# Patient Record
Sex: MALE | Employment: OTHER | URBAN - METROPOLITAN AREA
[De-identification: names, ages, dates, MRNs, and addresses within clinical notes are randomized per-mention and may not be internally consistent; named-entity substitution may affect disease eponyms.]

---

## 2019-11-29 ENCOUNTER — ANESTHESIA (OUTPATIENT)
Dept: OPERATING ROOM | Age: 34
DRG: 463 | End: 2019-11-29
Payer: MEDICARE

## 2019-11-29 ENCOUNTER — APPOINTMENT (OUTPATIENT)
Dept: GENERAL RADIOLOGY | Age: 34
DRG: 463 | End: 2019-11-29
Attending: INTERNAL MEDICINE
Payer: MEDICARE

## 2019-11-29 ENCOUNTER — HOSPITAL ENCOUNTER (INPATIENT)
Age: 34
LOS: 48 days | Discharge: NURSING, REHAB OR LONG-TERM CARE | DRG: 463 | End: 2020-01-16
Attending: INTERNAL MEDICINE | Admitting: INTERNAL MEDICINE
Payer: MEDICARE

## 2019-11-29 ENCOUNTER — ANESTHESIA EVENT (OUTPATIENT)
Dept: OPERATING ROOM | Age: 34
DRG: 463 | End: 2019-11-29
Payer: MEDICARE

## 2019-11-29 VITALS
SYSTOLIC BLOOD PRESSURE: 107 MMHG | DIASTOLIC BLOOD PRESSURE: 73 MMHG | RESPIRATION RATE: 18 BRPM | TEMPERATURE: 98.8 F | OXYGEN SATURATION: 98 %

## 2019-11-29 PROBLEM — L97.509 DIABETIC FOOT ULCER (HCC): Status: ACTIVE | Noted: 2019-11-29

## 2019-11-29 PROBLEM — M72.6 NECROTIZING FASCIITIS (HCC): Status: ACTIVE | Noted: 2019-11-29

## 2019-11-29 PROBLEM — I10 HYPERTENSION: Status: ACTIVE | Noted: 2019-11-29

## 2019-11-29 PROBLEM — E11.9 DIABETES (HCC): Status: ACTIVE | Noted: 2019-11-29

## 2019-11-29 PROBLEM — E11.621 DIABETIC FOOT ULCER (HCC): Status: ACTIVE | Noted: 2019-11-29

## 2019-11-29 LAB
-: NORMAL
ABSOLUTE EOS #: 0 K/UL (ref 0–0.4)
ABSOLUTE EOS #: 0 K/UL (ref 0–0.4)
ABSOLUTE IMMATURE GRANULOCYTE: 0.28 K/UL (ref 0–0.3)
ABSOLUTE IMMATURE GRANULOCYTE: 0.51 K/UL (ref 0–0.3)
ABSOLUTE LYMPH #: 1.01 K/UL (ref 1–4.8)
ABSOLUTE LYMPH #: 1.13 K/UL (ref 1–4.8)
ABSOLUTE MONO #: 1.42 K/UL (ref 0.1–0.8)
ABSOLUTE MONO #: 2.02 K/UL (ref 0.1–0.8)
ALLEN TEST: POSITIVE
AMORPHOUS: NORMAL
ANION GAP SERPL CALCULATED.3IONS-SCNC: 13 MMOL/L (ref 9–17)
ANION GAP SERPL CALCULATED.3IONS-SCNC: 13 MMOL/L (ref 9–17)
BACTERIA: NORMAL
BASOPHILS # BLD: 0 % (ref 0–2)
BASOPHILS # BLD: 0 % (ref 0–2)
BASOPHILS ABSOLUTE: 0 K/UL (ref 0–0.2)
BASOPHILS ABSOLUTE: 0 K/UL (ref 0–0.2)
BILIRUBIN URINE: NEGATIVE
BUN BLDV-MCNC: 57 MG/DL (ref 6–20)
BUN BLDV-MCNC: 59 MG/DL (ref 6–20)
BUN/CREAT BLD: ABNORMAL (ref 9–20)
BUN/CREAT BLD: ABNORMAL (ref 9–20)
C-REACTIVE PROTEIN: 253.8 MG/L (ref 0–5)
CALCIUM IONIZED: 1.17 MMOL/L (ref 1.13–1.33)
CALCIUM SERPL-MCNC: 7.4 MG/DL (ref 8.6–10.4)
CALCIUM SERPL-MCNC: 7.8 MG/DL (ref 8.6–10.4)
CASTS UA: NORMAL /LPF (ref 0–8)
CHLORIDE BLD-SCNC: 108 MMOL/L (ref 98–107)
CHLORIDE BLD-SCNC: 108 MMOL/L (ref 98–107)
CO2: 11 MMOL/L (ref 20–31)
CO2: 11 MMOL/L (ref 20–31)
COLOR: YELLOW
COMMENT UA: ABNORMAL
CREAT SERPL-MCNC: 2.39 MG/DL (ref 0.7–1.2)
CREAT SERPL-MCNC: 2.56 MG/DL (ref 0.7–1.2)
CRYSTALS, UA: NORMAL /HPF
DIFFERENTIAL TYPE: ABNORMAL
DIFFERENTIAL TYPE: ABNORMAL
EOSINOPHILS RELATIVE PERCENT: 0 % (ref 1–4)
EOSINOPHILS RELATIVE PERCENT: 0 % (ref 1–4)
EPITHELIAL CELLS UA: NORMAL /HPF (ref 0–5)
FIO2: 40
GFR AFRICAN AMERICAN: 35 ML/MIN
GFR AFRICAN AMERICAN: 37 ML/MIN
GFR NON-AFRICAN AMERICAN: 29 ML/MIN
GFR NON-AFRICAN AMERICAN: 31 ML/MIN
GFR SERPL CREATININE-BSD FRML MDRD: ABNORMAL ML/MIN/{1.73_M2}
GLUCOSE BLD-MCNC: 144 MG/DL (ref 70–99)
GLUCOSE BLD-MCNC: 147 MG/DL (ref 75–110)
GLUCOSE BLD-MCNC: 163 MG/DL (ref 70–99)
GLUCOSE URINE: NEGATIVE
HCT VFR BLD CALC: 29.6 % (ref 40.7–50.3)
HCT VFR BLD CALC: 33.7 % (ref 40.7–50.3)
HEMOGLOBIN: 8.5 G/DL (ref 13–17)
HEMOGLOBIN: 9.5 G/DL (ref 13–17)
IMMATURE GRANULOCYTES: 1 %
IMMATURE GRANULOCYTES: 1 %
INR BLD: 1.1
KETONES, URINE: NEGATIVE
LACTIC ACID, WHOLE BLOOD: 1.6 MMOL/L (ref 0.7–2.1)
LACTIC ACID, WHOLE BLOOD: 1.9 MMOL/L (ref 0.7–2.1)
LACTIC ACID: NORMAL MMOL/L
LEUKOCYTE ESTERASE, URINE: NEGATIVE
LYMPHOCYTES # BLD: 2 % (ref 24–44)
LYMPHOCYTES # BLD: 4 % (ref 24–44)
MAGNESIUM: 1.7 MG/DL (ref 1.6–2.6)
MAGNESIUM: 1.9 MG/DL (ref 1.6–2.6)
MCH RBC QN AUTO: 21.3 PG (ref 25.2–33.5)
MCH RBC QN AUTO: 22.6 PG (ref 25.2–33.5)
MCHC RBC AUTO-ENTMCNC: 28.2 G/DL (ref 28.4–34.8)
MCHC RBC AUTO-ENTMCNC: 28.7 G/DL (ref 28.4–34.8)
MCV RBC AUTO: 74 FL (ref 82.6–102.9)
MCV RBC AUTO: 80.2 FL (ref 82.6–102.9)
MODE: ABNORMAL
MONOCYTES # BLD: 4 % (ref 1–7)
MONOCYTES # BLD: 5 % (ref 1–7)
MORPHOLOGY: ABNORMAL
MRSA, DNA, NASAL: ABNORMAL
MUCUS: NORMAL
NEGATIVE BASE EXCESS, ART: 20 (ref 0–2)
NITRITE, URINE: NEGATIVE
NRBC AUTOMATED: 0.2 PER 100 WBC
NRBC AUTOMATED: 0.8 PER 100 WBC
O2 DEVICE/FLOW/%: ABNORMAL
OTHER OBSERVATIONS UA: NORMAL
PATIENT TEMP: ABNORMAL
PDW BLD-RTO: 19.4 % (ref 11.8–14.4)
PDW BLD-RTO: 21 % (ref 11.8–14.4)
PH UA: 5 (ref 5–8)
PHOSPHORUS: 4.6 MG/DL (ref 2.5–4.5)
PHOSPHORUS: 6.3 MG/DL (ref 2.5–4.5)
PLATELET # BLD: 492 K/UL (ref 138–453)
PLATELET # BLD: 645 K/UL (ref 138–453)
PLATELET ESTIMATE: ABNORMAL
PLATELET ESTIMATE: ABNORMAL
PMV BLD AUTO: 8.4 FL (ref 8.1–13.5)
PMV BLD AUTO: 8.5 FL (ref 8.1–13.5)
POC HCO3: 11 MMOL/L (ref 21–28)
POC O2 SATURATION: 96 % (ref 94–98)
POC PCO2 TEMP: ABNORMAL MM HG
POC PCO2: 47.5 MM HG (ref 35–48)
POC PH TEMP: ABNORMAL
POC PH: 6.97 (ref 7.35–7.45)
POC PO2 TEMP: ABNORMAL MM HG
POC PO2: 130.8 MM HG (ref 83–108)
POSITIVE BASE EXCESS, ART: ABNORMAL (ref 0–3)
POTASSIUM SERPL-SCNC: 4.8 MMOL/L (ref 3.7–5.3)
POTASSIUM SERPL-SCNC: 5.5 MMOL/L (ref 3.7–5.3)
PROTEIN UA: ABNORMAL
PROTHROMBIN TIME: 11.9 SEC (ref 9–12)
RBC # BLD: 4 M/UL (ref 4.21–5.77)
RBC # BLD: 4.2 M/UL (ref 4.21–5.77)
RBC # BLD: ABNORMAL 10*6/UL
RBC # BLD: ABNORMAL 10*6/UL
RBC UA: NORMAL /HPF (ref 0–4)
RENAL EPITHELIAL, UA: NORMAL /HPF
SAMPLE SITE: ABNORMAL
SEDIMENTATION RATE, ERYTHROCYTE: 66 MM (ref 0–10)
SEG NEUTROPHILS: 90 % (ref 36–66)
SEG NEUTROPHILS: 93 % (ref 36–66)
SEGMENTED NEUTROPHILS ABSOLUTE COUNT: 25.47 K/UL (ref 1.8–7.7)
SEGMENTED NEUTROPHILS ABSOLUTE COUNT: 46.96 K/UL (ref 1.8–7.7)
SODIUM BLD-SCNC: 132 MMOL/L (ref 135–144)
SODIUM BLD-SCNC: 132 MMOL/L (ref 135–144)
SPECIFIC GRAVITY UA: 1.01 (ref 1–1.03)
SPECIMEN DESCRIPTION: ABNORMAL
TCO2 (CALC), ART: 13 MMOL/L (ref 22–29)
TRICHOMONAS: NORMAL
TURBIDITY: ABNORMAL
URINE HGB: ABNORMAL
UROBILINOGEN, URINE: NORMAL
WBC # BLD: 28.3 K/UL (ref 3.5–11.3)
WBC # BLD: 50.5 K/UL (ref 3.5–11.3)
WBC # BLD: ABNORMAL 10*3/UL
WBC # BLD: ABNORMAL 10*3/UL
WBC UA: NORMAL /HPF (ref 0–5)
YEAST: NORMAL

## 2019-11-29 PROCEDURE — 83036 HEMOGLOBIN GLYCOSYLATED A1C: CPT

## 2019-11-29 PROCEDURE — 2000000000 HC ICU R&B

## 2019-11-29 PROCEDURE — 0JB70ZZ EXCISION OF BACK SUBCUTANEOUS TISSUE AND FASCIA, OPEN APPROACH: ICD-10-PCS | Performed by: SURGERY

## 2019-11-29 PROCEDURE — 87176 TISSUE HOMOGENIZATION CULTR: CPT

## 2019-11-29 PROCEDURE — 02H633Z INSERTION OF INFUSION DEVICE INTO RIGHT ATRIUM, PERCUTANEOUS APPROACH: ICD-10-PCS | Performed by: SURGERY

## 2019-11-29 PROCEDURE — 71045 X-RAY EXAM CHEST 1 VIEW: CPT

## 2019-11-29 PROCEDURE — 86900 BLOOD TYPING SEROLOGIC ABO: CPT

## 2019-11-29 PROCEDURE — 6370000000 HC RX 637 (ALT 250 FOR IP): Performed by: STUDENT IN AN ORGANIZED HEALTH CARE EDUCATION/TRAINING PROGRAM

## 2019-11-29 PROCEDURE — 2580000003 HC RX 258: Performed by: SURGERY

## 2019-11-29 PROCEDURE — 99222 1ST HOSP IP/OBS MODERATE 55: CPT | Performed by: INTERNAL MEDICINE

## 2019-11-29 PROCEDURE — 36556 INSERT NON-TUNNEL CV CATH: CPT

## 2019-11-29 PROCEDURE — 83605 ASSAY OF LACTIC ACID: CPT

## 2019-11-29 PROCEDURE — 3700000001 HC ADD 15 MINUTES (ANESTHESIA): Performed by: SURGERY

## 2019-11-29 PROCEDURE — 94002 VENT MGMT INPAT INIT DAY: CPT

## 2019-11-29 PROCEDURE — 80048 BASIC METABOLIC PNL TOTAL CA: CPT

## 2019-11-29 PROCEDURE — 82947 ASSAY GLUCOSE BLOOD QUANT: CPT

## 2019-11-29 PROCEDURE — 3700000000 HC ANESTHESIA ATTENDED CARE: Performed by: SURGERY

## 2019-11-29 PROCEDURE — 73590 X-RAY EXAM OF LOWER LEG: CPT

## 2019-11-29 PROCEDURE — 85651 RBC SED RATE NONAUTOMATED: CPT

## 2019-11-29 PROCEDURE — 88304 TISSUE EXAM BY PATHOLOGIST: CPT

## 2019-11-29 PROCEDURE — 87070 CULTURE OTHR SPECIMN AEROBIC: CPT

## 2019-11-29 PROCEDURE — 2580000003 HC RX 258: Performed by: SPECIALIST

## 2019-11-29 PROCEDURE — 87641 MR-STAPH DNA AMP PROBE: CPT

## 2019-11-29 PROCEDURE — 82330 ASSAY OF CALCIUM: CPT

## 2019-11-29 PROCEDURE — 85025 COMPLETE CBC W/AUTO DIFF WBC: CPT

## 2019-11-29 PROCEDURE — 6360000002 HC RX W HCPCS: Performed by: STUDENT IN AN ORGANIZED HEALTH CARE EDUCATION/TRAINING PROGRAM

## 2019-11-29 PROCEDURE — 94770 HC ETCO2 MONITOR DAILY: CPT

## 2019-11-29 PROCEDURE — 2580000003 HC RX 258: Performed by: STUDENT IN AN ORGANIZED HEALTH CARE EDUCATION/TRAINING PROGRAM

## 2019-11-29 PROCEDURE — 2500000003 HC RX 250 WO HCPCS: Performed by: SPECIALIST

## 2019-11-29 PROCEDURE — 86140 C-REACTIVE PROTEIN: CPT

## 2019-11-29 PROCEDURE — 2780000010 HC IMPLANT OTHER: Performed by: SURGERY

## 2019-11-29 PROCEDURE — 87040 BLOOD CULTURE FOR BACTERIA: CPT

## 2019-11-29 PROCEDURE — 87205 SMEAR GRAM STAIN: CPT

## 2019-11-29 PROCEDURE — 2500000003 HC RX 250 WO HCPCS: Performed by: STUDENT IN AN ORGANIZED HEALTH CARE EDUCATION/TRAINING PROGRAM

## 2019-11-29 PROCEDURE — 82803 BLOOD GASES ANY COMBINATION: CPT

## 2019-11-29 PROCEDURE — 0DBQ0ZZ EXCISION OF ANUS, OPEN APPROACH: ICD-10-PCS | Performed by: SURGERY

## 2019-11-29 PROCEDURE — 87086 URINE CULTURE/COLONY COUNT: CPT

## 2019-11-29 PROCEDURE — 2709999900 HC NON-CHARGEABLE SUPPLY: Performed by: SURGERY

## 2019-11-29 PROCEDURE — 86850 RBC ANTIBODY SCREEN: CPT

## 2019-11-29 PROCEDURE — 87075 CULTR BACTERIA EXCEPT BLOOD: CPT

## 2019-11-29 PROCEDURE — 86920 COMPATIBILITY TEST SPIN: CPT

## 2019-11-29 PROCEDURE — P9016 RBC LEUKOCYTES REDUCED: HCPCS

## 2019-11-29 PROCEDURE — 3600000015 HC SURGERY LEVEL 5 ADDTL 15MIN: Performed by: SURGERY

## 2019-11-29 PROCEDURE — 37799 UNLISTED PX VASCULAR SURGERY: CPT

## 2019-11-29 PROCEDURE — 6370000000 HC RX 637 (ALT 250 FOR IP): Performed by: SURGERY

## 2019-11-29 PROCEDURE — 86901 BLOOD TYPING SEROLOGIC RH(D): CPT

## 2019-11-29 PROCEDURE — 3600000005 HC SURGERY LEVEL 5 BASE: Performed by: SURGERY

## 2019-11-29 PROCEDURE — 73630 X-RAY EXAM OF FOOT: CPT

## 2019-11-29 PROCEDURE — 0V950ZZ DRAINAGE OF SCROTUM, OPEN APPROACH: ICD-10-PCS | Performed by: UROLOGY

## 2019-11-29 PROCEDURE — 83735 ASSAY OF MAGNESIUM: CPT

## 2019-11-29 PROCEDURE — 0VB5XZZ EXCISION OF SCROTUM, EXTERNAL APPROACH: ICD-10-PCS | Performed by: UROLOGY

## 2019-11-29 PROCEDURE — 36620 INSERTION CATHETER ARTERY: CPT

## 2019-11-29 PROCEDURE — 84100 ASSAY OF PHOSPHORUS: CPT

## 2019-11-29 PROCEDURE — 85610 PROTHROMBIN TIME: CPT

## 2019-11-29 PROCEDURE — 2500000003 HC RX 250 WO HCPCS: Performed by: ANESTHESIOLOGY

## 2019-11-29 PROCEDURE — 94761 N-INVAS EAR/PLS OXIMETRY MLT: CPT

## 2019-11-29 PROCEDURE — 0JBB0ZZ EXCISION OF PERINEUM SUBCUTANEOUS TISSUE AND FASCIA, OPEN APPROACH: ICD-10-PCS | Performed by: SURGERY

## 2019-11-29 PROCEDURE — 81001 URINALYSIS AUTO W/SCOPE: CPT

## 2019-11-29 PROCEDURE — 6360000002 HC RX W HCPCS: Performed by: SPECIALIST

## 2019-11-29 PROCEDURE — 0JB90ZZ EXCISION OF BUTTOCK SUBCUTANEOUS TISSUE AND FASCIA, OPEN APPROACH: ICD-10-PCS | Performed by: SURGERY

## 2019-11-29 RX ORDER — SODIUM CHLORIDE, SODIUM LACTATE, POTASSIUM CHLORIDE, CALCIUM CHLORIDE 600; 310; 30; 20 MG/100ML; MG/100ML; MG/100ML; MG/100ML
INJECTION, SOLUTION INTRAVENOUS CONTINUOUS PRN
Status: DISCONTINUED | OUTPATIENT
Start: 2019-11-29 | End: 2019-11-29 | Stop reason: SDUPTHER

## 2019-11-29 RX ORDER — FENTANYL CITRATE 50 UG/ML
25 INJECTION, SOLUTION INTRAMUSCULAR; INTRAVENOUS
Status: DISCONTINUED | OUTPATIENT
Start: 2019-11-29 | End: 2019-11-29

## 2019-11-29 RX ORDER — MIDAZOLAM HYDROCHLORIDE 1 MG/ML
INJECTION INTRAMUSCULAR; INTRAVENOUS PRN
Status: DISCONTINUED | OUTPATIENT
Start: 2019-11-29 | End: 2019-11-29 | Stop reason: SDUPTHER

## 2019-11-29 RX ORDER — 0.9 % SODIUM CHLORIDE 0.9 %
30 INTRAVENOUS SOLUTION INTRAVENOUS ONCE
Status: DISCONTINUED | OUTPATIENT
Start: 2019-11-29 | End: 2019-11-29

## 2019-11-29 RX ORDER — SODIUM HYPOCHLORITE 1.25 MG/ML
SOLUTION TOPICAL ONCE
Status: DISCONTINUED | OUTPATIENT
Start: 2019-11-29 | End: 2019-12-05

## 2019-11-29 RX ORDER — DEXTROSE MONOHYDRATE 25 G/50ML
12.5 INJECTION, SOLUTION INTRAVENOUS PRN
Status: DISCONTINUED | OUTPATIENT
Start: 2019-11-29 | End: 2019-12-05

## 2019-11-29 RX ORDER — HEPARIN SODIUM 5000 [USP'U]/ML
5000 INJECTION, SOLUTION INTRAVENOUS; SUBCUTANEOUS EVERY 8 HOURS SCHEDULED
Status: DISCONTINUED | OUTPATIENT
Start: 2019-11-29 | End: 2020-01-16 | Stop reason: HOSPADM

## 2019-11-29 RX ORDER — DEXTROSE MONOHYDRATE 25 G/50ML
25 INJECTION, SOLUTION INTRAVENOUS ONCE
Status: COMPLETED | OUTPATIENT
Start: 2019-11-29 | End: 2019-11-29

## 2019-11-29 RX ORDER — ROCURONIUM BROMIDE 10 MG/ML
INJECTION, SOLUTION INTRAVENOUS PRN
Status: DISCONTINUED | OUTPATIENT
Start: 2019-11-29 | End: 2019-11-29 | Stop reason: SDUPTHER

## 2019-11-29 RX ORDER — SODIUM CHLORIDE 9 MG/ML
INJECTION, SOLUTION INTRAVENOUS CONTINUOUS PRN
Status: DISCONTINUED | OUTPATIENT
Start: 2019-11-29 | End: 2019-11-29 | Stop reason: SDUPTHER

## 2019-11-29 RX ORDER — FENTANYL CITRATE 50 UG/ML
50 INJECTION, SOLUTION INTRAMUSCULAR; INTRAVENOUS
Status: DISCONTINUED | OUTPATIENT
Start: 2019-11-29 | End: 2019-11-29

## 2019-11-29 RX ORDER — NICOTINE POLACRILEX 4 MG
15 LOZENGE BUCCAL PRN
Status: DISCONTINUED | OUTPATIENT
Start: 2019-11-29 | End: 2019-12-05

## 2019-11-29 RX ORDER — SODIUM CHLORIDE 0.9 % (FLUSH) 0.9 %
10 SYRINGE (ML) INJECTION EVERY 12 HOURS SCHEDULED
Status: DISCONTINUED | OUTPATIENT
Start: 2019-11-29 | End: 2020-01-16 | Stop reason: HOSPADM

## 2019-11-29 RX ORDER — DEXTROSE MONOHYDRATE 50 MG/ML
100 INJECTION, SOLUTION INTRAVENOUS PRN
Status: DISCONTINUED | OUTPATIENT
Start: 2019-11-29 | End: 2019-12-05

## 2019-11-29 RX ORDER — SODIUM CHLORIDE 0.9 % (FLUSH) 0.9 %
10 SYRINGE (ML) INJECTION EVERY 12 HOURS SCHEDULED
Status: DISCONTINUED | OUTPATIENT
Start: 2019-11-29 | End: 2019-11-29 | Stop reason: SDUPTHER

## 2019-11-29 RX ORDER — SODIUM CHLORIDE, SODIUM LACTATE, POTASSIUM CHLORIDE, CALCIUM CHLORIDE 600; 310; 30; 20 MG/100ML; MG/100ML; MG/100ML; MG/100ML
INJECTION, SOLUTION INTRAVENOUS CONTINUOUS
Status: DISCONTINUED | OUTPATIENT
Start: 2019-11-29 | End: 2019-11-29

## 2019-11-29 RX ORDER — POTASSIUM CHLORIDE 7.45 MG/ML
10 INJECTION INTRAVENOUS PRN
Status: DISCONTINUED | OUTPATIENT
Start: 2019-11-29 | End: 2019-12-02

## 2019-11-29 RX ORDER — ONDANSETRON 2 MG/ML
4 INJECTION INTRAMUSCULAR; INTRAVENOUS EVERY 6 HOURS PRN
Status: DISCONTINUED | OUTPATIENT
Start: 2019-11-29 | End: 2020-01-16 | Stop reason: HOSPADM

## 2019-11-29 RX ORDER — SODIUM CHLORIDE 0.9 % (FLUSH) 0.9 %
10 SYRINGE (ML) INJECTION PRN
Status: DISCONTINUED | OUTPATIENT
Start: 2019-11-29 | End: 2019-11-29 | Stop reason: SDUPTHER

## 2019-11-29 RX ORDER — 0.9 % SODIUM CHLORIDE 0.9 %
250 INTRAVENOUS SOLUTION INTRAVENOUS ONCE
Status: DISCONTINUED | OUTPATIENT
Start: 2019-11-29 | End: 2019-11-29

## 2019-11-29 RX ORDER — MAGNESIUM HYDROXIDE 1200 MG/15ML
LIQUID ORAL CONTINUOUS PRN
Status: COMPLETED | OUTPATIENT
Start: 2019-11-29 | End: 2019-11-29

## 2019-11-29 RX ORDER — CALCIUM CHLORIDE 100 MG/ML
INJECTION INTRAVENOUS; INTRAVENTRICULAR PRN
Status: DISCONTINUED | OUTPATIENT
Start: 2019-11-29 | End: 2019-11-29 | Stop reason: SDUPTHER

## 2019-11-29 RX ORDER — CHLORHEXIDINE GLUCONATE 0.12 MG/ML
15 RINSE ORAL 2 TIMES DAILY
Status: DISCONTINUED | OUTPATIENT
Start: 2019-11-29 | End: 2019-12-05

## 2019-11-29 RX ORDER — SODIUM CHLORIDE 9 MG/ML
INJECTION, SOLUTION INTRAVENOUS CONTINUOUS
Status: DISCONTINUED | OUTPATIENT
Start: 2019-11-29 | End: 2019-11-30

## 2019-11-29 RX ORDER — FENTANYL CITRATE 50 UG/ML
25 INJECTION, SOLUTION INTRAMUSCULAR; INTRAVENOUS
Status: DISCONTINUED | OUTPATIENT
Start: 2019-11-29 | End: 2019-12-05

## 2019-11-29 RX ORDER — FENTANYL CITRATE 50 UG/ML
INJECTION, SOLUTION INTRAMUSCULAR; INTRAVENOUS PRN
Status: DISCONTINUED | OUTPATIENT
Start: 2019-11-29 | End: 2019-11-29 | Stop reason: SDUPTHER

## 2019-11-29 RX ORDER — SODIUM CHLORIDE 9 MG/ML
INJECTION, SOLUTION INTRAVENOUS CONTINUOUS PRN
Status: DISCONTINUED | OUTPATIENT
Start: 2019-11-29 | End: 2019-11-29

## 2019-11-29 RX ORDER — CLINDAMYCIN PHOSPHATE 900 MG/50ML
900 INJECTION INTRAVENOUS EVERY 8 HOURS
Status: DISCONTINUED | OUTPATIENT
Start: 2019-11-29 | End: 2019-11-29

## 2019-11-29 RX ORDER — ETOMIDATE 2 MG/ML
INJECTION INTRAVENOUS PRN
Status: DISCONTINUED | OUTPATIENT
Start: 2019-11-29 | End: 2019-11-29 | Stop reason: SDUPTHER

## 2019-11-29 RX ORDER — SODIUM CHLORIDE 0.9 % (FLUSH) 0.9 %
10 SYRINGE (ML) INJECTION PRN
Status: DISCONTINUED | OUTPATIENT
Start: 2019-11-29 | End: 2019-12-15

## 2019-11-29 RX ORDER — SODIUM CHLORIDE, SODIUM LACTATE, POTASSIUM CHLORIDE, AND CALCIUM CHLORIDE .6; .31; .03; .02 G/100ML; G/100ML; G/100ML; G/100ML
30 INJECTION, SOLUTION INTRAVENOUS ONCE
Status: DISCONTINUED | OUTPATIENT
Start: 2019-11-29 | End: 2019-12-05

## 2019-11-29 RX ORDER — LIDOCAINE HYDROCHLORIDE 10 MG/ML
INJECTION, SOLUTION EPIDURAL; INFILTRATION; INTRACAUDAL; PERINEURAL PRN
Status: DISCONTINUED | OUTPATIENT
Start: 2019-11-29 | End: 2019-11-29 | Stop reason: SDUPTHER

## 2019-11-29 RX ADMIN — PHENYLEPHRINE HYDROCHLORIDE 200 MCG: 10 INJECTION INTRAVENOUS at 18:59

## 2019-11-29 RX ADMIN — SODIUM CHLORIDE, POTASSIUM CHLORIDE, SODIUM LACTATE AND CALCIUM CHLORIDE: 600; 310; 30; 20 INJECTION, SOLUTION INTRAVENOUS at 17:49

## 2019-11-29 RX ADMIN — CALCIUM CHLORIDE 0.5 G: 100 INJECTION, SOLUTION INTRAVENOUS; INTRAVENTRICULAR at 19:46

## 2019-11-29 RX ADMIN — FENTANYL CITRATE 100 MCG: 50 INJECTION, SOLUTION INTRAMUSCULAR; INTRAVENOUS at 18:20

## 2019-11-29 RX ADMIN — PHENYLEPHRINE HYDROCHLORIDE 200 MCG: 10 INJECTION INTRAVENOUS at 18:56

## 2019-11-29 RX ADMIN — Medication 100 MCG/HR: at 21:08

## 2019-11-29 RX ADMIN — MIDAZOLAM HYDROCHLORIDE 4 MG: 1 INJECTION, SOLUTION INTRAMUSCULAR; INTRAVENOUS at 19:46

## 2019-11-29 RX ADMIN — PHENYLEPHRINE HYDROCHLORIDE 200 MCG: 10 INJECTION INTRAVENOUS at 18:43

## 2019-11-29 RX ADMIN — FAMOTIDINE 20 MG: 10 INJECTION, SOLUTION INTRAVENOUS at 22:13

## 2019-11-29 RX ADMIN — PHENYLEPHRINE HYDROCHLORIDE 200 MCG: 10 INJECTION INTRAVENOUS at 18:45

## 2019-11-29 RX ADMIN — ETOMIDATE 14 MG: 2 INJECTION, SOLUTION INTRAVENOUS at 17:49

## 2019-11-29 RX ADMIN — Medication 0.02 MCG/KG/MIN: at 18:36

## 2019-11-29 RX ADMIN — INSULIN LISPRO 1 UNITS: 100 INJECTION, SOLUTION INTRAVENOUS; SUBCUTANEOUS at 22:13

## 2019-11-29 RX ADMIN — HEPARIN SODIUM 5000 UNITS: 5000 INJECTION INTRAVENOUS; SUBCUTANEOUS at 23:31

## 2019-11-29 RX ADMIN — SODIUM CHLORIDE: 9 INJECTION, SOLUTION INTRAVENOUS at 18:32

## 2019-11-29 RX ADMIN — SODIUM CHLORIDE: 9 INJECTION, SOLUTION INTRAVENOUS at 22:09

## 2019-11-29 RX ADMIN — SODIUM CHLORIDE, POTASSIUM CHLORIDE, SODIUM LACTATE AND CALCIUM CHLORIDE: 600; 310; 30; 20 INJECTION, SOLUTION INTRAVENOUS at 21:12

## 2019-11-29 RX ADMIN — SODIUM BICARBONATE 100 MEQ: 84 INJECTION INTRAVENOUS at 23:53

## 2019-11-29 RX ADMIN — INSULIN HUMAN 10 UNITS: 100 INJECTION, SOLUTION PARENTERAL at 22:49

## 2019-11-29 RX ADMIN — DEXTROSE MONOHYDRATE 25 G: 500 INJECTION PARENTERAL at 22:48

## 2019-11-29 RX ADMIN — FENTANYL CITRATE 50 MCG: 50 INJECTION, SOLUTION INTRAMUSCULAR; INTRAVENOUS at 16:18

## 2019-11-29 RX ADMIN — LIDOCAINE HYDROCHLORIDE 50 MG: 10 INJECTION, SOLUTION EPIDURAL; INFILTRATION; INTRACAUDAL; PERINEURAL at 17:49

## 2019-11-29 RX ADMIN — SODIUM CHLORIDE, POTASSIUM CHLORIDE, SODIUM LACTATE AND CALCIUM CHLORIDE: 600; 310; 30; 20 INJECTION, SOLUTION INTRAVENOUS at 16:12

## 2019-11-29 RX ADMIN — NOREPINEPHRINE BITARTRATE 10 MCG/MIN: 1 INJECTION, SOLUTION, CONCENTRATE INTRAVENOUS at 21:09

## 2019-11-29 RX ADMIN — CALCIUM CHLORIDE 0.5 G: 100 INJECTION, SOLUTION INTRAVENOUS; INTRAVENTRICULAR at 19:52

## 2019-11-29 RX ADMIN — PHENYLEPHRINE HYDROCHLORIDE 200 MCG: 10 INJECTION INTRAVENOUS at 19:07

## 2019-11-29 RX ADMIN — ROCURONIUM BROMIDE 80 MG: 10 INJECTION INTRAVENOUS at 17:49

## 2019-11-29 RX ADMIN — MIDAZOLAM HYDROCHLORIDE 2 MG: 1 INJECTION, SOLUTION INTRAMUSCULAR; INTRAVENOUS at 18:20

## 2019-11-29 ASSESSMENT — PULMONARY FUNCTION TESTS
PIF_VALUE: 24
PIF_VALUE: 23
PIF_VALUE: 26
PIF_VALUE: 26
PIF_VALUE: 27
PIF_VALUE: 24
PIF_VALUE: 26
PIF_VALUE: 24
PIF_VALUE: 28
PIF_VALUE: 23
PIF_VALUE: 23
PIF_VALUE: 24
PIF_VALUE: 26
PIF_VALUE: 27
PIF_VALUE: 4
PIF_VALUE: 26
PIF_VALUE: 27
PIF_VALUE: 24
PIF_VALUE: 23
PIF_VALUE: 24
PIF_VALUE: 25
PIF_VALUE: 26
PIF_VALUE: 24
PIF_VALUE: 31
PIF_VALUE: 26
PIF_VALUE: 26
PIF_VALUE: 23
PIF_VALUE: 25
PIF_VALUE: 26
PIF_VALUE: 25
PIF_VALUE: 1
PIF_VALUE: 27
PIF_VALUE: 27
PIF_VALUE: 24
PIF_VALUE: 25
PIF_VALUE: 23
PIF_VALUE: 26
PIF_VALUE: 26
PIF_VALUE: 24
PIF_VALUE: 25
PIF_VALUE: 24
PIF_VALUE: 28
PIF_VALUE: 23
PIF_VALUE: 27
PIF_VALUE: 24
PIF_VALUE: 24
PIF_VALUE: 31
PIF_VALUE: 28
PIF_VALUE: 35
PIF_VALUE: 24
PIF_VALUE: 25
PIF_VALUE: 24
PIF_VALUE: 18
PIF_VALUE: 25
PIF_VALUE: 23
PIF_VALUE: 24
PIF_VALUE: 27
PIF_VALUE: 26
PIF_VALUE: 26
PIF_VALUE: 23
PIF_VALUE: 24
PIF_VALUE: 2
PIF_VALUE: 25
PIF_VALUE: 26
PIF_VALUE: 26
PIF_VALUE: 27
PIF_VALUE: 26
PIF_VALUE: 27
PIF_VALUE: 26
PIF_VALUE: 24
PIF_VALUE: 24
PIF_VALUE: 26
PIF_VALUE: 25
PIF_VALUE: 23
PIF_VALUE: 27
PIF_VALUE: 24
PIF_VALUE: 25
PIF_VALUE: 27
PIF_VALUE: 23
PIF_VALUE: 23
PIF_VALUE: 25
PIF_VALUE: 23
PIF_VALUE: 24
PIF_VALUE: 28
PIF_VALUE: 27
PIF_VALUE: 24
PIF_VALUE: 2
PIF_VALUE: 23
PIF_VALUE: 24
PIF_VALUE: 27
PIF_VALUE: 25
PIF_VALUE: 24
PIF_VALUE: 24
PIF_VALUE: 25
PIF_VALUE: 26
PIF_VALUE: 23
PIF_VALUE: 1
PIF_VALUE: 27
PIF_VALUE: 18
PIF_VALUE: 25
PIF_VALUE: 27
PIF_VALUE: 24
PIF_VALUE: 24
PIF_VALUE: 27
PIF_VALUE: 21
PIF_VALUE: 24
PIF_VALUE: 23
PIF_VALUE: 24
PIF_VALUE: 28
PIF_VALUE: 27
PIF_VALUE: 25
PIF_VALUE: 27
PIF_VALUE: 22
PIF_VALUE: 25
PIF_VALUE: 26
PIF_VALUE: 27
PIF_VALUE: 28
PIF_VALUE: 26
PIF_VALUE: 26
PIF_VALUE: 23
PIF_VALUE: 27
PIF_VALUE: 36
PIF_VALUE: 23
PIF_VALUE: 23
PIF_VALUE: 27
PIF_VALUE: 24
PIF_VALUE: 24
PIF_VALUE: 27
PIF_VALUE: 4
PIF_VALUE: 26
PIF_VALUE: 23

## 2019-11-29 ASSESSMENT — PAIN DESCRIPTION - ORIENTATION: ORIENTATION: POSTERIOR

## 2019-11-29 ASSESSMENT — PAIN DESCRIPTION - LOCATION: LOCATION: BUTTOCKS;SCROTUM

## 2019-11-29 ASSESSMENT — PAIN SCALES - GENERAL
PAINLEVEL_OUTOF10: 7
PAINLEVEL_OUTOF10: 7

## 2019-11-29 ASSESSMENT — PAIN DESCRIPTION - PAIN TYPE: TYPE: ACUTE PAIN

## 2019-11-29 ASSESSMENT — PAIN - FUNCTIONAL ASSESSMENT: PAIN_FUNCTIONAL_ASSESSMENT: PREVENTS OR INTERFERES WITH MANY ACTIVE NOT PASSIVE ACTIVITIES

## 2019-11-29 ASSESSMENT — PAIN DESCRIPTION - ONSET: ONSET: ON-GOING

## 2019-11-29 ASSESSMENT — PAIN DESCRIPTION - PROGRESSION: CLINICAL_PROGRESSION: GRADUALLY WORSENING

## 2019-11-29 ASSESSMENT — PAIN DESCRIPTION - FREQUENCY: FREQUENCY: CONTINUOUS

## 2019-11-29 NOTE — PROGRESS NOTES
Writer attempted to see patient for necrotic foot wound. Patient to OR for surgical debridement. Will attempt to see patient later with formal consult note.     Electronically signed by Brandi Daily DPM on 11/29/2019 at 5:38 PM

## 2019-11-29 NOTE — CONSULTS
History and Physical    PATIENT NAME: Tricia Moise  AGE: 37 y.o. MEDICAL RECORD NO. [id number]  DATE: 11/29/2019  SURGEON: Dr. Sotomayor Prim: No primary care provider on file. Patient evaluated at the request of  Dr. Dr. Sima Tapia  Reason for evaluation: Necrotizing fasciitis    Patient information was obtained from patient and past medical records. History/Exam limitations: none. Patient presented to the Emergency Department by ambulance where the patient received see Ambulance Run Sheet prior to arrival.    IMPRESSION:     Patient Active Problem List   Diagnosis    [facility]ecrotizing fasci[facility] ([facility]    Hypertension    Diabetes ([facility] [facility]    Diabetic foot ulcer ([facility] [address] [facility]       1. 38 yo M spina bifida h/o DM, wheel chair dependant presents 2-3 weeks wounds to buttocks/perinum seen at OSH for increasing weakness, found to have extensive infection to said areas and ct abd/pelv concerning for necrotizing fascitis. LRINEC score 12      MEDICAL DECISION MAKING AND PLAN:   1. OR for excision and debridement, due to extensive involvement of perianal region patient will need diverting colostomy. Risk, benefits and alternatives discussed with patient, all questions answered and informed consent obtained. 2. Abx: vanc, zosy, clindamycin  3. Rec 1L LR bolus  4. RIJ and R radial a line placed  5. Medical and supportive care per primary     Given extensive nature of disease on CT, will evaluate in the OR. Pending involvement patient may need transfer to quaternary center for plastic surgery evaluation for wound management. HISTORY:   History of Chief Complaint:    Tricia Moise is a 37 y.o. male  W/ h/o spina bifida w/ wheel chair dependence, dm presents with 2-3 week h/o wounds to buttocks/perineal region. Has been progressively getting weaker and do to this presented to OSH hospital where he had a ct abd/pelv concerning for necrotizing fascitis of perineum extending up left flank.  Due to this he was transferred to [facility]. . On initial examination pt was alert and oriented to person/place/time. Initial labs: wbc 18K  hgb 8.5, Na 132 Cl 108 Cr2. 56. Past Medical History   has no past medical history on file. Past Surgical History   has no past surgical history on file. Medications  Prior to Admission medications    Not on File    Scheduled Meds:   sodium chloride flush  10 mL Intravenous 2 times per day    piperacillin-tazobactam  3.375 g Intravenous Q8H    insulin lispro  0-12 Units Subcutaneous TID WC    insulin lispro  0-6 Units Subcutaneous Nightly    heparin (porcine)  5,000 Units Subcutaneous 3 times per day    vancomycin (VANCOCIN) intermittent dosing (placeholder)   Other RX Placeholder    lactated ringers bolus  30 mL/kg Intravenous Once    vancomycin  2,000 mg Intravenous Once     Continuous Infusions:   lactated ringers 125 mL/hr at 11/29/19 1612    dextrose       PRN Meds:.sodium chloride flush, magnesium hydroxide, ondansetron, potassium chloride, glucose, dextrose, glucagon (rDNA), dextrose, fentanNYL **OR** fentanNYL  Allergies  has no allergies on file. Family History  family history is not on file. Social History   has no history on file for tobacco.   has no history on file for alcohol.   has no history on file for drug. Review of Systems  General +weakness, fatigue, no fever, chills  HEENT Denies any diplopia, tinnitus or vertigo  Resp Denies any shortness of breath, cough or wheezing  Cardiac Denies any chest pain, palpitations, claudication or edema  GI Denies any melena, hematochezia, hematemesis or pyrosis   Denies any frequency, urgency, hesitancy or incontinence  Heme Denies bruising or bleeding easily  Endocrine +DM  Neuro +spina bifida     PHYSICAL:   VITALS:  height is 5' 9\" (1.753 m) and weight is 289 lb 14.4 oz (131.5 kg). His blood pressure is 138/96 (abnormal) and his pulse is 122. His respiration is 21 and oxygen saturation is 98%.    CONSTITUTIONAL: Alert recommendations with Resident, TECSS RN, bedside nurse. Patient seen and examined. Extensive necrotizing infection. Discussed potential need for permanent colostomy, multiple operations, ongoing sepsis, possible loss of testicles, ongoing sepsis, as well as death. Uncertain as to acceptance or level of true understanding of severity of infection. Also highlighted potential need for transfer to larger tertiary center. Prognosis guarded. Central access as well as art line placed.     Zia De León MD  11/29/2019  7:36 PM

## 2019-11-29 NOTE — PROGRESS NOTES
Pharmacy Note  Vancomycin Consult    Larry Pfeiffer is a 37 y.o. male started on Vancomycin for necrotizing fasciitis; consult received from Dr. Ольга Ashley to manage therapy. Also receiving the following antibiotics: Zosyn. Patient Active Problem List   Diagnosis    Necrotizing fasciitis ([facility] [address]    Hypertension    Diabetes ([facility] [address]    Diabetic foot ulcer ([facility] [address]       Allergies:  Patient has no allergy information on record. Temp max: N/A    No results for input(s): BUN in the last 72 hours. No results for input(s): CREATININE in the last 72 hours. No results for input(s): WBC in the last 72 hours. No intake or output data in the 24 hours ending 11/29/19 1637    Culture Date      Source                       Results  11/29/19               BC 2/2                        Sent  11/29/19               Urine                           Sent    Ht Readings from Last 1 Encounters:   11/29/19 5' 9\" (1.753 m)        Wt Readings from Last 1 Encounters:   11/29/19 289 lb 14.4 oz (131.5 kg)         Body mass index is 42.81 kg/m². CrCl cannot be calculated (No successful lab value found. ). Goal Trough Level: 15-20 mcg/mL    Assessment/Plan:  Will initiate vancomycin 2000 mg IV once (Acute renal failure; SCr = 2.82). A 24 hour random level has been ordered tomorrow at 1730. Thank you for the consult. Will continue to follow.    Inetta Angelucci, PharmD

## 2019-11-29 NOTE — CONSULTS
Beata Holland, Keely Christie, Yunior Lin, & Maxi  Urology Consultation      Patient:  Charmaine Dickens  MRN: [id number]  YOB: 1985    CHIEF COMPLAINT:  Necrotizing fascitis of scrotum   HISTORY OF PRESENT ILLNESS:   The patient is a 37 y.o. male who presents with PMH significant for spina bifida and chronic scrotal lymphedema, acute onset necrotizing infection of the perineum and the buttock region. Was also found to have involvement of the scrotum and urology is consulted for possible help during surgery. Complains of fever, foul smell emanating from the perineal area and was found to be tachycardic and tachypneic  on admission. H/O scrotal wall cellulitis 2 months ago treated with antibiotics / swelling persisted since then. Patient's old records, notes and chart reviewed and summarized above. Past Medical History:    No past medical history on file. Past Surgical History:    No past surgical history on file.     Medications:      Current Facility-Administered Medications:     sodium chloride flush 0.9 % injection 10 mL, 10 mL, Intravenous, 2 times per day, Mary Mayes MD    sodium chloride flush 0.9 % injection 10 mL, 10 mL, Intravenous, PRN, Mary Mayes MD    magnesium hydroxide (MILK OF MAGNESIA) 400 MG/5ML suspension 30 mL, 30 mL, Oral, Daily PRN, Mary Mayes MD    ondansetron [facility]) injection 4 mg, 4 mg, Intravenous, Q6H PRN, Mary Mayes MD    lactated ringers infusion, , Intravenous, Continuous, Mary Mayes MD, Last Rate: 125 mL/hr at 11/29/19 1612    potassium chloride 10 mEq/100 mL IVPB (Peripheral Line), 10 mEq, Intravenous, PRN, Mary Mayes MD    piperacillin-tazobactam (ZOSYN) 3.375 g in dextrose 5 % 50 mL IVPB extended infusion (mini-bag), 3.375 g, Intravenous, Q8H, Mary Mayes MD    glucose (GLUTOSE) 40 % oral gel 15 g, 15 g, Oral, PRN, Mary Mayes MD    dextrose 50 % IV solution, 12.5 g, Intravenous, PRN, Judge Vo non-distended. Neither side has CVA tenderness on exam.  Bladder non-tender and not distended. Lymphatics: no palpable lymphadenopathy. Penis normal and circumcised  Urethral meatus normal  Scrotum enlarged / lymphedema  Erythematous, with patchy skin gangrene involving the posterior 1/3 , close to the anus   Blanching erythema / possible extension of skin infection from nearby perineum      Labs:  Recent Labs     11/29/19  1633   WBC 28.3*   HGB 8.5*   HCT 29.6*   MCV 74.0*   *     Recent Labs     11/29/19  1633   *   K 4.8   *   CO2 11*   PHOS 4.6*   BUN 59*   CREATININE 2.56*       Recent Labs     11/29/19  1614   COLORU YELLOW   PHUR 5.0   WBCUA 5 TO 10   RBCUA 0 TO 2   MUCUS NOT REPORTED   TRICHOMONAS NOT REPORTED   YEAST NOT REPORTED   BACTERIA NOT REPORTED   SPECGRAV 1.015   LEUKOCYTESUR NEGATIVE   UROBILINOGEN Normal   BILIRUBINUR NEGATIVE       Culture Results:  @[facility]@      -----------------------------------------------------------------  Imaging Results:  Xr Chest Portable    Result Date: 11/29/2019  EXAMINATION: ONE XRAY VIEW OF THE CHEST 11/29/2019 4:45 pm COMPARISON: None. HISTORY: ORDERING SYSTEM PROVIDED HISTORY: CVC placement TECHNOLOGIST PROVIDED HISTORY: CVC placement Reason for Exam: CVC line/  AP erect/  gp used Acuity: Unknown Type of Exam: Unknown FINDINGS: AP portable view of the chest time stamped at 1634 hours demonstrates a right internal jugular catheter terminating in the base of the right atrium, overlying cardiac monitoring electrodes, and moderate cardiomegaly. No benjamin edema, localized consolidation or extrapleural air is noted. A 2nd line appears to extend from the neck right-side to the junction of the superior vena cava in brachiocephalic trunk. Osseous structures are age-appropriate. Moderate cardiomegaly. Lines as above.        CT abdomen and pelvis - perirectal and perianal necrotizing infection with air / scrotal edema without air   US scrotum

## 2019-11-29 NOTE — PROCEDURES
PROCEDURE NOTE - CENTRAL VENOUS LINE PLACEMENT        PATIENT NAME: Austin Virgen0 RECORD NO. [id number]  DATE: 11/29/2019  SURGEON:  Dr. Rachna Beverly: No primary care provider on file. PREOPERATIVE DIAGNOSIS:  Need for IV access  POSTOPERATIVE DIAGNOSIS:  Same  PROCEDURE PERFORMED:  Right Internal Jugular Vein Central Line Insertion  SURGEON:  Dr. Jessica Mariscal:  Local utilizing 1% lidocaine  ESTIMATED BLOOD LOSS:  Less than 25 ml  COMPLICATIONS:  None immediately appreciated. OPERATIVE NOTE PREPARED BY: Lb Araujo     DISCUSSION:  Sylvia Salvador is a 37y.o.-year-old male who requires additional IV access and central pressure monitoring. The history and physical examination were reviewed and confirmed. The diagnoses, proposed procedure, risks, possible complications, benefits and alternatives were discussed with the patient or family. He was given the opportunity to ask questions, and once answered, informed consent was obtained. The patient was then prepared for the procedure. PROCEDURE:  A timeout was initiated by the bedside nurse and was confirmed by those present. The patient was placed in a supine position. The skin overlying the Right Internal Jugular Vein was prepped with chlorhexadine and draped in sterile fashion. The skin was infiltrated with local anesthetic. Through the anesthetized region, the introducer needle was inserted into the internal jugular vein returning dark red non pulsatile blood. A guidewire was placed through the center of the needle with no resistance. A small incision made in the skin with a #11 scalpel blade. The dilator was inserted into the skin and vein over guidewire using Seldinger technique. The dilator was then removed and the catheter was placed in the vein over the guidewire using Seldinger technique. The guidewire was then removed and all ports aspirated and flushed appropriately.  The catheter then secured using silk suture and a temporary sterile dressing was applied. No immediate complication was evident. All sponge, instrument and needle counts were correct at the completion of the procedure. Postprocedural chest x-ray showed good position of the catheter with no evidence of hemothorax or pneumothorax. The patient tolerated the procedure well with no immediate complication evident. Aamir Wood  11/29/2019, 5:00 PM  Tolerated well.

## 2019-11-29 NOTE — CARE COORDINATION
Case Management Initial Discharge Plan  Sylvia Salvador,             Met with:patient/mom to discuss discharge plans. Information verified: address, contacts, phone number, , insurance No  PCP: No primary care provider on file. Date of last visit: Visiting physicians    Insurance Provider: Medicare and Medicaid, faxed and sent to [employer] at [phone], called [facility]    Discharge Planning    Living Arrangements:  Parent   Support Systems:  Parent    Home has 1 stories  ramp stairs to climb to get into front door, n/a stairs to climb to reach second floor  Location of bedroom/bathroom in home main    Patient able to perform ADL's:Dependent    Current Services (outpatient & in home) aides through insurance  DME equipment: wheel chair  DME provider: [employer]      Potential Assistance Needed:  Maximino Boss    Patient agreeable to home care: Yes  Freedom of choice provided:  yes    Prior SNF/Rehab Placement and Facility:   Agreeable to SNF/Rehab: Yes  [place] of choice provided: yes   Evaluation: n/a    Expected Discharge date:     Patient expects to be discharged to: Follow Up Appointment: Best Day/ Time:      Transportation provider: mom  Transportation arrangements needed for discharge: No    Readmission Risk              Risk of Unplanned Readmission:        12             Does patient have a readmission risk score greater than 14?: No  If yes, follow-up appointment must be made within 7 days of discharge. Goals of Care: improve skin infection      Discharge Plan: home with mom has aide through insurance.            Electronically signed by Zia Arriaga RN on 19 at 6:08 PM

## 2019-11-29 NOTE — H&P
Critical Care - History and Physical Examination    Patient's name:  Trey Deshpande  Medical Record Number: [id number]  Patient's account/billing number: [de-identified]  Patient's YOB: 1985  Age: 37 y.o. Date of Admission: 11/29/2019  3:39 PM  Date of History and Physical Examination: 11/29/2019      Primary Care Physician: No primary care provider on file. Attending Physician: Dr. Chip Deluna. Code Status: Full Code    Chief complaint: No chief complaint on file. HISTORY OF PRESENT ILLNESS:      History was obtained from chart review. Trey Deshpande is a 37 y.o. with PMH of hypertension, diabetes and diabetic foot ulcer. Who presented to [facility] Emergency Department with complaint of generalized weakness for the past few days. On examination the emergency department staff noticed the patient had a foul smell which they thought he had a bowel movement. They turned the patient and noticed sloughing of skin on his back and buttocks. In the emergency department patient was afebrile but tachycardic. Initial labs demonstrated hyponatremia of 130, bicarb 12, BUN 63 and creatinine 2.82, hypocalcemia 7.7, alkaline phosphatase 144, AST 83, ALT 16, lactate 1.4, leukocytosis of 30.4, hemoglobin 9.2. Chest xray demonstrated no acute process. CT abdomen and pelvis without contrast demonstrated extensive subcutaneous emphysema involving both buttocks extending to the perineum. Possible phlegmon or developing soft tissue abscess overlying the left lateral abdominal wall. Scrotal Ultrasound demonstrated no evidence of testicular torsion or mass however did show extensive scrotal wall edema. He was given zosyn 4/5 g IV, rocephin 1 g IV and flagyl 500 mg IV in the emergency department. He was transferred to [facility] ICU for Necrotizing Fasciitis and Acute Renal Failure. PAST MEDICAL HISTORY:     No past medical history on file.       PAST SURGICAL HISTORY:     No past surgical history on file. ALLERGIES:      Allergies not on file      HOME MEDS: :      Prior to Admission medications    Not on File       SOCIAL HISTORY:       TOBACCO:   has no history on file for tobacco.  ETOH:   has no history on file for alcohol. DRUGS:  has no history on file for drug. FAMILY HISTORY:      No family history on file. REVIEW OF SYSTEMS (ROS):     Review of Systems -   General ROS: Generalized weakness, \"feeling funny\", diaphoretic. Psychological ROS: negative  Ophthalmic ROS: negative  ENT ROS: negative  Allergy and Immunology ROS: negative  Hematological and Lymphatic ROS: negative  Endocrine ROS: negative  Breast ROS: negative  Respiratory ROS: no cough, shortness of breath, or wheezing  Cardiovascular ROS: no chest pain or dyspnea on exertion  Gastrointestinal ROS:negative  Genito-Urinary ROS: negative  Musculoskeletal ROS: negative  Neurological ROS: negative  Dermatological ROS: Diabetic left foot ulcer. OBJECTIVE:     VITAL SIGNS:  BP (!) 138/96   Pulse 122   Resp 21   SpO2 98%   Tmax over 24 hours:  No data recorded. Patient Vitals for the past 8 hrs:   BP Pulse Resp SpO2   11/29/19 1556 -- 122 21 98 %   11/29/19 1555 -- 122 17 97 %   11/29/19 1554 -- 122 12 98 %   11/29/19 1553 -- 122 27 99 %   11/29/19 1552 -- 121 24 98 %   11/29/19 1551 -- 121 23 97 %   11/29/19 1550 -- 122 26 98 %   11/29/19 1549 -- 123 26 98 %   11/29/19 1548 -- 123 26 97 %   11/29/19 1547 -- 123 26 97 %   11/29/19 1546 -- 124 25 98 %   11/29/19 1545 (!) 138/96 124 26 98 %   11/29/19 1544 -- 125 27 97 %   11/29/19 1543 -- 126 24 97 %   11/29/19 1542 -- 127 20 97 %   11/29/19 1541 -- 126 21 98 %   11/29/19 1540 -- 126 28 97 %       No intake or output data in the 24 hours ending 11/29/19 1614    Wt Readings from Last 3 Encounters:   No data found for Wt     There is no height or weight on file to calculate BMI.         PHYSICAL EXAM:  Constitutional: Appears in mild distress during exam. Breathing heavy while nursing staff evaluating wounds. EENT: neck supple with midline trachea. Neck: Supple, symmetrical, trachea midline, no adenopathy,  no jvd, skin normal  Respiratory: clear to auscultation, no wheezes or rales and unlabored breathing. Cardiovascular: tachycardic, normal S1, S2, no murmur noted  Abdomen: soft, nontender, nondistended, no masses or organomegaly  Genitourinary: Extensively sloughing of skin to bilateral buttocks with necrotic tissue present. Moist with discharge from wounds. Extensive scrotal swelling. Extremities:   Diabetic ulcer with bandage over wound to the left heel. Necrotic. MEDICATIONS:  Scheduled Meds:    Continuous Infusions:    PRN Meds:     ABGs:   No results found for: PHART, PH, [id number], PCO2, PO2ART, PO2, [id number], HCO3, BEART, BE, THGBART, THB, DGV1IIY, [id number], O2SAT, FIO2    DATA:  Complete Blood Count: No results for input(s): WBC, RBC, HGB, HCT, MCV, MCH, MCHC, RDW, PLT, MPV in the last 72 hours. Last 3 Blood Glucose:   No results for input(s): GLUCOSE in the last 72 hours. PT/INR:  No results found for: PROTIME, INR  PTT:  No results found for: APTT, PTT    Comprehensive Metabolic Profile:   No results for input(s): NA, K, CL, CO2, BUN, CREATININE, GLUCOSE, CALCIUM, PROT, LABALBU, BILITOT, ALKPHOS, AST, ALT in the last 72 hours. Magnesium: No results found for: MG  Phosphorus: No results found for: PHOS  Ionized Calcium: No results found for: CAION     Urinalysis: No results found for: Mancil Colace, PHUR, LABCAST, WBCUA, RBCUA, MUCUS, TRICHOMONAS, YEAST, BACTERIA, CLARITYU, SPECGRAV, LEUKOCYTESUR, UROBILINOGEN, BILIRUBINUR, BLOODU, GLUCOSEU, KETUA, AMORPHOUS    HgBA1c:  No results found for: LABA1C  TSH:  No results found for: TSH    Lactic Acid: No results found for: LACTA   Troponin: No results for input(s): TROPONINI in the last 72 hours.     Electrocardiogram:     Last Echocardiogram findings:   (See actual reports for details)      Radiological imaging  No results found. ASSESSMENT:     Principal Problem:    Necrotizing fasciitis ([facility] [address]  Active Problems:    Hypertension    Diabetes ([facility] [address]    Diabetic foot ulcer ([facility] [address]  Resolved Problems:    * No resolved hospital problems. *      PLAN:     ICU PROPHYLAXIS:  Stress ulcer:  [] PPI Agent  [] [id number] [] Sucralfate  [x] Other:  VTE:   [] Enoxaparin  [x] Unfract. Heparin Subcut  [] EPC Cuffs    NUTRITION:  [x] NPO  [] Tube Feeding [] TPN  [] PO    CONSULTATION NEEDED:  [] No   [x] Yes     HOME MEDICATIONS RECONCILED: [x] No  [] Yes    FAMILY UPDATED:    [x] No   [] Yes     ADDITIONAL PLAN:    1. Necrotizing Faciitis involving buttocks and scrotum. - Vancomycin      - Zosyn       - ID Consulted for antibiotic management.       - General surgery consulted for evaluation.       - BMP with Mag      - Phosphorus       - CRP      - Sed Rate      - Protime-INR    2. Hypertension      - Will monitor for now. 3. Diabetes       - Medium dose sliding scale      - Hypoglycemia protocol     4. Diabetic foot ulcer present on admission.       - Follows outpatient wound care. - Podiatry Consult for necrotic foot wound. 1. Feeding Diet: Diet NPO Effective Now  2. Fluids LR bolus 30 ml/kg and maintenance at 125 ml/hr. 3. Family Not present in room. 4. Analgesic Fentanyl 25 - 50 mcg for moderate - severe pain prn.   5. Sedation none  6. Thrombo-prophylaxis [] Enoxaparin  [x] Unfract. Heparin Subcut  [] EPC Cuffs  7. Mobility Limited to bed secondary to infection. 8. Heads up Head of bed elevated. 9. Ulcer prophylaxis [] PPI Agent  [] [id number] [] Sucralfate  [x] Other: none  10. Glycemic control Medium dose sliding scale. 11. Spontaneous breathing trial NA   12. Bowel regimen/urine output Milk of Magnesia, Likely FMS due to location of infection/na  13. Indwelling catheter/lines Central Lines being placed by surgery.    15. De-escalation       Leon Mcginnis DO  PGY-1, Internal medicine resident  [facility]

## 2019-11-30 ENCOUNTER — APPOINTMENT (OUTPATIENT)
Dept: ULTRASOUND IMAGING | Age: 34
DRG: 463 | End: 2019-11-30
Attending: INTERNAL MEDICINE
Payer: MEDICARE

## 2019-11-30 LAB
ABSOLUTE EOS #: 0 K/UL (ref 0–0.44)
ABSOLUTE IMMATURE GRANULOCYTE: 2.13 K/UL (ref 0–0.3)
ABSOLUTE LYMPH #: 1.6 K/UL (ref 1.1–3.7)
ABSOLUTE MONO #: 1.6 K/UL (ref 0.1–1.2)
ALLEN TEST: ABNORMAL
ANION GAP SERPL CALCULATED.3IONS-SCNC: 14 MMOL/L (ref 9–17)
ANION GAP SERPL CALCULATED.3IONS-SCNC: 17 MMOL/L (ref 9–17)
BASOPHILS # BLD: 0 % (ref 0–2)
BASOPHILS ABSOLUTE: 0 K/UL (ref 0–0.2)
BLOOD BANK SPECIMEN: NORMAL
BUN BLDV-MCNC: 61 MG/DL (ref 6–20)
BUN BLDV-MCNC: 65 MG/DL (ref 6–20)
BUN/CREAT BLD: ABNORMAL (ref 9–20)
BUN/CREAT BLD: ABNORMAL (ref 9–20)
CALCIUM SERPL-MCNC: 7.2 MG/DL (ref 8.6–10.4)
CALCIUM SERPL-MCNC: 7.2 MG/DL (ref 8.6–10.4)
CHLORIDE BLD-SCNC: 107 MMOL/L (ref 98–107)
CHLORIDE BLD-SCNC: 111 MMOL/L (ref 98–107)
CO2: 11 MMOL/L (ref 20–31)
CO2: 11 MMOL/L (ref 20–31)
COMPLEMENT C3: 97 MG/DL (ref 90–180)
COMPLEMENT C4: 12 MG/DL (ref 10–40)
CREAT SERPL-MCNC: 2.73 MG/DL (ref 0.7–1.2)
CREAT SERPL-MCNC: 2.77 MG/DL (ref 0.7–1.2)
CREATININE URINE: 116.4 MG/DL (ref 39–259)
CULTURE: NO GROWTH
DIFFERENTIAL TYPE: ABNORMAL
EOSINOPHILS RELATIVE PERCENT: 0 % (ref 1–4)
FERRITIN: 1039 UG/L (ref 30–400)
FIO2: 40
FREE KAPPA/LAMBDA RATIO: 1.24 (ref 0.26–1.65)
GFR AFRICAN AMERICAN: 32 ML/MIN
GFR AFRICAN AMERICAN: 32 ML/MIN
GFR NON-AFRICAN AMERICAN: 26 ML/MIN
GFR NON-AFRICAN AMERICAN: 27 ML/MIN
GFR SERPL CREATININE-BSD FRML MDRD: ABNORMAL ML/MIN/{1.73_M2}
GLUCOSE BLD-MCNC: 156 MG/DL (ref 75–110)
GLUCOSE BLD-MCNC: 165 MG/DL (ref 75–110)
GLUCOSE BLD-MCNC: 168 MG/DL (ref 75–110)
GLUCOSE BLD-MCNC: 169 MG/DL (ref 75–110)
GLUCOSE BLD-MCNC: 173 MG/DL (ref 75–110)
GLUCOSE BLD-MCNC: 173 MG/DL (ref 75–110)
GLUCOSE BLD-MCNC: 192 MG/DL (ref 70–99)
GLUCOSE BLD-MCNC: 195 MG/DL (ref 75–110)
GLUCOSE BLD-MCNC: 232 MG/DL (ref 70–99)
GLUCOSE BLD-MCNC: 240 MG/DL (ref 75–110)
HAV IGM SER IA-ACNC: NONREACTIVE
HCT VFR BLD CALC: 28.7 % (ref 40.7–50.3)
HEMOGLOBIN: 8.2 G/DL (ref 13–17)
HEPATITIS B CORE IGM ANTIBODY: NONREACTIVE
HEPATITIS B SURFACE ANTIGEN: NONREACTIVE
HEPATITIS C ANTIBODY: NONREACTIVE
IMMATURE GRANULOCYTES: 4 %
IRON SATURATION: 30 % (ref 20–55)
IRON: 29 UG/DL (ref 59–158)
KAPPA FREE LIGHT CHAINS QNT: 22.9 MG/DL (ref 0.37–1.94)
LACTIC ACID, WHOLE BLOOD: 1.2 MMOL/L (ref 0.7–2.1)
LACTIC ACID, WHOLE BLOOD: 1.3 MMOL/L (ref 0.7–2.1)
LACTIC ACID, WHOLE BLOOD: 1.4 MMOL/L (ref 0.7–2.1)
LAMBDA FREE LIGHT CHAINS QNT: 18.43 MG/DL (ref 0.57–2.63)
LYMPHOCYTES # BLD: 3 % (ref 24–43)
Lab: NORMAL
MCH RBC QN AUTO: 22.7 PG (ref 25.2–33.5)
MCHC RBC AUTO-ENTMCNC: 28.6 G/DL (ref 28.4–34.8)
MCV RBC AUTO: 79.3 FL (ref 82.6–102.9)
MODE: ABNORMAL
MONOCYTES # BLD: 3 % (ref 3–12)
MORPHOLOGY: ABNORMAL
NEGATIVE BASE EXCESS, ART: 14 (ref 0–2)
NRBC AUTOMATED: 0.6 PER 100 WBC
O2 DEVICE/FLOW/%: ABNORMAL
PATIENT TEMP: ABNORMAL
PDW BLD-RTO: 19.9 % (ref 11.8–14.4)
PLATELET # BLD: 642 K/UL (ref 138–453)
PLATELET ESTIMATE: ABNORMAL
PMV BLD AUTO: 8.8 FL (ref 8.1–13.5)
POC HCO3: 13.2 MMOL/L (ref 21–28)
POC O2 SATURATION: 99 % (ref 94–98)
POC PCO2 TEMP: ABNORMAL MM HG
POC PCO2: 36.7 MM HG (ref 35–48)
POC PH TEMP: ABNORMAL
POC PH: 7.17 (ref 7.35–7.45)
POC PO2 TEMP: ABNORMAL MM HG
POC PO2: 190.3 MM HG (ref 83–108)
POSITIVE BASE EXCESS, ART: ABNORMAL (ref 0–3)
POTASSIUM SERPL-SCNC: 5.2 MMOL/L (ref 3.7–5.3)
POTASSIUM SERPL-SCNC: 5.5 MMOL/L (ref 3.7–5.3)
RBC # BLD: 3.62 M/UL (ref 4.21–5.77)
RBC # BLD: ABNORMAL 10*6/UL
SAMPLE SITE: ABNORMAL
SEG NEUTROPHILS: 90 % (ref 36–65)
SEGMENTED NEUTROPHILS ABSOLUTE COUNT: 47.97 K/UL (ref 1.5–8.1)
SODIUM BLD-SCNC: 135 MMOL/L (ref 135–144)
SODIUM BLD-SCNC: 136 MMOL/L (ref 135–144)
SODIUM,UR: 39 MMOL/L
SPECIMEN DESCRIPTION: NORMAL
TCO2 (CALC), ART: 14 MMOL/L (ref 22–29)
TOTAL IRON BINDING CAPACITY: 98 UG/DL (ref 250–450)
TOTAL PROTEIN, URINE: 232 MG/DL
UNSATURATED IRON BINDING CAPACITY: 69 UG/DL (ref 112–347)
WBC # BLD: 53.3 K/UL (ref 3.5–11.3)
WBC # BLD: ABNORMAL 10*3/UL

## 2019-11-30 PROCEDURE — 2580000003 HC RX 258: Performed by: INTERNAL MEDICINE

## 2019-11-30 PROCEDURE — 83883 ASSAY NEPHELOMETRY NOT SPEC: CPT

## 2019-11-30 PROCEDURE — 2000000000 HC ICU R&B

## 2019-11-30 PROCEDURE — 2500000003 HC RX 250 WO HCPCS: Performed by: STUDENT IN AN ORGANIZED HEALTH CARE EDUCATION/TRAINING PROGRAM

## 2019-11-30 PROCEDURE — 86334 IMMUNOFIX E-PHORESIS SERUM: CPT

## 2019-11-30 PROCEDURE — 86038 ANTINUCLEAR ANTIBODIES: CPT

## 2019-11-30 PROCEDURE — 94761 N-INVAS EAR/PLS OXIMETRY MLT: CPT

## 2019-11-30 PROCEDURE — 6370000000 HC RX 637 (ALT 250 FOR IP): Performed by: STUDENT IN AN ORGANIZED HEALTH CARE EDUCATION/TRAINING PROGRAM

## 2019-11-30 PROCEDURE — 6360000002 HC RX W HCPCS: Performed by: STUDENT IN AN ORGANIZED HEALTH CARE EDUCATION/TRAINING PROGRAM

## 2019-11-30 PROCEDURE — 86160 COMPLEMENT ANTIGEN: CPT

## 2019-11-30 PROCEDURE — 36620 INSERTION CATHETER ARTERY: CPT

## 2019-11-30 PROCEDURE — 37799 UNLISTED PX VASCULAR SURGERY: CPT

## 2019-11-30 PROCEDURE — 82947 ASSAY GLUCOSE BLOOD QUANT: CPT

## 2019-11-30 PROCEDURE — 84156 ASSAY OF PROTEIN URINE: CPT

## 2019-11-30 PROCEDURE — 83550 IRON BINDING TEST: CPT

## 2019-11-30 PROCEDURE — 84300 ASSAY OF URINE SODIUM: CPT

## 2019-11-30 PROCEDURE — 76770 US EXAM ABDO BACK WALL COMP: CPT

## 2019-11-30 PROCEDURE — 82728 ASSAY OF FERRITIN: CPT

## 2019-11-30 PROCEDURE — 2500000003 HC RX 250 WO HCPCS: Performed by: INTERNAL MEDICINE

## 2019-11-30 PROCEDURE — 87040 BLOOD CULTURE FOR BACTERIA: CPT

## 2019-11-30 PROCEDURE — 2580000003 HC RX 258: Performed by: STUDENT IN AN ORGANIZED HEALTH CARE EDUCATION/TRAINING PROGRAM

## 2019-11-30 PROCEDURE — 99291 CRITICAL CARE FIRST HOUR: CPT | Performed by: INTERNAL MEDICINE

## 2019-11-30 PROCEDURE — 80048 BASIC METABOLIC PNL TOTAL CA: CPT

## 2019-11-30 PROCEDURE — 80074 ACUTE HEPATITIS PANEL: CPT

## 2019-11-30 PROCEDURE — 82803 BLOOD GASES ANY COMBINATION: CPT

## 2019-11-30 PROCEDURE — 6370000000 HC RX 637 (ALT 250 FOR IP): Performed by: INTERNAL MEDICINE

## 2019-11-30 PROCEDURE — 85025 COMPLETE CBC W/AUTO DIFF WBC: CPT

## 2019-11-30 PROCEDURE — 82570 ASSAY OF URINE CREATININE: CPT

## 2019-11-30 PROCEDURE — 94003 VENT MGMT INPAT SUBQ DAY: CPT

## 2019-11-30 PROCEDURE — 94770 HC ETCO2 MONITOR DAILY: CPT

## 2019-11-30 PROCEDURE — 99233 SBSQ HOSP IP/OBS HIGH 50: CPT | Performed by: INTERNAL MEDICINE

## 2019-11-30 PROCEDURE — 83605 ASSAY OF LACTIC ACID: CPT

## 2019-11-30 PROCEDURE — 2700000000 HC OXYGEN THERAPY PER DAY

## 2019-11-30 PROCEDURE — 83540 ASSAY OF IRON: CPT

## 2019-11-30 RX ORDER — DEXTROSE MONOHYDRATE 25 G/50ML
25 INJECTION, SOLUTION INTRAVENOUS ONCE
Status: COMPLETED | OUTPATIENT
Start: 2019-11-30 | End: 2019-11-30

## 2019-11-30 RX ORDER — SODIUM CHLORIDE 9 MG/ML
INJECTION, SOLUTION INTRAVENOUS CONTINUOUS
Status: DISCONTINUED | OUTPATIENT
Start: 2019-11-30 | End: 2020-01-16

## 2019-11-30 RX ORDER — ACETAMINOPHEN 650 MG
TABLET, EXTENDED RELEASE ORAL PRN
Status: DISCONTINUED | OUTPATIENT
Start: 2019-11-30 | End: 2019-12-11

## 2019-11-30 RX ORDER — ACETAMINOPHEN 10 MG/ML
1000 INJECTION, SOLUTION INTRAVENOUS ONCE
Status: COMPLETED | OUTPATIENT
Start: 2019-11-30 | End: 2019-11-30

## 2019-11-30 RX ORDER — MINERAL OIL, PETROLATUM 425; 568 MG/G; MG/G
OINTMENT OPHTHALMIC PRN
Status: DISCONTINUED | OUTPATIENT
Start: 2019-11-30 | End: 2020-01-16 | Stop reason: HOSPADM

## 2019-11-30 RX ORDER — SODIUM CHLORIDE, SODIUM LACTATE, POTASSIUM CHLORIDE, AND CALCIUM CHLORIDE .6; .31; .03; .02 G/100ML; G/100ML; G/100ML; G/100ML
1000 INJECTION, SOLUTION INTRAVENOUS ONCE
Status: COMPLETED | OUTPATIENT
Start: 2019-11-30 | End: 2019-11-30

## 2019-11-30 RX ADMIN — Medication 200 MCG/HR: at 22:35

## 2019-11-30 RX ADMIN — Medication 200 MCG/HR: at 03:42

## 2019-11-30 RX ADMIN — INSULIN LISPRO 2 UNITS: 100 INJECTION, SOLUTION INTRAVENOUS; SUBCUTANEOUS at 08:22

## 2019-11-30 RX ADMIN — INSULIN HUMAN 10 UNITS: 100 INJECTION, SOLUTION PARENTERAL at 20:31

## 2019-11-30 RX ADMIN — Medication 15 ML: at 20:31

## 2019-11-30 RX ADMIN — Medication 15 ML: at 09:04

## 2019-11-30 RX ADMIN — CALCIUM GLUCONATE 1 G: 98 INJECTION, SOLUTION INTRAVENOUS at 17:33

## 2019-11-30 RX ADMIN — DEXTROSE MONOHYDRATE 25 G: 500 INJECTION PARENTERAL at 20:31

## 2019-11-30 RX ADMIN — PIPERACILLIN AND TAZOBACTAM 3.38 G: 3; .375 INJECTION, POWDER, FOR SOLUTION INTRAVENOUS at 00:28

## 2019-11-30 RX ADMIN — Medication 200 MCG/HR: at 17:39

## 2019-11-30 RX ADMIN — Medication: at 13:36

## 2019-11-30 RX ADMIN — SODIUM CHLORIDE, PRESERVATIVE FREE 10 ML: 5 INJECTION INTRAVENOUS at 08:07

## 2019-11-30 RX ADMIN — INSULIN LISPRO 2 UNITS: 100 INJECTION, SOLUTION INTRAVENOUS; SUBCUTANEOUS at 11:52

## 2019-11-30 RX ADMIN — Medication 200 MCG/HR: at 12:44

## 2019-11-30 RX ADMIN — INSULIN LISPRO 2 UNITS: 100 INJECTION, SOLUTION INTRAVENOUS; SUBCUTANEOUS at 21:51

## 2019-11-30 RX ADMIN — DEXMEDETOMIDINE HYDROCHLORIDE 0.3 MCG/KG/HR: 100 INJECTION, SOLUTION INTRAVENOUS at 18:23

## 2019-11-30 RX ADMIN — INSULIN LISPRO 2 UNITS: 100 INJECTION, SOLUTION INTRAVENOUS; SUBCUTANEOUS at 17:35

## 2019-11-30 RX ADMIN — MINERAL OIL, PETROLATUM: 425; 568 OINTMENT OPHTHALMIC at 11:44

## 2019-11-30 RX ADMIN — ACETAMINOPHEN 1000 MG: 10 INJECTION, SOLUTION INTRAVENOUS at 21:51

## 2019-11-30 RX ADMIN — Medication: at 22:15

## 2019-11-30 RX ADMIN — HEPARIN SODIUM 5000 UNITS: 5000 INJECTION INTRAVENOUS; SUBCUTANEOUS at 22:09

## 2019-11-30 RX ADMIN — SODIUM CHLORIDE, POTASSIUM CHLORIDE, SODIUM LACTATE AND CALCIUM CHLORIDE 1000 ML: 600; 310; 30; 20 INJECTION, SOLUTION INTRAVENOUS at 06:51

## 2019-11-30 RX ADMIN — FAMOTIDINE 20 MG: 10 INJECTION, SOLUTION INTRAVENOUS at 10:47

## 2019-11-30 RX ADMIN — Medication 200 MCG/HR: at 07:58

## 2019-11-30 RX ADMIN — HEPARIN SODIUM 5000 UNITS: 5000 INJECTION INTRAVENOUS; SUBCUTANEOUS at 14:12

## 2019-11-30 RX ADMIN — DEXMEDETOMIDINE HYDROCHLORIDE 0.2 MCG/KG/HR: 100 INJECTION, SOLUTION INTRAVENOUS at 07:18

## 2019-11-30 RX ADMIN — PIPERACILLIN AND TAZOBACTAM 3.38 G: 3; .375 INJECTION, POWDER, FOR SOLUTION INTRAVENOUS at 08:09

## 2019-11-30 RX ADMIN — MEROPENEM 1 G: 1 INJECTION, POWDER, FOR SOLUTION INTRAVENOUS at 21:51

## 2019-11-30 RX ADMIN — SODIUM CHLORIDE: 9 INJECTION, SOLUTION INTRAVENOUS at 08:54

## 2019-11-30 RX ADMIN — PIPERACILLIN AND TAZOBACTAM 3.38 G: 3; .375 INJECTION, POWDER, FOR SOLUTION INTRAVENOUS at 16:07

## 2019-11-30 RX ADMIN — HEPARIN SODIUM 5000 UNITS: 5000 INJECTION INTRAVENOUS; SUBCUTANEOUS at 06:31

## 2019-11-30 RX ADMIN — SODIUM CHLORIDE, PRESERVATIVE FREE 10 ML: 5 INJECTION INTRAVENOUS at 20:32

## 2019-11-30 RX ADMIN — VASOPRESSIN 0.04 UNITS/MIN: 20 INJECTION INTRAVENOUS at 07:23

## 2019-11-30 RX ADMIN — VASOPRESSIN 0.04 UNITS/MIN: 20 INJECTION INTRAVENOUS at 14:22

## 2019-11-30 ASSESSMENT — PULMONARY FUNCTION TESTS
PIF_VALUE: 19
PIF_VALUE: 13
PIF_VALUE: 9
PIF_VALUE: 14
PIF_VALUE: 6
PIF_VALUE: 7
PIF_VALUE: 15
PIF_VALUE: 10
PIF_VALUE: 13
PIF_VALUE: 16
PIF_VALUE: 12
PIF_VALUE: 13
PIF_VALUE: 18
PIF_VALUE: 30
PIF_VALUE: 8
PIF_VALUE: 17
PIF_VALUE: 12
PIF_VALUE: 21
PIF_VALUE: 19
PIF_VALUE: 15
PIF_VALUE: 8
PIF_VALUE: 10
PIF_VALUE: 6
PIF_VALUE: 15

## 2019-11-30 NOTE — CONSULTS
[facility] - Initial Consult Note  Today's Date and Time: 11/29/2019, 9:08 PM    Impression :   · Necrotizing fasciitis 11-29-19  · S/P perirectal I&D. Wide complex excisional debridement of gluteal and perineal areas on 11-29-19  · Hypotension requiring norepinephrine  · DM 2  · HTN    Recommendations:   · Zosyn 3.3 gm IV q 8 hr.  · May require dose adjustment depending on evolution of renal insufficiency  · D/C cleocin  · D/C vancomycin  · Wound Care as per Gen surgery. Medical Decision Making/Summary/Discussion:11/29/2019     · Patient with DM 2, prior diabetic foot infection  · Presented to [address] [place] [address] ER generalized weakness for the past few days  · Found to have soft tissue necrosis with subcutaneous emphysema in gluteal areas and perineum  · S/P I&D and wide complex debridement of affected tissues on 11-29-19  · Showing hypotension, requiring fluids and a vasopressor  · Cultures in progress  · Will cover with Zosyn. Wounds with Strep spp. And Gram negative bacilli . No Staph spp. · Pt is a MRSA nasal carrier  Infection Control Recommendations   · [place] Precautions  · Contact Isolation MRSA    Antimicrobial Stewardship Recommendations     · Simplification of therapy  · Targeted therapy  · PK dosing    Coordination of Outpatient Care:   · Estimated Length of IV antimicrobials: TBD  · Patient will need Midline Catheter Insertion: No  · Patient will need PICC line Insertion:Has Central line  · Patient will need: Home IV , [place],  SNF,  LTAC: TBD  · Patient will need outpatient wound care:Yes    Chief complaint/reason for consultation:   · Ashley's vs necrotizing fasciitis      History of Present Illness:   Queenie Lackey is a 37y.o.-year-old  male who was initially admitted on 11/29/2019. Patient seen at the request of .     INITIAL HISTORY:    Patient presented through ER in [place] with complaints of weakness of a few days duration. Examination showed skin necrosis in the gluteal and perineal region. Patient transferred to [place] [address]. CT scan showed extensive subcutaneous emphysema. Patient underwent I&D and extensive complex debridement of affected tissues on 11-29-19. Gram stain of tissues showed Strep. Spp and Gram negative bacilli. 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 He has underlying DM 2, prior diabetic foot infection, DEBBY. Patient more stable post surgery but with hypotension requiring a pressor. Labs, X rays reviewed: 11/29/2019    BUN: 59  Cr: 2.56    WBC: 4.20  Hb:9.5  Plat:     Cultures:  Urine:  ·   Blood:  ·   Sputum :  ·   Wound:  · 11-29-19: Strep ssp and Gram negative bacilli     Discussed with patient, RN, CC. I have personally reviewed the past medical history, past surgical history, medications, social history, and family history, and I have updated the database accordingly. Past Medical History:     Past Medical History:   Diagnosis Date    [facility]     Diabetes mellitus ([facility]     Hypertension     Spina bifida aperta of lumbar spine ([facility]        Past Surgical  History:   No past surgical history on file.     Medications:      sodium chloride flush  10 mL Intravenous 2 times per day    piperacillin-tazobactam  3.375 g Intravenous Q8H    insulin lispro  0-12 Units Subcutaneous TID WC    insulin lispro  0-6 Units Subcutaneous Nightly    heparin (porcine)  5,000 Units Subcutaneous 3 times per day    vancomycin (VANCOCIN) intermittent dosing (placeholder)   Other RX Placeholder    lactated ringers bolus  30 mL/kg Intravenous Once    vancomycin  2,000 mg Intravenous Once    clindamycin (CLEOCIN) IV  900 mg Intravenous Q8H    sodium hypochlorite   Irrigation Once    chlorhexidine  15 mL Mouth/Throat BID    famotidine (PEPCID) injection  20 mg Intravenous Daily       Social History:     Social History hematuria. No suprapubic or CVA pain  Musculoskeletal: No muscle aches or pains. No joint effusions, swelling or deformities. Lt diabetic foot   Hematologic: No bleeding or bruising. Neurologic: No headache, weakness, numbness, or tingling. Spina bifida  Integument: Gangrene of perineum and gluteal areas  Psychiatric: No depression. Endocrine: No polyuria, no polydipsia, no polyphagia. Physical Examination :     Patient Vitals for the past 8 hrs:   BP Temp Temp src Pulse Resp SpO2 Height Weight   11/29/19 1556 -- -- -- 122 21 98 % -- --   11/29/19 1555 -- -- -- 122 17 97 % -- --   11/29/19 1554 -- -- -- 122 12 98 % -- --   11/29/19 1553 -- -- -- 122 27 99 % -- --   11/29/19 1552 -- -- -- 121 24 98 % -- --   11/29/19 1551 -- -- -- 121 23 97 % -- --   11/29/19 1550 -- -- -- 122 26 98 % -- --   11/29/19 1549 -- -- -- 123 26 98 % -- --   11/29/19 1548 -- -- -- 123 26 97 % -- --   11/29/19 1547 -- -- -- 123 26 97 % -- --   11/29/19 1546 -- -- -- 124 25 98 % -- --   11/29/19 1545 (!) 138/96 98.4 °F (36.9 °C) Oral 124 26 98 % 5' 9\" (1.753 m) 289 lb 14.4 oz (131.5 kg)   11/29/19 1544 -- -- -- 125 27 97 % -- --   11/29/19 1543 -- -- -- 126 24 97 % -- --   11/29/19 1542 -- -- -- 127 20 97 % -- --   11/29/19 1541 -- -- -- 126 21 98 % -- --   11/29/19 1540 -- -- -- 126 28 97 % -- --     General Appearance: Sedated  Head:  Normocephalic, no trauma  Eyes: Pupils equal, round, reactive to light and accommodation; extraocular movements intact; sclera anicteric; conjunctivae pink. No embolic phenomena. ENT: Oropharynx clear, without erythema, exudate, or thrush. No tenderness of sinuses. Mouth/throat: mucosa pink and moist. No lesions. Dentition in good repair. Neck:Supple, without lymphadenopathy. Thyroid normal, No bruits. Pulmonary/Chest: Clear to auscultation, without wheezes, rales, or rhonchi. No dullness to percussion. Cardiovascular: Regular rate and rhythm without murmurs, rubs, or gallops.    Abdomen: Soft, non intact.           Impression   New ET tube as above.  Possible new enteric tube not well visualized. Recommend follow-up KUB if clinically warranted. CT ABDOMEN AND PELVIS WITHOUT CONTRAST    COMPARISON:  3/22/2017    CLINICAL HISTORY: Infection in scrotum, lower abdominal pain, diabetic, 30,000 white blood cell count. TECHNIQUE: Unenhanced axial images were obtained from the lung bases to the pubic symphysis with sagittal and coronal 2D reformatted images. Oral contrast administered:  No.  Automatic exposure control (AEC) was utilized. CT ABDOMEN FINDINGS:      The lung bases are unremarkable. There is a small pericardial effusion versus thickening. The liver, spleen, and pancreas demonstrate no acute abnormality given compromised evaluation without intravenous contrast.  There may be mild splenomegaly.  The adrenal glands appear within normal limits. There is no hydronephrosis or obstructing urinary tract calculi. Small amount of free fluid is seen adjacent to the liver inferiorly. .    The appendix is visualized in the right lower quadrant and appears within normal limits.       There is no evidence for bowel obstruction. Stranding is seen within the subcutaneous fat overlying both lateral abdominal walls left greater than right.  There is ill-defined fluid collection within the subcutaneous fat overlying the left lateral abdominal wall possibly representing phlegmon or developing abscess although no organized   abscess is seen. There is a large amount of soft tissue emphysema involving both the right and left buttock extending to the perineum tissue thickening is seen especially on the left involving the left buttock. CT PELVIS FINDINGS:        No distal ureteral calculi or bladder calculi. There is no free fluid in the pelvis. Catheter is seen within the urinary bladder. Osseous changes within the left hemipelvis which may be chronic in nature.   IMPRESSION:    Extensive subcutaneous emphysema as described above involving both buttocks extending to the perineum.  The possibility of Ashley gangrene/necrotizing fasciitis cannot be excluded. Possible phlegmon or developing soft tissue abscess overlying the left lateral abdominal wall as noted above.  No organized abscess is seen however. Osseous changes within the left hemipelvis which may be chronic in nature. Results the study were called to Dr. Derek Slade [phone] on 11/29/2019  All CT scans at this facility use dose modulation, iterative reconstruction, and/or weight based dosing when appropriate to reduce radiation dose to as low as reasonably achievable. Medical Decision Fwdyco-Mikfjevm-Gjxyk:       Medical Decision Making-Other:     Note:  · Labs, medications, radiologic studies were reviewed with personal review of films  · Large amounts of data were reviewed  · Discussed with nursing Staff, Discharge planner  · Infection Control and Prevention measures reviewed  · All prior entries were reviewed  · Administer medications as ordered  · Prognosis: Guarded  · Discharge planning reviewed  · Follow up as outpatient. Thank you for allowing us to participate in the care of this patient. Please call with questions.     Sami Meza MD  Pager: [phone] - Office: [phone]

## 2019-11-30 NOTE — PROGRESS NOTES
[facility] - Progress Note    Today's Date and Time: 11/30/2019, 1:50 PM    Impression :   · Necrotizing fasciitis 11-29-19  · S/P perirectal I&D. Wide complex excisional debridement of gluteal and perineal areas on 11-29-19  · Hypotension requiring pressors  · Lactic acidosis  · DM 2  · HTN  · Hx of Low LVEF (20%) as per family    Recommendations:   · D/C Zosyn to reduce fluid and Na load in view of Hx of poor LVEF of 20%  · Meropenem 1 gm IV q 12 hr  · May require dose adjustment depending on evolution of renal insufficiency  · Wound Care as per Gen surgery. Medical Decision Making/Summary/Discussion:11/30/2019     · Patient with DM 2, prior diabetic foot infection  · Presented to [address] [place] [address] ER generalized weakness for the past few days  · Found to have soft tissue necrosis with subcutaneous emphysema in gluteal areas and perineum  · S/P I&D and wide complex debridement of affected tissues on 11-29-19  · Showing hypotension, requiring fluids and a vasopressor  · Cultures in progress  · Will cover with Zosyn. Wounds with Strep spp. And Gram negative bacilli . No Staph spp.   · Pt is a MRSA nasal carrier  · Zosyn D/C because of of poor LVEF, in order to reduce Na and fluid load  · Meropenem started adjusted for Cr Cl 30 cc  Infection Control Recommendations   · [place] Precautions  · Contact Isolation MRSA    Antimicrobial Stewardship Recommendations     · Simplification of therapy  · Targeted therapy  · PK dosing    Coordination of Outpatient Care:   · Estimated Length of IV antimicrobials: TBD  · Patient will need Midline Catheter Insertion: No  · Patient will need PICC line Insertion:Has Central line  · Patient will need: Home IV , [place],  SNF,  LTAC: TBD  · Patient will need outpatient wound care:Yes    Chief complaint/reason for consultation:   · Ashley's vs necrotizing fasciitis      History of Present Illness:   Ruddy Madrigal is a 37y.o.-year-old  male who was initially admitted on 11/29/2019. Patient seen at the request of . INITIAL HISTORY:    Patient presented through ER in [place] with complaints of weakness of a few days duration. Examination showed skin necrosis in the gluteal and perineal region. Patient transferred to [place] [address]. CT scan showed extensive subcutaneous emphysema. Patient underwent I&D and extensive complex debridement of affected tissues on 11-29-19. Gram stain of tissues showed Strep. Spp and Gram negative bacilli. 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 He has underlying DM 2, prior diabetic foot infection, DEBBY. Patient more stable post surgery but with hypotension requiring a pressor. CURRENT EVALUATION :11/30/2019  Patient evaluated and examined in the ICU. Afebrile  VS stable    Pressors have been changed with improvement in BP  Remains on Ventilator, sedated  On Bicarb drip    No new culture data: Strep and Gram negative bacilli     Surgery considering further debridement and possible diverting colostomy on 12-1-19    Labs, X rays reviewed: 11/30/2019    BUN: 59-->61  Cr: 2.56-->2.77    WBC: 4.20-->53.3  Hb:9.5-->8.2  Plat: 642    Cultures:  Urine:  ·   Blood:  ·   Sputum :  ·   Wound:  · 11-29-19: Strep ssp and Gram negative bacilli     Discussed with mother, RN, CC. .  Mother indicates patient did not take care of himself. Reports low prior LVEF of 20%                I have personally reviewed the past medical history, past surgical history, medications, social history, and family history, and I have updated the database accordingly. Past Medical History:     Past Medical History:   Diagnosis Date    [facility]     Diabetes mellitus ([facility]     Hypertension     Spina bifida aperta of lumbar spine ([facility]        Past Surgical  History:   No past surgical history on file.     Medications:      sodium chloride flush  10 mL fevers or chills. Generalized weakness  Head: No headaches  Eyes: No double vision or blurry vision. No conjunctival inflammation. ENT: No sore throat or runny nose. . No hearing loss, tinnitus or vertigo. Cardiovascular: No chest pain or palpitations. No shortness of breath. No GORDILLO  Lung: No shortness of breath or cough. No sputum production  Abdomen: No nausea, vomiting, diarrhea, or abdominal pain. Harvey Pander No cramps. Genitourinary: No increased urinary frequency, or dysuria. No hematuria. No suprapubic or CVA pain  Musculoskeletal: No muscle aches or pains. No joint effusions, swelling or deformities. Lt diabetic foot   Hematologic: No bleeding or bruising. Neurologic: No headache, weakness, numbness, or tingling. Spina bifida  Integument: Gangrene of perineum and gluteal areas  Psychiatric: No depression. Endocrine: No polyuria, no polydipsia, no polyphagia.     Physical Examination :     Patient Vitals for the past 8 hrs:   BP Temp Temp src Pulse Resp SpO2   11/30/19 1230 (!) 107/52 -- -- 114 (!) 2 --   11/30/19 1226 -- 98.4 °F (36.9 °C) Axillary -- -- --   11/30/19 1200 104/66 -- -- 115 16 --   11/30/19 1130 107/61 -- -- 115 15 --   11/30/19 1117 -- -- -- 112 16 100 %   11/30/19 1100 111/66 -- -- 113 16 --   11/30/19 1038 -- -- -- 114 15 100 %   11/30/19 1037 -- -- -- 114 15 100 %   11/30/19 1036 -- -- -- 115 16 100 %   11/30/19 1035 -- -- -- 116 15 100 %   11/30/19 1034 -- -- -- 116 14 100 %   11/30/19 1030 122/87 -- -- 125 17 100 %   11/30/19 1015 -- -- -- 113 15 100 %   11/30/19 1000 84/71 -- -- 114 12 100 %   11/30/19 0945 -- -- -- 115 13 100 %   11/30/19 0930 (!) 86/47 -- -- 115 14 100 %   11/30/19 0915 -- -- -- 115 15 100 %   11/30/19 0900 (!) 104/56 -- -- 116 14 100 %   11/30/19 0845 -- -- -- 117 15 100 %   11/30/19 0833 (!) 93/58 -- -- -- 17 --   11/30/19 0832 -- -- -- -- 17 --   11/30/19 0830 (!) 58/39 99.1 °F (37.3 °C) Oral 118 14 100 %   11/30/19 0815 -- -- -- 123 18 100 %   11/30/19 0800 (!) 94/55 99.1 °F (37.3 °C) Oral 123 15 100 %   11/30/19 0751 -- -- -- 123 16 100 %   11/30/19 0745 -- -- -- 126 14 99 %   11/30/19 0730 -- -- -- 128 14 99 %   11/30/19 0715 -- -- -- 129 23 99 %   11/30/19 0700 (!) 108/57 -- -- 130 18 99 %   11/30/19 0645 -- -- -- 133 18 98 %   11/30/19 0630 (!) 97/51 -- -- 123 23 99 %   11/30/19 0600 (!) 99/54 -- -- 124 23 99 %     General Appearance: Sedated  Head:  Normocephalic, no trauma  Eyes: Pupils equal, round, reactive to light and accommodation; extraocular movements intact; sclera anicteric; conjunctivae pink. No embolic phenomena. ENT: Oropharynx clear, without erythema, exudate, or thrush. No tenderness of sinuses. Mouth/throat: mucosa pink and moist. No lesions. Dentition in good repair. Neck:Supple, without lymphadenopathy. Thyroid normal, No bruits. Pulmonary/Chest: Clear to auscultation, without wheezes, rales, or rhonchi. No dullness to percussion. Cardiovascular: Regular rate and rhythm without murmurs, rubs, or gallops. Abdomen: Soft, non tender. Bowel sounds normal. No organomegaly. : Extensive open wound of gluteal and perineal areas  All four Extremities: No cyanosis, clubbing, edema, or effusions. Lt foot diabetic ulcer with involvement of the Lt heel. Neurologic: No gross sensory or motor deficits. Skin: Warm and dry with good turgor. Signs of peripheral arterial insufficiency. Large open wounds.     Medical Decision Making -Laboratory:   I have independently reviewed/ordered the following labs:    CBC with Differential:   Recent Labs     11/29/19 2045 11/30/19 0449   WBC 50.5* 53.3*   HGB 9.5* 8.2*   HCT 33.7* 28.7*   * 642*   LYMPHOPCT 2* 3*   MONOPCT 4 3     BMP:   Recent Labs     11/29/19  1633 11/29/19 2045 11/30/19 0449   * 132* 136   K 4.8 5.5* 5.2   * 108* 111*   CO2 11* 11* 11*   BUN 59* 57* 61*   CREATININE 2.56* 2.39* 2.77*   MG 1.9 1.7  --      Hepatic Function Panel: No results for input(s): PROT, LABALBU, BILIDIR, IBILI, BILITOT, ALKPHOS, ALT, AST in the last 72 hours. No results for input(s): RPR in the last 72 hours. No results for input(s): HIV in the last 72 hours. No results for input(s): BC in the last 72 hours. Lab Results   Component Value Date    MUCUS NOT REPORTED 11/29/2019    RBC 3.62 11/30/2019    TRICHOMONAS NOT REPORTED 11/29/2019    WBC 53.3 11/30/2019    YEAST NOT REPORTED 11/29/2019    TURBIDITY TURBID 11/29/2019     Lab Results   Component Value Date    CREATININE 2.77 11/30/2019    GLUCOSE 192 11/30/2019       Medical Decision Making-Imaging:     EXAMINATION:   ONE XRAY VIEW OF THE CHEST       11/29/2019 9:01 pm       COMPARISON:   4 hours ago       HISTORY:   ORDERING SYSTEM PROVIDED HISTORY: intubated   TECHNOLOGIST PROVIDED HISTORY:   intubated   Reason for Exam: portable supine/ intubated   Acuity: Acute   Type of Exam: Initial       FINDINGS:   New ET tube terminates 38 mm above the ron.  There appears to be a   possible enteric tube which is not well visualized distally.  Right IJ   catheter terminates in the right atrium and is unchanged.  Lungs are clear. Cardiomegaly.  Mediastinum normal.  Bony thorax intact.           Impression   New ET tube as above.  Possible new enteric tube not well visualized. Recommend follow-up KUB if clinically warranted. CT ABDOMEN AND PELVIS WITHOUT CONTRAST    COMPARISON:  3/22/2017    CLINICAL HISTORY: Infection in scrotum, lower abdominal pain, diabetic, 30,000 white blood cell count. TECHNIQUE: Unenhanced axial images were obtained from the lung bases to the pubic symphysis with sagittal and coronal 2D reformatted images. Oral contrast administered:  No.  Automatic exposure control (AEC) was utilized. CT ABDOMEN FINDINGS:      The lung bases are unremarkable. There is a small pericardial effusion versus thickening.     The liver, spleen, and pancreas demonstrate no acute abnormality given compromised evaluation without intravenous contrast. Milon Perch Component Collected Lab   Specimen Description 11/29/2019  7:34  [address]   . BUTTOCK BILATERAL TS    Special Requests 11/29/2019  7:34  [address]   NOT REPORTED    Direct Exam 11/29/2019  7:34  [address]   NO NEUTROPHILS SEEN    Direct Exam Abnormal  11/29/2019  7:34  [facility] [address] [facility]    Direct Exam Abnormal  11/29/2019  7:34 PM [address] NEGATIVE RODS    Culture 11/29/2019  7:34  [address]   PENDING          Medical Decision Making-Other:     Note:  · Labs, medications, radiologic studies were reviewed with personal review of films  · Large amounts of data were reviewed  · Discussed with nursing Staff, Discharge planner  · Infection Control and Prevention measures reviewed  · All prior entries were reviewed  · Administer medications as ordered  · Prognosis: Guarded  · Discharge planning reviewed  · Follow up as outpatient. Thank you for allowing us to participate in the care of this patient. Please call with questions.     Hilary Carter MD  Pager: [phone] - Office: [phone]

## 2019-11-30 NOTE — PROGRESS NOTES
0800  Gross per 24 hour   Intake 4479.33 ml   Output 1160 ml   Net 3319.33 ml     Wt Readings from Last 2 Encounters:   11/30/19 291 lb 0.1 oz (132 kg)     Body mass index is 42.97 kg/m². PHYSICAL EXAMINATION:    General appearance -intubated and sedated  Mental status -intubated and sedated  Chest - clear to auscultation, no wheezes, rales or rhonchi, symmetric air entry  Heart - normal rate, regular rhythm, normal S1, S2, no murmurs, rubs, clicks or gallops  Abdomen - soft, nontender, nondistended, no masses or organomegaly  Neurological - alert, oriented, normal speech, no focal findings or movement disorder noted}  Extremities - peripheral pulses normal, no pedal edema, no clubbing or cyanosis  Skin -fetus post debridement of gluteal and perianal region. Full-thickness ulcer in the left plantar surface.      Any additional physical findings:    MEDICATIONS:    Scheduled Meds:   sodium chloride flush  10 mL Intravenous 2 times per day    piperacillin-tazobactam  3.375 g Intravenous Q8H    insulin lispro  0-12 Units Subcutaneous TID WC    insulin lispro  0-6 Units Subcutaneous Nightly    heparin (porcine)  5,000 Units Subcutaneous 3 times per day    lactated ringers bolus  30 mL/kg Intravenous Once    sodium hypochlorite   Irrigation Once    chlorhexidine  15 mL Mouth/Throat BID    famotidine (PEPCID) injection  20 mg Intravenous Daily     Continuous Infusions:   vasopressin (Septic Shock) infusion 0.04 Units/min (11/30/19 0723)    dexmedetomidine (PRECEDEX) IV infusion 0.2 mcg/kg/hr (11/30/19 0718)    dextrose      norepinephrine (LEVOPHED) infusion (double concentration) 22 mcg/min (11/30/19 4019)    fentaNYL 200 mcg/hr (11/30/19 3008)    sodium chloride 125 mL/hr at 11/29/19 2209    dextrose       PRN Meds:   REFRESH LACRI-LUBE, , PRN  sodium chloride flush, 10 mL, PRN  magnesium hydroxide, 30 mL, Daily PRN  ondansetron, 4 mg, Q6H PRN  potassium chloride, 10 mEq, PRN  glucose, 15 g, ([facility]  · Resolved Problems:  ·   * No resolved hospital problems. *  ·   ·         PLAN:     WEAN PER PROTOCOL:  [x] No   [] Yes  [] N/A    DISCONTINUE ANY LABS:   [x] No   [] Yes    TRANSFER OUT OF ICU:   [x] No   [] Yes    ADDITIONAL PLAN:    1. Necrotizing Faciitis involving buttocks and scrotum - s/p POD#1 s/p wide complex debridement of gluteal/perianal region. Return to OR on 12/1/2019 for wound exploration, debridement and possible diverting ostomy. GEN surgery and urology on board. Continue Zosyn. ID on board. Follow with wound cultures.     2. Septic shock -on levophed and vasopressin. Continue normal saline at 125 mL/h. Received 1 bolus LR today. CVP 10.    3.  DEBBY on CKD -neurology on board. Monitor creatinine. No output overnight.      4. Diabetes - Medium dose sliding scale     5. Diabetic foot ulcer -dietary on board. Wound care. No surgical intervention for now.       1. Feeding Diet: Diet NPO Effective Now  2. Fluids 0.9% NaCl at 125ml/hr  3. Family updated  4. Analgesic fentanyl drip  5. Sedation fentanyl, precedex  6. Thrombo-prophylaxis [] Enoxaparin  [x] Unfract. Heparin Subcut  [] EPC Cuffs  7. Mobility bed rest  8. Heads up yes  9. Ulcer prophylaxis [] PPI Agent  [] [id number] [] Sucralfate  [] Other:  10. Glycemic control moderate control  11. Spontaneous breathing trial not today  12. Bowel regimen/urine output low to no urine output  13. Indwelling catheter/lines CVC MIKE DELEON Foley  14. De-escalation Nothing today      Nimisha Napier M.D.              Critical care resident,  Department of Internal Medicine/ Critical care  [facility]             11/30/2019, 8:43 AM

## 2019-11-30 NOTE — PROGRESS NOTES
Pharmacy Note     Renal Dose Adjustment    Luis Schulte is a 37 y.o. male. Pharmacist assessment of renally cleared medications. Recent Labs     11/29/19  1633   BUN 59*       Recent Labs     11/29/19  1633   CREATININE 2.56*       Estimated Creatinine Clearance: 54 mL/min (A) (based on SCr of 2.56 mg/dL (H)). Estimated CrCl using Ideal Body Weight: 39.91 mL/min (based on IBW 70.74 kg)    Height:   Ht Readings from Last 1 Encounters:   11/29/19 5' 9\" (1.753 m)     Weight:  Wt Readings from Last 1 Encounters:   11/29/19 289 lb 14.4 oz (131.5 kg)       The following medication dose has been adjusted based upon renal function per P&T Guidelines:             Famotidine 20 mg IV twice daily changed to 20 mg IV once daily.     [facility] [address]  11/29/2019 8:36 PM

## 2019-11-30 NOTE — OP NOTE
Patient: Navdeep Alexander  YOB: 1985  MRN: [id number]  Date of Procedure: 11/29/2019     Pre-Op Diagnosis: NECRAIZING FASCITIS     Post-Op Diagnosis: Same       Procedure(s):  PERIRECTAL INCISION AND DRAINAGE, WIDE COMPLEX EXCISIONAL DEBRIDEMENT GLUTEAL/PERINEAL TISSUE MEASURING [id number]     Anesthesia: Anesthesia type not filed in the log.     Surgeon(s):  MD Ricardo Crews MD     Assistant: Susana Ford PGY4. RADHA Barrera PGY3, ISIDRO Richards MD, Jose Eduardo Frederick PGY2     Estimated Blood Loss (mL): 750cc     IVF: 1.5L crystalloid, 2u prbc     WC: IV necrotizing soft tissue infection      Complications: None     Specimens:   ID Type Source Tests Collected by Time Destination   1 : BILATERAL BUTTOCKS Tissue Buttock ANAEROBIC AND AEROBIC CULTURE Erik Marin MD 11/29/2019 1920     A : BILATERAL BUTTOCKS Tissue Buttock SURGICAL PATHOLOGY Erik Marin MD 11/29/2019 1901           Implants:  * No implants in log *      Drains:        Urethral Catheter (Active)   Catheter Indications Need for fluid management in critically ill patients in a critical care setting not able to be managed by other means such as BSC with hat, bedpan, urinal, condom catheter, or short term intermittent urethral catherization 11/29/2019  3:45 PM   Site Assessment Red; Swelling;Edema 11/29/2019  3:45 PM   Urine Color Yellow 11/29/2019  3:45 PM   Urine Appearance Clear 11/29/2019  3:45 PM   Output (mL) 260 mL 11/29/2019  3:45 PM         Findings: Wide complex dexbridement of b/l gluteal down to pre-sacral fascia extending to b/l ischial tuberosities. Sharp debridement of anodermal junction extending approximately 3cm superior surrounding anus, involving external sphincter complex. Urology deroofed left testicle down to daratos fascia which appeared viable. L perianal track diving anterior.  Dressed with 0.125% daikins moist guaze.                                     Indication:   Niurka Cleary is a 42-year-old male who presented to the hospital with significant wounds to his gluteal and perineal region as a transfer from outside hospital.  At outside hospital he had a CT abdomen and pelvis that demonstrated significant subcu emphysema of the gluteal region tracking up the left flank. He had a leukocytosis of 28,000 he was hyponatremic and elevated CRP. His LIRNEC score of 12 concerning for high suspicious of necrotizing soft tissue infection. Due to this we recommended operative excisional debridement for source control. The risk, benefits alternatives were discussed with the patient all questions were answered and informed consent was signed. Procedure:   He was taken back to the operating room and put under general anesthesia per anesthesia guidelines aligned his hospital bed. The patient was then placed in the prone position on the operating room table. A timeout was called identifying correct patient, position and procedure to be performed and verified by all present. He was then prepped and draped in usual sterile fashion. A 10 blade scalpel was used to make elliptical incisions of the gluteal regions bilaterally through the affected tissue. Upon incision there was grade water discharge and malodorous smell. Dissection was continued with a mixture of sharp dissection and Bovie electrocautery. Dissection was carried down to subcutaneous tissue to healthy appearing fat. Debridement became medially and was taken down to the presacral fascia and extend all the way down to the anal dermal junction in which approximately 3 to 4 cm of surrounding perianal tissue was taken due to involvement. Department was continued laterally from the anus to the fascia overriding the ischial tuberosities. On the left side of the wound it tract anteriorly please see picture with Nazia. All necrotic infected tissue was debrided. At the end of debridement the wound measured approximately 28 X 23 X 8 cm.   Urology was consulted intraoperatively due to the wound tracking anterior and inferior and necrotic tissue on the patient's left side of his scrotum. Urology debrided the skin down the dartos fascia over the necrotic tissue with dartos fascia appearing healthy and viable. This point the patient was on pressor support per anesthesia and received 2 units of packed red blood cells. Decision was to dress the wound with 0.125% Dakin's moist gauze. The patient was then transferred to his hospital bed in the supine position. He remained intubated due to his critical illness and transferred back to the ICU in stable but critical condition. Instrument and sponge count were performed and found to be correct. Patient will need to return to the operating room for further debridement. But he needs to be resuscitated the ICU prior to this. On return trips to the operating room patient will need a diverting colostomy and continued evaluation for possible need of abdominal perineal resection due to extent of anal and perirectal involvement. Dr. Peyman Panchal was scrubbed and present for the entirety of the case. Sharon Hunter PGY4  Present throughout case.

## 2019-11-30 NOTE — CONSULTS
infusion 0.2 mcg/kg/hr (19 0718)    dextrose      norepinephrine (LEVOPHED) infusion (double concentration) 22 mcg/min (19 0628)    fentaNYL 200 mcg/hr (19 0758)    sodium chloride 125 mL/hr at 19 0854    dextrose       PRN Meds:. REFRESH LACRI-LUBE, sodium chloride flush, magnesium hydroxide, ondansetron, potassium chloride, glucose, dextrose, glucagon (rDNA), dextrose, fentanNYL, glucose, dextrose, glucagon (rDNA), dextrose    Allergies  has no allergies on file. Family History  family history is not on file. Social History   has no history on file for tobacco.   has no history on file for alcohol.   has no history on file for drug.     Objective     Vitals:  Patient Vitals for the past 8 hrs:   BP Temp Temp src Pulse Resp SpO2 Weight   19 0830 -- 99.1 °F (37.3 °C) Oral -- -- -- --   19 0751 -- -- -- 123 16 100 % --   19 0715 -- -- -- 129 23 99 % --   19 0700 (!) 108/57 -- -- 130 18 99 % --   19 0645 -- -- -- 133 18 98 % --   19 0630 (!) 97/51 -- -- 123 23 99 % --   19 0600 (!) 99/54 -- -- 124 23 99 % --   19 0548 -- -- -- -- -- -- 291 lb 0.1 oz (132 kg)   19 0500 (!) 104/58 -- -- 116 25 100 % --   19 0445 -- -- -- 119 26 -- --   19 0430 (!) 100/59 -- -- 117 23 -- --   19 0415 -- -- -- 119 13 -- --   19 0400 101/61 97.9 °F (36.6 °C) Oral 117 26 99 % --   19 0345 -- -- -- 120 19 -- --   19 0330 (!) 99/58 -- -- 115 25 -- --   19 0315 -- -- -- 115 20 -- --   19 0300 (!) 100/58 -- -- 112 26 -- --   19 0245 -- -- -- 113 25 -- --   19 0230 (!) 101/59 -- -- 114 24 -- --   19 0200 (!) 86/57 -- -- 114 (!) 0 100 % --   19 0145 -- -- -- 114 (!) 6 100 % --   19 0130 (!) 87/51 -- -- 114 11 100 % --     Average, Min, and Max for last 24 hours Vitals:  TEMPERATURE:  Temp  Av °F (37.2 °C)  Min: 97.5 °F (36.4 °C)  Max: 99.9 °F (37.7 °C)    RESPIRATIONS RANGE: Resp induration. Interdigital maceration absent. Superficial pressure abrasion to left posterior lateral calf. No open wound. No drainage. No erythema or increased warm. No other signs concerning for infectious process. Clinical:               Imaging:   XR FOOT LEFT (MIN 3 VIEWS)   Final Result   Severe soft tissue swelling and laceration plantar aspect. XR TIBIA FIBULA LEFT (2 VIEWS)   Final Result   No acute osseous abnormality. Possible subcutaneous gas laterally. This might suggest infection. Recommend CT with IV contrast.      RECOMMENDATIONS:   The findings were sent to the Radiology Results [address] at 10:27   pm on 11/29/2019to be communicated to a licensed caregiver. XR CHEST (SINGLE VIEW FRONTAL)   Final Result   New ET tube as above. Possible new enteric tube not well visualized. Recommend follow-up KUB if clinically warranted. XR CHEST PORTABLE   Final Result   Moderate cardiomegaly. Lines as above. Cultures:   CULTURE BLOOD #2 [[id number]] Collected: 11/30/19 0508   Order Status: Completed Specimen: Blood Updated: 11/30/19 0705    Specimen Description . BLOOD    Special Requests RT [facility] 3ML    Culture NO GROWTH 1 HOUR   CULTURE BLOOD #1 [[id number]] Collected: 11/29/19 1620   Order Status: Completed Specimen: Blood Updated: 11/30/19 0705    Specimen Description . BLOOD    Special Requests LT HAND 6MLS    Culture NO GROWTH 14 HOURS   Infectious Disease Intervention [[id number]]    Order Status: Sent    MRSA DNA Probe, Nasal [[id number]] (Abnormal) Collected: 11/29/19 1600   Order Status: Completed Specimen: Nasal Updated: 11/29/19 2021    Specimen Description . NASAL SWAB    MRSA, DNA, Nasal POSITIVE:  MRSA DNA detected by nucleic acid amplification. Abnormal     Comment:                                                    Results should be used as an adjunct to nosocomial control efforts to identify patients   needing enhanced precautions.     The test is not intended to identify patients with staphylococcal infections.  Results   should not be used to guide or monitor treatment for MRSA infections. Anaerobic and Aerobic Culture [[id number]] (Abnormal) Collected: 11/29/19 1934   Order Status: Completed Specimen: Tissue from Buttock Updated: 11/29/19 2017    Specimen Description . BUTTOCK BILATERAL TS    Special Requests NOT REPORTED    Direct Exam NO NEUTROPHILS SEEN     MODERATE GRAM POSITIVE COCCI IN PAIRSAbnormal      MODERATE GRAM NEGATIVE RODSAbnormal     Culture PENDING         Assessment     Ruddy Madrigal is a 37 y.o. male with   1. Stage III pressure ulcer to plantar foot, LLE  2. Stage I pressure ulcer to lateral leg, LLE  3. Necrotizing fascitis of gluteal region  4. Acute renal failure   5. Leukocytosis   6. Intubated and sedated     Principal Problem:    Necrotizing fasciitis ([address]  Active Problems:    Hypertension    Diabetes ([address]    Diabetic foot ulcer ([address]  Resolved Problems:    * No resolved hospital problems. *        Plan     · Patient examined and evaluated at bedside   · Treatment options discussed in detail with the patient  · Radiographs reviewed- no overt signs of osteomyelitis. Possible soft tissue emphysema to lateral LLE however does not correlate clinically. There is no concern for infection at this site. · Continue to float bilateral foot to offload heels with use of pillows under the legs  · Daily dressing changes per nursing staff to left foot consisting of betadine and DSD to left foot. Mepilex to left lateral leg every 3 days. · Dressing applied to LLE:  · Foot: betadine, DSD. · Lateral leg: Mepilex  · No surgical intervention indicated at this time. Will follow from [place]. Will see patient weekly while admitted.    · NWB to Left lower extremity  · Discussed with Dr. Randolph Culver DPM   Podiatric Medicine & Surgery   11/30/2019 at 9:18 AM    Senior Resident Statement:  I have discussed the case,

## 2019-11-30 NOTE — PROGRESS NOTES
PROGRESS NOTE          PATIENT NAME: Austin Mcdaniel [address] RECORD NO. [id number]  DATE: 2019  SURGEON: Bell Martínez  PRIMARY CARE PHYSICIAN: No primary care provider on file. HD: # 1    ASSESSMENT    Patient Active Problem List   Diagnosis    Necrotizing fasciitis ([address]    Hypertension    Diabetes ([address]    Diabetic foot ulcer ([address]       MEDICAL DECISION MAKING AND PLAN    1. Necrotizing soft tissue infection: POD#1 s/p wide complex debridement of gluteal/perianal region  - extensive necrotic tissue that involved the anus and distal rectum. Patient will need diverting colostomy and possible APR. At this time rec continuing to monitor and stabilize today with possible return to OR 19 for wound exploration, debridement of additional necrotic tissue and possible diverting ostomy. - urology following  - 1L bolus LR, remains tachycardic with significant R-R wave variability on a-line  - abx: vanc, zosyn, clindamycin   - f/u wound cultures for abx de-escalation   - Cont lema  - cont with intubation and sedation: rec fent gtt for sedation and analgesia due to hypotension  - keep map >60    2. RLE foot wound  - podiatry managing    3. DEBBY  - rec nephrology consult, may need HD vs CRT in near future   - repeat bmp in 12 hr (18:00)    4. Hyperglycemia  - rec keep blood glucose <180    5. Medical and supportive care per primary       SUBJECTIVE    Kadeem James is now on pressor support with levophed. Remains intubated and sedated. Afebrile overnight. Urine out put significantly decreasing.        OBJECTIVE  VITALS: Temp: Temp: 97.9 °F (36.6 °C)Temp  Av °F (37.2 °C)  Min: 97.5 °F (36.4 °C)  Max: 99.9 °F (58.2 °C) BP Systolic (36LTB), FKJ:510 , Min:86 , WZL:821   Diastolic (16RYA), HUC:06, Min:47, Max:96   Pulse Pulse  Av.7  Min: 112  Max: 134 Resp Resp  Av.7  Min: 0  Max: 28 Pulse ox SpO2  Av.5 %  Min: 95 %  Max: 100 %  GENERAL: intubated and sedated  LUNGS: ventilated, equal and symmetric chest rise/fall  HEART: Tachycardic s1+s2  ABDOMEN: soft, obese, nd  /Anal: lema intact, dressing intact. EXTERMITY: wounds L foot, decreased mm mass b/l LE    I/O last 3 completed shifts: In: 2000 [I.V.:1500; Blood:500]  Out: 1160 [Urine:410; Blood:750]    Drain/tube output:  In: 2000 [I.V.:1500; Blood:500]  Out: 1160 [Urine:410]    LAB:  CBC:   Recent Labs     11/29/19  1633 11/29/19 2045 11/30/19  0449   WBC 28.3* 50.5* 53.3*   HGB 8.5* 9.5* 8.2*   HCT 29.6* 33.7* 28.7*   MCV 74.0* 80.2* 79.3*   * 645* 642*     BMP:   Recent Labs     11/29/19  1633 11/29/19 2045 11/30/19  0449   * 132* 136   K 4.8 5.5* 5.2   * 108* 111*   CO2 11* 11* 11*   BUN 59* 57* 61*   CREATININE 2.56* 2.39* 2.77*   GLUCOSE 144* 163* 192*     COAGS:   Recent Labs     11/29/19  1633   INR 1.1       RADIOLOGY:  Narrative   EXAMINATION:   ONE XRAY VIEW OF THE CHEST       11/29/2019 9:01 pm       COMPARISON:   4 hours ago       HISTORY:   ORDERING SYSTEM PROVIDED HISTORY: intubated   TECHNOLOGIST PROVIDED HISTORY:   intubated   Reason for Exam: portable supine/ intubated   Acuity: Acute   Type of Exam: Initial       FINDINGS:   New ET tube terminates 38 mm above the ron.  There appears to be a   possible enteric tube which is not well visualized distally.  Right IJ   catheter terminates in the right atrium and is unchanged.  Lungs are clear. Cardiomegaly.  Mediastinum normal.  Bony thorax intact.           Impression   New ET tube as above.  Possible new enteric tube not well visualized. Recommend follow-up KUB if clinically warranted.               Lucas Schulte,   11/30/19, 7:00 AM   Trauma, Emergency and Critical Surgical Services  Attending Note      I have reviewed the above TECSS note(s) and confirmed the key elements of the medical history and physical exam. I have discussed the findings, established the care plan and recommendations with Resident, TECSS RN, bedside nurse.   Seen and examined

## 2019-11-30 NOTE — CONSULTS
intraoperatively due to wound tracking anterior and inferior and necrotic tissue on patient's left side of the scrotum, underwent debridement of the skin down the dartos fascia. Patient was transferred back to the ICU, continued to remain intubated due to critical condition. He was continued on Zosyn per ID recommendations. Nephrology was consulted due to concern for DEBBY on CKD stage III and decreasing urine output. Interval History-  Patient was seen and evaluated at bedside. He continues to be intubated, 30% FiO2, PEEP 5. He is currently on pressor support-Levophed and vasopressin, blood pressure 104/66 mm Hg at this point. He is reported to have no urine output overnight, 25 mL urine since morning. Labs reviewed, creatinine 2.77-baseline 1.6-1.8. Sodium 136, potassium 5.2, bicarb 11. Plan for diverting colostomy and possible APR with wound exploration, and additional debridement of necrotic tissue tomorrow. Past Medical History:        Diagnosis Date    [facility]     Diabetes mellitus [facility]     Hypertension     Spina bifida aperta of lumbar spine ([facility]        Past Surgical History:    No past surgical history on file.     Current Medications:    vasopressin 20 Units in dextrose 5 % 100 mL infusion, Continuous  dexmedetomidine (PRECEDEX) 400 mcg in sodium chloride 0.9 % 100 mL infusion, Continuous  REFRESH LACRI-LUBE ointment OINT, PRN  povidone-iodine (BETADINE) 10 % external solution, PRN  sodium chloride flush 0.9 % injection 10 mL, 2 times per day  sodium chloride flush 0.9 % injection 10 mL, PRN  magnesium hydroxide (MILK OF MAGNESIA) 400 MG/5ML suspension 30 mL, Daily PRN  ondansetron (ZOFRAN) injection 4 mg, Q6H PRN  potassium chloride 10 mEq/100 mL IVPB (Peripheral Line), PRN  piperacillin-tazobactam (ZOSYN) 3.375 g in dextrose 5 % 50 mL IVPB extended infusion (mini-bag), Q8H  glucose (GLUTOSE) 40 % oral gel 15 g, PRN  dextrose 50 % IV solution, PRN  glucagon (rDNA) injection 1 mg, PRN  dextrose 5 % solution, PRN  insulin lispro (HUMALOG) injection vial 0-12 Units, TID WC  insulin lispro (HUMALOG) injection vial 0-6 Units, Nightly  heparin (porcine) injection 5,000 Units, 3 times per day  lactated ringers bolus, Once  sodium hypochlorite (DAKINS) 0.125 % external solution, Once  norepinephrine (LEVOPHED) 32 mg in dextrose 5 % 250 mL infusion, Continuous  chlorhexidine (PERIDEX) 0.12 % solution 15 mL, BID  fentaNYL 20 mcg/mL Infusion, Continuous  fentaNYL (SUBLIMAZE) injection 25 mcg, Q1H PRN  famotidine (PEPCID) injection 20 mg, Daily  0.9 % sodium chloride infusion, Continuous  glucose (GLUTOSE) 40 % oral gel 15 g, PRN  dextrose 50 % IV solution, PRN  glucagon (rDNA) injection 1 mg, PRN  dextrose 5 % solution, PRN        Allergies:  Patient has no allergy information on record.     Social History:   Social History     Socioeconomic History    Marital status: Unknown     Spouse name: Not on file    Number of children: Not on file    Years of education: Not on file    Highest education level: Not on file   Occupational History    Not on file   Social Needs    Financial resource strain: Not on file    Food insecurity:     Worry: Not on file     Inability: Not on file    Transportation needs:     Medical: Not on file     Non-medical: Not on file   Tobacco Use    Smoking status: Not on file   Substance and Sexual Activity    Alcohol use: Not on file    Drug use: Not on file    Sexual activity: Not on file   Lifestyle    Physical activity:     Days per week: Not on file     Minutes per session: Not on file    Stress: Not on file   Relationships    Social connections:     Talks on phone: Not on file     Gets together: Not on file     Attends Hinduism service: Not on file     Active member of club or organization: Not on file     Attends meetings of clubs or organizations: Not on file     Relationship status: Not on file    Intimate partner violence:     Fear of 22.6* 22.7*   MCHC 28.7 28.2* 28.6   RDW 19.4* 21.0* 19.9*   * 645* 642*   MPV 8.5 8.4 8.8      BMP:   Recent Labs     11/29/19  1633 11/29/19 2045 11/30/19  0449   * 132* 136   K 4.8 5.5* 5.2   * 108* 111*   CO2 11* 11* 11*   BUN 59* 57* 61*   CREATININE 2.56* 2.39* 2.77*   GLUCOSE 144* 163* 192*   CALCIUM 7.4* 7.8* 7.2*        Phosphorus:    Recent Labs     11/29/19  1633 11/29/19  2045   PHOS 4.6* 6.3*     Magnesium:   Recent Labs     11/29/19 1633 11/29/19 2045   MG 1.9 1.7     Albumin: No results for input(s): LABALBU in the last 72 hours. IRON:  No results found for: IRON  Iron Saturation:  No components found for: PERCENTFE  TIBC:  No results found for: TIBC  FERRITIN:  No results found for: FERRITIN  SPEP: No results found for: PROT, ALBCAL, ALBCAL, ALBPCT, LABALPH, A1PCT, LABALPH, A2PCT, LABBETA, BETAPCT, GAMGLOB, GGPCT, PATH  UPEP: No results found for: TPU     C3: No results found for: C3  C4: No results found for: C4  MPO ANCA:  No results found for: MPO . PR3 ANCA:  No results found for: PR3  Urine Sodium:  No results found for: DONOVAN   Urine Creatinine:  No results found for: LABCREA  Urine Eosinophils: No results found for: UREO  Urine Protein:  No results found for: TPU  Urinalysis:  U/A:   Lab Results   Component Value Date    NITRU NEGATIVE 11/29/2019    COLORU YELLOW 11/29/2019    PHUR 5.0 11/29/2019    WBCUA 5 TO 10 11/29/2019    RBCUA 0 TO 2 11/29/2019    MUCUS NOT REPORTED 11/29/2019    TRICHOMONAS NOT REPORTED 11/29/2019    YEAST NOT REPORTED 11/29/2019    BACTERIA NOT REPORTED 11/29/2019    SPECGRAV 1.015 11/29/2019    LEUKOCYTESUR NEGATIVE 11/29/2019    UROBILINOGEN Normal 11/29/2019    BILIRUBINUR NEGATIVE 11/29/2019    GLUCOSEU NEGATIVE 11/29/2019    KETUA NEGATIVE 11/29/2019    AMORPHOUS NOT REPORTED 11/29/2019         Radiology:  Reviewed as available. Assessment:    1.   DEBBY non oliguric secondary to ischemic acute tubular necrosis from hypotension and sepsis 2.  High anion gap metabolic acidosis secondary to acute kidney injury  3. Circulatory shock - on pressor support, secondary to sepsis from necrotizing fasciitis  4. CKD stage III-from diabetic nephrosclerosis, baseline creatinine 1.6-1.8  5. Necrotizing fasciitis involving buttocks and scrotum-status post wide complex debridement of gluteal/perineal region  6. History of type 2 diabetes mellitus with diabetic foot ulcer  7. History of hypertension    Plan:  1. Continue IV fluids-D5 with 3 ampoules of bicarb at 150 cc/h.  2.  Repeat BMP at 6 PM and page nephrology with results  3. Continue pressor support due to hypotension. 4.  Continue Ambrosio's catheter and continue to monitor urine output. 5.  Follow-up labs ordered-immunofixation serum profile, free light chains, MARINE screen, complement level, urine sodium, protein, creatinine, hepatitis panel. 6.  Follow-up renal ultrasound. 7.  Adjust antibiotics according to renal dosing. 8.  We will continue to follow. Nely Sanches MD      Department of Internal Medicine  [facility]         11/30/2019, 2:27 PM    Attending Physician Statement  I have discussed the care of Shirlene Dexter, including pertinent history and exam findings,  with the Fellow/Residentt. I have reviewed the key elements of all parts of the encounter with the Fellow/ Resident. I agree with the assessment, plan and orders as documented.     Annabelle Osman MD, MRCP Leticia Dillon, FACP   11/30/2019 2:32 PM    [facility]

## 2019-11-30 NOTE — H&P
Attending Physician Statement  I have discussed the case of Lonariesl Bullocks, including pertinent history and exam findings with the resident/fellow/NP/PA. I have seen and examined the patient and the key elements of the encounter have been performed by me. I agree with the assessment, plan and orders as documented by the resident/fellow/NP/PA  With changes made to the note as needed. Pt was seen during rounds. Review of Systems:   In addition to the pertinent positives and negatives as stated within HPI and the review of systems as documented in their notes, all other systems were reviewed when able to and are reported negative. Patient admitted with chief complaint of generalized weakness for a few days. Patient observed a significant sacral decubitus with necrotizing fasciitis-antibiotics and general surgery evaluation. Add vancomycin. Pharmacy to monitor and dose vancomycin     Essential hypertension history-monitor but hold antihypertensive    Septic shock-hydrate patient and continue pressors with the need for the pressors should come down as the patient is hydrated. Dehydration to be guided by measurement of IVC pressure or CVP. Also bladder pressure to be measured to evaluate for any abdominal compartment syndrome    Type 2 diabetes mellitus-monitor blood sugars and cover with insulin    Sinus tachycardia secondary to sepsis from sacral decubitus-should be improved as the patient gets hydrated and the infection is controlled    Acute kidney injury secondary to severe sepsis-monitor urine output and kidney function. Measure bladder pressure for any compartment syndrome.   Nephrology consultation was obtained    Hepatic transaminase elevation-monitor    Leukocytosis secondary to infection-monitor    history of CSF SHUNT for hydrocephalus    Spina bifida    Anemia likely due to this chronic infection but could also have iron deficiency secondary to bleeding from the wound over a period of time-iron

## 2019-11-30 NOTE — PROGRESS NOTES
Grady Lawstephen, [address] [facility], [address] [facility], [facility]  Urology Progress Note     Subjective: Intubated, sedated, on pressor support with Levophed. No documented fevers.      Patient Vitals for the past 24 hrs:   BP Temp Temp src Pulse Resp SpO2 Height Weight   11/30/19 0751 -- -- -- 123 16 100 % -- --   11/30/19 0715 -- -- -- 129 23 99 % -- --   11/30/19 0700 (!) 108/57 -- -- 130 18 99 % -- --   11/30/19 0645 -- -- -- 133 18 98 % -- --   11/30/19 0630 (!) 97/51 -- -- 123 23 99 % -- --   11/30/19 0600 (!) 99/54 -- -- 124 23 99 % -- --   11/30/19 0548 -- -- -- -- -- -- -- 291 lb 0.1 oz (132 kg)   11/30/19 0500 (!) 104/58 -- -- 116 25 100 % -- --   11/30/19 0445 -- -- -- 119 26 -- -- --   11/30/19 0430 (!) 100/59 -- -- 117 23 -- -- --   11/30/19 0415 -- -- -- 119 13 -- -- --   11/30/19 0400 101/61 97.9 °F (36.6 °C) Oral 117 26 99 % -- --   11/30/19 0345 -- -- -- 120 19 -- -- --   11/30/19 0330 (!) 99/58 -- -- 115 25 -- -- --   11/30/19 0315 -- -- -- 115 20 -- -- --   11/30/19 0300 (!) 100/58 -- -- 112 26 -- -- --   11/30/19 0245 -- -- -- 113 25 -- -- --   11/30/19 0230 (!) 101/59 -- -- 114 24 -- -- --   11/30/19 0200 (!) 86/57 -- -- 114 (!) 0 100 % -- --   11/30/19 0145 -- -- -- 114 (!) 6 100 % -- --   11/30/19 0130 (!) 87/51 -- -- 114 11 100 % -- --   11/30/19 0115 -- -- -- 115 12 100 % -- --   11/30/19 0100 (!) 88/47 -- -- 117 18 100 % -- --   11/30/19 0045 -- -- -- 120 16 100 % -- --   11/30/19 0030 108/68 -- -- 120 19 100 % -- --   11/30/19 0015 -- -- -- 121 17 100 % -- --   11/30/19 0000 111/61 97.7 °F (36.5 °C) Oral 124 19 100 % -- --   11/29/19 2345 (!) 95/58 -- -- 128 21 99 % -- --   11/29/19 2339 -- -- -- 128 24 99 % -- --   11/29/19 2330 -- -- -- 130 21 99 % -- --   11/29/19 2315 92/61 -- -- 130 22 99 % -- --   11/29/19 2300 98/60 -- -- 128 23 100 % -- --   11/29/19 2245 (!) 101/58 -- -- 127 23 99 % -- --   11/29/19 2230 -- -- -- 127 22 99 % -- --   11/29/19 2215 -- -- -- 128 22 98 % -- --   11/29/19 2200 102/60 -- -- 128 22 98 % -- --   11/29/19 2145 -- -- -- 129 23 98 % -- --   11/29/19 2130 -- -- -- 131 24 98 % -- --   11/29/19 2115 -- -- -- 133 26 98 % -- --   11/29/19 2100 109/64 97.5 °F (36.4 °C) Oral 133 25 97 % -- --   11/29/19 2045 -- -- -- 134 25 98 % -- --   11/29/19 2030 -- -- -- 133 25 98 % -- --   11/29/19 1556 -- -- -- 122 21 98 % -- --   11/29/19 1555 -- -- -- 122 17 97 % -- --   11/29/19 1554 -- -- -- 122 12 98 % -- --   11/29/19 1553 -- -- -- 122 27 99 % -- --   11/29/19 1552 -- -- -- 121 24 98 % -- --   11/29/19 1551 -- -- -- 121 23 97 % -- --   11/29/19 1550 -- -- -- 122 26 98 % -- --   11/29/19 1549 -- -- -- 123 26 98 % -- --   11/29/19 1548 -- -- -- 123 26 97 % -- --   11/29/19 1547 -- -- -- 123 26 97 % -- --   11/29/19 1546 -- -- -- 124 25 98 % -- --   11/29/19 1545 (!) 138/96 98.4 °F (36.9 °C) Oral 124 26 98 % 5' 9\" (1.753 m) 289 lb 14.4 oz (131.5 kg)   11/29/19 1544 -- -- -- 125 27 97 % -- --   11/29/19 1543 -- -- -- 126 24 97 % -- --   11/29/19 1542 -- -- -- 127 20 97 % -- --   11/29/19 1541 -- -- -- 126 21 98 % -- --   11/29/19 1540 -- -- -- 126 28 97 % -- --       Intake/Output Summary (Last 24 hours) at 11/30/2019 0804  Last data filed at 11/30/2019 0715  Gross per 24 hour   Intake 4479.33 ml   Output 1160 ml   Net 3319.33 ml       Recent Labs     11/29/19  1633 11/29/19 2045 11/30/19 0449   WBC 28.3* 50.5* 53.3*   HGB 8.5* 9.5* 8.2*   HCT 29.6* 33.7* 28.7*   MCV 74.0* 80.2* 79.3*   * 645* 642*     Recent Labs     11/29/19  1633 11/29/19 2045 11/30/19 0449   * 132* 136   K 4.8 5.5* 5.2   * 108* 111*   CO2 11* 11* 11*   PHOS 4.6* 6.3*  --    BUN 59* 57* 61*   CREATININE 2.56* 2.39* 2.77*       Recent Labs     11/29/19  1614   COLORU YELLOW   PHUR 5.0   WBCUA 5 TO 10   RBCUA 0 TO 2   MUCUS NOT REPORTED   TRICHOMONAS NOT REPORTED   YEAST NOT REPORTED   BACTERIA NOT REPORTED   SPECGRAV 1.015   LEUKOCYTESUR NEGATIVE   UROBILINOGEN Normal   BILIRUBINUR NEGATIVE

## 2019-11-30 NOTE — PROGRESS NOTES
express needs and understand communication  Outcome: Ongoing  Goal: Mobility/activity is maintained at optimum level for patient  Outcome: Ongoing     Problem: ASPIRATION PRECAUTIONS  Goal: Patients risk of aspiration is minimized  Outcome: Ongoing     Problem: SKIN INTEGRITY  Goal: Skin integrity is maintained or improved  Outcome: Ongoing

## 2019-11-30 NOTE — OP NOTE
Incision and Drainage Procedure Note    Surgeon: Dr James Bernal MD   Assts: Carmelo Mcclain MD   Indication: necrotizing infection of perineum with suspected scrotal involvement     Procedure: The patient was positioned appropriately  in prone position and the skin over the incision site was prepped with betadine and draped in a sterile fashion. General surgery team started debridement and did extensive debridement of both buttocks, anal canal, exposing rectum in midline and ischiorectal fossa bilaterally   Urology was called to inspect a cavity extending from the perianal region downwards towards the rectum. Samantha Sanches Under aseptic precautions, we inserted a hemostat into the cavity and opened with bovie cautery. There was healthy viable tissue underneath. There was patchy gangrenous area on posterior  scrotum, which was debrided sharply and then we dissected to open the dartos. We couldn't find abscess , only the clear fluid from lymphedema was encountered. skin was viable with bleeding edges. With this we terminated our procedure and handed over to the General surgery team for further debridement. We will reassess the patient tomorrow am for changes in the scrotal wound.     Complications: None

## 2019-12-01 LAB
ABSOLUTE EOS #: 0 K/UL (ref 0–0.4)
ABSOLUTE IMMATURE GRANULOCYTE: 0 K/UL (ref 0–0.3)
ABSOLUTE LYMPH #: 1.56 K/UL (ref 1–4.8)
ABSOLUTE MONO #: 0.31 K/UL (ref 0.1–0.8)
ALBUMIN SERPL-MCNC: 1.5 G/DL (ref 3.5–5.2)
ALBUMIN/GLOBULIN RATIO: 0.4 (ref 1–2.5)
ALLEN TEST: ABNORMAL
ALP BLD-CCNC: 127 U/L (ref 40–129)
ALT SERPL-CCNC: 6 U/L (ref 5–41)
ANION GAP SERPL CALCULATED.3IONS-SCNC: 15 MMOL/L (ref 9–17)
ANION GAP SERPL CALCULATED.3IONS-SCNC: 16 MMOL/L (ref 9–17)
AST SERPL-CCNC: 40 U/L
BASOPHILS # BLD: 0 % (ref 0–2)
BASOPHILS ABSOLUTE: 0 K/UL (ref 0–0.2)
BILIRUB SERPL-MCNC: 1.2 MG/DL (ref 0.3–1.2)
BILIRUBIN DIRECT: 1.16 MG/DL
BILIRUBIN, INDIRECT: 0.04 MG/DL (ref 0–1)
BUN BLDV-MCNC: 64 MG/DL (ref 6–20)
BUN BLDV-MCNC: 68 MG/DL (ref 6–20)
BUN/CREAT BLD: ABNORMAL (ref 9–20)
BUN/CREAT BLD: ABNORMAL (ref 9–20)
CALCIUM IONIZED: 1.03 MMOL/L (ref 1.13–1.33)
CALCIUM SERPL-MCNC: 6.8 MG/DL (ref 8.6–10.4)
CALCIUM SERPL-MCNC: 7.2 MG/DL (ref 8.6–10.4)
CHLORIDE BLD-SCNC: 104 MMOL/L (ref 98–107)
CHLORIDE BLD-SCNC: 107 MMOL/L (ref 98–107)
CO2: 14 MMOL/L (ref 20–31)
CO2: 15 MMOL/L (ref 20–31)
CREAT SERPL-MCNC: 3.29 MG/DL (ref 0.7–1.2)
CREAT SERPL-MCNC: 3.83 MG/DL (ref 0.7–1.2)
CULTURE: ABNORMAL
DIFFERENTIAL TYPE: ABNORMAL
DIRECT EXAM: ABNORMAL
EOSINOPHILS RELATIVE PERCENT: 0 % (ref 1–4)
ESTIMATED AVERAGE GLUCOSE: 186 MG/DL
FIO2: 30
GFR AFRICAN AMERICAN: 22 ML/MIN
GFR AFRICAN AMERICAN: 26 ML/MIN
GFR NON-AFRICAN AMERICAN: 18 ML/MIN
GFR NON-AFRICAN AMERICAN: 21 ML/MIN
GFR SERPL CREATININE-BSD FRML MDRD: ABNORMAL ML/MIN/{1.73_M2}
GLOBULIN: ABNORMAL G/DL (ref 1.5–3.8)
GLUCOSE BLD-MCNC: 142 MG/DL (ref 75–110)
GLUCOSE BLD-MCNC: 147 MG/DL (ref 75–110)
GLUCOSE BLD-MCNC: 148 MG/DL (ref 75–110)
GLUCOSE BLD-MCNC: 155 MG/DL (ref 70–99)
GLUCOSE BLD-MCNC: 157 MG/DL (ref 75–110)
GLUCOSE BLD-MCNC: 176 MG/DL (ref 75–110)
GLUCOSE BLD-MCNC: 248 MG/DL (ref 75–110)
GLUCOSE BLD-MCNC: 256 MG/DL (ref 75–110)
GLUCOSE BLD-MCNC: 282 MG/DL (ref 75–110)
GLUCOSE BLD-MCNC: 292 MG/DL (ref 74–100)
GLUCOSE BLD-MCNC: 301 MG/DL (ref 75–110)
GLUCOSE BLD-MCNC: 313 MG/DL (ref 70–99)
HBA1C MFR BLD: 8.1 % (ref 4–6)
HCT VFR BLD CALC: 21.7 % (ref 40.7–50.3)
HEMOGLOBIN: 6.3 G/DL (ref 13–17)
IMMATURE GRANULOCYTES: 0 %
LYMPHOCYTES # BLD: 5 % (ref 24–44)
Lab: ABNORMAL
MAGNESIUM: 1.9 MG/DL (ref 1.6–2.6)
MCH RBC QN AUTO: 22.8 PG (ref 25.2–33.5)
MCHC RBC AUTO-ENTMCNC: 29 G/DL (ref 28.4–34.8)
MCV RBC AUTO: 78.6 FL (ref 82.6–102.9)
MODE: ABNORMAL
MONOCYTES # BLD: 1 % (ref 1–7)
MORPHOLOGY: ABNORMAL
NEGATIVE BASE EXCESS, ART: 10 (ref 0–2)
NRBC AUTOMATED: 0.3 PER 100 WBC
NUCLEATED RED BLOOD CELLS: 1 PER 100 WBC
O2 DEVICE/FLOW/%: ABNORMAL
PATIENT TEMP: ABNORMAL
PDW BLD-RTO: 20.1 % (ref 11.8–14.4)
PHOSPHORUS: 7.2 MG/DL (ref 2.5–4.5)
PLATELET # BLD: 443 K/UL (ref 138–453)
PLATELET ESTIMATE: ABNORMAL
PMV BLD AUTO: 8.6 FL (ref 8.1–13.5)
POC HCO3: 16 MMOL/L (ref 21–28)
POC O2 SATURATION: 98 % (ref 94–98)
POC PCO2 TEMP: ABNORMAL MM HG
POC PCO2: 32.9 MM HG (ref 35–48)
POC PH TEMP: ABNORMAL
POC PH: 7.29 (ref 7.35–7.45)
POC PO2 TEMP: ABNORMAL MM HG
POC PO2: 107.4 MM HG (ref 83–108)
POSITIVE BASE EXCESS, ART: ABNORMAL (ref 0–3)
POTASSIUM SERPL-SCNC: 5.3 MMOL/L (ref 3.7–5.3)
POTASSIUM SERPL-SCNC: 5.4 MMOL/L (ref 3.7–5.3)
POTASSIUM SERPL-SCNC: 5.4 MMOL/L (ref 3.7–5.3)
RBC # BLD: 2.76 M/UL (ref 4.21–5.77)
RBC # BLD: ABNORMAL 10*6/UL
SAMPLE SITE: ABNORMAL
SEG NEUTROPHILS: 94 % (ref 36–66)
SEGMENTED NEUTROPHILS ABSOLUTE COUNT: 29.33 K/UL (ref 1.8–7.7)
SODIUM BLD-SCNC: 134 MMOL/L (ref 135–144)
SODIUM BLD-SCNC: 137 MMOL/L (ref 135–144)
SPECIMEN DESCRIPTION: ABNORMAL
TCO2 (CALC), ART: 17 MMOL/L (ref 22–29)
TOTAL PROTEIN: 5.3 G/DL (ref 6.4–8.3)
WBC # BLD: 31.2 K/UL (ref 3.5–11.3)
WBC # BLD: ABNORMAL 10*3/UL

## 2019-12-01 PROCEDURE — 6370000000 HC RX 637 (ALT 250 FOR IP): Performed by: STUDENT IN AN ORGANIZED HEALTH CARE EDUCATION/TRAINING PROGRAM

## 2019-12-01 PROCEDURE — 80076 HEPATIC FUNCTION PANEL: CPT

## 2019-12-01 PROCEDURE — 99233 SBSQ HOSP IP/OBS HIGH 50: CPT | Performed by: INTERNAL MEDICINE

## 2019-12-01 PROCEDURE — 82947 ASSAY GLUCOSE BLOOD QUANT: CPT

## 2019-12-01 PROCEDURE — 82330 ASSAY OF CALCIUM: CPT

## 2019-12-01 PROCEDURE — 94770 HC ETCO2 MONITOR DAILY: CPT

## 2019-12-01 PROCEDURE — 2500000003 HC RX 250 WO HCPCS: Performed by: STUDENT IN AN ORGANIZED HEALTH CARE EDUCATION/TRAINING PROGRAM

## 2019-12-01 PROCEDURE — 2000000000 HC ICU R&B

## 2019-12-01 PROCEDURE — P9016 RBC LEUKOCYTES REDUCED: HCPCS

## 2019-12-01 PROCEDURE — 2500000003 HC RX 250 WO HCPCS: Performed by: INTERNAL MEDICINE

## 2019-12-01 PROCEDURE — 86900 BLOOD TYPING SEROLOGIC ABO: CPT

## 2019-12-01 PROCEDURE — 6360000002 HC RX W HCPCS: Performed by: STUDENT IN AN ORGANIZED HEALTH CARE EDUCATION/TRAINING PROGRAM

## 2019-12-01 PROCEDURE — 82803 BLOOD GASES ANY COMBINATION: CPT

## 2019-12-01 PROCEDURE — 2580000003 HC RX 258: Performed by: INTERNAL MEDICINE

## 2019-12-01 PROCEDURE — 99291 CRITICAL CARE FIRST HOUR: CPT | Performed by: INTERNAL MEDICINE

## 2019-12-01 PROCEDURE — 2580000003 HC RX 258: Performed by: STUDENT IN AN ORGANIZED HEALTH CARE EDUCATION/TRAINING PROGRAM

## 2019-12-01 PROCEDURE — 84132 ASSAY OF SERUM POTASSIUM: CPT

## 2019-12-01 PROCEDURE — 80048 BASIC METABOLIC PNL TOTAL CA: CPT

## 2019-12-01 PROCEDURE — 84100 ASSAY OF PHOSPHORUS: CPT

## 2019-12-01 PROCEDURE — 6360000002 HC RX W HCPCS: Performed by: INTERNAL MEDICINE

## 2019-12-01 PROCEDURE — 2700000000 HC OXYGEN THERAPY PER DAY

## 2019-12-01 PROCEDURE — 94761 N-INVAS EAR/PLS OXIMETRY MLT: CPT

## 2019-12-01 PROCEDURE — 94003 VENT MGMT INPAT SUBQ DAY: CPT

## 2019-12-01 PROCEDURE — 37799 UNLISTED PX VASCULAR SURGERY: CPT

## 2019-12-01 PROCEDURE — 83735 ASSAY OF MAGNESIUM: CPT

## 2019-12-01 PROCEDURE — 83036 HEMOGLOBIN GLYCOSYLATED A1C: CPT

## 2019-12-01 PROCEDURE — 85025 COMPLETE CBC W/AUTO DIFF WBC: CPT

## 2019-12-01 RX ORDER — FOLIC ACID 5 MG/ML
1 INJECTION, SOLUTION INTRAMUSCULAR; INTRAVENOUS; SUBCUTANEOUS DAILY
Status: DISCONTINUED | OUTPATIENT
Start: 2019-12-01 | End: 2019-12-01 | Stop reason: SDUPTHER

## 2019-12-01 RX ORDER — ACETAMINOPHEN 325 MG/1
650 TABLET ORAL EVERY 4 HOURS PRN
Status: DISCONTINUED | OUTPATIENT
Start: 2019-12-01 | End: 2019-12-15

## 2019-12-01 RX ORDER — THIAMINE HYDROCHLORIDE 100 MG/ML
100 INJECTION, SOLUTION INTRAMUSCULAR; INTRAVENOUS DAILY
Status: DISCONTINUED | OUTPATIENT
Start: 2019-12-01 | End: 2019-12-01 | Stop reason: SDUPTHER

## 2019-12-01 RX ORDER — DEXTROSE MONOHYDRATE 25 G/50ML
12.5 INJECTION, SOLUTION INTRAVENOUS PRN
Status: DISCONTINUED | OUTPATIENT
Start: 2019-12-01 | End: 2019-12-18

## 2019-12-01 RX ORDER — FUROSEMIDE 10 MG/ML
40 INJECTION INTRAMUSCULAR; INTRAVENOUS ONCE
Status: COMPLETED | OUTPATIENT
Start: 2019-12-01 | End: 2019-12-01

## 2019-12-01 RX ORDER — ACETAMINOPHEN 325 MG/1
650 TABLET ORAL EVERY 4 HOURS PRN
Status: DISCONTINUED | OUTPATIENT
Start: 2019-12-01 | End: 2019-12-24

## 2019-12-01 RX ORDER — 0.9 % SODIUM CHLORIDE 0.9 %
250 INTRAVENOUS SOLUTION INTRAVENOUS ONCE
Status: COMPLETED | OUTPATIENT
Start: 2019-12-01 | End: 2019-12-01

## 2019-12-01 RX ORDER — NICOTINE POLACRILEX 4 MG
15 LOZENGE BUCCAL PRN
Status: DISCONTINUED | OUTPATIENT
Start: 2019-12-01 | End: 2019-12-18

## 2019-12-01 RX ORDER — DEXTROSE MONOHYDRATE 50 MG/ML
100 INJECTION, SOLUTION INTRAVENOUS PRN
Status: DISCONTINUED | OUTPATIENT
Start: 2019-12-01 | End: 2019-12-18

## 2019-12-01 RX ADMIN — INSULIN LISPRO 8 UNITS: 100 INJECTION, SOLUTION INTRAVENOUS; SUBCUTANEOUS at 08:57

## 2019-12-01 RX ADMIN — HEPARIN SODIUM 5000 UNITS: 5000 INJECTION INTRAVENOUS; SUBCUTANEOUS at 13:56

## 2019-12-01 RX ADMIN — MINERAL OIL, PETROLATUM: 425; 568 OINTMENT OPHTHALMIC at 14:27

## 2019-12-01 RX ADMIN — Medication: at 21:39

## 2019-12-01 RX ADMIN — FAMOTIDINE 20 MG: 10 INJECTION, SOLUTION INTRAVENOUS at 07:53

## 2019-12-01 RX ADMIN — Medication 15 ML: at 21:48

## 2019-12-01 RX ADMIN — I.V. FAT EMULSION 250 ML: 20 EMULSION INTRAVENOUS at 18:00

## 2019-12-01 RX ADMIN — CALCIUM GLUCONATE: 98 INJECTION, SOLUTION INTRAVENOUS at 17:58

## 2019-12-01 RX ADMIN — VASOPRESSIN 0.04 UNITS/MIN: 20 INJECTION INTRAVENOUS at 05:23

## 2019-12-01 RX ADMIN — SODIUM CHLORIDE: 9 INJECTION, SOLUTION INTRAVENOUS at 07:56

## 2019-12-01 RX ADMIN — MEROPENEM 1 G: 1 INJECTION, POWDER, FOR SOLUTION INTRAVENOUS at 21:44

## 2019-12-01 RX ADMIN — Medication 200 MCG/HR: at 13:13

## 2019-12-01 RX ADMIN — Medication 200 MCG/HR: at 17:53

## 2019-12-01 RX ADMIN — SODIUM CHLORIDE: 9 INJECTION, SOLUTION INTRAVENOUS at 07:52

## 2019-12-01 RX ADMIN — Medication: at 05:31

## 2019-12-01 RX ADMIN — FOLIC ACID: 5 INJECTION, SOLUTION INTRAMUSCULAR; INTRAVENOUS; SUBCUTANEOUS at 13:46

## 2019-12-01 RX ADMIN — MINERAL OIL, PETROLATUM: 425; 568 OINTMENT OPHTHALMIC at 05:33

## 2019-12-01 RX ADMIN — FUROSEMIDE 40 MG: 10 INJECTION, SOLUTION INTRAMUSCULAR; INTRAVENOUS at 10:13

## 2019-12-01 RX ADMIN — SODIUM CHLORIDE 6 UNITS/HR: 9 INJECTION, SOLUTION INTRAVENOUS at 11:28

## 2019-12-01 RX ADMIN — Medication 200 MCG/HR: at 03:29

## 2019-12-01 RX ADMIN — HEPARIN SODIUM 5000 UNITS: 5000 INJECTION INTRAVENOUS; SUBCUTANEOUS at 22:35

## 2019-12-01 RX ADMIN — DEXMEDETOMIDINE HYDROCHLORIDE 0.3 MCG/KG/HR: 100 INJECTION, SOLUTION INTRAVENOUS at 05:23

## 2019-12-01 RX ADMIN — ACETAMINOPHEN 650 MG: 325 TABLET ORAL at 13:54

## 2019-12-01 RX ADMIN — SODIUM CHLORIDE: 9 INJECTION, SOLUTION INTRAVENOUS at 07:57

## 2019-12-01 RX ADMIN — ACETAMINOPHEN 650 MG: 325 TABLET ORAL at 20:24

## 2019-12-01 RX ADMIN — ACETAMINOPHEN 650 MG: 325 TABLET ORAL at 09:13

## 2019-12-01 RX ADMIN — MEROPENEM 1 G: 1 INJECTION, POWDER, FOR SOLUTION INTRAVENOUS at 09:03

## 2019-12-01 RX ADMIN — SODIUM CHLORIDE, PRESERVATIVE FREE 10 ML: 5 INJECTION INTRAVENOUS at 08:33

## 2019-12-01 RX ADMIN — Medication: at 13:34

## 2019-12-01 RX ADMIN — NOREPINEPHRINE BITARTRATE 14 MCG/MIN: 1 INJECTION, SOLUTION, CONCENTRATE INTRAVENOUS at 05:24

## 2019-12-01 RX ADMIN — Medication 15 ML: at 08:32

## 2019-12-01 RX ADMIN — VASOPRESSIN 0.04 UNITS/MIN: 20 INJECTION INTRAVENOUS at 17:37

## 2019-12-01 RX ADMIN — Medication 200 MCG/HR: at 22:53

## 2019-12-01 RX ADMIN — VASOPRESSIN 0.04 UNITS/MIN: 20 INJECTION INTRAVENOUS at 08:31

## 2019-12-01 RX ADMIN — SODIUM CHLORIDE, PRESERVATIVE FREE 10 ML: 5 INJECTION INTRAVENOUS at 21:49

## 2019-12-01 RX ADMIN — Medication 200 MCG/HR: at 08:26

## 2019-12-01 RX ADMIN — HEPARIN SODIUM 5000 UNITS: 5000 INJECTION INTRAVENOUS; SUBCUTANEOUS at 05:32

## 2019-12-01 ASSESSMENT — PULMONARY FUNCTION TESTS
PIF_VALUE: 17
PIF_VALUE: 17
PIF_VALUE: 22
PIF_VALUE: 15
PIF_VALUE: 11
PIF_VALUE: 20

## 2019-12-01 NOTE — PROGRESS NOTES
Ventilator Bronchodilator assessment    Breath sounds: clear  Inspiratory Pressure: 22  Plateau Pressure: 18    Patient assessed at level 1          []    Bronchodilator Assessment    BRONCHODILATOR ASSESSMENT SCORE  Score 0 (Home) 1 2 3 4   Breath Sounds   []  Chronic Ventilator: Patient at baseline []  Mild Wheezes/ Clear []  Intermittent wheezes with good air entry []  Bilateral/unilateral wheezing with diminished air entry []  Insp/Exp wheeze and/or poor aeration   Ventilator Pressures   []  Chronic Ventilator []  Insp. Pressure less than 25 cm H20 []  Insp. Pressure less than 25 cm H20 []  Insp. Pressure exceeds 25 cm H20 []  Insp.  Pressure exceeds 30 cm H20   Plateau Pressure []  NA   []  Plateau Pressure less than 4  []  Plateau Pressure less than or equal to 5 []  Plateau Pressure greater than or equal to 6 []  Plateau Pressure greater than or equal to [facility]  4:27 PM

## 2019-12-01 NOTE — PROGRESS NOTES
gtt  [] Versed gtt  [] Ativan gtt   [] No Sedation  Fentanyl and Precedex    PARALYZED:  [x] No    [] Yes    DIARRHEA:                [x] No                [] Yes  (C. Difficile status: [] positive                                                                                                                       [] negative                                                                                                                     [] pending)    VASOPRESSORS:  [] No    [x] Yes    If yes -   [x] Levophed       [] Dopamine     [x] Vasopressin       [] Dobutamine  [] Phenylephrine         [] Epinephrine    CENTRAL LINES:     [] No   [x] Yes   (Date of Insertion:   )           If yes -     [x] Right IJ     [] Left IJ [] Right Femoral [] Left Femoral                   [] Right Subclavian [] Left Subclavian       DOSS'S CATHETER:   [] No   [x] Yes  (Date of Insertion:   )     URINE OUTPUT:            [] Good   [] Low              [x] Anuric      OBJECTIVE:     VITAL SIGNS:  BP (!) 109/91   Pulse 90   Temp 102.5 °F (39.2 °C) (Axillary)   Resp 19   Ht 5' 9\" (1.753 m)   Wt (!) 306 lb (138.8 kg)   SpO2 100%   BMI 45.19 kg/m²   Tmax over 24 hours:  Temp (24hrs), Av.6 °F (38.7 °C), Min:99.5 °F (37.5 °C), Max:102.5 °F (39.2 °C)      Patient Vitals for the past 6 hrs:   Temp Temp src Pulse Resp SpO2   19 1113 -- -- 90 19 100 %   19 0845 102.5 °F (39.2 °C) Axillary 91 13 --   19 -- -- 91 15 --   1915 -- -- 91 25 --   19 0806 -- -- -- 21 100 %   19 08 -- -- 94 25 100 %   19 0745 -- -- 94 19 --   19 99.5 °F (37.5 °C) Oral 98 21 --   19 0720 -- -- 97 22 100 %   1915 -- -- 114 19 --   19 07 -- -- 93 21 --         Intake/Output Summary (Last 24 hours) at 2019 1239  Last data filed at 2019 1000  Gross per 24 hour   Intake 5238.6 ml   Output 255 ml   Net 4983.6 ml     Wt Readings from Last 2 Encounters:   19 (!) 306 lb (138.8 kg)     Body mass index is 45.19 kg/m². PHYSICAL EXAMINATION:    General appearance -intubated and sedated, NAD  Mental status -intubated and sedated, not following commands  Chest -bilateral breath sounds heard, no wheezing, on vent  Heart -normal rate, regular rhythm, normal S1 and S2  Abdomen - soft, nondistended, no rash  Neurological - intubated and sedated, not following commands  Extremities - weak pulses in extremities, Dopplerable RLE pulse  Skinpost debridement of gluteal and perianal region. Full-thickness ulcer in the left plantar surface.  See media for post op wounds debridement     Any additional physical findings:    MEDICATIONS:    Scheduled Meds:   thiamine  100 mg Intramuscular Daily    folic acid  1 mg Intravenous Daily    meropenem  1 g Intravenous Q12H    sodium chloride flush  10 mL Intravenous 2 times per day    heparin (porcine)  5,000 Units Subcutaneous 3 times per day    lactated ringers bolus  30 mL/kg Intravenous Once    sodium hypochlorite   Irrigation Once    chlorhexidine  15 mL Mouth/Throat BID    famotidine (PEPCID) injection  20 mg Intravenous Daily     Continuous Infusions:   insulin 7.84 Units/hr (12/01/19 1229)    dextrose      vasopressin (Septic Shock) infusion 0.04 Units/min (12/01/19 0831)    dexmedetomidine (PRECEDEX) IV infusion 0.4 mcg/kg/hr (12/01/19 8199)    IV infusion builder 150 mL/hr at 12/01/19 0531    sodium chloride 10 mL/hr at 12/01/19 0757    dextrose      norepinephrine (LEVOPHED) infusion (double concentration) 7 mcg/min (12/01/19 0958)    fentaNYL 200 mcg/hr (12/01/19 0826)    dextrose       PRN Meds:   acetaminophen, 650 mg, Q4H PRN  glucose, 15 g, PRN  dextrose, 12.5 g, PRN  glucagon (rDNA), 1 mg, PRN  dextrose, 100 mL/hr, PRN  REFRESH LACRI-LUBE, , PRN  povidone-iodine, , PRN  sodium chloride flush, 10 mL, PRN  magnesium hydroxide, 30 mL, Daily PRN  ondansetron, 4 mg, Q6H PRN  potassium chloride, 10 mEq, PRN  glucose, 15 g, PRN  dextrose, 12.5 g, PRN  glucagon (rDNA), 1 mg, PRN  dextrose, 100 mL/hr, PRN  fentanNYL, 25 mcg, Q1H PRN  glucose, 15 g, PRN  dextrose, 12.5 g, PRN  glucagon (rDNA), 1 mg, PRN  dextrose, 100 mL/hr, PRN          VENT SETTINGS (Comprehensive) (if applicable):  Vent Information  $Ventilation: $Subsequent Day  Ventilator Started: Yes  Skin Assessment: Clean, dry, & intact  Equipment Changed: HME  Vent Type: Servo i  Vent Mode: PRVC  Vt Ordered: 530 mL  Rate Set: 26 bmp  FiO2 : 30 %  Sensitivity: 5  PEEP/CPAP: 5  I Time/ I Time %: 0.8 s  Humidification Source: HME  Additional Respiratory  Assessments  Pulse: 90  Resp: 19  SpO2: 100 %  End Tidal CO2: 34 (%)  Position: Right Side  Humidification Source: HME  Oral Care Completed?: Yes  Oral Care: Teeth brushed, Mouthwash  Subglottic Suction Done?: Yes    ABGs:  Arterial Blood Gas result:  pO2 107.4; pCO2 32.9; pH 7.29;  HCO3 16, %O2 Sat 98.       Laboratory findings:    Complete Blood Count:   Recent Labs     11/29/19 2045 11/30/19 0449 12/01/19 0518   WBC 50.5* 53.3* 31.2*   HGB 9.5* 8.2* 6.3*   HCT 33.7* 28.7* 21.7*   * 642* 443        Last 3 Blood Glucose:   Recent Labs     11/30/19 0449 11/30/19 1755 12/01/19 0518   GLUCOSE 192* 232* 313*        PT/INR:    Lab Results   Component Value Date    PROTIME 11.9 11/29/2019    INR 1.1 11/29/2019     PTT:  No results found for: APTT, PTT    Comprehensive Metabolic Profile:   Recent Labs     11/30/19 0449 11/30/19 1755 12/01/19 0037 12/01/19 0518    135  --  134*   K 5.2 5.5* 5.4* 5.4*   * 107  --  104   CO2 11* 11*  --  14*   BUN 61* 65*  --  64*   CREATININE 2.77* 2.73*  --  3.29*   GLUCOSE 192* 232*  --  313*   CALCIUM 7.2* 7.2*  --  7.2*   PROT  --   --   --  5.3*   LABALBU  --   --   --  1.5*   BILITOT  --   --   --  1.20   ALKPHOS  --   --   --  127   AST  --   --   --  40*   ALT  --   --   --  6      Magnesium:   Lab Results   Component Value Date    MG 1.9 12/01/2019     Phosphorus: regimen/urine output: MoM,   13. Indwelling catheter/lines CVC RIJ, OG, Ambrosio  14. De-escalation: K: 5.4, Cr inc to 3.29 (2.73), liver enzymes normal      Tee Gannon D.O.              Critical care resident,  Department of Internal Medicine/ Critical care  [address] [facility] ([place]             12/1/2019, 12:39 PM

## 2019-12-01 NOTE — PLAN OF CARE
Problem: Pain:  Goal: Pain level will decrease  Description  Pain level will decrease  Outcome: Ongoing  Goal: Control of acute pain  Description  Control of acute pain  Outcome: Ongoing  Goal: Control of chronic pain  Description  Control of chronic pain  Outcome: Ongoing     Problem: Skin Integrity:  Goal: Will show no infection signs and symptoms  Description  Will show no infection signs and symptoms  Outcome: Ongoing  Goal: Absence of new skin breakdown  Description  Absence of new skin breakdown  Outcome: Ongoing     Problem: Restraint Use - Nonviolent/Non-Self-Destructive Behavior:  Goal: Absence of restraint indications  Description  Absence of restraint indications  Outcome: Ongoing  Goal: Absence of restraint-related injury  Description  Absence of restraint-related injury  Outcome: Ongoing     Problem: OXYGENATION/RESPIRATORY FUNCTION  Goal: Patient will maintain patent airway  12/1/2019 0446 by Fiorella Witt RN  Outcome: Ongoing  11/30/2019 2103 by Osito Call RCP  Outcome: Ongoing  Goal: Patient will achieve/maintain normal respiratory rate/effort  Description  Respiratory rate and effort will be within normal limits for the patient  12/1/2019 0446 by Fiorella Witt RN  Outcome: Ongoing  11/30/2019 2103 by Osito Call RCP  Outcome: Ongoing     Problem: MECHANICAL VENTILATION  Goal: Patient will maintain patent airway  12/1/2019 0446 by Fiorella Witt RN  Outcome: Ongoing  11/30/2019 2103 by Osito Call RCP  Outcome: Ongoing  Goal: Oral health is maintained or improved  12/1/2019 0446 by Fiorella Witt RN  Outcome: Ongoing  11/30/2019 2103 by Osito Call RCP  Outcome: Ongoing  Goal: ET tube will be managed safely  12/1/2019 0446 by Fiorella Witt RN  Outcome: Ongoing  11/30/2019 2103 by Osito Call RCP  Outcome: Ongoing  Goal: Ability to express needs and understand communication  12/1/2019 0446 by Fiorella Witt RN  Outcome: Ongoing  11/30/2019 2103 by Osito Call

## 2019-12-01 NOTE — PROGRESS NOTES
-- --   11/30/19 1200 104/66 -- -- 115 16 -- --   11/30/19 1130 107/61 -- -- 115 15 -- --   11/30/19 1117 -- -- -- 112 16 100 % --   11/30/19 1100 111/66 -- -- 113 16 -- --   11/30/19 1038 -- -- -- 114 15 100 % --   11/30/19 1037 -- -- -- 114 15 100 % --   11/30/19 1036 -- -- -- 115 16 100 % --   11/30/19 1035 -- -- -- 116 15 100 % --   11/30/19 1034 -- -- -- 116 14 100 % --   11/30/19 1030 122/87 -- -- 125 17 100 % --   11/30/19 1015 -- -- -- 113 15 100 % --   11/30/19 1000 84/71 -- -- 114 12 100 % --   11/30/19 0945 -- -- -- 115 13 100 % --   11/30/19 0930 (!) 86/47 -- -- 115 14 100 % --   11/30/19 0915 -- -- -- 115 15 100 % --   11/30/19 0900 (!) 104/56 -- -- 116 14 100 % --   11/30/19 0845 -- -- -- 117 15 100 % --   11/30/19 0833 (!) 93/58 -- -- -- 17 -- --   11/30/19 0832 -- -- -- -- 17 -- --   11/30/19 0830 (!) 58/39 99.1 °F (37.3 °C) Oral 118 14 100 % --   11/30/19 0815 -- -- -- 123 18 100 % --   11/30/19 0800 (!) 94/55 99.1 °F (37.3 °C) Oral 123 15 100 % --   11/30/19 0751 -- -- -- 123 16 100 % --   11/30/19 0745 -- -- -- 126 14 99 % --   11/30/19 0730 -- -- -- 128 14 99 % --   11/30/19 0715 -- -- -- 129 23 99 % --       Intake/Output Summary (Last 24 hours) at 12/1/2019 0705  Last data filed at 12/1/2019 0646  Gross per 24 hour   Intake 5625.1 ml   Output 265 ml   Net 5360.1 ml       Recent Labs     11/29/19 2045 11/30/19 0449 12/01/19 0518   WBC 50.5* 53.3* 31.2*   HGB 9.5* 8.2* 6.3*   HCT 33.7* 28.7* 21.7*   MCV 80.2* 79.3* 78.6*   * 642* 443     Recent Labs     11/29/19  1633 11/29/19 2045 11/30/19 0449 11/30/19  1755 12/01/19  0037 12/01/19 0518   * 132* 136 135  --  134*   K 4.8 5.5* 5.2 5.5* 5.4* 5.4*   * 108* 111* 107  --  104   CO2 11* 11* 11* 11*  --  14*   PHOS 4.6* 6.3*  --   --   --   --    BUN 59* 57* 61* 65*  --  64*   CREATININE 2.56* 2.39* 2.77* 2.73*  --  3.29*       Recent Labs     11/29/19  1614   COLORU YELLOW   PHUR 5.0   WBCUA 5 TO 10   RBCUA 0 TO 2   MUCUS NOT

## 2019-12-01 NOTE — PROGRESS NOTES
Nutrition Assessment    Type and Reason for Visit: Consult(TPN)    Nutrition Recommendations:   -Initiate TPN: 150gm dextrose, 75gm AA at 50ml per hr with 250ml 20% IL to provide 1310 kcal, 75gm protein  -Labs reviewed-No Phos or K in TPN  -Monitor labs and adjust as needed    Nutrition Assessment: Discussed with RN, plan to initiate TPN today due to inability to use GI at this time. Malnutrition Assessment:  · Malnutrition Status: Insufficient data  · Context: Chronic illness  · Findings of the 6 clinical characteristics of malnutrition (Minimum of 2 out of 6 clinical characteristics is required to make the diagnosis of moderate or severe Protein Calorie Malnutrition based on AND/ASPEN Guidelines):  1. Energy Intake-Unable to assess, Unable to assess    2. Weight Loss-Unable to assess, unable to assess  3. Fat Loss-Unable to assess,    4. Muscle Loss-Unable to assess,    5. Fluid Accumulation-Unable to assess,    6.   Strength-Not measured    Nutrition Risk Level: High    Nutrient Needs:  · Estimated Daily Total Kcal: 8261-7316 kcal  · Estimated Daily Protein (g): 110-130 gm (1.5-1.8gm/kg)    Nutrition Diagnosis:   · Problem: Inadequate oral intake  · Etiology: related to Impaired respiratory function-inability to consume food     Signs and symptoms:  as evidenced by NPO status due to medical condition, Intubation    Objective Information:  · Nutrition-Focused Physical Findings:    · Wound Type: Open Wounds  · Current Nutrition Therapies:  · Oral Diet Orders: NPO   · Oral Diet intake: NPO  · Anthropometric Measures:  · Ht: 5' 9\" (175.3 cm)   · Current Body Wt: 306 lb (138.8 kg)  · Admission Body Wt: 291 lb 0.1 oz (132 kg)     · Ideal Body Wt: 160 lb 15 oz (73 kg), % Ideal Body 182%  · BMI Classification: BMI > or equal to 40.0 Obese Class III    Nutrition Interventions:   Start Parenteral Nutrition  Continued Inpatient Monitoring, Education Not Indicated    Nutrition Evaluation:   · Evaluation:

## 2019-12-01 NOTE — PROGRESS NOTES
[facility] - Progress Note    Today's Date and Time: 12/1/2019, 2:34 PM    Impression :   · Necrotizing fasciitis 11-29-19  · S/P perirectal I&D. Wide complex excisional debridement of gluteal and perineal areas on 11-29-19  · Hypotension requiring pressors  · Lactic acidosis  · DM 2  · HTN  · Hx of Low LVEF (20%) as per family    Recommendations:   · D/C Zosyn to reduce fluid and Na load in view of Hx of poor LVEF of 20%  · Meropenem 1 gm IV q 12 hr  · May require dose adjustment depending on evolution of renal insufficiency  · Wound Care as per Gen surgery. Medical Decision Making/Summary/Discussion:12/1/2019     · Patient with DM 2, prior diabetic foot infection  · Presented to [address] [place] [address] ER generalized weakness for the past few days  · Found to have soft tissue necrosis with subcutaneous emphysema in gluteal areas and perineum  · S/P I&D and wide complex debridement of affected tissues on 11-29-19  · Showing hypotension, requiring fluids and a vasopressor  · Cultures in progress  · Will cover with Zosyn. Wounds with Strep spp. And Gram negative bacilli . No Staph spp.   · Pt is a MRSA nasal carrier  · Zosyn D/C because of of poor LVEF, in order to reduce Na and fluid load  · Meropenem started adjusted for Cr Cl 30 cc  Infection Control Recommendations   · Harrison Precautions  · Contact Isolation MRSA    Antimicrobial Stewardship Recommendations     · Simplification of therapy  · Targeted therapy  · PK dosing    Coordination of Outpatient Care:   · Estimated Length of IV antimicrobials: TBD  · Patient will need Midline Catheter Insertion: No  · Patient will need PICC line Insertion:Has Central line  · Patient will need: Home IV , [place],  SNF,  LTAC: TBD  · Patient will need outpatient wound care:Yes    Chief complaint/reason for consultation:   · Ashley's vs necrotizing fasciitis      History of Present Illness:   Becky Molina is a 37y.o.-year-old 1. 20   ALKPHOS 127   ALT 6   AST 40*     No results for input(s): RPR in the last 72 hours. No results for input(s): HIV in the last 72 hours. No results for input(s): BC in the last 72 hours. Lab Results   Component Value Date    MUCUS NOT REPORTED 11/29/2019    RBC 2.76 12/01/2019    TRICHOMONAS NOT REPORTED 11/29/2019    WBC 31.2 12/01/2019    YEAST NOT REPORTED 11/29/2019    TURBIDITY TURBID 11/29/2019     Lab Results   Component Value Date    CREATININE 3.29 12/01/2019    GLUCOSE 313 12/01/2019       Medical Decision Making-Imaging:     EXAMINATION:   ONE XRAY VIEW OF THE CHEST       11/29/2019 9:01 pm       COMPARISON:   4 hours ago       HISTORY:   ORDERING SYSTEM PROVIDED HISTORY: intubated   TECHNOLOGIST PROVIDED HISTORY:   intubated   Reason for Exam: portable supine/ intubated   Acuity: Acute   Type of Exam: Initial       FINDINGS:   New ET tube terminates 38 mm above the ron.  There appears to be a   possible enteric tube which is not well visualized distally.  Right IJ   catheter terminates in the right atrium and is unchanged.  Lungs are clear. Cardiomegaly.  Mediastinum normal.  Bony thorax intact.           Impression   New ET tube as above.  Possible new enteric tube not well visualized. Recommend follow-up KUB if clinically warranted. CT ABDOMEN AND PELVIS WITHOUT CONTRAST    COMPARISON:  3/22/2017    CLINICAL HISTORY: Infection in scrotum, lower abdominal pain, diabetic, 30,000 white blood cell count. TECHNIQUE: Unenhanced axial images were obtained from the lung bases to the pubic symphysis with sagittal and coronal 2D reformatted images. Oral contrast administered:  No.  Automatic exposure control (AEC) was utilized. CT ABDOMEN FINDINGS:      The lung bases are unremarkable. There is a small pericardial effusion versus thickening.     The liver, spleen, and pancreas demonstrate no acute abnormality given compromised evaluation without intravenous contrast.  There may be

## 2019-12-01 NOTE — PROGRESS NOTES
°F (39 °C) BP Systolic (28ANF), MSF:446 , Min:58 , OPO:462   Diastolic (86AQJ), ZCC:59, Min:39, Max:91   Pulse Pulse  Av.9  Min: 101  Max: 130 Resp Resp  Av.5  Min: 0  Max: 23 Pulse ox SpO2  Av.9 %  Min: 99 %  Max: 100 %  GENERAL: intubated and sedated  LUNGS: ventilated, equal and symmetric chest rise/fall  HEART: Tachycardic s1+s2  ABDOMEN: soft, obese, nd  /Anal: lema intact, dressing intact. EXTERMITY: wounds L foot, decreased mm mass b/l LE    I/O last 3 completed shifts: In: 4483.4 [I.V.:3483.4; IV ZDNVYGDGB:5189]  Out: 215 [Urine:65; Emesis/NG output:150]    Drain/tube output: In: 5625.1 [I.V.:4625.1]  Out: 265 [Urine:115]    LAB:  CBC:   Recent Labs     199 19  0518   WBC 50.5* 53.3* 31.2*   HGB 9.5* 8.2* 6.3*   HCT 33.7* 28.7* 21.7*   MCV 80.2* 79.3* 78.6*   * 642* 443     BMP:   Recent Labs     19  0449 19  1755 19  0037 19  0518    135  --  134*   K 5.2 5.5* 5.4* 5.4*   * 107  --  104   CO2 11* 11*  --  14*   BUN 61* 65*  --  64*   CREATININE 2.77* 2.73*  --  3.29*   GLUCOSE 192* 232*  --  313*     COAGS:   Recent Labs     19  1633   INR 1.1       RADIOLOGY:  Narrative   EXAMINATION:   ONE XRAY VIEW OF THE CHEST       2019 9:01 pm       COMPARISON:   4 hours ago       HISTORY:   ORDERING SYSTEM PROVIDED HISTORY: intubated   TECHNOLOGIST PROVIDED HISTORY:   intubated   Reason for Exam: portable supine/ intubated   Acuity: Acute   Type of Exam: Initial       FINDINGS:   New ET tube terminates 38 mm above the ron.  There appears to be a   possible enteric tube which is not well visualized distally.  Right IJ   catheter terminates in the right atrium and is unchanged.  Lungs are clear. Cardiomegaly.  Mediastinum normal.  Bony thorax intact.           Impression   New ET tube as above.  Possible new enteric tube not well visualized.    Recommend follow-up KUB if clinically warranted.     Marsha Dong, DO  11/30/19, 6:51 AM               Trauma Attending Attestation      I have reviewed the above GCS note(s) and confirmed the key elements of the medical history and physical exam. I have seen and examined the pt. I have discussed the findings, established the care plan and recommendations with Resident, GCS RN, bedside nurse.   Pt may need IHD   Leukocytosis coming down  Getting unit of PRBC - for anemia   Still on pressors       Alexandre Estrada, DO  12/3/2019  9:51 AM

## 2019-12-02 LAB
ABO/RH: NORMAL
ABSOLUTE EOS #: 0 K/UL (ref 0–0.4)
ABSOLUTE IMMATURE GRANULOCYTE: 0.51 K/UL (ref 0–0.3)
ABSOLUTE LYMPH #: 2.79 K/UL (ref 1–4.8)
ABSOLUTE MONO #: 0.76 K/UL (ref 0.1–0.8)
ALLEN TEST: ABNORMAL
ANION GAP SERPL CALCULATED.3IONS-SCNC: 14 MMOL/L (ref 9–17)
ANTI-NUCLEAR ANTIBODY (ANA): NEGATIVE
ANTIBODY SCREEN: NEGATIVE
ARM BAND NUMBER: NORMAL
BASOPHILS # BLD: 0 % (ref 0–2)
BASOPHILS ABSOLUTE: 0 K/UL (ref 0–0.2)
BLD PROD TYP BPU: NORMAL
BUN BLDV-MCNC: 70 MG/DL (ref 6–20)
BUN/CREAT BLD: ABNORMAL (ref 9–20)
CALCIUM SERPL-MCNC: 6.5 MG/DL (ref 8.6–10.4)
CHLORIDE BLD-SCNC: 102 MMOL/L (ref 98–107)
CO2: 17 MMOL/L (ref 20–31)
CREAT SERPL-MCNC: 4.2 MG/DL (ref 0.7–1.2)
CROSSMATCH RESULT: NORMAL
DIFFERENTIAL TYPE: ABNORMAL
DISPENSE STATUS BLOOD BANK: NORMAL
EOSINOPHILS RELATIVE PERCENT: 0 % (ref 1–4)
ESTIMATED AVERAGE GLUCOSE: 183 MG/DL
EXPIRATION DATE: NORMAL
FIO2: 30
GFR AFRICAN AMERICAN: 20 ML/MIN
GFR NON-AFRICAN AMERICAN: 16 ML/MIN
GFR SERPL CREATININE-BSD FRML MDRD: ABNORMAL ML/MIN/{1.73_M2}
GFR SERPL CREATININE-BSD FRML MDRD: ABNORMAL ML/MIN/{1.73_M2}
GLUCOSE BLD-MCNC: 129 MG/DL (ref 75–110)
GLUCOSE BLD-MCNC: 134 MG/DL (ref 75–110)
GLUCOSE BLD-MCNC: 136 MG/DL (ref 75–110)
GLUCOSE BLD-MCNC: 139 MG/DL (ref 75–110)
GLUCOSE BLD-MCNC: 142 MG/DL (ref 75–110)
GLUCOSE BLD-MCNC: 150 MG/DL (ref 75–110)
GLUCOSE BLD-MCNC: 157 MG/DL (ref 75–110)
GLUCOSE BLD-MCNC: 157 MG/DL (ref 75–110)
GLUCOSE BLD-MCNC: 162 MG/DL (ref 75–110)
GLUCOSE BLD-MCNC: 169 MG/DL (ref 75–110)
GLUCOSE BLD-MCNC: 171 MG/DL (ref 75–110)
GLUCOSE BLD-MCNC: 173 MG/DL (ref 75–110)
GLUCOSE BLD-MCNC: 179 MG/DL (ref 75–110)
GLUCOSE BLD-MCNC: 187 MG/DL (ref 75–110)
GLUCOSE BLD-MCNC: 195 MG/DL (ref 70–99)
GLUCOSE BLD-MCNC: 209 MG/DL (ref 75–110)
GLUCOSE BLD-MCNC: 263 MG/DL (ref 75–110)
HBA1C MFR BLD: 8 % (ref 4–6)
HCT VFR BLD CALC: 23 % (ref 40.7–50.3)
HCT VFR BLD CALC: 23.5 % (ref 40.7–50.3)
HEMOGLOBIN: 6.8 G/DL (ref 13–17)
HEMOGLOBIN: 7.2 G/DL (ref 13–17)
IMMATURE GRANULOCYTES: 2 %
INTERVENTION: NORMAL
LYMPHOCYTES # BLD: 11 % (ref 24–44)
MCH RBC QN AUTO: 23.4 PG (ref 25.2–33.5)
MCHC RBC AUTO-ENTMCNC: 29.6 G/DL (ref 28.4–34.8)
MCV RBC AUTO: 79.3 FL (ref 82.6–102.9)
MODE: ABNORMAL
MONOCYTES # BLD: 3 % (ref 1–7)
MORPHOLOGY: ABNORMAL
NEGATIVE BASE EXCESS, ART: 3 (ref 0–2)
NRBC AUTOMATED: 0.4 PER 100 WBC
NUCLEATED RED BLOOD CELLS: 1 PER 100 WBC
O2 DEVICE/FLOW/%: ABNORMAL
PATIENT TEMP: 38.7
PDW BLD-RTO: 20.1 % (ref 11.8–14.4)
PLATELET # BLD: 342 K/UL (ref 138–453)
PLATELET ESTIMATE: ABNORMAL
PMV BLD AUTO: 9.2 FL (ref 8.1–13.5)
POC HCO3: 21.5 MMOL/L (ref 21–28)
POC O2 SATURATION: 98 % (ref 94–98)
POC PCO2 TEMP: 39 MM HG
POC PCO2: 35.9 MM HG (ref 35–48)
POC PH TEMP: 7.36
POC PH: 7.39 (ref 7.35–7.45)
POC PO2 TEMP: 107 MM HG
POC PO2: 96.7 MM HG (ref 83–108)
POSITIVE BASE EXCESS, ART: ABNORMAL (ref 0–3)
POTASSIUM SERPL-SCNC: 4.6 MMOL/L (ref 3.7–5.3)
RBC # BLD: 2.9 M/UL (ref 4.21–5.77)
RBC # BLD: ABNORMAL 10*6/UL
SAMPLE SITE: ABNORMAL
SEG NEUTROPHILS: 84 % (ref 36–66)
SEGMENTED NEUTROPHILS ABSOLUTE COUNT: 21.34 K/UL (ref 1.8–7.7)
SODIUM BLD-SCNC: 133 MMOL/L (ref 135–144)
TCO2 (CALC), ART: 23 MMOL/L (ref 22–29)
TRANSFUSION STATUS: NORMAL
UNIT DIVISION: 0
UNIT NUMBER: NORMAL
WBC # BLD: 25.4 K/UL (ref 3.5–11.3)
WBC # BLD: ABNORMAL 10*3/UL

## 2019-12-02 PROCEDURE — 86901 BLOOD TYPING SEROLOGIC RH(D): CPT

## 2019-12-02 PROCEDURE — 2000000000 HC ICU R&B

## 2019-12-02 PROCEDURE — 6360000002 HC RX W HCPCS: Performed by: STUDENT IN AN ORGANIZED HEALTH CARE EDUCATION/TRAINING PROGRAM

## 2019-12-02 PROCEDURE — 94761 N-INVAS EAR/PLS OXIMETRY MLT: CPT

## 2019-12-02 PROCEDURE — 6370000000 HC RX 637 (ALT 250 FOR IP): Performed by: STUDENT IN AN ORGANIZED HEALTH CARE EDUCATION/TRAINING PROGRAM

## 2019-12-02 PROCEDURE — 82803 BLOOD GASES ANY COMBINATION: CPT

## 2019-12-02 PROCEDURE — 99233 SBSQ HOSP IP/OBS HIGH 50: CPT | Performed by: INTERNAL MEDICINE

## 2019-12-02 PROCEDURE — 99211 OFF/OP EST MAY X REQ PHY/QHP: CPT

## 2019-12-02 PROCEDURE — 2500000003 HC RX 250 WO HCPCS: Performed by: STUDENT IN AN ORGANIZED HEALTH CARE EDUCATION/TRAINING PROGRAM

## 2019-12-02 PROCEDURE — 94770 HC ETCO2 MONITOR DAILY: CPT

## 2019-12-02 PROCEDURE — 86900 BLOOD TYPING SEROLOGIC ABO: CPT

## 2019-12-02 PROCEDURE — P9016 RBC LEUKOCYTES REDUCED: HCPCS

## 2019-12-02 PROCEDURE — 86920 COMPATIBILITY TEST SPIN: CPT

## 2019-12-02 PROCEDURE — 86850 RBC ANTIBODY SCREEN: CPT

## 2019-12-02 PROCEDURE — 99291 CRITICAL CARE FIRST HOUR: CPT | Performed by: INTERNAL MEDICINE

## 2019-12-02 PROCEDURE — 2580000003 HC RX 258: Performed by: STUDENT IN AN ORGANIZED HEALTH CARE EDUCATION/TRAINING PROGRAM

## 2019-12-02 PROCEDURE — 2700000000 HC OXYGEN THERAPY PER DAY

## 2019-12-02 PROCEDURE — 85018 HEMOGLOBIN: CPT

## 2019-12-02 PROCEDURE — 6360000002 HC RX W HCPCS: Performed by: INTERNAL MEDICINE

## 2019-12-02 PROCEDURE — 94003 VENT MGMT INPAT SUBQ DAY: CPT

## 2019-12-02 PROCEDURE — 37799 UNLISTED PX VASCULAR SURGERY: CPT

## 2019-12-02 PROCEDURE — 85014 HEMATOCRIT: CPT

## 2019-12-02 PROCEDURE — 36415 COLL VENOUS BLD VENIPUNCTURE: CPT

## 2019-12-02 PROCEDURE — 82947 ASSAY GLUCOSE BLOOD QUANT: CPT

## 2019-12-02 PROCEDURE — 80048 BASIC METABOLIC PNL TOTAL CA: CPT

## 2019-12-02 PROCEDURE — 85025 COMPLETE CBC W/AUTO DIFF WBC: CPT

## 2019-12-02 PROCEDURE — 87040 BLOOD CULTURE FOR BACTERIA: CPT

## 2019-12-02 RX ORDER — FUROSEMIDE 10 MG/ML
80 INJECTION INTRAMUSCULAR; INTRAVENOUS DAILY
Status: DISCONTINUED | OUTPATIENT
Start: 2019-12-02 | End: 2019-12-10

## 2019-12-02 RX ORDER — 0.9 % SODIUM CHLORIDE 0.9 %
250 INTRAVENOUS SOLUTION INTRAVENOUS ONCE
Status: DISCONTINUED | OUTPATIENT
Start: 2019-12-02 | End: 2019-12-05

## 2019-12-02 RX ORDER — SODIUM HYPOCHLORITE 1.25 MG/ML
SOLUTION TOPICAL 2 TIMES DAILY
Status: DISCONTINUED | OUTPATIENT
Start: 2019-12-02 | End: 2019-12-11

## 2019-12-02 RX ADMIN — FUROSEMIDE 80 MG: 10 INJECTION, SOLUTION INTRAMUSCULAR; INTRAVENOUS at 11:53

## 2019-12-02 RX ADMIN — MEROPENEM 1 G: 1 INJECTION, POWDER, FOR SOLUTION INTRAVENOUS at 21:24

## 2019-12-02 RX ADMIN — FAMOTIDINE 20 MG: 10 INJECTION, SOLUTION INTRAVENOUS at 08:27

## 2019-12-02 RX ADMIN — Medication 200 MCG/HR: at 13:44

## 2019-12-02 RX ADMIN — MEROPENEM 1 G: 1 INJECTION, POWDER, FOR SOLUTION INTRAVENOUS at 08:27

## 2019-12-02 RX ADMIN — DAKIN'S SOLUTION 0.125% (QUARTER STRENGTH): 0.12 SOLUTION at 21:28

## 2019-12-02 RX ADMIN — DEXMEDETOMIDINE HYDROCHLORIDE 0.08 MCG/KG/HR: 100 INJECTION, SOLUTION INTRAVENOUS at 17:47

## 2019-12-02 RX ADMIN — Medication 200 MCG/HR: at 18:44

## 2019-12-02 RX ADMIN — Medication 200 MCG/HR: at 08:42

## 2019-12-02 RX ADMIN — DEXMEDETOMIDINE HYDROCHLORIDE 0.7 MCG/KG/HR: 100 INJECTION, SOLUTION INTRAVENOUS at 21:23

## 2019-12-02 RX ADMIN — DEXMEDETOMIDINE HYDROCHLORIDE 0.07 MCG/KG/HR: 100 INJECTION, SOLUTION INTRAVENOUS at 10:00

## 2019-12-02 RX ADMIN — ACETAMINOPHEN 650 MG: 325 TABLET ORAL at 01:43

## 2019-12-02 RX ADMIN — Medication 200 MCG/HR: at 23:38

## 2019-12-02 RX ADMIN — I.V. FAT EMULSION 250 ML: 20 EMULSION INTRAVENOUS at 17:31

## 2019-12-02 RX ADMIN — FOLIC ACID: 5 INJECTION, SOLUTION INTRAMUSCULAR; INTRAVENOUS; SUBCUTANEOUS at 08:27

## 2019-12-02 RX ADMIN — Medication 200 MCG/HR: at 03:51

## 2019-12-02 RX ADMIN — DEXMEDETOMIDINE HYDROCHLORIDE 0.5 MCG/KG/HR: 100 INJECTION, SOLUTION INTRAVENOUS at 03:57

## 2019-12-02 RX ADMIN — CALCIUM GLUCONATE: 98 INJECTION, SOLUTION INTRAVENOUS at 17:39

## 2019-12-02 RX ADMIN — Medication 15 ML: at 09:11

## 2019-12-02 RX ADMIN — SODIUM CHLORIDE 5.55 UNITS/HR: 9 INJECTION, SOLUTION INTRAVENOUS at 01:36

## 2019-12-02 RX ADMIN — Medication 15 ML: at 22:00

## 2019-12-02 ASSESSMENT — PULMONARY FUNCTION TESTS
PIF_VALUE: 6
PIF_VALUE: 22
PIF_VALUE: 20
PIF_VALUE: 20
PIF_VALUE: 24
PIF_VALUE: 19
PIF_VALUE: 21
PIF_VALUE: 19
PIF_VALUE: 20
PIF_VALUE: 21
PIF_VALUE: 20

## 2019-12-02 ASSESSMENT — PAIN SCALES - GENERAL: PAINLEVEL_OUTOF10: 0

## 2019-12-02 NOTE — PROGRESS NOTES
support/critical care management per ICU  Possible OR today for further debridement and diverting ostomy; urology will be available to assist as needed  Continue antibiotics: meropenem   Follow up wound cultures: multiple GN species, moderate gram + cocci  Ambrosio catheter per primary service     Elver Mata  7:03 AM 12/2/2019

## 2019-12-02 NOTE — PROGRESS NOTES
PHARMACY NOTE:    The electrolyte replacement protocol for potassium/magnesium has been discontinued per P&T guidelines because the patient has reduced renal function (CrCl < 30 mL/min). The patient's most recent potassium & magnesium levels are:  Recent Labs     11/29/19  1633 11/29/19  2045  12/01/19  0518 12/01/19  0912 12/01/19  1746 12/02/19  0431   K 4.8 5.5*   < > 5.4*  --  5.3 4.6   MG 1.9 1.7  --   --  1.9  --   --     < > = values in this interval not displayed. For patients with decreased renal function (below 30ml/min) needing potassium/magnesium supplementation, please order individual bolus doses with appropriate monitoring. Please contact the inpatient pharmacy with any concerns. Thank you.   Triston Hunt, PharmD BCPS  12/2/2019 7:40 AM

## 2019-12-02 NOTE — PROGRESS NOTES
dextrose      PN-Adult 2-in-1 Central Line (Standard) 50 mL/hr at 12/01/19 1758    vasopressin (Septic Shock) infusion Stopped (12/02/19 0307)    dexmedetomidine (PRECEDEX) IV infusion 0.5 mcg/kg/hr (12/02/19 0357)    IV infusion builder 150 mL/hr at 12/01/19 2139    sodium chloride 10 mL/hr at 12/01/19 0757    dextrose      norepinephrine (LEVOPHED) infusion (double concentration) 8 mcg/min (12/01/19 2249)    fentaNYL 200 mcg/hr (12/02/19 0842)    dextrose       PRN Meds:  acetaminophen, glucose, dextrose, glucagon (rDNA), dextrose, acetaminophen, REFRESH LACRI-LUBE, povidone-iodine, sodium chloride flush, magnesium hydroxide, ondansetron, glucose, dextrose, glucagon (rDNA), dextrose, fentanNYL, glucose, dextrose, glucagon (rDNA), dextrose  Home Meds:                No medications prior to admission. INVESTIGATIONS     Last 3 CMP:    Recent Labs     12/01/19 0518 12/01/19 1746 12/02/19 0431   * 137 133*   K 5.4* 5.3 4.6    107 102   CO2 14* 15* 17*   BUN 64* 68* 70*   CREATININE 3.29* 3.83* 4.20*   CALCIUM 7.2* 6.8* 6.5*   PROT 5.3*  --   --    LABALBU 1.5*  --   --    BILITOT 1.20  --   --    ALKPHOS 127  --   --    AST 40*  --   --    ALT 6  --   --        Last 3 CBC:  Recent Labs     11/30/19  0449 12/01/19 0518 12/02/19 0431   WBC 53.3* 31.2* 25.4*   RBC 3.62* 2.76* 2.90*   HGB 8.2* 6.3* 6.8*   HCT 28.7* 21.7* 23.0*   MCV 79.3* 78.6* 79.3*   MCH 22.7* 22.8* 23.4*   MCHC 28.6 29.0 29.6   RDW 19.9* 20.1* 20.1*   * 443 342   MPV 8.8 8.6 9.2       ASSESSMENT     1. DEBBY oliguric secondary to ischemic acute tubular necrosis from hypotension and sepsis - Evolving, clearances continue to decline although urine output has improved today. 2.  High anion gap metabolic acidosis secondary to acute kidney injury, lactic acidosis resolving  3. Circulatory shock - on pressor support, secondary to sepsis from necrotizing fasciitis  4.   CKD stage III-from diabetic nephrosclerosis, baseline

## 2019-12-02 NOTE — PROGRESS NOTES
involvement of anal sphincter complex; wound with some necrotic edges but viable tissue throughout majority of wound at this point; no additional pockets of purulence discovered on exam . Dressing replaced  EXTERMITY: wounds L foot, decreased mm mass b/l LE    I/O last 3 completed shifts: In: 4814.3 [I.V.:3616.3; Blood:350; IV Piggyback:442]  Out: 160 [Urine:160]    Drain/tube output:  In: 2517.7 [I.V.:1986.7]  Out: 120 [Urine:120]    LAB:  CBC:   Recent Labs     11/30/19  0449 12/01/19 0518 12/02/19  0431   WBC 53.3* 31.2* 25.4*   HGB 8.2* 6.3* 6.8*   HCT 28.7* 21.7* 23.0*   MCV 79.3* 78.6* 79.3*   * 443 342     BMP:   Recent Labs     12/01/19  0518 12/01/19  1746 12/02/19  0431   * 137 133*   K 5.4* 5.3 4.6    107 102   CO2 14* 15* 17*   BUN 64* 68* 70*   CREATININE 3.29* 3.83* 4.20*   GLUCOSE 313* 155* 195*     COAGS:   Recent Labs     11/29/19  1633 12/01/19 0518   PROT  --  5.3*   INR 1.1  --        RADIOLOGY:  Narrative   EXAMINATION:   ONE XRAY VIEW OF THE CHEST       11/29/2019 9:01 pm       COMPARISON:   4 hours ago       HISTORY:   ORDERING SYSTEM PROVIDED HISTORY: intubated   TECHNOLOGIST PROVIDED HISTORY:   intubated   Reason for Exam: portable supine/ intubated   Acuity: Acute   Type of Exam: Initial       FINDINGS:   New ET tube terminates 38 mm above the ron.  There appears to be a   possible enteric tube which is not well visualized distally.  Right IJ   catheter terminates in the right atrium and is unchanged.  Lungs are clear. Cardiomegaly.  Mediastinum normal.  Bony thorax intact.           Impression   New ET tube as above.  Possible new enteric tube not well visualized. Recommend follow-up KUB if clinically warranted.               Elieser Bingham DO  12/2/19, 8:18 AM       Trauma Attending Attestation      I have reviewed the above TECSS note(s) and confirmed the key elements of the medical history and physical exam. I have seen and examined the pt.   I have

## 2019-12-02 NOTE — CARE COORDINATION
Transition planning:  Pt intubated no family at bedside. Pt with extensive wounds, necrotizing fasciatitis will likely require complex wound care and [facility]. CM will need to f/u with pt's mom to obtain choice.

## 2019-12-02 NOTE — PROGRESS NOTES
Small [] Moderate  [] Large  [x] None  Color:     [] White [] Colored  [] Bloody    SEDATION:  RAAS Score:  [] Propofol gtt  [] Versed gtt  [] Ativan gtt   [] No Sedation    Fentanyl and Precedex    PARALYZED:  [x] No    [] Yes    DIARRHEA:                [x] No                [] Yes  (C. Difficile status: [] positive                                                                                                                       [] negative                                                                                                                     [] pending)    VASOPRESSORS:  [] No    [x] Yes    If yes -   [x] Levophed       [] Dopamine     [x] Vasopressin       [] Dobutamine  [] Phenylephrine         [] Epinephrine    CENTRAL LINES:     [] No   [x] Yes   (Date of Insertion:   )           If yes -     [x] Right IJ     [] Left IJ [] Right Femoral [] Left Femoral                   [] Right Subclavian [] Left Subclavian       DOSS'S CATHETER:   [] No   [x] Yes  (Date of Insertion:   )     URINE OUTPUT:            [] Good   [] Low              [x] Anuric      OBJECTIVE:     VITAL SIGNS:  /66   Pulse 94   Temp 101.7 °F (38.7 °C) (Axillary)   Resp 26   Ht 5' 9\" (1.753 m)   Wt (!) 324 lb 1.2 oz (147 kg)   SpO2 100%   BMI 47.86 kg/m²   Tmax over 24 hours:  Temp (24hrs), Av.8 °F (39.3 °C), Min:101.7 °F (38.7 °C), Max:103.5 °F (39.7 °C)      Patient Vitals for the past 6 hrs:   Temp Temp src Pulse Resp SpO2 Weight   19 0600 -- -- -- -- -- (!) 324 lb 1.2 oz (147 kg)   19 0400 101.7 °F (38.7 °C) Axillary -- -- -- --   19 0326 -- -- 94 26 100 % --         Intake/Output Summary (Last 24 hours) at 2019 0819  Last data filed at 2019 0000  Gross per 24 hour   Intake 4814.29 ml   Output 160 ml   Net 4654.29 ml     Wt Readings from Last 2 Encounters:   19 (!) 324 lb 1.2 oz (147 kg)     Body mass index is 47.86 kg/m².         PHYSICAL EXAMINATION:    General appearance Heparin Subcut  [] EPC Cuffs  7. Mobility bed rest  8. Heads up yes   9. Ulcer prophylaxis [] PPI Agent  [x] [id number] [] Sucralfate  [] Other:  10. Glycemic control: insulin gtt, glucose in 300s  11. Spontaneous breathing trial not today  12. Bowel regimen/urine output: MoM,   13. Indwelling catheter/lines CVC RIJ, OG, Ambrosio  14. De-escalation: K4.6: , Cr inc to 4.20<---(3.83), liver enzymes:       Yaya Ortega M.D             Critical care resident,  Department of Internal Medicine/ Critical care  [facility] ([place]             12/2/2019, 8:19 AM    Attending Physician Statement  I have discussed the care of Gisselle Sanches, including pertinent history and exam findings with the resident. I have reviewed the key elements of all parts of the encounter with the resident. I have seen and examined the patient with the resident. I agree with the assessment and plan and status of the problem list as documented. I seen the patient during my round this morning, I have reviewed the imaging studies, lab seen medications reviewed, culture results seen, infectious disease note seen in ventilator setting and arterial blood gases seen. He is on ventilator on 30% FiO2 with arterial blood gas 7.38/36/96/21.5. Creatinine is 4.20 BUN is 70 bicarbonate is 17 and sodium 133, his WBC count is improved to 25 from initial WBC count of 53. He did have drop in hemoglobin to 6.3 and he had received 2 units of packed RBCs yesterday and now is getting 1 unit packed RBCs as his hemoglobin was 6.8 this morning platelet count has been 342  His urine output is reported to be 160 mL and last 24 hours he was seen by nephrology today he was given 80 mg of IV Lasix today and his urine output is better after he received Lasix.   He is currently on meropenem and seen by infectious disease, his cultures from wound shows gram-positive cocci in pairs and gram-negative rods, he has been followed by surgery and is scheduled for debridement again tomorrow and diverting colostomy. When I saw him he was on fentanyl drip at 200 mcg and also on Precedex drip, he is off vasopressin and he is on low-dose of Levophed 3 mcg. His blood sugar is maintained between 140-180 with insulin drip    Continue with meropenem and follow-up final cultures. Will wean fentanyl drip down and will try to keep between 100 250 mcg. On Precedex drip. Follow-up with surgery and to OR tomorrow. Ventilator setting PRVC/26/530/5/30%  Follow-up urine output and renal function and Lasix daily. Continue with insulin drip. Discussed with mother and updated. Discussed with nursing staff, treatment and plan discussed    Total critical care time caring for this patient with life threatening, unstable organ failure, including direct patient contact, management of life support systems, review of data including imaging and labs, discussions with other team members and physicians at least 27  Min so far today, excluding procedures.         Dar Nelson MD  12/2/2019 2:26 PM

## 2019-12-02 NOTE — PROGRESS NOTES
Brandon Willard is a 37y.o.-year-old  male who was initially admitted on 11/29/2019. Patient seen at the request of . INITIAL HISTORY:    Patient presented through ER in [place] with complaints of weakness of a few days duration. Examination showed skin necrosis in the gluteal and perineal region. Patient transferred to [facility]. CT scan showed extensive subcutaneous emphysema. Patient underwent I&D and extensive complex debridement of affected tissues on 11-29-19. Gram stain of tissues showed Strep. Spp and Gram negative bacilli. 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 He has underlying DM 2, prior diabetic foot infection, DEBBY. Patient more stable post surgery but with hypotension requiring a pressor. CURRENT EVALUATION :12/2/2019     Patient evaluated and examined in the ICU. Afebrile  VS stable    Pressors down to 2 mcg Levophed  Remains on Ventilator, sedated  On Bicarb drip  Will receive transfusion    No new culture data: Strep and Gram negative bacilli     Surgery considering further debridement and possible diverting colostomy on 12-3-19    Labs, X rays reviewed: 12/2/2019    BUN: 59-->61-->70  Cr: 2.56-->2.77-->4.2    WBC: 4.20-->53.3-->25.4  Hb:9.5-->8.2-->6.8  Plat: 642-->342    Cultures:  Urine:  ·   Blood:  ·   Sputum :  ·   Wound:  · 11-29-19: Strep ssp and Gram negative bacilli       MRSA probe: Pos    Discussed with mother, RN, CC. .  Mother indicates patient did not take care of himself. Reports low prior LVEF of 20%                I have personally reviewed the past medical history, past surgical history, medications, social history, and family history, and I have updated the database accordingly.   Past Medical History:     Past Medical History:   Diagnosis Date    CHF (congestive heart failure) ([address]     Diabetes mellitus [facility]     Hypertension     Spina bifida aperta of lumbar spine ([facility]        Past Surgical History:     Past Surgical History:   Procedure Laterality Date    INCISION AND DRAINAGE Bilateral 11/29/2019    RECTAL PERIRECTAL INCISION AND DRAINAGE, DIVERING LOOP COLOSTOMY CREATION performed by Ricardo Ryder MD at [place] [id number]       Medications:      sodium chloride  250 mL Intravenous Once    furosemide  80 mg Intravenous Daily    thiamine and folic acid IVPB   Intravenous Daily    fat emulsion  250 mL Intravenous Daily    meropenem  1 g Intravenous Q12H    sodium chloride flush  10 mL Intravenous 2 times per day    heparin (porcine)  5,000 Units Subcutaneous 3 times per day    lactated ringers bolus  30 mL/kg Intravenous Once    sodium hypochlorite   Irrigation Once    chlorhexidine  15 mL Mouth/Throat BID    famotidine (PEPCID) injection  20 mg Intravenous Daily       Social History:     Social History     Socioeconomic History    Marital status: Unknown     Spouse name: Not on file    Number of children: Not on file    Years of education: Not on file    Highest education level: Not on file   Occupational History    Not on file   Social Needs    Financial resource strain: Not on file    Food insecurity:     Worry: Not on file     Inability: Not on file    Transportation needs:     Medical: Not on file     Non-medical: Not on file   Tobacco Use    Smoking status: Not on file   Substance and Sexual Activity    Alcohol use: Not on file    Drug use: Not on file    Sexual activity: Not on file   Lifestyle    Physical activity:     Days per week: Not on file     Minutes per session: Not on file    Stress: Not on file   Relationships    Social connections:     Talks on phone: Not on file     Gets together: Not on file     Attends Confucianist service: Not on file     Active member of club or organization: Not on file     Attends meetings of clubs or organizations: Not on file     Relationship status: Not on file    Intimate partner violence:     Fear of current or ex partner: Not on file Emotionally abused: Not on file     Physically abused: Not on file     Forced sexual activity: Not on file   Other Topics Concern    Not on file   Social History Narrative    Not on file       Family History:   No family history on file. Allergies:   Patient has no known allergies. Review of Systems:   Constitutional: No fevers or chills. Generalized weakness  Head: No headaches  Eyes: No double vision or blurry vision. No conjunctival inflammation. ENT: No sore throat or runny nose. . No hearing loss, tinnitus or vertigo. Cardiovascular: No chest pain or palpitations. No shortness of breath. No GORDILLO  Lung: No shortness of breath or cough. No sputum production  Abdomen: No nausea, vomiting, diarrhea, or abdominal pain. Clarene Search No cramps. Genitourinary: No increased urinary frequency, or dysuria. No hematuria. No suprapubic or CVA pain  Musculoskeletal: No muscle aches or pains. No joint effusions, swelling or deformities. Lt diabetic foot   Hematologic: No bleeding or bruising. Neurologic: No headache, weakness, numbness, or tingling. Spina bifida  Integument: Gangrene of perineum and gluteal areas  Psychiatric: No depression. Endocrine: No polyuria, no polydipsia, no polyphagia.     Physical Examination :     Patient Vitals for the past 8 hrs:   BP Temp Temp src Pulse Resp SpO2 Weight   12/02/19 1231 -- -- -- 76 25 100 % --   12/02/19 1230 (!) 115/52 -- -- 76 26 100 % --   12/02/19 1159 -- -- -- 98 26 99 % --   12/02/19 1145 (!) 159/103 99.7 °F (37.6 °C) Axillary 85 26 100 % --   12/02/19 1139 -- -- -- 88 25 100 % --   12/02/19 1106 -- -- -- 102 26 99 % --   12/02/19 1030 -- -- -- 88 26 100 % --   12/02/19 1000 139/77 -- -- 88 26 100 % --   12/02/19 0945 138/70 -- -- 88 26 100 % --   12/02/19 0930 (!) 142/73 -- -- 89 26 100 % --   12/02/19 0900 (!) 148/79 -- -- 94 26 100 % --   12/02/19 0845 (!) 150/85 -- -- 91 26 100 % --   12/02/19 0843 -- -- -- 92 26 100 % --   12/02/19 0830 137/75 101.5 °F (38.6 °C) were obtained from the lung bases to the pubic symphysis with sagittal and coronal 2D reformatted images. Oral contrast administered:  No.  Automatic exposure control (AEC) was utilized. CT ABDOMEN FINDINGS:      The lung bases are unremarkable. There is a small pericardial effusion versus thickening. The liver, spleen, and pancreas demonstrate no acute abnormality given compromised evaluation without intravenous contrast.  There may be mild splenomegaly.  The adrenal glands appear within normal limits. There is no hydronephrosis or obstructing urinary tract calculi. Small amount of free fluid is seen adjacent to the liver inferiorly. .    The appendix is visualized in the right lower quadrant and appears within normal limits.       There is no evidence for bowel obstruction. Stranding is seen within the subcutaneous fat overlying both lateral abdominal walls left greater than right.  There is ill-defined fluid collection within the subcutaneous fat overlying the left lateral abdominal wall possibly representing phlegmon or developing abscess although no organized   abscess is seen. There is a large amount of soft tissue emphysema involving both the right and left buttock extending to the perineum tissue thickening is seen especially on the left involving the left buttock. CT PELVIS FINDINGS:        No distal ureteral calculi or bladder calculi. There is no free fluid in the pelvis. Catheter is seen within the urinary bladder. Osseous changes within the left hemipelvis which may be chronic in nature. IMPRESSION:    Extensive subcutaneous emphysema as described above involving both buttocks extending to the perineum.  The possibility of Ashley gangrene/necrotizing fasciitis cannot be excluded. Possible phlegmon or developing soft tissue abscess overlying the left lateral abdominal wall as noted above.  No organized abscess is seen however.   Osseous changes within the left hemipelvis which may be chronic in nature. Results the study were called to Dr. Saleem Chavez [phone] on 11/29/2019  All CT scans at this facility use dose modulation, iterative reconstruction, and/or weight based dosing when appropriate to reduce radiation dose to as low as reasonably achievable. Medical Decision [id number]-[id number]-Digwq:   11/29/2019  8:17 PM - Jesus, Ana Incoming Lab Results From [facility]     Specimen Information: Buttock; Tissue        Component Collected Lab   Specimen Description 11/29/2019  7:[address]  [address]   . BUTTOCK BILATERAL TS    Special Requests 11/29/2019  7:[address]  [address]   NOT REPORTED    Direct Exam 11/29/2019  7:[address]  [address]   NO NEUTROPHILS SEEN    Direct Exam Abnormal  11/29/2019  7:34  [facility] [address] [place]    Direct Exam Abnormal  11/29/2019  7:34 PM [address] NEGATIVE RODS    Culture 11/29/2019  7:34  [address]   PENDING          Medical Decision Making-Other:     Note:  · Labs, medications, radiologic studies were reviewed with personal review of films  · Large amounts of data were reviewed  · Discussed with nursing Staff, Discharge planner  · Infection Control and Prevention measures reviewed  · All prior entries were reviewed  · Administer medications as ordered  · Prognosis: Guarded  · Discharge planning reviewed  · Follow up as outpatient. Thank you for allowing us to participate in the care of this patient. Please call with questions.     Jordon Rich MD  Pager: [phone] - Office: [phone]

## 2019-12-02 NOTE — PROGRESS NOTES
9\" (175.3 cm)   · Current Body Wt: 324 lb 1.2 oz (147 kg)  · Admission Body Wt: 291 lb 0.1 oz (132 kg)  · Ideal Body Wt: 160 lb 15 oz (73 kg), % Ideal Body 181% (adm/ideal)  · BMI Classification: BMI > or equal to 40.0 Obese Class III    Nutrition Interventions:   Continue Parenteral Nutrition until able to transition to TF. Suggest increase AA and Ca in next bag.    Continued Inpatient Monitoring, Education Not Indicated    Nutrition Evaluation:   · Evaluation: Progressing toward goals   · Goals: Meet % of estimated nutrition needs   · Monitoring: PN Intake, PN Tolerance, Wound Healing, I&O, Weight, Pertinent Labs      Electronically signed by Meenakshi Villafuerte RD, LD on 12/2/19 at 12:16 PM    Contact Number:[phone]

## 2019-12-02 NOTE — PLAN OF CARE
Discussed wound care plan with Dr Letty Becerril. BID Dakin's moist to moist dressings to the gluteal and perineal wounds. Dressing change due next this evening.

## 2019-12-03 ENCOUNTER — ANESTHESIA EVENT (OUTPATIENT)
Dept: OPERATING ROOM | Age: 34
DRG: 463 | End: 2019-12-03
Payer: MEDICARE

## 2019-12-03 ENCOUNTER — APPOINTMENT (OUTPATIENT)
Dept: GENERAL RADIOLOGY | Age: 34
DRG: 463 | End: 2019-12-03
Attending: INTERNAL MEDICINE
Payer: MEDICARE

## 2019-12-03 ENCOUNTER — ANESTHESIA (OUTPATIENT)
Dept: OPERATING ROOM | Age: 34
DRG: 463 | End: 2019-12-03
Payer: MEDICARE

## 2019-12-03 VITALS
OXYGEN SATURATION: 100 % | RESPIRATION RATE: 14 BRPM | SYSTOLIC BLOOD PRESSURE: 144 MMHG | TEMPERATURE: 99.5 F | DIASTOLIC BLOOD PRESSURE: 120 MMHG

## 2019-12-03 LAB
ABSOLUTE EOS #: 0 K/UL (ref 0–0.4)
ABSOLUTE IMMATURE GRANULOCYTE: 0 K/UL (ref 0–0.3)
ABSOLUTE LYMPH #: 2.24 K/UL (ref 1–4.8)
ABSOLUTE MONO #: 1.4 K/UL (ref 0.1–0.8)
ALLEN TEST: ABNORMAL
ANION GAP SERPL CALCULATED.3IONS-SCNC: 15 MMOL/L (ref 9–17)
BASOPHILS # BLD: 0 % (ref 0–2)
BASOPHILS ABSOLUTE: 0 K/UL (ref 0–0.2)
BUN BLDV-MCNC: 79 MG/DL (ref 6–20)
BUN/CREAT BLD: ABNORMAL (ref 9–20)
CALCIUM IONIZED: 1.05 MMOL/L (ref 1.13–1.33)
CALCIUM SERPL-MCNC: 6.8 MG/DL (ref 8.6–10.4)
CHLORIDE BLD-SCNC: 104 MMOL/L (ref 98–107)
CHLORIDE, WHOLE BLOOD: 104 MMOL/L (ref 98–110)
CO2: 18 MMOL/L (ref 20–31)
CREAT SERPL-MCNC: 4.49 MG/DL (ref 0.7–1.2)
DIFFERENTIAL TYPE: ABNORMAL
EOSINOPHILS RELATIVE PERCENT: 0 % (ref 1–4)
FIO2: ABNORMAL
GFR AFRICAN AMERICAN: 18 ML/MIN
GFR NON-AFRICAN AMERICAN: 15 ML/MIN
GFR SERPL CREATININE-BSD FRML MDRD: ABNORMAL ML/MIN/{1.73_M2}
GFR SERPL CREATININE-BSD FRML MDRD: ABNORMAL ML/MIN/{1.73_M2}
GLUCOSE BLD-MCNC: 134 MG/DL (ref 75–110)
GLUCOSE BLD-MCNC: 150 MG/DL (ref 75–110)
GLUCOSE BLD-MCNC: 150 MG/DL (ref 75–110)
GLUCOSE BLD-MCNC: 154 MG/DL (ref 74–100)
GLUCOSE BLD-MCNC: 159 MG/DL (ref 70–99)
GLUCOSE BLD-MCNC: 81 MG/DL (ref 75–110)
GLUCOSE BLD-MCNC: 84 MG/DL (ref 75–110)
GLUCOSE BLD-MCNC: 86 MG/DL (ref 75–110)
GLUCOSE BLD-MCNC: 89 MG/DL (ref 75–110)
HCT VFR BLD CALC: 25.6 %
HCT VFR BLD CALC: 26.6 % (ref 40.7–50.3)
HEMOGLOBIN: 7.7 G/DL (ref 13–17)
HEMOGLOBIN: 8.2 GM/DL
IMMATURE GRANULOCYTES: 0 %
LYMPHOCYTES # BLD: 8 % (ref 24–44)
Lab: 0.4 % (ref 0–1.5)
Lab: 1.5 % (ref 0–5)
Lab: 5 VOL % (ref 12–16)
Lab: 8.2 G/DL (ref 12–18)
MCH RBC QN AUTO: 23.8 PG (ref 25.2–33.5)
MCHC RBC AUTO-ENTMCNC: 28.9 G/DL (ref 28.4–34.8)
MCV RBC AUTO: 82.1 FL (ref 82.6–102.9)
MODE: ABNORMAL
MONOCYTES # BLD: 5 % (ref 1–7)
MORPHOLOGY: ABNORMAL
NEGATIVE BASE EXCESS, ART: 4 (ref 0–2)
NRBC AUTOMATED: 0.2 PER 100 WBC
O2 DEVICE/FLOW/%: ABNORMAL
O2 SAT, VEN: 39.8 % (ref 60–85)
OXYGEN STATUS: ABNORMAL
PATHOLOGIST: NORMAL
PATIENT TEMP: ABNORMAL
PDW BLD-RTO: 19.7 % (ref 11.8–14.4)
PLATELET # BLD: 285 K/UL (ref 138–453)
PLATELET ESTIMATE: ABNORMAL
PMV BLD AUTO: 9.6 FL (ref 8.1–13.5)
POC HCO3: 21.3 MMOL/L (ref 21–28)
POC O2 SATURATION: 97 % (ref 94–98)
POC PCO2 TEMP: ABNORMAL MM HG
POC PCO2: 37 MM HG (ref 35–48)
POC PH TEMP: ABNORMAL
POC PH: 7.37 (ref 7.35–7.45)
POC PO2 TEMP: ABNORMAL MM HG
POC PO2: 95.7 MM HG (ref 83–108)
POSITIVE BASE EXCESS, ART: ABNORMAL (ref 0–3)
POTASSIUM SERPL-SCNC: 4.3 MMOL/L (ref 3.7–5.3)
POTASSIUM, WHOLE BLOOD: 4.1 MMOL/L (ref 3.6–5)
RBC # BLD: 3.24 M/UL (ref 4.21–5.77)
RBC # BLD: ABNORMAL 10*6/UL
SAMPLE SITE: ABNORMAL
SEG NEUTROPHILS: 87 % (ref 36–66)
SEGMENTED NEUTROPHILS ABSOLUTE COUNT: 24.36 K/UL (ref 1.8–7.7)
SERUM IFX INTERP: NORMAL
SODIUM BLD-SCNC: 137 MMOL/L (ref 135–144)
SODIUM, WHOLE BLOOD: 137 MMOL/L (ref 136–145)
SURGICAL PATHOLOGY REPORT: NORMAL
TCO2 (CALC), ART: 23 MMOL/L (ref 22–29)
WBC # BLD: 28 K/UL (ref 3.5–11.3)
WBC # BLD: ABNORMAL 10*3/UL

## 2019-12-03 PROCEDURE — 2580000003 HC RX 258: Performed by: STUDENT IN AN ORGANIZED HEALTH CARE EDUCATION/TRAINING PROGRAM

## 2019-12-03 PROCEDURE — 84132 ASSAY OF SERUM POTASSIUM: CPT

## 2019-12-03 PROCEDURE — 6360000002 HC RX W HCPCS: Performed by: NURSE ANESTHETIST, CERTIFIED REGISTERED

## 2019-12-03 PROCEDURE — 82375 ASSAY CARBOXYHB QUANT: CPT

## 2019-12-03 PROCEDURE — 94770 HC ETCO2 MONITOR DAILY: CPT

## 2019-12-03 PROCEDURE — 2500000003 HC RX 250 WO HCPCS: Performed by: STUDENT IN AN ORGANIZED HEALTH CARE EDUCATION/TRAINING PROGRAM

## 2019-12-03 PROCEDURE — 2000000000 HC ICU R&B

## 2019-12-03 PROCEDURE — 3700000000 HC ANESTHESIA ATTENDED CARE: Performed by: SURGERY

## 2019-12-03 PROCEDURE — 0D1M0Z4 BYPASS DESCENDING COLON TO CUTANEOUS, OPEN APPROACH: ICD-10-PCS | Performed by: SURGERY

## 2019-12-03 PROCEDURE — 6360000002 HC RX W HCPCS: Performed by: STUDENT IN AN ORGANIZED HEALTH CARE EDUCATION/TRAINING PROGRAM

## 2019-12-03 PROCEDURE — 85025 COMPLETE CBC W/AUTO DIFF WBC: CPT

## 2019-12-03 PROCEDURE — 0DNN0ZZ RELEASE SIGMOID COLON, OPEN APPROACH: ICD-10-PCS | Performed by: SURGERY

## 2019-12-03 PROCEDURE — 6370000000 HC RX 637 (ALT 250 FOR IP): Performed by: STUDENT IN AN ORGANIZED HEALTH CARE EDUCATION/TRAINING PROGRAM

## 2019-12-03 PROCEDURE — 0DJD4ZZ INSPECTION OF LOWER INTESTINAL TRACT, PERCUTANEOUS ENDOSCOPIC APPROACH: ICD-10-PCS | Performed by: SURGERY

## 2019-12-03 PROCEDURE — 82805 BLOOD GASES W/O2 SATURATION: CPT

## 2019-12-03 PROCEDURE — 74018 RADEX ABDOMEN 1 VIEW: CPT

## 2019-12-03 PROCEDURE — 94003 VENT MGMT INPAT SUBQ DAY: CPT

## 2019-12-03 PROCEDURE — 99291 CRITICAL CARE FIRST HOUR: CPT | Performed by: INTERNAL MEDICINE

## 2019-12-03 PROCEDURE — 84295 ASSAY OF SERUM SODIUM: CPT

## 2019-12-03 PROCEDURE — 94761 N-INVAS EAR/PLS OXIMETRY MLT: CPT

## 2019-12-03 PROCEDURE — 2700000000 HC OXYGEN THERAPY PER DAY

## 2019-12-03 PROCEDURE — 2720000010 HC SURG SUPPLY STERILE: Performed by: SURGERY

## 2019-12-03 PROCEDURE — 5A1955Z RESPIRATORY VENTILATION, GREATER THAN 96 CONSECUTIVE HOURS: ICD-10-PCS | Performed by: INTERNAL MEDICINE

## 2019-12-03 PROCEDURE — 82435 ASSAY OF BLOOD CHLORIDE: CPT

## 2019-12-03 PROCEDURE — 80048 BASIC METABOLIC PNL TOTAL CA: CPT

## 2019-12-03 PROCEDURE — 3600000014 HC SURGERY LEVEL 4 ADDTL 15MIN: Performed by: SURGERY

## 2019-12-03 PROCEDURE — 99233 SBSQ HOSP IP/OBS HIGH 50: CPT | Performed by: INTERNAL MEDICINE

## 2019-12-03 PROCEDURE — 83050 HGB METHEMOGLOBIN QUAN: CPT

## 2019-12-03 PROCEDURE — 85018 HEMOGLOBIN: CPT

## 2019-12-03 PROCEDURE — 2580000003 HC RX 258: Performed by: NURSE ANESTHETIST, CERTIFIED REGISTERED

## 2019-12-03 PROCEDURE — 2500000003 HC RX 250 WO HCPCS: Performed by: NURSE ANESTHETIST, CERTIFIED REGISTERED

## 2019-12-03 PROCEDURE — 82330 ASSAY OF CALCIUM: CPT

## 2019-12-03 PROCEDURE — 82803 BLOOD GASES ANY COMBINATION: CPT

## 2019-12-03 PROCEDURE — 0DNM0ZZ RELEASE DESCENDING COLON, OPEN APPROACH: ICD-10-PCS | Performed by: SURGERY

## 2019-12-03 PROCEDURE — 82962 GLUCOSE BLOOD TEST: CPT

## 2019-12-03 PROCEDURE — 0DN80ZZ RELEASE SMALL INTESTINE, OPEN APPROACH: ICD-10-PCS | Performed by: SURGERY

## 2019-12-03 PROCEDURE — 3700000001 HC ADD 15 MINUTES (ANESTHESIA): Performed by: SURGERY

## 2019-12-03 PROCEDURE — 88304 TISSUE EXAM BY PATHOLOGIST: CPT

## 2019-12-03 PROCEDURE — 2709999900 HC NON-CHARGEABLE SUPPLY: Performed by: SURGERY

## 2019-12-03 PROCEDURE — 37799 UNLISTED PX VASCULAR SURGERY: CPT

## 2019-12-03 PROCEDURE — 6360000002 HC RX W HCPCS: Performed by: INTERNAL MEDICINE

## 2019-12-03 PROCEDURE — 85014 HEMATOCRIT: CPT

## 2019-12-03 PROCEDURE — 3600000004 HC SURGERY LEVEL 4 BASE: Performed by: SURGERY

## 2019-12-03 PROCEDURE — 2580000003 HC RX 258: Performed by: SURGERY

## 2019-12-03 RX ORDER — FUROSEMIDE 10 MG/ML
INJECTION INTRAMUSCULAR; INTRAVENOUS
Status: DISPENSED
Start: 2019-12-03 | End: 2019-12-03

## 2019-12-03 RX ORDER — CEFAZOLIN SODIUM 1 G/3ML
INJECTION, POWDER, FOR SOLUTION INTRAMUSCULAR; INTRAVENOUS PRN
Status: DISCONTINUED | OUTPATIENT
Start: 2019-12-03 | End: 2019-12-03 | Stop reason: SDUPTHER

## 2019-12-03 RX ORDER — 0.9 % SODIUM CHLORIDE 0.9 %
250 INTRAVENOUS SOLUTION INTRAVENOUS ONCE
Status: DISCONTINUED | OUTPATIENT
Start: 2019-12-03 | End: 2019-12-05

## 2019-12-03 RX ORDER — MAGNESIUM HYDROXIDE 1200 MG/15ML
LIQUID ORAL CONTINUOUS PRN
Status: COMPLETED | OUTPATIENT
Start: 2019-12-03 | End: 2019-12-03

## 2019-12-03 RX ORDER — PHENYLEPHRINE HYDROCHLORIDE 10 MG/ML
INJECTION INTRAVENOUS PRN
Status: DISCONTINUED | OUTPATIENT
Start: 2019-12-03 | End: 2019-12-03 | Stop reason: SDUPTHER

## 2019-12-03 RX ORDER — FENTANYL CITRATE 50 UG/ML
INJECTION, SOLUTION INTRAMUSCULAR; INTRAVENOUS PRN
Status: DISCONTINUED | OUTPATIENT
Start: 2019-12-03 | End: 2019-12-03 | Stop reason: SDUPTHER

## 2019-12-03 RX ORDER — MAGNESIUM HYDROXIDE 1200 MG/15ML
LIQUID ORAL PRN
Status: DISCONTINUED | OUTPATIENT
Start: 2019-12-03 | End: 2019-12-03 | Stop reason: HOSPADM

## 2019-12-03 RX ORDER — SODIUM CHLORIDE 9 MG/ML
INJECTION, SOLUTION INTRAVENOUS CONTINUOUS PRN
Status: DISCONTINUED | OUTPATIENT
Start: 2019-12-03 | End: 2019-12-03 | Stop reason: SDUPTHER

## 2019-12-03 RX ORDER — ROCURONIUM BROMIDE 10 MG/ML
INJECTION, SOLUTION INTRAVENOUS PRN
Status: DISCONTINUED | OUTPATIENT
Start: 2019-12-03 | End: 2019-12-03 | Stop reason: SDUPTHER

## 2019-12-03 RX ORDER — MIDAZOLAM HYDROCHLORIDE 1 MG/ML
INJECTION INTRAMUSCULAR; INTRAVENOUS PRN
Status: DISCONTINUED | OUTPATIENT
Start: 2019-12-03 | End: 2019-12-03 | Stop reason: SDUPTHER

## 2019-12-03 RX ADMIN — Medication 200 MCG/HR: at 04:42

## 2019-12-03 RX ADMIN — Medication 200 MCG/HR: at 23:54

## 2019-12-03 RX ADMIN — DEXMEDETOMIDINE HYDROCHLORIDE 1 MCG/KG/HR: 100 INJECTION, SOLUTION INTRAVENOUS at 19:22

## 2019-12-03 RX ADMIN — PHENYLEPHRINE HYDROCHLORIDE 200 MCG: 10 INJECTION INTRAVENOUS at 16:04

## 2019-12-03 RX ADMIN — CALCIUM GLUCONATE: 98 INJECTION, SOLUTION INTRAVENOUS at 22:42

## 2019-12-03 RX ADMIN — DEXMEDETOMIDINE HYDROCHLORIDE 1 MCG/KG/HR: 100 INJECTION, SOLUTION INTRAVENOUS at 08:43

## 2019-12-03 RX ADMIN — PHENYLEPHRINE HYDROCHLORIDE 100 MCG: 10 INJECTION INTRAVENOUS at 17:57

## 2019-12-03 RX ADMIN — MEROPENEM 1 G: 1 INJECTION, POWDER, FOR SOLUTION INTRAVENOUS at 23:59

## 2019-12-03 RX ADMIN — SODIUM CHLORIDE: 9 INJECTION, SOLUTION INTRAVENOUS at 11:04

## 2019-12-03 RX ADMIN — Medication 200 MCG/HR: at 18:56

## 2019-12-03 RX ADMIN — PHENYLEPHRINE HYDROCHLORIDE 100 MCG: 10 INJECTION INTRAVENOUS at 17:20

## 2019-12-03 RX ADMIN — FAMOTIDINE 20 MG: 10 INJECTION, SOLUTION INTRAVENOUS at 08:35

## 2019-12-03 RX ADMIN — Medication 15 ML: at 22:40

## 2019-12-03 RX ADMIN — DEXMEDETOMIDINE HYDROCHLORIDE 1.4 MCG/KG/HR: 100 INJECTION, SOLUTION INTRAVENOUS at 23:57

## 2019-12-03 RX ADMIN — CEFAZOLIN 3000 MG: 1 INJECTION, POWDER, FOR SOLUTION INTRAMUSCULAR; INTRAVENOUS at 15:53

## 2019-12-03 RX ADMIN — ROCURONIUM BROMIDE 50 MG: 10 INJECTION INTRAVENOUS at 11:22

## 2019-12-03 RX ADMIN — PHENYLEPHRINE HYDROCHLORIDE 200 MCG: 10 INJECTION INTRAVENOUS at 15:04

## 2019-12-03 RX ADMIN — PHENYLEPHRINE HYDROCHLORIDE 100 MCG: 10 INJECTION INTRAVENOUS at 16:49

## 2019-12-03 RX ADMIN — DEXMEDETOMIDINE HYDROCHLORIDE 1.4 MCG/KG/HR: 100 INJECTION, SOLUTION INTRAVENOUS at 21:30

## 2019-12-03 RX ADMIN — Medication 3 G: at 11:50

## 2019-12-03 RX ADMIN — PHENYLEPHRINE HYDROCHLORIDE 100 MCG: 10 INJECTION INTRAVENOUS at 16:22

## 2019-12-03 RX ADMIN — MIDAZOLAM HYDROCHLORIDE 2 MG: 1 INJECTION, SOLUTION INTRAMUSCULAR; INTRAVENOUS at 18:19

## 2019-12-03 RX ADMIN — DEXMEDETOMIDINE HYDROCHLORIDE 1 MCG/KG/HR: 100 INJECTION, SOLUTION INTRAVENOUS at 05:09

## 2019-12-03 RX ADMIN — FUROSEMIDE 80 MG: 10 INJECTION, SOLUTION INTRAMUSCULAR; INTRAVENOUS at 08:34

## 2019-12-03 RX ADMIN — DAKIN'S SOLUTION 0.125% (QUARTER STRENGTH): 0.12 SOLUTION at 22:40

## 2019-12-03 RX ADMIN — DEXMEDETOMIDINE HYDROCHLORIDE 0.9 MCG/KG/HR: 100 INJECTION, SOLUTION INTRAVENOUS at 01:46

## 2019-12-03 RX ADMIN — Medication 15 ML: at 08:33

## 2019-12-03 RX ADMIN — FOLIC ACID: 5 INJECTION, SOLUTION INTRAMUSCULAR; INTRAVENOUS; SUBCUTANEOUS at 08:34

## 2019-12-03 RX ADMIN — PHENYLEPHRINE HYDROCHLORIDE 100 MCG: 10 INJECTION INTRAVENOUS at 17:00

## 2019-12-03 RX ADMIN — ROCURONIUM BROMIDE 10 MG: 10 INJECTION INTRAVENOUS at 16:26

## 2019-12-03 RX ADMIN — SODIUM CHLORIDE: 9 INJECTION, SOLUTION INTRAVENOUS at 17:28

## 2019-12-03 RX ADMIN — PHENYLEPHRINE HYDROCHLORIDE 100 MCG: 10 INJECTION INTRAVENOUS at 17:11

## 2019-12-03 RX ADMIN — PHENYLEPHRINE HYDROCHLORIDE 200 MCG: 10 INJECTION INTRAVENOUS at 11:28

## 2019-12-03 RX ADMIN — Medication 200 MCG/HR: at 09:43

## 2019-12-03 RX ADMIN — ROCURONIUM BROMIDE 20 MG: 10 INJECTION INTRAVENOUS at 13:55

## 2019-12-03 RX ADMIN — FENTANYL CITRATE 100 MCG: 50 INJECTION INTRAMUSCULAR; INTRAVENOUS at 18:20

## 2019-12-03 RX ADMIN — MEROPENEM 1 G: 1 INJECTION, POWDER, FOR SOLUTION INTRAVENOUS at 08:39

## 2019-12-03 RX ADMIN — METRONIDAZOLE 500 MG: 500 INJECTION, SOLUTION INTRAVENOUS at 12:34

## 2019-12-03 RX ADMIN — ROCURONIUM BROMIDE 20 MG: 10 INJECTION INTRAVENOUS at 12:23

## 2019-12-03 ASSESSMENT — PULMONARY FUNCTION TESTS
PIF_VALUE: 32
PIF_VALUE: 31
PIF_VALUE: 28
PIF_VALUE: 29
PIF_VALUE: 28
PIF_VALUE: 27
PIF_VALUE: 29
PIF_VALUE: 28
PIF_VALUE: 28
PIF_VALUE: 30
PIF_VALUE: 28
PIF_VALUE: 29
PIF_VALUE: 29
PIF_VALUE: 30
PIF_VALUE: 29
PIF_VALUE: 30
PIF_VALUE: 31
PIF_VALUE: 27
PIF_VALUE: 27
PIF_VALUE: 30
PIF_VALUE: 29
PIF_VALUE: 30
PIF_VALUE: 29
PIF_VALUE: 28
PIF_VALUE: 27
PIF_VALUE: 28
PIF_VALUE: 30
PIF_VALUE: 29
PIF_VALUE: 30
PIF_VALUE: 28
PIF_VALUE: 29
PIF_VALUE: 28
PIF_VALUE: 27
PIF_VALUE: 27
PIF_VALUE: 28
PIF_VALUE: 29
PIF_VALUE: 30
PIF_VALUE: 29
PIF_VALUE: 2
PIF_VALUE: 29
PIF_VALUE: 29
PIF_VALUE: 30
PIF_VALUE: 27
PIF_VALUE: 31
PIF_VALUE: 30
PIF_VALUE: 28
PIF_VALUE: 25
PIF_VALUE: 28
PIF_VALUE: 25
PIF_VALUE: 28
PIF_VALUE: 29
PIF_VALUE: 30
PIF_VALUE: 27
PIF_VALUE: 31
PIF_VALUE: 29
PIF_VALUE: 28
PIF_VALUE: 32
PIF_VALUE: 30
PIF_VALUE: 28
PIF_VALUE: 27
PIF_VALUE: 32
PIF_VALUE: 28
PIF_VALUE: 25
PIF_VALUE: 29
PIF_VALUE: 31
PIF_VALUE: 31
PIF_VALUE: 29
PIF_VALUE: 26
PIF_VALUE: 27
PIF_VALUE: 28
PIF_VALUE: 27
PIF_VALUE: 27
PIF_VALUE: 26
PIF_VALUE: 29
PIF_VALUE: 27
PIF_VALUE: 29
PIF_VALUE: 28
PIF_VALUE: 31
PIF_VALUE: 29
PIF_VALUE: 28
PIF_VALUE: 31
PIF_VALUE: 28
PIF_VALUE: 27
PIF_VALUE: 28
PIF_VALUE: 30
PIF_VALUE: 29
PIF_VALUE: 28
PIF_VALUE: 27
PIF_VALUE: 28
PIF_VALUE: 29
PIF_VALUE: 28
PIF_VALUE: 29
PIF_VALUE: 27
PIF_VALUE: 29
PIF_VALUE: 31
PIF_VALUE: 29
PIF_VALUE: 28
PIF_VALUE: 32
PIF_VALUE: 27
PIF_VALUE: 30
PIF_VALUE: 24
PIF_VALUE: 29
PIF_VALUE: 28
PIF_VALUE: 29
PIF_VALUE: 29
PIF_VALUE: 30
PIF_VALUE: 27
PIF_VALUE: 28
PIF_VALUE: 27
PIF_VALUE: 31
PIF_VALUE: 27
PIF_VALUE: 28
PIF_VALUE: 29
PIF_VALUE: 29
PIF_VALUE: 33
PIF_VALUE: 28
PIF_VALUE: 29
PIF_VALUE: 5
PIF_VALUE: 28
PIF_VALUE: 5
PIF_VALUE: 29
PIF_VALUE: 31
PIF_VALUE: 31
PIF_VALUE: 27
PIF_VALUE: 28
PIF_VALUE: 28
PIF_VALUE: 26
PIF_VALUE: 22
PIF_VALUE: 30
PIF_VALUE: 28
PIF_VALUE: 29
PIF_VALUE: 28
PIF_VALUE: 30
PIF_VALUE: 29
PIF_VALUE: 29
PIF_VALUE: 32
PIF_VALUE: 28
PIF_VALUE: 23
PIF_VALUE: 29
PIF_VALUE: 30
PIF_VALUE: 24
PIF_VALUE: 28
PIF_VALUE: 29
PIF_VALUE: 29
PIF_VALUE: 30
PIF_VALUE: 30
PIF_VALUE: 27
PIF_VALUE: 28
PIF_VALUE: 30
PIF_VALUE: 27
PIF_VALUE: 29
PIF_VALUE: 28
PIF_VALUE: 32
PIF_VALUE: 29
PIF_VALUE: 29
PIF_VALUE: 32
PIF_VALUE: 28
PIF_VALUE: 30
PIF_VALUE: 31
PIF_VALUE: 27
PIF_VALUE: 29
PIF_VALUE: 28
PIF_VALUE: 27
PIF_VALUE: 29
PIF_VALUE: 28
PIF_VALUE: 29
PIF_VALUE: 29
PIF_VALUE: 28
PIF_VALUE: 27
PIF_VALUE: 27
PIF_VALUE: 28
PIF_VALUE: 29
PIF_VALUE: 28
PIF_VALUE: 30
PIF_VALUE: 29
PIF_VALUE: 30
PIF_VALUE: 29
PIF_VALUE: 27
PIF_VALUE: 29
PIF_VALUE: 28
PIF_VALUE: 29
PIF_VALUE: 27
PIF_VALUE: 28
PIF_VALUE: 29
PIF_VALUE: 27
PIF_VALUE: 28
PIF_VALUE: 27
PIF_VALUE: 27
PIF_VALUE: 29
PIF_VALUE: 31
PIF_VALUE: 27
PIF_VALUE: 29
PIF_VALUE: 30
PIF_VALUE: 27
PIF_VALUE: 29
PIF_VALUE: 32
PIF_VALUE: 27
PIF_VALUE: 26
PIF_VALUE: 27
PIF_VALUE: 29
PIF_VALUE: 30
PIF_VALUE: 21
PIF_VALUE: 29
PIF_VALUE: 28
PIF_VALUE: 29
PIF_VALUE: 29
PIF_VALUE: 6
PIF_VALUE: 31
PIF_VALUE: 29
PIF_VALUE: 29
PIF_VALUE: 28
PIF_VALUE: 28
PIF_VALUE: 29
PIF_VALUE: 28
PIF_VALUE: 29
PIF_VALUE: 27
PIF_VALUE: 29
PIF_VALUE: 31
PIF_VALUE: 28
PIF_VALUE: 27
PIF_VALUE: 27
PIF_VALUE: 29
PIF_VALUE: 28
PIF_VALUE: 9
PIF_VALUE: 29
PIF_VALUE: 22
PIF_VALUE: 29
PIF_VALUE: 26
PIF_VALUE: 29
PIF_VALUE: 29
PIF_VALUE: 28
PIF_VALUE: 23
PIF_VALUE: 28
PIF_VALUE: 32
PIF_VALUE: 27
PIF_VALUE: 29
PIF_VALUE: 29
PIF_VALUE: 27
PIF_VALUE: 31
PIF_VALUE: 29
PIF_VALUE: 28
PIF_VALUE: 28
PIF_VALUE: 32
PIF_VALUE: 27
PIF_VALUE: 32
PIF_VALUE: 28
PIF_VALUE: 34
PIF_VALUE: 32
PIF_VALUE: 30
PIF_VALUE: 28
PIF_VALUE: 30
PIF_VALUE: 27
PIF_VALUE: 31
PIF_VALUE: 30
PIF_VALUE: 29
PIF_VALUE: 28
PIF_VALUE: 30
PIF_VALUE: 30
PIF_VALUE: 32
PIF_VALUE: 28
PIF_VALUE: 28
PIF_VALUE: 29
PIF_VALUE: 27
PIF_VALUE: 30
PIF_VALUE: 5
PIF_VALUE: 30
PIF_VALUE: 30
PIF_VALUE: 26
PIF_VALUE: 30
PIF_VALUE: 29
PIF_VALUE: 27
PIF_VALUE: 29
PIF_VALUE: 21
PIF_VALUE: 29
PIF_VALUE: 27
PIF_VALUE: 28
PIF_VALUE: 29
PIF_VALUE: 36
PIF_VALUE: 28
PIF_VALUE: 29
PIF_VALUE: 30
PIF_VALUE: 31
PIF_VALUE: 29
PIF_VALUE: 27
PIF_VALUE: 29
PIF_VALUE: 28
PIF_VALUE: 31
PIF_VALUE: 29
PIF_VALUE: 29
PIF_VALUE: 30
PIF_VALUE: 30
PIF_VALUE: 28
PIF_VALUE: 29
PIF_VALUE: 27
PIF_VALUE: 29
PIF_VALUE: 29
PIF_VALUE: 31
PIF_VALUE: 30
PIF_VALUE: 30
PIF_VALUE: 10
PIF_VALUE: 27
PIF_VALUE: 28
PIF_VALUE: 27
PIF_VALUE: 30
PIF_VALUE: 31
PIF_VALUE: 28
PIF_VALUE: 28
PIF_VALUE: 27
PIF_VALUE: 31
PIF_VALUE: 27
PIF_VALUE: 29
PIF_VALUE: 30
PIF_VALUE: 28
PIF_VALUE: 29
PIF_VALUE: 31
PIF_VALUE: 29
PIF_VALUE: 28
PIF_VALUE: 29
PIF_VALUE: 27
PIF_VALUE: 27
PIF_VALUE: 29
PIF_VALUE: 28
PIF_VALUE: 30
PIF_VALUE: 27
PIF_VALUE: 29
PIF_VALUE: 32
PIF_VALUE: 27
PIF_VALUE: 29
PIF_VALUE: 30
PIF_VALUE: 29
PIF_VALUE: 26
PIF_VALUE: 27
PIF_VALUE: 29
PIF_VALUE: 28
PIF_VALUE: 29
PIF_VALUE: 27
PIF_VALUE: 27
PIF_VALUE: 29
PIF_VALUE: 26
PIF_VALUE: 31
PIF_VALUE: 30
PIF_VALUE: 27
PIF_VALUE: 29
PIF_VALUE: 30
PIF_VALUE: 31
PIF_VALUE: 28
PIF_VALUE: 30
PIF_VALUE: 22
PIF_VALUE: 29
PIF_VALUE: 28
PIF_VALUE: 30
PIF_VALUE: 29
PIF_VALUE: 32
PIF_VALUE: 29
PIF_VALUE: 28
PIF_VALUE: 27
PIF_VALUE: 27
PIF_VALUE: 28
PIF_VALUE: 28
PIF_VALUE: 31
PIF_VALUE: 30
PIF_VALUE: 28
PIF_VALUE: 30
PIF_VALUE: 27
PIF_VALUE: 29
PIF_VALUE: 28
PIF_VALUE: 29
PIF_VALUE: 27
PIF_VALUE: 30
PIF_VALUE: 27
PIF_VALUE: 27
PIF_VALUE: 24
PIF_VALUE: 30
PIF_VALUE: 27
PIF_VALUE: 27
PIF_VALUE: 26
PIF_VALUE: 29
PIF_VALUE: 7
PIF_VALUE: 30
PIF_VALUE: 27
PIF_VALUE: 28
PIF_VALUE: 29
PIF_VALUE: 27
PIF_VALUE: 6
PIF_VALUE: 25
PIF_VALUE: 30
PIF_VALUE: 32
PIF_VALUE: 29
PIF_VALUE: 30
PIF_VALUE: 27
PIF_VALUE: 32
PIF_VALUE: 28
PIF_VALUE: 31
PIF_VALUE: 27
PIF_VALUE: 28
PIF_VALUE: 29
PIF_VALUE: 27
PIF_VALUE: 29
PIF_VALUE: 27
PIF_VALUE: 30

## 2019-12-03 ASSESSMENT — PAIN SCALES - GENERAL
PAINLEVEL_OUTOF10: 0

## 2019-12-03 NOTE — PROGRESS NOTES
Nutrition Assessment    Type and Reason for Visit: Reassess    Nutrition Recommendations: Continue TPN until able to transition/tolerate TF- increase Ca in next bag. Will continue to monitor TPN/labs and care plans. Nutrition Assessment: Pt currently off unit for surgery. TPN to continue. Nutrition Risk Level: High    Nutrient Needs:  · Estimated Daily Total Kcal: 4995-9485 kcal/day   · Estimated Daily Protein (g): 110-145 g pro/day     Nutrition Diagnosis:   · Problem: Inadequate oral intake  · Etiology: related to Impaired respiratory function-inability to consume food     Signs and symptoms:  as evidenced by NPO status due to medical condition, Nutrition support - PN    Objective Information:  · Wound Type: Open Wounds  · Current Nutrition Therapies:  · Oral Diet Orders: NPO   · Parenteral Nutrition Orders:  · Type and Formula: (150 g Dex, 100 g AA)   · Lipids: 250ml, Daily  · Rate/Volume: 55 mL/hr   · Duration: Continuous  · Current PN Order Provides: 1410 kcal and 100 g pro/day   · Additional Calories: NaBicarb in D5% at 75 mL/hr =306 kcal/day   (Total Ffzbzjko=4452 kcal/day)  · Anthropometric Measures:  · Ht: 5' 9\" (175.3 cm)   · Current Body Wt: 322 lb 5 oz (146.2 kg)  · Admission Body Wt: 291 lb 0.1 oz (132 kg)  · Ideal Body Wt: 160 lb 15 oz (73 kg), % Ideal Body 181% (adm/ideal)  · BMI Classification: BMI > or equal to 40.0 Obese Class III    Nutrition Interventions:   Continue TPN until able to transition/tolerate TF.     Continued Inpatient Monitoring, Education Not Indicated    Nutrition Evaluation:   · Evaluation: Goal achieved   · Goals: Meet % of estimated nutrition needs    · Monitoring: PN Intake, PN Tolerance, Wound Healing, Skin Integrity, I&O, Weight, Pertinent Labs, Monitor Bowel Function      Electronically signed by Cheng Teixeira RD, LD on 12/3/19 at 11:56 AM    Contact Number: [phone]

## 2019-12-03 NOTE — PROGRESS NOTES
Pt arrived back from surgery bagged  Per ET tube-Pt placed on monitor- fentanyl infusion  restarted-precedex infusion restarted-ventilator

## 2019-12-03 NOTE — PROGRESS NOTES
Renal Progress Note    Patient :  Bam Taylor; 37 y.o. MRN# [id number]  Location:  [facility]/NONE  Attending:  Laura Dawkins MD  Admit Date:  11/29/2019   Hospital Day: 4      Subjective:     Patient was seen and examined. Currently on mechanical ventilation. Patient is going to OR again today for wound debridement. He made about 1.2 L of urine last 24 hours. Currently has been having around 60 to 90 cc an hour of urine output. His renal function did deteriorate to BUN of 79 and creatinine of 4.49 mg/DL. Electrolytes stable except bicarb of 18. Currently on sodium bicarbonate drip with 3 A at 75 cc an hour. Requiring pressor support x1. He received Lasix 80 mg IV yesterday. Patient's mother was present in the room today. Outpatient Medications:     No medications prior to admission.     Current Medications:     Scheduled Meds:    furosemide        [MAR Hold] sodium chloride  250 mL Intravenous Once    [MAR Hold] sodium chloride  250 mL Intravenous Once    [MAR Hold] furosemide  80 mg Intravenous Daily    [MAR Hold] sodium hypochlorite   Irrigation BID    [MAR Hold] thiamine and folic acid IVPB   Intravenous Daily    [MAR Hold] fat emulsion  250 mL Intravenous Daily    [MAR Hold] meropenem  1 g Intravenous Q12H    [MAR Hold] sodium chloride flush  10 mL Intravenous 2 times per day    [MAR Hold] heparin (porcine)  5,000 Units Subcutaneous 3 times per day    [MAR Hold] lactated ringers bolus  30 mL/kg Intravenous Once    [MAR Hold] sodium hypochlorite   Irrigation Once    [MAR Hold] chlorhexidine  15 mL Mouth/Throat BID    [MAR Hold] famotidine (PEPCID) injection  20 mg Intravenous Daily     Continuous Infusions:    PN-Adult 2-in-1 Central Line (Standard)      sodium chloride      [MAR Hold] PN-Adult 2-in-1 Central Line (Standard) 55 mL/hr at 12/02/19 1739    [MAR Hold] insulin Stopped (12/03/19 1311)    [MAR Hold] dextrose      [MAR Hold] vasopressin (Septic Shock) infusion Stopped SPECGRAV 1.015 11/29/2019    LEUKOCYTESUR NEGATIVE 11/29/2019    UROBILINOGEN Normal 11/29/2019    BILIRUBINUR NEGATIVE 11/29/2019    GLUCOSEU NEGATIVE 11/29/2019    KETUA NEGATIVE 11/29/2019    AMORPHOUS NOT REPORTED 11/29/2019     Urine Sodium:     Lab Results   Component Value Date    DONOVAN 39 11/30/2019      Urine Creatinine:     Lab Results   Component Value Date    LABCREA 116.4 11/30/2019       Radiology:     Reviewed. Assessment:     1.  DEBBY oliguric secondary to ischemic acute tubular necrosis from hypotension and sepsis - Evolving, clearances continue to decline although urine output has improved today. 2.  High anion gap metabolic acidosis secondary to acute kidney injury, lactic acidosis resolving  3.  Circulatory shock - on pressor support, secondary to sepsis from necrotizing fasciitis  4.  CKD stage III-from diabetic nephrosclerosis, baseline creatinine 1.6-1.8  5.  Necrotizing fasciitis involving buttocks and scrotum-status post wide complex debridement of gluteal/perineal region  6.  History of type 2 diabetes mellitus with diabetic foot ulcer     Plan:   1. Repeat Lasix 80 mg IV today. 2.  Wean off pressors. 3.  Continue monitor strict I's and O's and renal function. 4.  Case was discussed with patient's mother and need for possibly initiating hemodialysis by tomorrow was addressed. Risks (including SCD) versus benefits were discussed in detail and she is in agreement with getting the procedure started when required. 5.  Eagle catheter to be placed in by surgery today. 6.  If renal function deteriorates further or patient develops electrolytes imbalance along with acid-base disorder he will require hemodialysis initiation. 7.  BMP in a.m.  8.  Will follow. Case was discussed with patient's mother and trauma physicians and patient's nurse was updated. Nutrition   Please ensure that patient is on a renal diet/TF. Avoid nephrotoxic drugs/contrast exposure.     We will continue to follow along with you. Leonel Soliz MD  [facility]     This note is created with the assistance of a speech-recognition program. While intending to generate a document that actually reflects the content of the visit, no guarantees can be provided that every mistake has been identified and corrected by editing.

## 2019-12-03 NOTE — PROGRESS NOTES
[facility] - Progress Note    Today's Date and Time: 12/3/2019, 11:26 AM    Impression :   · Necrotizing fasciitis 11-29-19  · S/P perirectal I&D. Wide complex excisional debridement of gluteal and perineal areas on 11-29-19  · S/P debridement, washout of gluteal and perianal areas, diverting colostomy 12-3-19  · Hypotension requiring pressors.-Improved  · Lactic acidosis  · DM 2  · HTN  · Hx of Low LVEF (20%) as per family  · DEBBY  · Fluid overload    Recommendations:     · Meropenem 1 gm IV q 12 hr  · May require dose adjustment depending on evolution of renal insufficiency  · Wound Care as per Gen surgery. Medical Decision Making/Summary/Discussion:12/3/2019     · Patient with DM 2, prior diabetic foot infection  · Presented to [address] [place] [address] ER generalized weakness for the past few days  · Found to have soft tissue necrosis with subcutaneous emphysema in gluteal areas and perineum  · S/P I&D and wide complex debridement of affected tissues on 11-29-19  · Showing hypotension, requiring fluids and a vasopressor  · Cultures in progress  · Will cover with Zosyn. Wounds with Strep spp. And Gram negative bacilli . No Staph spp.   · Pt is a MRSA nasal carrier  · Zosyn D/C because of of poor LVEF, in order to reduce Na and fluid load  · Meropenem started adjusted for Cr Cl 30 cc  Infection Control Recommendations   · [place] Precautions  · Contact Isolation MRSA    Antimicrobial Stewardship Recommendations     · Simplification of therapy  · Targeted therapy  · PK dosing    Coordination of Outpatient Care:   · Estimated Length of IV antimicrobials: TBD  · Patient will need Midline Catheter Insertion: No  · Patient will need PICC line Insertion:Has Central line  · Patient will need: Home IV , [place],  SNF,  LTAC: TBD  · Patient will need outpatient wound care:Yes    Chief complaint/reason for consultation:   · Ashley's vs necrotizing fasciitis      History of Present Illness:   Rylee Collado is a 37y.o.-year-old  male who was initially admitted on 11/29/2019. Patient seen at the request of . INITIAL HISTORY:    Patient presented through ER in [place] with complaints of weakness of a few days duration. Examination showed skin necrosis in the gluteal and perineal region. Patient transferred to [place] [address]. CT scan showed extensive subcutaneous emphysema. Patient underwent I&D and extensive complex debridement of affected tissues on 11-29-19. Gram stain of tissues showed Strep. Spp and Gram negative bacilli. 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 He has underlying DM 2, prior diabetic foot infection, DEBBY. Patient more stable post surgery but with hypotension requiring a pressor. CURRENT EVALUATION :12/3/2019     Patient evaluated and examined in the ICU. Afebrile  VS stable, except low BP post op    On 7 mcg Levophed  Remains on Ventilator, sedated  On Bicarb drip    No new culture data: Strep and Gram negative bacilli     Surgery did debridement and diverting colostomy on 12-3-19    Labs, X rays reviewed: 12/3/2019    BUN: 59-->61-->79  Cr: 2.56-->2.77-->4.49    WBC: 4.20-->53.3-->28  Hb:9.5-->8.2-->6.8-->8.2  Plat: 642-->342    Cultures:  Urine:  ·   Blood:  ·   Sputum :  ·   Wound:  · 11-29-19: Strep ssp and Gram negative bacilli       MRSA probe: Pos    Discussed with mother, RN, CC. .  Mother indicates patient did not take care of himself. Reports low prior LVEF of 20%                I have personally reviewed the past medical history, past surgical history, medications, social history, and family history, and I have updated the database accordingly.   Past Medical History:     Past Medical History:   Diagnosis Date    CHF (congestive heart failure) ([address]     Diabetes mellitus ([address]     Hypertension     Spina bifida aperta of lumbar spine ([address]        Past Surgical  History:     Past Surgical History:   Procedure Laterality Date    INCISION AND DRAINAGE Bilateral 11/29/2019    RECTAL PERIRECTAL INCISION AND DRAINAGE, DIVERING LOOP COLOSTOMY CREATION performed by Daryle Crisp, MD at [place] [address]       Medications:      furosemide        sodium chloride  250 mL Intravenous Once    sodium chloride  250 mL Intravenous Once    furosemide  80 mg Intravenous Daily    sodium hypochlorite   Irrigation BID    thiamine and folic acid IVPB   Intravenous Daily    fat emulsion  250 mL Intravenous Daily    meropenem  1 g Intravenous Q12H    sodium chloride flush  10 mL Intravenous 2 times per day    heparin (porcine)  5,000 Units Subcutaneous 3 times per day    lactated ringers bolus  30 mL/kg Intravenous Once    sodium hypochlorite   Irrigation Once    chlorhexidine  15 mL Mouth/Throat BID    famotidine (PEPCID) injection  20 mg Intravenous Daily       Social History:     Social History     Socioeconomic History    Marital status: Unknown     Spouse name: Not on file    Number of children: Not on file    Years of education: Not on file    Highest education level: Not on file   Occupational History    Not on file   Social Needs    Financial resource strain: Not on file    Food insecurity:     Worry: Not on file     Inability: Not on file    Transportation needs:     Medical: Not on file     Non-medical: Not on file   Tobacco Use    Smoking status: Not on file   Substance and Sexual Activity    Alcohol use: Not on file    Drug use: Not on file    Sexual activity: Not on file   Lifestyle    Physical activity:     Days per week: Not on file     Minutes per session: Not on file    Stress: Not on file   Relationships    Social connections:     Talks on phone: Not on file     Gets together: Not on file     Attends Scientologist service: Not on file     Active member of club or organization: Not on file     Attends meetings of clubs or organizations: Not on file     Relationship status: Not on file  Intimate partner violence:     Fear of current or ex partner: Not on file     Emotionally abused: Not on file     Physically abused: Not on file     Forced sexual activity: Not on file   Other Topics Concern    Not on file   Social History Narrative    Not on file       Family History:   No family history on file. Allergies:   Patient has no known allergies. Review of Systems:   Constitutional: No fevers or chills. Generalized weakness  Head: No headaches  Eyes: No double vision or blurry vision. No conjunctival inflammation. ENT: No sore throat or runny nose. . No hearing loss, tinnitus or vertigo. Cardiovascular: No chest pain or palpitations. No shortness of breath. No GORDILLO  Lung: No shortness of breath or cough. No sputum production  Abdomen: No nausea, vomiting, diarrhea, or abdominal pain. Akira Brooking No cramps. Genitourinary: No increased urinary frequency, or dysuria. No hematuria. No suprapubic or CVA pain  Musculoskeletal: No muscle aches or pains. No joint effusions, swelling or deformities. Lt diabetic foot   Hematologic: No bleeding or bruising. Neurologic: No headache, weakness, numbness, or tingling. Spina bifida  Integument: Gangrene of perineum and gluteal areas  Psychiatric: No depression. Endocrine: No polyuria, no polydipsia, no polyphagia.     Physical Examination :     Patient Vitals for the past 8 hrs:   BP Temp Temp src Pulse Resp SpO2 Weight   12/03/19 0900 (!) 153/95 -- -- 107 16 99 % --   12/03/19 0857 -- -- -- 110 22 100 % --   12/03/19 0849 -- -- -- -- 16 97 % --   12/03/19 0847 (!) 155/86 -- -- 108 23 98 % --   12/03/19 0800 -- -- -- 69 26 100 % --   12/03/19 0750 -- -- -- 69 26 100 % --   12/03/19 0745 (!) 104/58 100 °F (37.8 °C) Axillary 69 26 100 % --   12/03/19 0701 -- -- -- 72 26 100 % --   12/03/19 0600 (!) 128/90 -- -- 78 25 99 % --   12/03/19 0500 124/67 -- -- 76 26 98 % --   12/03/19 0400 127/77 100.1 °F (37.8 °C) Axillary 76 24 100 % (!) 322 lb 5 oz (146.2 kg)   12/03/19 results for input(s): BC in the last 72 hours. Lab Results   Component Value Date    MUCUS NOT REPORTED 11/29/2019    RBC 3.24 12/03/2019    TRICHOMONAS NOT REPORTED 11/29/2019    WBC 28.0 12/03/2019    YEAST NOT REPORTED 11/29/2019    TURBIDITY TURBID 11/29/2019     Lab Results   Component Value Date    CREATININE 4.49 12/03/2019    GLUCOSE 159 12/03/2019       Medical Decision Making-Imaging:     EXAMINATION:   ONE XRAY VIEW OF THE CHEST       11/29/2019 9:01 pm       COMPARISON:   4 hours ago       HISTORY:   ORDERING SYSTEM PROVIDED HISTORY: intubated   TECHNOLOGIST PROVIDED HISTORY:   intubated   Reason for Exam: portable supine/ intubated   Acuity: Acute   Type of Exam: Initial       FINDINGS:   New ET tube terminates 38 mm above the ron.  There appears to be a   possible enteric tube which is not well visualized distally.  Right IJ   catheter terminates in the right atrium and is unchanged.  Lungs are clear. Cardiomegaly.  Mediastinum normal.  Bony thorax intact.           Impression   New ET tube as above.  Possible new enteric tube not well visualized. Recommend follow-up KUB if clinically warranted. CT ABDOMEN AND PELVIS WITHOUT CONTRAST    COMPARISON:  3/22/2017    CLINICAL HISTORY: Infection in scrotum, lower abdominal pain, diabetic, 30,000 white blood cell count. TECHNIQUE: Unenhanced axial images were obtained from the lung bases to the pubic symphysis with sagittal and coronal 2D reformatted images. Oral contrast administered:  No.  Automatic exposure control (AEC) was utilized. CT ABDOMEN FINDINGS:      The lung bases are unremarkable. There is a small pericardial effusion versus thickening. The liver, spleen, and pancreas demonstrate no acute abnormality given compromised evaluation without intravenous contrast.  There may be mild splenomegaly.  The adrenal glands appear within normal limits. There is no hydronephrosis or obstructing urinary tract calculi.   Small amount of free fluid is seen adjacent to the liver inferiorly. .    The appendix is visualized in the right lower quadrant and appears within normal limits.       There is no evidence for bowel obstruction. Stranding is seen within the subcutaneous fat overlying both lateral abdominal walls left greater than right.  There is ill-defined fluid collection within the subcutaneous fat overlying the left lateral abdominal wall possibly representing phlegmon or developing abscess although no organized   abscess is seen. There is a large amount of soft tissue emphysema involving both the right and left buttock extending to the perineum tissue thickening is seen especially on the left involving the left buttock. CT PELVIS FINDINGS:        No distal ureteral calculi or bladder calculi. There is no free fluid in the pelvis. Catheter is seen within the urinary bladder. Osseous changes within the left hemipelvis which may be chronic in nature. IMPRESSION:    Extensive subcutaneous emphysema as described above involving both buttocks extending to the perineum.  The possibility of Ashley gangrene/necrotizing fasciitis cannot be excluded. Possible phlegmon or developing soft tissue abscess overlying the left lateral abdominal wall as noted above.  No organized abscess is seen however. Osseous changes within the left hemipelvis which may be chronic in nature. Results the study were called to Dr. Haley Reap [phone] on 11/29/2019  All CT scans at this facility use dose modulation, iterative reconstruction, and/or weight based dosing when appropriate to reduce radiation dose to as low as reasonably achievable. Medical Decision [id number]:   11/29/2019  8:17 PM - [facility] Incoming Lab Results From [facility]     Specimen Information: Buttock; Tissue        Component Collected Lab   Specimen Description 11/29/2019  7:34  [address]   . BUTTOCK BILATERAL TS    Special Requests 11/29/2019  7:34 PM [employer]   NOT REPORTED    Direct Exam 11/29/2019  7:34 PM [employer]   NO NEUTROPHILS SEEN    Direct Exam Abnormal  11/29/2019  7:34  [facility] [place]    Direct Exam Abnormal  11/29/2019  7:34 PM [address] NEGATIVE RODS    Culture 11/29/2019  7:34 PM [employer]   PENDING          Medical Decision Making-Other:     Note:  · Labs, medications, radiologic studies were reviewed with personal review of films  · Large amounts of data were reviewed  · Discussed with nursing Staff, Discharge planner  · Infection Control and Prevention measures reviewed  · All prior entries were reviewed  · Administer medications as ordered  · Prognosis: Guarded  · Discharge planning reviewed  · Follow up as outpatient. Thank you for allowing us to participate in the care of this patient. Please call with questions.     Giuliano Barnard MD  Pager: [phone] - Office: [phone]

## 2019-12-03 NOTE — PROGRESS NOTES
Intake/Output Summary (Last 24 hours) at 12/3/2019 0909  Last data filed at 12/3/2019 0800  Gross per 24 hour   Intake 3584.25 ml   Output 1755 ml   Net 1829.25 ml     Wt Readings from Last 2 Encounters:   12/03/19 (!) 322 lb 5 oz (146.2 kg)     Body mass index is 47.6 kg/m². PHYSICAL EXAMINATION:    General appearance -intubated and sedated, NAD  Mental status -intubated and sedated, not following commands  Chest -bilateral breath sounds heard, no wheezing, on vent  Heart -normal rate, regular rhythm, normal S1 and S2  Abdomen - soft, nondistended, no rash  Neurological - intubated and sedated, not following commands  Extremities - weak pulses in extremities, Dopplerable RLE pulse  Skinpost debridement of gluteal and perianal region. Full-thickness ulcer in the left plantar surface.  See media for post op wounds debridement     Any additional physical findings:    MEDICATIONS:    Scheduled Meds:   furosemide        sodium chloride  250 mL Intravenous Once    sodium chloride  250 mL Intravenous Once    furosemide  80 mg Intravenous Daily    sodium hypochlorite   Irrigation BID    thiamine and folic acid IVPB   Intravenous Daily    fat emulsion  250 mL Intravenous Daily    meropenem  1 g Intravenous Q12H    sodium chloride flush  10 mL Intravenous 2 times per day    heparin (porcine)  5,000 Units Subcutaneous 3 times per day    lactated ringers bolus  30 mL/kg Intravenous Once    sodium hypochlorite   Irrigation Once    chlorhexidine  15 mL Mouth/Throat BID    famotidine (PEPCID) injection  20 mg Intravenous Daily     Continuous Infusions:   PN-Adult 2-in-1 Central Line (Standard) 55 mL/hr at 12/02/19 1739    insulin 4.7 Units/hr (12/03/19 0420)    dextrose      vasopressin (Septic Shock) infusion Stopped (12/02/19 0307)    dexmedetomidine (PRECEDEX) IV infusion 1 mcg/kg/hr (12/03/19 0843)    IV infusion builder 75 mL/hr at 12/02/19 1900    sodium chloride 10 mL/hr at 12/01/19 0457  dextrose      norepinephrine (LEVOPHED) infusion (double concentration) Stopped (12/02/19 1900)    fentaNYL 200 mcg/hr (12/03/19 0442)    dextrose       PRN Meds:   acetaminophen, 650 mg, Q4H PRN  glucose, 15 g, PRN  dextrose, 12.5 g, PRN  glucagon (rDNA), 1 mg, PRN  dextrose, 100 mL/hr, PRN  acetaminophen, 650 mg, Q4H PRN  REFRESH LACRI-LUBE, , PRN  povidone-iodine, , PRN  sodium chloride flush, 10 mL, PRN  magnesium hydroxide, 30 mL, Daily PRN  ondansetron, 4 mg, Q6H PRN  glucose, 15 g, PRN  dextrose, 12.5 g, PRN  glucagon (rDNA), 1 mg, PRN  dextrose, 100 mL/hr, PRN  fentanNYL, 25 mcg, Q1H PRN  glucose, 15 g, PRN  dextrose, 12.5 g, PRN  glucagon (rDNA), 1 mg, PRN  dextrose, 100 mL/hr, PRN          VENT SETTINGS (Comprehensive) (if applicable):  Vent Information  $Ventilation: $Subsequent Day  Ventilator Started: Yes  Skin Assessment: Clean, dry, & intact  Equipment Changed: HME  Vent Type: Servo i  Vent Mode: PRVC  Vt Ordered: 530 mL  Rate Set: 26 bmp  FiO2 : 30 %  Sensitivity: 5  PEEP/CPAP: 5  I Time/ I Time %: 0.8 s  Humidification Source: HME  Additional Respiratory  Assessments  Pulse: 110  Resp: 22  SpO2: 100 %  End Tidal CO2: 33 (%)  Position: Semi-Pagan's  Humidification Source: HME  Oral Care Completed?: Yes  Oral Care: Mouthwash, Mouth moisturizer, Mouth suctioned  Subglottic Suction Done?: Yes    ABGs:  Arterial Blood Gas result:  pO2 107.4; pCO2 32.9; pH 7.29;  HCO3 16, %O2 Sat 98.       Laboratory findings:    Complete Blood Count:   Recent Labs     12/01/19  0518 12/02/19  0431 12/02/19 2141 12/03/19 0415   WBC 31.2* 25.4*  --  28.0*   HGB 6.3* 6.8* 7.2* 7.7*   HCT 21.7* 23.0* 23.5* 26.6*    342  --  285        Last 3 Blood Glucose:   Recent Labs     12/01/19  1746 12/02/19  0431 12/03/19  0415   GLUCOSE 155* 195* 159*        PT/INR:    Lab Results   Component Value Date    PROTIME 11.9 11/29/2019    INR 1.1 11/29/2019     PTT:  No results found for: APTT, PTT    Comprehensive -Urine culture no growth  -Nephrology recs:  Continue IV fluids with bicarb  Renal ultrasound: Right renal cyst, otherwise grossly negative renal   Ultrasound, non diagnostic bladder assessment. 4. Diabetes - insulin gtt if cannot maintain glucose<200       5. Diabetic foot ulcer -dietary on board. Wound care. No surgical intervention for now. \    6. Anemia: Hgb (7.7), Received  3uPRBC       1. Feeding Diet: START TPN  2. Fluids 0.9% NaCl at 97AV/JF, folic acid and thiamine  3. Family updated  4. Analgesic fentanyl drip  5. Sedation fentanyl, precedex  6. Thrombo-prophylaxis [] Enoxaparin  [x] Unfract. Heparin Subcut  [] EPC Cuffs  7. Mobility bed rest  8. Heads up yes   9. Ulcer prophylaxis [] PPI Agent  [x] [id number] [] Sucralfate  [] Other:  10. Glycemic control: insulin gtt, glucose in 300s  11. Spontaneous breathing trial not today  12. Bowel regimen/urine output: MoM,   13. Indwelling catheter/lines CVC RIJ, OG, Ambrosio  14.  De-escalation: K4.3: , Cr inc to 4.49<---(3.83), liver enzymes:       Bianca Brian M.D             Critical care resident,  Department of Internal Medicine/ Critical care  [facility])             12/3/2019, 9:09 AM

## 2019-12-03 NOTE — PROGRESS NOTES
medical history and physical exam. I have seen and examined the pt. I have discussed the findings, established the care plan and recommendations with Resident, GCS RN, bedside nurse. I personally reviewed any images in real time to expedite the patient's care. D/w nephrology at bedside that would not recommend placement of dialysis line in OR as requested by team, as it is a colon and infected wound case. Dr. Edilberto Weeks agreed not an emergency and can be placed after case or tomorrow morning.     Mei Gomez MD  12/3/2019  11:44 PM

## 2019-12-04 ENCOUNTER — APPOINTMENT (OUTPATIENT)
Dept: GENERAL RADIOLOGY | Age: 34
DRG: 463 | End: 2019-12-04
Attending: INTERNAL MEDICINE
Payer: MEDICARE

## 2019-12-04 LAB
ABSOLUTE EOS #: 0 K/UL (ref 0–0.4)
ABSOLUTE IMMATURE GRANULOCYTE: 0 K/UL (ref 0–0.3)
ABSOLUTE LYMPH #: 0.94 K/UL (ref 1–4.8)
ABSOLUTE MONO #: 0.31 K/UL (ref 0.1–0.8)
ALBUMIN SERPL-MCNC: 1.2 G/DL (ref 3.5–5.2)
ALLEN TEST: ABNORMAL
ANION GAP SERPL CALCULATED.3IONS-SCNC: 16 MMOL/L (ref 9–17)
BASOPHILS # BLD: 0 % (ref 0–2)
BASOPHILS ABSOLUTE: 0 K/UL (ref 0–0.2)
BUN BLDV-MCNC: 88 MG/DL (ref 6–20)
BUN/CREAT BLD: ABNORMAL (ref 9–20)
CALCIUM SERPL-MCNC: 6.6 MG/DL (ref 8.6–10.4)
CHLORIDE BLD-SCNC: 102 MMOL/L (ref 98–107)
CO2: 19 MMOL/L (ref 20–31)
CREAT SERPL-MCNC: 4.69 MG/DL (ref 0.7–1.2)
DIFFERENTIAL TYPE: ABNORMAL
EOSINOPHILS RELATIVE PERCENT: 0 % (ref 1–4)
FIO2: ABNORMAL
GFR AFRICAN AMERICAN: 17 ML/MIN
GFR NON-AFRICAN AMERICAN: 14 ML/MIN
GFR SERPL CREATININE-BSD FRML MDRD: ABNORMAL ML/MIN/{1.73_M2}
GFR SERPL CREATININE-BSD FRML MDRD: ABNORMAL ML/MIN/{1.73_M2}
GLUCOSE BLD-MCNC: 132 MG/DL (ref 75–110)
GLUCOSE BLD-MCNC: 144 MG/DL (ref 75–110)
GLUCOSE BLD-MCNC: 144 MG/DL (ref 75–110)
GLUCOSE BLD-MCNC: 146 MG/DL (ref 75–110)
GLUCOSE BLD-MCNC: 153 MG/DL (ref 75–110)
GLUCOSE BLD-MCNC: 156 MG/DL (ref 75–110)
GLUCOSE BLD-MCNC: 156 MG/DL (ref 75–110)
GLUCOSE BLD-MCNC: 161 MG/DL (ref 75–110)
GLUCOSE BLD-MCNC: 163 MG/DL (ref 75–110)
GLUCOSE BLD-MCNC: 164 MG/DL (ref 75–110)
GLUCOSE BLD-MCNC: 164 MG/DL (ref 75–110)
GLUCOSE BLD-MCNC: 166 MG/DL (ref 75–110)
GLUCOSE BLD-MCNC: 168 MG/DL (ref 75–110)
GLUCOSE BLD-MCNC: 169 MG/DL (ref 75–110)
GLUCOSE BLD-MCNC: 176 MG/DL (ref 75–110)
GLUCOSE BLD-MCNC: 186 MG/DL (ref 70–99)
HCT VFR BLD CALC: 24 % (ref 40.7–50.3)
HCT VFR BLD CALC: 25.9 % (ref 40.7–50.3)
HEMOGLOBIN: 7 G/DL (ref 13–17)
HEMOGLOBIN: 7.7 G/DL (ref 13–17)
IMMATURE GRANULOCYTES: 0 %
LYMPHOCYTES # BLD: 3 % (ref 24–44)
MCH RBC QN AUTO: 24.2 PG (ref 25.2–33.5)
MCHC RBC AUTO-ENTMCNC: 29.7 G/DL (ref 28.4–34.8)
MCV RBC AUTO: 81.4 FL (ref 82.6–102.9)
MODE: ABNORMAL
MONOCYTES # BLD: 1 % (ref 1–7)
MORPHOLOGY: ABNORMAL
NEGATIVE BASE EXCESS, ART: 2 (ref 0–2)
NRBC AUTOMATED: 0.2 PER 100 WBC
O2 DEVICE/FLOW/%: ABNORMAL
PATIENT TEMP: ABNORMAL
PDW BLD-RTO: 19.8 % (ref 11.8–14.4)
PLATELET # BLD: 359 K/UL (ref 138–453)
PLATELET ESTIMATE: ABNORMAL
PMV BLD AUTO: 9.9 FL (ref 8.1–13.5)
POC HCO3: 21.7 MMOL/L (ref 21–28)
POC O2 SATURATION: 98 % (ref 94–98)
POC PCO2 TEMP: ABNORMAL MM HG
POC PCO2: 33.6 MM HG (ref 35–48)
POC PH TEMP: ABNORMAL
POC PH: 7.42 (ref 7.35–7.45)
POC PO2 TEMP: ABNORMAL MM HG
POC PO2: 102.1 MM HG (ref 83–108)
POSITIVE BASE EXCESS, ART: ABNORMAL (ref 0–3)
POTASSIUM SERPL-SCNC: 4.4 MMOL/L (ref 3.7–5.3)
RBC # BLD: 3.18 M/UL (ref 4.21–5.77)
RBC # BLD: ABNORMAL 10*6/UL
SAMPLE SITE: ABNORMAL
SEG NEUTROPHILS: 96 % (ref 36–66)
SEGMENTED NEUTROPHILS ABSOLUTE COUNT: 29.95 K/UL (ref 1.8–7.7)
SODIUM BLD-SCNC: 137 MMOL/L (ref 135–144)
TCO2 (CALC), ART: 23 MMOL/L (ref 22–29)
WBC # BLD: 31.2 K/UL (ref 3.5–11.3)
WBC # BLD: ABNORMAL 10*3/UL

## 2019-12-04 PROCEDURE — 82947 ASSAY GLUCOSE BLOOD QUANT: CPT

## 2019-12-04 PROCEDURE — 6370000000 HC RX 637 (ALT 250 FOR IP): Performed by: INTERNAL MEDICINE

## 2019-12-04 PROCEDURE — 6360000002 HC RX W HCPCS: Performed by: STUDENT IN AN ORGANIZED HEALTH CARE EDUCATION/TRAINING PROGRAM

## 2019-12-04 PROCEDURE — 80048 BASIC METABOLIC PNL TOTAL CA: CPT

## 2019-12-04 PROCEDURE — 94770 HC ETCO2 MONITOR DAILY: CPT

## 2019-12-04 PROCEDURE — 6370000000 HC RX 637 (ALT 250 FOR IP): Performed by: STUDENT IN AN ORGANIZED HEALTH CARE EDUCATION/TRAINING PROGRAM

## 2019-12-04 PROCEDURE — 2500000003 HC RX 250 WO HCPCS: Performed by: STUDENT IN AN ORGANIZED HEALTH CARE EDUCATION/TRAINING PROGRAM

## 2019-12-04 PROCEDURE — 85014 HEMATOCRIT: CPT

## 2019-12-04 PROCEDURE — 85025 COMPLETE CBC W/AUTO DIFF WBC: CPT

## 2019-12-04 PROCEDURE — 2580000003 HC RX 258: Performed by: STUDENT IN AN ORGANIZED HEALTH CARE EDUCATION/TRAINING PROGRAM

## 2019-12-04 PROCEDURE — 36415 COLL VENOUS BLD VENIPUNCTURE: CPT

## 2019-12-04 PROCEDURE — 94003 VENT MGMT INPAT SUBQ DAY: CPT

## 2019-12-04 PROCEDURE — 76937 US GUIDE VASCULAR ACCESS: CPT

## 2019-12-04 PROCEDURE — 71045 X-RAY EXAM CHEST 1 VIEW: CPT

## 2019-12-04 PROCEDURE — 99233 SBSQ HOSP IP/OBS HIGH 50: CPT | Performed by: INTERNAL MEDICINE

## 2019-12-04 PROCEDURE — 85018 HEMOGLOBIN: CPT

## 2019-12-04 PROCEDURE — 82803 BLOOD GASES ANY COMBINATION: CPT

## 2019-12-04 PROCEDURE — 02HV33Z INSERTION OF INFUSION DEVICE INTO SUPERIOR VENA CAVA, PERCUTANEOUS APPROACH: ICD-10-PCS | Performed by: INTERNAL MEDICINE

## 2019-12-04 PROCEDURE — 2580000003 HC RX 258

## 2019-12-04 PROCEDURE — 2000000000 HC ICU R&B

## 2019-12-04 PROCEDURE — 2700000000 HC OXYGEN THERAPY PER DAY

## 2019-12-04 PROCEDURE — 94761 N-INVAS EAR/PLS OXIMETRY MLT: CPT

## 2019-12-04 PROCEDURE — 37799 UNLISTED PX VASCULAR SURGERY: CPT

## 2019-12-04 PROCEDURE — 99291 CRITICAL CARE FIRST HOUR: CPT | Performed by: INTERNAL MEDICINE

## 2019-12-04 PROCEDURE — 82040 ASSAY OF SERUM ALBUMIN: CPT

## 2019-12-04 RX ORDER — MAGNESIUM HYDROXIDE 1200 MG/15ML
LIQUID ORAL
Status: COMPLETED
Start: 2019-12-04 | End: 2019-12-04

## 2019-12-04 RX ORDER — HEPARIN SODIUM 1000 [USP'U]/ML
1600 INJECTION, SOLUTION INTRAVENOUS; SUBCUTANEOUS PRN
Status: DISCONTINUED | OUTPATIENT
Start: 2019-12-04 | End: 2019-12-15

## 2019-12-04 RX ORDER — HEPARIN SODIUM 1000 [USP'U]/ML
1500 INJECTION, SOLUTION INTRAVENOUS; SUBCUTANEOUS PRN
Status: DISCONTINUED | OUTPATIENT
Start: 2019-12-04 | End: 2019-12-15

## 2019-12-04 RX ORDER — MIDODRINE HYDROCHLORIDE 5 MG/1
5 TABLET ORAL
Status: DISCONTINUED | OUTPATIENT
Start: 2019-12-04 | End: 2019-12-06

## 2019-12-04 RX ADMIN — Medication 15 ML: at 23:09

## 2019-12-04 RX ADMIN — CALCIUM GLUCONATE: 98 INJECTION, SOLUTION INTRAVENOUS at 18:44

## 2019-12-04 RX ADMIN — SODIUM CHLORIDE 2.16 UNITS/HR: 9 INJECTION, SOLUTION INTRAVENOUS at 02:18

## 2019-12-04 RX ADMIN — FOLIC ACID: 5 INJECTION, SOLUTION INTRAMUSCULAR; INTRAVENOUS; SUBCUTANEOUS at 08:58

## 2019-12-04 RX ADMIN — HEPARIN SODIUM 1500 UNITS: 1000 INJECTION INTRAVENOUS; SUBCUTANEOUS at 16:41

## 2019-12-04 RX ADMIN — Medication 2 MG/HR: at 10:07

## 2019-12-04 RX ADMIN — NOREPINEPHRINE BITARTRATE 2 MCG/MIN: 1 INJECTION, SOLUTION, CONCENTRATE INTRAVENOUS at 10:48

## 2019-12-04 RX ADMIN — FAMOTIDINE 20 MG: 10 INJECTION, SOLUTION INTRAVENOUS at 08:36

## 2019-12-04 RX ADMIN — HEPARIN SODIUM 5000 UNITS: 5000 INJECTION INTRAVENOUS; SUBCUTANEOUS at 22:48

## 2019-12-04 RX ADMIN — HEPARIN SODIUM 1600 UNITS: 1000 INJECTION INTRAVENOUS; SUBCUTANEOUS at 16:42

## 2019-12-04 RX ADMIN — MIDODRINE HYDROCHLORIDE 5 MG: 5 TABLET ORAL at 14:22

## 2019-12-04 RX ADMIN — MEROPENEM 1 G: 1 INJECTION, POWDER, FOR SOLUTION INTRAVENOUS at 22:50

## 2019-12-04 RX ADMIN — MIDODRINE HYDROCHLORIDE 5 MG: 5 TABLET ORAL at 17:00

## 2019-12-04 RX ADMIN — DEXMEDETOMIDINE HYDROCHLORIDE 1.4 MCG/KG/HR: 100 INJECTION, SOLUTION INTRAVENOUS at 06:58

## 2019-12-04 RX ADMIN — I.V. FAT EMULSION 250 ML: 20 EMULSION INTRAVENOUS at 18:44

## 2019-12-04 RX ADMIN — DEXMEDETOMIDINE HYDROCHLORIDE 1.1 MCG/KG/HR: 100 INJECTION, SOLUTION INTRAVENOUS at 09:25

## 2019-12-04 RX ADMIN — SODIUM CHLORIDE: 900 IRRIGANT IRRIGATION at 20:00

## 2019-12-04 RX ADMIN — MEROPENEM 1 G: 1 INJECTION, POWDER, FOR SOLUTION INTRAVENOUS at 09:40

## 2019-12-04 RX ADMIN — Medication 200 MCG/HR: at 14:37

## 2019-12-04 RX ADMIN — HEPARIN SODIUM 5000 UNITS: 5000 INJECTION INTRAVENOUS; SUBCUTANEOUS at 14:36

## 2019-12-04 RX ADMIN — HEPARIN SODIUM 1600 UNITS: 1000 INJECTION INTRAVENOUS; SUBCUTANEOUS at 13:41

## 2019-12-04 RX ADMIN — HEPARIN SODIUM 1500 UNITS: 1000 INJECTION INTRAVENOUS; SUBCUTANEOUS at 13:40

## 2019-12-04 RX ADMIN — Medication 200 MCG/HR: at 19:22

## 2019-12-04 RX ADMIN — DEXMEDETOMIDINE HYDROCHLORIDE 1.4 MCG/KG/HR: 100 INJECTION, SOLUTION INTRAVENOUS at 02:03

## 2019-12-04 RX ADMIN — FUROSEMIDE 80 MG: 10 INJECTION, SOLUTION INTRAMUSCULAR; INTRAVENOUS at 08:57

## 2019-12-04 RX ADMIN — Medication 200 MCG/HR: at 04:44

## 2019-12-04 RX ADMIN — DEXMEDETOMIDINE HYDROCHLORIDE 1.4 MCG/KG/HR: 100 INJECTION, SOLUTION INTRAVENOUS at 04:29

## 2019-12-04 RX ADMIN — Medication 200 MCG/HR: at 23:52

## 2019-12-04 RX ADMIN — Medication 200 MCG/HR: at 09:38

## 2019-12-04 RX ADMIN — Medication: at 15:35

## 2019-12-04 RX ADMIN — DAKIN'S SOLUTION 0.125% (QUARTER STRENGTH): 0.12 SOLUTION at 20:00

## 2019-12-04 ASSESSMENT — PULMONARY FUNCTION TESTS
PIF_VALUE: 19
PIF_VALUE: 21
PIF_VALUE: 19
PIF_VALUE: 22
PIF_VALUE: 22
PIF_VALUE: 19
PIF_VALUE: 21

## 2019-12-04 ASSESSMENT — PAIN SCALES - GENERAL
PAINLEVEL_OUTOF10: 0
PAINLEVEL_OUTOF10: 0

## 2019-12-04 NOTE — CARE COORDINATION
Patient remains intubated, dialysis to start. Expect LTACH need. Will call patient's mother to discuss.

## 2019-12-04 NOTE — PROGRESS NOTES
Arterial line removed: Unable to draw blood and monitor pressure per RN. Removed from left radial without incident. Pressure held on site x5 minutes and dressing applied.

## 2019-12-04 NOTE — PROGRESS NOTES
Renal Progress Note    Patient :  Chuy Rivers; 37 y.o. MRN# [id number]  Location:  0128/0128-01  Attending:  Jeremy Ricks MD  Admit Date:  11/29/2019   Hospital Day: 5      Subjective:     Patient was seen and examined. Currently on mechanical ventilation. S/P OR again yesterday 12/3/19 for wound debridement. Laparoscopic procedure was converted to open colostomy. He made about 970 ml of urine last 24 hours. He is about 18 L positive overall since admission. Urine output started to decrease early morning today and he received Lasix 80 mg IV today morning which showed some improvement but still is quite fluid overloaded. Domitila Butts His renal function did deteriorate further with BUN of 88 and creatinine of 4.69 mg/DL. Acidosis with bicarb of 19. Currently on sodium bicarbonate drip with 3 amps at 75 cc an hour. Requiring pressor support x1. Started on TPN. Could not get acetate added as there is a shortage of sodium acetate currently. Patient's mother was present in the room today. Outpatient Medications:     No medications prior to admission.     Current Medications:     Scheduled Meds:    sodium chloride  250 mL Intravenous Once    sodium chloride  250 mL Intravenous Once    furosemide  80 mg Intravenous Daily    sodium hypochlorite   Irrigation BID    thiamine and folic acid IVPB   Intravenous Daily    fat emulsion  250 mL Intravenous Daily    meropenem  1 g Intravenous Q12H    sodium chloride flush  10 mL Intravenous 2 times per day    heparin (porcine)  5,000 Units Subcutaneous 3 times per day    lactated ringers bolus  30 mL/kg Intravenous Once    sodium hypochlorite   Irrigation Once    chlorhexidine  15 mL Mouth/Throat BID    famotidine (PEPCID) injection  20 mg Intravenous Daily     Continuous Infusions:    midazolam 2 mg/hr (12/04/19 1240)    PN-Adult 2-in-1 Central Line (Standard) 55 mL/hr at 12/03/19 2242    insulin 3.48 Units/hr (12/04/19 1200)    dextrose      vasopressin (Septic Shock) infusion Stopped (19 0307)    dexmedetomidine (PRECEDEX) IV infusion 0.5 mcg/kg/hr (19 1220)    IV infusion builder 75 mL/hr (19 1104)    sodium chloride 10 mL/hr at 19 0757    dextrose      norepinephrine (LEVOPHED) infusion (double concentration) Stopped (19 1207)    fentaNYL 200 mcg/hr (19 0938)    dextrose       PRN Meds:  acetaminophen, glucose, dextrose, glucagon (rDNA), dextrose, acetaminophen, REFRESH LACRI-LUBE, povidone-iodine, sodium chloride flush, magnesium hydroxide, ondansetron, glucose, dextrose, glucagon (rDNA), dextrose, fentanNYL, glucose, dextrose, glucagon (rDNA), dextrose    Input/Output:       I/O last 3 completed shifts: In: 5133.7 [I.V.:4779.7]  Out: 1430 [Urine:970; Emesis/NG output:200; Drains:220; Blood:40]. Patient Vitals for the past 96 hrs (Last 3 readings):   Weight   19 0100 (!) 323 lb 13.7 oz (146.9 kg)   19 0400 (!) 322 lb 5 oz (146.2 kg)   19 0600 (!) 324 lb 1.2 oz (147 kg)       Vital Signs:   Temperature:  Temp: 98.8 °F (37.1 °C)  TMax:   Temp (24hrs), Av.6 °F (37.6 °C), Min:98.2 °F (36.8 °C), Max:99.9 °F (37.7 °C)    Respirations:  Resp: 12  Pulse:   Pulse: 97  BP:    BP: 120/62  BP Range: Systolic (20HEI), BQF:174 , Min:94 , FIH:484       Diastolic (88MGJ), THW:60, Min:50, Max:120      Physical Examination:     General:  On mechanical ventilation, sedated   HEENT: Atraumatic, normocephalic, no throat congestion, moist mucosa. Eyes:   Pupils equal, round and reactive to light. Neck:   Supple  Chest:   Bilateral vesicular breath sounds, no rales or wheezes. Cardiac:  S1 S2 RR, no murmurs, gallops or rubs. Abdomen: Soft, positive mid abdomen surgical wounds with wound VAC in place, BS could not assess. :   No suprapubic or flank tenderness. Neuro: On mechanical ventilation, sedated   SKIN:  No rashes, good skin turgor.   Extremities:  Positive fluid overload, edema upper extremity and lower acuity were 2-3+. Labs:       Recent Labs     12/02/19  0431  12/03/19  0415 12/03/19  1354 12/04/19  0420   WBC 25.4*  --  28.0*  --  31.2*   RBC 2.90*  --  3.24*  --  3.18*   HGB 6.8*   < > 7.7* 8.2 7.7*   HCT 23.0*   < > 26.6* 25.6 25.9*   MCV 79.3*  --  82.1*  --  81.4*   MCH 23.4*  --  23.8*  --  24.2*   MCHC 29.6  --  28.9  --  29.7   RDW 20.1*  --  19.7*  --  19.8*     --  285  --  359   MPV 9.2  --  9.6  --  9.9    < > = values in this interval not displayed. BMP:   Recent Labs     12/02/19 0431 12/03/19 0415 12/03/19  1354 12/04/19  0420   * 137 137 137   K 4.6 4.3 4.1 4.4    104  --  102   CO2 17* 18*  --  19*   BUN 70* 79*  --  88*   CREATININE 4.20* 4.49*  --  4.69*   GLUCOSE 195* 159*  --  186*   CALCIUM 6.5* 6.8*  --  6.6*      MARINE:      Lab Results   Component Value Date    MARINE NEGATIVE 11/30/2019     SPEP:  Lab Results   Component Value Date    PROT 5.3 12/01/2019    PATH ELECTRONICALLY SIGNED.  Julissa James M.D. 11/30/2019     C3:     Lab Results   Component Value Date    C3 97 11/30/2019     C4:     Lab Results   Component Value Date    C4 12 11/30/2019     Hep BsAg:         Lab Results   Component Value Date    HEPBSAG NONREACTIVE 11/30/2019     Hep C AB:          Lab Results   Component Value Date    HEPCAB NONREACTIVE 11/30/2019       Urinalysis/Chemistries:      Lab Results   Component Value Date    NITRU NEGATIVE 11/29/2019    COLORU YELLOW 11/29/2019    PHUR 5.0 11/29/2019    WBCUA 5 TO 10 11/29/2019    RBCUA 0 TO 2 11/29/2019    MUCUS NOT REPORTED 11/29/2019    TRICHOMONAS NOT REPORTED 11/29/2019    YEAST NOT REPORTED 11/29/2019    BACTERIA NOT REPORTED 11/29/2019    SPECGRAV 1.015 11/29/2019    LEUKOCYTESUR NEGATIVE 11/29/2019    UROBILINOGEN Normal 11/29/2019    BILIRUBINUR NEGATIVE 11/29/2019    GLUCOSEU NEGATIVE 11/29/2019    KETUA NEGATIVE 11/29/2019    AMORPHOUS NOT REPORTED 11/29/2019     Urine Sodium:     Lab Results   Component Value Date with the assistance of a speech-recognition program. While intending to generate a document that actually reflects the content of the visit, no guarantees can be provided that every mistake has been identified and corrected by editing.

## 2019-12-04 NOTE — PROGRESS NOTES
Room Air        SECRETIONS Amount:  [] Small [] Moderate  [] Large  [x] None  Color:     [] White [] Colored  [] Bloody    SEDATION:  RAAS Score:  [] Propofol gtt  [] Versed gtt  [] Ativan gtt   [] No Sedation     PARALYZED:  [x] No    [] Yes    DIARRHEA:                [x] No                [] Yes  (C. Difficile status: [] positive                                                                                                                       [] negative                                                                                                                     [] pending)    VASOPRESSORS:  [x] No    [] Yes    If yes -   [] Levophed       [] Dopamine     [] Vasopressin       [] Dobutamine  [] Phenylephrine         [] Epinephrine    CENTRAL LINES:     [] No   [x] Yes   (Date of Insertion:   )           If yes -     [x] Right IJ     [] Left IJ [] Right Femoral [] Left Femoral                   [] Right Subclavian [] Left Subclavian       DOSS'S CATHETER:   [] No   [x] Yes  (Date of Insertion:   )     URINE OUTPUT:            [] Good   [] Low              [x] Anuric      OBJECTIVE:     VITAL SIGNS:  /65   Pulse 92   Temp 99 °F (37.2 °C) (Oral)   Resp 8   Ht 5' 9\" (1.753 m)   Wt (!) 323 lb 13.7 oz (146.9 kg)   SpO2 99%   BMI 47.83 kg/m²   Tmax over 24 hours:  Temp (24hrs), Av.6 °F (37.6 °C), Min:99 °F (37.2 °C), Max:100 °F (37.8 °C)      Patient Vitals for the past 6 hrs:   BP Temp Temp src Pulse Resp SpO2   19 0615 120/65 -- -- 92 8 99 %   19 0600 (!) 172/107 -- -- 103 24 100 %   19 0500 (!) 151/87 -- -- 110 28 100 %   19 0445 (!) 157/101 -- -- 104 21 100 %   19 0400 (!) 130/114 99 °F (37.2 °C) Oral 106 18 100 %   19 0357 -- -- -- 104 19 100 %   19 0300 123/67 -- -- 95 18 99 %   19 0220 -- -- -- 105 14 99 %   19 0215 120/73 -- -- 115 21 100 %   19 0200 (!) 126/50 -- -- 91 19 100 %         Intake/Output Summary (Last 24 hours) at 12/4/2019 0725  Last data filed at 12/4/2019 6624  Gross per 24 hour   Intake 5133.65 ml   Output 1280 ml   Net 3853.65 ml     Wt Readings from Last 2 Encounters:   12/04/19 (!) 323 lb 13.7 oz (146.9 kg)     Body mass index is 47.83 kg/m². PHYSICAL EXAMINATION:    General appearance -intubated and sedated, NAD  Mental status -intubated and sedated, not following commands  Chest -bilateral breath sounds heard, no wheezing, on vent  Heart -normal rate, regular rhythm, normal S1 and S2  Abdomen - soft, nondistended, no rash  Neurological - intubated and sedated, not following commands  Extremities - weak pulses in extremities, Dopplerable RLE pulse  Skinpost debridement of gluteal and perianal region. Full-thickness ulcer in the left plantar surface.  See media for post op wounds debridement     Any additional physical findings:    MEDICATIONS:    Scheduled Meds:   sodium chloride  250 mL Intravenous Once    sodium chloride  250 mL Intravenous Once    furosemide  80 mg Intravenous Daily    sodium hypochlorite   Irrigation BID    thiamine and folic acid IVPB   Intravenous Daily    fat emulsion  250 mL Intravenous Daily    meropenem  1 g Intravenous Q12H    sodium chloride flush  10 mL Intravenous 2 times per day    heparin (porcine)  5,000 Units Subcutaneous 3 times per day    lactated ringers bolus  30 mL/kg Intravenous Once    sodium hypochlorite   Irrigation Once    chlorhexidine  15 mL Mouth/Throat BID    famotidine (PEPCID) injection  20 mg Intravenous Daily     Continuous Infusions:   PN-Adult 2-in-1 Central Line (Standard) 55 mL/hr at 12/03/19 2242    insulin 3.12 Units/hr (12/04/19 0700)    dextrose      vasopressin (Septic Shock) infusion Stopped (12/02/19 0307)    dexmedetomidine (PRECEDEX) IV infusion 1.4 mcg/kg/hr (12/04/19 2702)    IV infusion builder 75 mL/hr (12/03/19 1104)    sodium chloride 10 mL/hr at 12/01/19 0757    dextrose      norepinephrine (LEVOPHED) infusion (double concentration) Stopped (12/04/19 0600)    fentaNYL 200 mcg/hr (12/04/19 0444)    dextrose       PRN Meds:   acetaminophen, 650 mg, Q4H PRN  glucose, 15 g, PRN  dextrose, 12.5 g, PRN  glucagon (rDNA), 1 mg, PRN  dextrose, 100 mL/hr, PRN  acetaminophen, 650 mg, Q4H PRN  REFRESH LACRI-LUBE, , PRN  povidone-iodine, , PRN  sodium chloride flush, 10 mL, PRN  magnesium hydroxide, 30 mL, Daily PRN  ondansetron, 4 mg, Q6H PRN  glucose, 15 g, PRN  dextrose, 12.5 g, PRN  glucagon (rDNA), 1 mg, PRN  dextrose, 100 mL/hr, PRN  fentanNYL, 25 mcg, Q1H PRN  glucose, 15 g, PRN  dextrose, 12.5 g, PRN  glucagon (rDNA), 1 mg, PRN  dextrose, 100 mL/hr, PRN          VENT SETTINGS (Comprehensive) (if applicable):  Vent Information  $Ventilation: $Subsequent Day  Ventilator Started: Yes  Skin Assessment: Clean, dry, & intact  Equipment Changed: HME  Vent Type: Servo i  Vent Mode: PRVC  Vt Ordered: 530 mL  Rate Set: 26 bmp  FiO2 : 30 %  Sensitivity: 5  PEEP/CPAP: 5  I Time/ I Time %: 0.8 s  Humidification Source: HME  Additional Respiratory  Assessments  Pulse: 92  Resp: 8  SpO2: 99 %  End Tidal CO2: 34 (%)  Position: Semi-Pagan's  Humidification Source: HME  Oral Care Completed?: Yes  Oral Care: Mouthwash, Mouth moisturizer, Mouth suctioned  Subglottic Suction Done?: Yes    ABGs:  Arterial Blood Gas result:  pO2 107.4; pCO2 32.9; pH 7.29;  HCO3 16, %O2 Sat 98. Laboratory findings:    Complete Blood Count:   Recent Labs     12/02/19  0431  12/03/19  0415 12/03/19  1354 12/04/19  0420   WBC 25.4*  --  28.0*  --  31.2*   HGB 6.8*   < > 7.7* 8.2 7.7*   HCT 23.0*   < > 26.6* 25.6 25.9*     --  285  --  359    < > = values in this interval not displayed.         Last 3 Blood Glucose:   Recent Labs     12/02/19  0431 12/03/19  0415 12/04/19  0420   GLUCOSE 195* 159* 186*        PT/INR:    Lab Results   Component Value Date    PROTIME 11.9 11/29/2019    INR 1.1 11/29/2019     PTT:  No results found for: APTT, PTT    Comprehensive Metabolic Profile:   Recent Labs     12/02/19  0431 12/03/19  0415 12/03/19  1354 12/04/19  0420   * 137 137 137   K 4.6 4.3 4.1 4.4    104  --  102   CO2 17* 18*  --  19*   BUN 70* 79*  --  88*   CREATININE 4.20* 4.49*  --  4.69*   GLUCOSE 195* 159*  --  186*   CALCIUM 6.5* 6.8*  --  6.6*      Magnesium:   Lab Results   Component Value Date    MG 1.9 12/01/2019     Phosphorus:   Lab Results   Component Value Date    PHOS 7.2 12/01/2019     Ionized Calcium:   Lab Results   Component Value Date    CAION 1.05 12/03/2019            Troponin: No results for input(s): TROPONINI in the last 72 hours. Radiology/Imaging:     Chest Xray (12/4/2019):    ASSESSMENT:     Principal Problem:    Necrotizing fasciitis ([facility] [address]  Active Problems:    Hypertension    Diabetes ([facility] [address]    Diabetic foot ulcer ([facility] [address]  Resolved Problems:    * No resolved hospital problems. *    PLAN:     WEAN PER PROTOCOL:  [x] No   [] Yes  [] N/A    DISCONTINUE ANY LABS:   [x] No   [] Yes    TRANSFER OUT OF ICU:   [x] No   [] Yes    ADDITIONAL PLAN:    1. Pressure ulcer cant be staged present on admission / Necrotizing Faciitis involving buttocks and scrotum - s/p POD#5 s/p wide complex debridement of gluteal/perianal region. region extensive necrotic tissue that involved the anus and distal rectum. Patient went to the OR today for debridement, washout of gluteal and perineal, diverting loop colostomy. -GEN surgery and urology on board. - switched to meropenem (12/1) per ID     2. Septic shock -on levophed   . fluid replaced  Lactic acid 1.4 yesterday  - Blood cultures drawn 12/2/19:  No growth   -Blood cultures showed no growth at 4 days      3. DEBBY on CKD - urology on board. 970  UOP last 24 hrs , fluid replaced.     -Urine culture : Grew Proteus mirabilis, ID is aware of it  -Nephrology recs:  Mickiel Pinch catheter to be placed today  Repeat Lasix 80 mg IV today  Continue IV fluids with bicarb  Renal ultrasound: Right renal cyst, otherwise grossly negative renal   Ultrasound, non diagnostic bladder assessment. 4. Diabetes - insulin gtt if cannot maintain glucose<200       5. Diabetic foot ulcer -dietary on board. Wound care. No surgical intervention for now. \    6. Anemia: Hgb (7.7), Received  3uPRBC till now        1. Feeding Diet: START TPN  2. Fluids bicarbonate plus maintenance  3. Family updated  4. Analgesic fentanyl drip   5. Sedation fentanyl, precedex: We will change Precedex to Versed  6.   7. Thrombo-prophylaxis [] Enoxaparin  [x] Unfract. Heparin Subcut  [] EPC Cuffs  8. Mobility bed rest  9. Heads up yes   10. Ulcer prophylaxis [] PPI Agent  [x] [id number] [] Sucralfate  [] Other:  11. Glycemic control: insulin gtt, glucose in 300s  12. Spontaneous breathing trial not today  13. Bowel regimen/urine output: MoM,   14. Indwelling catheter/lines CVC RIJ, OG, Ambrosio  15.  De-escalation: K4.4: , Cr inc to 4.69<---(3.83), liver enzymes:       Km Esquivel M.D             Critical care resident,  Department of Internal Medicine/ Critical care  [address] [facility] ([place]             12/4/2019, 7:25 AM

## 2019-12-04 NOTE — PROGRESS NOTES
[facility] - Progress Note    Today's Date and Time: 12/4/2019, 11:48 AM    Impression :   · Necrotizing fasciitis 11-29-19  · S/P perirectal I&D. Wide complex excisional debridement of gluteal and perineal areas on 11-29-19  · S/P debridement, washout of gluteal and perianal areas, diverting colostomy 12-3-19  · Hypotension requiring pressors.-Improved  · Lactic acidosis  · DM 2  · HTN  · Hx of Low LVEF (20%) as per family  · DEBBY  · Fluid overload    Recommendations:     · Meropenem 1 gm IV q 24 hr because of renal insufficiency  · Wound Care as per Gen surgery. Medical Decision Making/Summary/Discussion:12/4/2019     · Patient with DM 2, prior diabetic foot infection  · Presented to [address] [place] [address] ER generalized weakness for the past few days  · Found to have soft tissue necrosis with subcutaneous emphysema in gluteal areas and perineum  · S/P I&D and wide complex debridement of affected tissues on 11-29-19  · Showing hypotension, requiring fluids and a vasopressor  · Cultures in progress  · Will cover with Zosyn. Wounds with Strep spp. And Gram negative bacilli . No Staph spp.   · Pt is a MRSA nasal carrier  · Zosyn D/C because of of poor LVEF, in order to reduce Na and fluid load  · Meropenem started adjusted for Cr Cl 30 cc  · Meropenem adjusted for HD  Infection Control Recommendations   · [place] Precautions  · Contact Isolation MRSA    Antimicrobial Stewardship Recommendations     · Simplification of therapy  · Targeted therapy  · PK dosing    Coordination of Outpatient Care:   · Estimated Length of IV antimicrobials: TBD  · Patient will need Midline Catheter Insertion: No  · Patient will need PICC line Insertion:Has Central line  · Patient will need: Home IV , [place],  SNF,  LTAC: TBD  · Patient will need outpatient wound care:Yes    Chief complaint/reason for consultation:   · Ashley's vs necrotizing fasciitis      History of Present Illness:   Wilner Night Scott Dykes is a 37y.o.-year-old  male who was initially admitted on 11/29/2019. Patient seen at the request of . INITIAL HISTORY:    Patient presented through ER in [place] with complaints of weakness of a few days duration. Examination showed skin necrosis in the gluteal and perineal region. Patient transferred to [facility]. CT scan showed extensive subcutaneous emphysema. Patient underwent I&D and extensive complex debridement of affected tissues on 11-29-19. Gram stain of tissues showed Strep. Spp and Gram negative bacilli. 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 He has underlying DM 2, prior diabetic foot infection, DEBBY. Patient more stable post surgery but with hypotension requiring a pressor. CURRENT EVALUATION :12/4/2019     Patient evaluated and examined in the ICU. Afebrile  VS stable, except low BP post op    On 7 mcg Levophed  Remains on Ventilator, sedated  More awake    HD catheter to be placed   Meropenem adjusted to q 24 hr    No new culture data: Strep and Gram negative bacilli     Surgery did debridement and diverting colostomy on 12-3-19    Labs, X rays reviewed: 12/4/2019    BUN: 59-->61-->79  Cr: 2.56-->2.77-->4.49    WBC: 4.20-->53.3-->28  Hb:9.5-->8.2-->6.8-->8.2  Plat: 642-->342    Cultures:  Urine:  ·   Blood:  ·   Sputum :  ·   Wound:  · 11-29-19: Strep ssp and Gram negative bacilli       MRSA probe: Pos    Discussed with mother, RN, CC. .  Mother indicates patient did not take care of himself. Reports low prior LVEF of 20%                I have personally reviewed the past medical history, past surgical history, medications, social history, and family history, and I have updated the database accordingly.   Past Medical History:     Past Medical History:   Diagnosis Date    CHF (congestive heart failure) ([address]     Diabetes mellitus ([facility] [address]     Hypertension     Spina bifida aperta of lumbar spine ([facility] [address]        Past 12/04/2019    TRICHOMONAS NOT REPORTED 11/29/2019    WBC 31.2 12/04/2019    YEAST NOT REPORTED 11/29/2019    TURBIDITY TURBID 11/29/2019     Lab Results   Component Value Date    CREATININE 4.69 12/04/2019    GLUCOSE 186 12/04/2019       Medical Decision Making-Imaging:     EXAMINATION:   ONE XRAY VIEW OF THE CHEST       11/29/2019 9:01 pm       COMPARISON:   4 hours ago       HISTORY:   ORDERING SYSTEM PROVIDED HISTORY: intubated   TECHNOLOGIST PROVIDED HISTORY:   intubated   Reason for Exam: portable supine/ intubated   Acuity: Acute   Type of Exam: Initial       FINDINGS:   New ET tube terminates 38 mm above the ron.  There appears to be a   possible enteric tube which is not well visualized distally.  Right IJ   catheter terminates in the right atrium and is unchanged.  Lungs are clear. Cardiomegaly.  Mediastinum normal.  Bony thorax intact.           Impression   New ET tube as above.  Possible new enteric tube not well visualized. Recommend follow-up KUB if clinically warranted. CT ABDOMEN AND PELVIS WITHOUT CONTRAST    COMPARISON:  3/22/2017    CLINICAL HISTORY: Infection in scrotum, lower abdominal pain, diabetic, 30,000 white blood cell count. TECHNIQUE: Unenhanced axial images were obtained from the lung bases to the pubic symphysis with sagittal and coronal 2D reformatted images. Oral contrast administered:  No.  Automatic exposure control (AEC) was utilized. CT ABDOMEN FINDINGS:      The lung bases are unremarkable. There is a small pericardial effusion versus thickening. The liver, spleen, and pancreas demonstrate no acute abnormality given compromised evaluation without intravenous contrast.  There may be mild splenomegaly.  The adrenal glands appear within normal limits. There is no hydronephrosis or obstructing urinary tract calculi. Small amount of free fluid is seen adjacent to the liver inferiorly. .    The appendix is visualized in the right lower quadrant and appears

## 2019-12-04 NOTE — PLAN OF CARE
Problem: Pain:  Goal: Pain level will decrease  Description  Pain level will decrease  Outcome: Ongoing  Goal: Control of acute pain  Description  Control of acute pain  Outcome: Ongoing  Goal: Control of chronic pain  Description  Control of chronic pain  Outcome: Ongoing     Problem: Skin Integrity:  Goal: Will show no infection signs and symptoms  Description  Will show no infection signs and symptoms  Outcome: Ongoing  Goal: Absence of new skin breakdown  Description  Absence of new skin breakdown  Outcome: Ongoing     Problem: Restraint Use - Nonviolent/Non-Self-Destructive Behavior:  Goal: Absence of restraint indications  Description  Absence of restraint indications   12/4/2019 1727 by Myriam Nicholson RN  Outcome: Ongoing  12/4/2019 0649 by Lin Pulido RN  Outcome: Ongoing  12/4/2019 0648 by Lin Pulido RN  Reactivated  Goal: Absence of restraint-related injury  Description  Absence of restraint-related injury   12/4/2019 1727 by Myriam Nicholson RN  Outcome: Ongoing  12/4/2019 0649 by Lin Pulido RN  Outcome: Ongoing  12/4/2019 0648 by Lin Pulido RN  Reactivated     Problem: SKIN INTEGRITY  Goal: Skin integrity is maintained or improved  Outcome: Ongoing     Problem: Falls - Risk of:  Goal: Will remain free from falls  Description  Will remain free from falls  Outcome: Ongoing  Goal: Absence of physical injury  Description  Absence of physical injury  Outcome: Ongoing     Problem: Nutrition  Goal: Optimal nutrition therapy  Description  Nutrition Problem: Inadequate oral intake  Intervention: Food and/or Nutrient Delivery: Start Parenteral Nutrition  Nutritional Goals: Meet % of estimated nutrition needs   Outcome: Ongoing     Problem: Injury - Risk of, Physical Injury:  Goal: Will remain free from falls  Description  Will remain free from falls  Outcome: Ongoing  Goal: Absence of physical injury  Description  Absence of physical injury  Outcome: Ongoing Problem: SKIN INTEGRITY  Goal: Skin integrity is maintained or improved  Outcome: Ongoing     Problem: Falls - Risk of:  Goal: Will remain free from falls  Description  Will remain free from falls  Outcome: Ongoing

## 2019-12-04 NOTE — PROGRESS NOTES
Nutrition Assessment (Parenteral Nutrition)    Type and Reason for Visit: Reassess    Nutrition Recommendations: Continue TPN until able to transition/tolerate TF- increase Ca in next bag. Will continue to follow. Nutrition Assessment: Pt remains on vent. s/p colostomy yesterday. TPN continues at this time-discussed with nephrology. Pt starting dialysis today. Nutrition Risk Level: High    Nutrient Needs:  · Estimated Daily Total Kcal: 1334-2212 kcal/day   · Estimated Daily Protein (g): 110-145 g pro/day     Nutrition Diagnosis:   · Problem: Inadequate oral intake  · Etiology: related to Impaired respiratory function-inability to consume food     Signs and symptoms:  as evidenced by NPO status due to medical condition, Nutrition support - PN    Objective Information:  · Wound Type: Open Wounds  · Current Nutrition Therapies:  · Oral Diet Orders: NPO   · Parenteral Nutrition Orders:  · Type and Formula: (150 g Dex, 100 g AA)   · Lipids: 250ml, Daily  · Rate/Volume: 55 mL/hr   · Duration: Continuous  · Current PN Order Provides: 1410 kcal and 100 g pro/day   · Additional Calories: NaBicarb in D5% at 75 mL/hr =306 kcal/day  · Anthropometric Measures:  · Ht: 5' 9\" (175.3 cm)   · Current Body Wt: 323 lb 13.7 oz (146.9 kg)  · Admission Body Wt: 291 lb 0.1 oz (132 kg)  · Ideal Body Wt: 160 lb 15 oz (73 kg), % Ideal Body 181% (adm/ideal)  · BMI Classification: BMI > or equal to 40.0 Obese Class III    Nutrition Interventions:   Continue TPN until able to transition/tolerate TF- increase Ca in next bag.    Continued Inpatient Monitoring, Education Not Indicated    Nutrition Evaluation:   · Evaluation: Goal achieved   · Goals: Meet % of estimated nutrition needs   · Monitoring: PN Intake, PN Tolerance, I&O, Wound Healing, Skin Integrity, Pertinent Labs, Monitor Bowel Function      Electronically signed by Luma Kc RD, LD on 12/4/19 at 12:48 PM    Contact Number: [phone]

## 2019-12-04 NOTE — PLAN OF CARE
Problem: Restraint Use - Nonviolent/Non-Self-Destructive Behavior:  Goal: Absence of restraint indications  Description  Absence of restraint indications   12/4/2019 0649 by Mayte Garcia RN  Outcome: Ongoing  12/4/2019 0648 by Mayte Garcia RN  Reactivated     Problem: Restraint Use - Nonviolent/Non-Self-Destructive Behavior:  Goal: Absence of restraint-related injury  Description  Absence of restraint-related injury   12/4/2019 0649 by Mayte Garcia RN  Outcome: Ongoing  12/4/2019 0648 by Mayte Garcia RN  Reactivated     Patient pulling at lines, tubes, equipment, bilateral soft wrist restraints applied and maintained

## 2019-12-04 NOTE — PROGRESS NOTES
Dialysis Post Treatment Note  Vitals:    12/04/19 1715   BP: 118/77   Pulse: 113   Resp: 26   Temp: 98.2 °F (36.8 °C)   SpO2: 98%     Pre-Weight = 146.2kg  Post-weight = Weight: (!) 318 lb 2 oz (144.3 kg)  Total Liters Processed = Total Liters Processed (l/min): 23.9 l/min  Rinseback Volume (mL) = Rinseback Volume (ml): 350 ml  Net Removal (mL) = 2000mL  Type of access used= Temp IJ kaden cath  Length of treatment= 2 hours    Patient tolerated treatment well. His catheter maintained a BFR of 200. He is on the schedule tomorrow 12/5/2019 for a second treatment. No pressers were needed to maintain BP during treatment today. Possible need for heparin or flushes during treatment to prevent clotting on future longer treatments.     Electronically signed by Basilia Guzman RN on 12/4/2019 at 5:22 PM

## 2019-12-05 LAB
ABSOLUTE EOS #: 0.24 K/UL (ref 0–0.4)
ABSOLUTE IMMATURE GRANULOCYTE: 0 K/UL (ref 0–0.3)
ABSOLUTE LYMPH #: 0.72 K/UL (ref 1–4.8)
ABSOLUTE MONO #: 0.72 K/UL (ref 0.1–0.8)
ANION GAP SERPL CALCULATED.3IONS-SCNC: 14 MMOL/L (ref 9–17)
BASOPHILS # BLD: 0 % (ref 0–2)
BASOPHILS ABSOLUTE: 0 K/UL (ref 0–0.2)
BUN BLDV-MCNC: 77 MG/DL (ref 6–20)
BUN/CREAT BLD: ABNORMAL (ref 9–20)
CALCIUM SERPL-MCNC: 6.5 MG/DL (ref 8.6–10.4)
CHLORIDE BLD-SCNC: 102 MMOL/L (ref 98–107)
CO2: 23 MMOL/L (ref 20–31)
CREAT SERPL-MCNC: 4.02 MG/DL (ref 0.7–1.2)
CULTURE: NORMAL
DIFFERENTIAL TYPE: ABNORMAL
EOSINOPHILS RELATIVE PERCENT: 1 % (ref 1–4)
GFR AFRICAN AMERICAN: 21 ML/MIN
GFR NON-AFRICAN AMERICAN: 17 ML/MIN
GFR SERPL CREATININE-BSD FRML MDRD: ABNORMAL ML/MIN/{1.73_M2}
GFR SERPL CREATININE-BSD FRML MDRD: ABNORMAL ML/MIN/{1.73_M2}
GLUCOSE BLD-MCNC: 141 MG/DL (ref 75–110)
GLUCOSE BLD-MCNC: 144 MG/DL (ref 75–110)
GLUCOSE BLD-MCNC: 147 MG/DL (ref 75–110)
GLUCOSE BLD-MCNC: 148 MG/DL (ref 75–110)
GLUCOSE BLD-MCNC: 150 MG/DL (ref 75–110)
GLUCOSE BLD-MCNC: 153 MG/DL (ref 75–110)
GLUCOSE BLD-MCNC: 153 MG/DL (ref 75–110)
GLUCOSE BLD-MCNC: 154 MG/DL (ref 75–110)
GLUCOSE BLD-MCNC: 155 MG/DL (ref 75–110)
GLUCOSE BLD-MCNC: 155 MG/DL (ref 75–110)
GLUCOSE BLD-MCNC: 157 MG/DL (ref 75–110)
GLUCOSE BLD-MCNC: 157 MG/DL (ref 75–110)
GLUCOSE BLD-MCNC: 166 MG/DL (ref 75–110)
GLUCOSE BLD-MCNC: 168 MG/DL (ref 70–99)
HCT VFR BLD CALC: 21.7 % (ref 40.7–50.3)
HEMOGLOBIN: 6.1 G/DL (ref 13–17)
IMMATURE GRANULOCYTES: 0 %
LYMPHOCYTES # BLD: 3 % (ref 24–44)
Lab: NORMAL
MAGNESIUM: 2 MG/DL (ref 1.6–2.6)
MCH RBC QN AUTO: 24.2 PG (ref 25.2–33.5)
MCHC RBC AUTO-ENTMCNC: 28.1 G/DL (ref 28.4–34.8)
MCV RBC AUTO: 86.1 FL (ref 82.6–102.9)
MONOCYTES # BLD: 3 % (ref 1–7)
MORPHOLOGY: ABNORMAL
NRBC AUTOMATED: 0.2 PER 100 WBC
PDW BLD-RTO: 20 % (ref 11.8–14.4)
PHOSPHORUS: 4.7 MG/DL (ref 2.5–4.5)
PLATELET # BLD: 258 K/UL (ref 138–453)
PLATELET ESTIMATE: ABNORMAL
PMV BLD AUTO: 9.9 FL (ref 8.1–13.5)
POTASSIUM SERPL-SCNC: 3.6 MMOL/L (ref 3.7–5.3)
RBC # BLD: 2.52 M/UL (ref 4.21–5.77)
RBC # BLD: ABNORMAL 10*6/UL
SEG NEUTROPHILS: 93 % (ref 36–66)
SEGMENTED NEUTROPHILS ABSOLUTE COUNT: 22.22 K/UL (ref 1.8–7.7)
SODIUM BLD-SCNC: 139 MMOL/L (ref 135–144)
SPECIMEN DESCRIPTION: NORMAL
SURGICAL PATHOLOGY REPORT: NORMAL
WBC # BLD: 23.9 K/UL (ref 3.5–11.3)
WBC # BLD: ABNORMAL 10*3/UL

## 2019-12-05 PROCEDURE — 2580000003 HC RX 258: Performed by: STUDENT IN AN ORGANIZED HEALTH CARE EDUCATION/TRAINING PROGRAM

## 2019-12-05 PROCEDURE — 6360000002 HC RX W HCPCS: Performed by: STUDENT IN AN ORGANIZED HEALTH CARE EDUCATION/TRAINING PROGRAM

## 2019-12-05 PROCEDURE — 2000000000 HC ICU R&B

## 2019-12-05 PROCEDURE — 6370000000 HC RX 637 (ALT 250 FOR IP): Performed by: STUDENT IN AN ORGANIZED HEALTH CARE EDUCATION/TRAINING PROGRAM

## 2019-12-05 PROCEDURE — 82947 ASSAY GLUCOSE BLOOD QUANT: CPT

## 2019-12-05 PROCEDURE — 2700000000 HC OXYGEN THERAPY PER DAY

## 2019-12-05 PROCEDURE — 94761 N-INVAS EAR/PLS OXIMETRY MLT: CPT

## 2019-12-05 PROCEDURE — 2500000003 HC RX 250 WO HCPCS: Performed by: STUDENT IN AN ORGANIZED HEALTH CARE EDUCATION/TRAINING PROGRAM

## 2019-12-05 PROCEDURE — 6370000000 HC RX 637 (ALT 250 FOR IP): Performed by: INTERNAL MEDICINE

## 2019-12-05 PROCEDURE — 5A1D70Z PERFORMANCE OF URINARY FILTRATION, INTERMITTENT, LESS THAN 6 HOURS PER DAY: ICD-10-PCS | Performed by: INTERNAL MEDICINE

## 2019-12-05 PROCEDURE — P9016 RBC LEUKOCYTES REDUCED: HCPCS

## 2019-12-05 PROCEDURE — 36415 COLL VENOUS BLD VENIPUNCTURE: CPT

## 2019-12-05 PROCEDURE — 99233 SBSQ HOSP IP/OBS HIGH 50: CPT | Performed by: INTERNAL MEDICINE

## 2019-12-05 PROCEDURE — 99291 CRITICAL CARE FIRST HOUR: CPT | Performed by: INTERNAL MEDICINE

## 2019-12-05 PROCEDURE — 2500000003 HC RX 250 WO HCPCS: Performed by: INTERNAL MEDICINE

## 2019-12-05 PROCEDURE — 80048 BASIC METABOLIC PNL TOTAL CA: CPT

## 2019-12-05 PROCEDURE — 84100 ASSAY OF PHOSPHORUS: CPT

## 2019-12-05 PROCEDURE — 85025 COMPLETE CBC W/AUTO DIFF WBC: CPT

## 2019-12-05 PROCEDURE — 94003 VENT MGMT INPAT SUBQ DAY: CPT

## 2019-12-05 PROCEDURE — 86900 BLOOD TYPING SEROLOGIC ABO: CPT

## 2019-12-05 PROCEDURE — 94770 HC ETCO2 MONITOR DAILY: CPT

## 2019-12-05 PROCEDURE — 83735 ASSAY OF MAGNESIUM: CPT

## 2019-12-05 PROCEDURE — 90935 HEMODIALYSIS ONE EVALUATION: CPT

## 2019-12-05 RX ORDER — 0.9 % SODIUM CHLORIDE 0.9 %
250 INTRAVENOUS SOLUTION INTRAVENOUS PRN
Status: DISCONTINUED | OUTPATIENT
Start: 2019-12-05 | End: 2019-12-14

## 2019-12-05 RX ORDER — 0.9 % SODIUM CHLORIDE 0.9 %
250 INTRAVENOUS SOLUTION INTRAVENOUS ONCE
Status: COMPLETED | OUTPATIENT
Start: 2019-12-05 | End: 2019-12-05

## 2019-12-05 RX ORDER — OXYCODONE HYDROCHLORIDE 5 MG/1
5 TABLET ORAL EVERY 4 HOURS PRN
Status: DISCONTINUED | OUTPATIENT
Start: 2019-12-05 | End: 2019-12-06

## 2019-12-05 RX ORDER — OXYCODONE HYDROCHLORIDE 5 MG/1
10 TABLET ORAL EVERY 4 HOURS PRN
Status: DISCONTINUED | OUTPATIENT
Start: 2019-12-05 | End: 2019-12-06

## 2019-12-05 RX ORDER — 0.9 % SODIUM CHLORIDE 0.9 %
150 INTRAVENOUS SOLUTION INTRAVENOUS PRN
Status: DISCONTINUED | OUTPATIENT
Start: 2019-12-05 | End: 2019-12-14

## 2019-12-05 RX ORDER — FAMOTIDINE 20 MG/1
20 TABLET, FILM COATED ORAL DAILY
Status: DISCONTINUED | OUTPATIENT
Start: 2019-12-05 | End: 2020-01-16 | Stop reason: HOSPADM

## 2019-12-05 RX ADMIN — I.V. FAT EMULSION 250 ML: 20 EMULSION INTRAVENOUS at 18:16

## 2019-12-05 RX ADMIN — Medication 200 MCG/HR: at 22:46

## 2019-12-05 RX ADMIN — SODIUM CHLORIDE: 9 INJECTION, SOLUTION INTRAVENOUS at 09:43

## 2019-12-05 RX ADMIN — Medication: at 05:13

## 2019-12-05 RX ADMIN — FOLIC ACID: 5 INJECTION, SOLUTION INTRAMUSCULAR; INTRAVENOUS; SUBCUTANEOUS at 08:57

## 2019-12-05 RX ADMIN — OXYCODONE HYDROCHLORIDE 5 MG: 5 TABLET ORAL at 11:53

## 2019-12-05 RX ADMIN — SODIUM CHLORIDE: 9 INJECTION, SOLUTION INTRAVENOUS at 08:01

## 2019-12-05 RX ADMIN — MIDODRINE HYDROCHLORIDE 5 MG: 5 TABLET ORAL at 11:42

## 2019-12-05 RX ADMIN — MIDODRINE HYDROCHLORIDE 5 MG: 5 TABLET ORAL at 16:57

## 2019-12-05 RX ADMIN — HEPARIN SODIUM 1600 UNITS: 1000 INJECTION INTRAVENOUS; SUBCUTANEOUS at 12:50

## 2019-12-05 RX ADMIN — Medication 4 MG/HR: at 03:25

## 2019-12-05 RX ADMIN — HEPARIN SODIUM 5000 UNITS: 5000 INJECTION INTRAVENOUS; SUBCUTANEOUS at 06:18

## 2019-12-05 RX ADMIN — Medication 200 MCG/HR: at 03:24

## 2019-12-05 RX ADMIN — MINERAL OIL, PETROLATUM: 425; 568 OINTMENT OPHTHALMIC at 14:53

## 2019-12-05 RX ADMIN — DAKIN'S SOLUTION 0.125% (QUARTER STRENGTH): 0.12 SOLUTION at 22:00

## 2019-12-05 RX ADMIN — HEPARIN SODIUM 5000 UNITS: 5000 INJECTION INTRAVENOUS; SUBCUTANEOUS at 22:02

## 2019-12-05 RX ADMIN — MIDODRINE HYDROCHLORIDE 5 MG: 5 TABLET ORAL at 08:35

## 2019-12-05 RX ADMIN — CALCIUM GLUCONATE: 98 INJECTION, SOLUTION INTRAVENOUS at 17:57

## 2019-12-05 RX ADMIN — Medication 200 MCG/HR: at 08:25

## 2019-12-05 RX ADMIN — ACETAMINOPHEN 650 MG: 325 TABLET ORAL at 21:55

## 2019-12-05 RX ADMIN — Medication 3 MG/HR: at 18:11

## 2019-12-05 RX ADMIN — Medication 200 MCG/HR: at 17:51

## 2019-12-05 RX ADMIN — MEROPENEM 1 G: 1 INJECTION, POWDER, FOR SOLUTION INTRAVENOUS at 13:37

## 2019-12-05 RX ADMIN — FUROSEMIDE 80 MG: 10 INJECTION, SOLUTION INTRAMUSCULAR; INTRAVENOUS at 08:36

## 2019-12-05 RX ADMIN — FAMOTIDINE 20 MG: 20 TABLET, FILM COATED ORAL at 14:28

## 2019-12-05 RX ADMIN — NOREPINEPHRINE BITARTRATE 2 MCG/MIN: 1 INJECTION, SOLUTION, CONCENTRATE INTRAVENOUS at 11:01

## 2019-12-05 RX ADMIN — SODIUM CHLORIDE 3.18 UNITS/HR: 9 INJECTION, SOLUTION INTRAVENOUS at 05:12

## 2019-12-05 RX ADMIN — HEPARIN SODIUM 5000 UNITS: 5000 INJECTION INTRAVENOUS; SUBCUTANEOUS at 14:29

## 2019-12-05 RX ADMIN — Medication 200 MCG/HR: at 12:56

## 2019-12-05 RX ADMIN — SODIUM CHLORIDE, PRESERVATIVE FREE 10 ML: 5 INJECTION INTRAVENOUS at 08:20

## 2019-12-05 RX ADMIN — HEPARIN SODIUM 1500 UNITS: 1000 INJECTION INTRAVENOUS; SUBCUTANEOUS at 12:50

## 2019-12-05 ASSESSMENT — PULMONARY FUNCTION TESTS
PIF_VALUE: 22
PIF_VALUE: 22
PIF_VALUE: 24
PIF_VALUE: 24
PIF_VALUE: 22
PIF_VALUE: 24

## 2019-12-05 ASSESSMENT — PAIN SCALES - GENERAL
PAINLEVEL_OUTOF10: 0

## 2019-12-05 NOTE — PLAN OF CARE
Problem: Pain:  Goal: Pain level will decrease  Description  Pain level will decrease  Outcome: Ongoing     Problem: Pain:  Goal: Control of acute pain  Description  Control of acute pain  Outcome: Ongoing     Problem: Pain:  Goal: Control of chronic pain  Description  Control of chronic pain  Outcome: Ongoing     Problem: Skin Integrity:  Goal: Will show no infection signs and symptoms  Description  Will show no infection signs and symptoms  Outcome: Ongoing     Problem: Skin Integrity:  Goal: Absence of new skin breakdown  Description  Absence of new skin breakdown  Outcome: Ongoing     Problem: SKIN INTEGRITY  Goal: Skin integrity is maintained or improved  12/5/2019 1129 by Chin Fritz RN  Outcome: Ongoing  12/5/2019 0823 by Amara Ochoa RCP  Outcome: Ongoing  12/4/2019 2154 by Iqra Ledesma RCP  Outcome: Ongoing     Problem: Injury - Risk of, Physical Injury:  Goal: Will remain free from falls  Description  Will remain free from falls  Outcome: Ongoing

## 2019-12-05 NOTE — PLAN OF CARE
Problem: Restraint Use - Nonviolent/Non-Self-Destructive Behavior:  Goal: Absence of restraint indications  Description  Absence of restraint indications   12/5/2019 0706 by Harpal Marcelino RN  Outcome: Ongoing  12/4/2019 1727 by Norah Young RN  Outcome: Ongoing     Problem: Restraint Use - Nonviolent/Non-Self-Destructive Behavior:  Goal: Absence of restraint-related injury  Description  Absence of restraint-related injury   12/5/2019 0706 by Harpal Marcelino RN  Outcome: Ongoing  12/4/2019 1727 by Norah Young RN  Outcome: Ongoing

## 2019-12-05 NOTE — PROGRESS NOTES
Dialysis Post Treatment Note  Vitals:    12/05/19 1256   BP: 115/64   Pulse: 116   Resp: 22   Temp: 98.2 °F (36.8 °C)   SpO2: 100%     Pre-Weight = 145.6 kg  Post-weight = Weight: (!) 316 lb 2.2 oz (143.4 kg)  Total Liters Processed = Total Liters Processed (l/min): 82.5 l/min  Rinseback Volume (mL) = Rinseback Volume (ml): 370 ml  Net Removal (mL) =2650  Type of access used= L temporary IJ catheter  Length of treatment=3.5 hours    Pt tolerated tx well, pt didn't appear to be restless or agitated during tx, per Dr. Lynsey Yoon to floor RN, bicarb drip was stopped, pt put on levo during tx for hypotension, temp in machine also reduced, 1 unit of RBC's given during tx b/c hemoglobin was 6.1, third tx needed tomorrow 12/6/19  per Dr. Lynsey Yoon.

## 2019-12-05 NOTE — PROGRESS NOTES
1500 (!) 322 lb 5 oz (146.2 kg)         AWAKE & FOLLOWING COMMANDS:  [x] No   [] Yes    CURRENT VENTILATION STATUS:     [x] Ventilator  [] BIPAP  [] Nasal Cannula [] Room Air        SECRETIONS Amount:  [] Small [] Moderate  [] Large  [x] None  Color:     [] White [] Colored  [] Bloody    SEDATION:  RAAS Score:  [] Propofol gtt  [] Versed gtt  [] Ativan gtt   [] No Sedation     PARALYZED:  [x] No    [] Yes    DIARRHEA:                [x] No                [] Yes  (C. Difficile status: [] positive                                                                                                                       [] negative                                                                                                                     [] pending)    VASOPRESSORS:  [x] No    [] Yes    If yes -   [] Levophed       [] Dopamine     [] Vasopressin       [] Dobutamine  [] Phenylephrine         [] Epinephrine    CENTRAL LINES:     [] No   [x] Yes   (Date of Insertion:   )           If yes -     [x] Right IJ     [] Left IJ [] Right Femoral [] Left Femoral                   [] Right Subclavian [] Left Subclavian       DOSS'S CATHETER:   [] No   [x] Yes  (Date of Insertion:   )     URINE OUTPUT:            [] Good   [] Low              [x] Anuric      OBJECTIVE:     VITAL SIGNS:  /72   Pulse 117   Temp 98.3 °F (36.8 °C) (Axillary)   Resp 12   Ht 5' 9\" (1.753 m)   Wt (!) 321 lb 6.9 oz (145.8 kg)   SpO2 99%   BMI 47.47 kg/m²   Tmax over 24 hours:  Temp (24hrs), Av.5 °F (36.9 °C), Min:98.1 °F (36.7 °C), Max:99 °F (37.2 °C)      Patient Vitals for the past 6 hrs:   BP Temp Temp src Pulse Resp SpO2 Weight   19 0758 -- -- -- -- 12 99 % --   19 0700 126/72 -- -- 117 (!) 0 97 % --   19 0600 113/89 -- -- 124 14 100 % (!) 321 lb 6.9 oz (145.8 kg)   19 0500 131/68 -- -- 112 (!) 0 100 % --   19 0400 124/66 98.3 °F (36.8 °C) Axillary 109 (!) 0 100 % --   19 0306 -- -- -- 111 26 100 % --   12/05/19 0300 113/60 -- -- 114 (!) 0 100 % --         Intake/Output Summary (Last 24 hours) at 12/5/2019 0801  Last data filed at 12/5/2019 0600  Gross per 24 hour   Intake 4368.14 ml   Output 1735 ml   Net 2633.14 ml     Wt Readings from Last 2 Encounters:   12/05/19 (!) 321 lb 6.9 oz (145.8 kg)     Body mass index is 47.47 kg/m². PHYSICAL EXAMINATION:    General appearance -intubated and sedated, NAD  Mental status -intubated and sedated, not following commands  Chest -bilateral breath sounds heard, no wheezing, on vent  Heart -normal rate, regular rhythm, normal S1 and S2  Abdomen - soft, nondistended, no rash  Neurological - intubated and sedated, not following commands  Extremities - weak pulses in extremities, Dopplerable RLE pulse  Skinpost debridement of gluteal and perianal region. Full-thickness ulcer in the left plantar surface.  See media for post op wounds debridement     Any additional physical findings:    MEDICATIONS:    Scheduled Meds:   sodium chloride  250 mL Intravenous Once    midodrine  5 mg Oral TID WC    sodium chloride  250 mL Intravenous Once    sodium chloride  250 mL Intravenous Once    furosemide  80 mg Intravenous Daily    sodium hypochlorite   Irrigation BID    thiamine and folic acid IVPB   Intravenous Daily    fat emulsion  250 mL Intravenous Daily    meropenem  1 g Intravenous Q12H    sodium chloride flush  10 mL Intravenous 2 times per day    heparin (porcine)  5,000 Units Subcutaneous 3 times per day    lactated ringers bolus  30 mL/kg Intravenous Once    sodium hypochlorite   Irrigation Once     Continuous Infusions:   fentaNYL      midazolam 4 mg/hr (12/05/19 0325)    PN-Adult 2-in-1 Central Line (Standard) 55 mL/hr at 12/04/19 1844    insulin 2.82 Units/hr (12/05/19 0551)    dextrose      vasopressin (Septic Shock) infusion Stopped (12/02/19 0307)    dexmedetomidine (PRECEDEX) IV infusion Stopped (12/04/19 1520)    IV infusion builder 75 mL/hr = values in this interval not displayed. Last 3 Blood Glucose:   Recent Labs     12/03/19  0415 12/04/19  0420 12/05/19  0548   GLUCOSE 159* 186* 168*        PT/INR:    Lab Results   Component Value Date    PROTIME 11.9 11/29/2019    INR 1.1 11/29/2019     PTT:  No results found for: APTT, PTT    Comprehensive Metabolic Profile:   Recent Labs     12/03/19  0415 12/03/19  1354 12/04/19  0420 12/05/19  0548    137 137 139   K 4.3 4.1 4.4 3.6*     --  102 102   CO2 18*  --  19* 23   BUN 79*  --  88* 77*   CREATININE 4.49*  --  4.69* 4.02*   GLUCOSE 159*  --  186* 168*   CALCIUM 6.8*  --  6.6* 6.5*   LABALBU  --   --  1.2*  --       Magnesium:   Lab Results   Component Value Date    MG 2.0 12/05/2019     Phosphorus:   Lab Results   Component Value Date    PHOS 4.7 12/05/2019     Ionized Calcium:   Lab Results   Component Value Date    CAION 1.05 12/03/2019            Troponin: No results for input(s): TROPONINI in the last 72 hours. Radiology/Imaging:     Chest Xray (12/5/2019):    ASSESSMENT:     Principal Problem:    Necrotizing fasciitis ([facility] [address]  Active Problems:    Hypertension    Diabetes ([facility] [address]    Diabetic foot ulcer ([facility] [address]  Resolved Problems:    * No resolved hospital problems. *    PLAN:     WEAN PER PROTOCOL:  [x] No   [] Yes  [] N/A    DISCONTINUE ANY LABS:   [x] No   [] Yes    TRANSFER OUT OF ICU:   [x] No   [] Yes    ADDITIONAL PLAN:    1. Pressure ulcer cant be staged present on admission / Necrotizing Faciitis involving buttocks and scrotum - s/p POD#6 s/p wide complex debridement of gluteal/perianal region. region extensive necrotic tissue that involved the anus and distal rectum. Had  debridement, washout of gluteal and perineal, diverting loop colostomy. Bedside debridement done on 12/4, dressing changes with him on site will continue to do wet-to-dry dressing changes as per surgery. -GEN surgery and urology on board. - switched to meropenem (12/1) per ID     2.   Septic shock -on levophed   . fluid replaced  Lactic acid 1.4 yesterday  - Blood cultures drawn 12/2/19:  No growth   -Blood cultures showed no growth at 5 days      3. DEBBY on CKD - urology on board. 970  UOP last 24 hrs , fluid replaced. -Urine culture : Grew Proteus mirabilis, ID is aware of it  -Nephrology recs:  Zeynep Coreas catheter to be placed today  Repeat Lasix 80 mg IV today  Patient had hemodialysis yesterday, tolerated. Remove 2 L of fluid  Possible hemodialysis today. Continue IV fluids with bicarb  Renal ultrasound: Right renal cyst, otherwise grossly negative renal   Ultrasound, non diagnostic bladder assessment. 4. Diabetes - insulin gtt if cannot maintain glucose<200       5. Diabetic foot ulcer -dietary on board. Wound care. No surgical intervention for now. \    6. Anemia: Hgb (6.1), ordered for 1 unit of PRBC,  received  3uPRBC till now        1. Feeding Diet: START TPN  2. Fluids bicarbonate plus maintenance  3. Family updated  4. Analgesic fentanyl drip   5. Sedation: Versed drip  6.   7. Thrombo-prophylaxis [] Enoxaparin  [x] Unfract. Heparin Subcut  [] EPC Cuffs  8. Mobility bed rest  9. Heads up yes   10. Ulcer prophylaxis [] PPI Agent  [x] [id number] [] Sucralfate  [] Other:  11. Glycemic control: insulin gtt, glucose in 300s  12. Spontaneous breathing trial not today  13. Bowel regimen/urine output: MoM,   14. Indwelling catheter/lines CVC RIJ, OG, Ambrosio, Coumadin catheter left IJ  15.  De-escalation: K3.6: , Cr inc to 4.02<---4.69<---(3.83), liver enzymes:       Etienne Farmer M.D             Critical care resident,  Department of Internal Medicine/ Critical care  [facility] ([place]             12/5/2019, 8:01 AM

## 2019-12-05 NOTE — PROGRESS NOTES
[facility]  Occupational Therapy Not Seen Note    DATE: 2019  Name: Rhett Reagan  : 1985  MRN: [id number]    Patient not available for Occupational Therapy due to:    [] Testing:    [] Hemodialysis    [] Blood Transfusion in Progress    []Refusal by Patient:    [] Surgery/Procedure:    [] Strict Bedrest    [x] Sedation/Intubation: fentanyl     [] Spine Precautions     [] Pt being transferred to palliative care at this time. Spoke with pt/family and OT services to be defered. [] Pt independent with functional mobility and functional tasks.  Pt with no OT acute care needs at this time, will defer OT eval.    [] Other    Next Scheduled Treatment: Ck      Kenny Reeves OT

## 2019-12-05 NOTE — PROGRESS NOTES
PROGRESS NOTE    PATIENT NAME: Austin Mcdaniel [address] RECORD NO. [id number]  DATE: 2019  SURGEON: Mario Tom  PRIMARY CARE PHYSICIAN: No primary care provider on file. HD: # 6    ASSESSMENT    Patient Active Problem List   Diagnosis    Necrotizing fasciitis ([address]    Hypertension    Diabetes ([address]    Diabetic foot ulcer ([address]     POD#6 wide complex debridement of nec fasc involving gluteal/perianal/distal rectum  POD#2 open diverting end colostomy     MEDICAL DECISION MAKING AND PLAN    · Necrotizing soft tissue infection: POD#6 s/p wide complex debridement of gluteal/perianal region extensive necrotic tissue that involved the anus and distal rectum. · Bedside debridement 12/4. Can do dressing changes with him on his side. Will continue with wet to dry dressing changes twice daily. · Urology following  · Meropenem. ID following. · Critical care for vent and medical management  · Recommend MAP >65  · Strict glucose control   · LLE wound per podiatry  · DEBBY with minimal UOP. Nephrology following. HD today. · Medical and supportive care per Crit care team      Mayra Braswell  was seen and examined. Wound dressing changes BID with dakins. Diverting stoma healing well and well perfused. Afebrile. VSS. Hemodynamics stable off pressors. OBJECTIVE  VITALS: Temp: Temp: 98.3 °F (36.8 °C)Temp  Av.5 °F (36.9 °C)  Min: 98.1 °F (36.7 °C)  Max: 99 °F (96.9 °C) BP Systolic (00LGO), MCO:425 , Min:94 , LFU:701   Diastolic (55EBD), LXU:33, Min:50, Max:114   Pulse Pulse  Av.2  Min: 83  Max: 145 Resp Resp  Av.1  Min: 0  Max: 26 Pulse ox SpO2  Av.1 %  Min: 97 %  Max: 100 %  GENERAL: intubated and sedated  LUNGS: ventilated, equal and symmetric chest rise/fall  HEART: Tachycardic s1+s2  ABDOMEN: soft, obese, left sided end colostomy well perfused without ischemia or necrosis.  Stoma appliance intact without flatus or stool  /Anal: lema intact, extensive wound to the level of gluteal muscle fascia and extending into the perianal area region with involvement of anal sphincter complex; wound with some necrotic edges but viable tissue throughout majority of wound at this point; no additional pockets of purulence discovered on exam . Dressing replaced  EXTERMITY: wounds L foot, decreased mm mass b/l LE    I/O last 3 completed shifts: In: 4368.1 [I.V.:2845.5]  Out: 4665 [Urine:1240; Emesis/NG output:300; Drains:195]    Drain/tube output:  In: 2875.6 [I.V.:1734.6]  Out: 244 [Urine:670; Drains:45]    LAB:  CBC:   Recent Labs     12/03/19  0415  12/04/19  0420 12/04/19  2036 12/05/19  0548   WBC 28.0*  --  31.2*  --  23.9*   HGB 7.7*   < > 7.7* 7.0* 6.1*   HCT 26.6*   < > 25.9* 24.0* 21.7*   MCV 82.1*  --  81.4*  --  86.1     --  359  --  258    < > = values in this interval not displayed. BMP:   Recent Labs     12/03/19  0415 12/03/19  1354 12/04/19  0420 12/05/19  0548    137 137 139   K 4.3 4.1 4.4 3.6*     --  102 102   CO2 18*  --  19* 23   BUN 79*  --  88* 77*   CREATININE 4.49*  --  4.69* 4.02*   GLUCOSE 159*  --  186* 168*     COAGS:   No results for input(s): APTT, PROT, INR in the last 72 hours. RADIOLOGY:  Narrative   EXAMINATION:   ONE XRAY VIEW OF THE CHEST       11/29/2019 9:01 pm       COMPARISON:   4 hours ago       HISTORY:   ORDERING SYSTEM PROVIDED HISTORY: intubated   TECHNOLOGIST PROVIDED HISTORY:   intubated   Reason for Exam: portable supine/ intubated   Acuity: Acute   Type of Exam: Initial       FINDINGS:   New ET tube terminates 38 mm above the ron.  There appears to be a   possible enteric tube which is not well visualized distally.  Right IJ   catheter terminates in the right atrium and is unchanged.  Lungs are clear. Cardiomegaly.  Mediastinum normal.  Bony thorax intact.           Impression   New ET tube as above.  Possible new enteric tube not well visualized.    Recommend follow-up KUB if clinically warranted.               Martin Howe, MD  12/5/19, 8:44 AM       Attending attestation:  I personally evaluated the patient and directed the medical decision making with Resident/JUSTIN after the physical/radiologic exam and laboratory values were reviewed and confirmed. Wound dressing was taken down at the bedside. Patient will benefit from further debridement in the OR. Will plan to take the patient for OR debridement Friday or Saturday.      Zamzam Hurst MD

## 2019-12-05 NOTE — PROGRESS NOTES
Channing Chauhan is a 37y.o.-year-old  male who was initially admitted on 11/29/2019. Patient seen at the request of . INITIAL HISTORY:    Patient presented through ER in [place] with complaints of weakness of a few days duration. Examination showed skin necrosis in the gluteal and perineal region. Patient transferred to [facility]. CT scan showed extensive subcutaneous emphysema. Patient underwent I&D and extensive complex debridement of affected tissues on 11-29-19. Gram stain of tissues showed Strep. Spp and Gram negative bacilli. 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 He has underlying DM 2, prior diabetic foot infection, DEBBY. Patient more stable post surgery but with hypotension requiring a pressor. CURRENT EVALUATION :12/5/2019     Patient evaluated and examined in the ICU. Afebrile  VS stable, except low BP     On 4 mcg Levophed  Remains on Ventilator, sedated  More awake    On CVVHD  Meropenem adjusted to q 24 hr    No new culture data: Strep and Gram negative bacilli     Surgery did debridement and diverting colostomy on 12-3-19    Labs, X rays reviewed: 12/5/2019    BUN: 59-->61-->79  Cr: 2.56-->2.77-->4.49    WBC: 4.20-->53.3-->28  Hb:9.5-->8.2-->6.8-->8.2  Plat: 642-->342    Cultures:  Urine:  ·   Blood:  ·   Sputum :  ·   Wound:  · 11-29-19: Strep ssp and Gram negative bacilli       MRSA probe: Pos    Discussed with mother, RN, CC. .  Mother indicates patient did not take care of himself. Reports low prior LVEF of 20%                I have personally reviewed the past medical history, past surgical history, medications, social history, and family history, and I have updated the database accordingly.   Past Medical History:     Past Medical History:   Diagnosis Date    CHF (congestive heart failure) ([address]     Diabetes mellitus ([address]     Hypertension     Spina bifida aperta of lumbar spine [address]        Past Surgical ex partner: Not on file     Emotionally abused: Not on file     Physically abused: Not on file     Forced sexual activity: Not on file   Other Topics Concern    Not on file   Social History Narrative    Not on file       Family History:   No family history on file. Allergies:   Patient has no known allergies. Review of Systems:   Constitutional: No fevers or chills. Generalized weakness  Head: No headaches  Eyes: No double vision or blurry vision. No conjunctival inflammation. ENT: No sore throat or runny nose. . No hearing loss, tinnitus or vertigo. Cardiovascular: No chest pain or palpitations. No shortness of breath. No GORDILLO  Lung: No shortness of breath or cough. No sputum production  Abdomen: No nausea, vomiting, diarrhea, or abdominal pain. [place] No cramps. Genitourinary: No increased urinary frequency, or dysuria. No hematuria. No suprapubic or CVA pain  Musculoskeletal: No muscle aches or pains. No joint effusions, swelling or deformities. Lt diabetic foot   Hematologic: No bleeding or bruising. Neurologic: No headache, weakness, numbness, or tingling. Spina bifida  Integument: Gangrene of perineum and gluteal areas  Psychiatric: No depression. Endocrine: No polyuria, no polydipsia, no polyphagia.     Physical Examination :     Patient Vitals for the past 8 hrs:   BP Temp Temp src Pulse Resp SpO2 Weight   12/05/19 1245 -- 98.4 °F (36.9 °C) Axillary 105 (!) 1 100 % --   12/05/19 1236 -- -- -- 95 (!) 0 100 % --   12/05/19 1230 106/60 -- -- 94 (!) 0 100 % --   12/05/19 1220 (!) 96/44 -- -- 102 -- -- --   12/05/19 1215 (!) 96/44 -- -- 104 (!) 0 100 % --   12/05/19 1200 (!) 111/58 -- -- 102 (!) 0 100 % --   12/05/19 1150 (!) 106/58 -- -- 101 -- -- --   12/05/19 1145 (!) 106/58 -- -- 104 (!) 0 -- --   12/05/19 1131 -- -- -- 104 26 100 % --   12/05/19 1130 110/73 -- -- 103 (!) 0 100 % --   12/05/19 1120 (!) 103/57 -- -- 99 -- -- --   12/05/19 1115 (!) 103/57 -- -- 99 (!) 0 100 % --   12/05/19 1100 (!) 89/49 above involving both buttocks extending to the perineum.  The possibility of Ashley gangrene/necrotizing fasciitis cannot be excluded. Possible phlegmon or developing soft tissue abscess overlying the left lateral abdominal wall as noted above.  No organized abscess is seen however. Osseous changes within the left hemipelvis which may be chronic in nature. Results the study were called to Dr. Yady Najera [phone] on 11/29/2019  All CT scans at this facility use dose modulation, iterative reconstruction, and/or weight based dosing when appropriate to reduce radiation dose to as low as reasonably achievable. Medical Decision Amwdqo-Vaabxzyj-Lapiu:   11/29/2019  8:17 PM - Jesus, Kortneypn Incoming Lab Results From [employer]     Specimen Information: Buttock; Tissue        Component Collected Lab   Specimen Description 11/29/2019  7:34  [address]   . BUTTOCK BILATERAL TS    Special Requests 11/29/2019  7:34  [address]   NOT REPORTED    Direct Exam 11/29/2019  7:34  [address]   NO NEUTROPHILS SEEN    Direct Exam Abnormal  11/29/2019  7:34  [facility] [address] [place] IN [place]    Direct Exam Abnormal  11/29/2019  7:34 PM [address] NEGATIVE RODS    Culture 11/29/2019  7:34  [address]   PENDING          Medical Decision Making-Other:     Note:  · Labs, medications, radiologic studies were reviewed with personal review of films  · Large amounts of data were reviewed  · Discussed with nursing Staff, Discharge planner  · Infection Control and Prevention measures reviewed  · All prior entries were reviewed  · Administer medications as ordered  · Prognosis: Guarded  · Discharge planning reviewed  · Follow up as outpatient. Thank you for allowing us to participate in the care of this patient. Please call with questions.     Alleen Snellen, MD  Pager: [phone] - Office: [phone]

## 2019-12-05 NOTE — PROGRESS NOTES
LACRI-LUBE, povidone-iodine, sodium chloride flush, magnesium hydroxide, ondansetron, glucose, dextrose, glucagon (rDNA), dextrose, glucose, dextrose, glucagon (rDNA), dextrose    Input/Output:       I/O last 3 completed shifts: In: 4368.1 [I.V.:2845.5]  Out: 9681 [Urine:1290; Emesis/NG output:300; Drains:195]    Vital Signs:   Temperature:  Temp: 98.4 °F (36.9 °C)  TMax:   Temp (24hrs), Av.5 °F (36.9 °C), Min:98.1 °F (36.7 °C), Max:99 °F (37.2 °C)    Respirations:  Resp: (!) 1  Pulse:   Pulse: 109  BP:    BP: (!) 102/49  BP Range: Systolic (85LGM), VKO:452 , Min:94 , LBW:536       Diastolic (64AVX), SMD:72, Min:49, Max:114      Physical Examination:     General:  Sedated on ventilator. FiO2 30% with a PEEP of 10  HEENT:  Atraumatic, normocephalic, no throat congestion, moist mucosa. Eyes:   Pupils equal, round and reactive to light, pallor, no icterus. Neck:   No JVD, no thyromegaly, no lymphadenopathy. Chest:  Air entry fair bilaterally, no added sopunds  Cardiac: S1 S2 RR no murmurs  Abdomen:  Soft, non-tender, no masses or organomegally appreciable, BS sluggish. :   No suprapubic or flank tenderness. Neuro:  Sedated on ventilator. Skin:   No rashes, good skin turgor. Extremities:  2+ edema, palpable peripheral pulses, no cyanosis, no calf tenderness. Labs:       Recent Labs     19  0415  19  0420 19  2036 19  0548   WBC 28.0*  --  31.2*  --  23.9*   RBC 3.24*  --  3.18*  --  2.52*   HGB 7.7*   < > 7.7* 7.0* 6.1*   HCT 26.6*   < > 25.9* 24.0* 21.7*   MCV 82.1*  --  81.4*  --  86.1   MCH 23.8*  --  24.2*  --  24.2*   MCHC 28.9  --  29.7  --  28.1*   RDW 19.7*  --  19.8*  --  20.0*     --  359  --  258   MPV 9.6  --  9.9  --  9.9    < > = values in this interval not displayed.       BMP:   Recent Labs     19  0415 19  1354 19  0420 19  0548    137 137 139   K 4.3 4.1 4.4 3.6*     --  102 102   CO2 18*  --  19* 23   BUN 79*  --  88* 77*   CREATININE 4.49*  --  4.69* 4.02*   GLUCOSE 159*  --  186* 168*   CALCIUM 6.8*  --  6.6* 6.5*      Phosphorus:     Recent Labs     12/05/19  0548   PHOS 4.7*     Magnesium:    Recent Labs     12/05/19  0548   MG 2.0     Albumin:    Recent Labs     12/04/19  0420   LABALBU 1.2*     BNP:    No results found for: BNP  MARINE:      Lab Results   Component Value Date    MARINE NEGATIVE 11/30/2019     SPEP:  Lab Results   Component Value Date    PROT 5.3 12/01/2019    PATH ELECTRONICALLY SIGNED.  Yana Enriquez M.D. 11/30/2019     UPEP:   No results found for: LABPE  C3:     Lab Results   Component Value Date    C3 97 11/30/2019     C4:     Lab Results   Component Value Date    C4 12 11/30/2019     MPO ANCA:   No results found for: MPO  PR3 ANCA:   No results found for: PR3  Anti-GBM:   No results found for: GBMABIGG  Hep BsAg:         Lab Results   Component Value Date    HEPBSAG NONREACTIVE 11/30/2019     Hep C AB:          Lab Results   Component Value Date    HEPCAB NONREACTIVE 11/30/2019       Urinalysis/Chemistries:      Lab Results   Component Value Date    NITRU NEGATIVE 11/29/2019    COLORU YELLOW 11/29/2019    PHUR 5.0 11/29/2019    WBCUA 5 TO 10 11/29/2019    RBCUA 0 TO 2 11/29/2019    MUCUS NOT REPORTED 11/29/2019    TRICHOMONAS NOT REPORTED 11/29/2019    YEAST NOT REPORTED 11/29/2019    BACTERIA NOT REPORTED 11/29/2019    SPECGRAV 1.015 11/29/2019    LEUKOCYTESUR NEGATIVE 11/29/2019    UROBILINOGEN Normal 11/29/2019    BILIRUBINUR NEGATIVE 11/29/2019    GLUCOSEU NEGATIVE 11/29/2019    KETUA NEGATIVE 11/29/2019    AMORPHOUS NOT REPORTED 11/29/2019     Urine Sodium:     Lab Results   Component Value Date    DONOVAN 39 11/30/2019     Urine Potassium:  No results found for: KUR  Urine Chloride:  No results found for: CLUR  Urine Osmolarity: No results found for: OSMOU  Urine Protein:   No results found for: TPU  Urine Creatinine:     Lab Results   Component Value Date    LABCREA 116.4 11/30/2019     Urine

## 2019-12-06 ENCOUNTER — ANESTHESIA EVENT (OUTPATIENT)
Dept: OPERATING ROOM | Age: 34
DRG: 463 | End: 2019-12-06
Payer: MEDICARE

## 2019-12-06 ENCOUNTER — ANESTHESIA (OUTPATIENT)
Dept: OPERATING ROOM | Age: 34
DRG: 463 | End: 2019-12-06
Payer: MEDICARE

## 2019-12-06 VITALS — OXYGEN SATURATION: 100 % | DIASTOLIC BLOOD PRESSURE: 63 MMHG | SYSTOLIC BLOOD PRESSURE: 97 MMHG | TEMPERATURE: 99.2 F

## 2019-12-06 LAB
ABO/RH: NORMAL
ABSOLUTE EOS #: 0 K/UL (ref 0–0.4)
ABSOLUTE IMMATURE GRANULOCYTE: 0.27 K/UL (ref 0–0.3)
ABSOLUTE LYMPH #: 1.87 K/UL (ref 1–4.8)
ABSOLUTE MONO #: 1.07 K/UL (ref 0.1–0.8)
ALLEN TEST: POSITIVE
ANION GAP SERPL CALCULATED.3IONS-SCNC: 13 MMOL/L (ref 9–17)
ANTIBODY SCREEN: NEGATIVE
ARM BAND NUMBER: NORMAL
BASOPHILS # BLD: 0 % (ref 0–2)
BASOPHILS ABSOLUTE: 0 K/UL (ref 0–0.2)
BLD PROD TYP BPU: NORMAL
BLOOD BANK SPECIMEN: NORMAL
BUN BLDV-MCNC: 49 MG/DL (ref 6–20)
BUN/CREAT BLD: ABNORMAL (ref 9–20)
CALCIUM SERPL-MCNC: 6.6 MG/DL (ref 8.6–10.4)
CHLORIDE BLD-SCNC: 103 MMOL/L (ref 98–107)
CO2: 22 MMOL/L (ref 20–31)
CREAT SERPL-MCNC: 2.84 MG/DL (ref 0.7–1.2)
CROSSMATCH RESULT: NORMAL
CULTURE: NORMAL
DIFFERENTIAL TYPE: ABNORMAL
DISPENSE STATUS BLOOD BANK: NORMAL
EOSINOPHILS RELATIVE PERCENT: 0 % (ref 1–4)
EXPIRATION DATE: NORMAL
FIO2: 30
GFR AFRICAN AMERICAN: 31 ML/MIN
GFR NON-AFRICAN AMERICAN: 25 ML/MIN
GFR SERPL CREATININE-BSD FRML MDRD: ABNORMAL ML/MIN/{1.73_M2}
GFR SERPL CREATININE-BSD FRML MDRD: ABNORMAL ML/MIN/{1.73_M2}
GLUCOSE BLD-MCNC: 155 MG/DL (ref 74–100)
GLUCOSE BLD-MCNC: 155 MG/DL (ref 75–110)
GLUCOSE BLD-MCNC: 157 MG/DL (ref 75–110)
GLUCOSE BLD-MCNC: 160 MG/DL (ref 75–110)
GLUCOSE BLD-MCNC: 160 MG/DL (ref 75–110)
GLUCOSE BLD-MCNC: 165 MG/DL (ref 75–110)
GLUCOSE BLD-MCNC: 168 MG/DL (ref 75–110)
GLUCOSE BLD-MCNC: 171 MG/DL (ref 75–110)
GLUCOSE BLD-MCNC: 172 MG/DL (ref 75–110)
GLUCOSE BLD-MCNC: 192 MG/DL (ref 70–99)
HCO3 VENOUS: 27.8 MMOL/L (ref 22–29)
HCT VFR BLD CALC: 24.7 % (ref 40.7–50.3)
HEMOGLOBIN: 7 G/DL (ref 13–17)
IMMATURE GRANULOCYTES: 1 %
LV EF: 30 %
LVEF MODALITY: NORMAL
LYMPHOCYTES # BLD: 7 % (ref 24–44)
Lab: NORMAL
MCH RBC QN AUTO: 24.9 PG (ref 25.2–33.5)
MCHC RBC AUTO-ENTMCNC: 28.3 G/DL (ref 28.4–34.8)
MCV RBC AUTO: 87.9 FL (ref 82.6–102.9)
MODE: ABNORMAL
MONOCYTES # BLD: 4 % (ref 1–7)
MORPHOLOGY: ABNORMAL
NEGATIVE BASE EXCESS, VEN: ABNORMAL (ref 0–2)
NRBC AUTOMATED: 0.4 PER 100 WBC
NUCLEATED RED BLOOD CELLS: 1 PER 100 WBC
O2 DEVICE/FLOW/%: ABNORMAL
O2 SAT, VEN: 64 % (ref 60–85)
PATIENT TEMP: 39.1
PCO2, VEN: 39.4 MM HG (ref 41–51)
PDW BLD-RTO: 21.1 % (ref 11.8–14.4)
PH VENOUS: 7.46 (ref 7.32–7.43)
PLATELET # BLD: ABNORMAL K/UL (ref 138–453)
PLATELET ESTIMATE: ABNORMAL
PLATELET, FLUORESCENCE: NORMAL K/UL (ref 138–453)
PLATELET, IMMATURE FRACTION: NORMAL % (ref 1.1–10.3)
PMV BLD AUTO: ABNORMAL FL (ref 8.1–13.5)
PO2, VEN: 31.5 MM HG (ref 30–50)
POC PCO2 TEMP: 43 MM HG
POC PH TEMP: 7.43
POC PO2 TEMP: 37 MM HG
POSITIVE BASE EXCESS, VEN: 4 (ref 0–3)
POTASSIUM SERPL-SCNC: 4 MMOL/L (ref 3.7–5.3)
RBC # BLD: 2.81 M/UL (ref 4.21–5.77)
RBC # BLD: ABNORMAL 10*6/UL
SAMPLE SITE: ABNORMAL
SEG NEUTROPHILS: 88 % (ref 36–66)
SEGMENTED NEUTROPHILS ABSOLUTE COUNT: 23.49 K/UL (ref 1.8–7.7)
SODIUM BLD-SCNC: 138 MMOL/L (ref 135–144)
SPECIMEN DESCRIPTION: NORMAL
TOTAL CO2, VENOUS: 29 MMOL/L (ref 23–30)
TRANSFUSION STATUS: NORMAL
UNIT DIVISION: 0
UNIT NUMBER: NORMAL
WBC # BLD: 26.7 K/UL (ref 3.5–11.3)
WBC # BLD: ABNORMAL 10*3/UL

## 2019-12-06 PROCEDURE — 86901 BLOOD TYPING SEROLOGIC RH(D): CPT

## 2019-12-06 PROCEDURE — 6360000002 HC RX W HCPCS: Performed by: STUDENT IN AN ORGANIZED HEALTH CARE EDUCATION/TRAINING PROGRAM

## 2019-12-06 PROCEDURE — 80048 BASIC METABOLIC PNL TOTAL CA: CPT

## 2019-12-06 PROCEDURE — 6370000000 HC RX 637 (ALT 250 FOR IP): Performed by: STUDENT IN AN ORGANIZED HEALTH CARE EDUCATION/TRAINING PROGRAM

## 2019-12-06 PROCEDURE — 6370000000 HC RX 637 (ALT 250 FOR IP): Performed by: SURGERY

## 2019-12-06 PROCEDURE — 36415 COLL VENOUS BLD VENIPUNCTURE: CPT

## 2019-12-06 PROCEDURE — 2580000003 HC RX 258: Performed by: STUDENT IN AN ORGANIZED HEALTH CARE EDUCATION/TRAINING PROGRAM

## 2019-12-06 PROCEDURE — 94770 HC ETCO2 MONITOR DAILY: CPT

## 2019-12-06 PROCEDURE — 86900 BLOOD TYPING SEROLOGIC ABO: CPT

## 2019-12-06 PROCEDURE — 0JB90ZZ EXCISION OF BUTTOCK SUBCUTANEOUS TISSUE AND FASCIA, OPEN APPROACH: ICD-10-PCS | Performed by: SURGERY

## 2019-12-06 PROCEDURE — 94003 VENT MGMT INPAT SUBQ DAY: CPT

## 2019-12-06 PROCEDURE — 3600000004 HC SURGERY LEVEL 4 BASE: Performed by: SURGERY

## 2019-12-06 PROCEDURE — 2580000003 HC RX 258: Performed by: SURGERY

## 2019-12-06 PROCEDURE — 99233 SBSQ HOSP IP/OBS HIGH 50: CPT | Performed by: INTERNAL MEDICINE

## 2019-12-06 PROCEDURE — 36600 WITHDRAWAL OF ARTERIAL BLOOD: CPT

## 2019-12-06 PROCEDURE — 93306 TTE W/DOPPLER COMPLETE: CPT

## 2019-12-06 PROCEDURE — G0257 UNSCHED DIALYSIS ESRD PT HOS: HCPCS

## 2019-12-06 PROCEDURE — 3600000014 HC SURGERY LEVEL 4 ADDTL 15MIN: Performed by: SURGERY

## 2019-12-06 PROCEDURE — 86920 COMPATIBILITY TEST SPIN: CPT

## 2019-12-06 PROCEDURE — 82947 ASSAY GLUCOSE BLOOD QUANT: CPT

## 2019-12-06 PROCEDURE — 3700000000 HC ANESTHESIA ATTENDED CARE: Performed by: SURGERY

## 2019-12-06 PROCEDURE — 6360000002 HC RX W HCPCS: Performed by: NURSE ANESTHETIST, CERTIFIED REGISTERED

## 2019-12-06 PROCEDURE — 2500000003 HC RX 250 WO HCPCS: Performed by: STUDENT IN AN ORGANIZED HEALTH CARE EDUCATION/TRAINING PROGRAM

## 2019-12-06 PROCEDURE — 2500000003 HC RX 250 WO HCPCS: Performed by: INTERNAL MEDICINE

## 2019-12-06 PROCEDURE — 6370000000 HC RX 637 (ALT 250 FOR IP): Performed by: INTERNAL MEDICINE

## 2019-12-06 PROCEDURE — 0D9P0ZZ DRAINAGE OF RECTUM, OPEN APPROACH: ICD-10-PCS | Performed by: SURGERY

## 2019-12-06 PROCEDURE — P9016 RBC LEUKOCYTES REDUCED: HCPCS

## 2019-12-06 PROCEDURE — 90935 HEMODIALYSIS ONE EVALUATION: CPT

## 2019-12-06 PROCEDURE — 85025 COMPLETE CBC W/AUTO DIFF WBC: CPT

## 2019-12-06 PROCEDURE — 82803 BLOOD GASES ANY COMBINATION: CPT

## 2019-12-06 PROCEDURE — 86850 RBC ANTIBODY SCREEN: CPT

## 2019-12-06 PROCEDURE — 99291 CRITICAL CARE FIRST HOUR: CPT | Performed by: INTERNAL MEDICINE

## 2019-12-06 PROCEDURE — 85055 RETICULATED PLATELET ASSAY: CPT

## 2019-12-06 PROCEDURE — 94761 N-INVAS EAR/PLS OXIMETRY MLT: CPT

## 2019-12-06 PROCEDURE — 2500000003 HC RX 250 WO HCPCS: Performed by: NURSE ANESTHETIST, CERTIFIED REGISTERED

## 2019-12-06 PROCEDURE — 2709999900 HC NON-CHARGEABLE SUPPLY: Performed by: SURGERY

## 2019-12-06 PROCEDURE — 3700000001 HC ADD 15 MINUTES (ANESTHESIA): Performed by: SURGERY

## 2019-12-06 PROCEDURE — 2700000000 HC OXYGEN THERAPY PER DAY

## 2019-12-06 PROCEDURE — 2000000000 HC ICU R&B

## 2019-12-06 RX ORDER — MAGNESIUM HYDROXIDE 1200 MG/15ML
LIQUID ORAL CONTINUOUS PRN
Status: COMPLETED | OUTPATIENT
Start: 2019-12-06 | End: 2019-12-06

## 2019-12-06 RX ORDER — OXYCODONE HYDROCHLORIDE 5 MG/1
10 TABLET ORAL EVERY 4 HOURS PRN
Status: DISCONTINUED | OUTPATIENT
Start: 2019-12-06 | End: 2019-12-15

## 2019-12-06 RX ORDER — OXYCODONE HYDROCHLORIDE 5 MG/1
5 TABLET ORAL EVERY 4 HOURS PRN
Status: DISCONTINUED | OUTPATIENT
Start: 2019-12-06 | End: 2019-12-15

## 2019-12-06 RX ORDER — PROPOFOL 10 MG/ML
INJECTION, EMULSION INTRAVENOUS PRN
Status: DISCONTINUED | OUTPATIENT
Start: 2019-12-06 | End: 2019-12-06 | Stop reason: SDUPTHER

## 2019-12-06 RX ORDER — ROCURONIUM BROMIDE 10 MG/ML
INJECTION, SOLUTION INTRAVENOUS PRN
Status: DISCONTINUED | OUTPATIENT
Start: 2019-12-06 | End: 2019-12-06 | Stop reason: SDUPTHER

## 2019-12-06 RX ORDER — MIDODRINE HYDROCHLORIDE 5 MG/1
10 TABLET ORAL
Status: DISCONTINUED | OUTPATIENT
Start: 2019-12-06 | End: 2019-12-22

## 2019-12-06 RX ADMIN — CALCIUM GLUCONATE: 98 INJECTION, SOLUTION INTRAVENOUS at 17:42

## 2019-12-06 RX ADMIN — ROCURONIUM BROMIDE 50 MG: 10 INJECTION INTRAVENOUS at 07:49

## 2019-12-06 RX ADMIN — Medication 200 MCG/HR: at 14:05

## 2019-12-06 RX ADMIN — MEROPENEM 1 G: 1 INJECTION, POWDER, FOR SOLUTION INTRAVENOUS at 09:37

## 2019-12-06 RX ADMIN — Medication 2 MG/HR: at 14:04

## 2019-12-06 RX ADMIN — HEPARIN SODIUM 1500 UNITS: 1000 INJECTION INTRAVENOUS; SUBCUTANEOUS at 14:31

## 2019-12-06 RX ADMIN — Medication 200 MCG/HR: at 09:24

## 2019-12-06 RX ADMIN — ACETAMINOPHEN 650 MG: 325 TABLET ORAL at 20:43

## 2019-12-06 RX ADMIN — HEPARIN SODIUM 5000 UNITS: 5000 INJECTION INTRAVENOUS; SUBCUTANEOUS at 20:45

## 2019-12-06 RX ADMIN — Medication 200 MCG/HR: at 03:25

## 2019-12-06 RX ADMIN — I.V. FAT EMULSION 250 ML: 20 EMULSION INTRAVENOUS at 17:43

## 2019-12-06 RX ADMIN — SODIUM CHLORIDE 2.9 UNITS/HR: 9 INJECTION, SOLUTION INTRAVENOUS at 10:07

## 2019-12-06 RX ADMIN — HEPARIN SODIUM 5000 UNITS: 5000 INJECTION INTRAVENOUS; SUBCUTANEOUS at 15:28

## 2019-12-06 RX ADMIN — MEROPENEM 1 G: 1 INJECTION, POWDER, FOR SOLUTION INTRAVENOUS at 20:45

## 2019-12-06 RX ADMIN — MIDODRINE HYDROCHLORIDE 10 MG: 5 TABLET ORAL at 17:50

## 2019-12-06 RX ADMIN — SODIUM CHLORIDE, PRESERVATIVE FREE 10 ML: 5 INJECTION INTRAVENOUS at 20:47

## 2019-12-06 RX ADMIN — HEPARIN SODIUM 1600 UNITS: 1000 INJECTION INTRAVENOUS; SUBCUTANEOUS at 14:32

## 2019-12-06 RX ADMIN — MIDODRINE HYDROCHLORIDE 10 MG: 5 TABLET ORAL at 13:38

## 2019-12-06 RX ADMIN — Medication 200 MCG/HR: at 19:05

## 2019-12-06 RX ADMIN — DAKIN'S SOLUTION 0.125% (QUARTER STRENGTH): 0.12 SOLUTION at 20:45

## 2019-12-06 RX ADMIN — MEROPENEM 1 G: 1 INJECTION, POWDER, FOR SOLUTION INTRAVENOUS at 01:00

## 2019-12-06 RX ADMIN — PROPOFOL 50 MG: 10 INJECTION, EMULSION INTRAVENOUS at 07:49

## 2019-12-06 RX ADMIN — OXYCODONE HYDROCHLORIDE 10 MG: 5 TABLET ORAL at 20:44

## 2019-12-06 ASSESSMENT — PULMONARY FUNCTION TESTS
PIF_VALUE: 34
PIF_VALUE: 29
PIF_VALUE: 31
PIF_VALUE: 35
PIF_VALUE: 32
PIF_VALUE: 35
PIF_VALUE: 32
PIF_VALUE: 0
PIF_VALUE: 25
PIF_VALUE: 32
PIF_VALUE: 31
PIF_VALUE: 28
PIF_VALUE: 27
PIF_VALUE: 24
PIF_VALUE: 23
PIF_VALUE: 26
PIF_VALUE: 30
PIF_VALUE: 33
PIF_VALUE: 30
PIF_VALUE: 24
PIF_VALUE: 27
PIF_VALUE: 35
PIF_VALUE: 29
PIF_VALUE: 29
PIF_VALUE: 0
PIF_VALUE: 28
PIF_VALUE: 0
PIF_VALUE: 34
PIF_VALUE: 36
PIF_VALUE: 21
PIF_VALUE: 1
PIF_VALUE: 32
PIF_VALUE: 28
PIF_VALUE: 22
PIF_VALUE: 27
PIF_VALUE: 32
PIF_VALUE: 30
PIF_VALUE: 2
PIF_VALUE: 30
PIF_VALUE: 23
PIF_VALUE: 0
PIF_VALUE: 31
PIF_VALUE: 31
PIF_VALUE: 34
PIF_VALUE: 27
PIF_VALUE: 35
PIF_VALUE: 0
PIF_VALUE: 34
PIF_VALUE: 0
PIF_VALUE: 30
PIF_VALUE: 31
PIF_VALUE: 30
PIF_VALUE: 27
PIF_VALUE: 32
PIF_VALUE: 26
PIF_VALUE: 36
PIF_VALUE: 0
PIF_VALUE: 34
PIF_VALUE: 0
PIF_VALUE: 27
PIF_VALUE: 34
PIF_VALUE: 29
PIF_VALUE: 1
PIF_VALUE: 30
PIF_VALUE: 0
PIF_VALUE: 27
PIF_VALUE: 31
PIF_VALUE: 0
PIF_VALUE: 31
PIF_VALUE: 35
PIF_VALUE: 0
PIF_VALUE: 28
PIF_VALUE: 31
PIF_VALUE: 27
PIF_VALUE: 0
PIF_VALUE: 32
PIF_VALUE: 30
PIF_VALUE: 30
PIF_VALUE: 33
PIF_VALUE: 1
PIF_VALUE: 1
PIF_VALUE: 35
PIF_VALUE: 29
PIF_VALUE: 32
PIF_VALUE: 26
PIF_VALUE: 31
PIF_VALUE: 30
PIF_VALUE: 25
PIF_VALUE: 23
PIF_VALUE: 29
PIF_VALUE: 36
PIF_VALUE: 30
PIF_VALUE: 29
PIF_VALUE: 30
PIF_VALUE: 1
PIF_VALUE: 34

## 2019-12-06 ASSESSMENT — PAIN SCALES - GENERAL: PAINLEVEL_OUTOF10: 6

## 2019-12-06 NOTE — PLAN OF CARE
Problem: Pain:  Goal: Pain level will decrease  Description  Pain level will decrease  Outcome: Met This Shift  Goal: Control of acute pain  Description  Control of acute pain  Outcome: Met This Shift  Goal: Control of chronic pain  Description  Control of chronic pain  Outcome: Ongoing

## 2019-12-06 NOTE — CARE COORDINATION
Met with patient parents to provide information about LTACH. Discussed complexity of care needs. Provided list for choice,encouraged research/visits. Will follow for choice.

## 2019-12-06 NOTE — PLAN OF CARE
Problem: OXYGENATION/RESPIRATORY FUNCTION  Goal: Patient will maintain patent airway  12/5/2019 2022 by Dinesh Ulloa RCP  Outcome: Ongoing     Problem: OXYGENATION/RESPIRATORY FUNCTION  Goal: Patient will achieve/maintain normal respiratory rate/effort  Description  Respiratory rate and effort will be within normal limits for the patient  12/5/2019 2022 by Dinesh Ullao RCP  Outcome: Ongoing     Problem: MECHANICAL VENTILATION  Goal: Patient will maintain patent airway  12/5/2019 2022 by Dinesh Ulloa RCP  Outcome: Ongoing     Problem: MECHANICAL VENTILATION  Goal: Oral health is maintained or improved  12/5/2019 2022 by Dinesh Ulloa RCP  Outcome: Ongoing     Problem: MECHANICAL VENTILATION  Goal: ET tube will be managed safely  12/5/2019 2022 by Dinesh Ulloa RCP  Outcome: Ongoing     Problem: MECHANICAL VENTILATION  Goal: Ability to express needs and understand communication  12/5/2019 2022 by Dinesh Ulloa RCP  Outcome: Ongoing     Problem: SKIN INTEGRITY  Goal: Skin integrity is maintained or improved  12/5/2019 2022 by Dinesh Ulloa RCP  Outcome: Ongoing

## 2019-12-06 NOTE — PROGRESS NOTES
Critical Care Team - Daily Progress Note      Date and time: 2019 9:05 AM  Patient's name:  [address] Record Number: [id number]  Patient's account/billing number: [de-identified]  Patient's YOB: 1985  Age: 37 y.o. Date of Admission: 2019  3:39 PM  Length of stay during current admission: 7      Primary Care Physician: No primary care provider on file. ICU Attending Physician: Dr. Nicholas Anthony    Code Status: Full Code    Reason for ICU admission: Necrotizing Fascitis vertebral lucency concerning for malignancy    SUBJECTIVE:     OVERNIGHT EVENTS:       Patient seen and examined. Patient is awake alert, following commands opening eyes, not able to move extremities. Patient denies any pain. Fentanyl drip      Patient had 2- dialysis net removal of 2.6 L fluid length of treatment 3.5 hours. Patient tolerated well,      Hemoglobin 7.0<----6.1<----7.7<---8.2, received 4  PRBC till now ,Urine output 780,   nephrology on board. He is +19, 721 since admission     on insulin gtt,     On Versed drip. On bicarb drip: Discontinued     ID switched to meropenem. Tube feed 10 ml,   Gen surgery ok for starting tube feed. Patient taken to OR for wound VAC placement    Echo ordered      Fever:-  Temp (24hrs), Av.3 °F (37.4 °C), Min:97.9 °F (36.6 °C), Max:100 °F (87.4 °C)    Systolic (96QRY), MWU:142 , Min:78 , HMZ:764     Diastolic (88GIF), GLA:90, Min:36, Max:128      Urine output in the last 24 hours: In: 2329.8 [I.V.:642.8;  Blood:350; NG/GT:99]  Out: 745 [Urine:430; Drains:85]    Patient Vitals for the past 96 hrs (Last 3 readings):   Weight   19 0445 (!) 319 lb 3.6 oz (144.8 kg)   19 1256 (!) 316 lb 2.2 oz (143.4 kg)   19 0845 (!) 320 lb 15.8 oz (145.6 kg)         AWAKE & FOLLOWING COMMANDS:  [x] No   [] Yes    CURRENT VENTILATION STATUS:     [x] Ventilator  [] BIPAP  [] Nasal Cannula [] Room Air        SECRETIONS Amount:  [] Small [] Moderate  [] Large  [x] Net -688.36 ml     Wt Readings from Last 2 Encounters:   12/06/19 (!) 319 lb 3.6 oz (144.8 kg)     Body mass index is 47.14 kg/m². PHYSICAL EXAMINATION:    General appearance -intubated and sedated, NAD  Mental status -intubated and sedated, not following commands  Chest -bilateral breath sounds heard, no wheezing, on vent  Heart -normal rate, regular rhythm, normal S1 and S2  Abdomen - soft, nondistended, no rash  Neurological - intubated and sedated, not following commands  Extremities - weak pulses in extremities, Dopplerable RLE pulse  Skinpost debridement of gluteal and perianal region. Full-thickness ulcer in the left plantar surface.  See media for post op wounds debridement     Any additional physical findings:    MEDICATIONS:    Scheduled Meds:   [MAR Hold] famotidine  20 mg Per NG tube Daily    [MAR Hold] midodrine  5 mg Oral TID WC    [MAR Hold] furosemide  80 mg Intravenous Daily    [MAR Hold] sodium hypochlorite   Irrigation BID    [MAR Hold] thiamine and folic acid IVPB   Intravenous Daily    [MAR Hold] fat emulsion  250 mL Intravenous Daily    [MAR Hold] meropenem  1 g Intravenous Q12H    [MAR Hold] sodium chloride flush  10 mL Intravenous 2 times per day    [facility] Hold] heparin (porcine)  5,000 Units Subcutaneous 3 times per day     Continuous Infusions:   [MAR Hold] fentaNYL 200 mcg/hr (12/06/19 0325)    [MAR Hold] PN-Adult 2-in-1 Central Line (Standard) 55 mL/hr at 12/05/19 1757    [MAR Hold] midazolam 2 mg/hr (12/06/19 0738)    [MAR Hold] insulin 3.3 Units/hr (12/06/19 0738)    [MAR Hold] dextrose      [MAR Hold] sodium chloride 100 mL/hr at 12/06/19 0807    [MAR Hold] norepinephrine (LEVOPHED) infusion (double concentration) 5 mcg/min (12/06/19 0816)     PRN Meds:   [[facility] Hold] sodium chloride, 250 mL, PRN  [MAR Hold] sodium chloride, 150 mL, PRN  [MAR Hold] heparin (porcine), 1,500 Units, PRN  [MAR Hold] heparin (porcine), 1,600 Units, PRN  [MAR Hold] acetaminophen, 650 mg, Profile:   Recent Labs     12/04/19  0420 12/05/19  0548 12/06/19  0521    139 138   K 4.4 3.6* 4.0    102 103   CO2 19* 23 22   BUN 88* 77* 49*   CREATININE 4.69* 4.02* 2.84*   GLUCOSE 186* 168* 192*   CALCIUM 6.6* 6.5* 6.6*   LABALBU 1.2*  --   --       Magnesium:   Lab Results   Component Value Date    MG 2.0 12/05/2019     Phosphorus:   Lab Results   Component Value Date    PHOS 4.7 12/05/2019     Ionized Calcium:   Lab Results   Component Value Date    CAION 1.05 12/03/2019            Troponin: No results for input(s): TROPONINI in the last 72 hours. Radiology/Imaging:     Chest Xray (12/6/2019):    ASSESSMENT:     Principal Problem:    Necrotizing fasciitis ([facility]  Active Problems:    Hypertension    Diabetes ([facility]    Diabetic foot ulcer ([facility] [address]  Resolved Problems:    * No resolved hospital problems. *    PLAN:     WEAN PER PROTOCOL:  [x] No   [] Yes  [] N/A    DISCONTINUE ANY LABS:   [x] No   [] Yes    TRANSFER OUT OF ICU:   [x] No   [] Yes    ADDITIONAL PLAN:    1. Pressure ulcer cant be staged present on admission / Necrotizing Faciitis involving buttocks and scrotum - s/p POD#7 s/p wide complex debridement of gluteal/perianal region. region extensive necrotic tissue that involved the anus and distal rectum. Had  debridement, washout of gluteal and perineal, diverting loop colostomy. Bedside debridement done on 12/4, dressing changes with him on site will continue to do wet-to-dry dressing changes as per surgery. -GEN surgery and urology on board. Patient taken to the OR for wound VAC placement on the posterior side  - switched to meropenem (12/1) per ID     2. Septic shock -on levophed   . fluid replaced  Lactic acid 1.4 yesterday  - Blood cultures drawn 12/2/19:  No growth   -Blood cultures showed no growth at 6 days      3. DEBBY on CKD - urology on board. 780 UOP last 24 hrs , fluid replaced.     -Urine culture : Grew Proteus mirabilis, ID is aware of it  -Nephrology recs:  Cassy Hinojosa

## 2019-12-06 NOTE — PROGRESS NOTES
enteric tube not well visualized. Recommend follow-up KUB if clinically warranted.               Papiot Velez MD  12/6/19, 2:06 PM         Attending attestation:  I personally evaluated the patient and directed the medical decision making with Resident/JUSTIN after the physical/radiologic exam and laboratory values were reviewed and confirmed. OR for debridement of wound.     Lio Osman MD

## 2019-12-06 NOTE — PROGRESS NOTES
([facility]     Diabetes mellitus ([facility]     Hypertension     Spina bifida aperta of lumbar spine ([facility]        Past Surgical  History:     Past Surgical History:   Procedure Laterality Date    COLOSTOMY N/A 12/3/2019    LAPAROSCOPIC CONVERTED TO OPEN DIVERTING LOOP COLOSTOMY; WOUND VAC APPLICATION performed by Chris Carbajal MD at [facility] Bilateral 11/29/2019    RECTAL PERIRECTAL INCISION AND DRAINAGE, [facility] LOOP COLOSTOMY CREATION performed by Robb Xie MD at [facility]       Medications:      famotidine  20 mg Per NG tube Daily    midodrine  5 mg Oral TID WC    furosemide  80 mg Intravenous Daily    sodium hypochlorite   Irrigation BID    thiamine and folic acid IVPB   Intravenous Daily    fat emulsion  250 mL Intravenous Daily    meropenem  1 g Intravenous Q12H    sodium chloride flush  10 mL Intravenous 2 times per day    heparin (porcine)  5,000 Units Subcutaneous 3 times per day       Social History:     Social History     Socioeconomic History    Marital status: Unknown     Spouse name: Not on file    Number of children: Not on file    Years of education: Not on file    Highest education level: Not on file   Occupational History    Not on file   Social Needs    Financial resource strain: Not on file    Food insecurity:     Worry: Not on file     Inability: Not on file    Transportation needs:     Medical: Not on file     Non-medical: Not on file   Tobacco Use    Smoking status: Not on file   Substance and Sexual Activity    Alcohol use: Not on file    Drug use: Not on file    Sexual activity: Not on file   Lifestyle    Physical activity:     Days per week: Not on file     Minutes per session: Not on file    Stress: Not on file   Relationships    Social connections:     Talks on phone: Not on file     Gets together: Not on file     Attends Druze service: Not on file     Active member of club or organization: Not on file     Attends meetings of clubs REPORTED 11/29/2019    RBC 2.81 12/06/2019    TRICHOMONAS NOT REPORTED 11/29/2019    WBC 26.7 12/06/2019    YEAST NOT REPORTED 11/29/2019    TURBIDITY TURBID 11/29/2019     Lab Results   Component Value Date    CREATININE 2.84 12/06/2019    GLUCOSE 192 12/06/2019       Medical Decision Making-Imaging:     EXAMINATION:   ONE XRAY VIEW OF THE CHEST       11/29/2019 9:01 pm       COMPARISON:   4 hours ago       HISTORY:   ORDERING SYSTEM PROVIDED HISTORY: intubated   TECHNOLOGIST PROVIDED HISTORY:   intubated   Reason for Exam: portable supine/ intubated   Acuity: Acute   Type of Exam: Initial       FINDINGS:   New ET tube terminates 38 mm above the ron.  There appears to be a   possible enteric tube which is not well visualized distally.  Right IJ   catheter terminates in the right atrium and is unchanged.  Lungs are clear. Cardiomegaly.  Mediastinum normal.  Bony thorax intact.           Impression   New ET tube as above.  Possible new enteric tube not well visualized. Recommend follow-up KUB if clinically warranted. CT ABDOMEN AND PELVIS WITHOUT CONTRAST    COMPARISON:  3/22/2017    CLINICAL HISTORY: Infection in scrotum, lower abdominal pain, diabetic, 30,000 white blood cell count. TECHNIQUE: Unenhanced axial images were obtained from the lung bases to the pubic symphysis with sagittal and coronal 2D reformatted images. Oral contrast administered:  No.  Automatic exposure control (AEC) was utilized. CT ABDOMEN FINDINGS:      The lung bases are unremarkable. There is a small pericardial effusion versus thickening. The liver, spleen, and pancreas demonstrate no acute abnormality given compromised evaluation without intravenous contrast.  There may be mild splenomegaly.  The adrenal glands appear within normal limits. There is no hydronephrosis or obstructing urinary tract calculi. Small amount of free fluid is seen adjacent to the liver inferiorly. .    The appendix is visualized in the right lower quadrant and appears within normal limits.       There is no evidence for bowel obstruction. Stranding is seen within the subcutaneous fat overlying both lateral abdominal walls left greater than right.  There is ill-defined fluid collection within the subcutaneous fat overlying the left lateral abdominal wall possibly representing phlegmon or developing abscess although no organized   abscess is seen. There is a large amount of soft tissue emphysema involving both the right and left buttock extending to the perineum tissue thickening is seen especially on the left involving the left buttock. CT PELVIS FINDINGS:        No distal ureteral calculi or bladder calculi. There is no free fluid in the pelvis. Catheter is seen within the urinary bladder. Osseous changes within the left hemipelvis which may be chronic in nature. IMPRESSION:    Extensive subcutaneous emphysema as described above involving both buttocks extending to the perineum.  The possibility of Ashley gangrene/necrotizing fasciitis cannot be excluded. Possible phlegmon or developing soft tissue abscess overlying the left lateral abdominal wall as noted above.  No organized abscess is seen however. Osseous changes within the left hemipelvis which may be chronic in nature. Results the study were called to Dr. Eleonora Zepeda [phone] on 11/29/2019  All CT scans at this facility use dose modulation, iterative reconstruction, and/or weight based dosing when appropriate to reduce radiation dose to as low as reasonably achievable. Medical Decision [facility]:   11/29/2019  8:17 PM - Ana Lee Incoming Lab Results From [email or web address]     Specimen Information: Buttock; Tissue        Component Collected Lab   Specimen Description 11/29/2019  7:34  [address]   . BUTTOCK BILATERAL TS    Special Requests 11/29/2019  7:34  [address]   NOT REPORTED    Direct Exam 11/29/2019  7:34 PM [facility] [address]   NO NEUTROPHILS SEEN    Direct Exam Abnormal  11/29/2019  7:34  [facility] [address] [place]    Direct Exam Abnormal  11/29/2019  7:34 PM [address] NEGATIVE RODS    Culture 11/29/2019  7:34  [address]   PENDING          Medical Decision Making-Other:     Note:  · Labs, medications, radiologic studies were reviewed with personal review of films  · Large amounts of data were reviewed  · Discussed with nursing Staff, Discharge planner  · Infection Control and Prevention measures reviewed  · All prior entries were reviewed  · Administer medications as ordered  · Prognosis: Guarded  · Discharge planning reviewed  · Follow up as outpatient. Thank you for allowing us to participate in the care of this patient. Please call with questions.     Seth Hanna MD  Pager: [phone] - Office: [phone]

## 2019-12-06 NOTE — PROGRESS NOTES
Nephrology Progress Note        Subjective: patient evaluated on dialysis. Pt is stable on dialysis. Access cannulated without problems. No new issues overnite. Stable hemodynamics. Underwent another debridement procedure the gluteal areas. Wound VAC still not placed. Felt that he may need more debridement. Pressors continue although being weaned downwards. Requiring daily dialysis for ultrafiltration and clearances. Still about 14 kg above admission weight. Generalized edema evident. TPN continues. Being adjusted based on lab results. Objective:  CURRENT TEMPERATURE:  Temp: 99.9 °F (37.7 °C)  MAXIMUM TEMPERATURE OVER 24HRS:  Temp (24hrs), Av.4 °F (37.4 °C), Min:97.9 °F (36.6 °C), Max:100 °F (37.8 °C)    CURRENT RESPIRATORY RATE:  Resp: 18  CURRENT PULSE:  Pulse: 112  CURRENT BLOOD PRESSURE:  BP: (!) 143/73  24HR BLOOD PRESSURE RANGE:  Systolic (77RYN), UUJ:659 , Min:78 , LAURO:944   ; Diastolic (02JXF), JZW:66, Min:36, Max:128    24HR INTAKE/OUTPUT:      Intake/Output Summary (Last 24 hours) at 2019 1103  Last data filed at 2019 0855  Gross per 24 hour   Intake 2709.84 ml   Output 4185 ml   Net -1475.16 ml     Patient Vitals for the past 96 hrs (Last 3 readings):   Weight   19 1034 (!) 319 lb 12.8 oz (145.1 kg)   19 0445 (!) 319 lb 3.6 oz (144.8 kg)   19 1256 (!) 316 lb 2.2 oz (143.4 kg)         Physical Exam:  General appearance: Sedated intubated on the ventilator  Skin: warm and dry, no rash or erythema  Eyes: conjunctivae normal and sclera anicteric  ENT:no thrush no pharyngeal congestion   Neck:  No JVD, No Thyromegaly  Pulmonary: clear to auscultation and no wheezing or rhonchi   Cardiovascular: normal S1 and S2. NO rubs and NO murmur.  No S3 OR S4   Abdomen: soft nontender, bowel sounds present, no organomegaly,  no ascites   Extremities: no cyanosis, clubbing 2+ edema  Access:  Temporary dialysis catheter    Current Medications:    midodrine (PROAMATINE) tablet 10 mg, TID WC  fentaNYL 20 mcg/mL Infusion, Continuous  0.9 % sodium chloride bolus, PRN  0.9 % sodium chloride bolus, PRN  famotidine (PEPCID) tablet 20 mg, Daily  PN-Adult 2-in-1 Central Line (Standard), Continuous TPN  midazolam (VERSED) 100 mg in dextrose 5% 100 mL infusion, Continuous  heparin (porcine) injection 1,500 Units, PRN  heparin (porcine) injection 1,600 Units, PRN  furosemide (LASIX) injection 80 mg, Daily  sodium hypochlorite (DAKINS) 0.125 % external solution, BID  acetaminophen (TYLENOL) tablet 650 mg, Q4H PRN  insulin regular (HUMULIN R;NOVOLIN R) 100 Units in sodium chloride 0.9 % 100 mL infusion, Continuous  glucose (GLUTOSE) 40 % oral gel 15 g, PRN  dextrose 50 % IV solution, PRN  glucagon (rDNA) injection 1 mg, PRN  dextrose 5 % solution, PRN  folic acid 1 mg, thiamine (B-1) 100 mg in dextrose 5 % 50 mL IVPB, Daily  fat emulsion 20 % infusion 250 mL, Daily  acetaminophen (TYLENOL) tablet 650 mg, Q4H PRN  REFRESH LACRI-LUBE ointment OINT, PRN  povidone-iodine (BETADINE) 10 % external solution, PRN  0.9 % sodium chloride infusion, Continuous  meropenem (MERREM) 1 g in sodium chloride 0.9 % 100 mL IVPB (mini-bag), Q12H  sodium chloride flush 0.9 % injection 10 mL, 2 times per day  sodium chloride flush 0.9 % injection 10 mL, PRN  magnesium hydroxide (MILK OF MAGNESIA) 400 MG/5ML suspension 30 mL, Daily PRN  ondansetron (ZOFRAN) injection 4 mg, Q6H PRN  glucagon (rDNA) injection 1 mg, PRN  heparin (porcine) injection 5,000 Units, 3 times per day  norepinephrine (LEVOPHED) 32 mg in dextrose 5 % 250 mL infusion, Continuous      Labs:   CBC:   Recent Labs     12/04/19  0420 12/04/19  2036 12/05/19  0548 12/06/19  0521   WBC 31.2*  --  23.9* 26.7*   RBC 3.18*  --  2.52* 2.81*   HGB 7.7* 7.0* 6.1* 7.0*   HCT 25.9* 24.0* 21.7* 24.7*     --  258 See Reflexed IPF Result   MPV 9.9  --  9.9 NOT REPORTED      BMP:   Recent Labs     12/04/19  0420 12/05/19  0548 12/06/19  0521    139 138   K 4.4 3. 6* 4.0    102 103   CO2 19* 23 22   BUN 88* 77* 49*   CREATININE 4.69* 4.02* 2.84*   GLUCOSE 186* 168* 192*   CALCIUM 6.6* 6.5* 6.6*        Phosphorus:    Recent Labs     12/05/19  0548   PHOS 4.7*     Magnesium:   Recent Labs     12/05/19  0548   MG 2.0     Albumin:   Recent Labs     12/04/19  0420   LABALBU 1.2*       Dialysis bath: Dialysis Bath  K+ (Potassium): 3  Ca+ (Calcium): 3  Na+ (Sodium): 140  HCO3 (Bicarb): 35    Radiology:  Reviewed as available. Assessment:  1 Acute Kidney Injury: Secondary to ischemic ATN from sepsis/systemic intermittent response syndrome from necrotizing fasciitis, dialysis dependent, acute kidney injury stage III  2. Respiratory failure on the ventilator  3. Necrotizing fasciitis status post wide complex debridement of gluteal, perineal region and open colostomy  4. Chronic kidney disease stage III from diabetic nephrosclerosis baseline 1.6-1.8  5. Protein calorie malnutrition  6. Fluid overload    Plan:  1. Wt removal and dialysis orders reviewed. 2.  We will need daily dialysis still adequate dry weight can be achieved  3. Cont pt on aranesp and zemplar per protocol. 4. Follow up labs ordered. 5.  Minimize fluids as much as possible  6. Increase ProAmatine to 10 3 times daily through the NG  7. Prognosis guarded  8. Family updated  5.. We will follow      Please do not hesitate to call with questions.     Electronically signed by Nani Rosenthal MD on 12/6/2019 at 11:03 AM

## 2019-12-06 NOTE — OP NOTE
sharp excisional debridement with scissors. The necrotic overlying subcutaneous tissue, muscle, and some fascia was sharply excised. After thoroughly excising the dead/sloughed off tissue her attention was turned to the area surrounding the rectum. The rectum was probed using the index finger with no signs of any mucosal involvement, there was no hole or tear noted in the rectum. After a finger was placed into the rectum the tissue was palpated down around the rectum on the exterior portion with the other hand with findings of 2 loculated abscesses at the 3:00 and 6:00 positions. These areas were carefully opened up and drained. All hemostasis was achieved with Bovie cautery and direct pressure. The wound was then copiously irrigated once more with 2 to 3 L of warm normal saline. The remaining tissue was then debrided using a curette. Hemostasis was achieved with Bovie cautery. The wound was then irrigated and dried. The wound was then packed with 0.125% Dakin solution soaked Kerlix gauze. The overlying Kerlix packed wound was covered with ABDs and then had perioperative underwear placed on top. The patient tolerated the procedure well. In the operating room the patient was given 1 unit of packed red blood cells due to 200 mL of blood loss. The patient remained intubated and was transferred back to the medical ICU in stable condition. At the end of the case all sponge, needle, and instrument counts were correct. We will plan to bring the patient back to the operating room in several days for exploration of the wound with possible wound VAC placement if the wound appears to have less/resolving infection at that time. Dr. Carolyn Romero was present for the entire case.     Electronically signed by Arthur Gant DO on 12/6/2019 at 3:56 PM

## 2019-12-07 ENCOUNTER — APPOINTMENT (OUTPATIENT)
Dept: GENERAL RADIOLOGY | Age: 34
DRG: 463 | End: 2019-12-07
Attending: INTERNAL MEDICINE
Payer: MEDICARE

## 2019-12-07 LAB
ABSOLUTE EOS #: 0.42 K/UL (ref 0–0.44)
ABSOLUTE IMMATURE GRANULOCYTE: 0.84 K/UL (ref 0–0.3)
ABSOLUTE LYMPH #: 1.46 K/UL (ref 1.1–3.7)
ABSOLUTE MONO #: 1.25 K/UL (ref 0.1–1.2)
ANION GAP SERPL CALCULATED.3IONS-SCNC: 9 MMOL/L (ref 9–17)
BASOPHILS # BLD: 0 % (ref 0–2)
BASOPHILS ABSOLUTE: 0 K/UL (ref 0–0.2)
BUN BLDV-MCNC: 36 MG/DL (ref 6–20)
BUN/CREAT BLD: ABNORMAL (ref 9–20)
CALCIUM SERPL-MCNC: 6.8 MG/DL (ref 8.6–10.4)
CHLORIDE BLD-SCNC: 104 MMOL/L (ref 98–107)
CO2: 24 MMOL/L (ref 20–31)
CREAT SERPL-MCNC: 2.44 MG/DL (ref 0.7–1.2)
DIFFERENTIAL TYPE: ABNORMAL
EOSINOPHILS RELATIVE PERCENT: 2 % (ref 1–4)
GFR AFRICAN AMERICAN: 37 ML/MIN
GFR NON-AFRICAN AMERICAN: 30 ML/MIN
GFR SERPL CREATININE-BSD FRML MDRD: ABNORMAL ML/MIN/{1.73_M2}
GFR SERPL CREATININE-BSD FRML MDRD: ABNORMAL ML/MIN/{1.73_M2}
GLUCOSE BLD-MCNC: 123 MG/DL (ref 75–110)
GLUCOSE BLD-MCNC: 138 MG/DL (ref 75–110)
GLUCOSE BLD-MCNC: 140 MG/DL (ref 75–110)
GLUCOSE BLD-MCNC: 141 MG/DL (ref 75–110)
GLUCOSE BLD-MCNC: 149 MG/DL (ref 75–110)
GLUCOSE BLD-MCNC: 158 MG/DL (ref 75–110)
GLUCOSE BLD-MCNC: 162 MG/DL (ref 75–110)
GLUCOSE BLD-MCNC: 163 MG/DL (ref 70–99)
GLUCOSE BLD-MCNC: 166 MG/DL (ref 75–110)
GLUCOSE BLD-MCNC: 171 MG/DL (ref 75–110)
HCT VFR BLD CALC: 22.5 % (ref 40.7–50.3)
HCT VFR BLD CALC: 24.1 % (ref 40.7–50.3)
HCT VFR BLD CALC: 27.7 % (ref 40.7–50.3)
HEMOGLOBIN: 6.8 G/DL (ref 13–17)
HEMOGLOBIN: 7 G/DL (ref 13–17)
HEMOGLOBIN: 8 G/DL (ref 13–17)
IMMATURE GRANULOCYTES: 4 %
LYMPHOCYTES # BLD: 7 % (ref 24–43)
MCH RBC QN AUTO: 25.5 PG (ref 25.2–33.5)
MCHC RBC AUTO-ENTMCNC: 29 G/DL (ref 28.4–34.8)
MCV RBC AUTO: 87.6 FL (ref 82.6–102.9)
MONOCYTES # BLD: 6 % (ref 3–12)
MORPHOLOGY: ABNORMAL
NRBC AUTOMATED: 0.3 PER 100 WBC
PDW BLD-RTO: 20.5 % (ref 11.8–14.4)
PLATELET # BLD: 243 K/UL (ref 138–453)
PLATELET ESTIMATE: ABNORMAL
PMV BLD AUTO: 9.6 FL (ref 8.1–13.5)
POTASSIUM SERPL-SCNC: 3.6 MMOL/L (ref 3.7–5.3)
RBC # BLD: 2.75 M/UL (ref 4.21–5.77)
RBC # BLD: ABNORMAL 10*6/UL
SEG NEUTROPHILS: 81 % (ref 36–65)
SEGMENTED NEUTROPHILS ABSOLUTE COUNT: 16.93 K/UL (ref 1.5–8.1)
SODIUM BLD-SCNC: 137 MMOL/L (ref 135–144)
WBC # BLD: 20.9 K/UL (ref 3.5–11.3)
WBC # BLD: ABNORMAL 10*3/UL

## 2019-12-07 PROCEDURE — 82947 ASSAY GLUCOSE BLOOD QUANT: CPT

## 2019-12-07 PROCEDURE — P9016 RBC LEUKOCYTES REDUCED: HCPCS

## 2019-12-07 PROCEDURE — 6360000002 HC RX W HCPCS: Performed by: STUDENT IN AN ORGANIZED HEALTH CARE EDUCATION/TRAINING PROGRAM

## 2019-12-07 PROCEDURE — 2700000000 HC OXYGEN THERAPY PER DAY

## 2019-12-07 PROCEDURE — 94770 HC ETCO2 MONITOR DAILY: CPT

## 2019-12-07 PROCEDURE — 2580000003 HC RX 258: Performed by: STUDENT IN AN ORGANIZED HEALTH CARE EDUCATION/TRAINING PROGRAM

## 2019-12-07 PROCEDURE — 6370000000 HC RX 637 (ALT 250 FOR IP): Performed by: STUDENT IN AN ORGANIZED HEALTH CARE EDUCATION/TRAINING PROGRAM

## 2019-12-07 PROCEDURE — 94003 VENT MGMT INPAT SUBQ DAY: CPT

## 2019-12-07 PROCEDURE — 85025 COMPLETE CBC W/AUTO DIFF WBC: CPT

## 2019-12-07 PROCEDURE — 85018 HEMOGLOBIN: CPT

## 2019-12-07 PROCEDURE — 36415 COLL VENOUS BLD VENIPUNCTURE: CPT

## 2019-12-07 PROCEDURE — 99232 SBSQ HOSP IP/OBS MODERATE 35: CPT | Performed by: INTERNAL MEDICINE

## 2019-12-07 PROCEDURE — 94761 N-INVAS EAR/PLS OXIMETRY MLT: CPT

## 2019-12-07 PROCEDURE — 71045 X-RAY EXAM CHEST 1 VIEW: CPT

## 2019-12-07 PROCEDURE — 86900 BLOOD TYPING SEROLOGIC ABO: CPT

## 2019-12-07 PROCEDURE — 2500000003 HC RX 250 WO HCPCS: Performed by: INTERNAL MEDICINE

## 2019-12-07 PROCEDURE — 85014 HEMATOCRIT: CPT

## 2019-12-07 PROCEDURE — 6370000000 HC RX 637 (ALT 250 FOR IP): Performed by: INTERNAL MEDICINE

## 2019-12-07 PROCEDURE — 99291 CRITICAL CARE FIRST HOUR: CPT | Performed by: INTERNAL MEDICINE

## 2019-12-07 PROCEDURE — 2000000000 HC ICU R&B

## 2019-12-07 PROCEDURE — 80048 BASIC METABOLIC PNL TOTAL CA: CPT

## 2019-12-07 PROCEDURE — 2500000003 HC RX 250 WO HCPCS: Performed by: STUDENT IN AN ORGANIZED HEALTH CARE EDUCATION/TRAINING PROGRAM

## 2019-12-07 RX ORDER — SODIUM CHLORIDE 0.9 % (FLUSH) 0.9 %
10 SYRINGE (ML) INJECTION EVERY 12 HOURS
Status: DISCONTINUED | OUTPATIENT
Start: 2019-12-07 | End: 2019-12-15

## 2019-12-07 RX ORDER — 0.9 % SODIUM CHLORIDE 0.9 %
250 INTRAVENOUS SOLUTION INTRAVENOUS ONCE
Status: COMPLETED | OUTPATIENT
Start: 2019-12-07 | End: 2019-12-07

## 2019-12-07 RX ORDER — POTASSIUM CHLORIDE 7.45 MG/ML
10 INJECTION INTRAVENOUS PRN
Status: DISCONTINUED | OUTPATIENT
Start: 2019-12-07 | End: 2020-01-16 | Stop reason: HOSPADM

## 2019-12-07 RX ORDER — SODIUM CHLORIDE 0.9 % (FLUSH) 0.9 %
10 SYRINGE (ML) INJECTION PRN
Status: DISCONTINUED | OUTPATIENT
Start: 2019-12-07 | End: 2019-12-15

## 2019-12-07 RX ORDER — POTASSIUM CHLORIDE 20 MEQ/1
40 TABLET, EXTENDED RELEASE ORAL PRN
Status: DISCONTINUED | OUTPATIENT
Start: 2019-12-07 | End: 2020-01-16 | Stop reason: HOSPADM

## 2019-12-07 RX ORDER — 0.9 % SODIUM CHLORIDE 0.9 %
150 INTRAVENOUS SOLUTION INTRAVENOUS PRN
Status: DISCONTINUED | OUTPATIENT
Start: 2019-12-07 | End: 2019-12-14

## 2019-12-07 RX ORDER — 0.9 % SODIUM CHLORIDE 0.9 %
250 INTRAVENOUS SOLUTION INTRAVENOUS ONCE
Status: DISCONTINUED | OUTPATIENT
Start: 2019-12-07 | End: 2019-12-07

## 2019-12-07 RX ORDER — 0.9 % SODIUM CHLORIDE 0.9 %
250 INTRAVENOUS SOLUTION INTRAVENOUS PRN
Status: DISCONTINUED | OUTPATIENT
Start: 2019-12-07 | End: 2019-12-14

## 2019-12-07 RX ADMIN — Medication 200 MCG/HR: at 00:00

## 2019-12-07 RX ADMIN — Medication 200 MCG/HR: at 14:45

## 2019-12-07 RX ADMIN — Medication: at 06:00

## 2019-12-07 RX ADMIN — SODIUM CHLORIDE, PRESERVATIVE FREE 10 ML: 5 INJECTION INTRAVENOUS at 09:34

## 2019-12-07 RX ADMIN — FOLIC ACID: 5 INJECTION, SOLUTION INTRAMUSCULAR; INTRAVENOUS; SUBCUTANEOUS at 09:33

## 2019-12-07 RX ADMIN — FAMOTIDINE 20 MG: 20 TABLET, FILM COATED ORAL at 09:33

## 2019-12-07 RX ADMIN — Medication 3 MG/HR: at 18:11

## 2019-12-07 RX ADMIN — MIDODRINE HYDROCHLORIDE 10 MG: 5 TABLET ORAL at 17:11

## 2019-12-07 RX ADMIN — Medication 200 MCG/HR: at 10:03

## 2019-12-07 RX ADMIN — DAKIN'S SOLUTION 0.125% (QUARTER STRENGTH): 0.12 SOLUTION at 09:34

## 2019-12-07 RX ADMIN — HEPARIN SODIUM 5000 UNITS: 5000 INJECTION INTRAVENOUS; SUBCUTANEOUS at 06:44

## 2019-12-07 RX ADMIN — Medication 200 MCG/HR: at 04:56

## 2019-12-07 RX ADMIN — SODIUM CHLORIDE, PRESERVATIVE FREE 10 ML: 5 INJECTION INTRAVENOUS at 12:53

## 2019-12-07 RX ADMIN — OXYCODONE HYDROCHLORIDE 10 MG: 5 TABLET ORAL at 10:07

## 2019-12-07 RX ADMIN — SODIUM CHLORIDE 250 ML: 0.9 INJECTION, SOLUTION INTRAVENOUS at 14:30

## 2019-12-07 RX ADMIN — CALCIUM GLUCONATE 1 G: 98 INJECTION, SOLUTION INTRAVENOUS at 12:53

## 2019-12-07 RX ADMIN — FUROSEMIDE 80 MG: 10 INJECTION, SOLUTION INTRAMUSCULAR; INTRAVENOUS at 09:33

## 2019-12-07 RX ADMIN — OXYCODONE HYDROCHLORIDE 10 MG: 5 TABLET ORAL at 13:12

## 2019-12-07 RX ADMIN — MIDODRINE HYDROCHLORIDE 10 MG: 5 TABLET ORAL at 09:33

## 2019-12-07 RX ADMIN — DAKIN'S SOLUTION 0.125% (QUARTER STRENGTH): 0.12 SOLUTION at 22:10

## 2019-12-07 RX ADMIN — HEPARIN SODIUM 5000 UNITS: 5000 INJECTION INTRAVENOUS; SUBCUTANEOUS at 22:14

## 2019-12-07 RX ADMIN — CALCIUM GLUCONATE: 98 INJECTION, SOLUTION INTRAVENOUS at 18:08

## 2019-12-07 RX ADMIN — Medication 200 MCG/HR: at 19:43

## 2019-12-07 RX ADMIN — MEROPENEM 1 G: 1 INJECTION, POWDER, FOR SOLUTION INTRAVENOUS at 09:33

## 2019-12-07 RX ADMIN — I.V. FAT EMULSION 250 ML: 20 EMULSION INTRAVENOUS at 18:08

## 2019-12-07 RX ADMIN — MEROPENEM 1 G: 1 INJECTION, POWDER, FOR SOLUTION INTRAVENOUS at 22:05

## 2019-12-07 RX ADMIN — HEPARIN SODIUM 5000 UNITS: 5000 INJECTION INTRAVENOUS; SUBCUTANEOUS at 14:31

## 2019-12-07 RX ADMIN — SODIUM CHLORIDE: 9 INJECTION, SOLUTION INTRAVENOUS at 09:50

## 2019-12-07 RX ADMIN — MIDODRINE HYDROCHLORIDE 10 MG: 5 TABLET ORAL at 12:53

## 2019-12-07 ASSESSMENT — PAIN SCALES - GENERAL
PAINLEVEL_OUTOF10: 0

## 2019-12-07 ASSESSMENT — PULMONARY FUNCTION TESTS
PIF_VALUE: 23
PIF_VALUE: 24
PIF_VALUE: 22
PIF_VALUE: 24
PIF_VALUE: 23

## 2019-12-07 NOTE — PROGRESS NOTES
Nutrition Assessment (Parenteral Nutrition)    Type and Reason for Visit: Reassess    Nutrition Recommendations:    - Continue Parenteral Nutrition - suggest increasing KCl slightly in next bag.    - Restart trickle TF of Immune Enhancing formula as able- suggest eventual goal rate of 55 mL/hr.    - Suggest checking mag and phos. Nutrition Assessment: TF on hold. TPN continues. Nutrition Risk Level: High    Nutrient Needs:  · Estimated Daily Total Kcal: 4523-8714 kcal/day   · Estimated Daily Protein (g): 110-145 g pro/day     Nutrition Diagnosis:   · Problem: Inadequate oral intake  · Etiology: related to Impaired respiratory function-inability to consume food     Signs and symptoms:  as evidenced by NPO status due to medical condition(need for nutrition support )    Objective Information:  · Wound Type: Open Wounds  · Current Nutrition Therapies:  · Oral Diet Orders: NPO   · Parenteral Nutrition Orders:  · Type and Formula: (150 g Dex, 100 g AA)   · Lipids: 250ml, Daily  · Rate/Volume: 55 mL/hr   · Duration: Continuous  · Current PN Order Provides: 1410 kcal and 100 g pro/day   · Additional Calories: none  · Anthropometric Measures:  · Ht: 5' 9\" (175.3 cm)   · Current Body Wt: 295 lb 6.7 oz (134 kg)  · Admission Body Wt: 291 lb 0.1 oz (132 kg)  · Ideal Body Wt: 160 lb 15 oz (73 kg), % Ideal Body 181% (adm/ideal)  · BMI Classification: BMI > or equal to 40.0 Obese Class III    Nutrition Interventions:   (Continue Parenteral Nutrition.  Restart trickle TF as able- suggest eventual goal rate of 55 mL/hr. )  Continued Inpatient Monitoring, Education Not Indicated    Nutrition Evaluation:   · Evaluation: Progressing toward goals   · Goals: Meet % of estimated nutrition needs   · Monitoring: Nutrition Progression, PN Intake, PN Tolerance, Pertinent Labs, Weight, Monitor Hemodynamic Status, I&O, Monitor Bowel Function      Electronically signed by Christal Hogan MS, RD, LD on 12/7/19 at 3:10 PM    Contact Number: [phone]

## 2019-12-07 NOTE — PLAN OF CARE
Problem: OXYGENATION/RESPIRATORY FUNCTION  Goal: Patient will maintain patent airway  12/7/2019 0944 by Anne Paez, VENANCIO  Outcome: Ongoing  12/7/2019 0943 by Anne Paez, ARNULFOP  Outcome: Ongoing  12/7/2019 0028 by Nikolay Maddox RCP  Outcome: Ongoing  Goal: Patient will achieve/maintain normal respiratory rate/effort  Description  Respiratory rate and effort will be within normal limits for the patient  12/7/2019 0944 by Anne Paez, ARNULFOP  Outcome: Ongoing  12/7/2019 0943 by Anne Paez, RCP  Outcome: Ongoing  12/7/2019 0028 by Nikolay Maddox RCP  Outcome: Ongoing     Problem: MECHANICAL VENTILATION  Goal: Patient will maintain patent airway  12/7/2019 0944 by Anne Paez, ARNULFOP  Outcome: Ongoing  12/7/2019 0943 by Anne Paez RCP  Outcome: Ongoing  12/7/2019 0028 by Nikolay Maddox RCP  Outcome: Ongoing  Goal: Oral health is maintained or improved  12/7/2019 0944 by ARNULFO SwartzP  Outcome: Ongoing  12/7/2019 0943 by Anne Paez RCP  Outcome: Ongoing  12/7/2019 0028 by Nikolay Maddox RCP  Outcome: Ongoing  Goal: ET tube will be managed safely  12/7/2019 0944 by Anne Paez RCP  Outcome: Ongoing  12/7/2019 0943 by ARNULFO SwartzP  Outcome: Ongoing  12/7/2019 0028 by Nikolay Maddox RCP  Outcome: Ongoing  Goal: Ability to express needs and understand communication  12/7/2019 0944 by Anne Paez RCP  Outcome: Ongoing  12/7/2019 0028 by Nikolay Maddox RCP  Outcome: Ongoing  Goal: Mobility/activity is maintained at optimum level for patient  12/7/2019 0944 by Anne Paez RCP  Outcome: Ongoing  12/7/2019 0028 by Nikolay Maddox RCP  Outcome: Ongoing     Problem: SKIN INTEGRITY  Goal: Skin integrity is maintained or improved  12/7/2019 0944 by Anne Paez, ARNULFOP  Outcome: Ongoing  12/7/2019 0943 by ARNULFO SwartzP  Outcome: Ongoing  12/7/2019 0028 by Nikolay Maddox RCP  Outcome: Ongoing

## 2019-12-07 NOTE — PROGRESS NOTES
flatus or stool  /Anal: lema intact, extensive wound to the level of gluteal muscle fascia and extending into the perianal area  EXTERMITY: wounds L foot    I/O last 3 completed shifts: In: 2656.7 [I.V.:521.7; Blood:350; NG/GT:47]  Out: 2860 [Urine:310; Drains:10; Blood:200]    Drain/tube output:  In: 2446.6 [I.V.:331.6; Blood:350]  Out: 3643 [Urine:130]    LAB:  CBC:   Recent Labs     12/05/19 0548 12/06/19  0521 12/07/19  0352   WBC 23.9* 26.7* 20.9*   HGB 6.1* 7.0* 7.0*   HCT 21.7* 24.7* 24.1*   MCV 86.1 87.9 87.6    See Reflexed IPF Result 243     BMP:   Recent Labs     12/05/19 0548 12/06/19 0521 12/07/19  0352    138 137   K 3.6* 4.0 3.6*    103 104   CO2 23 22 24   BUN 77* 49* 36*   CREATININE 4.02* 2.84* 2.44*   GLUCOSE 168* 192* 163*     COAGS:   No results for input(s): APTT, PROT, INR in the last 72 hours. RADIOLOGY:  Narrative   EXAMINATION:   ONE XRAY VIEW OF THE CHEST       11/29/2019 9:01 pm       COMPARISON:   4 hours ago       HISTORY:   ORDERING SYSTEM PROVIDED HISTORY: intubated   TECHNOLOGIST PROVIDED HISTORY:   intubated   Reason for Exam: portable supine/ intubated   Acuity: Acute   Type of Exam: Initial       FINDINGS:   New ET tube terminates 38 mm above the ron.  There appears to be a   possible enteric tube which is not well visualized distally.  Right IJ   catheter terminates in the right atrium and is unchanged.  Lungs are clear. Cardiomegaly.  Mediastinum normal.  Bony thorax intact.           Impression   New ET tube as above.  Possible new enteric tube not well visualized.    Recommend follow-up KUB if clinically warranted.               Davi Skinner MD  12/7/19, 6:39 AM

## 2019-12-07 NOTE — PROGRESS NOTES
Progress Note  Podiatric Medicine and Surgery     Subjective     CC: left foot wound    Patient seen and examined at bedside. Afebrile, vital signs stable   Pt is intubated and sedated  Per critical care note patient will be taken to the OR 12/8    HPI :  Navdeep Alexander is a 37 y.o. male seen at [facility] for multiple wounds of his LLE. Patient is currently intubated and sedated, no family in the room to obtain history. HPI primarily obtained via EMR chart review. Patient presented to the ED yesterday for generalized malaise. Patient usually lives at home with his mother as a caretaker. He had been complaining of fatigue, fever, chills for multiple days before presenting to the ED. Patient found to have significant necrotizing infection to the gluteal region extending to the perineum. CT demonstrated extensive soft tissue emphysema. Patient admitted to the ICU for necrotizing fasciitis and acute renal failure. S/p I&D and complex debridement of affect tissues 11/29/19. Currently on Zosyn.      PCP is No primary care provider on file. ROS: Denies N/V/F/C/SOB/CP. Otherwise negative except at stated in the HPI.      Medications:  Scheduled Meds:   midodrine  10 mg Oral TID WC    famotidine  20 mg Per NG tube Daily    furosemide  80 mg Intravenous Daily    sodium hypochlorite   Irrigation BID    thiamine and folic acid IVPB   Intravenous Daily    fat emulsion  250 mL Intravenous Daily    meropenem  1 g Intravenous Q12H    sodium chloride flush  10 mL Intravenous 2 times per day    heparin (porcine)  5,000 Units Subcutaneous 3 times per day       Continuous Infusions:   PN-Adult 2-in-1 Central Line (Standard) 55.8 mL/hr at 12/06/19 1742    fentaNYL 200 mcg/hr (12/07/19 1003)    midazolam 3 mg/hr (12/07/19 0500)    insulin 2.4 Units/hr (12/07/19 0857)    dextrose      sodium chloride 10 mL/hr at 12/07/19 0950    norepinephrine (LEVOPHED) infusion (double concentration) Stopped (12/07/19 0000) PRN Meds:oxyCODONE **OR** oxyCODONE, sodium chloride, sodium chloride, heparin (porcine), heparin (porcine), acetaminophen, glucose, dextrose, glucagon (rDNA), dextrose, acetaminophen, REFRESH LACRI-LUBE, povidone-iodine, sodium chloride flush, magnesium hydroxide, ondansetron, glucagon (rDNA)    Objective     Vitals:  Patient Vitals for the past 8 hrs:   BP Temp Temp src Pulse Resp SpO2 Weight   19 0750 -- -- -- 84 26 100 % --   19 0700 125/63 -- -- 95 25 100 % --   19 0600 120/62 -- -- 91 26 100 % 295 lb 8 oz (134 kg)   19 0500 125/65 -- -- 98 26 99 % --   19 0400 126/61 100.2 °F (37.9 °C) Axillary 95 26 97 % --     Average, Min, and Max for last 24 hours Vitals:  TEMPERATURE:  Temp  Av.5 °F (36.4 °C)  Min: 96.8 °F (36 °C)  Max: 102.1 °F (38.9 °C)    RESPIRATIONS RANGE: Resp  Av.6  Min: 23  Max: 26    PULSE RANGE: Pulse  Av.3  Min: 84  Max: 140    BLOOD PRESSURE RANGE:  Systolic (53VQO), FNO:104 , Min:101 , FIY:506   ; Diastolic (79CVT), YFO:73, Min:52, Max:107      PULSE OXIMETRY RANGE: SpO2  Av.9 %  Min: 96 %  Max: 100 %    I/O last 3 completed shifts: In: 2446.6 [I.V.:331.6; Blood:350; IV Piggyback:167]  Out: 1818 [Urine:330; Drains:10; Blood:200]    CBC:  Recent Labs     1950 19  0519  0352   WBC 23.9* 26.7* 20.9*   HGB 6.1* 7.0* 7.0*   HCT 21.7* 24.7* 24.1*    See Reflexed IPF Result 243        BMP:  Recent Labs     19  0548 19  0521 19  0352    138 137   K 3.6* 4.0 3.6*    103 104   CO2 23 22 24   BUN 77* 49* 36*   CREATININE 4.02* 2.84* 2.44*   GLUCOSE 168* 192* 163*   CALCIUM 6.5* 6.6* 6.8*        Coags:  No results for input(s): APTT, PROT, INR in the last 72 hours. Lab Results   Component Value Date    SEDRATE 66 (H) 2019     No results for input(s): CRP in the last 72 hours. Lower Extremity Physical Exam:  Vascular: DP and PT pulses are non- palpable.  BLE DP/PT audible on doppler. CFT <5 seconds to all digits. Hair growth is absent to the level of the digits. Non pitting edema to b/l LE.       Neuro: Saph/sural/SP/DP/plantar sensation absent to light touch.     Musculoskeletal: Muscle strength is decreased ROM, decreased strength to all lower extremity muscle groups. Gross deformity is present with contracted external rotation of b/l LE.     Dermatologic: Full thickness ulcer #1 located plantar and measures approximately 14cm x 5.5 cm x 0.3cm. Base is fibronecrotic. Periwound skin is minimally macerated with sloughing hyperkeratotic tissue. No purulence drainage noted. Erythema absent with no associated increase in warmth. Does not probe to bone, sinus track, or undermine. No fluctuance, crepitus, or induration. Interdigital maceration absent.      Superficial pressure abrasion to left posterior lateral calf has epithelialized. No open wound. No drainage. No erythema or increased warm. No other signs concerning for infectious process. Clinical Images:          Imaging:   XR CHEST PORTABLE   Final Result   1. Increased left basilar airspace opacity potentially due to atelectasis,   pneumonia, and/or aspiration. 2. Decreased right basilar airspace opacity likely due to improved   atelectasis. 3. No significant change in central predominant alveolar interstitial edema   likely due to congestive heart failure given moderate cardiomegaly. Pneumonia could appear similar. 4. Suspected left pleural effusion. XR CHEST PORTABLE   Final Result   Worsening features of volume overload. New left IJ line in good position; no evidence of procedure related   pneumothorax. XR ABDOMEN (KUB) (SINGLE AP VIEW)   Final Result   Ovoid increased density projecting over the leftward aspect of the pelvis is   likely due to the patient's reported surgery. There are no defined surgical instruments noted.          US RENAL COMPLETE   Final Result   Right renal cyst, indicated at this time. Will follow from [place]. Will see patient weekly while admitted. · NWB to Left lower extremity  · Discussed with Dr. Charmaine Brown DPM PGY1  Podiatric Medicine & Surgery   12/7/2019 at 11:07 AM    Senior Resident Statement:  I have discussed the case, including pertinent history and exam findings with the resident. I agree with the assessment, plan and orders as documented by the intern. Any changes were made in the note above. Plantar left foot wound stable. Continue Betadine dressings. Increased white count not due to ulceration at foot. Nursing to continue dressing changes daily.   Podiatry will follow    Electronically signed by Ismael Yepez DPM on 12/7/2019 at 2:32 PM

## 2019-12-07 NOTE — PROGRESS NOTES
Physical Therapy  DATE: 2019    NAME: Eun Kay  MRN: [id number]   : 1985    Patient not seen this date for Physical Therapy due to:  [] Blood transfusion in progress  [x] Hemodialysis  At bedside  []  Patient Declined  [] Spine Precautions   [] Strict Bedrest  [] Surgery/ Procedure  [] Testing      [] Other        [] PT being discontinued at this time. Patient independent. No further needs. [] PT being discontinued at this time as the patient has been transferred to palliative care. No further needs.     Karen Brain, PT

## 2019-12-07 NOTE — PROGRESS NOTES
evaluated at bedside in the ICU  No overnight events  Intubated on the ventilator, PRVC/26/530/5/30  Sedated on Fentanyl PCA    On CVVHD  Meropenem q 24 hours    Summary of relevant labs:  Labs:  BUN: 36  Creatinine: 2.44  WBC 20.9  Hemoglobin 7.0  Micro:  Urine Culture 11/29/19: No growth  Tissue from Buttock Culture 11/29/19: Mixed Anaerobic Yasmeen  Blood Culture 11/29/19: No growth  Blood Culture 11/30/19: No growth  Blood Culture 12/2/19: No growth   Imaging:  CXR 12/7/19: Increased left basilar airspace opacity potentially due to atelectasis. I have personally reviewed the past medical history, past surgical history, medications, social history, and family history, and I haveupdated the database accordingly.   Past Medical History:     Past Medical History:   Diagnosis Date    [facility]     Diabetes mellitus [facility]     Hypertension     Spina bifida aperta of lumbar spine ([facility]        Past Surgical  History:     Past Surgical History:   Procedure Laterality Date    COLOSTOMY N/A 12/3/2019    LAPAROSCOPIC CONVERTED TO OPEN DIVERTING LOOP COLOSTOMY; WOUND VAC APPLICATION performed by Amy Robles MD at [place] [address] Bilateral 11/29/2019    RECTAL PERIRECTAL INCISION AND DRAINAGE, DIVERING LOOP COLOSTOMY CREATION performed by Flory Fish MD at [facility] OR       Medications:      midodrine  10 mg Oral TID WC    famotidine  20 mg Per NG tube Daily    furosemide  80 mg Intravenous Daily    sodium hypochlorite   Irrigation BID    thiamine and folic acid IVPB   Intravenous Daily    fat emulsion  250 mL Intravenous Daily    meropenem  1 g Intravenous Q12H    sodium chloride flush  10 mL Intravenous 2 times per day    heparin (porcine)  5,000 Units Subcutaneous 3 times per day       Social History:     Social History     Socioeconomic History    Marital status: Unknown     Spouse name: Not on file    Number of children: Not on file    Years of education: Not on file    Highest education level: Not on file   Occupational History    Not on file   Social Needs    Financial resource strain: Not on file    Food insecurity:     Worry: Not on file     Inability: Not on file    Transportation needs:     Medical: Not on file     Non-medical: Not on file   Tobacco Use    Smoking status: Not on file   Substance and Sexual Activity    Alcohol use: Not on file    Drug use: Not on file    Sexual activity: Not on file   Lifestyle    Physical activity:     Days per week: Not on file     Minutes per session: Not on file    Stress: Not on file   Relationships    Social connections:     Talks on phone: Not on file     Gets together: Not on file     Attends Christian service: Not on file     Active member of club or organization: Not on file     Attends meetings of clubs or organizations: Not on file     Relationship status: Not on file    Intimate partner violence:     Fear of current or ex partner: Not on file     Emotionally abused: Not on file     Physically abused: Not on file     Forced sexual activity: Not on file   Other Topics Concern    Not on file   Social History Narrative    Not on file       Family History:   No family history on file. Allergies:   Patient has no known allergies. Review of Systems:     Review of Systems   Unable to perform ROS: Intubated     Physical Examination :     Patient Vitals for the past 8 hrs:   BP Temp Temp src Pulse Resp SpO2 Weight   12/07/19 1130 -- -- -- 95 26 100 % --   12/07/19 0750 -- -- -- 84 26 100 % --   12/07/19 0700 125/63 -- -- 95 25 100 % --   12/07/19 0600 120/62 -- -- 91 26 100 % 295 lb 8 oz (134 kg)   12/07/19 0500 125/65 -- -- 98 26 99 % --   12/07/19 0400 126/61 100.2 °F (37.9 °C) Axillary 95 26 97 % --     Physical Exam  Vitals signs and nursing note reviewed. Constitutional:       Comments: Intubated and Sedated   HENT:      Head: Normocephalic and atraumatic.    Cardiovascular:      Rate

## 2019-12-07 NOTE — PROGRESS NOTES
Renal Progress Note    Patient :  Bola Byers; 37 y.o. MRN# [id number]  Location:  0128/0128-01  Attending:  Shahab Carbone MD  Admit Date:  2019   Hospital Day: 8    Subjective   Following for DEBBY/CKD now HD dependent, admitted with necrotizing fasciitis. Has had three consecutive sessions. Had HD yesterday and [phone] removed. Urine output minimal last 24 hours 130. Received Lasix 80mg IV daily. Remains positive 19 liters + fluid balance. 6 pounds above admission weight. Blood pressure stable. Remains intubated 30% FIO2.     Objective     VS: /62   Pulse 91   Temp 100.2 °F (37.9 °C) (Axillary)   Resp 26   Ht 5' 9\" (1.753 m)   Wt 295 lb 8 oz (134 kg)   SpO2 100%   BMI 43.64 kg/m²   MAXIMUM TEMPERATURE OVER 24HRS:  Temp (24hrs), Av °F (37.2 °C), Min:96.8 °F (36 °C), Max:102.1 °F (38.9 °C)    24HR BLOOD PRESSURE RANGE:  Systolic (39RCB), QHI:102 , Min:78 , RYF:715   ; Diastolic (13UFY), SQT:87, Min:36, Max:128    24HR INTAKE/OUTPUT:      Intake/Output Summary (Last 24 hours) at 2019 0753  Last data filed at 2019 0456  Gross per 24 hour   Intake 2446.62 ml   Output 2670 ml   Net -223.38 ml     WEIGHT :  Patient Vitals for the past 96 hrs (Last 3 readings):   Weight   19 0600 295 lb 8 oz (134 kg)   19 1445 (!) 316 lb 5.8 oz (143.5 kg)   19 1034 (!) 319 lb 12.8 oz (145.1 kg)       Current Medications:     Scheduled Meds:    midodrine  10 mg Oral TID WC    famotidine  20 mg Per NG tube Daily    furosemide  80 mg Intravenous Daily    sodium hypochlorite   Irrigation BID    thiamine and folic acid IVPB   Intravenous Daily    fat emulsion  250 mL Intravenous Daily    meropenem  1 g Intravenous Q12H    sodium chloride flush  10 mL Intravenous 2 times per day    heparin (porcine)  5,000 Units Subcutaneous 3 times per day     Continuous Infusions:    PN-Adult 2-in-1 Central Line (Standard) 55.8 mL/hr at 19 5995    fentaNYL 200 mcg/hr (19 6688)   Len recent    Assessment:     1. Acute Kidney Injury: Secondary to ischemic ATN from sepsis/systemic intermittent response syndrome from necrotizing fasciitis, now dialysis dependent, acute kidney injury stage III  2.  Chronic kidney diseae stage III from diabetic nephrosclerosis baseline 1.6-1.8  3.  Necrotizing fasciitis status post wide complex debridement of gluteal, perineal region and open colostomy  4.  Respiratory Failure  5.  Protein calorie malnutrition  6.  Fluid overload  7. S/P diverting   8. Anemia    Plan: 1. Ultrafiltration today for 3 hours. 2. Strict Input and Output, Daily weigh and document in the chart. 3.  Sitter blood transfusion once hemoglobin is less than 7     Nutrition   Renal Diet/TF      Minesh Miles  [facility]  [facility]. Attending Physician Statement  I have discussed the care of Kadeem James, including pertinent history and exam findings with the np. I have reviewed the key elements of all parts of the encounter with the resident/fellow. I have seen and examined the patient with the NP. I agree with the assessment and plan and status of the problem list as documented. Addiitionally I recommend dialysis today dialysis orders were discussed with dialysis nurse.   Layton Schulte

## 2019-12-07 NOTE — PROGRESS NOTES
COMMANDS:  [x] No   [] Yes    CURRENT VENTILATION STATUS:     [x] Ventilator  [] BIPAP  [] Nasal Cannula [] Room Air        SECRETIONS Amount:  [] Small [] Moderate  [] Large  [x] None  Color:     [] White [] Colored  [] Bloody    SEDATION:  RAAS Score:  [] Propofol gtt  [] Versed gtt  [] Ativan gtt   [] No Sedation     PARALYZED:  [x] No    [] Yes    DIARRHEA:                [x] No                [] Yes  (C. Difficile status: [] positive                                                                                                                       [] negative                                                                                                                     [] pending)    VASOPRESSORS:  [x] No    [] Yes    If yes -   [] Levophed       [] Dopamine     [] Vasopressin       [] Dobutamine  [] Phenylephrine         [] Epinephrine    CENTRAL LINES:     [] No   [x] Yes   (Date of Insertion:   )           If yes -     [x] Right IJ     [] Left IJ [] Right Femoral [] Left Femoral                   [] Right Subclavian [] Left Subclavian       DOSS'S CATHETER:   [] No   [x] Yes  (Date of Insertion:   )     URINE OUTPUT:            [] Good   [] Low              [x] Anuric      OBJECTIVE:     VITAL SIGNS:  /63   Pulse 95   Temp 100.2 °F (37.9 °C) (Axillary)   Resp 26   Ht 5' 9\" (1.753 m)   Wt 295 lb 8 oz (134 kg)   SpO2 100%   BMI 43.64 kg/m²   Tmax over 24 hours:  Temp (24hrs), Av.6 °F (36.4 °C), Min:96.8 °F (36 °C), Max:102.1 °F (38.9 °C)      Patient Vitals for the past 6 hrs:   BP Pulse Resp SpO2 Weight   19 1130 -- 95 26 100 % --   19 0750 -- 84 26 100 % --   19 0700 125/63 95 25 100 % --   19 0600 120/62 91 26 100 % 295 lb 8 oz (134 kg)         Intake/Output Summary (Last 24 hours) at 2019 1140  Last data filed at 2019 0456  Gross per 24 hour   Intake 2096.62 ml   Output 2680 ml   Net -583.38 ml     Wt Readings from Last 2 Encounters:   19 295 lb 8 oz (134 kg)     Body mass index is 43.64 kg/m². PHYSICAL EXAMINATION:    General appearance -intubated and sedated, NAD  Mental status -intubated and sedated, not following commands  Chest -bilateral breath sounds heard, no wheezing, on vent  Heart -normal rate, regular rhythm, normal S1 and S2  Abdomen - soft, nondistended, no rash  Neurological - intubated and sedated, not following commands  Extremities - weak pulses in extremities, Dopplerable RLE pulse  Skinpost debridement of gluteal and perianal region. Full-thickness ulcer in the left plantar surface.  See media for post op wounds debridement     Any additional physical findings:    MEDICATIONS:    Scheduled Meds:   midodrine  10 mg Oral TID WC    famotidine  20 mg Per NG tube Daily    furosemide  80 mg Intravenous Daily    sodium hypochlorite   Irrigation BID    thiamine and folic acid IVPB   Intravenous Daily    fat emulsion  250 mL Intravenous Daily    meropenem  1 g Intravenous Q12H    sodium chloride flush  10 mL Intravenous 2 times per day    heparin (porcine)  5,000 Units Subcutaneous 3 times per day     Continuous Infusions:   PN-Adult 2-in-1 Central Line (Standard) 55.8 mL/hr at 12/06/19 1742    fentaNYL 200 mcg/hr (12/07/19 1003)    midazolam 3 mg/hr (12/07/19 0500)    insulin 1.56 Units/hr (12/07/19 1100)    dextrose      sodium chloride 10 mL/hr at 12/07/19 0950    norepinephrine (LEVOPHED) infusion (double concentration) Stopped (12/07/19 0000)     PRN Meds:   oxyCODONE, 5 mg, Q4H PRN    Or  oxyCODONE, 10 mg, Q4H PRN  sodium chloride, 250 mL, PRN  sodium chloride, 150 mL, PRN  heparin (porcine), 1,500 Units, PRN  heparin (porcine), 1,600 Units, PRN  acetaminophen, 650 mg, Q4H PRN  glucose, 15 g, PRN  dextrose, 12.5 g, PRN  glucagon (rDNA), 1 mg, PRN  dextrose, 100 mL/hr, PRN  acetaminophen, 650 mg, Q4H PRN  REFRESH LACRI-LUBE, , PRN  povidone-iodine, , PRN  sodium chloride flush, 10 mL, PRN  magnesium hydroxide, 30 mL, Daily PRN  ondansetron, 4 mg, Q6H PRN  glucagon (rDNA), 1 mg, PRN          VENT SETTINGS (Comprehensive) (if applicable):  Vent Information  $Ventilation: $Subsequent Day  Ventilator Started: Yes  Skin Assessment: Clean, dry, & intact  Equipment ID: [id number]  Equipment Changed: Suction catheter  Vent Type: Servo i  Vent Mode: PRVC  Vt Ordered: 530 mL  Rate Set: 26 bmp  FiO2 : 30 %  Sensitivity: 1  PEEP/CPAP: 5  I Time/ I Time %: 0.75 s  Humidification Source: Heated wire  Humidification Temp: 33  Humidification Temp Measured: 33  Circuit Condensation: Not drained  Nitric Oxide/Epoprostenol In Use?: No  Additional Respiratory  Assessments  Pulse: 95  Resp: 26  SpO2: 100 %  End Tidal CO2: 33 (%)  Position: Semi-Pagan's  Humidification Source: Heated wire  Humidification Temp: 33  Circuit Condensation: Not drained  Oral Care Completed?: Yes  Oral Care: Mouthwash, Lip moisturizer applied, Mouth swabbed, Mouth moisturizer, Mouth suctioned, Suction toothette  Subglottic Suction Done?: Yes  Skin barrier applied: No    ABGs:  Arterial Blood Gas result:  pO2 107.4; pCO2 32.9; pH 7.29;  HCO3 16, %O2 Sat 98.       Laboratory findings:    Complete Blood Count:   Recent Labs     12/05/19 0548 12/06/19 0521 12/07/19 0352   WBC 23.9* 26.7* 20.9*   HGB 6.1* 7.0* 7.0*   HCT 21.7* 24.7* 24.1*    See Reflexed IPF Result 243        Last 3 Blood Glucose:   Recent Labs     12/05/19 0548 12/06/19  0521 12/07/19  0352   GLUCOSE 168* 192* 163*        PT/INR:    Lab Results   Component Value Date    PROTIME 11.9 11/29/2019    INR 1.1 11/29/2019     PTT:  No results found for: APTT, PTT    Comprehensive Metabolic Profile:   Recent Labs     12/05/19 0548 12/06/19 0521 12/07/19  0352    138 137   K 3.6* 4.0 3.6*    103 104   CO2 23 22 24   BUN 77* 49* 36*   CREATININE 4.02* 2.84* 2.44*   GLUCOSE 168* 192* 163*   CALCIUM 6.5* 6.6* 6.8*      Magnesium:   Lab Results   Component Value Date    MG 2.0 12/05/2019 reduced systolic function, RVSP 29 mmHg. Trivial pericardial effusion      4. Diabetes - insulin gtt if cannot maintain glucose<200       5. Diabetic foot ulcer -dietary on board. Wound care. No surgical intervention for now. \    6. Anemia: Hgb (7.0),   received  4uPRBC till now        1. Feeding Diet: START TPN 55 ml  and Tube feed 10 ml   2. Fluids   maintenance  3. Family updated  4. Analgesic fentanyl drip and oxycodone   5. Sedation: Versed drip  6.   7. Thrombo-prophylaxis [] Enoxaparin  [x] Unfract. Heparin Subcut  [] EPC Cuffs  8. Mobility bed rest and PT/ OT   9. Heads up yes   10. Ulcer prophylaxis [x] PPI Agent  [x] [id number] [] Sucralfate  [] Other:  11. Glycemic control: insulin gtt, glucose   12. Spontaneous breathing trial not today  13. Bowel regimen/urine output: MoM,   14. Indwelling catheter/lines CVC RIJ, OG, Ambrosio, Coumadin catheter left IJ  15.  De-escalation: K3.6: , Cr inc to 2.44<---2.84<----4.02<---4.69<---(3.83), liver enzymes:       Slick Reddy M.D             Critical care resident,  Department of Internal Medicine/ Critical care  [facility] ([place]             12/7/2019, 11:40 AM

## 2019-12-07 NOTE — PLAN OF CARE
Problem: Pain:  Description  Pain management should include both nonpharmacologic and pharmacologic interventions.   Goal: Pain level will decrease  Description  Pain level will decrease  Outcome: Ongoing  Goal: Control of acute pain  Description  Control of acute pain  Outcome: Ongoing  Goal: Control of chronic pain  Description  Control of chronic pain  Outcome: Ongoing     Problem: Skin Integrity:  Goal: Will show no infection signs and symptoms  Description  Will show no infection signs and symptoms  Outcome: Ongoing  Goal: Absence of new skin breakdown  Description  Absence of new skin breakdown  Outcome: Ongoing     Problem: OXYGENATION/RESPIRATORY FUNCTION  Goal: Patient will maintain patent airway  12/7/2019 1355 by Maxim Whitten RN  Outcome: Ongoing  12/7/2019 0944 by Suki Denis RCP  Outcome: Ongoing  12/7/2019 0943 by Suki Denis RCP  Outcome: Ongoing  12/7/2019 0028 by Kelly Keller RCP  Outcome: Ongoing  Goal: Patient will achieve/maintain normal respiratory rate/effort  Description  Respiratory rate and effort will be within normal limits for the patient  12/7/2019 1355 by Maxim Whitten RN  Outcome: Ongoing  12/7/2019 0944 by Suki Denis RCP  Outcome: Ongoing  12/7/2019 0943 by Suki Denis RCP  Outcome: Ongoing  12/7/2019 0028 by Kelly Keller RCP  Outcome: Ongoing     Problem: MECHANICAL VENTILATION  Goal: Patient will maintain patent airway  12/7/2019 1355 by Maxim Whitten RN  Outcome: Ongoing  12/7/2019 0944 by Suki Denis RCP  Outcome: Ongoing  12/7/2019 0943 by Suki Denis RCP  Outcome: Ongoing  12/7/2019 0028 by Kelly Keller RCP  Outcome: Ongoing  Goal: Oral health is maintained or improved  12/7/2019 1355 by Maxim Whitten RN  Outcome: Ongoing  12/7/2019 0944 by Suki Denis RCP  Outcome: Ongoing  12/7/2019 0943 by Suki Denis RCP  Outcome: Ongoing  12/7/2019 0028 by Kelly Keller RCP  Outcome: Ongoing  Goal: ET tube will be managed

## 2019-12-08 ENCOUNTER — ANESTHESIA (OUTPATIENT)
Dept: OPERATING ROOM | Age: 34
DRG: 463 | End: 2019-12-08
Payer: MEDICARE

## 2019-12-08 ENCOUNTER — ANESTHESIA EVENT (OUTPATIENT)
Dept: OPERATING ROOM | Age: 34
DRG: 463 | End: 2019-12-08
Payer: MEDICARE

## 2019-12-08 VITALS
RESPIRATION RATE: 14 BRPM | OXYGEN SATURATION: 100 % | SYSTOLIC BLOOD PRESSURE: 96 MMHG | DIASTOLIC BLOOD PRESSURE: 45 MMHG | TEMPERATURE: 96.4 F

## 2019-12-08 LAB
ABSOLUTE EOS #: 0.18 K/UL (ref 0–0.44)
ABSOLUTE IMMATURE GRANULOCYTE: 0.53 K/UL (ref 0–0.3)
ABSOLUTE LYMPH #: 1.23 K/UL (ref 1.1–3.7)
ABSOLUTE MONO #: 1.41 K/UL (ref 0.1–1.2)
ANION GAP SERPL CALCULATED.3IONS-SCNC: 10 MMOL/L (ref 9–17)
BASOPHILS # BLD: 0 % (ref 0–2)
BASOPHILS ABSOLUTE: 0 K/UL (ref 0–0.2)
BUN BLDV-MCNC: 31 MG/DL (ref 6–20)
BUN/CREAT BLD: ABNORMAL (ref 9–20)
CALCIUM SERPL-MCNC: 7.1 MG/DL (ref 8.6–10.4)
CHLORIDE BLD-SCNC: 104 MMOL/L (ref 98–107)
CO2: 21 MMOL/L (ref 20–31)
CREAT SERPL-MCNC: 2.13 MG/DL (ref 0.7–1.2)
CULTURE: NORMAL
DIFFERENTIAL TYPE: ABNORMAL
EOSINOPHILS RELATIVE PERCENT: 1 % (ref 1–4)
GFR AFRICAN AMERICAN: 43 ML/MIN
GFR NON-AFRICAN AMERICAN: 35 ML/MIN
GFR SERPL CREATININE-BSD FRML MDRD: ABNORMAL ML/MIN/{1.73_M2}
GFR SERPL CREATININE-BSD FRML MDRD: ABNORMAL ML/MIN/{1.73_M2}
GLUCOSE BLD-MCNC: 127 MG/DL (ref 75–110)
GLUCOSE BLD-MCNC: 128 MG/DL (ref 75–110)
GLUCOSE BLD-MCNC: 137 MG/DL (ref 75–110)
GLUCOSE BLD-MCNC: 171 MG/DL (ref 75–110)
GLUCOSE BLD-MCNC: 177 MG/DL (ref 75–110)
GLUCOSE BLD-MCNC: 183 MG/DL (ref 75–110)
GLUCOSE BLD-MCNC: 185 MG/DL (ref 75–110)
GLUCOSE BLD-MCNC: 188 MG/DL (ref 70–99)
HCT VFR BLD CALC: 26.7 % (ref 40.7–50.3)
HEMOGLOBIN: 7.8 G/DL (ref 13–17)
IMMATURE GRANULOCYTES: 3 %
LYMPHOCYTES # BLD: 7 % (ref 24–43)
Lab: NORMAL
MCH RBC QN AUTO: 26.4 PG (ref 25.2–33.5)
MCHC RBC AUTO-ENTMCNC: 29.2 G/DL (ref 28.4–34.8)
MCV RBC AUTO: 90.2 FL (ref 82.6–102.9)
MONOCYTES # BLD: 8 % (ref 3–12)
MORPHOLOGY: ABNORMAL
MORPHOLOGY: ABNORMAL
NRBC AUTOMATED: 0.5 PER 100 WBC
PDW BLD-RTO: 21.1 % (ref 11.8–14.4)
PLATELET # BLD: 243 K/UL (ref 138–453)
PLATELET ESTIMATE: ABNORMAL
PMV BLD AUTO: 9.3 FL (ref 8.1–13.5)
POTASSIUM SERPL-SCNC: 3.8 MMOL/L (ref 3.7–5.3)
RBC # BLD: 2.96 M/UL (ref 4.21–5.77)
RBC # BLD: ABNORMAL 10*6/UL
SEG NEUTROPHILS: 81 % (ref 36–65)
SEGMENTED NEUTROPHILS ABSOLUTE COUNT: 14.25 K/UL (ref 1.5–8.1)
SODIUM BLD-SCNC: 135 MMOL/L (ref 135–144)
SPECIMEN DESCRIPTION: NORMAL
WBC # BLD: 17.6 K/UL (ref 3.5–11.3)
WBC # BLD: ABNORMAL 10*3/UL

## 2019-12-08 PROCEDURE — 6370000000 HC RX 637 (ALT 250 FOR IP): Performed by: STUDENT IN AN ORGANIZED HEALTH CARE EDUCATION/TRAINING PROGRAM

## 2019-12-08 PROCEDURE — 94003 VENT MGMT INPAT SUBQ DAY: CPT

## 2019-12-08 PROCEDURE — 6360000002 HC RX W HCPCS: Performed by: STUDENT IN AN ORGANIZED HEALTH CARE EDUCATION/TRAINING PROGRAM

## 2019-12-08 PROCEDURE — 0J9C0ZZ DRAINAGE OF PELVIC REGION SUBCUTANEOUS TISSUE AND FASCIA, OPEN APPROACH: ICD-10-PCS | Performed by: SURGERY

## 2019-12-08 PROCEDURE — 2580000003 HC RX 258: Performed by: STUDENT IN AN ORGANIZED HEALTH CARE EDUCATION/TRAINING PROGRAM

## 2019-12-08 PROCEDURE — 6360000002 HC RX W HCPCS: Performed by: ANESTHESIOLOGY

## 2019-12-08 PROCEDURE — 2580000003 HC RX 258: Performed by: ANESTHESIOLOGY

## 2019-12-08 PROCEDURE — 99291 CRITICAL CARE FIRST HOUR: CPT | Performed by: INTERNAL MEDICINE

## 2019-12-08 PROCEDURE — 2500000003 HC RX 250 WO HCPCS: Performed by: STUDENT IN AN ORGANIZED HEALTH CARE EDUCATION/TRAINING PROGRAM

## 2019-12-08 PROCEDURE — 3600000014 HC SURGERY LEVEL 4 ADDTL 15MIN: Performed by: SURGERY

## 2019-12-08 PROCEDURE — 6370000000 HC RX 637 (ALT 250 FOR IP): Performed by: INTERNAL MEDICINE

## 2019-12-08 PROCEDURE — 3700000001 HC ADD 15 MINUTES (ANESTHESIA): Performed by: SURGERY

## 2019-12-08 PROCEDURE — 3600000004 HC SURGERY LEVEL 4 BASE: Performed by: SURGERY

## 2019-12-08 PROCEDURE — 2709999900 HC NON-CHARGEABLE SUPPLY: Performed by: SURGERY

## 2019-12-08 PROCEDURE — 82947 ASSAY GLUCOSE BLOOD QUANT: CPT

## 2019-12-08 PROCEDURE — 2580000003 HC RX 258: Performed by: NURSE ANESTHETIST, CERTIFIED REGISTERED

## 2019-12-08 PROCEDURE — 2700000000 HC OXYGEN THERAPY PER DAY

## 2019-12-08 PROCEDURE — 2580000003 HC RX 258: Performed by: SURGERY

## 2019-12-08 PROCEDURE — 6370000000 HC RX 637 (ALT 250 FOR IP): Performed by: SURGERY

## 2019-12-08 PROCEDURE — 36415 COLL VENOUS BLD VENIPUNCTURE: CPT

## 2019-12-08 PROCEDURE — 80048 BASIC METABOLIC PNL TOTAL CA: CPT

## 2019-12-08 PROCEDURE — 2500000003 HC RX 250 WO HCPCS: Performed by: NURSE ANESTHETIST, CERTIFIED REGISTERED

## 2019-12-08 PROCEDURE — 85025 COMPLETE CBC W/AUTO DIFF WBC: CPT

## 2019-12-08 PROCEDURE — 3700000000 HC ANESTHESIA ATTENDED CARE: Performed by: SURGERY

## 2019-12-08 PROCEDURE — 0V950ZZ DRAINAGE OF SCROTUM, OPEN APPROACH: ICD-10-PCS | Performed by: UROLOGY

## 2019-12-08 PROCEDURE — 2000000000 HC ICU R&B

## 2019-12-08 PROCEDURE — 94770 HC ETCO2 MONITOR DAILY: CPT

## 2019-12-08 PROCEDURE — 94761 N-INVAS EAR/PLS OXIMETRY MLT: CPT

## 2019-12-08 PROCEDURE — 6360000002 HC RX W HCPCS: Performed by: NURSE ANESTHETIST, CERTIFIED REGISTERED

## 2019-12-08 RX ORDER — FENTANYL CITRATE 50 UG/ML
25 INJECTION, SOLUTION INTRAMUSCULAR; INTRAVENOUS ONCE
Status: DISCONTINUED | OUTPATIENT
Start: 2019-12-08 | End: 2019-12-10 | Stop reason: HOSPADM

## 2019-12-08 RX ORDER — FENTANYL CITRATE 50 UG/ML
50 INJECTION, SOLUTION INTRAMUSCULAR; INTRAVENOUS EVERY 5 MIN PRN
Status: DISCONTINUED | OUTPATIENT
Start: 2019-12-08 | End: 2019-12-08 | Stop reason: HOSPADM

## 2019-12-08 RX ORDER — FENTANYL CITRATE 50 UG/ML
INJECTION, SOLUTION INTRAMUSCULAR; INTRAVENOUS PRN
Status: DISCONTINUED | OUTPATIENT
Start: 2019-12-08 | End: 2019-12-08 | Stop reason: SDUPTHER

## 2019-12-08 RX ORDER — SODIUM CHLORIDE 0.9 % (FLUSH) 0.9 %
10 SYRINGE (ML) INJECTION EVERY 12 HOURS SCHEDULED
Status: DISCONTINUED | OUTPATIENT
Start: 2019-12-08 | End: 2019-12-10 | Stop reason: HOSPADM

## 2019-12-08 RX ORDER — SODIUM CHLORIDE, SODIUM LACTATE, POTASSIUM CHLORIDE, CALCIUM CHLORIDE 600; 310; 30; 20 MG/100ML; MG/100ML; MG/100ML; MG/100ML
INJECTION, SOLUTION INTRAVENOUS CONTINUOUS
Status: DISCONTINUED | OUTPATIENT
Start: 2019-12-08 | End: 2019-12-10

## 2019-12-08 RX ORDER — SODIUM CHLORIDE 0.9 % (FLUSH) 0.9 %
10 SYRINGE (ML) INJECTION PRN
Status: DISCONTINUED | OUTPATIENT
Start: 2019-12-08 | End: 2019-12-10 | Stop reason: HOSPADM

## 2019-12-08 RX ORDER — MIDAZOLAM HYDROCHLORIDE 1 MG/ML
2 INJECTION INTRAMUSCULAR; INTRAVENOUS
Status: ACTIVE | OUTPATIENT
Start: 2019-12-08 | End: 2019-12-08

## 2019-12-08 RX ORDER — PHENYLEPHRINE HYDROCHLORIDE 10 MG/ML
INJECTION INTRAVENOUS PRN
Status: DISCONTINUED | OUTPATIENT
Start: 2019-12-08 | End: 2019-12-08 | Stop reason: SDUPTHER

## 2019-12-08 RX ORDER — MAGNESIUM HYDROXIDE 1200 MG/15ML
LIQUID ORAL CONTINUOUS PRN
Status: COMPLETED | OUTPATIENT
Start: 2019-12-08 | End: 2019-12-08

## 2019-12-08 RX ORDER — SODIUM CHLORIDE, SODIUM LACTATE, POTASSIUM CHLORIDE, CALCIUM CHLORIDE 600; 310; 30; 20 MG/100ML; MG/100ML; MG/100ML; MG/100ML
INJECTION, SOLUTION INTRAVENOUS CONTINUOUS PRN
Status: DISCONTINUED | OUTPATIENT
Start: 2019-12-08 | End: 2019-12-08 | Stop reason: SDUPTHER

## 2019-12-08 RX ORDER — ROCURONIUM BROMIDE 10 MG/ML
INJECTION, SOLUTION INTRAVENOUS PRN
Status: DISCONTINUED | OUTPATIENT
Start: 2019-12-08 | End: 2019-12-08 | Stop reason: SDUPTHER

## 2019-12-08 RX ORDER — ONDANSETRON 2 MG/ML
4 INJECTION INTRAMUSCULAR; INTRAVENOUS ONCE
Status: DISCONTINUED | OUTPATIENT
Start: 2019-12-08 | End: 2019-12-10 | Stop reason: HOSPADM

## 2019-12-08 RX ADMIN — ACETAMINOPHEN 650 MG: 325 TABLET ORAL at 08:27

## 2019-12-08 RX ADMIN — FOLIC ACID: 5 INJECTION, SOLUTION INTRAMUSCULAR; INTRAVENOUS; SUBCUTANEOUS at 08:26

## 2019-12-08 RX ADMIN — FENTANYL CITRATE 100 MCG: 50 INJECTION INTRAMUSCULAR; INTRAVENOUS at 17:02

## 2019-12-08 RX ADMIN — OXYCODONE HYDROCHLORIDE 10 MG: 5 TABLET ORAL at 08:27

## 2019-12-08 RX ADMIN — PHENYLEPHRINE HYDROCHLORIDE 200 MCG: 10 INJECTION INTRAVENOUS at 15:03

## 2019-12-08 RX ADMIN — MEROPENEM 1 G: 1 INJECTION, POWDER, FOR SOLUTION INTRAVENOUS at 08:26

## 2019-12-08 RX ADMIN — ROCURONIUM BROMIDE 50 MG: 10 INJECTION INTRAVENOUS at 14:11

## 2019-12-08 RX ADMIN — I.V. FAT EMULSION 250 ML: 20 EMULSION INTRAVENOUS at 18:17

## 2019-12-08 RX ADMIN — DAKIN'S SOLUTION 0.125% (QUARTER STRENGTH): 0.12 SOLUTION at 08:28

## 2019-12-08 RX ADMIN — CALCIUM GLUCONATE: 98 INJECTION, SOLUTION INTRAVENOUS at 18:17

## 2019-12-08 RX ADMIN — FAMOTIDINE 20 MG: 20 TABLET, FILM COATED ORAL at 08:27

## 2019-12-08 RX ADMIN — FUROSEMIDE 80 MG: 10 INJECTION, SOLUTION INTRAMUSCULAR; INTRAVENOUS at 08:27

## 2019-12-08 RX ADMIN — Medication 200 MCG/HR: at 17:44

## 2019-12-08 RX ADMIN — HYDROMORPHONE HYDROCHLORIDE 0.5 MG: 1 INJECTION, SOLUTION INTRAMUSCULAR; INTRAVENOUS; SUBCUTANEOUS at 18:23

## 2019-12-08 RX ADMIN — DAKIN'S SOLUTION 0.125% (QUARTER STRENGTH): 0.12 SOLUTION at 20:56

## 2019-12-08 RX ADMIN — MIDODRINE HYDROCHLORIDE 10 MG: 5 TABLET ORAL at 20:56

## 2019-12-08 RX ADMIN — Medication 200 MCG/HR: at 22:20

## 2019-12-08 RX ADMIN — PHENYLEPHRINE HYDROCHLORIDE 300 MCG: 10 INJECTION INTRAVENOUS at 15:07

## 2019-12-08 RX ADMIN — PHENYLEPHRINE HYDROCHLORIDE 100 MCG: 10 INJECTION INTRAVENOUS at 15:01

## 2019-12-08 RX ADMIN — PHENYLEPHRINE HYDROCHLORIDE 200 MCG: 10 INJECTION INTRAVENOUS at 14:33

## 2019-12-08 RX ADMIN — MEROPENEM 1 G: 1 INJECTION, POWDER, FOR SOLUTION INTRAVENOUS at 21:10

## 2019-12-08 RX ADMIN — SODIUM CHLORIDE, POTASSIUM CHLORIDE, SODIUM LACTATE AND CALCIUM CHLORIDE: 600; 310; 30; 20 INJECTION, SOLUTION INTRAVENOUS at 13:52

## 2019-12-08 RX ADMIN — FENTANYL CITRATE 100 MCG: 50 INJECTION INTRAMUSCULAR; INTRAVENOUS at 14:00

## 2019-12-08 RX ADMIN — Medication 6 MG/HR: at 10:56

## 2019-12-08 RX ADMIN — MIDODRINE HYDROCHLORIDE 10 MG: 5 TABLET ORAL at 08:27

## 2019-12-08 RX ADMIN — Medication 200 MCG/HR: at 05:37

## 2019-12-08 RX ADMIN — SODIUM CHLORIDE, PRESERVATIVE FREE 10 ML: 5 INJECTION INTRAVENOUS at 08:28

## 2019-12-08 RX ADMIN — Medication 200 MCG/HR: at 00:43

## 2019-12-08 RX ADMIN — Medication 200 MCG/HR: at 10:15

## 2019-12-08 RX ADMIN — OXYCODONE HYDROCHLORIDE 10 MG: 5 TABLET ORAL at 21:08

## 2019-12-08 RX ADMIN — SODIUM CHLORIDE 3.75 UNITS/HR: 9 INJECTION, SOLUTION INTRAVENOUS at 10:16

## 2019-12-08 RX ADMIN — SODIUM CHLORIDE, PRESERVATIVE FREE 10 ML: 5 INJECTION INTRAVENOUS at 20:56

## 2019-12-08 RX ADMIN — HEPARIN SODIUM 5000 UNITS: 5000 INJECTION INTRAVENOUS; SUBCUTANEOUS at 20:56

## 2019-12-08 RX ADMIN — Medication 10 ML: at 21:02

## 2019-12-08 RX ADMIN — PHENYLEPHRINE HYDROCHLORIDE 100 MCG: 10 INJECTION INTRAVENOUS at 15:05

## 2019-12-08 RX ADMIN — PHENYLEPHRINE HYDROCHLORIDE 100 MCG: 10 INJECTION INTRAVENOUS at 14:40

## 2019-12-08 ASSESSMENT — PULMONARY FUNCTION TESTS
PIF_VALUE: 22
PIF_VALUE: 36
PIF_VALUE: 33
PIF_VALUE: 33
PIF_VALUE: 25
PIF_VALUE: 38
PIF_VALUE: 34
PIF_VALUE: 35
PIF_VALUE: 29
PIF_VALUE: 35
PIF_VALUE: 39
PIF_VALUE: 35
PIF_VALUE: 39
PIF_VALUE: 40
PIF_VALUE: 28
PIF_VALUE: 37
PIF_VALUE: 37
PIF_VALUE: 35
PIF_VALUE: 39
PIF_VALUE: 35
PIF_VALUE: 35
PIF_VALUE: 27
PIF_VALUE: 36
PIF_VALUE: 30
PIF_VALUE: 35
PIF_VALUE: 42
PIF_VALUE: 5
PIF_VALUE: 38
PIF_VALUE: 35
PIF_VALUE: 38
PIF_VALUE: 29
PIF_VALUE: 35
PIF_VALUE: 35
PIF_VALUE: 43
PIF_VALUE: 35
PIF_VALUE: 28
PIF_VALUE: 29
PIF_VALUE: 29
PIF_VALUE: 34
PIF_VALUE: 35
PIF_VALUE: 39
PIF_VALUE: 37
PIF_VALUE: 35
PIF_VALUE: 29
PIF_VALUE: 29
PIF_VALUE: 44
PIF_VALUE: 27
PIF_VALUE: 35
PIF_VALUE: 27
PIF_VALUE: 29
PIF_VALUE: 35
PIF_VALUE: 28
PIF_VALUE: 29
PIF_VALUE: 36
PIF_VALUE: 29
PIF_VALUE: 33
PIF_VALUE: 35
PIF_VALUE: 36
PIF_VALUE: 44
PIF_VALUE: 45
PIF_VALUE: 36
PIF_VALUE: 29
PIF_VALUE: 37
PIF_VALUE: 26
PIF_VALUE: 33
PIF_VALUE: 22
PIF_VALUE: 35
PIF_VALUE: 29
PIF_VALUE: 37
PIF_VALUE: 41
PIF_VALUE: 21
PIF_VALUE: 38
PIF_VALUE: 35
PIF_VALUE: 32
PIF_VALUE: 35
PIF_VALUE: 35
PIF_VALUE: 33
PIF_VALUE: 35
PIF_VALUE: 41
PIF_VALUE: 31
PIF_VALUE: 29
PIF_VALUE: 28
PIF_VALUE: 32
PIF_VALUE: 29
PIF_VALUE: 30
PIF_VALUE: 29
PIF_VALUE: 22
PIF_VALUE: 35
PIF_VALUE: 27
PIF_VALUE: 38
PIF_VALUE: 29
PIF_VALUE: 21
PIF_VALUE: 42
PIF_VALUE: 36
PIF_VALUE: 30
PIF_VALUE: 43
PIF_VALUE: 38
PIF_VALUE: 24
PIF_VALUE: 34
PIF_VALUE: 23
PIF_VALUE: 33
PIF_VALUE: 43
PIF_VALUE: 27
PIF_VALUE: 29
PIF_VALUE: 35
PIF_VALUE: 35
PIF_VALUE: 21
PIF_VALUE: 37
PIF_VALUE: 39
PIF_VALUE: 35
PIF_VALUE: 37
PIF_VALUE: 37
PIF_VALUE: 29
PIF_VALUE: 44
PIF_VALUE: 30
PIF_VALUE: 38
PIF_VALUE: 35
PIF_VALUE: 36
PIF_VALUE: 24
PIF_VALUE: 38
PIF_VALUE: 37
PIF_VALUE: 35
PIF_VALUE: 35
PIF_VALUE: 24
PIF_VALUE: 27
PIF_VALUE: 37
PIF_VALUE: 42
PIF_VALUE: 35
PIF_VALUE: 35
PIF_VALUE: 29
PIF_VALUE: 36
PIF_VALUE: 29
PIF_VALUE: 32
PIF_VALUE: 40
PIF_VALUE: 29
PIF_VALUE: 28
PIF_VALUE: 37
PIF_VALUE: 41
PIF_VALUE: 27
PIF_VALUE: 35
PIF_VALUE: 36
PIF_VALUE: 27
PIF_VALUE: 24
PIF_VALUE: 35
PIF_VALUE: 35
PIF_VALUE: 37
PIF_VALUE: 44
PIF_VALUE: 35
PIF_VALUE: 29
PIF_VALUE: 30
PIF_VALUE: 24
PIF_VALUE: 39
PIF_VALUE: 33
PIF_VALUE: 27
PIF_VALUE: 38
PIF_VALUE: 35
PIF_VALUE: 37
PIF_VALUE: 27
PIF_VALUE: 35
PIF_VALUE: 35
PIF_VALUE: 29
PIF_VALUE: 29
PIF_VALUE: 22
PIF_VALUE: 35
PIF_VALUE: 28
PIF_VALUE: 27
PIF_VALUE: 34
PIF_VALUE: 35
PIF_VALUE: 29
PIF_VALUE: 36
PIF_VALUE: 27
PIF_VALUE: 35
PIF_VALUE: 36
PIF_VALUE: 43
PIF_VALUE: 27
PIF_VALUE: 38
PIF_VALUE: 26
PIF_VALUE: 25
PIF_VALUE: 37
PIF_VALUE: 38
PIF_VALUE: 35
PIF_VALUE: 29
PIF_VALUE: 37
PIF_VALUE: 29
PIF_VALUE: 3
PIF_VALUE: 35
PIF_VALUE: 35
PIF_VALUE: 29
PIF_VALUE: 35
PIF_VALUE: 28
PIF_VALUE: 21
PIF_VALUE: 24
PIF_VALUE: 35
PIF_VALUE: 29
PIF_VALUE: 39
PIF_VALUE: 29
PIF_VALUE: 41
PIF_VALUE: 29
PIF_VALUE: 29
PIF_VALUE: 35
PIF_VALUE: 27
PIF_VALUE: 36
PIF_VALUE: 28
PIF_VALUE: 35
PIF_VALUE: 29
PIF_VALUE: 38
PIF_VALUE: 36
PIF_VALUE: 30
PIF_VALUE: 22
PIF_VALUE: 22
PIF_VALUE: 37
PIF_VALUE: 35
PIF_VALUE: 32
PIF_VALUE: 22
PIF_VALUE: 45
PIF_VALUE: 36
PIF_VALUE: 35
PIF_VALUE: 35
PIF_VALUE: 31

## 2019-12-08 ASSESSMENT — PAIN SCALES - GENERAL
PAINLEVEL_OUTOF10: 0
PAINLEVEL_OUTOF10: 0
PAINLEVEL_OUTOF10: 8
PAINLEVEL_OUTOF10: 0
PAINLEVEL_OUTOF10: 0

## 2019-12-08 NOTE — PROGRESS NOTES
Nutrition Assessment (Parenteral Nutrition)    Type and Reason for Visit: Reassess    Nutrition Recommendations:    - Continue current TPN. - Restart trickle TF of Immune Enhancing formula as able- suggest eventual goal rate of 55 mL/hr.    - Suggest checking mag and phos. Nutrition Assessment: Discussed with RN. TF held. TPN continues. Noted plan for OR today for debridement. Nutrition Risk Level: High    Nutrient Needs:  · Estimated Daily Total Kcal: 1749-4878 kcal/day   · Estimated Daily Protein (g): 110-145 g pro/day     Nutrition Diagnosis:   · Problem: Inadequate oral intake  · Etiology: related to Impaired respiratory function-inability to consume food     Signs and symptoms:  as evidenced by NPO status due to medical condition(need for nutrition support )    Objective Information:  · Wound Type: Open Wounds  · Current Nutrition Therapies:  · Oral Diet Orders: NPO   · Parenteral Nutrition Orders:  · Type and Formula: (150 g Dex, 100 g AA)   · Lipids: 250ml, Daily  · Rate/Volume: 55.8 mL/hr   · Duration: Continuous  · Current PN Order Provides: 1410 kcal and 100 g pro/day   · Additional Calories: none  · Anthropometric Measures:  · Ht: 5' 9\" (175.3 cm)   · Current Body Wt: 295 lb 6.7 oz (134 kg)  · Admission Body Wt: 291 lb 0.1 oz (132 kg)  · Ideal Body Wt: 160 lb 15 oz (73 kg), % Ideal Body 181% (adm/ideal)  · BMI Classification: BMI > or equal to 40.0 Obese Class III    Nutrition Interventions:   (Continue Parenteral Nutrition.  Restart trickle TF as able- suggest eventual goal rate of 55 mL/hr. )  Continued Inpatient Monitoring, Education Not Indicated    Nutrition Evaluation:   · Evaluation: Progressing toward goals   · Goals: Meet % of estimated nutrition needs   · Monitoring: PN Intake, PN Tolerance, Pertinent Labs, Weight, Monitor Hemodynamic Status, I&O, Monitor Bowel Function      Electronically signed by Obie Romero MS, RD, LD on 12/8/19 at 11:53 AM    Contact Number:

## 2019-12-08 NOTE — PROGRESS NOTES
Critical Care Team - Daily Progress Note      Date and time: 2019 10:49 AM  Patient's name:  Eun Kay  Medical Record Number: [id number]  Patient's account/billing number: [de-identified]  Patient's YOB: 1985  Age: 37 y.o. Date of Admission: 2019  3:39 PM  Length of stay during current admission: 9      Primary Care Physician: No primary care provider on file. ICU Attending Physician: Dr. Colonel Ramirez    Code Status: Full Code    Reason for ICU admission: Necrotizing Fascitis vertebral lucency concerning for malignancy    SUBJECTIVE:     OVERNIGHT EVENTS:       Patient seen and examined. Patient on Versed and fentanyl drip, following commands. Off pressors. Hemoglobin 7.8. Fever:-  Temp (24hrs), Av.2 °F (37.3 °C), Min:98.1 °F (36.7 °C), Max:100.4 °F (38 °C)    Systolic (31MET), HZJ:019 , Min:98 , SXN:829     Diastolic (30WNT), LZT:78, Min:40, Max:92      Urine output in the last 24 hours:  In: 2429.3 [I.V.:760.3; NG/GT:30]  Out: 1171 [Urine:15; Drains:30]    Patient Vitals for the past 96 hrs (Last 3 readings):   Weight   19 0500 295 lb 6.4 oz (134 kg)   19 1820 296 lb 8.3 oz (134.5 kg)   19 1430 295 lb 6.7 oz (134 kg)         AWAKE & FOLLOWING COMMANDS:  [x] No   [] Yes    CURRENT VENTILATION STATUS:     [x] Ventilator  [] BIPAP  [] Nasal Cannula [] Room Air        SECRETIONS Amount:  [] Small [] Moderate  [] Large  [x] None  Color:     [] White [] Colored  [] Bloody    SEDATION:  RAAS Score:  [] Propofol gtt  [x] Versed gtt  [] Ativan gtt   [] No Sedation     PARALYZED:  [x] No    [] Yes    DIARRHEA:                [x] No                [] Yes  (C. Difficile status: [] positive                                                                                                                       [] negative                                                                                                                     [] pending)    VASOPRESSORS:  [x] No    [] $Subsequent Day  Ventilator Started: Yes  Skin Assessment: Clean, dry, & intact  Equipment ID: [id number]  Equipment Changed: Suction catheter  Vent Type: Servo i  Vent Mode: PRVC  Vt Ordered: 530 mL  Rate Set: 26 bmp  FiO2 : 30 %  Sensitivity: 1  PEEP/CPAP: 5  I Time/ I Time %: 0.75 s  Humidification Source: Heated wire  Humidification Temp: 37  Humidification Temp Measured: 37  Circuit Condensation: Drained  Nitric Oxide/Epoprostenol In Use?: No  Additional Respiratory  Assessments  Pulse: 90  Resp: 26  SpO2: 96 %  End Tidal CO2: 31 (%)  Position: Semi-Pagan's  Humidification Source: Heated wire  Humidification Temp: 37  Circuit Condensation: Drained  Oral Care Completed?: Yes  Oral Care: Teeth brushed, Mouthwash  Subglottic Suction Done?: Yes  Skin barrier applied: No    ABGs:  Arterial Blood Gas result:  pO2 107.4; pCO2 32.9; pH 7.29;  HCO3 16, %O2 Sat 98.       Laboratory findings:    Complete Blood Count:   Recent Labs     12/06/19  0521 12/07/19  0352 12/07/19  1205 12/07/19  2252 12/08/19  0509   WBC 26.7* 20.9*  --   --  17.6*   HGB 7.0* 7.0* 6.8* 8.0* 7.8*   HCT 24.7* 24.1* 22.5* 27.7* 26.7*   PLT See Reflexed IPF Result 243  --   --  243        Last 3 Blood Glucose:   Recent Labs     12/06/19  0521 12/07/19  0352 12/08/19  0509   GLUCOSE 192* 163* 188*        PT/INR:    Lab Results   Component Value Date    PROTIME 11.9 11/29/2019    INR 1.1 11/29/2019     PTT:  No results found for: APTT, PTT    Comprehensive Metabolic Profile:   Recent Labs     12/06/19  0521 12/07/19  0352 12/08/19  0509    137 135   K 4.0 3.6* 3.8    104 104   CO2 22 24 21   BUN 49* 36* 31*   CREATININE 2.84* 2.44* 2.13*   GLUCOSE 192* 163* 188*   CALCIUM 6.6* 6.8* 7.1*      Magnesium:   Lab Results   Component Value Date    MG 2.0 12/05/2019     Phosphorus:   Lab Results   Component Value Date    PHOS 4.7 12/05/2019     Ionized Calcium:   Lab Results   Component Value Date    CAION 1.05 12/03/2019            Troponin: No results for input(s): TROPONINI in the last 72 hours. Radiology/Imaging:     Chest Xray (12/8/2019):    ASSESSMENT:     Principal Problem:    Necrotizing fasciitis ([facility] [address]  Active Problems:    Hypertension    Diabetes ([facility] [address]    Diabetic foot ulcer ([facility] [address]    Septic shock ([facility] [address]    Bandemia  Resolved Problems:    * No resolved hospital problems. *    PLAN:     WEAN PER PROTOCOL:  [x] No   [] Yes  [] N/A    DISCONTINUE ANY LABS:   [x] No   [] Yes    TRANSFER OUT OF ICU:   [x] No   [] Yes    ADDITIONAL PLAN:    1. Pressure ulcer cant be staged present on admission / Necrotizing Faciitis involving buttocks and scrotum - s/p POD#8 s/p wide complex debridement of gluteal/perianal region. region extensive necrotic tissue that involved the anus and distal rectum. Had  debridement, washout of gluteal and perineal, diverting loop colostomy. Bedside debridement done on 12/4, dressing changes with him on site will continue to do wet-to-dry dressing changes as per  surgery. -GEN surgery and urology on board. Patient going to OR today for further debridement (12/8)  - continue meropenem (started 12/1) per ID   2. Septic shock -resolved, off pressors  - Blood cultures drawn 12/2/19:  No growth     3. DEBBY on CKD - urology on board. 780 UOP last 24 hrs , fluid replaced. -Urine culture : Grew Proteus mirabilis, ID is aware of it  -Nephrology recs:     Renal ultrasound: Right renal cyst, otherwise grossly negative renal   Ultrasound, non diagnostic bladder assessment. 4. Diabetes - insulin gtt - will maintain through OR then switch to ISS and add in TPN if needed     5. Diabetic foot ulcer -dietary on board. Wound care. No surgical intervention for now    6. Anemia: Hgb (7.0),   received  5uPRBC since admission     1. Feeding NPO for OR today   2. Fluids - KVO  3. Family - none at bedside   4. Analgesic -fentanyl drip   5. Sedation: Versed drip  6. Thrombo-prophylaxis [] Enoxaparin  [x] Unfract.  Heparin Subcut (was held this AM for OR)  [] EPC Cuffs  7. Mobility bed rest and PT/ OT   8. Heads up yes   9. Ulcer prophylaxis - pepcid   10. Glycemic control: insulin gtt, can   11. Spontaneous breathing trial not today  12. Bowel regimen/urine output: milk of mag PRN, 45 ml UOP in last 24 hours  13. Indwelling catheter/lines CVC ADRIENNE, MIKE, Hebert  14.  De-escalation: take off insulin drip after OR- switch to Humza Orozco M.D             Critical care resident,  Department of Internal Medicine/ Critical care  [facility] ([place]             12/8/2019, 10:49 AM

## 2019-12-08 NOTE — PLAN OF CARE
Problem: Pain:  Description  Pain management should include both nonpharmacologic and pharmacologic interventions.   Goal: Pain level will decrease  Description  Pain level will decrease  Outcome: Ongoing  Goal: Control of acute pain  Description  Control of acute pain  Outcome: Ongoing  Goal: Control of chronic pain  Description  Control of chronic pain  Outcome: Ongoing     Problem: Skin Integrity:  Goal: Will show no infection signs and symptoms  Description  Will show no infection signs and symptoms  Outcome: Ongoing  Goal: Absence of new skin breakdown  Description  Absence of new skin breakdown  Outcome: Ongoing     Problem: OXYGENATION/RESPIRATORY FUNCTION  Goal: Patient will maintain patent airway  12/8/2019 0742 by Jt Child RN  Outcome: Ongoing  12/7/2019 2007 by Jossie Galloway RCP  Outcome: Ongoing  Goal: Patient will achieve/maintain normal respiratory rate/effort  Description  Respiratory rate and effort will be within normal limits for the patient  12/8/2019 0742 by Jt Child RN  Outcome: Ongoing  12/7/2019 2007 by Jossie Galloway RCP  Outcome: Ongoing     Problem: MECHANICAL VENTILATION  Goal: Patient will maintain patent airway  12/8/2019 0742 by Jt Child RN  Outcome: Ongoing  12/7/2019 2007 by Jossie Galloway RCP  Outcome: Ongoing  Goal: Oral health is maintained or improved  12/8/2019 0742 by Jt Child RN  Outcome: Ongoing  12/7/2019 2007 by Jossie Galloway RCP  Outcome: Ongoing  Goal: ET tube will be managed safely  12/8/2019 0742 by Jt Child RN  Outcome: Ongoing  12/7/2019 2007 by Jossie Galloway RCP  Outcome: Ongoing  Goal: Ability to express needs and understand communication  12/8/2019 0742 by Jt Child RN  Outcome: Ongoing  12/7/2019 2007 by Jossie Galloway RCP  Outcome: Ongoing  Goal: Mobility/activity is maintained at optimum level for patient  12/8/2019 0742 by Jt Child RN  Outcome: Ongoing  12/7/2019 2007 by Jossie Galloway RCP  Outcome: Ongoing Verbalizations of feeling emotionally comfortable while being cared for will increase  Description  Verbalizations of feeling emotionally comfortable while being cared for will increase  Outcome: Ongoing     Problem: Psychomotor Activity - Altered:  Goal: Absence of psychomotor disturbance signs and symptoms  Description  Absence of psychomotor disturbance signs and symptoms  Outcome: Ongoing     Problem: Sensory Perception - Impaired:  Goal: Demonstrations of improved sensory functioning will increase  Description  Demonstrations of improved sensory functioning will increase  Outcome: Ongoing  Goal: Decrease in sensory misperception frequency  Description  Decrease in sensory misperception frequency  Outcome: Ongoing  Goal: Able to refrain from responding to false sensory perceptions  Description  Able to refrain from responding to false sensory perceptions  Outcome: Ongoing  Goal: Demonstrates accurate environmental perceptions  Description  Demonstrates accurate environmental perceptions  Outcome: Ongoing  Goal: Able to distinguish between reality-based and nonreality-based thinking  Description  Able to distinguish between reality-based and nonreality-based thinking  Outcome: Ongoing  Goal: Able to interrupt nonreality-based thinking  Description  Able to interrupt nonreality-based thinking  Outcome: Ongoing     Problem: Sleep Pattern Disturbance:  Goal: Appears well-rested  Description  Appears well-rested  Outcome: Ongoing

## 2019-12-08 NOTE — PROGRESS NOTES
PROGRESS NOTE     PATIENT NAME: Austin Mcdaniel [address] RECORD NO. [id number]  DATE: 2019  SURGEON: Arturo Aleman  PRIMARY CARE PHYSICIAN: No primary care provider on file. HD: # 9    ASSESSMENT    Patient Active Problem List   Diagnosis    Necrotizing fasciitis ([address]    Hypertension    Diabetes ([address]    Diabetic foot ulcer ([address]    Septic shock ([address]    Bandemia     POD#9 wide complex debridement of nec fasc involving gluteal/perianal/distal rectum  POD#5 open diverting end colostomy     MEDICAL DECISION MAKING AND PLAN    · Necrotizing soft tissue infection: POD#9 s/p wide complex debridement of gluteal/perianal region extensive necrotic tissue that involved the anus and distal rectum. · Debridement in the OR 12/6. Purulent pockets found during debridement, will consider CT scan for further evaluation. Will continue with wet to dry dressing changes twice daily. · Will take back to the OR today for further debridement and wound VAC placement. · Urology following  · Open diverting end colostomy: POD #5, remove wound vac today  · Meropenem, ID following. · Critical care for vent and medical management  · Recommend MAP >65  · Strict glucose control   · LLE wound per podiatry  · DEBBY with minimal UOP. Nephrology following. · Medical and supportive care per Crit care team      Brandyn Chandler  was seen and examined. Afebrile. Hemodynamics stable off pressors. On Versed and Fentanyl drips.        OBJECTIVE  VITALS: Temp: Temp: 99.5 °F (37.5 °C)Temp  Av °F (37.2 °C)  Min: 98.1 °F (36.7 °C)  Max: 99.7 °F (25.5 °C) BP Systolic (98SCJ), PUB:999 , Min:108 , AQM:141   Diastolic (99EIE), OOV:45, Min:53, Max:92   Pulse Pulse  Av.8  Min: 77  Max: 113 Resp Resp  Av.3  Min: 22  Max: 26 Pulse ox SpO2  Av.6 %  Min: 96 %  Max: 100 %  GENERAL: intubated and sedated  NEURO: following commands, EOMI, pupils equal and reactive  LUNGS: ventilated, equal and symmetric chest rise/fall  HEART:

## 2019-12-08 NOTE — PROGRESS NOTES
Renal Progress Note    Patient :  Gisselle Sanches; 37 y.o. MRN# [id number]  Location:  0128/0128-01  Attending:  Tootie Younger MD  Admit Date:  2019   Hospital Day: 9    Subjective   Following for DEBBY/CKD now HD dependent, admitted with necrotizing fasciitis. Patient was seen and examined. Patient is for OR today at 1130 for debridement  Dialyzed yesterday and tolerated fairly well  Potassium is within normal limits  On 30% FiO2 and stable  Objective     VS: /76   Pulse 113   Temp 99.5 °F (37.5 °C) (Oral)   Resp 25   Ht 5' 9\" (1.753 m)   Wt 295 lb 6.4 oz (134 kg)   SpO2 96%   BMI 43.62 kg/m²   MAXIMUM TEMPERATURE OVER 24HRS:  Temp (24hrs), Av °F (37.2 °C), Min:98.1 °F (36.7 °C), Max:99.7 °F (37.6 °C)    24HR BLOOD PRESSURE RANGE:  Systolic (61VBN), LI , Min:108 , WNU:546   ; Diastolic (06OSO), RVC:27, Min:53, Max:92    24HR INTAKE/OUTPUT:      Intake/Output Summary (Last 24 hours) at 2019 0550  Last data filed at 2019 0400  Gross per 24 hour   Intake 3023.31 ml   Output 3605 ml   Net -581.69 ml     WEIGHT :  Patient Vitals for the past 96 hrs (Last 3 readings):   Weight   19 0500 295 lb 6.4 oz (134 kg)   19 1820 296 lb 8.3 oz (134.5 kg)   19 1430 295 lb 6.7 oz (134 kg)       Current Medications:     Scheduled Meds:    sodium chloride flush  10 mL Intravenous Q12H    midodrine  10 mg Oral TID WC    famotidine  20 mg Per NG tube Daily    furosemide  80 mg Intravenous Daily    sodium hypochlorite   Irrigation BID    thiamine and folic acid IVPB   Intravenous Daily    fat emulsion  250 mL Intravenous Daily    meropenem  1 g Intravenous Q12H    sodium chloride flush  10 mL Intravenous 2 times per day    heparin (porcine)  5,000 Units Subcutaneous 3 times per day       Physical Examination:     General:  Intubated and sedated. Obese  Chest:   Bilateral vesicular breath sounds, no rales or wheezes.   Cardiac:  S1 S2 RR, no murmurs, gallops or rubs, JVP not complex debridement of gluteal, perineal region and open colostomy. Patient will be going to the OR today for debridement  4.  Respiratory Failure: On ventilator and 30% FiO2  5.  Protein calorie malnutrition  6.  Fluid overload: Improved with ultrafiltration  7. S/P diverting   8. Anemia    Plan:   1.no need for dialysis today  2.   BMP and CBC in a.m.  3.  We will follow with  Nutrition   Renal Diet/TF      Silas Gibson

## 2019-12-08 NOTE — PROGRESS NOTES
Critical Care Team - Daily Progress Note      Date and time: 2019 6:54 AM  Patient's name:  Hay Man  Medical Record Number: [id number]  Patient's account/billing number: [de-identified]  Patient's YOB: 1985  Age: 37 y.o. Date of Admission: 2019  3:39 PM  Length of stay during current admission: 10      Primary Care Physician: No primary care provider on file. ICU Attending Physician: Dr. Mariza Theodore    Code Status: Full Code    Reason for ICU admission: Necrotizing Fascitis vertebral lucency concerning for malignancy    SUBJECTIVE:     OVERNIGHT EVENTS:       Patient seen and examined. Patient on Versed and fentanyl drip, following commands. Patient underwent surgical debridement of Necrotizing fasciitis buttock  Per the surgery, infection had spread to  deep plane, they did extensive debridement, scrotum was opened and testicles were evaluated, urethra is less viable, which means he is getting less blood supply, in 1 or 2 days surgery will take him back to the OR to reassess if the infection is the suprapubic area then they will think about it CODE STATUS. Mother was present at the bedside surgeon spoke with mother the possibility of changing CODE STATUS later on this week. We will put her on more pain medication because of extensive debridement. patient is getting Tube feed 55 ml/hr and tube feed 10 ml. He is on Fentanyl and Roxicodone, meropenum. Off pressors. Hemoglobin 7.0      Fever:-  Temp (24hrs), Av.8 °F (36 °C), Min:95 °F (35 °C), Max:100.4 °F (38 °C)    Systolic (71HAG), QIO:311 , Min:50 , MAV:807     Diastolic (80MQV), HLD:06, Min:31, Max:108      Urine output in the last 24 hours:  In: 2393 [I.V.:1221; NG/GT:30]  Out: 100     Patient Vitals for the past 96 hrs (Last 3 readings):   Weight   19 0400 296 lb (134.3 kg)   19 0500 295 lb 6.4 oz (134 kg)   19 1820 296 lb 8.3 oz (134.5 kg)         AWAKE & FOLLOWING COMMANDS:  [x] No   [] Yes    CURRENT VENTILATION STATUS:     [x] Ventilator  [] BIPAP  [] Nasal Cannula [] Room Air        SECRETIONS Amount:  [] Small [] Moderate  [] Large  [x] None  Color:     [] White [] Colored  [] Bloody    SEDATION:  RAAS Score:  [] Propofol gtt  [x] Versed gtt  [] Ativan gtt   [] No Sedation     PARALYZED:  [x] No    [] Yes    DIARRHEA:                [x] No                [] Yes  (C. Difficile status: [] positive                                                                                                                       [] negative                                                                                                                     [] pending)    VASOPRESSORS:  [x] No    [] Yes    If yes -   [] Levophed       [] Dopamine     [] Vasopressin       [] Dobutamine  [] Phenylephrine         [] Epinephrine    CENTRAL LINES:     [] No   [x] Yes          If yes -     [x] Right IJ     [] Left IJ [] Right Femoral [] Left Femoral                   [] Right Subclavian [] Left Subclavian       DOSS'S CATHETER:   [] No   [x] Yes  (Date of Insertion:   )     URINE OUTPUT:            [] Good   [] Low              [x] Anuric      OBJECTIVE:     VITAL SIGNS:  BP (!) 106/57   Pulse 100   Temp 98.9 °F (37.2 °C) (Axillary)   Resp 26   Ht 5' 9\" (1.753 m)   Wt 296 lb (134.3 kg)   SpO2 100%   BMI 43.71 kg/m²   Tmax over 24 hours:  Temp (24hrs), Av.8 °F (36 °C), Min:95 °F (35 °C), Max:100.4 °F (38 °C)      Patient Vitals for the past 6 hrs:   BP Temp Temp src Pulse Resp SpO2 Weight   19 0600 (!) 106/57 -- -- 100 26 -- --   19 0500 (!) 106/59 -- -- 95 19 100 % --   19 0400 134/76 98.9 °F (37.2 °C) Axillary 109 21 100 % 296 lb (134.3 kg)   19 0336 -- -- -- 104 14 100 % --   19 0200 (!) 99/58 -- -- 91 15 93 % --   19 0100 107/66 -- -- 107 16 97 % --         Intake/Output Summary (Last 24 hours) at 2019 0654  Last data filed at 2019 0000  Gross per 24 hour 243 273    < > = values in this interval not displayed. Last 3 Blood Glucose:   Recent Labs     12/07/19  0352 12/08/19  0509 12/09/19  0431   GLUCOSE 163* 188* 170*        PT/INR:    Lab Results   Component Value Date    PROTIME 11.9 11/29/2019    INR 1.1 11/29/2019     PTT:  No results found for: APTT, PTT    Comprehensive Metabolic Profile:   Recent Labs     12/07/19  0352 12/08/19  0509 12/09/19  0431    135 133*   K 3.6* 3.8 4.0    104 102   CO2 24 21 21   BUN 36* 31* 42*   CREATININE 2.44* 2.13* 2.69*   GLUCOSE 163* 188* 170*   CALCIUM 6.8* 7.1* 7.3*      Magnesium:   Lab Results   Component Value Date    MG 2.0 12/05/2019     Phosphorus:   Lab Results   Component Value Date    PHOS 2.3 12/09/2019     Ionized Calcium:   Lab Results   Component Value Date    CAION 1.05 12/03/2019            Troponin: No results for input(s): TROPONINI in the last 72 hours. Radiology/Imaging:     Chest Xray (12/9/2019):    ASSESSMENT:     Principal Problem:    Necrotizing fasciitis ([address]  Active Problems:    Hypertension    Diabetes ([address]    Diabetic foot ulcer ([address]    Septic shock ([address]    Bandemia  Resolved Problems:    * No resolved hospital problems. *    PLAN:     WEAN PER PROTOCOL:  [x] No   [] Yes  [] N/A    DISCONTINUE ANY LABS:   [x] No   [] Yes    TRANSFER OUT OF ICU:   [x] No   [] Yes    ADDITIONAL PLAN:    1. Pressure ulcer cant be staged present on admission / Necrotizing Faciitis involving buttocks and scrotum - s/p POD#10 s/p wide complex debridement of gluteal/perianal region. region extensive necrotic tissue that involved the anus and distal rectum. Had  debridement, washout of gluteal and perineal, diverting loop colostomy. Bedside debridement done on 12/4, dressing changes with him on site will continue to do wet-to-dry dressing changes as per  surgery. -GEN surgery and urology on board.   Patient going to OR yesterday for further debridement (12/8)  - continue meropenem (started 12/1) per ID   2. Septic shock -resolved, off pressors  - Blood cultures drawn 12/2/19:  No growth     3. DEBBY on CKD - urology on board. 780 UOP last 24 hrs , fluid replaced. -Urine culture : Grew Proteus mirabilis, ID is aware of it  -Nephrology recs:  Patient 3 dialysis till now. Renal ultrasound: Right renal cyst, otherwise grossly negative renal   Ultrasound, non diagnostic bladder assessment. 4. Diabetes - insulin gtt - will maintain through OR then switch to ISS and add in TPN if needed     5. Diabetic left foot ulcer -dietary on board. Wound care. No surgical intervention for now    6. Anemia: Hgb (7.0),   received  5uPRBC since admission     1. Feeding NPO for OR today   2. Fluids - KVO  3. Family - none at bedside   4. Analgesic -fentanyl drip   5. Sedation: Versed drip  6. Thrombo-prophylaxis [] Enoxaparin  [x] Unfract. Heparin Subcut (was held this AM for OR)  [] EPC Cuffs  7. Mobility bed rest and PT/ OT   8. Heads up yes   9. Ulcer prophylaxis - pepcid   10. Glycemic control: insulin gtt, can   11. Spontaneous breathing trial not today  12. Bowel regimen/urine output: milk of mag PRN, 45 ml UOP in last 24 hours  13. Indwelling catheter/lines CVC MIKE DELEON Foley  14.  De-escalation: take off insulin drip after OR- switch to Emma Bragg M.D             Critical care resident,  Department of Internal Medicine/ Critical care  [facility])             12/9/2019, 6:54 AM

## 2019-12-08 NOTE — OP NOTE
Dr. Vicente Stringer MD      [facility]. [facility]. [place]    DATE:  12/08/19    SURGEON:   Vicente Stringer MD    ASSISTANT:  Rick Clemens MD.       PREOPERATIVE DIAGNOSIS:  NECROTIZING FASCIITIS PERINEUM, SCROTUM    POSTOPERATIVE DIAGNOSIS:  same    PROCEDURE PERFORMED:  DEBRIDEMENT  NECROTIZING FASCIITIS PERINEUM, SCROTUM, GROIN, SUPRAPUBIC AREA INCLUDING SKIN, SOFT TISSUE, FASCIA >5cm    ANESTHESIA:  General    COMPLICATIONS:  None. ESTIMATED BLOOD LOSS:  50mL    DRAINS:  Ambrosio catheter    SPECIMENS:  Tissue for culture    INDICATIONS FOR PROCEDURE:  The patient is a 37 y.o. male with a history of necrotizing fasciitis taken to OR by general surgery for re-debridement. Extensive scrotal involvement was noted and urology was called intra-operatively for evaluation/debridement. DETAILS OF THE PROCEDURE:  On entering the room the patient was intubated in dorsal lithotomy and debridement was underway. The skin and subcutaneous tissue overlying the perineum, buttocks, and medial thighs had already been removed. Previous diverting colostomy had been performed. There was purulent drainage from the right hemiscrotum, and the right groin was exposed. With general surgery we assisted in removing necrotic tissue from the scrotum bilaterally, the left groin, and perineum. The testes were identified and viable bilaterally, and the tunica vaginalis was intact. The external ring was probed and there was no purulence drainage from the abdomen. The urethra was identified and the right bulbourethra was slightly dusky but did appear viable. The debridement was carried all the way to the pelvic floor musculature and pubic bone. At this point it was clear that additional debridement would not be possible or beneficial. The wound was packed wet to dry. He remained intubated and was transferred to ICU.      DISPOSITION:  Discussion with general surgery and the family that the patients status is

## 2019-12-08 NOTE — PROGRESS NOTES
Pt transported to OR by myself, OR RN, Mychal Anglin, RT and Qing Dinh, RT. Pt transported on monitor. Report given to CRNA.

## 2019-12-09 ENCOUNTER — APPOINTMENT (OUTPATIENT)
Dept: CT IMAGING | Age: 34
DRG: 463 | End: 2019-12-09
Attending: INTERNAL MEDICINE
Payer: MEDICARE

## 2019-12-09 LAB
ABSOLUTE EOS #: 0 K/UL (ref 0–0.4)
ABSOLUTE IMMATURE GRANULOCYTE: 0.34 K/UL (ref 0–0.3)
ABSOLUTE LYMPH #: 1.52 K/UL (ref 1–4.8)
ABSOLUTE MONO #: 1.35 K/UL (ref 0.1–0.8)
ANION GAP SERPL CALCULATED.3IONS-SCNC: 10 MMOL/L (ref 9–17)
BASOPHILS # BLD: 0 % (ref 0–2)
BASOPHILS ABSOLUTE: 0 K/UL (ref 0–0.2)
BUN BLDV-MCNC: 42 MG/DL (ref 6–20)
BUN/CREAT BLD: ABNORMAL (ref 9–20)
CALCIUM SERPL-MCNC: 7.3 MG/DL (ref 8.6–10.4)
CHLORIDE BLD-SCNC: 102 MMOL/L (ref 98–107)
CO2: 21 MMOL/L (ref 20–31)
CREAT SERPL-MCNC: 2.69 MG/DL (ref 0.7–1.2)
DIFFERENTIAL TYPE: ABNORMAL
EOSINOPHILS RELATIVE PERCENT: 0 % (ref 1–4)
GFR AFRICAN AMERICAN: 33 ML/MIN
GFR NON-AFRICAN AMERICAN: 27 ML/MIN
GFR SERPL CREATININE-BSD FRML MDRD: ABNORMAL ML/MIN/{1.73_M2}
GFR SERPL CREATININE-BSD FRML MDRD: ABNORMAL ML/MIN/{1.73_M2}
GLUCOSE BLD-MCNC: 145 MG/DL (ref 75–110)
GLUCOSE BLD-MCNC: 146 MG/DL (ref 75–110)
GLUCOSE BLD-MCNC: 147 MG/DL (ref 75–110)
GLUCOSE BLD-MCNC: 149 MG/DL (ref 75–110)
GLUCOSE BLD-MCNC: 150 MG/DL (ref 75–110)
GLUCOSE BLD-MCNC: 155 MG/DL (ref 75–110)
GLUCOSE BLD-MCNC: 162 MG/DL (ref 75–110)
GLUCOSE BLD-MCNC: 162 MG/DL (ref 75–110)
GLUCOSE BLD-MCNC: 163 MG/DL (ref 75–110)
GLUCOSE BLD-MCNC: 163 MG/DL (ref 75–110)
GLUCOSE BLD-MCNC: 170 MG/DL (ref 70–99)
GLUCOSE BLD-MCNC: 175 MG/DL (ref 75–110)
GLUCOSE BLD-MCNC: 176 MG/DL (ref 75–110)
HCT VFR BLD CALC: 24.8 % (ref 40.7–50.3)
HCT VFR BLD CALC: 27.7 % (ref 40.7–50.3)
HEMOGLOBIN: 7 G/DL (ref 13–17)
HEMOGLOBIN: 8 G/DL (ref 13–17)
IMMATURE GRANULOCYTES: 2 %
LACTIC ACID, WHOLE BLOOD: 1.9 MMOL/L (ref 0.7–2.1)
LYMPHOCYTES # BLD: 9 % (ref 24–44)
MAGNESIUM: 1.5 MG/DL (ref 1.6–2.6)
MCH RBC QN AUTO: 25.7 PG (ref 25.2–33.5)
MCHC RBC AUTO-ENTMCNC: 28.2 G/DL (ref 28.4–34.8)
MCV RBC AUTO: 91.2 FL (ref 82.6–102.9)
MONOCYTES # BLD: 8 % (ref 1–7)
MORPHOLOGY: ABNORMAL
MORPHOLOGY: ABNORMAL
NRBC AUTOMATED: 0.6 PER 100 WBC
PDW BLD-RTO: 21.6 % (ref 11.8–14.4)
PHOSPHORUS: 2.3 MG/DL (ref 2.5–4.5)
PLATELET # BLD: 273 K/UL (ref 138–453)
PLATELET ESTIMATE: ABNORMAL
PMV BLD AUTO: 9.8 FL (ref 8.1–13.5)
POTASSIUM SERPL-SCNC: 4 MMOL/L (ref 3.7–5.3)
RBC # BLD: 2.72 M/UL (ref 4.21–5.77)
RBC # BLD: ABNORMAL 10*6/UL
SEG NEUTROPHILS: 81 % (ref 36–66)
SEGMENTED NEUTROPHILS ABSOLUTE COUNT: 13.69 K/UL (ref 1.8–7.7)
SODIUM BLD-SCNC: 133 MMOL/L (ref 135–144)
TRIGL SERPL-MCNC: 153 MG/DL
WBC # BLD: 16.9 K/UL (ref 3.5–11.3)
WBC # BLD: ABNORMAL 10*3/UL

## 2019-12-09 PROCEDURE — 90935 HEMODIALYSIS ONE EVALUATION: CPT

## 2019-12-09 PROCEDURE — 2580000003 HC RX 258: Performed by: STUDENT IN AN ORGANIZED HEALTH CARE EDUCATION/TRAINING PROGRAM

## 2019-12-09 PROCEDURE — 6360000002 HC RX W HCPCS: Performed by: STUDENT IN AN ORGANIZED HEALTH CARE EDUCATION/TRAINING PROGRAM

## 2019-12-09 PROCEDURE — 6370000000 HC RX 637 (ALT 250 FOR IP): Performed by: STUDENT IN AN ORGANIZED HEALTH CARE EDUCATION/TRAINING PROGRAM

## 2019-12-09 PROCEDURE — 84478 ASSAY OF TRIGLYCERIDES: CPT

## 2019-12-09 PROCEDURE — P9016 RBC LEUKOCYTES REDUCED: HCPCS

## 2019-12-09 PROCEDURE — 2700000000 HC OXYGEN THERAPY PER DAY

## 2019-12-09 PROCEDURE — 94003 VENT MGMT INPAT SUBQ DAY: CPT

## 2019-12-09 PROCEDURE — 2000000000 HC ICU R&B

## 2019-12-09 PROCEDURE — 85014 HEMATOCRIT: CPT

## 2019-12-09 PROCEDURE — 99233 SBSQ HOSP IP/OBS HIGH 50: CPT | Performed by: INTERNAL MEDICINE

## 2019-12-09 PROCEDURE — 99291 CRITICAL CARE FIRST HOUR: CPT | Performed by: INTERNAL MEDICINE

## 2019-12-09 PROCEDURE — 6360000004 HC RX CONTRAST MEDICATION: Performed by: SURGERY

## 2019-12-09 PROCEDURE — 2500000003 HC RX 250 WO HCPCS: Performed by: INTERNAL MEDICINE

## 2019-12-09 PROCEDURE — 94770 HC ETCO2 MONITOR DAILY: CPT

## 2019-12-09 PROCEDURE — 74177 CT ABD & PELVIS W/CONTRAST: CPT

## 2019-12-09 PROCEDURE — 99222 1ST HOSP IP/OBS MODERATE 55: CPT | Performed by: FAMILY MEDICINE

## 2019-12-09 PROCEDURE — 94762 N-INVAS EAR/PLS OXIMTRY CONT: CPT

## 2019-12-09 PROCEDURE — 83605 ASSAY OF LACTIC ACID: CPT

## 2019-12-09 PROCEDURE — 2500000003 HC RX 250 WO HCPCS: Performed by: STUDENT IN AN ORGANIZED HEALTH CARE EDUCATION/TRAINING PROGRAM

## 2019-12-09 PROCEDURE — 84100 ASSAY OF PHOSPHORUS: CPT

## 2019-12-09 PROCEDURE — 36415 COLL VENOUS BLD VENIPUNCTURE: CPT

## 2019-12-09 PROCEDURE — 36430 TRANSFUSION BLD/BLD COMPNT: CPT

## 2019-12-09 PROCEDURE — 76937 US GUIDE VASCULAR ACCESS: CPT

## 2019-12-09 PROCEDURE — 2580000003 HC RX 258: Performed by: ANESTHESIOLOGY

## 2019-12-09 PROCEDURE — 85025 COMPLETE CBC W/AUTO DIFF WBC: CPT

## 2019-12-09 PROCEDURE — 83735 ASSAY OF MAGNESIUM: CPT

## 2019-12-09 PROCEDURE — 80048 BASIC METABOLIC PNL TOTAL CA: CPT

## 2019-12-09 PROCEDURE — 85018 HEMOGLOBIN: CPT

## 2019-12-09 PROCEDURE — 86900 BLOOD TYPING SEROLOGIC ABO: CPT

## 2019-12-09 PROCEDURE — 82947 ASSAY GLUCOSE BLOOD QUANT: CPT

## 2019-12-09 RX ORDER — MAGNESIUM SULFATE 1 G/100ML
1 INJECTION INTRAVENOUS
Status: COMPLETED | OUTPATIENT
Start: 2019-12-09 | End: 2019-12-09

## 2019-12-09 RX ORDER — SODIUM CHLORIDE 0.9 % (FLUSH) 0.9 %
10 SYRINGE (ML) INJECTION PRN
Status: DISCONTINUED | OUTPATIENT
Start: 2019-12-09 | End: 2020-01-16 | Stop reason: HOSPADM

## 2019-12-09 RX ORDER — FUROSEMIDE 10 MG/ML
20 INJECTION INTRAMUSCULAR; INTRAVENOUS ONCE
Status: COMPLETED | OUTPATIENT
Start: 2019-12-09 | End: 2019-12-09

## 2019-12-09 RX ORDER — PROPOFOL 10 MG/ML
10 INJECTION, EMULSION INTRAVENOUS
Status: DISCONTINUED | OUTPATIENT
Start: 2019-12-09 | End: 2019-12-20

## 2019-12-09 RX ORDER — 0.9 % SODIUM CHLORIDE 0.9 %
250 INTRAVENOUS SOLUTION INTRAVENOUS ONCE
Status: DISCONTINUED | OUTPATIENT
Start: 2019-12-09 | End: 2019-12-15

## 2019-12-09 RX ORDER — SODIUM CHLORIDE 0.9 % (FLUSH) 0.9 %
10 SYRINGE (ML) INJECTION EVERY 12 HOURS
Status: DISCONTINUED | OUTPATIENT
Start: 2019-12-09 | End: 2019-12-18

## 2019-12-09 RX ADMIN — MAGNESIUM SULFATE HEPTAHYDRATE 1 G: 1 INJECTION, SOLUTION INTRAVENOUS at 17:00

## 2019-12-09 RX ADMIN — DAKIN'S SOLUTION 0.125% (QUARTER STRENGTH) 473 ML: 0.12 SOLUTION at 09:05

## 2019-12-09 RX ADMIN — PROPOFOL 20 MCG/KG/MIN: 10 INJECTION, EMULSION INTRAVENOUS at 15:33

## 2019-12-09 RX ADMIN — HEPARIN SODIUM 1600 UNITS: 1000 INJECTION INTRAVENOUS; SUBCUTANEOUS at 13:25

## 2019-12-09 RX ADMIN — MAGNESIUM SULFATE HEPTAHYDRATE 1 G: 1 INJECTION, SOLUTION INTRAVENOUS at 15:47

## 2019-12-09 RX ADMIN — I.V. FAT EMULSION 250 ML: 20 EMULSION INTRAVENOUS at 17:49

## 2019-12-09 RX ADMIN — Medication 200 MCG/HR: at 12:49

## 2019-12-09 RX ADMIN — FOLIC ACID: 5 INJECTION, SOLUTION INTRAMUSCULAR; INTRAVENOUS; SUBCUTANEOUS at 08:54

## 2019-12-09 RX ADMIN — SODIUM CHLORIDE 3.3 UNITS/HR: 9 INJECTION, SOLUTION INTRAVENOUS at 05:34

## 2019-12-09 RX ADMIN — Medication 6 MG/HR: at 03:41

## 2019-12-09 RX ADMIN — FUROSEMIDE 80 MG: 10 INJECTION, SOLUTION INTRAMUSCULAR; INTRAVENOUS at 08:55

## 2019-12-09 RX ADMIN — FUROSEMIDE 20 MG: 10 INJECTION, SOLUTION INTRAMUSCULAR; INTRAVENOUS at 20:36

## 2019-12-09 RX ADMIN — Medication 200 MCG/HR: at 07:50

## 2019-12-09 RX ADMIN — Medication 200 MCG/HR: at 17:49

## 2019-12-09 RX ADMIN — Medication 200 MCG/HR: at 03:42

## 2019-12-09 RX ADMIN — PROPOFOL 20 MCG/KG/MIN: 10 INJECTION, EMULSION INTRAVENOUS at 12:01

## 2019-12-09 RX ADMIN — MIDODRINE HYDROCHLORIDE 10 MG: 5 TABLET ORAL at 12:50

## 2019-12-09 RX ADMIN — MIDODRINE HYDROCHLORIDE 10 MG: 5 TABLET ORAL at 08:54

## 2019-12-09 RX ADMIN — HEPARIN SODIUM 5000 UNITS: 5000 INJECTION INTRAVENOUS; SUBCUTANEOUS at 15:47

## 2019-12-09 RX ADMIN — HEPARIN SODIUM 1500 UNITS: 1000 INJECTION INTRAVENOUS; SUBCUTANEOUS at 13:25

## 2019-12-09 RX ADMIN — MEROPENEM 1 G: 1 INJECTION, POWDER, FOR SOLUTION INTRAVENOUS at 09:00

## 2019-12-09 RX ADMIN — IOHEXOL 75 ML: 350 INJECTION, SOLUTION INTRAVENOUS at 03:16

## 2019-12-09 RX ADMIN — MIDODRINE HYDROCHLORIDE 10 MG: 5 TABLET ORAL at 18:06

## 2019-12-09 RX ADMIN — CALCIUM GLUCONATE: 98 INJECTION, SOLUTION INTRAVENOUS at 17:50

## 2019-12-09 RX ADMIN — HEPARIN SODIUM 5000 UNITS: 5000 INJECTION INTRAVENOUS; SUBCUTANEOUS at 06:16

## 2019-12-09 RX ADMIN — DAKIN'S SOLUTION 0.125% (QUARTER STRENGTH): 0.12 SOLUTION at 20:43

## 2019-12-09 RX ADMIN — FAMOTIDINE 20 MG: 20 TABLET, FILM COATED ORAL at 08:54

## 2019-12-09 RX ADMIN — Medication 200 MCG/HR: at 22:48

## 2019-12-09 RX ADMIN — NOREPINEPHRINE BITARTRATE 4 MCG/MIN: 1 INJECTION, SOLUTION, CONCENTRATE INTRAVENOUS at 10:45

## 2019-12-09 RX ADMIN — Medication 10 ML: at 08:57

## 2019-12-09 RX ADMIN — MEROPENEM 1 G: 1 INJECTION, POWDER, FOR SOLUTION INTRAVENOUS at 20:36

## 2019-12-09 RX ADMIN — Medication 10 ML: at 20:37

## 2019-12-09 ASSESSMENT — PULMONARY FUNCTION TESTS
PIF_VALUE: 21
PIF_VALUE: 25
PIF_VALUE: 22
PIF_VALUE: 24
PIF_VALUE: 29
PIF_VALUE: 21

## 2019-12-09 ASSESSMENT — PAIN SCALES - GENERAL
PAINLEVEL_OUTOF10: 0
PAINLEVEL_OUTOF10: 0

## 2019-12-09 NOTE — PLAN OF CARE
Problem: OXYGENATION/RESPIRATORY FUNCTION  Goal: Patient will maintain patent airway  12/8/2019 2041 by Abril Clarke RCP  Outcome: Ongoing  12/8/2019 0742 by Jl Salazar RN  Outcome: Ongoing  Goal: Patient will achieve/maintain normal respiratory rate/effort  Description  Respiratory rate and effort will be within normal limits for the patient  12/8/2019 2041 by Abril Clarke RCP  Outcome: Ongoing  12/8/2019 0742 by Jl Salazar RN  Outcome: Ongoing     Problem: MECHANICAL VENTILATION  Goal: Patient will maintain patent airway  12/8/2019 2041 by Abril Clarke RCP  Outcome: Ongoing  12/8/2019 0742 by Jl Salazar RN  Outcome: Ongoing  Goal: Oral health is maintained or improved  12/8/2019 2041 by Abril Clarke RCP  Outcome: Ongoing  12/8/2019 0742 by Jl Salazar RN  Outcome: Ongoing  Goal: ET tube will be managed safely  12/8/2019 2041 by Abril Clarke RCP  Outcome: Ongoing  12/8/2019 0742 by Jl Salazar RN  Outcome: Ongoing  Goal: Ability to express needs and understand communication  12/8/2019 2041 by Abril Clarke RCP  Outcome: Ongoing  12/8/2019 0742 by Jl Salazar RN  Outcome: Ongoing  Goal: Mobility/activity is maintained at optimum level for patient  12/8/2019 2041 by Abril Clarke RCP  Outcome: Ongoing  12/8/2019 0742 by Jl Salazar RN  Outcome: Ongoing     Problem: SKIN INTEGRITY  Goal: Skin integrity is maintained or improved  12/8/2019 2041 by Abril Clarke RCP  Outcome: Ongoing  12/8/2019 0742 by Jl Salazar RN  Outcome: Ongoing

## 2019-12-09 NOTE — BRIEF OP NOTE
addressed. Family wanted to proceed with operating room and a formal written consent was obtained and placed the patient's chart. Procedure: Patient was brought back to the operating room and placed on the operating room in a left lateral decubitus position. General anesthesia was already performed from the ICU and continued by the anesthesia team.  A formal timeout was performed identifying the patient, procedure, allergies, and antibiotics. She was given Ancef prior to the procedure. The dressing from the buttocks was taken down and the area was prepped with Hibiclens and draped in a sterile fashion. Upon evaluation of the wound the rectum there was an area at the 9 and 12 o'clock position which was concerning for further abscess and necrotizing fasciitis. There was a tract that went in the 9 o'clock position of the rectum towards the right groin region. Through digital palpation, the track went into the scrotum where purulent fluid/dishwater type fluid was evacuated. Immediately there was concern for further necrotizing fasciitis and the patient was not in a proper position for further evaluation. The procedure at this point was postponed. Urology was called for intraoperative consult. The patient was replaced in the supine position and then placed in a lithotomy for further evaluation. The area of the groins, scrotum, penis, thighs up to the umbilicus was prepped with Hibiclens and draped in the usual sterile fashion. The intact skin of the right groin was incised to open up the palpated tract to the scrotum. A horizontal incision was made in the suprapubic region and carried down through subcutaneous tissue to confirm no purulent drainage at that point. The tract of the right groin continued up to this area of the suprapubic region and it was connected using electrocautery.   By this time urology had come into the room and began open up the scrotum to identify the testicles and other important structures such as the urethra. For further details of this portion of the procedure, please defer to urology's dictation. However the urethra was identified and necrotic tissue was noted around it concerning for the urethra to be nonviable. The left groin was also opened starting from the posterior wound up to where the horizontal incision of the suprapubic region was formed. At this point there was no further surgical debridement able to be performed. Urology's recommendations was to leave the Ambrosio catheter and continue to evaluate. Patient may need a suprapubic catheter versus nephrostomy tubes. The wounds were irrigated and packed with Dakin soaked Kerlix. The testicles were wrapped with Adaptic gauze. ABD pads were placed over the Kerlix gauze. Mesh panties were placed over to keep the packing in place. At this time, the wound VAC was taken down and the sutures left from the diverting colostomy case were tied down to close the skin of the midline incision. A new colostomy bag was placed. This concluded the procedure. The patient remained stable throughout the course of the case. All instruments, sponges, needle counts were correct at the end of the case. Patient remained intubated and transferred to ICU in critical condition. Dr. Jeremy El MD was present throughout the entirety of the case.     Electronically signed by Camila Rhodes DO on 12/8/2019 at 8:09 PM      Camila Rhodes DO  Date: 12/8/2019  Time: 7:50 PM

## 2019-12-09 NOTE — PROGRESS NOTES
[facility]  Occupational Therapy Not Seen Note    DATE: 2019  Name: Charmaine Dickens  : 1985  MRN: [id number]    Patient not available for Occupational Therapy due to:    [] Testing:    [] Hemodialysis    [] Blood Transfusion in Progress    []Refusal by Patient:    [] Surgery/Procedure:    [] Strict Bedrest    [x] Sedation/Intubated: Fentanyl/intubated    [] Spine Precautions     [] Pt being transferred to palliative care at this time. Spoke with pt/family and OT services to be defered. [] Pt independent with functional mobility and functional tasks.  Pt with no OT acute care needs at this time, will defer OT eval.    [] Other    Next Scheduled Treatment: Ck 12/10    Leann Franz, OT

## 2019-12-09 NOTE — PROGRESS NOTES
[facility] - Progress Note    Today's Date and Time: 12/9/2019, 9:04 AM    Impression :   · Necrotizing fasciitis 11-29-19  · S/P perirectal I&D. Wide complex excisional debridement of gluteal and perineal areas on 11-29-19  · S/P debridement, washout of gluteal and perianal areas, diverting colostomy 12-3-19.   · S/P debridement, washout of gluteal and perianal areas, diverting colostomy 12-6-19.  · S/P Incision and debridement of necrotizing fasciitis buttock wound, scrotum, bilateral groin region and closure of midline abdominal wound 12-8-19  · Hypotension requiring pressors. -Off pressors  · Lactic acidosis  · DM 2  · HTN  · Hx of Low LVEF (20%) as per family  · DEBBY  · Fluid overload    Recommendations:     · Meropenem 1 gm IV q 24 hr because of renal insufficiency CVVHD  · Wound Care as per Gen surgery. Medical Decision Making/Summary/Discussion:12/9/2019     · Patient with DM 2, prior diabetic foot infection  · Presented to [address] [place] [address] ER generalized weakness for the past few days  · Found to have soft tissue necrosis with subcutaneous emphysema in gluteal areas and perineum  · S/P I&D and wide complex debridement of affected tissues on 11-29-19  · Showing hypotension, requiring fluids and a vasopressor  · Cultures in progress  · Will cover with Zosyn. Wounds with Strep spp. And Gram negative bacilli . No Staph spp. 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 · One more debridement in OR scheduled for today, 12-09-19 or tomorrow 12-10-19 and then decision will be made to change code status or not.    Infection Control Recommendations   · [place] Precautions  · Contact Isolation MRSA    Antimicrobial Stewardship change the code status    Labs, X rays reviewed: 12/9/2019    BUN: 59-->61-->79-->49-->42  Cr: 2.56-->2.77-->4.49-->2.84-->2.69    WBC: 4.20-->53.3-->26.7-->16.9  Hb:9.5-->8.2-->6.8-->8.2 -->7.0-->7.0  Plat: 642-->342-->273    Cultures:  Urine:  ·   Blood:  ·   Sputum :  ·   Wound:  · 11-29-19: Strep ssp and Gram negative bacilli       MRSA probe: Pos    Discussed with mother, RN, CC. [facility] 12-7-19: Left foot:          12-4-19:          11-29-19:            I have personally reviewed the past medical history, past surgical history, medications, social history, and family history, and I have updated the database accordingly.   Past Medical History:     Past Medical History:   Diagnosis Date    [facility] failure[facility]     Diabetes mellitus ([facility]     Hypertension     Spina bifida aperta of lumbar spine ([facility]        Past Surgical  History:     Past Surgical History:   Procedure Laterality Date    COLOSTOMY N/A 12/3/2019    LAPAROSCOPIC CONVERTED TO OPEN DIVERTING LOOP COLOSTOMY; WOUND VAC APPLICATION performed by Lorenzo Phillips MD at [place] [address] Bilateral 11/29/2019    RECTAL PERIRECTAL INCISION AND DRAINAGE, DIVERING LOOP COLOSTOMY CREATION performed by Norma Virgen MD at [facility] OR       Medications:      sodium chloride  250 mL Intravenous Once    sodium chloride flush  10 mL Intravenous Q12H    sodium chloride  250 mL Intravenous Once    sodium chloride flush  10 mL Intravenous 2 times per day    fentanNYL  25 mcg Intravenous Once    ondansetron  4 mg Intravenous Once    sodium hypochlorite   Irrigation Once    sodium chloride flush  10 mL Intravenous Q12H    midodrine  10 mg Oral TID WC    famotidine  20 mg Per NG tube Daily    furosemide  80 mg Intravenous Daily    sodium hypochlorite   Irrigation BID    thiamine and folic acid IVPB   Intravenous Daily    fat emulsion  250 mL Intravenous Daily    meropenem  1 g Intravenous Q12H    sodium chloride flush  10 nausea, vomiting, diarrhea, or abdominal pain. Darlean Cork No cramps. Genitourinary: No increased urinary frequency, or dysuria. No hematuria. No suprapubic or CVA pain  Musculoskeletal: No muscle aches or pains. No joint effusions, swelling or deformities. Lt diabetic foot   Hematologic: No bleeding or bruising. Neurologic: No headache, weakness, numbness, or tingling. Spina bifida  Integument: Gangrene of perineum and gluteal areas  Psychiatric: No depression. Endocrine: No polyuria, no polydipsia, no polyphagia. Physical Examination :     Patient Vitals for the past 8 hrs:   BP Temp Temp src Pulse Resp SpO2 Weight   12/09/19 0800 (!) 100/53 -- -- 97 23 96 % --   12/09/19 0700 94/75 -- -- 83 22 97 % --   12/09/19 0600 (!) 106/57 -- -- 100 26 -- --   12/09/19 0500 (!) 106/59 -- -- 95 19 100 % --   12/09/19 0400 134/76 98.9 °F (37.2 °C) Axillary 109 21 100 % 296 lb (134.3 kg)   12/09/19 0336 -- -- -- 104 14 100 % --   12/09/19 0200 (!) 99/58 -- -- 91 15 93 % --     General Appearance: Sedated  Head:  Normocephalic, no trauma  Eyes: Pupils equal, round, reactive to light and accommodation; extraocular movements intact; sclera anicteric; conjunctivae pink. No embolic phenomena. ENT: Oropharynx clear, without erythema, exudate, or thrush. No tenderness of sinuses. Mouth/throat: mucosa pink and moist. No lesions. Dentition in good repair. Neck:Supple, without lymphadenopathy. Thyroid normal, No bruits. Pulmonary/Chest: Clear to auscultation, without wheezes, rales, or rhonchi. No dullness to percussion. Cardiovascular: Regular rate and rhythm without murmurs, rubs, or gallops. Abdomen: Soft, non tender. Bowel sounds normal. No organomegaly. : Extensive open wound of gluteal and perineal areas  All four Extremities: No cyanosis, clubbing, edema, or effusions. Lt foot diabetic ulcer with involvement of the Lt heel. Neurologic: No gross sensory or motor deficits. Skin: Warm and dry with good turgor. Signs of peripheral arterial insufficiency. Large open wounds. Medical Decision Making -Laboratory:   I have independently reviewed/ordered the following labs:    CBC with Differential:   Recent Labs     12/08/19  0509 12/09/19  0431   WBC 17.6* 16.9*   HGB 7.8* 7.0*   HCT 26.7* 24.8*    273   LYMPHOPCT 7* 9*   MONOPCT 8 8*     BMP:   Recent Labs     12/08/19  0509 12/09/19  0431    133*   K 3.8 4.0    102   CO2 21 21   BUN 31* 42*   CREATININE 2.13* 2.69*     Hepatic Function Panel:   No results for input(s): PROT, LABALBU, BILIDIR, IBILI, BILITOT, ALKPHOS, ALT, AST in the last 72 hours. No results for input(s): RPR in the last 72 hours. No results for input(s): HIV in the last 72 hours. No results for input(s): BC in the last 72 hours. Lab Results   Component Value Date    MUCUS NOT REPORTED 11/29/2019    RBC 2.72 12/09/2019    TRICHOMONAS NOT REPORTED 11/29/2019    WBC 16.9 12/09/2019    YEAST NOT REPORTED 11/29/2019    TURBIDITY TURBID 11/29/2019     Lab Results   Component Value Date    CREATININE 2.69 12/09/2019    GLUCOSE 170 12/09/2019       Medical Decision Making-Imaging:     EXAMINATION:   ONE XRAY VIEW OF THE CHEST       11/29/2019 9:01 pm       COMPARISON:   4 hours ago       HISTORY:   ORDERING SYSTEM PROVIDED HISTORY: intubated   TECHNOLOGIST PROVIDED HISTORY:   intubated   Reason for Exam: portable supine/ intubated   Acuity: Acute   Type of Exam: Initial       FINDINGS:   New ET tube terminates 38 mm above the ron.  There appears to be a   possible enteric tube which is not well visualized distally.  Right IJ   catheter terminates in the right atrium and is unchanged.  Lungs are clear. Cardiomegaly.  Mediastinum normal.  Bony thorax intact.           Impression   New ET tube as above.  Possible new enteric tube not well visualized. Recommend follow-up KUB if clinically warranted.      CT ABDOMEN AND PELVIS WITHOUT CONTRAST    COMPARISON:  3/22/2017    CLINICAL HISTORY: Infection in scrotum, lower abdominal pain, diabetic, 30,000 white blood cell count. TECHNIQUE: Unenhanced axial images were obtained from the lung bases to the pubic symphysis with sagittal and coronal 2D reformatted images. Oral contrast administered:  No.  Automatic exposure control (AEC) was utilized. CT ABDOMEN FINDINGS:      The lung bases are unremarkable. There is a small pericardial effusion versus thickening. The liver, spleen, and pancreas demonstrate no acute abnormality given compromised evaluation without intravenous contrast.  There may be mild splenomegaly.  The adrenal glands appear within normal limits. There is no hydronephrosis or obstructing urinary tract calculi. Small amount of free fluid is seen adjacent to the liver inferiorly. .    The appendix is visualized in the right lower quadrant and appears within normal limits.       There is no evidence for bowel obstruction. Stranding is seen within the subcutaneous fat overlying both lateral abdominal walls left greater than right.  There is ill-defined fluid collection within the subcutaneous fat overlying the left lateral abdominal wall possibly representing phlegmon or developing abscess although no organized   abscess is seen. There is a large amount of soft tissue emphysema involving both the right and left buttock extending to the perineum tissue thickening is seen especially on the left involving the left buttock. CT PELVIS FINDINGS:        No distal ureteral calculi or bladder calculi. There is no free fluid in the pelvis. Catheter is seen within the urinary bladder. Osseous changes within the left hemipelvis which may be chronic in nature. IMPRESSION:    Extensive subcutaneous emphysema as described above involving both buttocks extending to the perineum.  The possibility of Ashley gangrene/necrotizing fasciitis cannot be excluded.     Possible phlegmon or developing soft tissue abscess overlying the left lateral abdominal wall as noted above.  No organized abscess is seen however. Osseous changes within the left hemipelvis which may be chronic in nature. Results the study were called to Dr. Enrique Camacho [phone] on 11/29/2019  All CT scans at this facility use dose modulation, iterative reconstruction, and/or weight based dosing when appropriate to reduce radiation dose to as low as reasonably achievable. Medical Decision [id number]:   11/29/2019  8:17 PM - Jesus, Kortneypn Incoming Lab Results From [facility]     Specimen Information: Buttock; Tissue        Component Collected Lab   Specimen Description 11/29/2019  7:34 PM [place]   . BUTTOCK BILATERAL TS    Special Requests 11/29/2019  7:34 PM [place]   NOT REPORTED    Direct Exam 11/29/2019  7:34 PM [place]   NO NEUTROPHILS SEEN    Direct Exam Abnormal  11/29/2019  7:34  [facility] [address] [facility] [place]    Direct Exam Abnormal  11/29/2019  7:34 PM [address] NEGATIVE RODS    Culture 11/29/2019  7:34 PM [place]   PENDING          Medical Decision Making-Other:     Note:  · Labs, medications, radiologic studies were reviewed with personal review of films  · Large amounts of data were reviewed  · Discussed with nursing Staff, Discharge planner  · Infection Control and Prevention measures reviewed  · All prior entries were reviewed  · Administer medications as ordered  · Prognosis: Guarded  · Discharge planning reviewed  · Follow up as outpatient. Thank you for allowing us to participate in the care of this patient. Please call with questions. Helga Aase     ATTESTATION:    I have discussed the case, including pertinent history and exam findings with the residents. I have seen and examined the patient and the key elements of the encounter have been performed by me.  I have reviewed the laboratory data, other diagnostic studies and discussed them with the residents. I have updated the medical record where necessary. I agree with the assessment, plan and orders as documented by the resident.     Reema Fox MD.    Pager: [phone] - Office: [phone]

## 2019-12-09 NOTE — PROGRESS NOTES
Nephrology Progress Note        Subjective: patient evaluated on dialysis. Access cannulated without problems. Patient is status post debridement of necrotizing fasciitis involving the gluteal, perianal and rectal area. Patient is also status post diverting end colostomy. Patient underwent incision and debridement of necrotizing gluteal wound and scrotal wound yesterday as well. Wound was closed    Overall his weight is up. .  Patient currently is on fentanyl drip and Versed. Patient on IV antibiotics per ID. Surgery plans for possible relook in the OR for tomorrow noted. Patient receiving TPN. Advised to add 20 mmol of sodium phosphate to the TPN. His phosphorus level is 2.3 today. Patient is scheduled to get 1 unit of packed RBC because his hemoglobin is around 7. He also has somewhat low blood pressures today. Objective:  CURRENT TEMPERATURE:  Temp: 98.6 °F (37 °C)  MAXIMUM TEMPERATURE OVER 24HRS:  Temp (24hrs), Av.8 °F (36 °C), Min:95 °F (35 °C), Max:99.7 °F (37.6 °C)    CURRENT RESPIRATORY RATE:  Resp: 19  CURRENT PULSE:  Pulse: 90  CURRENT BLOOD PRESSURE:  BP: 107/85  24HR BLOOD PRESSURE RANGE:  Systolic (67MSJ), OPN:912 , Min:50 , OGA:084   ; Diastolic (84GKL), PXQ:19, Min:27, Max:108    24HR INTAKE/OUTPUT:      Intake/Output Summary (Last 24 hours) at 2019 1052  Last data filed at 2019 0600  Gross per 24 hour   Intake 2576.86 ml   Output 730 ml   Net 1846.86 ml     Patient Vitals for the past 96 hrs (Last 3 readings):   Weight   19 0400 296 lb (134.3 kg)   19 0500 295 lb 6.4 oz (134 kg)   19 1820 296 lb 8.3 oz (134.5 kg)         Physical Exam:  General appearance: Intubated, on fentanyl and Versed  Skin: warm and dry, no rash or erythema  Eyes: Pale conjunctivae   ENT: ETT in  place  Neck: No carotid bruits, No Thyromegaly  Pulmonary: Sounds appear somewhat diminished  Cardiovascular: normal S1 and S2. NO rubs and NO murmur.     Abdomen: Colostomy noted,

## 2019-12-09 NOTE — PROGRESS NOTES
Write contacted general surgery to see if they were going to change pt gluteal dressing today. Dr. Elijah Cheek said that since they are taking him back to OR tomorrow, they would not be changing it today.

## 2019-12-09 NOTE — PROGRESS NOTES
PROGRESS NOTE     PATIENT NAME: [place] Mcdaniel [address] RECORD NO. [id number]  DATE: 2019  SURGEON: Steven Brenner  PRIMARY CARE PHYSICIAN: No primary care provider on file. HD: # 10    ASSESSMENT    Patient Active Problem List   Diagnosis    Necrotizing fasciitis ([address]    Hypertension    Diabetes ([address]    Diabetic foot ulcer ([address]    Septic shock ([address]    Bandemia     POD#10 wide complex debridement of nec fasc involving gluteal/perianal/distal rectum  POD#6 open diverting end colostomy   POD#1 Incision and debridement of necrotizing fasciitis buttock wound, scrotum, bilateral groin region and closure of midline abdominal wound      MEDICAL DECISION MAKING AND PLAN    · Necrotizing soft tissue infection: POD#9 s/p wide complex debridement of gluteal/perianal region extensive necrotic tissue that involved the anus and distal rectum. On most recent surgery, infection is spreading into pelvis which indicates significant risk of mortality. · Return to OR in am, 12/10/19, depending on progression of infection, may discuss palliation  · Hold dressing change until OR   · Ok to start tube feeds s/p diverting colostomy   · CT reviewed. Will defer management of peritoneal fluid collection near  shunt to primary service. Would recommend drainage of pleural fluid collections  · Meropenem, ID following. · Strict glucose control <180  · LLE wound per podiatry  · DEBBY with minimal UOP. Nephrology following. SUBJECTIVE    Patient seen and examined. No acute events overnight. off pressors. UOP improving.        OBJECTIVE  VITALS: Temp: Temp: 98.9 °F (37.2 °C)Temp  Av.8 °F (36 °C)  Min: 95 °F (35 °C)  Max: 99.7 °F (70.3 °C) BP Systolic (06EVK), TANA:894 , Min:50 , TIZ:210   Diastolic (31ZJG), RRN:21, Min:27, Max:108   Pulse Pulse  Av.2  Min: 80  Max: 109 Resp Resp  Avg: 15.2  Min: 0  Max: 29 Pulse ox SpO2  Av.4 %  Min: 73 %  Max: 100 %  GENERAL: intubated and sedated  NEURO: moving upper extremities

## 2019-12-09 NOTE — OP NOTE
Operative Note  ______________________________________________________________    Patient: Sandi Morrison  YOB: 1985  MRN: [id number]  Date of Procedure: 12/8/2019    Pre-Op Diagnosis: NECROTIZING FASCIITIS BUTTOCK    Post-Op Diagnosis: Necrotizing fasciitis of buttock region extending into the scrotum causing Ashley's gangrene. Procedure(s):  Incision and debridement of necrotizing fasciitis buttock wound, scrotum, bilateral groin region and closure of midline abdominal wound    Anesthesia: General    Surgeon(s):  MD Nazario Win MD    Assistant: Domenico Cash MD; Radha Martin DO; Efrain Mcgraw DO    Estimated Blood Loss (mL): 586     Complications: None    Specimens: None      Drains:   Closed/Suction Drain Superior Abdomen Bulb (Active)   Site Description Reddened;Edema/Swelling 12/8/2019  6:00 PM   Drainage Appearance Serosanguinous 12/8/2019  6:00 PM   Status To bulb suction 12/8/2019 12:00 PM   Output (ml) 0 ml 12/8/2019  8:00 AM       NG/OG/NJ/NE Tube Orogastric 16 fr (Active)   Surrounding Skin Dry; Intact 12/8/2019  6:00 PM   Securement device Yes 12/8/2019  6:00 PM   Status Clamped 12/8/2019 12:00 PM   Placement Verified by External Catheter Length;by Respiratory Status 12/8/2019 12:00 PM   NG/OG/NJ/NE External Measurement (cm) 60 cm 12/8/2019 12:00 PM   Drainage Appearance Bile;Green 12/8/2019  6:00 PM   Tube Feeding Immune Enhancing 12/5/2019  8:00 PM   Tube Feeding Status Stopped 12/6/2019 12:00 AM   Rate/Schedule 10 mL/hr 12/5/2019  8:00 PM   Tube Feeding Intake (mL) 47 ml 12/5/2019 11:15 PM   Free Water Flush (mL) 30 mL 12/8/2019  8:00 AM   Output (mL) 10 ml 12/5/2019  3:30 PM       Colostomy LLQ Descending/sigmoid (Active)   Stomal Appliance 1 piece;Clean; Intact 12/7/2019  4:00 AM   Flange Size (inches) 2.5 Inches 12/4/2019 10:19 AM   Stoma  Assessment Moist;Pink 12/8/2019  6:00 PM   Peristomal Assessment JESSI 12/8/2019  6:00 PM   Output (mL) 0 ml 12/7/2019 8:00 PM       Urethral Catheter (Active)   Catheter Indications Need for fluid management in critically ill patients in a critical care setting not able to be managed by other means such as BSC with hat, bedpan, urinal, condom catheter, or short term intermittent urethral catherization 12/8/2019  6:00 PM   Site Assessment Red; Swelling;Edema 12/8/2019  6:00 PM   Urine Color Yellow 12/8/2019  6:00 PM   Urine Appearance Sediment 12/8/2019  6:00 PM   Output (mL) 0 mL 12/8/2019  6:00 PM       [REMOVED] Negative Pressure Wound Therapy Abdomen Medial;Upper (Removed)   Wound Type Surgical 12/8/2019  6:00 PM   Dressing Type Black foam 12/8/2019  6:00 PM   Cycle Continuous 12/8/2019  6:00 PM   Target Pressure (mmHg) 125 12/8/2019  6:00 PM   Irrigation Solution Normal Saline 12/7/2019  8:00 PM   Canister changed? No 12/8/2019  6:00 PM   Dressing Status Clean;Dry; Intact 12/8/2019  6:00 PM   Dressing Changed Changed/New 12/3/2019  5:26 PM   Drainage Description Serosanguinous 12/8/2019  6:00 PM   Output (ml) 50 ml 12/5/2019  4:00 PM   Odor None 12/8/2019  6:00 PM       [REMOVED] NG/OG/NJ/NE Tube Orogastric  Center mouth (Removed)   Securement device Yes 11/30/2019 12:00 PM   Status Suction-low intermittent 11/30/2019 12:00 PM   Placement Verified by Gastric Contents 11/30/2019 12:00 PM   NG/OG/NJ/NE External Measurement (cm) 60 cm 11/30/2019 12:00 PM   Drainage Appearance Tan 11/30/2019  8:00 AM       Findings: Wound class 4. Indications: Is a 52-year-old male with extensive diabetes uncontrolled who presented on the hospital on 11/29 with sepsis. He was found to have necrotizing fasciitis of the buttock region. He has since underwent diverting colostomy and has had several take backs for incision and debridement of the necrotizing fasciitis. Decision was made to take the patient back to the operating room for wound evaluation and possible wound VAC. Consent.   Procedure was explained to family and all questions and other important structures such as the urethra. For further details of this portion of the procedure, please defer to urology's dictation. However the urethra was identified and necrotic tissue was noted around it concerning for the urethra to be nonviable. The left groin was also opened starting from the posterior wound up to where the horizontal incision of the suprapubic region was formed. At this point there was no further surgical debridement able to be performed. Urology's recommendations was to leave the Ambrosio catheter and continue to evaluate. Patient may need a suprapubic catheter versus nephrostomy tubes. The wounds were irrigated and packed with Dakin soaked Kerlix. The testicles were wrapped with Adaptic gauze. ABD pads were placed over the Kerlix gauze. Mesh panties were placed over to keep the packing in place. At this time, the wound VAC was taken down and the sutures left from the diverting colostomy case were tied down to close the skin of the midline incision. A new colostomy bag was placed. This concluded the procedure. The patient remained stable throughout the course of the case. All instruments, sponges, needle counts were correct at the end of the case. Patient remained intubated and transferred to ICU in critical condition. Dr. Samantha Kurtz MD was present throughout the entirety of the case.     Electronically signed by Tiff Alegria DO on 12/8/2019 at 8:09 PM      Tiff Alegria DO  Date: 12/8/2019  Time: 7:50 PM

## 2019-12-09 NOTE — PROGRESS NOTES
Physical Therapy  DATE: 2019    NAME: Lisa Musa  MRN: [id number]   : 1985    Patient not seen this date for Physical Therapy due to:  [] Blood transfusion in progress  [] Hemodialysis  []  Patient Declined  [] Spine Precautions   [] Strict Bedrest  [] Surgery/ Procedure  [] Testing      [x] Other: Respiratory therapy in with patient, plan for OR 12/10. Ck post op. [] PT being discontinued at this time. Patient independent. No further needs. [] PT being discontinued at this time as the patient has been transferred to palliative care. No further needs.     Cristiana Champagne, PT

## 2019-12-09 NOTE — CONSULTS
mellitus. Palliative care consulted for goals of care, CODE STATUS discussion, symptom management and family support.       Active Hospital Problems    Diagnosis Date Noted    Septic shock ([facility] [A41.9, R65.21]     Bandemia [D72.825]     Necrotizing fasciitis ([facility] [M72.6] 11/29/2019    Hypertension [I10] 11/29/2019    Diabetes ([facility] [E11.9] 11/29/2019    Diabetic foot ulcer ([facility] [L76.989, L97.509] 11/29/2019       PAST MEDICAL HISTORY      Diagnosis Date    CHF (congestive heart failure) ([facility]     Diabetes mellitus ([facility]     Hypertension     Spina bifida aperta of lumbar spine ([facility]        PAST SURGICAL HISTORY  Past Surgical History:   Procedure Laterality Date    COLOSTOMY N/A 12/3/2019    LAPAROSCOPIC CONVERTED TO OPEN DIVERTING LOOP COLOSTOMY; WOUND VAC APPLICATION performed by Elver Grier MD at [place] [address] Bilateral 11/29/2019    RECTAL PERIRECTAL INCISION AND DRAINAGE, [address] [place] [address] LOOP COLOSTOMY CREATION performed by Marguerite Sandy MD at [address] [place] [address] History     Tobacco Use    Smoking status: Not on file   Substance Use Topics    Alcohol use: Not on file    Drug use: Not on file       ALLERGIES  No Known Allergies      MEDICATIONS  Current Medications    sodium chloride  250 mL Intravenous Once    sodium chloride flush  10 mL Intravenous Q12H    sodium chloride  250 mL Intravenous Once    sodium chloride flush  10 mL Intravenous 2 times per day    fentanNYL  25 mcg Intravenous Once    ondansetron  4 mg Intravenous Once    sodium hypochlorite   Irrigation Once    sodium chloride flush  10 mL Intravenous Q12H    midodrine  10 mg Oral TID WC    famotidine  20 mg Per NG tube Daily    furosemide  80 mg Intravenous Daily    sodium hypochlorite   Irrigation BID    thiamine and folic acid IVPB   Intravenous Daily    fat emulsion  250 mL Intravenous Daily    meropenem  1 g Intravenous Q12H    sodium chloride flush  10 mL Intravenous 2 times per day    heparin (porcine)  5,000 Units Subcutaneous 3 times per day     sodium chloride flush, sodium chloride flush, potassium chloride **OR** potassium alternative oral replacement **OR** potassium chloride, sodium chloride flush, sodium chloride, sodium chloride, oxyCODONE **OR** oxyCODONE, sodium chloride, sodium chloride, heparin (porcine), heparin (porcine), acetaminophen, glucose, dextrose, glucagon (rDNA), dextrose, acetaminophen, REFRESH LACRI-LUBE, povidone-iodine, sodium chloride flush, magnesium hydroxide, ondansetron, glucagon (rDNA)  IV Drips/Infusions   lactated ringers      PN-Adult 2-in-1 Central Line (Standard) 55.8 mL/hr at 12/08/19 1817    norepinephrine      fentaNYL 200 mcg/hr (12/09/19 0750)    midazolam 6 mg/hr (12/09/19 0341)    insulin 2.7 Units/hr (12/09/19 0928)    dextrose      sodium chloride 10 mL/hr at 12/07/19 0950    norepinephrine (LEVOPHED) infusion (double concentration) Stopped (12/07/19 0000)     Home Medications  No current facility-administered medications on file prior to encounter. No current outpatient medications on file prior to encounter. Data         BP (!) 100/53   Pulse 89   Temp 98.9 °F (37.2 °C) (Axillary)   Resp 19   Ht 5' 9\" (1.753 m)   Wt 296 lb (134.3 kg)   SpO2 96%   BMI 43.71 kg/m²     Wt Readings from Last 3 Encounters:   12/09/19 296 lb (134.3 kg)        Code Status: Full Code     ADVANCED CARE PLANNING:  Patient has capacity for medical decisions: no  Health Care Power of : no  Living Will: no     Personal, Social, and Family History  Marital Status: single  Living situation:with family:  mother  Does patient understand diagnosis/treatment? no  Does caregiver understand diagnosis/treatment? yes      Assessment        REVIEW OF SYSTEMS    ROS unable to be done patient intubated and sedated    PHYSICAL ASSESSMENT:  Constitutional: Intubated, sedated  Head: Normocephalic and atraumatic.    Eyes: EOM are normal. family  Education/support to patient  Discharge planning/helping to coordinate care  Communications with primary service  Caregiver support/education  Code status clarified: Full Code  Code status clarified: [facility]  Code status clarified: DNRCCA  Other major issues     Principle Problem/Diagnosis:  Necrotizing fasciitis ([facility]    Additional Assessments:   Principal Problem:    Necrotizing fasciitis ([facility]  Active Problems:    Hypertension    Diabetes ([facility]    Diabetic foot ulcer ([facility]    Septic shock ([facility]    Bandemia    1- Symptom management/ pain control     Pain Assessment:  Pain is controlled with current analgesics. Medication(s) being used: acetaminophen, narcotic analgesics including fentanyl IV, oxycodone                Anxiety:  none                          Dyspnea:  none                          Fatigue:  none    Other: Intubated and sedated    We feel the patient symptoms are being controlled. his current regimen is reviewed by myself and discussed with the staff. 2- Goals of care evaluation   The patient goals of care are spiritual needs, strengthening relationships, preserve independence/autonomy/control and support for family/caregiver   Goals of care discussed with:    [] Patient independently    [] Patient and Family    [x] Family or Healthcare DPOA independently    [] Unable to discuss with patient, family/DPOA not present    3- Code Status  Full Code    4- Other recommendations   - We will continue to provide comfort and support to the patient and the family  Please call with any palliative questions or concerns. Palliative Care Team is available via perfect serve or via phone. Palliative Care will continue to follow Mr. Chaudhari Broaden care as needed. Thank you for allowing Palliative Care to participate in the care of Mr. Lorin Alvarado . This note has been dictated by dragon, typing errors may be a possibility.     The total time I spent in seeing the patient, discussing goals of care, advanced

## 2019-12-10 ENCOUNTER — ANESTHESIA (OUTPATIENT)
Dept: OPERATING ROOM | Age: 34
DRG: 463 | End: 2019-12-10
Payer: MEDICARE

## 2019-12-10 ENCOUNTER — ANESTHESIA EVENT (OUTPATIENT)
Dept: OPERATING ROOM | Age: 34
DRG: 463 | End: 2019-12-10
Payer: MEDICARE

## 2019-12-10 VITALS — OXYGEN SATURATION: 98 % | TEMPERATURE: 96.7 F | SYSTOLIC BLOOD PRESSURE: 96 MMHG | DIASTOLIC BLOOD PRESSURE: 79 MMHG

## 2019-12-10 LAB
ABO/RH: NORMAL
ABSOLUTE EOS #: 0.34 K/UL (ref 0–0.44)
ABSOLUTE IMMATURE GRANULOCYTE: 0.51 K/UL (ref 0–0.3)
ABSOLUTE LYMPH #: 1.86 K/UL (ref 1.1–3.7)
ABSOLUTE MONO #: 1.35 K/UL (ref 0.1–1.2)
ANION GAP SERPL CALCULATED.3IONS-SCNC: 11 MMOL/L (ref 9–17)
ANTIBODY SCREEN: NEGATIVE
ARM BAND NUMBER: NORMAL
BASOPHILS # BLD: 1 % (ref 0–2)
BASOPHILS ABSOLUTE: 0.17 K/UL (ref 0–0.2)
BLD PROD TYP BPU: NORMAL
BLOOD BANK SPECIMEN: NORMAL
BUN BLDV-MCNC: 39 MG/DL (ref 6–20)
BUN/CREAT BLD: ABNORMAL (ref 9–20)
CALCIUM SERPL-MCNC: 7.7 MG/DL (ref 8.6–10.4)
CHLORIDE BLD-SCNC: 105 MMOL/L (ref 98–107)
CO2: 19 MMOL/L (ref 20–31)
CREAT SERPL-MCNC: 2.39 MG/DL (ref 0.7–1.2)
CROSSMATCH RESULT: NORMAL
DIFFERENTIAL TYPE: ABNORMAL
DISPENSE STATUS BLOOD BANK: NORMAL
EOSINOPHILS RELATIVE PERCENT: 2 % (ref 1–4)
EXPIRATION DATE: NORMAL
GFR AFRICAN AMERICAN: 37 ML/MIN
GFR NON-AFRICAN AMERICAN: 31 ML/MIN
GFR SERPL CREATININE-BSD FRML MDRD: ABNORMAL ML/MIN/{1.73_M2}
GFR SERPL CREATININE-BSD FRML MDRD: ABNORMAL ML/MIN/{1.73_M2}
GLUCOSE BLD-MCNC: 118 MG/DL (ref 75–110)
GLUCOSE BLD-MCNC: 121 MG/DL (ref 75–110)
GLUCOSE BLD-MCNC: 123 MG/DL (ref 75–110)
GLUCOSE BLD-MCNC: 124 MG/DL (ref 75–110)
GLUCOSE BLD-MCNC: 125 MG/DL (ref 75–110)
GLUCOSE BLD-MCNC: 130 MG/DL (ref 75–110)
GLUCOSE BLD-MCNC: 133 MG/DL (ref 75–110)
GLUCOSE BLD-MCNC: 135 MG/DL (ref 75–110)
GLUCOSE BLD-MCNC: 152 MG/DL (ref 75–110)
GLUCOSE BLD-MCNC: 164 MG/DL (ref 75–110)
GLUCOSE BLD-MCNC: 165 MG/DL (ref 75–110)
GLUCOSE BLD-MCNC: 172 MG/DL (ref 75–110)
GLUCOSE BLD-MCNC: 172 MG/DL (ref 75–110)
GLUCOSE BLD-MCNC: 180 MG/DL (ref 70–99)
GLUCOSE BLD-MCNC: 185 MG/DL (ref 75–110)
GLUCOSE BLD-MCNC: 195 MG/DL (ref 75–110)
GLUCOSE BLD-MCNC: 205 MG/DL (ref 75–110)
HCT VFR BLD CALC: 26 % (ref 40.7–50.3)
HEMOGLOBIN: 7.7 G/DL (ref 13–17)
IMMATURE GRANULOCYTES: 3 %
LYMPHOCYTES # BLD: 11 % (ref 24–43)
MCH RBC QN AUTO: 26.3 PG (ref 25.2–33.5)
MCHC RBC AUTO-ENTMCNC: 29.6 G/DL (ref 28.4–34.8)
MCV RBC AUTO: 88.7 FL (ref 82.6–102.9)
MONOCYTES # BLD: 8 % (ref 3–12)
MORPHOLOGY: ABNORMAL
MORPHOLOGY: ABNORMAL
NRBC AUTOMATED: 0.9 PER 100 WBC
PDW BLD-RTO: 21.4 % (ref 11.8–14.4)
PHOSPHORUS: 2.6 MG/DL (ref 2.5–4.5)
PLATELET # BLD: 346 K/UL (ref 138–453)
PLATELET ESTIMATE: ABNORMAL
PMV BLD AUTO: 9.8 FL (ref 8.1–13.5)
POTASSIUM SERPL-SCNC: 3.8 MMOL/L (ref 3.7–5.3)
RBC # BLD: 2.93 M/UL (ref 4.21–5.77)
RBC # BLD: ABNORMAL 10*6/UL
SEG NEUTROPHILS: 75 % (ref 36–65)
SEGMENTED NEUTROPHILS ABSOLUTE COUNT: 12.67 K/UL (ref 1.5–8.1)
SODIUM BLD-SCNC: 135 MMOL/L (ref 135–144)
TRANSFUSION STATUS: NORMAL
UNIT DIVISION: 0
UNIT NUMBER: NORMAL
WBC # BLD: 16.9 K/UL (ref 3.5–11.3)
WBC # BLD: ABNORMAL 10*3/UL

## 2019-12-10 PROCEDURE — 99233 SBSQ HOSP IP/OBS HIGH 50: CPT | Performed by: INTERNAL MEDICINE

## 2019-12-10 PROCEDURE — 2580000003 HC RX 258: Performed by: STUDENT IN AN ORGANIZED HEALTH CARE EDUCATION/TRAINING PROGRAM

## 2019-12-10 PROCEDURE — 2700000000 HC OXYGEN THERAPY PER DAY

## 2019-12-10 PROCEDURE — 99291 CRITICAL CARE FIRST HOUR: CPT | Performed by: INTERNAL MEDICINE

## 2019-12-10 PROCEDURE — 6360000002 HC RX W HCPCS: Performed by: STUDENT IN AN ORGANIZED HEALTH CARE EDUCATION/TRAINING PROGRAM

## 2019-12-10 PROCEDURE — 80048 BASIC METABOLIC PNL TOTAL CA: CPT

## 2019-12-10 PROCEDURE — 2500000003 HC RX 250 WO HCPCS: Performed by: STUDENT IN AN ORGANIZED HEALTH CARE EDUCATION/TRAINING PROGRAM

## 2019-12-10 PROCEDURE — 36415 COLL VENOUS BLD VENIPUNCTURE: CPT

## 2019-12-10 PROCEDURE — 2580000003 HC RX 258: Performed by: SURGERY

## 2019-12-10 PROCEDURE — 6370000000 HC RX 637 (ALT 250 FOR IP): Performed by: STUDENT IN AN ORGANIZED HEALTH CARE EDUCATION/TRAINING PROGRAM

## 2019-12-10 PROCEDURE — 3600000013 HC SURGERY LEVEL 3 ADDTL 15MIN: Performed by: SURGERY

## 2019-12-10 PROCEDURE — 3700000001 HC ADD 15 MINUTES (ANESTHESIA): Performed by: SURGERY

## 2019-12-10 PROCEDURE — 86850 RBC ANTIBODY SCREEN: CPT

## 2019-12-10 PROCEDURE — 0VB50ZZ EXCISION OF SCROTUM, OPEN APPROACH: ICD-10-PCS | Performed by: UROLOGY

## 2019-12-10 PROCEDURE — 2709999900 HC NON-CHARGEABLE SUPPLY: Performed by: SURGERY

## 2019-12-10 PROCEDURE — 86900 BLOOD TYPING SEROLOGIC ABO: CPT

## 2019-12-10 PROCEDURE — 6360000002 HC RX W HCPCS: Performed by: NURSE ANESTHETIST, CERTIFIED REGISTERED

## 2019-12-10 PROCEDURE — 3700000000 HC ANESTHESIA ATTENDED CARE: Performed by: SURGERY

## 2019-12-10 PROCEDURE — 99232 SBSQ HOSP IP/OBS MODERATE 35: CPT | Performed by: FAMILY MEDICINE

## 2019-12-10 PROCEDURE — 94770 HC ETCO2 MONITOR DAILY: CPT

## 2019-12-10 PROCEDURE — 94761 N-INVAS EAR/PLS OXIMETRY MLT: CPT

## 2019-12-10 PROCEDURE — 86920 COMPATIBILITY TEST SPIN: CPT

## 2019-12-10 PROCEDURE — 0JB90ZZ EXCISION OF BUTTOCK SUBCUTANEOUS TISSUE AND FASCIA, OPEN APPROACH: ICD-10-PCS | Performed by: SURGERY

## 2019-12-10 PROCEDURE — 2000000000 HC ICU R&B

## 2019-12-10 PROCEDURE — 85025 COMPLETE CBC W/AUTO DIFF WBC: CPT

## 2019-12-10 PROCEDURE — 0JBB0ZZ EXCISION OF PERINEUM SUBCUTANEOUS TISSUE AND FASCIA, OPEN APPROACH: ICD-10-PCS | Performed by: SURGERY

## 2019-12-10 PROCEDURE — 2500000003 HC RX 250 WO HCPCS: Performed by: INTERNAL MEDICINE

## 2019-12-10 PROCEDURE — 3600000003 HC SURGERY LEVEL 3 BASE: Performed by: SURGERY

## 2019-12-10 PROCEDURE — 2580000003 HC RX 258: Performed by: NURSE ANESTHETIST, CERTIFIED REGISTERED

## 2019-12-10 PROCEDURE — 84100 ASSAY OF PHOSPHORUS: CPT

## 2019-12-10 PROCEDURE — 86901 BLOOD TYPING SEROLOGIC RH(D): CPT

## 2019-12-10 PROCEDURE — 94003 VENT MGMT INPAT SUBQ DAY: CPT

## 2019-12-10 PROCEDURE — 6370000000 HC RX 637 (ALT 250 FOR IP): Performed by: SURGERY

## 2019-12-10 RX ORDER — ONDANSETRON 2 MG/ML
4 INJECTION INTRAMUSCULAR; INTRAVENOUS ONCE
Status: DISCONTINUED | OUTPATIENT
Start: 2019-12-10 | End: 2019-12-10

## 2019-12-10 RX ORDER — SODIUM CHLORIDE 0.9 % (FLUSH) 0.9 %
10 SYRINGE (ML) INJECTION PRN
Status: DISCONTINUED | OUTPATIENT
Start: 2019-12-10 | End: 2019-12-10

## 2019-12-10 RX ORDER — FENTANYL CITRATE 50 UG/ML
25 INJECTION, SOLUTION INTRAMUSCULAR; INTRAVENOUS ONCE
Status: DISCONTINUED | OUTPATIENT
Start: 2019-12-10 | End: 2019-12-10

## 2019-12-10 RX ORDER — FENTANYL CITRATE 50 UG/ML
50 INJECTION, SOLUTION INTRAMUSCULAR; INTRAVENOUS EVERY 5 MIN PRN
Status: DISCONTINUED | OUTPATIENT
Start: 2019-12-10 | End: 2019-12-10 | Stop reason: HOSPADM

## 2019-12-10 RX ORDER — MIDAZOLAM HYDROCHLORIDE 1 MG/ML
2 INJECTION INTRAMUSCULAR; INTRAVENOUS
Status: DISCONTINUED | OUTPATIENT
Start: 2019-12-10 | End: 2019-12-10

## 2019-12-10 RX ORDER — ONDANSETRON 2 MG/ML
4 INJECTION INTRAMUSCULAR; INTRAVENOUS
Status: DISCONTINUED | OUTPATIENT
Start: 2019-12-10 | End: 2019-12-10 | Stop reason: HOSPADM

## 2019-12-10 RX ORDER — MAGNESIUM HYDROXIDE 1200 MG/15ML
LIQUID ORAL CONTINUOUS PRN
Status: COMPLETED | OUTPATIENT
Start: 2019-12-10 | End: 2019-12-10

## 2019-12-10 RX ORDER — FENTANYL CITRATE 50 UG/ML
INJECTION, SOLUTION INTRAMUSCULAR; INTRAVENOUS PRN
Status: DISCONTINUED | OUTPATIENT
Start: 2019-12-10 | End: 2019-12-10 | Stop reason: SDUPTHER

## 2019-12-10 RX ORDER — SODIUM CHLORIDE 9 MG/ML
INJECTION, SOLUTION INTRAVENOUS CONTINUOUS PRN
Status: DISCONTINUED | OUTPATIENT
Start: 2019-12-10 | End: 2019-12-10 | Stop reason: SDUPTHER

## 2019-12-10 RX ORDER — SODIUM CHLORIDE, SODIUM LACTATE, POTASSIUM CHLORIDE, CALCIUM CHLORIDE 600; 310; 30; 20 MG/100ML; MG/100ML; MG/100ML; MG/100ML
INJECTION, SOLUTION INTRAVENOUS CONTINUOUS
Status: DISCONTINUED | OUTPATIENT
Start: 2019-12-10 | End: 2019-12-10

## 2019-12-10 RX ORDER — SODIUM CHLORIDE 0.9 % (FLUSH) 0.9 %
10 SYRINGE (ML) INJECTION EVERY 12 HOURS SCHEDULED
Status: DISCONTINUED | OUTPATIENT
Start: 2019-12-10 | End: 2019-12-10

## 2019-12-10 RX ADMIN — DAKIN'S SOLUTION 0.125% (QUARTER STRENGTH) 473 ML: 0.12 SOLUTION at 23:28

## 2019-12-10 RX ADMIN — PROPOFOL 20 MCG/KG/MIN: 10 INJECTION, EMULSION INTRAVENOUS at 23:52

## 2019-12-10 RX ADMIN — HEPARIN SODIUM 5000 UNITS: 5000 INJECTION INTRAVENOUS; SUBCUTANEOUS at 22:44

## 2019-12-10 RX ADMIN — MEROPENEM 1 G: 1 INJECTION, POWDER, FOR SOLUTION INTRAVENOUS at 08:15

## 2019-12-10 RX ADMIN — FAMOTIDINE 20 MG: 20 TABLET, FILM COATED ORAL at 08:14

## 2019-12-10 RX ADMIN — FENTANYL CITRATE 100 MCG: 50 INJECTION, SOLUTION INTRAMUSCULAR; INTRAVENOUS at 14:30

## 2019-12-10 RX ADMIN — FOLIC ACID: 5 INJECTION, SOLUTION INTRAMUSCULAR; INTRAVENOUS; SUBCUTANEOUS at 09:00

## 2019-12-10 RX ADMIN — MIDODRINE HYDROCHLORIDE 10 MG: 5 TABLET ORAL at 08:14

## 2019-12-10 RX ADMIN — I.V. FAT EMULSION 250 ML: 20 EMULSION INTRAVENOUS at 18:04

## 2019-12-10 RX ADMIN — MIDODRINE HYDROCHLORIDE 10 MG: 5 TABLET ORAL at 16:36

## 2019-12-10 RX ADMIN — FENTANYL CITRATE 50 MCG: 50 INJECTION, SOLUTION INTRAMUSCULAR; INTRAVENOUS at 14:40

## 2019-12-10 RX ADMIN — PROPOFOL 20 MCG/KG/MIN: 10 INJECTION, EMULSION INTRAVENOUS at 05:36

## 2019-12-10 RX ADMIN — PROPOFOL 20 MCG/KG/MIN: 10 INJECTION, EMULSION INTRAVENOUS at 00:02

## 2019-12-10 RX ADMIN — FENTANYL CITRATE 100 MCG: 50 INJECTION, SOLUTION INTRAMUSCULAR; INTRAVENOUS at 14:16

## 2019-12-10 RX ADMIN — NOREPINEPHRINE BITARTRATE 2 MCG/MIN: 1 INJECTION, SOLUTION, CONCENTRATE INTRAVENOUS at 11:51

## 2019-12-10 RX ADMIN — MEROPENEM 1 G: 1 INJECTION, POWDER, FOR SOLUTION INTRAVENOUS at 21:42

## 2019-12-10 RX ADMIN — Medication 200 MCG/HR: at 03:43

## 2019-12-10 RX ADMIN — POTASSIUM CHLORIDE: 2 INJECTION, SOLUTION, CONCENTRATE INTRAVENOUS at 18:03

## 2019-12-10 RX ADMIN — Medication 175 MCG/HR: at 16:16

## 2019-12-10 RX ADMIN — FUROSEMIDE 80 MG: 10 INJECTION, SOLUTION INTRAMUSCULAR; INTRAVENOUS at 08:14

## 2019-12-10 RX ADMIN — SODIUM CHLORIDE: 9 INJECTION, SOLUTION INTRAVENOUS at 11:51

## 2019-12-10 RX ADMIN — PROPOFOL 20 MCG/KG/MIN: 10 INJECTION, EMULSION INTRAVENOUS at 17:05

## 2019-12-10 RX ADMIN — Medication 175 MCG/HR: at 21:18

## 2019-12-10 RX ADMIN — Medication 175 MCG/HR: at 08:42

## 2019-12-10 RX ADMIN — SODIUM CHLORIDE, PRESERVATIVE FREE 10 ML: 5 INJECTION INTRAVENOUS at 08:15

## 2019-12-10 RX ADMIN — SODIUM CHLORIDE 5.25 UNITS/HR: 9 INJECTION, SOLUTION INTRAVENOUS at 08:33

## 2019-12-10 ASSESSMENT — PULMONARY FUNCTION TESTS
PIF_VALUE: 30
PIF_VALUE: 38
PIF_VALUE: 27
PIF_VALUE: 31
PIF_VALUE: 26
PIF_VALUE: 35
PIF_VALUE: 31
PIF_VALUE: 34
PIF_VALUE: 26
PIF_VALUE: 35
PIF_VALUE: 26
PIF_VALUE: 35
PIF_VALUE: 34
PIF_VALUE: 36
PIF_VALUE: 28
PIF_VALUE: 34
PIF_VALUE: 28
PIF_VALUE: 27
PIF_VALUE: 24
PIF_VALUE: 34
PIF_VALUE: 36
PIF_VALUE: 26
PIF_VALUE: 35
PIF_VALUE: 35
PIF_VALUE: 26
PIF_VALUE: 23
PIF_VALUE: 30
PIF_VALUE: 36
PIF_VALUE: 36
PIF_VALUE: 27
PIF_VALUE: 28
PIF_VALUE: 36
PIF_VALUE: 28
PIF_VALUE: 36
PIF_VALUE: 36
PIF_VALUE: 31
PIF_VALUE: 30
PIF_VALUE: 35
PIF_VALUE: 27
PIF_VALUE: 28
PIF_VALUE: 35
PIF_VALUE: 37
PIF_VALUE: 16
PIF_VALUE: 30
PIF_VALUE: 26
PIF_VALUE: 34
PIF_VALUE: 26
PIF_VALUE: 31
PIF_VALUE: 36
PIF_VALUE: 23
PIF_VALUE: 34
PIF_VALUE: 26
PIF_VALUE: 26
PIF_VALUE: 35
PIF_VALUE: 36
PIF_VALUE: 29
PIF_VALUE: 30
PIF_VALUE: 36
PIF_VALUE: 36
PIF_VALUE: 24
PIF_VALUE: 36
PIF_VALUE: 30
PIF_VALUE: 36
PIF_VALUE: 24
PIF_VALUE: 35
PIF_VALUE: 36
PIF_VALUE: 37
PIF_VALUE: 26
PIF_VALUE: 27
PIF_VALUE: 26
PIF_VALUE: 36
PIF_VALUE: 36
PIF_VALUE: 27
PIF_VALUE: 34
PIF_VALUE: 24
PIF_VALUE: 27
PIF_VALUE: 27
PIF_VALUE: 30
PIF_VALUE: 37
PIF_VALUE: 36
PIF_VALUE: 30
PIF_VALUE: 36
PIF_VALUE: 34
PIF_VALUE: 27
PIF_VALUE: 30
PIF_VALUE: 30
PIF_VALUE: 26
PIF_VALUE: 36
PIF_VALUE: 36
PIF_VALUE: 34
PIF_VALUE: 26
PIF_VALUE: 36
PIF_VALUE: 28
PIF_VALUE: 26
PIF_VALUE: 27
PIF_VALUE: 26
PIF_VALUE: 31
PIF_VALUE: 26
PIF_VALUE: 30
PIF_VALUE: 36
PIF_VALUE: 26
PIF_VALUE: 27
PIF_VALUE: 36
PIF_VALUE: 28
PIF_VALUE: 37
PIF_VALUE: 26
PIF_VALUE: 30
PIF_VALUE: 35
PIF_VALUE: 31
PIF_VALUE: 34
PIF_VALUE: 27
PIF_VALUE: 36
PIF_VALUE: 25
PIF_VALUE: 14
PIF_VALUE: 31
PIF_VALUE: 36
PIF_VALUE: 28
PIF_VALUE: 36
PIF_VALUE: 31
PIF_VALUE: 26
PIF_VALUE: 31
PIF_VALUE: 26
PIF_VALUE: 29
PIF_VALUE: 27
PIF_VALUE: 28
PIF_VALUE: 36
PIF_VALUE: 37
PIF_VALUE: 25
PIF_VALUE: 31
PIF_VALUE: 36
PIF_VALUE: 24
PIF_VALUE: 25
PIF_VALUE: 35
PIF_VALUE: 36
PIF_VALUE: 28
PIF_VALUE: 26
PIF_VALUE: 36
PIF_VALUE: 35
PIF_VALUE: 27
PIF_VALUE: 30
PIF_VALUE: 28
PIF_VALUE: 28
PIF_VALUE: 36
PIF_VALUE: 27
PIF_VALUE: 25
PIF_VALUE: 30
PIF_VALUE: 36
PIF_VALUE: 30
PIF_VALUE: 30
PIF_VALUE: 27
PIF_VALUE: 26
PIF_VALUE: 36
PIF_VALUE: 34
PIF_VALUE: 29
PIF_VALUE: 26
PIF_VALUE: 29
PIF_VALUE: 36
PIF_VALUE: 35
PIF_VALUE: 37
PIF_VALUE: 28
PIF_VALUE: 35
PIF_VALUE: 27
PIF_VALUE: 34
PIF_VALUE: 36
PIF_VALUE: 22
PIF_VALUE: 36
PIF_VALUE: 36
PIF_VALUE: 15
PIF_VALUE: 36
PIF_VALUE: 26
PIF_VALUE: 28
PIF_VALUE: 4
PIF_VALUE: 36
PIF_VALUE: 26
PIF_VALUE: 26
PIF_VALUE: 20
PIF_VALUE: 35
PIF_VALUE: 30
PIF_VALUE: 27
PIF_VALUE: 38
PIF_VALUE: 28
PIF_VALUE: 34
PIF_VALUE: 26
PIF_VALUE: 35
PIF_VALUE: 26
PIF_VALUE: 36
PIF_VALUE: 37
PIF_VALUE: 27
PIF_VALUE: 25
PIF_VALUE: 28
PIF_VALUE: 27
PIF_VALUE: 36
PIF_VALUE: 35
PIF_VALUE: 26
PIF_VALUE: 27
PIF_VALUE: 36
PIF_VALUE: 26
PIF_VALUE: 37
PIF_VALUE: 28
PIF_VALUE: 34
PIF_VALUE: 38
PIF_VALUE: 23
PIF_VALUE: 36
PIF_VALUE: 34
PIF_VALUE: 36
PIF_VALUE: 38
PIF_VALUE: 35
PIF_VALUE: 26
PIF_VALUE: 27
PIF_VALUE: 36
PIF_VALUE: 28
PIF_VALUE: 36
PIF_VALUE: 25
PIF_VALUE: 27
PIF_VALUE: 27
PIF_VALUE: 20
PIF_VALUE: 36
PIF_VALUE: 27

## 2019-12-10 NOTE — PROGRESS NOTES
Yes    CURRENT VENTILATION STATUS:     [x] Ventilator  [] BIPAP  [] Nasal Cannula [] Room Air        SECRETIONS Amount:  [] Small [] Moderate  [] Large  [x] None  Color:     [] White [] Colored  [] Bloody    SEDATION:  RAAS Score:  [] Propofol gtt  [x] Versed gtt  [] Ativan gtt   [] No Sedation     PARALYZED:  [x] No    [] Yes    DIARRHEA:                [x] No                [] Yes  (C. Difficile status: [] positive                                                                                                                       [] negative                                                                                                                     [] pending)    VASOPRESSORS:  [x] No    [] Yes    If yes -   [] Levophed       [] Dopamine     [] Vasopressin       [] Dobutamine  [] Phenylephrine         [] Epinephrine    CENTRAL LINES:     [] No   [x] Yes          If yes -     [x] Right IJ     [] Left IJ [] Right Femoral [] Left Femoral                   [] Right Subclavian [] Left Subclavian       DOSS'S CATHETER:   [] No   [x] Yes  (Date of Insertion:   )     URINE OUTPUT:            [] Good   [] Low              [x] Anuric      OBJECTIVE:     VITAL SIGNS:  /72   Pulse 72   Temp 98.5 °F (36.9 °C)   Resp 26   Ht 5' 9\" (1.753 m)   Wt 295 lb 6.4 oz (134 kg) Comment: with one pillow and bed machine on  SpO2 100%   BMI 43.62 kg/m²   Tmax over 24 hours:  Temp (24hrs), Av.5 °F (36.9 °C), Min:95 °F (35 °C), Max:99.5 °F (37.5 °C)      Patient Vitals for the past 6 hrs:   BP Temp Pulse Resp SpO2 Weight   12/10/19 0645 122/72 -- 72 26 100 % --   12/10/19 0630 124/77 -- 73 26 100 % --   12/10/19 0615 121/65 -- 73 26 100 % --   12/10/19 0600 118/69 -- 76 26 100 % --   12/10/19 0545 (!) 108/46 -- 72 26 100 % --   12/10/19 0530 (!) 111/50 -- 73 26 100 % --   12/10/19 0515 (!) 124/57 -- 78 26 100 % --   12/10/19 0500 (!) 129/57 -- 78 26 99 % --   12/10/19 5005 (!) 125/59 -- 78 26 100 % --   12/10/19 6657 resolved hospital problems. *    PLAN:     WEAN PER PROTOCOL:  [x] No   [] Yes  [] N/A    DISCONTINUE ANY LABS:   [x] No   [] Yes    TRANSFER OUT OF ICU:   [x] No   [] Yes    ADDITIONAL PLAN:    1. Pressure ulcer cant be staged present on admission / Necrotizing Faciitis involving buttocks and scrotum - s/p POD#10 s/p wide complex debridement of gluteal/perianal region. region extensive necrotic tissue that involved the anus and distal rectum. Had  debridement, washout of gluteal and perineal, diverting loop colostomy  Bedside debridement done on 12/4, dressing changes with him on site will continue to do wet-to-dry dressing changes as per  surgery. -GEN surgery and urology on board. Repeat OR today with gen surg  - continue meropenem (started 12/1) per ID   2. Septic shock -resolved, off pressors  - Blood cultures drawn 12/2/19:  No growth     3. DBEBY on CKD - urology on board. 780 UOP last 24 hrs , fluid replaced. -Urine culture : Grew Proteus mirabilis, ID is aware of it  -Nephrology recs:  Patient 3 dialysis till now. Renal ultrasound: Right renal cyst, otherwise grossly negative renal   Ultrasound, non diagnostic bladder assessment. 4. Diabetes - insulin gtt - will maintain through OR then switch to ISS and add in TPN if needed     5. Diabetic left foot ulcer -dietary on board. Wound care. No surgical intervention for now    6. Anemia: Hgb (7.0),   received  5uPRBC since admission     1. Feeding NPO for OR today   2. Fluids - KVO  3. Family - none at bedside   4. Analgesic -fentanyl drip   5. Sedation: Versed drip  6. Thrombo-prophylaxis [] Enoxaparin  [x] Unfract. Heparin Subcut (was held this AM for OR)  [] EPC Cuffs  7. Mobility bed rest and PT/ OT   8. Heads up yes   9. Ulcer prophylaxis - pepcid   10. Glycemic control: insulin gtt, can   11. Spontaneous breathing trial not today  12. Bowel regimen/urine output: milk of mag PRN, 45 ml UOP in last 24 hours  13.  Indwelling catheter/lines CVC

## 2019-12-10 NOTE — PROGRESS NOTES
PROGRESS NOTE     PATIENT NAME: Austin Mcdaniel [address] RECORD NO. [id number]  DATE: 12/10/2019  SURGEON: Mandy Ly  PRIMARY CARE PHYSICIAN: No primary care provider on file. HD: # 11    ASSESSMENT    Patient Active Problem List   Diagnosis    Necrotizing fasciitis ([address]    Hypertension    Diabetes ([address]    Diabetic foot ulcer ([address]    Septic shock ([address]    Bandemia     POD#11 wide complex debridement of nec fasc involving gluteal/perianal/distal rectum  POD#7 open diverting end colostomy   POD#2 Incision and debridement of necrotizing fasciitis buttock wound, scrotum, bilateral groin region and closure of midline abdominal wound      MEDICAL DECISION MAKING AND PLAN    · Necrotizing soft tissue infection: s/p wide complex debridement of gluteal/perianal region extensive necrotic tissue that involved the anus and distal rectum. On most recent surgery, infection is spreading into pelvis which indicates significant risk of mortality. · Plan for OR today for possible further debridement and dressing change. Urology aware. Pending the findings, may need to discuss palliation further     · NPO for now, but ok for tube feeds to resume post op   · CT reviewed. Will defer management of peritoneal fluid collection near  shunt to primary service. Would recommend drainage of pleural fluid collections  · Meropenem, ID following. · Strict glucose control <180  · LLE wound per podiatry  · DEBBY with minimal UOP. Nephrology following. SUBJECTIVE    Patient seen and examined. Yesterday patient received 1uPRBC with HD. He tolerated two hours and ten was started on low dose levo.        OBJECTIVE  VITALS: Temp: Temp: 98.5 °F (36.9 °C)Temp  Av.5 °F (36.9 °C)  Min: 95 °F (35 °C)  Max: 99.5 °F (36.9 °C) BP Systolic (10MEI), ZCM:507 , Min:85 , UVL:368   Diastolic (70YAH), URK:94, Min:39, Max:101   Pulse Pulse  Av.1  Min: 65  Max: 97 Resp Resp  Av.9  Min: 0  Max: 29 Pulse ox SpO2  Av.5 %  Min: 90 %

## 2019-12-10 NOTE — PROGRESS NOTES
Progress Note  Podiatric Medicine and Surgery     Subjective     CC: left foot wound    Patient seen and examined at bedside with Dr. Blanca Owens. Afebrile, vital signs stable   Pt is intubated and sedated. Per chart review plan for OR tomorrow per gen surg    HPI :  Shirlene Dexter is a 37 y.o. male seen at [facility] for multiple wounds of his LLE. Patient is currently intubated and sedated, no family in the room to obtain history. HPI primarily obtained via EMR chart review. Patient presented to the ED yesterday for generalized malaise. Patient usually lives at home with his mother as a caretaker. He had been complaining of fatigue, fever, chills for multiple days before presenting to the ED. Patient found to have significant necrotizing infection to the gluteal region extending to the perineum. CT demonstrated extensive soft tissue emphysema. Patient admitted to the ICU for necrotizing fasciitis and acute renal failure. S/p I&D and complex debridement of affect tissues 11/29/19. Currently on Zosyn.      PCP is No primary care provider on file. ROS: Denies N/V/F/C/SOB/CP. Otherwise negative except at stated in the HPI.      Medications:  Scheduled Meds:   sodium chloride  250 mL Intravenous Once    sodium chloride flush  10 mL Intravenous Q12H    sodium chloride  250 mL Intravenous Once    sodium chloride flush  10 mL Intravenous 2 times per day    fentanNYL  25 mcg Intravenous Once    ondansetron  4 mg Intravenous Once    sodium hypochlorite   Irrigation Once    sodium chloride flush  10 mL Intravenous Q12H    midodrine  10 mg Oral TID WC    famotidine  20 mg Per NG tube Daily    furosemide  80 mg Intravenous Daily    sodium hypochlorite   Irrigation BID    thiamine and folic acid IVPB   Intravenous Daily    fat emulsion  250 mL Intravenous Daily    meropenem  1 g Intravenous Q12H    sodium chloride flush  10 mL Intravenous 2 times per day    heparin (porcine)  5,000 Units Subcutaneous 3 1615 (!) 113/51 -- -- 68 26 -- --   19 1600 (!) 118/56 -- -- 70 26 98 % --   19 1549 -- -- -- 70 26 98 % --   19 1545 118/60 -- -- 71 26 -- --   19 1530 111/67 -- -- 69 26 -- --   19 1515 112/61 -- -- 69 26 -- --   19 1500 (!) 110/53 -- -- 69 26 98 % --   19 1445 (!) 110/54 -- -- 69 26 -- --   19 1430 116/65 -- -- 74 26 -- --   19 1415 (!) 116/45 -- -- 74 26 -- --   19 1400 (!) 126/58 -- -- 80 26 98 % --   19 1345 (!) 124/56 -- -- 76 26 -- --   19 1334 (!) 156/76 98.2 °F (36.8 °C) -- -- -- -- 294 lb 3.2 oz (133.4 kg)   19 1325 (!) 149/80 -- -- -- -- -- --   19 1315 115/68 -- -- 65 26 -- --     Average, Min, and Max for last 24 hours Vitals:  TEMPERATURE:  Temp  Av.4 °F (36.9 °C)  Min: 95 °F (35 °C)  Max: 99.4 °F (37.4 °C)    RESPIRATIONS RANGE: Resp  Av.2  Min: 0  Max: 26    PULSE RANGE: Pulse  Av.2  Min: 65  Max: 109    BLOOD PRESSURE RANGE:  Systolic (84TQW), MGQ:420 , Min:85 , QAI:867   ; Diastolic (35INS), FOH:12, Min:39, Max:101      PULSE OXIMETRY RANGE: SpO2  Av.5 %  Min: 93 %  Max: 100 %    I/O last 3 completed shifts: In: 4187.3 [I.V.:1599.3; Blood:350; NG/GT:40; IV Piggyback:150]  Out: 4355 [Urine:745; Drains:30; Blood:100]    CBC:  Recent Labs     19  194   WBC 20.9*  --  17.6* 16.9*  --    HGB 7.0*   < > 7.8* 7.0* 8.0*   HCT 24.1*   < > 26.7* 24.8* 27.7*     --  243 273  --     < > = values in this interval not displayed. BMP:  Recent Labs     19    135 133*   K 3.6* 3.8 4.0    104 102   CO2 24 21 21   BUN 36* 31* 42*   CREATININE 2.44* 2.13* 2.69*   GLUCOSE 163* 188* 170*   CALCIUM 6.8* 7.1* 7.3*        Coags:  No results for input(s): APTT, PROT, INR in the last 72 hours.     Lab Results   Component Value Date    SEDRATE 66 (H) 2019     No results for input(s): CRP in the

## 2019-12-10 NOTE — PROGRESS NOTES
[facility]  Occupational Therapy Not Seen Note    DATE: 12/10/2019  Name: Alissa Culp  : 1985  MRN: [id number]    Patient not available for Occupational Therapy due to:    [] Testing:    [] Hemodialysis    [] Blood Transfusion in Progress    []Refusal by Patient:    [x] Surgery/Procedure: OR today for possible further debridement and dressing change    [] Strict Bedrest    [] Sedation    [] Spine Precautions     [] Pt being transferred to palliative care at this time. Spoke with pt/family and OT services to be defered. [] Pt independent with functional mobility and functional tasks.  Pt with no OT acute care needs at this time, will defer OT eval.    [] Other    Next Scheduled Treatment: Ck     Randell Mendoza, OT

## 2019-12-10 NOTE — BRIEF OP NOTE
Brief Postoperative Note  ______________________________________________________________    Patient: Tricia Moise  YOB: 1985  MRN: [id number]  Date of Procedure: 12/10/2019    Pre-Op Diagnosis: Necrotizing fasciitis     Post-Op Diagnosis: Same       Procedure:  1. Sharp excisional debridement of gluteal, marc-rectal and perineal wound to level of muscle  2. Sharp excisional debridement of scrotum (performed by Urology)  3. Washout of gluteal/perineal/scrotal/inguinal wound  4. Partial primary closure of pubic wound  5. Dakins Wet to dry dressing application    Anesthesia: General     Surgeon(s):  MD Garrett Pete MD    Assistant: Soraya Valencia, PGY-4    Estimated Blood Loss (mL): 14MH    Complications: None      Drains:   Closed/Suction Drain Superior Abdomen Bulb (Active)   Site Description Healing 12/10/2019  8:00 AM   Dressing Status Other (Comment) 12/10/2019  8:00 AM   Drainage Appearance Serous 12/10/2019  8:00 AM   Status To bulb suction 12/10/2019  4:00 AM   Output (ml) 10 ml 12/10/2019  8:39 AM       NG/OG/NJ/NE Tube Orogastric 16 fr (Active)   Surrounding Skin Dry; Intact 12/10/2019  8:00 AM   Securement device Yes 12/10/2019  8:00 AM   Status Suction-low intermittent 12/10/2019  4:00 AM   Placement Verified by External Catheter Length 12/10/2019  4:00 AM   NG/OG/NJ/NE External Measurement (cm) 60 cm 12/10/2019  4:00 AM   Drainage Appearance Bile;Green 12/10/2019  8:00 AM   Tube Feeding Immune Enhancing 12/5/2019  8:00 PM   Tube Feeding Status Stopped 12/6/2019 12:00 AM   Rate/Schedule 10 mL/hr 12/5/2019  8:00 PM   Tube Feeding Intake (mL) 47 ml 12/5/2019 11:15 PM   Free Water Flush (mL) 20 mL 12/9/2019  1:00 PM   Output (mL) 100 ml 12/10/2019  8:39 AM       Colostomy LLQ Descending/sigmoid (Active)   Stomal Appliance 1 piece;Dry;Clean; Intact 12/9/2019  8:00 PM   Flange Size (inches) 2.5 Inches 12/4/2019 10:19 AM   Stoma  Assessment Pink;Moist 12/10/2019  8:00 AM Peristomal Assessment JESSI 12/10/2019  8:00 AM   Stool Appearance Watery; Bloody; Hard 12/10/2019  8:00 AM   Stool Color Brown 12/10/2019  8:00 AM   Stool Amount Small 12/10/2019  8:00 AM   Output (mL) 25 ml 12/10/2019  8:39 AM       Urethral Catheter (Active)   Catheter Indications Need for fluid management in critically ill patients in a critical care setting not able to be managed by other means such as BSC with hat, bedpan, urinal, condom catheter, or short term intermittent urethral catherization;Urology/Urologist seeing this patient or inserted indwelling catheter;Stage III or IV perineal and sacral wound OR full thickness perineal/lower extremity burns in incontinent patients 12/10/2019  8:00 AM   Site Assessment Red; Swelling;Edema 12/10/2019  8:00 AM   Urine Color Yellow 12/10/2019  8:00 AM   Urine Appearance Sediment 12/10/2019  8:00 AM   Output (mL) 200 mL 12/10/2019  4:32 PM       [REMOVED] Negative Pressure Wound Therapy Abdomen Medial;Upper (Removed)   Wound Type Surgical 12/8/2019  6:00 PM   Dressing Type Black foam 12/8/2019  6:00 PM   Cycle Continuous 12/8/2019  6:00 PM   Target Pressure (mmHg) 125 12/8/2019  6:00 PM   Irrigation Solution Normal Saline 12/7/2019  8:00 PM   Canister changed? No 12/8/2019  6:00 PM   Dressing Status Clean;Dry; Intact 12/8/2019  6:00 PM   Dressing Changed Changed/New 12/3/2019  5:26 PM   Drainage Description Serosanguinous 12/8/2019  6:00 PM   Output (ml) 50 ml 12/5/2019  4:00 PM   Odor None 12/8/2019  6:00 PM       [REMOVED] NG/OG/NJ/NE Tube Orogastric  Center mouth (Removed)   Securement device Yes 11/30/2019 12:00 PM   Status Suction-low intermittent 11/30/2019 12:00 PM   Placement Verified by Gastric Contents 11/30/2019 12:00 PM   NG/OG/NJ/NE External Measurement (cm) 60 cm 11/30/2019 12:00 PM   Drainage Appearance Tan 11/30/2019  8:00 AM       Findings: Fat necrosis along gluteal wounds and perineal wounds requiring debridement.  No additional abscess requiring

## 2019-12-10 NOTE — PLAN OF CARE
Problem: Pain:  Goal: Pain level will decrease  Description  Pain level will decrease  12/10/2019 1417 by Gissel Kidd RN  Outcome: Ongoing  12/10/2019 0540 by Nichole Lyn RN  Outcome: Ongoing  Goal: Control of acute pain  Description  Control of acute pain  12/10/2019 1417 by Gissel Kidd RN  Outcome: Ongoing  12/10/2019 0540 by Nichole Lyn RN  Outcome: Ongoing  Goal: Control of chronic pain  Description  Control of chronic pain  12/10/2019 1417 by Gissel Kidd RN  Outcome: Ongoing  12/10/2019 0540 by Nichole Lyn RN  Outcome: Ongoing     Problem: Skin Integrity:  Goal: Will show no infection signs and symptoms  Description  Will show no infection signs and symptoms  12/10/2019 1417 by Gissel Kidd RN  Outcome: Ongoing  12/10/2019 0540 by Nichole Lyn RN  Outcome: Ongoing  Goal: Absence of new skin breakdown  Description  Absence of new skin breakdown  12/10/2019 1417 by Gissel Kidd RN  Outcome: Ongoing  12/10/2019 0540 by Nichole Lyn RN  Outcome: Ongoing     Problem: OXYGENATION/RESPIRATORY FUNCTION  Goal: Patient will maintain patent airway  12/10/2019 1417 by Gissel Kidd RN  Outcome: Ongoing  12/10/2019 0849 by Ramya Mcclellan RCP  Outcome: Ongoing  12/10/2019 0540 by Nichole Lyn RN  Outcome: Ongoing  Goal: Patient will achieve/maintain normal respiratory rate/effort  Description  Respiratory rate and effort will be within normal limits for the patient  12/10/2019 1417 by Gissel Kidd RN  Outcome: Ongoing  12/10/2019 0849 by Ramya Mcclellan RCP  Outcome: Ongoing  12/10/2019 0540 by Nichole Lyn RN  Outcome: Ongoing     Problem: MECHANICAL VENTILATION  Goal: Patient will maintain patent airway  12/10/2019 1417 by Gissel Kidd RN  Outcome: Ongoing  12/10/2019 0849 by Ramya Mcclellan RCP  Outcome: Ongoing  12/10/2019 0540 by Nichole Lyn RN  Outcome: Ongoing  Goal: Oral health is maintained or improved  12/10/2019 1417 by behavior  Description  Absence of abusive behavior  12/10/2019 1417 by Ry Medrano RN  Outcome: Ongoing  12/10/2019 0540 by Melly Flowers RN  Outcome: Ongoing  Goal: Verbalizations of feeling emotionally comfortable while being cared for will increase  Description  Verbalizations of feeling emotionally comfortable while being cared for will increase  12/10/2019 1417 by Ry Medrano RN  Outcome: Ongoing  12/10/2019 0540 by Melly Flowers RN  Outcome: Ongoing     Problem: Psychomotor Activity - Altered:  Goal: Absence of psychomotor disturbance signs and symptoms  Description  Absence of psychomotor disturbance signs and symptoms  12/10/2019 1417 by Ry Medrano RN  Outcome: Ongoing  12/10/2019 0540 by Melly Flowers RN  Outcome: Ongoing     Problem: Sensory Perception - Impaired:  Goal: Demonstrations of improved sensory functioning will increase  Description  Demonstrations of improved sensory functioning will increase  12/10/2019 1417 by Ry Medrano RN  Outcome: Ongoing  12/10/2019 0540 by Melly Flowers RN  Outcome: Ongoing  Goal: Decrease in sensory misperception frequency  Description  Decrease in sensory misperception frequency  12/10/2019 1417 by Ry Medrano RN  Outcome: Ongoing  12/10/2019 0540 by Melly Flowers RN  Outcome: Ongoing  Goal: Able to refrain from responding to false sensory perceptions  Description  Able to refrain from responding to false sensory perceptions  Outcome: Ongoing  Goal: Demonstrates accurate environmental perceptions  Description  Demonstrates accurate environmental perceptions  12/10/2019 1417 by Ry Medrano RN  Outcome: Ongoing  12/10/2019 0540 by Melly Flowers RN  Outcome: Ongoing  Goal: Able to distinguish between reality-based and nonreality-based thinking  Description  Able to distinguish between reality-based and nonreality-based thinking  12/10/2019 1417 by Ry Medrano RN  Outcome: Ongoing  12/10/2019 0540 by

## 2019-12-10 NOTE — PROGRESS NOTES
2019.       HISTORY:   ORDERING SYSTEM PROVIDED HISTORY: nec fasc   TECHNOLOGIST PROVIDED HISTORY:       nec fasc   Reason for Exam: nec fasc; Trauma   Acuity: Unknown   Type of Exam: Subsequent/Follow-up       FINDINGS:   Lower Chest: Partially visualized bilateral pleural effusions with bibasilar   heterogeneous opacities and bilateral lower lobe consolidations.  Central   venous catheter tip near the superior atrial caval junction.  Cardiomegaly. Trace pericardial fluid.       Liver: Normal.       Gallbladder and Bile Ducts: Normal.       Spleen: Splenomegaly.       Adrenal Glands: Normal.       Pancreas: Normal.       Genitourinary: Symmetric renal atrophy.  Redemonstration of multiple   hypodensities in the right kidney which likely represent benign cysts.  No   urinary stones or hydronephrosis.  Ambrosio catheter in the decompressed urinary   bladder.       Bowel: Normal caliber bowel.  Postsurgical changes of the bowel with left   lower quadrant ostomy.  No evidence of acute appendicitis.  No significant   diverticular disease.       Vasculature: Atherosclerosis.  No abdominal aortic aneurysm.       Bones and Soft Tissues: Diffuse soft tissue stranding and fluid. Postsurgical changes in the anterior abdominal wall with midline incision   demonstrating expected small amount of fluid and air.  Large soft tissue   defects in the right inguinal region, scrotum, right greater than left   gluteal creases with associated packing material.  Small amount of   surrounding soft tissue gas.  There is associated skin thickening in these   regions.  Bilateral lower abdominal wall skin thickening.       Retroperitoneum/Mesentery: No intraperitoneal free air.  Mild abdominopelvic   ascites.  Loculated fluid along the inferior aspect of the liver. Redemonstration of bilateral inguinal and pelvic sidewall lymphadenopathy.    Multiple mildly prominent retroperitoneal and upper abdominal lymph nodes are   similar to the prior ABDOMEN FINDINGS:      The lung bases are unremarkable. There is a small pericardial effusion versus thickening. The liver, spleen, and pancreas demonstrate no acute abnormality given compromised evaluation without intravenous contrast.  There may be mild splenomegaly.  The adrenal glands appear within normal limits. There is no hydronephrosis or obstructing urinary tract calculi. Small amount of free fluid is seen adjacent to the liver inferiorly. .    The appendix is visualized in the right lower quadrant and appears within normal limits.       There is no evidence for bowel obstruction. Stranding is seen within the subcutaneous fat overlying both lateral abdominal walls left greater than right.  There is ill-defined fluid collection within the subcutaneous fat overlying the left lateral abdominal wall possibly representing phlegmon or developing abscess although no organized   abscess is seen. There is a large amount of soft tissue emphysema involving both the right and left buttock extending to the perineum tissue thickening is seen especially on the left involving the left buttock. CT PELVIS FINDINGS:        No distal ureteral calculi or bladder calculi. There is no free fluid in the pelvis. Catheter is seen within the urinary bladder. Osseous changes within the left hemipelvis which may be chronic in nature. IMPRESSION:    Extensive subcutaneous emphysema as described above involving both buttocks extending to the perineum.  The possibility of Ashley gangrene/necrotizing fasciitis cannot be excluded. Possible phlegmon or developing soft tissue abscess overlying the left lateral abdominal wall as noted above.  No organized abscess is seen however. Osseous changes within the left hemipelvis which may be chronic in nature.   Results the study were called to Dr. Peggy El [phone] on 11/29/2019  All CT scans at this facility use dose modulation, iterative reconstruction, and/or weight based dosing when appropriate to reduce radiation dose to as low as reasonably achievable. Medical Decision Ojisae-[id number]-Yqpqa:   11/29/2019  8:17 PM - Jesus, Ana Incoming Lab Results From [facility]     Specimen Information: Buttock; Tissue        Component Collected Lab   Specimen Description 11/29/2019  7:34  [address]   . BUTTOCK BILATERAL TS    Special Requests 11/29/2019  7:34  [address]   NOT REPORTED    Direct Exam 11/29/2019  7:[address]  [address]   NO NEUTROPHILS SEEN    Direct Exam Abnormal  11/29/2019  7:34  [facility] [address] [place]    Direct Exam Abnormal  11/29/2019  7:34 PM [address] NEGATIVE RODS    Culture 11/29/2019  7:34  [address]   PENDING          Medical Decision Making-Other:     Note:  · Labs, medications, radiologic studies were reviewed with personal review of films  · Large amounts of data were reviewed  · Discussed with nursing Staff, Discharge planner  · Infection Control and Prevention measures reviewed  · All prior entries were reviewed  · Administer medications as ordered  · Prognosis: Guarded  · Discharge planning reviewed  · Follow up as outpatient. Thank you for allowing us to participate in the care of this patient. Please call with questions.     Beryl Velázquez MD   Pager: [phone] - Office: [phone]

## 2019-12-10 NOTE — PLAN OF CARE
Problem: Pain:  Goal: Pain level will decrease  Description  Pain level will decrease  Outcome: Ongoing  Goal: Control of acute pain  Description  Control of acute pain  Outcome: Ongoing  Goal: Control of chronic pain  Description  Control of chronic pain  Outcome: Ongoing     Problem: Skin Integrity:  Goal: Will show no infection signs and symptoms  Description  Will show no infection signs and symptoms  Outcome: Ongoing  Goal: Absence of new skin breakdown  Description  Absence of new skin breakdown  Outcome: Ongoing     Problem: OXYGENATION/RESPIRATORY FUNCTION  Goal: Patient will maintain patent airway  Outcome: Ongoing  Goal: Patient will achieve/maintain normal respiratory rate/effort  Description  Respiratory rate and effort will be within normal limits for the patient  Outcome: Ongoing     Problem: MECHANICAL VENTILATION  Goal: Patient will maintain patent airway  Outcome: Ongoing  Goal: Oral health is maintained or improved  Outcome: Ongoing  Goal: ET tube will be managed safely  Outcome: Ongoing  Goal: Ability to express needs and understand communication  Outcome: Ongoing  Goal: Mobility/activity is maintained at optimum level for patient  Outcome: Ongoing     Problem: SKIN INTEGRITY  Goal: Skin integrity is maintained or improved  Outcome: Ongoing     Problem: Falls - Risk of:  Goal: Will remain free from falls  Description  Will remain free from falls  Outcome: Ongoing  Goal: Absence of physical injury  Description  Absence of physical injury  Outcome: Ongoing     Problem: Risk for Impaired Skin Integrity  Goal: Tissue integrity - skin and mucous membranes  Description  Structural intactness and normal physiological function of skin and  mucous membranes.   Outcome: Ongoing     Problem: Nutrition  Goal: Optimal nutrition therapy  Description  Nutrition Problem: Inadequate oral intake  Intervention: Food and/or Nutrient Delivery: Start Parenteral Nutrition  Nutritional Goals: Meet % of estimated Ongoing  Goal: Able to distinguish between reality-based and nonreality-based thinking  Description  Able to distinguish between reality-based and nonreality-based thinking  Outcome: Ongoing  Goal: Able to interrupt nonreality-based thinking  Description  Able to interrupt nonreality-based thinking  Outcome: Ongoing     Problem: Sleep Pattern Disturbance:  Goal: Appears well-rested  Description  Appears well-rested  Outcome: Ongoing

## 2019-12-10 NOTE — PLAN OF CARE
Problem: OXYGENATION/RESPIRATORY FUNCTION  Goal: Patient will maintain patent airway  12/10/2019 0849 by Chino Trevino RCP  Outcome: Ongoing     Problem: OXYGENATION/RESPIRATORY FUNCTION  Goal: Patient will achieve/maintain normal respiratory rate/effort  Description  Respiratory rate and effort will be within normal limits for the patient  12/10/2019 0849 by Chino Trevino RCP  Outcome: Ongoing     Problem: MECHANICAL VENTILATION  Goal: Patient will maintain patent airway  12/10/2019 0849 by Chino Trevino RCP  Outcome: Ongoing     Problem: MECHANICAL VENTILATION  Goal: Oral health is maintained or improved  12/10/2019 0849 by Chino Trevino RCP  Outcome: Ongoing     Problem: MECHANICAL VENTILATION  Goal: ET tube will be managed safely  12/10/2019 0849 by Chino Trevino RCP  Outcome: Ongoing     Problem: MECHANICAL VENTILATION  Goal: Ability to express needs and understand communication  12/10/2019 0849 by Chino Trevino RCP  Outcome: Ongoing     Problem: MECHANICAL VENTILATION  Goal: Mobility/activity is maintained at optimum level for patient  12/10/2019 0849 by Chino Trevino RCP  Outcome: Ongoing     Problem: SKIN INTEGRITY  Goal: Skin integrity is maintained or improved  12/10/2019 0849 by Chino Trevino RCP  Outcome: Ongoing

## 2019-12-10 NOTE — OP NOTE
Dr. Emani Henriquez MD      [address] [facility]. [place]    DATE:  12/10/19    SURGEON:   Christopher Hernandez MD    ASSISTANT:  Monster Chacon MD.       PREOPERATIVE DIAGNOSIS:  NECROTIZING FASCIITIS PERINEUM, SCROTUM    POSTOPERATIVE DIAGNOSIS:  same    PROCEDURE PERFORMED:  1. Exam under anesthesia  2. Debridement of necrotizing fasciitis of perineum and scrotum including skin and soft tissue >5cm    ANESTHESIA:  General    COMPLICATIONS:  None. ESTIMATED BLOOD LOSS:  50mL    DRAINS:  Ambrosio catheter    SPECIMENS:  Tissue for culture    INDICATIONS FOR PROCEDURE:  The patient is a 37 y.o. male with a history of necrotizing fasciitis taken to OR by general surgery for re-examination and re-debridement today. We previously examined him approximately 2 days ago and debrided extensively around his scrotum and perineum at that time. General surgery did request us to re-examine under anesthesia. Consent was obtained. DETAILS OF THE PROCEDURE:  On entering the room the patient was intubated in dorsal lithotomy and debridement was underway. Previous wounds were freshened up by general surgery, and the defect overall was irrigated. There were two flaps of tissue extending inferoposterior that were the remnants of the left and right lateral hemiscrotum with remnants of either respective thigh crease skin. The edges of these flaps of tissue were dusky and did seem to be relatively devitalized, so we did elect to excise them up to the level of the superior scrotum. The perineal aspect of the wound was probed and was noted to be clean without loculations, tunneling, new purulence. Urethra was noted to be intact and so was left alone. Ambrosio catheter was left in place. Of note, the remnant of the left lateral hemiscrotum/thigh crease skin flap did seem viable and pink with bleeding edges, so we did save this in the hopes of providing some tissue coverage for possible future flap.  Hemostasis was

## 2019-12-10 NOTE — PROGRESS NOTES
Physical Therapy  DATE: 12/10/2019    NAME: Ruddy Madrigal  MRN: [id number]   : 1985    Patient not seen this date for Physical Therapy due to:  [] Blood transfusion in progress  [] Hemodialysis  []  Patient Declined  [] Spine Precautions   [] Strict Bedrest  [x] Surgery/ Procedure- Possible OR today, intubated. PT to check back   [] Testing      [] Other        [] PT being discontinued at this time. Patient independent. No further needs. [] PT being discontinued at this time as the patient has been transferred to palliative care. No further needs. Jt Tejada , This treatment/evaluation completed by signing SPT. Signing PT agrees with treatment and documentation.

## 2019-12-10 NOTE — PROGRESS NOTES
Nutrition Assessment (Parenteral Nutrition)    Type and Reason for Visit: Reassess    Nutrition Recommendations: Will increase AA in TPN to 140g requiring increase in rate to 75 ml per hour (1594 kcal with diprivan, 140 g protein). Discussed with pharmacist requesting help with concentrating TPN. Recommend restart trophic tube feeds as able. Nutrition Assessment: Pt returning to OR today. PN continues    Malnutrition Assessment:  · Malnutrition Status: Insufficient data  · Context: Chronic illness  · Findings of the 6 clinical characteristics of malnutrition (Minimum of 2 out of 6 clinical characteristics is required to make the diagnosis of moderate or severe Protein Calorie Malnutrition based on AND/ASPEN Guidelines):  1. Energy Intake-Unable to assess, Unable to assess    2. Weight Loss-Unable to assess, unable to assess  3. Fat Loss-No significant subcutaneous fat loss,    4. Muscle Loss-No significant muscle mass loss,    5. Fluid Accumulation-Moderate to severe fluid accumulation, Extremities, Generalized  6.   Strength-Not measured    Nutrition Risk Level: High    Nutrient Needs:  · Estimated Daily Total Kcal: 8461-9830 kcal/day   · Estimated Daily Protein (g): 110-145 g pro/day   Nutrition Diagnosis:   · Problem: Inadequate oral intake  · Etiology: related to Impaired respiratory function-inability to consume food     Signs and symptoms:  as evidenced by Nutrition support - PN    Objective Information:  · Wound Type: Open Wounds  · Current Nutrition Therapies:  · Oral Diet Orders: NPO   · Parenteral Nutrition Orders:  · Type and Formula: (150 g Dex, 100 g AA)   · Lipids: 250ml, Daily  · Rate/Volume: 75 ml per hour  · Duration: Continuous  · Current PN Order Provides: 1672 kcal, 140 g protein  · Anthropometric Measures:  · Ht: 5' 9\" (175.3 cm)   · Current Body Wt: 295 lb 6.7 oz (134 kg)  · Admission Body Wt: 291 lb 0.1 oz (132 kg)  · Ideal Body Wt: 160 lb 15 oz (73 kg), % Ideal Body 181% (adm/ideal)  · BMI Classification: BMI > or equal to 40.0 Obese Class III    Nutrition Interventions:   Modify current Parenteral Nutrition, Start Tube Feeding  Continued Inpatient Monitoring, Education Not Indicated    Nutrition Evaluation:   · Evaluation: Progressing toward goals   · Goals: Meet % of estimated nutrition needs   · Monitoring: PN Intake, PN Tolerance, Pertinent Labs, Wound Healing      Electronically signed by Chente Morataya RD, LD on 12/10/19 at 2:45 PM    Contact Number: [phone]

## 2019-12-10 NOTE — PROGRESS NOTES
Palliative Care Progress Note    NAME:  Segundo Granger RECORD NUMBER:  [id number]  AGE: 37 y.o. GENDER: male  : 1985  TODAY'S DATE:  12/10/2019    Reason for Consult: Family support  History of Present Illness     The patient is a 37 y.o. Unavailable/unknown male who presents with No chief complaint on file. Referred to Palliative Care by  ?x Physician   ? Nursing  ? Family Request   ? Other:       He was admitted to the critical care service for Necrotizing fasciitis ([facility] [M72.6]. His hospital course has been associated with sepsis. The patient has a complicated medical history and has been hospitalized since 2019  3:39 PM.    OVERNIGHT EVENTS:  No acute events overnight  Patient remains intubated  Patient to have further debridement today in the OR     BP (!) 109/59   Pulse 84   Temp 100.2 °F (37.9 °C) (Axillary)   Resp 26   Ht 5' 9\" (1.753 m)   Wt 295 lb 6.4 oz (134 kg) Comment: with one pillow and bed machine on  SpO2 93%   BMI 43.62 kg/m²     Assessment        REVIEW OF SYSTEMS    ? x  UNABLE TO OBTAIN: Intubated and sedated    Constitutional:  ?   Chills   ? Fatigue   ? Fevers   ? Malaise   ? Weight loss   ? Other:     Respiratory:   ?  Cough    ? Shortness of breath    ? Chest pain    ? Other:     Cardiovascular:   ?  Chest pain  ? Dyspnea    ? Exertional chest pressure/discomfort     ? Fatigue      ? Palpitations    ? Syncope   ? Other:     Gastrointestinal:   ?  Abdominal pain   ? Constipation    ? Diarrhea    ? Dysphagia   ? Reflux             ? Vomiting   ? Other:     Genitourinary:  ?  Dysuria     ? Frequency   ? Hematuria   ? Nocturia   ? Urinary incontinence   ? Other:     Musculoskeletal:   ? Back pain    ? Muscle weakness   ? Myalgias    ? Neck pain   ? Stiff joints   ? Other:     Behavioral/Psych:   ? Anxiety    ? Depression     ? Mood swings   ?  Other:     PHYSICAL ASSESSMENT:     General: ?  Oriented x3      ? well appearing      ?x Problem/Diagnosis:  Necrotizing fasciitis ([address]    Additional Assessments:  Principal Problem:    Necrotizing fasciitis ([address]  Active Problems:    Hypertension    Diabetes ([address]    Diabetic foot ulcer ([address]    Septic shock (HCC)    Bandemia    1- Symptom management/ pain control     Pain Assessment:  Pain is controlled with current analgesics. Medication(s) being used: acetaminophen, narcotic analgesics including fentanyl IV, oxycodone                Anxiety:  none                          Dyspnea:  none                          Fatigue:  none    Other: Intubated and sedated    We feel the patient symptoms are being controlled. his current regimen is reviewed by myself and discussed with the staff. 2- Goals of care evaluation   The patient goals of care are spiritual needs, strengthening relationships, preserve independence/autonomy/control and support for family/caregiver   Goals of care discussed with:    ? Patient independently    ? Patient and Family    ?x Family or Healthcare DPOA independently    ? Unable to discuss with patient, family/DPOA not present    3- Code Status  Full Code    4- Other recommendations  - We will continue to provide comfort and support to the patient and the family    Please call with any palliative questions or concerns. Palliative Care Team is available via perfect serve or via phone. Palliative Care will continue to follow Mr. Bonny Oneill care as needed. The note has been dictated by dragon, typing errors may be a possibility     Thank you for allowing Palliative Care to participate in the care of Mr. Zelda Cano .        Electronically signed by   Josiah Salas MD  Palliative Care Team  on 12/10/2019 at 11:37 AM    Palliative care office: [phone]

## 2019-12-11 LAB
ABO/RH: NORMAL
ABSOLUTE EOS #: 0.32 K/UL (ref 0–0.44)
ABSOLUTE IMMATURE GRANULOCYTE: 0.32 K/UL (ref 0–0.3)
ABSOLUTE LYMPH #: 1.13 K/UL (ref 1.1–3.7)
ABSOLUTE MONO #: 0.97 K/UL (ref 0.1–1.2)
ANION GAP SERPL CALCULATED.3IONS-SCNC: 11 MMOL/L (ref 9–17)
ANTIBODY SCREEN: NEGATIVE
ARM BAND NUMBER: NORMAL
BASOPHILS # BLD: 0 % (ref 0–2)
BASOPHILS ABSOLUTE: 0 K/UL (ref 0–0.2)
BLD PROD TYP BPU: NORMAL
BLOOD BANK SPECIMEN: NORMAL
BUN BLDV-MCNC: 50 MG/DL (ref 6–20)
BUN/CREAT BLD: ABNORMAL (ref 9–20)
CALCIUM SERPL-MCNC: 7.8 MG/DL (ref 8.6–10.4)
CHLORIDE BLD-SCNC: 107 MMOL/L (ref 98–107)
CO2: 17 MMOL/L (ref 20–31)
CREAT SERPL-MCNC: 2.59 MG/DL (ref 0.7–1.2)
CROSSMATCH RESULT: NORMAL
DIFFERENTIAL TYPE: ABNORMAL
DISPENSE STATUS BLOOD BANK: NORMAL
EOSINOPHILS RELATIVE PERCENT: 2 % (ref 1–4)
EXPIRATION DATE: NORMAL
GFR AFRICAN AMERICAN: 34 ML/MIN
GFR NON-AFRICAN AMERICAN: 28 ML/MIN
GFR SERPL CREATININE-BSD FRML MDRD: ABNORMAL ML/MIN/{1.73_M2}
GFR SERPL CREATININE-BSD FRML MDRD: ABNORMAL ML/MIN/{1.73_M2}
GLUCOSE BLD-MCNC: 142 MG/DL (ref 75–110)
GLUCOSE BLD-MCNC: 146 MG/DL (ref 75–110)
GLUCOSE BLD-MCNC: 148 MG/DL (ref 75–110)
GLUCOSE BLD-MCNC: 149 MG/DL (ref 75–110)
GLUCOSE BLD-MCNC: 150 MG/DL (ref 75–110)
GLUCOSE BLD-MCNC: 150 MG/DL (ref 75–110)
GLUCOSE BLD-MCNC: 151 MG/DL (ref 75–110)
GLUCOSE BLD-MCNC: 152 MG/DL (ref 75–110)
GLUCOSE BLD-MCNC: 157 MG/DL (ref 75–110)
GLUCOSE BLD-MCNC: 158 MG/DL (ref 75–110)
GLUCOSE BLD-MCNC: 163 MG/DL (ref 75–110)
GLUCOSE BLD-MCNC: 164 MG/DL (ref 75–110)
GLUCOSE BLD-MCNC: 164 MG/DL (ref 75–110)
GLUCOSE BLD-MCNC: 166 MG/DL (ref 75–110)
GLUCOSE BLD-MCNC: 168 MG/DL (ref 75–110)
GLUCOSE BLD-MCNC: 169 MG/DL (ref 75–110)
GLUCOSE BLD-MCNC: 169 MG/DL (ref 75–110)
GLUCOSE BLD-MCNC: 170 MG/DL (ref 75–110)
GLUCOSE BLD-MCNC: 178 MG/DL (ref 70–99)
GLUCOSE BLD-MCNC: 178 MG/DL (ref 75–110)
HCT VFR BLD CALC: 24.2 % (ref 40.7–50.3)
HEMOGLOBIN: 7 G/DL (ref 13–17)
IMMATURE GRANULOCYTES: 2 %
LYMPHOCYTES # BLD: 7 % (ref 24–43)
MCH RBC QN AUTO: 27.9 PG (ref 25.2–33.5)
MCHC RBC AUTO-ENTMCNC: 28.9 G/DL (ref 28.4–34.8)
MCV RBC AUTO: 96.4 FL (ref 82.6–102.9)
MONOCYTES # BLD: 6 % (ref 3–12)
MORPHOLOGY: ABNORMAL
MORPHOLOGY: ABNORMAL
NRBC AUTOMATED: 0.2 PER 100 WBC
PDW BLD-RTO: 22.6 % (ref 11.8–14.4)
PLATELET # BLD: 398 K/UL (ref 138–453)
PLATELET ESTIMATE: ABNORMAL
PMV BLD AUTO: 9.9 FL (ref 8.1–13.5)
POTASSIUM SERPL-SCNC: 4.1 MMOL/L (ref 3.7–5.3)
RBC # BLD: 2.51 M/UL (ref 4.21–5.77)
RBC # BLD: ABNORMAL 10*6/UL
SEG NEUTROPHILS: 83 % (ref 36–65)
SEGMENTED NEUTROPHILS ABSOLUTE COUNT: 13.46 K/UL (ref 1.5–8.1)
SODIUM BLD-SCNC: 135 MMOL/L (ref 135–144)
TRANSFUSION STATUS: NORMAL
TRIGL SERPL-MCNC: 170 MG/DL
UNIT DIVISION: 0
UNIT NUMBER: NORMAL
WBC # BLD: 16.2 K/UL (ref 3.5–11.3)
WBC # BLD: ABNORMAL 10*3/UL

## 2019-12-11 PROCEDURE — 84478 ASSAY OF TRIGLYCERIDES: CPT

## 2019-12-11 PROCEDURE — 2580000003 HC RX 258: Performed by: STUDENT IN AN ORGANIZED HEALTH CARE EDUCATION/TRAINING PROGRAM

## 2019-12-11 PROCEDURE — 6360000002 HC RX W HCPCS: Performed by: STUDENT IN AN ORGANIZED HEALTH CARE EDUCATION/TRAINING PROGRAM

## 2019-12-11 PROCEDURE — 2700000000 HC OXYGEN THERAPY PER DAY

## 2019-12-11 PROCEDURE — 90935 HEMODIALYSIS ONE EVALUATION: CPT

## 2019-12-11 PROCEDURE — 99291 CRITICAL CARE FIRST HOUR: CPT | Performed by: INTERNAL MEDICINE

## 2019-12-11 PROCEDURE — 6370000000 HC RX 637 (ALT 250 FOR IP): Performed by: STUDENT IN AN ORGANIZED HEALTH CARE EDUCATION/TRAINING PROGRAM

## 2019-12-11 PROCEDURE — 2500000003 HC RX 250 WO HCPCS: Performed by: STUDENT IN AN ORGANIZED HEALTH CARE EDUCATION/TRAINING PROGRAM

## 2019-12-11 PROCEDURE — 99233 SBSQ HOSP IP/OBS HIGH 50: CPT | Performed by: INTERNAL MEDICINE

## 2019-12-11 PROCEDURE — 2500000003 HC RX 250 WO HCPCS: Performed by: INTERNAL MEDICINE

## 2019-12-11 PROCEDURE — 94770 HC ETCO2 MONITOR DAILY: CPT

## 2019-12-11 PROCEDURE — 82947 ASSAY GLUCOSE BLOOD QUANT: CPT

## 2019-12-11 PROCEDURE — 94761 N-INVAS EAR/PLS OXIMETRY MLT: CPT

## 2019-12-11 PROCEDURE — 2580000003 HC RX 258

## 2019-12-11 PROCEDURE — 94003 VENT MGMT INPAT SUBQ DAY: CPT

## 2019-12-11 PROCEDURE — 86920 COMPATIBILITY TEST SPIN: CPT

## 2019-12-11 PROCEDURE — 86901 BLOOD TYPING SEROLOGIC RH(D): CPT

## 2019-12-11 PROCEDURE — 86900 BLOOD TYPING SEROLOGIC ABO: CPT

## 2019-12-11 PROCEDURE — P9016 RBC LEUKOCYTES REDUCED: HCPCS

## 2019-12-11 PROCEDURE — 85025 COMPLETE CBC W/AUTO DIFF WBC: CPT

## 2019-12-11 PROCEDURE — 2000000000 HC ICU R&B

## 2019-12-11 PROCEDURE — 36430 TRANSFUSION BLD/BLD COMPNT: CPT

## 2019-12-11 PROCEDURE — 36415 COLL VENOUS BLD VENIPUNCTURE: CPT

## 2019-12-11 PROCEDURE — 80048 BASIC METABOLIC PNL TOTAL CA: CPT

## 2019-12-11 PROCEDURE — 86850 RBC ANTIBODY SCREEN: CPT

## 2019-12-11 RX ORDER — MAGNESIUM HYDROXIDE 1200 MG/15ML
LIQUID ORAL
Status: COMPLETED
Start: 2019-12-11 | End: 2019-12-11

## 2019-12-11 RX ORDER — ACETAMINOPHEN 650 MG
TABLET, EXTENDED RELEASE ORAL DAILY
Status: DISCONTINUED | OUTPATIENT
Start: 2019-12-12 | End: 2019-12-12

## 2019-12-11 RX ORDER — 0.9 % SODIUM CHLORIDE 0.9 %
250 INTRAVENOUS SOLUTION INTRAVENOUS ONCE
Status: COMPLETED | OUTPATIENT
Start: 2019-12-11 | End: 2019-12-11

## 2019-12-11 RX ORDER — 0.9 % SODIUM CHLORIDE 0.9 %
250 INTRAVENOUS SOLUTION INTRAVENOUS ONCE
Status: DISCONTINUED | OUTPATIENT
Start: 2019-12-11 | End: 2019-12-15

## 2019-12-11 RX ADMIN — Medication 200 MCG/HR: at 23:44

## 2019-12-11 RX ADMIN — SODIUM CHLORIDE: 9 INJECTION, SOLUTION INTRAVENOUS at 16:31

## 2019-12-11 RX ADMIN — SODIUM CHLORIDE 20 ML: 9 INJECTION, SOLUTION INTRAVENOUS at 16:40

## 2019-12-11 RX ADMIN — MEROPENEM 1 G: 1 INJECTION, POWDER, FOR SOLUTION INTRAVENOUS at 21:55

## 2019-12-11 RX ADMIN — FOLIC ACID: 5 INJECTION, SOLUTION INTRAMUSCULAR; INTRAVENOUS; SUBCUTANEOUS at 14:09

## 2019-12-11 RX ADMIN — SODIUM CHLORIDE: 9 INJECTION, SOLUTION INTRAVENOUS at 16:30

## 2019-12-11 RX ADMIN — HEPARIN SODIUM 5000 UNITS: 5000 INJECTION INTRAVENOUS; SUBCUTANEOUS at 14:20

## 2019-12-11 RX ADMIN — POTASSIUM CHLORIDE: 2 INJECTION, SOLUTION, CONCENTRATE INTRAVENOUS at 18:25

## 2019-12-11 RX ADMIN — I.V. FAT EMULSION 250 ML: 20 EMULSION INTRAVENOUS at 18:25

## 2019-12-11 RX ADMIN — SODIUM CHLORIDE 1000 ML: 900 IRRIGANT IRRIGATION at 10:55

## 2019-12-11 RX ADMIN — Medication 200 MCG/HR: at 13:20

## 2019-12-11 RX ADMIN — FAMOTIDINE 20 MG: 20 TABLET, FILM COATED ORAL at 08:41

## 2019-12-11 RX ADMIN — PROPOFOL 20 MCG/KG/MIN: 10 INJECTION, EMULSION INTRAVENOUS at 23:52

## 2019-12-11 RX ADMIN — Medication 200 MCG/HR: at 18:40

## 2019-12-11 RX ADMIN — SODIUM CHLORIDE 1.7 UNITS/HR: 9 INJECTION, SOLUTION INTRAVENOUS at 18:07

## 2019-12-11 RX ADMIN — MEROPENEM 1 G: 1 INJECTION, POWDER, FOR SOLUTION INTRAVENOUS at 10:32

## 2019-12-11 RX ADMIN — Medication 175 MCG/HR: at 03:19

## 2019-12-11 RX ADMIN — MINERAL OIL, PETROLATUM: 425; 568 OINTMENT OPHTHALMIC at 02:31

## 2019-12-11 RX ADMIN — HEPARIN SODIUM 1600 UNITS: 1000 INJECTION INTRAVENOUS; SUBCUTANEOUS at 19:20

## 2019-12-11 RX ADMIN — PROPOFOL 20 MCG/KG/MIN: 10 INJECTION, EMULSION INTRAVENOUS at 18:48

## 2019-12-11 RX ADMIN — HEPARIN SODIUM 5000 UNITS: 5000 INJECTION INTRAVENOUS; SUBCUTANEOUS at 22:04

## 2019-12-11 RX ADMIN — Medication 200 MCG/HR: at 08:50

## 2019-12-11 RX ADMIN — HEPARIN SODIUM 1500 UNITS: 1000 INJECTION INTRAVENOUS; SUBCUTANEOUS at 19:19

## 2019-12-11 ASSESSMENT — PULMONARY FUNCTION TESTS
PIF_VALUE: 27
PIF_VALUE: 22
PIF_VALUE: 21
PIF_VALUE: 21
PIF_VALUE: 23
PIF_VALUE: 23
PIF_VALUE: 22
PIF_VALUE: 21
PIF_VALUE: 23

## 2019-12-11 ASSESSMENT — PAIN SCALES - GENERAL
PAINLEVEL_OUTOF10: 0
PAINLEVEL_OUTOF10: 0

## 2019-12-11 NOTE — PROGRESS NOTES
2245 (!) 101/58 -- -- 68 26 100 % --   12/10/19 2230 (!) 102/59 -- -- 67 26 100 % --   12/10/19 2215 103/61 -- -- 66 26 100 % --   12/10/19 2200 (!) 102/59 -- -- 66 26 100 % --   12/10/19 2145 105/65 -- -- 65 26 100 % --   12/10/19 2130 104/61 -- -- 66 26 100 % --   12/10/19 2115 (!) 99/55 -- -- 64 26 100 % --   12/10/19 2100 (!) 102/58 -- -- 63 26 100 % --   12/10/19 2045 (!) 97/58 -- -- 62 26 100 % --   12/10/19 2030 (!) 101/56 -- -- 64 26 100 % --   12/10/19 2015 106/61 -- -- 67 26 100 % --   12/10/19 2000 114/66 -- -- 70 26 100 % --   12/10/19 1945 131/79 -- -- 74 26 100 % --   12/10/19 1930 110/64 -- -- 69 26 100 % --   12/10/19 1928 -- -- -- 74 26 100 % --   12/10/19 1915 106/61 -- -- 69 26 100 % --   12/10/19 1900 99/64 -- -- 69 26 100 % --   12/10/19 1845 107/64 -- -- 69 26 100 % --   12/10/19 1830 112/62 -- -- 68 26 100 % --   12/10/19 1815 111/66 -- -- 66 26 100 % --   12/10/19 1800 131/79 -- -- 65 24 100 % --   12/10/19 1745 136/85 -- -- 65 26 100 % --   12/10/19 1730 134/79 -- -- 65 26 100 % --   12/10/19 1715 131/79 -- -- 67 26 100 % --   12/10/19 1700 (!) 92/50 -- -- 64 26 100 % --   12/10/19 1645 (!) 83/44 -- -- 62 26 100 % --   12/10/19 1634 131/75 -- -- 89 26 -- --   12/10/19 1630 (!) 215/126 96.8 °F (36 °C) Axillary 72 25 100 % --   12/10/19 1615 (!) 141/81 -- -- 63 26 99 % --   12/10/19 1600 113/68 96.6 °F (35.9 °C) Axillary 65 16 98 % --   12/10/19 1551 -- -- -- 69 26 96 % --   12/10/19 1130 116/63 -- -- 71 26 -- --   12/10/19 1115 110/63 -- -- 75 26 -- --   12/10/19 1100 110/62 -- -- 80 26 -- --   12/10/19 1045 (!) 109/59 -- -- 84 26 93 % --   12/10/19 1030 (!) 113/53 -- -- 86 26 91 % --   12/10/19 1015 (!) 109/55 -- -- 92 26 90 % --   12/10/19 1000 (!) 115/53 -- -- 94 26 90 % --   12/10/19 0945 (!) 121/59 -- -- 91 26 97 % --   12/10/19 0930 (!) 120/95 -- -- 94 25 96 % --   12/10/19 0915 118/67 -- -- 96 25 97 % --   12/10/19 0900 106/75 -- -- 88 26 97 % --   12/10/19 0845 113/63 -- -- 97 25 96 % --   12/10/19 0830 130/68 -- -- 93 -- 96 % --   12/10/19 0815 (!) 104/47 -- -- 75 -- 98 % --   12/10/19 0812 -- -- -- 75 26 99 % --   12/10/19 0800 (!) 107/45 100.2 °F (37.9 °C) Axillary 75 26 99 % --   12/10/19 0745 (!) 112/43 -- -- 77 26 99 % --   12/10/19 0730 (!) 114/56 -- -- 75 18 99 % --   12/10/19 0715 -- -- -- -- -- 99 % --       Intake/Output Summary (Last 24 hours) at 12/11/2019 0713  Last data filed at 12/11/2019 0618  Gross per 24 hour   Intake 2919.7 ml   Output 1207 ml   Net 1712.7 ml       Recent Labs     12/09/19  0431 12/09/19  1944 12/10/19  0536 12/11/19  0438   WBC 16.9*  --  16.9* 16.2*   HGB 7.0* 8.0* 7.7* 7.0*   HCT 24.8* 27.7* 26.0* 24.2*   MCV 91.2  --  88.7 96.4     --  346 398     Recent Labs     12/09/19  0431 12/10/19  0536 12/11/19  0438   * 135 135   K 4.0 3.8 4.1    105 107   CO2 21 19* 17*   PHOS 2.3* 2.6  --    BUN 42* 39* 50*   CREATININE 2.69* 2.39* 2.59*       No results for input(s): COLORU, PHUR, LABCAST, WBCUA, RBCUA, MUCUS, TRICHOMONAS, YEAST, BACTERIA, CLARITYU, SPECGRAV, LEUKOCYTESUR, UROBILINOGEN, BILIRUBINUR, BLOODU in the last 72 hours. Invalid input(s): NITRATE, GLUCOSEUKETONESUAMORPHOUS    Additional Lab/culture results:    Physical Exam:   NAD, intubated/sedated  RRR, palpable radial pulses  Equal chest rise, normal inspiratory effort, intubated/vented  Soft, ND. No peritoneal signs. Midline incision dressed. RLQ drain in place.  LLQ diverting colostomy in place, pink/viable  Ambrosio catheter in place, draining clear UOP  Genital: dressed, clean/dry dressing  Warm extremities, no calf tenderness    Interval Imaging Findings:    Impression:  Necrotizing fasciitis ([address]  s/p re-debridement in conjunction with General surgery 12/8/19    Plan:   Supportive care  Meropenem empirically, defer to ID service, recommendations appreciated  Critical care management per ICU primary, appreciate their management  Stoma care per general surgery  BID WTD with dakins dressings per primary  Will be on board to re-examine during next look tentatively scheduled Friday  Maintain lema catheter for diversion  Please call with questions/concerns    Cruz Londono  7:13 AM 12/11/2019

## 2019-12-11 NOTE — PROGRESS NOTES
PROGRESS NOTE     PATIENT NAME: Austin Mcdaniel [address] RECORD NO. [id number]  DATE: 12/11/2019  SURGEON: Josef Glover  PRIMARY CARE PHYSICIAN: No primary care provider on file. HD: # 12    ASSESSMENT    Patient Active Problem List   Diagnosis    Necrotizing fasciitis ([address]    Hypertension    Diabetes ([address]    Diabetic foot ulcer ([address]    Septic shock ([address]    Bandemia     POD#12 wide complex debridement of nec fasc involving gluteal/perianal/distal rectum  POD#8 open diverting end colostomy   POD#3 Incision and debridement of necrotizing fasciitis buttock wound, scrotum, bilateral groin region and closure of midline abdominal wound  POD #1 Sharp excisional debridement of gluteal, marc-rectal and perineal wound to level of muscle, Sharp excisional debridement of scrotum (performed by Urology), Washout of gluteal/perineal/scrotal/inguinal wound, Partial primary closure of pubic wound, and Dakins Wet to dry dressing application      MEDICAL DECISION MAKING AND PLAN    · Necrotizing soft tissue infection: s/p wide complex debridement of gluteal/perianal region extensive necrotic tissue that involved the anus and distal rectum. On most recent surgery, infection is spreading into pelvis which indicates significant risk of mortality. · Plan for OR on Friday   · BID wet to dry dressing to perineal area. Packing to midline incision and suprapubic area. · Ok for tube feeds per general surgery   · CT reviewed. Will defer management of peritoneal fluid collection near  shunt to primary service. Would recommend drainage of pleural fluid collections  · Meropenem, ID following. · Transfuse per primary team   · Strict glucose control <180  · LLE wound per podiatry  · Nephrology following. SUBJECTIVE    Patient seen and examined. Yesterday he was taken to the OR for repeat evaluation of wounds, debridement, and dressing change. He is off pressors today. Intubated and sedated.         OBJECTIVE  VITALS: Temp: Temp: 98.6 °F (37 °C)Temp  Av °F (36.7 °C)  Min: 96.6 °F (35.9 °C)  Max: 99.3 °F (66.3 °C) BP Systolic (09NFR), LNR:042 , Min:64 , VNV:980   Diastolic (93HYX), IGM:43, Min:41, Max:126   Pulse Pulse  Av.1  Min: 62  Max: 91 Resp Resp  Av.9  Min: 0  Max: 26 Pulse ox SpO2  Av.8 %  Min: 83 %  Max: 100 %     GENERAL: intubated and sedated. No acute distress  NEURO: moves all extremities, not following commands   LUNGS: vented   HEART: RRR  ABDOMEN: soft, non-distended, +grimace with palpation. Midline packing removed and replaced, no significant drainage noted. Colostomy is patent with dark succus in appliance. Suprapubic packing replaced. Perineal dressing replaced, wound bed without new necrosis or purulent drainage. EXTERMITY: wounds L foot, trace edema     I/O last 3 completed shifts: In: 2919.7 [I.V.:1211.4; IV Piggyback:100]  Out: 0293 [Urine:872; Emesis/NG output:250; Drains:10; Stool:25; Blood:50]    Drain/tube output: In: 1748.8 [I.V.:539]  Out: 956 [Urine:806]    LAB:  CBC:   Recent Labs     194 12/10/19  0536 12/11/19  0438   WBC 16.9*  --  16.9* 16.2*   HGB 7.0* 8.0* 7.7* 7.0*   HCT 24.8* 27.7* 26.0* 24.2*   MCV 91.2  --  88.7 96.4     --  346 398     BMP:   Recent Labs     12/09/19  0431 12/10/19  0536 12/11/19  0438   * 135 135   K 4.0 3.8 4.1    105 107   CO2 21 19* 17*   BUN 42* 39* 50*   CREATININE 2.69* 2.39* 2.59*   GLUCOSE 170* 180* 178*     COAGS:   No results for input(s): APTT, PROT, INR in the last 72 hours. RADIOLOGY:   no new images   No        Electronically signed by Hiren Song DO on 2019 at 11:37 AM         Attending Note      I have reviewed the above [facility] resident progress note and I either performed the key elements of the medical history and physical exam or was present when the resident performed them.  I have discussed the findings, established the care plan and recommendations with resident, TECSS nurse and

## 2019-12-11 NOTE — PROGRESS NOTES
Class III    Nutrition Interventions:   Continue Parenteral Nutrition, Start Tube Feeding(As able, restart trickle TF of Vital AF 1.2 (semi-elemental) at 10 mL/hr.)  Continued Inpatient Monitoring, Education Not Indicated    Nutrition Evaluation:   · Evaluation: Progressing toward goals   · Goals: Meet % of estimated nutrition needs   · Monitoring: PN Intake, PN Tolerance, TF Intake, TF Tolerance, Skin Integrity, Wound Healing, I&O, Weight, Pertinent Labs, Monitor Hemodynamic Status    Electronically signed by Rachel Young RD, LD on 12/11/19 at 3:20 PM    Contact Number: [phone]

## 2019-12-11 NOTE — PLAN OF CARE
PROVIDE ADEQUATE OXYGENATION WITH ACCEPTABLE SP02/ABG'S    [x]  IDENTIFY APPROPRIATE OXYGEN THERAPY  [x]   MONITOR SP02/ABG'S AS NEEDED   [x]   PATIENT EDUCATION AS NEEDED    Ventilator Bronchodilator assessment    Breath sounds: clear  Inspiratory Pressure: 23  Plateau Pressure: 22    Patient assessed at level 1          []    Bronchodilator Assessment    BRONCHODILATOR ASSESSMENT SCORE  Score 0 (Home) 1 2 3 4   Breath Sounds   []  Chronic Ventilator: Patient at baseline [x]  Mild Wheezes/ Clear []  Intermittent wheezes with good air entry []  Bilateral/unilateral wheezing with diminished air entry []  Insp/Exp wheeze and/or poor aeration   Ventilator Pressures   []  Chronic Ventilator [x]  Insp. Pressure less than 25 cm H20 []  Insp. Pressure less than 25 cm H20 []  Insp. Pressure exceeds 25 cm H20 []  Insp.  Pressure exceeds 30 cm H20   Plateau Pressure []  NA   [x]  Plateau Pressure less than 4  []  Plateau Pressure less than or equal to 5 []  Plateau Pressure greater than or equal to 6 []  Plateau Pressure greater than or equal to [place]  8:08 AM

## 2019-12-11 NOTE — PROGRESS NOTES
debridement      CURRENT EVALUATION :12/11/2019     Afebrile  VS stable    Patient evaluated at bedside in the medical ICU   Intubated and ventilated, PRVC/26/530/5/40  Sedated on propofol and fentanyl PCA  On HD  Meropenem adjusted to q 24 hr    S/P general surgery debridement 12/10/19 (4th debridement)  Plan for OR 12-13-19    No new culture data: Strep and Gram negative bacilli     Labs, X rays reviewed: 12/11/2019    BUN: 59-->61-->79-->49-->42->39->50  Cr: 2.56-->2.77-->4.49-->2.84-->2.69->2.39>2.50    WBC: 4.20-->53.3-->26.7-->16.9->16.9->16.2  Hb:9.5-->8.2-->6.8-->8.2 -->7.0-->7.0->7.7>7  Plat: 642-->342-->273->346->398    Cultures:  Urine:  · NA  Blood:  · 11-30-19: no growth  · 12-2-19: no growth  Sputum :  · NA  Wound:  · 11-29-19: Strep ssp and Gram negative bacilli       MRSA probe: Pos    Discussed with mother, RN, CC. .    12-11-19:          12-7-19: Left foot:      12-4-19:      11-29-19:      I have personally reviewed the past medical history, past surgical history, medications, social history, and family history, and I have updated the database accordingly.   Past Medical History:     Past Medical History:   Diagnosis Date    [facility]est[facility] failure[facility]     Diabetes mellitus ([facility]     Hypertension     Spina bifida aperta of lumbar spine ([facility]        Past Surgical  History:     Past Surgical History:   Procedure Laterality Date    ABDOMEN SURGERY N/A 12/8/2019    DEBRIDEMENT  NECROTIZING FASCIITIS BUTTOCK, WOUND VAC REMOVAL DELAYED PRIMARY CLOSURE OF MIDLINE ABDOMINAL INCISION, OSTOMY BAG CHANGE performed by Davie Ramos MD at [facility] 12/3/2019    LAPAROSCOPIC CONVERTED TO OPEN DIVERTING LOOP COLOSTOMY; WOUND VAC APPLICATION performed by Shayla Barrios MD at [address] [place] [address] 69 Bilateral 11/29/2019    RECTAL PERIRECTAL INCISION AND DRAINAGE, DIVERING LOOP COLOSTOMY CREATION performed by Smiley Rinne, MD at [place] [address] 12/6/2019    DEBRIDEMENT NECROTIZING FASCIAITIS BILAT BUTTOCK performed by eKy Handley MD at [place] [address]       Medications:      sodium chloride  250 mL Intravenous Once    sodium chloride  250 mL Intravenous Once    sodium chloride  250 mL Intravenous Once    sodium chloride flush  10 mL Intravenous Q12H    sodium chloride  250 mL Intravenous Once    sodium hypochlorite   Irrigation Once    sodium chloride flush  10 mL Intravenous Q12H    midodrine  10 mg Oral TID WC    famotidine  20 mg Per NG tube Daily    sodium hypochlorite   Irrigation BID    thiamine and folic acid IVPB   Intravenous Daily    fat emulsion  250 mL Intravenous Daily    meropenem  1 g Intravenous Q12H    sodium chloride flush  10 mL Intravenous 2 times per day    heparin (porcine)  5,000 Units Subcutaneous 3 times per day       Social History:     Social History     Socioeconomic History    Marital status: Unknown     Spouse name: Not on file    Number of children: Not on file    Years of education: Not on file    Highest education level: Not on file   Occupational History    Not on file   Social Needs    Financial resource strain: Not on file    Food insecurity:     Worry: Not on file     Inability: Not on file    Transportation needs:     Medical: Not on file     Non-medical: Not on file   Tobacco Use    Smoking status: Not on file   Substance and Sexual Activity    Alcohol use: Not on file    Drug use: Not on file    Sexual activity: Not on file   Lifestyle    Physical activity:     Days per week: Not on file     Minutes per session: Not on file    Stress: Not on file   Relationships    Social connections:     Talks on phone: Not on file     Gets together: Not on file     Attends Taoist service: Not on file     Active member of club or organization: Not on file     Attends meetings of clubs or organizations: Not on file     Relationship status: Not on file    Intimate partner violence:     Fear of current or ex partner: Not on file     Emotionally abused: Not on file     Physically abused: Not on file     Forced sexual activity: Not on file   Other Topics Concern    Not on file   Social History Narrative    Not on file       Family History:   No family history on file. Allergies:   Patient has no known allergies. Review of Systems:     12/11/2019 UTO pt intubated, sedated, on vasopressors. Seen in ICU    Constitutional: No fevers or chills. Generalized weakness  Head: No headaches  Eyes: No double vision or blurry vision. No conjunctival inflammation. ENT: No sore throat or runny nose. . No hearing loss, tinnitus or vertigo. Cardiovascular: No chest pain or palpitations. No shortness of breath. No GORDILLO  Lung: No shortness of breath or cough. No sputum production  Abdomen: No nausea, vomiting, diarrhea, or abdominal pain. Shana Lukes No cramps. Genitourinary: No increased urinary frequency, or dysuria. No hematuria. No suprapubic or CVA pain  Musculoskeletal: No muscle aches or pains. No joint effusions, swelling or deformities. Lt diabetic foot   Hematologic: No bleeding or bruising. Neurologic: No headache, weakness, numbness, or tingling. Spina bifida  Integument: Gangrene of perineum and gluteal areas  Psychiatric: No depression. Endocrine: No polyuria, no polydipsia, no polyphagia.     Physical Examination :     Patient Vitals for the past 8 hrs:   BP Temp Temp src Pulse Resp SpO2 Weight   12/11/19 1115 -- 98.6 °F (37 °C) Oral -- -- -- --   12/11/19 1100 (!) 145/68 -- -- 115 13 95 % --   12/11/19 1000 133/82 -- -- 107 12 97 % --   12/11/19 0900 (!) 150/79 -- -- 104 (!) 0 98 % --   12/11/19 0800 120/64 98.1 °F (36.7 °C) Oral 87 (!) 0 92 % --   12/11/19 0754 -- -- -- 88 (!) 1 98 % --   12/11/19 0700 (!) 107/46 -- -- 72 9 100 % --   12/11/19 0615 (!) 161/65 -- -- 90 26 100 % --   12/11/19 0600 133/73 -- -- 91 26 100 % (!) 396 lb 13.3 oz (180 kg)   12/11/19 0545 (!) 112/56 -- -- 83 26 99 % --   12/11/19 0530 106/61 -- -- 73 26 99 % --   12/11/19 0515 (!) 93/41 -- -- 68 26 100 % --   12/11/19 0500 (!) 93/50 -- -- 69 26 100 % --   12/11/19 0445 (!) 98/49 -- -- 71 26 99 % --   12/11/19 0430 (!) 100/51 -- -- 70 26 100 % --     General Appearance: Sedated, on vent  Head:  Normocephalic, no trauma  ENT: Oropharyngeally intubated, without erythema, exudate, or thrush. No tenderness of sinuses. Mouth/throat: mucosa pink and moist. No lesions. Dentition in good repair. Neck:Supple, without lymphadenopathy. Thyroid normal, No bruits. Pulmonary/Chest: Clear to auscultation, without wheezes, rales, or rhonchi. No dullness to percussion. Cardiovascular: Regular rate and rhythm without murmurs, rubs, or gallops. Abdomen: Soft, non tender. Bowel sounds quiet. Ostomy with scant liquid output. Surgical dressing clean  : Extensive open wound of gluteal and perineal areas, lema with small amt clear urine  All four Extremities: No cyanosis, clubbing, or effusions. Lt foot diabetic ulcer with involvement of the Lt heel. Neurologic: Intubated, sedated  Skin: Warm and dry with good turgor. Signs of peripheral arterial insufficiency. Large open wounds. Medical Decision Making -Laboratory:   I have independently reviewed/ordered the following labs:    CBC with Differential:   Recent Labs     12/10/19  0536 12/11/19 0438   WBC 16.9* 16.2*   HGB 7.7* 7.0*   HCT 26.0* 24.2*    398   LYMPHOPCT 11* 7*   MONOPCT 8 6     BMP:   Recent Labs     12/09/19  1258 12/10/19  0536 12/11/19  0438   NA  --  135 135   K  --  3.8 4.1   CL  --  105 107   CO2  --  19* 17*   BUN  --  39* 50*   CREATININE  --  2.39* 2.59*   MG 1.5*  --   --      Hepatic Function Panel:   No results for input(s): PROT, LABALBU, BILIDIR, IBILI, BILITOT, ALKPHOS, ALT, AST in the last 72 hours. No results for input(s): RPR in the last 72 hours. No results for input(s): HIV in the last 72 hours. No results for input(s): BC in the last 72 hours.   Lab Results Tissues: Diffuse soft tissue stranding and fluid. Postsurgical changes in the anterior abdominal wall with midline incision   demonstrating expected small amount of fluid and air.  Large soft tissue   defects in the right inguinal region, scrotum, right greater than left   gluteal creases with associated packing material.  Small amount of   surrounding soft tissue gas.  There is associated skin thickening in these   regions.  Bilateral lower abdominal wall skin thickening.       Retroperitoneum/Mesentery: No intraperitoneal free air.  Mild abdominopelvic   ascites.  Loculated fluid along the inferior aspect of the liver. Redemonstration of bilateral inguinal and pelvic sidewall lymphadenopathy.    Multiple mildly prominent retroperitoneal and upper abdominal lymph nodes are   similar to the prior study.  Percutaneous intraperitoneal surgical drains.           Impression   1.  Large soft tissue defects in the right inguinal region, scrotum and right   greater than left gluteal crease is with associated packing material, small   amount of surrounding soft tissue gas and skin thickening likely relates to   history of necrotizing fasciitis.       2.  Postsurgical changes of the bowel with left lower quadrant ostomy.  No   bowel obstruction.  Percutaneous intraperitoneal surgical drains.       3.  Mild abdominopelvic ascites.  Loculated fluid along the inferior aspect   of the liver.  No intraperitoneal free air.       4.  Diffuse anasarca.       5.   Bilateral pleural effusions with associated heterogeneous opacities and   consolidations.  Underlying infection is not excluded.               EXAMINATION:   ONE XRAY VIEW OF THE CHEST       11/29/2019 9:01 pm       COMPARISON:   4 hours ago       HISTORY:   ORDERING SYSTEM PROVIDED HISTORY: intubated   TECHNOLOGIST PROVIDED HISTORY:   intubated   Reason for Exam: portable supine/ intubated   Acuity: Acute   Type of Exam: Initial       FINDINGS:   New ET tube terminates      No distal ureteral calculi or bladder calculi. There is no free fluid in the pelvis. Catheter is seen within the urinary bladder. Osseous changes within the left hemipelvis which may be chronic in nature. IMPRESSION:    Extensive subcutaneous emphysema as described above involving both buttocks extending to the perineum.  The possibility of Ashley gangrene/necrotizing fasciitis cannot be excluded. Possible phlegmon or developing soft tissue abscess overlying the left lateral abdominal wall as noted above.  No organized abscess is seen however. Osseous changes within the left hemipelvis which may be chronic in nature. Results the study were called to Dr. Ashleigh Arciniega [phone] on 11/29/2019  All CT scans at this facility use dose modulation, iterative reconstruction, and/or weight based dosing when appropriate to reduce radiation dose to as low as reasonably achievable. Medical Decision Zvywln-Crlbuejy-Jqnva:   11/29/2019  8:17 PM - JesusAna higginbotham Incoming Lab Results From [facility]     Specimen Information: Buttock; Tissue        Component Collected Lab   Specimen Description 11/29/2019  7:34  [address]   . BUTTOCK BILATERAL TS    Special Requests 11/29/2019  7:34  [address]   NOT REPORTED    Direct Exam 11/29/2019  7:34  [address]   NO NEUTROPHILS SEEN    Direct Exam Abnormal  11/29/2019  7:34  [facility] [address] [facility]    Direct Exam Abnormal  11/29/2019  7:34 PM [address] NEGATIVE RODS    Culture 11/29/2019  7:34  [address]   PENDING      Medical Decision Making-Other:     Note:  · Labs, medications, radiologic studies were reviewed with personal review of films  · Large amounts of data were reviewed  · Discussed with nursing Staff, Discharge planner  · Infection Control and Prevention measures reviewed  · All prior entries were reviewed  · Administer medications as ordered  · Prognosis: Guarded  · Discharge planning reviewed  · Follow up as outpatient. Thank you for allowing us to participate in the care of this patient. Please call with questions. Yousuf Sánchez DO  PGY-1, Internal medicine resident  [facility], [facility], [facility] Main:    I have discussed the case, including pertinent history and exam findings with the residents. I have seen and examined the patient and the key elements of the encounter have been performed by me. I have reviewed the laboratory data, other diagnostic studies and discussed them with the residents. I have updated the medical record where necessary. I agree with the assessment, plan and orders as documented by the resident.     Viv Mireles MD.    12/11/2019 12:32 PM

## 2019-12-11 NOTE — PROGRESS NOTES
FOLLOWING COMMANDS:  [x] No   [] Yes    CURRENT VENTILATION STATUS:     [x] Ventilator  [] BIPAP  [] Nasal Cannula [] Room Air        SECRETIONS Amount:  [] Small [] Moderate  [] Large  [x] None  Color:     [] White [] Colored  [] Bloody    SEDATION:  RAAS Score:  [] Propofol gtt  [x] Versed gtt  [] Ativan gtt   [] No Sedation     PARALYZED:  [x] No    [] Yes    DIARRHEA:                [x] No                [] Yes  (C. Difficile status: [] positive                                                                                                                       [] negative                                                                                                                     [] pending)    VASOPRESSORS:  [x] No    [] Yes    If yes -   [] Levophed       [] Dopamine     [] Vasopressin       [] Dobutamine  [] Phenylephrine         [] Epinephrine    CENTRAL LINES:     [] No   [x] Yes          If yes -     [x] Right IJ     [] Left IJ [] Right Femoral [] Left Femoral                   [] Right Subclavian [] Left Subclavian       DOSS'S CATHETER:   [] No   [x] Yes  (Date of Insertion:   )     URINE OUTPUT:            [] Good   [] Low              [x] Anuric      OBJECTIVE:     VITAL SIGNS:  BP (!) 161/65   Pulse 88   Temp 98.4 °F (36.9 °C) (Oral)   Resp (!) 1   Ht 5' 9\" (1.753 m)   Wt (!) 396 lb 13.3 oz (180 kg) Comment: Bed zeroed-would go to 114kg.  Subtracted this from 294  SpO2 98%   BMI 58.60 kg/m²   Tmax over 24 hours:  Temp (24hrs), Av °F (36.7 °C), Min:96.6 °F (35.9 °C), Max:99.3 °F (37.4 °C)      Patient Vitals for the past 6 hrs:   BP Temp Temp src Pulse Resp SpO2 Weight   19 0754 -- -- -- 88 (!) 1 98 % --   19 0615 (!) 161/65 -- -- 90 26 100 % --   19 0600 133/73 -- -- 91 26 100 % (!) 396 lb 13.3 oz (180 kg)   19 0545 (!) 112/56 -- -- 83 26 99 % --   19 0530 106/61 -- -- 73 26 99 % --   19 0515 (!) 93/41 -- -- 68 26 100 % --   19 0500 (!) 93/50 Infusions:   PN-Adult 2-in-1 Central Line (Standard) 74.1 mL/hr at 12/10/19 1803    propofol 20 mcg/kg/min (12/10/19 2352)    fentaNYL 200 mcg/hr (12/11/19 0850)    insulin 2.12 Units/hr (12/11/19 0824)    dextrose      sodium chloride 10 mL/hr at 12/07/19 0950    norepinephrine (LEVOPHED) infusion (double concentration) 2 mcg/min (12/11/19 0514)     PRN Meds:   sodium chloride flush, 10 mL, PRN  potassium chloride, 40 mEq, PRN    Or  potassium alternative oral replacement, 40 mEq, PRN    Or  potassium chloride, 10 mEq, PRN  sodium chloride flush, 10 mL, PRN  sodium chloride, 250 mL, PRN  sodium chloride, 150 mL, PRN  oxyCODONE, 5 mg, Q4H PRN    Or  oxyCODONE, 10 mg, Q4H PRN  sodium chloride, 250 mL, PRN  sodium chloride, 150 mL, PRN  heparin (porcine), 1,500 Units, PRN  heparin (porcine), 1,600 Units, PRN  acetaminophen, 650 mg, Q4H PRN  glucose, 15 g, PRN  dextrose, 12.5 g, PRN  glucagon (rDNA), 1 mg, PRN  dextrose, 100 mL/hr, PRN  acetaminophen, 650 mg, Q4H PRN  REFRESH LACRI-LUBE, , PRN  povidone-iodine, , PRN  sodium chloride flush, 10 mL, PRN  magnesium hydroxide, 30 mL, Daily PRN  ondansetron, 4 mg, Q6H PRN  glucagon (rDNA), 1 mg, PRN          VENT SETTINGS (Comprehensive) (if applicable):  Vent Information  $Ventilation: $Subsequent Day  Ventilator Started: Yes  Skin Assessment: Clean, dry, & intact  Equipment ID: [id number]  Equipment Changed: HME  Vent Type: Servo i  Vent Mode: PRVC  Vt Ordered: 530 mL  Rate Set: 26 bmp  Pressure Support: 6 cmH20  FiO2 : 40 %  Sensitivity: 1  PEEP/CPAP: 5  I Time/ I Time %: 0.85 s  Cuff Pressure (cm H2O): 28 cm H2O  Humidification Source: Heated wire  Humidification Temp: 37.1  Humidification Temp Measured: 37  Circuit Condensation: Drained  Nitric Oxide/Epoprostenol In Use?: No  Additional Respiratory  Assessments  Pulse: 88  Resp: (!) 1  SpO2: 98 %  End Tidal CO2: 30 (%)  Position: Semi-Pagan's  Humidification Source: Heated wire  Humidification Temp: 37.1  Circuit gluteal/perianal region. region extensive necrotic tissue that involved the anus and distal rectum. Had  debridement, washout of gluteal and perineal, diverting loop colostomy  Bedside debridement done on 12/4, dressing changes with him on site will continue to do wet-to-dry dressing changes as per  surgery. - continue meropenem (started 12/1) per ID   - (12/10) Repeat debridement with general surgery and urology  2. Septic shock -resolved, off pressors  - Blood cultures drawn 12/2/19:  No growth     3. DEBBY on CKD -     Urology recs: Meroponen empirically, maintain lema for continued drainage.   -Urine culture : Grew Proteus mirabilis, ID is aware of it  -Nephrology recs:  Patient 3 dialysis till now. Renal ultrasound: Right renal cyst, otherwise grossly negative renal   Ultrasound, non diagnostic bladder assessment. 4. Diabetes - insulin gtt - will maintain through OR then switch to ISS and add in TPN if needed     5. Diabetic left foot ulcer -dietary on board. Wound care. No surgical intervention for now    6. Anemia: Hgb (7.0),   received  5uPRBC since admission     1. Feeding NPO for OR today   2. Fluids - KVO  3. Family - none at bedside   4. Analgesic -fentanyl drip   5. Sedation: Versed drip  6. Thrombo-prophylaxis [] Enoxaparin  [x] Unfract. Heparin Subcut (was held this AM for OR)  [] EPC Cuffs  7. Mobility bed rest and PT/ OT   8. Heads up yes   9. Ulcer prophylaxis - pepcid   10. Glycemic control: insulin gtt, can   11. Spontaneous breathing trial not today  12. Bowel regimen/urine output: milk of mag PRN, 45 ml UOP in last 24 hours  13. Indwelling catheter/lines CVC RIJ, OG, Lema  14.  De-escalation: take off insulin drip after OR- switch to [address], DO  Orthopedic Surgery Resident, PGY-1  [address], [facility]            12/11/2019, 9:14 AM

## 2019-12-12 LAB
ABO/RH: NORMAL
ABSOLUTE EOS #: 0.11 K/UL (ref 0–0.4)
ABSOLUTE IMMATURE GRANULOCYTE: 0 K/UL (ref 0–0.3)
ABSOLUTE LYMPH #: 1.37 K/UL (ref 1–4.8)
ABSOLUTE MONO #: 0.57 K/UL (ref 0.1–0.8)
ALLEN TEST: POSITIVE
ANION GAP SERPL CALCULATED.3IONS-SCNC: 10 MMOL/L (ref 9–17)
ANTIBODY SCREEN: NEGATIVE
ARM BAND NUMBER: NORMAL
BASOPHILS # BLD: 0 % (ref 0–2)
BASOPHILS ABSOLUTE: 0 K/UL (ref 0–0.2)
BLD PROD TYP BPU: NORMAL
BUN BLDV-MCNC: 46 MG/DL (ref 6–20)
BUN/CREAT BLD: ABNORMAL (ref 9–20)
CALCIUM SERPL-MCNC: 8.3 MG/DL (ref 8.6–10.4)
CHLORIDE BLD-SCNC: 106 MMOL/L (ref 98–107)
CO2: 19 MMOL/L (ref 20–31)
CREAT SERPL-MCNC: 2.14 MG/DL (ref 0.7–1.2)
CROSSMATCH RESULT: NORMAL
DIFFERENTIAL TYPE: ABNORMAL
DISPENSE STATUS BLOOD BANK: NORMAL
EOSINOPHILS RELATIVE PERCENT: 1 % (ref 1–4)
EXPIRATION DATE: NORMAL
FIO2: 30
GFR AFRICAN AMERICAN: 43 ML/MIN
GFR NON-AFRICAN AMERICAN: 35 ML/MIN
GFR SERPL CREATININE-BSD FRML MDRD: ABNORMAL ML/MIN/{1.73_M2}
GFR SERPL CREATININE-BSD FRML MDRD: ABNORMAL ML/MIN/{1.73_M2}
GLUCOSE BLD-MCNC: 140 MG/DL (ref 75–110)
GLUCOSE BLD-MCNC: 142 MG/DL (ref 75–110)
GLUCOSE BLD-MCNC: 147 MG/DL (ref 75–110)
GLUCOSE BLD-MCNC: 155 MG/DL (ref 75–110)
GLUCOSE BLD-MCNC: 158 MG/DL (ref 75–110)
GLUCOSE BLD-MCNC: 162 MG/DL (ref 70–99)
GLUCOSE BLD-MCNC: 162 MG/DL (ref 75–110)
GLUCOSE BLD-MCNC: 168 MG/DL (ref 74–100)
GLUCOSE BLD-MCNC: 168 MG/DL (ref 75–110)
GLUCOSE BLD-MCNC: 171 MG/DL (ref 75–110)
GLUCOSE BLD-MCNC: 174 MG/DL (ref 75–110)
GLUCOSE BLD-MCNC: 175 MG/DL (ref 75–110)
GLUCOSE BLD-MCNC: 183 MG/DL (ref 75–110)
HCT VFR BLD CALC: 25 % (ref 40.7–50.3)
HCT VFR BLD CALC: 26.3 % (ref 40.7–50.3)
HEMOGLOBIN: 7.7 G/DL (ref 13–17)
HEMOGLOBIN: 7.7 G/DL (ref 13–17)
IMMATURE GRANULOCYTES: 0 %
LYMPHOCYTES # BLD: 12 % (ref 24–44)
MAGNESIUM: 1.9 MG/DL (ref 1.6–2.6)
MCH RBC QN AUTO: 27.2 PG (ref 25.2–33.5)
MCHC RBC AUTO-ENTMCNC: 29.3 G/DL (ref 28.4–34.8)
MCV RBC AUTO: 92.9 FL (ref 82.6–102.9)
MODE: ABNORMAL
MONOCYTES # BLD: 5 % (ref 1–7)
MORPHOLOGY: ABNORMAL
MORPHOLOGY: ABNORMAL
NEGATIVE BASE EXCESS, ART: 2 (ref 0–2)
NRBC AUTOMATED: 0.2 PER 100 WBC
O2 DEVICE/FLOW/%: ABNORMAL
PATIENT TEMP: ABNORMAL
PDW BLD-RTO: 21.9 % (ref 11.8–14.4)
PHOSPHORUS: 3.5 MG/DL (ref 2.5–4.5)
PLATELET # BLD: 284 K/UL (ref 138–453)
PLATELET ESTIMATE: ABNORMAL
PMV BLD AUTO: 9.5 FL (ref 8.1–13.5)
POC HCO3: 22.1 MMOL/L (ref 21–28)
POC LACTIC ACID: 0.45 MMOL/L (ref 0.56–1.39)
POC O2 SATURATION: 93 % (ref 94–98)
POC PCO2 TEMP: ABNORMAL MM HG
POC PCO2: 31.9 MM HG (ref 35–48)
POC PH TEMP: ABNORMAL
POC PH: 7.45 (ref 7.35–7.45)
POC PO2 TEMP: ABNORMAL MM HG
POC PO2: 62.2 MM HG (ref 83–108)
POSITIVE BASE EXCESS, ART: ABNORMAL (ref 0–3)
POTASSIUM SERPL-SCNC: 3.7 MMOL/L (ref 3.7–5.3)
RBC # BLD: 2.83 M/UL (ref 4.21–5.77)
RBC # BLD: ABNORMAL 10*6/UL
SAMPLE SITE: ABNORMAL
SEG NEUTROPHILS: 82 % (ref 36–66)
SEGMENTED NEUTROPHILS ABSOLUTE COUNT: 9.35 K/UL (ref 1.8–7.7)
SODIUM BLD-SCNC: 135 MMOL/L (ref 135–144)
TCO2 (CALC), ART: 23 MMOL/L (ref 22–29)
TRANSFUSION STATUS: NORMAL
TRIGL SERPL-MCNC: 183 MG/DL
UNIT DIVISION: 0
UNIT NUMBER: NORMAL
WBC # BLD: 11.4 K/UL (ref 3.5–11.3)
WBC # BLD: ABNORMAL 10*3/UL

## 2019-12-12 PROCEDURE — 6370000000 HC RX 637 (ALT 250 FOR IP): Performed by: STUDENT IN AN ORGANIZED HEALTH CARE EDUCATION/TRAINING PROGRAM

## 2019-12-12 PROCEDURE — 2580000003 HC RX 258: Performed by: STUDENT IN AN ORGANIZED HEALTH CARE EDUCATION/TRAINING PROGRAM

## 2019-12-12 PROCEDURE — 94761 N-INVAS EAR/PLS OXIMETRY MLT: CPT

## 2019-12-12 PROCEDURE — 84100 ASSAY OF PHOSPHORUS: CPT

## 2019-12-12 PROCEDURE — 82803 BLOOD GASES ANY COMBINATION: CPT

## 2019-12-12 PROCEDURE — 36600 WITHDRAWAL OF ARTERIAL BLOOD: CPT

## 2019-12-12 PROCEDURE — 94770 HC ETCO2 MONITOR DAILY: CPT

## 2019-12-12 PROCEDURE — 6360000002 HC RX W HCPCS: Performed by: STUDENT IN AN ORGANIZED HEALTH CARE EDUCATION/TRAINING PROGRAM

## 2019-12-12 PROCEDURE — 2500000003 HC RX 250 WO HCPCS: Performed by: STUDENT IN AN ORGANIZED HEALTH CARE EDUCATION/TRAINING PROGRAM

## 2019-12-12 PROCEDURE — 99233 SBSQ HOSP IP/OBS HIGH 50: CPT | Performed by: INTERNAL MEDICINE

## 2019-12-12 PROCEDURE — 80048 BASIC METABOLIC PNL TOTAL CA: CPT

## 2019-12-12 PROCEDURE — 2580000003 HC RX 258

## 2019-12-12 PROCEDURE — 84478 ASSAY OF TRIGLYCERIDES: CPT

## 2019-12-12 PROCEDURE — 36415 COLL VENOUS BLD VENIPUNCTURE: CPT

## 2019-12-12 PROCEDURE — 83605 ASSAY OF LACTIC ACID: CPT

## 2019-12-12 PROCEDURE — 85018 HEMOGLOBIN: CPT

## 2019-12-12 PROCEDURE — 83735 ASSAY OF MAGNESIUM: CPT

## 2019-12-12 PROCEDURE — 99291 CRITICAL CARE FIRST HOUR: CPT | Performed by: INTERNAL MEDICINE

## 2019-12-12 PROCEDURE — 2000000000 HC ICU R&B

## 2019-12-12 PROCEDURE — 2700000000 HC OXYGEN THERAPY PER DAY

## 2019-12-12 PROCEDURE — 2500000003 HC RX 250 WO HCPCS: Performed by: INTERNAL MEDICINE

## 2019-12-12 PROCEDURE — 94003 VENT MGMT INPAT SUBQ DAY: CPT

## 2019-12-12 PROCEDURE — 82947 ASSAY GLUCOSE BLOOD QUANT: CPT

## 2019-12-12 PROCEDURE — 85025 COMPLETE CBC W/AUTO DIFF WBC: CPT

## 2019-12-12 PROCEDURE — 85014 HEMATOCRIT: CPT

## 2019-12-12 RX ORDER — MAGNESIUM HYDROXIDE 1200 MG/15ML
LIQUID ORAL
Status: COMPLETED
Start: 2019-12-12 | End: 2019-12-12

## 2019-12-12 RX ORDER — ACETAMINOPHEN 650 MG
TABLET, EXTENDED RELEASE ORAL DAILY
Status: DISCONTINUED | OUTPATIENT
Start: 2019-12-12 | End: 2020-01-16 | Stop reason: HOSPADM

## 2019-12-12 RX ORDER — ACETAMINOPHEN 650 MG
TABLET, EXTENDED RELEASE ORAL DAILY
Status: DISCONTINUED | OUTPATIENT
Start: 2019-12-13 | End: 2019-12-12

## 2019-12-12 RX ADMIN — HEPARIN SODIUM 5000 UNITS: 5000 INJECTION INTRAVENOUS; SUBCUTANEOUS at 15:05

## 2019-12-12 RX ADMIN — SODIUM CHLORIDE, PRESERVATIVE FREE 10 ML: 5 INJECTION INTRAVENOUS at 22:25

## 2019-12-12 RX ADMIN — CALCIUM GLUCONATE: 98 INJECTION, SOLUTION INTRAVENOUS at 17:51

## 2019-12-12 RX ADMIN — MIDODRINE HYDROCHLORIDE 10 MG: 5 TABLET ORAL at 17:35

## 2019-12-12 RX ADMIN — PROPOFOL 20 MCG/KG/MIN: 10 INJECTION, EMULSION INTRAVENOUS at 07:05

## 2019-12-12 RX ADMIN — MEROPENEM 1 G: 1 INJECTION, POWDER, FOR SOLUTION INTRAVENOUS at 22:23

## 2019-12-12 RX ADMIN — SODIUM CHLORIDE: 9 INJECTION, SOLUTION INTRAVENOUS at 14:30

## 2019-12-12 RX ADMIN — SODIUM CHLORIDE: 9 INJECTION, SOLUTION INTRAVENOUS at 05:10

## 2019-12-12 RX ADMIN — I.V. FAT EMULSION 250 ML: 20 EMULSION INTRAVENOUS at 17:57

## 2019-12-12 RX ADMIN — Medication 200 MCG/HR: at 23:38

## 2019-12-12 RX ADMIN — HEPARIN SODIUM 5000 UNITS: 5000 INJECTION INTRAVENOUS; SUBCUTANEOUS at 05:32

## 2019-12-12 RX ADMIN — Medication 200 MCG/HR: at 09:41

## 2019-12-12 RX ADMIN — OXYCODONE HYDROCHLORIDE 10 MG: 5 TABLET ORAL at 22:14

## 2019-12-12 RX ADMIN — HEPARIN SODIUM 5000 UNITS: 5000 INJECTION INTRAVENOUS; SUBCUTANEOUS at 22:34

## 2019-12-12 RX ADMIN — Medication: at 10:30

## 2019-12-12 RX ADMIN — Medication 200 MCG/HR: at 18:40

## 2019-12-12 RX ADMIN — PROPOFOL 10 MCG/KG/MIN: 10 INJECTION, EMULSION INTRAVENOUS at 20:11

## 2019-12-12 RX ADMIN — INSULIN LISPRO 3 UNITS: 100 INJECTION, SOLUTION INTRAVENOUS; SUBCUTANEOUS at 17:35

## 2019-12-12 RX ADMIN — PROPOFOL 30 MCG/KG/MIN: 10 INJECTION, EMULSION INTRAVENOUS at 23:46

## 2019-12-12 RX ADMIN — MINERAL OIL, PETROLATUM: 425; 568 OINTMENT OPHTHALMIC at 17:37

## 2019-12-12 RX ADMIN — Medication 200 MCG/HR: at 14:32

## 2019-12-12 RX ADMIN — PROPOFOL 20 MCG/KG/MIN: 10 INJECTION, EMULSION INTRAVENOUS at 13:28

## 2019-12-12 RX ADMIN — INSULIN LISPRO 2 UNITS: 100 INJECTION, SOLUTION INTRAVENOUS; SUBCUTANEOUS at 22:39

## 2019-12-12 RX ADMIN — FOLIC ACID: 5 INJECTION, SOLUTION INTRAMUSCULAR; INTRAVENOUS; SUBCUTANEOUS at 09:27

## 2019-12-12 RX ADMIN — MEROPENEM 1 G: 1 INJECTION, POWDER, FOR SOLUTION INTRAVENOUS at 10:13

## 2019-12-12 RX ADMIN — Medication 200 MCG/HR: at 04:52

## 2019-12-12 RX ADMIN — INSULIN LISPRO 3 UNITS: 100 INJECTION, SOLUTION INTRAVENOUS; SUBCUTANEOUS at 11:55

## 2019-12-12 RX ADMIN — SODIUM CHLORIDE 2000 ML: 900 IRRIGANT IRRIGATION at 14:40

## 2019-12-12 RX ADMIN — Medication 10 ML: at 09:50

## 2019-12-12 ASSESSMENT — PULMONARY FUNCTION TESTS
PIF_VALUE: 11
PIF_VALUE: 24
PIF_VALUE: 23
PIF_VALUE: 15
PIF_VALUE: 12
PIF_VALUE: 9

## 2019-12-12 NOTE — PROGRESS NOTES
WC  insulin lispro (HUMALOG) injection vial 0-9 Units, Nightly  povidone-iodine (BETADINE) 10 % external solution, Daily  0.9 % sodium chloride bolus, Once  PN-Adult 2-in-1 Central Line (Standard), Continuous TPN  0.9 % sodium chloride bolus, Once  sodium chloride flush 0.9 % injection 10 mL, Q12H  sodium chloride flush 0.9 % injection 10 mL, PRN  0.9 % sodium chloride bolus, Once  propofol injection, Titrated  potassium chloride (KLOR-CON M) extended release tablet 40 mEq, PRN    Or  potassium bicarb-citric acid (EFFER-K) effervescent tablet 40 mEq, PRN    Or  potassium chloride 10 mEq/100 mL IVPB (Peripheral Line), PRN  sodium chloride flush 0.9 % injection 10 mL, Q12H  sodium chloride flush 0.9 % injection 10 mL, PRN  0.9 % sodium chloride bolus, PRN  0.9 % sodium chloride bolus, PRN  midodrine (PROAMATINE) tablet 10 mg, TID WC  oxyCODONE (ROXICODONE) immediate release tablet 5 mg, Q4H PRN    Or  oxyCODONE (ROXICODONE) immediate release tablet 10 mg, Q4H PRN  fentaNYL 20 mcg/mL Infusion, Continuous  0.9 % sodium chloride bolus, PRN  0.9 % sodium chloride bolus, PRN  famotidine (PEPCID) tablet 20 mg, Daily  heparin (porcine) injection 1,500 Units, PRN  heparin (porcine) injection 1,600 Units, PRN  acetaminophen (TYLENOL) tablet 650 mg, Q4H PRN  glucose (GLUTOSE) 40 % oral gel 15 g, PRN  dextrose 50 % IV solution, PRN  glucagon (rDNA) injection 1 mg, PRN  dextrose 5 % solution, PRN  folic acid 1 mg, thiamine (B-1) 100 mg in dextrose 5 % 50 mL IVPB, Daily  fat emulsion 20 % infusion 250 mL, Daily  acetaminophen (TYLENOL) tablet 650 mg, Q4H PRN  REFRESH LACRI-LUBE ointment OINT, PRN  0.9 % sodium chloride infusion, Continuous  meropenem (MERREM) 1 g in sodium chloride 0.9 % 100 mL IVPB (mini-bag), Q12H  sodium chloride flush 0.9 % injection 10 mL, 2 times per day  sodium chloride flush 0.9 % injection 10 mL, PRN  magnesium hydroxide (MILK OF MAGNESIA) 400 MG/5ML suspension 30 mL, Daily PRN  ondansetron (ZOFRAN)

## 2019-12-12 NOTE — PROGRESS NOTES
support overnight. Dialysis treatment not completed yesterday d/t machine malfunction. OBJECTIVE  VITALS: Temp: Temp: 98.4 °F (36.9 °C)Temp  Av.3 °F (36.8 °C)  Min: 97.9 °F (36.6 °C)  Max: 98.6 °F (37 °C) BP Systolic (38STS), TWB:359 , Min:91 , UCX:198   Diastolic (38BQZ), PAY:76, Min:48, Max:105   Pulse Pulse  Av.4  Min: 70  Max: 115 Resp Resp  Av.3  Min: 0  Max: 33 Pulse ox SpO2  Av.5 %  Min: 92 %  Max: 100 %     GENERAL: intubated and sedated. No acute distress  NEURO: moves uppers off sedation. Not following commands  LUNGS: vented   HEART: RRR  ABDOMEN: soft, non-distended, +grimace with palpation. No peritoneal signs. Abdominal wall edema noted. Midline incision with packing in place. Ostomy is patent with dark succus in appliance. Perineal wound with packing in place   EXTERMITY: dressing to L foot is c/d/i. No cyanosis     I/O last 3 completed shifts: In: 2847.8 [I.V.:653.8; Blood:350; NG/GT:74; IV Piggyback:55]  Out: 2500 [Urine:500]    Drain/tube output: In: 1209 [I.V.:163; Blood:350; NG/GT:74]  Out: 2153 [Urine:153]    LAB:  CBC:   Recent Labs     12/10/19  0536 198 19  0023 191   WBC 16.9* 16.2*  --  11.4*   HGB 7.7* 7.0* 7.7* 7.7*   HCT 26.0* 24.2* 25.0* 26.3*   MCV 88.7 96.4  --  92.9    398  --  284     BMP:   Recent Labs     12/10/19  0536 19  0438 191    135 135   K 3.8 4.1 3.7    107 106   CO2 19* 17* 19*   BUN 39* 50* 46*   CREATININE 2.39* 2.59* 2.14*   GLUCOSE 180* 178* 162*     COAGS:   No results for input(s): APTT, PROT, INR in the last 72 hours.     RADIOLOGY:   no new images   No        Electronically signed by Saúl Gillespie DO on 2019 at 7:26 AM

## 2019-12-12 NOTE — PLAN OF CARE

## 2019-12-12 NOTE — PLAN OF CARE
Skin integrity is maintained or improved  12/12/2019 0429 by Veena Power RN  Outcome: Ongoing     Problem: Falls - Risk of:  Goal: Will remain free from falls  Description  Will remain free from falls  12/12/2019 0429 by Veena Power RN  Outcome: Ongoing     Problem: Falls - Risk of:  Goal: Absence of physical injury  Description  Absence of physical injury  12/12/2019 0429 by Veena Power RN  Outcome: Ongoing     Problem: Risk for Impaired Skin Integrity  Goal: Tissue integrity - skin and mucous membranes  Description  Structural intactness and normal physiological function of skin and  mucous membranes.   12/12/2019 0429 by Veena Power RN  Outcome: Ongoing     Problem: Nutrition  Goal: Optimal nutrition therapy  Description  Nutrition Problem: Inadequate oral intake  Intervention: Food and/or Nutrient Delivery: Start Parenteral Nutrition  Nutritional Goals: Meet % of estimated nutrition needs   12/12/2019 0429 by Veena Power RN  Outcome: Ongoing     Problem: Confusion - Acute:  Goal: Absence of continued neurological deterioration signs and symptoms  Description  Absence of continued neurological deterioration signs and symptoms  12/12/2019 0429 by Veena Power RN  Outcome: Ongoing     Problem: Confusion - Acute:  Goal: Mental status will be restored to baseline  Description  Mental status will be restored to baseline  12/12/2019 0429 by Veena Power RN  Outcome: Ongoing     Problem: Injury - Risk of, Physical Injury:  Goal: Will remain free from falls  Description  Will remain free from falls  12/12/2019 0429 by Veena Power RN  Outcome: Ongoing     Problem: Injury - Risk of, Physical Injury:  Goal: Absence of physical injury  Description  Absence of physical injury  12/12/2019 0429 by Veena Power RN  Outcome: Ongoing     Problem: Mood - Altered:  Goal: Mood stable  Description  Mood stable  12/12/2019 0429 by Veena Power RN  Outcome: Ongoing     Problem: Mood -

## 2019-12-12 NOTE — PROGRESS NOTES
Nutrition Assessment (Parenteral Nutrition)    Type and Reason for Visit: Reassess    Nutrition Recommendations: Going up slowly on Dextrose to 190g, increased AA to 145 g. This with diprivan calories (211-422 kcal) will provide 4374-7069 kcal, 145 g protein    Nutrition Assessment: Tube feed on hold for possible OR today or tomorrow. Discussed adding Thiamine/Folate to TPN with pharmacy- this can not be done, needs to be piggy backed. PN continues. Plan for possible wound vac noted. Malnutrition Assessment:  · Malnutrition Status: At risk for malnutrition  · Context: Chronic illness  · Findings of the 6 clinical characteristics of malnutrition (Minimum of 2 out of 6 clinical characteristics is required to make the diagnosis of moderate or severe Protein Calorie Malnutrition based on AND/ASPEN Guidelines):  1. Energy Intake-Greater than 75% of estimated energy requirement, Greater than or equal to 7 days(Intakes improved with start of TPN.)    2. Weight Loss-Unable to assess, unable to assess  3. Fat Loss-No significant subcutaneous fat loss,    4. Muscle Loss-No significant muscle mass loss,    5. Fluid Accumulation-Moderate to severe fluid accumulation, Extremities, Generalized  6.   Strength-Not measured    Nutrition Risk Level: High    Nutrient Needs:  · Estimated Daily Total Kcal: 5567-1940 kcal/day   · Estimated Daily Protein (g): 110-145 g pro/day   · Estimated Daily Total Fluid (ml/day):      Nutrition Diagnosis:   · Problem: Inadequate oral intake  · Etiology: related to Impaired respiratory function-inability to consume food     Signs and symptoms:  as evidenced by NPO status due to medical condition, Nutrition support - PN, Nutrition support - EN    Objective Information:  · Wound Type: Open Wounds  · Current Nutrition Therapies:  · Oral Diet Orders: NPO   · Parenteral Nutrition Orders:  · Type and Formula: 2-in-1 Standard(180 gm Dextrose, 140 gm AA)   · Lipids: None  · Rate/Volume: 74.1

## 2019-12-12 NOTE — PLAN OF CARE
perceptions  Outcome: Ongoing  Goal: Demonstrates accurate environmental perceptions  Description  Demonstrates accurate environmental perceptions  Outcome: Ongoing  Goal: Able to distinguish between reality-based and nonreality-based thinking  Description  Able to distinguish between reality-based and nonreality-based thinking  Outcome: Ongoing  Goal: Able to interrupt nonreality-based thinking  Description  Able to interrupt nonreality-based thinking  Outcome: Ongoing     Problem: Sleep Pattern Disturbance:  Goal: Appears well-rested  Description  Appears well-rested  Outcome: Ongoing

## 2019-12-12 NOTE — PROGRESS NOTES
(36.8 °C), Min:97.9 °F (36.6 °C), Max:98.6 °F (37 °C)    Systolic (02QIH), LVP:365 , Min:91 , FQO:533     Diastolic (59JDE), ZX, Min:48, Max:105      Urine output in the last 24 hours:  In: 0842 [I.V.:163; Blood:350; NG/GT:74]  Out: 2153 [Urine:153]    Patient Vitals for the past 96 hrs (Last 3 readings):   Weight   19 0521 (!) 325 lb 9.9 oz (147.7 kg)   19 1915 (!) 397 lb 11.4 oz (180.4 kg)   19 1624 (!) 400 lb 5.7 oz (181.6 kg)         AWAKE & FOLLOWING COMMANDS:  [x] No   [] Yes    CURRENT VENTILATION STATUS:     [x] Ventilator  [] BIPAP  [] Nasal Cannula [] Room Air        SECRETIONS Amount:  [] Small [] Moderate  [] Large  [x] None  Color:     [] White [] Colored  [] Bloody    SEDATION:  RAAS Score:  [] Propofol gtt  [x] Versed gtt  [] Ativan gtt   [] No Sedation     PARALYZED:  [x] No    [] Yes    DIARRHEA:                [x] No                [] Yes  (C. Difficile status: [] positive                                                                                                                       [] negative                                                                                                                     [] pending)    VASOPRESSORS:  [x] No    [] Yes    If yes -   [] Levophed       [] Dopamine     [] Vasopressin       [] Dobutamine  [] Phenylephrine         [] Epinephrine    CENTRAL LINES:     [] No   [x] Yes          If yes -     [x] Right IJ     [] Left IJ [] Right Femoral [] Left Femoral                   [] Right Subclavian [] Left Subclavian       DOSS'S CATHETER:   [] No   [x] Yes  (Date of Insertion:   )     URINE OUTPUT:            [] Good   [] Low              [x] Anuric      OBJECTIVE:     VITAL SIGNS:  /66   Pulse 101   Temp 98.4 °F (36.9 °C) (Axillary)   Resp (!) 0   Ht 5' 9\" (1.753 m)   Wt (!) 325 lb 9.9 oz (147.7 kg)   SpO2 97%   BMI 48.09 kg/m²   Tmax over 24 hours:  Temp (24hrs), Av.3 °F (36.8 °C), Min:97.9 °F (36.6 °C), Max:98.6 °F Intravenous Daily    fat emulsion  250 mL Intravenous Daily    meropenem  1 g Intravenous Q12H    sodium chloride flush  10 mL Intravenous 2 times per day    heparin (porcine)  5,000 Units Subcutaneous 3 times per day     Continuous Infusions:   PN-Adult 2-in-1 Central Line (Standard) 75 mL/hr at 12/11/19 1825    propofol 20 mcg/kg/min (12/12/19 0705)    fentaNYL 200 mcg/hr (12/12/19 0452)    insulin 2.22 Units/hr (12/12/19 0659)    dextrose      sodium chloride 10 mL/hr at 12/12/19 0510    norepinephrine (LEVOPHED) infusion (double concentration) 2 mcg/min (12/11/19 0514)     PRN Meds:   sodium chloride flush, 10 mL, PRN  potassium chloride, 40 mEq, PRN    Or  potassium alternative oral replacement, 40 mEq, PRN    Or  potassium chloride, 10 mEq, PRN  sodium chloride flush, 10 mL, PRN  sodium chloride, 250 mL, PRN  sodium chloride, 150 mL, PRN  oxyCODONE, 5 mg, Q4H PRN    Or  oxyCODONE, 10 mg, Q4H PRN  sodium chloride, 250 mL, PRN  sodium chloride, 150 mL, PRN  heparin (porcine), 1,500 Units, PRN  heparin (porcine), 1,600 Units, PRN  acetaminophen, 650 mg, Q4H PRN  glucose, 15 g, PRN  dextrose, 12.5 g, PRN  glucagon (rDNA), 1 mg, PRN  dextrose, 100 mL/hr, PRN  acetaminophen, 650 mg, Q4H PRN  REFRESH LACRI-LUBE, , PRN  sodium chloride flush, 10 mL, PRN  magnesium hydroxide, 30 mL, Daily PRN  ondansetron, 4 mg, Q6H PRN  glucagon (rDNA), 1 mg, PRN          VENT SETTINGS (Comprehensive) (if applicable):  Vent Information  $Ventilation: $Subsequent Day  Ventilator Started: Yes  Skin Assessment: Clean, dry, & intact  Equipment ID: [id number]  Equipment Changed: HME  Vent Type: Servo i  Vent Mode: PRVC  Vt Ordered: 530 mL  Rate Set: 26 bmp  Pressure Support: 6 cmH20  FiO2 : 30 %  Sensitivity: 2  PEEP/CPAP: 8  I Time/ I Time %: 0.8 s  Cuff Pressure (cm H2O): 28 cm H2O  Humidification Source: Heated wire  Humidification Temp: 37  Humidification Temp Measured: 37  Circuit Condensation: Drained  Nitric Oxide/Epoprostenol In Use?: No  Additional Respiratory  Assessments  Pulse: 101  Resp: (!) 0  SpO2: 97 %  End Tidal CO2: 33 (%)  Position: Semi-Pagan's  Humidification Source: Heated wire  Humidification Temp: 37  Circuit Condensation: Drained  Oral Care Completed?: Yes  Oral Care: Mouthwash, Mouth moisturizer, Mouth suctioned  Subglottic Suction Done?: Yes  Cuff Pressure (cm H2O): 28 cm H2O  Skin barrier applied: No    ABGs:  Arterial Blood Gas result:  pO2 107.4; pCO2 32.9; pH 7.29;  HCO3 16, %O2 Sat 98. Laboratory findings:    Complete Blood Count:   Recent Labs     12/10/19  0536 12/11/19  0438 12/12/19  0023 12/12/19 0411   WBC 16.9* 16.2*  --  11.4*   HGB 7.7* 7.0* 7.7* 7.7*   HCT 26.0* 24.2* 25.0* 26.3*    398  --  284        Last 3 Blood Glucose:   Recent Labs     12/10/19  0536 12/11/19  0438 12/12/19  0411   GLUCOSE 180* 178* 162*        PT/INR:    Lab Results   Component Value Date    PROTIME 11.9 11/29/2019    INR 1.1 11/29/2019     PTT:  No results found for: APTT, PTT    Comprehensive Metabolic Profile:   Recent Labs     12/10/19  0536 12/11/19  0438 12/12/19  0411    135 135   K 3.8 4.1 3.7    107 106   CO2 19* 17* 19*   BUN 39* 50* 46*   CREATININE 2.39* 2.59* 2.14*   GLUCOSE 180* 178* 162*   CALCIUM 7.7* 7.8* 8.3*      Magnesium:   Lab Results   Component Value Date    MG 1.9 12/12/2019     Phosphorus:   Lab Results   Component Value Date    PHOS 3.5 12/12/2019     Ionized Calcium:   Lab Results   Component Value Date    CAION 1.05 12/03/2019            Troponin: No results for input(s): TROPONINI in the last 72 hours. Radiology/Imaging:     Chest Xray (12/12/2019):    ASSESSMENT:     Principal Problem:    Necrotizing fasciitis ([facility]  Active Problems:    Hypertension    Diabetes ([address]    Diabetic foot ulcer ([facility]    Septic shock ([facility]    Bandemia  Resolved Problems:    * No resolved hospital problems.  *    PLAN:     WEAN PER PROTOCOL:  [x] No   [] Yes  [] N/A    DISCONTINUE ANY LABS:   [x] No   [] Yes    TRANSFER OUT OF ICU:   [x] No   [] Yes    ADDITIONAL PLAN:    1. Pressure ulcer cant be staged present on admission / Necrotizing Faciitis involving buttocks and scrotum - s/p POD#13 s/p wide complex debridement of gluteal/perianal region. region extensive necrotic tissue that involved the anus and distal rectum. Had  debridement, washout of gluteal and perineal, diverting loop colostomy  Bedside debridement done on 12/4, dressing changes with him on site will continue to do wet-to-dry dressing changes as per  surgery. - continue meropenem (started 12/1) per ID   - (12/10) Repeat debridement with general surgery and urology   -Possible OR today for wound VAC placement  2. Septic shock -resolved, off pressors  - Blood cultures drawn 12/2/19:  No growth     3. DEBBY on CKD -     Urology recs: Meroponen empirically, maintain lema for continued drainage.   -Urine culture : Grew Proteus mirabilis, ID is aware of it  -Nephrology recs:  Patient 5 dialysis till now. Renal ultrasound: Right renal cyst, otherwise grossly negative renal   Ultrasound, non diagnostic bladder assessment. 4. Diabetes - insulin gtt - will maintain through OR then switch to ISS and add in TPN if needed     5. Diabetic left foot ulcer -dietary on board. Wound care. No surgical intervention for now    6. Anemia: Hgb (77.0),   received  5uPRBC since admission     1. Feeding NPO for OR today   2. Fluids - KVO  3. Family - none at bedside   4. Analgesic -fentanyl drip   5. Sedation: Versed drip  6. Thrombo-prophylaxis [] Enoxaparin  [x] Unfract. Heparin Subcut (was held this AM for OR)  [] EPC Cuffs  7. Mobility bed rest and PT/ OT   8. Heads up yes   9. Ulcer prophylaxis - pepcid   10. Glycemic control: insulin gtt, can   11. Spontaneous breathing trial not today  12. Bowel regimen/urine output: milk of mag PRN, 45 ml UOP in last 24 hours  13.  Indwelling catheter/lines CVC RIJ, OG, Hebert  14.  De-escalation: take off insulin drip after OR- switch to ISS      Wendy Jenkins MD  Critical care resident   [address] [facility]            12/12/2019, 7:36 AM

## 2019-12-12 NOTE — PROGRESS NOTES
[facility]  Occupational Therapy Not Seen Note    DATE: 2019  Name: Bola Byers  : 1985  MRN: [id number]    Patient not available for Occupational Therapy due to:    [] Testing:    [] Hemodialysis    [] Blood Transfusion in Progress    []Refusal by Patient:    [] Surgery/Procedure:    [] Strict Bedrest    [x] Sedation: Fentanyl/Intubated    [] Spine Precautions     [] Pt being transferred to palliative care at this time. Spoke with pt/family and OT services to be defered. [] Pt independent with functional mobility and functional tasks.  Pt with no OT acute care needs at this time, will defer OT eval.    [] Other    Next Scheduled Treatment: ck      Karen Juarez, OT

## 2019-12-12 NOTE — PROGRESS NOTES
[facility] [place]  Urology Progress Note    Subjective: POD#2 s/p re-debridement of Ashley's with Dr. Tori Barba in conjunction with general surgery. Remains intubated, no acute events overnight per nurse. Now off levophed. Afebrile overnight.      Patient Vitals for the past 24 hrs:   BP Temp Temp src Pulse Resp SpO2 Weight   12/12/19 0600 123/66 -- -- 101 11 95 % --   12/12/19 0530 131/66 -- -- 103 10 96 % --   12/12/19 0521 -- -- -- -- -- -- (!) 325 lb 9.9 oz (147.7 kg)   12/12/19 0500 -- -- -- 102 (!) 6 96 % --   12/12/19 0430 (!) 120/57 -- -- 99 (!) 3 93 % --   12/12/19 0400 133/72 98.4 °F (36.9 °C) Axillary 105 (!) 0 97 % --   12/12/19 0330 134/71 -- -- 109 (!) 7 96 % --   12/12/19 0300 (!) 153/84 -- -- 108 27 97 % --   12/12/19 0230 (!) 116/96 -- -- 103 (!) 3 96 % --   12/12/19 0200 134/75 -- -- 100 14 97 % --   12/12/19 0130 114/63 -- -- 90 (!) 0 98 % --   12/12/19 0100 120/66 -- -- 92 (!) 0 98 % --   12/12/19 0030 111/81 -- -- 89 (!) 0 98 % --   12/12/19 0000 (!) 110/56 98.4 °F (36.9 °C) Axillary 80 (!) 0 99 % --   12/11/19 2330 118/65 -- -- 89 (!) 4 98 % --   12/11/19 2322 -- -- -- 82 (!) 0 98 % --   12/11/19 2300 (!) 107/55 -- -- 82 (!) 0 99 % --   12/11/19 2230 (!) 144/77 -- -- 100 (!) 2 99 % --   12/11/19 2200 (!) 144/78 -- -- 98 22 99 % --   12/11/19 2130 116/66 -- -- 83 24 98 % --   12/11/19 2115 (!) 91/48 -- -- 70 20 99 % --   12/11/19 2100 105/62 -- -- 71 16 99 % --   12/11/19 2045 (!) 98/52 -- -- 73 11 100 % --   12/11/19 2030 (!) 112/58 -- -- 83 (!) 0 100 % --   12/11/19 2015 123/69 -- -- 88 19 100 % --   12/11/19 2000 (!) 150/82 98.1 °F (36.7 °C) Axillary 97 9 97 % --   12/11/19 1945 120/61 -- -- 86 (!) 0 96 % --   12/11/19 1930 (!) 149/93 -- -- 95 8 96 % --   12/11/19 1928 -- -- -- 98 11 95 % --   12/11/19 1915 138/73 98.4 °F (36.9 °C) -- 95 24 96 % (!) 397 lb 11.4 oz (180.4 kg)   12/11/19 1900 (!) 140/105 -- -- 94 24 98 % --   12/11/19 1845 134/85 -- -- 93 (!) 0 97 % --   12/11/19 1830 (!) 190/97 -- -- 110 23 99 % --   12/11/19 1815 (!) 150/81 -- -- 98 (!) 0 98 % --   12/11/19 1800 (!) 158/88 -- -- 100 (!) 0 98 % --   12/11/19 1745 (!) 152/87 -- -- 100 (!) 1 98 % --   12/11/19 1730 (!) 161/89 -- -- 105 9 99 % --   12/11/19 1728 -- -- -- 102 (!) 7 98 % --   12/11/19 1715 (!) 154/84 -- -- 102 (!) 0 98 % --   12/11/19 1700 (!) 150/82 -- -- 98 16 98 % --   12/11/19 1652 (!) 149/85 98.2 °F (36.8 °C) Axillary 98 16 98 % --   12/11/19 1648 (!) 149/85 97.9 °F (36.6 °C) -- -- -- -- --   12/11/19 1645 (!) 149/85 -- -- 102 19 98 % --   12/11/19 1630 128/61 -- -- 99 (!) 4 97 % --   12/11/19 1624 (!) 118/59 98.2 °F (36.8 °C) Oral 104 26 97 % (!) 400 lb 5.7 oz (181.6 kg)   12/11/19 1600 (!) 118/59 -- -- 93 (!) 0 99 % --   12/11/19 1500 133/72 -- -- 99 (!) 33 99 % --   12/11/19 1400 113/62 -- -- 91 (!) 0 99 % --   12/11/19 1300 119/65 -- -- 93 (!) 0 98 % --   12/11/19 1245 -- -- -- 94 (!) 0 97 % --   12/11/19 1230 123/67 -- -- 99 (!) 0 97 % --   12/11/19 1200 130/73 98.6 °F (37 °C) Oral 102 (!) 0 97 % --   12/11/19 1115 -- 98.6 °F (37 °C) Oral -- -- -- --   12/11/19 1100 (!) 145/68 -- -- 115 13 95 % --   12/11/19 1000 133/82 -- -- 107 12 97 % --   12/11/19 0900 (!) 150/79 -- -- 104 (!) 0 98 % --   12/11/19 0800 120/64 98.1 °F (36.7 °C) Oral 87 (!) 0 92 % --   12/11/19 0754 -- -- -- 88 (!) 1 98 % --       Intake/Output Summary (Last 24 hours) at 12/12/2019 0713  Last data filed at 12/12/2019 0500  Gross per 24 hour   Intake 2847.8 ml   Output 2500 ml   Net 347.8 ml       Recent Labs     12/10/19  0536 12/11/19  0438 12/12/19  0023 12/12/19  0411   WBC 16.9* 16.2*  --  11.4*   HGB 7.7* 7.0* 7.7* 7.7*   HCT 26.0* 24.2* 25.0* 26.3*   MCV 88.7 96.4  --  92.9    398  --  284     Recent Labs     12/10/19  0536 12/11/19  0438 12/12/19 0411    135 135   K 3.8 4.1 3.7    107 106   CO2 19* 17* 19*   PHOS 2.6  --  3.5   BUN 39* 50* 46*   CREATININE 2.39* 2.59* 2.14*       No results for input(s): COLORU, PHUR, LABCAST, WBCUA, RBCUA, MUCUS, TRICHOMONAS, YEAST, BACTERIA, CLARITYU, SPECGRAV, LEUKOCYTESUR, UROBILINOGEN, Nickie Abraham in the last 72 hours. Invalid input(s): NITRATE, GLUCOSEUKETONESUAMORPHOUS    Additional Lab/culture results:    Physical Exam:   NAD, intubated/sedated  RRR, palpable radial pulses  Equal chest rise, normal inspiratory effort, intubated/vented  Soft, ND. No peritoneal signs. Midline incision dressed. RLQ drain in place.  LLQ diverting colostomy in place, pink/viable  Lema catheter in place, draining clear UOP  Genital: dressed, clean/dry dressing  Warm extremities, no calf tenderness    Interval Imaging Findings:    Impression:  Necrotizing fasciitis ([address]  s/p re-debridement in conjunction with General surgery 12/8/19    Plan:   Supportive care  Meropenem empirically, defer to ID service, recommendations appreciated  Critical care management per ICU primary, appreciate their management  Stoma care per general surgery  BID WTD with dakins dressings per primary  Will be on board to re-examine during next look tentatively scheduled Friday  Maintain lema catheter for diversion  Please call with questions/concerns    Mayi Asher  7:13 AM 12/12/2019

## 2019-12-12 NOTE — PROGRESS NOTES
[facility] - Progress Note    Today's Date and Time: 12/12/2019, 8:57 AM    Impression :   · Necrotizing fasciitis 11-29-19  · S/P perirectal I&D. Wide complex excisional debridement of gluteal and perineal areas on 11-29-19  · S/P debridement, washout of gluteal and perianal areas, diverting colostomy 12-3-19.   · S/P debridement, washout of gluteal and perianal areas, diverting colostomy 12-6-19.  · S/P Incision and debridement of necrotizing fasciitis buttock wound, scrotum, bilateral groin region and closure of midline abdominal wound 12-8-19  · Hypotension requiring pressors. -Off pressors  · Lactic acidosis  · DM 2  · HTN  · Hx of Low LVEF (20%) as per family  · DEBBY  · Fluid overload    Recommendations:     · Meropenem 1 gm IV q 24 hr because of DEBBY, pt on HD  · Wound Care as per Gen surgery. Medical Decision Making/Summary/Discussion:12/12/2019     · Patient with DM 2, prior diabetic foot infection  · Presented to [address] [place] [address] ER generalized weakness for the past few days  · Found to have soft tissue necrosis with subcutaneous emphysema in gluteal areas and perineum  · S/P I&D and wide complex debridement of affected tissues on 11-29-19  · Showing hypotension, requiring fluids and a vasopressor  · Cultures in progress  · Will cover with Zosyn. Wounds with Strep spp. And Gram negative bacilli . No Staph spp. 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 · One more debridement in OR scheduled for 12-10-19 and then decision will be made to change code status or not. · Pt did not tolerate HD on 12-9. It was stopped early and pt required vasopressor support with Levophed, remains on vent.    · Pt to OR 12-10 for further debridement  Infection Control Recommendations   · [place] Precautions  · Contact Isolation MRSA    Antimicrobial Stewardship Recommendations     · Simplification of therapy  · Targeted therapy  · PK dosing    Coordination of Outpatient Care:   · Estimated Length of IV antimicrobials: TBD  · Patient will need Midline Catheter Insertion: No  · Patient will need PICC line Insertion:Has Central line  · Patient will need: Home IV , [place],  SNF,  LTAC: TBD  · Patient will need outpatient wound care:Yes    Chief complaint/reason for consultation:   · Ashley's vs necrotizing fasciitis    History of Present Illness:   Alissa Culp is a 37y.o.-year-old  male who was initially admitted on 11/29/2019. Patient seen at the request of . INITIAL HISTORY:    Patient presented through ER in [place] with complaints of weakness of a few days duration. Examination showed skin necrosis in the gluteal and perineal region. Patient transferred to [place] [address]. CT scan showed extensive subcutaneous emphysema. Patient underwent I&D and extensive complex debridement of affected tissues on 11-29-19. Gram stain of tissues showed Strep. Spp and Gram negative bacilli. 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 He has underlying DM 2, prior diabetic foot infection, DEBBY. Patient more stable post surgery but with hypotension requiring a pressor. Zosyn D/C because of of poor LVEF, in order to reduce Na and fluid load  Meropenem started adjusted for Cr Cl 30 cc  Meropenem adjusted for HD  Per surgery after debridement on 12-8-19, infection has spread to deep planes with the urethra less viable. One more debridement in OR scheduled for 12-10-19 and then decision will be made to change code status or not. Pt did not tolerate HD on 12-9. It was stopped early and pt required vasopressor support with Levophed, remains on vent.    Pt to OR 12-10 for further LOOP COLOSTOMY; WOUND [facility]AC APPLICATION [facility] Bilateral 11/29/2019    RECTAL PERIRECTAL INCISION AND DRAINAGE, [facility] LOOP COLOSTOMY CREATION performed by Alice Plummer MD at [facility] 8141 N/A 12/6/2019    DEBRIDEMENT NECROTIZING FASCIAITIS BILAT BUTTOCK performed by Lex Landeros MD at [facility] 58 N/A 12/10/2019    SCROTAL EXPLORATION WITH SCROTAL DEBRIDEMENT performed by Ana Lynch MD at [facility] OR       Medications:      sodium chloride  250 mL Intravenous Once    povidone-iodine   Topical Daily    sodium chloride  250 mL Intravenous Once    sodium chloride flush  10 mL Intravenous Q12H    sodium chloride  250 mL Intravenous Once    sodium chloride flush  10 mL Intravenous Q12H    midodrine  10 mg Oral TID WC    famotidine  20 mg Per NG tube Daily    thiamine and folic acid IVPB   Intravenous Daily    fat emulsion  250 mL Intravenous Daily    meropenem  1 g Intravenous Q12H    sodium chloride flush  10 mL Intravenous 2 times per day    heparin (porcine)  5,000 Units Subcutaneous 3 times per day       Social History:     Social History     Socioeconomic History    Marital status: Unknown     Spouse name: Not on file    Number of children: Not on file    Years of education: Not on file    Highest education level: Not on file   Occupational History    Not on file   Social Needs    Financial resource strain: Not on file    Food insecurity:     Worry: Not on file     Inability: Not on file    Transportation needs:     Medical: Not on file     Non-medical: Not on file   Tobacco Use    Smoking status: Not on file   Substance and Sexual Activity    Alcohol use: Not on file    Drug use: Not on file    Sexual activity: Not on file   Lifestyle    Physical activity:     Days per week: Not on file     Minutes per session: Not on file    Stress: Not on file   Relationships    Social connections:     Talks on phone: Not on file     Gets together: Not on file     Attends Buddhist service: Not on file     Active member of club or organization: Not on file     Attends meetings of clubs or organizations: Not on file     Relationship status: Not on file    Intimate partner violence:     Fear of current or ex partner: Not on file     Emotionally abused: Not on file     Physically abused: Not on file     Forced sexual activity: Not on file   Other Topics Concern    Not on file   Social History Narrative    Not on file       Family History:   No family history on file. Allergies:   Patient has no known allergies. Review of Systems:     12/12/2019 UTO pt intubated, sedated, off vasopressors. Seen in ICU    Constitutional: No fevers or chills. Generalized weakness  Head: No headaches  Eyes: No double vision or blurry vision. No conjunctival inflammation. ENT: No sore throat or runny nose. . No hearing loss, tinnitus or vertigo. Cardiovascular: No chest pain or palpitations. No shortness of breath. No GORDILLO  Lung: No shortness of breath or cough. No sputum production  Abdomen: No nausea, vomiting, diarrhea, or abdominal pain. Norman Faye No cramps. Genitourinary: No increased urinary frequency, or dysuria. No hematuria. No suprapubic or CVA pain  Musculoskeletal: No muscle aches or pains. No joint effusions, swelling or deformities. Lt diabetic foot   Hematologic: No bleeding or bruising. Neurologic: No headache, weakness, numbness, or tingling. Spina bifida  Integument: Gangrene of perineum and gluteal areas  Psychiatric: No depression. Endocrine: No polyuria, no polydipsia, no polyphagia.     Physical Examination :     Patient Vitals for the past 8 hrs:   BP Temp Temp src Pulse Resp SpO2 Weight   12/12/19 0831 -- -- -- 97 19 97 % --   12/12/19 0729 -- -- -- -- (!) 0 97 % --   12/12/19 0600 123/66 -- -- 101 11 95 % --   12/12/19 0530 131/66 -- -- 103 10 96 % --   12/12/19 0521 -- -- -- -- -- -- (!) 325 lb 9.9 oz (147.7 kg)   12/12/19 0500 -- -- -- 102 (!) 6 96 % --   12/12/19 0430 (!) 120/57 -- -- 99 (!) 3 93 % --   12/12/19 0400 133/72 98.4 °F (36.9 °C) Axillary 105 (!) 0 97 % --   12/12/19 0330 134/71 -- -- 109 (!) 7 96 % --   12/12/19 0300 (!) 153/84 -- -- 108 27 97 % --   12/12/19 0230 (!) 116/96 -- -- 103 (!) 3 96 % --   12/12/19 0200 134/75 -- -- 100 14 97 % --   12/12/19 0130 114/63 -- -- 90 (!) 0 98 % --   12/12/19 0100 120/66 -- -- 92 (!) 0 98 % --     General Appearance: Sedated, on vent  Head:  Normocephalic, no trauma  ENT: Oropharyngeally intubated, without erythema, exudate, or thrush. No tenderness of sinuses. Mouth/throat: mucosa pink and moist. No lesions. Dentition in good repair. Neck:Supple, without lymphadenopathy. Thyroid normal, No bruits. Pulmonary/Chest: Clear to auscultation, without wheezes, rales, or rhonchi. No dullness to percussion. Cardiovascular: Regular rate and rhythm without murmurs, rubs, or gallops. Abdomen: Soft, non tender. Bowel sounds quiet. Ostomy with scant liquid output. Surgical dressing clean  : Extensive open wound of gluteal and perineal areas, lema with small amt clear urine  All four Extremities: No cyanosis, clubbing, or effusions. Lt foot diabetic ulcer with involvement of the Lt heel. Neurologic: Intubated, sedated  Skin: Warm and dry with good turgor. Signs of peripheral arterial insufficiency. Large open wounds.     Medical Decision Making -Laboratory:   I have independently reviewed/ordered the following labs:    CBC with Differential:   Recent Labs     12/11/19 0438 12/12/19  0023 12/12/19 0411   WBC 16.2*  --  11.4*   HGB 7.0* 7.7* 7.7*   HCT 24.2* 25.0* 26.3*     --  284   LYMPHOPCT 7*  --  12*   MONOPCT 6  --  5     BMP:   Recent Labs     12/09/19  1258  12/11/19  0438 12/12/19  0411   NA  --    < > 135 135   K  --    < > 4.1 3.7   CL  --    < > 107 106   CO2  --    < > 17* 19*   BUN  --    < > 50* 46*   CREATININE  --    < > 2.59* 2.14*   MG 1.5*  --   --  1.9    < > = values in this interval not displayed. Hepatic Function Panel:   No results for input(s): PROT, LABALBU, BILIDIR, IBILI, BILITOT, ALKPHOS, ALT, AST in the last 72 hours. No results for input(s): RPR in the last 72 hours. No results for input(s): HIV in the last 72 hours. No results for input(s): BC in the last 72 hours. Lab Results   Component Value Date    MUCUS NOT REPORTED 2019    RBC 2.83 2019    TRICHOMONAS NOT REPORTED 2019    WBC 11.4 2019    YEAST NOT REPORTED 2019    TURBIDITY TURBID 2019     Lab Results   Component Value Date    CREATININE 2.14 2019    GLUCOSE 162 2019       Medical Decision Making-Imagin-9 CT Abd/pelvis  EXAMINATION:   CT OF THE ABDOMEN AND PELVIS WITH CONTRAST 2019 2:29 am       TECHNIQUE:   CT of the abdomen and pelvis was performed with the administration of   intravenous contrast. Multiplanar reformatted images are provided for review. Dose modulation, iterative reconstruction, and/or weight based adjustment of   the mA/kV was utilized to reduce the radiation dose to as low as reasonably   achievable.       COMPARISON:   2019.       HISTORY:   ORDERING SYSTEM PROVIDED HISTORY: Sobeida galindo   TECHNOLOGIST PROVIDED HISTORY:       lory fasc   Reason for Exam: nec fasc; Trauma   Acuity: Unknown   Type of Exam: Subsequent/Follow-up       FINDINGS:   Lower Chest: Partially visualized bilateral pleural effusions with bibasilar   heterogeneous opacities and bilateral lower lobe consolidations.  Central   venous catheter tip near the superior atrial caval junction.  Cardiomegaly.    Trace pericardial fluid.       Liver: Normal.       Gallbladder and Bile Ducts: Normal.       Spleen: Splenomegaly.       Adrenal Glands: Normal.       Pancreas: Normal.       Genitourinary: Symmetric renal atrophy.  Redemonstration of multiple   hypodensities in the right kidney which likely represent benign cysts.  No   urinary stones or hydronephrosis.  Ambrosio catheter in the decompressed urinary   bladder.       Bowel: Normal caliber bowel.  Postsurgical changes of the bowel with left   lower quadrant ostomy.  No evidence of acute appendicitis.  No significant   diverticular disease.       Vasculature: Atherosclerosis.  No abdominal aortic aneurysm.       Bones and Soft Tissues: Diffuse soft tissue stranding and fluid. Postsurgical changes in the anterior abdominal wall with midline incision   demonstrating expected small amount of fluid and air.  Large soft tissue   defects in the right inguinal region, scrotum, right greater than left   gluteal creases with associated packing material.  Small amount of   surrounding soft tissue gas.  There is associated skin thickening in these   regions.  Bilateral lower abdominal wall skin thickening.       Retroperitoneum/Mesentery: No intraperitoneal free air.  Mild abdominopelvic   ascites.  Loculated fluid along the inferior aspect of the liver. Redemonstration of bilateral inguinal and pelvic sidewall lymphadenopathy.    Multiple mildly prominent retroperitoneal and upper abdominal lymph nodes are   similar to the prior study.  Percutaneous intraperitoneal surgical drains.           Impression   1.  Large soft tissue defects in the right inguinal region, scrotum and right   greater than left gluteal crease is with associated packing material, small   amount of surrounding soft tissue gas and skin thickening likely relates to   history of necrotizing fasciitis.       2.  Postsurgical changes of the bowel with left lower quadrant ostomy.  No   bowel obstruction.  Percutaneous intraperitoneal surgical drains.       3.  Mild abdominopelvic ascites.  Loculated fluid along the inferior aspect   of the liver.  No intraperitoneal free air.       4.  Diffuse anasarca.       5.   Bilateral pleural effusions with associated heterogeneous opacities and consolidations.  Underlying infection is not excluded.               EXAMINATION:   ONE XRAY VIEW OF THE CHEST       11/29/2019 9:01 pm       COMPARISON:   4 hours ago       HISTORY:   ORDERING SYSTEM PROVIDED HISTORY: intubated   TECHNOLOGIST PROVIDED HISTORY:   intubated   Reason for Exam: portable supine/ intubated   Acuity: Acute   Type of Exam: Initial       FINDINGS:   New ET tube terminates 38 mm above the ron.  There appears to be a   possible enteric tube which is not well visualized distally.  Right IJ   catheter terminates in the right atrium and is unchanged.  Lungs are clear. Cardiomegaly.  Mediastinum normal.  Bony thorax intact.           Impression   New ET tube as above.  Possible new enteric tube not well visualized. Recommend follow-up KUB if clinically warranted. CT ABDOMEN AND PELVIS WITHOUT CONTRAST    COMPARISON:  3/22/2017    CLINICAL HISTORY: Infection in scrotum, lower abdominal pain, diabetic, 30,000 white blood cell count. TECHNIQUE: Unenhanced axial images were obtained from the lung bases to the pubic symphysis with sagittal and coronal 2D reformatted images. Oral contrast administered:  No.  Automatic exposure control (AEC) was utilized. CT ABDOMEN FINDINGS:      The lung bases are unremarkable. There is a small pericardial effusion versus thickening. The liver, spleen, and pancreas demonstrate no acute abnormality given compromised evaluation without intravenous contrast.  There may be mild splenomegaly.  The adrenal glands appear within normal limits. There is no hydronephrosis or obstructing urinary tract calculi. Small amount of free fluid is seen adjacent to the liver inferiorly. .    The appendix is visualized in the right lower quadrant and appears within normal limits.       There is no evidence for bowel obstruction.   Stranding is seen within the subcutaneous fat overlying both lateral abdominal walls left greater than right. Sheliah Cheers is ill-defined

## 2019-12-12 NOTE — PROGRESS NOTES
Dialysis Post Treatment Note  Patient tolerated treatment well. Denies complaints at time of discharge. Vitals:    12/11/19 1928   BP:    Pulse: 98   Resp: 11   Temp:    SpO2: 95%     Pre-Weight = 181.6   Post-weight = 180.4kg  Total Liters Processed = Total Liters Processed (l/min): 2000 l/min  Rinseback Volume (mL) = Rinseback Volume (ml): 210 ml  Net Removal (mL) = 1450  Length of treatment=140  Access: dialysis cath slow, at 140 minutes pressures increased in lines and then lines started to clot up. Dialysis stopped, lines flushed.

## 2019-12-13 LAB
ABSOLUTE EOS #: 0.37 K/UL (ref 0–0.4)
ABSOLUTE IMMATURE GRANULOCYTE: 0.25 K/UL (ref 0–0.3)
ABSOLUTE LYMPH #: 1.12 K/UL (ref 1–4.8)
ABSOLUTE MONO #: 0.74 K/UL (ref 0.1–0.8)
ALLEN TEST: POSITIVE
ANION GAP SERPL CALCULATED.3IONS-SCNC: 12 MMOL/L (ref 9–17)
BASOPHILS # BLD: 1 % (ref 0–2)
BASOPHILS ABSOLUTE: 0.12 K/UL (ref 0–0.2)
BUN BLDV-MCNC: 56 MG/DL (ref 6–20)
BUN/CREAT BLD: ABNORMAL (ref 9–20)
CALCIUM SERPL-MCNC: 8.8 MG/DL (ref 8.6–10.4)
CHLORIDE BLD-SCNC: 103 MMOL/L (ref 98–107)
CO2: 18 MMOL/L (ref 20–31)
CREAT SERPL-MCNC: 2.33 MG/DL (ref 0.7–1.2)
DIFFERENTIAL TYPE: ABNORMAL
EOSINOPHILS RELATIVE PERCENT: 3 % (ref 1–4)
FIO2: 40
GFR AFRICAN AMERICAN: 39 ML/MIN
GFR NON-AFRICAN AMERICAN: 32 ML/MIN
GFR SERPL CREATININE-BSD FRML MDRD: ABNORMAL ML/MIN/{1.73_M2}
GFR SERPL CREATININE-BSD FRML MDRD: ABNORMAL ML/MIN/{1.73_M2}
GLUCOSE BLD-MCNC: 166 MG/DL (ref 75–110)
GLUCOSE BLD-MCNC: 169 MG/DL (ref 75–110)
GLUCOSE BLD-MCNC: 170 MG/DL (ref 75–110)
GLUCOSE BLD-MCNC: 171 MG/DL (ref 75–110)
GLUCOSE BLD-MCNC: 189 MG/DL (ref 70–99)
GLUCOSE BLD-MCNC: 192 MG/DL (ref 75–110)
GLUCOSE BLD-MCNC: 208 MG/DL (ref 74–100)
HCO3 VENOUS: 20.2 MMOL/L (ref 22–29)
HCT VFR BLD CALC: 26.7 % (ref 40.7–50.3)
HCT VFR BLD CALC: 27.3 % (ref 40.7–50.3)
HEMOGLOBIN: 7.9 G/DL (ref 13–17)
HEMOGLOBIN: 8 G/DL (ref 13–17)
IMMATURE GRANULOCYTES: 2 %
LYMPHOCYTES # BLD: 9 % (ref 24–44)
MAGNESIUM: 1.9 MG/DL (ref 1.6–2.6)
MCH RBC QN AUTO: 27.6 PG (ref 25.2–33.5)
MCHC RBC AUTO-ENTMCNC: 29.3 G/DL (ref 28.4–34.8)
MCV RBC AUTO: 94.1 FL (ref 82.6–102.9)
MODE: ABNORMAL
MONOCYTES # BLD: 6 % (ref 1–7)
MORPHOLOGY: ABNORMAL
MORPHOLOGY: ABNORMAL
NEGATIVE BASE EXCESS, VEN: 5 (ref 0–2)
NRBC AUTOMATED: 0.3 PER 100 WBC
O2 DEVICE/FLOW/%: ABNORMAL
O2 SAT, VEN: 96 % (ref 60–85)
PATIENT TEMP: ABNORMAL
PCO2, VEN: 39.1 MM HG (ref 41–51)
PDW BLD-RTO: 22.6 % (ref 11.8–14.4)
PH VENOUS: 7.32 (ref 7.32–7.43)
PHOSPHORUS: 5.4 MG/DL (ref 2.5–4.5)
PLATELET # BLD: 281 K/UL (ref 138–453)
PLATELET ESTIMATE: ABNORMAL
PMV BLD AUTO: 9.4 FL (ref 8.1–13.5)
PO2, VEN: 84.3 MM HG (ref 30–50)
POC PCO2 TEMP: ABNORMAL MM HG
POC PH TEMP: ABNORMAL
POC PO2 TEMP: ABNORMAL MM HG
POSITIVE BASE EXCESS, VEN: ABNORMAL (ref 0–3)
POTASSIUM SERPL-SCNC: 4.1 MMOL/L (ref 3.7–5.3)
RBC # BLD: 2.9 M/UL (ref 4.21–5.77)
RBC # BLD: ABNORMAL 10*6/UL
SAMPLE SITE: ABNORMAL
SEG NEUTROPHILS: 79 % (ref 36–66)
SEGMENTED NEUTROPHILS ABSOLUTE COUNT: 9.8 K/UL (ref 1.8–7.7)
SODIUM BLD-SCNC: 133 MMOL/L (ref 135–144)
TOTAL CO2, VENOUS: 21 MMOL/L (ref 23–30)
WBC # BLD: 12.4 K/UL (ref 3.5–11.3)
WBC # BLD: ABNORMAL 10*3/UL

## 2019-12-13 PROCEDURE — 6370000000 HC RX 637 (ALT 250 FOR IP): Performed by: STUDENT IN AN ORGANIZED HEALTH CARE EDUCATION/TRAINING PROGRAM

## 2019-12-13 PROCEDURE — 85025 COMPLETE CBC W/AUTO DIFF WBC: CPT

## 2019-12-13 PROCEDURE — 90935 HEMODIALYSIS ONE EVALUATION: CPT

## 2019-12-13 PROCEDURE — 6360000002 HC RX W HCPCS: Performed by: STUDENT IN AN ORGANIZED HEALTH CARE EDUCATION/TRAINING PROGRAM

## 2019-12-13 PROCEDURE — 36600 WITHDRAWAL OF ARTERIAL BLOOD: CPT

## 2019-12-13 PROCEDURE — 94681 O2 UPTK CO2 OUTP % O2 XTRC: CPT

## 2019-12-13 PROCEDURE — 2700000000 HC OXYGEN THERAPY PER DAY

## 2019-12-13 PROCEDURE — 2580000003 HC RX 258: Performed by: STUDENT IN AN ORGANIZED HEALTH CARE EDUCATION/TRAINING PROGRAM

## 2019-12-13 PROCEDURE — 80048 BASIC METABOLIC PNL TOTAL CA: CPT

## 2019-12-13 PROCEDURE — 82947 ASSAY GLUCOSE BLOOD QUANT: CPT

## 2019-12-13 PROCEDURE — 94770 HC ETCO2 MONITOR DAILY: CPT

## 2019-12-13 PROCEDURE — 94761 N-INVAS EAR/PLS OXIMETRY MLT: CPT

## 2019-12-13 PROCEDURE — 2580000003 HC RX 258

## 2019-12-13 PROCEDURE — 2500000003 HC RX 250 WO HCPCS: Performed by: INTERNAL MEDICINE

## 2019-12-13 PROCEDURE — 2000000000 HC ICU R&B

## 2019-12-13 PROCEDURE — 99233 SBSQ HOSP IP/OBS HIGH 50: CPT | Performed by: INTERNAL MEDICINE

## 2019-12-13 PROCEDURE — 84100 ASSAY OF PHOSPHORUS: CPT

## 2019-12-13 PROCEDURE — 36415 COLL VENOUS BLD VENIPUNCTURE: CPT

## 2019-12-13 PROCEDURE — 85018 HEMOGLOBIN: CPT

## 2019-12-13 PROCEDURE — 97163 PT EVAL HIGH COMPLEX 45 MIN: CPT

## 2019-12-13 PROCEDURE — 82803 BLOOD GASES ANY COMBINATION: CPT

## 2019-12-13 PROCEDURE — 94003 VENT MGMT INPAT SUBQ DAY: CPT

## 2019-12-13 PROCEDURE — 2500000003 HC RX 250 WO HCPCS: Performed by: STUDENT IN AN ORGANIZED HEALTH CARE EDUCATION/TRAINING PROGRAM

## 2019-12-13 PROCEDURE — 85014 HEMATOCRIT: CPT

## 2019-12-13 PROCEDURE — 97110 THERAPEUTIC EXERCISES: CPT

## 2019-12-13 PROCEDURE — 99291 CRITICAL CARE FIRST HOUR: CPT | Performed by: INTERNAL MEDICINE

## 2019-12-13 PROCEDURE — 83735 ASSAY OF MAGNESIUM: CPT

## 2019-12-13 RX ORDER — MAGNESIUM HYDROXIDE 1200 MG/15ML
LIQUID ORAL
Status: COMPLETED
Start: 2019-12-13 | End: 2019-12-13

## 2019-12-13 RX ORDER — FOLIC ACID 1 MG/1
1 TABLET ORAL DAILY
Status: DISCONTINUED | OUTPATIENT
Start: 2019-12-13 | End: 2019-12-22

## 2019-12-13 RX ORDER — THIAMINE MONONITRATE (VIT B1) 100 MG
100 TABLET ORAL DAILY
Status: DISCONTINUED | OUTPATIENT
Start: 2019-12-13 | End: 2019-12-21

## 2019-12-13 RX ADMIN — Medication 100 MG: at 13:16

## 2019-12-13 RX ADMIN — POTASSIUM CHLORIDE: 2 INJECTION, SOLUTION, CONCENTRATE INTRAVENOUS at 18:11

## 2019-12-13 RX ADMIN — INSULIN LISPRO 3 UNITS: 100 INJECTION, SOLUTION INTRAVENOUS; SUBCUTANEOUS at 13:17

## 2019-12-13 RX ADMIN — Medication 200 MCG/HR: at 14:07

## 2019-12-13 RX ADMIN — MINERAL OIL, PETROLATUM: 425; 568 OINTMENT OPHTHALMIC at 09:33

## 2019-12-13 RX ADMIN — MEROPENEM 1 G: 1 INJECTION, POWDER, FOR SOLUTION INTRAVENOUS at 21:30

## 2019-12-13 RX ADMIN — OXYCODONE HYDROCHLORIDE 5 MG: 5 TABLET ORAL at 13:55

## 2019-12-13 RX ADMIN — SODIUM CHLORIDE: 9 INJECTION, SOLUTION INTRAVENOUS at 16:00

## 2019-12-13 RX ADMIN — Medication 200 MCG/HR: at 04:44

## 2019-12-13 RX ADMIN — PROPOFOL 30 MCG/KG/MIN: 10 INJECTION, EMULSION INTRAVENOUS at 04:44

## 2019-12-13 RX ADMIN — SODIUM CHLORIDE: 9 INJECTION, SOLUTION INTRAVENOUS at 16:01

## 2019-12-13 RX ADMIN — INSULIN LISPRO 3 UNITS: 100 INJECTION, SOLUTION INTRAVENOUS; SUBCUTANEOUS at 17:09

## 2019-12-13 RX ADMIN — Medication 200 MCG/HR: at 18:33

## 2019-12-13 RX ADMIN — HEPARIN SODIUM 5000 UNITS: 5000 INJECTION INTRAVENOUS; SUBCUTANEOUS at 21:29

## 2019-12-13 RX ADMIN — HEPARIN SODIUM 5000 UNITS: 5000 INJECTION INTRAVENOUS; SUBCUTANEOUS at 13:55

## 2019-12-13 RX ADMIN — SODIUM CHLORIDE 1000 ML: 900 IRRIGANT IRRIGATION at 14:45

## 2019-12-13 RX ADMIN — HEPARIN SODIUM 1600 UNITS: 1000 INJECTION INTRAVENOUS; SUBCUTANEOUS at 18:58

## 2019-12-13 RX ADMIN — INSULIN LISPRO 3 UNITS: 100 INJECTION, SOLUTION INTRAVENOUS; SUBCUTANEOUS at 08:54

## 2019-12-13 RX ADMIN — MEROPENEM 1 G: 1 INJECTION, POWDER, FOR SOLUTION INTRAVENOUS at 10:37

## 2019-12-13 RX ADMIN — PROPOFOL 20 MCG/KG/MIN: 10 INJECTION, EMULSION INTRAVENOUS at 16:00

## 2019-12-13 RX ADMIN — Medication: at 10:46

## 2019-12-13 RX ADMIN — HEPARIN SODIUM 1500 UNITS: 1000 INJECTION INTRAVENOUS; SUBCUTANEOUS at 18:58

## 2019-12-13 RX ADMIN — OXYCODONE HYDROCHLORIDE 5 MG: 5 TABLET ORAL at 09:40

## 2019-12-13 RX ADMIN — INSULIN LISPRO 2 UNITS: 100 INJECTION, SOLUTION INTRAVENOUS; SUBCUTANEOUS at 21:29

## 2019-12-13 RX ADMIN — FOLIC ACID: 5 INJECTION, SOLUTION INTRAMUSCULAR; INTRAVENOUS; SUBCUTANEOUS at 09:30

## 2019-12-13 RX ADMIN — Medication 200 MCG/HR: at 23:33

## 2019-12-13 RX ADMIN — HEPARIN SODIUM 5000 UNITS: 5000 INJECTION INTRAVENOUS; SUBCUTANEOUS at 07:48

## 2019-12-13 RX ADMIN — FAMOTIDINE 20 MG: 20 TABLET, FILM COATED ORAL at 10:36

## 2019-12-13 RX ADMIN — FOLIC ACID 1 MG: 1 TABLET ORAL at 13:15

## 2019-12-13 RX ADMIN — PROPOFOL 15 MCG/KG/MIN: 10 INJECTION, EMULSION INTRAVENOUS at 09:15

## 2019-12-13 RX ADMIN — PROPOFOL 10 MCG/KG/MIN: 10 INJECTION, EMULSION INTRAVENOUS at 21:37

## 2019-12-13 RX ADMIN — OXYCODONE HYDROCHLORIDE 5 MG: 5 TABLET ORAL at 18:45

## 2019-12-13 RX ADMIN — Medication 200 MCG/HR: at 09:40

## 2019-12-13 ASSESSMENT — PULMONARY FUNCTION TESTS
PIF_VALUE: 13
PIF_VALUE: 14
PIF_VALUE: 11
PIF_VALUE: 11
PIF_VALUE: 19

## 2019-12-13 ASSESSMENT — PAIN SCALES - GENERAL
PAINLEVEL_OUTOF10: 0
PAINLEVEL_OUTOF10: 0

## 2019-12-13 ASSESSMENT — PAIN SCALES - WONG BAKER: WONGBAKER_NUMERICALRESPONSE: 6;4

## 2019-12-13 NOTE — PLAN OF CARE
nutrition needs   Outcome: Ongoing     Problem: Confusion - Acute:  Goal: Absence of continued neurological deterioration signs and symptoms  Description  Absence of continued neurological deterioration signs and symptoms  Outcome: Ongoing  Goal: Mental status will be restored to baseline  Description  Mental status will be restored to baseline  Outcome: Ongoing     Problem: Discharge Planning:  Goal: Ability to perform activities of daily living will improve  Description  Ability to perform activities of daily living will improve  Outcome: Ongoing  Goal: Participates in care planning  Description  Participates in care planning  Outcome: Ongoing     Problem: Injury - Risk of, Physical Injury:  Goal: Will remain free from falls  Description  Will remain free from falls  Outcome: Ongoing  Goal: Absence of physical injury  Description  Absence of physical injury  Outcome: Ongoing     Problem: Mood - Altered:  Goal: Mood stable  Description  Mood stable  Outcome: Ongoing  Goal: Absence of abusive behavior  Description  Absence of abusive behavior  Outcome: Ongoing  Goal: Verbalizations of feeling emotionally comfortable while being cared for will increase  Description  Verbalizations of feeling emotionally comfortable while being cared for will increase  Outcome: Ongoing     Problem: Psychomotor Activity - Altered:  Goal: Absence of psychomotor disturbance signs and symptoms  Description  Absence of psychomotor disturbance signs and symptoms  Outcome: Ongoing     Problem: Sensory Perception - Impaired:  Goal: Demonstrations of improved sensory functioning will increase  Description  Demonstrations of improved sensory functioning will increase  Outcome: Ongoing  Goal: Decrease in sensory misperception frequency  Description  Decrease in sensory misperception frequency  Outcome: Ongoing  Goal: Able to refrain from responding to false sensory perceptions  Description  Able to refrain from responding to false sensory perceptions  Outcome: Ongoing  Goal: Demonstrates accurate environmental perceptions  Description  Demonstrates accurate environmental perceptions  Outcome: Ongoing  Goal: Able to distinguish between reality-based and nonreality-based thinking  Description  Able to distinguish between reality-based and nonreality-based thinking  Outcome: Ongoing  Goal: Able to interrupt nonreality-based thinking  Description  Able to interrupt nonreality-based thinking  Outcome: Ongoing     Problem: Sleep Pattern Disturbance:  Goal: Appears well-rested  Description  Appears well-rested  Outcome: Ongoing

## 2019-12-13 NOTE — OP NOTE
the proper procedure to be performed. Antibiotics were given in accordance with SCIP and SCD's were in place for perioperative DVT prophylaxis. The patient's abdomen was prepped and draped in the usual sterile fashion in preparation for laparoscopic, possible open colostomy creation. Using a #11 blade a 1cm infraumbilical vertical incision was made and electrocautery was used to dissect down to the fascia. The fascia was divided with electrocautery and stay sutures were placed in the fascia. The underlying peritoneum was divided and the 10mm Cramer balloon tipped trocar was inserted into the abdomen without complication. The laparoscope was inserted and the underlying bowel and omentum was visualized to ensure no injury , which there was none. We immediately noted that there was extensive adhesions throughout the abdomen involving the small bowel as well as to what appeared to be a loop of sigmoid colon. Two additional 5mm trocars were placed, one in the suprapubic area and the other in the RLQ due to adhesions preventing placement elsewhere. Using atraumatic graspers and a maryland dissector we attempted to begin mobilizing the loop of sigmoid colon and endoshears were used to sharply divide thick adhesive bands of tissue. We spent a significant amount of time attempting to make progress but due to the extent of adhesions, likely from his previous ventriculoperitoneal shunt, we decided it was safest to convert to an open surgery. We removed our instruments and laparoscope and all trocars. Using a #10 blade a midline laparotomy incision was made extending through the previous umbilical laparoscopic incision and was extended from the suprapubic area to above the umbilicus. Electrocautery was used to carry the incision through the subcutaneous tissue to the anterior fascia.  We were able to sweep away any adhesions to the anterior abdominal wall an safely extend our incision through the peritoneum along the after placing horizontal mattress sutures which would be used to close the wound in several days as a delayed closure technique. The vac was draped with adhesive sheets and placed to suction with excellent seal, no leak. We then turned our attention to maturing the stoma. The staple line was excised with metzenbaums and using 3-0 vicryl suture we proceeded to mature the colostomy in the usual fashion, placing 4 sutures in the 12, 3, 6, and 9:00 positions, taking care to [facility] the sutures in these positions. We then placed two additional interrupted sutures between each of the Brooked sutures and the ostomy was well appearing, perfused and without any retraction. A finger was placed through the ostomy and easily through the fascia without obstruction or narrowing. At this point the abdomen was washed, dried and an ostomy appliance was secured. The sponge and instrument counts were correct. The patient did tolerate the procedure well and was taken back to the ICU in stable condition. Dr. Christiano Barnard was participating for all portions of the procedure.        Frank Collier,   Date: 12/3/2019  Time: 6:15 PM     See also attending operative note. Yung Frazier.  Christiano Barnard MD, PhD  12/13/19  8:52 PM

## 2019-12-13 NOTE — PROGRESS NOTES
Nutrition Assessment (Parenteral Nutrition)    Type and Reason for Visit: Reassess    Nutrition Recommendations: Continue 190 g Dextrose, 145 g AA at 80 ml per hour. No IL. This with diprivan calories provides 1530 kcal (85% of estimated kcal needs), 145 g protein. Nutrition Assessment: TPN and Vent continue. Tube feed continues on hold. Plan is for wound vac tomorrow. Malnutrition Assessment:  · Malnutrition Status: At risk for malnutrition  · Context: Chronic illness  · Findings of the 6 clinical characteristics of malnutrition (Minimum of 2 out of 6 clinical characteristics is required to make the diagnosis of moderate or severe Protein Calorie Malnutrition based on AND/ASPEN Guidelines):  1. Energy Intake-Greater than 75% of estimated energy requirement, Greater than or equal to 7 days(Intakes improved with start of TPN.)    2. Weight Loss-Unable to assess, unable to assess  3. Fat Loss-No significant subcutaneous fat loss,    4. Muscle Loss-No significant muscle mass loss,    5. Fluid Accumulation-Moderate to severe fluid accumulation, Extremities, Generalized  6.   Strength-Not measured    Nutrition Risk Level: High    Nutrient Needs:  · Estimated Daily Total Kcal: 7596-2942 kcal/day   · Estimated Daily Protein (g): 110-145 g pro/day   Nutrition Diagnosis:   · Problem: Inadequate oral intake  · Etiology: related to Impaired respiratory function-inability to consume food     Signs and symptoms:  as evidenced by Nutrition support - PN    Objective Information:  · Wound Type: Open Wounds  · Current Nutrition Therapies:  · Oral Diet Orders: NPO   · Parenteral Nutrition Orders:  · Type and Formula: 2-in-1 Standard(180 gm Dextrose, 140 gm AA)   · Lipids: None  · Rate/Volume: 80 ml per hour  · Duration: Continuous  · Current PN Order Provides: 190 g Dextrose, 145 g AA  provides 1214 kcal, 145 g protein  · Anthropometric Measures:  · Ht: 5' 9\" (175.3 cm)   · Current Body Wt: 325 lb (147.4

## 2019-12-13 NOTE — PROGRESS NOTES
Critical Care Team - Daily Progress Note      Date and time: 2019 7:56 AM  Patient's name:  Kadeem James  Medical Record Number: [id number]  Patient's account/billing number: [de-identified]  Patient's YOB: 1985  Age: 37 y.o. Date of Admission: 2019  3:39 PM  Length of stay during current admission: 14      Primary Care Physician: No primary care provider on file. ICU Attending Physician: Dr. Annita Kaur    Code Status: Full Code    Reason for ICU admission: Necrotizing Fascitis vertebral lucency concerning for malignancy    SUBJECTIVE:     OVERNIGHT EVENTS:       Patient seen and examined. Per the surgery, infection had spread to  deep plane, they did extensive debridement, scrotum was opened and testicles were evaluated, urethra is less viable, which means he is getting less blood supply. POD#15 wide complex debridement of nec fasc involving gluteal/perianal/distal rectum  POD#11 open diverting end colostomy   POD#6 Incision and debridement of necrotizing fasciitis buttock wound, scrotum, bilateral groin region and closure of midline abdominal wound  POD #4 Sharp excisional debridement of gluteal, marc-rectal and perineal wound to level of muscle, Sharp excisional debridement of scrotum (performed by Urology), Washout of gluteal/perineal/scrotal/inguinal wound, Partial primary closure of pubic wound, and Dakins Wet to dry dressing application. Patient might go to the OR today for possible wound VAC placement on the buttock. CODE STATUS. Mother was present at the bedside surgeon spoke with mother the possibility of changing CODE STATUS later on this week. Berkley Duenas He is on Fentanyl and Roxicodone, meropenum. Yesterday patient had dialysis net removal of 1450 mL  Hemoglobin 7.7  ABG: pH 7.448, PCO2 31.9, PO2 62.2, HCO3 22.1.   TSH 0.45   Fever:-  Temp (24hrs), Av.8 °F (36.6 °C), Min:96.6 °F (35.9 °C), Max:98.4 °F (69.1 °C)    Systolic (97IZS), LEILANI:813 , Min:94 , Max:214 -- 93 (!) 5 98 %   12/13/19 0508 (!) 159/90 -- -- 102 (!) 0 98 %   12/13/19 0400 (!) 152/85 96.6 °F (35.9 °C) Axillary 101 (!) 0 99 %   12/13/19 0300 -- -- -- 105 -- --   12/13/19 0200 (!) 96/53 -- -- 89 -- 98 %         Intake/Output Summary (Last 24 hours) at 12/13/2019 0756  Last data filed at 12/13/2019 0540  Gross per 24 hour   Intake 3467.1 ml   Output 428 ml   Net 3039.1 ml     Wt Readings from Last 2 Encounters:   12/12/19 (!) 325 lb 9.9 oz (147.7 kg)     Body mass index is 48.09 kg/m². PHYSICAL EXAMINATION:    General appearance -intubated and sedated, NAD  Mental status -intubated and sedated, not following commands  Chest -bilateral breath sounds heard, no wheezing, on vent  Heart -normal rate, regular rhythm, normal S1 and S2  Abdomen - soft, nondistended, no rash  Neurological - intubated and sedated, not following commands  Extremities - weak pulses in extremities, Dopplerable RLE pulse  Skinpost debridement of gluteal and perianal region. Full-thickness ulcer in the left plantar surface.  See media for post op wounds debridement     Any additional physical findings:    MEDICATIONS:    Scheduled Meds:   insulin lispro  0-18 Units Subcutaneous TID WC    insulin lispro  0-9 Units Subcutaneous Nightly    povidone-iodine   Topical Daily    sodium chloride  250 mL Intravenous Once    sodium chloride  250 mL Intravenous Once    sodium chloride flush  10 mL Intravenous Q12H    sodium chloride  250 mL Intravenous Once    sodium chloride flush  10 mL Intravenous Q12H    midodrine  10 mg Oral TID WC    famotidine  20 mg Per NG tube Daily    thiamine and folic acid IVPB   Intravenous Daily    fat emulsion  250 mL Intravenous Daily    meropenem  1 g Intravenous Q12H    sodium chloride flush  10 mL Intravenous 2 times per day    heparin (porcine)  5,000 Units Subcutaneous 3 times per day     Continuous Infusions:   PN-Adult 2-in-1 Central Line (Standard) 75 mL/hr at 12/12/19 1751    propofol applied: No    ABGs:  Arterial Blood Gas result:  pO2 107.4; pCO2 32.9; pH 7.29;  HCO3 16, %O2 Sat 98. Laboratory findings:    Complete Blood Count:   Recent Labs     12/11/19 0438 12/12/19  0023 12/12/19 0411 12/13/19 0435   WBC 16.2*  --  11.4* 12.4*   HGB 7.0* 7.7* 7.7* 8.0*   HCT 24.2* 25.0* 26.3* 27.3*     --  284 281        Last 3 Blood Glucose:   Recent Labs     12/11/19 0438 12/12/19 0411 12/13/19 0435   GLUCOSE 178* 162* 189*        PT/INR:    Lab Results   Component Value Date    PROTIME 11.9 11/29/2019    INR 1.1 11/29/2019     PTT:  No results found for: APTT, PTT    Comprehensive Metabolic Profile:   Recent Labs     12/11/19 0438 12/12/19 0411 12/13/19 0435    135 133*   K 4.1 3.7 4.1    106 103   CO2 17* 19* 18*   BUN 50* 46* 56*   CREATININE 2.59* 2.14* 2.33*   GLUCOSE 178* 162* 189*   CALCIUM 7.8* 8.3* 8.8      Magnesium:   Lab Results   Component Value Date    MG 1.9 12/13/2019     Phosphorus:   Lab Results   Component Value Date    PHOS 5.4 12/13/2019     Ionized Calcium:   Lab Results   Component Value Date    CAION 1.05 12/03/2019            Troponin: No results for input(s): TROPONINI in the last 72 hours. Radiology/Imaging:     Chest Xray (12/13/2019):    ASSESSMENT:     Principal Problem:    Necrotizing fasciitis ([facility] [facility]  Active Problems:    Hypertension    Diabetes ([facility] [address]    Diabetic foot ulcer ([facility] [facility]    Septic shock ([facility] [address] [facility]    Bandemia  Resolved Problems:    * No resolved hospital problems. *    PLAN:     WEAN PER PROTOCOL:  [x] No   [] Yes  [] N/A    DISCONTINUE ANY LABS:   [x] No   [] Yes    TRANSFER OUT OF ICU:   [x] No   [] Yes    ADDITIONAL PLAN:    1. Pressure ulcer cant be staged present on admission / Necrotizing Faciitis involving buttocks and scrotum - s/p POD#14 s/p wide complex debridement of gluteal/perianal region. region extensive necrotic tissue that involved the anus and distal rectum.    Had  debridement, washout of gluteal and perineal, diverting loop colostomy  Bedside debridement done on 12/4, dressing changes with him on site will continue to do wet-to-dry dressing changes as per  surgery. - continue meropenem (started 12/1) per ID   - (12/10) Repeat debridement with general surgery and urology   -Possible OR tomorrow  for wound VAC placement  2. Septic shock -resolved, off pressors  - Blood cultures drawn 12/2/19:  No growth     3. DEBBY on CKD -     Urology recs: Meroponen empirically, maintain lema for continued drainage.   -Urine culture : Grew Proteus mirabilis, ID is aware of it  -Nephrology recs:  Patient 5 dialysis till now. Renal ultrasound: Right renal cyst, otherwise grossly negative renal   Ultrasound, non diagnostic bladder assessment. 4. Diabetes -high-dose correction POC glucose checks every 4 to every 6, check back to q 4 if the glucose levels constantly remains high over 200   5. Diabetic left foot ulcer -dietary on board. Wound care. No surgical intervention for now    6. Anemia: Hgb (77.0),   received  5uPRBC since admission     1. Feeding NPO for OR today   2. Fluids - KVO  3. Family - none at bedside   4. Analgesic -fentanyl drip   5. Sedation: Propofol  6. Thrombo-prophylaxis [] Enoxaparin  [x] Unfract. Heparin Subcut (was held this AM for OR)  [] EPC Cuffs  7. Mobility bed rest and PT/ OT   8. Head of bed up: yes   9. Ulcer prophylaxis - pepcid   10. Glycemic control: High dose correction  11. Spontaneous breathing trial: Wean trial today  12. Bowel regimen/urine output: milk of mag PRN, 45 ml UOP in last 24 hours  13. Indwelling catheter/lines CVC RIJ, OG, Lema  14.  De-escalation: Labs for POC glucose checks and add thiamin and folic acid PO       Arlette Molina MD  Critical care resident   [facility]            12/13/2019, 7:56 AM

## 2019-12-13 NOTE — PROGRESS NOTES
Shashi  Occupational Therapy Not Seen Note    DATE: 2019  Name: Starr Jackson  : 1985  MRN: [id number]    Patient not available for Occupational Therapy due to:    [] Testing:    [] Hemodialysis    [] Blood Transfusion in Progress    []Refusal by Patient:    [] Surgery/Procedure:    [] Strict Bedrest    [x] Sedation: Fentanyl/Intubated    [] Spine Precautions     [] Pt being transferred to palliative care at this time. Spoke with pt/family and OT services to be defered. [] Pt independent with functional mobility and functional tasks.  Pt with no OT acute care needs at this time, will defer OT eval.    [] Other    Next Scheduled Treatment:Ck 12/15     Lieutenant Lee, OT

## 2019-12-13 NOTE — PROGRESS NOTES
Nephrology Progress Note        Subjective: patient evaluated . Patient is scheduled to receive hemodialysis this afternoon. Patient is status post debridement of necrotizing fasciitis involving the gluteal, perianal and rectal area. Patient is also status post diverting end colostomy. Patient underwent incision and debridement of necrotizing gluteal wound and scrotal wound few days ago . Patient currently is on fentanyl drip and Versed. Not on any pressors . Receiving TPN. Patient on IV antibiotics per ID. Not making much urine  Surgery and ID notes reviewed    Objective:  CURRENT TEMPERATURE:  Temp: 96.6 °F (35.9 °C)  MAXIMUM TEMPERATURE OVER 24HRS:  Temp (24hrs), Av.7 °F (36.5 °C), Min:96.6 °F (35.9 °C), Max:98.4 °F (36.9 °C)    CURRENT RESPIRATORY RATE:  Resp: 14  CURRENT PULSE:  Pulse: 95  CURRENT BLOOD PRESSURE:  BP: 115/62  24HR BLOOD PRESSURE RANGE:  Systolic (06CYT), LLL:175 , Min:94 , XXP:368   ; Diastolic (41BMX), JYL:27, Min:46, Max:150    24HR INTAKE/OUTPUT:      Intake/Output Summary (Last 24 hours) at 2019 1005  Last data filed at 2019 0540  Gross per 24 hour   Intake 2937.3 ml   Output 248 ml   Net 2689.3 ml     Patient Vitals for the past 96 hrs (Last 3 readings):   Weight   19 0521 (!) 325 lb 9.9 oz (147.7 kg)   19 1915 (!) 397 lb 11.4 oz (180.4 kg)   19 1624 (!) 400 lb 5.7 oz (181.6 kg)         Physical Exam:  General appearance: Intubated, on fentanyl and Versed  Skin: warm and dry, no rash or erythema  Eyes: Pale conjunctivae   ENT: ETT in  place  Neck: No carotid bruits, No Thyromegaly  Pulmonary: Sounds appear somewhat diminished  Cardiovascular: normal S1 and S2. NO rubs and NO murmur.     Abdomen: Colostomy noted, bowel sounds absent , midline incision noted,     Extremities: + edema     Current Medications:    vitamin B-1 (THIAMINE) tablet 095 mg, Daily  folic acid (FOLVITE) tablet 1 mg, Daily  insulin lispro (HUMALOG) injection vial 0-18 Units, TID WC  insulin lispro (HUMALOG) injection vial 0-9 Units, Nightly  povidone-iodine (BETADINE) 10 % external solution, Daily  PN-Adult 2-in-1 Central Line (Standard), Continuous TPN  0.9 % sodium chloride bolus, Once  0.9 % sodium chloride bolus, Once  sodium chloride flush 0.9 % injection 10 mL, Q12H  sodium chloride flush 0.9 % injection 10 mL, PRN  0.9 % sodium chloride bolus, Once  propofol injection, Titrated  potassium chloride (KLOR-CON M) extended release tablet 40 mEq, PRN    Or  potassium bicarb-citric acid (EFFER-K) effervescent tablet 40 mEq, PRN    Or  potassium chloride 10 mEq/100 mL IVPB (Peripheral Line), PRN  sodium chloride flush 0.9 % injection 10 mL, Q12H  sodium chloride flush 0.9 % injection 10 mL, PRN  0.9 % sodium chloride bolus, PRN  0.9 % sodium chloride bolus, PRN  midodrine (PROAMATINE) tablet 10 mg, TID WC  oxyCODONE (ROXICODONE) immediate release tablet 5 mg, Q4H PRN    Or  oxyCODONE (ROXICODONE) immediate release tablet 10 mg, Q4H PRN  fentaNYL 20 mcg/mL Infusion, Continuous  0.9 % sodium chloride bolus, PRN  0.9 % sodium chloride bolus, PRN  famotidine (PEPCID) tablet 20 mg, Daily  heparin (porcine) injection 1,500 Units, PRN  heparin (porcine) injection 1,600 Units, PRN  acetaminophen (TYLENOL) tablet 650 mg, Q4H PRN  glucose (GLUTOSE) 40 % oral gel 15 g, PRN  dextrose 50 % IV solution, PRN  glucagon (rDNA) injection 1 mg, PRN  dextrose 5 % solution, PRN  [Held by provider] folic acid 1 mg, thiamine (B-1) 100 mg in dextrose 5 % 50 mL IVPB, Daily  fat emulsion 20 % infusion 250 mL, Daily  acetaminophen (TYLENOL) tablet 650 mg, Q4H PRN  REFRESH LACRI-LUBE ointment OINT, PRN  0.9 % sodium chloride infusion, Continuous  meropenem (MERREM) 1 g in sodium chloride 0.9 % 100 mL IVPB (mini-bag), Q12H  sodium chloride flush 0.9 % injection 10 mL, 2 times per day  sodium chloride flush 0.9 % injection 10 mL, PRN  magnesium hydroxide (MILK OF MAGNESIA) 400 MG/5ML suspension 30 mL, Daily PRN  ondansetron (ZOFRAN) injection 4 mg, Q6H PRN  glucagon (rDNA) injection 1 mg, PRN  heparin (porcine) injection 5,000 Units, 3 times per day  norepinephrine (LEVOPHED) 32 mg in dextrose 5 % 250 mL infusion, Continuous      Labs:   CBC:   Recent Labs     12/11/19 0438 12/12/19  0023 12/12/19 0411 12/13/19 0435   WBC 16.2*  --  11.4* 12.4*   RBC 2.51*  --  2.83* 2.90*   HGB 7.0* 7.7* 7.7* 8.0*   HCT 24.2* 25.0* 26.3* 27.3*     --  284 281   MPV 9.9  --  9.5 9.4      BMP:   Recent Labs     12/11/19 0438 12/12/19 0411 12/13/19 0435    135 133*   K 4.1 3.7 4.1    106 103   CO2 17* 19* 18*   BUN 50* 46* 56*   CREATININE 2.59* 2.14* 2.33*   GLUCOSE 178* 162* 189*   CALCIUM 7.8* 8.3* 8.8        Phosphorus:    Recent Labs     12/12/19 0411 12/13/19 0435   PHOS 3.5 5.4*       Assessment:    1.  Acute Kidney Injury: Secondary to ischemic ATN from sepsis syndrome from necrotizing fasciitis. He has been  dialysis dependent      2.  Chronic kidney diseae stage III from diabetic nephrosclerosis baseline 1.6-1.8  3.  Necrotizing fasciitis status post wide complex debridement of gluteal, perineal region and open colostomy. 4.  Respiratory Failure: On ventilator   5.  Protein calorie malnutrition  6.  Fluid overload: Continue to remove fluid on dialysis as tolerated  7. S/P diverting colostomy     Plan:    1. Continue TPN. Dialysis today, orders reviewed with nurse. 2.  Supportive care. 3.  Antibiotics to continue. Will attempt 2 kg fluid removal today. Made  Need a dose of albumin  4. Prognosis is guarded. Please do not hesitate to call with questions.     Electronically signed by Sydney Rock MD on 12/13/2019 at 10:05 AM

## 2019-12-13 NOTE — PROGRESS NOTES
debridement  Infection Control Recommendations   · [place] Precautions  · Contact Isolation MRSA    Antimicrobial Stewardship Recommendations     · Simplification of therapy  · Targeted therapy  · PK dosing    Coordination of Outpatient Care:   · Estimated Length of IV antimicrobials: TBD  · Patient will need Midline Catheter Insertion: No  · Patient will need PICC line Insertion:Has Central line  · Patient will need: Home IV , [place],  SNF,  LTAC: TBD  · Patient will need outpatient wound care:Yes    Chief complaint/reason for consultation:   · Ashley's vs necrotizing fasciitis    History of Present Illness:   Hay Man is a 37y.o.-year-old  male who was initially admitted on 11/29/2019. Patient seen at the request of . INITIAL HISTORY:    Patient presented through ER in [place] with complaints of weakness of a few days duration. Examination showed skin necrosis in the gluteal and perineal region. Patient transferred to [place] [address]. CT scan showed extensive subcutaneous emphysema. Patient underwent I&D and extensive complex debridement of affected tissues on 11-29-19. Gram stain of tissues showed Strep. Spp and Gram negative bacilli. 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 He has underlying DM 2, prior diabetic foot infection, DEBBY. Patient more stable post surgery but with hypotension requiring a pressor. Zosyn D/C because of of poor LVEF, in order to reduce Na and fluid load  Meropenem started adjusted for Cr Cl 30 cc  Meropenem adjusted for HD  Per surgery after debridement on 12-8-19, infection has spread to deep planes with the urethra less viable. One more debridement in OR scheduled for 12-10-19 and then decision will be made to change code status or not. Pt did not tolerate HD on 12-9. It was stopped early and pt required vasopressor support with Levophed, remains on vent.    Pt to OR 12-10 for further debridement      CURRENT EVALUATION :12/13/2019     Afebrile  VS stable except hypertensive at 175/112    Patient evaluated at bedside in the medical ICU   Intubated and ventilated, PRVC/26/530/5/40  Patient awake but does not follow commands. On HD  Meropenem adjusted to q 24 hr  Off pressors    S/P general surgery debridement 12/10/19 (4th debridement)  Gen Surgery plans to take patient to OR today (12/12/19:) for possible wound VAC placement on buttock. No new culture data: Strep and Gram negative bacilli     Labs, X rays reviewed: 12/13/2019    BUN: 59-->61-->79-->49-->42->39->50-->46-->56  Cr: 2.56-->2.77-->4.49-->2.84-->2.69->2.39>2.50-->2.14-->2.33    WBC: 4.20-->53.3-->26.7-->16.9->16.9->16.2-->11.4-->12.4  Hb:9.5-->8.2-->6.8-->8.2 -->7.0-->7.0->7.7>7-->7.7-->8.0  Plat: 642-->342-->273->346->398-->284-->281    Cultures:  Urine:  · NA  Blood:  · 11-30-19: no growth  · 12-2-19: no growth  Sputum :  · NA  Wound:  · 11-29-19: Strep ssp and Gram negative bacilli       MRSA probe: Pos    Discussed with mother, RN, CC. .    12-11-19:          12-7-19: Left foot:      12-4-19:      11-29-19:      I have personally reviewed the past medical history, past surgical history, medications, social history, and family history, and I have updated the database accordingly.   Past Medical History:     Past Medical History:   Diagnosis Date    [facility]est[facility]) [facility]     Diabetes mellitus ([facility]     Hypertension     Spina bifida aperta of lumbar spine ([facility] [facility]        Past Surgical  History:     Past Surgical History:   Procedure Laterality Date    ABDOMEN SURGERY N/A 12/8/2019    DEBRIDEMENT  NECROTIZING FASCIITIS BUTTOCK, WOUND VAC REMOVAL DELAYED PRIMARY CLOSURE OF MIDLINE ABDOMINAL INCISION, OSTOMY BAG CHANGE performed by Moreno Block MD at [address] [place] [address],[address] N/A 12/10/2019    DEBRIDEMENT OF SACRAL WOUND performed by Lex Landeros MD at [facility] 12/3/2019    LAPAROSCOPIC Physical activity:     Days per week: Not on file     Minutes per session: Not on file    Stress: Not on file   Relationships    Social connections:     Talks on phone: Not on file     Gets together: Not on file     Attends [employer] service: Not on file     Active member of club or organization: Not on file     Attends meetings of clubs or organizations: Not on file     Relationship status: Not on file    Intimate partner violence:     Fear of current or ex partner: Not on file     Emotionally abused: Not on file     Physically abused: Not on file     Forced sexual activity: Not on file   Other Topics Concern    Not on file   Social History Narrative    Not on file       Family History:   No family history on file. Allergies:   Patient has no known allergies. Review of Systems:     12/13/2019 UTO pt intubated, sedated, off vasopressors. Seen in ICU    Constitutional: No fevers or chills. Generalized weakness  Head: No headaches  Eyes: No double vision or blurry vision. No conjunctival inflammation. ENT: No sore throat or runny nose. . No hearing loss, tinnitus or vertigo. Cardiovascular: No chest pain or palpitations. No shortness of breath. No GORDILLO  Lung: No shortness of breath or cough. No sputum production  Abdomen: No nausea, vomiting, diarrhea, or abdominal pain. Berkley Maudlin No cramps. Genitourinary: No increased urinary frequency, or dysuria. No hematuria. No suprapubic or CVA pain  Musculoskeletal: No muscle aches or pains. No joint effusions, swelling or deformities. Lt diabetic foot   Hematologic: No bleeding or bruising. Neurologic: No headache, weakness, numbness, or tingling. Spina bifida  Integument: Gangrene of perineum and gluteal areas  Psychiatric: No depression. Endocrine: No polyuria, no polydipsia, no polyphagia.     Physical Examination :     Patient Vitals for the past 8 hrs:   BP Temp Temp src Pulse Resp SpO2   12/13/19 0808 -- -- -- 95 14 94 %   12/13/19 0600 115/62 -- -- 93 (!) 5 98 % Component Value Date    MUCUS NOT REPORTED 2019    RBC 2.90 2019    TRICHOMONAS NOT REPORTED 2019    WBC 12.4 2019    YEAST NOT REPORTED 2019    TURBIDITY TURBID 2019     Lab Results   Component Value Date    CREATININE 2.33 2019    GLUCOSE 189 2019       Medical Decision Making-Imagin-9 CT Abd/pelvis  EXAMINATION:   CT OF THE ABDOMEN AND PELVIS WITH CONTRAST 2019 2:29 am       TECHNIQUE:   CT of the abdomen and pelvis was performed with the administration of   intravenous contrast. Multiplanar reformatted images are provided for review. Dose modulation, iterative reconstruction, and/or weight based adjustment of   the mA/kV was utilized to reduce the radiation dose to as low as reasonably   achievable.       COMPARISON:   2019.       HISTORY:   ORDERING SYSTEM PROVIDED HISTORY: Kathy Collado fasc   TECHNOLOGIST PROVIDED HISTORY:       [employer] fasc   Reason for Exam: nec fasc; Trauma   Acuity: Unknown   Type of Exam: Subsequent/Follow-up       FINDINGS:   Lower Chest: Partially visualized bilateral pleural effusions with bibasilar   heterogeneous opacities and bilateral lower lobe consolidations.  Central   venous catheter tip near the superior atrial caval junction.  Cardiomegaly. Trace pericardial fluid.       Liver: Normal.       Gallbladder and Bile Ducts: Normal.       Spleen: Splenomegaly.       Adrenal Glands: Normal.       Pancreas: Normal.       Genitourinary: Symmetric renal atrophy.  Redemonstration of multiple   hypodensities in the right kidney which likely represent benign cysts.  No   urinary stones or hydronephrosis.  Ambrosio catheter in the decompressed urinary   bladder.       Bowel: Normal caliber bowel.  Postsurgical changes of the bowel with left   lower quadrant ostomy.  No evidence of acute appendicitis.  No significant   diverticular disease.       Vasculature:  Atherosclerosis.  No abdominal aortic aneurysm.       Bones and Soft      No distal ureteral calculi or bladder calculi. There is no free fluid in the pelvis. Catheter is seen within the urinary bladder. Osseous changes within the left hemipelvis which may be chronic in nature. IMPRESSION:    Extensive subcutaneous emphysema as described above involving both buttocks extending to the perineum.  The possibility of Ashley gangrene/necrotizing fasciitis cannot be excluded. Possible phlegmon or developing soft tissue abscess overlying the left lateral abdominal wall as noted above.  No organized abscess is seen however. Osseous changes within the left hemipelvis which may be chronic in nature. Results the study were called to Dr. Isidro Doty [phone] on 11/29/2019  All CT scans at this facility use dose modulation, iterative reconstruction, and/or weight based dosing when appropriate to reduce radiation dose to as low as reasonably achievable. Medical Decision [id number]:   11/29/2019  8:17 PM - JesusAna higginbotham Incoming Lab Results From [facility]     Specimen Information: Buttock; Tissue        Component Collected Lab   Specimen Description 11/29/2019  7:34  [address]   . BUTTOCK BILATERAL TS    Special Requests 11/29/2019  7:34  [address]   NOT REPORTED    Direct Exam 11/29/2019  7:34  [address]   NO NEUTROPHILS SEEN    Direct Exam Abnormal  11/29/2019  7:34  [facility] [address] [facility]    Direct Exam Abnormal  11/29/2019  7:34 PM [address] NEGATIVE RODS    Culture 11/29/2019  7:34  [address]   PENDING      Medical Decision Making-Other:     Note:  · Labs, medications, radiologic studies were reviewed with personal review of films  · Large amounts of data were reviewed  · Discussed with nursing Staff, Discharge planner  · Infection Control and Prevention measures reviewed  · All prior entries were reviewed  · Administer medications as ordered  · Prognosis: Guarded  · Discharge planning reviewed  · Follow up as outpatient. Thank you for allowing us to participate in the care of this patient. Please call with questions.     Breonna Julio MD    12/13/2019 8:45 AM

## 2019-12-13 NOTE — PROGRESS NOTES
Vilma Krishnamurthy Ano  Urology Progress Note    Subjective: POD#3 s/p re-debridement of Ashley's with Dr. Elias Camejo in conjunction with general surgery. Remains intubated, no acute events overnight per nurse. Per nurse, no plans to retake to OR at this time. Dressings WTD light packing.      Patient Vitals for the past 24 hrs:   BP Temp Temp src Pulse Resp SpO2   12/13/19 0508 (!) 159/90 -- -- 102 (!) 0 98 %   12/13/19 0400 (!) 152/85 96.6 °F (35.9 °C) Axillary 101 (!) 0 99 %   12/13/19 0200 (!) 96/53 -- -- 89 -- 98 %   12/13/19 0100 105/64 -- -- 91 -- 98 %   12/13/19 0055 -- -- -- 87 -- 98 %   12/13/19 0000 95/60 98.1 °F (36.7 °C) Axillary 92 -- 97 %   12/12/19 2300 105/60 -- -- 100 28 95 %   12/12/19 2226 (!) 173/98 -- -- 113 26 98 %   12/12/19 2220 (!) 166/150 -- -- 110 27 98 %   12/12/19 2202 (!) 210/109 -- -- 113 24 98 %   12/12/19 2200 (!) 178/101 -- -- 111 19 98 %   12/12/19 2135 (!) 174/94 -- -- 110 19 98 %   12/12/19 2100 (!) 214/116 -- -- 131 (!) 32 99 %   12/12/19 2028 -- -- -- 118 21 100 %   12/12/19 2000 (!) 176/101 97.7 °F (36.5 °C) Axillary 115 25 100 %   12/12/19 1900 (!) 121/57 -- -- 88 22 100 %   12/12/19 1815 135/73 -- -- 95 21 100 %   12/12/19 1800 129/69 -- -- 97 23 100 %   12/12/19 1745 112/65 -- -- 90 23 100 %   12/12/19 1730 (!) 106/53 -- -- 83 26 100 %   12/12/19 1715 (!) 111/59 -- -- 88 23 100 %   12/12/19 1700 117/66 -- -- 94 24 100 %   12/12/19 1645 (!) 96/46 -- -- 85 24 98 %   12/12/19 1636 -- -- -- 91 25 98 %   12/12/19 1630 (!) 108/58 97.7 °F (36.5 °C) Oral 88 21 98 %   12/12/19 1628 -- -- -- 89 25 98 %   12/12/19 1615 (!) 102/51 -- -- 89 25 97 %   12/12/19 1600 (!) 94/48 -- -- 87 25 98 %   12/12/19 1545 (!) 99/52 -- -- 91 25 97 %   12/12/19 1530 (!) 106/56 -- -- 96 26 97 %   12/12/19 1515 (!) 122/58 -- -- 101 25 97 %   12/12/19 1500 139/73 -- -- 107 25 96 %   12/12/19 1445 (!) 136/97 -- -- 110 26 96 %   12/12/19 1430 131/75 -- -- 105 (!) 31 98 %

## 2019-12-13 NOTE — PROGRESS NOTES
PROGRESS NOTE     PATIENT NAME: Austin Mcdaniel [address]20 RECORD NO. [id number]  DATE: 12/13/2019  SURGEON: Mary Cavazos  PRIMARY CARE PHYSICIAN: No primary care provider on file. HD: # 14    ASSESSMENT    Patient Active Problem List   Diagnosis    Necrotizing fasciitis ([address]    Hypertension    Diabetes ([address]    Diabetic foot ulcer ([address]    Septic shock ([address]    Bandemia     POD#14 wide complex debridement of nec fasc involving gluteal/perianal/distal rectum  POD#10 open diverting end colostomy   POD#5 Incision and debridement of necrotizing fasciitis buttock wound, scrotum, bilateral groin region and closure of midline abdominal wound  POD #3 Sharp excisional debridement of gluteal, marc-rectal and perineal wound to level of muscle, Sharp excisional debridement of scrotum (performed by Urology), Washout of gluteal/perineal/scrotal/inguinal wound, Partial primary closure of pubic wound, and Dakins Wet to dry dressing application      MEDICAL DECISION MAKING AND PLAN    · Necrotizing soft tissue infection: s/p wide complex debridement of gluteal/perianal region extensive necrotic tissue that involved the anus and distal rectum. Recently, infection spread into pelvis which indicates significant risk of mortality; however, as of our last debridement 12/10 we did not find any additional extension of infection. · Discussed with Urology. Recommending plastic surgery consultation to assess possibilities for closure of the scrotum. Otherwise recommending WTD dressing changes until it fully granulates. · Unable to place vac at this time due to wound being in continuity with the open scrotum with exposed testicles and urethra. Will plan for BID WTD dressing changes for the entire wound as mentioned. · Recommend continuing tube feeds/optimizing nutrition to account for healing massive wounds. · Monitor stoma - healthy in appearance.    · Will replace jayj ay to the abdominal midline incision and pubic incision and then plans for prevena vac to cover and remove fluid from the incision. · Meropenem per ID. Initially recommended Zosyn but switched due to need to reduce fluid load with poor LVEF. Strep and Gram negative in initial cultures  · Strict glucose control <180  · LLE wound per podiatry  · Nephrology following. SUBJECTIVE    Patient seen and examined. No acute events or changes. Patient moving upper extremities on sedation holiday. Not following commands. No need for pressor support. Plans for dialysis today. Afebrile. VSS. OBJECTIVE  VITALS: Temp: Temp: 96.6 °F (35.9 °C)Temp  Av.5 °F (36.4 °C)  Min: 96.6 °F (35.9 °C)  Max: 98.1 °F (98.8 °C) BP Systolic (82XOA), XOK:590 , Min:94 , JLJ:342   Diastolic (03LIW), TRB:35, Min:46, Max:150   Pulse Pulse  Av.4  Min: 83  Max: 131 Resp Resp  Av.2  Min: 0  Max: 32 Pulse ox SpO2  Av %  Min: 94 %  Max: 100 %     GENERAL: intubated and sedated. No acute distress  NEURO: moves uppers off sedation. Not following commands  LUNGS: vented   HEART: +S1S2  ABDOMEN: soft, non-distended, +grimace with palpation. No peritoneal signs. Abdominal wall edema noted. Midline incision with packing in place. Ostomy is patent with dark succus in appliance. Perineal wound with packing in place; rectum exposed but intact without perforation or necrosis  SKIN/SOFT TISSUE: gluteal, perirectal/perineal, and scrotal wounds without any additional abscess or extension of necrotizing disease; some fibrinous exudate noted to the wound but improved around the rectum;   EXTREMITY: dressing to L foot is c/d/i. No cyanosis     I/O last 3 completed shifts:   In: 3467.1 [I.V.:1292.4; NG/GT:215]  Out: 428 [Urine:128; Emesis/NG output:280; Stool:20]    Drain/tube output:  In: 2532.9 [I.V.:902.6; NG/GT:215]  Out: 128 [Urine:28]    LAB:  CBC:   Recent Labs     19  0438 19  0023 19  0411 19  0435   WBC 16.2*  --  11.4* 12.4*   HGB 7.0* 7.7* 7.7* 8.0*   HCT 24.2* 25.0* 26.3* 27.3*   MCV 96.4  --  92.9 94.1     --  284 281     BMP:   Recent Labs     12/11/19  0438 12/12/19  0411 12/13/19 0435    135 133*   K 4.1 3.7 4.1    106 103   CO2 17* 19* 18*   BUN 50* 46* 56*   CREATININE 2.59* 2.14* 2.33*   GLUCOSE 178* 162* 189*         RADIOLOGY:   no new images   No        ------------------------------------------------------------------  Winnie Santiago DO, PGY-4  [place], [place].  12/13/19, 1:00PM      Trauma Attending Attestation      I have reviewed the above TECSS note(s) and confirmed the key elements of the medical history and physical exam. I have seen and examined the pt. I have discussed the findings, established the care plan and recommendations with Resident, GCS RN, bedside nurse. I personally reviewed any images in real time to expedite the patient's care.         Sunil Syed MD  12/13/2019  1:29 PM

## 2019-12-13 NOTE — PROGRESS NOTES
debridement      CURRENT EVALUATION :12/13/2019     Patient evaluated and examined in the ICU. Afebrile  VS stable    Patient awake but does not follow commands. On HD  Meropenem adjusted to q 24 hr  Off pressors    S/P general surgery debridement 12/10/19 (4th debridement)  Gen Surgery cancelled OR on 12/12/19 and 12-13-19 for possible wound VAC placement on buttock. Configuration of sacral wound may not allow VAC placement. Has abdominal incisions and groin incisions as well. Wound dressing change at 1-2 PM today. Will inspect wound at that time. No new culture data: Strep and Gram negative bacilli     Labs, X rays reviewed: 12/13/2019    BUN: 59-->61-->79-->49-->42->39->50-->56  Cr: 2.56-->2.77-->4.49-->2.84-->2.69->2.39>2.50-->2.33    WBC: 4.20-->53.3-->26.7-->16.9->16.9->16.2-->12.4  Hb:9.5-->8.2-->6.8-->8.2 -->7.0-->7.0->7.7>7-->8.0  Plat: 642-->342-->273->346->398-->281    Cultures:  Urine:  · NA  Blood:  · 11-30-19: no growth  · 12-2-19: no growth  Sputum :  · NA  Wound:  · 11-29-19: Strep ssp and Gram negative bacilli       MRSA probe: Pos    Discussed with mother, RN, CC. .    12-11-19:          12-7-19: Left foot:      12-4-19:      11-29-19:      I have personally reviewed the past medical history, past surgical history, medications, social history, and family history, and I have updated the database accordingly.   Past Medical History:     Past Medical History:   Diagnosis Date    [facility]     Diabetes mellitus ([facility]     Hypertension     Spina bifida aperta of lumbar spine ([facility]        Past Surgical  History:     Past Surgical History:   Procedure Laterality Date    ABDOMEN SURGERY N/A 12/8/2019    DEBRIDEMENT  NECROTIZING FASCIITIS BUTTOCK, WOUND VAC REMOVAL DELAYED PRIMARY CLOSURE OF MIDLINE ABDOMINAL INCISION, OSTOMY BAG CHANGE performed by Carmita Eugene MD at [address] [facility] N/A 12/10/2019    DEBRIDEMENT OF SACRAL WOUND performed by Lillian Patel status: Not on file   Substance and Sexual Activity    Alcohol use: Not on file    Drug use: Not on file    Sexual activity: Not on file   Lifestyle    Physical activity:     Days per week: Not on file     Minutes per session: Not on file    Stress: Not on file   Relationships    Social connections:     Talks on phone: Not on file     Gets together: Not on file     Attends Rastafarian service: Not on file     Active member of club or organization: Not on file     Attends meetings of clubs or organizations: Not on file     Relationship status: Not on file    Intimate partner violence:     Fear of current or ex partner: Not on file     Emotionally abused: Not on file     Physically abused: Not on file     Forced sexual activity: Not on file   Other Topics Concern    Not on file   Social History Narrative    Not on file       Family History:   No family history on file. Allergies:   Patient has no known allergies. Review of Systems:     12/13/2019 UTO pt intubated, sedated, off vasopressors. Seen in ICU    Constitutional: No fevers or chills. Generalized weakness  Head: No headaches  Eyes: No double vision or blurry vision. No conjunctival inflammation. ENT: No sore throat or runny nose. . No hearing loss, tinnitus or vertigo. Cardiovascular: No chest pain or palpitations. No shortness of breath. No GORDILLO  Lung: No shortness of breath or cough. No sputum production  Abdomen: No nausea, vomiting, diarrhea, or abdominal pain. Zainab Car No cramps. Genitourinary: No increased urinary frequency, or dysuria. No hematuria. No suprapubic or CVA pain  Musculoskeletal: No muscle aches or pains. No joint effusions, swelling or deformities. Lt diabetic foot   Hematologic: No bleeding or bruising. Neurologic: No headache, weakness, numbness, or tingling. Spina bifida  Integument: Gangrene of perineum and gluteal areas  Psychiatric: No depression. Endocrine: No polyuria, no polydipsia, no polyphagia.     Physical Examination : Patient Vitals for the past 8 hrs:   BP Temp Temp src Pulse Resp SpO2   12/13/19 0808 -- -- -- 95 14 94 %   12/13/19 0600 115/62 -- -- 93 (!) 5 98 %   12/13/19 0508 (!) 159/90 -- -- 102 (!) 0 98 %   12/13/19 0400 (!) 152/85 96.6 °F (35.9 °C) Axillary 101 (!) 0 99 %     General Appearance: Sedated, on vent  Head:  Normocephalic, no trauma  ENT: Oropharyngeally intubated, without erythema, exudate, or thrush. No tenderness of sinuses. Mouth/throat: mucosa pink and moist. No lesions. Dentition in good repair. Neck:Supple, without lymphadenopathy. Thyroid normal, No bruits. Pulmonary/Chest: Clear to auscultation, without wheezes, rales, or rhonchi. No dullness to percussion. Cardiovascular: Regular rate and rhythm without murmurs, rubs, or gallops. Abdomen: Soft, non tender. Bowel sounds quiet. Ostomy with scant liquid output. Surgical dressing clean  : Extensive open wound of gluteal and perineal areas, lema with small amt clear urine  All four Extremities: No cyanosis, clubbing, or effusions. Lt foot diabetic ulcer with involvement of the Lt heel. Neurologic: Intubated, sedated  Skin: Warm and dry with good turgor. Signs of peripheral arterial insufficiency. Large open wounds. Medical Decision Making -Laboratory:   I have independently reviewed/ordered the following labs:    CBC with Differential:   Recent Labs     12/12/19 0411 12/13/19 0435   WBC 11.4* 12.4*   HGB 7.7* 8.0*   HCT 26.3* 27.3*    281   LYMPHOPCT 12* 9*   MONOPCT 5 6     BMP:   Recent Labs     12/12/19 0411 12/13/19 0435    133*   K 3.7 4.1    103   CO2 19* 18*   BUN 46* 56*   CREATININE 2.14* 2.33*   MG 1.9 1.9     Hepatic Function Panel:   No results for input(s): PROT, LABALBU, BILIDIR, IBILI, BILITOT, ALKPHOS, ALT, AST in the last 72 hours. No results for input(s): RPR in the last 72 hours. No results for input(s): HIV in the last 72 hours. No results for input(s): BC in the last 72 hours.   Lab Results Tissues: Diffuse soft tissue stranding and fluid. Postsurgical changes in the anterior abdominal wall with midline incision   demonstrating expected small amount of fluid and air.  Large soft tissue   defects in the right inguinal region, scrotum, right greater than left   gluteal creases with associated packing material.  Small amount of   surrounding soft tissue gas.  There is associated skin thickening in these   regions.  Bilateral lower abdominal wall skin thickening.       Retroperitoneum/Mesentery: No intraperitoneal free air.  Mild abdominopelvic   ascites.  Loculated fluid along the inferior aspect of the liver. Redemonstration of bilateral inguinal and pelvic sidewall lymphadenopathy.    Multiple mildly prominent retroperitoneal and upper abdominal lymph nodes are   similar to the prior study.  Percutaneous intraperitoneal surgical drains.           Impression   1.  Large soft tissue defects in the right inguinal region, scrotum and right   greater than left gluteal crease is with associated packing material, small   amount of surrounding soft tissue gas and skin thickening likely relates to   history of necrotizing fasciitis.       2.  Postsurgical changes of the bowel with left lower quadrant ostomy.  No   bowel obstruction.  Percutaneous intraperitoneal surgical drains.       3.  Mild abdominopelvic ascites.  Loculated fluid along the inferior aspect   of the liver.  No intraperitoneal free air.       4.  Diffuse anasarca.       5.   Bilateral pleural effusions with associated heterogeneous opacities and   consolidations.  Underlying infection is not excluded.               EXAMINATION:   ONE XRAY VIEW OF THE CHEST       11/29/2019 9:01 pm       COMPARISON:   4 hours ago       HISTORY:   ORDERING SYSTEM PROVIDED HISTORY: intubated   TECHNOLOGIST PROVIDED HISTORY:   intubated   Reason for Exam: portable supine/ intubated   Acuity: Acute   Type of Exam: Initial       FINDINGS:   New ET tube terminates 38 mm above the ron.  There appears to be a   possible enteric tube which is not well visualized distally.  Right IJ   catheter terminates in the right atrium and is unchanged.  Lungs are clear. Cardiomegaly.  Mediastinum normal.  Bony thorax intact.           Impression   New ET tube as above.  Possible new enteric tube not well visualized. Recommend follow-up KUB if clinically warranted. CT ABDOMEN AND PELVIS WITHOUT CONTRAST    COMPARISON:  3/22/2017    CLINICAL HISTORY: Infection in scrotum, lower abdominal pain, diabetic, 30,000 white blood cell count. TECHNIQUE: Unenhanced axial images were obtained from the lung bases to the pubic symphysis with sagittal and coronal 2D reformatted images. Oral contrast administered:  No.  Automatic exposure control (AEC) was utilized. CT ABDOMEN FINDINGS:      The lung bases are unremarkable. There is a small pericardial effusion versus thickening. The liver, spleen, and pancreas demonstrate no acute abnormality given compromised evaluation without intravenous contrast.  There may be mild splenomegaly.  The adrenal glands appear within normal limits. There is no hydronephrosis or obstructing urinary tract calculi. Small amount of free fluid is seen adjacent to the liver inferiorly. .    The appendix is visualized in the right lower quadrant and appears within normal limits.       There is no evidence for bowel obstruction. Stranding is seen within the subcutaneous fat overlying both lateral abdominal walls left greater than right.  There is ill-defined fluid collection within the subcutaneous fat overlying the left lateral abdominal wall possibly representing phlegmon or developing abscess although no organized   abscess is seen. There is a large amount of soft tissue emphysema involving both the right and left buttock extending to the perineum tissue thickening is seen especially on the left involving the left buttock.   CT PELVIS FINDINGS:        No distal ureteral calculi or bladder calculi. There is no free fluid in the pelvis. Catheter is seen within the urinary bladder. Osseous changes within the left hemipelvis which may be chronic in nature. IMPRESSION:    Extensive subcutaneous emphysema as described above involving both buttocks extending to the perineum.  The possibility of Ashley gangrene/necrotizing fasciitis cannot be excluded. Possible phlegmon or developing soft tissue abscess overlying the left lateral abdominal wall as noted above.  No organized abscess is seen however. Osseous changes within the left hemipelvis which may be chronic in nature. Results the study were called to Dr. Ankita Layton [phone] on 11/29/2019  All CT scans at this facility use dose modulation, iterative reconstruction, and/or weight based dosing when appropriate to reduce radiation dose to as low as reasonably achievable. Medical Decision Dwlwbo-Tumfajfc-Rdinq:   11/29/2019  8:17 PM - JesusAna higginbotham Incoming Lab Results From [facility]     Specimen Information: Buttock; Tissue        Component Collected Lab   Specimen Description 11/29/2019  7:34  [address]   . BUTTOCK BILATERAL TS    Special Requests 11/29/2019  7:34  [address]   NOT REPORTED    Direct Exam 11/29/2019  7:34  [address]   NO NEUTROPHILS SEEN    Direct Exam Abnormal  11/29/2019  7:34  [facility] [address] [facility] [place]    Direct Exam Abnormal  11/29/2019  7:34 PM [address] NEGATIVE RODS    Culture 11/29/2019  7:34  [address]   PENDING      Medical Decision Making-Other:     Note:  · Labs, medications, radiologic studies were reviewed with personal review of films  · Large amounts of data were reviewed  · Discussed with nursing Staff, Discharge planner  · Infection Control and Prevention measures reviewed  · All prior entries were reviewed  · Administer medications as ordered  · Prognosis: Guarded  · Discharge planning reviewed  · Follow up as outpatient. Thank you for allowing us to participate in the care of this patient. Please call with questions.     Radha Loja MD    12/13/2019 11:22 AM

## 2019-12-13 NOTE — PLAN OF CARE
Problem: Pain:  Goal: Pain level will decrease  Description  Pain level will decrease  12/13/2019 0107 by Jayce Kay RN  Outcome: Ongoing     Problem: Pain:  Goal: Control of acute pain  Description  Control of acute pain  12/13/2019 0107 by Jayce Kay RN  Outcome: Ongoing     Problem: Pain:  Goal: Control of chronic pain  Description  Control of chronic pain  12/13/2019 0107 by Jayce Kay RN  Outcome: Ongoing     Problem: Skin Integrity:  Goal: Will show no infection signs and symptoms  Description  Will show no infection signs and symptoms  12/13/2019 0107 by Jayce Kay RN  Outcome: Ongoing  12/12/2019 2058 by Rita Bocanegra RCP  Outcome: Ongoing  12/12/2019 1951 by Hasmukh Baker RN  Outcome: Ongoing     Problem: Skin Integrity:  Goal: Absence of new skin breakdown  Description  Absence of new skin breakdown  12/13/2019 0107 by Jayce Kay RN  Outcome: Ongoing     Problem: OXYGENATION/RESPIRATORY FUNCTION  Goal: Patient will maintain patent airway  12/13/2019 0107 by Jayce Kay RN  Outcome: Ongoing     Problem: OXYGENATION/RESPIRATORY FUNCTION  Goal: Patient will achieve/maintain normal respiratory rate/effort  Description  Respiratory rate and effort will be within normal limits for the patient  12/13/2019 0107 by Jayce Kay RN  Outcome: Ongoing     Problem: MECHANICAL VENTILATION  Goal: Patient will maintain patent airway  12/13/2019 0107 by Jayce Kay RN  Outcome: Ongoing     Problem: MECHANICAL VENTILATION  Goal: Oral health is maintained or improved  12/13/2019 0107 by Jayce Kay RN  Outcome: Ongoing     Problem: MECHANICAL VENTILATION  Goal: ET tube will be managed safely  12/13/2019 0107 by Jayce Kay RN  Outcome: Ongoing     Problem: MECHANICAL VENTILATION  Goal: Ability to express needs and understand communication  12/13/2019 0107 by Jayce Kay RN  Outcome: Ongoing     Problem: MECHANICAL VENTILATION  Goal: Mobility/activity is maintained at

## 2019-12-13 NOTE — PROGRESS NOTES
Physical Therapy    [address]  Initial Assessment    NAME: Becky Molina  : 1985  MRN: [id number]    Date of Service: 2019    Discharge Recommendations: Further therapy recommended following discharge   Patient would benefit from continued therapy after discharge   PT Equipment Recommendations  Equipment Needed: (Uses w/c for all mobility at baseline )    Assessment   Body structures, Functions, Activity limitations: Decreased cognition;Decreased functional mobility ; Decreased endurance;Decreased ADL status; Decreased ROM; Decreased balance;Decreased strength;Decreased safe awareness  Assessment: Pt at this appropriate for PROM/AAROM of extremities. Pt grimiced with some ROM on this date, limting functional mobility. Pt displays the need for assist with all functional mobility at this time. Pt to continue with acute PT services for stretching and to progress mobility as appropriate. Pt would benefit from continued therapy services following discharge. Prognosis: Fair  Decision Making: High Complexity  PT Education: Goals;PT Role;Orientation;Plan of Care;Family Education  REQUIRES PT FOLLOW UP: Yes  Activity Tolerance  Activity Tolerance: Patient limited by endurance; Patient limited by pain; Patient limited by cognitive status       Patient Diagnosis(es): There were no encounter diagnoses. has a past medical history of CHF (congestive heart failure) ([facility] [address] Diabetes mellitus ([address] Hypertension, and Spina bifida aperta of lumbar spine ([facility] [address] has a past surgical history that includes incision and drainage (Bilateral, 2019); colostomy (N/A, 12/3/2019); Abdomen surgery (N/A, 2019); incision and drainage (N/A, 2019); Abdomen surgery (N/A, 12/10/2019); and pr explore scrotum (N/A, 12/10/2019). History was obtained from chart review: Becky Molina is a 37 y.o. with PMH of hypertension, diabetes and diabetic foot ulcer.  Who presented to [facility] a RW but for the last 2 yrs uses mostly wheelchair )  Receives Help From: Family, Home health(Mother reports she is available, HHA comes into home to assist a couple times a week per mother )  ADL Assistance: Needs assistance(occassional need for assistance per mother, increased asssitance needed in few weeks prior to admission)  Homemaking Assistance: Needs assistance(occassional need for assistance per mother, increased asssitance needed in few weeks prior to admission )  Homemaking Responsibilities: Yes(Per mother depends on the day, but able to cook and clean, some assist with laundry )  Ambulation Assistance: Independent(using manual w/c)  Transfer Assistance: Needs assistance(required more assist just prior to admission )  Active : No   Social functional history obtained from mother at this time d/t intubation/sedation. Mother reports he has been as independent as possible up until just prior to admission.  Mother reports function declined significantly in the last 2 years, has not ambulated in years per report of mother    Cognition   Cognition  Cognition Comment: JESSI, pt able to inconsistently follow simple commands with extra time on this date     Objective   PROM RLE (degrees)  RLE General PROM: ankle WFL , unable to assess other joints d/t pain and wounds at this time  PROM LLE (degrees)  LLE General PROM: JESSI at this time d/t wounds and dressings   PROM RUE (degrees)  RUE General PROM: WFL except shoulder ABD  PROM LUE (degrees)  LUE PROM: Exceptions  LUE General PROM: WFL except shoulder ROM , pt grimiced with movement of shoulder   Strength Other  Other: Pt displayed ability to move L UE against gravity on this date, no formal MMT performed today  Tone RLE  RLE Tone: Normotonic  Tone LLE  LLE Tone: Normotonic  Sensation  Overall Sensation Status: (JESSI on this date )  Bed mobility  Comment: Not assessed on this date   Transfers  Comment: Not assessed on this date, not appropriate

## 2019-12-13 NOTE — OP NOTE
Larry Pfeiffer  [id number]   DOS:  12/3/2019    PREOPERATIVE DIAGNOSIS:  Necrotizing fasciitis of perineum  POSTOPERATIVE DIAGNOSIS:  Necrotizing fasciitis of perineum  PROCEDURE:  LAPAROSCOPIC CONVERTED TO OPEN COLOSTOMY WITH LONG GEE'S, LYSIS OF ADHESIONS X 3.0 HOURS    ATTENDING SURGEON:  Miles Reagan MD, PhD  RESIDENT SURGEON:  Hussein Wild DO  ANESTHESIA:  Carolina Sergeant  EBL:  100cc  WOUND CLASS:  CONTAMINATED  SPECIMENS:  Sigmoid colon    INDICATIONS:  Mr. Kasey Carter is a 39yoM who presented on 11/29/2019 with a severe necrotizing soft tissue infection of the buttocks and perineum. He was taken emergently for debridement and his wound has been managed with a gauze dressing. Due to the large nature of the wound surrounding the anus, especially given that he has a history of prior stool incontinence, I strongly recommended to his mother (next of kin) that we proceed with colostomy to allow the wound to heal.  We discussed that we would try to do it as distal (low) as possible to allow for stool formation, but that it would be made harder by his body habitus. I also warned that it could be more difficult due to his prior operation. She had opportunity to ask questions. Informed consent was obtained. PROCEDURE:  The patient was escorted from the MICU to the operating suites. General anesthesia was induced by way of the previously placed endotracheal tube. The patient was placed in the supine position on the operating room table. The abdomen was prepped and draped in the usual sterile fashion. A formal time-out was done with all key personnel present confirming the correct, patient, procedure, antibiotics, fire-risk and equipment. Entry was made into the abdomen using the Sushant technique in the infraumbilical location. The skin was incised with a scalpel, followed by bovie cautery through the subcutaneous layers. The fascia was entered sharply under direct visualization.   A 12mm balloon port was introduced, and the abdomen was insufflated  to 15mmHg. A 5mm 30 degree laparoscope was introduced into the abdomen. There were significant adhesions noted in the RUQ were the prior shunt scar was located, and all of the bowel was fairly adherent. A 5mm port was introduced into the RLQ under direct visualization, which was made harder by the patient's thick subcutaneous layer requiring bariatric port length with steric hindrance. Another 5mm port was introduced into the left side of the abdomen. I attempted to release adhesions from the anterior abdominal wall, but the bowel was too matted and strongly adherent. Thus, I converted to an open procedure. The incision was lengthened around the umbilicus through the midline down the suprapubic area. The skin was incised with a scalpel, followed by cautery through the subcutaneous layers. The extensive adhesions were released from the anterior abdominal wall sharply and methodically. There was a significant amount of fluid in the abdomen around and about the prior incisions and adherent bowel, but I never found the prior shunt. The sigmoid colon was delivered from the retroperitoneum along the white line of Toldt. All of the bowel was densely adherent to itself and to nearby small bowel. The careful approach took well over 3.0 hours to release adhesions to give the colon enough mobility to reach the anterior abdominal wall. The colon was divided from the rectum using the CAROLINA 3.5mm stapler. There was a small segment on the distal portion that needed to be trimmed to allow for maximum length to the anterior abdominal wall. The second division occurred with a similar stapler. Care was taken to preserve the SERGE and to ensure that there was not tension on the vessel, distal colon, or mesentery. A colostomy was then created externally by lining up skin and fascial layers in an organized fashion.   A small skin hole was made sharply, then the subcutaneous adipose tissue, anterior fascia, muscle, and posterior fascia were divided in a longitudinal way to preserve tissue around the ostomy on the right side of the abdomen. The rectus and epigastric artery were preserved. The entire ostomy lined up well without any kinks or malalignment noted. The size was carefully confirmed. The end of the colon was then fed through the ostomy hole with the mesentery intact to preserve blood flow. It was oriented to protect the mesentery and orientation of the SERGE, which required a clockwise rotation about the SERGE. The closed bowel was left external to the abdomen. I then closed the fascia in a bidirectional fashion using #1 PDS suture. A 19Fr drain was left under the fascia to minimize fluid in this location to allow the fascia to heal.  Additional #1 PDS sutures were placed in a horizontal mattress fashion to approximate the skin for delayed closure and an irrigating wound vac dressing was applied. We then turned our attention to the formation of the ostomy, which was done in the standard [place] fashion. The bowel was opened by cautery and sharply only once the midline dressing was placed. [place] 3-0 vicryl sutures were placed around the ostomy. Additional full thickness 3-0 vicryl sutures were placed in the 'hands of a clock' position. An ostomy appliance was placed. The mucosa was above skin level and pink in color. The patient was awakened from anesthesia, but left intubated. He was returned in critical condition to the MICU by escort. He tolerated the procedure well overall. All sponge and needle counts were correct x 2. There were no known complications. The one specimen of distal sigmoid colon was sent to pathology. The attending was present for the entirety of the procedure. Dequan Garcia.  Ralph Vivas MD, PhD  12/13/19  3:32 PM

## 2019-12-14 LAB
ABSOLUTE EOS #: 0.54 K/UL (ref 0–0.4)
ABSOLUTE IMMATURE GRANULOCYTE: 0.22 K/UL (ref 0–0.3)
ABSOLUTE LYMPH #: 1.3 K/UL (ref 1–4.8)
ABSOLUTE MONO #: 0.86 K/UL (ref 0.1–0.8)
ANION GAP SERPL CALCULATED.3IONS-SCNC: 11 MMOL/L (ref 9–17)
BASOPHILS # BLD: 1 % (ref 0–2)
BASOPHILS ABSOLUTE: 0.11 K/UL (ref 0–0.2)
BUN BLDV-MCNC: 53 MG/DL (ref 6–20)
BUN/CREAT BLD: ABNORMAL (ref 9–20)
CALCIUM SERPL-MCNC: 8.5 MG/DL (ref 8.6–10.4)
CHLORIDE BLD-SCNC: 104 MMOL/L (ref 98–107)
CO2: 19 MMOL/L (ref 20–31)
CREAT SERPL-MCNC: 2.01 MG/DL (ref 0.7–1.2)
DIFFERENTIAL TYPE: ABNORMAL
EOSINOPHILS RELATIVE PERCENT: 5 % (ref 1–4)
GFR AFRICAN AMERICAN: 46 ML/MIN
GFR NON-AFRICAN AMERICAN: 38 ML/MIN
GFR SERPL CREATININE-BSD FRML MDRD: ABNORMAL ML/MIN/{1.73_M2}
GFR SERPL CREATININE-BSD FRML MDRD: ABNORMAL ML/MIN/{1.73_M2}
GLUCOSE BLD-MCNC: 149 MG/DL (ref 75–110)
GLUCOSE BLD-MCNC: 160 MG/DL (ref 75–110)
GLUCOSE BLD-MCNC: 166 MG/DL (ref 75–110)
GLUCOSE BLD-MCNC: 179 MG/DL (ref 70–99)
HCT VFR BLD CALC: 25.5 % (ref 40.7–50.3)
HEMOGLOBIN: 7.3 G/DL (ref 13–17)
IMMATURE GRANULOCYTES: 2 %
LYMPHOCYTES # BLD: 12 % (ref 24–44)
MAGNESIUM: 1.9 MG/DL (ref 1.6–2.6)
MCH RBC QN AUTO: 27.7 PG (ref 25.2–33.5)
MCHC RBC AUTO-ENTMCNC: 28.6 G/DL (ref 28.4–34.8)
MCV RBC AUTO: 96.6 FL (ref 82.6–102.9)
MONOCYTES # BLD: 8 % (ref 1–7)
MORPHOLOGY: ABNORMAL
MORPHOLOGY: ABNORMAL
NRBC AUTOMATED: 0.3 PER 100 WBC
PDW BLD-RTO: 22.6 % (ref 11.8–14.4)
PHOSPHORUS: 4.5 MG/DL (ref 2.5–4.5)
PLATELET # BLD: 224 K/UL (ref 138–453)
PLATELET ESTIMATE: ABNORMAL
PMV BLD AUTO: 8.7 FL (ref 8.1–13.5)
POTASSIUM SERPL-SCNC: 4 MMOL/L (ref 3.7–5.3)
RBC # BLD: 2.64 M/UL (ref 4.21–5.77)
RBC # BLD: ABNORMAL 10*6/UL
SEG NEUTROPHILS: 72 % (ref 36–66)
SEGMENTED NEUTROPHILS ABSOLUTE COUNT: 7.77 K/UL (ref 1.8–7.7)
SODIUM BLD-SCNC: 134 MMOL/L (ref 135–144)
WBC # BLD: 10.8 K/UL (ref 3.5–11.3)
WBC # BLD: ABNORMAL 10*3/UL

## 2019-12-14 PROCEDURE — 99291 CRITICAL CARE FIRST HOUR: CPT | Performed by: INTERNAL MEDICINE

## 2019-12-14 PROCEDURE — 6360000002 HC RX W HCPCS: Performed by: STUDENT IN AN ORGANIZED HEALTH CARE EDUCATION/TRAINING PROGRAM

## 2019-12-14 PROCEDURE — 6360000002 HC RX W HCPCS: Performed by: INTERNAL MEDICINE

## 2019-12-14 PROCEDURE — 6370000000 HC RX 637 (ALT 250 FOR IP): Performed by: STUDENT IN AN ORGANIZED HEALTH CARE EDUCATION/TRAINING PROGRAM

## 2019-12-14 PROCEDURE — 36415 COLL VENOUS BLD VENIPUNCTURE: CPT

## 2019-12-14 PROCEDURE — 99233 SBSQ HOSP IP/OBS HIGH 50: CPT | Performed by: INTERNAL MEDICINE

## 2019-12-14 PROCEDURE — 82947 ASSAY GLUCOSE BLOOD QUANT: CPT

## 2019-12-14 PROCEDURE — 90935 HEMODIALYSIS ONE EVALUATION: CPT

## 2019-12-14 PROCEDURE — P9047 ALBUMIN (HUMAN), 25%, 50ML: HCPCS | Performed by: INTERNAL MEDICINE

## 2019-12-14 PROCEDURE — 80048 BASIC METABOLIC PNL TOTAL CA: CPT

## 2019-12-14 PROCEDURE — 94770 HC ETCO2 MONITOR DAILY: CPT

## 2019-12-14 PROCEDURE — 94003 VENT MGMT INPAT SUBQ DAY: CPT

## 2019-12-14 PROCEDURE — 84100 ASSAY OF PHOSPHORUS: CPT

## 2019-12-14 PROCEDURE — 2500000003 HC RX 250 WO HCPCS: Performed by: INTERNAL MEDICINE

## 2019-12-14 PROCEDURE — 2700000000 HC OXYGEN THERAPY PER DAY

## 2019-12-14 PROCEDURE — 2580000003 HC RX 258: Performed by: STUDENT IN AN ORGANIZED HEALTH CARE EDUCATION/TRAINING PROGRAM

## 2019-12-14 PROCEDURE — 85025 COMPLETE CBC W/AUTO DIFF WBC: CPT

## 2019-12-14 PROCEDURE — 83735 ASSAY OF MAGNESIUM: CPT

## 2019-12-14 PROCEDURE — 2000000000 HC ICU R&B

## 2019-12-14 PROCEDURE — 94761 N-INVAS EAR/PLS OXIMETRY MLT: CPT

## 2019-12-14 RX ORDER — 0.9 % SODIUM CHLORIDE 0.9 %
150 INTRAVENOUS SOLUTION INTRAVENOUS PRN
Status: DISCONTINUED | OUTPATIENT
Start: 2019-12-14 | End: 2020-01-15

## 2019-12-14 RX ORDER — MAGNESIUM HYDROXIDE 1200 MG/15ML
LIQUID ORAL
Status: DISCONTINUED
Start: 2019-12-14 | End: 2019-12-15

## 2019-12-14 RX ORDER — 0.9 % SODIUM CHLORIDE 0.9 %
250 INTRAVENOUS SOLUTION INTRAVENOUS PRN
Status: DISCONTINUED | OUTPATIENT
Start: 2019-12-14 | End: 2020-01-15

## 2019-12-14 RX ORDER — ALBUMIN (HUMAN) 12.5 G/50ML
25 SOLUTION INTRAVENOUS PRN
Status: DISCONTINUED | OUTPATIENT
Start: 2019-12-14 | End: 2020-01-16 | Stop reason: HOSPADM

## 2019-12-14 RX ADMIN — OXYCODONE HYDROCHLORIDE 10 MG: 5 TABLET ORAL at 17:19

## 2019-12-14 RX ADMIN — Medication 200 MCG/HR: at 08:53

## 2019-12-14 RX ADMIN — HEPARIN SODIUM 5000 UNITS: 5000 INJECTION INTRAVENOUS; SUBCUTANEOUS at 21:14

## 2019-12-14 RX ADMIN — PROPOFOL 20 MCG/KG/MIN: 10 INJECTION, EMULSION INTRAVENOUS at 22:34

## 2019-12-14 RX ADMIN — HEPARIN SODIUM 1500 UNITS: 1000 INJECTION INTRAVENOUS; SUBCUTANEOUS at 12:00

## 2019-12-14 RX ADMIN — HEPARIN SODIUM 5000 UNITS: 5000 INJECTION INTRAVENOUS; SUBCUTANEOUS at 06:23

## 2019-12-14 RX ADMIN — MEROPENEM 1 G: 1 INJECTION, POWDER, FOR SOLUTION INTRAVENOUS at 21:14

## 2019-12-14 RX ADMIN — INSULIN LISPRO 3 UNITS: 100 INJECTION, SOLUTION INTRAVENOUS; SUBCUTANEOUS at 17:25

## 2019-12-14 RX ADMIN — MEROPENEM 1 G: 1 INJECTION, POWDER, FOR SOLUTION INTRAVENOUS at 12:00

## 2019-12-14 RX ADMIN — SODIUM CHLORIDE, PRESERVATIVE FREE 10 ML: 5 INJECTION INTRAVENOUS at 14:22

## 2019-12-14 RX ADMIN — PROPOFOL 20 MCG/KG/MIN: 10 INJECTION, EMULSION INTRAVENOUS at 05:19

## 2019-12-14 RX ADMIN — Medication 200 MCG/HR: at 13:14

## 2019-12-14 RX ADMIN — MIDODRINE HYDROCHLORIDE 10 MG: 5 TABLET ORAL at 09:49

## 2019-12-14 RX ADMIN — INSULIN LISPRO 2 UNITS: 100 INJECTION, SOLUTION INTRAVENOUS; SUBCUTANEOUS at 21:14

## 2019-12-14 RX ADMIN — SODIUM CHLORIDE, PRESERVATIVE FREE 10 ML: 5 INJECTION INTRAVENOUS at 21:13

## 2019-12-14 RX ADMIN — Medication 200 MCG/HR: at 17:25

## 2019-12-14 RX ADMIN — INSULIN LISPRO 3 UNITS: 100 INJECTION, SOLUTION INTRAVENOUS; SUBCUTANEOUS at 14:21

## 2019-12-14 RX ADMIN — Medication 200 MCG/HR: at 22:29

## 2019-12-14 RX ADMIN — POTASSIUM CHLORIDE: 2 INJECTION, SOLUTION, CONCENTRATE INTRAVENOUS at 20:36

## 2019-12-14 RX ADMIN — HEPARIN SODIUM 5000 UNITS: 5000 INJECTION INTRAVENOUS; SUBCUTANEOUS at 14:21

## 2019-12-14 RX ADMIN — MINERAL OIL, PETROLATUM: 425; 568 OINTMENT OPHTHALMIC at 05:17

## 2019-12-14 RX ADMIN — Medication: at 09:00

## 2019-12-14 RX ADMIN — Medication 200 MCG/HR: at 04:27

## 2019-12-14 RX ADMIN — HEPARIN SODIUM 1600 UNITS: 1000 INJECTION INTRAVENOUS; SUBCUTANEOUS at 12:00

## 2019-12-14 RX ADMIN — OXYCODONE HYDROCHLORIDE 10 MG: 5 TABLET ORAL at 02:21

## 2019-12-14 RX ADMIN — PROPOFOL 19.98 MCG/KG/MIN: 10 INJECTION, EMULSION INTRAVENOUS at 09:54

## 2019-12-14 RX ADMIN — INSULIN LISPRO 3 UNITS: 100 INJECTION, SOLUTION INTRAVENOUS; SUBCUTANEOUS at 08:56

## 2019-12-14 ASSESSMENT — PAIN SCALES - GENERAL: PAINLEVEL_OUTOF10: 7

## 2019-12-14 ASSESSMENT — PULMONARY FUNCTION TESTS
PIF_VALUE: 12
PIF_VALUE: 14
PIF_VALUE: 12
PIF_VALUE: 15
PIF_VALUE: 19
PIF_VALUE: 14
PIF_VALUE: 10
PIF_VALUE: 9

## 2019-12-14 NOTE — PLAN OF CARE
Problem: Pain:  Goal: Pain level will decrease  Description  Pain level will decrease  12/14/2019 0542 by Jayce Kay RN  Outcome: Ongoing     Problem: Pain:  Goal: Control of acute pain  Description  Control of acute pain  12/14/2019 0542 by Jayce Kay RN  Outcome: Ongoing     Problem: Pain:  Goal: Control of chronic pain  Description  Control of chronic pain  12/14/2019 0542 by Jayce Kay RN  Outcome: Ongoing     Problem: Skin Integrity:  Goal: Will show no infection signs and symptoms  Description  Will show no infection signs and symptoms  12/14/2019 0542 by Jayce Kay RN  Outcome: Ongoing     Problem: Skin Integrity:  Goal: Absence of new skin breakdown  Description  Absence of new skin breakdown  12/14/2019 0542 by Jayce Kay RN  Outcome: Ongoing     Problem: OXYGENATION/RESPIRATORY FUNCTION  Goal: Patient will maintain patent airway  12/14/2019 0542 by Jayce Kay RN  Outcome: Ongoing     Problem: OXYGENATION/RESPIRATORY FUNCTION  Goal: Patient will achieve/maintain normal respiratory rate/effort  Description  Respiratory rate and effort will be within normal limits for the patient  12/14/2019 0542 by Jayce Kay RN  Outcome: Ongoing     Problem: MECHANICAL VENTILATION  Goal: Patient will maintain patent airway  12/14/2019 0542 by Jayce Kay RN  Outcome: Ongoing     Problem: MECHANICAL VENTILATION  Goal: Oral health is maintained or improved  12/14/2019 0542 by Jayce Kay RN  Outcome: Ongoing     Problem: MECHANICAL VENTILATION  Goal: ET tube will be managed safely  12/14/2019 0542 by Jayce Kay RN  Outcome: Ongoing     Problem: MECHANICAL VENTILATION  Goal: Ability to express needs and understand communication  12/14/2019 0542 by Jayce Kay RN  Outcome: Ongoing     Problem: MECHANICAL VENTILATION  Goal: Mobility/activity is maintained at optimum level for patient  12/14/2019 0542 by Jayce Kay RN  Outcome: Ongoing     Problem: SKIN INTEGRITY  Goal: Skin integrity is maintained or improved  12/14/2019 0542 by Lj Spears RN  Outcome: Ongoing     Problem: Falls - Risk of:  Goal: Will remain free from falls  Description  Will remain free from falls  12/14/2019 0542 by Lj Spears RN  Outcome: Ongoing     Problem: Falls - Risk of:  Goal: Absence of physical injury  Description  Absence of physical injury  12/14/2019 0542 by Lj Spears RN  Outcome: Ongoing     Problem: Risk for Impaired Skin Integrity  Goal: Tissue integrity - skin and mucous membranes  Description  Structural intactness and normal physiological function of skin and  mucous membranes.   12/14/2019 0542 by Lj Spears RN  Outcome: Ongoing     Problem: Nutrition  Goal: Optimal nutrition therapy  Description  Nutrition Problem: Inadequate oral intake  Intervention: Food and/or Nutrient Delivery: Start Parenteral Nutrition  Nutritional Goals: Meet % of estimated nutrition needs   12/14/2019 0542 by Lj Spears RN  Outcome: Ongoing     Problem: Confusion - Acute:  Goal: Absence of continued neurological deterioration signs and symptoms  Description  Absence of continued neurological deterioration signs and symptoms  12/14/2019 0542 by Lj Spears RN  Outcome: Ongoing     Problem: Confusion - Acute:  Goal: Mental status will be restored to baseline  Description  Mental status will be restored to baseline  12/14/2019 0542 by Lj Spears RN  Outcome: Ongoing     Problem: Discharge Planning:  Goal: Ability to perform activities of daily living will improve  Description  Ability to perform activities of daily living will improve  12/14/2019 0542 by Lj Spears RN  Outcome: Ongoing     Problem: Discharge Planning:  Goal: Participates in care planning  Description  Participates in care planning  12/14/2019 0542 by Lj Spears RN  Outcome: Ongoing     Problem: Injury - Risk of, Physical Injury:  Goal: Will remain free from falls  Description  Will remain free from falls  12/14/2019 0542 by Marie James RN  Outcome: Ongoing     Problem: Injury - Risk of, Physical Injury:  Goal: Absence of physical injury  Description  Absence of physical injury  12/14/2019 0542 by Marie James RN  Outcome: Ongoing     Problem: Mood - Altered:  Goal: Mood stable  Description  Mood stable  12/14/2019 0542 by Marie James RN  Outcome: Ongoing     Problem: Mood - Altered:  Goal: Absence of abusive behavior  Description  Absence of abusive behavior  12/14/2019 0542 by Marie James RN  Outcome: Ongoing     Problem: Mood - Altered:  Goal: Verbalizations of feeling emotionally comfortable while being cared for will increase  Description  Verbalizations of feeling emotionally comfortable while being cared for will increase  12/14/2019 0542 by Marie James RN  Outcome: Ongoing     Problem: Psychomotor Activity - Altered:  Goal: Absence of psychomotor disturbance signs and symptoms  Description  Absence of psychomotor disturbance signs and symptoms  12/14/2019 0542 by Marie James RN  Outcome: Ongoing     Problem: Sensory Perception - Impaired:  Goal: Demonstrations of improved sensory functioning will increase  Description  Demonstrations of improved sensory functioning will increase  12/14/2019 0542 by Marie James RN  Outcome: Ongoing     Problem: Sensory Perception - Impaired:  Goal: Decrease in sensory misperception frequency  Description  Decrease in sensory misperception frequency  12/14/2019 0542 by Marie James RN  Outcome: Ongoing     Problem: Sensory Perception - Impaired:  Goal: Able to refrain from responding to false sensory perceptions  Description  Able to refrain from responding to false sensory perceptions  12/14/2019 0542 by Marie James RN  Outcome: Ongoing     Problem: Sensory Perception - Impaired:  Goal: Demonstrates accurate environmental perceptions  Description  Demonstrates accurate environmental perceptions  12/14/2019 0542 by Marie James

## 2019-12-14 NOTE — PROGRESS NOTES
Dialysis Post Treatment Note  Vitals:    12/13/19 1839   BP: (!) 157/87   Pulse: 112   Resp: 22   Temp: 97.9 °F (36.6 °C)   SpO2:      Pre-Weight = 183.5 kg  Post-weight = Weight: (!) 398 lb 13 oz (180.9 kg)  Total Liters Processed = Total Liters Processed (l/min): 41.5 l/min  Rinseback Volume (mL) = Rinseback Volume (ml): 80 ml  Net Removal (mL) = @[id number]  Type of access used= L IJ catheter  Length of treatment=171 mins    Pt tolerated tx well, pt did not appear to be restless or agitated during tx, pt was tacky and hypertensive during tx, pt scrotum, buttocks, and abdominal dressings were changed during tx, arterial pressures high and port not pulling well at initiation of tx, flushed ports and reversed lines, venous pressures high throughout tx, machine detected air, flushed system throughout with NS and removed air bubble, system clotted and pt had 39 mins left of tx, MD notified, per Dr. Alia Khalil, I put in a H&H lab, did not want catheter replaced today, stated IR is closed, informed floor RN [facility] of everything that happened during tx.

## 2019-12-14 NOTE — PROGRESS NOTES
Dressing change completed at bedside. No further debridement needed acutely. Per RN, some leakage of urine noted. Will need Urology in [facility] next debridement to further evaluate.          Electronically signed by Saúl Gillespie DO on 12/14/2019 at 5:22 PM

## 2019-12-14 NOTE — PROGRESS NOTES
Nephrology Progress Note        Subjective: patient evaluated . Patient is status post debridement of necrotizing fasciitis involving the gluteal, perianal and rectal area. Patient is also status post diverting end colostomy. Patient underwent incision and debridement of necrotizing gluteal wound and scrotal wound few days ago . Patient currently is on fentanyl drip and Versed. Not on any pressors . Receiving TPN. Patient on IV antibiotics per ID. UROP remains minimal . Pt has needed dialysis intermittently   Surgery and ID notes reviewed    Objective:  CURRENT TEMPERATURE:  Temp: 97.9 °F (36.6 °C)  MAXIMUM TEMPERATURE OVER 24HRS:  Temp (24hrs), Av.1 °F (36.7 °C), Min:97.3 °F (36.3 °C), Max:99.1 °F (37.3 °C)    CURRENT RESPIRATORY RATE:  Resp: 20  CURRENT PULSE:  Pulse: 100  CURRENT BLOOD PRESSURE:  BP: (!) 110/54  24HR BLOOD PRESSURE RANGE:  Systolic (32KAU), FEJ:083 , Min:94 , UWK:271   ; Diastolic (18CZX), GQW:66, Min:49, Max:150    24HR INTAKE/OUTPUT:      Intake/Output Summary (Last 24 hours) at 2019 1000  Last data filed at 2019 0600  Gross per 24 hour   Intake 2904.9 ml   Output 2616 ml   Net 288.9 ml     Patient Vitals for the past 96 hrs (Last 3 readings):   Weight   19 0900 (!) 402 lb 9 oz (182.6 kg)   19 0516 (!) 402 lb 9 oz (182.6 kg)   19 1839 (!) 398 lb 13 oz (180.9 kg)         Physical Exam:  General appearance: Intubated, on fentanyl and Versed  Skin: warm and dry, no rash or erythema  Eyes: Pale conjunctivae   ENT: ETT in  place  Neck: No carotid bruits, No Thyromegaly  Pulmonary: Sounds appear somewhat diminished  Cardiovascular: normal S1 and S2. NO rubs and NO murmur.     Abdomen: Colostomy noted, bowel sounds absent , midline incision noted,     Extremities: + edema     Current Medications:    0.9 % sodium chloride bolus, PRN  0.9 % sodium chloride bolus, PRN  albumin human 25 % IV solution 25 g, PRN  vitamin B-1 (THIAMINE) tablet 100 mg, elements of all parts of the encounter with the resident/fellow. I have seen and examined the patient with the resident/fellow. I agree with the assessment and plan and status of the problem list as documented.       Dl Bashir MD

## 2019-12-14 NOTE — PROGRESS NOTES
Sima Root, [place]  Urology Progress Note    Subjective: POD#4 s/p re-debridement of Ashley's with Dr. Ramandeep Jean in conjunction with general surgery.  Remains intubated, no acute events overnight per nurse, other than more reactive to stimuli, pulling at lines    Patient Vitals for the past 24 hrs:   BP Temp Temp src Pulse Resp SpO2 Weight   12/14/19 0800 (!) 170/87 -- -- 113 20 -- --   12/14/19 0738 -- -- -- 105 (!) 5 99 % --   12/14/19 0737 -- -- -- 103 9 99 % --   12/14/19 0700 111/66 -- -- 99 (!) 5 98 % --   12/14/19 0600 117/62 -- -- 102 (!) 0 98 % --   12/14/19 0545 122/68 -- -- 103 14 98 % --   12/14/19 0530 (!) 157/86 -- -- 110 22 98 % --   12/14/19 0516 -- -- -- -- -- -- (!) 402 lb 9 oz (182.6 kg)   12/14/19 0515 (!) 145/78 -- -- 110 (!) 0 98 % --   12/14/19 0500 (!) 156/103 -- -- 115 23 97 % --   12/14/19 0445 (!) 147/93 -- -- 114 16 91 % --   12/14/19 0430 (!) 149/88 -- -- 110 19 98 % --   12/14/19 0415 (!) 143/80 -- -- 111 23 98 % --   12/14/19 0400 133/69 98.2 °F (36.8 °C) Axillary 102 17 97 % --   12/14/19 0345 139/73 -- -- 102 19 97 % --   12/14/19 0330 (!) 148/72 -- -- 107 11 97 % --   12/14/19 0326 -- -- -- 112 25 98 % --   12/14/19 0315 (!) 110/56 -- -- 101 21 97 % --   12/14/19 0300 (!) 111/58 -- -- 101 16 97 % --   12/14/19 0245 122/62 -- -- 104 18 96 % --   12/14/19 0230 (!) 159/84 -- -- 110 13 97 % --   12/14/19 0215 (!) 177/97 -- -- 110 16 97 % --   12/14/19 0200 (!) 171/95 99.1 °F (37.3 °C) Axillary 110 15 98 % --   12/14/19 0145 -- -- -- 118 19 98 % --   12/14/19 0130 (!) 168/97 -- -- 110 15 99 % --   12/14/19 0115 (!) 176/105 -- -- 117 20 99 % --   12/14/19 0100 (!) 159/91 -- -- 106 21 99 % --   12/14/19 0045 125/74 -- -- 102 14 98 % --   12/14/19 0030 122/77 -- -- 100 12 98 % --   12/14/19 0015 (!) 98/49 -- -- 94 21 97 % --   12/14/19 0000 (!) 107/57 98.8 °F (37.1 °C) Axillary 98 22 97 % --   12/13/19 2354 -- -- -- 97 23 97 % --   12/13/19 2345 (!) 96/50 -- -- 98 24 97 % --   12/13/19 2330 (!) 94/52 -- -- 100 21 97 % --   12/13/19 2315 122/73 -- -- 103 22 96 % --   12/13/19 2300 129/72 -- -- 107 17 97 % --   12/13/19 2245 128/81 -- -- 108 14 97 % --   12/13/19 2230 117/63 -- -- 106 23 96 % --   12/13/19 2215 (!) 161/150 -- -- 120 8 97 % --   12/13/19 2200 (!) 191/122 -- -- 124 16 97 % --   12/13/19 2145 (!) 160/102 -- -- 116 18 95 % --   12/13/19 2130 (!) 153/101 -- -- 110 9 96 % --   12/13/19 2115 135/87 -- -- 109 (!) 6 97 % --   12/13/19 2100 128/80 97.5 °F (36.4 °C) Axillary 106 (!) 0 96 % --   12/13/19 2045 124/76 -- -- 106 (!) 0 96 % --   12/13/19 2030 118/73 -- -- 106 (!) 0 97 % --   12/13/19 2023 -- -- -- 105 (!) 0 97 % --   12/13/19 2015 118/63 -- -- 106 (!) 0 97 % --   12/13/19 2000 130/81 -- -- 107 (!) 0 97 % --   12/13/19 1945 139/77 -- -- 111 (!) 0 97 % --   12/13/19 1930 (!) 157/91 -- -- 112 (!) 0 97 % --   12/13/19 1915 (!) 149/72 -- -- 108 (!) 0 97 % --   12/13/19 1900 (!) 155/119 -- -- 116 (!) 31 97 % --   12/13/19 1845 (!) 154/92 -- -- 113 25 97 % --   12/13/19 1839 (!) 157/87 97.9 °F (36.6 °C) -- 112 22 -- (!) 398 lb 13 oz (180.9 kg)   12/13/19 1830 (!) 157/87 -- -- 111 22 98 % --   12/13/19 1820 (!) 160/86 -- -- 114 -- -- --   12/13/19 1815 (!) 160/86 -- -- 113 25 95 % --   12/13/19 1800 (!) 152/88 -- -- 112 25 97 % --   12/13/19 1756 (!) 155/83 -- -- 109 -- -- --   12/13/19 1745 (!) 155/83 -- -- 107 21 96 % --   12/13/19 1730 (!) 165/96 -- -- 116 22 98 % --   12/13/19 1726 (!) 165/96 -- -- 110 -- -- --   12/13/19 1715 (!) 166/100 -- -- 109 24 96 % --   12/13/19 1700 (!) 157/88 -- -- 107 23 97 % --   12/13/19 1656 (!) 157/88 -- -- 107 -- -- --   12/13/19 1645 (!) 164/97 -- -- 113 26 98 % --   12/13/19 1630 (!) 156/91 -- -- 109 26 98 % --   12/13/19 1626 (!) 156/91 -- -- 111 -- -- --   12/13/19 1615 (!) 158/98 -- -- 111 27 99 % --   12/13/19 1600 (!) 160/95 -- -- 109 25 97 % --   12/13/19 1556 (!) 160/95 -- -- 110 -- -- --   12/13/19 1545 131/75 --

## 2019-12-14 NOTE — PROGRESS NOTES
but switched due to need to reduce fluid load with poor LVEF. Strep and Gram negative in initial cultures  · Strict glucose control <180  · LLE wound per podiatry  · Nephrology following. SUBJECTIVE    Patient seen and examined. No acute changes. Patient tolerated HD yesterday. He is becoming more purposeful per nursing staff. OBJECTIVE  VITALS: Temp: Temp: 98.2 °F (36.8 °C)Temp  Av.2 °F (36.8 °C)  Min: 97.3 °F (36.3 °C)  Max: 99.1 °F (93.3 °C) BP Systolic (09QHP), PXU:665 , Min:94 , PRJ:355   Diastolic (78JTH), URS:46, Min:49, Max:150   Pulse Pulse  Av.2  Min: 94  Max: 124 Resp Resp  Avg: 15.6  Min: 0  Max: 31 Pulse ox SpO2  Av.4 %  Min: 82 %  Max: 99 %     GENERAL: intubated and sedated. NAD  NEURO: moves uppers off sedation. Not following commands, making purposeful movements   LUNGS: vented   HEART: RRR  ABDOMEN: soft, ND, +grimace with palpation. Ostomy is patent, bowel succus in appliance, no flatus. prevena vac to midline incision  SKIN/SOFT TISSUE: dressing remains intact, with no significant change in drainage, plan to change at bedside today  EXTREMITY: dressing to L foot is c/d/i. No cyanosis     I/O last 3 completed shifts:   In: 5437 [I.V.:1698.5; NG/GT:175]  Out: 2626 [Urine:36]    Drain/tube output:  In: 2259.5 [I.V.:1161.2; NG/GT:134]  Out: 5101 [Urine:23]    LAB:  CBC:   Recent Labs     19  0435 19  0521   WBC 11.4* 12.4*  --  10.8   HGB 7.7* 8.0* 7.9* 7.3*   HCT 26.3* 27.3* 26.7* 25.5*   MCV 92.9 94.1  --  96.6    281  --  224     BMP:   Recent Labs     19/13/19  0435 19  0521    133* 134*   K 3.7 4.1 4.0    103 104   CO2 19* 18* 19*   BUN 46* 56* 53*   CREATININE 2.14* 2.33* 2.01*   GLUCOSE 162* 189* 179*         RADIOLOGY:   no new images   No      Mireya Low DO  2019  8:30 AM      Trauma Attending Attestation      I have reviewed the above TECSS note(s) and confirmed the key elements of the medical history and physical exam. I have seen and examined the pt. I have discussed the findings, established the care plan and recommendations with Resident, GCS RN, bedside nurse. I personally reviewed any images in real time to expedite the patient's care. Midline wound incisional (purple) vac holding suction. Sacral and perineal wound seen on evening rounds with some fibrinous exudate in base, no evidence of progression of infection. Groin wound with significant tunneling along right inguinal crease and over suprapubic location. Concern for possible urine leakage from urethra.       Zeny Barragan MD  12/14/2019  6:07 PM

## 2019-12-14 NOTE — PROGRESS NOTES
Condensation: Drained  Oral Care Completed?: Yes  Oral Care: Mouthwash, Mouth swabbed, Mouth suctioned  Subglottic Suction Done?: Yes  Cuff Pressure (cm H2O): 28 cm H2O  Skin barrier applied: No    ABGs:  Arterial Blood Gas result:  pO2 107.4; pCO2 32.9; pH 7.29;  HCO3 16, %O2 Sat 98. Laboratory findings:    Complete Blood Count:   Recent Labs     12/12/19 0411 12/13/19 0435 12/13/19 1924 12/14/19  0521   WBC 11.4* 12.4*  --  10.8   HGB 7.7* 8.0* 7.9* 7.3*   HCT 26.3* 27.3* 26.7* 25.5*    281  --  224        Last 3 Blood Glucose:   Recent Labs     12/12/19 0411 12/13/19 0435 12/14/19  0521   GLUCOSE 162* 189* 179*        PT/INR:    Lab Results   Component Value Date    PROTIME 11.9 11/29/2019    INR 1.1 11/29/2019     PTT:  No results found for: APTT, PTT    Comprehensive Metabolic Profile:   Recent Labs     12/12/19 0411 12/13/19 0435 12/14/19  0521    133* 134*   K 3.7 4.1 4.0    103 104   CO2 19* 18* 19*   BUN 46* 56* 53*   CREATININE 2.14* 2.33* 2.01*   GLUCOSE 162* 189* 179*   CALCIUM 8.3* 8.8 8.5*      Magnesium:   Lab Results   Component Value Date    MG 1.9 12/14/2019     Phosphorus:   Lab Results   Component Value Date    PHOS 4.5 12/14/2019     Ionized Calcium:   Lab Results   Component Value Date    CAION 1.05 12/03/2019            Troponin: No results for input(s): TROPONINI in the last 72 hours. Radiology/Imaging:     Chest Xray (12/14/2019):    ASSESSMENT:     Principal Problem:    Necrotizing fasciitis ([facility] [address]  Active Problems:    Hypertension    Diabetes ([facility] [address]    Diabetic foot ulcer ([facility] [address]    Septic shock ([facility] [address]    Bandemia  Resolved Problems:    * No resolved hospital problems. *    PLAN:     WEAN PER PROTOCOL:  [x] No   [] Yes  [] N/A    DISCONTINUE ANY LABS:   [x] No   [] Yes    TRANSFER OUT OF ICU:   [x] No   [] Yes    ADDITIONAL PLAN:    1. Pressure ulcer cant be staged present on admission / Necrotizing Faciitis involving buttocks and scrotum - s/p POD#145 s/p wide complex debridement of gluteal/perianal region. region extensive necrotic tissue that involved the anus and distal rectum. Had  debridement, washout of gluteal and perineal, diverting loop colostomy  Bedside debridement done on 12/4, dressing changes with him on site will continue to do wet-to-dry dressing changes as per  surgery. - continue meropenem (started 12/1) per ID   - (12/10) Repeat debridement with general surgery and urology   -Possible OR tomorrow  for wound VAC placement  2. Septic shock -resolved, off pressors  - Blood cultures drawn 12/2/19:  No growth     3. DEBBY on CKD -     Urology recs: Meroponen empirically, maintain lema for continued drainage.   -Urine culture : Grew Proteus mirabilis, ID is aware of it  -Nephrology recs:  Patient 7 dialysis till now. Renal ultrasound: Right renal cyst, otherwise grossly negative renal   Ultrasound, non diagnostic bladder assessment. 4. Diabetes -high-dose correction POC glucose checks every 4 to every 6, check back to q 4 if the glucose levels constantly remains high over 200   5. Diabetic left foot ulcer -dietary on board. Wound care. No surgical intervention for now    6. Anemia: Hgb (7.3.0),   received  7uPRBC since admission     1. Feeding NPO for OR today   2. Fluids - KVO  3. Family - none at bedside   4. Analgesic -fentanyl drip   5. Sedation: Propofol  6. Thrombo-prophylaxis [] Enoxaparin  [x] Unfract. Heparin Subcut (was held this AM for OR)  [] EPC Cuffs  7. Mobility bed rest and PT/ OT   8. Head of bed up: yes   9. Ulcer prophylaxis - pepcid   10. Glycemic control: High dose correction  11. Spontaneous breathing trial: Wean trial today  12. Bowel regimen/urine output: milk of mag PRN, 45 ml UOP in last 24 hours  13. Indwelling catheter/lines CVC RIJ, OG, Lema  14.  De-escalation: Labs for POC glucose checks and add thiamin and folic acid PO       Ashley Borja MD  Critical care resident   [address], [place]            12/14/2019, 12:08 PM

## 2019-12-14 NOTE — PLAN OF CARE
Problem: Pain:  Goal: Pain level will decrease  Description  Pain level will decrease  Outcome: Ongoing  Goal: Control of acute pain  Description  Control of acute pain  Outcome: Ongoing  Goal: Control of chronic pain  Description  Control of chronic pain  Outcome: Ongoing     Problem: Skin Integrity:  Goal: Will show no infection signs and symptoms  Description  Will show no infection signs and symptoms  Outcome: Ongoing  Goal: Absence of new skin breakdown  Description  Absence of new skin breakdown  Outcome: Ongoing     Problem: OXYGENATION/RESPIRATORY FUNCTION  Goal: Patient will maintain patent airway  Outcome: Ongoing  Goal: Patient will achieve/maintain normal respiratory rate/effort  Description  Respiratory rate and effort will be within normal limits for the patient  Outcome: Ongoing     Problem: MECHANICAL VENTILATION  Goal: Patient will maintain patent airway  Outcome: Ongoing  Goal: Oral health is maintained or improved  Outcome: Ongoing  Goal: ET tube will be managed safely  Outcome: Ongoing  Goal: Ability to express needs and understand communication  Outcome: Ongoing  Goal: Mobility/activity is maintained at optimum level for patient  Outcome: Ongoing     Problem: SKIN INTEGRITY  Goal: Skin integrity is maintained or improved  Outcome: Ongoing     Problem: Falls - Risk of:  Goal: Will remain free from falls  Description  Will remain free from falls  Outcome: Ongoing  Goal: Absence of physical injury  Description  Absence of physical injury  Outcome: Ongoing     Problem: Risk for Impaired Skin Integrity  Goal: Tissue integrity - skin and mucous membranes  Description  Structural intactness and normal physiological function of skin and  mucous membranes.   Outcome: Ongoing     Problem: Nutrition  Goal: Optimal nutrition therapy  Description  Nutrition Problem: Inadequate oral intake  Intervention: Food and/or Nutrient Delivery: Start Parenteral Nutrition  Nutritional Goals: Meet % of estimated nutrition needs   Outcome: Ongoing     Problem: Confusion - Acute:  Goal: Absence of continued neurological deterioration signs and symptoms  Description  Absence of continued neurological deterioration signs and symptoms  Outcome: Ongoing  Goal: Mental status will be restored to baseline  Description  Mental status will be restored to baseline  Outcome: Ongoing     Problem: Discharge Planning:  Goal: Ability to perform activities of daily living will improve  Description  Ability to perform activities of daily living will improve  Outcome: Ongoing  Goal: Participates in care planning  Description  Participates in care planning  Outcome: Ongoing     Problem: Injury - Risk of, Physical Injury:  Goal: Will remain free from falls  Description  Will remain free from falls  Outcome: Ongoing  Goal: Absence of physical injury  Description  Absence of physical injury  Outcome: Ongoing     Problem: Mood - Altered:  Goal: Mood stable  Description  Mood stable  Outcome: Ongoing  Goal: Absence of abusive behavior  Description  Absence of abusive behavior  Outcome: Ongoing  Goal: Verbalizations of feeling emotionally comfortable while being cared for will increase  Description  Verbalizations of feeling emotionally comfortable while being cared for will increase  Outcome: Ongoing     Problem: Psychomotor Activity - Altered:  Goal: Absence of psychomotor disturbance signs and symptoms  Description  Absence of psychomotor disturbance signs and symptoms  Outcome: Ongoing     Problem: Sensory Perception - Impaired:  Goal: Demonstrations of improved sensory functioning will increase  Description  Demonstrations of improved sensory functioning will increase  Outcome: Ongoing  Goal: Decrease in sensory misperception frequency  Description  Decrease in sensory misperception frequency  Outcome: Ongoing  Goal: Able to refrain from responding to false sensory perceptions  Description  Able to refrain from responding to false sensory perceptions  Outcome: Ongoing  Goal: Demonstrates accurate environmental perceptions  Description  Demonstrates accurate environmental perceptions  Outcome: Ongoing  Goal: Able to distinguish between reality-based and nonreality-based thinking  Description  Able to distinguish between reality-based and nonreality-based thinking  Outcome: Ongoing  Goal: Able to interrupt nonreality-based thinking  Description  Able to interrupt nonreality-based thinking  Outcome: Ongoing     Problem: Sleep Pattern Disturbance:  Goal: Appears well-rested  Description  Appears well-rested  Outcome: Ongoing

## 2019-12-14 NOTE — PROGRESS NOTES
[facility] - Progress Note    Today's Date and Time: 12/14/2019, 11:55 AM    Impression :   · Necrotizing fasciitis 11-29-19  · S/P perirectal I&D. Wide complex excisional debridement of gluteal and perineal areas on 11-29-19  · S/P debridement, washout of gluteal and perianal areas, diverting colostomy 12-3-19.   · S/P debridement, washout of gluteal and perianal areas, diverting colostomy 12-6-19.  · S/P Incision and debridement of necrotizing fasciitis buttock wound, scrotum, bilateral groin region and closure of midline abdominal wound 12-8-19  · Hypotension requiring pressors. -Off pressors  · Lactic acidosis  · DM 2  · HTN  · Hx of Low LVEF (20%) as per family  · DEBBY  · Fluid overload    Recommendations:     · Meropenem 1 gm IV q 24 hr because of DEBBY, pt on HD  · Wound Care as per Gen surgery. Medical Decision Making/Summary/Discussion:12/14/2019     · Patient with DM 2, prior diabetic foot infection  · Presented to [address] [place] [address] ER generalized weakness for the past few days  · Found to have soft tissue necrosis with subcutaneous emphysema in gluteal areas and perineum  · S/P I&D and wide complex debridement of affected tissues on 11-29-19  · Showing hypotension, requiring fluids and a vasopressor  · Cultures in progress  · Will cover with Zosyn. Wounds with Strep spp. And Gram negative bacilli . No Staph spp. 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 · One more debridement in OR scheduled for 12-10-19 and then decision will be made to change code status or not. · Pt did not tolerate HD on 12-9. It was stopped early and pt required vasopressor support with Levophed, remains on vent.    · Pt to OR 12-10 for further debridement  Infection Control Recommendations   · [place] Precautions  · Contact Isolation MRSA    Antimicrobial Stewardship Recommendations     · Simplification of therapy  · Targeted therapy  · PK dosing    Coordination of Outpatient Care:   · Estimated Length of IV antimicrobials: TBD  · Patient will need Midline Catheter Insertion: No  · Patient will need PICC line Insertion:Has Central line  · [facility]  [facility],  LTAC: TBD  · Patient will need outpatient wound care:Yes    Chief complaint/reason for consultation:   · Ashley's vs necrotizing fasciitis    History of Present Illness:   Izzy Jacques is a 37y.o.-year-old  male who was initially admitted on 11/29/2019. Patient seen at the request of . INITIAL HISTORY:    Patient presented through ER in [place] with complaints of weakness of a few days duration. Examination showed skin necrosis in the gluteal and perineal region. Patient transferred to [facility]. CT scan showed extensive subcutaneous emphysema. Patient underwent I&D and extensive complex debridement of affected tissues on 11-29-19. Gram stain of tissues showed Strep. Spp and Gram negative bacilli. 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 He has underlying DM 2, prior diabetic foot infection, DEBBY. Patient more stable post surgery but with hypotension requiring a pressor. Zosyn D/C because of of poor LVEF, in order to reduce Na and fluid load  Meropenem started adjusted for Cr Cl 30 cc  Meropenem adjusted for HD  Per surgery after debridement on 12-8-19, infection has spread to deep planes with the urethra less viable. One more debridement in OR scheduled for 12-10-19 and then decision will be made to change code status or not. Pt did not tolerate HD on 12-9. It was stopped early and pt required vasopressor support with Levophed, remains on vent.    Pt to OR 12-10 for further output. Surgical dressing clean  : Extensive open wound of gluteal and perineal areas, lema with small amt clear urine  All four Extremities: No cyanosis, clubbing, or effusions. Lt foot diabetic ulcer with involvement of the Lt heel. Neurologic: Intubated, sedated  Skin: Warm and dry with good turgor. Signs of peripheral arterial insufficiency. Large open wounds. Medical Decision Making -Laboratory:   I have independently reviewed/ordered the following labs:    CBC with Differential:   Recent Labs     19  1924 19  0521   WBC 12.4*  --  10.8   HGB 8.0* 7.9* 7.3*   HCT 27.3* 26.7* 25.5*     --  224   LYMPHOPCT 9*  --  12*   MONOPCT 6  --  8*     BMP:   Recent Labs     19  0521   * 134*   K 4.1 4.0    104   CO2 18* 19*   BUN 56* 53*   CREATININE 2.33* 2.01*   MG 1.9 1.9     Hepatic Function Panel:   No results for input(s): PROT, LABALBU, BILIDIR, IBILI, BILITOT, ALKPHOS, ALT, AST in the last 72 hours. No results for input(s): RPR in the last 72 hours. No results for input(s): HIV in the last 72 hours. No results for input(s): BC in the last 72 hours. Lab Results   Component Value Date    MUCUS NOT REPORTED 2019    RBC 2.64 2019    TRICHOMONAS NOT REPORTED 2019    WBC 10.8 2019    YEAST NOT REPORTED 2019    TURBIDITY TURBID 2019     Lab Results   Component Value Date    CREATININE 2.01 2019    GLUCOSE 179 2019       Medical Decision Making-Imagin-9 CT Abd/pelvis  EXAMINATION:   CT OF THE ABDOMEN AND PELVIS WITH CONTRAST 2019 2:29 am       TECHNIQUE:   CT of the abdomen and pelvis was performed with the administration of   intravenous contrast. Multiplanar reformatted images are provided for review.    Dose modulation, iterative reconstruction, and/or weight based adjustment of   the mA/kV was utilized to reduce the radiation dose to as low as reasonably   achievable.     COMPARISON:   November 29, 2019.       HISTORY:   ORDERING SYSTEM PROVIDED HISTORY: Gayle Dakin fasc   TECHNOLOGIST PROVIDED HISTORY:       nec fasc   Reason for Exam: nec fasc; Trauma   Acuity: Unknown   Type of Exam: Subsequent/Follow-up       FINDINGS:   Lower Chest: Partially visualized bilateral pleural effusions with bibasilar   heterogeneous opacities and bilateral lower lobe consolidations.  Central   venous catheter tip near the superior atrial caval junction.  Cardiomegaly. Trace pericardial fluid.       Liver: Normal.       Gallbladder and Bile Ducts: Normal.       Spleen: Splenomegaly.       Adrenal Glands: Normal.       Pancreas: Normal.       Genitourinary: Symmetric renal atrophy.  Redemonstration of multiple   hypodensities in the right kidney which likely represent benign cysts.  No   urinary stones or hydronephrosis.  Ambrosio catheter in the decompressed urinary   bladder.       Bowel: Normal caliber bowel.  Postsurgical changes of the bowel with left   lower quadrant ostomy.  No evidence of acute appendicitis.  No significant   diverticular disease.       Vasculature: Atherosclerosis.  No abdominal aortic aneurysm.       Bones and Soft Tissues: Diffuse soft tissue stranding and fluid. Postsurgical changes in the anterior abdominal wall with midline incision   demonstrating expected small amount of fluid and air.  Large soft tissue   defects in the right inguinal region, scrotum, right greater than left   gluteal creases with associated packing material.  Small amount of   surrounding soft tissue gas.  There is associated skin thickening in these   regions.  Bilateral lower abdominal wall skin thickening.       Retroperitoneum/Mesentery: No intraperitoneal free air.  Mild abdominopelvic   ascites.  Loculated fluid along the inferior aspect of the liver. Redemonstration of bilateral inguinal and pelvic sidewall lymphadenopathy.    Multiple mildly prominent retroperitoneal and upper abdominal lymph control (AEC) was utilized. CT ABDOMEN FINDINGS:      The lung bases are unremarkable. There is a small pericardial effusion versus thickening. The liver, spleen, and pancreas demonstrate no acute abnormality given compromised evaluation without intravenous contrast.  There may be mild splenomegaly.  The adrenal glands appear within normal limits. There is no hydronephrosis or obstructing urinary tract calculi. Small amount of free fluid is seen adjacent to the liver inferiorly. .    The appendix is visualized in the right lower quadrant and appears within normal limits.       There is no evidence for bowel obstruction. Stranding is seen within the subcutaneous fat overlying both lateral abdominal walls left greater than right.  There is ill-defined fluid collection within the subcutaneous fat overlying the left lateral abdominal wall possibly representing phlegmon or developing abscess although no organized   abscess is seen. There is a large amount of soft tissue emphysema involving both the right and left buttock extending to the perineum tissue thickening is seen especially on the left involving the left buttock. CT PELVIS FINDINGS:        No distal ureteral calculi or bladder calculi. There is no free fluid in the pelvis. Catheter is seen within the urinary bladder. Osseous changes within the left hemipelvis which may be chronic in nature. IMPRESSION:    Extensive subcutaneous emphysema as described above involving both buttocks extending to the perineum.  The possibility of Ashley gangrene/necrotizing fasciitis cannot be excluded. Possible phlegmon or developing soft tissue abscess overlying the left lateral abdominal wall as noted above.  No organized abscess is seen however. Osseous changes within the left hemipelvis which may be chronic in nature.   Results the study were called to Dr. Levar Palomino [phone] on 11/29/2019  All CT scans at this facility use dose modulation, iterative reconstruction, and/or weight based dosing when appropriate to reduce radiation dose to as low as reasonably achievable. Medical Decision [id number]:   11/29/2019  8:17 PM - Ana Lee Incoming Lab Results From [facility]     Specimen Information: Buttock; Tissue        Component Collected Lab   Specimen Description 11/29/2019  7:34 PM [facility]   . BUTTOCK BILATERAL TS    Special Requests 11/29/2019  7:34 PM [facility]   NOT REPORTED    Direct Exam 11/29/2019  7:34 PM [facility]   NO NEUTROPHILS SEEN    Direct Exam Abnormal  11/29/2019  7:34  [facility] [address] [facility] [place]    Direct Exam Abnormal  11/29/2019  7:34 PM [address] NEGATIVE RODS    Culture 11/29/2019  7:34 PM [facility]   PENDING      Medical Decision Making-Other:     Note:  · Labs, medications, radiologic studies were reviewed with personal review of films  · Large amounts of data were reviewed  · Discussed with nursing Staff, Discharge planner  · Infection Control and Prevention measures reviewed  · All prior entries were reviewed  · Administer medications as ordered  · Prognosis: Guarded  · Discharge planning reviewed  · Follow up as outpatient. Thank you for allowing us to participate in the care of this patient. Please call with questions.     Ct Damon MD    12/14/2019 11:55 AM

## 2019-12-15 ENCOUNTER — ANESTHESIA EVENT (OUTPATIENT)
Dept: ICU | Age: 34
DRG: 463 | End: 2019-12-15
Payer: MEDICARE

## 2019-12-15 LAB
ABSOLUTE EOS #: 0.58 K/UL (ref 0–0.4)
ABSOLUTE IMMATURE GRANULOCYTE: 0 K/UL (ref 0–0.3)
ABSOLUTE LYMPH #: 0.78 K/UL (ref 1–4.8)
ABSOLUTE MONO #: 0.19 K/UL (ref 0.1–0.8)
ANION GAP SERPL CALCULATED.3IONS-SCNC: 12 MMOL/L (ref 9–17)
BASOPHILS # BLD: 0 % (ref 0–2)
BASOPHILS ABSOLUTE: 0 K/UL (ref 0–0.2)
BUN BLDV-MCNC: 44 MG/DL (ref 6–20)
BUN/CREAT BLD: ABNORMAL (ref 9–20)
CALCIUM SERPL-MCNC: 8.9 MG/DL (ref 8.6–10.4)
CHLORIDE BLD-SCNC: 105 MMOL/L (ref 98–107)
CO2: 20 MMOL/L (ref 20–31)
CREAT SERPL-MCNC: 1.61 MG/DL (ref 0.7–1.2)
DIFFERENTIAL TYPE: ABNORMAL
EOSINOPHILS RELATIVE PERCENT: 6 % (ref 1–4)
GFR AFRICAN AMERICAN: 59 ML/MIN
GFR NON-AFRICAN AMERICAN: 49 ML/MIN
GFR SERPL CREATININE-BSD FRML MDRD: ABNORMAL ML/MIN/{1.73_M2}
GFR SERPL CREATININE-BSD FRML MDRD: ABNORMAL ML/MIN/{1.73_M2}
GLUCOSE BLD-MCNC: 196 MG/DL (ref 70–99)
GLUCOSE BLD-MCNC: 203 MG/DL (ref 75–110)
GLUCOSE BLD-MCNC: 211 MG/DL (ref 75–110)
HCT VFR BLD CALC: 24.3 % (ref 40.7–50.3)
HEMOGLOBIN: 7.1 G/DL (ref 13–17)
IMMATURE GRANULOCYTES: 0 %
LYMPHOCYTES # BLD: 8 % (ref 24–44)
MAGNESIUM: 1.8 MG/DL (ref 1.6–2.6)
MCH RBC QN AUTO: 28.1 PG (ref 25.2–33.5)
MCHC RBC AUTO-ENTMCNC: 29.2 G/DL (ref 28.4–34.8)
MCV RBC AUTO: 96 FL (ref 82.6–102.9)
MONOCYTES # BLD: 2 % (ref 1–7)
MORPHOLOGY: ABNORMAL
MORPHOLOGY: ABNORMAL
NRBC AUTOMATED: 0.5 PER 100 WBC
PDW BLD-RTO: 23.1 % (ref 11.8–14.4)
PHOSPHORUS: 3.6 MG/DL (ref 2.5–4.5)
PLATELET # BLD: ABNORMAL K/UL (ref 138–453)
PLATELET ESTIMATE: ABNORMAL
PLATELET, FLUORESCENCE: 276 K/UL (ref 138–453)
PLATELET, IMMATURE FRACTION: 1.2 % (ref 1.1–10.3)
PMV BLD AUTO: ABNORMAL FL (ref 8.1–13.5)
POTASSIUM SERPL-SCNC: 4.5 MMOL/L (ref 3.7–5.3)
RBC # BLD: 2.53 M/UL (ref 4.21–5.77)
RBC # BLD: ABNORMAL 10*6/UL
SEG NEUTROPHILS: 84 % (ref 36–66)
SEGMENTED NEUTROPHILS ABSOLUTE COUNT: 8.15 K/UL (ref 1.8–7.7)
SODIUM BLD-SCNC: 137 MMOL/L (ref 135–144)
WBC # BLD: 9.7 K/UL (ref 3.5–11.3)
WBC # BLD: ABNORMAL 10*3/UL

## 2019-12-15 PROCEDURE — 2500000003 HC RX 250 WO HCPCS: Performed by: INTERNAL MEDICINE

## 2019-12-15 PROCEDURE — 2580000003 HC RX 258: Performed by: STUDENT IN AN ORGANIZED HEALTH CARE EDUCATION/TRAINING PROGRAM

## 2019-12-15 PROCEDURE — 6360000002 HC RX W HCPCS: Performed by: STUDENT IN AN ORGANIZED HEALTH CARE EDUCATION/TRAINING PROGRAM

## 2019-12-15 PROCEDURE — 6370000000 HC RX 637 (ALT 250 FOR IP): Performed by: STUDENT IN AN ORGANIZED HEALTH CARE EDUCATION/TRAINING PROGRAM

## 2019-12-15 PROCEDURE — 94770 HC ETCO2 MONITOR DAILY: CPT

## 2019-12-15 PROCEDURE — 82947 ASSAY GLUCOSE BLOOD QUANT: CPT

## 2019-12-15 PROCEDURE — 2000000000 HC ICU R&B

## 2019-12-15 PROCEDURE — 99233 SBSQ HOSP IP/OBS HIGH 50: CPT | Performed by: INTERNAL MEDICINE

## 2019-12-15 PROCEDURE — 85055 RETICULATED PLATELET ASSAY: CPT

## 2019-12-15 PROCEDURE — 83735 ASSAY OF MAGNESIUM: CPT

## 2019-12-15 PROCEDURE — 99291 CRITICAL CARE FIRST HOUR: CPT | Performed by: INTERNAL MEDICINE

## 2019-12-15 PROCEDURE — 84100 ASSAY OF PHOSPHORUS: CPT

## 2019-12-15 PROCEDURE — 80048 BASIC METABOLIC PNL TOTAL CA: CPT

## 2019-12-15 PROCEDURE — 85025 COMPLETE CBC W/AUTO DIFF WBC: CPT

## 2019-12-15 PROCEDURE — 36415 COLL VENOUS BLD VENIPUNCTURE: CPT

## 2019-12-15 PROCEDURE — 94003 VENT MGMT INPAT SUBQ DAY: CPT

## 2019-12-15 PROCEDURE — 2700000000 HC OXYGEN THERAPY PER DAY

## 2019-12-15 PROCEDURE — 94761 N-INVAS EAR/PLS OXIMETRY MLT: CPT

## 2019-12-15 RX ORDER — LORAZEPAM 2 MG/ML
1 INJECTION INTRAMUSCULAR EVERY 6 HOURS PRN
Status: DISCONTINUED | OUTPATIENT
Start: 2019-12-15 | End: 2020-01-16 | Stop reason: HOSPADM

## 2019-12-15 RX ORDER — OXYCODONE HYDROCHLORIDE 5 MG/1
10 TABLET ORAL EVERY 4 HOURS
Status: DISCONTINUED | OUTPATIENT
Start: 2019-12-15 | End: 2019-12-20

## 2019-12-15 RX ADMIN — OXYCODONE HYDROCHLORIDE 10 MG: 5 TABLET ORAL at 08:06

## 2019-12-15 RX ADMIN — HEPARIN SODIUM 5000 UNITS: 5000 INJECTION INTRAVENOUS; SUBCUTANEOUS at 06:08

## 2019-12-15 RX ADMIN — MINERAL OIL, PETROLATUM: 425; 568 OINTMENT OPHTHALMIC at 06:08

## 2019-12-15 RX ADMIN — Medication 200 MCG/HR: at 12:53

## 2019-12-15 RX ADMIN — PROPOFOL 40 MCG/KG/MIN: 10 INJECTION, EMULSION INTRAVENOUS at 19:21

## 2019-12-15 RX ADMIN — MEROPENEM 1 G: 1 INJECTION, POWDER, FOR SOLUTION INTRAVENOUS at 10:06

## 2019-12-15 RX ADMIN — PROPOFOL 20 MCG/KG/MIN: 10 INJECTION, EMULSION INTRAVENOUS at 05:26

## 2019-12-15 RX ADMIN — Medication 200 MCG/HR: at 22:14

## 2019-12-15 RX ADMIN — MEROPENEM 1 G: 1 INJECTION, POWDER, FOR SOLUTION INTRAVENOUS at 23:34

## 2019-12-15 RX ADMIN — Medication: at 10:30

## 2019-12-15 RX ADMIN — Medication 200 MCG/HR: at 03:28

## 2019-12-15 RX ADMIN — SODIUM CHLORIDE: 9 INJECTION, SOLUTION INTRAVENOUS at 03:47

## 2019-12-15 RX ADMIN — OXYCODONE HYDROCHLORIDE 10 MG: 5 TABLET ORAL at 17:21

## 2019-12-15 RX ADMIN — SODIUM CHLORIDE: 9 INJECTION, SOLUTION INTRAVENOUS at 03:44

## 2019-12-15 RX ADMIN — PROPOFOL 40 MCG/KG/MIN: 10 INJECTION, EMULSION INTRAVENOUS at 22:15

## 2019-12-15 RX ADMIN — INSULIN LISPRO 3 UNITS: 100 INJECTION, SOLUTION INTRAVENOUS; SUBCUTANEOUS at 08:26

## 2019-12-15 RX ADMIN — Medication 100 MG: at 07:58

## 2019-12-15 RX ADMIN — POTASSIUM CHLORIDE: 2 INJECTION, SOLUTION, CONCENTRATE INTRAVENOUS at 18:09

## 2019-12-15 RX ADMIN — Medication 200 MCG/HR: at 07:58

## 2019-12-15 RX ADMIN — INSULIN LISPRO 3 UNITS: 100 INJECTION, SOLUTION INTRAVENOUS; SUBCUTANEOUS at 12:31

## 2019-12-15 RX ADMIN — OXYCODONE HYDROCHLORIDE 10 MG: 5 TABLET ORAL at 03:53

## 2019-12-15 RX ADMIN — OXYCODONE HYDROCHLORIDE 10 MG: 5 TABLET ORAL at 12:22

## 2019-12-15 RX ADMIN — Medication 10 ML: at 07:59

## 2019-12-15 RX ADMIN — FAMOTIDINE 20 MG: 20 TABLET, FILM COATED ORAL at 08:07

## 2019-12-15 RX ADMIN — FOLIC ACID 1 MG: 1 TABLET ORAL at 07:58

## 2019-12-15 RX ADMIN — OXYCODONE HYDROCHLORIDE 10 MG: 5 TABLET ORAL at 20:20

## 2019-12-15 RX ADMIN — INSULIN LISPRO 3 UNITS: 100 INJECTION, SOLUTION INTRAVENOUS; SUBCUTANEOUS at 20:29

## 2019-12-15 RX ADMIN — HEPARIN SODIUM 5000 UNITS: 5000 INJECTION INTRAVENOUS; SUBCUTANEOUS at 23:34

## 2019-12-15 RX ADMIN — INSULIN LISPRO 3 UNITS: 100 INJECTION, SOLUTION INTRAVENOUS; SUBCUTANEOUS at 17:32

## 2019-12-15 ASSESSMENT — PULMONARY FUNCTION TESTS
PIF_VALUE: 19
PIF_VALUE: 14
PIF_VALUE: 12
PIF_VALUE: 8
PIF_VALUE: 14
PIF_VALUE: 15
PIF_VALUE: 14
PIF_VALUE: 13

## 2019-12-15 ASSESSMENT — PAIN SCALES - GENERAL
PAINLEVEL_OUTOF10: 10
PAINLEVEL_OUTOF10: 7
PAINLEVEL_OUTOF10: 7

## 2019-12-15 NOTE — PROGRESS NOTES
PROGRESS NOTE     PATIENT NAME: Austin Mdcaniel [address]20 RECORD NO. [id number]  DATE: 12/15/2019  SURGEON: Josef Glover  PRIMARY CARE PHYSICIAN: No primary care provider on file. HD: # 16    ASSESSMENT    Patient Active Problem List   Diagnosis    Necrotizing fasciitis ([address]    Hypertension    Diabetes ([address]    Diabetic foot ulcer ([address]    Septic shock ([address]    Bandemia      11/29/2019 wide complex debridement of nec fasc involving gluteal/perianal/distal rectum  12/03/2019 open diverting end colostomy   12/06/2019 I&D  12/08/2019 Incision and debridement of necrotizing fasciitis buttock wound, scrotum, bilateral groin region and closure of midline abdominal wound  12/10/2019 Sharp excisional debridement of gluteal, marc-rectal and perineal wound to level of muscle, Sharp excisional debridement of scrotum (performed by Urology), Washout of gluteal/perineal/scrotal/inguinal wound, Partial primary closure of pubic wound, and Dakins Wet to dry dressing application      MEDICAL DECISION MAKING AND PLAN    · Necrotizing soft tissue infection: s/p wide complex debridement of gluteal/perianal region extensive necrotic tissue that involved the anus and distal rectum. Recently, infection spread into pelvis which indicates significant risk of mortality; however, as of our last debridement 12/10 we did not find any additional extension of infection. · Discussed with Urology. Recommending plastic surgery consultation to assess possibilities for closure of the scrotum. Otherwise recommending WTD dressing changes until it fully granulates. Will be available for debridements required in OR this week if necessary. Aware of possible urine leak - planning to evaluate at bedside today. · Unable to place vac at this time due to wound being in continuity with the open scrotum with exposed testicles and urethra. Will plan for BID WTD dressing changes for the entire wound as mentioned.      · Prevena vac to the midline laparotomy incision with underlying jay jay. Will remove vac/jay jay tomorrow 12/16 and then place new prevena sponges over the incision. · Recommend continuing tube feeds/optimizing nutrition to account for healing massive wounds. · Monitor stoma - healthy in appearance. · Meropenem per ID. Initially recommended Zosyn but switched due to need to reduce fluid load with poor LVEF. Strep and Gram negative in initial cultures  · Strict glucose control <180  · LLE wound per podiatry  · Nephrology following. SUBJECTIVE    Patient seen and examined. No acute changes. Afebrile. He is waking up more and purposeful movements but still not following commands. Remains intubated/sedated. RN reports concern for urine leak in scrotal wound seen on dressings when removed/replaced. OBJECTIVE  VITALS: BP (!) 185/109   Pulse 112   Temp 98.6 °F (37 °C) (Axillary)   Resp 19   Ht 5' 9\" (1.753 m)   Wt (!) 396 lb 13.3 oz (180 kg)   SpO2 100%   BMI 58.60 kg/m²     GENERAL: intubated and sedated. NAD  NEURO: moves uppers off sedation. Not following commands, making purposeful movements   LUNGS: vented   HEART: +S1/S2, Sinus tachy  ABDOMEN: soft, ND, +grimace with palpation. Ostomy is patent, bowel succus in appliance, no flatus. prevena vac to midline incision  SKIN/SOFT TISSUE: dressing remains intact, with no significant change in drainage, plan to change at bedside today   EXTREMITY: dressing to L foot is c/d/i. No cyanosis     I/O last 3 completed shifts: In: 3600.5 [I.V.:1211.2; NG/GT:472; IV Piggyback:55]  Out: 1644 [Urine:15]    Drain/tube output:   In: 3600.5 [I.V.:1211.2; NG/GT:472]  Out: 15 [Urine:15]    LAB:  CBC:   Recent Labs     12/13/19  0435 12/13/19  1924 12/14/19  0521   WBC 12.4*  --  10.8   HGB 8.0* 7.9* 7.3*   HCT 27.3* 26.7* 25.5*   MCV 94.1  --  96.6     --  224     BMP:   Recent Labs     12/13/19  0435 12/14/19  0521   * 134*   K 4.1 4.0    104   CO2 18* 19*   BUN 56* 53*   CREATININE 2.33* 2.01*   GLUCOSE 189* 179*         RADIOLOGY:   no new images   No      Makenzie Iglesias DO  12/15/2019  8:18 AM      Trauma Attending Attestation      I have reviewed the above TECSS note(s) and confirmed the key elements of the medical history and physical exam. I have seen and examined the pt. I have discussed the findings, established the care plan and recommendations with Resident, GCS RN, bedside nurse. I personally reviewed any images in real time to expedite the patient's care.         Uriel Galloway MD  1/20/2020  10:43 PM

## 2019-12-15 NOTE — PROGRESS NOTES
debridement  Infection Control Recommendations   · [place] Precautions  · Contact Isolation MRSA    Antimicrobial Stewardship Recommendations     · Simplification of therapy  · Targeted therapy  · PK dosing    Coordination of Outpatient Care:   · Estimated Length of IV antimicrobials: TBD  · Patient will need Midline Catheter Insertion: No  · Patient will need PICC line Insertion:Has Central line  · Patient will need: Home IV , [place],  SNF,  LTAC: TBD  · Patient will need outpatient wound care:Yes    Chief complaint/reason for consultation:   · Ashley's vs necrotizing fasciitis    History of Present Illness:   Starr Jackson is a 37y.o.-year-old  male who was initially admitted on 11/29/2019. Patient seen at the request of . INITIAL HISTORY:    Patient presented through ER in [place] with complaints of weakness of a few days duration. Examination showed skin necrosis in the gluteal and perineal region. Patient transferred to [place] [id number]. CT scan showed extensive subcutaneous emphysema. Patient underwent I&D and extensive complex debridement of affected tissues on 11-29-19. Gram stain of tissues showed Strep. Spp and Gram negative bacilli. 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 He has underlying DM 2, prior diabetic foot infection, DEBBY. Patient more stable post surgery but with hypotension requiring a pressor. Zosyn D/C because of of poor LVEF, in order to reduce Na and fluid load  Meropenem started adjusted for Cr Cl 30 cc  Meropenem adjusted for HD  Per surgery after debridement on 12-8-19, infection has spread to deep planes with the urethra less viable. One more debridement in OR scheduled for 12-10-19 and then decision will be made to change code status or not. Pt did not tolerate HD on 12-9. It was stopped early and pt required vasopressor support with Levophed, remains on vent.    Pt to OR 12-10 for further debridement      CURRENT EVALUATION :12/15/2019     Patient evaluated and examined in the ICU. Afebrile  VS stable    Patient more awake, follows some commands. Continues on Meropenem adjusted to q 24 hr  Off pressors    S/P general surgery debridement 12/10/19 (4th debridement)  Gen Surgery cancelled OR on 12/12/19 and 12-13-19 for possible wound VAC placement on buttock. Configuration of sacral wound may not allow VAC placement. Has abdominal incisions and groin incisions as well. Wounds examined during dressing change on 12-13-19: Massive sacral, gluteal wound. Smaller anterior wounds. No purulence noted. No new culture data: Strep and Gram negative bacilli     Labs, X rays reviewed: 12/15/2019    BUN: 59-->61-->79-->49-->42->39->50-->56  Cr: 2.56-->2.77-->4.49-->2.84-->2.69->2.39>2.50-->2.33    WBC: 4.20-->53.3-->26.7-->16.9->16.9->16.2-->12.4  Hb:9.5-->8.2-->6.8-->8.2 -->7.0-->7.0->7.7>7-->8.0  Plat: 642-->342-->273->346->398-->281    Cultures:  Urine:  · NA  Blood:  · 11-30-19: no growth  · 12-2-19: no growth  Sputum :  · NA  Wound:  · 11-29-19: Strep ssp and Gram negative bacilli       MRSA probe: Pos    Discussed with mother, RN, CC. .    12-13-19: Abdominal, groin wounds:              12-11-19:          12-7-19: Left foot:      12-4-19:      11-29-19:      I have personally reviewed the past medical history, past surgical history, medications, social history, and family history, and I have updated the database accordingly.   Past Medical History:     Past Medical History:   Diagnosis Date    [facility]     Diabetes mellitus ([facility]     Hypertension     Spina bifida aperta of lumbar spine ([facility]        Past Surgical  History:     Past Surgical History:   Procedure Laterality Date    ABDOMEN SURGERY N/A 12/8/2019    DEBRIDEMENT  NECROTIZING FASCIITIS BUTTOCK, WOUND VAC REMOVAL DELAYED PRIMARY CLOSURE OF MIDLINE ABDOMINAL INCISION, OSTOMY BAG CHANGE performed by Nicole Zepeda Transportation needs:     Medical: Not on file     Non-medical: Not on file   Tobacco Use    Smoking status: Not on file   Substance and Sexual Activity    Alcohol use: Not on file    Drug use: Not on file    Sexual activity: Not on file   Lifestyle    Physical activity:     Days per week: Not on file     Minutes per session: Not on file    Stress: Not on file   Relationships    Social connections:     Talks on phone: Not on file     Gets together: Not on file     Attends Yarsani service: Not on file     Active member of club or organization: Not on file     Attends meetings of clubs or organizations: Not on file     Relationship status: Not on file    Intimate partner violence:     Fear of current or ex partner: Not on file     Emotionally abused: Not on file     Physically abused: Not on file     Forced sexual activity: Not on file   Other Topics Concern    Not on file   Social History Narrative    Not on file       Family History:   No family history on file. Allergies:   Patient has no known allergies. Review of Systems:     12/15/2019 UTO pt intubated, sedated, off vasopressors. Seen in ICU    Constitutional: No fevers or chills. Generalized weakness  Head: No headaches  Eyes: No double vision or blurry vision. No conjunctival inflammation. ENT: No sore throat or runny nose. . No hearing loss, tinnitus or vertigo. Cardiovascular: No chest pain or palpitations. No shortness of breath. No GORDILLO  Lung: No shortness of breath or cough. No sputum production  Abdomen: No nausea, vomiting, diarrhea, or abdominal pain. [place] No cramps. Genitourinary: No increased urinary frequency, or dysuria. No hematuria. No suprapubic or CVA pain  Musculoskeletal: No muscle aches or pains. No joint effusions, swelling or deformities. Lt diabetic foot   Hematologic: No bleeding or bruising. Neurologic: No headache, weakness, numbness, or tingling. Spina bifida  Integument: Gangrene of perineum and gluteal     COMPARISON:   4 hours ago       HISTORY:   ORDERING SYSTEM PROVIDED HISTORY: intubated   TECHNOLOGIST PROVIDED HISTORY:   intubated   Reason for Exam: portable supine/ intubated   Acuity: Acute   Type of Exam: Initial       FINDINGS:   New ET tube terminates 38 mm above the ron.  There appears to be a   possible enteric tube which is not well visualized distally.  Right IJ   catheter terminates in the right atrium and is unchanged.  Lungs are clear. Cardiomegaly.  Mediastinum normal.  Bony thorax intact.           Impression   New ET tube as above.  Possible new enteric tube not well visualized. Recommend follow-up KUB if clinically warranted. CT ABDOMEN AND PELVIS WITHOUT CONTRAST    COMPARISON:  3/22/2017    CLINICAL HISTORY: Infection in scrotum, lower abdominal pain, diabetic, 30,000 white blood cell count. TECHNIQUE: Unenhanced axial images were obtained from the lung bases to the pubic symphysis with sagittal and coronal 2D reformatted images. Oral contrast administered:  No.  Automatic exposure control (AEC) was utilized. CT ABDOMEN FINDINGS:      The lung bases are unremarkable. There is a small pericardial effusion versus thickening. The liver, spleen, and pancreas demonstrate no acute abnormality given compromised evaluation without intravenous contrast.  There may be mild splenomegaly.  The adrenal glands appear within normal limits. There is no hydronephrosis or obstructing urinary tract calculi. Small amount of free fluid is seen adjacent to the liver inferiorly. .    The appendix is visualized in the right lower quadrant and appears within normal limits.       There is no evidence for bowel obstruction.   Stranding is seen within the subcutaneous fat overlying both lateral abdominal walls left greater than right.  There is ill-defined fluid collection within the subcutaneous fat overlying the left lateral abdominal wall possibly representing phlegmon or developing abscess although no organized   abscess is seen. There is a large amount of soft tissue emphysema involving both the right and left buttock extending to the perineum tissue thickening is seen especially on the left involving the left buttock. CT PELVIS FINDINGS:        No distal ureteral calculi or bladder calculi. There is no free fluid in the pelvis. Catheter is seen within the urinary bladder. Osseous changes within the left hemipelvis which may be chronic in nature. IMPRESSION:    Extensive subcutaneous emphysema as described above involving both buttocks extending to the perineum.  The possibility of Ashley gangrene/necrotizing fasciitis cannot be excluded. Possible phlegmon or developing soft tissue abscess overlying the left lateral abdominal wall as noted above.  No organized abscess is seen however. Osseous changes within the left hemipelvis which may be chronic in nature. Results the study were called to Dr. Jordyn Persaud [phone] on 11/29/2019  All CT scans at this facility use dose modulation, iterative reconstruction, and/or weight based dosing when appropriate to reduce radiation dose to as low as reasonably achievable. Medical Decision [id number]:   11/29/2019  8:17 PM - Ana Lee Incoming Lab Results From [employer] [facility]     Specimen Information: Buttock; Tissue        Component Collected Lab   Specimen Description 11/29/2019  7:34  [address]   . BUTTOCK BILATERAL TS    Special Requests 11/29/2019  7:34  [address]   NOT REPORTED    Direct Exam 11/29/2019  7:34  [address]   NO NEUTROPHILS SEEN    Direct Exam Abnormal  11/29/2019  7:34  [facility] [address] [facility]    Direct Exam Abnormal  11/29/2019  7:34 PM [address] NEGATIVE RODS    Culture 11/29/2019  7:34  [address]   PENDING      Medical Decision Making-Other:     Note:  · Labs, medications, radiologic studies were reviewed with personal review of films  · Large amounts of data were reviewed  · Discussed with nursing Staff, Discharge planner  · Infection Control and Prevention measures reviewed  · All prior entries were reviewed  · Administer medications as ordered  · Prognosis: Guarded  · Discharge planning reviewed  · Follow up as outpatient. Thank you for allowing us to participate in the care of this patient. Please call with questions.     Ann Olsen MD    12/15/2019 3:07 PM

## 2019-12-15 NOTE — PLAN OF CARE
physiological function of skin and  mucous membranes.   Outcome: Ongoing     Problem: Nutrition  Goal: Optimal nutrition therapy  Description  Nutrition Problem: Inadequate oral intake  Intervention: Food and/or Nutrient Delivery: Start Parenteral Nutrition  Nutritional Goals: Meet % of estimated nutrition needs   Outcome: Ongoing     Problem: Confusion - Acute:  Goal: Absence of continued neurological deterioration signs and symptoms  Description  Absence of continued neurological deterioration signs and symptoms  Outcome: Ongoing  Goal: Mental status will be restored to baseline  Description  Mental status will be restored to baseline  Outcome: Ongoing     Problem: Injury - Risk of, Physical Injury:  Goal: Will remain free from falls  Description  Will remain free from falls  Outcome: Ongoing  Goal: Absence of physical injury  Description  Absence of physical injury  Outcome: Ongoing     Problem: Mood - Altered:  Goal: Mood stable  Description  Mood stable  Outcome: Ongoing  Goal: Absence of abusive behavior  Description  Absence of abusive behavior  Outcome: Ongoing  Goal: Verbalizations of feeling emotionally comfortable while being cared for will increase  Description  Verbalizations of feeling emotionally comfortable while being cared for will increase  Outcome: Ongoing     Problem: Psychomotor Activity - Altered:  Goal: Absence of psychomotor disturbance signs and symptoms  Description  Absence of psychomotor disturbance signs and symptoms  Outcome: Ongoing     Problem: Sensory Perception - Impaired:  Goal: Demonstrations of improved sensory functioning will increase  Description  Demonstrations of improved sensory functioning will increase  Outcome: Ongoing  Goal: Decrease in sensory misperception frequency  Description  Decrease in sensory misperception frequency  Outcome: Ongoing  Goal: Able to refrain from responding to false sensory perceptions  Description  Able to refrain from responding to false sensory perceptions  Outcome: Ongoing  Goal: Demonstrates accurate environmental perceptions  Description  Demonstrates accurate environmental perceptions  Outcome: Ongoing  Goal: Able to distinguish between reality-based and nonreality-based thinking  Description  Able to distinguish between reality-based and nonreality-based thinking  Outcome: Ongoing  Goal: Able to interrupt nonreality-based thinking  Description  Able to interrupt nonreality-based thinking  Outcome: Ongoing     Problem: Sleep Pattern Disturbance:  Goal: Appears well-rested  Description  Appears well-rested  Outcome: Ongoing

## 2019-12-15 NOTE — PROGRESS NOTES
PRN  albumin human 25 % IV solution 25 g, PRN  PN-Adult 2-in-1 Central Line (Standard), Continuous TPN  vitamin B-1 (THIAMINE) tablet 694 mg, Daily  folic acid (FOLVITE) tablet 1 mg, Daily  insulin lispro (HUMALOG) injection vial 0-18 Units, TID WC  insulin lispro (HUMALOG) injection vial 0-9 Units, Nightly  povidone-iodine (BETADINE) 10 % external solution, Daily  0.9 % sodium chloride bolus, Once  0.9 % sodium chloride bolus, Once  sodium chloride flush 0.9 % injection 10 mL, Q12H  sodium chloride flush 0.9 % injection 10 mL, PRN  0.9 % sodium chloride bolus, Once  propofol injection, Titrated  potassium chloride (KLOR-CON M) extended release tablet 40 mEq, PRN    Or  potassium bicarb-citric acid (EFFER-K) effervescent tablet 40 mEq, PRN    Or  potassium chloride 10 mEq/100 mL IVPB (Peripheral Line), PRN  sodium chloride flush 0.9 % injection 10 mL, Q12H  sodium chloride flush 0.9 % injection 10 mL, PRN  midodrine (PROAMATINE) tablet 10 mg, TID WC  oxyCODONE (ROXICODONE) immediate release tablet 5 mg, Q4H PRN    Or  oxyCODONE (ROXICODONE) immediate release tablet 10 mg, Q4H PRN  fentaNYL 20 mcg/mL Infusion, Continuous  famotidine (PEPCID) tablet 20 mg, Daily  heparin (porcine) injection 1,500 Units, PRN  heparin (porcine) injection 1,600 Units, PRN  acetaminophen (TYLENOL) tablet 650 mg, Q4H PRN  glucose (GLUTOSE) 40 % oral gel 15 g, PRN  dextrose 50 % IV solution, PRN  glucagon (rDNA) injection 1 mg, PRN  dextrose 5 % solution, PRN  [Held by provider] folic acid 1 mg, thiamine (B-1) 100 mg in dextrose 5 % 50 mL IVPB, Daily  acetaminophen (TYLENOL) tablet 650 mg, Q4H PRN  REFRESH LACRI-LUBE ointment OINT, PRN  0.9 % sodium chloride infusion, Continuous  meropenem (MERREM) 1 g in sodium chloride 0.9 % 100 mL IVPB (mini-bag), Q12H  sodium chloride flush 0.9 % injection 10 mL, 2 times per day  sodium chloride flush 0.9 % injection 10 mL, PRN  magnesium hydroxide (MILK OF MAGNESIA) 400 MG/5ML suspension 30 mL, Daily PRN  ondansetron (ZOFRAN) injection 4 mg, Q6H PRN  glucagon (rDNA) injection 1 mg, PRN  heparin (porcine) injection 5,000 Units, 3 times per day  norepinephrine (LEVOPHED) 32 mg in dextrose 5 % 250 mL infusion, Continuous      Labs:   CBC:   Recent Labs     12/13/19  0435 12/13/19  1924 12/14/19  0521   WBC 12.4*  --  10.8   RBC 2.90*  --  2.64*   HGB 8.0* 7.9* 7.3*   HCT 27.3* 26.7* 25.5*     --  224   MPV 9.4  --  8.7      BMP:   Recent Labs     12/13/19 0435 12/14/19  0521   * 134*   K 4.1 4.0    104   CO2 18* 19*   BUN 56* 53*   CREATININE 2.33* 2.01*   GLUCOSE 189* 179*   CALCIUM 8.8 8.5*        Phosphorus:    Recent Labs     12/13/19 0435 12/14/19  0521   PHOS 5.4* 4.5       Assessment:    1.  Acute Kidney Injury: Secondary to ischemic ATN from sepsis syndrome from necrotizing fasciitis. He has been dialysis dependent      2.  Chronic kidney diseae stage III from diabetic nephrosclerosis baseline 1.6-1.8  3.  Necrotizing fasciitis status post wide complex debridement of gluteal, perineal region and open colostomy. 4.  Respiratory Failure: On ventilator   5.  Protein calorie malnutrition  6.  Fluid overload: Continue to remove fluid on dialysis as tolerated  7. S/P diverting colostomy     Plan:  1. Continue TPN. 2.  HD possibly tomorrow, labs pending  3. Antibiotics to continue. 4.  Following      Please do not hesitate to call with questions. Electronically signed by Juliano Saab CNP, MIRTA - CNP on 12/15/2019 at 7:13 AM  Attending Physician Statement  I have discussed the care of Tricia Moise, including pertinent history and exam findings with the resident/fellow. I have reviewed the key elements of all parts of the encounter with the resident/fellow. I have seen and examined the patient with the resident/fellow. I agree with the assessment and plan and status of the problem list as documented.       Amada Junior MD

## 2019-12-15 NOTE — PLAN OF CARE
Problem: Pain:  Goal: Pain level will decrease  Description  Pain level will decrease  12/15/2019 0657 by Xuan Tolentino RN  Outcome: Ongoing  Goal: Control of acute pain  Description  Control of acute pain  12/15/2019 0657 by Xuan Tolentino RN  Outcome: Ongoing  Goal: Control of chronic pain  Description  Control of chronic pain  12/15/2019 0657 by Xuan Tolentino RN  Outcome: Ongoing     Problem: Skin Integrity:  Goal: Will show no infection signs and symptoms  Description  Will show no infection signs and symptoms  12/15/2019 0657 by Xuan Tolentino RN  Outcome: Ongoing  Goal: Absence of new skin breakdown  Description  Absence of new skin breakdown  12/15/2019 0657 by Xuan Tolentino RN  Outcome: Ongoing     Problem: OXYGENATION/RESPIRATORY FUNCTION  Goal: Patient will maintain patent airway  12/15/2019 0852 by Basil Lowe RCP  Outcome: Ongoing  12/15/2019 0657 by Xuan Tolentino RN  Outcome: Ongoing  Goal: Patient will achieve/maintain normal respiratory rate/effort  Description  Respiratory rate and effort will be within normal limits for the patient  12/15/2019 0852 by Basil Lowe RCP  Outcome: Ongoing  12/15/2019 0657 by Xuan Tolentino RN  Outcome: Ongoing     Problem: MECHANICAL VENTILATION  Goal: Patient will maintain patent airway  12/15/2019 0852 by Basil Lowe RCP  Outcome: Ongoing  12/15/2019 0657 by Xuan Tolentino RN  Outcome: Ongoing  Goal: Oral health is maintained or improved  12/15/2019 0852 by Basil Lowe RCP  Outcome: Ongoing  12/15/2019 0657 by Xuan Tolentino RN  Outcome: Ongoing  Goal: ET tube will be managed safely  12/15/2019 0852 by Basil Lowe RCP  Outcome: Ongoing  12/15/2019 0657 by Xuan Tolentino RN  Outcome: Ongoing  Goal: Ability to express needs and understand communication  12/15/2019 0852 by Basil Lowe RCP  Outcome: Ongoing  12/15/2019 0657 by Xuan Tolentino RN  Outcome: Ongoing  Goal: Mobility/activity is maintained at optimum level for patient  12/15/2019 6168 by Angelique Rodriguez RCP  Outcome: Ongoing  12/15/2019 0657 by Melissa Hernandez RN  Outcome: Ongoing     Problem: SKIN INTEGRITY  Goal: Skin integrity is maintained or improved  12/15/2019 0657 by Melissa Hernandez RN  Outcome: Ongoing     Problem: Falls - Risk of:  Goal: Will remain free from falls  Description  Will remain free from falls  12/15/2019 0657 by Melissa Hernandez RN  Outcome: Ongoing  Goal: Absence of physical injury  Description  Absence of physical injury  12/15/2019 0657 by Melissa Hernandez RN  Outcome: Ongoing     Problem: Risk for Impaired Skin Integrity  Goal: Tissue integrity - skin and mucous membranes  Description  Structural intactness and normal physiological function of skin and  mucous membranes.   12/15/2019 0657 by Melissa Hernandez RN  Outcome: Ongoing     Problem: Nutrition  Goal: Optimal nutrition therapy  Description  Nutrition Problem: Inadequate oral intake  Intervention: Food and/or Nutrient Delivery: Start Parenteral Nutrition  Nutritional Goals: Meet % of estimated nutrition needs   12/15/2019 0657 by Melissa Hernandez RN  Outcome: Ongoing     Problem: Confusion - Acute:  Goal: Absence of continued neurological deterioration signs and symptoms  Description  Absence of continued neurological deterioration signs and symptoms  12/15/2019 0657 by Melissa Hernandez RN  Outcome: Ongoing  Goal: Mental status will be restored to baseline  Description  Mental status will be restored to baseline  12/15/2019 0657 by Melissa Hernandez RN  Outcome: Ongoing     Problem: Discharge Planning:  Goal: Ability to perform activities of daily living will improve  Description  Ability to perform activities of daily living will improve  12/15/2019 0657 by Melissa Hernandez RN  Outcome: Ongoing  Goal: Participates in care planning  Description  Participates in care planning  12/15/2019 0657 by Melissa Hernandez RN  Outcome: Ongoing     Problem: Injury - Risk of, Physical Injury:  Goal: Will remain free from falls  Description  Will remain free from falls  12/15/2019 0657 by Rizwana Escobar RN  Outcome: Ongoing  Goal: Absence of physical injury  Description  Absence of physical injury  12/15/2019 0657 by Rizwana Escobar RN  Outcome: Ongoing     Problem: Mood - Altered:  Goal: Mood stable  Description  Mood stable  12/15/2019 0657 by Rizwana Escobar RN  Outcome: Ongoing  Goal: Absence of abusive behavior  Description  Absence of abusive behavior  12/15/2019 0657 by Rizwana Escobar RN  Outcome: Ongoing  Goal: Verbalizations of feeling emotionally comfortable while being cared for will increase  Description  Verbalizations of feeling emotionally comfortable while being cared for will increase  12/15/2019 0657 by Rizwana Escobar RN  Outcome: Ongoing     Problem: Psychomotor Activity - Altered:  Goal: Absence of psychomotor disturbance signs and symptoms  Description  Absence of psychomotor disturbance signs and symptoms  12/15/2019 0657 by Rizwana Escobar RN  Outcome: Ongoing     Problem: Sensory Perception - Impaired:  Goal: Demonstrations of improved sensory functioning will increase  Description  Demonstrations of improved sensory functioning will increase  12/15/2019 0657 by Rizwana Escobar RN  Outcome: Ongoing  Goal: Decrease in sensory misperception frequency  Description  Decrease in sensory misperception frequency  12/15/2019 0657 by Rizwana Escobar RN  Outcome: Ongoing  Goal: Able to refrain from responding to false sensory perceptions  Description  Able to refrain from responding to false sensory perceptions  12/15/2019 0657 by Rizwana Escobar RN  Outcome: Ongoing  Goal: Demonstrates accurate environmental perceptions  Description  Demonstrates accurate environmental perceptions  12/15/2019 0657 by Rizwana Escobar RN  Outcome: Ongoing  Goal: Able to distinguish between reality-based and nonreality-based thinking  Description  Able to distinguish between reality-based and nonreality-based

## 2019-12-15 NOTE — PROGRESS NOTES
Critical Care Team - Daily Progress Note      Date and time: 12/15/2019 3:53 PM  Patient's name:  Queenie Lackey  Medical Record Number: [id number]  Patient's account/billing number: [de-identified]  Patient's YOB: 1985  Age: 37 y.o. Date of Admission: 2019  3:39 PM  Length of stay during current admission: 16      Primary Care Physician: No primary care provider on file. ICU Attending Physician: Dr. Cindi Reyes    Code Status: Full Code    Reason for ICU admission: Necrotizing Fascitis vertebral lucency concerning for malignancy    SUBJECTIVE:     OVERNIGHT EVENTS:       Patient seen and examined. No Any acute event overnight. Tube feed once with Pivot 1.5 at 40 mL/h for 24 hours along with TPN. Nephrology: Will be consulted regarding protein supplementation and fluid balance. Patient is on scheduled dose of oxycodone. POD#16wide complex debridement of nec fasc involving gluteal/perianal/distal rectum  POD#12 open diverting end colostomy   POD#7 Incision and debridement of necrotizing fasciitis buttock wound, scrotum, bilateral groin region and closure of midline abdominal wound  POD #5 Sharp excisional debridement of gluteal, marc-rectal and perineal wound to level of muscle, Sharp excisional debridement of scrotum (performed by Urology), Washout of gluteal/perineal/scrotal/inguinal wound, Partial primary closure of pubic wound, and Dakins Wet to dry dressing application. Samantha Sanches He is on Fentanyl and Roxicodone, meropenum. Dialysis 2.5 L removed, study. Possible dialysis tomorrow  Hemoglobin 7.1           Of note: Patient is going OR tomorrow morning for possible cleaning up we discussed regarding tracheostomy during rounds. Talk to mother she is not willing for tracheostomy to be done now she wants to time to think and talk to other people and get second opinion.     TSH 0.45   Fever:-  Temp (24hrs), Av.8 °F (37.1 °C), Min:98.6 °F (37 °C), Max:99 °F (03.6 °C)    Systolic (58VTP), 119 20 96 %   12/15/19 1150 -- 105 20 98 %   12/15/19 1100 129/77 94 19 99 %   12/15/19 1028 -- 94 18 99 %   12/15/19 1000 (!) 151/83 110 21 100 %         Intake/Output Summary (Last 24 hours) at 12/15/2019 1553  Last data filed at 12/15/2019 0400  Gross per 24 hour   Intake 3600.49 ml   Output 15 ml   Net 3585.49 ml     Wt Readings from Last 2 Encounters:   12/15/19 (!) 396 lb 13.3 oz (180 kg)     Body mass index is 58.6 kg/m². PHYSICAL EXAMINATION:    General appearance -intubated and sedated, NAD  Mental status -intubated and sedated, not following commands  Chest -bilateral breath sounds heard, no wheezing, on vent  Heart -normal rate, regular rhythm, normal S1 and S2  Abdomen - soft, nondistended, no rash  Neurological - intubated and sedated, not following commands  Extremities - weak pulses in extremities, Dopplerable RLE pulse  Skinpost debridement of gluteal and perianal region. Full-thickness ulcer in the left plantar surface.  See media for post op wounds debridement     Any additional physical findings:    MEDICATIONS:    Scheduled Meds:   oxyCODONE  10 mg Oral Q4H    thiamine  100 mg Oral Daily    folic acid  1 mg Oral Daily    insulin lispro  0-18 Units Subcutaneous TID WC    insulin lispro  0-9 Units Subcutaneous Nightly    povidone-iodine   Topical Daily    sodium chloride  250 mL Intravenous Once    sodium chloride  250 mL Intravenous Once    sodium chloride flush  10 mL Intravenous Q12H    sodium chloride  250 mL Intravenous Once    sodium chloride flush  10 mL Intravenous Q12H    midodrine  10 mg Oral TID WC    famotidine  20 mg Per NG tube Daily    [Held by provider] thiamine and folic acid IVPB   Intravenous Daily    meropenem  1 g Intravenous Q12H    sodium chloride flush  10 mL Intravenous 2 times per day    heparin (porcine)  5,000 Units Subcutaneous 3 times per day     Continuous Infusions:   PN-Adult 2-in-1 Central Line (Standard)      PN-Adult 2-in-1 [facility] Yes  Cuff Pressure (cm H2O): 28 cm H2O  Skin barrier applied: No    ABGs:  Arterial Blood Gas result:  pO2 107.4; pCO2 32.9; pH 7.29;  HCO3 16, %O2 Sat 98. Laboratory findings:    Complete Blood Count:   Recent Labs     12/13/19 0435 12/13/19 1924 12/14/19  0521 12/15/19  0700   WBC 12.4*  --  10.8 9.7   HGB 8.0* 7.9* 7.3* 7.1*   HCT 27.3* 26.7* 25.5* 24.3*     --  224 See Reflexed IPF Result        Last 3 Blood Glucose:   Recent Labs     12/13/19 0435 12/14/19  0521 12/15/19  0700   GLUCOSE 189* 179* 196*        PT/INR:    Lab Results   Component Value Date    PROTIME 11.9 11/29/2019    INR 1.1 11/29/2019     PTT:  No results found for: APTT, PTT    Comprehensive Metabolic Profile:   Recent Labs     12/13/19  0435 12/14/19  0521 12/15/19  0700   * 134* 137   K 4.1 4.0 4.5    104 105   CO2 18* 19* 20   BUN 56* 53* 44*   CREATININE 2.33* 2.01* 1.61*   GLUCOSE 189* 179* 196*   CALCIUM 8.8 8.5* 8.9      Magnesium:   Lab Results   Component Value Date    MG 1.8 12/15/2019     Phosphorus:   Lab Results   Component Value Date    PHOS 3.6 12/15/2019     Ionized Calcium:   Lab Results   Component Value Date    CAION 1.05 12/03/2019            Troponin: No results for input(s): TROPONINI in the last 72 hours. Radiology/Imaging:     Chest Xray (12/15/2019):    ASSESSMENT:     Principal Problem:    Necrotizing fasciitis ([facility] [address]  Active Problems:    Hypertension    Diabetes ([facility] [address]    Diabetic foot ulcer ([facility] [address]    Septic shock ([facility] [address]    Bandemia  Resolved Problems:    * No resolved hospital problems. *    PLAN:     WEAN PER PROTOCOL:  [x] No   [] Yes  [] N/A    DISCONTINUE ANY LABS:   [x] No   [] Yes    TRANSFER OUT OF ICU:   [x] No   [] Yes    ADDITIONAL PLAN:    1. Pressure ulcer cant be staged present on admission / Necrotizing Faciitis involving buttocks and scrotum - s/p POD#16 s/p wide complex debridement of gluteal/perianal region.  region extensive necrotic tissue that involved the anus and

## 2019-12-16 ENCOUNTER — ANESTHESIA (OUTPATIENT)
Dept: ICU | Age: 34
DRG: 463 | End: 2019-12-16
Payer: MEDICARE

## 2019-12-16 LAB
-: NORMAL
ABSOLUTE EOS #: 0.84 K/UL (ref 0–0.4)
ABSOLUTE IMMATURE GRANULOCYTE: 0 K/UL (ref 0–0.3)
ABSOLUTE LYMPH #: 1.68 K/UL (ref 1–4.8)
ABSOLUTE MONO #: 0.95 K/UL (ref 0.1–0.8)
ANION GAP SERPL CALCULATED.3IONS-SCNC: 10 MMOL/L (ref 9–17)
BASOPHILS # BLD: 0 % (ref 0–2)
BASOPHILS ABSOLUTE: 0 K/UL (ref 0–0.2)
BUN BLDV-MCNC: 59 MG/DL (ref 6–20)
BUN/CREAT BLD: ABNORMAL (ref 9–20)
CALCIUM SERPL-MCNC: 9.3 MG/DL (ref 8.6–10.4)
CHLORIDE BLD-SCNC: 105 MMOL/L (ref 98–107)
CO2: 19 MMOL/L (ref 20–31)
CREAT SERPL-MCNC: 1.88 MG/DL (ref 0.7–1.2)
DIFFERENTIAL TYPE: ABNORMAL
EOSINOPHILS RELATIVE PERCENT: 8 % (ref 1–4)
GFR AFRICAN AMERICAN: 49 ML/MIN
GFR NON-AFRICAN AMERICAN: 41 ML/MIN
GFR SERPL CREATININE-BSD FRML MDRD: ABNORMAL ML/MIN/{1.73_M2}
GFR SERPL CREATININE-BSD FRML MDRD: ABNORMAL ML/MIN/{1.73_M2}
GLUCOSE BLD-MCNC: 164 MG/DL (ref 75–110)
GLUCOSE BLD-MCNC: 175 MG/DL (ref 75–110)
GLUCOSE BLD-MCNC: 176 MG/DL (ref 75–110)
GLUCOSE BLD-MCNC: 183 MG/DL (ref 75–110)
GLUCOSE BLD-MCNC: 185 MG/DL (ref 75–110)
GLUCOSE BLD-MCNC: 208 MG/DL (ref 75–110)
GLUCOSE BLD-MCNC: 218 MG/DL (ref 75–110)
GLUCOSE BLD-MCNC: 234 MG/DL (ref 70–99)
HCT VFR BLD CALC: 25.2 % (ref 40.7–50.3)
HEMOGLOBIN: 7.9 G/DL (ref 13–17)
IMMATURE GRANULOCYTES: 0 %
LYMPHOCYTES # BLD: 16 % (ref 24–44)
MAGNESIUM: 1.8 MG/DL (ref 1.6–2.6)
MCH RBC QN AUTO: 28.9 PG (ref 25.2–33.5)
MCHC RBC AUTO-ENTMCNC: 31.3 G/DL (ref 28.4–34.8)
MCV RBC AUTO: 92.3 FL (ref 82.6–102.9)
MONOCYTES # BLD: 9 % (ref 1–7)
MORPHOLOGY: ABNORMAL
MORPHOLOGY: ABNORMAL
NRBC AUTOMATED: 0.9 PER 100 WBC
PDW BLD-RTO: 23.6 % (ref 11.8–14.4)
PHOSPHORUS: 3.9 MG/DL (ref 2.5–4.5)
PLATELET # BLD: ABNORMAL K/UL (ref 138–453)
PLATELET ESTIMATE: ABNORMAL
PLATELET, FLUORESCENCE: 191 K/UL (ref 138–453)
PLATELET, IMMATURE FRACTION: 1.3 % (ref 1.1–10.3)
PMV BLD AUTO: ABNORMAL FL (ref 8.1–13.5)
POTASSIUM SERPL-SCNC: 3.9 MMOL/L (ref 3.7–5.3)
POTASSIUM SERPL-SCNC: 6 MMOL/L (ref 3.7–5.3)
RBC # BLD: 2.73 M/UL (ref 4.21–5.77)
RBC # BLD: ABNORMAL 10*6/UL
REASON FOR REJECTION: NORMAL
SEG NEUTROPHILS: 67 % (ref 36–66)
SEGMENTED NEUTROPHILS ABSOLUTE COUNT: 7.03 K/UL (ref 1.8–7.7)
SODIUM BLD-SCNC: 134 MMOL/L (ref 135–144)
WBC # BLD: 10.5 K/UL (ref 3.5–11.3)
WBC # BLD: ABNORMAL 10*3/UL
ZZ NTE CLEAN UP: ORDERED TEST: NORMAL
ZZ NTE WITH NAME CLEAN UP: SPECIMEN SOURCE: NORMAL

## 2019-12-16 PROCEDURE — 85055 RETICULATED PLATELET ASSAY: CPT

## 2019-12-16 PROCEDURE — 02HV33Z INSERTION OF INFUSION DEVICE INTO SUPERIOR VENA CAVA, PERCUTANEOUS APPROACH: ICD-10-PCS | Performed by: INTERNAL MEDICINE

## 2019-12-16 PROCEDURE — 2580000003 HC RX 258: Performed by: STUDENT IN AN ORGANIZED HEALTH CARE EDUCATION/TRAINING PROGRAM

## 2019-12-16 PROCEDURE — 2700000000 HC OXYGEN THERAPY PER DAY

## 2019-12-16 PROCEDURE — 6360000002 HC RX W HCPCS: Performed by: STUDENT IN AN ORGANIZED HEALTH CARE EDUCATION/TRAINING PROGRAM

## 2019-12-16 PROCEDURE — 6360000002 HC RX W HCPCS: Performed by: INTERNAL MEDICINE

## 2019-12-16 PROCEDURE — 6370000000 HC RX 637 (ALT 250 FOR IP): Performed by: INTERNAL MEDICINE

## 2019-12-16 PROCEDURE — 97110 THERAPEUTIC EXERCISES: CPT

## 2019-12-16 PROCEDURE — 6370000000 HC RX 637 (ALT 250 FOR IP): Performed by: STUDENT IN AN ORGANIZED HEALTH CARE EDUCATION/TRAINING PROGRAM

## 2019-12-16 PROCEDURE — 2000000000 HC ICU R&B

## 2019-12-16 PROCEDURE — 94770 HC ETCO2 MONITOR DAILY: CPT

## 2019-12-16 PROCEDURE — 36415 COLL VENOUS BLD VENIPUNCTURE: CPT

## 2019-12-16 PROCEDURE — 99291 CRITICAL CARE FIRST HOUR: CPT | Performed by: INTERNAL MEDICINE

## 2019-12-16 PROCEDURE — 02PYX3Z REMOVAL OF INFUSION DEVICE FROM GREAT VESSEL, EXTERNAL APPROACH: ICD-10-PCS | Performed by: INTERNAL MEDICINE

## 2019-12-16 PROCEDURE — 80048 BASIC METABOLIC PNL TOTAL CA: CPT

## 2019-12-16 PROCEDURE — 85025 COMPLETE CBC W/AUTO DIFF WBC: CPT

## 2019-12-16 PROCEDURE — 82947 ASSAY GLUCOSE BLOOD QUANT: CPT

## 2019-12-16 PROCEDURE — 2580000003 HC RX 258: Performed by: INTERNAL MEDICINE

## 2019-12-16 PROCEDURE — 99233 SBSQ HOSP IP/OBS HIGH 50: CPT | Performed by: INTERNAL MEDICINE

## 2019-12-16 PROCEDURE — 94003 VENT MGMT INPAT SUBQ DAY: CPT

## 2019-12-16 PROCEDURE — 84100 ASSAY OF PHOSPHORUS: CPT

## 2019-12-16 PROCEDURE — 2500000003 HC RX 250 WO HCPCS: Performed by: INTERNAL MEDICINE

## 2019-12-16 PROCEDURE — 83735 ASSAY OF MAGNESIUM: CPT

## 2019-12-16 PROCEDURE — 94761 N-INVAS EAR/PLS OXIMETRY MLT: CPT

## 2019-12-16 PROCEDURE — P9047 ALBUMIN (HUMAN), 25%, 50ML: HCPCS | Performed by: INTERNAL MEDICINE

## 2019-12-16 PROCEDURE — 84132 ASSAY OF SERUM POTASSIUM: CPT

## 2019-12-16 RX ORDER — DEXTROSE MONOHYDRATE 25 G/50ML
12.5 INJECTION, SOLUTION INTRAVENOUS PRN
Status: DISCONTINUED | OUTPATIENT
Start: 2019-12-16 | End: 2020-01-16 | Stop reason: HOSPADM

## 2019-12-16 RX ORDER — HEPARIN SODIUM 1000 [USP'U]/ML
1300 INJECTION, SOLUTION INTRAVENOUS; SUBCUTANEOUS PRN
Status: DISCONTINUED | OUTPATIENT
Start: 2019-12-16 | End: 2019-12-26

## 2019-12-16 RX ORDER — DEXTROSE MONOHYDRATE 25 G/50ML
25 INJECTION, SOLUTION INTRAVENOUS ONCE
Status: COMPLETED | OUTPATIENT
Start: 2019-12-16 | End: 2019-12-16

## 2019-12-16 RX ORDER — HEPARIN SODIUM 1000 [USP'U]/ML
500 INJECTION INTRAVENOUS; SUBCUTANEOUS PRN
Status: DISCONTINUED | OUTPATIENT
Start: 2019-12-16 | End: 2019-12-17

## 2019-12-16 RX ORDER — ALBUMIN (HUMAN) 12.5 G/50ML
25 SOLUTION INTRAVENOUS PRN
Status: DISCONTINUED | OUTPATIENT
Start: 2019-12-16 | End: 2020-01-16 | Stop reason: HOSPADM

## 2019-12-16 RX ORDER — NICOTINE POLACRILEX 4 MG
15 LOZENGE BUCCAL PRN
Status: DISCONTINUED | OUTPATIENT
Start: 2019-12-16 | End: 2020-01-16 | Stop reason: HOSPADM

## 2019-12-16 RX ORDER — HEPARIN SODIUM 1000 [USP'U]/ML
1500 INJECTION, SOLUTION INTRAVENOUS; SUBCUTANEOUS ONCE
Status: DISCONTINUED | OUTPATIENT
Start: 2019-12-16 | End: 2019-12-17

## 2019-12-16 RX ORDER — HEPARIN SODIUM 1000 [USP'U]/ML
1600 INJECTION, SOLUTION INTRAVENOUS; SUBCUTANEOUS PRN
Status: DISCONTINUED | OUTPATIENT
Start: 2019-12-16 | End: 2019-12-17

## 2019-12-16 RX ORDER — MIDODRINE HYDROCHLORIDE 5 MG/1
5 TABLET ORAL PRN
Status: DISCONTINUED | OUTPATIENT
Start: 2019-12-16 | End: 2019-12-22

## 2019-12-16 RX ORDER — HEPARIN SODIUM 1000 [USP'U]/ML
1400 INJECTION, SOLUTION INTRAVENOUS; SUBCUTANEOUS PRN
Status: DISCONTINUED | OUTPATIENT
Start: 2019-12-16 | End: 2019-12-26

## 2019-12-16 RX ORDER — DEXTROSE MONOHYDRATE 50 MG/ML
100 INJECTION, SOLUTION INTRAVENOUS PRN
Status: DISCONTINUED | OUTPATIENT
Start: 2019-12-16 | End: 2020-01-16 | Stop reason: HOSPADM

## 2019-12-16 RX ADMIN — INSULIN HUMAN 10 UNITS: 100 INJECTION, SOLUTION PARENTERAL at 10:24

## 2019-12-16 RX ADMIN — Medication: at 09:49

## 2019-12-16 RX ADMIN — INSULIN LISPRO 2 UNITS: 100 INJECTION, SOLUTION INTRAVENOUS; SUBCUTANEOUS at 23:47

## 2019-12-16 RX ADMIN — HEPARIN SODIUM 1400 UNITS: 1000 INJECTION INTRAVENOUS; SUBCUTANEOUS at 22:35

## 2019-12-16 RX ADMIN — SODIUM CHLORIDE, PRESERVATIVE FREE 10 ML: 5 INJECTION INTRAVENOUS at 21:30

## 2019-12-16 RX ADMIN — Medication 200 MCG/HR: at 03:00

## 2019-12-16 RX ADMIN — DEXTROSE MONOHYDRATE 25 G: 25 INJECTION, SOLUTION INTRAVENOUS at 09:49

## 2019-12-16 RX ADMIN — Medication 200 MCG/HR: at 17:13

## 2019-12-16 RX ADMIN — HEPARIN SODIUM 5000 UNITS: 5000 INJECTION INTRAVENOUS; SUBCUTANEOUS at 07:06

## 2019-12-16 RX ADMIN — MEROPENEM 1 G: 1 INJECTION, POWDER, FOR SOLUTION INTRAVENOUS at 09:49

## 2019-12-16 RX ADMIN — PROPOFOL 30 MCG/KG/MIN: 10 INJECTION, EMULSION INTRAVENOUS at 21:39

## 2019-12-16 RX ADMIN — OXYCODONE HYDROCHLORIDE 10 MG: 5 TABLET ORAL at 02:14

## 2019-12-16 RX ADMIN — MEROPENEM 1 G: 1 INJECTION, POWDER, FOR SOLUTION INTRAVENOUS at 21:30

## 2019-12-16 RX ADMIN — HEPARIN SODIUM 5000 UNITS: 5000 INJECTION INTRAVENOUS; SUBCUTANEOUS at 22:00

## 2019-12-16 RX ADMIN — HEPARIN SODIUM 1300 UNITS: 1000 INJECTION INTRAVENOUS; SUBCUTANEOUS at 22:35

## 2019-12-16 RX ADMIN — Medication 100 MG: at 09:49

## 2019-12-16 RX ADMIN — ALBUMIN (HUMAN) 25 G: 0.25 INJECTION, SOLUTION INTRAVENOUS at 16:59

## 2019-12-16 RX ADMIN — ALTEPLASE 2 MG: 2.2 INJECTION, POWDER, LYOPHILIZED, FOR SOLUTION INTRAVENOUS at 18:00

## 2019-12-16 RX ADMIN — PROPOFOL 25 MCG/KG/MIN: 10 INJECTION, EMULSION INTRAVENOUS at 12:31

## 2019-12-16 RX ADMIN — MIDODRINE HYDROCHLORIDE 10 MG: 5 TABLET ORAL at 16:59

## 2019-12-16 RX ADMIN — OXYCODONE HYDROCHLORIDE 10 MG: 5 TABLET ORAL at 09:49

## 2019-12-16 RX ADMIN — WATER 2.2 ML: 1 INJECTION INTRAMUSCULAR; INTRAVENOUS; SUBCUTANEOUS at 18:00

## 2019-12-16 RX ADMIN — FAMOTIDINE 20 MG: 20 TABLET, FILM COATED ORAL at 09:49

## 2019-12-16 RX ADMIN — OXYCODONE HYDROCHLORIDE 10 MG: 5 TABLET ORAL at 04:30

## 2019-12-16 RX ADMIN — PROPOFOL 25 MCG/KG/MIN: 10 INJECTION, EMULSION INTRAVENOUS at 02:52

## 2019-12-16 RX ADMIN — SODIUM CHLORIDE, PRESERVATIVE FREE 10 ML: 5 INJECTION INTRAVENOUS at 09:00

## 2019-12-16 RX ADMIN — FOLIC ACID 1 MG: 1 TABLET ORAL at 09:49

## 2019-12-16 RX ADMIN — MIDODRINE HYDROCHLORIDE 10 MG: 5 TABLET ORAL at 09:00

## 2019-12-16 RX ADMIN — OXYCODONE HYDROCHLORIDE 10 MG: 5 TABLET ORAL at 14:53

## 2019-12-16 RX ADMIN — Medication 200 MCG/HR: at 21:46

## 2019-12-16 RX ADMIN — PROPOFOL 25 MCG/KG/MIN: 10 INJECTION, EMULSION INTRAVENOUS at 17:13

## 2019-12-16 RX ADMIN — SODIUM CHLORIDE: 9 INJECTION, SOLUTION INTRAVENOUS at 02:26

## 2019-12-16 RX ADMIN — HEPARIN SODIUM 5000 UNITS: 5000 INJECTION INTRAVENOUS; SUBCUTANEOUS at 14:00

## 2019-12-16 RX ADMIN — OXYCODONE HYDROCHLORIDE 10 MG: 5 TABLET ORAL at 21:30

## 2019-12-16 RX ADMIN — MIDODRINE HYDROCHLORIDE 10 MG: 5 TABLET ORAL at 12:31

## 2019-12-16 RX ADMIN — CALCIUM GLUCONATE: 98 INJECTION, SOLUTION INTRAVENOUS at 18:17

## 2019-12-16 RX ADMIN — Medication 200 MCG/HR: at 12:30

## 2019-12-16 RX ADMIN — Medication 200 MCG/HR: at 07:56

## 2019-12-16 RX ADMIN — PROPOFOL 25 MCG/KG/MIN: 10 INJECTION, EMULSION INTRAVENOUS at 07:06

## 2019-12-16 RX ADMIN — SODIUM ZIRCONIUM CYCLOSILICATE 10 G: 10 POWDER, FOR SUSPENSION ORAL at 23:50

## 2019-12-16 RX ADMIN — Medication 10 ML: at 09:50

## 2019-12-16 ASSESSMENT — PULMONARY FUNCTION TESTS
PIF_VALUE: 14
PIF_VALUE: 13
PIF_VALUE: 13
PIF_VALUE: 11
PIF_VALUE: 15
PIF_VALUE: 9
PIF_VALUE: 11
PIF_VALUE: 9
PIF_VALUE: 13
PIF_VALUE: 11

## 2019-12-16 ASSESSMENT — PAIN SCALES - GENERAL
PAINLEVEL_OUTOF10: 5
PAINLEVEL_OUTOF10: 7
PAINLEVEL_OUTOF10: 10

## 2019-12-16 NOTE — PROGRESS NOTES
PROGRESS NOTE     PATIENT NAME: Austin Mcdaniel [address] RECORD NO. [id number]  DATE: 12/16/2019  SURGEON: Logan Ríos  PRIMARY CARE PHYSICIAN: No primary care provider on file. HD: # 17    ASSESSMENT    Patient Active Problem List   Diagnosis    Necrotizing fasciitis ([address]    Hypertension    Diabetes ([address]    Diabetic foot ulcer ([address]    Septic shock ([address]    Bandemia      11/29/2019 wide complex debridement of nec fasc involving gluteal/perianal/distal rectum  12/03/2019 open diverting end colostomy   12/06/2019 I&D  12/08/2019 Incision and debridement of necrotizing fasciitis buttock wound, scrotum, bilateral groin region and closure of midline abdominal wound  12/10/2019 Sharp excisional debridement of gluteal, marc-rectal and perineal wound to level of muscle, Sharp excisional debridement of scrotum (performed by Urology), Washout of gluteal/perineal/scrotal/inguinal wound, Partial primary closure of pubic wound, and Dakins Wet to dry dressing application      MEDICAL DECISION MAKING AND PLAN    · Necrotizing soft tissue infection: s/p wide complex debridement of gluteal/perianal region extensive necrotic tissue that involved the anus and distal rectum. Recently, infection spread into pelvis which indicates significant risk of mortality; however, as of our last debridement 12/10 we did not find any additional extension of infection. · Discussed with Urology. Recommending plastic surgery consultation to assess possibilities for closure of the scrotum. Otherwise recommending WTD dressing changes until it fully granulates. Aware of possible urine leak - planning to evaluate with us in OR today. · Unable to place vac at this time due to wound being in continuity with the open scrotum with exposed testicles and urethra. Will plan for BID WTD dressing changes for the entire wound as mentioned. · Consult to reconstructive plastics to evaluate scrotum/perineum.      · Prevena vac to the midline laparotomy incision. Will change in OR today. · Recommend continuing tube feeds/optimizing nutrition to account for healing massive wounds. Held for OR today. · Monitor stoma - healthy in appearance. · Meropenem per ID. Initially recommended Zosyn but switched due to need to reduce fluid load with poor LVEF. Strep and Gram negative in initial cultures  · Strict glucose control <180  · LLE wound per podiatry  · Nephrology following. SUBJECTIVE    Patient seen and examined. No acute changes. Afebrile. He is waking up more and purposeful movements but still not following commands. Remains intubated/sedated. OBJECTIVE  VITALS: BP (!) 160/92   Pulse 113   Temp (!) 32 °F (0 °C)   Resp 13   Ht 5' 9\" (1.753 m)   Wt (!) 401 lb 14.4 oz (182.3 kg)   SpO2 100%   BMI 59.35 kg/m²     GENERAL: intubated and sedated. NAD  NEURO: moves uppers off sedation. Not following commands, making purposeful movements   LUNGS: vented   HEART: +S1/S2, Sinus tachy  ABDOMEN: soft, ND, +grimace with palpation. Ostomy is patent, bowel succus in appliance, no flatus. prevena vac to midline incision  SKIN/SOFT TISSUE: dressing remains intact, with no significant change in drainage, plan to change at bedside today   EXTREMITY: dressing to L foot is c/d/i. No cyanosis     I/O last 3 completed shifts: In: 4489.5 [I.V.:1198.2; NG/GT:1173; IV Piggyback:200]  Out: 10 [Urine:10]    Drain/tube output:   In: 4489.5 [I.V.:1198.2; NG/GT:1173]  Out: 10 [Urine:10]    LAB:  CBC:   Recent Labs     12/14/19  0521 12/15/19  0700 12/16/19  0610   WBC 10.8 9.7 10.5   HGB 7.3* 7.1* 7.9*   HCT 25.5* 24.3* 25.2*   MCV 96.6 96.0 92.3    See Reflexed IPF Result See Reflexed IPF Result     BMP:   Recent Labs     12/14/19  0521 12/15/19  0700 12/16/19  0816   * 137 134*   K 4.0 4.5 PENDING    105 105   CO2 19* 20 19*   BUN 53* 44* 59*   CREATININE 2.01* 1.61* 1.88*   GLUCOSE 179* 196* 234*         RADIOLOGY:   no new images   No      Clint Donate Joon Russell DO  12/16/2019  8:56 AM         Attending Note    Wound vac change in OR today  I have reviewed the above TECSS note(s) and I either performed the key elements of the medical history and physical exam or was present with the resident when the key elements of the medical history and physical exam were performed. I have discussed the findings, established the care plan and recommendations with Resident, TECSS RN, bedside nurse.     Maury Ledesma MD  12/16/2019  11:23 AM

## 2019-12-16 NOTE — CARE COORDINATION
Care Transition  Met with mom to discuss LTACH and she chose [facility]. Referral sent.      Called N[facility] at [facility] to discuss referral.

## 2019-12-16 NOTE — PROGRESS NOTES
DATE: 2019  NAME: Bola Byers  MRN: [id number]   : 1985    Discharge Recommendations: Continue to Assess (pending progress)       RN reported pt was appropriate to receive PROM today  Subjective: Pt is intubated and non-verbal  Pain: Unknown; no grimacing  Patient follows: No Commands  Is patient on ventilator: Yes  Is patient on sedation: Yes  Precautions: Fall Risk; Contact Precautions; intubated, per RN okay for PROM on this date    Therapeutic exercises:  UE/LE(s)  Bilateral Passive range of motion all planes x 20 reps  bilateral gastrocnemius stretching 5 reps x 30 seconds    Goals  Short Term Goals  Short term goal 1: PROM for stretching and injury prevention   Short term goal 2: AROM/AAROM for strengthening   Short term goal 3: Progress mobility as appropriate       Plan: Progress functional mobility as medically appropriate.    Time In: 1513  Time Out: 1537  Time Coded Minutes (treatment minutes): 24  Rehab Potential: Fair  Treatments/week: 2-3x / wk    Eliud Falcon, PTA

## 2019-12-16 NOTE — PROGRESS NOTES
hrs:   BP Temp Temp src Pulse Resp SpO2 Weight   12/16/19 0817 -- (!) 32 °F (0 °C) -- -- -- -- (!) 401 lb 14.4 oz (182.3 kg)   12/16/19 0800 (!) 160/92 -- -- 113 13 100 % --   12/16/19 0730 (!) 173/107 -- -- 117 17 100 % --   12/16/19 0700 (!) 164/105 -- -- 110 18 100 % --   12/16/19 0630 (!) 164/89 -- -- 112 20 -- --   12/16/19 0600 119/63 -- -- 107 18 100 % --   12/16/19 0530 (!) 142/82 -- -- 120 16 91 % --   12/16/19 0500 (!) 162/94 -- -- 116 24 100 % --   12/16/19 0430 (!) 154/90 -- -- 113 19 -- --   12/16/19 0403 -- -- -- 110 28 100 % --   12/16/19 0400 (!) 145/81 98.1 °F (36.7 °C) Axillary 112 15 -- --   12/16/19 0330 (!) 146/83 -- -- 106 18 -- --   12/16/19 0300 (!) 154/90 -- -- 108 16 100 % --         Intake/Output Summary (Last 24 hours) at 12/16/2019 0857  Last data filed at 12/16/2019 0400  Gross per 24 hour   Intake 4489.5 ml   Output 10 ml   Net 4479.5 ml     Wt Readings from Last 2 Encounters:   12/16/19 (!) 401 lb 14.4 oz (182.3 kg)     Body mass index is 59.35 kg/m². PHYSICAL EXAMINATION:    General appearance -intubated and sedated, NAD  Mental status -intubated and sedated, not following commands  Chest -bilateral breath sounds heard, no wheezing, on vent  Heart -normal rate, regular rhythm, normal S1 and S2  Abdomen - soft, nondistended, no rash  Neurological - intubated and sedated, not following commands  Extremities - weak pulses in extremities, Dopplerable RLE pulse  Skinpost debridement of gluteal and perianal region. Full-thickness ulcer in the left plantar surface.  See media for post op wounds debridement     Any additional physical findings:    MEDICATIONS:    Scheduled Meds:   oxyCODONE  10 mg Oral Q4H    thiamine  100 mg Oral Daily    folic acid  1 mg Oral Daily    insulin lispro  0-18 Units Subcutaneous TID WC    insulin lispro  0-9 Units Subcutaneous Nightly    povidone-iodine   Topical Daily    sodium chloride flush  10 mL Intravenous Q12H    midodrine  10 mg Oral TID WC    famotidine  20 mg Per NG tube Daily    [Held by provider] thiamine and folic acid IVPB   Intravenous Daily    meropenem  1 g Intravenous Q12H    sodium chloride flush  10 mL Intravenous 2 times per day    heparin (porcine)  5,000 Units Subcutaneous 3 times per day     Continuous Infusions:   PN-Adult 2-in-1 Central Line (Standard) 80 mL/hr at 12/15/19 1809    propofol 25 mcg/kg/min (12/16/19 0706)    fentaNYL 200 mcg/hr (12/16/19 0756)    dextrose      sodium chloride 10 mL/hr at 12/16/19 0226    norepinephrine (LEVOPHED) infusion (double concentration) 2 mcg/min (12/11/19 0514)     PRN Meds:   LORazepam, 1 mg, Q6H PRN  sodium chloride, 250 mL, PRN  sodium chloride, 150 mL, PRN  albumin human, 25 g, PRN  sodium chloride flush, 10 mL, PRN  potassium chloride, 40 mEq, PRN    Or  potassium alternative oral replacement, 40 mEq, PRN    Or  potassium chloride, 10 mEq, PRN  glucose, 15 g, PRN  dextrose, 12.5 g, PRN  glucagon (rDNA), 1 mg, PRN  dextrose, 100 mL/hr, PRN  acetaminophen, 650 mg, Q4H PRN  REFRESH LACRI-LUBE, , PRN  magnesium hydroxide, 30 mL, Daily PRN  ondansetron, 4 mg, Q6H PRN          VENT SETTINGS (Comprehensive) (if applicable):  Vent Information  $Ventilation: $Subsequent Day  Ventilator Started: Yes  Skin Assessment: Clean, dry, & intact  Equipment ID: [id number]  Equipment Changed: Expiratory Filter  Vent Type: Servo i  Vent Mode: PRVC  Vt Ordered: 400 mL  Rate Set: 15 bmp  Pressure Support: 8 cmH20  FiO2 : 40 %  Sensitivity: 2  PEEP/CPAP: 8  I Time/ I Time %: 0.8 s  Cuff Pressure (cm H2O): 28 cm H2O  Humidification Source: Heated wire  Humidification Temp: 37  Humidification Temp Measured: 37  Circuit Condensation: Drained  Nitric Oxide/Epoprostenol In Use?: No  Additional Respiratory  Assessments  Pulse: 113  Resp: 13  SpO2: 100 %  End Tidal CO2: 50 (%)  Position: Semi-Pagan's  Humidification Source: Heated wire  Humidification Temp: 37  Circuit Condensation: Drained  Oral Care POD#17 s/p wide complex debridement of gluteal/perianal region. region extensive necrotic tissue that involved the anus and distal rectum. Had  debridement, washout of gluteal and perineal, diverting loop colostomy  Bedside debridement done on 12/4, dressing changes with him on site will continue to do wet-to-dry dressing changes as per  surgery. - continue meropenem (started 12/1) per ID   - (12/10) Repeat debridement with general surgery and urology   -Possible OR  Today for cleaning. 2.  Septic shock -resolved, off pressors  - Blood cultures drawn 12/2/19:  No growth     3. DEBBY on CKD -     Urology recs: Meroponen empirically, maintain lema for continued drainage.   -Urine culture : Grew Proteus mirabilis, ID is aware of it  -Nephrology recs:  Almost daily dialysis  Replacing electrolyte  . - K: 6.0: Started on moderate Hyperkalemia treatment protocol. Renal ultrasound: Right renal cyst, otherwise grossly negative renal   Ultrasound, non diagnostic bladder assessment. 4. Diabetes -high-dose correction POC glucose checks every 4 to every 6, check back to q 4 if the glucose levels constantly remains high over 200   - Hypoglycemia treatment protocol     5. Diabetic left foot ulcer -dietary on board. Wound care. No surgical intervention for now    6. Anemia: Hgb (7.9.0),   received  7uPRBC since admission     1. Feeding NPO for OR today   2. Fluids - KVO  3. Family - none at bedside   4. Analgesic -fentanyl drip   5. Sedation: Propofol  6. Thrombo-prophylaxis [] Enoxaparin  [x] Unfract. Heparin Subcut (was held this AM for OR)  [] EPC Cuffs  7. Mobility bed rest and PT/ OT   8. Head of bed up: yes   9. Ulcer prophylaxis - pepcid   10. Glycemic control: High dose correction  11. Spontaneous breathing trial: YEs  12. Bowel regimen/urine output: milk of mag PRN, 10 ml UOP in last 24 hours  13. Indwelling catheter/lines CVC RIJ, OG, Lema  14.  De-escalation: Labs for POC glucose checks and add thiamin and

## 2019-12-16 NOTE — PROGRESS NOTES
Shashi  Occupational Therapy Not Seen Note    DATE: 2019  Name: Channing Chauhan  : 1985  MRN: [id number]    Patient not available for Occupational Therapy due to:    [] Testing:    [] Hemodialysis    [] Blood Transfusion in Progress    []Refusal by Patient:    [x] Surgery/Procedure: OR this morning for possible cleaning up of fascitis per crit care note    [] Strict Bedrest    [] Sedation    [] Spine Precautions     [] Pt being transferred to palliative care at this time. Spoke with pt/family and OT services to be defered. [] Pt independent with functional mobility and functional tasks.  Pt with no OT acute care needs at this time, will defer OT eval.    [] Other    Next Scheduled Treatment: Ck      King Atkinson OT

## 2019-12-16 NOTE — PROGRESS NOTES
vent.   Pt to OR 12-10 for further debridement      CURRENT EVALUATION :12/16/2019     Patient evaluated and examined in the ICU. Afebrile  VS stable except hypertensive  No acute overnight events and currently no changes in patient's condition. Continues on Meropenem adjusted to q 24 hr  Off pressors    Gen surgery planning to take patient to the OR today for further debridement. Will likely D/C antibiotics in the next 24-48 Hr, depending on clinical evolution. Wounds examined during dressing change on 12-13-19: Massive sacral, gluteal wound. Smaller anterior wounds. No purulence noted. No new culture data: Strep and Gram negative bacilli     Labs, X rays reviewed: 12/16/2019    BUN: 59-->61-->79-->49-->42->39->50-->56-->59  Cr: 2.56-->2.77-->4.49-->2.84-->2.69->2.39>2.50-->2.33-->1.88    WBC: 4.20-->53.3-->26.7-->16.9->16.9->16.2-->12.4-->10.5  Hb:9.5-->8.2-->6.8-->8.2 -->7.0-->7.0->7.7>7-->8.0-->7.9  Plat: 642-->342-->273->346->398-->281-->191    Cultures:  Urine:  · NA  Blood:  · 11-30-19: no growth  · 12-2-19: no growth  Sputum :  · NA  Wound:  · 11-29-19: Strep ssp and Gram negative bacilli       MRSA probe: Pos    Discussed with mother, RN, CC. .    12-13-19: Abdominal, groin wounds:              12-11-19:          12-7-19: Left foot:      12-4-19:      11-29-19:      I have personally reviewed the past medical history, past surgical history, medications, social history, and family history, and I have updated the database accordingly.   Past Medical History:     Past Medical History:   Diagnosis Date    CHF (congestive heart failure) ([address]     Diabetes mellitus ([address]     Hypertension     Spina bifida aperta of lumbar spine ([facility] [address] [facility]        Past Surgical  History:     Past Surgical History:   Procedure Laterality Date    ABDOMEN SURGERY N/A 12/8/2019    DEBRIDEMENT  NECROTIZING FASCIITIS BUTTOCK, WOUND VAC REMOVAL DELAYED PRIMARY CLOSURE OF MIDLINE ABDOMINAL INCISION, OSTOMY BAG CHANGE performed by Davie Ramos MD at [address] [facility] N/A 12/10/2019    DEBRIDEMENT OF SACRAL WOUND performed by Serena Pemberton MD at [address] [place] [address] N/A 12/3/2019    LAPAROSCOPIC CONVERTED TO OPEN DIVERTING LOOP COLOSTOMY; WOUND VAC APPLICATION performed by Shayla Barrios MD at [facility] [address] Bilateral 11/29/2019    RECTAL PERIRECTAL INCISION AND DRAINAGE, [address] [facility] LOOP COLOSTOMY CREATION performed by Smiley Rinne, MD at [facility] [address] N/A 12/6/2019    DEBRIDEMENT NECROTIZING FASCIAITIS BILAT BUTTOCK performed by Serena Pemberton MD at [address] N/A 12/10/2019    SCROTAL EXPLORATION WITH SCROTAL DEBRIDEMENT performed by Olvin Neumann MD at [place] [address]       Medications:      insulin regular  10 Units Intravenous Once    And    dextrose  25 g Intravenous Once    oxyCODONE  10 mg Oral Q4H    thiamine  100 mg Oral Daily    folic acid  1 mg Oral Daily    insulin lispro  0-18 Units Subcutaneous TID WC    insulin lispro  0-9 Units Subcutaneous Nightly    povidone-iodine   Topical Daily    sodium chloride flush  10 mL Intravenous Q12H    midodrine  10 mg Oral TID WC    famotidine  20 mg Per NG tube Daily    [Held by provider] thiamine and folic acid IVPB   Intravenous Daily    meropenem  1 g Intravenous Q12H    sodium chloride flush  10 mL Intravenous 2 times per day    heparin (porcine)  5,000 Units Subcutaneous 3 times per day       Social History:     Social History     Socioeconomic History    Marital status: Unknown     Spouse name: Not on file    Number of children: Not on file    Years of education: Not on file    Highest education level: Not on file   Occupational History    Not on file   Social Needs    Financial resource strain: Not on file    Food insecurity:     Worry: Not on file     Inability: Not on file    Transportation needs:     Medical: Not on file     Non-medical: Not on file   Tobacco Use    Smoking status: Not on file   Substance and Sexual Activity    Alcohol use: Not on file    Drug use: Not on file    Sexual activity: Not on file   Lifestyle    Physical activity:     Days per week: Not on file     Minutes per session: Not on file    Stress: Not on file   Relationships    Social connections:     Talks on phone: Not on file     Gets together: Not on file     Attends Mu-ism service: Not on file     Active member of club or organization: Not on file     Attends meetings of clubs or organizations: Not on file     Relationship status: Not on file    Intimate partner violence:     Fear of current or ex partner: Not on file     Emotionally abused: Not on file     Physically abused: Not on file     Forced sexual activity: Not on file   Other Topics Concern    Not on file   Social History Narrative    Not on file       Family History:   No family history on file. Allergies:   Patient has no known allergies. Review of Systems:     12/16/2019 UTO pt intubated, sedated, off vasopressors. Seen in ICU    Constitutional: No fevers or chills. Generalized weakness  Head: No headaches  Eyes: No double vision or blurry vision. No conjunctival inflammation. ENT: No sore throat or runny nose. . No hearing loss, tinnitus or vertigo. Cardiovascular: No chest pain or palpitations. No shortness of breath. No GORDILLO  Lung: No shortness of breath or cough. No sputum production  Abdomen: No nausea, vomiting, diarrhea, or abdominal pain. [place] No cramps. Genitourinary: No increased urinary frequency, or dysuria. No hematuria. No suprapubic or CVA pain  Musculoskeletal: No muscle aches or pains. No joint effusions, swelling or deformities. Lt diabetic foot   Hematologic: No bleeding or bruising. Neurologic: No headache, weakness, numbness, or tingling. Spina bifida  Integument: Gangrene of perineum and gluteal areas  Psychiatric: No depression. Endocrine: No polyuria, no polydipsia, no polyphagia.     Physical fluid.       Liver: Normal.       Gallbladder and Bile Ducts: Normal.       Spleen: Splenomegaly.       Adrenal Glands: Normal.       Pancreas: Normal.       Genitourinary: Symmetric renal atrophy.  Redemonstration of multiple   hypodensities in the right kidney which likely represent benign cysts.  No   urinary stones or hydronephrosis.  Ambrosio catheter in the decompressed urinary   bladder.       Bowel: Normal caliber bowel.  Postsurgical changes of the bowel with left   lower quadrant ostomy.  No evidence of acute appendicitis.  No significant   diverticular disease.       Vasculature: Atherosclerosis.  No abdominal aortic aneurysm.       Bones and Soft Tissues: Diffuse soft tissue stranding and fluid. Postsurgical changes in the anterior abdominal wall with midline incision   demonstrating expected small amount of fluid and air.  Large soft tissue   defects in the right inguinal region, scrotum, right greater than left   gluteal creases with associated packing material.  Small amount of   surrounding soft tissue gas.  There is associated skin thickening in these   regions.  Bilateral lower abdominal wall skin thickening.       Retroperitoneum/Mesentery: No intraperitoneal free air.  Mild abdominopelvic   ascites.  Loculated fluid along the inferior aspect of the liver. Redemonstration of bilateral inguinal and pelvic sidewall lymphadenopathy.    Multiple mildly prominent retroperitoneal and upper abdominal lymph nodes are   similar to the prior study.  Percutaneous intraperitoneal surgical drains.           Impression   1.  Large soft tissue defects in the right inguinal region, scrotum and right   greater than left gluteal crease is with associated packing material, small   amount of surrounding soft tissue gas and skin thickening likely relates to   history of necrotizing fasciitis.       2.  Postsurgical changes of the bowel with left lower quadrant ostomy.  No   bowel obstruction.  Percutaneous inferiorly. .    The appendix is visualized in the right lower quadrant and appears within normal limits.       There is no evidence for bowel obstruction. Stranding is seen within the subcutaneous fat overlying both lateral abdominal walls left greater than right.  There is ill-defined fluid collection within the subcutaneous fat overlying the left lateral abdominal wall possibly representing phlegmon or developing abscess although no organized   abscess is seen. There is a large amount of soft tissue emphysema involving both the right and left buttock extending to the perineum tissue thickening is seen especially on the left involving the left buttock. CT PELVIS FINDINGS:        No distal ureteral calculi or bladder calculi. There is no free fluid in the pelvis. Catheter is seen within the urinary bladder. Osseous changes within the left hemipelvis which may be chronic in nature. IMPRESSION:    Extensive subcutaneous emphysema as described above involving both buttocks extending to the perineum.  The possibility of Ashley gangrene/necrotizing fasciitis cannot be excluded. Possible phlegmon or developing soft tissue abscess overlying the left lateral abdominal wall as noted above.  No organized abscess is seen however. Osseous changes within the left hemipelvis which may be chronic in nature. Results the study were called to Dr. Saleem Chavez [phone] on 11/29/2019  All CT scans at this facility use dose modulation, iterative reconstruction, and/or weight based dosing when appropriate to reduce radiation dose to as low as reasonably achievable. Medical Decision [id number]:   11/29/2019  8:17 PM - Ana Lee Incoming Lab Results From [employer]     Specimen Information: Buttock; Tissue        Component Collected Lab   Specimen Description 11/29/2019  7:34  Lee Dorsey   . BUTTOCK BILATERAL TS    Special Requests 11/29/2019  7:34  Lee Dorsey   NOT

## 2019-12-16 NOTE — PROGRESS NOTES
Progress Note  Podiatric Medicine and Surgery     Subjective     CC: left foot wound    Patient seen and examined at bedside   Afebrile, vital signs stable   Pt is intubated and sedated. Per chart review plan for OR today per gen surg /w urology    HPI :  Dell Melendez is a 37 y.o. male seen at [facility] for multiple wounds of his LLE. Patient is currently intubated and sedated, no family in the room to obtain history. HPI primarily obtained via EMR chart review. Patient presented to the ED yesterday for generalized malaise. Patient usually lives at home with his mother as a caretaker. He had been complaining of fatigue, fever, chills for multiple days before presenting to the ED. Patient found to have significant necrotizing infection to the gluteal region extending to the perineum. CT demonstrated extensive soft tissue emphysema. Patient admitted to the ICU for necrotizing fasciitis and acute renal failure. S/p I&D and complex debridement of affect tissues 11/29/19. Currently on Zosyn.      PCP is No primary care provider on file. ROS: Denies N/V/F/C/SOB/CP. Otherwise negative except at stated in the HPI.      Medications:  Scheduled Meds:   oxyCODONE  10 mg Oral Q4H    thiamine  100 mg Oral Daily    folic acid  1 mg Oral Daily    insulin lispro  0-18 Units Subcutaneous TID WC    insulin lispro  0-9 Units Subcutaneous Nightly    povidone-iodine   Topical Daily    sodium chloride flush  10 mL Intravenous Q12H    midodrine  10 mg Oral TID WC    famotidine  20 mg Per NG tube Daily    [Held by provider] thiamine and folic acid IVPB   Intravenous Daily    meropenem  1 g Intravenous Q12H    sodium chloride flush  10 mL Intravenous 2 times per day    heparin (porcine)  5,000 Units Subcutaneous 3 times per day       Continuous Infusions:   dextrose      PN-Adult 2-in-1 Central Line (Standard) 80 mL/hr at 12/15/19 1809    propofol 25 mcg/kg/min (12/16/19 0706)    fentaNYL 200 mcg/hr (12/16/19 80)    dextrose      sodium chloride 10 mL/hr at 19 0226    norepinephrine (LEVOPHED) infusion (double concentration) 2 mcg/min (19 0514)       PRN Meds:glucose, dextrose, glucagon (rDNA), dextrose, LORazepam, sodium chloride, sodium chloride, albumin human, sodium chloride flush, potassium chloride **OR** potassium alternative oral replacement **OR** potassium chloride, glucose, dextrose, glucagon (rDNA), dextrose, acetaminophen, REFRESH LACRI-LUBE, magnesium hydroxide, ondansetron    Objective     Vitals:  Patient Vitals for the past 8 hrs:   BP Temp Temp src Pulse Resp SpO2 Weight   19 0926 -- -- -- 109 -- 100 % --   19 0924 -- -- -- -- 10 100 % --   19 0817 -- (!) 32 °F (0 °C) -- -- -- -- (!) 401 lb 14.4 oz (182.3 kg)   19 0800 (!) 160/92 -- -- 113 13 100 % --   19 0730 (!) 173/107 -- -- 117 17 100 % --   19 0700 (!) 164/105 -- -- 110 18 100 % --   19 0630 (!) 164/89 -- -- 112 20 -- --   19 0600 119/63 -- -- 107 18 100 % --   19 0530 (!) 142/82 -- -- 120 16 91 % --   19 0500 (!) 162/94 -- -- 116 24 100 % --   19 0430 (!) 154/90 -- -- 113 19 -- --   19 0403 -- -- -- 110 28 100 % --   19 0400 (!) 145/81 98.1 °F (36.7 °C) Axillary 112 15 -- --   19 0330 (!) 146/83 -- -- 106 18 -- --   19 0300 (!) 154/90 -- -- 108 16 100 % --     Average, Min, and Max for last 24 hours Vitals:  TEMPERATURE:  Temp  Av.8 °F (27.7 °C)  Min: 32 °F (0 °C)  Max: 98.6 °F (37 °C)    RESPIRATIONS RANGE: Resp  Av.2  Min: 0  Max: 28    PULSE RANGE: Pulse  Av.6  Min: 86  Max: 126    BLOOD PRESSURE RANGE:  Systolic (36IUF), MTU:595 , Min:94 , MON:716   ; Diastolic (19XFG), TQY:13, Min:46, Max:117      PULSE OXIMETRY RANGE: SpO2  Av %  Min: 91 %  Max: 100 %    I/O last 3 completed shifts:   In: 4489.5 [I.V.:1198.2; NG/GT:1173; IV ZBCPAYPYC:823]  Out: 10 [Urine:10]    CBC:  Recent Labs     19  0521 12/15/19  0700 12/16/19  0610   WBC 10.8 9.7 10.5   HGB 7.3* 7.1* 7.9*   HCT 25.5* 24.3* 25.2*    See Reflexed IPF Result See Reflexed IPF Result        BMP:  Recent Labs     12/14/19  0521 12/15/19  0700 12/16/19  0816   * 137 134*   K 4.0 4.5 6.0*    105 105   CO2 19* 20 19*   BUN 53* 44* 59*   CREATININE 2.01* 1.61* 1.88*   GLUCOSE 179* 196* 234*   CALCIUM 8.5* 8.9 9.3        Coags:  No results for input(s): APTT, PROT, INR in the last 72 hours. Lab Results   Component Value Date    SEDRATE 66 (H) 11/29/2019     No results for input(s): CRP in the last 72 hours. Lower Extremity Physical Exam:  Vascular: DP and PT pulses are non- palpable. BLE DP/PT audible on doppler. CFT <5 seconds to all digits. Hair growth is absent to the level of the digits. Non pitting edema to b/l LE.       Neuro: Saph/sural/SP/DP/plantar sensation absent to light touch.     Musculoskeletal: Muscle strength is decreased ROM, decreased strength to all lower extremity muscle groups. Gross deformity is present with contracted external rotation of b/l LE.     Dermatologic: Full thickness ulcer #1 located plantar and measures approximately 14cm x 5.5 cm x 0.3cm. Base is 30% granular 70% necrotic. Periwound skin is dry. no drainage noted. No purulence drainage noted. Erythema absent with no associated increase in warmth. Does not probe to bone, sinus track, or undermine. No fluctuance, crepitus, or induration. Interdigital maceration absent. Clinical Images: From 12/16/19          Imaging:   CT ABDOMEN PELVIS W IV CONTRAST Additional Contrast? None   Final Result   1. Large soft tissue defects in the right inguinal region, scrotum and right   greater than left gluteal crease is with associated packing material, small   amount of surrounding soft tissue gas and skin thickening likely relates to   history of necrotizing fasciitis. 2.  Postsurgical changes of the bowel with left lower quadrant ostomy.   No   bowel visualized. Recommend follow-up KUB if clinically warranted. XR CHEST PORTABLE   Final Result   Moderate cardiomegaly. Lines as above. Assessment   Xin Phelps is a 37 y.o. male with   1. Stage III pressure ulcer to plantar foot, LLE  2. Stage I pressure ulcer to lateral leg, LLE-resolved  3. Necrotizing fascitis of gluteal region  4. Acute renal failure   5. Leukocytosis   6. S/p multiple gluteal debridements    Principal Problem:    Necrotizing fasciitis ([address]  Active Problems:    Hypertension    Diabetes ([address]    Diabetic foot ulcer ([address]    Septic shock ([address]    Bandemia  Resolved Problems:    * No resolved hospital problems. *       Plan     · Patient examined and evaluated at bedside with Dr. Nicky Quesada. · Radiographs reviewed- no overt signs of osteomyelitis. Possible soft tissue emphysema to lateral LLE however does not correlate clinically. There is no concern for infection at this site. · Continue to float bilateral foot to offload heels with use of pillows under the legs  · Daily dressing changes per nursing staff to left foot consisting of betadine gauze, dry gauze and DSD to left foot. · Dressing applied to Left Foot: Betadine DSD. · No surgical intervention indicated at this time. Will continue to follow from [place]. Will see patient weekly while admitted. Continue with local wound care.    · NWB to Left lower extremity    Liz Lombardi DPM   Podiatric Medicine & Surgery   12/16/2019 at 10:52 AM

## 2019-12-16 NOTE — PROGRESS NOTES
Renal Progress Note    Patient :  Tricia Moise; 37 y.o. MRN# [id number]  Location:  0128/0128-01  Attending:  Mei Van MD  Admit Date:  11/29/2019   Hospital Day: 17      Subjective:     Patient was seen and examined on HD. No new issues overnight. Tolerating procedure ok. Will give midodrine and albumin with dialysis. Possible OR tomorrow for wound cleaning. Outpatient Medications:     No medications prior to admission. Current Medications:     Scheduled Meds:    heparin (porcine)  1,500 Units Intracatheter Once    oxyCODONE  10 mg Oral Q4H    thiamine  100 mg Oral Daily    folic acid  1 mg Oral Daily    insulin lispro  0-18 Units Subcutaneous TID WC    insulin lispro  0-9 Units Subcutaneous Nightly    povidone-iodine   Topical Daily    sodium chloride flush  10 mL Intravenous Q12H    midodrine  10 mg Oral TID WC    famotidine  20 mg Per NG tube Daily    [Held by provider] thiamine and folic acid IVPB   Intravenous Daily    meropenem  1 g Intravenous Q12H    sodium chloride flush  10 mL Intravenous 2 times per day    heparin (porcine)  5,000 Units Subcutaneous 3 times per day     Continuous Infusions:    dextrose      PN-Adult 2-in-1 Central Line (Standard)      PN-Adult 2-in-1 [employer] (Standard) 80 mL/hr at 12/15/19 1809    propofol 25 mcg/kg/min (12/16/19 1231)    fentaNYL 200 mcg/hr (12/16/19 1230)    dextrose      sodium chloride 10 mL/hr at 12/16/19 0226    norepinephrine (LEVOPHED) infusion (double concentration) 2 mcg/min (12/11/19 0514)     PRN Meds:  glucose, dextrose, glucagon (rDNA), dextrose, heparin (porcine), LORazepam, sodium chloride, sodium chloride, albumin human, sodium chloride flush, potassium chloride **OR** potassium alternative oral replacement **OR** potassium chloride, glucose, dextrose, glucagon (rDNA), dextrose, acetaminophen, REFRESH LACRI-LUBE, magnesium hydroxide, ondansetron    Input/Output:       I/O last 3 completed shifts:   In: 4489.5 Value Date    MARINE NEGATIVE 11/30/2019     SPEP:  Lab Results   Component Value Date    PROT 5.3 12/01/2019    PATH ELECTRONICALLY SIGNED. Aleks Brandon M.D. 11/30/2019     C3:     Lab Results   Component Value Date    C3 97 11/30/2019     C4:     Lab Results   Component Value Date    C4 12 11/30/2019     Hep BsAg:         Lab Results   Component Value Date    HEPBSAG NONREACTIVE 11/30/2019     Hep C AB:          Lab Results   Component Value Date    HEPCAB NONREACTIVE 11/30/2019       Urinalysis/Chemistries:      Lab Results   Component Value Date    NITRU NEGATIVE 11/29/2019    COLORU YELLOW 11/29/2019    PHUR 5.0 11/29/2019    WBCUA 5 TO 10 11/29/2019    RBCUA 0 TO 2 11/29/2019    MUCUS NOT REPORTED 11/29/2019    TRICHOMONAS NOT REPORTED 11/29/2019    YEAST NOT REPORTED 11/29/2019    BACTERIA NOT REPORTED 11/29/2019    SPECGRAV 1.015 11/29/2019    LEUKOCYTESUR NEGATIVE 11/29/2019    UROBILINOGEN Normal 11/29/2019    BILIRUBINUR NEGATIVE 11/29/2019    GLUCOSEU NEGATIVE 11/29/2019    KETUA NEGATIVE 11/29/2019    AMORPHOUS NOT REPORTED 11/29/2019     Urine Sodium:     Lab Results   Component Value Date    DONOVAN 39 11/30/2019       Urine Creatinine:     Lab Results   Component Value Date    LABCREA 116.4 11/30/2019     Radiology:     Reviewed. Assessment:     1.  Acute Kidney Injury: Secondary to ischemic ATN from sepsis syndrome from necrotizing fasciitis. He has been dialysis dependent      2.  Chronic kidney diseae stage III from diabetic nephrosclerosis baseline 1.6-1.8  3.  Necrotizing fasciitis status post wide complex debridement of gluteal, perineal region and open colostomy. 4.  Respiratory Failure: On ventilator   5.  Protein calorie malnutrition  6.  Fluid overload: Continue to remove fluid on dialysis as tolerated  7. S/P diverting colostomy strict    Plan:   1. Patient was seen on HD at bedside. Orders were confirmed with the HD nurse. 2. Will try to take 2-2.5 kgs off as tolerated.    3.  Will

## 2019-12-16 NOTE — PLAN OF CARE
Problem: Pain:  Description  Pain management should include both nonpharmacologic and pharmacologic interventions.   Goal: Pain level will decrease  Description  Pain level will decrease  12/16/2019 1632 by Elieser Rivera RN  Outcome: Ongoing  12/16/2019 0314 by Paul Keyes RN  Outcome: Ongoing  Goal: Control of acute pain  Description  Control of acute pain  12/16/2019 1632 by Elieser Rivera RN  Outcome: Ongoing  12/16/2019 0314 by Paul Keyes RN  Outcome: Ongoing  Goal: Control of chronic pain  Description  Control of chronic pain  12/16/2019 1632 by Elieser Rivera RN  Outcome: Ongoing  12/16/2019 0314 by Paul Keyes RN  Outcome: Ongoing     Problem: Skin Integrity:  Goal: Will show no infection signs and symptoms  Description  Will show no infection signs and symptoms  12/16/2019 1632 by Elieser Rivera RN  Outcome: Ongoing  12/16/2019 0314 by Paul Keyes RN  Outcome: Ongoing  Goal: Absence of new skin breakdown  Description  Absence of new skin breakdown  12/16/2019 1632 by Elieser Rivera RN  Outcome: Ongoing  12/16/2019 0314 by Paul Keyes RN  Outcome: Ongoing     Problem: OXYGENATION/RESPIRATORY FUNCTION  Goal: Patient will maintain patent airway  12/16/2019 1632 by Elieser Rivera RN  Outcome: Ongoing  12/16/2019 0314 by Paul Keyes RN  Outcome: Ongoing  Goal: Patient will achieve/maintain normal respiratory rate/effort  Description  Respiratory rate and effort will be within normal limits for the patient  12/16/2019 1632 by Elieser Rivera RN  Outcome: Ongoing  12/16/2019 0314 by Paul Keyes RN  Outcome: Ongoing     Problem: MECHANICAL VENTILATION  Goal: Patient will maintain patent airway  12/16/2019 1632 by Elieser Rivera RN  Outcome: Ongoing  12/16/2019 0314 by Paul Keyes RN  Outcome: Ongoing  Goal: Oral health is maintained or improved  12/16/2019 1632 by Elieser Rivera RN  Outcome: Ongoing  12/16/2019 0314 by Paul Keyes RN  Outcome: Ongoing  Goal: ET tube will be managed safely  12/16/2019 1632 by Surekha Ratliff RN  Outcome: Ongoing  12/16/2019 0314 by Lj Spears RN  Outcome: Ongoing  Goal: Ability to express needs and understand communication  12/16/2019 1632 by Surekha Ratliff RN  Outcome: Ongoing  12/16/2019 0314 by Lj Spears RN  Outcome: Ongoing  Goal: Mobility/activity is maintained at optimum level for patient  12/16/2019 1632 by Surekha Ratliff RN  Outcome: Ongoing  12/16/2019 0314 by Lj Spears RN  Outcome: Ongoing     Problem: SKIN INTEGRITY  Goal: Skin integrity is maintained or improved  12/16/2019 1632 by Surekha Ratliff RN  Outcome: Ongoing  12/16/2019 0314 by Lj Spears RN  Outcome: Ongoing     Problem: Falls - Risk of:  Goal: Will remain free from falls  Description  Will remain free from falls  12/16/2019 1632 by Surekha Ratliff RN  Outcome: Ongoing  12/16/2019 0314 by Lj Spears RN  Outcome: Ongoing  Goal: Absence of physical injury  Description  Absence of physical injury  12/16/2019 1632 by Surekha Ratliff RN  Outcome: Ongoing  12/16/2019 0314 by Lj Spears RN  Outcome: Ongoing     Problem: Risk for Impaired Skin Integrity  Goal: Tissue integrity - skin and mucous membranes  Description  Structural intactness and normal physiological function of skin and  mucous membranes.   12/16/2019 1632 by Surekha Ratliff RN  Outcome: Ongoing  12/16/2019 0314 by Lj Spears RN  Outcome: Ongoing     Problem: Nutrition  Goal: Optimal nutrition therapy  Description  Nutrition Problem: Inadequate oral intake  Intervention: Food and/or Nutrient Delivery: Start Parenteral Nutrition  Nutritional Goals: Meet % of estimated nutrition needs   12/16/2019 1632 by Surekha Ratliff RN  Outcome: Ongoing  12/16/2019 0314 by Lj Spears RN  Outcome: Ongoing     Problem: Confusion - Acute:  Goal: Absence of continued neurological deterioration signs and symptoms  Description  Absence of continued neurological deterioration signs and symptoms  12/16/2019 1632 by Surekha Ratliff RN  Outcome: Ongoing  12/16/2019 symptoms  Description  Absence of psychomotor disturbance signs and symptoms  12/16/2019 1632 by Yamileth Foy RN  Outcome: Ongoing  12/16/2019 0314 by Rizwana Escobar RN  Outcome: Ongoing     Problem: Sensory Perception - Impaired:  Goal: Demonstrations of improved sensory functioning will increase  Description  Demonstrations of improved sensory functioning will increase  12/16/2019 1632 by Yamileth Foy RN  Outcome: Ongoing  12/16/2019 0314 by Rizwana Escobar RN  Outcome: Ongoing  Goal: Decrease in sensory misperception frequency  Description  Decrease in sensory misperception frequency  12/16/2019 1632 by Yamileth Foy RN  Outcome: Ongoing  12/16/2019 0314 by Rizwana Escobar RN  Outcome: Ongoing  Goal: Able to refrain from responding to false sensory perceptions  Description  Able to refrain from responding to false sensory perceptions  12/16/2019 1632 by Yamileth Foy RN  Outcome: Ongoing  12/16/2019 0314 by Rizwana Escobar RN  Outcome: Ongoing  Goal: Demonstrates accurate environmental perceptions  Description  Demonstrates accurate environmental perceptions  12/16/2019 1632 by Yamileth Foy RN  Outcome: Ongoing  12/16/2019 0314 by Rizwana Escobar RN  Outcome: Ongoing  Goal: Able to distinguish between reality-based and nonreality-based thinking  Description  Able to distinguish between reality-based and nonreality-based thinking  12/16/2019 1632 by Yamileth Foy RN  Outcome: Ongoing  12/16/2019 0314 by Rizwana Escobar RN  Outcome: Ongoing  Goal: Able to interrupt nonreality-based thinking  Description  Able to interrupt nonreality-based thinking  12/16/2019 1632 by Yamileth Foy RN  Outcome: Ongoing  12/16/2019 0314 by Rizwana Escobar RN  Outcome: Ongoing     Problem: Sleep Pattern Disturbance:  Goal: Appears well-rested  Description  Appears well-rested  12/16/2019 1632 by Yamileth Foy RN  Outcome: Ongoing  12/16/2019 0314 by Rizwana Escobar RN  Outcome: Ongoing     Problem: Restraint Use - Nonviolent/Non-Self-Destructive

## 2019-12-16 NOTE — PLAN OF CARE
Problem: Pain:  Goal: Pain level will decrease  Description  Pain level will decrease  Outcome: Ongoing  Goal: Control of acute pain  Description  Control of acute pain  Outcome: Ongoing  Goal: Control of chronic pain  Description  Control of chronic pain  Outcome: Ongoing     Problem: Skin Integrity:  Goal: Will show no infection signs and symptoms  Description  Will show no infection signs and symptoms  Outcome: Ongoing  Goal: Absence of new skin breakdown  Description  Absence of new skin breakdown  Outcome: Ongoing     Problem: Restraint Use - Nonviolent/Non-Self-Destructive Behavior:  Goal: Absence of restraint indications  Description  Absence of restraint indications  Outcome: Not Met This Shift  Goal: Absence of restraint-related injury  Description  Absence of restraint-related injury  Outcome: Ongoing     Problem: OXYGENATION/RESPIRATORY FUNCTION  Goal: Patient will maintain patent airway  Outcome: Ongoing  Goal: Patient will achieve/maintain normal respiratory rate/effort  Description  Respiratory rate and effort will be within normal limits for the patient  Outcome: Ongoing     Problem: MECHANICAL VENTILATION  Goal: Patient will maintain patent airway  Outcome: Ongoing  Goal: Oral health is maintained or improved  Outcome: Ongoing  Goal: ET tube will be managed safely  Outcome: Ongoing  Goal: Ability to express needs and understand communication  Outcome: Ongoing  Goal: Mobility/activity is maintained at optimum level for patient  Outcome: Ongoing     Problem: SKIN INTEGRITY  Goal: Skin integrity is maintained or improved  Outcome: Ongoing     Problem: Falls - Risk of:  Goal: Will remain free from falls  Description  Will remain free from falls  Outcome: Ongoing  Goal: Absence of physical injury  Description  Absence of physical injury  Outcome: Ongoing     Problem: Risk for Impaired Skin Integrity  Goal: Tissue integrity - skin and mucous membranes  Description  Structural intactness and normal physiological function of skin and  mucous membranes.   Outcome: Ongoing     Problem: Nutrition  Goal: Optimal nutrition therapy  Description  Nutrition Problem: Inadequate oral intake  Intervention: Food and/or Nutrient Delivery: Start Parenteral Nutrition  Nutritional Goals: Meet % of estimated nutrition needs   Outcome: Ongoing     Problem: Confusion - Acute:  Goal: Absence of continued neurological deterioration signs and symptoms  Description  Absence of continued neurological deterioration signs and symptoms  Outcome: Ongoing  Goal: Mental status will be restored to baseline  Description  Mental status will be restored to baseline  Outcome: Ongoing    Problem: Injury - Risk of, Physical Injury:  Goal: Will remain free from falls  Description  Will remain free from falls  Outcome: Ongoing  Goal: Absence of physical injury  Description  Absence of physical injury  Outcome: Ongoing     Problem: Mood - Altered:  Goal: Mood stable  Description  Mood stable  Outcome: Ongoing     Problem: Mood - Altered:  Goal: Verbalizations of feeling emotionally comfortable while being cared for will increase  Description  Verbalizations of feeling emotionally comfortable while being cared for will increase  Outcome: Ongoing     Problem: Sensory Perception - Impaired:  Goal: Demonstrations of improved sensory functioning will increase  Description  Demonstrations of improved sensory functioning will increase  Outcome: Ongoing  Goal: Decrease in sensory misperception frequency  Description  Decrease in sensory misperception frequency  Outcome: Ongoing  Goal: Able to refrain from responding to false sensory perceptions  Description  Able to refrain from responding to false sensory perceptions  Outcome: Ongoing  Goal: Demonstrates accurate environmental perceptions  Description  Demonstrates accurate environmental perceptions  Outcome: Ongoing  Goal: Able to distinguish between reality-based and nonreality-based thinking  Description  Able to distinguish between reality-based and nonreality-based thinking  Outcome: Ongoing  Goal: Able to interrupt nonreality-based thinking  Description  Able to interrupt nonreality-based thinking  Outcome: Ongoing     Problem: Sleep Pattern Disturbance:  Goal: Appears well-rested  Description  Appears well-rested  Outcome: Ongoing

## 2019-12-16 NOTE — DISCHARGE INSTR - COC
Continuity of Care Form    Patient Name: Izzy Jacques   :  1985  MRN:  [id number]    Admit date:  2019  Discharge date:  2020    Code Status Order: Full Code   Advance Directives:     Admitting Physician:  Lusi Davenport MD  PCP: No primary care provider on file. Discharging Nurse: Karen Artis [facility]/Room#: 0128/0128-01  Discharging Unit Phone Number: [phone]    Emergency Contact:   Extended Emergency Contact Information  Primary Emergency Contact: None, None  Home Phone: [phone]  Relation: Other  Secondary Emergency Contact: Zia Muro  Mobile Phone: [phone]  Relation: Parent  Preferred language: English   needed? No    Past Surgical History:  Past Surgical History:   Procedure Laterality Date    ABDOMEN SURGERY N/A 2019    DEBRIDEMENT  NECROTIZING FASCIITIS BUTTOCK, WOUND VAC REMOVAL DELAYED PRIMARY CLOSURE OF MIDLINE ABDOMINAL INCISION, OSTOMY BAG CHANGE performed by Anjana Martell MD at [address] [place] [address],[address] N/A 12/10/2019    DEBRIDEMENT OF SACRAL WOUND performed by [facility]anna Alan MD at [facility] 12/3/2019    LAPAROSCOPIC CONVERTED TO OPEN DIVERTING LOOP COLOSTOMY; WOUND VAC APPLICATION performed by Teto Alcantar MD at [address] [place] [address] 2019    RECTAL PERIRECTAL INCISION AND DRAINAGE, DIVERING LOOP COLOSTOMY CREATION performed by Carlton Cunningham MD at [address] [place] [address] N/A 2019    DEBRIDEMENT NECROTIZING FASCIAITIS BILAT BUTTOCK performed by Ayanna Alan MD at [place] [address] N/A 12/10/2019    SCROTAL EXPLORATION WITH SCROTAL DEBRIDEMENT performed by Hadley Gaston MD at [place] [address]       Immunization History: There is no immunization history on file for this patient.     Active Problems:  Patient Active Problem List   Diagnosis Code    Necrotizing fasciitis ([address] M72.6    Hypertension I10    Diabetes ([address] E11.9    Status Clean;Dry; Intact 1/16/2020  3:26 AM   Dressing Changed Dressing reinforced 1/15/2020  4:00 PM   Dressing/Treatment Foam 1/16/2020  3:26 AM   Wound Cleansed Not Cleansed 1/15/2020  8:00 AM   Dressing Change Due 01/12/20 1/13/2020  4:00 AM   Wound Assessment Other (Comment) 1/15/2020  4:00 PM   Drainage Amount None 1/15/2020  4:00 PM   Drainage Description Other (Comment) 1/13/2020  4:00 AM   Odor None 1/15/2020  4:00 PM   Margins JESSI 1/13/2020  4:00 AM   Liliam-wound Assessment Other (Comment) 1/15/2020  4:00 PM   [place]%Wound Bed 100 1/8/2020  9:25 AM   Culture Taken No 1/15/2020  4:00 PM   Number of days: 46       Wound 12/20/19 Back Right (Active)   Dressing Status Clean;Dry; Intact 12/24/2019  4:00 PM   Dressing/Treatment Foam 12/24/2019  4:00 PM   Liliam-wound Assessment Clean;Dry; Intact 12/24/2019  4:00 PM   Number of days: 26        Elimination:  Continence:   · Bowel: Colostomy  · Bladder: Ambrosio  Urinary Catheter: Insertion Date: 01/07/2020   Colostomy/Ileostomy/Ileal Conduit: Yes  Colostomy LLQ Descending/sigmoid-Stomal Appliance: 1 piece  Colostomy LLQ Descending/sigmoid-Flange Size (inches): 1.5 Inches  Colostomy LLQ Descending/sigmoid-Stoma  Assessment: Red, Moist, Protrudes  Colostomy LLQ Descending/sigmoid-Mucocutaneous Junction: Intact  Colostomy LLQ Descending/sigmoid-Peristomal Assessment: Clean, Intact  Colostomy LLQ Descending/sigmoid-Treatment: Bag change, Site care, Liquid skin barrier(Brava protective seal)  Colostomy LLQ Descending/sigmoid-Stool Appearance: Formed  Colostomy LLQ Descending/sigmoid-Stool Color: Brown  Colostomy LLQ Descending/sigmoid-Stool Amount: Large  Colostomy LLQ Descending/sigmoid-Output (mL): 0 ml    Date of Last BM: 01/15/2020    Intake/Output Summary (Last 24 hours) at 1/16/2020 1056  Last data filed at 1/16/2020 0811  Gross per 24 hour   Intake --   Output 1900 ml   Net -1900 ml     I/O last 3 completed shifts:  In: -   Out: 950 [Urine:950]    Safety Concerns:      At Risk for Falls    Impairments/Disabilities:      Vision    Nutrition Therapy:  Current Nutrition Therapy:   - Oral Diet:  Renal    Routes of Feeding: Oral  Liquids: No Restrictions  Daily Fluid Restriction: no  Last Modified Barium Swallow with Video (Video Swallowing Test): not done    Treatments at the Time of Hospital Discharge:   Respiratory Treatments:none  Oxygen Therapy:  is not on home oxygen therapy. Ventilator:    - No ventilator support    Rehab Therapies: Physical Therapy and Occupational Therapy  Weight Bearing Status/Restrictions: No weight bearing restirctions  Other Medical Equipment (for information only, NOT a DME order): White boots    Other Treatments Non-ambulatory hx of spina bifida    Patient's personal belongings (please select all that are sent with patient):  Mateus    RN SIGNATURE:  Electronically signed by Bishop Marrufo on 1/16/20 at 11:00 AM    CASE MANAGEMENT/SOCIAL WORK SECTION    Inpatient Status Date: 11/29/2019    Readmission Risk Assessment Score:  Readmission Risk              Risk of Unplanned Readmission:        17               Discharging to Facility/ Agency   · Name: [facility]  · Address: [address] [place] [address]  · Phone: [phone]  · Fax: [phone]    Dialysis Facility (if applicable)   · Name:  · Address:  · Dialysis Schedule:  · Phone:  · Fax:    / signature: Electronically signed by Nataly Berger RN on 1/16/20 at 11:19 AM    PHYSICIAN SECTION    Prognosis: Fair    Condition at Discharge: Stable    Rehab Potential (if transferring to Rehab): Fair    Recommended Labs or Other Treatments After Discharge:   Routine NPWT dressing changes M-W-F using black granufoam with colostomy pouch change. Wound Vac changes as needed. Patient needs appointment with Dr Anthony Tucker, plastic surgery for possible flap.  Patient to have regular monitoring of wonds and continued wound care as outpatient PT and OT as well as monitoring of vitals. Betadine to foot wound daily. Physician Certification: I certify the above information and transfer of Izzy Jacques  is necessary for the continuing treatment of the diagnosis listed and that he requires LTAC for greater 30 days.      Update Admission H&P: No change in H&P    PHYSICIAN SIGNATURE:  Electronically signed by Onel Monahan MD on 1/16/20 at 11:59 AM  Electronically signed by Onel Monahan MD on 1/16/2020 at 1:57 PM

## 2019-12-16 NOTE — PROGRESS NOTES
Indicated    Nutrition Evaluation:   · Evaluation: Progressing toward goals   · Goals: Meet % of estimated nutrition needs   · Monitoring: TF Intake, TF Tolerance, PN Intake, PN Tolerance, Pertinent Labs, Weight, I&O, Wound Healing      Electronically signed by Sang Vora RD, LD on 12/16/19 at 12:17 PM    Contact Number: [phone]

## 2019-12-17 ENCOUNTER — ANESTHESIA EVENT (OUTPATIENT)
Dept: ICU | Age: 34
DRG: 463 | End: 2019-12-17
Payer: MEDICARE

## 2019-12-17 ENCOUNTER — ANESTHESIA (OUTPATIENT)
Dept: ICU | Age: 34
DRG: 463 | End: 2019-12-17
Payer: MEDICARE

## 2019-12-17 LAB
-: NORMAL
ABSOLUTE EOS #: 1.39 K/UL (ref 0–0.4)
ABSOLUTE IMMATURE GRANULOCYTE: 0.09 K/UL (ref 0–0.3)
ABSOLUTE LYMPH #: 1.74 K/UL (ref 1–4.8)
ABSOLUTE MONO #: 0.26 K/UL (ref 0.1–0.8)
ANION GAP SERPL CALCULATED.3IONS-SCNC: 10 MMOL/L (ref 9–17)
BASOPHILS # BLD: 1 % (ref 0–2)
BASOPHILS ABSOLUTE: 0.09 K/UL (ref 0–0.2)
BUN BLDV-MCNC: 61 MG/DL (ref 6–20)
BUN/CREAT BLD: ABNORMAL (ref 9–20)
CALCIUM SERPL-MCNC: 8.5 MG/DL (ref 8.6–10.4)
CHLORIDE BLD-SCNC: 105 MMOL/L (ref 98–107)
CO2: 18 MMOL/L (ref 20–31)
CREAT SERPL-MCNC: 1.88 MG/DL (ref 0.7–1.2)
DIFFERENTIAL TYPE: ABNORMAL
EOSINOPHILS RELATIVE PERCENT: 16 % (ref 1–4)
GFR AFRICAN AMERICAN: 49 ML/MIN
GFR NON-AFRICAN AMERICAN: 41 ML/MIN
GFR SERPL CREATININE-BSD FRML MDRD: ABNORMAL ML/MIN/{1.73_M2}
GFR SERPL CREATININE-BSD FRML MDRD: ABNORMAL ML/MIN/{1.73_M2}
GLUCOSE BLD-MCNC: 159 MG/DL (ref 75–110)
GLUCOSE BLD-MCNC: 173 MG/DL (ref 75–110)
GLUCOSE BLD-MCNC: 177 MG/DL (ref 75–110)
GLUCOSE BLD-MCNC: 214 MG/DL (ref 70–99)
HCT VFR BLD CALC: 23.6 % (ref 40.7–50.3)
HEMOGLOBIN: 6.6 G/DL (ref 13–17)
IMMATURE GRANULOCYTES: 1 %
LYMPHOCYTES # BLD: 20 % (ref 24–44)
MCH RBC QN AUTO: 28 PG (ref 25.2–33.5)
MCHC RBC AUTO-ENTMCNC: 28 G/DL (ref 28.4–34.8)
MCV RBC AUTO: 100 FL (ref 82.6–102.9)
MONOCYTES # BLD: 3 % (ref 1–7)
MORPHOLOGY: ABNORMAL
MORPHOLOGY: ABNORMAL
NRBC AUTOMATED: 0.6 PER 100 WBC
PDW BLD-RTO: 23.8 % (ref 11.8–14.4)
PLATELET # BLD: 201 K/UL (ref 138–453)
PLATELET ESTIMATE: ABNORMAL
PMV BLD AUTO: 8.8 FL (ref 8.1–13.5)
POTASSIUM SERPL-SCNC: 4 MMOL/L (ref 3.7–5.3)
RBC # BLD: 2.36 M/UL (ref 4.21–5.77)
RBC # BLD: ABNORMAL 10*6/UL
REASON FOR REJECTION: NORMAL
SEG NEUTROPHILS: 59 % (ref 36–66)
SEGMENTED NEUTROPHILS ABSOLUTE COUNT: 5.13 K/UL (ref 1.8–7.7)
SODIUM BLD-SCNC: 133 MMOL/L (ref 135–144)
WBC # BLD: 8.7 K/UL (ref 3.5–11.3)
WBC # BLD: ABNORMAL 10*3/UL
ZZ NTE CLEAN UP: ORDERED TEST: NORMAL
ZZ NTE WITH NAME CLEAN UP: SPECIMEN SOURCE: NORMAL

## 2019-12-17 PROCEDURE — 6360000002 HC RX W HCPCS: Performed by: INTERNAL MEDICINE

## 2019-12-17 PROCEDURE — 86850 RBC ANTIBODY SCREEN: CPT

## 2019-12-17 PROCEDURE — 94761 N-INVAS EAR/PLS OXIMETRY MLT: CPT

## 2019-12-17 PROCEDURE — 6360000002 HC RX W HCPCS: Performed by: STUDENT IN AN ORGANIZED HEALTH CARE EDUCATION/TRAINING PROGRAM

## 2019-12-17 PROCEDURE — 2500000003 HC RX 250 WO HCPCS: Performed by: INTERNAL MEDICINE

## 2019-12-17 PROCEDURE — 6370000000 HC RX 637 (ALT 250 FOR IP): Performed by: STUDENT IN AN ORGANIZED HEALTH CARE EDUCATION/TRAINING PROGRAM

## 2019-12-17 PROCEDURE — 85025 COMPLETE CBC W/AUTO DIFF WBC: CPT

## 2019-12-17 PROCEDURE — 99233 SBSQ HOSP IP/OBS HIGH 50: CPT | Performed by: INTERNAL MEDICINE

## 2019-12-17 PROCEDURE — 6360000002 HC RX W HCPCS: Performed by: ORTHOPAEDIC SURGERY

## 2019-12-17 PROCEDURE — P9016 RBC LEUKOCYTES REDUCED: HCPCS

## 2019-12-17 PROCEDURE — 2580000003 HC RX 258: Performed by: STUDENT IN AN ORGANIZED HEALTH CARE EDUCATION/TRAINING PROGRAM

## 2019-12-17 PROCEDURE — 86900 BLOOD TYPING SEROLOGIC ABO: CPT

## 2019-12-17 PROCEDURE — 2000000000 HC ICU R&B

## 2019-12-17 PROCEDURE — 86920 COMPATIBILITY TEST SPIN: CPT

## 2019-12-17 PROCEDURE — 99291 CRITICAL CARE FIRST HOUR: CPT | Performed by: INTERNAL MEDICINE

## 2019-12-17 PROCEDURE — 94770 HC ETCO2 MONITOR DAILY: CPT

## 2019-12-17 PROCEDURE — 86901 BLOOD TYPING SEROLOGIC RH(D): CPT

## 2019-12-17 PROCEDURE — 36415 COLL VENOUS BLD VENIPUNCTURE: CPT

## 2019-12-17 PROCEDURE — 2700000000 HC OXYGEN THERAPY PER DAY

## 2019-12-17 PROCEDURE — 80048 BASIC METABOLIC PNL TOTAL CA: CPT

## 2019-12-17 PROCEDURE — 99232 SBSQ HOSP IP/OBS MODERATE 35: CPT | Performed by: FAMILY MEDICINE

## 2019-12-17 PROCEDURE — 82947 ASSAY GLUCOSE BLOOD QUANT: CPT

## 2019-12-17 PROCEDURE — 94003 VENT MGMT INPAT SUBQ DAY: CPT

## 2019-12-17 PROCEDURE — 36430 TRANSFUSION BLD/BLD COMPNT: CPT

## 2019-12-17 RX ORDER — 0.9 % SODIUM CHLORIDE 0.9 %
250 INTRAVENOUS SOLUTION INTRAVENOUS ONCE
Status: COMPLETED | OUTPATIENT
Start: 2019-12-17 | End: 2019-12-17

## 2019-12-17 RX ADMIN — PROPOFOL 20 MCG/KG/MIN: 10 INJECTION, EMULSION INTRAVENOUS at 06:10

## 2019-12-17 RX ADMIN — SODIUM CHLORIDE, PRESERVATIVE FREE 10 ML: 5 INJECTION INTRAVENOUS at 08:31

## 2019-12-17 RX ADMIN — SODIUM CHLORIDE, PRESERVATIVE FREE 10 ML: 5 INJECTION INTRAVENOUS at 20:18

## 2019-12-17 RX ADMIN — HEPARIN SODIUM 1400 UNITS: 1000 INJECTION INTRAVENOUS; SUBCUTANEOUS at 13:16

## 2019-12-17 RX ADMIN — PROPOFOL 19.98 MCG/KG/MIN: 10 INJECTION, EMULSION INTRAVENOUS at 18:15

## 2019-12-17 RX ADMIN — MEROPENEM 1 G: 1 INJECTION, POWDER, FOR SOLUTION INTRAVENOUS at 20:17

## 2019-12-17 RX ADMIN — Medication: at 08:30

## 2019-12-17 RX ADMIN — MIDODRINE HYDROCHLORIDE 10 MG: 5 TABLET ORAL at 08:28

## 2019-12-17 RX ADMIN — MEROPENEM 1 G: 1 INJECTION, POWDER, FOR SOLUTION INTRAVENOUS at 08:30

## 2019-12-17 RX ADMIN — Medication 200 MCG/HR: at 03:14

## 2019-12-17 RX ADMIN — Medication 200 MCG/HR: at 22:11

## 2019-12-17 RX ADMIN — HEPARIN SODIUM 5000 UNITS: 5000 INJECTION INTRAVENOUS; SUBCUTANEOUS at 20:18

## 2019-12-17 RX ADMIN — PROPOFOL 30 MCG/KG/MIN: 10 INJECTION, EMULSION INTRAVENOUS at 02:24

## 2019-12-17 RX ADMIN — FAMOTIDINE 20 MG: 20 TABLET, FILM COATED ORAL at 08:28

## 2019-12-17 RX ADMIN — OXYCODONE HYDROCHLORIDE 10 MG: 5 TABLET ORAL at 09:45

## 2019-12-17 RX ADMIN — SODIUM CHLORIDE 250 ML: 0.9 INJECTION, SOLUTION INTRAVENOUS at 14:13

## 2019-12-17 RX ADMIN — Medication 100 MG: at 08:29

## 2019-12-17 RX ADMIN — OXYCODONE HYDROCHLORIDE 10 MG: 5 TABLET ORAL at 14:21

## 2019-12-17 RX ADMIN — Medication 200 MCG/HR: at 08:13

## 2019-12-17 RX ADMIN — OXYCODONE HYDROCHLORIDE 10 MG: 5 TABLET ORAL at 18:21

## 2019-12-17 RX ADMIN — Medication 10 ML: at 08:31

## 2019-12-17 RX ADMIN — OXYCODONE HYDROCHLORIDE 10 MG: 5 TABLET ORAL at 01:17

## 2019-12-17 RX ADMIN — MIDODRINE HYDROCHLORIDE 10 MG: 5 TABLET ORAL at 17:12

## 2019-12-17 RX ADMIN — HEPARIN SODIUM 1300 UNITS: 1000 INJECTION INTRAVENOUS; SUBCUTANEOUS at 13:16

## 2019-12-17 RX ADMIN — PROPOFOL 19.98 MCG/KG/MIN: 10 INJECTION, EMULSION INTRAVENOUS at 14:00

## 2019-12-17 RX ADMIN — MIDODRINE HYDROCHLORIDE 10 MG: 5 TABLET ORAL at 12:20

## 2019-12-17 RX ADMIN — ALTEPLASE 1 MG: 2.2 INJECTION, POWDER, LYOPHILIZED, FOR SOLUTION INTRAVENOUS at 09:29

## 2019-12-17 RX ADMIN — Medication 200 MCG/HR: at 13:16

## 2019-12-17 RX ADMIN — OXYCODONE HYDROCHLORIDE 10 MG: 5 TABLET ORAL at 05:57

## 2019-12-17 RX ADMIN — FOLIC ACID 1 MG: 1 TABLET ORAL at 08:29

## 2019-12-17 RX ADMIN — Medication 10 ML: at 20:34

## 2019-12-17 RX ADMIN — POTASSIUM CHLORIDE: 2 INJECTION, SOLUTION, CONCENTRATE INTRAVENOUS at 18:41

## 2019-12-17 RX ADMIN — INSULIN LISPRO 2 UNITS: 100 INJECTION, SOLUTION INTRAVENOUS; SUBCUTANEOUS at 20:30

## 2019-12-17 ASSESSMENT — PAIN SCALES - GENERAL
PAINLEVEL_OUTOF10: 9
PAINLEVEL_OUTOF10: 10
PAINLEVEL_OUTOF10: 9

## 2019-12-17 ASSESSMENT — PULMONARY FUNCTION TESTS
PIF_VALUE: 12
PIF_VALUE: 9
PIF_VALUE: 7
PIF_VALUE: 12
PIF_VALUE: 11
PIF_VALUE: 18

## 2019-12-17 NOTE — OP NOTE
Operative Note  ______________________________________________________________     Patient: Channing Chauhan  YOB: 1985  MRN: [id number]  Date of Procedure: 12/10/2019     Pre-Op Diagnosis: S/p necrotizing fasciitis     Post-Op Diagnosis: Gluteal, marc-rectal, perineal, and scrotal open wound       Procedure:  1. Sharp excisional debridement of gluteal, marc-rectal and perineal wound to level of muscle (Excisional dimension 53p88wo)  2. Sharp excisional debridement of scrotum (performed by Urology)  3. Washout of gluteal/perineal/scrotal/inguinal wound  4. Partial primary closure of pubic wound  5. Dakins Wet to dry dressing application     Anesthesia: General      Surgeons:  Donavon Perdomo MD (General surgery)  Jeferson Watts MD (Urology)     Assistant:   Anibal Cage DO, PGY-4     Estimated Blood Loss (mL): 25cc     Specimens: None indicated to be sent for analysis. Only non-viable tissue debrided. Complications: None         Findings: Fat necrosis along gluteal wounds and perineal wounds requiring debridement. No additional abscess requiring drainage along the gluteal/perineal wound. Rectum intact. B/l testicles intact; however, required additional debridement of non-viable scrotal tissue. Urethra intact. Urology performed scrotal debridement and exploration.        INDICATIONS:    This is a 33-year-old male with extensive diabetes uncontrolled who presented on the hospital on 11/29 with sepsis secondary to necrotizing fasciitis of the buttock, perineum and extension to the scrotum. He has since underwent diverting colostomy and has had several take backs for incision and debridement of the necrotizing fasciitis. The decision was made to take the patient back to the operating room for a thorough wound evaluation and debridement as necessary. Consent was obtained with his mother who is his POA after all of the risks, benefits and alternatives were discussed.     PROCEDURE:    The patient was taken to the OR and placed in the supine position. The patient remains intubated from the ICU but general anesthesia was induced. A time out was performed confirming all personnel present, the patient ID and the proper procedure to be performed. Antibiotics were given in accordance with SCIP. The patient was then rolled into the left lateral decubitus position and the patient's gluteal/perineal wound was prepped with hibiclens in the usual fashion. After the patient's gluteal region was prepped and draped we then proceeded with wound exploration and did not find any extension of the infectious process previously discovered. It appeared that the infection had been adequately controlled during previous operations, unfortunately requiring extreme, extensive debriement of gluteal and marc-rectal tissue. The wound appeared fairly healthy but had a significant amount of fat necrosis which we sharply excised with a scalpel. We then used a currette and scalpel to carefully debride the exudate from the surface of the exposed rectum. There was no injury to the rectum itself and that appears viable and now cleaned of exudate. In total we debrided approximately 32e05th of tissue from the gluteal and marc-rectal area to the level of the subcutaneous tissue. The rectum was inspected closely and with a digital rectal exam and as noted was without injury. Hemostasis was obtained with electrocautery. We then re-positioned the patient into the supine position and carefully placed his legs in stirrups. This is difficult due to muscle contracture and body habitus but with the legs carefully in stirrups we were able to get a better exposure of the scrotal area and the pubic region which was incised previously to evaluate extent of infection. There was no additional spread of the infectious process seen previously.  At this point we had urology evaluate the patient in the OR and they proceeded with sharp excisional debridement of the right side of the scrotum including the skin and underlying soft tissue. The testicles were viable and left intact. The left hemiscrotal flap remaining was left untouched due to the appearance of pink and viable underlying tissue, although the epidermis appeared dusky. This was done in hopes to retain it for future flap coverage. The perineum was inspected and without any additional tunneling or purulence. The lema catheter was left in place and then the perineum and scrotum was irrigated. Once urology finished their debridement and inspection we then elected to proceed with closure of the suprapubic incision that had been made previously. This was done with 2-0 prolene sutures in interrupted horizontal mattress fashion. We closed all along the suprapubic area and left the right groin/perineum wound open to allow continued drainage as needed. 1/2\" iodoform was placed between the sutures. We then removed the TILA drain placed during his diverting colostomy due to minimal output and the abdominal incision was evaluated. There was serous fluid that was underlying the delayed primary closure midline incision so this was evacuated but no evidence of infection. 1/2\" iodoform was then placed between those interrupted midline abdominal sutures and a dressing was placed. DAKINS Wet to dry dressing with quarter strength dakins was applied to the wounds with kerlix. We then Covered the wounds with ABD pads and placed gauze underwear on the patient. The surgery was well tolerated and Dr. Margarito Gan was present for all portions of the procedure. The patient was returned to the ICU in stable condition.       Elia Guido DO  Date: 12/10/2019  Time: 4:34 PM

## 2019-12-17 NOTE — FLOWSHEET NOTE
Assessment: Patient is a 37 y.o. male who will be undergoing surgery. Patient remained intubated at time of  visit. No family was present at time of  visit. Intervention:  visited patient per surgery list. Estelle Cox offered silent prayer for upcoming surgery. Outcome: Patient remained intubated. Plan: Chaplains can make follow-up visit, per request. Nikko Weldon can be reached 24/7 via [employer].     Dante Matters     12/17/19 0545   Encounter Summary   Services provided to: Patient not available   Continue Visiting   (12/16/2019, pt intubated)   Complexity of Encounter Low   Length of Encounter 15 minutes   Routine   Type Pre-procedure   Spiritual/Sikhism   Type Spiritual support   Assessment Unable to respond   Intervention Prayer   Outcome Did not respond

## 2019-12-17 NOTE — PROGRESS NOTES
Unable to dialyze pt after R IJ catheter was replaced, arterial port hard to pull in the beginning of tx, reversed lines, venous pressure in the 480s, flushed system and catheter port multiple times, no success, contacted Dr. Manuel Winkler, he said to do a stat potassium and to replace catheter in a different location if potassium is too high, informed floor RICO Laureano.

## 2019-12-17 NOTE — PROGRESS NOTES
Medical History:     Past Medical History:   Diagnosis Date    [facility]     Diabetes mellitus ([facility]     Hypertension     Spina bifida aperta of lumbar spine ([facility]        Past Surgical  History:     Past Surgical History:   Procedure Laterality Date    ABDOMEN SURGERY N/A 12/8/2019    DEBRIDEMENT  NECROTIZING FASCIITIS BUTTOCK, WOUND VAC REMOVAL DELAYED PRIMARY CLOSURE OF MIDLINE ABDOMINAL INCISION, OSTOMY BAG CHANGE performed by Anjana Martell MD at [address] [facility] N/A 12/10/2019    DEBRIDEMENT OF SACRAL WOUND performed by Ayanna Alan MD at [address] N/A 12/3/2019    LAPAROSCOPIC CONVERTED TO OPEN DIVERTING LOOP COLOSTOMY; WOUND VAC APPLICATION performed by Teto Alcantar MD at [facility] 81 Bilateral 11/29/2019    RECTAL PERIRECTAL INCISION AND DRAINAGE, [facility] LOOP COLOSTOMY CREATION performed by Carlton Cunningham MD at [facility] [address]141 N/A 12/6/2019    DEBRIDEMENT NECROTIZING FASCIAITIS BILAT BUTTOCK performed by Ayanna Alan MD at [facility] 58 N/A 12/10/2019    SCROTAL EXPLORATION WITH SCROTAL DEBRIDEMENT performed by Hadley Gaston MD at [place] [address]       Medications:      heparin (porcine)  1,500 Units Intracatheter Once    oxyCODONE  10 mg Oral Q4H    thiamine  100 mg Oral Daily    folic acid  1 mg Oral Daily    insulin lispro  0-18 Units Subcutaneous TID WC    insulin lispro  0-9 Units Subcutaneous Nightly    povidone-iodine   Topical Daily    sodium chloride flush  10 mL Intravenous Q12H    midodrine  10 mg Oral TID WC    famotidine  20 mg Per NG tube Daily    [Held by provider] thiamine and folic acid IVPB   Intravenous Daily    meropenem  1 g Intravenous Q12H    sodium chloride flush  10 mL Intravenous 2 times per day    heparin (porcine)  5,000 Units Subcutaneous 3 times per day       Social History:     Social History     Socioeconomic History    frequency, or dysuria. No hematuria. No suprapubic or CVA pain  Musculoskeletal: No muscle aches or pains. No joint effusions, swelling or deformities. Lt diabetic foot   Hematologic: No bleeding or bruising. Neurologic: No headache, weakness, numbness, or tingling. Spina bifida  Integument: Gangrene of perineum and gluteal areas  Psychiatric: No depression. Endocrine: No polyuria, no polydipsia, no polyphagia. Physical Examination :     Patient Vitals for the past 8 hrs:   BP Temp Temp src Pulse Resp SpO2 Weight   12/17/19 0900 133/74 -- -- 107 13 100 % --   12/17/19 0835 -- -- -- 110 17 100 % --   12/17/19 0800 129/69 -- -- 105 14 100 % --   12/17/19 0600 (!) 97/58 -- -- 86 15 100 % --   12/17/19 0500 (!) 95/56 -- -- 81 15 100 % --   12/17/19 0409 (!) 93/50 -- -- 85 15 -- --   12/17/19 0400 (!) 90/44 98.2 °F (36.8 °C) Axillary 84 15 100 % 286 lb 13.1 oz (130.1 kg)   12/17/19 0307 -- -- -- 96 15 100 % --   12/17/19 0300 113/71 -- -- 98 11 100 % --   12/17/19 0200 139/72 -- -- 104 19 99 % --     General Appearance: Sedated, on vent  Head:  Normocephalic, no trauma  ENT: Oropharyngeally intubated, without erythema, exudate, or thrush. No tenderness of sinuses. Mouth/throat: mucosa pink and moist. No lesions. Dentition in good repair. Neck:Supple, without lymphadenopathy. Thyroid normal, No bruits. Pulmonary/Chest: Clear to auscultation, without wheezes, rales, or rhonchi. No dullness to percussion. Cardiovascular: Regular rate and rhythm without murmurs, rubs, or gallops. Abdomen: Soft, non tender. Bowel sounds quiet. Ostomy with scant liquid output. Surgical dressing clean  : Extensive open wound of gluteal and perineal areas, lema with small amt clear urine  All four Extremities: No cyanosis, clubbing, or effusions. Lt foot diabetic ulcer with involvement of the Lt heel. Neurologic: Intubated, sedated  Skin: Warm and dry with good turgor. Signs of peripheral arterial insufficiency.  Large open  The adrenal glands appear within normal limits. There is no hydronephrosis or obstructing urinary tract calculi. Small amount of free fluid is seen adjacent to the liver inferiorly. .    The appendix is visualized in the right lower quadrant and appears within normal limits.       There is no evidence for bowel obstruction. Stranding is seen within the subcutaneous fat overlying both lateral abdominal walls left greater than right.  There is ill-defined fluid collection within the subcutaneous fat overlying the left lateral abdominal wall possibly representing phlegmon or developing abscess although no organized   abscess is seen. There is a large amount of soft tissue emphysema involving both the right and left buttock extending to the perineum tissue thickening is seen especially on the left involving the left buttock. CT PELVIS FINDINGS:        No distal ureteral calculi or bladder calculi. There is no free fluid in the pelvis. Catheter is seen within the urinary bladder. Osseous changes within the left hemipelvis which may be chronic in nature. IMPRESSION:    Extensive subcutaneous emphysema as described above involving both buttocks extending to the perineum.  The possibility of Ashley gangrene/necrotizing fasciitis cannot be excluded. Possible phlegmon or developing soft tissue abscess overlying the left lateral abdominal wall as noted above.  No organized abscess is seen however. Osseous changes within the left hemipelvis which may be chronic in nature. Results the study were called to Dr. Anderson Harper [phone] on 11/29/2019  All CT scans at this facility use dose modulation, iterative reconstruction, and/or weight based dosing when appropriate to reduce radiation dose to as low as reasonably achievable. Medical Decision [id number]:   11/29/2019  8:17 PM - Jesus, Ana Incoming Lab Results From [facility]     Specimen Information: Buttock;  Tissue        Component Collected Lab Specimen Description 11/29/2019  7:34  [address]   . BUTTOCK BILATERAL TS    Special Requests 11/29/2019  7:34  [address]   NOT REPORTED    Direct Exam 11/29/2019  7:34  [address]   NO NEUTROPHILS SEEN    Direct Exam Abnormal  11/29/2019  7:34  [facility] [address] [place]    Direct Exam Abnormal  11/29/2019  7:34 PM [address] NEGATIVE RODS    Culture 11/29/2019  7:34  [address]   PENDING      Medical Decision Making-Other:     Note:  · Labs, medications, radiologic studies were reviewed with personal review of films  · Large amounts of data were reviewed  · Discussed with nursing Staff, Discharge planner  · Infection Control and Prevention measures reviewed  · All prior entries were reviewed  · Administer medications as ordered  · Prognosis: Guarded  · Discharge planning reviewed  · Follow up as outpatient. Thank you for allowing us to participate in the care of this patient. Please call with questions.     Veronique Burgess MD    12/17/2019 9:52 AM

## 2019-12-17 NOTE — PROGRESS NOTES
Both dialysis port open-Alteplase aspirated from both side (red/blue ports) and blood aspirtating easily-dialysis  team paged-

## 2019-12-17 NOTE — PLAN OF CARE
Problem: Pain:  Goal: Pain level will decrease  Description  Pain level will decrease  12/17/2019 0427 by Tiffany Alarcon RN  Outcome: Ongoing  12/16/2019 1632 by Meagan Marks RN  Outcome: Ongoing  Goal: Control of acute pain  Description  Control of acute pain  12/17/2019 0427 by Tiffany Alarcon RN  Outcome: Ongoing  12/16/2019 1632 by Meagan Marks RN  Outcome: Ongoing  Goal: Control of chronic pain  Description  Control of chronic pain  12/17/2019 0427 by Tiffany Alarcon RN  Outcome: Ongoing  12/16/2019 1632 by Meagan Marks RN  Outcome: Ongoing     Problem: Skin Integrity:  Goal: Will show no infection signs and symptoms  Description  Will show no infection signs and symptoms  12/17/2019 0427 by Tiffany Alarcon RN  Outcome: Ongoing  12/16/2019 1632 by Meagan Marks RN  Outcome: Ongoing  Goal: Absence of new skin breakdown  Description  Absence of new skin breakdown  12/17/2019 0427 by Tiffany Alarcon RN  Outcome: Ongoing  12/16/2019 1632 by Meagan Marks RN  Outcome: Ongoing     Problem: OXYGENATION/RESPIRATORY FUNCTION  Goal: Patient will maintain patent airway  12/17/2019 0427 by Tiffany Alarcon RN  Outcome: Ongoing  12/16/2019 2009 by Andres Mcnamara RCP  Outcome: Ongoing  12/16/2019 1632 by Meagan Marks RN  Outcome: Ongoing  Goal: Patient will achieve/maintain normal respiratory rate/effort  Description  Respiratory rate and effort will be within normal limits for the patient  12/17/2019 0427 by Tiffany Alarcon RN  Outcome: Ongoing  12/16/2019 2009 by Andres Mcnamara RCP  Outcome: Ongoing  12/16/2019 1632 by Meagan Marks RN  Outcome: Ongoing     Problem: MECHANICAL VENTILATION  Goal: Patient will maintain patent airway  12/17/2019 0427 by Tiffany Alarcon RN  Outcome: Ongoing  12/16/2019 2009 by Andres Mcnamara RCP  Outcome: Ongoing  12/16/2019 1632 by Meagan Marks RN  Outcome: Ongoing  Goal: Oral health is maintained or improved  12/17/2019 0427 by Tiffany Alarcon RN  Outcome: Ongoing  12/16/2019 2009 by Tyler Soriano nutrition needs   12/17/2019 0427 by Ruthie Foster RN  Outcome: Ongoing  12/16/2019 1632 by Anahi Winslow RN  Outcome: Ongoing     Problem: Confusion - Acute:  Goal: Absence of continued neurological deterioration signs and symptoms  Description  Absence of continued neurological deterioration signs and symptoms  12/17/2019 0427 by Ruthie Foster RN  Outcome: Ongoing  12/16/2019 1632 by Anahi Winslow RN  Outcome: Ongoing  Goal: Mental status will be restored to baseline  Description  Mental status will be restored to baseline  12/17/2019 0427 by Ruthie Foster RN  Outcome: Ongoing  12/16/2019 1632 by Anahi Winslow RN  Outcome: Ongoing     Problem: Discharge Planning:  Goal: Ability to perform activities of daily living will improve  Description  Ability to perform activities of daily living will improve  12/17/2019 0427 by Ruthie Foster RN  Outcome: Ongoing  12/16/2019 1632 by Anahi Winslow RN  Outcome: Ongoing  Goal: Participates in care planning  Description  Participates in care planning  12/17/2019 0427 by Ruthie Foster RN  Outcome: Ongoing  12/16/2019 1632 by Anahi Winslow RN  Outcome: Ongoing     Problem: Injury - Risk of, Physical Injury:  Goal: Will remain free from falls  Description  Will remain free from falls  12/17/2019 0427 by Ruthie Foster RN  Outcome: Ongoing  12/16/2019 1632 by Anahi Winslow RN  Outcome: Ongoing  Goal: Absence of physical injury  Description  Absence of physical injury  12/17/2019 0427 by Ruthie Foster RN  Outcome: Ongoing  12/16/2019 1632 by Anahi Winslow RN  Outcome: Ongoing     Problem: Mood - Altered:  Goal: Mood stable  Description  Mood stable  12/17/2019 0427 by Ruthie Foster RN  Outcome: Ongoing  12/16/2019 1632 by Anahi Winslow RN  Outcome: Ongoing  Goal: Absence of abusive behavior  Description  Absence of abusive behavior  12/17/2019 0427 by Ruthie Foster RN  Outcome: Ongoing  12/16/2019 1632 by Anahi Winslow RN  Outcome: Ongoing  Goal: Verbalizations of feeling emotionally comfortable while being cared for will increase  Description  Verbalizations of feeling emotionally comfortable while being cared for will increase  12/17/2019 0427 by Marya Soria RN  Outcome: Ongoing  12/16/2019 1632 by Jennifer Mendez RN  Outcome: Ongoing     Problem: Psychomotor Activity - Altered:  Goal: Absence of psychomotor disturbance signs and symptoms  Description  Absence of psychomotor disturbance signs and symptoms  12/17/2019 0427 by Marya Soria RN  Outcome: Ongoing  12/16/2019 1632 by Jennifer Mendez RN  Outcome: Ongoing     Problem: Sensory Perception - Impaired:  Goal: Demonstrations of improved sensory functioning will increase  Description  Demonstrations of improved sensory functioning will increase  12/17/2019 0427 by Marya Soria RN  Outcome: Ongoing  12/16/2019 1632 by Jennifer Mendez RN  Outcome: Ongoing  Goal: Decrease in sensory misperception frequency  Description  Decrease in sensory misperception frequency  12/17/2019 0427 by Marya Soria RN  Outcome: Ongoing  12/16/2019 1632 by Jennifer Mendez RN  Outcome: Ongoing  Goal: Able to refrain from responding to false sensory perceptions  Description  Able to refrain from responding to false sensory perceptions  12/17/2019 0427 by Marya Soria RN  Outcome: Ongoing  12/16/2019 1632 by Jennifer Mendez RN  Outcome: Ongoing  Goal: Demonstrates accurate environmental perceptions  Description  Demonstrates accurate environmental perceptions  12/17/2019 0427 by Marya Soria RN  Outcome: Ongoing  12/16/2019 1632 by Jennifer Mendez RN  Outcome: Ongoing  Goal: Able to distinguish between reality-based and nonreality-based thinking  Description  Able to distinguish between reality-based and nonreality-based thinking  12/17/2019 0427 by Marya Soria RN  Outcome: Ongoing  12/16/2019 1632 by Jennifer Mendez RN  Outcome: Ongoing  Goal: Able to interrupt nonreality-based thinking  Description  Able to interrupt nonreality-based thinking  12/17/2019 0427 by Sherwin Davis RN  Outcome: Ongoing  12/16/2019 1632 by Yamileth Foy RN  Outcome: Ongoing     Problem: Sleep Pattern Disturbance:  Goal: Appears well-rested  Description  Appears well-rested  12/17/2019 0427 by Sherwin Davis RN  Outcome: Ongoing  12/16/2019 1632 by Yamileth Foy RN  Outcome: Ongoing     Problem: Restraint Use - Nonviolent/Non-Self-Destructive Behavior:  Goal: Absence of restraint indications  Description  Absence of restraint indications  12/17/2019 0427 by Sherwin Davis RN  Outcome: Ongoing  12/16/2019 1632 by Yamileth Foy RN  Outcome: Ongoing  Goal: Absence of restraint-related injury  Description  Absence of restraint-related injury  12/17/2019 0427 by Sherwin Davis RN  Outcome: Ongoing  12/16/2019 1632 by Yamileth Foy RN  Outcome: Ongoing

## 2019-12-17 NOTE — PLAN OF CARE
Problem: OXYGENATION/RESPIRATORY FUNCTION  Goal: Patient will maintain patent airway  12/16/2019 2009 by Mack Velasquez RCP  Outcome: Ongoing     Problem: OXYGENATION/RESPIRATORY FUNCTION  Goal: Patient will achieve/maintain normal respiratory rate/effort  Description  Respiratory rate and effort will be within normal limits for the patient  12/16/2019 2009 by Mack Velasquez RCP  Outcome: Ongoing     Problem: MECHANICAL VENTILATION  Goal: Patient will maintain patent airway  12/16/2019 2009 by Mack Velasquez RCP  Outcome: Ongoing     Problem: MECHANICAL VENTILATION  Goal: Oral health is maintained or improved  12/16/2019 2009 by Mack Velasquez RCP  Outcome: Ongoing     Problem: MECHANICAL VENTILATION  Goal: ET tube will be managed safely  12/16/2019 2009 by Mack Velasquez RCP  Outcome: Ongoing     Problem: MECHANICAL VENTILATION  Goal: Ability to express needs and understand communication  12/16/2019 2009 by Mack Velasquez RCP  Outcome: Ongoing     Problem: MECHANICAL VENTILATION  Goal: Mobility/activity is maintained at optimum level for patient  12/16/2019 2009 by Mack Velasquez RCP  Outcome: Ongoing     Problem: SKIN INTEGRITY  Goal: Skin integrity is maintained or improved  12/16/2019 2009 by Mack Velasquez RCP  Outcome: Ongoing

## 2019-12-17 NOTE — PROGRESS NOTES
Pt had new dialysis access line placed at bedside per Dr. Fernanda Argueta. Line not functioning for dialysis run. Dialysis RN, Sophie notified neph, Dr. Zain Morales. Stat K ordered. Depending on results may need another new line placed tonight or run in a.m.  2255: Perfect serv notified Dr. Zain Morales of pt K level of 3.9.

## 2019-12-17 NOTE — PROGRESS NOTES
Occupational Therapy Not Seen Note    DATE: 2019  Name: Rhett Reagan  : 1985  MRN: [id number]    Patient not available for Occupational Therapy due to:    Sedation/intubated (fentanyl/propofol)    Next Scheduled Treatment: 2019    Electronically signed by Janiya Caldera OT on 2019 at 3:55 PM

## 2019-12-17 NOTE — PROGRESS NOTES
Dialysis Post Treatment Note  Vitals:    12/17/19 1418   BP: (!) 181/96   Pulse: 118   Resp: 22   Temp:    SpO2: 98%     Pre-Weight = 130.1kg  Post-weight = Weight: 282 lb 6.6 oz (128.1 kg)  Total Liters Processed = Total Liters Processed (l/min): 46.5 l/min  Rinseback Volume (mL) = Rinseback Volume (ml): 300 ml  Net Removal (mL) = 2050mL  Type of access used= Lt temporary IJ  Length of treatment= 2 hours    Patient tolerated treatment well. HD catheter running well. Pressures remained stable through out the entire treatment. He is receiving a unit of blood after treatment and heading to surgery. He will be evaluated by the treatment team tomorrow for any additional treatment needs.     Electronically signed by Basilia Guzman RN on 12/17/2019 at 2:32 PM

## 2019-12-17 NOTE — PROGRESS NOTES
Possible dialysis today   Hemoglobin 7.9     Of note:  Talk to mother she is not willing for tracheostomy to be done now she wants to time to think and talk to other people and get second opinion. TSH 0.45   Fever:-  Temp (24hrs), Av.1 °F (36.7 °C), Min:97 °F (36.1 °C), Max:99.1 °F (79.8 °C)    Systolic (24ZBO), YZJ:658 , Min:90 , OW     Diastolic (45ZYS), RMX:63, Min:44, Max:92      Urine output in the last 24 hours:  In:  [I.V.:750; NG/GT:112]  Out: 225 [Urine:75; Drains:50]    Patient Vitals for the past 96 hrs (Last 3 readings):   Weight   19 0400 286 lb 13.1 oz (130.1 kg)   19 1730 (!) 400 lb 2.2 oz (181.5 kg)   19 1600 (!) 401 lb 14.4 oz (182.3 kg)         AWAKE & FOLLOWING COMMANDS:  [x] No   [] Yes    CURRENT VENTILATION STATUS:     [x] Ventilator  [] BIPAP  [] Nasal Cannula [] Room Air        SECRETIONS Amount:  [] Small [] Moderate  [] Large  [x] None  Color:     [] White [] Colored  [] Bloody    SEDATION:  RAAS Score:  [x] Propofol gtt  [] Versed gtt  [] Ativan gtt   [] No Sedation     PARALYZED:  [x] No    [] Yes    DIARRHEA:                [x] No                [] Yes  (C. Difficile status: [] positive                                                                                                                       [] negative                                                                                                                     [] pending)    VASOPRESSORS:  [x] No    [] Yes    If yes -   [] Levophed       [] Dopamine     [] Vasopressin       [] Dobutamine  [] Phenylephrine         [] Epinephrine    CENTRAL LINES:     [] No   [x] Yes          If yes -     [x] Right IJ     [] Left IJ [] Right Femoral [] Left Femoral                   [] Right Subclavian [] Left Subclavian       DOSS'S CATHETER:   [] No   [x] Yes  (Date of Insertion:   )     URINE OUTPUT:            [] Good   [] Low              [x] Anuric      OBJECTIVE:     VITAL SIGNS:  BP (!) 97/58 Pulse 86   Temp 98.2 °F (36.8 °C) (Axillary)   Resp 15   Ht 5' 9\" (1.753 m)   Wt 286 lb 13.1 oz (130.1 kg)   SpO2 100%   BMI 42.36 kg/m²   Tmax over 24 hours:  Temp (24hrs), Av.1 °F (36.7 °C), Min:97 °F (36.1 °C), Max:99.1 °F (37.3 °C)      Patient Vitals for the past 6 hrs:   BP Temp Temp src Pulse Resp SpO2 Weight   19 0600 (!) 97/58 -- -- 86 15 100 % --   19 0500 (!) 95/56 -- -- 81 15 100 % --   19 0409 (!) 93/50 -- -- 85 15 -- --   19 0400 (!) 90/44 98.2 °F (36.8 °C) Axillary 84 15 100 % 286 lb 13.1 oz (130.1 kg)   19 0307 -- -- -- 96 15 100 % --   19 0300 113/71 -- -- 98 11 100 % --   19 0200 139/72 -- -- 104 19 99 % --         Intake/Output Summary (Last 24 hours) at 2019 0746  Last data filed at 2019 0603  Gross per 24 hour   Intake 2062 ml   Output 225 ml   Net 1837 ml     Wt Readings from Last 2 Encounters:   19 286 lb 13.1 oz (130.1 kg)     Body mass index is 42.36 kg/m². PHYSICAL EXAMINATION:    General appearance -intubated and sedated, NAD  Mental status -intubated and sedated, not following commands  Chest -bilateral breath sounds heard, no wheezing, on vent  Heart -normal rate, regular rhythm, normal S1 and S2  Abdomen - soft, nondistended, no rash  Neurological - intubated and sedated, not following commands  Extremities - weak pulses in extremities, Dopplerable RLE pulse  Skinpost debridement of gluteal and perianal region. Full-thickness ulcer in the left plantar surface.  See media for post op wounds debridement     Any additional physical findings:    MEDICATIONS:    Scheduled Meds:   heparin (porcine)  1,500 Units Intracatheter Once    oxyCODONE  10 mg Oral Q4H    thiamine  100 mg Oral Daily    folic acid  1 mg Oral Daily    insulin lispro  0-18 Units Subcutaneous TID WC    insulin lispro  0-9 Units Subcutaneous Nightly    povidone-iodine   Topical Daily    sodium chloride flush  10 mL Intravenous Q12H    37  Humidification Temp Measured: 36.9  Circuit Condensation: Drained  Nitric Oxide/Epoprostenol In Use?: No  Additional Respiratory  Assessments  Pulse: 86  Resp: 15  SpO2: 100 %  End Tidal CO2: 47 (%)  Position: Semi-Pagan's  Humidification Source: Heated wire  Humidification Temp: 37  Circuit Condensation: Drained  Oral Care Completed?: Yes  Oral Care: Mouth swabbed, Mouth moisturizer, Mouth suctioned  Subglottic Suction Done?: Yes  Cuff Pressure (cm H2O): 26 cm H2O  Skin barrier applied: No    ABGs:  Arterial Blood Gas result:  pO2 107.4; pCO2 32.9; pH 7.29;  HCO3 16, %O2 Sat 98. Laboratory findings:    Complete Blood Count:   Recent Labs     12/15/19  0700 12/16/19  0610 12/17/19  0709   WBC 9.7 10.5 8.7   HGB 7.1* 7.9* 6.6*   HCT 24.3* 25.2* 23.6*   PLT See Reflexed IPF Result See Reflexed IPF Result 201        Last 3 Blood Glucose:   Recent Labs     12/15/19  0700 12/16/19  0816 12/17/19  0431   GLUCOSE 196* 234* 214*        PT/INR:    Lab Results   Component Value Date    PROTIME 11.9 11/29/2019    INR 1.1 11/29/2019     PTT:  No results found for: APTT, PTT    Comprehensive Metabolic Profile:   Recent Labs     12/15/19  0700 12/16/19  0816 12/16/19  2215 12/17/19  0431    134*  --  133*   K 4.5 6.0* 3.9 4.0    105  --  105   CO2 20 19*  --  18*   BUN 44* 59*  --  61*   CREATININE 1.61* 1.88*  --  1.88*   GLUCOSE 196* 234*  --  214*   CALCIUM 8.9 9.3  --  8.5*      Magnesium:   Lab Results   Component Value Date    MG 1.8 12/16/2019     Phosphorus:   Lab Results   Component Value Date    PHOS 3.9 12/16/2019     Ionized Calcium:   Lab Results   Component Value Date    CAION 1.05 12/03/2019            Troponin: No results for input(s): TROPONINI in the last 72 hours.       Radiology/Imaging:     Chest Xray (12/17/2019):    ASSESSMENT:     Principal Problem:    Necrotizing fasciitis ([facility]  Active Problems:    Hypertension    Diabetes ([facility]    Diabetic foot ulcer ([facility]    Septic shock ([facility] Bandemia  Resolved Problems:    * No resolved hospital problems. *    PLAN:     WEAN PER PROTOCOL:  [x] No   [] Yes  [] N/A    DISCONTINUE ANY LABS:   [x] No   [] Yes    TRANSFER OUT OF ICU:   [x] No   [] Yes    ADDITIONAL PLAN:    1. Pressure ulcer cant be staged present on admission / Necrotizing Faciitis involving buttocks and scrotum - s/p POD#18 s/p wide complex debridement of gluteal/perianal region. region extensive necrotic tissue that involved the anus and distal rectum. Had  debridement, washout of gluteal and perineal, diverting loop colostomy   Bedside debridement done on 12/4, dressing changes with him on site will continue to do wet-to-dry dressing changes as per  surgery. - continue meropenem (started 12/1) per ID   - (12/10) Repeat debridement with general surgery and urology   -Possible OR  Today for cleaning. 2.  Septic shock -resolved, off pressors  - Blood cultures drawn 12/2/19:  No growth     3. DEBBY on CKD -     Urology recs: Meroponen empirically, maintain lema for continued drainage.   -Urine culture : Grew Proteus mirabilis, ID is aware of it  -Nephrology recs:  Almost daily dialysis  Replacing electrolyte  . - K: 6.0: Started on moderate Hyperkalemia treatment protocol - resolved    Renal ultrasound: Right renal cyst, otherwise grossly negative renal   Ultrasound, non diagnostic bladder assessment. 4. Diabetes -high-dose correction POC glucose checks every 4 to every 6, check back to q 4 if the glucose levels constantly remains high over 200   - Hypoglycemia treatment protocol     5. Diabetic left foot ulcer -dietary on board. Wound care. No surgical intervention for now    6. Anemia: Hgb (7.9.0),   received  7uPRBC since admission     1. Feeding NPO for OR today   2. Fluids - KVO  3. Family - none at bedside   4. Analgesic -fentanyl drip   5. Sedation: Propofol  6. Thrombo-prophylaxis [] Enoxaparin  [x] Unfract. Heparin Subcut (was held this AM for OR)  [] EPC Cuffs  7.  Mobility bed rest and PT/ OT   8. Head of bed up: yes   9. Ulcer prophylaxis - pepcid   10. Glycemic control: High dose correction  11. Spontaneous breathing trial: YEs  12. Bowel regimen/urine output: milk of mag PRN, 10 ml UOP in last 24 hours  13. Indwelling catheter/lines CVC RIBRAYDON, MIKE, Ambrosio  14.  De-escalation: Labs for POC glucose checks and add thiamin and folic acid PO       Jenni Wise MD  Critical care resident   [address]            12/17/2019, 7:46 AM

## 2019-12-17 NOTE — CARE COORDINATION
Patient seen during CM rounds, plan for OR today, HD cath clotted, needs HD and PRBC's before OR. Intubated.  [facility] of choice

## 2019-12-17 NOTE — PROGRESS NOTES
Palliative Care Progress Note    NAME:  Del Aviles RECORD NUMBER:  [id number]  AGE: 37 y.o. GENDER: male  : 1985  TODAY'S DATE:  2019    Reason for Consult: Family support  History of Present Illness     The patient is a 37 y.o. Unavailable/unknown male who presents with No chief complaint on file. Referred to Palliative Care by  ?x Physician   ? Nursing  ? Family Request   ? Other:       He was admitted to the critical care service for Necrotizing fasciitis ([facility] [M72.6]. His hospital course has been associated with sepsis. The patient has a complicated medical history and has been hospitalized since 2019  3:39 PM.    OVERNIGHT EVENTS:  No acute events overnight  Patient remains intubated  Patient to have debridement today     BP (!) 109/56   Pulse 95   Temp 98.2 °F (36.8 °C) (Axillary)   Resp 14   Ht 5' 9\" (1.753 m)   Wt 286 lb 13.1 oz (130.1 kg)   SpO2 100%   BMI 42.36 kg/m²     Assessment        REVIEW OF SYSTEMS    ? x  UNABLE TO OBTAIN: Intubated    Constitutional:  ?   Chills   ? Fatigue   ? Fevers   ? Malaise   ? Weight loss   ? Other:     Respiratory:   ?  Cough    ? Shortness of breath    ? Chest pain    ? Other:     Cardiovascular:   ?  Chest pain  ? Dyspnea    ? Exertional chest pressure/discomfort     ? Fatigue      ? Palpitations    ? Syncope   ? Other:     Gastrointestinal:   ?  Abdominal pain   ? Constipation    ? Diarrhea    ? Dysphagia   ? Reflux             ? Vomiting   ? Other:     Genitourinary:  ?  Dysuria     ? Frequency   ? Hematuria   ? Nocturia   ? Urinary incontinence   ? Other:     Musculoskeletal:   ? Back pain    ? Muscle weakness   ? Myalgias    ? Neck pain   ? Stiff joints   ? Other:     Behavioral/Psych:   ? Anxiety    ? Depression     ? Mood swings   ? Other:     PHYSICAL ASSESSMENT:     General: ?  Oriented x3      ? well appearing      ?x Intubated      ?x ill appearing      ? Other:    Mental Status: ? service  Caregiver support/education     Principle Problem/Diagnosis:  Necrotizing fasciitis ([facility]    Additional Assessments:  Principal Problem:    Necrotizing fasciitis ([address]  Active Problems:    Hypertension    Diabetes ([address]    Diabetic foot ulcer ([facility]    Septic shock (HCC)    Bandemia    1- Symptom management/ pain control     Pain Assessment:  Pain is controlled with current analgesics. Medication(s) being used: acetaminophen, narcotic analgesics including fentanyl IV, oxycodone                Anxiety:  none                          Dyspnea:  none                          Fatigue:  none    Other: Intubated    We feel the patient symptoms are being controlled. his current regimen is reviewed by myself and discussed with the staff. 2- Goals of care evaluation   The patient goals of care are spiritual needs, strengthening relationships, preserve independence/autonomy/control and support for family/caregiver   Goals of care discussed with:    ? Patient independently    ? Patient and Family    ?x Family or Healthcare DPOA independently    ? Unable to discuss with patient, family/DPOA not present    3- Code Status  Full Code    4- Other recommendations  - We will continue to provide comfort and support to the patient and the family    Please call with any palliative questions or concerns. Palliative Care Team is available via perfect serve or via phone. Palliative Care will continue to follow Mr. Hernandez [facility] as needed. The note has been dictated by dragon, typing errors may be a possibility     Thank you for allowing Palliative Care to participate in the care of Mr. Korin Deleon .        Electronically signed by   Bryan Cavazos MD  Palliative Care Team  on 12/17/2019 at 11:43 AM    Palliative care office: [phone]

## 2019-12-17 NOTE — PROGRESS NOTES
Meds:  glucose, dextrose, glucagon (rDNA), dextrose, heparin (porcine), midodrine, albumin human, heparin (porcine), heparin (porcine), heparin (porcine), LORazepam, sodium chloride, sodium chloride, albumin human, sodium chloride flush, potassium chloride **OR** potassium alternative oral replacement **OR** potassium chloride, glucose, dextrose, glucagon (rDNA), dextrose, acetaminophen, REFRESH LACRI-LUBE, magnesium hydroxide, ondansetron  Home Meds:                No medications prior to admission. INVESTIGATIONS     Last 3 CMP:    Recent Labs     12/15/19  0700 12/16/19  0816 12/16/19  2215 12/17/19  0431    134*  --  133*   K 4.5 6.0* 3.9 4.0    105  --  105   CO2 20 19*  --  18*   BUN 44* 59*  --  61*   CREATININE 1.61* 1.88*  --  1.88*   CALCIUM 8.9 9.3  --  8.5*       Last 3 CBC:  Recent Labs     12/15/19  0700 12/16/19  0610 12/17/19  0709   WBC 9.7 10.5 8.7   RBC 2.53* 2.73* 2.36*   HGB 7.1* 7.9* 6.6*   HCT 24.3* 25.2* 23.6*   MCV 96.0 92.3 100.0   MCH 28.1 28.9 28.0   MCHC 29.2 31.3 28.0*   RDW 23.1* 23.6* 23.8*   PLT See Reflexed IPF Result See Reflexed IPF Result 201   MPV NOT REPORTED NOT REPORTED 8.8       ASSESSMENT     1.  DEBBY oliguric secondary to ischemic acute tubular necrosis from hypotension and sepsis - HD dependent temp kaden since 12/3  2. Volume overload from capillary leak  3. VDRF  4.  CKD stage III-from diabetic nephrosclerosis, baseline creatinine 1.6-1.8  5.  Necrotizing fasciitis involving buttocks and scrotum-status post multiple  debridement of gluteal/perineal region with diverting colostomy  6.  History of type 2 diabetes mellitus with diabetic foot ulcer    PLAN     1. HD today. 2 kg off. Orders reviewed with NS. PRBc with HD  2. Continue TPN  3.  Will follow    Please do not hesitate to call with questions    This note is created with the assistance of a speech-recognition program. While intending to generate a document that actually reflects the content of the

## 2019-12-18 ENCOUNTER — APPOINTMENT (OUTPATIENT)
Dept: DIALYSIS | Age: 34
DRG: 463 | End: 2019-12-18
Attending: INTERNAL MEDICINE
Payer: MEDICARE

## 2019-12-18 LAB
ABSOLUTE EOS #: 1.81 K/UL (ref 0–0.44)
ABSOLUTE IMMATURE GRANULOCYTE: 0.23 K/UL (ref 0–0.3)
ABSOLUTE LYMPH #: 1.13 K/UL (ref 1.1–3.7)
ABSOLUTE MONO #: 0.9 K/UL (ref 0.1–1.2)
ALLEN TEST: ABNORMAL
ANION GAP SERPL CALCULATED.3IONS-SCNC: 13 MMOL/L (ref 9–17)
BASOPHILS # BLD: 1 % (ref 0–2)
BASOPHILS ABSOLUTE: 0.11 K/UL (ref 0–0.2)
BUN BLDV-MCNC: 65 MG/DL (ref 6–20)
BUN/CREAT BLD: ABNORMAL (ref 9–20)
CALCIUM SERPL-MCNC: 8.8 MG/DL (ref 8.6–10.4)
CARBOXYHEMOGLOBIN: 2.3 % (ref 0–5)
CHLORIDE BLD-SCNC: 105 MMOL/L (ref 98–107)
CO2: 16 MMOL/L (ref 20–31)
CREAT SERPL-MCNC: 2.01 MG/DL (ref 0.7–1.2)
DIFFERENTIAL TYPE: ABNORMAL
EOSINOPHILS RELATIVE PERCENT: 16 % (ref 1–4)
FIO2: ABNORMAL
GFR AFRICAN AMERICAN: 46 ML/MIN
GFR NON-AFRICAN AMERICAN: 38 ML/MIN
GFR SERPL CREATININE-BSD FRML MDRD: ABNORMAL ML/MIN/{1.73_M2}
GFR SERPL CREATININE-BSD FRML MDRD: ABNORMAL ML/MIN/{1.73_M2}
GLUCOSE BLD-MCNC: 189 MG/DL (ref 75–110)
GLUCOSE BLD-MCNC: 208 MG/DL (ref 70–99)
HBV SURFACE AB TITR SER: 6.75 MIU/ML
HCO3 VENOUS: 21.1 MMOL/L (ref 24–30)
HCT VFR BLD CALC: 23.8 % (ref 40.7–50.3)
HCT VFR BLD CALC: 24.4 % (ref 40.7–50.3)
HEMOGLOBIN: 6.8 G/DL (ref 13–17)
HEMOGLOBIN: 7.1 G/DL (ref 13–17)
HEPATITIS B CORE TOTAL ANTIBODY: NONREACTIVE
IMMATURE GRANULOCYTES: 2 %
LYMPHOCYTES # BLD: 10 % (ref 24–43)
MAGNESIUM: 2 MG/DL (ref 1.6–2.6)
MCH RBC QN AUTO: 28.1 PG (ref 25.2–33.5)
MCHC RBC AUTO-ENTMCNC: 28.6 G/DL (ref 28.4–34.8)
MCV RBC AUTO: 98.3 FL (ref 82.6–102.9)
METHEMOGLOBIN: ABNORMAL % (ref 0–1.5)
MODE: ABNORMAL
MONOCYTES # BLD: 8 % (ref 3–12)
MORPHOLOGY: ABNORMAL
MORPHOLOGY: ABNORMAL
NEGATIVE BASE EXCESS, VEN: 7.5 MMOL/L (ref 0–2)
NOTIFICATION TIME: ABNORMAL
NOTIFICATION: ABNORMAL
NRBC AUTOMATED: 0.5 PER 100 WBC
O2 DEVICE/FLOW/%: ABNORMAL
O2 SAT, VEN: 80.9 % (ref 60–85)
OXYHEMOGLOBIN: ABNORMAL % (ref 95–98)
PATIENT TEMP: 37
PCO2, VEN, TEMP ADJ: ABNORMAL MMHG (ref 39–55)
PCO2, VEN: 67.4 (ref 39–55)
PDW BLD-RTO: 22.6 % (ref 11.8–14.4)
PEEP/CPAP: ABNORMAL
PH VENOUS: 7.12 (ref 7.32–7.42)
PH, VEN, TEMP ADJ: ABNORMAL (ref 7.32–7.42)
PHOSPHORUS: 3.4 MG/DL (ref 2.5–4.5)
PLATELET # BLD: 204 K/UL (ref 138–453)
PLATELET ESTIMATE: ABNORMAL
PMV BLD AUTO: 9.1 FL (ref 8.1–13.5)
PO2, VEN, TEMP ADJ: ABNORMAL MMHG (ref 30–50)
PO2, VEN: 46.4 (ref 30–50)
POSITIVE BASE EXCESS, VEN: ABNORMAL MMOL/L (ref 0–2)
POTASSIUM SERPL-SCNC: 4.3 MMOL/L (ref 3.7–5.3)
PSV: ABNORMAL
PT. POSITION: ABNORMAL
RBC # BLD: 2.42 M/UL (ref 4.21–5.77)
RBC # BLD: ABNORMAL 10*6/UL
RESPIRATORY RATE: ABNORMAL
SAMPLE SITE: ABNORMAL
SEG NEUTROPHILS: 63 % (ref 36–65)
SEGMENTED NEUTROPHILS ABSOLUTE COUNT: 7.12 K/UL (ref 1.5–8.1)
SET RATE: ABNORMAL
SODIUM BLD-SCNC: 134 MMOL/L (ref 135–144)
TEXT FOR RESPIRATORY: ABNORMAL
TOTAL HB: ABNORMAL G/DL (ref 12–16)
TOTAL RATE: ABNORMAL
VT: ABNORMAL
WBC # BLD: 11.3 K/UL (ref 3.5–11.3)
WBC # BLD: ABNORMAL 10*3/UL

## 2019-12-18 PROCEDURE — 86317 IMMUNOASSAY INFECTIOUS AGENT: CPT

## 2019-12-18 PROCEDURE — 99291 CRITICAL CARE FIRST HOUR: CPT | Performed by: INTERNAL MEDICINE

## 2019-12-18 PROCEDURE — 2580000003 HC RX 258: Performed by: STUDENT IN AN ORGANIZED HEALTH CARE EDUCATION/TRAINING PROGRAM

## 2019-12-18 PROCEDURE — 6370000000 HC RX 637 (ALT 250 FOR IP): Performed by: STUDENT IN AN ORGANIZED HEALTH CARE EDUCATION/TRAINING PROGRAM

## 2019-12-18 PROCEDURE — 36415 COLL VENOUS BLD VENIPUNCTURE: CPT

## 2019-12-18 PROCEDURE — 6360000002 HC RX W HCPCS: Performed by: INTERNAL MEDICINE

## 2019-12-18 PROCEDURE — 94003 VENT MGMT INPAT SUBQ DAY: CPT

## 2019-12-18 PROCEDURE — 94770 HC ETCO2 MONITOR DAILY: CPT

## 2019-12-18 PROCEDURE — 82947 ASSAY GLUCOSE BLOOD QUANT: CPT

## 2019-12-18 PROCEDURE — 80048 BASIC METABOLIC PNL TOTAL CA: CPT

## 2019-12-18 PROCEDURE — P9016 RBC LEUKOCYTES REDUCED: HCPCS

## 2019-12-18 PROCEDURE — 85018 HEMOGLOBIN: CPT

## 2019-12-18 PROCEDURE — 6360000002 HC RX W HCPCS: Performed by: STUDENT IN AN ORGANIZED HEALTH CARE EDUCATION/TRAINING PROGRAM

## 2019-12-18 PROCEDURE — 86704 HEP B CORE ANTIBODY TOTAL: CPT

## 2019-12-18 PROCEDURE — 85025 COMPLETE CBC W/AUTO DIFF WBC: CPT

## 2019-12-18 PROCEDURE — 36430 TRANSFUSION BLD/BLD COMPNT: CPT

## 2019-12-18 PROCEDURE — 2700000000 HC OXYGEN THERAPY PER DAY

## 2019-12-18 PROCEDURE — 86900 BLOOD TYPING SEROLOGIC ABO: CPT

## 2019-12-18 PROCEDURE — 82805 BLOOD GASES W/O2 SATURATION: CPT

## 2019-12-18 PROCEDURE — 90935 HEMODIALYSIS ONE EVALUATION: CPT

## 2019-12-18 PROCEDURE — 83735 ASSAY OF MAGNESIUM: CPT

## 2019-12-18 PROCEDURE — 2500000003 HC RX 250 WO HCPCS: Performed by: INTERNAL MEDICINE

## 2019-12-18 PROCEDURE — 85014 HEMATOCRIT: CPT

## 2019-12-18 PROCEDURE — 84100 ASSAY OF PHOSPHORUS: CPT

## 2019-12-18 PROCEDURE — 99233 SBSQ HOSP IP/OBS HIGH 50: CPT | Performed by: INTERNAL MEDICINE

## 2019-12-18 PROCEDURE — 94761 N-INVAS EAR/PLS OXIMETRY MLT: CPT

## 2019-12-18 PROCEDURE — 2000000000 HC ICU R&B

## 2019-12-18 PROCEDURE — 97110 THERAPEUTIC EXERCISES: CPT

## 2019-12-18 RX ORDER — 0.9 % SODIUM CHLORIDE 0.9 %
250 INTRAVENOUS SOLUTION INTRAVENOUS ONCE
Status: DISCONTINUED | OUTPATIENT
Start: 2019-12-18 | End: 2019-12-20

## 2019-12-18 RX ORDER — HEPARIN SODIUM 1000 [USP'U]/ML
500 INJECTION INTRAVENOUS; SUBCUTANEOUS PRN
Status: DISCONTINUED | OUTPATIENT
Start: 2019-12-18 | End: 2020-01-16 | Stop reason: HOSPADM

## 2019-12-18 RX ORDER — HEPARIN SODIUM 1000 [USP'U]/ML
1750 INJECTION INTRAVENOUS; SUBCUTANEOUS PRN
Status: DISCONTINUED | OUTPATIENT
Start: 2019-12-18 | End: 2020-01-16 | Stop reason: HOSPADM

## 2019-12-18 RX ADMIN — Medication 100 MG: at 10:00

## 2019-12-18 RX ADMIN — HEPARIN SODIUM 5000 UNITS: 5000 INJECTION INTRAVENOUS; SUBCUTANEOUS at 17:20

## 2019-12-18 RX ADMIN — HEPARIN SODIUM 1300 UNITS: 1000 INJECTION INTRAVENOUS; SUBCUTANEOUS at 14:35

## 2019-12-18 RX ADMIN — PROPOFOL 24.82 MCG/KG/MIN: 10 INJECTION, EMULSION INTRAVENOUS at 18:15

## 2019-12-18 RX ADMIN — PROPOFOL 25 MCG/KG/MIN: 10 INJECTION, EMULSION INTRAVENOUS at 23:29

## 2019-12-18 RX ADMIN — POTASSIUM CHLORIDE: 2 INJECTION, SOLUTION, CONCENTRATE INTRAVENOUS at 18:39

## 2019-12-18 RX ADMIN — Medication 200 MCG/HR: at 16:43

## 2019-12-18 RX ADMIN — Medication 200 MCG/HR: at 02:58

## 2019-12-18 RX ADMIN — Medication 200 MCG/HR: at 12:02

## 2019-12-18 RX ADMIN — INSULIN LISPRO 3 UNITS: 100 INJECTION, SOLUTION INTRAVENOUS; SUBCUTANEOUS at 17:20

## 2019-12-18 RX ADMIN — HEPARIN SODIUM 5000 UNITS: 5000 INJECTION INTRAVENOUS; SUBCUTANEOUS at 22:27

## 2019-12-18 RX ADMIN — HEPARIN SODIUM 1750 UNITS: 1000 INJECTION INTRAVENOUS; SUBCUTANEOUS at 11:15

## 2019-12-18 RX ADMIN — HEPARIN SODIUM 1400 UNITS: 1000 INJECTION INTRAVENOUS; SUBCUTANEOUS at 14:35

## 2019-12-18 RX ADMIN — HEPARIN SODIUM 5000 UNITS: 5000 INJECTION INTRAVENOUS; SUBCUTANEOUS at 05:35

## 2019-12-18 RX ADMIN — MIDODRINE HYDROCHLORIDE 10 MG: 5 TABLET ORAL at 13:24

## 2019-12-18 RX ADMIN — Medication 200 MCG/HR: at 07:35

## 2019-12-18 RX ADMIN — FOLIC ACID 1 MG: 1 TABLET ORAL at 09:59

## 2019-12-18 RX ADMIN — FAMOTIDINE 20 MG: 20 TABLET, FILM COATED ORAL at 09:58

## 2019-12-18 RX ADMIN — PROPOFOL 30 MCG/KG/MIN: 10 INJECTION, EMULSION INTRAVENOUS at 05:35

## 2019-12-18 RX ADMIN — MEROPENEM 1 G: 1 INJECTION, POWDER, FOR SOLUTION INTRAVENOUS at 09:59

## 2019-12-18 RX ADMIN — Medication 200 MCG/HR: at 21:38

## 2019-12-18 RX ADMIN — OXYCODONE HYDROCHLORIDE 10 MG: 5 TABLET ORAL at 13:25

## 2019-12-18 RX ADMIN — OXYCODONE HYDROCHLORIDE 10 MG: 5 TABLET ORAL at 10:04

## 2019-12-18 RX ADMIN — HEPARIN SODIUM 500 UNITS: 1000 INJECTION INTRAVENOUS; SUBCUTANEOUS at 11:15

## 2019-12-18 RX ADMIN — MIDODRINE HYDROCHLORIDE 10 MG: 5 TABLET ORAL at 18:16

## 2019-12-18 RX ADMIN — MIDODRINE HYDROCHLORIDE 10 MG: 5 TABLET ORAL at 09:59

## 2019-12-18 RX ADMIN — OXYCODONE HYDROCHLORIDE 10 MG: 5 TABLET ORAL at 03:00

## 2019-12-18 RX ADMIN — OXYCODONE HYDROCHLORIDE 10 MG: 5 TABLET ORAL at 05:35

## 2019-12-18 ASSESSMENT — PULMONARY FUNCTION TESTS
PIF_VALUE: 16
PIF_VALUE: 20
PIF_VALUE: 13
PIF_VALUE: 11
PIF_VALUE: 14
PIF_VALUE: 17

## 2019-12-18 ASSESSMENT — PAIN SCALES - GENERAL
PAINLEVEL_OUTOF10: 8
PAINLEVEL_OUTOF10: 9
PAINLEVEL_OUTOF10: 0

## 2019-12-18 NOTE — PROGRESS NOTES
PROGRESS NOTE     PATIENT NAME: Austin Mcdaniel [age] RECORD NO. [id number]  DATE: 12/18/2019  SURGEON: Mandy Ly  PRIMARY CARE PHYSICIAN: No primary care provider on file. HD: # 19    ASSESSMENT    Patient Active Problem List   Diagnosis    Necrotizing fasciitis ([address]    Hypertension    Diabetes ([address]    Diabetic foot ulcer ([address]    Septic shock ([address]    Bandemia      11/29/2019 wide complex debridement of nec fasc involving gluteal/perianal/distal rectum  12/03/2019 open diverting end colostomy   12/06/2019 I&D  12/08/2019 Incision and debridement of necrotizing fasciitis buttock wound, scrotum, bilateral groin region and closure of midline abdominal wound  12/10/2019 Sharp excisional debridement of gluteal, marc-rectal and perineal wound to level of muscle, Sharp excisional debridement of scrotum (performed by Urology), Washout of gluteal/perineal/scrotal/inguinal wound, Partial primary closure of pubic wound, and Dakins Wet to dry dressing application      MEDICAL DECISION MAKING AND PLAN    · Necrotizing soft tissue infection resolved: s/p wide complex debridement of gluteal/perianal region extensive necrotic tissue that involved the anus and distal rectum. · Urology following. Recommending plastic surgery for closure of the scrotum. Otherwise recommending WTD dressing changes until it fully granulates. Will eval again next time in OR. Tentative for tomorrow morning. Timing TBD. · No vac possible at this point. WTD dressing with saline kerlix and ABDs twice daily. · Deborra Calaveras failed midline incision. Removed and replaced with kerlix jay jay which are to be changed twice daily. · Continue tube feeds/nutritional optimization   · Monitor stoma - healthy in appearance. · Meropenem per ID. · Strict glucose control <180  · LLE wound per podiatry  · Nephrology following. · Weaning to extubate per ICU. Will attempt to extubate prior to placing trach. SUBJECTIVE    Patient seen and examined.  No acute changes. Afebrile. ICU to start weaning to extubate if possible. OBJECTIVE  VITALS: /68   Pulse 100   Temp 98.2 °F (36.8 °C) (Axillary)   Resp 11   Ht 5' 9\" (1.753 m)   Wt (!) 390 lb (176.9 kg)   SpO2 (!) 63%   BMI 57.59 kg/m²     GENERAL: intubated and sedated. NAD  NEURO: moves uppers off sedation. Not following commands, making purposeful movements   LUNGS: vented   HEART: +S1/S2, Sinus tachy  ABDOMEN: soft, ND, +grimace with palpation. Ostomy is patent, bowel succus in appliance, no flatus. prevena vac to midline incision  SKIN/SOFT TISSUE: dressing remains intact, with no significant change in drainage, plan to change at bedside today   EXTREMITY: dressing to L foot is c/d/i. No cyanosis     I/O last 3 completed shifts:  In: -   Out: 120 [Urine:120]    Drain/tube output:   In: -   Out: 70 [Urine:70]    LAB:  CBC:   Recent Labs     12/16/19  0610 12/17/19  0709 12/18/19  0548   WBC 10.5 8.7 11.3   HGB 7.9* 6.6* 6.8*   HCT 25.2* 23.6* 23.8*   MCV 92.3 100.0 98.3   PLT See Reflexed IPF Result 201 204     BMP:   Recent Labs     12/16/19  0816 12/16/19  2215 12/17/19  0431 12/18/19  0548   *  --  133* 134*   K 6.0* 3.9 4.0 4.3     --  105 105   CO2 19*  --  18* 16*   BUN 59*  --  61* 65*   CREATININE 1.88*  --  1.88* 2.01*   GLUCOSE 234*  --  214* 208*         RADIOLOGY:   no new images   No      Dennise Rodriguez DO  12/18/2019  10:29 AM

## 2019-12-18 NOTE — PROGRESS NOTES
Renal Progress Note    Patient :  Rhett Reagan; 37 y.o. MRN# [id number]  Location:  0128/0128-01  Attending:  Amelia Olivares MD  Admit Date:  11/29/2019   Hospital Day: 23      Subjective:     Patient was seen and examined on HD. Tolerating the procedure well. No new issues reported overnight. Will give midodrine and albumin with dialysis. Hemoglobin dropped to 6.6 today will be getting 1 unit of blood with dialysis today. CODE STATUS has been changed to DNR CCA. Outpatient Medications:     No medications prior to admission. Current Medications:     Scheduled Meds:    alteplase  1 mg Intracatheter Once    sodium chloride  250 mL Intravenous Once    insulin lispro  0-18 Units Subcutaneous Q6H    oxyCODONE  10 mg Oral Q4H    thiamine  100 mg Oral Daily    folic acid  1 mg Oral Daily    povidone-iodine   Topical Daily    midodrine  10 mg Oral TID WC    famotidine  20 mg Per NG tube Daily    meropenem  1 g Intravenous Q12H    sodium chloride flush  10 mL Intravenous 2 times per day    heparin (porcine)  5,000 Units Subcutaneous 3 times per day     Continuous Infusions:    PN-Adult 2-in-1 Central Line (Standard) 80 mL/hr at 12/17/19 1841    dextrose      propofol 30 mcg/kg/min (12/18/19 0535)    fentaNYL 200 mcg/hr (12/18/19 1202)    sodium chloride 10 mL/hr at 12/16/19 0226     PRN Meds:  heparin (porcine), heparin (porcine), glucose, dextrose, glucagon (rDNA), dextrose, midodrine, albumin human, heparin (porcine), heparin (porcine), LORazepam, sodium chloride, sodium chloride, albumin human, sodium chloride flush, potassium chloride **OR** potassium alternative oral replacement **OR** potassium chloride, acetaminophen, REFRESH LACRI-LUBE, magnesium hydroxide, ondansetron    Input/Output:       I/O last 3 completed shifts:  In: -   Out: 120 [Urine:120].       Patient Vitals for the past 96 hrs (Last 3 readings):   Weight   12/18/19 0547 (!) 390 lb (176.9 kg)   12/17/19 1418 (!) 392 lb 10.2 oz for hypotension. 4.  BMP in a.m.  5.  Antibiotics as per renal function. 6.  Will follow. Patient's nurse was updated. Nutrition   Please ensure that patient is on a renal diet/TF. Avoid nephrotoxic drugs/contrast exposure. We will continue to follow along with you. Leonel Simmons MD  [facility]     This note is created with the assistance of a speech-recognition program. While intending to generate a document that actually reflects the content of the visit, no guarantees can be provided that every mistake has been identified and corrected by editing.

## 2019-12-18 NOTE — PROGRESS NOTES
DATE: 2019  NAME: Eun Kay  MRN: [id number]   : 1985    Discharge Recommendations: Continue to Assess (pending progress)     RN reported pt was appropriate to receive PROM today  Subjective: Pt is intubated and non-verbal  Pain: Unknown; no grimacing  Patient follows: No Commands  Is patient on ventilator: Yes  Is patient on sedation: Yes  Precautions: Fall Risk; Contact Precautions; intubated, per RN okay for PROM on this date    Therapeutic exercises:  UE/LE(s)    Bilateral Passive range of motion all planes x 20 reps (Pt provided some AAROM with UE's, but it was very little)   (PROM to B LE from knee down)  bilateral gastrocnemius stretching 5 reps x 30 seconds    Goals  Short Term Goals  Short term goal 1: PROM for stretching and injury prevention   Short term goal 2: AROM/AAROM for strengthening   Short term goal 3: Progress mobility as appropriate       Plan: Progress functional mobility as medically appropriate.    Time In:   Time Out: 912  Time Coded Minutes (treatment minutes): 23  Rehab Potential: Fair  Treatments/week: 2-3x / wk    Alicia Ibanez PTA

## 2019-12-18 NOTE — FLOWSHEET NOTE
Unable to aspirate from arterial port of left IJ temporary dialysis catheter, venous port aspirates well, primary nurse relates has been aspirating and flushing hourly sometimes removing clots having to aspirate and flush frequently. Arterial port flushes with resistance. Dressing removed and attempting to reposition catheter without help. Primary nurse able to aspirate clot from arterial port, now aspirating and flushing better.

## 2019-12-18 NOTE — FLOWSHEET NOTE
Hemodialysis started but now venous pressure too high, 380, unable to continue treatment. Had to disconnect from both ports and recirculating extracorporeal volume. Aggressively aspirating and flushing both ports again, multiple times. Attempted to resume dialysis with lines reversed, but now arterial pressure too high at -310. Lines aspirated and flushed with 0.9 Normal Saline agin. Will attempt to restart hemo with lines not reversed.

## 2019-12-18 NOTE — PROGRESS NOTES
database accordingly.   Past Medical History:     Past Medical History:   Diagnosis Date    [facility]estive [facility] failure) [facility]     Diabetes mellitus ([facility]     Hypertension     Spina bifida aperta of lumbar spine ([facility]        Past Surgical  History:     Past Surgical History:   Procedure Laterality Date    ABDOMEN SURGERY N/A 12/8/2019    DEBRIDEMENT  NECROTIZING FASCIITIS BUTTOCK, WOUND VAC REMOVAL DELAYED PRIMARY CLOSURE OF MIDLINE ABDOMINAL INCISION, OSTOMY BAG CHANGE performed by Laurita Kirby MD at [address] [facility] N/A 12/10/2019    DEBRIDEMENT OF SACRAL WOUND performed by Naif Garcia MD at [address] N/A 12/3/2019    LAPAROSCOPIC CONVERTED TO OPEN DIVERTING LOOP COLOSTOMY; WOUND VAC APPLICATION performed by Jono Hawkins MD at [address] [place] [address] 11/29/2019    RECTAL PERIRECTAL INCISION AND DRAINAGE, [facility] LOOP COLOSTOMY CREATION performed by Isis Cottrell MD at [address] [place] [address] N/A 12/6/2019    DEBRIDEMENT NECROTIZING FASCIAITIS BILAT BUTTOCK performed by Naif Garcia MD at [place] [address] N/A 12/10/2019    SCROTAL EXPLORATION WITH SCROTAL DEBRIDEMENT performed by Marielle Zarate MD at [facility]       Medications:      alteplase  1 mg Intracatheter Once    oxyCODONE  10 mg Oral Q4H    thiamine  100 mg Oral Daily    folic acid  1 mg Oral Daily    insulin lispro  0-18 Units Subcutaneous TID WC    insulin lispro  0-9 Units Subcutaneous Nightly    povidone-iodine   Topical Daily    sodium chloride flush  10 mL Intravenous Q12H    midodrine  10 mg Oral TID WC    famotidine  20 mg Per NG tube Daily    [Held by provider] thiamine and folic acid IVPB   Intravenous Daily    meropenem  1 g Intravenous Q12H    sodium chloride flush  10 mL Intravenous 2 times per day    heparin (porcine)  5,000 Units Subcutaneous 3 times per day       Social History:     Social History     Socioeconomic History    Marital status: Unknown     Spouse name: Not on file    Number of children: Not on file    Years of education: Not on file    Highest education level: Not on file   Occupational History    Not on file   Social Needs    Financial resource strain: Not on file    Food insecurity:     Worry: Not on file     Inability: Not on file    Transportation needs:     Medical: Not on file     Non-medical: Not on file   Tobacco Use    Smoking status: Not on file   Substance and Sexual Activity    Alcohol use: Not on file    Drug use: Not on file    Sexual activity: Not on file   Lifestyle    Physical activity:     Days per week: Not on file     Minutes per session: Not on file    Stress: Not on file   Relationships    Social connections:     Talks on phone: Not on file     Gets together: Not on file     Attends Mandaeism service: Not on file     Active member of club or organization: Not on file     Attends meetings of clubs or organizations: Not on file     Relationship status: Not on file    Intimate partner violence:     Fear of current or ex partner: Not on file     Emotionally abused: Not on file     Physically abused: Not on file     Forced sexual activity: Not on file   Other Topics Concern    Not on file   Social History Narrative    Not on file       Family History:   No family history on file. Allergies:   Patient has no known allergies. Review of Systems:     12/18/2019 UTO pt intubated, sedated, off vasopressors. Seen in ICU    Constitutional: No fevers or chills. Generalized weakness  Head: No headaches  Eyes: No double vision or blurry vision. No conjunctival inflammation. ENT: No sore throat or runny nose. . No hearing loss, tinnitus or vertigo. Cardiovascular: No chest pain or palpitations. No shortness of breath. No GORDILLO  Lung: No shortness of breath or cough. No sputum production  Abdomen: No nausea, vomiting, diarrhea, or abdominal pain. [place] No cramps.   Genitourinary: No increased urinary frequency, or dysuria. No hematuria. No suprapubic or CVA pain  Musculoskeletal: No muscle aches or pains. No joint effusions, swelling or deformities. Lt diabetic foot   Hematologic: No bleeding or bruising. Neurologic: No headache, weakness, numbness, or tingling. Spina bifida  Integument: Gangrene of perineum and gluteal areas  Psychiatric: No depression. Endocrine: No polyuria, no polydipsia, no polyphagia. Physical Examination :     Patient Vitals for the past 8 hrs:   BP Temp Temp src Pulse Resp SpO2 Weight   12/18/19 0750 -- -- -- 100 11 (!) 63 % --   12/18/19 0733 -- -- -- -- (!) 0 -- --   12/18/19 0730 114/68 98.2 °F (36.8 °C) Axillary 95 (!) 0 -- --   12/18/19 0700 118/73 -- -- 93 (!) 0 -- --   12/18/19 0600 113/62 -- -- 94 (!) 0 100 % --   12/18/19 0547 -- -- -- -- -- -- (!) 390 lb (176.9 kg)   12/18/19 0500 117/65 -- -- 100 (!) 0 94 % --   12/18/19 0400 133/78 98 °F (36.7 °C) -- 105 (!) 0 97 % --   12/18/19 0300 135/69 -- -- 99 (!) 0 98 % --     General Appearance: Sedated, on vent  Head:  Normocephalic, no trauma  ENT: Oropharyngeally intubated, without erythema, exudate, or thrush. No tenderness of sinuses. Mouth/throat: mucosa pink and moist. No lesions. Dentition in good repair. Neck:Supple, without lymphadenopathy. Thyroid normal, No bruits. Pulmonary/Chest: Clear to auscultation, without wheezes, rales, or rhonchi. No dullness to percussion. Cardiovascular: Regular rate and rhythm without murmurs, rubs, or gallops. Abdomen: Soft, non tender. Bowel sounds quiet. Ostomy with scant liquid output. Surgical dressing clean  : Extensive open wound of gluteal and perineal areas, lema with small amt clear urine  All four Extremities: No cyanosis, clubbing, or effusions. Lt foot diabetic ulcer with involvement of the Lt heel. Neurologic: Intubated, sedated  Skin: Warm and dry with good turgor. Signs of peripheral arterial insufficiency. Large open wounds.     Medical Decision Making -Laboratory:   I have independently reviewed/ordered the following labs:    CBC with Differential:   Recent Labs     19  0709 19  0548   WBC 8.7 11.3   HGB 6.6* 6.8*   HCT 23.6* 23.8*    204   LYMPHOPCT 20* 10*   MONOPCT 3 8     BMP:   Recent Labs     19  0816  19  0431 19  0548   *  --  133* 134*   K 6.0*   < > 4.0 4.3     --  105 105   CO2 19*  --  18* 16*   BUN 59*  --  61* 65*   CREATININE 1.88*  --  1.88* 2.01*   MG 1.8  --   --  2.0    < > = values in this interval not displayed. Hepatic Function Panel:   No results for input(s): PROT, LABALBU, BILIDIR, IBILI, BILITOT, ALKPHOS, ALT, AST in the last 72 hours. No results for input(s): RPR in the last 72 hours. No results for input(s): HIV in the last 72 hours. No results for input(s): BC in the last 72 hours. Lab Results   Component Value Date    MUCUS NOT REPORTED 2019    RBC 2.42 2019    TRICHOMONAS NOT REPORTED 2019    WBC 11.3 2019    YEAST NOT REPORTED 2019    TURBIDITY TURBID 2019     Lab Results   Component Value Date    CREATININE 2.01 2019    GLUCOSE 208 2019       Medical Decision Making-Imagin-9 CT Abd/pelvis  EXAMINATION:   CT OF THE ABDOMEN AND PELVIS WITH CONTRAST 2019 2:29 am       TECHNIQUE:   CT of the abdomen and pelvis was performed with the administration of   intravenous contrast. Multiplanar reformatted images are provided for review.    Dose modulation, iterative reconstruction, and/or weight based adjustment of   the mA/kV was utilized to reduce the radiation dose to as low as reasonably   achievable.       COMPARISON:   2019.       HISTORY:   ORDERING SYSTEM PROVIDED HISTORY: Marshall Spatz fasc   TECHNOLOGIST PROVIDED HISTORY:       nec fasc   Reason for Exam: nec fasc; Trauma   Acuity: Unknown   Type of Exam: Subsequent/Follow-up       FINDINGS:   Lower Chest: Partially visualized bilateral pleural effusions adrenal glands appear within normal limits. There is no hydronephrosis or obstructing urinary tract calculi. Small amount of free fluid is seen adjacent to the liver inferiorly. .    The appendix is visualized in the right lower quadrant and appears within normal limits.       There is no evidence for bowel obstruction. Stranding is seen within the subcutaneous fat overlying both lateral abdominal walls left greater than right.  There is ill-defined fluid collection within the subcutaneous fat overlying the left lateral abdominal wall possibly representing phlegmon or developing abscess although no organized   abscess is seen. There is a large amount of soft tissue emphysema involving both the right and left buttock extending to the perineum tissue thickening is seen especially on the left involving the left buttock. CT PELVIS FINDINGS:        No distal ureteral calculi or bladder calculi. There is no free fluid in the pelvis. Catheter is seen within the urinary bladder. Osseous changes within the left hemipelvis which may be chronic in nature. IMPRESSION:    Extensive subcutaneous emphysema as described above involving both buttocks extending to the perineum.  The possibility of Ashley gangrene/necrotizing fasciitis cannot be excluded. Possible phlegmon or developing soft tissue abscess overlying the left lateral abdominal wall as noted above.  No organized abscess is seen however. Osseous changes within the left hemipelvis which may be chronic in nature. Results the study were called to Dr. Stacey Govea on 11/29/2019  All CT scans at this facility use dose modulation, iterative reconstruction, and/or weight based dosing when appropriate to reduce radiation dose to as low as reasonably achievable. Medical Decision [facility]cgy[facility]:   11/29/2019  8:17 PM - Jesus, Ana Incoming Lab Results From [facility]     Specimen Information: Buttock;  Tissue        Component Collected Lab

## 2019-12-18 NOTE — PROGRESS NOTES
Critical Care Team - Daily Progress Note      Date and time: 12/18/2019 7:01 AM  Patient's name:  [address] Record Number: [id number]  Patient's account/billing number: [de-identified]  Patient's YOB: 1985  Age: 37 y.o. Date of Admission: 11/29/2019  3:39 PM  Length of stay during current admission: 19    Primary Care Physician: No primary care provider on file. ICU Attending Physician: Dr. Susan Barnard    Code Status: Full Code    Reason for ICU admission: Necrotizing Fascitis vertebral lucency concerning for malignancy    SUBJECTIVE:     OVERNIGHT EVENTS:     Patient seen and examined. No Any acute event overnight. Hemoglobin level dropped to 6.6 ordered blood transfusion during the dialysis today. Patient had dialysis yesterday almost 2 L removed    Patient is intubated and sedated, patient wakes up and follows commands like eye blinking and squeezing of hand. Dialysis catheter working now, nurse flushed and removed the clot from it. It is mostly dry and smooth push of normal saline. Patient had repeat I&D yesterday at bedside  with general surgery team but will need an established dialysis cather in order to proceed with surgical intervention. Tube feeds  On hold will double check with surgery, TPN on. Propofol and fentanyl are on. K+ resolved now at 4.3 . There are also concerns for urine leak which may be addresssed by urology possible today.       POD#19 wide complex debridement of nec fasc involving gluteal/perianal/distal rectum  POD#15 open diverting end colostomy   POD#10  Incision and debridement of necrotizing fasciitis buttock wound, scrotum, bilateral groin region and closure of midline abdominal wound  POD #8 Sharp excisional debridement of gluteal, marc-rectal and perineal wound to level of muscle, Sharp excisional debridement of scrotum (performed by Urology), Washout of gluteal/perineal/scrotal/inguinal wound, Partial primary closure of pubic wound, and Dakins Wet to dry dressing application. He is on Fentanyl and Roxicodone, meropenum. Possible dialysis today   Hemoglobin 6.6     Of note: surgery talked with mother for possible tracheostomy, will try to wean off patient far few days and if not probability of patient getting trach    TSH 0.45   Fever:-  Temp (24hrs), Av.2 °F (36.8 °C), Min:97.9 °F (36.6 °C), Max:98.8 °F (81.2 °C)    Systolic (27NBF), KLV:913 , Min:95 , RENETTA:262     Diastolic (36GMZ), YVL:55, Min:53, Max:85      Urine output in the last 24 hours:  In: -   Out: 79 [Urine:70]    Patient Vitals for the past 96 hrs (Last 3 readings):   Weight   19 0547 (!) 390 lb (176.9 kg)   19 1418 (!) 392 lb 10.2 oz (178.1 kg)   19 1202 (!) 397 lb 0.8 oz (180.1 kg)         AWAKE & FOLLOWING COMMANDS:  [x] No   [] Yes    CURRENT VENTILATION STATUS:     [x] Ventilator  [] BIPAP  [] Nasal Cannula [] Room Air        SECRETIONS Amount:  [] Small [] Moderate  [] Large  [x] None  Color:     [] White [] Colored  [] Bloody    SEDATION:  RAAS Score:  [x] Propofol gtt  [] Versed gtt  [] Ativan gtt   [] No Sedation     PARALYZED:  [x] No    [] Yes    DIARRHEA:                [x] No                [] Yes  (C. Difficile status: [] positive                                                                                                                       [] negative                                                                                                                     [] pending)    VASOPRESSORS:  [x] No    [] Yes    If yes -   [] Levophed       [] Dopamine     [] Vasopressin       [] Dobutamine  [] Phenylephrine         [] Epinephrine    CENTRAL LINES:     [] No   [x] Yes          If yes -     [x] Right IJ     [] Left IJ [] Right Femoral [] Left Femoral                   [] Right Subclavian [] Left Subclavian       DOSS'S CATHETER:   [] No   [x] Yes  (Date of Insertion:   )     URINE OUTPUT:            [] Good   [] Low              [x] folic acid IVPB   Intravenous Daily    meropenem  1 g Intravenous Q12H    sodium chloride flush  10 mL Intravenous 2 times per day    heparin (porcine)  5,000 Units Subcutaneous 3 times per day     Continuous Infusions:   PN-Adult 2-in-1 Central Line (Standard) 80 mL/hr at 12/17/19 1841    dextrose      propofol 30 mcg/kg/min (12/18/19 0535)    fentaNYL 200 mcg/hr (12/18/19 0258)    dextrose      sodium chloride 10 mL/hr at 12/16/19 0226    norepinephrine (LEVOPHED) infusion (double concentration) 2 mcg/min (12/11/19 0514)     PRN Meds:   glucose, 15 g, PRN  dextrose, 12.5 g, PRN  glucagon (rDNA), 1 mg, PRN  dextrose, 100 mL/hr, PRN  midodrine, 5 mg, PRN  albumin human, 25 g, PRN  heparin (porcine), 1,400 Units, PRN  heparin (porcine), 1,300 Units, PRN  LORazepam, 1 mg, Q6H PRN  sodium chloride, 250 mL, PRN  sodium chloride, 150 mL, PRN  albumin human, 25 g, PRN  sodium chloride flush, 10 mL, PRN  potassium chloride, 40 mEq, PRN    Or  potassium alternative oral replacement, 40 mEq, PRN    Or  potassium chloride, 10 mEq, PRN  glucose, 15 g, PRN  dextrose, 12.5 g, PRN  glucagon (rDNA), 1 mg, PRN  dextrose, 100 mL/hr, PRN  acetaminophen, 650 mg, Q4H PRN  REFRESH LACRI-LUBE, , PRN  magnesium hydroxide, 30 mL, Daily PRN  ondansetron, 4 mg, Q6H PRN          VENT SETTINGS (Comprehensive) (if applicable):  Vent Information  $Ventilation: $Subsequent Day  Ventilator Started: Yes  Skin Assessment: Clean, dry, & intact  Equipment ID: [id number]  Equipment Changed: Expiratory Filter  Vent Type: Servo i  Vent Mode: PRVC  Vt Ordered: 400 mL  Rate Set: 15 bmp  Pressure Support: 8 cmH20  FiO2 : 100 %  Sensitivity: 2  PEEP/CPAP: 8  I Time/ I Time %: 0.8 s  Cuff Pressure (cm H2O): 25 cm H2O  Humidification Source: Heated wire  Humidification Temp: 37  Humidification Temp Measured: 36.9  Circuit Condensation: Drained  Nitric Oxide/Epoprostenol In Use?: No  Additional Respiratory  Assessments  Pulse: 94  Resp: (!) 0  SpO2: 100 %  End Tidal CO2: 51 (%)  pCO2 (TCOM, mmHg): 53 mmHg  Position: Semi-Pagan's  Humidification Source: Heated wire  Humidification Temp: 37  Circuit Condensation: Drained  Oral Care Completed?: Yes  Oral Care: Teeth brushed, Mouthwash  Subglottic Suction Done?: Yes  Cuff Pressure (cm H2O): 25 cm H2O  Skin barrier applied: No    ABGs:  Arterial Blood Gas result:  pO2 107.4; pCO2 32.9; pH 7.29;  HCO3 16, %O2 Sat 98. Laboratory findings:    Complete Blood Count:   Recent Labs     12/16/19  0610 12/17/19  0709   WBC 10.5 8.7   HGB 7.9* 6.6*   HCT 25.2* 23.6*   PLT See Reflexed IPF Result 201        Last 3 Blood Glucose:   Recent Labs     12/16/19  0816 12/17/19  0431   GLUCOSE 234* 214*        PT/INR:    Lab Results   Component Value Date    PROTIME 11.9 11/29/2019    INR 1.1 11/29/2019     PTT:  No results found for: APTT, PTT    Comprehensive Metabolic Profile:   Recent Labs     12/16/19  0816 12/16/19  2215 12/17/19  0431   *  --  133*   K 6.0* 3.9 4.0     --  105   CO2 19*  --  18*   BUN 59*  --  61*   CREATININE 1.88*  --  1.88*   GLUCOSE 234*  --  214*   CALCIUM 9.3  --  8.5*      Magnesium:   Lab Results   Component Value Date    MG 1.8 12/16/2019     Phosphorus:   Lab Results   Component Value Date    PHOS 3.9 12/16/2019     Ionized Calcium:   Lab Results   Component Value Date    CAION 1.05 12/03/2019            Troponin: No results for input(s): TROPONINI in the last 72 hours. Radiology/Imaging:     Chest Xray (12/18/2019):    ASSESSMENT:     Principal Problem:    Necrotizing fasciitis ([facility] [address] [address]  Active Problems:    Hypertension    Diabetes ([facility] [address]    Diabetic foot ulcer ([facility] [address]    Septic shock ([facility] [address]    Bandemia  Resolved Problems:    * No resolved hospital problems. *    PLAN:     WEAN PER PROTOCOL:  [x] No   [] Yes  [] N/A    DISCONTINUE ANY LABS:   [x] No   [] Yes    TRANSFER OUT OF ICU:   [x] No   [] Yes    ADDITIONAL PLAN:    1. Pressure ulcer cant be staged present on admission / Necrotizing Faciitis involving buttocks and scrotum -   S/p bed side I&D: I&D buttock, scrotal and perianal abscess  s/p POD#19 s/p wide complex debridement of gluteal/perianal region. region extensive necrotic tissue that involved the anus and distal rectum. Had  debridement, washout of gluteal and perineal, diverting loop colostomy   Bedside debridement done on 12/4, dressing changes with him on site will continue to do wet-to-dry dressing changes as per  surgery. - continue meropenem (started 12/1) per ID   - (12/10) Repeat debridement with general surgery and urology   2. Septic shock -resolved, off pressors  - Blood cultures drawn 12/2/19:  No growth     3. DEBBY on CKD -     Urology recs: Meroponen empirically, maintain lema for continued drainage.   -Urine culture : Grew Proteus mirabilis, ID is aware of it  -Nephrology recs:  Almost daily dialysis  Replacing electrolyte  . - K: 6.0: Started on moderate Hyperkalemia treatment protocol - resolved    Renal ultrasound: Right renal cyst, otherwise grossly negative renal   Ultrasound, non diagnostic bladder assessment. 4. Diabetes -high-dose correction POC glucose checks every 4 to every 6, check back to q 4 if the glucose levels constantly remains high over 200   - Hypoglycemia treatment protocol     5. Diabetic left foot ulcer -dietary on board. Wound care. No surgical intervention for now    6. Anemia: Hgb (6.6.0),   received  11 uPRBC since admission     1. Feeding NPO for OR today   2. Fluids - KVO  3. Family - none at bedside   4. Analgesic -fentanyl drip   5. Sedation: Propofol  6. Thrombo-prophylaxis [] Enoxaparin  [x] Unfract. Heparin Subcut (was held this AM for OR)  [] EPC Cuffs  7. Mobility bed rest and PT/ OT   8. Head of bed up: yes   9. Ulcer prophylaxis - pepcid   10. Glycemic control: High dose correction  11. Spontaneous breathing trial: YEs  12. Bowel regimen/urine output: milk of mag PRN, 10 ml UOP in last 24 hours  13.  Indwelling catheter/lines CVC RIBRAYDON, MIKE, Hebert  14.  De-escalation: Labs for POC glucose checks and add thiamin and folic acid PO       Arlette Molina MD  Critical care resident   [facility]            12/18/2019, 7:01 AM

## 2019-12-19 ENCOUNTER — ANESTHESIA EVENT (OUTPATIENT)
Dept: OPERATING ROOM | Age: 34
DRG: 463 | End: 2019-12-19
Payer: MEDICARE

## 2019-12-19 LAB
ABSOLUTE EOS #: 1.26 K/UL (ref 0–0.4)
ABSOLUTE IMMATURE GRANULOCYTE: 0.42 K/UL (ref 0–0.3)
ABSOLUTE LYMPH #: 0.42 K/UL (ref 1–4.8)
ABSOLUTE MONO #: 0.98 K/UL (ref 0.1–0.8)
ACTION: NORMAL
ALLEN TEST: ABNORMAL
ANION GAP SERPL CALCULATED.3IONS-SCNC: 10 MMOL/L (ref 9–17)
BASOPHILS # BLD: 0 % (ref 0–2)
BASOPHILS ABSOLUTE: 0 K/UL (ref 0–0.2)
BUN BLDV-MCNC: 54 MG/DL (ref 6–20)
BUN/CREAT BLD: ABNORMAL (ref 9–20)
CALCIUM SERPL-MCNC: 7.9 MG/DL (ref 8.6–10.4)
CARBOXYHEMOGLOBIN: 0 % (ref 0–5)
CHLORIDE BLD-SCNC: 102 MMOL/L (ref 98–107)
CO2: 18 MMOL/L (ref 20–31)
CREAT SERPL-MCNC: 1.86 MG/DL (ref 0.7–1.2)
DATE AND TIME: NORMAL
DIFFERENTIAL TYPE: ABNORMAL
EOSINOPHILS RELATIVE PERCENT: 9 % (ref 1–4)
FIO2: 40
FIO2: 50
FIO2: ABNORMAL
GFR AFRICAN AMERICAN: 50 ML/MIN
GFR NON-AFRICAN AMERICAN: 41 ML/MIN
GFR SERPL CREATININE-BSD FRML MDRD: ABNORMAL ML/MIN/{1.73_M2}
GFR SERPL CREATININE-BSD FRML MDRD: ABNORMAL ML/MIN/{1.73_M2}
GLUCOSE BLD-MCNC: 184 MG/DL (ref 74–100)
GLUCOSE BLD-MCNC: 187 MG/DL (ref 75–110)
GLUCOSE BLD-MCNC: 189 MG/DL (ref 75–110)
GLUCOSE BLD-MCNC: 190 MG/DL (ref 70–99)
GLUCOSE BLD-MCNC: 204 MG/DL (ref 74–100)
GLUCOSE BLD-MCNC: 204 MG/DL (ref 75–110)
GLUCOSE BLD-MCNC: 214 MG/DL (ref 75–110)
HCO3 VENOUS: 17.5 MMOL/L (ref 24–30)
HCO3 VENOUS: 21.9 MMOL/L (ref 22–29)
HCO3 VENOUS: 22.6 MMOL/L (ref 22–29)
HCT VFR BLD CALC: 22.5 % (ref 40.7–50.3)
HCT VFR BLD CALC: 27.3 % (ref 40.7–50.3)
HEMOGLOBIN: 6.9 G/DL (ref 13–17)
HEMOGLOBIN: 8.3 G/DL (ref 13–17)
IMMATURE GRANULOCYTES: 3 %
INTERVENTION: NORMAL
LYMPHOCYTES # BLD: 3 % (ref 24–44)
MCH RBC QN AUTO: 29.2 PG (ref 25.2–33.5)
MCHC RBC AUTO-ENTMCNC: 30.7 G/DL (ref 28.4–34.8)
MCV RBC AUTO: 95.3 FL (ref 82.6–102.9)
METHEMOGLOBIN: ABNORMAL % (ref 0–1.5)
MODE: ABNORMAL
MONOCYTES # BLD: 7 % (ref 1–7)
MORPHOLOGY: ABNORMAL
MORPHOLOGY: ABNORMAL
NEGATIVE BASE EXCESS, VEN: 4 (ref 0–2)
NEGATIVE BASE EXCESS, VEN: 6 (ref 0–2)
NEGATIVE BASE EXCESS, VEN: 7.3 MMOL/L (ref 0–2)
NOTIFICATION TIME: ABNORMAL
NOTIFICATION: ABNORMAL
NOTIFY: NORMAL
NRBC AUTOMATED: 1.8 PER 100 WBC
NUCLEATED RED BLOOD CELLS: 1 PER 100 WBC
O2 DEVICE/FLOW/%: ABNORMAL
O2 SAT, VEN: 33 % (ref 60–85)
O2 SAT, VEN: 40 % (ref 60–85)
O2 SAT, VEN: 95.8 % (ref 60–85)
OXYHEMOGLOBIN: ABNORMAL % (ref 95–98)
PATIENT TEMP: 37
PATIENT TEMP: ABNORMAL
PATIENT TEMP: ABNORMAL
PCO2, VEN, TEMP ADJ: ABNORMAL MMHG (ref 39–55)
PCO2, VEN: 34.7 (ref 39–55)
PCO2, VEN: 45.2 MM HG (ref 41–51)
PCO2, VEN: 64.5 MM HG (ref 41–51)
PDW BLD-RTO: 22 % (ref 11.8–14.4)
PEEP/CPAP: ABNORMAL
PH VENOUS: 7.15 (ref 7.32–7.43)
PH VENOUS: 7.29 (ref 7.32–7.43)
PH VENOUS: 7.32 (ref 7.32–7.42)
PH, VEN, TEMP ADJ: ABNORMAL (ref 7.32–7.42)
PLATELET # BLD: 220 K/UL (ref 138–453)
PLATELET ESTIMATE: ABNORMAL
PMV BLD AUTO: 9.3 FL (ref 8.1–13.5)
PO2, VEN, TEMP ADJ: ABNORMAL MMHG (ref 30–50)
PO2, VEN: 22.6 MM HG (ref 30–50)
PO2, VEN: 30 MM HG (ref 30–50)
PO2, VEN: 81.9 (ref 30–50)
POC LACTIC ACID: 0.98 MMOL/L (ref 0.56–1.39)
POC PCO2 TEMP: ABNORMAL MM HG
POC PCO2 TEMP: ABNORMAL MM HG
POC PH TEMP: ABNORMAL
POC PH TEMP: ABNORMAL
POC PO2 TEMP: ABNORMAL MM HG
POC PO2 TEMP: ABNORMAL MM HG
POSITIVE BASE EXCESS, VEN: ABNORMAL (ref 0–3)
POSITIVE BASE EXCESS, VEN: ABNORMAL (ref 0–3)
POSITIVE BASE EXCESS, VEN: ABNORMAL MMOL/L (ref 0–2)
POTASSIUM SERPL-SCNC: 3.9 MMOL/L (ref 3.7–5.3)
PSV: ABNORMAL
PT. POSITION: ABNORMAL
RBC # BLD: 2.36 M/UL (ref 4.21–5.77)
RBC # BLD: ABNORMAL 10*6/UL
READ BACK: YES
RESPIRATORY RATE: ABNORMAL
SAMPLE SITE: ABNORMAL
SEG NEUTROPHILS: 78 % (ref 36–66)
SEGMENTED NEUTROPHILS ABSOLUTE COUNT: 10.92 K/UL (ref 1.8–7.7)
SET RATE: ABNORMAL
SODIUM BLD-SCNC: 130 MMOL/L (ref 135–144)
TEXT FOR RESPIRATORY: ABNORMAL
TOTAL CO2, VENOUS: 23 MMOL/L (ref 23–30)
TOTAL CO2, VENOUS: 25 MMOL/L (ref 23–30)
TOTAL HB: ABNORMAL G/DL (ref 12–16)
TOTAL RATE: ABNORMAL
TRIGL SERPL-MCNC: 208 MG/DL
VT: ABNORMAL
WBC # BLD: 14 K/UL (ref 3.5–11.3)
WBC # BLD: ABNORMAL 10*3/UL

## 2019-12-19 PROCEDURE — 6370000000 HC RX 637 (ALT 250 FOR IP): Performed by: STUDENT IN AN ORGANIZED HEALTH CARE EDUCATION/TRAINING PROGRAM

## 2019-12-19 PROCEDURE — 94770 HC ETCO2 MONITOR DAILY: CPT

## 2019-12-19 PROCEDURE — 2700000000 HC OXYGEN THERAPY PER DAY

## 2019-12-19 PROCEDURE — 85014 HEMATOCRIT: CPT

## 2019-12-19 PROCEDURE — 84478 ASSAY OF TRIGLYCERIDES: CPT

## 2019-12-19 PROCEDURE — 99291 CRITICAL CARE FIRST HOUR: CPT | Performed by: INTERNAL MEDICINE

## 2019-12-19 PROCEDURE — 2580000003 HC RX 258: Performed by: STUDENT IN AN ORGANIZED HEALTH CARE EDUCATION/TRAINING PROGRAM

## 2019-12-19 PROCEDURE — 2500000003 HC RX 250 WO HCPCS: Performed by: STUDENT IN AN ORGANIZED HEALTH CARE EDUCATION/TRAINING PROGRAM

## 2019-12-19 PROCEDURE — 99233 SBSQ HOSP IP/OBS HIGH 50: CPT | Performed by: INTERNAL MEDICINE

## 2019-12-19 PROCEDURE — 2000000000 HC ICU R&B

## 2019-12-19 PROCEDURE — 94003 VENT MGMT INPAT SUBQ DAY: CPT

## 2019-12-19 PROCEDURE — 82947 ASSAY GLUCOSE BLOOD QUANT: CPT

## 2019-12-19 PROCEDURE — 82803 BLOOD GASES ANY COMBINATION: CPT

## 2019-12-19 PROCEDURE — 6360000002 HC RX W HCPCS: Performed by: STUDENT IN AN ORGANIZED HEALTH CARE EDUCATION/TRAINING PROGRAM

## 2019-12-19 PROCEDURE — P9016 RBC LEUKOCYTES REDUCED: HCPCS

## 2019-12-19 PROCEDURE — 36415 COLL VENOUS BLD VENIPUNCTURE: CPT

## 2019-12-19 PROCEDURE — 2500000003 HC RX 250 WO HCPCS: Performed by: INTERNAL MEDICINE

## 2019-12-19 PROCEDURE — 36600 WITHDRAWAL OF ARTERIAL BLOOD: CPT

## 2019-12-19 PROCEDURE — 82805 BLOOD GASES W/O2 SATURATION: CPT

## 2019-12-19 PROCEDURE — 80048 BASIC METABOLIC PNL TOTAL CA: CPT

## 2019-12-19 PROCEDURE — 2580000003 HC RX 258

## 2019-12-19 PROCEDURE — 94761 N-INVAS EAR/PLS OXIMETRY MLT: CPT

## 2019-12-19 PROCEDURE — 86900 BLOOD TYPING SEROLOGIC ABO: CPT

## 2019-12-19 PROCEDURE — 83605 ASSAY OF LACTIC ACID: CPT

## 2019-12-19 PROCEDURE — 85018 HEMOGLOBIN: CPT

## 2019-12-19 PROCEDURE — 85025 COMPLETE CBC W/AUTO DIFF WBC: CPT

## 2019-12-19 RX ORDER — MAGNESIUM HYDROXIDE 1200 MG/15ML
LIQUID ORAL
Status: COMPLETED
Start: 2019-12-19 | End: 2019-12-19

## 2019-12-19 RX ORDER — LABETALOL HYDROCHLORIDE 5 MG/ML
20 INJECTION, SOLUTION INTRAVENOUS EVERY 6 HOURS PRN
Status: DISCONTINUED | OUTPATIENT
Start: 2019-12-19 | End: 2020-01-16 | Stop reason: HOSPADM

## 2019-12-19 RX ORDER — 0.9 % SODIUM CHLORIDE 0.9 %
250 INTRAVENOUS SOLUTION INTRAVENOUS ONCE
Status: DISCONTINUED | OUTPATIENT
Start: 2019-12-19 | End: 2019-12-20

## 2019-12-19 RX ORDER — MAGNESIUM HYDROXIDE 1200 MG/15ML
1000 LIQUID ORAL CONTINUOUS
Status: DISCONTINUED | OUTPATIENT
Start: 2019-12-19 | End: 2020-01-16 | Stop reason: HOSPADM

## 2019-12-19 RX ADMIN — HEPARIN SODIUM 5000 UNITS: 5000 INJECTION INTRAVENOUS; SUBCUTANEOUS at 05:23

## 2019-12-19 RX ADMIN — INSULIN LISPRO 6 UNITS: 100 INJECTION, SOLUTION INTRAVENOUS; SUBCUTANEOUS at 14:28

## 2019-12-19 RX ADMIN — INSULIN LISPRO 3 UNITS: 100 INJECTION, SOLUTION INTRAVENOUS; SUBCUTANEOUS at 10:29

## 2019-12-19 RX ADMIN — INSULIN LISPRO 6 UNITS: 100 INJECTION, SOLUTION INTRAVENOUS; SUBCUTANEOUS at 20:32

## 2019-12-19 RX ADMIN — SODIUM CHLORIDE 1000 ML: 900 IRRIGANT IRRIGATION at 18:00

## 2019-12-19 RX ADMIN — HEPARIN SODIUM 5000 UNITS: 5000 INJECTION INTRAVENOUS; SUBCUTANEOUS at 23:03

## 2019-12-19 RX ADMIN — SODIUM CHLORIDE: 9 INJECTION, SOLUTION INTRAVENOUS at 00:51

## 2019-12-19 RX ADMIN — Medication 150 MCG/HR: at 12:13

## 2019-12-19 RX ADMIN — Medication 20 MG: at 14:35

## 2019-12-19 RX ADMIN — INSULIN LISPRO 6 UNITS: 100 INJECTION, SOLUTION INTRAVENOUS; SUBCUTANEOUS at 01:58

## 2019-12-19 RX ADMIN — Medication 200 MCG/HR: at 06:57

## 2019-12-19 RX ADMIN — PROPOFOL 25 MCG/KG/MIN: 10 INJECTION, EMULSION INTRAVENOUS at 08:01

## 2019-12-19 RX ADMIN — OXYCODONE HYDROCHLORIDE 10 MG: 5 TABLET ORAL at 12:53

## 2019-12-19 RX ADMIN — OXYCODONE HYDROCHLORIDE 10 MG: 5 TABLET ORAL at 20:30

## 2019-12-19 RX ADMIN — Medication 100 MG: at 10:29

## 2019-12-19 RX ADMIN — OXYCODONE HYDROCHLORIDE 10 MG: 5 TABLET ORAL at 10:28

## 2019-12-19 RX ADMIN — FAMOTIDINE 20 MG: 20 TABLET, FILM COATED ORAL at 12:53

## 2019-12-19 RX ADMIN — OXYCODONE HYDROCHLORIDE 10 MG: 5 TABLET ORAL at 18:14

## 2019-12-19 RX ADMIN — PROPOFOL 25 MCG/KG/MIN: 10 INJECTION, EMULSION INTRAVENOUS at 04:22

## 2019-12-19 RX ADMIN — FOLIC ACID 1 MG: 1 TABLET ORAL at 10:28

## 2019-12-19 RX ADMIN — Medication 200 MCG/HR: at 01:59

## 2019-12-19 RX ADMIN — Medication 20 MG: at 20:30

## 2019-12-19 RX ADMIN — Medication: at 12:30

## 2019-12-19 RX ADMIN — POTASSIUM CHLORIDE: 2 INJECTION, SOLUTION, CONCENTRATE INTRAVENOUS at 18:43

## 2019-12-19 RX ADMIN — Medication 125 MCG/HR: at 18:14

## 2019-12-19 RX ADMIN — SODIUM CHLORIDE: 900 IRRIGANT IRRIGATION at 03:41

## 2019-12-19 RX ADMIN — SODIUM CHLORIDE, PRESERVATIVE FREE 10 ML: 5 INJECTION INTRAVENOUS at 20:41

## 2019-12-19 RX ADMIN — HEPARIN SODIUM 5000 UNITS: 5000 INJECTION INTRAVENOUS; SUBCUTANEOUS at 14:28

## 2019-12-19 RX ADMIN — MIDODRINE HYDROCHLORIDE 10 MG: 5 TABLET ORAL at 10:29

## 2019-12-19 ASSESSMENT — PULMONARY FUNCTION TESTS
PIF_VALUE: 23
PIF_VALUE: 20
PIF_VALUE: 19
PIF_VALUE: 23
PIF_VALUE: 29
PIF_VALUE: 25
PIF_VALUE: 11
PIF_VALUE: 22
PIF_VALUE: 17
PIF_VALUE: 17
PIF_VALUE: 11
PIF_VALUE: 23
PIF_VALUE: 24
PIF_VALUE: 23

## 2019-12-19 NOTE — PROGRESS NOTES
Large  [] None  Color:     [x] White [] Colored  [] Bloody    SEDATION:  RAAS Score:  [x] Propofol gtt  [] Versed gtt  [] Ativan gtt   [] No Sedation    PARALYZED:  [x] No    [] Yes    DIARRHEA:                [x] No                [] Yes  (C. Difficile status: [] positive                                                                                                                       [] negative                                                                                                                     [] pending)    VASOPRESSORS:  [x] No    [] Yes    If yes -   [] Levophed       [] Dopamine     [] Vasopressin       [] Dobutamine  [] Phenylephrine         [] Epinephrine    CENTRAL LINES:     [] No   [x] Yes   (Date of Insertion:   )           If yes -     [x] Right IJ     [] Left IJ [] Right Femoral [] Left Femoral                   [] Right Subclavian [] Left Subclavian       DOSS'S CATHETER:   [] No   [x] Yes  (Date of Insertion:   )     URINE OUTPUT:            [] Good   [] Low              [x] Anuric      OBJECTIVE:     VITAL SIGNS:  /62   Pulse 93   Temp 98.7 °F (37.1 °C) (Oral)   Resp 12   Ht 5' 9\" (1.753 m)   Wt (!) 398 lb 5.9 oz (180.7 kg)   SpO2 98%   BMI 58.83 kg/m²   Tmax over 24 hours:  Temp (24hrs), Av.5 °F (36.9 °C), Min:98.2 °F (36.8 °C), Max:98.7 °F (37.1 °C)      Patient Vitals for the past 6 hrs:   BP Temp Temp src Pulse Resp SpO2   19 0900 103/62 98.7 °F (37.1 °C) Oral 93 12 98 %   19 0839 -- -- -- 85 11 97 %   19 0836 -- -- -- 79 12 98 %   19 0835 -- -- -- 77 10 98 %   19 0830 -- -- -- 75 22 100 %   19 0802 -- -- -- 95 21 99 %   19 0800 92/77 -- -- 88 22 100 %   19 0730 -- -- -- 88 (!) 0 100 %   19 0700 (!) 140/92 -- -- 89 8 100 %   19 0600 (!) 134/93 -- -- 85 19 100 %   19 0535 127/78 98.5 °F (36.9 °C) Oral 77 22 100 %   19 0530 125/83 98.4 °F (36.9 °C) Oral 82 24 100 %   19 0525 120/82 98.4 °F (36.9 °C) Oral 83 23 100 %   12/19/19 0520 (!) 103/59 98.4 °F (36.9 °C) Oral 83 24 100 %   12/19/19 0500 (!) 103/59 -- -- 74 24 100 %         Intake/Output Summary (Last 24 hours) at 12/19/2019 1008  Last data filed at 12/19/2019 0915  Gross per 24 hour   Intake 9443.33 ml   Output 4578 ml   Net 4865.33 ml     Wt Readings from Last 2 Encounters:   12/18/19 (!) 398 lb 5.9 oz (180.7 kg)     Body mass index is 58.83 kg/m².         PHYSICAL EXAMINATION:    General appearance - sedated and intubated  Mental status - sedated and intubated  Eyes - pupils equal and reactive, extraocular eye movements intact  Ears - bilateral TM's and external ear canals normal  Nose - normal and patent, no erythema, discharge or polyps  Mouth - mucous membranes moist, pharynx normal without lesions  Neck - supple, no significant adenopathy  Chest - clear to auscultation, no wheezes, rales or rhonchi, symmetric air entry, ventilated breath sounds  Heart - normal rate, regular rhythm, normal S1, S2, no murmurs, rubs, clicks or gallops  Abdomen - soft, nontender, nondistended, no masses or organomegaly  Extremities - peripheral pulses normal, no pedal edema, no clubbing or cyanosis    MEDICATIONS:    Scheduled Meds:   sodium chloride  250 mL Intravenous Once    alteplase  1 mg Intracatheter Once    sodium chloride  250 mL Intravenous Once    insulin lispro  0-18 Units Subcutaneous Q6H    oxyCODONE  10 mg Oral Q4H    thiamine  100 mg Oral Daily    folic acid  1 mg Oral Daily    povidone-iodine   Topical Daily    midodrine  10 mg Oral TID WC    famotidine  20 mg Per NG tube Daily    sodium chloride flush  10 mL Intravenous 2 times per day    heparin (porcine)  5,000 Units Subcutaneous 3 times per day     Continuous Infusions:   PN-Adult 2-in-1 Central Line (Standard) 80 mL/hr at 12/18/19 1839    dextrose      propofol 15 mcg/kg/min (12/19/19 0915)    fentaNYL 200 mcg/hr (12/19/19 0657)    sodium chloride 10 mL/hr at Continuous; 25; 45; 24  PN-Adult 2-in-1 Central Line (Standard))    HOME MEDICATIONS RECONCILED: [] No  [x] Yes    INSULIN DRIP:   [x] No   [] Yes    CONSULTATION NEEDED:  [] No   [x] Yes    FAMILY UPDATED:    [] No   [x] Yes    TRANSFER OUT OF ICU:   [x] No   [] Yes    ADDITIONAL PLAN:    1. Pressure ulcer cant be staged present on admission / Necrotizing Faciitis involving buttocks and scrotum -   S/p bed side I&D: I&D buttock, scrotal and perianal abscess  s/p POD#20 s/p wide complex debridement of gluteal/perianal region. region extensive necrotic tissue that involved the anus and distal rectum. Had  debridement, washout of gluteal and perineal, diverting loop colostomy   Bedside debridement done on 12/4, dressing changes with him on site will continue to do wet-to-dry dressing changes as per  surgery. - continue meropenem (started 12/1) per ID   - (12/10) Repeat debridement with general surgery and urology   meropenem discontinued 12/18  2. Septic shock -resolved, off pressors  - Blood cultures drawn 12/2/19:  No growth      3. DEBBY on CKD -      Urology recs: Meroponen discontinued on 12/18   -Urine culture : Grew Proteus mirabilis, ID is aware of it  -Nephrology recs:  Almost daily dialysis  Replacing electrolyte  . - K: 6.0: Started on moderate Hyperkalemia treatment protocol - resolved     Renal ultrasound: Right renal cyst, otherwise grossly negative renal   Ultrasound, non diagnostic bladder assessment.       4. Diabetes -high-dose correction POC glucose checks every 4 to every 6, check back to q 4 if the glucose levels constantly remains high over 200   - Hypoglycemia treatment protocol      5. Diabetic left foot ulcer -dietary on board. Wound care. No surgical intervention for now     6. Anemia: Hgb (6.6.0),   received  11 uPRBC since admission     7. Respiratory failure. Due to sepsis. Improving. Patient intubated and sedated. Wean off propofol.   Continuing weaning off the vent and if doing well, will extubate  Decrease fentanyl to 150   gabriella 10 for pain q4. Sarah Dockery M.D.              Critical care resident,  Department of Internal Medicine/ Critical care  [id number] [facility]")             12/19/2019, 10:08 AM

## 2019-12-19 NOTE — PROGRESS NOTES
Occupational Therapy    Occupational Therapy Not Seen Note    DATE: 2019  Name: Charmaine Dickens  : 1985  MRN: [id number]    Patient not available for Occupational Therapy due to: Other: Possible extubation this am, chk later. Next Scheduled Treatment: Attempt on  as appropriate.     Electronically signed by Rachna Degroot OT on 2019 at 1:34 PM

## 2019-12-19 NOTE — PROGRESS NOTES
NAD  NEURO: moves uppers off sedation. Not following commands, making purposeful movements   LUNGS: vented   HEART: +S1/S2, Sinus tachy  ABDOMEN: soft, ND, obese, ostomy is patent, bowel succus in appliance, no flatus. Kerlix Morales replaced in midline incision  SKIN/SOFT TISSUE: dressing remains intact, with no significant change in drainage, plan to change at bedside today   EXTREMITY: dressing to L foot is c/d/i. No cyanosis     I/O last 3 completed shifts: In: 8824.3 [I.V.:3975; Blood:333.3]  Out: 4560 [Urine:50]    Drain/tube output: In: 9518.3 [I.V.:4181; Blood:333.3; NG/GT:75]  Out: 4728 [Urine:68]    LAB:  CBC:   Recent Labs     12/17/19  0709 12/18/19  0548 12/18/19  2210 12/19/19  0424   WBC 8.7 11.3  --  14.0*   HGB 6.6* 6.8* 7.1* 6.9*   HCT 23.6* 23.8* 24.4* 22.5*   .0 98.3  --  95.3    204  --  220     BMP:   Recent Labs     12/17/19  0431 12/18/19  0548 12/19/19  0424   * 134* 130*   K 4.0 4.3 3.9    105 102   CO2 18* 16* 18*   BUN 61* 65* 54*   CREATININE 1.88* 2.01* 1.86*   GLUCOSE 214* 208* 190*         RADIOLOGY:   no new images   Noy Guido DO  12/19/2019  11:38 AM       Trauma, Emergency and Critical Surgical Services  Attending Note      I have reviewed the above TECKATIE note(s) and confirmed the key elements of the medical history and physical exam. I have discussed the findings, established the care plan and recommendations with Resident, MONIQUE RN, bedside nurse. Seen and examined. Tentative plan debridement, possible trach tomorrow.     Rudy Garcia MD  12/19/2019  12:40 PM

## 2019-12-19 NOTE — PROGRESS NOTES
12/19/19 1036   Vent Information   Pressure Support 6 cmH20   FiO2  40 %   PEEP/CPAP 5   Decreased wean to 5 over 6.  Possible extubation this AM.

## 2019-12-19 NOTE — PROGRESS NOTES
Levophed, remains on vent. · Pt to OR 12-10 for further debridement  · Repeat I&D planned for 12-16-19 was cancelled because of high K levels  · Bedside I & D on 12/17/19. · Patient to undergo additional I&D on 12-20-19  Infection Control Recommendations   · [place] Precautions  · Contact Isolation MRSA    Antimicrobial Stewardship Recommendations     · Simplification of therapy  · Targeted therapy  · PK dosing    Coordination of Outpatient Care:   · Estimated Length of IV antimicrobials: TBD  · Patient will need Midline Catheter Insertion: No  · Patient will need PICC line Insertion:Has Central line  · Patient will need: Home IV , [place],  SNF,  LTAC: TBD  · Patient will need outpatient wound care:Yes    Chief complaint/reason for consultation:   · Ashley's vs necrotizing fasciitis    History of Present Illness:   Queenie Lackey is a 37y.o.-year-old  male who was initially admitted on 11/29/2019. Patient seen at the request of . INITIAL HISTORY:    Patient presented through ER in [place] with complaints of weakness of a few days duration. Examination showed skin necrosis in the gluteal and perineal region. Patient transferred to [place] [id number]. CT scan showed extensive subcutaneous emphysema. Patient underwent I&D and extensive complex debridement of affected tissues on 11-29-19. Gram stain of tissues showed Strep. Spp and Gram negative bacilli. 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 He has underlying DM 2, prior diabetic foot infection, DEBBY. Patient more stable post surgery but with hypotension requiring a pressor. Zosyn D/C because of of poor LVEF, in order to reduce Na and fluid load  Meropenem started adjusted for Cr Cl 30 cc  Meropenem adjusted for HD  Per surgery after debridement on 12-8-19, infection has spread to deep planes with the urethra less viable.    One more debridement in OR scheduled for 12-10-19 and then decision will be made to change code status or not. Pt did not tolerate HD on 12-9. It was stopped early and pt required vasopressor support with Levophed, remains on vent. Pt to OR 12-10 for further debridement      CURRENT EVALUATION :12/19/2019     Patient evaluated and examined in the ICU. Afebrile  VS stable. Off pressors  No acute overnight events and currently no changes in patient's condition. He has received 18 days of meropenem. Antibiotics have been d/c as there is no change on clinical evolution. Gen surgery did a bedside I and D on 12/17/19. They plan to do another I&D in the OR on 12-20-19. Exam shows:  Massive sacral, gluteal wounds. Wounds showed pale, non healing tissue. Deeper extension of non viable tissue. Smaller anterior wounds. No purulence noted. The wounds do not look inflamed or infected at this stage. He seems to have small vessel disease that is preventing healing and allowing continued deeper tissue loss. Dialysis port now functioning. 2L of fluid off during dialysis yesterday. Hemoglobin is 6.8 this morning. No new culture data: Strep and Gram negative bacilli     Prognosis remains very guarded with lack of healing and massive wounds. Family has changed code status to [facility]. Labs, X rays reviewed: 12/19/2019    BUN: 59-->61-->79-->49-->42->39->50-->56-->59-->61-->65 >54  Cr: 2.56-->2.77-->4.49-->2.84-->2.69->2.39>2.50-->2.33-->1.88-->1.88->2.01>1.86    WBC: 4.20-->53.3-->26.7-->16.9->16.9->16.2-->12.4-->10.5-->8.7-->11.3>14.0  Hb:9.5-->8.2-->6.8-->8.2 -->7.0-->7.0->7.7>7-->8.0-->7.9-->6.6-->6.8>6.9  Plat: 642-->342-->273->346->398-->281-->191-->201-->204>220    Cultures:  Urine:  · NA  Blood:  · 11-30-19: no growth  · 12-2-19: no growth  Sputum :  · NA  Wound:  · 11-29-19: Strep ssp and Gram negative bacilli       MRSA probe: Pos    Discussed with mother, RN, CC. .    12-13-19: Abdominal, groin wounds:              12-11-19:          12-7-19: Left diarrhea, or abdominal pain. Aixa Meres No cramps. Genitourinary: No increased urinary frequency, or dysuria. No hematuria. No suprapubic or CVA pain  Musculoskeletal: No muscle aches or pains. No joint effusions, swelling or deformities. Lt diabetic foot   Hematologic: No bleeding or bruising. Neurologic: No headache, weakness, numbness, or tingling. Spina bifida  Integument: Gangrene of perineum and gluteal areas  Psychiatric: No depression. Endocrine: No polyuria, no polydipsia, no polyphagia. Physical Examination :     Patient Vitals for the past 8 hrs:   BP Temp Temp src Pulse Resp SpO2   12/19/19 0900 103/62 98.7 °F (37.1 °C) Oral 93 12 98 %   12/19/19 0839 -- -- -- 85 11 97 %   12/19/19 0836 -- -- -- 79 12 98 %   12/19/19 0835 -- -- -- 77 10 98 %   12/19/19 0830 -- -- -- 75 22 100 %   12/19/19 0802 -- -- -- 95 21 99 %   12/19/19 0800 92/77 -- -- 88 22 100 %   12/19/19 0730 -- -- -- 88 (!) 0 100 %   12/19/19 0700 (!) 140/92 -- -- 89 8 100 %   12/19/19 0600 (!) 134/93 -- -- 85 19 100 %   12/19/19 0535 127/78 98.5 °F (36.9 °C) Oral 77 22 100 %   12/19/19 0530 125/83 98.4 °F (36.9 °C) Oral 82 24 100 %   12/19/19 0525 120/82 98.4 °F (36.9 °C) Oral 83 23 100 %   12/19/19 0520 (!) 103/59 98.4 °F (36.9 °C) Oral 83 24 100 %   12/19/19 0500 (!) 103/59 -- -- 74 24 100 %   12/19/19 0400 (!) 105/57 98.4 °F (36.9 °C) Oral 82 24 100 %   12/19/19 0342 -- -- -- 87 24 100 %   12/19/19 0335 (!) 125/98 -- -- 97 18 100 %     General Appearance: Sedated, on vent  Head:  Normocephalic, no trauma  ENT: Oropharyngeally intubated, without erythema, exudate, or thrush. No tenderness of sinuses. Mouth/throat: mucosa pink and moist. No lesions. Dentition in good repair. Neck:Supple, without lymphadenopathy. Thyroid normal, No bruits. Pulmonary/Chest: Clear to auscultation, without wheezes, rales, or rhonchi. No dullness to percussion. Cardiovascular: Regular rate and rhythm without murmurs, rubs, or gallops. Abdomen: Soft, non tender. as reasonably   achievable.       COMPARISON:   November 29, 2019.       HISTORY:   ORDERING SYSTEM PROVIDED HISTORY: [place] Billy fasc   TECHNOLOGIST PROVIDED HISTORY:       nec fasc   Reason for Exam: nec fasc; Trauma   Acuity: Unknown   Type of Exam: Subsequent/Follow-up       FINDINGS:   Lower Chest: Partially visualized bilateral pleural effusions with bibasilar   heterogeneous opacities and bilateral lower lobe consolidations.  Central   venous catheter tip near the superior atrial caval junction.  Cardiomegaly. Trace pericardial fluid.       Liver: Normal.       Gallbladder and Bile Ducts: Normal.       Spleen: Splenomegaly.       Adrenal Glands: Normal.       Pancreas: Normal.       Genitourinary: Symmetric renal atrophy.  Redemonstration of multiple   hypodensities in the right kidney which likely represent benign cysts.  No   urinary stones or hydronephrosis.  Ambrosio catheter in the decompressed urinary   bladder.       Bowel: Normal caliber bowel.  Postsurgical changes of the bowel with left   lower quadrant ostomy.  No evidence of acute appendicitis.  No significant   diverticular disease.       Vasculature: Atherosclerosis.  No abdominal aortic aneurysm.       Bones and Soft Tissues: Diffuse soft tissue stranding and fluid. Postsurgical changes in the anterior abdominal wall with midline incision   demonstrating expected small amount of fluid and air.  Large soft tissue   defects in the right inguinal region, scrotum, right greater than left   gluteal creases with associated packing material.  Small amount of   surrounding soft tissue gas.  There is associated skin thickening in these   regions.  Bilateral lower abdominal wall skin thickening.       Retroperitoneum/Mesentery: No intraperitoneal free air.  Mild abdominopelvic   ascites.  Loculated fluid along the inferior aspect of the liver. Redemonstration of bilateral inguinal and pelvic sidewall lymphadenopathy.    Multiple mildly prominent administered:  No.  Automatic exposure control (AEC) was utilized. CT ABDOMEN FINDINGS:      The lung bases are unremarkable. There is a small pericardial effusion versus thickening. The liver, spleen, and pancreas demonstrate no acute abnormality given compromised evaluation without intravenous contrast.  There may be mild splenomegaly.  The adrenal glands appear within normal limits. There is no hydronephrosis or obstructing urinary tract calculi. Small amount of free fluid is seen adjacent to the liver inferiorly. .    The appendix is visualized in the right lower quadrant and appears within normal limits.       There is no evidence for bowel obstruction. Stranding is seen within the subcutaneous fat overlying both lateral abdominal walls left greater than right.  There is ill-defined fluid collection within the subcutaneous fat overlying the left lateral abdominal wall possibly representing phlegmon or developing abscess although no organized   abscess is seen. There is a large amount of soft tissue emphysema involving both the right and left buttock extending to the perineum tissue thickening is seen especially on the left involving the left buttock. CT PELVIS FINDINGS:        No distal ureteral calculi or bladder calculi. There is no free fluid in the pelvis. Catheter is seen within the urinary bladder. Osseous changes within the left hemipelvis which may be chronic in nature. IMPRESSION:    Extensive subcutaneous emphysema as described above involving both buttocks extending to the perineum.  The possibility of Ashley gangrene/necrotizing fasciitis cannot be excluded. Possible phlegmon or developing soft tissue abscess overlying the left lateral abdominal wall as noted above.  No organized abscess is seen however. Osseous changes within the left hemipelvis which may be chronic in nature.   Results the study were called to Dr. Levar Palomino [phone] on 11/29/2019  All CT scans at this facility use dose modulation, iterative reconstruction, and/or weight based dosing when appropriate to reduce radiation dose to as low as reasonably achievable. Medical Decision [facility]:   11/29/2019  8:17 PM - [place] Incoming Lab Results From [facility]     Specimen Information: Buttock; Tissue        Component Collected Lab   Specimen Description 11/29/2019  7:34  [address]   . BUTTOCK BILATERAL TS    Special Requests 11/29/2019  7:34  [address]   NOT REPORTED    Direct Exam 11/29/2019  7:34  [address]   NO NEUTROPHILS SEEN    Direct Exam Abnormal  11/29/2019  7:34  [facility] [address] [place]    Direct Exam Abnormal  11/29/2019  7:34 PM [address] NEGATIVE RODS    Culture 11/29/2019  7:34  [address]   PENDING      Medical Decision Making-Other:     Note:  · Labs, medications, radiologic studies were reviewed with personal review of films  · Large amounts of data were reviewed  · Discussed with nursing Staff, Discharge planner  · Infection Control and Prevention measures reviewed  · All prior entries were reviewed  · Administer medications as ordered  · Prognosis: Guarded  · Discharge planning reviewed  · Follow up as outpatient. Thank you for allowing us to participate in the care of this patient. Please call with questions.     Belinda Leone MD    12/19/2019 10:27 AM

## 2019-12-19 NOTE — CARE COORDINATION
Advanced liaison Ness updated with patient status - possible extubation today Complex wound care, new HD.

## 2019-12-19 NOTE — PROGRESS NOTES
· Monitoring: PN Tolerance, PN Intake, Wound Healing, Skin Integrity, I&O, Weight, Pertinent Labs, Monitor Bowel Function      Electronically signed by Ortiz Davis RD, RYLIE on 12/19/19 at 11:44 AM    Contact Number: [phone]

## 2019-12-19 NOTE — PLAN OF CARE
Problem: Pain:  Description  Pain management should include both nonpharmacologic and pharmacologic interventions.   Goal: Pain level will decrease  Description  Pain level will decrease  Outcome: Ongoing  Goal: Control of acute pain  Description  Control of acute pain  Outcome: Ongoing  Goal: Control of chronic pain  Description  Control of chronic pain  Outcome: Ongoing     Problem: Skin Integrity:  Goal: Will show no infection signs and symptoms  Description  Will show no infection signs and symptoms  Outcome: Ongoing  Goal: Absence of new skin breakdown  Description  Absence of new skin breakdown  Outcome: Ongoing     Problem: OXYGENATION/RESPIRATORY FUNCTION  Goal: Patient will maintain patent airway  12/18/2019 2108 by Najma Peacock RN  Outcome: Ongoing  12/18/2019 2051 by Jarrett Torres RCP  Outcome: Ongoing  Goal: Patient will achieve/maintain normal respiratory rate/effort  Description  Respiratory rate and effort will be within normal limits for the patient  12/18/2019 2108 by Najma Peacock RN  Outcome: Ongoing  12/18/2019 2051 by Jarrett Torres RCP  Outcome: Ongoing     Problem: MECHANICAL VENTILATION  Goal: Patient will maintain patent airway  12/18/2019 2108 by Najma Peacock RN  Outcome: Ongoing  12/18/2019 2051 by Jarrett Torres RCP  Outcome: Ongoing  Goal: Oral health is maintained or improved  12/18/2019 2108 by Najma Peacock RN  Outcome: Ongoing  12/18/2019 2051 by Jarrett Torres RCP  Outcome: Ongoing  Goal: ET tube will be managed safely  12/18/2019 2108 by Najma Peacock RN  Outcome: Ongoing  12/18/2019 2051 by Jarrett Torres RCP  Outcome: Ongoing  Goal: Ability to express needs and understand communication  12/18/2019 2108 by Najma Peacock RN  Outcome: Ongoing  12/18/2019 2051 by Jarrett Torres RCP  Outcome: Ongoing  Goal: Mobility/activity is maintained at optimum level for patient  12/18/2019 2108 by Najma Peacock RN  Outcome: Ongoing  12/18/2019 2051 by Jarrett Torres VENANCIO  Outcome: Ongoing     Problem: SKIN INTEGRITY  Goal: Skin integrity is maintained or improved  12/18/2019 2108 by Maria L Mcmahon RN  Outcome: Ongoing  12/18/2019 2051 by Lia Hogue RCP  Outcome: Ongoing     Problem: Falls - Risk of:  Goal: Will remain free from falls  Description  Will remain free from falls  Outcome: Ongoing  Goal: Absence of physical injury  Description  Absence of physical injury  Outcome: Ongoing     Problem: Risk for Impaired Skin Integrity  Goal: Tissue integrity - skin and mucous membranes  Description  Structural intactness and normal physiological function of skin and  mucous membranes.   Outcome: Ongoing     Problem: Nutrition  Goal: Optimal nutrition therapy  Description  Nutrition Problem: Inadequate oral intake  Intervention: Food and/or Nutrient Delivery: Start Parenteral Nutrition  Nutritional Goals: Meet % of estimated nutrition needs   Outcome: Ongoing     Problem: Confusion - Acute:  Goal: Absence of continued neurological deterioration signs and symptoms  Description  Absence of continued neurological deterioration signs and symptoms  Outcome: Ongoing  Goal: Mental status will be restored to baseline  Description  Mental status will be restored to baseline  Outcome: Ongoing     Problem: Discharge Planning:  Goal: Ability to perform activities of daily living will improve  Description  Ability to perform activities of daily living will improve  Outcome: Ongoing  Goal: Participates in care planning  Description  Participates in care planning  Outcome: Ongoing     Problem: Injury - Risk of, Physical Injury:  Goal: Will remain free from falls  Description  Will remain free from falls  Outcome: Ongoing  Goal: Absence of physical injury  Description  Absence of physical injury  Outcome: Ongoing     Problem: Mood - Altered:  Goal: Mood stable  Description  Mood stable  Outcome: Ongoing  Goal: Absence of abusive behavior  Description  Absence of abusive behavior  Outcome: Ongoing  Goal: Verbalizations of feeling emotionally comfortable while being cared for will increase  Description  Verbalizations of feeling emotionally comfortable while being cared for will increase  Outcome: Ongoing     Problem: Psychomotor Activity - Altered:  Goal: Absence of psychomotor disturbance signs and symptoms  Description  Absence of psychomotor disturbance signs and symptoms  Outcome: Ongoing     Problem: Sensory Perception - Impaired:  Goal: Demonstrations of improved sensory functioning will increase  Description  Demonstrations of improved sensory functioning will increase  Outcome: Ongoing  Goal: Decrease in sensory misperception frequency  Description  Decrease in sensory misperception frequency  Outcome: Ongoing  Goal: Able to refrain from responding to false sensory perceptions  Description  Able to refrain from responding to false sensory perceptions  Outcome: Ongoing  Goal: Demonstrates accurate environmental perceptions  Description  Demonstrates accurate environmental perceptions  Outcome: Ongoing  Goal: Able to distinguish between reality-based and nonreality-based thinking  Description  Able to distinguish between reality-based and nonreality-based thinking  Outcome: Ongoing  Goal: Able to interrupt nonreality-based thinking  Description  Able to interrupt nonreality-based thinking  Outcome: Ongoing     Problem: Sleep Pattern Disturbance:  Goal: Appears well-rested  Description  Appears well-rested  Outcome: Ongoing

## 2019-12-19 NOTE — PROGRESS NOTES
12/19/19 0839   Vent Information   Vent Mode CPAP   Pressure Support 10 cmH20   FiO2  40 %   PEEP/CPAP 8   Vent Patient Data   Vt Exhaled 307 mL   Minute Volume 6.8 Liters   Wean as tolerated. Decreased Fio2 to 40% with sat of 97%.

## 2019-12-20 ENCOUNTER — ANESTHESIA (OUTPATIENT)
Dept: OPERATING ROOM | Age: 34
DRG: 463 | End: 2019-12-20
Payer: MEDICARE

## 2019-12-20 VITALS
DIASTOLIC BLOOD PRESSURE: 50 MMHG | RESPIRATION RATE: 16 BRPM | SYSTOLIC BLOOD PRESSURE: 109 MMHG | OXYGEN SATURATION: 98 % | TEMPERATURE: 96.4 F

## 2019-12-20 LAB
ABO/RH: NORMAL
ABSOLUTE EOS #: 1.3 K/UL (ref 0–0.4)
ABSOLUTE IMMATURE GRANULOCYTE: 0.12 K/UL (ref 0–0.3)
ABSOLUTE LYMPH #: 1.06 K/UL (ref 1–4.8)
ABSOLUTE MONO #: 1.06 K/UL (ref 0.1–0.8)
ALLEN TEST: ABNORMAL
ANION GAP SERPL CALCULATED.3IONS-SCNC: 13 MMOL/L (ref 9–17)
ANTIBODY SCREEN: NEGATIVE
ARM BAND NUMBER: NORMAL
BASOPHILS # BLD: 0 % (ref 0–2)
BASOPHILS ABSOLUTE: 0 K/UL (ref 0–0.2)
BLD PROD TYP BPU: NORMAL
BUN BLDV-MCNC: 76 MG/DL (ref 6–20)
BUN/CREAT BLD: ABNORMAL (ref 9–20)
CALCIUM SERPL-MCNC: 8.3 MG/DL (ref 8.6–10.4)
CARBOXYHEMOGLOBIN: 1.8 % (ref 0–5)
CHLORIDE BLD-SCNC: 102 MMOL/L (ref 98–107)
CO2: 15 MMOL/L (ref 20–31)
CREAT SERPL-MCNC: 2.49 MG/DL (ref 0.7–1.2)
CROSSMATCH RESULT: NORMAL
DIFFERENTIAL TYPE: ABNORMAL
DISPENSE STATUS BLOOD BANK: NORMAL
EOSINOPHILS RELATIVE PERCENT: 11 % (ref 1–4)
EXPIRATION DATE: NORMAL
FIO2: ABNORMAL
GFR AFRICAN AMERICAN: 36 ML/MIN
GFR NON-AFRICAN AMERICAN: 30 ML/MIN
GFR SERPL CREATININE-BSD FRML MDRD: ABNORMAL ML/MIN/{1.73_M2}
GFR SERPL CREATININE-BSD FRML MDRD: ABNORMAL ML/MIN/{1.73_M2}
GLUCOSE BLD-MCNC: 171 MG/DL (ref 75–110)
GLUCOSE BLD-MCNC: 176 MG/DL (ref 75–110)
GLUCOSE BLD-MCNC: 221 MG/DL (ref 75–110)
GLUCOSE BLD-MCNC: 230 MG/DL (ref 70–99)
HCO3 VENOUS: 16.9 MMOL/L (ref 24–30)
HCT VFR BLD CALC: 24.8 % (ref 40.7–50.3)
HEMOGLOBIN: 7.8 G/DL (ref 13–17)
IMMATURE GRANULOCYTES: 1 %
LYMPHOCYTES # BLD: 9 % (ref 24–44)
MAGNESIUM: 1.9 MG/DL (ref 1.6–2.6)
MCH RBC QN AUTO: 28.4 PG (ref 25.2–33.5)
MCHC RBC AUTO-ENTMCNC: 31.5 G/DL (ref 28.4–34.8)
MCV RBC AUTO: 90.2 FL (ref 82.6–102.9)
METHEMOGLOBIN: ABNORMAL % (ref 0–1.5)
MODE: ABNORMAL
MONOCYTES # BLD: 9 % (ref 1–7)
MORPHOLOGY: ABNORMAL
MORPHOLOGY: ABNORMAL
NEGATIVE BASE EXCESS, VEN: 8.3 MMOL/L (ref 0–2)
NOTIFICATION TIME: ABNORMAL
NOTIFICATION: ABNORMAL
NRBC AUTOMATED: 0.7 PER 100 WBC
NUCLEATED RED BLOOD CELLS: 1 PER 100 WBC
O2 DEVICE/FLOW/%: ABNORMAL
O2 SAT, VEN: 99.2 % (ref 60–85)
OXYHEMOGLOBIN: ABNORMAL % (ref 95–98)
PATIENT TEMP: 37
PCO2, VEN, TEMP ADJ: ABNORMAL MMHG (ref 39–55)
PCO2, VEN: 35.6 (ref 39–55)
PDW BLD-RTO: 21.7 % (ref 11.8–14.4)
PEEP/CPAP: ABNORMAL
PH VENOUS: 7.3 (ref 7.32–7.42)
PH, VEN, TEMP ADJ: ABNORMAL (ref 7.32–7.42)
PHOSPHORUS: 3.1 MG/DL (ref 2.5–4.5)
PLATELET # BLD: 220 K/UL (ref 138–453)
PLATELET ESTIMATE: ABNORMAL
PMV BLD AUTO: 9.6 FL (ref 8.1–13.5)
PO2, VEN, TEMP ADJ: ABNORMAL MMHG (ref 30–50)
PO2, VEN: 104 (ref 30–50)
POSITIVE BASE EXCESS, VEN: ABNORMAL MMOL/L (ref 0–2)
POTASSIUM SERPL-SCNC: 4.2 MMOL/L (ref 3.7–5.3)
PSV: ABNORMAL
PT. POSITION: ABNORMAL
RBC # BLD: 2.75 M/UL (ref 4.21–5.77)
RBC # BLD: ABNORMAL 10*6/UL
RESPIRATORY RATE: ABNORMAL
SAMPLE SITE: ABNORMAL
SEG NEUTROPHILS: 70 % (ref 36–66)
SEGMENTED NEUTROPHILS ABSOLUTE COUNT: 8.26 K/UL (ref 1.8–7.7)
SET RATE: ABNORMAL
SODIUM BLD-SCNC: 130 MMOL/L (ref 135–144)
TEXT FOR RESPIRATORY: ABNORMAL
TOTAL HB: ABNORMAL G/DL (ref 12–16)
TOTAL RATE: ABNORMAL
TRANSFUSION STATUS: NORMAL
UNIT DIVISION: 0
UNIT NUMBER: NORMAL
VT: ABNORMAL
WBC # BLD: 11.8 K/UL (ref 3.5–11.3)
WBC # BLD: ABNORMAL 10*3/UL

## 2019-12-20 PROCEDURE — 2709999900 HC NON-CHARGEABLE SUPPLY: Performed by: SURGERY

## 2019-12-20 PROCEDURE — 6360000002 HC RX W HCPCS: Performed by: STUDENT IN AN ORGANIZED HEALTH CARE EDUCATION/TRAINING PROGRAM

## 2019-12-20 PROCEDURE — 3700000000 HC ANESTHESIA ATTENDED CARE: Performed by: SURGERY

## 2019-12-20 PROCEDURE — 2500000003 HC RX 250 WO HCPCS: Performed by: SURGERY

## 2019-12-20 PROCEDURE — 6370000000 HC RX 637 (ALT 250 FOR IP): Performed by: STUDENT IN AN ORGANIZED HEALTH CARE EDUCATION/TRAINING PROGRAM

## 2019-12-20 PROCEDURE — 84100 ASSAY OF PHOSPHORUS: CPT

## 2019-12-20 PROCEDURE — 88304 TISSUE EXAM BY PATHOLOGIST: CPT

## 2019-12-20 PROCEDURE — 2500000003 HC RX 250 WO HCPCS: Performed by: NURSE ANESTHETIST, CERTIFIED REGISTERED

## 2019-12-20 PROCEDURE — 94003 VENT MGMT INPAT SUBQ DAY: CPT

## 2019-12-20 PROCEDURE — 99233 SBSQ HOSP IP/OBS HIGH 50: CPT | Performed by: INTERNAL MEDICINE

## 2019-12-20 PROCEDURE — 0JBB0ZZ EXCISION OF PERINEUM SUBCUTANEOUS TISSUE AND FASCIA, OPEN APPROACH: ICD-10-PCS | Performed by: SURGERY

## 2019-12-20 PROCEDURE — 2700000000 HC OXYGEN THERAPY PER DAY

## 2019-12-20 PROCEDURE — 99291 CRITICAL CARE FIRST HOUR: CPT | Performed by: INTERNAL MEDICINE

## 2019-12-20 PROCEDURE — 0JB90ZZ EXCISION OF BUTTOCK SUBCUTANEOUS TISSUE AND FASCIA, OPEN APPROACH: ICD-10-PCS | Performed by: SURGERY

## 2019-12-20 PROCEDURE — 90935 HEMODIALYSIS ONE EVALUATION: CPT

## 2019-12-20 PROCEDURE — 80048 BASIC METABOLIC PNL TOTAL CA: CPT

## 2019-12-20 PROCEDURE — 2580000003 HC RX 258: Performed by: STUDENT IN AN ORGANIZED HEALTH CARE EDUCATION/TRAINING PROGRAM

## 2019-12-20 PROCEDURE — 36415 COLL VENOUS BLD VENIPUNCTURE: CPT

## 2019-12-20 PROCEDURE — 3600000014 HC SURGERY LEVEL 4 ADDTL 15MIN: Performed by: SURGERY

## 2019-12-20 PROCEDURE — 94761 N-INVAS EAR/PLS OXIMETRY MLT: CPT

## 2019-12-20 PROCEDURE — 2000000000 HC ICU R&B

## 2019-12-20 PROCEDURE — 88312 SPECIAL STAINS GROUP 1: CPT

## 2019-12-20 PROCEDURE — 85025 COMPLETE CBC W/AUTO DIFF WBC: CPT

## 2019-12-20 PROCEDURE — 97110 THERAPEUTIC EXERCISES: CPT

## 2019-12-20 PROCEDURE — 94770 HC ETCO2 MONITOR DAILY: CPT

## 2019-12-20 PROCEDURE — 6360000002 HC RX W HCPCS: Performed by: NURSE ANESTHETIST, CERTIFIED REGISTERED

## 2019-12-20 PROCEDURE — 0WJR0ZZ INSPECTION OF GENITOURINARY TRACT, OPEN APPROACH: ICD-10-PCS | Performed by: UROLOGY

## 2019-12-20 PROCEDURE — 2500000003 HC RX 250 WO HCPCS: Performed by: STUDENT IN AN ORGANIZED HEALTH CARE EDUCATION/TRAINING PROGRAM

## 2019-12-20 PROCEDURE — 83735 ASSAY OF MAGNESIUM: CPT

## 2019-12-20 PROCEDURE — 82947 ASSAY GLUCOSE BLOOD QUANT: CPT

## 2019-12-20 PROCEDURE — 3600000004 HC SURGERY LEVEL 4 BASE: Performed by: SURGERY

## 2019-12-20 PROCEDURE — 2580000003 HC RX 258: Performed by: SURGERY

## 2019-12-20 PROCEDURE — 82805 BLOOD GASES W/O2 SATURATION: CPT

## 2019-12-20 PROCEDURE — 3700000001 HC ADD 15 MINUTES (ANESTHESIA): Performed by: SURGERY

## 2019-12-20 RX ORDER — BUPIVACAINE HYDROCHLORIDE AND EPINEPHRINE 5; 5 MG/ML; UG/ML
INJECTION, SOLUTION PERINEURAL PRN
Status: DISCONTINUED | OUTPATIENT
Start: 2019-12-20 | End: 2019-12-20 | Stop reason: HOSPADM

## 2019-12-20 RX ORDER — PROPOFOL 10 MG/ML
10 INJECTION, EMULSION INTRAVENOUS
Status: DISCONTINUED | OUTPATIENT
Start: 2019-12-20 | End: 2019-12-20

## 2019-12-20 RX ORDER — MAGNESIUM HYDROXIDE 1200 MG/15ML
LIQUID ORAL CONTINUOUS PRN
Status: COMPLETED | OUTPATIENT
Start: 2019-12-20 | End: 2019-12-20

## 2019-12-20 RX ORDER — MORPHINE SULFATE 10 MG/ML
INJECTION, SOLUTION INTRAMUSCULAR; INTRAVENOUS PRN
Status: DISCONTINUED | OUTPATIENT
Start: 2019-12-20 | End: 2019-12-20 | Stop reason: SDUPTHER

## 2019-12-20 RX ORDER — MULTIVIT-MIN/FERROUS GLUCONATE 9 MG/15 ML
15 LIQUID (ML) ORAL DAILY
Status: DISCONTINUED | OUTPATIENT
Start: 2019-12-20 | End: 2020-01-02

## 2019-12-20 RX ORDER — FENTANYL CITRATE 50 UG/ML
INJECTION, SOLUTION INTRAMUSCULAR; INTRAVENOUS PRN
Status: DISCONTINUED | OUTPATIENT
Start: 2019-12-20 | End: 2019-12-20 | Stop reason: SDUPTHER

## 2019-12-20 RX ORDER — MIDAZOLAM HYDROCHLORIDE 1 MG/ML
INJECTION INTRAMUSCULAR; INTRAVENOUS PRN
Status: DISCONTINUED | OUTPATIENT
Start: 2019-12-20 | End: 2019-12-20 | Stop reason: SDUPTHER

## 2019-12-20 RX ORDER — OXYCODONE HCL 5 MG/5 ML
10 SOLUTION, ORAL ORAL EVERY 4 HOURS
Status: DISCONTINUED | OUTPATIENT
Start: 2019-12-20 | End: 2019-12-22

## 2019-12-20 RX ORDER — PHENYLEPHRINE HCL IN 0.9% NACL 0.5 MG/5ML
SYRINGE (ML) INTRAVENOUS PRN
Status: DISCONTINUED | OUTPATIENT
Start: 2019-12-20 | End: 2019-12-20 | Stop reason: SDUPTHER

## 2019-12-20 RX ORDER — PROPOFOL 10 MG/ML
10 INJECTION, EMULSION INTRAVENOUS
Status: DISCONTINUED | OUTPATIENT
Start: 2019-12-20 | End: 2019-12-21

## 2019-12-20 RX ORDER — ROCURONIUM BROMIDE 10 MG/ML
INJECTION, SOLUTION INTRAVENOUS PRN
Status: DISCONTINUED | OUTPATIENT
Start: 2019-12-20 | End: 2019-12-20 | Stop reason: SDUPTHER

## 2019-12-20 RX ADMIN — Medication 100 MCG: at 14:08

## 2019-12-20 RX ADMIN — Medication 125 MCG/KG/HR: at 00:49

## 2019-12-20 RX ADMIN — MEROPENEM 1 G: 1 INJECTION, POWDER, FOR SOLUTION INTRAVENOUS at 13:21

## 2019-12-20 RX ADMIN — OXYCODONE HYDROCHLORIDE 10 MG: 5 TABLET ORAL at 08:52

## 2019-12-20 RX ADMIN — PROPOFOL 10 MCG/KG/MIN: 10 INJECTION, EMULSION INTRAVENOUS at 03:45

## 2019-12-20 RX ADMIN — Medication: at 18:58

## 2019-12-20 RX ADMIN — HEPARIN SODIUM 500 UNITS: 1000 INJECTION INTRAVENOUS; SUBCUTANEOUS at 18:30

## 2019-12-20 RX ADMIN — FOLIC ACID 1 MG: 1 TABLET ORAL at 08:52

## 2019-12-20 RX ADMIN — OXYCODONE HYDROCHLORIDE 10 MG: 5 TABLET ORAL at 05:58

## 2019-12-20 RX ADMIN — Medication 100 MCG: at 14:21

## 2019-12-20 RX ADMIN — MORPHINE SULFATE 2 MG: 10 INJECTION, SOLUTION INTRAMUSCULAR; INTRAVENOUS at 15:02

## 2019-12-20 RX ADMIN — Medication 100 MCG: at 14:06

## 2019-12-20 RX ADMIN — PROPOFOL 20 MCG/KG/MIN: 10 INJECTION, EMULSION INTRAVENOUS at 19:31

## 2019-12-20 RX ADMIN — I.V. FAT EMULSION 100 ML: 20 EMULSION INTRAVENOUS at 22:48

## 2019-12-20 RX ADMIN — SODIUM CHLORIDE: 900 IRRIGANT IRRIGATION at 18:00

## 2019-12-20 RX ADMIN — POTASSIUM CHLORIDE: 2 INJECTION, SOLUTION, CONCENTRATE INTRAVENOUS at 19:09

## 2019-12-20 RX ADMIN — MIDAZOLAM HYDROCHLORIDE 2 MG: 1 INJECTION, SOLUTION INTRAMUSCULAR; INTRAVENOUS at 13:53

## 2019-12-20 RX ADMIN — MORPHINE SULFATE 2 MG: 10 INJECTION, SOLUTION INTRAMUSCULAR; INTRAVENOUS at 15:05

## 2019-12-20 RX ADMIN — Medication 20 MG: at 01:17

## 2019-12-20 RX ADMIN — FENTANYL CITRATE 100 MCG: 50 INJECTION INTRAMUSCULAR; INTRAVENOUS at 14:19

## 2019-12-20 RX ADMIN — INSULIN LISPRO 3 UNITS: 100 INJECTION, SOLUTION INTRAVENOUS; SUBCUTANEOUS at 20:15

## 2019-12-20 RX ADMIN — Medication 100 MG: at 08:52

## 2019-12-20 RX ADMIN — PROPOFOL 20 MCG/KG/MIN: 10 INJECTION, EMULSION INTRAVENOUS at 08:28

## 2019-12-20 RX ADMIN — ROCURONIUM BROMIDE 50 MG: 10 INJECTION INTRAVENOUS at 13:53

## 2019-12-20 RX ADMIN — FAMOTIDINE 20 MG: 20 TABLET, FILM COATED ORAL at 08:52

## 2019-12-20 RX ADMIN — Medication 100 MCG/HR: at 09:13

## 2019-12-20 RX ADMIN — Medication 100 MCG: at 14:10

## 2019-12-20 RX ADMIN — INSULIN LISPRO 3 UNITS: 100 INJECTION, SOLUTION INTRAVENOUS; SUBCUTANEOUS at 09:27

## 2019-12-20 RX ADMIN — Medication 100 MCG: at 14:14

## 2019-12-20 RX ADMIN — OXYCODONE HYDROCHLORIDE 10 MG: 5 SOLUTION ORAL at 20:15

## 2019-12-20 RX ADMIN — Medication 100 MCG/HR: at 18:17

## 2019-12-20 RX ADMIN — HEPARIN SODIUM 5000 UNITS: 5000 INJECTION INTRAVENOUS; SUBCUTANEOUS at 22:57

## 2019-12-20 RX ADMIN — OXYCODONE HYDROCHLORIDE 10 MG: 5 TABLET ORAL at 00:49

## 2019-12-20 RX ADMIN — PROPOFOL 20 MCG/KG/MIN: 10 INJECTION, EMULSION INTRAVENOUS at 09:57

## 2019-12-20 RX ADMIN — INSULIN LISPRO 6 UNITS: 100 INJECTION, SOLUTION INTRAVENOUS; SUBCUTANEOUS at 03:18

## 2019-12-20 ASSESSMENT — PULMONARY FUNCTION TESTS
PIF_VALUE: 36
PIF_VALUE: 33
PIF_VALUE: 33
PIF_VALUE: 26
PIF_VALUE: 29
PIF_VALUE: 33
PIF_VALUE: 29
PIF_VALUE: 30
PIF_VALUE: 30
PIF_VALUE: 34
PIF_VALUE: 30
PIF_VALUE: 31
PIF_VALUE: 30
PIF_VALUE: 30
PIF_VALUE: 33
PIF_VALUE: 36
PIF_VALUE: 36
PIF_VALUE: 35
PIF_VALUE: 29
PIF_VALUE: 29
PIF_VALUE: 30
PIF_VALUE: 30
PIF_VALUE: 33
PIF_VALUE: 30
PIF_VALUE: 25
PIF_VALUE: 33
PIF_VALUE: 30
PIF_VALUE: 34
PIF_VALUE: 30
PIF_VALUE: 35
PIF_VALUE: 30
PIF_VALUE: 34
PIF_VALUE: 33
PIF_VALUE: 27
PIF_VALUE: 28
PIF_VALUE: 32
PIF_VALUE: 31
PIF_VALUE: 33
PIF_VALUE: 30
PIF_VALUE: 22
PIF_VALUE: 36
PIF_VALUE: 31
PIF_VALUE: 33
PIF_VALUE: 30
PIF_VALUE: 30
PIF_VALUE: 26
PIF_VALUE: 32
PIF_VALUE: 33
PIF_VALUE: 21
PIF_VALUE: 32
PIF_VALUE: 29
PIF_VALUE: 16
PIF_VALUE: 35
PIF_VALUE: 34
PIF_VALUE: 31
PIF_VALUE: 33
PIF_VALUE: 33
PIF_VALUE: 30
PIF_VALUE: 17
PIF_VALUE: 34
PIF_VALUE: 14
PIF_VALUE: 13
PIF_VALUE: 29
PIF_VALUE: 35
PIF_VALUE: 33
PIF_VALUE: 50
PIF_VALUE: 30
PIF_VALUE: 35
PIF_VALUE: 33
PIF_VALUE: 20
PIF_VALUE: 29
PIF_VALUE: 35
PIF_VALUE: 32
PIF_VALUE: 13
PIF_VALUE: 8
PIF_VALUE: 30
PIF_VALUE: 36
PIF_VALUE: 30
PIF_VALUE: 33
PIF_VALUE: 33
PIF_VALUE: 15
PIF_VALUE: 12
PIF_VALUE: 30
PIF_VALUE: 30
PIF_VALUE: 34
PIF_VALUE: 23
PIF_VALUE: 34
PIF_VALUE: 33
PIF_VALUE: 34
PIF_VALUE: 30
PIF_VALUE: 29
PIF_VALUE: 33
PIF_VALUE: 30
PIF_VALUE: 30
PIF_VALUE: 36
PIF_VALUE: 31

## 2019-12-20 NOTE — FLOWSHEET NOTE
Dr. Hanna James notified of systolic blood pressor in the 190s prn does of labetalol was given. No new orders, continue to monitor.   Electronically signed by Juliet Molina RN on 12/20/2019 at 8:20 AM

## 2019-12-20 NOTE — PROGRESS NOTES
Renal Progress Note    Patient :  Geraldo Munoz; 37 y.o. MRN# [id number]  Location:  [facility]/NONE  Attending:  Brandon Ram MD  Admit Date:  11/29/2019   Hospital Day: 21      Subjective:     Patient was seen and examined. No new issues reported overnight. Patient will be going for repeat OR today. Will get regular hemodialysis after the OR. Will use midodrine and albumin for hypotension with dialysis. CODE STATUS has been changed to [facility]. Outpatient Medications:     No medications prior to admission.     Current Medications:     Scheduled Meds:    [MAR Hold] oxyCODONE  10 mg Oral Q4H    [MAR Hold] CENTRUM/CERTA-CHANCE with minerals oral  15 mL Oral Daily    [MAR Hold] alteplase  1 mg Intracatheter Once    [facility] insulin lispro  0-18 Units Subcutaneous Q6H    [MAR Hold] thiamine  100 mg Oral Daily    [MAR Hold] folic acid  1 mg Oral Daily    [MAR Hold] povidone-iodine   Topical Daily    [MAR Hold] midodrine  10 mg Oral TID WC    [MAR Hold] famotidine  20 mg Per NG tube Daily    [MAR Hold] sodium chloride flush  10 mL Intravenous 2 times per day    [facility] heparin (porcine)  5,000 Units Subcutaneous 3 times per day     Continuous Infusions:    [MAR Hold] propofol 20 mcg/kg/min (12/20/19 0957)    [MAR Hold] PN-Adult 2-in-1 Central Line (Standard) 80 mL/hr at 12/19/19 1843    [MAR Hold] sodium chloride      [MAR Hold] dextrose      [MAR Hold] fentaNYL 100 mcg/hr (12/20/19 0913)    [MAR Hold] sodium chloride 10 mL/hr at 12/19/19 0051     PRN Meds:  [MAR Hold] labetalol, [MAR Hold] heparin (porcine), [MAR Hold] heparin (porcine), [MAR Hold] glucose, [MAR Hold] dextrose, [MAR Hold] glucagon (rDNA), [MAR Hold] dextrose, [MAR Hold] midodrine, [MAR Hold] albumin human, [MAR Hold] heparin (porcine), [MAR Hold] heparin (porcine), [MAR Hold] LORazepam, [MAR Hold] sodium chloride, [MAR Hold] sodium chloride, [MAR Hold] albumin human, [MAR Hold] sodium chloride flush, [MAR Hold] potassium chloride **OR** [MAR Hold] potassium alternative oral replacement **OR** [MAR Hold] potassium chloride, [MAR Hold] acetaminophen, [MAR Hold] REFRESH LACRI-LUBE, [MAR Hold] magnesium hydroxide, [MAR Hold] ondansetron    Input/Output:       I/O last 3 completed shifts: In: 1842 [I.V.:423.7; NG/GT:125]  Out: 1206 [Urine:906; Emesis/NG output:300]. Patient Vitals for the past 96 hrs (Last 3 readings):   Weight   19 0331 264 lb 8.8 oz (120 kg)   19 0900 131 lb 13.4 oz (59.8 kg)   19 1515 (!) 398 lb 5.9 oz (180.7 kg)       Vital Signs:   Temperature:  Temp: 98.5 °F (36.9 °C)  TMax:   Temp (24hrs), Av.2 °F (36.8 °C), Min:97.5 °F (36.4 °C), Max:98.6 °F (37 °C)    Respirations:  Resp: 12  Pulse:   Pulse: 86  BP:    BP: 139/79  BP Range: Systolic (26RSN), KLD:324 , Min:57 , VRR:701       Diastolic (05ASP), QGZ:82, Min:30, Max:129      Physical Examination:     General:  On mechanical ventilation, sedated  HEENT: Atraumatic, normocephalic, no throat congestion, moist mucosa. Eyes:   Pupils equal, round and reactive to light. Neck:   Supple  Chest:   Bilateral vesicular breath sounds, no rales or wheezes. Cardiac:  S1 S2 RR, no murmurs, gallops or rubs. Abdomen: Soft, obese, no masses or organomegaly, BS audible. :   No suprapubic or flank tenderness. Neuro: On mechanical ventilation, sedated   SKIN:  No rashes, good skin turgor. Extremities:  2-3+ bilateral lower extremity edema. Labs:       Recent Labs     19  0548  19  0424 19  1141 19  0458   WBC 11.3  --  14.0*  --  11.8*   RBC 2.42*  --  2.36*  --  2.75*   HGB 6.8*   < > 6.9* 8.3* 7.8*   HCT 23.8*   < > 22.5* 27.3* 24.8*   MCV 98.3  --  95.3  --  90.2   MCH 28.1  --  29.2  --  28.4   MCHC 28.6  --  30.7  --  31.5   RDW 22.6*  --  22.0*  --  21.7*     --  220  --  220   MPV 9.1  --  9.3  --  9.6    < > = values in this interval not displayed.       BMP:   Recent Labs     19  0548 19  0424 12/20/19  0458   * 130* 130*   K 4.3 3.9 4.2    102 102   CO2 16* 18* 15*   BUN 65* 54* 76*   CREATININE 2.01* 1.86* 2.49*   GLUCOSE 208* 190* 230*   CALCIUM 8.8 7.9* 8.3*      Phosphorus:     Recent Labs     12/18/19  0548 12/20/19  0458   PHOS 3.4 3.1     Magnesium:    Recent Labs     12/18/19  0548 12/20/19  0458   MG 2.0 1.9     MARINE:      Lab Results   Component Value Date    MARINE NEGATIVE 11/30/2019     SPEP:  Lab Results   Component Value Date    PROT 5.3 12/01/2019    PATH ELECTRONICALLY SIGNED. Hola Brody M.D. 11/30/2019     C3:     Lab Results   Component Value Date    C3 97 11/30/2019     C4:     Lab Results   Component Value Date    C4 12 11/30/2019     Hep BsAg:         Lab Results   Component Value Date    HEPBSAG NONREACTIVE 11/30/2019     Hep C AB:          Lab Results   Component Value Date    HEPCAB NONREACTIVE 11/30/2019       Urinalysis/Chemistries:      Lab Results   Component Value Date    NITRU NEGATIVE 11/29/2019    COLORU YELLOW 11/29/2019    PHUR 5.0 11/29/2019    WBCUA 5 TO 10 11/29/2019    RBCUA 0 TO 2 11/29/2019    MUCUS NOT REPORTED 11/29/2019    TRICHOMONAS NOT REPORTED 11/29/2019    YEAST NOT REPORTED 11/29/2019    BACTERIA NOT REPORTED 11/29/2019    SPECGRAV 1.015 11/29/2019    LEUKOCYTESUR NEGATIVE 11/29/2019    UROBILINOGEN Normal 11/29/2019    BILIRUBINUR NEGATIVE 11/29/2019    GLUCOSEU NEGATIVE 11/29/2019    KETUA NEGATIVE 11/29/2019    AMORPHOUS NOT REPORTED 11/29/2019     Urine Sodium:     Lab Results   Component Value Date    DONOVAN 39 11/30/2019       Urine Creatinine:     Lab Results   Component Value Date    LABCREA 116.4 11/30/2019     Radiology:     Reviewed. Assessment:     1.  Acute Kidney Injury: Secondary to ischemic ATN from sepsis syndrome from necrotizing fasciitis. He has been dialysis dependent. 2.  Chronic kidney diseae stage III from diabetic nephrosclerosis baseline 1.6-1.8 MG/DL.   Continue normal  3.  Necrotizing fasciitis status post wide

## 2019-12-20 NOTE — BRIEF OP NOTE
Status Suction-low intermittent 11/30/2019 12:00 PM   Placement Verified by Gastric Contents 11/30/2019 12:00 PM   NG/OG/NJ/NE External Measurement (cm) 60 cm 11/30/2019 12:00 PM   Drainage Appearance Tan 11/30/2019  8:00 AM       Findings: No evidence of infectious process. Exudate and some non viable muscle requiring debridement. Healthy and viable remaining tissue. Left scrotal flap appears viable and healthy. Granulation tissue forming over scrotum/testicles. Urethra leak test negative per urology. Makenzie Iglesias DO  Date: 12/20/2019  Time: 6:31 PM  Present throughout.

## 2019-12-20 NOTE — PROGRESS NOTES
642-->342-->273->346->398-->281-->191-->201-->204>220>220    Cultures:  Urine:  · NA  Blood:  · 11-30-19: no growth  · 12-2-19: no growth  Sputum :  · NA  Wound:  · 11-29-19: Strep ssp and Gram negative bacilli       MRSA probe: Pos    Discussed with mother, RN, CC. .    12-13-19: Abdominal, groin wounds:              12-11-19:          12-7-19: Left foot:      12-4-19:      11-29-19:      I have personally reviewed the past medical history, past surgical history, medications, social history, and family history, and I have updated the database accordingly.   Past Medical History:     Past Medical History:   Diagnosis Date    [facility]     Diabetes mellitus [facility]     Hypertension     Spina bifida aperta of lumbar spine ([facility]        Past Surgical  History:     Past Surgical History:   Procedure Laterality Date    ABDOMEN SURGERY N/A 12/8/2019    DEBRIDEMENT  NECROTIZING FASCIITIS BUTTOCK, WOUND VAC REMOVAL DELAYED PRIMARY CLOSURE OF MIDLINE ABDOMINAL INCISION, OSTOMY BAG CHANGE performed by Vic Cooper MD at [address] [facility] N/A 12/10/2019    DEBRIDEMENT OF SACRAL WOUND performed by Donavon Perdomo MD at [facility] 12/3/2019    LAPAROSCOPIC CONVERTED TO OPEN DIVERTING LOOP COLOSTOMY; WOUND VAC APPLICATION performed by Mora Concepcion MD at [place] [address] Bilateral 11/29/2019    RECTAL PERIRECTAL INCISION AND DRAINAGE, DIVERING LOOP COLOSTOMY CREATION performed by Héctor Moran MD at [place] [address] N/A 12/6/2019    DEBRIDEMENT NECROTIZING FASCIAITIS BILAT BUTTOCK performed by Donavon Perdomo MD at [place] [address] N/A 12/10/2019    SCROTAL EXPLORATION WITH SCROTAL DEBRIDEMENT performed by Jeferson Watts MD at [place] [id number]       Medications:      meropenem  1 g Intravenous On Call to OR    alteplase  1 mg Intracatheter Once    insulin lispro  0-18 Units Subcutaneous Q6H    oxyCODONE  10 gallops. Abdomen: Soft, non tender. Bowel sounds quiet. Ostomy with scant liquid output. Surgical dressing clean  : Extensive open wound of gluteal and perineal areas, lema with small amt clear urine  All four Extremities: No cyanosis, clubbing, or effusions. Lt foot diabetic ulcer with involvement of the Lt heel. Neurologic: Intubated, sedated  Skin: Warm and dry with good turgor. Signs of peripheral arterial insufficiency. Large open wounds. Medical Decision Making -Laboratory:   I have independently reviewed/ordered the following labs:    CBC with Differential:   Recent Labs     19  0424 19  1141 19  0458   WBC 14.0*  --  11.8*   HGB 6.9* 8.3* 7.8*   HCT 22.5* 27.3* 24.8*     --  220   LYMPHOPCT 3*  --  9*   MONOPCT 7  --  9*     BMP:   Recent Labs     19  0548 19  0424 19  0458   * 130* 130*   K 4.3 3.9 4.2    102 102   CO2 16* 18* 15*   BUN 65* 54* 76*   CREATININE 2.01* 1.86* 2.49*   MG 2.0  --  1.9     Hepatic Function Panel:   No results for input(s): PROT, LABALBU, BILIDIR, IBILI, BILITOT, ALKPHOS, ALT, AST in the last 72 hours. No results for input(s): RPR in the last 72 hours. No results for input(s): HIV in the last 72 hours. No results for input(s): BC in the last 72 hours. Lab Results   Component Value Date    MUCUS NOT REPORTED 2019    RBC 2.75 2019    TRICHOMONAS NOT REPORTED 2019    WBC 11.8 2019    YEAST NOT REPORTED 2019    TURBIDITY TURBID 2019     Lab Results   Component Value Date    CREATININE 2.49 2019    GLUCOSE 230 2019       Medical Decision Making-Imagin-9 CT Abd/pelvis  EXAMINATION:   CT OF THE ABDOMEN AND PELVIS WITH CONTRAST 2019 2:29 am       TECHNIQUE:   CT of the abdomen and pelvis was performed with the administration of   intravenous contrast. Multiplanar reformatted images are provided for review.    Dose modulation, iterative reconstruction, and/or bilateral inguinal and pelvic sidewall lymphadenopathy. Multiple mildly prominent retroperitoneal and upper abdominal lymph nodes are   similar to the prior study.  Percutaneous intraperitoneal surgical drains.           Impression   1.  Large soft tissue defects in the right inguinal region, scrotum and right   greater than left gluteal crease is with associated packing material, small   amount of surrounding soft tissue gas and skin thickening likely relates to   history of necrotizing fasciitis.       2.  Postsurgical changes of the bowel with left lower quadrant ostomy.  No   bowel obstruction.  Percutaneous intraperitoneal surgical drains.       3.  Mild abdominopelvic ascites.  Loculated fluid along the inferior aspect   of the liver.  No intraperitoneal free air.       4.  Diffuse anasarca.       5.   Bilateral pleural effusions with associated heterogeneous opacities and   consolidations.  Underlying infection is not excluded.               EXAMINATION:   ONE XRAY VIEW OF THE CHEST       11/29/2019 9:01 pm       COMPARISON:   4 hours ago       HISTORY:   ORDERING SYSTEM PROVIDED HISTORY: intubated   TECHNOLOGIST PROVIDED HISTORY:   intubated   Reason for Exam: portable supine/ intubated   Acuity: Acute   Type of Exam: Initial       FINDINGS:   New ET tube terminates 38 mm above the ron.  There appears to be a   possible enteric tube which is not well visualized distally.  Right IJ   catheter terminates in the right atrium and is unchanged.  Lungs are clear. Cardiomegaly.  Mediastinum normal.  Bony thorax intact.           Impression   New ET tube as above.  Possible new enteric tube not well visualized. Recommend follow-up KUB if clinically warranted. CT ABDOMEN AND PELVIS WITHOUT CONTRAST    COMPARISON:  3/22/2017    CLINICAL HISTORY: Infection in scrotum, lower abdominal pain, diabetic, 30,000 white blood cell count.     TECHNIQUE: Unenhanced axial images were obtained from the lung bases to the pubic symphysis with sagittal and coronal 2D reformatted images. Oral contrast administered:  No.  Automatic exposure control (AEC) was utilized. CT ABDOMEN FINDINGS:      The lung bases are unremarkable. There is a small pericardial effusion versus thickening. The liver, spleen, and pancreas demonstrate no acute abnormality given compromised evaluation without intravenous contrast.  There may be mild splenomegaly.  The adrenal glands appear within normal limits. There is no hydronephrosis or obstructing urinary tract calculi. Small amount of free fluid is seen adjacent to the liver inferiorly. .    The appendix is visualized in the right lower quadrant and appears within normal limits.       There is no evidence for bowel obstruction. Stranding is seen within the subcutaneous fat overlying both lateral abdominal walls left greater than right.  There is ill-defined fluid collection within the subcutaneous fat overlying the left lateral abdominal wall possibly representing phlegmon or developing abscess although no organized   abscess is seen. There is a large amount of soft tissue emphysema involving both the right and left buttock extending to the perineum tissue thickening is seen especially on the left involving the left buttock. CT PELVIS FINDINGS:        No distal ureteral calculi or bladder calculi. There is no free fluid in the pelvis. Catheter is seen within the urinary bladder. Osseous changes within the left hemipelvis which may be chronic in nature. IMPRESSION:    Extensive subcutaneous emphysema as described above involving both buttocks extending to the perineum.  The possibility of Ashley gangrene/necrotizing fasciitis cannot be excluded. Possible phlegmon or developing soft tissue abscess overlying the left lateral abdominal wall as noted above.  No organized abscess is seen however. Osseous changes within the left hemipelvis which may be chronic in nature.   Results residents. I have updated the medical record where necessary. I agree with the assessment, plan and orders as documented by the resident/ student.     Lynda Olivas MD.

## 2019-12-20 NOTE — OP NOTE
Dr. Gisselle Roberts MD      [facility]. [facility]. [place]    DATE:  12/20/19    SURGEON:   Chuckie Strange MD    ASSISTANT:  Bárbara Bailon MD.       PREOPERATIVE DIAGNOSIS:  NECROTIZING FASCIITIS PERINEUM, SCROTUM    POSTOPERATIVE DIAGNOSIS:  same    PROCEDURE PERFORMED:  1. Exam under anesthesia    ANESTHESIA:  General    COMPLICATIONS:  None. ESTIMATED BLOOD LOSS:  50mL    DRAINS:  Lema catheter    SPECIMENS:  Tissue for culture    INDICATIONS FOR PROCEDURE:  The patient is a 37 y.o. male with a history of necrotizing fasciitis taken to OR by general surgery for re-examination and re-debridement today. We previously examined him approximately last approximately 1.5 weeks ago and further debrided around his scrotum and perineum at that time. General surgery did request us to re-examine under anesthesia. Consent was obtained. DETAILS OF THE PROCEDURE:  On entering the room the patient was intubated and placed in dorsal lithotomy position. Previous wounds were freshened up by general surgery, and the defect overall was irrigated. The two flaps of tissue extending inferoposterior that were the remnants of the left and right lateral hemiscrotum with remnants of either respective thigh crease skin were re-examined. These did seem viable, with excellent color and turgor, and extensive edema. There was some mild fibrinous exudate in the R groin incision, which was freshened up with a curette. The perineal aspect of the wound was probed and was noted to be clean without loculations, tunneling, new purulence. Urethra was noted to be intact and so was left alone. We called for a 14 gauge angiocatheter and 10cc syringe with sterile saline, and injected this into the urethral meatus beside the lema catheter. There was no leakage noted out of the dependent portion of the wound, and we thus determined the urethra to be intact. Lema catheter was left in place.  Of note, the remnant of the

## 2019-12-20 NOTE — PLAN OF CARE
Problem: Pain:  Goal: Pain level will decrease  Description  Pain level will decrease  Outcome: Ongoing  Goal: Control of acute pain  Description  Control of acute pain  Outcome: Ongoing  Goal: Control of chronic pain  Description  Control of chronic pain  Outcome: Ongoing     Problem: Skin Integrity:  Goal: Will show no infection signs and symptoms  Description  Will show no infection signs and symptoms  Outcome: Ongoing  Goal: Absence of new skin breakdown  Description  Absence of new skin breakdown  Outcome: Ongoing     Problem: OXYGENATION/RESPIRATORY FUNCTION  Goal: Patient will maintain patent airway  12/19/2019 2113 by Tera Pantoja RN  Outcome: Ongoing  12/19/2019 2004 by Syd Tinoco RCP  Outcome: Ongoing  Goal: Patient will achieve/maintain normal respiratory rate/effort  Description  Respiratory rate and effort will be within normal limits for the patient  12/19/2019 2113 by Tera Pantoja RN  Outcome: Ongoing  12/19/2019 2004 by Syd Tinoco RCP  Outcome: Ongoing     Problem: MECHANICAL VENTILATION  Goal: Patient will maintain patent airway  12/19/2019 2113 by Tera Pantoja RN  Outcome: Ongoing  12/19/2019 2004 by Syd Tinoco RCP  Outcome: Ongoing  Goal: Oral health is maintained or improved  12/19/2019 2113 by Tera Pantoja RN  Outcome: Ongoing  12/19/2019 2004 by Syd Tinoco RCP  Outcome: Ongoing  Goal: ET tube will be managed safely  12/19/2019 2113 by Tera Pantoja RN  Outcome: Ongoing  12/19/2019 2004 by Syd Tinoco RCP  Outcome: Ongoing  Goal: Ability to express needs and understand communication  12/19/2019 2113 by Tera Pantoja RN  Outcome: Ongoing  12/19/2019 2004 by Syd Tinoco RCP  Outcome: Ongoing  Goal: Mobility/activity is maintained at optimum level for patient  12/19/2019 2113 by Tera Pantoja RN  Outcome: Ongoing  12/19/2019 2004 by Syd Tinoco RCP  Outcome: Ongoing     Problem: SKIN INTEGRITY  Goal: Skin integrity is maintained or improved  12/19/2019 2113 by Jaja Deluna RN  Outcome: Ongoing  12/19/2019 2004 by Donovan Liao RCP  Outcome: Ongoing     Problem: Falls - Risk of:  Goal: Will remain free from falls  Description  Will remain free from falls  Outcome: Ongoing  Goal: Absence of physical injury  Description  Absence of physical injury  Outcome: Ongoing     Problem: Risk for Impaired Skin Integrity  Goal: Tissue integrity - skin and mucous membranes  Description  Structural intactness and normal physiological function of skin and  mucous membranes.   Outcome: Ongoing     Problem: Nutrition  Goal: Optimal nutrition therapy  Description  Nutrition Problem: Inadequate oral intake  Intervention: Food and/or Nutrient Delivery: Start Parenteral Nutrition  Nutritional Goals: Meet % of estimated nutrition needs   Outcome: Ongoing     Problem: Confusion - Acute:  Goal: Absence of continued neurological deterioration signs and symptoms  Description  Absence of continued neurological deterioration signs and symptoms  Outcome: Ongoing     Problem: Injury - Risk of, Physical Injury:  Goal: Will remain free from falls  Description  Will remain free from falls  Outcome: Ongoing  Goal: Absence of physical injury  Description  Absence of physical injury  Outcome: Ongoing     Problem: Mood - Altered:  Goal: Mood stable  Description  Mood stable  Outcome: Ongoing     Problem: Psychomotor Activity - Altered:  Goal: Absence of psychomotor disturbance signs and symptoms  Description  Absence of psychomotor disturbance signs and symptoms  Outcome: Ongoing     Problem: Sensory Perception - Impaired:  Goal: Demonstrations of improved sensory functioning will increase  Description  Demonstrations of improved sensory functioning will increase  Outcome: Ongoing     Problem: Sleep Pattern Disturbance:  Goal: Appears well-rested  Description  Appears well-rested  Outcome: Ongoing     Problem: Restraint Use - Nonviolent/Non-Self-Destructive Behavior:  Goal: Absence of restraint-related injury  Description  Absence of restraint-related injury  Outcome: Ongoing     Problem: Confusion - Acute:  Goal: Mental status will be restored to baseline  Description  Mental status will be restored to baseline  Outcome: Not Met This Shift     Problem: Discharge Planning:  Goal: Ability to perform activities of daily living will improve  Description  Ability to perform activities of daily living will improve  Outcome: Not Met This Shift  Goal: Participates in care planning  Description  Participates in care planning  Outcome: Not Met This Shift     Problem: Restraint Use - Nonviolent/Non-Self-Destructive Behavior:  Goal: Absence of restraint indications  Description  Absence of restraint indications  Outcome: Not Met This Shift   Electronically signed by Walter Fernánedz RN on 12/19/2019 at 9:14 PM

## 2019-12-20 NOTE — PROGRESS NOTES
PROGRESS NOTE     PATIENT NAME: Austin Mcdaniel [address] RECORD NO. [id number]  DATE: 12/20/2019  SURGEON: Ivet Thompson  PRIMARY CARE PHYSICIAN: No primary care provider on file. HD: # 21    ASSESSMENT    Patient Active Problem List   Diagnosis    Necrotizing fasciitis ([address]    Hypertension    Diabetes ([address]    Diabetic foot ulcer ([address]    [facility]    Bandemia    Acute respiratory failure with hypoxemia [address]      11/29/2019 wide complex debridement of nec fasc involving gluteal/perianal/distal rectum  12/03/2019 open diverting end colostomy   12/06/2019 I&D  12/08/2019 Incision and debridement of necrotizing fasciitis buttock wound, scrotum, bilateral groin region and closure of midline abdominal wound  12/10/2019 Sharp excisional debridement of gluteal, marc-rectal and perineal wound to level of muscle, Sharp excisional debridement of scrotum (performed by Urology), Washout of gluteal/perineal/scrotal/inguinal wound, Partial primary closure of pubic wound, and Dakins Wet to dry dressing application      MEDICAL DECISION MAKING AND PLAN    · For debridement and wound exploration in OR today. Will ask urology to come take a look at the scrotum. May need to consider transfer to a facility with in house reconstructive plastics to take over the care of the wound/reconstruction as we will not likely need to continue with OR debridements and the infection is controlled in the wound. · Continue with BID WTD dressing changes to the large open gluteal, perirectal, perineal and scrotal wounds - nursing to assist.  · Continue with packing changes to midline incision and pubic incision. · Meropenem per ID. · Strict glucose control <180  · LLE wound per podiatry  · Nephrology following. SUBJECTIVE    Patient seen and examined. No acute changes. Afebrile. For OR today. Planning for trach next week if fails SbT this weekend.        OBJECTIVE  VITALS: BP (!) 155/86   Pulse 99   Temp 98.5 °F (36.9 °C)

## 2019-12-20 NOTE — FLOWSHEET NOTE
SPIRITUAL CARE PROGRESS NOTE     Spiritual Assessment: Patient is a 37 y.o. male who is intubated and nonconversant, and is scheduled for surgery 12/20. Intervention:   Patient did not respond when spoken to.  offered a prayer . Patemnt remained restful and did not respond.  exited.    Outcome: [             12/19/19 2030   Encounter Summary   Services provided to: Patient   Referral/Consult From:   (pre procedure)   Support System Parent   Continue Visiting   (12/19/2019)   Complexity of Encounter Low   Length of Encounter 15 minutes   Spiritual Assessment Completed Yes   Routine   Type Pre-procedure   Assessment Unable to respond   Intervention Prayer;Sustaining presence/ Ministry of presence   Outcome Did not respond

## 2019-12-20 NOTE — PLAN OF CARE
Problem: Restraint Use - Nonviolent/Non-Self-Destructive Behavior:  Goal: Absence of restraint-related injury  Description  Absence of restraint-related injury  12/20/2019 0815 by Prasanna Berry RN  Outcome: Met This Shift  12/19/2019 2113 by Baylee Pike RN  Outcome: Ongoing     Problem: Pain:  Goal: Pain level will decrease  Description  Pain level will decrease  12/20/2019 0815 by Prasanna Berry RN  Outcome: Ongoing  12/19/2019 2113 by Baylee iPke RN  Outcome: Ongoing  Goal: Control of acute pain  Description  Control of acute pain  12/20/2019 0815 by Prasanna Berry RN  Outcome: Ongoing  12/19/2019 2113 by Baylee Pike RN  Outcome: Ongoing  Goal: Control of chronic pain  Description  Control of chronic pain  12/20/2019 0815 by Prasanna Berry RN  Outcome: Ongoing  12/19/2019 2113 by Baylee Pike RN  Outcome: Ongoing     Problem: Skin Integrity:  Goal: Will show no infection signs and symptoms  Description  Will show no infection signs and symptoms  12/20/2019 0815 by Prasanna Berry RN  Outcome: Ongoing  12/19/2019 2113 by Baylee Pike RN  Outcome: Ongoing  Goal: Absence of new skin breakdown  Description  Absence of new skin breakdown  12/20/2019 0815 by Prasanna Berry RN  Outcome: Ongoing  12/19/2019 2113 by Baylee Pike RN  Outcome: Ongoing     Problem: OXYGENATION/RESPIRATORY FUNCTION  Goal: Patient will maintain patent airway  12/20/2019 0815 by Prasanna Berry RN  Outcome: Ongoing  12/19/2019 2113 by Baylee Pike RN  Outcome: Ongoing  12/19/2019 2004 by Lia Hogue RCP  Outcome: Ongoing  Goal: Patient will achieve/maintain normal respiratory rate/effort  Description  Respiratory rate and effort will be within normal limits for the patient  12/20/2019 0815 by Prasanna Berry RN  Outcome: Ongoing  12/19/2019 2113 by Baylee Pike RN  Outcome: Ongoing  12/19/2019 2004 by Lia Hogue RCP  Outcome: Ongoing     Problem: MECHANICAL VENTILATION  Goal: Patient will maintain patent airway  12/20/2019 0815 by Kathya Queen RN  Outcome: Ongoing  12/19/2019 2113 by Mirian Muse RN  Outcome: Ongoing  12/19/2019 2004 by Daljit Nguyen RCP  Outcome: Ongoing  Goal: Oral health is maintained or improved  12/20/2019 0815 by Kathya Queen RN  Outcome: Ongoing  12/19/2019 2113 by Mirian Muse RN  Outcome: Ongoing  12/19/2019 2004 by Daljit Nguyen RCP  Outcome: Ongoing  Goal: ET tube will be managed safely  12/20/2019 0815 by Kathya Queen RN  Outcome: Ongoing  12/19/2019 2113 by Mirian Muse RN  Outcome: Ongoing  12/19/2019 2004 by Daljit Nguyen RCP  Outcome: Ongoing  Goal: Ability to express needs and understand communication  12/20/2019 0815 by Kathya Queen RN  Outcome: Ongoing  12/19/2019 2113 by Mirian Muse RN  Outcome: Ongoing  12/19/2019 2004 by Daljit Nguyen RCP  Outcome: Ongoing  Goal: Mobility/activity is maintained at optimum level for patient  12/20/2019 0815 by Kathya Queen RN  Outcome: Ongoing  12/19/2019 2113 by Mirian Muse RN  Outcome: Ongoing  12/19/2019 2004 by Daljit Nguyen RCP  Outcome: Ongoing     Problem: SKIN INTEGRITY  Goal: Skin integrity is maintained or improved  12/20/2019 0815 by Kathya Queen RN  Outcome: Ongoing  12/19/2019 2113 by Mirian Muse RN  Outcome: Ongoing  12/19/2019 2004 by Daljit Nguyen RCP  Outcome: Ongoing     Problem: Falls - Risk of:  Goal: Will remain free from falls  Description  Will remain free from falls  12/20/2019 0815 by Kathya Queen RN  Outcome: Ongoing  12/19/2019 2113 by Mirian Muse RN  Outcome: Ongoing  Goal: Absence of physical injury  Description  Absence of physical injury  12/20/2019 0815 by Kathya Queen RN  Outcome: Ongoing  12/19/2019 2113 by Mirian Muse RN  Outcome: Ongoing     Problem: Risk for Impaired Skin Integrity  Goal: Tissue integrity - skin and mucous membranes  Description  Structural intactness and normal physiological function of skin and  mucous membranes.   12/20/2019 0815 by Octavia Antonio RN  Outcome: Ongoing  12/19/2019 2113 by Walter Fernández RN  Outcome: Ongoing     Problem: Nutrition  Goal: Optimal nutrition therapy  Description  Nutrition Problem: Inadequate oral intake  Intervention: Food and/or Nutrient Delivery: Start Parenteral Nutrition  Nutritional Goals: Meet % of estimated nutrition needs   12/20/2019 0815 by Octavia Antonio RN  Outcome: Ongoing  12/19/2019 2113 by Walter Fernández RN  Outcome: Ongoing     Problem: Confusion - Acute:  Goal: Absence of continued neurological deterioration signs and symptoms  Description  Absence of continued neurological deterioration signs and symptoms  12/20/2019 0815 by Octavia Antonio RN  Outcome: Ongoing  12/19/2019 2113 by Walter Fernández RN  Outcome: Ongoing  Goal: Mental status will be restored to baseline  Description  Mental status will be restored to baseline  12/20/2019 0815 by Octavia Antonio RN  Outcome: Ongoing  12/19/2019 2113 by Walter Fernández RN  Outcome: Not Met This Shift     Problem: Discharge Planning:  Goal: Ability to perform activities of daily living will improve  Description  Ability to perform activities of daily living will improve  12/20/2019 0815 by Octavia Antonio RN  Outcome: Ongoing  12/19/2019 2113 by Walter Fernández RN  Outcome: Not Met This Shift  Goal: Participates in care planning  Description  Participates in care planning  12/20/2019 0815 by Octavia Antonio RN  Outcome: Ongoing  12/19/2019 2113 by Walter Fernández RN  Outcome: Not Met This Shift     Problem: Injury - Risk of, Physical Injury:  Goal: Will remain free from falls  Description  Will remain free from falls  12/20/2019 0815 by Octavia Antonio RN  Outcome: Ongoing  12/19/2019 2113 by Walter Fernández RN  Outcome: Ongoing  Goal: Absence of physical injury  Description  Absence of physical injury  12/20/2019 0815 by Octavia Antonio RN  Outcome: Ongoing  12/19/2019 2113 by Walter Fernández well-rested  12/20/2019 0815 by Yulia Tejada RN  Outcome: Ongoing  12/19/2019 2113 by Malia Duke RN  Outcome: Ongoing     Problem: Restraint Use - Nonviolent/Non-Self-Destructive Behavior:  Goal: Absence of restraint indications  Description  Absence of restraint indications  12/20/2019 0815 by Yulia Tejada RN  Outcome: Ongoing  12/19/2019 2113 by Malia Duke RN  Outcome: Not Met This Shift

## 2019-12-20 NOTE — PROGRESS NOTES
· Evaluation: Achieved   · Goals: Meet % of estimated nutrition needs   · Monitoring: PN Intake, PN Tolerance, Weight, Pertinent Labs, Wound Healing, Skin Integrity, I&O      Electronically signed by Maurice Balderrama RD, RYLIE on 12/20/19 at 2:38 PM    Contact Number: [phone]

## 2019-12-20 NOTE — PROGRESS NOTES
Critical Care Team - Daily Progress Note      Date and time: 2019 1:05 PM  Patient's name:  Shirlene Dexter  Medical Record Number: [id number]  Patient's account/billing number: [de-identified]  Patient's YOB: 1985  Age: 37 y.o. Date of Admission: 2019  3:39 PM  Length of stay during current admission: 21      Primary Care Physician: No primary care provider on file. ICU Attending Physician: Dr. Uribe Diver Status: DNR-CCA    Reason for ICU admission: Necrotizing Fascitis and acute renal failure      SUBJECTIVE:     OVERNIGHT EVENTS:     Patient was hypertensive overnight. Received PRN labetalol     Midodrine held for now. Mentation: Awake off sedation     Fever:-  Temp (24hrs), Av.2 °F (36.8 °C), Min:97.5 °F (36.4 °C), Max:98.6 °F (37 °C)    Secretions: Small    Blood pressure: In 150s/90s  Vasopressors: None  Systolic (70GUR), QZL:215 , Min:109 , NNP:131     Diastolic (32MKX), LMP:04, Min:60, Max:129      Point of care ultrasound for IVC: Not indicated    Urine output in the last 24 hours: In: 2100.9 [I.V.:403.6; NG/GT:80]  Out: 680 [Urine:630]  Net since admission: + 21 634  Patient Vitals for the past 96 hrs (Last 3 readings):   Weight   19 0331 264 lb 8.8 oz (120 kg)   19 0900 131 lb 13.4 oz (59.8 kg)   19 1515 (!) 398 lb 5.9 oz (180.7 kg)     ABG/Ventilator settings   Currently being weaned off.     Fluids: TPN at 80 mL/h  Feeding: TPN, n.p.o. currently because of sedation  Analgesics: Fentanyl Androxy  Sedation: Propofol  DVT Prophylaxis: Heparin  Mobilization: PT/OT following  Head Up: Elevated  GI Prophylaxis: Present  Glycemic Control: Controlled  Spontaneous breathing trial: Ongoing, tolerating well  Bowel management: Milk of magnesia as needed    AWAKE & FOLLOWING COMMANDS:  [] No   [x] Yes    CURRENT VENTILATION STATUS:     [x] Ventilator  [] BIPAP  [] Nasal Cannula [] Room Air        SECRETIONS Amount:  [x] Small [] Moderate  [] Large  [] None  Color:     [] PRN  glucagon (rDNA), 1 mg, PRN  dextrose, 100 mL/hr, PRN  midodrine, 5 mg, PRN  albumin human, 25 g, PRN  heparin (porcine), 1,400 Units, PRN  heparin (porcine), 1,300 Units, PRN  LORazepam, 1 mg, Q6H PRN  sodium chloride, 250 mL, PRN  sodium chloride, 150 mL, PRN  albumin human, 25 g, PRN  sodium chloride flush, 10 mL, PRN  potassium chloride, 40 mEq, PRN    Or  potassium alternative oral replacement, 40 mEq, PRN    Or  potassium chloride, 10 mEq, PRN  acetaminophen, 650 mg, Q4H PRN  REFRESH LACRI-LUBE, , PRN  magnesium hydroxide, 30 mL, Daily PRN  ondansetron, 4 mg, Q6H PRN          VENT SETTINGS (Comprehensive) (if applicable):  Vent Information  $Ventilation: $Subsequent Day  Ventilator Started: Yes  Skin Assessment: Clean, dry, & intact  Equipment ID: [id number]  Equipment Changed: Expiratory Filter  Vent Type: Servo i  Vent Mode: PRVC  Vt Ordered: 530 mL  Rate Set: 24 bmp  Pressure Support: 6 cmH20  FiO2 : 40 %  Sensitivity: 2  PEEP/CPAP: 8  I Time/ I Time %: 0.9 s  Cuff Pressure (cm H2O): 28 cm H2O  Humidification Source: Heated wire  Humidification Temp: 37  Humidification Temp Measured: 37.5  Circuit Condensation: Drained  Nitric Oxide/Epoprostenol In Use?: No  Additional Respiratory  Assessments  Pulse: 94  Resp: 12  SpO2: 100 %  End Tidal CO2: 35 (%)  pCO2 (TCOM, mmHg): 53 mmHg  Position: Semi-Pagan's  Humidification Source: Heated wire  Humidification Temp: 37  Circuit Condensation: Drained  Oral Care Completed?: Yes  Oral Care: Mouthwash  Subglottic Suction Done?: Yes  Cuff Pressure (cm H2O): 28 cm H2O  Skin barrier applied: No    Laboratory findings:    Complete Blood Count:   Recent Labs     12/18/19  0548  12/19/19  0424 12/19/19  1141 12/20/19  0458   WBC 11.3  --  14.0*  --  11.8*   HGB 6.8*   < > 6.9* 8.3* 7.8*   HCT 23.8*   < > 22.5* 27.3* 24.8*     --  220  --  220    < > = values in this interval not displayed.         Last 3 Blood Glucose:   Recent Labs     12/18/19  0548 12/19/19  4216 Midnight)    HOME MEDICATIONS RECONCILED: [] No  [x] Yes    INSULIN DRIP:   [x] No   [] Yes    CONSULTATION NEEDED:  [] No   [x] Yes    FAMILY UPDATED:    [] No   [x] Yes    TRANSFER OUT OF ICU:   [x] No   [] Yes    ADDITIONAL PLAN:    1. Pressure ulcer cant be staged present on admission / Necrotizing Faciitis involving buttocks and scrotum -   S/p bed side I&D: I&D buttock, scrotal and perianal abscess  s/p POD#21 s/p wide complex debridement of gluteal/perianal region. region extensive necrotic tissue that involved the anus and distal rectum.   Had  debridement, washout of gluteal and perineal, diverting loop colostomy   Bedside debridement done on 12/4, dressing changes with him on site will continue to do wet-to-dry dressing changes as per  surgery. - continue meropenem (started 12/1) per ID   - (12/10) Repeat debridement with general surgery and urology   meropenem discontinued 12/18  -OR on 12/20/2019    2.  Septic shock -resolved, off pressors  - Blood cultures drawn 12/2/19:  No growth      3. DEBBY on CKD   Urology recs: Meroponen discontinued on 12/18   -Urine culture : Grew Proteus mirabilis, ID is aware of it  -Nephrology recs:  Almost daily dialysis  Replacing electrolyte  . - Started on moderate Hyperkalemia treatment protocol   Resolved  -Hemodialysis today after 1  Renal ultrasound: Right renal cyst, otherwise grossly negative renal   Ultrasound, non diagnostic bladder assessment.       4. Diabetes mellitus type II-high-dose correction POC glucose checks every 4 to every 6, check back to q 4 if the glucose levels constantly remains high over 200   - Hypoglycemia treatment protocol      5. Diabetic left foot ulcer -wound care on board.  No surgical intervention for now     6. Anemia: Hgb (6.6.0),   received  12 uPRBC since admission      7. Respiratory failure. Due to sepsis. Improving. Patient intubated and sedated.   Continuing weaning off the vent and if doing well, will extubate  Fentanyl cut down

## 2019-12-21 ENCOUNTER — APPOINTMENT (OUTPATIENT)
Dept: GENERAL RADIOLOGY | Age: 34
DRG: 463 | End: 2019-12-21
Attending: INTERNAL MEDICINE
Payer: MEDICARE

## 2019-12-21 LAB
ABSOLUTE EOS #: 1.49 K/UL (ref 0–0.4)
ABSOLUTE IMMATURE GRANULOCYTE: 0 K/UL (ref 0–0.3)
ABSOLUTE LYMPH #: 0.65 K/UL (ref 1–4.8)
ABSOLUTE MONO #: 0.37 K/UL (ref 0.1–0.8)
ALLEN TEST: POSITIVE
ANION GAP SERPL CALCULATED.3IONS-SCNC: 11 MMOL/L (ref 9–17)
BASOPHILS # BLD: 0 % (ref 0–2)
BASOPHILS ABSOLUTE: 0 K/UL (ref 0–0.2)
BUN BLDV-MCNC: 58 MG/DL (ref 6–20)
BUN/CREAT BLD: ABNORMAL (ref 9–20)
CALCIUM SERPL-MCNC: 7.9 MG/DL (ref 8.6–10.4)
CHLORIDE BLD-SCNC: 96 MMOL/L (ref 98–107)
CO2: 21 MMOL/L (ref 20–31)
CREAT SERPL-MCNC: 2.03 MG/DL (ref 0.7–1.2)
DIFFERENTIAL TYPE: ABNORMAL
EOSINOPHILS RELATIVE PERCENT: 16 % (ref 1–4)
FIO2: 2
GFR AFRICAN AMERICAN: 45 ML/MIN
GFR NON-AFRICAN AMERICAN: 37 ML/MIN
GFR SERPL CREATININE-BSD FRML MDRD: ABNORMAL ML/MIN/{1.73_M2}
GFR SERPL CREATININE-BSD FRML MDRD: ABNORMAL ML/MIN/{1.73_M2}
GLUCOSE BLD-MCNC: 175 MG/DL (ref 75–110)
GLUCOSE BLD-MCNC: 176 MG/DL (ref 75–110)
GLUCOSE BLD-MCNC: 181 MG/DL (ref 70–99)
GLUCOSE BLD-MCNC: 187 MG/DL (ref 75–110)
GLUCOSE BLD-MCNC: 241 MG/DL (ref 75–110)
HCT VFR BLD CALC: 25 % (ref 40.7–50.3)
HEMOGLOBIN: 7.7 G/DL (ref 13–17)
IMMATURE GRANULOCYTES: 0 %
LYMPHOCYTES # BLD: 7 % (ref 24–44)
MCH RBC QN AUTO: 28.6 PG (ref 25.2–33.5)
MCHC RBC AUTO-ENTMCNC: 30.8 G/DL (ref 28.4–34.8)
MCV RBC AUTO: 92.9 FL (ref 82.6–102.9)
MODE: ABNORMAL
MONOCYTES # BLD: 4 % (ref 1–7)
MORPHOLOGY: ABNORMAL
MORPHOLOGY: ABNORMAL
NEGATIVE BASE EXCESS, ART: 2 (ref 0–2)
NRBC AUTOMATED: 0.4 PER 100 WBC
O2 DEVICE/FLOW/%: ABNORMAL
PATIENT TEMP: ABNORMAL
PDW BLD-RTO: 22.5 % (ref 11.8–14.4)
PLATELET # BLD: 181 K/UL (ref 138–453)
PLATELET ESTIMATE: ABNORMAL
PMV BLD AUTO: 9 FL (ref 8.1–13.5)
POC HCO3: 22.3 MMOL/L (ref 21–28)
POC O2 SATURATION: 96 % (ref 94–98)
POC PCO2 TEMP: ABNORMAL MM HG
POC PCO2: 34.6 MM HG (ref 35–48)
POC PH TEMP: ABNORMAL
POC PH: 7.42 (ref 7.35–7.45)
POC PO2 TEMP: ABNORMAL MM HG
POC PO2: 80.5 MM HG (ref 83–108)
POSITIVE BASE EXCESS, ART: ABNORMAL (ref 0–3)
POTASSIUM SERPL-SCNC: 3.2 MMOL/L (ref 3.7–5.3)
RBC # BLD: 2.69 M/UL (ref 4.21–5.77)
RBC # BLD: ABNORMAL 10*6/UL
SAMPLE SITE: ABNORMAL
SEG NEUTROPHILS: 73 % (ref 36–66)
SEGMENTED NEUTROPHILS ABSOLUTE COUNT: 6.79 K/UL (ref 1.8–7.7)
SODIUM BLD-SCNC: 128 MMOL/L (ref 135–144)
TCO2 (CALC), ART: 23 MMOL/L (ref 22–29)
WBC # BLD: 9.3 K/UL (ref 3.5–11.3)
WBC # BLD: ABNORMAL 10*3/UL

## 2019-12-21 PROCEDURE — 2500000003 HC RX 250 WO HCPCS: Performed by: INTERNAL MEDICINE

## 2019-12-21 PROCEDURE — 2700000000 HC OXYGEN THERAPY PER DAY

## 2019-12-21 PROCEDURE — 6370000000 HC RX 637 (ALT 250 FOR IP): Performed by: EMERGENCY MEDICINE

## 2019-12-21 PROCEDURE — 85025 COMPLETE CBC W/AUTO DIFF WBC: CPT

## 2019-12-21 PROCEDURE — 74018 RADEX ABDOMEN 1 VIEW: CPT

## 2019-12-21 PROCEDURE — 6360000002 HC RX W HCPCS: Performed by: STUDENT IN AN ORGANIZED HEALTH CARE EDUCATION/TRAINING PROGRAM

## 2019-12-21 PROCEDURE — 36600 WITHDRAWAL OF ARTERIAL BLOOD: CPT

## 2019-12-21 PROCEDURE — 6370000000 HC RX 637 (ALT 250 FOR IP): Performed by: STUDENT IN AN ORGANIZED HEALTH CARE EDUCATION/TRAINING PROGRAM

## 2019-12-21 PROCEDURE — 94761 N-INVAS EAR/PLS OXIMETRY MLT: CPT

## 2019-12-21 PROCEDURE — 94003 VENT MGMT INPAT SUBQ DAY: CPT

## 2019-12-21 PROCEDURE — 99291 CRITICAL CARE FIRST HOUR: CPT | Performed by: INTERNAL MEDICINE

## 2019-12-21 PROCEDURE — 94770 HC ETCO2 MONITOR DAILY: CPT

## 2019-12-21 PROCEDURE — 36415 COLL VENOUS BLD VENIPUNCTURE: CPT

## 2019-12-21 PROCEDURE — 2500000003 HC RX 250 WO HCPCS: Performed by: STUDENT IN AN ORGANIZED HEALTH CARE EDUCATION/TRAINING PROGRAM

## 2019-12-21 PROCEDURE — 2000000000 HC ICU R&B

## 2019-12-21 PROCEDURE — 80048 BASIC METABOLIC PNL TOTAL CA: CPT

## 2019-12-21 PROCEDURE — 82947 ASSAY GLUCOSE BLOOD QUANT: CPT

## 2019-12-21 PROCEDURE — 2580000003 HC RX 258: Performed by: STUDENT IN AN ORGANIZED HEALTH CARE EDUCATION/TRAINING PROGRAM

## 2019-12-21 PROCEDURE — 99232 SBSQ HOSP IP/OBS MODERATE 35: CPT | Performed by: INTERNAL MEDICINE

## 2019-12-21 PROCEDURE — 82803 BLOOD GASES ANY COMBINATION: CPT

## 2019-12-21 RX ORDER — CARVEDILOL 6.25 MG/1
6.25 TABLET ORAL DAILY
Status: DISCONTINUED | OUTPATIENT
Start: 2019-12-21 | End: 2020-01-01

## 2019-12-21 RX ADMIN — OXYCODONE HYDROCHLORIDE 10 MG: 5 SOLUTION ORAL at 00:25

## 2019-12-21 RX ADMIN — PROPOFOL 20 MCG/KG/MIN: 10 INJECTION, EMULSION INTRAVENOUS at 03:44

## 2019-12-21 RX ADMIN — POTASSIUM BICARBONATE 40 MEQ: 782 TABLET, EFFERVESCENT ORAL at 11:50

## 2019-12-21 RX ADMIN — INSULIN LISPRO 6 UNITS: 100 INJECTION, SOLUTION INTRAVENOUS; SUBCUTANEOUS at 21:15

## 2019-12-21 RX ADMIN — SODIUM CHLORIDE, PRESERVATIVE FREE 10 ML: 5 INJECTION INTRAVENOUS at 08:24

## 2019-12-21 RX ADMIN — Medication: at 08:25

## 2019-12-21 RX ADMIN — Medication 15 ML: at 08:24

## 2019-12-21 RX ADMIN — OXYCODONE HYDROCHLORIDE 10 MG: 5 SOLUTION ORAL at 11:50

## 2019-12-21 RX ADMIN — INSULIN LISPRO 3 UNITS: 100 INJECTION, SOLUTION INTRAVENOUS; SUBCUTANEOUS at 09:57

## 2019-12-21 RX ADMIN — HEPARIN SODIUM 5000 UNITS: 5000 INJECTION INTRAVENOUS; SUBCUTANEOUS at 21:15

## 2019-12-21 RX ADMIN — Medication 75 MCG/HR: at 12:37

## 2019-12-21 RX ADMIN — CARVEDILOL 6.25 MG: 6.25 TABLET, FILM COATED ORAL at 12:53

## 2019-12-21 RX ADMIN — OXYCODONE HYDROCHLORIDE 10 MG: 5 SOLUTION ORAL at 21:24

## 2019-12-21 RX ADMIN — Medication 100 MCG/HR: at 04:01

## 2019-12-21 RX ADMIN — SODIUM CHLORIDE, PRESERVATIVE FREE 10 ML: 5 INJECTION INTRAVENOUS at 21:21

## 2019-12-21 RX ADMIN — OXYCODONE HYDROCHLORIDE 10 MG: 5 SOLUTION ORAL at 04:20

## 2019-12-21 RX ADMIN — PROPOFOL 20 MCG/KG/MIN: 10 INJECTION, EMULSION INTRAVENOUS at 08:21

## 2019-12-21 RX ADMIN — HEPARIN SODIUM 5000 UNITS: 5000 INJECTION INTRAVENOUS; SUBCUTANEOUS at 14:00

## 2019-12-21 RX ADMIN — FOLIC ACID 1 MG: 1 TABLET ORAL at 08:25

## 2019-12-21 RX ADMIN — OXYCODONE HYDROCHLORIDE 10 MG: 5 SOLUTION ORAL at 23:53

## 2019-12-21 RX ADMIN — OXYCODONE HYDROCHLORIDE 10 MG: 5 SOLUTION ORAL at 15:23

## 2019-12-21 RX ADMIN — FAMOTIDINE 20 MG: 20 TABLET, FILM COATED ORAL at 08:25

## 2019-12-21 RX ADMIN — OXYCODONE HYDROCHLORIDE 10 MG: 5 SOLUTION ORAL at 08:27

## 2019-12-21 RX ADMIN — HEPARIN SODIUM 5000 UNITS: 5000 INJECTION INTRAVENOUS; SUBCUTANEOUS at 06:25

## 2019-12-21 RX ADMIN — INSULIN LISPRO 3 UNITS: 100 INJECTION, SOLUTION INTRAVENOUS; SUBCUTANEOUS at 14:00

## 2019-12-21 RX ADMIN — INSULIN LISPRO 3 UNITS: 100 INJECTION, SOLUTION INTRAVENOUS; SUBCUTANEOUS at 02:29

## 2019-12-21 RX ADMIN — POTASSIUM CHLORIDE: 2 INJECTION, SOLUTION, CONCENTRATE INTRAVENOUS at 18:26

## 2019-12-21 RX ADMIN — I.V. FAT EMULSION 100 ML: 20 EMULSION INTRAVENOUS at 18:24

## 2019-12-21 ASSESSMENT — PULMONARY FUNCTION TESTS
PIF_VALUE: 22
PIF_VALUE: 22
PIF_VALUE: 14
PIF_VALUE: 16
PIF_VALUE: 12

## 2019-12-21 NOTE — PROGRESS NOTES
Renal Progress Note    Patient :  Bola Byers; 37 y.o. MRN# [id number]  Location:  0128/0128-01  Attending:  Shahab Carbone MD  Admit Date:  11/29/2019   Hospital Day: 22    Subjective:     Patient was seen and examined. No new issues reported overnight. S/P repeat OR 12/20/2019 debridement of gluteal and perineal wounds to muscles, along with urology exam under anesthesia. General surgery suggesting consider transfer to a facility with in house reconstructive plastics to take care of the would/reconstruction. Finished dialysis early this am, well tolerated, issue was increased venous pressures, but surgery was ok with use of heparin to given heavy clotting of dialyzer and venous chamber. Status post extubation today. Doing better. [address]. Outpatient Medications:     No medications prior to admission.     Current Medications:     Scheduled Meds:    oxyCODONE  10 mg Oral H1F    CENTRUM/CERTA-CHANCE with minerals oral  15 mL Oral Daily    fat emulsion  100 mL Intravenous Daily    alteplase  1 mg Intracatheter Once    insulin lispro  0-18 Units Subcutaneous Z5G    folic acid  1 mg Oral Daily    povidone-iodine   Topical Daily    midodrine  10 mg Oral TID WC    famotidine  20 mg Per NG tube Daily    sodium chloride flush  10 mL Intravenous 2 times per day    heparin (porcine)  5,000 Units Subcutaneous 3 times per day     Continuous Infusions:    propofol Stopped (12/21/19 1002)    PN-Adult 2-in-1 Central Line (Standard) 80 mL/hr at 12/20/19 1909    sodium chloride      dextrose      fentaNYL 75 mcg/hr (12/21/19 1126)    sodium chloride 10 mL/hr at 12/19/19 0051     PRN Meds:  labetalol, heparin (porcine), heparin (porcine), glucose, dextrose, glucagon (rDNA), dextrose, midodrine, albumin human, heparin (porcine), heparin (porcine), LORazepam, sodium chloride, sodium chloride, albumin human, sodium chloride flush, potassium chloride **OR** potassium alternative oral replacement **OR** potassium Magnesium:    Recent Labs     12/20/19  0458   MG 1.9     MARINE:      Lab Results   Component Value Date    MARINE NEGATIVE 11/30/2019     SPEP:  Lab Results   Component Value Date    PROT 5.3 12/01/2019    PATH ELECTRONICALLY SIGNED. Isrrael Valencia M.D. 11/30/2019     C3:     Lab Results   Component Value Date    C3 97 11/30/2019     C4:     Lab Results   Component Value Date    C4 12 11/30/2019     Hep BsAg:         Lab Results   Component Value Date    HEPBSAG NONREACTIVE 11/30/2019     Hep C AB:          Lab Results   Component Value Date    HEPCAB NONREACTIVE 11/30/2019       Urinalysis/Chemistries:      Lab Results   Component Value Date    NITRU NEGATIVE 11/29/2019    COLORU YELLOW 11/29/2019    PHUR 5.0 11/29/2019    WBCUA 5 TO 10 11/29/2019    RBCUA 0 TO 2 11/29/2019    MUCUS NOT REPORTED 11/29/2019    TRICHOMONAS NOT REPORTED 11/29/2019    YEAST NOT REPORTED 11/29/2019    BACTERIA NOT REPORTED 11/29/2019    SPECGRAV 1.015 11/29/2019    LEUKOCYTESUR NEGATIVE 11/29/2019    UROBILINOGEN Normal 11/29/2019    BILIRUBINUR NEGATIVE 11/29/2019    GLUCOSEU NEGATIVE 11/29/2019    KETUA NEGATIVE 11/29/2019    AMORPHOUS NOT REPORTED 11/29/2019     Urine Sodium:     Lab Results   Component Value Date    DONOVAN 39 11/30/2019       Urine Creatinine:     Lab Results   Component Value Date    LABCREA 116.4 11/30/2019     Radiology:     Reviewed. Assessment:     1.  Acute Kidney Injury: Secondary to ischemic ATN from sepsis syndrome from necrotizing fasciitis. He has been dialysis dependent. 2.  Chronic kidney diseae stage III from diabetic nephrosclerosis baseline 1.6-1.8 MG/DL. Continue normal  3.  Necrotizing fasciitis status post wide complex debridement of gluteal, perineal region and open colostomy. 4.  Respiratory Failure: On ventilator   5.  Protein calorie malnutrition  6.  Fluid overload: Continue to remove fluid on dialysis as tolerated  7. S/P diverting colostomy strict  8.   Anemia requiring blood transfusion. Plan:   1. Patient will get regular hemodialysis tomorrow a.m.. 2.  Daily weights. 3.  Will use midodrine and albumin as needed for hypotension. 4.  BMP in a.m.  5.  Antibiotics as per renal function. 6.  Will follow. Patient's nurse was updated. Nutrition   Please ensure that patient is on a renal diet/TF. Avoid nephrotoxic drugs/contrast exposure. We will continue to follow along with you. MIRTA Ordoñez  [facility]       Attending Physician Statement  I have discussed the care of this patient, including pertinent history and exam findings, with the Resident/CNP. I have reviewed and edited the key elements of all parts of the encounter with the Resident/CNP. I agree with the assessment, plan and orders as documented by the Resident/CNP. Leonel Crouch MD   Nephrology [address] [place] [address]

## 2019-12-21 NOTE — PROGRESS NOTES
[facility] - Progress Note    Today's Date and Time: 12/21/2019, 12:46 AM    Impression :   · Necrotizing fasciitis 11-29-19  · S/P perirectal I&D. Wide complex excisional debridement of gluteal and perineal areas on 11-29-19  · S/P debridement, washout of gluteal and perianal areas, diverting colostomy 12-3-19.   · S/P debridement, washout of gluteal and perianal areas, diverting colostomy 12-6-19.  · S/P Incision and debridement of necrotizing fasciitis buttock wound, scrotum, bilateral groin region and closure of midline abdominal wound 12-8-19   · S/P Incision and debridement of necrotizing fasciitis buttock wound, scrotum, bilateral groin region on 12-20-19. · Lactic acidosis  · DM 2  · HTN  · Hx of Low LVEF (20%) as per family  · DEBBY  · Fluid overload    Recommendations:     · D/C Meropenem 12-18-19  · Monitor off antibiotics, except for 1 dose of meropenem on call to OR on 12-20-19  · Wound Care as per Gen surgery. Overall prognosis remains very guarded. Medical Decision Making/Summary/Discussion:12/21/2019     · Patient with DM 2, prior diabetic foot infection  · Presented to [address] [place] [address] ER generalized weakness for the past few days  · Found to have soft tissue necrosis with subcutaneous emphysema in gluteal areas and perineum  · S/P I&D and wide complex debridement of affected tissues on 11-29-19  · Showing hypotension, requiring fluids and a vasopressor  · Cultures in progress  · Will cover with Zosyn. Wounds with Strep spp. And Gram negative bacilli . No Staph spp. 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    · One more debridement in OR scheduled for 12-10-19 and then decision will be made to change code infection has spread to deep planes with the urethra less viable. One more debridement in OR scheduled for 12-10-19 and then decision will be made to change code status or not. Pt did not tolerate HD on 12-9. It was stopped early and pt required vasopressor support with Levophed, remains on vent. Pt to OR 12-10 for further debridement      CURRENT EVALUATION :12/21/2019   Alert extubated, still lethargic not able to respond well. Abdomen with VAC, groin area with multiple wounds in the anterior and the posterior pelvic area. Right foot also with a wound that is dressed. All his wounds are pale  Not short of breath. Buttock wound is showing multi-gram-negative jayme 11/29/2019  Very poor outcome. DNR CC  Case discussed with nurse        Cultures:  Urine:  · NA  Blood:  · 11-30-19: no growth  · 12-2-19: no growth  Sputum :  · NA  Wound:  · 11-29-19: Strep ssp and Gram negative bacilli       MRSA probe: Pos    12-13-19: Abdominal, groin wounds:              12-11-19:          12-7-19: Left foot:      12-4-19:      11-29-19:      I have personally reviewed the past medical history, past surgical history, medications, social history, and family history, and I have updated the database accordingly.   Past Medical History:     Past Medical History:   Diagnosis Date    [facility]estive [facility] failure[facility]     Diabetes mellitus ([facility]     Hypertension     Spina bifida aperta of lumbar spine ([facility]        Past Surgical  History:     Past Surgical History:   Procedure Laterality Date    ABDOMEN SURGERY N/A 12/8/2019    DEBRIDEMENT  NECROTIZING FASCIITIS BUTTOCK, WOUND VAC REMOVAL DELAYED PRIMARY CLOSURE OF MIDLINE ABDOMINAL INCISION, OSTOMY BAG CHANGE performed by Emanuel Vora MD at [address] N/A 12/10/2019    DEBRIDEMENT OF SACRAL WOUND performed by Cruz Gray MD at [facility] 12/3/2019    LAPAROSCOPIC CONVERTED TO OPEN DIVERTING LOOP COLOSTOMY; WOUND VAC APPLICATION performed by Nargis Ríos MD at [address] [place] [address] Bilateral 11/29/2019    RECTAL PERIRECTAL INCISION AND DRAINAGE, [facility] LOOP COLOSTOMY CREATION performed by Tuyet Dickey MD at [address] [place] [address] N/A 12/6/2019    DEBRIDEMENT NECROTIZING FASCIAITIS BILAT BUTTOCK performed by Izabela Castanon MD at [place] [address] N/A 12/10/2019    SCROTAL EXPLORATION WITH SCROTAL DEBRIDEMENT performed by Tate Cochran MD at [facility] OR       Medications:      oxyCODONE  10 mg Oral W1F    CENTRUM/CERTA-CHANCE with minerals oral  15 mL Oral Daily    fat emulsion  100 mL Intravenous Daily    alteplase  1 mg Intracatheter Once    insulin lispro  0-18 Units Subcutaneous Q6H    thiamine  100 mg Oral Daily    folic acid  1 mg Oral Daily    povidone-iodine   Topical Daily    midodrine  10 mg Oral TID WC    famotidine  20 mg Per NG tube Daily    sodium chloride flush  10 mL Intravenous 2 times per day    heparin (porcine)  5,000 Units Subcutaneous 3 times per day       Social History:     Social History     Socioeconomic History    Marital status: Unknown     Spouse name: Not on file    Number of children: Not on file    Years of education: Not on file    Highest education level: Not on file   Occupational History    Not on file   Social Needs    Financial resource strain: Not on file    Food insecurity:     Worry: Not on file     Inability: Not on file    Transportation needs:     Medical: Not on file     Non-medical: Not on file   Tobacco Use    Smoking status: Not on file   Substance and Sexual Activity    Alcohol use: Not on file    Drug use: Not on file    Sexual activity: Not on file   Lifestyle    Physical activity:     Days per week: Not on file     Minutes per session: Not on file    Stress: Not on file   Relationships    Social connections:     Talks on phone: Not on file     Gets together: Not on file     Attends Anabaptist service: Not on file     Active member of club or organization: Not on file     Attends meetings of clubs or organizations: Not on file     Relationship status: Not on file    Intimate partner violence:     Fear of current or ex partner: Not on file     Emotionally abused: Not on file     Physically abused: Not on file     Forced sexual activity: Not on file   Other Topics Concern    Not on file   Social History Narrative    Not on file       Family History:   No family history on file. Allergies:   Patient has no known allergies. Review of Systems:     Review of Systems   Unable to perform ROS: Mental status change   Constitutional: Positive for activity change.            Physical Examination :     Patient Vitals for the past 8 hrs:   BP Temp Temp src Pulse Resp SpO2 Weight   12/21/19 0004 -- -- -- 99 23 100 % --   12/21/19 0000 -- 98.1 °F (36.7 °C) Oral -- -- -- --   12/20/19 2300 122/70 -- -- 89 24 100 % --   12/20/19 2200 132/71 -- -- 94 24 100 % --   12/20/19 2115 137/80 96.8 °F (36 °C) -- 102 -- -- --   12/20/19 2100 122/71 -- -- 87 24 100 % --   12/20/19 2045 136/77 96.8 °F (36 °C) -- 94 -- -- --   12/20/19 2038 -- -- -- 87 24 100 % --   12/20/19 2030 137/75 96.8 °F (36 °C) -- 89 -- -- --   12/20/19 2015 131/71 96.8 °F (36 °C) -- 98 -- -- --   12/20/19 2000 126/69 97.7 °F (36.5 °C) Oral 93 14 100 % --   12/20/19 1930 123/67 -- -- 94 -- -- --   12/20/19 1915 -- -- -- 93 -- -- --   12/20/19 1900 121/63 -- -- 95 -- -- --   12/20/19 1845 128/72 -- -- 88 -- -- --   12/20/19 1830 115/61 -- -- 98 -- -- --   12/20/19 1815 134/77 -- -- 94 -- -- --   12/20/19 1800 126/69 -- -- 85 -- -- --   12/20/19 1755 131/70 -- -- 86 27 -- (!) 402 lb 1.9 oz (182.4 kg)   12/20/19 1745 134/71 -- -- 89 -- -- --   12/20/19 1738 -- -- -- 94 -- -- --   12/20/19 1737 -- -- -- 93 -- -- --   12/20/19 1736 -- -- -- 94 -- -- --   12/20/19 1735 -- -- -- 91 -- -- --   12/20/19 1734 -- -- -- 92 -- -- --   12/20/19 1730 135/81 -- -- 91 -- 100 % --   12/20/19 1715 128/68 -- -- 88 -- -- --   12/20/19 1700 (!) 144/79 -- -- 93 -- 100 % --     Physical Exam  Constitutional:       Appearance: Normal appearance. He is not toxic-appearing or diaphoretic. HENT:      Head: Normocephalic and atraumatic. Nose: No congestion or rhinorrhea. Eyes:      General: No scleral icterus. Pupils: Pupils are equal, round, and reactive to light. Neck:      Musculoskeletal: Neck supple. No neck rigidity. Cardiovascular:      Rate and Rhythm: Normal rate and regular rhythm. Heart sounds: Normal heart sounds. No murmur. Pulmonary:      Effort: Pulmonary effort is normal. No respiratory distress. Breath sounds: Normal breath sounds. No stridor. Abdominal:      General: Abdomen is flat. There is no distension. Palpations: There is no mass. Tenderness: There is no tenderness. Comments: Abdominal VAC in place   Genitourinary:     Comments: No Ambrosio in place. Musculoskeletal:         General: No swelling or tenderness. Skin:     General: Skin is warm and dry. Comments: Multiple wounds, dressed   Neurological:      General: No focal deficit present. Mental Status: He is alert. Comments:  The patient is alert extubated still lethargic   Psychiatric:      Comments: Calm at this point           Medical Decision Making -Laboratory:   I have independently reviewed/ordered the following labs:    CBC with Differential:   Recent Labs     12/19/19  0424 12/19/19  1141 12/20/19  0458   WBC 14.0*  --  11.8*   HGB 6.9* 8.3* 7.8*   HCT 22.5* 27.3* 24.8*     --  220   LYMPHOPCT 3*  --  9*   MONOPCT 7  --  9*     BMP:   Recent Labs     12/18/19  0548 12/19/19  0424 12/20/19  0458   * 130* 130*   K 4.3 3.9 4.2    102 102   CO2 16* 18* 15*   BUN 65* 54* 76*   CREATININE 2.01* 1.86* 2.49*   MG 2.0  --  1.9     Hepatic Function Panel:   No results for input(s): PROT, LABALBU, BILIDIR, IBILI, BILITOT, ALKPHOS, ALT, AST in the soft tissue emphysema involving both the right and left buttock extending to the perineum tissue thickening is seen especially on the left involving the left buttock. CT PELVIS FINDINGS:        No distal ureteral calculi or bladder calculi. There is no free fluid in the pelvis. Catheter is seen within the urinary bladder. Osseous changes within the left hemipelvis which may be chronic in nature. IMPRESSION:    Extensive subcutaneous emphysema as described above involving both buttocks extending to the perineum.  The possibility of Ashley gangrene/necrotizing fasciitis cannot be excluded. Possible phlegmon or developing soft tissue abscess overlying the left lateral abdominal wall as noted above.  No organized abscess is seen however. Osseous changes within the left hemipelvis which may be chronic in nature. Results the study were called to Dr. Brandi Dixon [phone] on 11/29/2019  All CT scans at this facility use dose modulation, iterative reconstruction, and/or weight based dosing when appropriate to reduce radiation dose to as low as reasonably achievable. Medical Decision [id number]:   11/29/2019  8:17 PM - Jesus, Ana Incoming Lab Results From [facility]     Specimen Information: Buttock; Tissue        Component Collected Lab   Specimen Description 11/29/2019  7:[address]  [address]   . BUTTOCK BILATERAL TS    Special Requests 11/29/2019  7:[address]  [address]   NOT REPORTED    Direct Exam 11/29/2019  7:[address]  [address]   NO NEUTROPHILS SEEN    Direct Exam Abnormal  11/29/2019  7:34  [facility] [place]    Direct Exam Abnormal  11/29/2019  7:34 PM [address] NEGATIVE RODS    Culture 11/29/2019  7:34  [address]   PENDING          Marlen Hayes MD. Infectious Diseases

## 2019-12-21 NOTE — PROGRESS NOTES
Dialysis Post Treatment Note  Vitals:    12/21/19 0100   BP: (!) 142/86   Pulse: 100   Resp: 14   Temp:    SpO2: 99%     Pre-Weight = 182.4 kg   Post-weight = Weight: (!) 397 lb 0.8 oz (180.1 kg)  Total Liters Processed = Total Liters Processed (l/min): 69.4 l/min  Rinseback Volume (mL) = Rinseback Volume (ml): 370 ml  Net Removal (mL) = @[id number]  Type of access used=  CVC   Length of treatment=3.5    Patient tolerated treatment, heparin administered during treatment related to heavy clotting of dialyzer and venous chamber.

## 2019-12-21 NOTE — PROGRESS NOTES
Order Provides: 1294 kcal and 145 g pro/day   · Goal PN Orders Provides: 210 gms dextrose, 145 gms AA ~>1294 kcals, 145 gms protein  1494 kcal with lipids  · Additional Calories: Propofol at 14.4 ml/hr =380 kcal/day  · Anthropometric Measures:  · Ht: 5' 9\" (175.3 cm)   · Current Body Wt: 397 lb 0.8 oz (180.1 kg)  · Admission Body Wt: 291 lb 0.1 oz (132 kg)  · % Weight Change:  ,  difficult to accurately assess weight change d/t variable weights   · Ideal Body Wt: 160 lb 15 oz (73 kg), % Ideal Body 248%  · BMI Classification: BMI > or equal to 40.0 Obese Class III    Nutrition Interventions:   Continue Parenteral Nutrition  Continued Inpatient Monitoring, Education Not Indicated    Nutrition Evaluation:   · Evaluation: Progressing toward goals   · Goals: Meet % of estimated nutrition needs   · Monitoring: PN Intake, PN Tolerance, Weight, Pertinent Labs, Wound Healing, Skin Integrity, I&O      Electronically signed by Emeterio Cabot, RD, LD on 12/21/19 at 1:00 PM    Contact Number: [phone]

## 2019-12-21 NOTE — PROGRESS NOTES
[x] No                [] Yes  (C. Difficile status: [] positive                                                                                                                       [] negative                                                                                                                     [] pending)    VASOPRESSORS:  [x] No    [] Yes    If yes -   [] Levophed       [] Dopamine     [] Vasopressin       [] Dobutamine  [] Phenylephrine         [] Epinephrine    CENTRAL LINES:     []? No               [x]? Yes   (Date of Insertion:   )           If yes -     [x]? Right IJ             []? Left IJ         []? Right Femoral        []? Left Femoral                   []? Right Subclavian           []? Left Subclavian        DOSS'S CATHETER:   []? No                      [x]? Yes  (Date of Insertion:   )      URINE OUTPUT:            []? Good                  []? Low              [x]? Anuric      OBJECTIVE:     VITAL SIGNS:  BP (!) 142/86   Pulse 107   Temp 97.9 °F (36.6 °C) (Oral)   Resp 13   Ht 5' 9\" (1.753 m)   Wt (!) 397 lb 0.8 oz (180.1 kg)   SpO2 100%   BMI 58.63 kg/m²   Tmax over 24 hours:  Temp (24hrs), Av.9 °F (36.1 °C), Min:96.4 °F (35.8 °C), Max:98.5 °F (36.9 °C)      Patient Vitals for the past 6 hrs:   Temp Temp src Pulse Resp SpO2   19 0736 -- -- 107 13 100 %   19 0417 -- -- 100 22 100 %   19 0400 97.9 °F (36.6 °C) Oral -- -- --         Intake/Output Summary (Last 24 hours) at 2019 0802  Last data filed at 2019 0400  Gross per 24 hour   Intake 3953.81 ml   Output 3417 ml   Net 536.81 ml     Wt Readings from Last 2 Encounters:   19 (!) 397 lb 0.8 oz (180.1 kg)     Body mass index is 58.63 kg/m².         PHYSICAL EXAMINATION:    General appearance - alert and awake  Mental status - awake  Eyes - pupils equal and reactive, extraocular eye movements intact  Ears - bilateral TM's and external ear canals normal  Nose - normal and patent, no erythema, debridement of gluteal/perianal region. region extensive necrotic tissue that involved the anus and distal rectum.   Had  debridement, washout of gluteal and perineal, diverting loop colostomy   Bedside debridement done on 12/4, dressing changes with him on site will continue to do wet-to-dry dressing changes as per  surgery. - continue meropenem (started 12/1) per ID   - (12/10) Repeat debridement with general surgery and urology   meropenem discontinued 12/18  -OR on 12/20/2019. Had debridement only. No infection noted  - patient needs to be [facility] to [facility] or  for reconstructive surgery.     2.  Septic shock -resolved, off pressors  - Blood cultures drawn 12/2/19:  No growth      3. DEBBY on CKD   Urology recs: Meroponen discontinued on 12/18   -Urine culture : Grew Proteus mirabilis, ID is aware of it  -Nephrology recs:  Almost daily dialysis  Replacing electrolyte  . - Started on moderate Hyperkalemia treatment protocol   Resolved  -Hemodialysis done yesterday. Patient doing well    Renal ultrasound: Right renal cyst, otherwise grossly negative renal   Ultrasound, non diagnostic bladder assessment.       4. Diabetes mellitus type II-high-dose correction POC glucose checks every 4 to every 6, check back to q 4 if the glucose levels constantly remains high over 200   - Hypoglycemia treatment protocol      5. Diabetic left foot ulcer -wound care on board.  No surgical intervention for now     6. Anemia: Hgb - stable.   received  12 uPRBC since admission      7. Respiratory failure. Due to sepsis. Improving. Extubated 12/21  Continuing weaning off the vent and if doing well, will extubate  Fentanyl cut down to 75,  Wean off propofol as tolerated  gabriella 10 for pain q4.  flexiflo advanced. F/u on Xray and start tube feeds once surgery is ok with it.   Speech eval.    8. Essential Hypertension and tachycardia  Started coreg 6.25mg daily  PRN labetalol for Hypertension   Will get patient's home med

## 2019-12-21 NOTE — FLOWSHEET NOTE
RN attempts to insert flexi-flow tube in both R and L nares with no success. Dr Hill Sheets requested tube insertion to begin tube feedings. OG tube remains in place as of now.

## 2019-12-21 NOTE — PROGRESS NOTES
sedation. Not following commands, making purposeful movements   LUNGS: vented   HEART: +S1/S2, Sinus tachy  ABDOMEN: soft, ND, obese, ostomy is patent, bowel succus in appliance, no flatus. Kerlix Morales replaced in midline incision  SKIN/SOFT TISSUE: dressing remains intact, with no significant change in drainage, plan to change at bedside today   EXTREMITY: dressing to L foot is c/d/i. No cyanosis     I/O last 3 completed shifts:   In: 3953.8 [I.V.:1152.8; NG/GT:30]  Out: 5428 [Urine:317]    Drain/tube output:  In: 2209.4 [I.V.:764.7]  Out: 8626 [Urine:192]    LAB:  CBC:   Recent Labs     12/19/19  0424 12/19/19  1141 12/20/19  0458 12/21/19  0456   WBC 14.0*  --  11.8* 9.3   HGB 6.9* 8.3* 7.8* 7.7*   HCT 22.5* 27.3* 24.8* 25.0*   MCV 95.3  --  90.2 92.9     --  220 181     BMP:   Recent Labs     12/19/19  0424 12/20/19  0458 12/21/19  0456   * 130* 128*   K 3.9 4.2 3.2*    102 96*   CO2 18* 15* 21   BUN 54* 76* 58*   CREATININE 1.86* 2.49* 2.03*   GLUCOSE 190* 230* 181*         RADIOLOGY:   no new images   No      Alessia Del Rio, DO

## 2019-12-21 NOTE — PLAN OF CARE
[id number] by Mita Acosta RN  Outcome: Ongoing  Goal: Ability to express needs and understand communication  12/20/2019 2035 by Catherine Brand RN  Outcome: Ongoing  12/20/2019 0815 by Mita Acosta RN  Outcome: Ongoing  Goal: Mobility/activity is maintained at optimum level for patient  12/20/2019 2035 by Catherine Brand RN  Outcome: Ongoing  12/20/2019 0815 by Mita Acosta RN  Outcome: Ongoing     Problem: SKIN INTEGRITY  Goal: Skin integrity is maintained or improved  12/20/2019 2035 by Catherine Brand RN  Outcome: Ongoing  12/20/2019 0815 by Mita Acosta RN  Outcome: Ongoing     Problem: Falls - Risk of:  Goal: Will remain free from falls  Description  Will remain free from falls  12/20/2019 2035 by Catherine Brand RN  Outcome: Ongoing  12/20/2019 0815 by Mita Acosta RN  Outcome: Ongoing  Goal: Absence of physical injury  Description  Absence of physical injury  12/20/2019 2035 by Catherine Brand RN  Outcome: Ongoing  12/20/2019 0815 by Mita Acosta RN  Outcome: Ongoing     Problem: Risk for Impaired Skin Integrity  Goal: Tissue integrity - skin and mucous membranes  Description  Structural intactness and normal physiological function of skin and  mucous membranes.   12/20/2019 2035 by Catherine Brand RN  Outcome: Ongoing  12/20/2019 0815 by Mita Acosta RN  Outcome: Ongoing     Problem: Nutrition  Goal: Optimal nutrition therapy  Description  Nutrition Problem: Inadequate oral intake  Intervention: Food and/or Nutrient Delivery: Start Parenteral Nutrition  Nutritional Goals: Meet % of estimated nutrition needs   12/20/2019 2035 by Catherine Brand RN  Outcome: Ongoing  12/20/2019 0815 by Mita Acosta RN  Outcome: Ongoing     Problem: Confusion - Acute:  Goal: Absence of continued neurological deterioration signs and symptoms  Description  Absence of continued neurological deterioration signs and symptoms  12/20/2019 2035 by Catherine Brand RN  Outcome: Ongoing  12/20/2019 0815 by Shania Ingram RN  Outcome: Ongoing  Goal: Mental status will be restored to baseline  Description  Mental status will be restored to baseline  12/20/2019 0815 by Shania Ingram RN  Outcome: Ongoing     Problem: Injury - Risk of, Physical Injury:  Goal: Will remain free from falls  Description  Will remain free from falls  12/20/2019 2035 by Ericka Keyes RN  Outcome: Ongoing  12/20/2019 0815 by Shania Ingram RN  Outcome: Ongoing  Goal: Absence of physical injury  Description  Absence of physical injury  12/20/2019 2035 by Ericka Keyes RN  Outcome: Ongoing  12/20/2019 0815 by Shania Ingram RN  Outcome: Ongoing     Problem: Mood - Altered:  Goal: Mood stable  Description  Mood stable  12/20/2019 0815 by Shania Ingram RN  Outcome: Ongoing  Goal: Absence of abusive behavior  Description  Absence of abusive behavior  12/20/2019 0815 by Shania Ingram RN  Outcome: Ongoing  Goal: Verbalizations of feeling emotionally comfortable while being cared for will increase  Description  Verbalizations of feeling emotionally comfortable while being cared for will increase  12/20/2019 0815 by Shania Ingram RN  Outcome: Ongoing     Problem: Psychomotor Activity - Altered:  Goal: Absence of psychomotor disturbance signs and symptoms  Description  Absence of psychomotor disturbance signs and symptoms  12/20/2019 2035 by Ericka Keyes RN  Outcome: Ongoing  12/20/2019 0815 by Shania Ingram RN  Outcome: Ongoing     Problem: Sensory Perception - Impaired:  Goal: Demonstrations of improved sensory functioning will increase  Description  Demonstrations of improved sensory functioning will increase  12/20/2019 2035 by Ericka Keyes RN  Outcome: Ongoing  12/20/2019 0815 by Shania Ingram RN  Outcome: Ongoing  Goal: Decrease in sensory misperception frequency  Description  Decrease in sensory misperception frequency  12/20/2019 0815 by Shania Ingram RN  Outcome: Ongoing  Goal: Able to refrain from responding to false sensory perceptions  Description  Able to refrain from responding to false sensory perceptions  12/20/2019 0815 by Juliet Molina RN  Outcome: Ongoing  Goal: Demonstrates accurate environmental perceptions  Description  Demonstrates accurate environmental perceptions  12/20/2019 0815 by Juliet Molina RN  Outcome: Ongoing  Goal: Able to distinguish between reality-based and nonreality-based thinking  Description  Able to distinguish between reality-based and nonreality-based thinking  12/20/2019 0815 by Juliet Molina RN  Outcome: Ongoing  Goal: Able to interrupt nonreality-based thinking  Description  Able to interrupt nonreality-based thinking  12/20/2019 0815 by Juliet Molina RN  Outcome: Ongoing     Problem: Sleep Pattern Disturbance:  Goal: Appears well-rested  Description  Appears well-rested  12/20/2019 2035 by Wally Sepulveda RN  Outcome: Ongoing  12/20/2019 0815 by Juliet Molina RN  Outcome: Ongoing     Problem: Restraint Use - Nonviolent/Non-Self-Destructive Behavior:  Goal: Absence of restraint indications  Description  Absence of restraint indications  12/20/2019 2035 by Wally Sepulveda RN  Outcome: Ongoing  12/20/2019 0815 by Juliet Molina RN  Outcome: Ongoing     Problem: Discharge Planning:  Goal: Ability to perform activities of daily living will improve  Description  Ability to perform activities of daily living will improve  12/20/2019 2035 by Wally Sepulveda RN  Outcome: Not Met This Shift  12/20/2019 0815 by Juliet Molina RN  Outcome: Ongoing  Goal: Participates in care planning  Description  Participates in care planning  12/20/2019 2035 by Wally Sepulveda RN  Outcome: Not Met This Shift  12/20/2019 0815 by Juliet Molina RN  Outcome: Ongoing     Problem: Restraint Use - Nonviolent/Non-Self-Destructive Behavior:  Goal: Absence of restraint-related injury  Description  Absence of restraint-related injury  12/20/2019 2035 by Wally Sepulveda RN  Outcome: Not Met This Shift  12/20/2019 0815 by Gary Peace RN  Outcome: Met This Shift   Electronically signed by Lisa Correa RN on 12/20/2019 at 8:36 PM

## 2019-12-21 NOTE — PLAN OF CARE
Problem: Restraint Use - Nonviolent/Non-Self-Destructive Behavior:  Goal: Absence of restraint-related injury  Description  Absence of restraint-related injury  12/21/2019 0836 by Mita Acosta RN  Outcome: Met This Shift  12/20/2019 2035 by Catherine Brand RN  Outcome: Not Met This Shift     Problem: Pain:  Goal: Pain level will decrease  Description  Pain level will decrease  12/21/2019 0836 by Mita Acosta RN  Outcome: Ongoing  12/20/2019 2035 by Catherine Brand RN  Outcome: Ongoing  Goal: Control of acute pain  Description  Control of acute pain  12/21/2019 0836 by Mita Acosta RN  Outcome: Ongoing  12/20/2019 2035 by Catherine Brand RN  Outcome: Ongoing  Goal: Control of chronic pain  Description  Control of chronic pain  12/21/2019 0836 by Mita Acosta RN  Outcome: Ongoing  12/20/2019 2035 by Catherine Brand RN  Outcome: Ongoing     Problem: Skin Integrity:  Goal: Will show no infection signs and symptoms  Description  Will show no infection signs and symptoms  12/21/2019 0836 by Mita Acosta RN  Outcome: Ongoing  12/20/2019 2035 by Catherine Brand RN  Outcome: Ongoing  Goal: Absence of new skin breakdown  Description  Absence of new skin breakdown  12/21/2019 0836 by Mita Acosta RN  Outcome: Ongoing  12/20/2019 2035 by Catherine Brand RN  Outcome: Ongoing     Problem: OXYGENATION/RESPIRATORY FUNCTION  Goal: Patient will maintain patent airway  12/21/2019 0836 by Mita Acosta RN  Outcome: Ongoing  12/20/2019 2043 by Anthony Patrick RCP  Outcome: Ongoing  12/20/2019 2035 by Catherine Brand RN  Outcome: Ongoing  Goal: Patient will achieve/maintain normal respiratory rate/effort  Description  Respiratory rate and effort will be within normal limits for the patient  12/21/2019 0836 by Mita Acosta RN  Outcome: Ongoing  12/20/2019 2043 by Anthony Patrick RCP  Outcome: Ongoing  12/20/2019 2035 by Catherine Brand RN  Outcome: Ongoing     Problem: MECHANICAL VENTILATION  Goal: Patient will maintain patent airway  12/21/2019 0836 by Mita Acosta RN  Outcome: Ongoing  12/20/2019 2043 by Anthony Patrick RCP  Outcome: Ongoing  12/20/2019 2035 by Catherine Brand RN  Outcome: Ongoing  Goal: Oral health is maintained or improved  12/21/2019 0836 by Mita Acosta RN  Outcome: Ongoing  12/20/2019 2043 by ARNULFO IrahetaP  Outcome: Ongoing  12/20/2019 2035 by Catherine Brand RN  Outcome: Ongoing  Goal: ET tube will be managed safely  12/21/2019 0836 by Mita Acosta RN  Outcome: Ongoing  12/20/2019 2043 by Anthony Patrick RCP  Outcome: Ongoing  12/20/2019 2035 by Catherine Brand RN  Outcome: Ongoing  Goal: Ability to express needs and understand communication  12/21/2019 0836 by Mita Acosta RN  Outcome: Ongoing  12/20/2019 2043 by Anthony Patrick RCP  Outcome: Ongoing  12/20/2019 2035 by Catherine Brand RN  Outcome: Ongoing  Goal: Mobility/activity is maintained at optimum level for patient  12/21/2019 0836 by Mita Acosta RN  Outcome: Ongoing  12/20/2019 2043 by Anthony Patrick RCP  Outcome: Ongoing  12/20/2019 2035 by Catherine Brand RN  Outcome: Ongoing     Problem: SKIN INTEGRITY  Goal: Skin integrity is maintained or improved  12/21/2019 0836 by Mita Acosta RN  Outcome: Ongoing  12/20/2019 2043 by Anthony Patrick RCP  Outcome: Ongoing  12/20/2019 2035 by Catherine Brand RN  Outcome: Ongoing     Problem: Falls - Risk of:  Goal: Will remain free from falls  Description  Will remain free from falls  12/21/2019 0836 by Mita Acosta RN  Outcome: Ongoing  12/20/2019 2035 by Catherine Brand RN  Outcome: Ongoing  Goal: Absence of physical injury  Description  Absence of physical injury  12/21/2019 0836 by Mita Acosta RN  Outcome: Ongoing  12/20/2019 2035 by Catherine Brand RN  Outcome: Ongoing     Problem: Risk for Impaired Skin Integrity  Goal: Tissue integrity - skin and mucous membranes  Description  Structural intactness and normal physiological function of skin and  mucous

## 2019-12-22 ENCOUNTER — APPOINTMENT (OUTPATIENT)
Dept: GENERAL RADIOLOGY | Age: 34
DRG: 463 | End: 2019-12-22
Attending: INTERNAL MEDICINE
Payer: MEDICARE

## 2019-12-22 ENCOUNTER — ANESTHESIA EVENT (OUTPATIENT)
Dept: OPERATING ROOM | Age: 34
DRG: 463 | End: 2019-12-22
Payer: MEDICARE

## 2019-12-22 LAB
ABSOLUTE EOS #: 0.4 K/UL (ref 0–0.44)
ABSOLUTE IMMATURE GRANULOCYTE: 0.08 K/UL (ref 0–0.3)
ABSOLUTE LYMPH #: 0.79 K/UL (ref 1.1–3.7)
ABSOLUTE MONO #: 0.55 K/UL (ref 0.1–1.2)
ANION GAP SERPL CALCULATED.3IONS-SCNC: 14 MMOL/L (ref 9–17)
BASOPHILS # BLD: 0 % (ref 0–2)
BASOPHILS ABSOLUTE: 0 K/UL (ref 0–0.2)
BUN BLDV-MCNC: 71 MG/DL (ref 6–20)
BUN/CREAT BLD: ABNORMAL (ref 9–20)
CALCIUM SERPL-MCNC: 8.8 MG/DL (ref 8.6–10.4)
CHLORIDE BLD-SCNC: 97 MMOL/L (ref 98–107)
CO2: 18 MMOL/L (ref 20–31)
CREAT SERPL-MCNC: 2.24 MG/DL (ref 0.7–1.2)
DIFFERENTIAL TYPE: ABNORMAL
EOSINOPHILS RELATIVE PERCENT: 5 % (ref 1–4)
GFR AFRICAN AMERICAN: 40 ML/MIN
GFR NON-AFRICAN AMERICAN: 33 ML/MIN
GFR SERPL CREATININE-BSD FRML MDRD: ABNORMAL ML/MIN/{1.73_M2}
GFR SERPL CREATININE-BSD FRML MDRD: ABNORMAL ML/MIN/{1.73_M2}
GLUCOSE BLD-MCNC: 206 MG/DL (ref 75–110)
GLUCOSE BLD-MCNC: 207 MG/DL (ref 75–110)
GLUCOSE BLD-MCNC: 212 MG/DL (ref 75–110)
GLUCOSE BLD-MCNC: 216 MG/DL (ref 75–110)
GLUCOSE BLD-MCNC: 217 MG/DL (ref 75–110)
GLUCOSE BLD-MCNC: 220 MG/DL (ref 70–99)
HCT VFR BLD CALC: 26.8 % (ref 40.7–50.3)
HEMOGLOBIN: 8.2 G/DL (ref 13–17)
IMMATURE GRANULOCYTES: 1 %
LYMPHOCYTES # BLD: 10 % (ref 24–43)
MCH RBC QN AUTO: 28.7 PG (ref 25.2–33.5)
MCHC RBC AUTO-ENTMCNC: 30.6 G/DL (ref 28.4–34.8)
MCV RBC AUTO: 93.7 FL (ref 82.6–102.9)
MONOCYTES # BLD: 7 % (ref 3–12)
MORPHOLOGY: ABNORMAL
MORPHOLOGY: ABNORMAL
NRBC AUTOMATED: 0.5 PER 100 WBC
PDW BLD-RTO: 22.6 % (ref 11.8–14.4)
PLATELET # BLD: 204 K/UL (ref 138–453)
PLATELET ESTIMATE: ABNORMAL
PMV BLD AUTO: 9.3 FL (ref 8.1–13.5)
POTASSIUM SERPL-SCNC: 3.6 MMOL/L (ref 3.7–5.3)
RBC # BLD: 2.86 M/UL (ref 4.21–5.77)
RBC # BLD: ABNORMAL 10*6/UL
SEG NEUTROPHILS: 77 % (ref 36–65)
SEGMENTED NEUTROPHILS ABSOLUTE COUNT: 6.08 K/UL (ref 1.5–8.1)
SODIUM BLD-SCNC: 129 MMOL/L (ref 135–144)
WBC # BLD: 7.9 K/UL (ref 3.5–11.3)
WBC # BLD: ABNORMAL 10*3/UL

## 2019-12-22 PROCEDURE — 6370000000 HC RX 637 (ALT 250 FOR IP): Performed by: EMERGENCY MEDICINE

## 2019-12-22 PROCEDURE — 90935 HEMODIALYSIS ONE EVALUATION: CPT

## 2019-12-22 PROCEDURE — 2500000003 HC RX 250 WO HCPCS: Performed by: STUDENT IN AN ORGANIZED HEALTH CARE EDUCATION/TRAINING PROGRAM

## 2019-12-22 PROCEDURE — 6370000000 HC RX 637 (ALT 250 FOR IP): Performed by: STUDENT IN AN ORGANIZED HEALTH CARE EDUCATION/TRAINING PROGRAM

## 2019-12-22 PROCEDURE — 2580000003 HC RX 258: Performed by: STUDENT IN AN ORGANIZED HEALTH CARE EDUCATION/TRAINING PROGRAM

## 2019-12-22 PROCEDURE — 82947 ASSAY GLUCOSE BLOOD QUANT: CPT

## 2019-12-22 PROCEDURE — 6360000002 HC RX W HCPCS: Performed by: STUDENT IN AN ORGANIZED HEALTH CARE EDUCATION/TRAINING PROGRAM

## 2019-12-22 PROCEDURE — 2000000000 HC ICU R&B

## 2019-12-22 PROCEDURE — 85025 COMPLETE CBC W/AUTO DIFF WBC: CPT

## 2019-12-22 PROCEDURE — 94770 HC ETCO2 MONITOR DAILY: CPT

## 2019-12-22 PROCEDURE — 36415 COLL VENOUS BLD VENIPUNCTURE: CPT

## 2019-12-22 PROCEDURE — 71045 X-RAY EXAM CHEST 1 VIEW: CPT

## 2019-12-22 PROCEDURE — 2500000003 HC RX 250 WO HCPCS: Performed by: INTERNAL MEDICINE

## 2019-12-22 PROCEDURE — 99232 SBSQ HOSP IP/OBS MODERATE 35: CPT | Performed by: INTERNAL MEDICINE

## 2019-12-22 PROCEDURE — 94761 N-INVAS EAR/PLS OXIMETRY MLT: CPT

## 2019-12-22 PROCEDURE — 2700000000 HC OXYGEN THERAPY PER DAY

## 2019-12-22 PROCEDURE — 99291 CRITICAL CARE FIRST HOUR: CPT | Performed by: INTERNAL MEDICINE

## 2019-12-22 PROCEDURE — 80048 BASIC METABOLIC PNL TOTAL CA: CPT

## 2019-12-22 RX ORDER — OXYCODONE HCL 5 MG/5 ML
10 SOLUTION, ORAL ORAL EVERY 6 HOURS PRN
Status: DISCONTINUED | OUTPATIENT
Start: 2019-12-22 | End: 2019-12-26

## 2019-12-22 RX ORDER — FENTANYL CITRATE 50 UG/ML
50 INJECTION, SOLUTION INTRAMUSCULAR; INTRAVENOUS
Status: DISCONTINUED | OUTPATIENT
Start: 2019-12-22 | End: 2019-12-23

## 2019-12-22 RX ORDER — OXYCODONE HCL 5 MG/5 ML
5 SOLUTION, ORAL ORAL EVERY 6 HOURS PRN
Status: DISCONTINUED | OUTPATIENT
Start: 2019-12-22 | End: 2019-12-22

## 2019-12-22 RX ORDER — FENTANYL CITRATE 50 UG/ML
75 INJECTION, SOLUTION INTRAMUSCULAR; INTRAVENOUS
Status: DISCONTINUED | OUTPATIENT
Start: 2019-12-22 | End: 2019-12-22

## 2019-12-22 RX ADMIN — Medication 75 MCG/HR: at 02:04

## 2019-12-22 RX ADMIN — INSULIN LISPRO 6 UNITS: 100 INJECTION, SOLUTION INTRAVENOUS; SUBCUTANEOUS at 20:39

## 2019-12-22 RX ADMIN — HEPARIN SODIUM 1750 UNITS: 1000 INJECTION INTRAVENOUS; SUBCUTANEOUS at 14:30

## 2019-12-22 RX ADMIN — HEPARIN SODIUM 5000 UNITS: 5000 INJECTION INTRAVENOUS; SUBCUTANEOUS at 05:47

## 2019-12-22 RX ADMIN — HEPARIN SODIUM 5000 UNITS: 5000 INJECTION INTRAVENOUS; SUBCUTANEOUS at 22:50

## 2019-12-22 RX ADMIN — CARVEDILOL 6.25 MG: 6.25 TABLET, FILM COATED ORAL at 08:08

## 2019-12-22 RX ADMIN — SODIUM CHLORIDE 10 ML/HR: 9 INJECTION, SOLUTION INTRAVENOUS at 14:25

## 2019-12-22 RX ADMIN — HEPARIN SODIUM 5000 UNITS: 5000 INJECTION INTRAVENOUS; SUBCUTANEOUS at 13:17

## 2019-12-22 RX ADMIN — INSULIN LISPRO 6 UNITS: 100 INJECTION, SOLUTION INTRAVENOUS; SUBCUTANEOUS at 08:35

## 2019-12-22 RX ADMIN — HEPARIN SODIUM 500 UNITS: 1000 INJECTION INTRAVENOUS; SUBCUTANEOUS at 14:30

## 2019-12-22 RX ADMIN — Medication 15 ML: at 08:09

## 2019-12-22 RX ADMIN — I.V. FAT EMULSION 100 ML: 20 EMULSION INTRAVENOUS at 17:42

## 2019-12-22 RX ADMIN — OXYCODONE HYDROCHLORIDE 10 MG: 5 SOLUTION ORAL at 08:08

## 2019-12-22 RX ADMIN — FENTANYL CITRATE 75 MCG: 50 INJECTION, SOLUTION INTRAMUSCULAR; INTRAVENOUS at 08:09

## 2019-12-22 RX ADMIN — Medication: at 08:09

## 2019-12-22 RX ADMIN — INSULIN LISPRO 6 UNITS: 100 INJECTION, SOLUTION INTRAVENOUS; SUBCUTANEOUS at 03:02

## 2019-12-22 RX ADMIN — FENTANYL CITRATE 50 MCG: 50 INJECTION, SOLUTION INTRAMUSCULAR; INTRAVENOUS at 14:41

## 2019-12-22 RX ADMIN — SODIUM CHLORIDE, PRESERVATIVE FREE 10 ML: 5 INJECTION INTRAVENOUS at 08:10

## 2019-12-22 RX ADMIN — HEPARIN SODIUM 1400 UNITS: 1000 INJECTION INTRAVENOUS; SUBCUTANEOUS at 17:01

## 2019-12-22 RX ADMIN — POTASSIUM CHLORIDE: 2 INJECTION, SOLUTION, CONCENTRATE INTRAVENOUS at 17:43

## 2019-12-22 RX ADMIN — Medication 20 MG: at 05:43

## 2019-12-22 RX ADMIN — OXYCODONE HYDROCHLORIDE 10 MG: 5 SOLUTION ORAL at 04:16

## 2019-12-22 RX ADMIN — FAMOTIDINE 20 MG: 20 TABLET, FILM COATED ORAL at 08:32

## 2019-12-22 RX ADMIN — SODIUM CHLORIDE, PRESERVATIVE FREE 10 ML: 5 INJECTION INTRAVENOUS at 20:43

## 2019-12-22 RX ADMIN — HEPARIN SODIUM 1300 UNITS: 1000 INJECTION INTRAVENOUS; SUBCUTANEOUS at 17:01

## 2019-12-22 RX ADMIN — INSULIN LISPRO 6 UNITS: 100 INJECTION, SOLUTION INTRAVENOUS; SUBCUTANEOUS at 13:17

## 2019-12-22 NOTE — PROGRESS NOTES
Dialysis Post Treatment Note  Vitals:    12/22/19 1800   BP: (!) 157/88   Pulse: 107   Resp: 25   Temp: 98.4 °F (36.9 °C)   SpO2: 95%     Pre-Weight = 182.1kg  Post-weight = Weight: (!) 396 lb 6.2 oz (179.8 kg)  Total Liters Processed = Total Liters Processed (l/min): 47.6 l/min  Rinseback Volume (mL) = Rinseback Volume (ml): 240 ml  Net Removal (mL) = 2650mL  Type of access used= Temp Lt IJ Cath  Length of treatment= 3.5 hour    Patient tolerated treatment well. BP remained stable through out. Catheter flow would not allow a BFR higher than 300 due to clotting and position. Dr. Lela Villanueva informed of catheter issues. Patient will remain on his current dialysis schedule.     Electronically signed by Carmen Prado RN on 12/22/2019 at 6:04 PM

## 2019-12-22 NOTE — PROGRESS NOTES
cm)   · Current Body Wt: 397 lb 0.8 oz (180.1 kg)  · Admission Body Wt: 291 lb 0.1 oz (132 kg)  · % Weight Change:  ,  difficult to accurately assess weight change d/t variable weights   · Ideal Body Wt: 160 lb 15 oz (73 kg), % Ideal Body 248%  · BMI Classification: BMI > or equal to 40.0 Obese Class III    Nutrition Interventions:   Modify current Tube Feeding, Modify current Parenteral Nutrition  Continued Inpatient Monitoring, Education Not Indicated    Nutrition Evaluation:   · Evaluation: Progressing toward goals   · Goals: Meet % of estimated nutrition needs   · Monitoring: TF Intake, PN Intake, TF Tolerance, PN Tolerance, Wound Healing, Pertinent Labs, Weight, Monitor Bowel Function      Electronically signed by Simin Gutierrez RD, LD on 12/22/19 at 1:32 PM    Contact Number: [phone]

## 2019-12-22 NOTE — PLAN OF CARE
Problem: Pain:  Goal: Pain level will decrease  12/22/2019 0626 by Gabe George RN  Outcome: Ongoing  12/21/2019 1745 by Pilar Booth RN  Outcome: Ongoing  Goal: Control of acute pain  12/22/2019 0626 by Gabe George RN  Outcome: Ongoing  12/21/2019 1745 by Pilar Booth RN  Outcome: Ongoing  Goal: Control of chronic pain  12/22/2019 0626 by Gabe George RN  Outcome: Ongoing  12/21/2019 1745 by Pilar Booth RN  Outcome: Ongoing     Problem: Skin Integrity:  Goal: Will show no infection signs and symptoms  12/22/2019 0626 by Gabe George RN  Outcome: Ongoing  12/21/2019 1745 by Pilar Booth RN  Outcome: Ongoing  Goal: Absence of new skin breakdown  12/22/2019 0626 by Gabe George RN  Outcome: Ongoing  12/21/2019 1745 by Pilar Booth RN  Outcome: Ongoing     Problem: Restraint Use - Nonviolent/Non-Self-Destructive Behavior:  Goal: Absence of restraint indications  12/22/2019 0626 by Gabe George RN  Outcome: Ongoing  12/21/2019 1745 by Pilar Booth RN  Outcome: Ongoing  Goal: Absence of restraint-related injury  12/22/2019 0626 by Gabe George RN  Outcome: Ongoing  12/21/2019 1745 by Pilar Booth RN  Outcome: Ongoing     Problem: OXYGENATION/RESPIRATORY FUNCTION  Goal: Patient will maintain patent airway  12/22/2019 0626 by Gabe George RN  Outcome: Ongoing  12/21/2019 1745 by Pilar Booth RN  Outcome: Ongoing  Goal: Patient will achieve/maintain normal respiratory rate/effort  12/22/2019 0626 by Gabe George RN  Outcome: Ongoing  12/21/2019 1745 by Pilar Booth RN  Outcome: Ongoing     Problem: MECHANICAL VENTILATION  Goal: Patient will maintain patent airway  12/22/2019 0626 by Gabe George RN  Outcome: Ongoing  12/21/2019 1745 by Pilar Booth RN  Outcome: Ongoing  Goal: Oral health is maintained or improved  12/22/2019 0626 by Gabe George RN  Outcome: Ongoing  12/21/2019 1745 by Pilar Booth will improve  12/22/2019 0626 by Melani Solares RN  Outcome: Ongoing  12/21/2019 1745 by Garth Espinal RN  Outcome: Ongoing  Goal: Participates in care planning  12/22/2019 0626 by Melani Solares RN  Outcome: Ongoing  12/21/2019 1745 by Garth Espinal RN  Outcome: Ongoing     Problem: Injury - Risk of, Physical Injury:  Goal: Will remain free from falls  12/22/2019 0626 by Melani Solares RN  Outcome: Ongoing  12/21/2019 1745 by Garth Espinal RN  Outcome: Ongoing  Goal: Absence of physical injury  12/22/2019 0626 by Melani Solares RN  Outcome: Ongoing  12/21/2019 1745 by Garth Espinal RN  Outcome: Ongoing     Problem: Mood - Altered:  Goal: Mood stable  12/22/2019 0626 by Melani Solares RN  Outcome: Ongoing  12/21/2019 1745 by Garth Espinal RN  Outcome: Ongoing  Goal: Absence of abusive behavior  12/22/2019 0626 by Melani Solares RN  Outcome: Ongoing  12/21/2019 1745 by Garth Espinal RN  Outcome: Ongoing  Goal: Verbalizations of feeling emotionally comfortable while being cared for will increase  12/22/2019 0626 by Melani Solares RN  Outcome: Ongoing  12/21/2019 1745 by Garth Espinal RN  Outcome: Ongoing     Problem: Sensory Perception - Impaired:  Goal: Demonstrations of improved sensory functioning will increase  12/22/2019 0626 by Melani Solares RN  Outcome: Ongoing  12/21/2019 1745 by Garth Espinal RN  Outcome: Ongoing  Goal: Decrease in sensory misperception frequency  12/22/2019 0626 by Melani Solares RN  Outcome: Ongoing  12/21/2019 1745 by Garth Espinal RN  Outcome: Ongoing  Goal: Able to refrain from responding to false sensory perceptions  12/22/2019 0626 by Melani Solares RN  Outcome: Ongoing  12/21/2019 1745 by Garth Espinal RN  Outcome: Ongoing  Goal: Demonstrates accurate environmental perceptions  12/22/2019 0626 by Melani Solares RN  Outcome: Ongoing  12/21/2019 1745 by Garth Espinal RN  Outcome: Ongoing  Goal: Able

## 2019-12-22 NOTE — PROGRESS NOTES
Skin:     General: Skin is warm and dry. Comments: Multiple wounds, dressed   Neurological:      General: No focal deficit present. Mental Status: He is alert. Comments: The patient is alert extubated still lethargic   Psychiatric:      Comments: Calm at this point           Medical Decision Making -Laboratory:   I have independently reviewed/ordered the following labs:    CBC with Differential:   Recent Labs     19  0456 19  0355   WBC 9.3 7.9   HGB 7.7* 8.2*   HCT 25.0* 26.8*    204   LYMPHOPCT 7* 10*   MONOPCT 4 7     BMP:   Recent Labs     19  0458 19  0456 19  0355   * 128* 129*   K 4.2 3.2* 3.6*    96* 97*   CO2 15* 21 18*   BUN 76* 58* 71*   CREATININE 2.49* 2.03* 2.24*   MG 1.9  --   --      Hepatic Function Panel:   No results for input(s): PROT, LABALBU, BILIDIR, IBILI, BILITOT, ALKPHOS, ALT, AST in the last 72 hours. No results for input(s): RPR in the last 72 hours. No results for input(s): HIV in the last 72 hours. No results for input(s): BC in the last 72 hours. Lab Results   Component Value Date    MUCUS NOT REPORTED 2019    RBC 2.86 2019    TRICHOMONAS NOT REPORTED 2019    WBC 7.9 2019    YEAST NOT REPORTED 2019    TURBIDITY TURBID 2019     Lab Results   Component Value Date    CREATININE 2.24 2019    GLUCOSE 220 2019       Medical Decision Making-Imagin-9 CT Abd/pelvis  EXAMINATION:   CT OF THE ABDOMEN AND PELVIS WITH CONTRAST 2019 2:29 am       TECHNIQUE:   CT of the abdomen and pelvis was performed with the administration of   intravenous contrast. Multiplanar reformatted images are provided for review.    Dose modulation, iterative reconstruction, and/or weight based adjustment of   the mA/kV was utilized to reduce the radiation dose to as low as reasonably   achievable.       COMPARISON:   2019.       HISTORY:   ORDERING SYSTEM PROVIDED HISTORY: Nabeel Samuel reasonably achievable. Medical Decision Ljcuwe-Djcxnjgg-Angyv:   11/29/2019  8:17 PM - Jesus, Ana Incoming Lab Results From [facility]     Specimen Information: Buttock; Tissue        Component Collected Lab   Specimen Description 11/29/2019  7:34  [address]   . BUTTOCK BILATERAL TS    Special Requests 11/29/2019  7:34  [address]   NOT REPORTED    Direct Exam 11/29/2019  7:34  [address]   NO NEUTROPHILS SEEN    Direct Exam Abnormal  11/29/2019  7:34  [facility] [address] [place]    Direct Exam Abnormal  11/29/2019  7:34 PM [address] NEGATIVE RODS    Culture 11/29/2019  7:34  [address]   PENDING          Grisel Fatima MD. Infectious Diseases

## 2019-12-22 NOTE — PROGRESS NOTES
Critical Care Team - Daily Progress Note      Date and time: 2019 7:41 AM  Patient's name:  Luis Schulte  Medical Record Number: [id number]  Patient's account/billing number: [de-identified]  Patient's YOB: 1985  Age: 37 y.o. Date of Admission: 2019  3:39 PM  Length of stay during current admission: 23      Primary Care Physician: No primary care provider on file. ICU Attending Physician: Dr. Capo Woodruff    Code Status: DNR-CCA    Reason for ICU admission: Necrotizing Fascitis and acute renal failure      SUBJECTIVE:     OVERNIGHT EVENTS:     No acute events overnight. Blood pressure continues to run in 150s to 160s/90s to 100s with tachycardia in 110s. Mentation: Awake and sometimes follows commands, tracks. Fever:-  Temp (24hrs), Av.1 °F (36.7 °C), Min:97.3 °F (36.3 °C), Max:98.4 °F (36.9 °C)    Secretions: Minimal    Blood pressure: 150s to 160s/90s to 100s  Vasopressors: None  Systolic (42VBA), EXX:870 , Min:141 , AKK:833     Diastolic (72YBU), JDJ:77, Min:83, Max:134      Point of care ultrasound for IVC: Not indicated    Urine output in the last 24 hours: In: 3328 [I.V.:450; NG/GT:90]  Out: 21 [Urine:20]  Patient Vitals for the past 96 hrs (Last 3 readings):   Weight   19 2200 (!) 397 lb 0.8 oz (180.1 kg)   19 1755 (!) 402 lb 1.9 oz (182.4 kg)   19 0331 264 lb 8.8 oz (120 kg)     Fluids: None  Feeding: TPN, tube feeds  Analgesics: Fentanyl and Danielle  Sedation: None  DVT Prophylaxis: Heparin  Mobilization: PT OT following  Head Up: Elevated  GI Prophylaxis: Pepcid  Glycemic Control: High-dose insulin sliding scale  Spontaneous breathing trial: Not applicable.     AWAKE & FOLLOWING COMMANDS:  [] No   [x] Yes    CURRENT VENTILATION STATUS:     [] Ventilator  [] BIPAP  [x] Nasal Cannula [] Room Air        SECRETIONS Amount:  [x] Small [] Moderate  [] Large  [] None  Color:     [x] White [] Colored  [] Bloody    SEDATION:  RAAS Score:  [] Propofol gtt  [] Versed [x]? Yes     FAMILY UPDATED:                           []? No                           [x]? Yes     TRANSFER OUT OF ICU:                 [x]? No                           []? Yes     ADDITIONAL PLAN:     1. Pressure ulcer cant be staged present on admission / Necrotizing Faciitis involving buttocks and scrotum -   S/p bed side I&D: I&D buttock, scrotal and perianal abscess  s/p POD#23  s/p wide complex debridement of gluteal/perianal region. region extensive necrotic tissue that involved the anus and distal rectum. Had multiple debridement, washout of gluteal and perineal  diverting loop colostomy   meropenem (from 12/1- 12/18) per ID   OR on 12/20/2019. Had debridement only. No infection noted. No urethral leak. patient needs to be transferred to [facility] or  for reconstructive surgery. OR tomorrow for reconstruction, n.p.o. after midnight    2.  Septic shock -resolved, off pressors  - Blood cultures drawn 12/2/19:  No growth      3. DEBBY on CKD   Renal ultrasound: Right renal cyst, otherwise grossly negative renal   Ultrasound, non diagnostic bladder assessment.    Nephrology recs:  Dialysis this am  Replacing electrolyte     4. Diabetes mellitus type II-high-dose correction POC glucose checks every 4 hours  Hypoglycemia treatment protocol      5. Diabetic left foot ulcer -wound care on board.  No surgical intervention for now     6. Anemia: Hgb - stable. received  12 uPRBC since admission      7. Respiratory failure. Due to sepsis. imProving  Extubated 12/21  Oxygen-2 L of oxygen via nasal cannula    8. Essential Hypertension and tachycardia  Started coreg 6.25mg daily  PRN labetalol for Hypertension   Will get patient's home med list from pharmacy    9. Analgesia and Nutrition  gabriella 5 for pain q6h. Fentanyl 50 PRN q2h   Continue tube feeds and TPN, n.p.o. after midnight      Nenita Dowd M.D.              Critical care resident,  Department of Internal Medicine/ Critical [facility] [place])             12/22/2019, 7:41 AM

## 2019-12-22 NOTE — PROGRESS NOTES
Speech Language Pathology  [facility]  Speech Language Pathology    Date: 12/22/2019  Patient Name: Rylee Collado  YOB: 1985   AGE: 37 y.o. MRN: [id number]        Patient Not Available for Speech Therapy     Due to:  [] Testing  [] Hemodialysis  [] Cancelled by RN  [] Surgery   [] Intubation/Sedation/Pain Medication  [] Medical instability  [x] Other: Not following commands, not responing to yes/no questions, no oral motor commands, not appropriate for ST at this time. Next scheduled treatment: 9/23/19  Completed by:  Rodolfo Pizarro M.SFrank [id number] [facility]

## 2019-12-22 NOTE — PROGRESS NOTES
PROGRESS NOTE     PATIENT NAME: Austin Virgen0 RECORD NO. [id number]  DATE: 12/22/2019  SURGEON: Gerardo Setting  PRIMARY CARE PHYSICIAN: No primary care provider on file. HD: # 23    ASSESSMENT    Patient Active Problem List   Diagnosis    Necrotizing fasciitis ([address]    Hypertension    Diabetes ([address]    Diabetic foot ulcer ([address]    Septic shock ([address]    Bandemia    Acute respiratory failure with hypoxemia ([address]    Diabetic foot infection ([address]      11/29/2019 wide complex debridement of nec fasc involving gluteal/perianal/distal rectum  12/03/2019 open diverting end colostomy   12/06/2019 I&D  12/08/2019 Incision and debridement of necrotizing fasciitis buttock wound, scrotum, bilateral groin region and closure of midline abdominal wound  12/10/2019 Sharp excisional debridement of gluteal, marc-rectal and perineal wound to level of muscle, Sharp excisional debridement of scrotum (performed by Urology), Washout of gluteal/perineal/scrotal/inguinal wound, Partial primary closure of pubic wound, and Dakins Wet to dry dressing application  72/37/6326 sharp excisional debridement of gluteal and perineal wounds to muscle 30 x 40 cm. Urology exam under anesthesia       MEDICAL DECISION MAKING AND PLAN    · OR on Monday evening with plastic/reconstructive surgery, Dr. Efrain Edwards, for evaluation. · NPO at MN, hold anticoagulation in am  · Continue with BID WTD dressing changes to the large open gluteal, perirectal, perineal and scrotal wounds - nursing to assist.  · Packing changes to supra pubic incision site with other dressing changes  · Midline wound vac, wound RN to follow starting 12/23/19 for midline vac   · Recommend d/c fentanyl gtt following extubation   · Meropenem per ID. · Strict glucose control <180  · LLE wound per podiatry  · Nephrology following. SUBJECTIVE    Patient seen and examined. No acute events yesterday. Patient extubated with no issue.  Tube feed not started this am.

## 2019-12-22 NOTE — PROGRESS NOTES
Temperature:  Temp: 98.2 °F (36.8 °C)  TMax:   Temp (24hrs), Av °F (36.7 °C), Min:97.3 °F (36.3 °C), Max:98.2 °F (36.8 °C)    Respirations:  Resp: 26  Pulse:   Pulse: 105  BP:    BP: (!) 175/95  BP Range: Systolic (14ZRR), MOQ:218 , Min:132 , CQV:892       Diastolic (60ZZU), BNK:58, Min:69, Max:134      Physical Examination:     General:  Lethargic, NAD  HEENT: Atraumatic, normocephalic, no throat congestion, moist mucosa. +flexiflo  Eyes:   Pupils equal, round and reactive to light. Neck:   Supple  Chest:   Bilateral vesicular breath sounds, no rales or wheezes. Cardiac:  S1 S2 RR, no murmurs, gallops or rubs. Abdomen: Softly distended, ostomy pink, midline VAC in place  :   +Ambrosio  SKIN:  No rashes, good skin turgor, no jaundice  Extremities:  +BL edema, dressing L foot, no cyanosis    Labs:       Recent Labs     19  04519  0456 19  0355   WBC 11.8* 9.3 7.9   RBC 2.75* 2.69* 2.86*   HGB 7.8* 7.7* 8.2*   HCT 24.8* 25.0* 26.8*   MCV 90.2 92.9 93.7   MCH 28.4 28.6 28.7   MCHC 31.5 30.8 30.6   RDW 21.7* 22.5* 22.6*    181 204   MPV 9.6 9.0 9.3      BMP:   Recent Labs     19  0458 19  0456 19  0355   * 128* 129*   K 4.2 3.2* 3.6*    96* 97*   CO2 15* 21 18*   BUN 76* 58* 71*   CREATININE 2.49* 2.03* 2.24*   GLUCOSE 230* 181* 220*   CALCIUM 8.3* 7.9* 8.8      Phosphorus:     Recent Labs     19   PHOS 3.1     Magnesium:    Recent Labs     19  0458   MG 1.9     MARINE:      Lab Results   Component Value Date    MARINE NEGATIVE 2019     SPEP:  Lab Results   Component Value Date    PROT 5.3 2019    PATH ELECTRONICALLY SIGNED.  Logan Booth M.D. 2019     C3:     Lab Results   Component Value Date    C3 97 2019     C4:     Lab Results   Component Value Date    C4 12 2019     Hep BsAg:         Lab Results   Component Value Date    HEPBSAG NONREACTIVE 2019     Hep C AB:          Lab Results   Component Value

## 2019-12-23 ENCOUNTER — APPOINTMENT (OUTPATIENT)
Dept: GENERAL RADIOLOGY | Age: 34
DRG: 463 | End: 2019-12-23
Attending: INTERNAL MEDICINE
Payer: MEDICARE

## 2019-12-23 ENCOUNTER — ANESTHESIA (OUTPATIENT)
Dept: OPERATING ROOM | Age: 34
DRG: 463 | End: 2019-12-23
Payer: MEDICARE

## 2019-12-23 VITALS
SYSTOLIC BLOOD PRESSURE: 105 MMHG | TEMPERATURE: 98 F | OXYGEN SATURATION: 99 % | DIASTOLIC BLOOD PRESSURE: 66 MMHG | RESPIRATION RATE: 20 BRPM

## 2019-12-23 LAB
ALLEN TEST: ABNORMAL
ANION GAP SERPL CALCULATED.3IONS-SCNC: 11 MMOL/L (ref 9–17)
ANION GAP: 11 MMOL/L (ref 7–16)
BUN BLDV-MCNC: 66 MG/DL (ref 6–20)
BUN/CREAT BLD: ABNORMAL (ref 9–20)
CALCIUM SERPL-MCNC: 8.4 MG/DL (ref 8.6–10.4)
CHLORIDE BLD-SCNC: 102 MMOL/L (ref 98–107)
CO2: 19 MMOL/L (ref 20–31)
CREAT SERPL-MCNC: 2.25 MG/DL (ref 0.7–1.2)
FIO2: 50
GFR AFRICAN AMERICAN: 40 ML/MIN
GFR NON-AFRICAN AMERICAN: 27 ML/MIN
GFR NON-AFRICAN AMERICAN: 33 ML/MIN
GFR SERPL CREATININE-BSD FRML MDRD: 33 ML/MIN
GFR SERPL CREATININE-BSD FRML MDRD: ABNORMAL ML/MIN/{1.73_M2}
GLUCOSE BLD-MCNC: 168 MG/DL (ref 75–110)
GLUCOSE BLD-MCNC: 179 MG/DL (ref 75–110)
GLUCOSE BLD-MCNC: 188 MG/DL (ref 75–110)
GLUCOSE BLD-MCNC: 189 MG/DL (ref 75–110)
GLUCOSE BLD-MCNC: 191 MG/DL (ref 70–99)
GLUCOSE BLD-MCNC: 212 MG/DL (ref 75–110)
GLUCOSE BLD-MCNC: 255 MG/DL (ref 74–100)
MAGNESIUM: 1.8 MG/DL (ref 1.6–2.6)
MODE: ABNORMAL
NEGATIVE BASE EXCESS, ART: 5 (ref 0–2)
O2 DEVICE/FLOW/%: ABNORMAL
PATIENT TEMP: ABNORMAL
PHOSPHORUS: 0.9 MG/DL (ref 2.5–4.5)
POC CHLORIDE: 103 MMOL/L (ref 98–107)
POC CREATININE: 2.65 MG/DL (ref 0.51–1.19)
POC HCO3: 21 MMOL/L (ref 21–28)
POC HEMATOCRIT: 27 % (ref 41–53)
POC HEMOGLOBIN: 9 G/DL (ref 13.5–17.5)
POC IONIZED CALCIUM: 1.25 MMOL/L (ref 1.15–1.33)
POC LACTIC ACID: 0.81 MMOL/L (ref 0.56–1.39)
POC O2 SATURATION: 98 % (ref 94–98)
POC PCO2 TEMP: ABNORMAL MM HG
POC PCO2: 42.3 MM HG (ref 35–48)
POC PH TEMP: ABNORMAL
POC PH: 7.3 (ref 7.35–7.45)
POC PO2 TEMP: ABNORMAL MM HG
POC PO2: 121.2 MM HG (ref 83–108)
POC POTASSIUM: 3.5 MMOL/L (ref 3.5–4.5)
POC SODIUM: 135 MMOL/L (ref 138–146)
POSITIVE BASE EXCESS, ART: ABNORMAL (ref 0–3)
POTASSIUM SERPL-SCNC: 3.3 MMOL/L (ref 3.7–5.3)
SAMPLE SITE: ABNORMAL
SODIUM BLD-SCNC: 132 MMOL/L (ref 135–144)
TCO2 (CALC), ART: 22 MMOL/L (ref 22–29)

## 2019-12-23 PROCEDURE — 82803 BLOOD GASES ANY COMBINATION: CPT

## 2019-12-23 PROCEDURE — 2700000000 HC OXYGEN THERAPY PER DAY

## 2019-12-23 PROCEDURE — 6360000002 HC RX W HCPCS: Performed by: STUDENT IN AN ORGANIZED HEALTH CARE EDUCATION/TRAINING PROGRAM

## 2019-12-23 PROCEDURE — 2580000003 HC RX 258: Performed by: NURSE ANESTHETIST, CERTIFIED REGISTERED

## 2019-12-23 PROCEDURE — 94002 VENT MGMT INPAT INIT DAY: CPT

## 2019-12-23 PROCEDURE — 82435 ASSAY OF BLOOD CHLORIDE: CPT

## 2019-12-23 PROCEDURE — 84132 ASSAY OF SERUM POTASSIUM: CPT

## 2019-12-23 PROCEDURE — 6370000000 HC RX 637 (ALT 250 FOR IP): Performed by: EMERGENCY MEDICINE

## 2019-12-23 PROCEDURE — 6370000000 HC RX 637 (ALT 250 FOR IP): Performed by: STUDENT IN AN ORGANIZED HEALTH CARE EDUCATION/TRAINING PROGRAM

## 2019-12-23 PROCEDURE — 2W15X6Z COMPRESSION OF BACK USING PRESSURE DRESSING: ICD-10-PCS | Performed by: SURGERY

## 2019-12-23 PROCEDURE — 3600000003 HC SURGERY LEVEL 3 BASE: Performed by: SURGERY

## 2019-12-23 PROCEDURE — 2580000003 HC RX 258: Performed by: STUDENT IN AN ORGANIZED HEALTH CARE EDUCATION/TRAINING PROGRAM

## 2019-12-23 PROCEDURE — 6360000002 HC RX W HCPCS: Performed by: NURSE ANESTHETIST, CERTIFIED REGISTERED

## 2019-12-23 PROCEDURE — 2500000003 HC RX 250 WO HCPCS: Performed by: STUDENT IN AN ORGANIZED HEALTH CARE EDUCATION/TRAINING PROGRAM

## 2019-12-23 PROCEDURE — 94770 HC ETCO2 MONITOR DAILY: CPT

## 2019-12-23 PROCEDURE — 2580000003 HC RX 258: Performed by: INTERNAL MEDICINE

## 2019-12-23 PROCEDURE — 90935 HEMODIALYSIS ONE EVALUATION: CPT

## 2019-12-23 PROCEDURE — 2500000003 HC RX 250 WO HCPCS: Performed by: NURSE ANESTHETIST, CERTIFIED REGISTERED

## 2019-12-23 PROCEDURE — 83735 ASSAY OF MAGNESIUM: CPT

## 2019-12-23 PROCEDURE — 2580000003 HC RX 258: Performed by: SURGERY

## 2019-12-23 PROCEDURE — 71045 X-RAY EXAM CHEST 1 VIEW: CPT

## 2019-12-23 PROCEDURE — 6360000002 HC RX W HCPCS: Performed by: EMERGENCY MEDICINE

## 2019-12-23 PROCEDURE — 94761 N-INVAS EAR/PLS OXIMETRY MLT: CPT

## 2019-12-23 PROCEDURE — 2580000003 HC RX 258: Performed by: EMERGENCY MEDICINE

## 2019-12-23 PROCEDURE — 6360000002 HC RX W HCPCS: Performed by: INTERNAL MEDICINE

## 2019-12-23 PROCEDURE — 3600000013 HC SURGERY LEVEL 3 ADDTL 15MIN: Performed by: SURGERY

## 2019-12-23 PROCEDURE — 0JBB0ZZ EXCISION OF PERINEUM SUBCUTANEOUS TISSUE AND FASCIA, OPEN APPROACH: ICD-10-PCS | Performed by: SURGERY

## 2019-12-23 PROCEDURE — 84100 ASSAY OF PHOSPHORUS: CPT

## 2019-12-23 PROCEDURE — 80048 BASIC METABOLIC PNL TOTAL CA: CPT

## 2019-12-23 PROCEDURE — 82330 ASSAY OF CALCIUM: CPT

## 2019-12-23 PROCEDURE — 2500000003 HC RX 250 WO HCPCS: Performed by: EMERGENCY MEDICINE

## 2019-12-23 PROCEDURE — 0VQ50ZZ REPAIR SCROTUM, OPEN APPROACH: ICD-10-PCS | Performed by: SURGERY

## 2019-12-23 PROCEDURE — 99232 SBSQ HOSP IP/OBS MODERATE 35: CPT | Performed by: INTERNAL MEDICINE

## 2019-12-23 PROCEDURE — 0JB90ZZ EXCISION OF BUTTOCK SUBCUTANEOUS TISSUE AND FASCIA, OPEN APPROACH: ICD-10-PCS | Performed by: SURGERY

## 2019-12-23 PROCEDURE — 3700000001 HC ADD 15 MINUTES (ANESTHESIA): Performed by: SURGERY

## 2019-12-23 PROCEDURE — 2709999900 HC NON-CHARGEABLE SUPPLY: Performed by: SURGERY

## 2019-12-23 PROCEDURE — 2000000000 HC ICU R&B

## 2019-12-23 PROCEDURE — 84295 ASSAY OF SERUM SODIUM: CPT

## 2019-12-23 PROCEDURE — 36415 COLL VENOUS BLD VENIPUNCTURE: CPT

## 2019-12-23 PROCEDURE — 82565 ASSAY OF CREATININE: CPT

## 2019-12-23 PROCEDURE — 97606 NEG PRS WND THER DME>50 SQCM: CPT

## 2019-12-23 PROCEDURE — 83605 ASSAY OF LACTIC ACID: CPT

## 2019-12-23 PROCEDURE — 3700000000 HC ANESTHESIA ATTENDED CARE: Performed by: SURGERY

## 2019-12-23 PROCEDURE — 2500000003 HC RX 250 WO HCPCS: Performed by: INTERNAL MEDICINE

## 2019-12-23 PROCEDURE — 82947 ASSAY GLUCOSE BLOOD QUANT: CPT

## 2019-12-23 PROCEDURE — 85014 HEMATOCRIT: CPT

## 2019-12-23 RX ORDER — DOCUSATE SODIUM 100 MG/1
100 CAPSULE, LIQUID FILLED ORAL DAILY
Status: DISCONTINUED | OUTPATIENT
Start: 2019-12-23 | End: 2019-12-26

## 2019-12-23 RX ORDER — PROPOFOL 10 MG/ML
INJECTION, EMULSION INTRAVENOUS PRN
Status: DISCONTINUED | OUTPATIENT
Start: 2019-12-23 | End: 2019-12-23 | Stop reason: SDUPTHER

## 2019-12-23 RX ORDER — CHLORHEXIDINE GLUCONATE 0.12 MG/ML
15 RINSE ORAL 2 TIMES DAILY
Status: DISCONTINUED | OUTPATIENT
Start: 2019-12-23 | End: 2019-12-24

## 2019-12-23 RX ORDER — ROCURONIUM BROMIDE 10 MG/ML
INJECTION, SOLUTION INTRAVENOUS PRN
Status: DISCONTINUED | OUTPATIENT
Start: 2019-12-23 | End: 2019-12-23 | Stop reason: SDUPTHER

## 2019-12-23 RX ORDER — FENTANYL CITRATE 50 UG/ML
INJECTION, SOLUTION INTRAMUSCULAR; INTRAVENOUS PRN
Status: DISCONTINUED | OUTPATIENT
Start: 2019-12-23 | End: 2019-12-23 | Stop reason: SDUPTHER

## 2019-12-23 RX ORDER — SODIUM CHLORIDE 9 MG/ML
INJECTION, SOLUTION INTRAVENOUS CONTINUOUS PRN
Status: DISCONTINUED | OUTPATIENT
Start: 2019-12-23 | End: 2019-12-23 | Stop reason: SDUPTHER

## 2019-12-23 RX ORDER — MIDAZOLAM HYDROCHLORIDE 1 MG/ML
INJECTION INTRAMUSCULAR; INTRAVENOUS PRN
Status: DISCONTINUED | OUTPATIENT
Start: 2019-12-23 | End: 2019-12-23 | Stop reason: SDUPTHER

## 2019-12-23 RX ORDER — POLYETHYLENE GLYCOL 3350 17 G/17G
17 POWDER, FOR SOLUTION ORAL DAILY
Status: DISCONTINUED | OUTPATIENT
Start: 2019-12-23 | End: 2020-01-16 | Stop reason: HOSPADM

## 2019-12-23 RX ORDER — MAGNESIUM HYDROXIDE 1200 MG/15ML
LIQUID ORAL CONTINUOUS PRN
Status: COMPLETED | OUTPATIENT
Start: 2019-12-23 | End: 2019-12-23

## 2019-12-23 RX ORDER — LIDOCAINE HYDROCHLORIDE 10 MG/ML
INJECTION, SOLUTION EPIDURAL; INFILTRATION; INTRACAUDAL; PERINEURAL PRN
Status: DISCONTINUED | OUTPATIENT
Start: 2019-12-23 | End: 2019-12-23 | Stop reason: SDUPTHER

## 2019-12-23 RX ADMIN — POTASSIUM CHLORIDE: 2 INJECTION, SOLUTION, CONCENTRATE INTRAVENOUS at 17:10

## 2019-12-23 RX ADMIN — SODIUM CHLORIDE, PRESERVATIVE FREE 10 ML: 5 INJECTION INTRAVENOUS at 22:04

## 2019-12-23 RX ADMIN — HEPARIN SODIUM 1300 UNITS: 1000 INJECTION INTRAVENOUS; SUBCUTANEOUS at 16:45

## 2019-12-23 RX ADMIN — MEROPENEM 1 G: 1 INJECTION, POWDER, FOR SOLUTION INTRAVENOUS at 17:18

## 2019-12-23 RX ADMIN — PHENYLEPHRINE HYDROCHLORIDE 100 MCG: 10 INJECTION INTRAVENOUS at 18:27

## 2019-12-23 RX ADMIN — FENTANYL CITRATE 50 MCG: 50 INJECTION, SOLUTION INTRAMUSCULAR; INTRAVENOUS at 02:13

## 2019-12-23 RX ADMIN — INSULIN LISPRO 3 UNITS: 100 INJECTION, SOLUTION INTRAVENOUS; SUBCUTANEOUS at 09:06

## 2019-12-23 RX ADMIN — NOREPINEPHRINE BITARTRATE 10 MCG/MIN: 1 INJECTION, SOLUTION, CONCENTRATE INTRAVENOUS at 21:00

## 2019-12-23 RX ADMIN — PHENYLEPHRINE HYDROCHLORIDE 100 MCG: 10 INJECTION INTRAVENOUS at 18:10

## 2019-12-23 RX ADMIN — INSULIN LISPRO 3 UNITS: 100 INJECTION, SOLUTION INTRAVENOUS; SUBCUTANEOUS at 02:19

## 2019-12-23 RX ADMIN — ROCURONIUM BROMIDE 20 MG: 10 INJECTION INTRAVENOUS at 18:49

## 2019-12-23 RX ADMIN — MAGNESIUM HYDROXIDE 30 ML: 400 SUSPENSION ORAL at 23:32

## 2019-12-23 RX ADMIN — PHENYLEPHRINE HYDROCHLORIDE 100 MCG: 10 INJECTION INTRAVENOUS at 18:12

## 2019-12-23 RX ADMIN — FENTANYL CITRATE 50 MCG: 50 INJECTION INTRAMUSCULAR; INTRAVENOUS at 18:02

## 2019-12-23 RX ADMIN — HEPARIN SODIUM 5000 UNITS: 5000 INJECTION INTRAVENOUS; SUBCUTANEOUS at 22:02

## 2019-12-23 RX ADMIN — POLYETHYLENE GLYCOL 3350 17 G: 17 POWDER, FOR SOLUTION ORAL at 08:59

## 2019-12-23 RX ADMIN — Medication 25 MCG/HR: at 21:53

## 2019-12-23 RX ADMIN — PHENYLEPHRINE HYDROCHLORIDE 100 MCG: 10 INJECTION INTRAVENOUS at 18:07

## 2019-12-23 RX ADMIN — FAMOTIDINE 20 MG: 20 TABLET, FILM COATED ORAL at 08:51

## 2019-12-23 RX ADMIN — MIDAZOLAM HYDROCHLORIDE 4 MG: 1 INJECTION, SOLUTION INTRAMUSCULAR; INTRAVENOUS at 20:00

## 2019-12-23 RX ADMIN — Medication 15 ML: at 08:51

## 2019-12-23 RX ADMIN — ROCURONIUM BROMIDE 30 MG: 10 INJECTION INTRAVENOUS at 19:17

## 2019-12-23 RX ADMIN — CARVEDILOL 6.25 MG: 6.25 TABLET, FILM COATED ORAL at 08:51

## 2019-12-23 RX ADMIN — PHENYLEPHRINE HYDROCHLORIDE 100 MCG: 10 INJECTION INTRAVENOUS at 18:31

## 2019-12-23 RX ADMIN — PROPOFOL 150 MG: 10 INJECTION, EMULSION INTRAVENOUS at 18:02

## 2019-12-23 RX ADMIN — Medication 10 MCG/MIN: at 19:12

## 2019-12-23 RX ADMIN — MEROPENEM 1 G: 1 INJECTION, POWDER, FOR SOLUTION INTRAVENOUS at 05:40

## 2019-12-23 RX ADMIN — FENTANYL CITRATE 50 MCG: 50 INJECTION INTRAMUSCULAR; INTRAVENOUS at 19:58

## 2019-12-23 RX ADMIN — PHENYLEPHRINE HYDROCHLORIDE 100 MCG: 10 INJECTION INTRAVENOUS at 18:30

## 2019-12-23 RX ADMIN — PHENYLEPHRINE HYDROCHLORIDE 100 MCG: 10 INJECTION INTRAVENOUS at 18:20

## 2019-12-23 RX ADMIN — SODIUM PHOSPHATE, MONOBASIC, MONOHYDRATE 10 MMOL: 276; 142 INJECTION, SOLUTION INTRAVENOUS at 08:52

## 2019-12-23 RX ADMIN — I.V. FAT EMULSION 100 ML: 20 EMULSION INTRAVENOUS at 17:10

## 2019-12-23 RX ADMIN — PHENYLEPHRINE HYDROCHLORIDE 100 MCG/MIN: 10 INJECTION INTRAVENOUS at 18:55

## 2019-12-23 RX ADMIN — LIDOCAINE HYDROCHLORIDE 50 MG: 10 INJECTION, SOLUTION EPIDURAL; INFILTRATION; INTRACAUDAL; PERINEURAL at 18:02

## 2019-12-23 RX ADMIN — ROCURONIUM BROMIDE 50 MG: 10 INJECTION INTRAVENOUS at 18:02

## 2019-12-23 RX ADMIN — ROCURONIUM BROMIDE 50 MG: 10 INJECTION INTRAVENOUS at 19:56

## 2019-12-23 RX ADMIN — HEPARIN SODIUM 1400 UNITS: 1000 INJECTION INTRAVENOUS; SUBCUTANEOUS at 16:45

## 2019-12-23 RX ADMIN — Medication: at 08:50

## 2019-12-23 RX ADMIN — SODIUM CHLORIDE: 9 INJECTION, SOLUTION INTRAVENOUS at 05:36

## 2019-12-23 RX ADMIN — INSULIN LISPRO 3 UNITS: 100 INJECTION, SOLUTION INTRAVENOUS; SUBCUTANEOUS at 12:09

## 2019-12-23 RX ADMIN — SODIUM CHLORIDE: 9 INJECTION, SOLUTION INTRAVENOUS at 18:02

## 2019-12-23 RX ADMIN — PHENYLEPHRINE HYDROCHLORIDE 100 MCG: 10 INJECTION INTRAVENOUS at 18:24

## 2019-12-23 RX ADMIN — SODIUM CHLORIDE, PRESERVATIVE FREE 10 ML: 5 INJECTION INTRAVENOUS at 08:51

## 2019-12-23 RX ADMIN — INSULIN LISPRO 3 UNITS: 100 INJECTION, SOLUTION INTRAVENOUS; SUBCUTANEOUS at 16:10

## 2019-12-23 ASSESSMENT — PULMONARY FUNCTION TESTS
PIF_VALUE: 9
PIF_VALUE: 33
PIF_VALUE: 25
PIF_VALUE: 30
PIF_VALUE: 32
PIF_VALUE: 31
PIF_VALUE: 34
PIF_VALUE: 35
PIF_VALUE: 40
PIF_VALUE: 35
PIF_VALUE: 29
PIF_VALUE: 32
PIF_VALUE: 28
PIF_VALUE: 30
PIF_VALUE: 23
PIF_VALUE: 35
PIF_VALUE: 31
PIF_VALUE: 1
PIF_VALUE: 2
PIF_VALUE: 35
PIF_VALUE: 33
PIF_VALUE: 33
PIF_VALUE: 31
PIF_VALUE: 0
PIF_VALUE: 2
PIF_VALUE: 31
PIF_VALUE: 26
PIF_VALUE: 36
PIF_VALUE: 30
PIF_VALUE: 25
PIF_VALUE: 34
PIF_VALUE: 35
PIF_VALUE: 32
PIF_VALUE: 30
PIF_VALUE: 35
PIF_VALUE: 1
PIF_VALUE: 36
PIF_VALUE: 33
PIF_VALUE: 1
PIF_VALUE: 37
PIF_VALUE: 26
PIF_VALUE: 0
PIF_VALUE: 30
PIF_VALUE: 39
PIF_VALUE: 9
PIF_VALUE: 32
PIF_VALUE: 28
PIF_VALUE: 30
PIF_VALUE: 23
PIF_VALUE: 34
PIF_VALUE: 0
PIF_VALUE: 28
PIF_VALUE: 36
PIF_VALUE: 31
PIF_VALUE: 36
PIF_VALUE: 0
PIF_VALUE: 28
PIF_VALUE: 35
PIF_VALUE: 28
PIF_VALUE: 27
PIF_VALUE: 31
PIF_VALUE: 20
PIF_VALUE: 31
PIF_VALUE: 36
PIF_VALUE: 30
PIF_VALUE: 9
PIF_VALUE: 39
PIF_VALUE: 2
PIF_VALUE: 32
PIF_VALUE: 33
PIF_VALUE: 26
PIF_VALUE: 10
PIF_VALUE: 30
PIF_VALUE: 36
PIF_VALUE: 28
PIF_VALUE: 35
PIF_VALUE: 33
PIF_VALUE: 32
PIF_VALUE: 32
PIF_VALUE: 36
PIF_VALUE: 9
PIF_VALUE: 31
PIF_VALUE: 1
PIF_VALUE: 22
PIF_VALUE: 10
PIF_VALUE: 34
PIF_VALUE: 33
PIF_VALUE: 27
PIF_VALUE: 28
PIF_VALUE: 36
PIF_VALUE: 32
PIF_VALUE: 32
PIF_VALUE: 31
PIF_VALUE: 25
PIF_VALUE: 30
PIF_VALUE: 36
PIF_VALUE: 35
PIF_VALUE: 36
PIF_VALUE: 34
PIF_VALUE: 30
PIF_VALUE: 34
PIF_VALUE: 28
PIF_VALUE: 25
PIF_VALUE: 1
PIF_VALUE: 35
PIF_VALUE: 39
PIF_VALUE: 35
PIF_VALUE: 31
PIF_VALUE: 41
PIF_VALUE: 29
PIF_VALUE: 31
PIF_VALUE: 31
PIF_VALUE: 37
PIF_VALUE: 22
PIF_VALUE: 1
PIF_VALUE: 32
PIF_VALUE: 28
PIF_VALUE: 38
PIF_VALUE: 29
PIF_VALUE: 1
PIF_VALUE: 27
PIF_VALUE: 1
PIF_VALUE: 10
PIF_VALUE: 27
PIF_VALUE: 34
PIF_VALUE: 22
PIF_VALUE: 36
PIF_VALUE: 35
PIF_VALUE: 28
PIF_VALUE: 41
PIF_VALUE: 27
PIF_VALUE: 10
PIF_VALUE: 34
PIF_VALUE: 32
PIF_VALUE: 35
PIF_VALUE: 40
PIF_VALUE: 29
PIF_VALUE: 29
PIF_VALUE: 28
PIF_VALUE: 26
PIF_VALUE: 32
PIF_VALUE: 31
PIF_VALUE: 31

## 2019-12-23 ASSESSMENT — PAIN DESCRIPTION - ONSET: ONSET: ON-GOING

## 2019-12-23 ASSESSMENT — PAIN DESCRIPTION - LOCATION: LOCATION: BUTTOCKS;SCROTUM

## 2019-12-23 ASSESSMENT — PAIN DESCRIPTION - PROGRESSION: CLINICAL_PROGRESSION: NOT CHANGED

## 2019-12-23 ASSESSMENT — PAIN DESCRIPTION - PAIN TYPE: TYPE: ACUTE PAIN

## 2019-12-23 ASSESSMENT — PAIN DESCRIPTION - FREQUENCY: FREQUENCY: CONTINUOUS

## 2019-12-23 NOTE — PROGRESS NOTES
PROGRESS NOTE     PATIENT NAME: Austin Virgen0 RECORD NO. [id number]  DATE: 12/23/2019  SURGEON: Anita Raygoza  PRIMARY CARE PHYSICIAN: No primary care provider on file. HD: # 24    ASSESSMENT    Patient Active Problem List   Diagnosis    Necrotizing fasciitis ([address]    Hypertension    Diabetes ([address]    Diabetic foot ulcer ([address]    Septic shock ([address]    Bandemia    Acute respiratory failure with hypoxemia ([address]    Diabetic foot infection ([address]      11/29/2019 wide complex debridement of nec fasc involving gluteal/perianal/distal rectum  12/03/2019 open diverting end colostomy   12/06/2019 I&D  12/08/2019 Incision and debridement of necrotizing fasciitis buttock wound, scrotum, bilateral groin region and closure of midline abdominal wound  12/10/2019 Sharp excisional debridement of gluteal, marc-rectal and perineal wound to level of muscle, Sharp excisional debridement of scrotum (performed by Urology), Washout of gluteal/perineal/scrotal/inguinal wound, Partial primary closure of pubic wound, and Dakins Wet to dry dressing application  72/85/7612 sharp excisional debridement of gluteal and perineal wounds to muscle 30 x 40 cm. Urology exam under anesthesia       [address] [place] [address]    · Recommend bowel regimen. Colace and miralax daily  · OR on Monday evening with plastic/reconstructive surgery, Dr. Kalin Starkey, for evaluation. · NPO at MN, hold anticoagulation in am  · Continue with BID WTD dressing changes to the large open gluteal, perirectal, perineal and scrotal wounds - nursing to assist.  · Packing changes to supra pubic incision site with other dressing changes  · Midline wound vac, wound RN to follow starting 12/23/19 for midline vac   · Meropenem per ID. · Strict glucose control <180  · LLE wound per podiatry  · Nephrology following. SUBJECTIVE    Patient seen and examined. No acute changes. Patient seems to nod appropriately, but it is not consistent.  tube feeds on hold for

## 2019-12-23 NOTE — PROGRESS NOTES
Renal Progress Note    Patient :  Donal Lung; 37 y.o. MRN# [id number]  Location:  0128/0128-01  Attending:  Debora Bates MD  Admit Date:  11/29/2019   Hospital Day: 25      Subjective:     Plan for OR today. Plastic surgery evaluation, evaluation for the debridement. Ambrosio in place. Urine output still poor. Colostomy in place. TPN continues. Patient significantly fluid overloaded. Weight is about 179.8 kg, dry weight is around 145 to 150 kg. Has had significant amount of intravenous fluids. Currently extubated. TPN being adjusted based on lab results. Phosphorus and magnesium is running low. Corrected calcium more than 10. Outpatient Medications:     No medications prior to admission.     Current Medications:     Scheduled Meds:    sodium phosphate IVPB  10 mmol Intravenous Once    docusate sodium  100 mg Oral Daily    polyethylene glycol  17 g Oral Daily    insulin lispro  0-18 Units Subcutaneous Q4H    meropenem  1 g Intravenous Q12H    carvedilol  6.25 mg Per NG tube Daily    CENTRUM/CERTA-CHANCE with minerals oral  15 mL Oral Daily    fat emulsion  100 mL Intravenous Daily    alteplase  1 mg Intracatheter Once    povidone-iodine   Topical Daily    famotidine  20 mg Per NG tube Daily    sodium chloride flush  10 mL Intravenous 2 times per day    heparin (porcine)  5,000 Units Subcutaneous 3 times per day     Continuous Infusions:    PN-Adult 2-in-1 Central Line (Standard)      sodium chloride      dextrose      sodium chloride 10 mL/hr at 12/23/19 0536     PRN Meds:  fentanNYL, oxyCODONE, labetalol, heparin (porcine), heparin (porcine), glucose, dextrose, glucagon (rDNA), dextrose, albumin human, heparin (porcine), heparin (porcine), LORazepam, sodium chloride, sodium chloride, albumin human, sodium chloride flush, potassium chloride **OR** potassium alternative oral replacement **OR** potassium chloride, acetaminophen, REFRESH LACRI-LUBE, magnesium hydroxide, ondansetron    Input/Output:       I/O last 3 completed shifts: In: 2452 [I.V.:242; NG/GT:351]  Out: 775 [Urine:775]. Patient Vitals for the past 96 hrs (Last 3 readings):   Weight   19 1800 (!) 396 lb 6.2 oz (179.8 kg)   19 1400 (!) 401 lb 7.3 oz (182.1 kg)   19 2200 (!) 397 lb 0.8 oz (180.1 kg)       Vital Signs:   Temperature:  Temp: 98.4 °F (36.9 °C)  TMax:   Temp (24hrs), Av.3 °F (36.8 °C), Min:98.1 °F (36.7 °C), Max:98.6 °F (37 °C)    Respirations:  Resp: 21  Pulse:   Pulse: 104  BP:    BP: (!) 149/94  BP Range: Systolic (32SQR), MWI:333 , Min:137 , FCQ:571       Diastolic (82DOO), CD, Min:75, Max:128      Physical Examination:     General:  Sedated, arousable, in no distress,  HEENT: Atraumatic, normocephalic, no throat congestion, moist mucosa. Eyes:   Pupils equal, round and reactive to light, EOMI. Neck:   No JVD, no thyromegaly, no lymphadenopathy. Chest:  Bilateral vesicular breath sounds, no rales or wheezes. Cardiac:  S1 S2 RR, no murmurs, gallops or rubs, JVP not raised. Abdomen: Abdominal distended, bowel sounds present :   No suprapubic or flank tenderness. Neuro:  AAO x 3, No FND. SKIN:  No rashes, good skin turgor. Extremities:  2-3+ edema, palpable peripheral pulses, no calf tenderness. Labs:       Recent Labs     19  0456 19  0355   WBC 9.3 7.9   RBC 2.69* 2.86*   HGB 7.7* 8.2*   HCT 25.0* 26.8*   MCV 92.9 93.7   MCH 28.6 28.7   MCHC 30.8 30.6   RDW 22.5* 22.6*    204   MPV 9.0 9.3      BMP:   Recent Labs     19  0456 19  0355 19  0551   * 129* 132*   K 3.2* 3.6* 3.3*   CL 96* 97* 102   CO2 21 18* 19*   BUN 58* 71* 66*   CREATININE 2.03* 2.24* 2.25*   GLUCOSE 181* 220* 191*   CALCIUM 7.9* 8.8 8.4*      Phosphorus:     Recent Labs     19  0551   PHOS 0.9*     Magnesium:    Recent Labs     19  0551   MG 1.8     Albumin:  No results for input(s): LABALBU in the last 72 hours.   BNP:    No results found for: BNP  MARINE:      Lab Results   Component Value Date    MARINE NEGATIVE 11/30/2019     SPEP:  Lab Results   Component Value Date    PROT 5.3 12/01/2019    PATH ELECTRONICALLY SIGNED. Sherri Toscano M.D. 11/30/2019     UPEP:   No results found for: LABPE  C3:     Lab Results   Component Value Date    C3 97 11/30/2019     C4:     Lab Results   Component Value Date    C4 12 11/30/2019     MPO ANCA:   No results found for: MPO  PR3 ANCA:   No results found for: PR3  Anti-GBM:   No results found for: GBMABIGG  Hep BsAg:         Lab Results   Component Value Date    HEPBSAG NONREACTIVE 11/30/2019     Hep C AB:          Lab Results   Component Value Date    HEPCAB NONREACTIVE 11/30/2019       Urinalysis/Chemistries:      Lab Results   Component Value Date    NITRU NEGATIVE 11/29/2019    COLORU YELLOW 11/29/2019    PHUR 5.0 11/29/2019    WBCUA 5 TO 10 11/29/2019    RBCUA 0 TO 2 11/29/2019    MUCUS NOT REPORTED 11/29/2019    TRICHOMONAS NOT REPORTED 11/29/2019    YEAST NOT REPORTED 11/29/2019    BACTERIA NOT REPORTED 11/29/2019    SPECGRAV 1.015 11/29/2019    LEUKOCYTESUR NEGATIVE 11/29/2019    UROBILINOGEN Normal 11/29/2019    BILIRUBINUR NEGATIVE 11/29/2019    GLUCOSEU NEGATIVE 11/29/2019    KETUA NEGATIVE 11/29/2019    AMORPHOUS NOT REPORTED 11/29/2019     Urine Sodium:     Lab Results   Component Value Date    DONOVAN 39 11/30/2019     Urine Potassium:  No results found for: KUR  Urine Chloride:  No results found for: CLUR  Urine Osmolarity: No results found for: OSMOU  Urine Protein:   No components found for: TOTALPROTEIN, URINE   Urine Creatinine:     Lab Results   Component Value Date    LABCREA 116.4 11/30/2019     Urine Eosinophils:  No components found for: UEOS    Radiology:     CXR:     Assessment:     1. Acute Kidney Injury: Secondary to ischemic ATN from sepsis syndrome from necrotizing fasciitis. He has been dialysis dependent.   Has a left IJ temporary dialysis catheter in place  2.  Chronic kidney diseae

## 2019-12-23 NOTE — CARE COORDINATION
Patient is now extubated - OR scheduled at 6pm tonight.  Continues on HD, complex wound care - [facility] follows

## 2019-12-23 NOTE — PROGRESS NOTES
obstruction. Percutaneous intraperitoneal surgical drains. 3.  Mild abdominopelvic ascites. Loculated fluid along the inferior aspect   of the liver. No intraperitoneal free air. 4.  Diffuse anasarca. 5.   Bilateral pleural effusions with associated heterogeneous opacities and   consolidations. Underlying infection is not excluded. XR CHEST PORTABLE   Final Result   1. Increased left basilar airspace opacity potentially due to atelectasis,   pneumonia, and/or aspiration. 2. Decreased right basilar airspace opacity likely due to improved   atelectasis. 3. No significant change in central predominant alveolar interstitial edema   likely due to congestive heart failure given moderate cardiomegaly. Pneumonia could appear similar. 4. Suspected left pleural effusion. XR CHEST PORTABLE   Final Result   Worsening features of volume overload. New left IJ line in good position; no evidence of procedure related   pneumothorax. XR ABDOMEN (KUB) (SINGLE AP VIEW)   Final Result   Ovoid increased density projecting over the leftward aspect of the pelvis is   likely due to the patient's reported surgery. There are no defined surgical instruments noted. US RENAL COMPLETE   Final Result   Right renal cyst, otherwise grossly negative renal ultrasound. Nondiagnostic   bladder assessment. XR FOOT LEFT (MIN 3 VIEWS)   Final Result   Severe soft tissue swelling and laceration plantar aspect. XR TIBIA FIBULA LEFT (2 VIEWS)   Final Result   No acute osseous abnormality. Possible subcutaneous gas laterally. This might suggest infection. Recommend CT with IV contrast.      RECOMMENDATIONS:   The findings were sent to the Radiology Results [address] at 10:27   pm on 11/29/2019to be communicated to a licensed caregiver. XR CHEST (SINGLE VIEW FRONTAL)   Final Result   New ET tube as above.   Possible new enteric tube not well visualized. Recommend follow-up KUB if clinically warranted. XR CHEST PORTABLE   Final Result   Moderate cardiomegaly. Lines as above. Assessment   Gisselle Sanches is a 37 y.o. male with   1. Stage III pressure ulcer to plantar foot, LLE  2. Stage I pressure ulcer to lateral leg, LLE-resolved  3. Necrotizing fascitis of gluteal region  4. Acute renal failure   5. Leukocytosis   6. S/p multiple gluteal debridements    Principal Problem:    Necrotizing fasciitis ([address]  Active Problems:    Hypertension    Diabetes ([address]    Diabetic foot ulcer ([address]    Septic shock ([address]    Bandemia    Acute respiratory failure with hypoxemia (HCC)    Diabetic foot infection ([address]  Resolved Problems:    * No resolved hospital problems. *       Plan     · Patient examined and evaluated at bedside with Dr. Peggy Jenkins. · Radiographs reviewed- no overt signs of osteomyelitis. Possible soft tissue emphysema to lateral LLE however does not correlate clinically. There is no concern for infection at this site. · Continue to float bilateral foot to offload heels with use of pillows under the legs  · Daily dressing changes per nursing staff to left foot consisting of betadine gauze, dry gauze and DSD to left foot. · Dressing applied to Left Foot: Betadine DSD. · Will order Rooke boot for RLE. · Elevate heels of bed. · No surgical intervention indicated at this time. Will continue to follow from [facility]. Will see patient weekly while admitted. Continue with local wound care.    · NWB to Left lower extremity      Electronically signed by Peggy Raza DPM on 12/23/2019 at 11:09 AM

## 2019-12-23 NOTE — PROGRESS NOTES
Critical Care Team - Daily Progress Note      Date and time: 12/23/2019 7:55 AM  Patient's name:  Chuy Rivers  Medical Record Number: [id number]  Patient's account/billing number: [de-identified]  Patient's YOB: 1985  Age: 37 y.o. Date of Admission: 11/29/2019  3:39 PM  Length of stay during current admission: 24      Primary Care Physician: No primary care provider on file. Code Status: DNR-CCA    Reason for ICU admission: Necrotizing fasciitis      SUBJECTIVE:     OVERNIGHT EVENTS:      Acute events overnight, patient made n.p.o. at midnight for OR plans today.     AWAKE & FOLLOWING COMMANDS:  Patient awake intermittently following minimal commands  CURRENT VENTILATION STATUS:     [] Ventilator  [] BIPAP  [x] Nasal Cannula [] Room Air          SECRETIONS Amount:  [x] Small [] Moderate  [] Large  [] None  Color:     [x] White [] Colored  [] Bloody    SEDATION:  RAAS Score:  [] Propofol gtt  [] Versed gtt  [] Ativan gtt   [] No Sedation    PARALYZED:  [x] No    [] Yes    DIARRHEA:                [x] No                [] Yes  (C. Difficile status: [] positive                                                                                                                       [] negative                                                                                                                     [] pending)    VASOPRESSORS:  [x] No    [] Yes    If yes -   [] Levophed       [] Dopamine     [] Vasopressin       [] Dobutamine  [] Phenylephrine         [] Epinephrine    CENTRAL LINES:     [] No   [x] Yes   (Date of Insertion:   )           If yes -     [x] Right IJ     [] Left IJ [] Right Femoral [] Left Femoral                   [] Right Subclavian [] Left Subclavian       DOSS'S CATHETER:   [] No   [x] Yes  (Date of Insertion:   )     URINE OUTPUT:            [] Good   [x] Low (improved compared to night before.)           [] Anuric      OBJECTIVE:     VITAL SIGNS:  BP (!) 152/107   Pulse 111 0-18 Units Subcutaneous Q4H    meropenem  1 g Intravenous Q12H    carvedilol  6.25 mg Per NG tube Daily    CENTRUM/CERTA-CHANCE with minerals oral  15 mL Oral Daily    fat emulsion  100 mL Intravenous Daily    alteplase  1 mg Intracatheter Once    povidone-iodine   Topical Daily    famotidine  20 mg Per NG tube Daily    sodium chloride flush  10 mL Intravenous 2 times per day    heparin (porcine)  5,000 Units Subcutaneous 3 times per day     Continuous Infusions:   PN-Adult 2-in-1 Central Line (Standard) 70 mL/hr at 12/22/19 1743    sodium chloride      dextrose      sodium chloride 10 mL/hr at 12/23/19 0536     PRN Meds:   fentanNYL, 50 mcg, Q2H PRN  oxyCODONE, 10 mg, Q6H PRN  labetalol, 20 mg, Q6H PRN  heparin (porcine), 500 Units, PRN  heparin (porcine), 1,750 Units, PRN  glucose, 15 g, PRN  dextrose, 12.5 g, PRN  glucagon (rDNA), 1 mg, PRN  dextrose, 100 mL/hr, PRN  albumin human, 25 g, PRN  heparin (porcine), 1,400 Units, PRN  heparin (porcine), 1,300 Units, PRN  LORazepam, 1 mg, Q6H PRN  sodium chloride, 250 mL, PRN  sodium chloride, 150 mL, PRN  albumin human, 25 g, PRN  sodium chloride flush, 10 mL, PRN  potassium chloride, 40 mEq, PRN    Or  potassium alternative oral replacement, 40 mEq, PRN    Or  potassium chloride, 10 mEq, PRN  acetaminophen, 650 mg, Q4H PRN  REFRESH LACRI-LUBE, , PRN  magnesium hydroxide, 30 mL, Daily PRN  ondansetron, 4 mg, Q6H PRN          VENT SETTINGS (Comprehensive) (if applicable):  Vent Information  $Ventilation: $Subsequent Day  Ventilator Started: Yes  Ventilator Stopped: Yes  Skin Assessment: Clean, dry, & intact  Equipment ID: [id number]  Equipment Changed: (S) Vent Circuit(heater)  Vent Type: Servo i  Vent Mode: CPAP  Vt Ordered: 530 mL  Rate Set: 24 bmp  Pressure Support: 6 cmH20  FiO2 : 30 %  Sensitivity: 2  PEEP/CPAP: 5  I Time/ I Time %: 0.9 s  Cuff Pressure (cm H2O): 28 cm H2O  Humidification Source: Heated wire  Humidification Temp: 32.4  Humidification Temp Measured: 32.4  Circuit Condensation: Drained  Nitric Oxide/Epoprostenol In Use?: No  Additional Respiratory  Assessments  Pulse: 111  Resp: 14  SpO2: 98 %  End Tidal CO2: 28 (%)  pCO2 (TCOM, mmHg): 53 mmHg  Position: Semi-Pagan's  Humidification Source: Heated wire  Humidification Temp: 32.4  Circuit Condensation: Drained  Oral Care Completed?: Yes  Oral Care: Mouthwash, Teeth brushed, Suction toothette  Subglottic Suction Done?: Yes  Cuff Pressure (cm H2O): 28 cm H2O  Skin barrier applied: No      Laboratory findings:    Complete Blood Count:   Recent Labs     12/21/19 0456 12/22/19  0355   WBC 9.3 7.9   HGB 7.7* 8.2*   HCT 25.0* 26.8*    204        Last 3 Blood Glucose:   Recent Labs     12/21/19 0456 12/22/19  0355 12/23/19  0551   GLUCOSE 181* 220* 191*        PT/INR:    Lab Results   Component Value Date    PROTIME 11.9 11/29/2019    INR 1.1 11/29/2019     PTT:  No results found for: APTT, PTT    Comprehensive Metabolic Profile:   Recent Labs     12/21/19 0456 12/22/19  0355 12/23/19  0551   * 129* 132*   K 3.2* 3.6* 3.3*   CL 96* 97* 102   CO2 21 18* 19*   BUN 58* 71* 66*   CREATININE 2.03* 2.24* 2.25*   GLUCOSE 181* 220* 191*   CALCIUM 7.9* 8.8 8.4*      Magnesium:   Lab Results   Component Value Date    MG 1.8 12/23/2019     Phosphorus:   Lab Results   Component Value Date    PHOS 0.9 12/23/2019     Ionized Calcium:   Lab Results   Component Value Date    CAION 1.05 12/03/2019        Urinalysis:     Troponin: No results for input(s): TROPONINI in the last 72 hours. Microbiology:    Cultures during this admission:     Other pertinent Labs:       Radiology/Imaging:       ASSESSMENT:     Principal Problem:    Necrotizing fasciitis ([address]  Active Problems:    Hypertension    Diabetes ([facility]    Diabetic foot ulcer ([facility]    Septic shock ([facility]    Bandemia    Acute respiratory failure with hypoxemia ([facility]    [facility]  Resolved Problems:    * No resolved hospital problems. *    Necrotizing fasciitis  Diabetes  Septic shock        PLAN:     WEAN PER PROTOCOL:  [] No   [] Yes  [x] N/A    DISCONTINUE ANY LABS:   [x] No   [] Yes    ICU PROPHYLAXIS:  Stress ulcer:  [] PPI Agent  [x] [id number] [] Sucralfate  [] Other:  VTE:   [] Enoxaparin  [] Unfract.  Heparin Subcut  [] EPC Cuffs    NUTRITION: (Diet: PN-Adult 2-in-1 Central Line (Standard)  Diet NPO, After Midnight)    HOME MEDICATIONS RECONCILED: [] No  [] Yes    INSULIN DRIP:   [x] No   [] Yes    CONSULTATION NEEDED:  [x] No   [] Yes    FAMILY UPDATED:    [] No   [x] Yes    TRANSFER OUT OF ICU:   [x] No   [] Yes    ADDITIONAL PLAN:       -Optimize glucose, sliding scale switch from every 6 to every 4  -OR today at 6 PM remain n.p.o.  -We will resume tube feeds postop with surgical permission  -Continue meropenem per infectious disease          Matthew Corona DO      [facility] [address], [facility]         12/23/2019, 7:55 AM

## 2019-12-23 NOTE — PROGRESS NOTES
[facility] - Progress Note    Today's Date and Time: 12/23/2019, 5:13 PM    Impression :   · Necrotizing fasciitis 11-29-19  · S/P perirectal I&D. Wide complex excisional debridement of gluteal and perineal areas on 11-29-19  · S/P debridement, washout of gluteal and perianal areas, diverting colostomy 12-3-19.   · S/P debridement, washout of gluteal and perianal areas, diverting colostomy 12-6-19.  · S/P Incision and debridement of necrotizing fasciitis buttock wound, scrotum, bilateral groin region and closure of midline abdominal wound 12-8-19   · S/P Incision and debridement of necrotizing fasciitis buttock wound, scrotum, bilateral groin region on 12-20-19. · Lactic acidosis  · DM 2  · HTN  · Hx of Low LVEF (20%) as per family  · DEBBY  · Fluid overload    Recommendations:     · D/C Meropenem 12-18-19  · Monitor off antibiotics,  · General surgery planning for a debridement tomorrow for plastic reconstructive surgery. · meropenem around the surgery  1 g q 12 x 2 doses  · Continue with wound care. · Poor outcome in general,        Medical Decision Making/Summary/Discussion:12/23/2019     · Patient with DM 2, prior diabetic foot infection  · Presented to [address] [place] [address] ER generalized weakness for the past few days  · Found to have soft tissue necrosis with subcutaneous emphysema in gluteal areas and perineum  · S/P I&D and wide complex debridement of affected tissues on 11-29-19  · Showing hypotension, requiring fluids and a vasopressor  · Cultures in progress  · Will cover with Zosyn. Wounds with Strep spp. And Gram negative bacilli . No Staph spp. 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    · One more debridement in OR debridement on 12-8-19, infection has spread to deep planes with the urethra less viable. One more debridement in OR scheduled for 12-10-19 and then decision will be made to change code status or not. Pt did not tolerate HD on 12-9. It was stopped early and pt required vasopressor support with Levophed, remains on vent. Pt to OR 12-10 for further debridement      CURRENT EVALUATION :12/23/2019   Very weak alert not much talkative  abd soft and wounds dressed - to be going for OR this pm  No rash      WBC 7.9    Buttock wound is showing multi-gram-negative jayme 11/29/2019  Very poor outcome. DNR CC  CXR pend  Mother on the bedside updated    Labs reviewed, no new cultures      Cultures:  Urine:  · NA  Blood:  · 11-30-19: no growth  · 12-2-19: no growth  Sputum :  · NA  Wound:  · 11-29-19: Strep ssp and Gram negative bacilli       MRSA probe: Pos    12-13-19: Abdominal, groin wounds:              12-11-19:          12-7-19: Left foot:      12-4-19:      11-29-19:      I have personally reviewed the past medical history, past surgical history, medications, social history, and family history, and I have updated the database accordingly.   Past Medical History:     Past Medical History:   Diagnosis Date    [facility]congestive [facility] failure) [facility]     Diabetes mellitus ([address]     Hypertension     Spina bifida aperta of lumbar spine ([facility] [address]        Past Surgical  History:     Past Surgical History:   Procedure Laterality Date    ABDOMEN SURGERY N/A 12/8/2019    DEBRIDEMENT  NECROTIZING FASCIITIS BUTTOCK, WOUND VAC REMOVAL DELAYED PRIMARY CLOSURE OF MIDLINE ABDOMINAL INCISION, OSTOMY BAG CHANGE performed by Karri Marcum MD at [facility] N/A 12/10/2019    DEBRIDEMENT OF SACRAL WOUND performed by Izabela Castanon MD at [facility] N/A 12/3/2019    LAPAROSCOPIC CONVERTED TO OPEN DIVERTING LOOP COLOSTOMY; WOUND VAC APPLICATION performed by Nargis Ríos MD at [place] [address] DRAINAGE Bilateral 11/29/2019    RECTAL PERIRECTAL INCISION AND DRAINAGE, DIVERING LOOP COLOSTOMY CREATION performed by Mireay Calderon MD at [facility] [address] N/A 12/6/2019    DEBRIDEMENT NECROTIZING FASCIAITIS BILAT BUTTOCK performed by Bernardo Julio MD at [facility] [address] N/A 12/20/2019    DEBRIDEMENT GLUTEAL AND SCROTAL REGIONS performed by Mireya Calderon MD at [place] [address] N/A 12/10/2019    SCROTAL EXPLORATION WITH SCROTAL DEBRIDEMENT performed by Melonie Landeros MD at [facility] OR       Medications:      docusate sodium  100 mg Oral Daily    polyethylene glycol  17 g Oral Daily    insulin lispro  0-18 Units Subcutaneous Q4H    meropenem  1 g Intravenous Q12H    carvedilol  6.25 mg Per NG tube Daily    CENTRUM/CERTA-CHANCE with minerals oral  15 mL Oral Daily    fat emulsion  100 mL Intravenous Daily    alteplase  1 mg Intracatheter Once    povidone-iodine   Topical Daily    famotidine  20 mg Per NG tube Daily    sodium chloride flush  10 mL Intravenous 2 times per day    heparin (porcine)  5,000 Units Subcutaneous 3 times per day       Social History:     Social History     Socioeconomic History    Marital status: Unknown     Spouse name: Not on file    Number of children: Not on file    Years of education: Not on file    Highest education level: Not on file   Occupational History    Not on file   Social Needs    Financial resource strain: Not on file    Food insecurity:     Worry: Not on file     Inability: Not on file    Transportation needs:     Medical: Not on file     Non-medical: Not on file   Tobacco Use    Smoking status: Not on file   Substance and Sexual Activity    Alcohol use: Not on file    Drug use: Not on file    Sexual activity: Not on file   Lifestyle    Physical activity:     Days per week: Not on file     Minutes per session: Not on file    Stress: Not on file   Relationships    Social connections: Talks on phone: Not on file     Gets together: Not on file     Attends Holiness service: Not on file     Active member of club or organization: Not on file     Attends meetings of clubs or organizations: Not on file     Relationship status: Not on file    Intimate partner violence:     Fear of current or ex partner: Not on file     Emotionally abused: Not on file     Physically abused: Not on file     Forced sexual activity: Not on file   Other Topics Concern    Not on file   Social History Narrative    Not on file       Family History:   No family history on file. Allergies:   Patient has no known allergies. Review of Systems:     Review of Systems   Unable to perform ROS: Mental status change   Constitutional: Positive for activity change. Physical Examination :     Patient Vitals for the past 8 hrs:   BP Temp Temp src Pulse Resp SpO2 Weight   12/23/19 1630 (!) 147/88 -- -- 90 -- -- --   12/23/19 1615 (!) 146/95 -- -- 96 -- -- --   12/23/19 1600 (!) 152/92 97.7 °F (36.5 °C) Oral 94 20 -- --   12/23/19 1545 (!) 142/92 -- -- 95 -- -- --   12/23/19 1530 (!) 145/88 -- -- 97 -- -- --   12/23/19 1515 (!) 149/94 -- -- 98 -- -- --   12/23/19 1500 (!) 145/93 -- -- 97 -- -- --   12/23/19 1445 (!) 140/91 -- -- 99 -- -- --   12/23/19 1430 (!) 145/83 -- -- 99 -- -- --   12/23/19 1415 136/86 -- -- 98 -- -- --   12/23/19 1400 116/75 -- -- 98 -- -- --   12/23/19 1345 (!) 147/97 96.8 °F (36 °C) -- 97 -- -- --   12/23/19 1320 (!) 147/93 98.4 °F (36.9 °C) -- 98 20 99 % (!) 396 lb 2.7 oz (179.7 kg)   12/23/19 1300 (!) 147/93 -- -- 98 21 98 % --   12/23/19 1200 127/71 98.4 °F (36.9 °C) Oral 97 20 98 % --   12/23/19 1100 (!) 143/93 -- -- 99 22 98 % --   12/23/19 1000 135/78 -- -- 104 23 99 % --     Physical Exam  Constitutional:       Appearance: Normal appearance. He is not toxic-appearing or diaphoretic. HENT:      Head: Normocephalic and atraumatic. Nose: No congestion or rhinorrhea.       Mouth/Throat: YEAST NOT REPORTED 2019    TURBIDITY TURBID 2019     Lab Results   Component Value Date    CREATININE 2.25 2019    GLUCOSE 191 2019       Medical Decision Making-Imagin-9 CT Abd/pelvis  EXAMINATION:   CT OF THE ABDOMEN AND PELVIS WITH CONTRAST 2019 2:29 am       TECHNIQUE:   CT of the abdomen and pelvis was performed with the administration of   intravenous contrast. Multiplanar reformatted images are provided for review. Dose modulation, iterative reconstruction, and/or weight based adjustment of   the mA/kV was utilized to reduce the radiation dose to as low as reasonably   achievable.       COMPARISON:   2019.       HISTORY:   ORDERING SYSTEM PROVIDED HISTORY: [place] fasc   TECHNOLOGIST PROVIDED HISTORY:       [facility] fasc   Reason for Exam: [facility] fasc; Trauma   Acuity: Unknown   Type of Exam: Subsequent/Follow-up       FINDINGS:   Lower Chest: Partially visualized bilateral pleural effusions with bibasilar   heterogeneous opacities and bilateral lower lobe consolidations.  Central   venous catheter tip near the superior atrial caval junction.  Cardiomegaly. Trace pericardial fluid.       Liver: Normal.       Gallbladder and Bile Ducts: Normal.       Spleen: Splenomegaly.       Adrenal Glands: Normal.       Pancreas: Normal.       Genitourinary: Symmetric renal atrophy.  Redemonstration of multiple   hypodensities in the right kidney which likely represent benign cysts.  No   urinary stones or hydronephrosis.  Ambrosio catheter in the decompressed urinary   bladder.       Bowel: Normal caliber bowel.  Postsurgical changes of the bowel with left   lower quadrant ostomy.  No evidence of acute appendicitis.  No significant   diverticular disease.       Vasculature: Atherosclerosis.  No abdominal aortic aneurysm.       Bones and Soft Tissues: Diffuse soft tissue stranding and fluid.    Postsurgical changes in the anterior abdominal wall with midline incision   demonstrating in the right atrium and is unchanged.  Lungs are clear. Cardiomegaly.  Mediastinum normal.  Bony thorax intact.           Impression   New ET tube as above.  Possible new enteric tube not well visualized. Recommend follow-up KUB if clinically warranted. CT ABDOMEN AND PELVIS WITHOUT CONTRAST    COMPARISON:  3/22/2017    CLINICAL HISTORY: Infection in scrotum, lower abdominal pain, diabetic, 30,000 white blood cell count. TECHNIQUE: Unenhanced axial images were obtained from the lung bases to the pubic symphysis with sagittal and coronal 2D reformatted images. Oral contrast administered:  No.  Automatic exposure control (AEC) was utilized. CT ABDOMEN FINDINGS:      The lung bases are unremarkable. There is a small pericardial effusion versus thickening. The liver, spleen, and pancreas demonstrate no acute abnormality given compromised evaluation without intravenous contrast.  There may be mild splenomegaly.  The adrenal glands appear within normal limits. There is no hydronephrosis or obstructing urinary tract calculi. Small amount of free fluid is seen adjacent to the liver inferiorly. .    The appendix is visualized in the right lower quadrant and appears within normal limits.       There is no evidence for bowel obstruction. Stranding is seen within the subcutaneous fat overlying both lateral abdominal walls left greater than right.  There is ill-defined fluid collection within the subcutaneous fat overlying the left lateral abdominal wall possibly representing phlegmon or developing abscess although no organized   abscess is seen. There is a large amount of soft tissue emphysema involving both the right and left buttock extending to the perineum tissue thickening is seen especially on the left involving the left buttock. CT PELVIS FINDINGS:        No distal ureteral calculi or bladder calculi. There is no free fluid in the pelvis.   Catheter is seen within the urinary bladder. Osseous changes within the left hemipelvis which may be chronic in nature. IMPRESSION:    Extensive subcutaneous emphysema as described above involving both buttocks extending to the perineum.  The possibility of Ashley gangrene/necrotizing fasciitis cannot be excluded. Possible phlegmon or developing soft tissue abscess overlying the left lateral abdominal wall as noted above.  No organized abscess is seen however. Osseous changes within the left hemipelvis which may be chronic in nature. Results the study were called to Dr. Joy Ansari [phone] on 11/29/2019  All CT scans at this facility use dose modulation, iterative reconstruction, and/or weight based dosing when appropriate to reduce radiation dose to as low as reasonably achievable. Medical Decision [id number]:   11/29/2019  8:17 PM - Ana Lee Incoming Lab Results From [facility]     Specimen Information: Buttock; Tissue        Component Collected Lab   Specimen Description 11/29/2019  7:[address]  [address]   . BUTTOCK BILATERAL TS    Special Requests 11/29/2019  7:[address]  [address]   NOT REPORTED    Direct Exam 11/29/2019  7:[address]  [address]   NO NEUTROPHILS SEEN    Direct Exam Abnormal  11/29/2019  7:34  [facility] [place]    Direct Exam Abnormal  11/29/2019  7:34 PM [address] NEGATIVE RODS    Culture 11/29/2019  7:34  [address]   PENDING          Mikel Garcia MD. Infectious Diseases

## 2019-12-23 NOTE — PROGRESS NOTES
Negative Pressure Wound Therapy  Ostomy Care    NAME:  Sherren Guess OF BIRTH:  1985  MEDICAL RECORD NUMBER:  [id number]  DATE:  11/29/2019 12/23/19 1220   Colostomy LLQ Descending/sigmoid   Placement Date: 12/03/19   Location: LLQ  Colostomy Type: Descending/sigmoid   Stomal Appliance 1 piece; Changed   Flange Size (inches) 1.5 Inches   Stoma  Assessment Moist;Pink;Flush   Mucocutaneous Junction Separation   Peristomal Assessment Clean; Intact   Treatment Bag change; Liquid skin barrier   Incision 12/03/19 Abdomen Medial;Upper   Date First Assessed/Time First Assessed: 12/03/19 1158   Present on Hospital Admission: No  Primary Wound Type: Surgical Type  Location: Abdomen  Wound Location Orientation: Medial;Upper   Wound Image    Wound Assessment Red;Slough; Yellow   Marc-wound Assessment Dry  (yellow slough over former suture/staple sites.)   Wound Length (cm) 22 cm   Wound Width (cm) 3 cm   Wound Depth (cm) 5.5 cm   Wound Volume (cm^3) 363 cm^3   Closure None   Drainage Amount Small   Drainage Description Serosanguinous   Odor None   Dressing/Treatment Vacuum dressing   Dressing Changed Changed/New   Dressing Change Due 12/25/19   Negative Pressure Wound Therapy Abdomen Medial;Upper   Placement Date/Time: 12/20/19 1800   Pre-existing: No  Inserted by: Arturo Alan  Location: Abdomen  Wound Location Orientation: Medial;Upper   Wound Type Surgical   Unit Type KCI   Dressing Type Black foam   Number of pieces used 2   Cycle Continuous; On   Target Pressure (mmHg) 100   Canister changed? No   Dressing Status Changed   Dressing Change Due 12/25/19        Applied Negative Pressure to mid abdomen wound(s)/ulcer(s).  [x] Applied skin barrier prep to marc-wound.  [x] Cut strips of plastic drape to picture frame wound so that marc-wound is     covered with the drape.     [] If bridging dressing to less prominent site, cover any intact skin that will come in contact with the Negative Pressure Therapy sponge, gauze or channel drain with plastic drape. The sponge should never touch intact skin.  [x] Cut sponge, gauze or channel drain to size which will fit into the wound/ulcer bed without being forced.  [x] Be sure the sponge is large enough to hold the entire round plastic flange which is attached to the tubing. Never allow flange to be larger than the sponge or it will produce suction damaging intact skin.  Total number of individual pieces of foam used within the wound bed: 2     [] If bridging the dressing away from the primary site, be sure the bridge leads to a piece of sponge large enough to hold the entire flange without allowing any of the flange to overlap onto intact skin.  [x] Covered sponge, gauze or channel drain with plastic drape.  [x] Cut a hole in this plastic drape directly over the sponge the same size as the plastic drain tubing.  [x] Removed plastic liner from flange and apply it directly over the hole you cut.  [x] Removed the plastic cover from the flange.  [x] Attached the tubing to the wound/ulcer Negative Pressure Therapy and turn it on to be sure a vacuum is created and that there are no leaks.  [x] If air leaks occur, use plastic drape to patch them.  [] Secured Negative Pressure Therapy dressing with ace wrap loosely if located on an extremity. Maintain tubing outside of ace wrap. Tubing must not exert pressure on intact skin. Applied per  Guidelines        Ostomy:  Cut barrier to fit stoma with no more than 1/8\" of exposed skin. Currently 1\" x 1/3/8\" oval  Empty the pouch when 1/3 to 1/2 full. Change the pouching system twice weekly and prn  Our goal is to keep the skin around the stoma intact and to have a dependable pouching system without leaks. The skin around the stoma should be intact and appear just like the skin on the opposite side of the abdomen.    Call the [address] [place] [address] at [phone] with questions or concerns.     Electronically signed by Diane Tang RN, [facility] on 12/23/2019 at 2:03 PM

## 2019-12-24 ENCOUNTER — APPOINTMENT (OUTPATIENT)
Dept: INTERVENTIONAL RADIOLOGY/VASCULAR | Age: 34
DRG: 463 | End: 2019-12-24
Attending: INTERNAL MEDICINE
Payer: MEDICARE

## 2019-12-24 LAB
ABSOLUTE EOS #: 0.75 K/UL (ref 0–0.44)
ABSOLUTE IMMATURE GRANULOCYTE: 0.08 K/UL (ref 0–0.3)
ABSOLUTE LYMPH #: 0.83 K/UL (ref 1.1–3.7)
ABSOLUTE MONO #: 0.75 K/UL (ref 0.1–1.2)
ALLEN TEST: POSITIVE
ANION GAP SERPL CALCULATED.3IONS-SCNC: 13 MMOL/L (ref 9–17)
BASOPHILS # BLD: 0 % (ref 0–2)
BASOPHILS ABSOLUTE: 0 K/UL (ref 0–0.2)
BUN BLDV-MCNC: 80 MG/DL (ref 6–20)
BUN/CREAT BLD: ABNORMAL (ref 9–20)
CALCIUM SERPL-MCNC: 8 MG/DL (ref 8.6–10.4)
CHLORIDE BLD-SCNC: 103 MMOL/L (ref 98–107)
CO2: 19 MMOL/L (ref 20–31)
CREAT SERPL-MCNC: 2.45 MG/DL (ref 0.7–1.2)
DIFFERENTIAL TYPE: ABNORMAL
EOSINOPHILS RELATIVE PERCENT: 9 % (ref 1–4)
FIO2: 40
GFR AFRICAN AMERICAN: 36 ML/MIN
GFR NON-AFRICAN AMERICAN: 30 ML/MIN
GFR SERPL CREATININE-BSD FRML MDRD: ABNORMAL ML/MIN/{1.73_M2}
GFR SERPL CREATININE-BSD FRML MDRD: ABNORMAL ML/MIN/{1.73_M2}
GLUCOSE BLD-MCNC: 133 MG/DL (ref 75–110)
GLUCOSE BLD-MCNC: 139 MG/DL (ref 75–110)
GLUCOSE BLD-MCNC: 160 MG/DL (ref 75–110)
GLUCOSE BLD-MCNC: 180 MG/DL (ref 75–110)
GLUCOSE BLD-MCNC: 182 MG/DL (ref 70–99)
GLUCOSE BLD-MCNC: 192 MG/DL (ref 74–100)
GLUCOSE BLD-MCNC: 219 MG/DL (ref 75–110)
HCT VFR BLD CALC: 24.9 % (ref 40.7–50.3)
HEMOGLOBIN: 7.4 G/DL (ref 13–17)
IMMATURE GRANULOCYTES: 1 %
LYMPHOCYTES # BLD: 10 % (ref 24–43)
MCH RBC QN AUTO: 28.7 PG (ref 25.2–33.5)
MCHC RBC AUTO-ENTMCNC: 29.7 G/DL (ref 28.4–34.8)
MCV RBC AUTO: 96.5 FL (ref 82.6–102.9)
MODE: ABNORMAL
MONOCYTES # BLD: 9 % (ref 3–12)
MORPHOLOGY: ABNORMAL
MORPHOLOGY: ABNORMAL
NEGATIVE BASE EXCESS, ART: 4 (ref 0–2)
NRBC AUTOMATED: 0.2 PER 100 WBC
O2 DEVICE/FLOW/%: ABNORMAL
PATIENT TEMP: ABNORMAL
PDW BLD-RTO: 23.1 % (ref 11.8–14.4)
PHOSPHORUS: 1.9 MG/DL (ref 2.5–4.5)
PLATELET # BLD: 218 K/UL (ref 138–453)
PLATELET ESTIMATE: ABNORMAL
PMV BLD AUTO: 8.9 FL (ref 8.1–13.5)
POC HCO3: 20.9 MMOL/L (ref 21–28)
POC O2 SATURATION: 99 % (ref 94–98)
POC PCO2 TEMP: ABNORMAL MM HG
POC PCO2: 35.3 MM HG (ref 35–48)
POC PH TEMP: ABNORMAL
POC PH: 7.38 (ref 7.35–7.45)
POC PO2 TEMP: ABNORMAL MM HG
POC PO2: 140.3 MM HG (ref 83–108)
POSITIVE BASE EXCESS, ART: ABNORMAL (ref 0–3)
POTASSIUM SERPL-SCNC: 3.2 MMOL/L (ref 3.7–5.3)
RBC # BLD: 2.58 M/UL (ref 4.21–5.77)
RBC # BLD: ABNORMAL 10*6/UL
SAMPLE SITE: ABNORMAL
SEG NEUTROPHILS: 71 % (ref 36–65)
SEGMENTED NEUTROPHILS ABSOLUTE COUNT: 5.89 K/UL (ref 1.5–8.1)
SODIUM BLD-SCNC: 135 MMOL/L (ref 135–144)
SURGICAL PATHOLOGY REPORT: NORMAL
TCO2 (CALC), ART: 22 MMOL/L (ref 22–29)
WBC # BLD: 8.3 K/UL (ref 3.5–11.3)
WBC # BLD: ABNORMAL 10*3/UL

## 2019-12-24 PROCEDURE — 6360000002 HC RX W HCPCS: Performed by: STUDENT IN AN ORGANIZED HEALTH CARE EDUCATION/TRAINING PROGRAM

## 2019-12-24 PROCEDURE — 94770 HC ETCO2 MONITOR DAILY: CPT

## 2019-12-24 PROCEDURE — 2709999900 HC NON-CHARGEABLE SUPPLY

## 2019-12-24 PROCEDURE — 82803 BLOOD GASES ANY COMBINATION: CPT

## 2019-12-24 PROCEDURE — 85025 COMPLETE CBC W/AUTO DIFF WBC: CPT

## 2019-12-24 PROCEDURE — 02H633Z INSERTION OF INFUSION DEVICE INTO RIGHT ATRIUM, PERCUTANEOUS APPROACH: ICD-10-PCS | Performed by: RADIOLOGY

## 2019-12-24 PROCEDURE — 94761 N-INVAS EAR/PLS OXIMETRY MLT: CPT

## 2019-12-24 PROCEDURE — 6360000002 HC RX W HCPCS: Performed by: RADIOLOGY

## 2019-12-24 PROCEDURE — 94003 VENT MGMT INPAT SUBQ DAY: CPT

## 2019-12-24 PROCEDURE — 80048 BASIC METABOLIC PNL TOTAL CA: CPT

## 2019-12-24 PROCEDURE — 2500000003 HC RX 250 WO HCPCS: Performed by: INTERNAL MEDICINE

## 2019-12-24 PROCEDURE — 6370000000 HC RX 637 (ALT 250 FOR IP): Performed by: STUDENT IN AN ORGANIZED HEALTH CARE EDUCATION/TRAINING PROGRAM

## 2019-12-24 PROCEDURE — 76937 US GUIDE VASCULAR ACCESS: CPT | Performed by: RADIOLOGY

## 2019-12-24 PROCEDURE — 36415 COLL VENOUS BLD VENIPUNCTURE: CPT

## 2019-12-24 PROCEDURE — 84100 ASSAY OF PHOSPHORUS: CPT

## 2019-12-24 PROCEDURE — 2700000000 HC OXYGEN THERAPY PER DAY

## 2019-12-24 PROCEDURE — 99232 SBSQ HOSP IP/OBS MODERATE 35: CPT | Performed by: INTERNAL MEDICINE

## 2019-12-24 PROCEDURE — 90935 HEMODIALYSIS ONE EVALUATION: CPT

## 2019-12-24 PROCEDURE — 0JH63XZ INSERTION OF TUNNELED VASCULAR ACCESS DEVICE INTO CHEST SUBCUTANEOUS TISSUE AND FASCIA, PERCUTANEOUS APPROACH: ICD-10-PCS | Performed by: RADIOLOGY

## 2019-12-24 PROCEDURE — 36558 INSERT TUNNELED CV CATH: CPT | Performed by: RADIOLOGY

## 2019-12-24 PROCEDURE — 2580000003 HC RX 258: Performed by: RADIOLOGY

## 2019-12-24 PROCEDURE — 82947 ASSAY GLUCOSE BLOOD QUANT: CPT

## 2019-12-24 PROCEDURE — 2000000000 HC ICU R&B

## 2019-12-24 PROCEDURE — 77001 FLUOROGUIDE FOR VEIN DEVICE: CPT | Performed by: RADIOLOGY

## 2019-12-24 PROCEDURE — C1750 CATH, HEMODIALYSIS,LONG-TERM: HCPCS

## 2019-12-24 PROCEDURE — C1769 GUIDE WIRE: HCPCS

## 2019-12-24 PROCEDURE — 2580000003 HC RX 258: Performed by: STUDENT IN AN ORGANIZED HEALTH CARE EDUCATION/TRAINING PROGRAM

## 2019-12-24 PROCEDURE — 36600 WITHDRAWAL OF ARTERIAL BLOOD: CPT

## 2019-12-24 PROCEDURE — C1894 INTRO/SHEATH, NON-LASER: HCPCS

## 2019-12-24 RX ORDER — HEPARIN SODIUM (PORCINE) LOCK FLUSH IV SOLN 100 UNIT/ML 100 UNIT/ML
SOLUTION INTRAVENOUS
Status: COMPLETED | OUTPATIENT
Start: 2019-12-24 | End: 2019-12-24

## 2019-12-24 RX ORDER — FENTANYL CITRATE 50 UG/ML
INJECTION, SOLUTION INTRAMUSCULAR; INTRAVENOUS
Status: COMPLETED | OUTPATIENT
Start: 2019-12-24 | End: 2019-12-24

## 2019-12-24 RX ORDER — ACETAMINOPHEN 325 MG/1
650 TABLET ORAL EVERY 4 HOURS PRN
Status: DISCONTINUED | OUTPATIENT
Start: 2019-12-24 | End: 2020-01-16 | Stop reason: HOSPADM

## 2019-12-24 RX ADMIN — Medication 15 ML: at 08:24

## 2019-12-24 RX ADMIN — SODIUM CHLORIDE, PRESERVATIVE FREE 10 ML: 5 INJECTION INTRAVENOUS at 08:24

## 2019-12-24 RX ADMIN — SODIUM CHLORIDE, PRESERVATIVE FREE 1.9 ML: 5 INJECTION INTRAVENOUS at 12:15

## 2019-12-24 RX ADMIN — I.V. FAT EMULSION 250 ML: 20 EMULSION INTRAVENOUS at 18:08

## 2019-12-24 RX ADMIN — SODIUM CHLORIDE, PRESERVATIVE FREE 10 ML: 5 INJECTION INTRAVENOUS at 20:31

## 2019-12-24 RX ADMIN — POTASSIUM CHLORIDE 10 MEQ: 7.46 INJECTION, SOLUTION INTRAVENOUS at 08:19

## 2019-12-24 RX ADMIN — HEPARIN SODIUM 5000 UNITS: 5000 INJECTION INTRAVENOUS; SUBCUTANEOUS at 15:29

## 2019-12-24 RX ADMIN — HEPARIN SODIUM 5000 UNITS: 5000 INJECTION INTRAVENOUS; SUBCUTANEOUS at 06:40

## 2019-12-24 RX ADMIN — INSULIN LISPRO 3 UNITS: 100 INJECTION, SOLUTION INTRAVENOUS; SUBCUTANEOUS at 16:22

## 2019-12-24 RX ADMIN — HEPARIN SODIUM 5000 UNITS: 5000 INJECTION INTRAVENOUS; SUBCUTANEOUS at 21:46

## 2019-12-24 RX ADMIN — INSULIN LISPRO 3 UNITS: 100 INJECTION, SOLUTION INTRAVENOUS; SUBCUTANEOUS at 15:20

## 2019-12-24 RX ADMIN — INSULIN LISPRO 6 UNITS: 100 INJECTION, SOLUTION INTRAVENOUS; SUBCUTANEOUS at 01:04

## 2019-12-24 RX ADMIN — DEXTROSE MONOHYDRATE 1 G: 5 INJECTION INTRAVENOUS at 10:46

## 2019-12-24 RX ADMIN — INSULIN LISPRO 3 UNITS: 100 INJECTION, SOLUTION INTRAVENOUS; SUBCUTANEOUS at 04:18

## 2019-12-24 RX ADMIN — CARVEDILOL 6.25 MG: 6.25 TABLET, FILM COATED ORAL at 08:24

## 2019-12-24 RX ADMIN — POTASSIUM CHLORIDE: 2 INJECTION, SOLUTION, CONCENTRATE INTRAVENOUS at 18:08

## 2019-12-24 RX ADMIN — POLYETHYLENE GLYCOL 3350 17 G: 17 POWDER, FOR SOLUTION ORAL at 08:24

## 2019-12-24 RX ADMIN — FAMOTIDINE 20 MG: 20 TABLET, FILM COATED ORAL at 09:47

## 2019-12-24 RX ADMIN — SODIUM CHLORIDE 10 ML/HR: 9 INJECTION, SOLUTION INTRAVENOUS at 18:41

## 2019-12-24 RX ADMIN — FENTANYL CITRATE 50 MCG: 50 INJECTION INTRAMUSCULAR; INTRAVENOUS at 12:07

## 2019-12-24 RX ADMIN — CHLORHEXIDINE GLUCONATE 15 ML: 1.2 RINSE ORAL at 08:25

## 2019-12-24 RX ADMIN — Medication: at 08:26

## 2019-12-24 ASSESSMENT — PAIN SCALES - GENERAL
PAINLEVEL_OUTOF10: 0

## 2019-12-24 ASSESSMENT — PULMONARY FUNCTION TESTS
PIF_VALUE: 25
PIF_VALUE: 14
PIF_VALUE: 11

## 2019-12-24 NOTE — PROGRESS NOTES
regular rate and rhythm, normal S1, S2, no murmur noted   Abdomen: Soft, wound VAC in place. Extremities:  peripheral pulses normal, some mild edema. , no clubbing or cyanosis  Neuro: Patient alert, will follow commands will move head from side to side. Has difficulty moving lower extremity and arms likely secondary to weakness.      Any additional physical findings:    MEDICATIONS:    Scheduled Meds:   docusate sodium  100 mg Oral Daily    polyethylene glycol  17 g Oral Daily    insulin lispro  0-18 Units Subcutaneous Q4H    chlorhexidine  15 mL Mouth/Throat BID    carvedilol  6.25 mg Per NG tube Daily    CENTRUM/CERTA-CHANCE with minerals oral  15 mL Oral Daily    fat emulsion  100 mL Intravenous Daily    alteplase  1 mg Intracatheter Once    povidone-iodine   Topical Daily    famotidine  20 mg Per NG tube Daily    sodium chloride flush  10 mL Intravenous 2 times per day    heparin (porcine)  5,000 Units Subcutaneous 3 times per day     Continuous Infusions:   PN-Adult 2-in-1 Central Line (Standard) 70 mL/hr at 12/23/19 1710    norepinephrine Stopped (12/23/19 2147)    fentaNYL 25 mcg/hr (12/23/19 2153)    sodium chloride      dextrose      sodium chloride 10 mL/hr at 12/23/19 0536     PRN Meds:   oxyCODONE, 10 mg, Q6H PRN  labetalol, 20 mg, Q6H PRN  heparin (porcine), 500 Units, PRN  heparin (porcine), 1,750 Units, PRN  glucose, 15 g, PRN  dextrose, 12.5 g, PRN  glucagon (rDNA), 1 mg, PRN  dextrose, 100 mL/hr, PRN  albumin human, 25 g, PRN  heparin (porcine), 1,400 Units, PRN  heparin (porcine), 1,300 Units, PRN  LORazepam, 1 mg, Q6H PRN  sodium chloride, 250 mL, PRN  sodium chloride, 150 mL, PRN  albumin human, 25 g, PRN  sodium chloride flush, 10 mL, PRN  potassium chloride, 40 mEq, PRN    Or  potassium alternative oral replacement, 40 mEq, PRN    Or  potassium chloride, 10 mEq, PRN  acetaminophen, 650 mg, Q4H PRN  REFRESH LACRI-LUBE, , PRN  magnesium hydroxide, 30 mL, Daily PRN  ondansetron,

## 2019-12-24 NOTE — PROGRESS NOTES
status or not. · Pt did not tolerate HD on 12-9. It was stopped early and pt required vasopressor support with Levophed, remains on vent. · Pt to OR 12-10 for further debridement  · Repeat I&D planned for 12-16-19 was cancelled because of high K levels  · Bedside I & D on 12/17/19. · Patient to undergo additional I&D on 12-20-19  · 12/23 right upper lobe collapse  · 12/24 debridement with excision of sacral wound in preparation for a flap, partial closure of his scrotal wound, VAC and debridement over the perineum  Infection Control Recommendations   · [place] Precautions  · Contact Isolation MRSA    Antimicrobial Stewardship Recommendations     Off antibiotics  Coordination of Outpatient Care:   · Estimated Length of IV antimicrobials: D/C 12-19-19  · Patient will need Midline Catheter Insertion: No  · Patient will need PICC line Insertion:Has Central line  · Patient will need: Home IV , [place],  SNF,  LTAC: TBD  · Patient will need outpatient wound care:Yes    Chief complaint/reason for consultation:   · Ashley's vs necrotizing fasciitis    History of Present Illness:   Luis Schulte is a 37y.o.-year-old  male who was initially admitted on 11/29/2019. Patient seen at the request of . INITIAL HISTORY:    Patient presented through ER in [place] with complaints of weakness of a few days duration. Examination showed skin necrosis in the gluteal and perineal region. Patient transferred to [place] [id number]. CT scan showed extensive subcutaneous emphysema. Patient underwent I&D and extensive complex debridement of affected tissues on 11-29-19. Gram stain of tissues showed Strep. Spp and Gram negative bacilli. 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 He has underlying DM 2, prior diabetic foot infection, DEBBY. Patient more stable post surgery but with hypotension requiring a pressor.     Zosyn D/C because of of poor LVEF, in order to 80*   CREATININE 2.25* 2.65* 2.45*   MG 1.8  --   --      Hepatic Function Panel:   No results for input(s): PROT, LABALBU, BILIDIR, IBILI, BILITOT, ALKPHOS, ALT, AST in the last 72 hours. No results for input(s): RPR in the last 72 hours. No results for input(s): HIV in the last 72 hours. No results for input(s): BC in the last 72 hours. Lab Results   Component Value Date    MUCUS NOT REPORTED 2019    RBC 2.58 2019    TRICHOMONAS NOT REPORTED 2019    WBC 8.3 2019    YEAST NOT REPORTED 2019    TURBIDITY TURBID 2019     Lab Results   Component Value Date    CREATININE 2.45 2019    GLUCOSE 182 2019       Medical Decision Making-Imagin-9 CT Abd/pelvis  EXAMINATION:   CT OF THE ABDOMEN AND PELVIS WITH CONTRAST 2019 2:29 am       TECHNIQUE:   CT of the abdomen and pelvis was performed with the administration of   intravenous contrast. Multiplanar reformatted images are provided for review. Dose modulation, iterative reconstruction, and/or weight based adjustment of   the mA/kV was utilized to reduce the radiation dose to as low as reasonably   achievable.       COMPARISON:   2019.       HISTORY:   ORDERING SYSTEM PROVIDED HISTORY: Dow Skiff fasc   TECHNOLOGIST PROVIDED HISTORY:       lory fasc   Reason for Exam: nec fasc; Trauma   Acuity: Unknown   Type of Exam: Subsequent/Follow-up       FINDINGS:   Lower Chest: Partially visualized bilateral pleural effusions with bibasilar   heterogeneous opacities and bilateral lower lobe consolidations.  Central   venous catheter tip near the superior atrial caval junction.  Cardiomegaly.    Trace pericardial fluid.       Liver: Normal.       Gallbladder and Bile Ducts: Normal.       Spleen: Splenomegaly.       Adrenal Glands: Normal.       Pancreas: Normal.       Genitourinary: Symmetric renal atrophy.  Redemonstration of multiple   hypodensities in the right kidney which likely represent benign cysts.  No overlying the left lateral abdominal wall possibly representing phlegmon or developing abscess although no organized   abscess is seen. There is a large amount of soft tissue emphysema involving both the right and left buttock extending to the perineum tissue thickening is seen especially on the left involving the left buttock. CT PELVIS FINDINGS:        No distal ureteral calculi or bladder calculi. There is no free fluid in the pelvis. Catheter is seen within the urinary bladder. Osseous changes within the left hemipelvis which may be chronic in nature. IMPRESSION:    Extensive subcutaneous emphysema as described above involving both buttocks extending to the perineum.  The possibility of Ashley gangrene/necrotizing fasciitis cannot be excluded. Possible phlegmon or developing soft tissue abscess overlying the left lateral abdominal wall as noted above.  No organized abscess is seen however. Osseous changes within the left hemipelvis which may be chronic in nature. Results the study were called to Dr. Seth Elder [phone] on 11/29/2019  All CT scans at this facility use dose modulation, iterative reconstruction, and/or weight based dosing when appropriate to reduce radiation dose to as low as reasonably achievable. Medical Decision Impqbj-Yapctiwp-Rayyi:   11/29/2019  8:17 PM - Jesus, Ana Incoming Lab Results From [facility]     Specimen Information: Buttock; Tissue        Component Collected Lab   Specimen Description 11/29/2019  7:34  [address]   . BUTTOCK BILATERAL TS    Special Requests 11/29/2019  7:34  [address]   NOT REPORTED    Direct Exam 11/29/2019  7:34  [address]   NO NEUTROPHILS SEEN    Direct Exam Abnormal  11/29/2019  7:34  [facility],[address] [facility]    Direct Exam Abnormal  11/29/2019  7:34 PM [address] NEGATIVE RODS    Culture 11/29/2019  7:34

## 2019-12-24 NOTE — OP NOTE
Operative Note  ______________________________________________________________    Patient: Bola Byers  YOB: 1985  MRN: [id number]  Date of Procedure: 12/23/2019    Pre-Op Diagnosis: NECROTIZING FASCITIS - PERINIUM    Post-Op Diagnosis: Same       Procedure(s):  WOUND DEBRIDEMENT  WOUND VAC CHANGE - PERENIUM  Excision of sacral wound in preparation for flap  Partial closure of scrotal wound    Anesthesia: General    Surgeon(s):  Ashely Mcclellan MD    Assistant: Tobin Barksdale PGY 1    Estimated Blood Loss (mL): less than 50     Complications: None    Specimens:   * No specimens in log *    Implants:  * No implants in log *      Drains:   Negative Pressure Wound Therapy (Active)   Number of pieces used 1 12/16/2019 12:00 AM   Output (ml) 50 ml 12/17/2019  6:03 AM   Wound Assessment Fragile; Red 12/14/2019 12:00 AM       Negative Pressure Wound Therapy Abdomen Medial;Upper (Active)   Wound Type Surgical 12/23/2019  4:00 PM   Unit Type KCI 12/23/2019  4:00 PM   Dressing Type Black foam 12/23/2019  4:00 PM   Number of pieces used 2 12/23/2019 12:20 PM   Cycle Continuous; On 12/23/2019  4:00 PM   Target Pressure (mmHg) 100 12/23/2019  4:00 PM   Canister changed? Yes 12/23/2019  4:00 PM   Dressing Status Clean;Dry; Intact 12/23/2019  4:00 PM   Dressing Changed Changed/New 12/20/2019  6:00 PM   Drainage Amount Scant 12/23/2019  4:00 PM   Drainage Description Serous 12/23/2019  4:00 PM   Dressing Change Due 12/23/19 12/23/2019  4:00 PM   Output (ml) 0 ml 12/20/2019  6:00 PM   Wound Assessment Other (Comment) 12/23/2019  4:00 PM   Liliam-wound Assessment Intact;Pink;Swelling 12/23/2019  4:00 PM       Negative Pressure Wound Therapy Buttocks (Active)   Dressing Type Black foam 12/23/2019  6:58 PM   Cycle Continuous 12/23/2019  6:58 PM   Irrigation Solution Normal Saline 12/23/2019  6:58 PM   Canister changed? Yes 12/23/2019  6:58 PM   Dressing Status Clean;Dry; Intact 12/23/2019  6:58 PM       NG/OG/NJ/NE Tube necrotizing soft tissue infection and a large area debridement was undertaken as a joint effort with urology and general surgery. Due to the amount of skin and soft tissue that had to be removed the patient has had a long and arduous recovery. A recommendation is for placement of wound vacs to facilitate faster healing. Due to the patient's mental condition, informed consent was obtained from the patient's mother. All questions were answered and all risks and benefits were discussed prior to obtaining consent. Procedure: Patient was brought into the operating suite, intubated, and transferred to the operating table. The patient was then positioned in the left side lying position to allow access to the buttocks and perineum, a vacuum-assisted beanbag was used to hold the patient in place, safety straps were used on the patient's left leg and arm boards were placed to facilitate a more natural positioning of the patient's arms and purple foam used to pad all pressure points. EPC cuffs were placed on the patient's legs bilaterally. A Ambrosio was in place prior to arrival to the operating room. At this point a timeout was performed ensuring the proper patient, positioning, procedure, acknowledging informed consent, and allergies prior to initiation of the procedure. The patient was then prepped and draped in the usual sterile fashion and the procedure began. We started with sharp debridement of the left lateral aspect of the wound bed and wound edges, removing saponified fat and necrotic tissue until we had a clean bleeding edge. The area that was debrided was approximately 20cm x 30cm. Hemostasis was achieved with Bovie electrocautery. Once a sufficient amount of debridement was performed along the edges, our attention was then turned to the wound bed itself where porous fenestrated vera flow foam was placed, having been cut in half lengthwise into the wound bed.   Half sheet lengthwise, of quarter inch and half inch gray vera flow foam was then used over top of the fenestrated foam.  The foam was then stapled in place to facilitate placement of acrylic drapes. The superior aspect of the wound was noted to have a large cleft as the patient's intergluteal fold from the buttocks met with the lower back and the patient's spina bifida deformity. Due to this a single 2-0 Prolene suture was used in a horizontal mattress fashion to seal the superior aspect of the wound prior to placing any of the acrylic drape. 3 strips of the malleable wound VAC edge sealant were molded and placed within the crevasses of the superior aspect of the wound to facilitate seal.  At this point acrylic drape was applied liberally over the buttocks to approximately the prison point between the buttock and perineal wounds. Our attention was then turned towards the scrotal wound and anterior perineal wounds. Due to the flayed state of the scrotum the decision was made to use interrupted Prolene sutures to reapproximate the scrotum in such a way as to facilitate a satisfactory vacuum seal.  The scrotum was partially closed by approximated the flaps together using prolene suture. Foam was then applied in a similar fashion as previously stated, using a bottom layer of fenestrated gray vera flow foam with quarter inch or half inch vera flow gray foam used, depending on thickness of the surrounding tissue, to provide coverage over the wound bed. Again staples were used to hold the foam pieces in place and allow adequate acrylic draping. Due to the uneven edges and dry skin especially around the perineum, strips of gray Mepilex were placed around the penis and right anterolateral portions of the perineal wound to further facilitate seal.  At this point acrylic drape was, again, liberally applied to the perineum and anterior portions of the groin, taking care to maintain seals within the skin folds.   This was achieved with some great

## 2019-12-24 NOTE — BRIEF OP NOTE
Brief Postoperative Note    Izzy Jacques  YOB: 1985  [id number]    Pre-operative Diagnosis: Chronic Renal Failure      Post-operative Diagnosis: Same    Procedure: Tunneled Dialysis Catheter    Medication Given: fentanyl 50 ug IV for pain    Anesthesia: 2%Lidocaine Local Injection     Surgeons/Assistants: ANGELINA DE LA TORRE    Estimated Blood Loss: Minimal    Complications: none    14 Fr x 23 cm (tip to cuff) tunneled HD Catheter placed Site:  Left Internal Jugular Vein. Dressing applied. May use HD cath for dialysis. Dialysis to instill heparin 1000 units per ml to priming volume in each port after use. If dialysis treatment is done within 24 hours of insertion DO NOT USE HEPARIN in treatment, or contact nephrologist for decrease heparin dose. Vital signs were reviewed and were stable after the procedure. Discharged to floor.     Electronically signed by Andreina Cadena on 12/24/2019 at 12:30 PM

## 2019-12-24 NOTE — PROGRESS NOTES
PROGRESS NOTE     PATIENT NAME: Austin Mcdaniel [address] RECORD NO. [id number]  DATE: 12/24/2019  SURGEON: Ivet Thompson  PRIMARY CARE PHYSICIAN: No primary care provider on file. HD: # 25    ASSESSMENT    Patient Active Problem List   Diagnosis    Necrotizing fasciitis ([address]    Hypertension    Diabetes ([address]    Diabetic foot ulcer ([address]    Septic shock ([address]    Bandemia    Acute respiratory failure with hypoxemia ([address]    Diabetic foot infection ([address]      11/29/2019 wide complex debridement of nec fasc involving gluteal/perianal/distal rectum  12/03/2019 open diverting end colostomy   12/06/2019 I&D  12/08/2019 Incision and debridement of necrotizing fasciitis buttock wound, scrotum, bilateral groin region and closure of midline abdominal wound  12/10/2019 Sharp excisional debridement of gluteal, marc-rectal and perineal wound to level of muscle, Sharp excisional debridement of scrotum (performed by Urology), Washout of gluteal/perineal/scrotal/inguinal wound, Partial primary closure of pubic wound, and Dakins Wet to dry dressing application  66/41/4503 sharp excisional debridement of gluteal and perineal wounds to muscle 30 x 40 cm. Urology exam under anesthesia   12/24/2019 sharp debridement, partial closure of scrotum, placement of wound vac       MEDICAL DECISION MAKING AND PLAN    · No plans for transfer  · Ok to resume tube feeds. bowel regimen: Colace and miralax daily  · Midline vac change M/W/F  · Sacral wound vac change in OR, plan for Thur evening or early Fri morning   · Meropenem per ID. · Strict glucose control <180  · LLE wound per podiatry  · Nephrology following. SUBJECTIVE    Patient seen and examined. No acute events post op. Patient intubated and minimal sedation. OBJECTIVE  VITALS: BP (!) 151/87   Pulse 84   Temp 93.7 °F (34.3 °C)   Resp 14   Ht 5' 7\" (1.702 m)   Wt (!) 304 lb 14.3 oz (138.3 kg)   SpO2 100%   BMI 47.75 kg/m²     GENERAL: awake and alert.  NAD  NEURO:

## 2019-12-24 NOTE — PROGRESS NOTES
Classification: BMI > or equal to 40.0 Obese Class III    Nutrition Interventions:   Continue Parenteral Nutrition until able to restart TF/advance to goal. Noted changes to next bag made by nephrologist. Will also change lipids to 250 mL 20%.    Continued Inpatient Monitoring, Education Not Indicated    Nutrition Evaluation:   · Evaluation: Progressing toward goals   · Goals: Meet % of estimated nutrition needs   · Monitoring: PN Intake, PN Tolerance, TF Tolerance, TF Intake, Wound Healing, I&O, Weight, Pertinent Labs      Electronically signed by Louisa Alvarez RD, LD on 12/24/19 at 12:30 PM    Contact Number: [phone]

## 2019-12-24 NOTE — PROGRESS NOTES
12/23/19  0551 12/23/19 2137 12/24/19  0505   * 132*  --  135   K 3.6* 3.3*  --  3.2*   CL 97* 102  --  103   CO2 18* 19*  --  19*   BUN 71* 66*  --  80*   CREATININE 2.24* 2.25* 2.65* 2.45*   GLUCOSE 220* 191*  --  182*   CALCIUM 8.8 8.4*  --  8.0*      Phosphorus:     Recent Labs     12/23/19  0551   PHOS 0.9*     Magnesium:    Recent Labs     12/23/19  0551   MG 1.8     Albumin:  No results for input(s): LABALBU in the last 72 hours. BNP:    No results found for: BNP  MARINE:      Lab Results   Component Value Date    MARINE NEGATIVE 11/30/2019     SPEP:  Lab Results   Component Value Date    PROT 5.3 12/01/2019    PATH ELECTRONICALLY SIGNED.  Alessandro Shretsha M.D. 11/30/2019     UPEP:   No results found for: LABPE  C3:     Lab Results   Component Value Date    C3 97 11/30/2019     C4:     Lab Results   Component Value Date    C4 12 11/30/2019     MPO ANCA:   No results found for: MPO  PR3 ANCA:   No results found for: PR3  Anti-GBM:   No results found for: GBMABIGG  Hep BsAg:         Lab Results   Component Value Date    HEPBSAG NONREACTIVE 11/30/2019     Hep C AB:          Lab Results   Component Value Date    HEPCAB NONREACTIVE 11/30/2019       Urinalysis/Chemistries:      Lab Results   Component Value Date    NITRU NEGATIVE 11/29/2019    COLORU YELLOW 11/29/2019    PHUR 5.0 11/29/2019    WBCUA 5 TO 10 11/29/2019    RBCUA 0 TO 2 11/29/2019    MUCUS NOT REPORTED 11/29/2019    TRICHOMONAS NOT REPORTED 11/29/2019    YEAST NOT REPORTED 11/29/2019    BACTERIA NOT REPORTED 11/29/2019    SPECGRAV 1.015 11/29/2019    LEUKOCYTESUR NEGATIVE 11/29/2019    UROBILINOGEN Normal 11/29/2019    BILIRUBINUR NEGATIVE 11/29/2019    GLUCOSEU NEGATIVE 11/29/2019    KETUA NEGATIVE 11/29/2019    AMORPHOUS NOT REPORTED 11/29/2019     Urine Sodium:     Lab Results   Component Value Date    DONOVAN 39 11/30/2019     Urine Potassium:  No results found for: KUR  Urine Chloride:  No results found for: CLUR  Urine Osmolarity: No results found for: OSMOU  Urine Protein:   No components found for: TOTALPROTEIN, URINE   Urine Creatinine:     Lab Results   Component Value Date    LABCREA 116.4 11/30/2019     Urine Eosinophils:  No components found for: UEOS    Radiology:     CXR:     Assessment:     1. Acute Kidney Injury: Secondary to ischemic ATN from sepsis syndrome from necrotizing fasciitis. He has been dialysis dependent. Has a left IJ temporary dialysis catheter in place  2.  Chronic kidney diseae stage III from diabetic nephrosclerosis baseline 1.6-1.8 MG/DL. 3.  Necrotizing fasciitis status post wide complex debridement of gluteal, perineal region and open colostomy.    4.  Respiratory Failure: On ventilator   5.  Protein calorie malnutrition  6.  Fluid overload: Continue to remove fluid on dialysis as tolerated  7. S/P diverting colostomy strict  8. Anemia requiring blood transfusion  9. Poorly functioning left IJ temporary dialysis catheter    Plan:   1. We will consult IR to place tunneled catheter today if possible, failing which would recommend replacing temporary catheter  2. Hemodialysis today orders given 2 to 3 L of fluid removal  3. TPN adjusted based on labs  4. Will likely need daily dialysis to achieve euvolemic state  5. Discussed with BENI turner with placement of tunneled catheter today    Nutrition   Please ensure that patient is on a renal diet/TF. Avoid nephrotoxic drugs/contrast exposure. We will continue to follow along with you.

## 2019-12-25 LAB
ANION GAP SERPL CALCULATED.3IONS-SCNC: 12 MMOL/L (ref 9–17)
BUN BLDV-MCNC: 53 MG/DL (ref 6–20)
BUN/CREAT BLD: ABNORMAL (ref 9–20)
CALCIUM SERPL-MCNC: 7.9 MG/DL (ref 8.6–10.4)
CHLORIDE BLD-SCNC: 104 MMOL/L (ref 98–107)
CO2: 20 MMOL/L (ref 20–31)
CREAT SERPL-MCNC: 1.77 MG/DL (ref 0.7–1.2)
GFR AFRICAN AMERICAN: 53 ML/MIN
GFR NON-AFRICAN AMERICAN: 44 ML/MIN
GFR SERPL CREATININE-BSD FRML MDRD: ABNORMAL ML/MIN/{1.73_M2}
GFR SERPL CREATININE-BSD FRML MDRD: ABNORMAL ML/MIN/{1.73_M2}
GLUCOSE BLD-MCNC: 134 MG/DL (ref 75–110)
GLUCOSE BLD-MCNC: 139 MG/DL (ref 75–110)
GLUCOSE BLD-MCNC: 171 MG/DL (ref 75–110)
GLUCOSE BLD-MCNC: 171 MG/DL (ref 75–110)
GLUCOSE BLD-MCNC: 202 MG/DL (ref 75–110)
GLUCOSE BLD-MCNC: 203 MG/DL (ref 70–99)
MAGNESIUM: 1.7 MG/DL (ref 1.6–2.6)
PHOSPHORUS: 1.5 MG/DL (ref 2.5–4.5)
POTASSIUM SERPL-SCNC: 4 MMOL/L (ref 3.7–5.3)
SODIUM BLD-SCNC: 136 MMOL/L (ref 135–144)

## 2019-12-25 PROCEDURE — 83735 ASSAY OF MAGNESIUM: CPT

## 2019-12-25 PROCEDURE — 2580000003 HC RX 258: Performed by: INTERNAL MEDICINE

## 2019-12-25 PROCEDURE — 6370000000 HC RX 637 (ALT 250 FOR IP): Performed by: STUDENT IN AN ORGANIZED HEALTH CARE EDUCATION/TRAINING PROGRAM

## 2019-12-25 PROCEDURE — 2500000003 HC RX 250 WO HCPCS: Performed by: INTERNAL MEDICINE

## 2019-12-25 PROCEDURE — 2000000000 HC ICU R&B

## 2019-12-25 PROCEDURE — 6360000002 HC RX W HCPCS: Performed by: INTERNAL MEDICINE

## 2019-12-25 PROCEDURE — 2580000003 HC RX 258: Performed by: STUDENT IN AN ORGANIZED HEALTH CARE EDUCATION/TRAINING PROGRAM

## 2019-12-25 PROCEDURE — 82947 ASSAY GLUCOSE BLOOD QUANT: CPT

## 2019-12-25 PROCEDURE — 80048 BASIC METABOLIC PNL TOTAL CA: CPT

## 2019-12-25 PROCEDURE — 6360000002 HC RX W HCPCS: Performed by: STUDENT IN AN ORGANIZED HEALTH CARE EDUCATION/TRAINING PROGRAM

## 2019-12-25 PROCEDURE — 99222 1ST HOSP IP/OBS MODERATE 55: CPT | Performed by: INTERNAL MEDICINE

## 2019-12-25 PROCEDURE — 36415 COLL VENOUS BLD VENIPUNCTURE: CPT

## 2019-12-25 PROCEDURE — 84100 ASSAY OF PHOSPHORUS: CPT

## 2019-12-25 RX ORDER — UREA 10 %
1 LOTION (ML) TOPICAL NIGHTLY PRN
Status: DISCONTINUED | OUTPATIENT
Start: 2019-12-25 | End: 2020-01-16 | Stop reason: HOSPADM

## 2019-12-25 RX ORDER — FUROSEMIDE 10 MG/ML
80 INJECTION INTRAMUSCULAR; INTRAVENOUS ONCE
Status: COMPLETED | OUTPATIENT
Start: 2019-12-25 | End: 2019-12-25

## 2019-12-25 RX ADMIN — FUROSEMIDE 80 MG: 10 INJECTION, SOLUTION INTRAMUSCULAR; INTRAVENOUS at 10:02

## 2019-12-25 RX ADMIN — CARVEDILOL 6.25 MG: 6.25 TABLET, FILM COATED ORAL at 09:32

## 2019-12-25 RX ADMIN — POTASSIUM PHOSPHATE, MONOBASIC AND POTASSIUM PHOSPHATE, DIBASIC 30 MMOL: 224; 236 INJECTION, SOLUTION, CONCENTRATE INTRAVENOUS at 10:02

## 2019-12-25 RX ADMIN — HEPARIN SODIUM 5000 UNITS: 5000 INJECTION INTRAVENOUS; SUBCUTANEOUS at 05:35

## 2019-12-25 RX ADMIN — OXYCODONE HYDROCHLORIDE 10 MG: 5 SOLUTION ORAL at 23:55

## 2019-12-25 RX ADMIN — INSULIN LISPRO 3 UNITS: 100 INJECTION, SOLUTION INTRAVENOUS; SUBCUTANEOUS at 04:05

## 2019-12-25 RX ADMIN — INSULIN LISPRO 6 UNITS: 100 INJECTION, SOLUTION INTRAVENOUS; SUBCUTANEOUS at 11:49

## 2019-12-25 RX ADMIN — HEPARIN SODIUM 5000 UNITS: 5000 INJECTION INTRAVENOUS; SUBCUTANEOUS at 15:17

## 2019-12-25 RX ADMIN — SODIUM CHLORIDE, PRESERVATIVE FREE 10 ML: 5 INJECTION INTRAVENOUS at 19:39

## 2019-12-25 RX ADMIN — POLYETHYLENE GLYCOL 3350 17 G: 17 POWDER, FOR SOLUTION ORAL at 09:32

## 2019-12-25 RX ADMIN — Medication 15 ML: at 09:32

## 2019-12-25 RX ADMIN — HEPARIN SODIUM 5000 UNITS: 5000 INJECTION INTRAVENOUS; SUBCUTANEOUS at 21:54

## 2019-12-25 RX ADMIN — INSULIN LISPRO 3 UNITS: 100 INJECTION, SOLUTION INTRAVENOUS; SUBCUTANEOUS at 00:13

## 2019-12-25 RX ADMIN — OXYCODONE HYDROCHLORIDE 10 MG: 5 SOLUTION ORAL at 09:32

## 2019-12-25 RX ADMIN — INSULIN LISPRO 6 UNITS: 100 INJECTION, SOLUTION INTRAVENOUS; SUBCUTANEOUS at 09:15

## 2019-12-25 RX ADMIN — FAMOTIDINE 20 MG: 20 TABLET, FILM COATED ORAL at 09:40

## 2019-12-25 RX ADMIN — Medication: at 15:19

## 2019-12-25 NOTE — PROGRESS NOTES
Renal Progress Note    Patient :  Donal Rivera; 37 y.o. MRN# [id number]  Location:  0128/0128-01  Attending:  Debora Bates MD  Admit Date:  11/29/2019   Hospital Day: 32      Subjective: Tunnel catheter placed yesterday. Hemodialysis done thereafter, 3.8 L of fluid removed. Further debridement done 12/23/19, wound VAC replaced, partial closure of some of the wounds done. Extubated overnight. Chest x-ray reviewed. Ambrosio in place. Urine output still poor, although response to Lasix. Colostomy in place. TPN continues. Also on tube feeds. Volume of overall fluid intake high, exceeds 130 mL an hour as result of which patient has a tendency of being fluid overloaded. Phosphorus and magnesium is running low. Corrected calcium more than 10. Intravenous antibiotics discontinued, has finished a course of meropenem. Wound care continues. Further debridement/wound closure to be attempted tomorrow  Outpatient Medications:     No medications prior to admission.     Current Medications:     Scheduled Meds:    potassium phosphate IVPB  30 mmol Intravenous Once    furosemide  80 mg Intravenous Once    fat emulsion  250 mL Intravenous Daily    docusate sodium  100 mg Oral Daily    polyethylene glycol  17 g Oral Daily    insulin lispro  0-18 Units Subcutaneous Q4H    carvedilol  6.25 mg Per NG tube Daily    CENTRUM/CERTA-CHANCE with minerals oral  15 mL Oral Daily    alteplase  1 mg Intracatheter Once    povidone-iodine   Topical Daily    famotidine  20 mg Per NG tube Daily    sodium chloride flush  10 mL Intravenous 2 times per day    heparin (porcine)  5,000 Units Subcutaneous 3 times per day     Continuous Infusions:    PN-Adult 2-in-1 Central Line (Standard) 70 mL/hr at 12/24/19 1808    norepinephrine Stopped (12/23/19 2147)    fentaNYL Stopped (12/24/19 1420)    sodium chloride      dextrose      sodium chloride 10 mL/hr (12/24/19 1841)     PRN Meds:  acetaminophen, oxyCODONE, labetalol, heparin Creatinine:     Lab Results   Component Value Date    LABCREA 116.4 11/30/2019     Urine Eosinophils:  No components found for: UEOS    Radiology:     CXR:     Assessment:     1. Acute Kidney Injury: Secondary to ischemic ATN from sepsis syndrome from necrotizing fasciitis. He has been dialysis dependent. Has a left IJ temporary dialysis catheter in place  2.  Chronic kidney diseae stage III from diabetic nephrosclerosis baseline 1.6-1.8 MG/DL. 3.  Necrotizing fasciitis status post wide complex debridement of gluteal, perineal region and open colostomy.    4.  Respiratory Failure: On ventilator   5.  Protein calorie malnutrition  6.  Fluid overload: Continue to remove fluid on dialysis as tolerated  7. S/P diverting colostomy strict  8. Anemia requiring blood transfusion  9. Poorly functioning left IJ temporary dialysis catheter    Plan:   1. Replace phosphorus  2. Hemodialysis/UF tomorrow, aim for further ultrafiltration   3. TPN adjusted based on labs  4. Will likely need daily dialysis to achieve euvolemic state  5. Lasix 80 mg intravenously x1 dose    Nutrition   Please ensure that patient is on a renal diet/TF. Avoid nephrotoxic drugs/contrast exposure. We will continue to follow along with you.

## 2019-12-25 NOTE — PLAN OF CARE
functioning will increase  Outcome: Ongoing  Goal: Decrease in sensory misperception frequency  Description  Decrease in sensory misperception frequency  Outcome: Ongoing  Goal: Able to refrain from responding to false sensory perceptions  Description  Able to refrain from responding to false sensory perceptions  Outcome: Ongoing  Goal: Demonstrates accurate environmental perceptions  Description  Demonstrates accurate environmental perceptions  Outcome: Ongoing  Goal: Able to distinguish between reality-based and nonreality-based thinking  Description  Able to distinguish between reality-based and nonreality-based thinking  Outcome: Ongoing  Goal: Able to interrupt nonreality-based thinking  Description  Able to interrupt nonreality-based thinking  Outcome: Ongoing     Problem: Sleep Pattern Disturbance:  Goal: Appears well-rested  Description  Appears well-rested  Outcome: Ongoing

## 2019-12-25 NOTE — PROGRESS NOTES
combination of tube feed and TPN. OBJECTIVE  VITALS: BP (!) 153/89   Pulse 107   Temp 98.1 °F (36.7 °C) (Oral)   Resp 23   Ht 5' 7\" (1.702 m)   Wt 295 lb 6.7 oz (134 kg)   SpO2 98%   BMI 46.27 kg/m²     GENERAL: awake and alert. NAD  NEURO: not following commands, very weak, but attempting to move extremities   LUNGS: vented   HEART: RRR  ABDOMEN: soft, mild distension, abdominal wall edema. Midline vac in place without leak. Ostomy is patent. no output. Sacral and perineal vac in place without leak    EXTREMITY: dressing to L foot is c/d/i. No cyanosis     I/O last 3 completed shifts: In: 6418 [I.V.:1118; NG/GT:417]  Out: 4890 [THDLM:1854]    Drain/tube output:  In: 3087 [I.V.:968; NG/GT:417]  Out: 4190 [Urine:390]    LAB:  CBC:   Recent Labs     12/24/19  0505   WBC 8.3   HGB 7.4*   HCT 24.9*   MCV 96.5        BMP:   Recent Labs     12/23/19  0551 12/23/19  2137 12/24/19  0505 12/25/19  0529   *  --  135 136   K 3.3*  --  3.2* 4.0     --  103 104   CO2 19*  --  19* 20   BUN 66*  --  80* 53*   CREATININE 2.25* 2.65* 2.45* 1.77*   GLUCOSE 191*  --  182* 203*         RADIOLOGY:   no new images   No      Neha Bob DO  12/25/2019  8:54 AM     Trauma, Emergency and Critical Surgical Services  Attending Note      I have reviewed the above TECSS note(s) and confirmed the key elements of the medical history and physical exam. I have discussed the findings, established the care plan and recommendations with Resident, TECSS RN, bedside nurse. Seen and examined. Extubated. Does not follow commands or appear to take part in communication. Will change vac tomorrow. Extremely difficult coverage issue.     Rachel Nieto MD  12/25/2019  5:42 PM

## 2019-12-25 NOTE — PROGRESS NOTES
Dialysis Post Treatment Note  Vitals:    12/24/19 1926   BP: (!) 184/102   Pulse: 107   Resp: 14   Temp:    SpO2: 100%     Pre-Weight = 138.5 kg   Post-weight = Weight: 297 lb 9.9 oz (135 kg)  Total Liters Processed = Total Liters Processed (l/min): 83 l/min  Rinseback Volume (mL) = Rinseback Volume (ml): 300 ml  Net Removal (mL) = @[id number]  Type of access used= [facility] CVC   Length of treatment=3.5 hours

## 2019-12-25 NOTE — PROGRESS NOTES
Critical Care Team - Daily Progress Note      Date and time: 12/25/2019 12:18 PM  Patient's name:  Geraldo Munoz  Medical Record Number: [id number]  Patient's account/billing number: [de-identified]  Patient's YOB: 1985  Age: 37 y.o. Date of Admission: 11/29/2019  3:39 PM  Length of stay during current admission: 26      Primary Care Physician: No primary care provider on file. Code Status: DNR-CCA    Reason for ICU admission: Necrotizing fasciitis      SUBJECTIVE:     OVERNIGHT EVENTS:      Patient had no acute events overnight tube feeds resumed yesterday evening patient underwent dialysis with no complications yesterday. He went to IR and had tunneled catheter placed yesterday. Patient extubated yesterday tolerating extubation well today. Patient extremely weak will need PT OT and extended period of rehab.     AWAKE & FOLLOWING COMMANDS:  [] No   [x] Yes    CURRENT VENTILATION STATUS:     [] Ventilator  [] BIPAP  [] Nasal Cannula [x] Room Air      IF INTUBATED, ET TUBE MARKING AT LOWER LIP:       cms    SECRETIONS Amount:  [] Small [] Moderate  [] Large  [] None  Color:     [] White [] Colored  [] Bloody    SEDATION:  RAAS Score:  [] Propofol gtt  [] Versed gtt  [] Ativan gtt   [x] No Sedation    PARALYZED:  [x] No    [] Yes    DIARRHEA:                [x] No                [] Yes  (C. Difficile status: [] positive                                                                                                                       [] negative                                                                                                                     [] pending)    VASOPRESSORS:  [x] No    [] Yes    If yes -   [] Levophed       [] Dopamine     [] Vasopressin       [] Dobutamine  [] Phenylephrine         [] Epinephrine    CENTRAL LINES:     [] No   [] Yes   (Date of Insertion:   )           If yes -     [x] Right IJ     [] Left IJ [] Right Femoral [] Left Femoral                   [] Right tube Daily    CENTRUM/CERTA-CHANCE with minerals oral  15 mL Oral Daily    alteplase  1 mg Intracatheter Once    povidone-iodine   Topical Daily    famotidine  20 mg Per NG tube Daily    sodium chloride flush  10 mL Intravenous 2 times per day    heparin (porcine)  5,000 Units Subcutaneous 3 times per day     Continuous Infusions:   PN-Adult 2-in-1 Central Line (Standard) Stopped (12/25/19 1130)    norepinephrine Stopped (12/23/19 2147)    fentaNYL Stopped (12/24/19 1420)    sodium chloride      dextrose      sodium chloride 10 mL/hr (12/24/19 1841)     PRN Meds:   acetaminophen, 650 mg, Q4H PRN  oxyCODONE, 10 mg, Q6H PRN  labetalol, 20 mg, Q6H PRN  heparin (porcine), 500 Units, PRN  heparin (porcine), 1,750 Units, PRN  glucose, 15 g, PRN  dextrose, 12.5 g, PRN  glucagon (rDNA), 1 mg, PRN  dextrose, 100 mL/hr, PRN  albumin human, 25 g, PRN  heparin (porcine), 1,400 Units, PRN  heparin (porcine), 1,300 Units, PRN  LORazepam, 1 mg, Q6H PRN  sodium chloride, 250 mL, PRN  sodium chloride, 150 mL, PRN  albumin human, 25 g, PRN  sodium chloride flush, 10 mL, PRN  potassium chloride, 40 mEq, PRN    Or  potassium alternative oral replacement, 40 mEq, PRN    Or  potassium chloride, 10 mEq, PRN  REFRESH LACRI-LUBE, , PRN  magnesium hydroxide, 30 mL, Daily PRN  ondansetron, 4 mg, Q6H PRN          VENT SETTINGS (Comprehensive) (if applicable):  Vent Information  $Ventilation: $Subsequent Day  Ventilator Started: Yes(transitioned from OR bag)  Ventilator Stopped: Yes  Skin Assessment: Clean, dry, & intact  Equipment ID: [id number]  Equipment Changed: (S) Vent Circuit(heater)  Vent Type: Servo i  Vent Mode: PRVC  Vt Ordered: 530 mL  Rate Set: 18 bmp  Pressure Support: 6 cmH20  FiO2 : 30 %  Sensitivity: 2  PEEP/CPAP: 5  I Time/ I Time %: 0.9 s  Cuff Pressure (cm H2O): 28 cm H2O  Humidification Source: Heated wire  Humidification Temp: 36  Humidification Temp Measured: 37  Circuit Condensation: Drained  Nitric Oxide/Epoprostenol In Use?: No  Additional Respiratory  Assessments  Pulse: 106  Resp: 20  SpO2: 98 %  End Tidal CO2: (on portable machine at side of patient. )  pCO2 (TCOM, mmHg): 53 mmHg  Position: Semi-Pagan's  Humidification Source: Heated wire  Humidification Temp: 36  Circuit Condensation: Drained  Oral Care Completed?: Yes  Oral Care: Mouthwash, Teeth brushed, Suction toothette  Subglottic Suction Done?: Yes  Cuff Pressure (cm H2O): 28 cm H2O  Skin barrier applied: No      Laboratory findings:    Complete Blood Count:   Recent Labs     12/24/19  0505   WBC 8.3   HGB 7.4*   HCT 24.9*           Last 3 Blood Glucose:   Recent Labs     12/23/19  0551 12/24/19  0505 12/25/19  0529   GLUCOSE 191* 182* 203*        PT/INR:    Lab Results   Component Value Date    PROTIME 11.9 11/29/2019    INR 1.1 11/29/2019     PTT:  No results found for: APTT, PTT    Comprehensive Metabolic Profile:   Recent Labs     12/23/19  0551 12/23/19  2137 12/24/19  0505 12/25/19  0529   *  --  135 136   K 3.3*  --  3.2* 4.0     --  103 104   CO2 19*  --  19* 20   BUN 66*  --  80* 53*   CREATININE 2.25* 2.65* 2.45* 1.77*   GLUCOSE 191*  --  182* 203*   CALCIUM 8.4*  --  8.0* 7.9*      Magnesium:   Lab Results   Component Value Date    MG 1.7 12/25/2019     Phosphorus:   Lab Results   Component Value Date    PHOS 1.5 12/25/2019     Ionized Calcium:   Lab Results   Component Value Date    CAION 1.05 12/03/2019            ASSESSMENT:     Principal Problem:    Necrotizing fasciitis ([facility] [address]  Active Problems:    Hypertension    Diabetes ([facility] [facility]    Diabetic foot ulcer ([facility]    Septic shock (HCC)    Bandemia    Acute respiratory failure with hypoxemia (HCC)    Diabetic foot infection ([facility] [address]  Resolved Problems:    * No resolved hospital problems. *      Necrotizing fasciitis, continues to improve  DEBBY, requiring dialysis, urine output improving      PLAN:     WEAN PER PROTOCOL:                  [x]?  No               []? Yes []? N/A     DISCONTINUE ANY LABS:              [x]? No               []? Yes     ICU PROPHYLAXIS:  Stress ulcer:  []? PPI Agent               [x]? [id number]      []? Sucralfate               []? Other:  VTE:                []? Enoxaparin             []? Unfract. Heparin Subcut                []? EPC Cuffs     NUTRITION: (Diet: PN-Adult 2-in-1 Central Line (Standard)  DIET TUBE FEED CONTINUOUS/CYCLIC NPO; Immune Enhancing; Nasogastric; Continuous; 10; 50; 24 ( ))     HOME MEDICATIONS RECONCILED:        [x]? No               []? Yes     INSULIN DRIP:                                   [x]? No                           []? Yes     CONSULTATION NEEDED:  [x]? No                           []? Yes     FAMILY UPDATED:                           [x]? No                           []? Yes     TRANSFER OUT OF ICU:                 [x]?  No                           []? Yes    ADDITIONAL PLAN:       -Tube feeds resumed, DC'd TPN  -Continue to monitor urine output closely, daily BMP  -We will follow-up with surgical planning regarding next OR date  -Potentially candidate for acute rehab, but will hold off on PMNR consult until discussing surgical planning and timing with surgery team        Ragini Ramirez DO      Department of [facility] Tadeo Jean, Julia Medina         12/25/2019, 12:18 PM

## 2019-12-26 ENCOUNTER — ANESTHESIA EVENT (OUTPATIENT)
Dept: OPERATING ROOM | Age: 34
DRG: 463 | End: 2019-12-26
Payer: MEDICARE

## 2019-12-26 ENCOUNTER — ANESTHESIA (OUTPATIENT)
Dept: OPERATING ROOM | Age: 34
DRG: 463 | End: 2019-12-26
Payer: MEDICARE

## 2019-12-26 VITALS — OXYGEN SATURATION: 100 % | SYSTOLIC BLOOD PRESSURE: 127 MMHG | TEMPERATURE: 97.4 F | DIASTOLIC BLOOD PRESSURE: 85 MMHG

## 2019-12-26 LAB
ABSOLUTE EOS #: 0.81 K/UL (ref 0–0.4)
ABSOLUTE IMMATURE GRANULOCYTE: 0 K/UL (ref 0–0.3)
ABSOLUTE LYMPH #: 0.89 K/UL (ref 1–4.8)
ABSOLUTE MONO #: 0.3 K/UL (ref 0.1–0.8)
ANION GAP SERPL CALCULATED.3IONS-SCNC: 12 MMOL/L (ref 9–17)
BASOPHILS # BLD: 0 % (ref 0–2)
BASOPHILS ABSOLUTE: 0 K/UL (ref 0–0.2)
BUN BLDV-MCNC: 64 MG/DL (ref 6–20)
BUN/CREAT BLD: ABNORMAL (ref 9–20)
CALCIUM SERPL-MCNC: 7.9 MG/DL (ref 8.6–10.4)
CHLORIDE BLD-SCNC: 104 MMOL/L (ref 98–107)
CO2: 21 MMOL/L (ref 20–31)
CREAT SERPL-MCNC: 2.1 MG/DL (ref 0.7–1.2)
DIFFERENTIAL TYPE: ABNORMAL
EOSINOPHILS RELATIVE PERCENT: 11 % (ref 1–4)
GFR AFRICAN AMERICAN: 44 ML/MIN
GFR NON-AFRICAN AMERICAN: 36 ML/MIN
GFR SERPL CREATININE-BSD FRML MDRD: ABNORMAL ML/MIN/{1.73_M2}
GFR SERPL CREATININE-BSD FRML MDRD: ABNORMAL ML/MIN/{1.73_M2}
GLUCOSE BLD-MCNC: 109 MG/DL (ref 75–110)
GLUCOSE BLD-MCNC: 118 MG/DL (ref 70–99)
GLUCOSE BLD-MCNC: 134 MG/DL (ref 75–110)
GLUCOSE BLD-MCNC: 79 MG/DL (ref 75–110)
GLUCOSE BLD-MCNC: 94 MG/DL (ref 75–110)
GLUCOSE BLD-MCNC: 98 MG/DL (ref 75–110)
GLUCOSE BLD-MCNC: 98 MG/DL (ref 75–110)
HCT VFR BLD CALC: 24.6 % (ref 40.7–50.3)
HEMOGLOBIN: 7.2 G/DL (ref 13–17)
IMMATURE GRANULOCYTES: 0 %
LYMPHOCYTES # BLD: 12 % (ref 24–44)
MCH RBC QN AUTO: 28.5 PG (ref 25.2–33.5)
MCHC RBC AUTO-ENTMCNC: 29.3 G/DL (ref 28.4–34.8)
MCV RBC AUTO: 97.2 FL (ref 82.6–102.9)
MONOCYTES # BLD: 4 % (ref 1–7)
MORPHOLOGY: ABNORMAL
MORPHOLOGY: ABNORMAL
NRBC AUTOMATED: 0 PER 100 WBC
PDW BLD-RTO: 22.8 % (ref 11.8–14.4)
PHOSPHORUS: 3.1 MG/DL (ref 2.5–4.5)
PLATELET # BLD: 233 K/UL (ref 138–453)
PLATELET ESTIMATE: ABNORMAL
PMV BLD AUTO: 9 FL (ref 8.1–13.5)
POTASSIUM SERPL-SCNC: 4.4 MMOL/L (ref 3.7–5.3)
RBC # BLD: 2.53 M/UL (ref 4.21–5.77)
RBC # BLD: ABNORMAL 10*6/UL
SEG NEUTROPHILS: 73 % (ref 36–66)
SEGMENTED NEUTROPHILS ABSOLUTE COUNT: 5.4 K/UL (ref 1.8–7.7)
SODIUM BLD-SCNC: 137 MMOL/L (ref 135–144)
WBC # BLD: 7.4 K/UL (ref 3.5–11.3)
WBC # BLD: ABNORMAL 10*3/UL

## 2019-12-26 PROCEDURE — 2500000003 HC RX 250 WO HCPCS: Performed by: SPECIALIST

## 2019-12-26 PROCEDURE — 2W05X6Z CHANGE PRESSURE DRESSING ON BACK: ICD-10-PCS | Performed by: SURGERY

## 2019-12-26 PROCEDURE — 97110 THERAPEUTIC EXERCISES: CPT

## 2019-12-26 PROCEDURE — 6370000000 HC RX 637 (ALT 250 FOR IP): Performed by: STUDENT IN AN ORGANIZED HEALTH CARE EDUCATION/TRAINING PROGRAM

## 2019-12-26 PROCEDURE — 7100000001 HC PACU RECOVERY - ADDTL 15 MIN: Performed by: SURGERY

## 2019-12-26 PROCEDURE — 6360000002 HC RX W HCPCS: Performed by: SPECIALIST

## 2019-12-26 PROCEDURE — 84100 ASSAY OF PHOSPHORUS: CPT

## 2019-12-26 PROCEDURE — 2580000003 HC RX 258: Performed by: SURGERY

## 2019-12-26 PROCEDURE — 82947 ASSAY GLUCOSE BLOOD QUANT: CPT

## 2019-12-26 PROCEDURE — 3700000000 HC ANESTHESIA ATTENDED CARE: Performed by: SURGERY

## 2019-12-26 PROCEDURE — 2000000000 HC ICU R&B

## 2019-12-26 PROCEDURE — 6370000000 HC RX 637 (ALT 250 FOR IP): Performed by: EMERGENCY MEDICINE

## 2019-12-26 PROCEDURE — 2580000003 HC RX 258: Performed by: STUDENT IN AN ORGANIZED HEALTH CARE EDUCATION/TRAINING PROGRAM

## 2019-12-26 PROCEDURE — 3700000001 HC ADD 15 MINUTES (ANESTHESIA): Performed by: SURGERY

## 2019-12-26 PROCEDURE — 6360000002 HC RX W HCPCS: Performed by: INTERNAL MEDICINE

## 2019-12-26 PROCEDURE — 7100000000 HC PACU RECOVERY - FIRST 15 MIN: Performed by: SURGERY

## 2019-12-26 PROCEDURE — 99233 SBSQ HOSP IP/OBS HIGH 50: CPT | Performed by: INTERNAL MEDICINE

## 2019-12-26 PROCEDURE — 6360000002 HC RX W HCPCS: Performed by: STUDENT IN AN ORGANIZED HEALTH CARE EDUCATION/TRAINING PROGRAM

## 2019-12-26 PROCEDURE — 0HB8XZZ EXCISION OF BUTTOCK SKIN, EXTERNAL APPROACH: ICD-10-PCS | Performed by: SURGERY

## 2019-12-26 PROCEDURE — 85025 COMPLETE CBC W/AUTO DIFF WBC: CPT

## 2019-12-26 PROCEDURE — 3600000014 HC SURGERY LEVEL 4 ADDTL 15MIN: Performed by: SURGERY

## 2019-12-26 PROCEDURE — 99232 SBSQ HOSP IP/OBS MODERATE 35: CPT | Performed by: INTERNAL MEDICINE

## 2019-12-26 PROCEDURE — 90935 HEMODIALYSIS ONE EVALUATION: CPT

## 2019-12-26 PROCEDURE — 80048 BASIC METABOLIC PNL TOTAL CA: CPT

## 2019-12-26 PROCEDURE — 36415 COLL VENOUS BLD VENIPUNCTURE: CPT

## 2019-12-26 PROCEDURE — 3600000004 HC SURGERY LEVEL 4 BASE: Performed by: SURGERY

## 2019-12-26 PROCEDURE — 2709999900 HC NON-CHARGEABLE SUPPLY: Performed by: SURGERY

## 2019-12-26 RX ORDER — MORPHINE SULFATE 2 MG/ML
2 INJECTION, SOLUTION INTRAMUSCULAR; INTRAVENOUS EVERY 5 MIN PRN
Status: DISCONTINUED | OUTPATIENT
Start: 2019-12-26 | End: 2019-12-26 | Stop reason: HOSPADM

## 2019-12-26 RX ORDER — LIDOCAINE HYDROCHLORIDE 10 MG/ML
INJECTION, SOLUTION EPIDURAL; INFILTRATION; INTRACAUDAL; PERINEURAL PRN
Status: DISCONTINUED | OUTPATIENT
Start: 2019-12-26 | End: 2019-12-26

## 2019-12-26 RX ORDER — CEFAZOLIN SODIUM 1 G/3ML
INJECTION, POWDER, FOR SOLUTION INTRAMUSCULAR; INTRAVENOUS PRN
Status: DISCONTINUED | OUTPATIENT
Start: 2019-12-26 | End: 2019-12-26 | Stop reason: SDUPTHER

## 2019-12-26 RX ORDER — OXYCODONE HYDROCHLORIDE AND ACETAMINOPHEN 5; 325 MG/1; MG/1
2 TABLET ORAL PRN
Status: DISCONTINUED | OUTPATIENT
Start: 2019-12-26 | End: 2019-12-26 | Stop reason: HOSPADM

## 2019-12-26 RX ORDER — HEPARIN SODIUM 1000 [USP'U]/ML
500 INJECTION INTRAVENOUS; SUBCUTANEOUS ONCE
Status: COMPLETED | OUTPATIENT
Start: 2019-12-26 | End: 2019-12-26

## 2019-12-26 RX ORDER — GLYCOPYRROLATE 1 MG/5 ML
SYRINGE (ML) INTRAVENOUS PRN
Status: DISCONTINUED | OUTPATIENT
Start: 2019-12-26 | End: 2019-12-26 | Stop reason: SDUPTHER

## 2019-12-26 RX ORDER — MAGNESIUM HYDROXIDE 1200 MG/15ML
LIQUID ORAL CONTINUOUS PRN
Status: COMPLETED | OUTPATIENT
Start: 2019-12-26 | End: 2019-12-26

## 2019-12-26 RX ORDER — PROPOFOL 10 MG/ML
INJECTION, EMULSION INTRAVENOUS PRN
Status: DISCONTINUED | OUTPATIENT
Start: 2019-12-26 | End: 2019-12-26

## 2019-12-26 RX ORDER — FENTANYL CITRATE 50 UG/ML
INJECTION, SOLUTION INTRAMUSCULAR; INTRAVENOUS PRN
Status: DISCONTINUED | OUTPATIENT
Start: 2019-12-26 | End: 2019-12-26 | Stop reason: SDUPTHER

## 2019-12-26 RX ORDER — PROPOFOL 10 MG/ML
INJECTION, EMULSION INTRAVENOUS PRN
Status: DISCONTINUED | OUTPATIENT
Start: 2019-12-26 | End: 2019-12-26 | Stop reason: SDUPTHER

## 2019-12-26 RX ORDER — ROCURONIUM BROMIDE 10 MG/ML
INJECTION, SOLUTION INTRAVENOUS PRN
Status: DISCONTINUED | OUTPATIENT
Start: 2019-12-26 | End: 2019-12-26 | Stop reason: SDUPTHER

## 2019-12-26 RX ORDER — HEPARIN SODIUM 1000 [USP'U]/ML
1900 INJECTION, SOLUTION INTRAVENOUS; SUBCUTANEOUS PRN
Status: DISCONTINUED | OUTPATIENT
Start: 2019-12-26 | End: 2020-01-16 | Stop reason: HOSPADM

## 2019-12-26 RX ORDER — OXYCODONE HCL 5 MG/5 ML
7.5 SOLUTION, ORAL ORAL EVERY 6 HOURS PRN
Status: DISCONTINUED | OUTPATIENT
Start: 2019-12-26 | End: 2020-01-08

## 2019-12-26 RX ORDER — LABETALOL HYDROCHLORIDE 5 MG/ML
5 INJECTION, SOLUTION INTRAVENOUS EVERY 10 MIN PRN
Status: DISCONTINUED | OUTPATIENT
Start: 2019-12-26 | End: 2019-12-26 | Stop reason: HOSPADM

## 2019-12-26 RX ORDER — OXYCODONE HYDROCHLORIDE AND ACETAMINOPHEN 5; 325 MG/1; MG/1
1 TABLET ORAL PRN
Status: DISCONTINUED | OUTPATIENT
Start: 2019-12-26 | End: 2019-12-26 | Stop reason: HOSPADM

## 2019-12-26 RX ORDER — ONDANSETRON 2 MG/ML
INJECTION INTRAMUSCULAR; INTRAVENOUS PRN
Status: DISCONTINUED | OUTPATIENT
Start: 2019-12-26 | End: 2019-12-26 | Stop reason: SDUPTHER

## 2019-12-26 RX ORDER — DIPHENHYDRAMINE HYDROCHLORIDE 50 MG/ML
12.5 INJECTION INTRAMUSCULAR; INTRAVENOUS
Status: DISCONTINUED | OUTPATIENT
Start: 2019-12-26 | End: 2019-12-26 | Stop reason: HOSPADM

## 2019-12-26 RX ORDER — FENTANYL CITRATE 50 UG/ML
25 INJECTION, SOLUTION INTRAMUSCULAR; INTRAVENOUS EVERY 5 MIN PRN
Status: DISCONTINUED | OUTPATIENT
Start: 2019-12-26 | End: 2019-12-26 | Stop reason: HOSPADM

## 2019-12-26 RX ORDER — OXYCODONE HCL 5 MG/5 ML
10 SOLUTION, ORAL ORAL EVERY 6 HOURS PRN
Status: DISCONTINUED | OUTPATIENT
Start: 2019-12-26 | End: 2020-01-08

## 2019-12-26 RX ORDER — LIDOCAINE HYDROCHLORIDE 10 MG/ML
INJECTION, SOLUTION EPIDURAL; INFILTRATION; INTRACAUDAL; PERINEURAL PRN
Status: DISCONTINUED | OUTPATIENT
Start: 2019-12-26 | End: 2019-12-26 | Stop reason: SDUPTHER

## 2019-12-26 RX ORDER — HEPARIN SODIUM 1000 [USP'U]/ML
1000 INJECTION INTRAVENOUS; SUBCUTANEOUS ONCE
Status: COMPLETED | OUTPATIENT
Start: 2019-12-26 | End: 2019-12-26

## 2019-12-26 RX ORDER — NEOSTIGMINE METHYLSULFATE 5 MG/5 ML
SYRINGE (ML) INTRAVENOUS PRN
Status: DISCONTINUED | OUTPATIENT
Start: 2019-12-26 | End: 2019-12-26 | Stop reason: SDUPTHER

## 2019-12-26 RX ORDER — ONDANSETRON 2 MG/ML
4 INJECTION INTRAMUSCULAR; INTRAVENOUS
Status: DISCONTINUED | OUTPATIENT
Start: 2019-12-26 | End: 2019-12-26 | Stop reason: HOSPADM

## 2019-12-26 RX ORDER — ROCURONIUM BROMIDE 10 MG/ML
INJECTION, SOLUTION INTRAVENOUS PRN
Status: DISCONTINUED | OUTPATIENT
Start: 2019-12-26 | End: 2019-12-26

## 2019-12-26 RX ADMIN — HEPARIN SODIUM 5000 UNITS: 5000 INJECTION INTRAVENOUS; SUBCUTANEOUS at 05:19

## 2019-12-26 RX ADMIN — HEPARIN SODIUM 1000 UNITS: 1000 INJECTION INTRAVENOUS; SUBCUTANEOUS at 09:45

## 2019-12-26 RX ADMIN — LIDOCAINE HYDROCHLORIDE 50 MG: 10 INJECTION, SOLUTION EPIDURAL; INFILTRATION; INTRACAUDAL; PERINEURAL at 15:24

## 2019-12-26 RX ADMIN — CEFAZOLIN 3000 MG: 1 INJECTION, POWDER, FOR SOLUTION INTRAMUSCULAR; INTRAVENOUS at 15:42

## 2019-12-26 RX ADMIN — POLYETHYLENE GLYCOL 3350 17 G: 17 POWDER, FOR SOLUTION ORAL at 09:43

## 2019-12-26 RX ADMIN — ONDANSETRON 4 MG: 2 INJECTION, SOLUTION INTRAMUSCULAR; INTRAVENOUS at 17:13

## 2019-12-26 RX ADMIN — FAMOTIDINE 20 MG: 20 TABLET, FILM COATED ORAL at 10:40

## 2019-12-26 RX ADMIN — ROCURONIUM BROMIDE 40 MG: 10 INJECTION INTRAVENOUS at 15:24

## 2019-12-26 RX ADMIN — CARVEDILOL 6.25 MG: 6.25 TABLET, FILM COATED ORAL at 13:16

## 2019-12-26 RX ADMIN — HEPARIN SODIUM 1900 UNITS: 1000 INJECTION INTRAVENOUS; SUBCUTANEOUS at 11:50

## 2019-12-26 RX ADMIN — HEPARIN SODIUM 500 UNITS: 1000 INJECTION INTRAVENOUS; SUBCUTANEOUS at 09:45

## 2019-12-26 RX ADMIN — SODIUM CHLORIDE: 9 INJECTION, SOLUTION INTRAVENOUS at 04:19

## 2019-12-26 RX ADMIN — Medication 15 ML: at 09:40

## 2019-12-26 RX ADMIN — FENTANYL CITRATE 50 MCG: 50 INJECTION INTRAMUSCULAR; INTRAVENOUS at 15:34

## 2019-12-26 RX ADMIN — PROPOFOL 200 MG: 10 INJECTION, EMULSION INTRAVENOUS at 15:24

## 2019-12-26 RX ADMIN — SODIUM CHLORIDE: 9 INJECTION, SOLUTION INTRAVENOUS at 14:20

## 2019-12-26 RX ADMIN — SODIUM CHLORIDE, PRESERVATIVE FREE 10 ML: 5 INJECTION INTRAVENOUS at 19:46

## 2019-12-26 RX ADMIN — FENTANYL CITRATE 25 MCG: 50 INJECTION INTRAMUSCULAR; INTRAVENOUS at 16:00

## 2019-12-26 RX ADMIN — PHENYLEPHRINE HYDROCHLORIDE 100 MCG: 10 INJECTION INTRAVENOUS at 16:22

## 2019-12-26 RX ADMIN — OXYCODONE HYDROCHLORIDE 10 MG: 5 SOLUTION ORAL at 19:57

## 2019-12-26 RX ADMIN — Medication: at 10:54

## 2019-12-26 RX ADMIN — Medication 4 MG: at 17:09

## 2019-12-26 RX ADMIN — PHENYLEPHRINE HYDROCHLORIDE 100 MCG: 10 INJECTION INTRAVENOUS at 16:30

## 2019-12-26 RX ADMIN — HEPARIN SODIUM 5000 UNITS: 5000 INJECTION INTRAVENOUS; SUBCUTANEOUS at 21:36

## 2019-12-26 RX ADMIN — PHENYLEPHRINE HYDROCHLORIDE 100 MCG: 10 INJECTION INTRAVENOUS at 16:37

## 2019-12-26 RX ADMIN — Medication 0.6 MG: at 17:09

## 2019-12-26 RX ADMIN — Medication 100 MG: at 10:40

## 2019-12-26 RX ADMIN — FENTANYL CITRATE 25 MCG: 50 INJECTION INTRAMUSCULAR; INTRAVENOUS at 16:41

## 2019-12-26 RX ADMIN — SODIUM CHLORIDE, PRESERVATIVE FREE 10 ML: 5 INJECTION INTRAVENOUS at 09:45

## 2019-12-26 ASSESSMENT — PAIN SCALES - GENERAL
PAINLEVEL_OUTOF10: 0
PAINLEVEL_OUTOF10: 0
PAINLEVEL_OUTOF10: 7
PAINLEVEL_OUTOF10: 0
PAINLEVEL_OUTOF10: 7
PAINLEVEL_OUTOF10: 0

## 2019-12-26 ASSESSMENT — PULMONARY FUNCTION TESTS
PIF_VALUE: 18
PIF_VALUE: 26
PIF_VALUE: 29
PIF_VALUE: 26
PIF_VALUE: 3
PIF_VALUE: 33
PIF_VALUE: 29
PIF_VALUE: 31
PIF_VALUE: 19
PIF_VALUE: 15
PIF_VALUE: 29
PIF_VALUE: 16
PIF_VALUE: 33
PIF_VALUE: 22
PIF_VALUE: 25
PIF_VALUE: 15
PIF_VALUE: 16
PIF_VALUE: 4
PIF_VALUE: 33
PIF_VALUE: 29
PIF_VALUE: 19
PIF_VALUE: 16
PIF_VALUE: 31
PIF_VALUE: 26
PIF_VALUE: 32
PIF_VALUE: 13
PIF_VALUE: 33
PIF_VALUE: 13
PIF_VALUE: 1
PIF_VALUE: 21
PIF_VALUE: 30
PIF_VALUE: 3
PIF_VALUE: 32
PIF_VALUE: 30
PIF_VALUE: 26
PIF_VALUE: 27
PIF_VALUE: 5
PIF_VALUE: 1
PIF_VALUE: 1
PIF_VALUE: 34
PIF_VALUE: 1
PIF_VALUE: 3
PIF_VALUE: 31
PIF_VALUE: 19
PIF_VALUE: 29
PIF_VALUE: 34
PIF_VALUE: 34
PIF_VALUE: 31
PIF_VALUE: 1
PIF_VALUE: 31
PIF_VALUE: 31
PIF_VALUE: 15
PIF_VALUE: 5
PIF_VALUE: 15
PIF_VALUE: 29
PIF_VALUE: 32
PIF_VALUE: 33
PIF_VALUE: 27
PIF_VALUE: 15
PIF_VALUE: 30
PIF_VALUE: 5
PIF_VALUE: 10
PIF_VALUE: 16
PIF_VALUE: 2
PIF_VALUE: 1
PIF_VALUE: 34
PIF_VALUE: 32
PIF_VALUE: 15
PIF_VALUE: 22
PIF_VALUE: 31
PIF_VALUE: 33
PIF_VALUE: 1
PIF_VALUE: 34
PIF_VALUE: 19
PIF_VALUE: 15
PIF_VALUE: 19
PIF_VALUE: 18
PIF_VALUE: 33
PIF_VALUE: 4
PIF_VALUE: 27
PIF_VALUE: 29
PIF_VALUE: 32
PIF_VALUE: 26
PIF_VALUE: 15
PIF_VALUE: 17
PIF_VALUE: 28
PIF_VALUE: 32
PIF_VALUE: 19
PIF_VALUE: 19
PIF_VALUE: 34
PIF_VALUE: 13
PIF_VALUE: 27
PIF_VALUE: 32
PIF_VALUE: 19
PIF_VALUE: 19
PIF_VALUE: 15
PIF_VALUE: 19
PIF_VALUE: 31
PIF_VALUE: 19
PIF_VALUE: 15
PIF_VALUE: 32
PIF_VALUE: 19
PIF_VALUE: 26
PIF_VALUE: 15
PIF_VALUE: 1
PIF_VALUE: 13
PIF_VALUE: 15
PIF_VALUE: 30
PIF_VALUE: 15
PIF_VALUE: 30
PIF_VALUE: 33
PIF_VALUE: 30
PIF_VALUE: 31
PIF_VALUE: 32
PIF_VALUE: 31
PIF_VALUE: 31
PIF_VALUE: 30
PIF_VALUE: 14
PIF_VALUE: 32
PIF_VALUE: 13
PIF_VALUE: 22
PIF_VALUE: 27
PIF_VALUE: 30
PIF_VALUE: 16
PIF_VALUE: 2
PIF_VALUE: 15
PIF_VALUE: 26
PIF_VALUE: 1
PIF_VALUE: 16

## 2019-12-26 ASSESSMENT — PAIN DESCRIPTION - ORIENTATION
ORIENTATION: POSTERIOR
ORIENTATION: POSTERIOR

## 2019-12-26 ASSESSMENT — PAIN DESCRIPTION - ONSET
ONSET: ON-GOING
ONSET: ON-GOING

## 2019-12-26 ASSESSMENT — PAIN DESCRIPTION - FREQUENCY
FREQUENCY: CONTINUOUS
FREQUENCY: CONTINUOUS

## 2019-12-26 ASSESSMENT — PAIN DESCRIPTION - LOCATION
LOCATION: BUTTOCKS
LOCATION: BUTTOCKS

## 2019-12-26 ASSESSMENT — PAIN DESCRIPTION - PAIN TYPE
TYPE: ACUTE PAIN
TYPE: ACUTE PAIN

## 2019-12-26 ASSESSMENT — PAIN - FUNCTIONAL ASSESSMENT: PAIN_FUNCTIONAL_ASSESSMENT: FACES

## 2019-12-26 ASSESSMENT — PAIN DESCRIPTION - PROGRESSION
CLINICAL_PROGRESSION: NOT CHANGED
CLINICAL_PROGRESSION: NOT CHANGED

## 2019-12-26 NOTE — PROGRESS NOTES
[facility] - Progress Note    Today's Date and Time: 12/26/2019, 11:01 AM    Impression :   · Necrotizing fasciitis 11-29-19  · S/P perirectal I&D. Wide complex excisional debridement of gluteal and perineal areas on 11-29-19  · S/P debridement, washout of gluteal and perianal areas, diverting colostomy 12-3-19.   · S/P debridement, washout of gluteal and perianal areas, diverting colostomy 12-6-19.  · S/P Incision and debridement of necrotizing fasciitis buttock wound, scrotum, bilateral groin region and closure of midline abdominal wound 12-8-19   · S/P Incision and debridement of necrotizing fasciitis buttock wound, scrotum, bilateral groin region on 12-20-19. · Lactic acidosis  · DM 2  · HTN  · Hx of Low LVEF (20%) as per family  · DEBBY  · Fluid overload    Recommendations:     · D/C Meropenem 12-18-19  · Monitor off antibiotics, directional tip catheter to open up the collapse,  · Might go back for another debridement of the perineal wounds  · Continue with wound care. · Poor outcome in general,  · Discussed with nurse        Medical Decision Making/Summary/Discussion:12/26/2019     · Patient with DM 2, prior diabetic foot infection  · Presented to [address] [place] [address] ER generalized weakness for the past few days  · Found to have soft tissue necrosis with subcutaneous emphysema in gluteal areas and perineum  · S/P I&D and wide complex debridement of affected tissues on 11-29-19  · Showing hypotension, requiring fluids and a vasopressor  · Cultures in progress  · Will cover with Zosyn. Wounds with Strep spp. And Gram negative bacilli . No Staph spp. 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    · One more debridement in OR scheduled for 12-10-19 and then decision will be made to change code status or not. · Pt did not tolerate HD on 12-9. It was stopped early and pt required vasopressor support with Levophed, remains on vent. · Pt to OR 12-10 for further debridement  · Repeat I&D planned for 12-16-19 was cancelled because of high K levels  · Bedside I & D on 12/17/19. · Patient to undergo additional I&D on 12-20-19  · 12/23 right upper lobe collapse  · 12/24 debridement with excision of sacral wound in preparation for a flap, partial closure of his scrotal wound, VAC and debridement over the perineum  Infection Control Recommendations   · [place] Precautions  · Contact Isolation MRSA    Antimicrobial Stewardship Recommendations     Off antibiotics  Coordination of Outpatient Care:   · Estimated Length of IV antimicrobials: D/C 12-19-19  · Patient will need Midline Catheter Insertion: No  · Patient will need PICC line Insertion:Has Central line  · Patient will need: Home IV , [place],  SNF,  LTAC: TBD  · Patient will need outpatient wound care:Yes    Chief complaint/reason for consultation:   · Ashley's vs necrotizing fasciitis    History of Present Illness:   Sravan Echavarria is a 37y.o.-year-old  male who was initially admitted on 11/29/2019. Patient seen at the request of . INITIAL HISTORY:    Patient presented through ER in [place] with complaints of weakness of a few days duration. Examination showed skin necrosis in the gluteal and perineal region. Patient transferred to [place] [address]. CT scan showed extensive subcutaneous emphysema. Patient underwent I&D and extensive complex debridement of affected tissues on 11-29-19. Gram stain of tissues showed Strep. Spp and Gram negative bacilli. 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 He has underlying DM 2, prior diabetic foot infection, DEBBY.   Patient more stable post surgery but with hypotension  COLOSTOMY N/A 12/3/2019    LAPAROSCOPIC CONVERTED TO OPEN DIVERTING LOOP COLOSTOMY; WOUND VAC APPLICATION performed by Aileen Clements MD at [address] [facility] Bilateral 11/29/2019    RECTAL PERIRECTAL INCISION AND DRAINAGE, [facility] LOOP COLOSTOMY CREATION performed by Alex Mora MD at [address] [facility] N/A 12/6/2019    DEBRIDEMENT NECROTIZING FASCIAITIS BILAT BUTTOCK performed by So Torres MD at [address] [facility] N/A 12/20/2019    DEBRIDEMENT GLUTEAL AND SCROTAL REGIONS performed by Alex Mora MD at [address] N/A 12/10/2019    SCROTAL EXPLORATION WITH SCROTAL DEBRIDEMENT performed by Laura Bass MD at [facility] N/A 12/23/2019    [address] performed by So Torres MD at [place] [address]       Medications:      docusate  100 mg Oral Daily    heparin (porcine)  500 Units Intravenous Once    polyethylene glycol  17 g Oral Daily    insulin lispro  0-18 Units Subcutaneous Q4H    carvedilol  6.25 mg Per NG tube Daily    CENTRUM/CERTA-CHANCE with minerals oral  15 mL Oral Daily    alteplase  1 mg Intracatheter Once    povidone-iodine   Topical Daily    famotidine  20 mg Per NG tube Daily    sodium chloride flush  10 mL Intravenous 2 times per day    heparin (porcine)  5,000 Units Subcutaneous 3 times per day       Social History:     Social History     Socioeconomic History    Marital status: Unknown     Spouse name: Not on file    Number of children: Not on file    Years of education: Not on file    Highest education level: Not on file   Occupational History    Not on file   Social Needs    Financial resource strain: Not on file    Food insecurity:     Worry: Not on file     Inability: Not on file    Transportation needs:     Medical: Not on file     Non-medical: Not on file   Tobacco Use    Smoking status: Not on file   Substance and Component Value Date    MUCUS NOT REPORTED 2019    RBC 2.53 2019    TRICHOMONAS NOT REPORTED 2019    WBC 7.4 2019    YEAST NOT REPORTED 2019    TURBIDITY TURBID 2019     Lab Results   Component Value Date    CREATININE 2.10 2019    GLUCOSE 118 2019       Medical Decision Making-Imagin-9 CT Abd/pelvis  EXAMINATION:   CT OF THE ABDOMEN AND PELVIS WITH CONTRAST 2019 2:29 am       TECHNIQUE:   CT of the abdomen and pelvis was performed with the administration of   intravenous contrast. Multiplanar reformatted images are provided for review. Dose modulation, iterative reconstruction, and/or weight based adjustment of   the mA/kV was utilized to reduce the radiation dose to as low as reasonably   achievable.       COMPARISON:   2019.       HISTORY:   ORDERING SYSTEM PROVIDED HISTORY: Shayla galindo   TECHNOLOGIST PROVIDED HISTORY:       Wireless Safety fasc   Reason for Exam: nec fasc; Trauma   Acuity: Unknown   Type of Exam: Subsequent/Follow-up       FINDINGS:   Lower Chest: Partially visualized bilateral pleural effusions with bibasilar   heterogeneous opacities and bilateral lower lobe consolidations.  Central   venous catheter tip near the superior atrial caval junction.  Cardiomegaly. Trace pericardial fluid.       Liver: Normal.       Gallbladder and Bile Ducts: Normal.       Spleen: Splenomegaly.       Adrenal Glands: Normal.       Pancreas: Normal.       Genitourinary: Symmetric renal atrophy.  Redemonstration of multiple   hypodensities in the right kidney which likely represent benign cysts.  No   urinary stones or hydronephrosis.  Ambrosio catheter in the decompressed urinary   bladder.       Bowel: Normal caliber bowel.  Postsurgical changes of the bowel with left   lower quadrant ostomy.  No evidence of acute appendicitis.  No significant   diverticular disease.       Vasculature:  Atherosclerosis.  No abdominal aortic aneurysm.       Bones and Soft Tissues: Diffuse soft tissue stranding and fluid. Postsurgical changes in the anterior abdominal wall with midline incision   demonstrating expected small amount of fluid and air.  Large soft tissue   defects in the right inguinal region, scrotum, right greater than left   gluteal creases with associated packing material.  Small amount of   surrounding soft tissue gas.  There is associated skin thickening in these   regions.  Bilateral lower abdominal wall skin thickening.       Retroperitoneum/Mesentery: No intraperitoneal free air.  Mild abdominopelvic   ascites.  Loculated fluid along the inferior aspect of the liver. Redemonstration of bilateral inguinal and pelvic sidewall lymphadenopathy.    Multiple mildly prominent retroperitoneal and upper abdominal lymph nodes are   similar to the prior study.  Percutaneous intraperitoneal surgical drains.           Impression   1.  Large soft tissue defects in the right inguinal region, scrotum and right   greater than left gluteal crease is with associated packing material, small   amount of surrounding soft tissue gas and skin thickening likely relates to   history of necrotizing fasciitis.       2.  Postsurgical changes of the bowel with left lower quadrant ostomy.  No   bowel obstruction.  Percutaneous intraperitoneal surgical drains.       3.  Mild abdominopelvic ascites.  Loculated fluid along the inferior aspect   of the liver.  No intraperitoneal free air.       4.  Diffuse anasarca.       5.   Bilateral pleural effusions with associated heterogeneous opacities and   consolidations.  Underlying infection is not excluded.               EXAMINATION:   ONE XRAY VIEW OF THE CHEST       11/29/2019 9:01 pm       COMPARISON:   4 hours ago       HISTORY:   ORDERING SYSTEM PROVIDED HISTORY: intubated   TECHNOLOGIST PROVIDED HISTORY:   intubated   Reason for Exam: portable supine/ intubated   Acuity: Acute   Type of Exam: Initial       FINDINGS:   New ET tube terminates 38 mm above the ron.  There appears to be a   possible enteric tube which is not well visualized distally.  Right IJ   catheter terminates in the right atrium and is unchanged.  Lungs are clear. Cardiomegaly.  Mediastinum normal.  Bony thorax intact.           Impression   New ET tube as above.  Possible new enteric tube not well visualized. Recommend follow-up KUB if clinically warranted. CT ABDOMEN AND PELVIS WITHOUT CONTRAST    COMPARISON:  3/22/2017    CLINICAL HISTORY: Infection in scrotum, lower abdominal pain, diabetic, 30,000 white blood cell count. TECHNIQUE: Unenhanced axial images were obtained from the lung bases to the pubic symphysis with sagittal and coronal 2D reformatted images. Oral contrast administered:  No.  Automatic exposure control (AEC) was utilized. CT ABDOMEN FINDINGS:      The lung bases are unremarkable. There is a small pericardial effusion versus thickening. The liver, spleen, and pancreas demonstrate no acute abnormality given compromised evaluation without intravenous contrast.  There may be mild splenomegaly.  The adrenal glands appear within normal limits. There is no hydronephrosis or obstructing urinary tract calculi. Small amount of free fluid is seen adjacent to the liver inferiorly. .    The appendix is visualized in the right lower quadrant and appears within normal limits.       There is no evidence for bowel obstruction. Stranding is seen within the subcutaneous fat overlying both lateral abdominal walls left greater than right.  There is ill-defined fluid collection within the subcutaneous fat overlying the left lateral abdominal wall possibly representing phlegmon or developing abscess although no organized   abscess is seen. There is a large amount of soft tissue emphysema involving both the right and left buttock extending to the perineum tissue thickening is seen especially on the left involving the left buttock.   CT PELVIS FINDINGS:

## 2019-12-26 NOTE — PROGRESS NOTES
status or not. · Pt did not tolerate HD on 12-9. It was stopped early and pt required vasopressor support with Levophed, remains on vent. · Pt to OR 12-10 for further debridement  · Repeat I&D planned for 12-16-19 was cancelled because of high K levels  · Bedside I & D on 12/17/19. · Patient to undergo additional I&D on 12-20-19  · 12/23 right upper lobe collapse  · 12/24 debridement with excision of sacral wound in preparation for a flap, partial closure of his scrotal wound, VAC and debridement over the perineum  Infection Control Recommendations   · [place] Precautions  · Contact Isolation MRSA    Antimicrobial Stewardship Recommendations     Off antibiotics  Coordination of Outpatient Care:   · Estimated Length of IV antimicrobials: D/C 12-19-19  · Patient will need Midline Catheter Insertion: No  · Patient will need PICC line Insertion:Has Central line  · Patient will need: Home IV , [place],  SNF,  LTAC: TBD  · Patient will need outpatient wound care:Yes    Chief complaint/reason for consultation:   · Ashley's vs necrotizing fasciitis    History of Present Illness:   Sylvia Salvador is a 37y.o.-year-old  male who was initially admitted on 11/29/2019. Patient seen at the request of . INITIAL HISTORY:    Patient presented through ER in [place] with complaints of weakness of a few days duration. Examination showed skin necrosis in the gluteal and perineal region. Patient transferred to [facility]. CT scan showed extensive subcutaneous emphysema. Patient underwent I&D and extensive complex debridement of affected tissues on 11-29-19. Gram stain of tissues showed Strep. Spp and Gram negative bacilli. 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 He has underlying DM 2, prior diabetic foot infection, DEBBY. Patient more stable post surgery but with hypotension requiring a pressor.     Zosyn D/C because of of poor LVEF, in order to reduce Na and fluid load  Meropenem started adjusted for Cr Cl 30 cc  Meropenem adjusted for HD  Per surgery after debridement on 12-8-19, infection has spread to deep planes with the urethra less viable. One more debridement in OR scheduled for 12-10-19 and then decision will be made to change code status or not. Pt did not tolerate HD on 12-9. It was stopped early and pt required vasopressor support with Levophed, remains on vent. Pt to OR 12-10 for further debridement      CURRENT EVALUATION :12/25/2019   Extubated and doing well - alert but not talking  - follows simple commands  No fever  Post debridement of the  wound and surgery 12/23 . VAC is on, abdomen is soft,   Chest x-ray from 12/23 is showing right upper l/obe collapse possibly mucous plug  WBC 8.3    DNR CC      Cultures:  Urine:  · NA  Blood:  · 11-30-19: no growth  · 12-2-19: no growth  Sputum :  · NA  Wound:  · 11-29-19: Strep ssp and Gram negative bacilli     I have personally reviewed the past medical history, past surgical history, medications, social history, and family history, and I have updated the database accordingly.   Past Medical History:     Past Medical History:   Diagnosis Date    CHF (congestive heart failure) ([facility] [place] [address]     Diabetes mellitus ([facility] [place] [address]     Hypertension     Spina bifida aperta of lumbar spine ([facility] [place] [address]        Past Surgical  History:     Past Surgical History:   Procedure Laterality Date    ABDOMEN SURGERY N/A 12/8/2019    DEBRIDEMENT  NECROTIZING FASCIITIS BUTTOCK, WOUND VAC REMOVAL DELAYED PRIMARY CLOSURE OF MIDLINE ABDOMINAL INCISION, OSTOMY BAG CHANGE performed by Andrew Alvares MD at [address] [facility] [facility] N/A 12/10/2019    DEBRIDEMENT OF SACRAL WOUND performed by Erika Soto MD at [address] N/A 12/3/2019    LAPAROSCOPIC CONVERTED TO OPEN DIVERTING LOOP COLOSTOMY; WOUND VAC APPLICATION performed by Rohith Easley MD at [facility] [address] Bilateral 11/29/2019    RECTAL Social connections:     Talks on phone: Not on file     Gets together: Not on file     Attends Orthodoxy service: Not on file     Active member of club or organization: Not on file     Attends meetings of clubs or organizations: Not on file     Relationship status: Not on file    Intimate partner violence:     Fear of current or ex partner: Not on file     Emotionally abused: Not on file     Physically abused: Not on file     Forced sexual activity: Not on file   Other Topics Concern    Not on file   Social History Narrative    Not on file       Family History:   No family history on file. Allergies:   Patient has no known allergies. Review of Systems:     Review of Systems   Unable to perform ROS: Mental status change   Constitutional: Positive for activity change. Physical Examination :     Patient Vitals for the past 8 hrs:   BP Temp Temp src Pulse Resp   12/25/19 1800 (!) 158/74 -- -- 100 21   12/25/19 1700 (!) 151/83 -- -- 99 20   12/25/19 1600 (!) 152/79 99.7 °F (37.6 °C) Oral 102 22   12/25/19 1500 (!) 141/80 -- -- 100 20   12/25/19 1300 (!) 152/73 -- -- 100 19   12/25/19 1200 138/72 -- -- 100 20     Physical Exam  Constitutional:       Appearance: Normal appearance. He is not toxic-appearing or diaphoretic. HENT:      Head: Normocephalic and atraumatic. Nose: No congestion or rhinorrhea. Mouth/Throat:      Mouth: Mucous membranes are moist.   Eyes:      General: No scleral icterus. Pupils: Pupils are equal, round, and reactive to light. Neck:      Musculoskeletal: Neck supple. No neck rigidity or muscular tenderness. Cardiovascular:      Rate and Rhythm: Normal rate and regular rhythm. Heart sounds: Normal heart sounds. No murmur. No friction rub. Pulmonary:      Effort: Pulmonary effort is normal. No respiratory distress. Breath sounds: Normal breath sounds. No stridor. Abdominal:      General: Abdomen is flat. There is no distension.       Palpations: There is no mass. Tenderness: There is no tenderness. Comments: Abdominal VAC in place   Genitourinary:     Comments:  Ambrosio in place. Musculoskeletal:         General: No swelling, tenderness or deformity. Skin:     General: Skin is warm and dry. Coloration: Skin is not jaundiced. Neurological:      General: No focal deficit present. Mental Status: He is alert. Mental status is at baseline. Cranial Nerves: No cranial nerve deficit. Psychiatric:         Mood and Affect: Mood normal.           Medical Decision Making -Laboratory:   I have independently reviewed/ordered the following labs:    CBC with Differential:   Recent Labs     19  0505   WBC 8.3   HGB 7.4*   HCT 24.9*      LYMPHOPCT 10*   MONOPCT 9     BMP:   Recent Labs     19  0551  19  0505 19  0529   *  --  135 136   K 3.3*  --  3.2* 4.0     --  103 104   CO2 19*  --  19* 20   BUN 66*  --  80* 53*   CREATININE 2.25*   < > 2.45* 1.77*   MG 1.8  --   --  1.7    < > = values in this interval not displayed. Hepatic Function Panel:   No results for input(s): PROT, LABALBU, BILIDIR, IBILI, BILITOT, ALKPHOS, ALT, AST in the last 72 hours. No results for input(s): RPR in the last 72 hours. No results for input(s): HIV in the last 72 hours. No results for input(s): BC in the last 72 hours.   Lab Results   Component Value Date    MUCUS NOT REPORTED 2019    RBC 2.58 2019    TRICHOMONAS NOT REPORTED 2019    WBC 8.3 2019    YEAST NOT REPORTED 2019    TURBIDITY TURBID 2019     Lab Results   Component Value Date    CREATININE 1.77 2019    GLUCOSE 203 2019       Medical Decision Making-Imagin-9 CT Abd/pelvis  EXAMINATION:   CT OF THE ABDOMEN AND PELVIS WITH CONTRAST 2019 2:29 am       TECHNIQUE:   CT of the abdomen and pelvis was performed with the administration of   intravenous contrast. Multiplanar reformatted images are provided for review. Dose modulation, iterative reconstruction, and/or weight based adjustment of   the mA/kV was utilized to reduce the radiation dose to as low as reasonably   achievable.       COMPARISON:   November 29, 2019.       HISTORY:   ORDERING SYSTEM PROVIDED HISTORY: Donal galindo   TECHNOLOGIST PROVIDED HISTORY:       nec fasc   Reason for Exam: nec fasc; Trauma   Acuity: Unknown   Type of Exam: Subsequent/Follow-up       FINDINGS:   Lower Chest: Partially visualized bilateral pleural effusions with bibasilar   heterogeneous opacities and bilateral lower lobe consolidations.  Central   venous catheter tip near the superior atrial caval junction.  Cardiomegaly. Trace pericardial fluid.       Liver: Normal.       Gallbladder and Bile Ducts: Normal.       Spleen: Splenomegaly.       Adrenal Glands: Normal.       Pancreas: Normal.       Genitourinary: Symmetric renal atrophy.  Redemonstration of multiple   hypodensities in the right kidney which likely represent benign cysts.  No   urinary stones or hydronephrosis.  Ambrosio catheter in the decompressed urinary   bladder.       Bowel: Normal caliber bowel.  Postsurgical changes of the bowel with left   lower quadrant ostomy.  No evidence of acute appendicitis.  No significant   diverticular disease.       Vasculature: Atherosclerosis.  No abdominal aortic aneurysm.       Bones and Soft Tissues: Diffuse soft tissue stranding and fluid.    Postsurgical changes in the anterior abdominal wall with midline incision   demonstrating expected small amount of fluid and air.  Large soft tissue   defects in the right inguinal region, scrotum, right greater than left   gluteal creases with associated packing material.  Small amount of   surrounding soft tissue gas.  There is associated skin thickening in these   regions.  Bilateral lower abdominal wall skin thickening.       Retroperitoneum/Mesentery: No intraperitoneal free air.  Mild abdominopelvic   ascites.  Loculated fluid along the inferior aspect of the liver. Redemonstration of bilateral inguinal and pelvic sidewall lymphadenopathy. Multiple mildly prominent retroperitoneal and upper abdominal lymph nodes are   similar to the prior study.  Percutaneous intraperitoneal surgical drains.           Impression   1.  Large soft tissue defects in the right inguinal region, scrotum and right   greater than left gluteal crease is with associated packing material, small   amount of surrounding soft tissue gas and skin thickening likely relates to   history of necrotizing fasciitis.       2.  Postsurgical changes of the bowel with left lower quadrant ostomy.  No   bowel obstruction.  Percutaneous intraperitoneal surgical drains.       3.  Mild abdominopelvic ascites.  Loculated fluid along the inferior aspect   of the liver.  No intraperitoneal free air.       4.  Diffuse anasarca.       5.   Bilateral pleural effusions with associated heterogeneous opacities and   consolidations.  Underlying infection is not excluded.               EXAMINATION:   ONE XRAY VIEW OF THE CHEST       11/29/2019 9:01 pm       COMPARISON:   4 hours ago       HISTORY:   ORDERING SYSTEM PROVIDED HISTORY: intubated   TECHNOLOGIST PROVIDED HISTORY:   intubated   Reason for Exam: portable supine/ intubated   Acuity: Acute   Type of Exam: Initial       FINDINGS:   New ET tube terminates 38 mm above the ron.  There appears to be a   possible enteric tube which is not well visualized distally.  Right IJ   catheter terminates in the right atrium and is unchanged.  Lungs are clear. Cardiomegaly.  Mediastinum normal.  Bony thorax intact.           Impression   New ET tube as above.  Possible new enteric tube not well visualized. Recommend follow-up KUB if clinically warranted. CT ABDOMEN AND PELVIS WITHOUT CONTRAST    COMPARISON:  3/22/2017    CLINICAL HISTORY: Infection in scrotum, lower abdominal pain, diabetic, 30,000 white blood cell count.     TECHNIQUE: Unenhanced axial images were obtained from the lung bases to the pubic symphysis with sagittal and coronal 2D reformatted images. Oral contrast administered:  No.  Automatic exposure control (AEC) was utilized. CT ABDOMEN FINDINGS:      The lung bases are unremarkable. There is a small pericardial effusion versus thickening. The liver, spleen, and pancreas demonstrate no acute abnormality given compromised evaluation without intravenous contrast.  There may be mild splenomegaly.  The adrenal glands appear within normal limits. There is no hydronephrosis or obstructing urinary tract calculi. Small amount of free fluid is seen adjacent to the liver inferiorly. .    The appendix is visualized in the right lower quadrant and appears within normal limits.       There is no evidence for bowel obstruction. Stranding is seen within the subcutaneous fat overlying both lateral abdominal walls left greater than right.  There is ill-defined fluid collection within the subcutaneous fat overlying the left lateral abdominal wall possibly representing phlegmon or developing abscess although no organized   abscess is seen. There is a large amount of soft tissue emphysema involving both the right and left buttock extending to the perineum tissue thickening is seen especially on the left involving the left buttock. CT PELVIS FINDINGS:        No distal ureteral calculi or bladder calculi. There is no free fluid in the pelvis. Catheter is seen within the urinary bladder. Osseous changes within the left hemipelvis which may be chronic in nature. IMPRESSION:    Extensive subcutaneous emphysema as described above involving both buttocks extending to the perineum.  The possibility of Ashley gangrene/necrotizing fasciitis cannot be excluded. Possible phlegmon or developing soft tissue abscess overlying the left lateral abdominal wall as noted above.  No organized abscess is seen however.   Osseous changes within

## 2019-12-26 NOTE — PROGRESS NOTES
Critical Care Team - Daily Progress Note      Date and time: 2019 7:36 AM  Patient's name:  Geraldo Munoz  Medical Record Number: [id number]  Patient's account/billing number: [de-identified]  Patient's YOB: 1985  Age: 37 y.o. Date of Admission: 2019  3:39 PM  Length of stay during current admission: 27      Primary Care Physician: No primary care provider on file. ICU Attending Physician: Dr. Juan Jensen    Code Status: DNR-CCA    Reason for ICU admission: necrotizing fascitis      SUBJECTIVE:     OVERNIGHT EVENTS:     Patient's urine output fluctuated overnight. No other acute events overnight. Patient is doing well. OR today at 5 pm for wound vac change. Tunneled catheter placed on  by IR. Mentation: awake, on and off follows commands. Not very vocal.     Fever:-  Temp (24hrs), Av.5 °F (37.5 °C), Min:98.1 °F (36.7 °C), Max:100.2 °F (37.9 °C)    Secretions:minimal    Blood pressure: 140s-150s/90s  Vasopressors:none  Systolic (81TXZ), URK:815 , Min:136 , ZIF:890     Diastolic (59TUF), GCA:53, Min:72, Max:117      Point of care ultrasound for IVC:not indicated    Urine output in the last 24 hours: In: 4340 [I.V.:1017; NG/GT:580]  Out: 1349 [Urine:605; Drains:744]  Net since admission:+18,224  Patient Vitals for the past 96 hrs (Last 3 readings):   Weight   19 0542 295 lb 6.7 oz (134 kg)   19 1925 297 lb 9.9 oz (135 kg)   19 0901 (!) 304 lb 14.3 oz (138.3 kg)     Fluids:KVO  Feeding:tube feeds, currently held due to OR in the evening  Analgesics:tylenol for mild pain, gabriella 7.5 q6 prn for moderate pain and 10 q6h prn for severe pain.    Sedation:none  DVT Prophylaxis: heparin  Mobilization:pt/ot following  Head Up:elevated  GI Prophylaxis:  pepcid  Glycemic Control:on ISS, 12 lispro in 24 hours  Spontaneous breathing trial:N/A  Bowel management:colace and glycolax   Indwelling catheter: CVC Left IJ , colostomy bag, NG , lema     AWAKE & FOLLOWING COMMANDS:  [] No   [x] Yes    CURRENT VENTILATION STATUS:     [] Ventilator  [] BIPAP  [] Nasal Cannula [x] Room Air        SECRETIONS Amount:  [] Small [] Moderate  [] Large  [x] None  Color:     [] White [] Colored  [] Bloody    SEDATION:  RAAS Score:  [] Propofol gtt  [] Versed gtt  [] Ativan gtt   [x] No Sedation    PARALYZED:  [x] No    [] Yes    DIARRHEA:                [x] No                [] Yes  (C. Difficile status: [] positive                                                                                                                       [] negative                                                                                                                     [] pending)    VASOPRESSORS:  [x] No    [] Yes    If yes -   [] Levophed       [] Dopamine     [] Vasopressin       [] Dobutamine  [] Phenylephrine         [] Epinephrine    CENTRAL LINES:     [] No   [] Yes   (Date of Insertion:   )           If yes -     [] Right IJ     [x] Left IJ  [] Right Femoral [] Left Femoral                   [] Right Subclavian [] Left Subclavian       DOSS'S CATHETER:   [] No   [x] Yes  (Date of Insertion:   )     URINE OUTPUT:            [] Good   [x] Low              [] Anuric      OBJECTIVE:     VITAL SIGNS:  /86   Pulse 88   Temp 99.1 °F (37.3 °C) (Axillary)   Resp 18   Ht 5' 7\" (1.702 m)   Wt 295 lb 6.7 oz (134 kg)   SpO2 96%   BMI 46.27 kg/m²   Tmax over 24 hours:  Temp (24hrs), Av.5 °F (37.5 °C), Min:98.1 °F (36.7 °C), Max:100.2 °F (37.9 °C)      Patient Vitals for the past 6 hrs:   BP Temp Temp src Pulse Resp SpO2   19 0700 136/86 -- -- 88 18 96 %   19 0600 (!) 152/90 -- -- 95 22 98 %   19 0500 (!) 150/87 -- -- 97 19 97 %   19 0400 (!) 159/89 99.1 °F (37.3 °C) Axillary 106 24 99 %   19 0300 (!) 146/87 -- -- 98 21 98 %   19 0200 (!) 142/117 -- -- 101 22 98 %         Intake/Output Summary (Last 24 hours) at 2019 0736  Last data filed at 12/26/2019 0630  Gross per 24 hour   Intake 1627 ml   Output 1649 ml   Net -22 ml     Wt Readings from Last 2 Encounters:   12/25/19 295 lb 6.7 oz (134 kg)     Body mass index is 46.27 kg/m². PHYSICAL EXAMINATION:    General appearance - alert, well appearing, and in no distress  Mental status - alert and awake, follows commands  Eyes - pupils equal and reactive, extraocular eye movements intact  Ears - bilateral TM's and external ear canals normal  Nose - normal and patent, no erythema, discharge or polyps  Mouth - mucous membranes moist, pharynx normal without lesions  Neck - supple, no significant adenopathy  Chest - clear to auscultation, no wheezes, rales or rhonchi, symmetric air entry  Heart - normal rate, regular rhythm, normal S1, S2, no murmurs, rubs, clicks or gallops  Abdomen - soft, nontender, nondistended, no masses or organomegaly, colostomy site looks ok, wounds covered in dressing.  Wound vac in place  Neurological - alert, oriented, normal speech, no focal findings or movement disorder noted}  Extremities - peripheral pulses normal, no pedal edema, no clubbing or cyanosis, foot ulcers covered in dressing  Skin - normal coloration and turgor, no rashes, no suspicious skin lesions noted    MEDICATIONS:    Scheduled Meds:   docusate sodium  100 mg Oral Daily    polyethylene glycol  17 g Oral Daily    insulin lispro  0-18 Units Subcutaneous Q4H    carvedilol  6.25 mg Per NG tube Daily    CENTRUM/CERTA-CHANCE with minerals oral  15 mL Oral Daily    alteplase  1 mg Intracatheter Once    povidone-iodine   Topical Daily    famotidine  20 mg Per NG tube Daily    sodium chloride flush  10 mL Intravenous 2 times per day    heparin (porcine)  5,000 Units Subcutaneous 3 times per day     Continuous Infusions:   norepinephrine Stopped (12/23/19 9547)    fentaNYL Stopped (12/24/19 1420)    sodium chloride      dextrose      sodium chloride 10 mL/hr at 12/26/19 2309     PRN Meds:   melatonin, 1 mg, Nightly PRN  acetaminophen, 650 mg, Q4H PRN  oxyCODONE, 10 mg, Q6H PRN  labetalol, 20 mg, Q6H PRN  heparin (porcine), 500 Units, PRN  heparin (porcine), 1,750 Units, PRN  glucose, 15 g, PRN  dextrose, 12.5 g, PRN  glucagon (rDNA), 1 mg, PRN  dextrose, 100 mL/hr, PRN  albumin human, 25 g, PRN  heparin (porcine), 1,400 Units, PRN  heparin (porcine), 1,300 Units, PRN  LORazepam, 1 mg, Q6H PRN  sodium chloride, 250 mL, PRN  sodium chloride, 150 mL, PRN  albumin human, 25 g, PRN  sodium chloride flush, 10 mL, PRN  potassium chloride, 40 mEq, PRN    Or  potassium alternative oral replacement, 40 mEq, PRN    Or  potassium chloride, 10 mEq, PRN  REFRESH LACRI-LUBE, , PRN  magnesium hydroxide, 30 mL, Daily PRN  ondansetron, 4 mg, Q6H PRN          VENT SETTINGS (Comprehensive) (if applicable):  Vent Information  $Ventilation: $Subsequent Day  Ventilator Started: Yes(transitioned from OR bag)  Ventilator Stopped: Yes  Skin Assessment: Clean, dry, & intact  Equipment ID: [id number]  Equipment Changed: (S) Vent Circuit(heater)  Vent Type: Servo i  Vent Mode: PRVC  Vt Ordered: 530 mL  Rate Set: 18 bmp  Pressure Support: 6 cmH20  FiO2 : 30 %  Sensitivity: 2  PEEP/CPAP: 5  I Time/ I Time %: 0.9 s  Cuff Pressure (cm H2O): 28 cm H2O  Humidification Source: Heated wire  Humidification Temp: 36  Humidification Temp Measured: 37  Circuit Condensation: Drained  Nitric Oxide/Epoprostenol In Use?: No  Additional Respiratory  Assessments  Pulse: 88  Resp: 18  SpO2: 96 %  End Tidal CO2: (on portable machine at side of patient. )  pCO2 (TCOM, mmHg): 53 mmHg  Position: Semi-Pagan's  Humidification Source: Heated wire  Humidification Temp: 36  Circuit Condensation: Drained  Oral Care Completed?: Yes  Oral Care: Mouthwash, Teeth brushed, Suction toothette  Subglottic Suction Done?: Yes  Cuff Pressure (cm H2O): 28 cm H2O  Skin barrier applied: No    Laboratory findings:    Complete Blood Count:   Recent Labs     12/24/19  0505 12/26/19  4236 WBC 8.3 7.4   HGB 7.4* 7.2*   HCT 24.9* 24.6*    233        Last 3 Blood Glucose:   Recent Labs     12/24/19  0505 12/25/19  0529 12/26/19  0414   GLUCOSE 182* 203* 118*        PT/INR:    Lab Results   Component Value Date    PROTIME 11.9 11/29/2019    INR 1.1 11/29/2019     PTT:  No results found for: APTT, PTT    Comprehensive Metabolic Profile:   Recent Labs     12/24/19  0505 12/25/19  0529 12/26/19  0414    136 137   K 3.2* 4.0 4.4    104 104   CO2 19* 20 21   BUN 80* 53* 64*   CREATININE 2.45* 1.77* 2.10*   GLUCOSE 182* 203* 118*   CALCIUM 8.0* 7.9* 7.9*      Magnesium:   Lab Results   Component Value Date    MG 1.7 12/25/2019     Phosphorus:   Lab Results   Component Value Date    PHOS 3.1 12/26/2019     Ionized Calcium:   Lab Results   Component Value Date    CAION 1.05 12/03/2019     Troponin: No results for input(s): TROPONINI in the last 72 hours. Cultures during this admission:     Blood cultures:                 [x] None drawn      [] Negative             []  Positive (Details:  )  Urine Culture:                   [x] None drawn      [] Negative             []  Positive (Details:  )  Sputum Culture:               [x] None drawn       [] Negative             []  Positive (Details:  )   Endotracheal aspirate:     [x] None drawn       [] Negative             []  Positive (Details:  )       ASSESSMENT:     · Principal Problem:  ·   Necrotizing fasciitis ([facility] [address]  · Active Problems:  ·   Hypertension  ·   Diabetes ([facility] [address]  ·   Diabetic foot ulcer ([facility] [address]  ·   Septic shock ([facility] [address]  ·   Bandemia  ·   Acute respiratory failure with hypoxemia ([facility] [address]  ·   Diabetic foot infection ([facility] [address]  · Resolved Problems:  ·   * No resolved hospital problems. *  ·   ·         PLAN:     WEAN PER PROTOCOL:  [] No   [] Yes  [x] N/A    DISCONTINUE ANY LABS:   [x] No   [] Yes    ICU PROPHYLAXIS:  Stress ulcer:  [] PPI Agent  [x] [id number] [] Sucralfate  [] Other:  VTE:   [] Enoxaparin  [x] Unfract.  Heparin Subcut  [] EPC Cuffs    NUTRITION:  [] NPO [] Tube Feeding (Specify: ) [] TPN  [] PO (Diet: Diet NPO, After Midnight)    HOME MEDICATIONS RECONCILED: [] No  [x] Yes    INSULIN DRIP:   [x] No   [] Yes    CONSULTATION NEEDED:  [] No   [x] Yes    FAMILY UPDATED:    [x] No   [] Yes    TRANSFER OUT OF ICU:   [x] No   [] Yes    ADDITIONAL PLAN:    1 . Pressure ulcer can't be staged present on admission / Necrotizing Faciitis involving buttocks and scrotum -   S/p bed side I&D: I&D buttock, scrotal and perianal abscess  s/p POD#27 s/p wide complex debridement of gluteal/perianal region. region extensive necrotic tissue that involved the anus and distal rectum. Had multiple debridement, washout of gluteal and perineal  diverting loop colostomy   meropenem (from 12/1- 12/18) per ID   OR on 12/20/2019. Had debridement only. No infection noted. No urethral leak. patient needs to be transferred to [facility] or  for reconstructive surgery. OR today at 5 pm for wound vac change, n.p.o. after midnight     2.  Septic shock -resolved, off pressors  - Blood cultures drawn 12/2/19:  No growth      3. DEBBY on CKD   Renal ultrasound: Right renal cyst, otherwise grossly negative renal   Ultrasound, non diagnostic bladder assessment.    Tunneled cath placed on 12/24  Nephrology following. Dialysis today  Replacing electrolyte     4. Diabetes mellitus type II-high-dose correction POC glucose checks every 4 hours  Hypoglycemia treatment protocol      5. Diabetic left foot ulcer -wound care on board.  No surgical intervention for now     6. Anemia: Hgb - stable. received  12 uPRBC since admission      7. Respiratory failure. Due to sepsis.   Resolved  Extubated 12/21  On room air     8. Essential Hypertension and tachycardia  Continue coreg 6.25mg daily  PRN labetalol for Hypertension   Will get patient's home med list from pharmacy     9. Analgesia and Nutrition  gabriella 7.5 and 10 for pain q6h. On tube feeds, held due to OR plan.       Hannah Rodas

## 2019-12-26 NOTE — PROGRESS NOTES
Dialysis Post Treatment Note  Vitals:    12/26/19 1201   BP:    Pulse:    Resp: 17   Temp:    SpO2:      Pre-Weight = 129kg  Post-weight = Weight: 279 lb 12.2 oz (126.9 kg)  Total Liters Processed = Total Liters Processed (l/min): 48.1 l/min  Rinseback Volume (mL) = Rinseback Volume (ml): 360 ml  Net Removal (mL) = 2170  Type of access used= left subclavian cathether  Length of treatment= 2 hours    Patient tolerated treatment well; no complications throughout treatment. No signs of distress by the end of treatment. Patient ran on UF only for 2 hours per Dr. Najma Castorena orders.

## 2019-12-26 NOTE — PROGRESS NOTES
DATE: 2019  NAME: Xin Phelps  MRN: [id number]   : 1985    Discharge Recommendations: Continue to Assess (pending progress)     Subjective: RN agreed to PT today for PROM  Pain: JESSI  Patient follows: Some Commands; pt blinks his eyes when asked  Is patient on ventilator: Yes  Is patient on sedation: No  Precautions: no lifting over 20 lbs for 24 hrs; Contact (MRSA and necrotizing fasciitis)    Therapeutic exercises:  UE/LE(s)  Bilateral Passive range of motion all planes x 20 reps  Stretching multiple joints d/t tightness especially R side  FDS was donned on Right side, upon arrival    Goals  Short Term Goals  Short term goal 1: PROM for stretching and injury prevention   Short term goal 2: AROM/AAROM for strengthening   Short term goal 3: Progress mobility as appropriate           Plan: Progress functional mobility as medically appropriate.    Time In: 1021  Time Out: 1044  Time Coded Minutes (treatment minutes): 23 mins  Rehab Potential: CTA  Treatments/week: 2-3x per wk    Candy Andrews PTA

## 2019-12-26 NOTE — PLAN OF CARE
Problem: Pain:  Goal: Pain level will decrease  Description  Pain level will decrease  Outcome: Ongoing  Goal: Control of acute pain  Description  Control of acute pain  Outcome: Ongoing  Goal: Control of chronic pain  Description  Control of chronic pain  Outcome: Ongoing     Problem: Skin Integrity:  Goal: Will show no infection signs and symptoms  Description  Will show no infection signs and symptoms  Outcome: Ongoing  Goal: Absence of new skin breakdown  Description  Absence of new skin breakdown  Outcome: Ongoing     Problem: OXYGENATION/RESPIRATORY FUNCTION  Goal: Patient will maintain patent airway  Outcome: Ongoing  Goal: Patient will achieve/maintain normal respiratory rate/effort  Description  Respiratory rate and effort will be within normal limits for the patient  Outcome: Ongoing     Problem: SKIN INTEGRITY  Goal: Skin integrity is maintained or improved  Outcome: Ongoing     Problem: Falls - Risk of:  Goal: Will remain free from falls  Description  Will remain free from falls  Outcome: Ongoing  Goal: Absence of physical injury  Description  Absence of physical injury  Outcome: Ongoing     Problem: Risk for Impaired Skin Integrity  Goal: Tissue integrity - skin and mucous membranes  Description  Structural intactness and normal physiological function of skin and  mucous membranes.   Outcome: Ongoing     Problem: Nutrition  Goal: Optimal nutrition therapy  Description  Nutrition Problem: Inadequate oral intake  Intervention: Food and/or Nutrient Delivery: Start Parenteral Nutrition  Nutritional Goals: Meet % of estimated nutrition needs   Outcome: Ongoing     Problem: Confusion - Acute:  Goal: Absence of continued neurological deterioration signs and symptoms  Description  Absence of continued neurological deterioration signs and symptoms  Outcome: Ongoing  Goal: Mental status will be restored to baseline  Description  Mental status will be restored to baseline  Outcome: Ongoing     Problem: Discharge Planning:  Goal: Ability to perform activities of daily living will improve  Description  Ability to perform activities of daily living will improve  Outcome: Ongoing  Goal: Participates in care planning  Description  Participates in care planning  Outcome: Ongoing     Problem: Injury - Risk of, Physical Injury:  Goal: Will remain free from falls  Description  Will remain free from falls  Outcome: Ongoing  Goal: Absence of physical injury  Description  Absence of physical injury  Outcome: Ongoing     Problem: Mood - Altered:  Goal: Mood stable  Description  Mood stable  Outcome: Ongoing  Goal: Absence of abusive behavior  Description  Absence of abusive behavior  Outcome: Ongoing  Goal: Verbalizations of feeling emotionally comfortable while being cared for will increase  Description  Verbalizations of feeling emotionally comfortable while being cared for will increase  Outcome: Ongoing     Problem: Psychomotor Activity - Altered:  Goal: Absence of psychomotor disturbance signs and symptoms  Description  Absence of psychomotor disturbance signs and symptoms  Outcome: Ongoing     Problem: Sensory Perception - Impaired:  Goal: Demonstrations of improved sensory functioning will increase  Description  Demonstrations of improved sensory functioning will increase  Outcome: Ongoing  Goal: Decrease in sensory misperception frequency  Description  Decrease in sensory misperception frequency  Outcome: Ongoing  Goal: Able to refrain from responding to false sensory perceptions  Description  Able to refrain from responding to false sensory perceptions  Outcome: Ongoing  Goal: Demonstrates accurate environmental perceptions  Description  Demonstrates accurate environmental perceptions  Outcome: Ongoing  Goal: Able to distinguish between reality-based and nonreality-based thinking  Description  Able to distinguish between reality-based and nonreality-based thinking  Outcome: Ongoing  Goal: Able to interrupt nonreality-based thinking  Description  Able to interrupt nonreality-based thinking  Outcome: Ongoing     Problem: Sleep Pattern Disturbance:  Goal: Appears well-rested  Description  Appears well-rested  Outcome: Ongoing

## 2019-12-26 NOTE — BRIEF OP NOTE
Brief Postoperative Note  ______________________________________________________________    Patient: Eun Kay  YOB: 1985  MRN: [id number]  Date of Procedure: 12/26/2019    Pre-Op Diagnosis: Scrotal, perineal and buttock abcesses    Post-Op Diagnosis: Same       Procedure(s):  Sharp INCISION AND debridement of gluteal wound; application of wound VAC CHANGE BUTTOCK, PERINEUM    Anesthesia: General    Surgeon(s):  Parth Melendez DO    Assistant: Alexus Santos, PGY 5     Estimated Blood Loss (mL): 5 mL    Complications: None    Specimens:   * No specimens in log *    Implants:  * No implants in log *      Drains:   Negative Pressure Wound Therapy (Active)   Number of pieces used 1 12/16/2019 12:00 AM   Output (ml) 50 ml 12/17/2019  6:03 AM   Wound Assessment Fragile; Red 12/14/2019 12:00 AM       Negative Pressure Wound Therapy Abdomen Medial;Upper (Active)   Wound Type Surgical 12/26/2019 12:30 PM   Unit Type vac ultra veraflow 12/24/2019  8:00 PM   Dressing Type Black foam 12/26/2019 12:30 PM   Number of pieces used 2 12/25/2019 11:00 AM   Cycle Continuous; On 12/26/2019 12:30 PM   Target Pressure (mmHg) 175 12/26/2019 12:30 PM   Canister changed? Yes 12/26/2019 12:30 PM   Dressing Status Clean;Dry; Intact 12/26/2019 12:30 PM   Dressing Changed Changed/New 12/25/2019  4:57 PM   Drainage Amount Moderate 12/26/2019 12:30 PM   Drainage Description Serosanguinous 12/26/2019 12:30 PM   Dressing Change Due 01/01/20 12/25/2019  4:57 PM   Output (ml) 125 ml 12/26/2019 12:00 AM   Wound Assessment Other (Comment) 12/26/2019 12:30 PM   Liliam-wound Assessment Other (Comment) 12/26/2019 12:30 PM       Negative Pressure Wound Therapy Buttocks (Active)   Unit Type vac ultra veraflo 12/24/2019  8:00 PM   Dressing Type Silver impregnated foam 12/26/2019  2:21 PM   Cycle Continuous 12/26/2019  2:21 PM   Irrigation Solution Normal Saline 12/23/2019  6:58 PM   Canister changed?  Yes 12/26/2019  6:30 AM   Dressing Status 12/26/2019 12:30 PM   Site Assessment Pink;Swelling; No urethral drainage 12/26/2019 12:30 PM   Urine Color Yellow 12/26/2019 12:30 PM   Urine Appearance Sediment 12/26/2019 12:30 PM   Output (mL) 5 mL 12/26/2019 11:00 AM       [REMOVED] Closed/Suction Drain Superior Abdomen Bulb (Removed)   Site Description Healing 12/10/2019  4:00 PM   Dressing Status Other (Comment) 12/10/2019  4:00 PM   Drainage Appearance Serous 12/10/2019  4:00 PM   Status To bulb suction 12/10/2019  4:00 AM   Output (ml) 10 ml 12/10/2019  8:39 AM       [REMOVED] Negative Pressure Wound Therapy Abdomen Medial;Upper (Removed)   Wound Type Surgical 12/8/2019  6:00 PM   Dressing Type Black foam 12/8/2019  6:00 PM   Cycle Continuous 12/8/2019  6:00 PM   Target Pressure (mmHg) 125 12/8/2019  6:00 PM   Irrigation Solution Normal Saline 12/7/2019  8:00 PM   Canister changed? No 12/8/2019  6:00 PM   Dressing Status Clean;Dry; Intact 12/8/2019  6:00 PM   Dressing Changed Changed/New 12/3/2019  5:26 PM   Drainage Description Serosanguinous 12/8/2019  6:00 PM   Output (ml) 50 ml 12/5/2019  4:00 PM   Odor None 12/8/2019  6:00 PM       [REMOVED] NG/OG/NJ/NE Tube Orogastric  Center mouth (Removed)   Securement device Yes 11/30/2019 12:00 PM   Status Suction-low intermittent 11/30/2019 12:00 PM   Placement Verified by Gastric Contents 11/30/2019 12:00 PM   NG/OG/NJ/NE External Measurement (cm) 60 cm 11/30/2019 12:00 PM   Drainage Appearance Tan 11/30/2019  8:00 AM       [REMOVED] NG/OG/NJ/NE Tube Right nostril (Removed)   Surrounding Skin Dry; Intact 12/21/2019  8:00 AM   Securement device Yes 12/21/2019  8:00 AM   Status Clamped 12/21/2019  8:00 AM   NG/OG/NJ/NE External Measurement (cm) 55 cm 12/21/2019  4:00 AM       Findings:   Wound Class IV; portion of gluteal cleft approximated; portion of R upper thigh approximated.    - vera flow applied  - additional suction applied to R groin and then \"y\" connected to midline wound vac  - adaptic placed over rectum Mingo Springer,   Date: 12/26/2019  Time: 5:48 PM               Trauma Attending Attestation      I have reviewed the above [facility] note(s) and confirmed the key elements of the medical history and physical exam. I have seen and examined the pt. I have discussed the findings, established the care plan and recommendations with Resident, [facility]CS RN, bedside nurse.         Violet Pinto DO  12/26/2019  6:00 PM

## 2019-12-26 NOTE — PROGRESS NOTES
HEART: RRR  ABDOMEN: soft, ND, mild tenderness. Stool in appliance. Ostomy patent. Midline vac with good seal.     EXTREMITY: dressing to L foot is c/d/i. No cyanosis     I/O last 3 completed shifts: In: 1627 [I.V.:1017; NG/GT:610]  Out: 1659 [Urine:915; Drains:744]    Drain/tube output: In: 1957 [I.V.:1017; NG/GT:580]  Out: 3899 [Urine:625; Drains:744]    LAB:  CBC:   Recent Labs     12/24/19  0505 12/26/19  0414   WBC 8.3 7.4   HGB 7.4* 7.2*   HCT 24.9* 24.6*   MCV 96.5 97.2    233     BMP:   Recent Labs     12/24/19  0505 12/25/19  0529 12/26/19  0414    136 137   K 3.2* 4.0 4.4    104 104   CO2 19* 20 21   BUN 80* 53* 64*   CREATININE 2.45* 1.77* 2.10*   GLUCOSE 182* 203* 118*         RADIOLOGY:   no new images   No      Hiren Song DO  12/26/2019  12:53 PM           Trauma Attending Attestation      I have reviewed the above [facility] note(s) and confirmed the key elements of the medical history and physical exam. I have seen and examined the pt. I have discussed the findings, established the care plan and recommendations with Resident, [facility] RN, bedside nurse.   To OR for debridement and wound vac change         Luna Velasquez DO  12/27/2019  12:08 AM

## 2019-12-26 NOTE — PLAN OF CARE
Problem: Pain:  Goal: Pain level will decrease  Description  Pain level will decrease  12/26/2019 0905 by Humphrey Cox RN  Outcome: Ongoing  12/25/2019 2337 by Rosalind Antonio RN  Outcome: Ongoing  Goal: Control of acute pain  Description  Control of acute pain  12/26/2019 0905 by Humphrey Cox RN  Outcome: Ongoing  12/25/2019 2337 by Rosalind Antonio RN  Outcome: Ongoing  Goal: Control of chronic pain  Description  Control of chronic pain  12/26/2019 0905 by Humphrey Cox RN  Outcome: Ongoing  12/25/2019 2337 by Rosalind Antonio RN  Outcome: Ongoing     Problem: Skin Integrity:  Goal: Will show no infection signs and symptoms  Description  Will show no infection signs and symptoms  12/26/2019 0905 by Humphrey Cox RN  Outcome: Ongoing  12/25/2019 2337 by Rosalind Antonio RN  Outcome: Ongoing  Goal: Absence of new skin breakdown  Description  Absence of new skin breakdown  12/26/2019 0905 by Humphrey Cox RN  Outcome: Ongoing  12/25/2019 2337 by Rosalind Antonio RN  Outcome: Ongoing     Problem: OXYGENATION/RESPIRATORY FUNCTION  Goal: Patient will maintain patent airway  12/26/2019 0905 by Humphrey Cox RN  Outcome: Ongoing  12/25/2019 2337 by Rosalind Antonio RN  Outcome: Ongoing  Goal: Patient will achieve/maintain normal respiratory rate/effort  Description  Respiratory rate and effort will be within normal limits for the patient  12/26/2019 0905 by Humphrey Cox RN  Outcome: Ongoing  12/25/2019 2337 by Rosalind Antonio RN  Outcome: Ongoing     Problem: SKIN INTEGRITY  Goal: Skin integrity is maintained or improved  12/26/2019 0905 by Humphrey Cox RN  Outcome: Ongoing  12/25/2019 2337 by Rosalind Antonio RN  Outcome: Ongoing     Problem: Falls - Risk of:  Goal: Will remain free from falls  Description  Will remain free from falls  12/26/2019 0905 by Humphrey Cox RN  Outcome: Ongoing  12/25/2019 2337 by Rosalind Antonio RN  Outcome: Ongoing  Goal: Absence of physical injury  Description  Absence of physical RN  Outcome: Ongoing  Goal: Absence of physical injury  Description  Absence of physical injury  12/26/2019 0905 by Juwan Garcia RN  Outcome: Ongoing  12/25/2019 2337 by Guillermo Mcfarlane RN  Outcome: Ongoing     Problem: Mood - Altered:  Goal: Mood stable  Description  Mood stable  12/26/2019 0905 by Juwan Garcai RN  Outcome: Ongoing  12/25/2019 2337 by Guillermo Mcfarlane RN  Outcome: Ongoing  Goal: Absence of abusive behavior  Description  Absence of abusive behavior  12/26/2019 0905 by Juwan Garcia RN  Outcome: Ongoing  12/25/2019 2337 by Guillermo Mcfarlane RN  Outcome: Ongoing  Goal: Verbalizations of feeling emotionally comfortable while being cared for will increase  Description  Verbalizations of feeling emotionally comfortable while being cared for will increase  12/26/2019 0905 by Juwan Garcia RN  Outcome: Ongoing  12/25/2019 2337 by Guillermo Mcfarlane RN  Outcome: Ongoing     Problem: Psychomotor Activity - Altered:  Goal: Absence of psychomotor disturbance signs and symptoms  Description  Absence of psychomotor disturbance signs and symptoms  12/26/2019 0905 by Juwan Garcia RN  Outcome: Ongoing  12/25/2019 2337 by Guillermo Mcfarlane RN  Outcome: Ongoing     Problem: Sensory Perception - Impaired:  Goal: Demonstrations of improved sensory functioning will increase  Description  Demonstrations of improved sensory functioning will increase  12/26/2019 0905 by Juwan Garcia RN  Outcome: Ongoing  12/25/2019 2337 by Guillermo Mcfarlane RN  Outcome: Ongoing  Goal: Decrease in sensory misperception frequency  Description  Decrease in sensory misperception frequency  12/26/2019 0905 by Juwan Garcia RN  Outcome: Ongoing  12/25/2019 2337 by Guillermo Mcfarlane RN  Outcome: Ongoing  Goal: Able to refrain from responding to false sensory perceptions  Description  Able to refrain from responding to false sensory perceptions  12/26/2019 0905 by Juwan Garcia RN  Outcome: Ongoing  12/25/2019 2337 by Guillermo Mcfarlane RN  Outcome:

## 2019-12-26 NOTE — PROGRESS NOTES
Medications:    oxyCODONE (ROXICODONE) 5 MG/5ML solution 7.5 mg, Q6H PRN    Or  oxyCODONE (ROXICODONE) 5 MG/5ML solution 10 mg, Q6H PRN  docusate (COLACE) 50 MG/5ML liquid 100 mg, Daily  heparin (porcine) injection 1,900 Units, PRN  heparin (porcine) injection 1,900 Units, PRN  heparin (porcine) injection 500 Units, Once  melatonin tablet 1 mg, Nightly PRN  acetaminophen (TYLENOL) tablet 650 mg, Q4H PRN  polyethylene glycol (GLYCOLAX) packet 17 g, Daily  insulin lispro (HUMALOG) injection vial 0-18 Units, Q4H  carvedilol (COREG) tablet 6.25 mg, Daily  CENTRUM/CERTA-CHANCE with minerals oral solution 15 mL, Daily  labetalol (NORMODYNE;TRANDATE) injection 20 mg, Q6H PRN  sodium chloride 0.9 % irrigation 1,000 mL, Continuous  alteplase (CATHFLO) injection 1 mg, Once  heparin (porcine) injection 500 Units, PRN  heparin (porcine) injection 1,750 Units, PRN  glucose (GLUTOSE) 40 % oral gel 15 g, PRN  dextrose 50 % IV solution, PRN  glucagon (rDNA) injection 1 mg, PRN  dextrose 5 % solution, PRN  albumin human 25 % IV solution 25 g, PRN  heparin (porcine) injection 1,400 Units, PRN  heparin (porcine) injection 1,300 Units, PRN  LORazepam (ATIVAN) injection 1 mg, Q6H PRN  0.9 % sodium chloride bolus, PRN  0.9 % sodium chloride bolus, PRN  albumin human 25 % IV solution 25 g, PRN  povidone-iodine (BETADINE) 10 % external solution, Daily  sodium chloride flush 0.9 % injection 10 mL, PRN  potassium chloride (KLOR-CON M) extended release tablet 40 mEq, PRN    Or  potassium bicarb-citric acid (EFFER-K) effervescent tablet 40 mEq, PRN    Or  potassium chloride 10 mEq/100 mL IVPB (Peripheral Line), PRN  famotidine (PEPCID) tablet 20 mg, Daily  REFRESH LACRI-LUBE ointment OINT, PRN  0.9 % sodium chloride infusion, Continuous  sodium chloride flush 0.9 % injection 10 mL, 2 times per day  magnesium hydroxide (MILK OF MAGNESIA) 400 MG/5ML suspension 30 mL, Daily PRN  ondansetron (ZOFRAN) injection 4 mg, Q6H PRN  heparin (porcine) injection 5,000 Units, 3 times per day      Labs:   CBC:   Recent Labs     12/24/19  0505 12/26/19  0414   WBC 8.3 7.4   RBC 2.58* 2.53*   HGB 7.4* 7.2*   HCT 24.9* 24.6*    233   MPV 8.9 9.0      BMP:   Recent Labs     12/24/19  0505 12/25/19  0529 12/26/19  0414    136 137   K 3.2* 4.0 4.4    104 104   CO2 19* 20 21   BUN 80* 53* 64*   CREATININE 2.45* 1.77* 2.10*   GLUCOSE 182* 203* 118*   CALCIUM 8.0* 7.9* 7.9*        Phosphorus:    Recent Labs     12/24/19  0931 12/25/19  0529 12/26/19  0414   PHOS 1.9* 1.5* 3.1     Magnesium:   Recent Labs     12/25/19  0529   MG 1.7     Albumin: No results for input(s): LABALBU in the last 72 hours. Dialysis bath: Dialysis Bath  K+ (Potassium): (pt running on UF only)  Ca+ (Calcium): 3  Na+ (Sodium): 140  HCO3 (Bicarb): 38    Radiology:  Reviewed as available. Assessment:  1 Acute Kidney Injury: Secondary to ischemic ATN from sepsis syndrome from necrotizing fasciitis. He has been dialysis dependent. Has a left IJ tunnel dialysis catheter in place  2.  Chronic kidney diseae stage III from diabetic nephrosclerosis baseline 1.6-1.8 MG/DL.  3.  Necrotizing fasciitis status post wide complex debridement of gluteal, perineal region and open colostomy.    4.  Respiratory Failure: Was on the ventilator now extubated  5.  Protein calorie malnutrition on tube feeds  6.  Fluid overload: Continue to remove fluid on dialysis as tolerated, much better in the last 3 to 4 days with daily ultrafiltration  7.  S/P diverting colostomy strict  8.  Anemia requiring blood transfusion  9. Necrotizing fasciitis multiple re-debridements, involving up to the scrotal area, diverting loop colostomy placed, wound VAC placed, will need help with plastics to ultimately lean to wound closure. Area of wound and skin loss and deep tissue loss significantly high. General surgery following. Plan:  1. Ultrafiltration only today, wt removal and dialysis orders reviewed.     2.

## 2019-12-27 ENCOUNTER — APPOINTMENT (OUTPATIENT)
Dept: DIALYSIS | Age: 34
DRG: 463 | End: 2019-12-27
Attending: INTERNAL MEDICINE
Payer: MEDICARE

## 2019-12-27 PROBLEM — D50.9 IRON DEFICIENCY ANEMIA: Status: ACTIVE | Noted: 2019-12-27

## 2019-12-27 PROBLEM — D64.9 ANEMIA: Status: ACTIVE | Noted: 2019-12-27

## 2019-12-27 LAB
ABSOLUTE EOS #: 0.85 K/UL (ref 0–0.4)
ABSOLUTE IMMATURE GRANULOCYTE: 0 K/UL (ref 0–0.3)
ABSOLUTE LYMPH #: 1.5 K/UL (ref 1–4.8)
ABSOLUTE MONO #: 0.26 K/UL (ref 0.1–0.8)
ANION GAP SERPL CALCULATED.3IONS-SCNC: 13 MMOL/L (ref 9–17)
BASOPHILS # BLD: 2 % (ref 0–2)
BASOPHILS ABSOLUTE: 0.13 K/UL (ref 0–0.2)
BLOOD BANK SPECIMEN: NORMAL
BUN BLDV-MCNC: 72 MG/DL (ref 6–20)
BUN/CREAT BLD: ABNORMAL (ref 9–20)
CALCIUM SERPL-MCNC: 7.5 MG/DL (ref 8.6–10.4)
CHLORIDE BLD-SCNC: 107 MMOL/L (ref 98–107)
CO2: 21 MMOL/L (ref 20–31)
CREAT SERPL-MCNC: 2.51 MG/DL (ref 0.7–1.2)
DIFFERENTIAL TYPE: ABNORMAL
EOSINOPHILS RELATIVE PERCENT: 13 % (ref 1–4)
FERRITIN: 425 UG/L (ref 30–400)
GFR AFRICAN AMERICAN: 35 ML/MIN
GFR NON-AFRICAN AMERICAN: 29 ML/MIN
GFR SERPL CREATININE-BSD FRML MDRD: ABNORMAL ML/MIN/{1.73_M2}
GFR SERPL CREATININE-BSD FRML MDRD: ABNORMAL ML/MIN/{1.73_M2}
GLUCOSE BLD-MCNC: 109 MG/DL (ref 75–110)
GLUCOSE BLD-MCNC: 124 MG/DL (ref 75–110)
GLUCOSE BLD-MCNC: 140 MG/DL (ref 75–110)
GLUCOSE BLD-MCNC: 143 MG/DL (ref 75–110)
GLUCOSE BLD-MCNC: 88 MG/DL (ref 75–110)
GLUCOSE BLD-MCNC: 97 MG/DL (ref 75–110)
GLUCOSE BLD-MCNC: 98 MG/DL (ref 70–99)
HCT VFR BLD CALC: 23.9 % (ref 40.7–50.3)
HEMOGLOBIN: 6.8 G/DL (ref 13–17)
IMMATURE GRANULOCYTES: 0 %
INTERVENTION: NORMAL
IRON SATURATION: 23 % (ref 20–55)
IRON: 30 UG/DL (ref 59–158)
LYMPHOCYTES # BLD: 23 % (ref 24–44)
MAGNESIUM: 1.7 MG/DL (ref 1.6–2.6)
MCH RBC QN AUTO: 28.2 PG (ref 25.2–33.5)
MCHC RBC AUTO-ENTMCNC: 28.5 G/DL (ref 28.4–34.8)
MCV RBC AUTO: 99.2 FL (ref 82.6–102.9)
MONOCYTES # BLD: 4 % (ref 1–7)
MORPHOLOGY: ABNORMAL
MORPHOLOGY: ABNORMAL
NRBC AUTOMATED: 0 PER 100 WBC
PDW BLD-RTO: 21.7 % (ref 11.8–14.4)
PHOSPHORUS: 4 MG/DL (ref 2.5–4.5)
PLATELET # BLD: 249 K/UL (ref 138–453)
PLATELET ESTIMATE: ABNORMAL
PMV BLD AUTO: 8.7 FL (ref 8.1–13.5)
POTASSIUM SERPL-SCNC: 4.7 MMOL/L (ref 3.7–5.3)
RBC # BLD: 2.41 M/UL (ref 4.21–5.77)
RBC # BLD: ABNORMAL 10*6/UL
SEG NEUTROPHILS: 58 % (ref 36–66)
SEGMENTED NEUTROPHILS ABSOLUTE COUNT: 3.76 K/UL (ref 1.8–7.7)
SODIUM BLD-SCNC: 141 MMOL/L (ref 135–144)
TOTAL IRON BINDING CAPACITY: 129 UG/DL (ref 250–450)
UNSATURATED IRON BINDING CAPACITY: 99 UG/DL (ref 112–347)
WBC # BLD: 6.5 K/UL (ref 3.5–11.3)
WBC # BLD: ABNORMAL 10*3/UL

## 2019-12-27 PROCEDURE — 36415 COLL VENOUS BLD VENIPUNCTURE: CPT

## 2019-12-27 PROCEDURE — 6370000000 HC RX 637 (ALT 250 FOR IP): Performed by: STUDENT IN AN ORGANIZED HEALTH CARE EDUCATION/TRAINING PROGRAM

## 2019-12-27 PROCEDURE — 90935 HEMODIALYSIS ONE EVALUATION: CPT

## 2019-12-27 PROCEDURE — 85025 COMPLETE CBC W/AUTO DIFF WBC: CPT

## 2019-12-27 PROCEDURE — 2580000003 HC RX 258: Performed by: STUDENT IN AN ORGANIZED HEALTH CARE EDUCATION/TRAINING PROGRAM

## 2019-12-27 PROCEDURE — 86920 COMPATIBILITY TEST SPIN: CPT

## 2019-12-27 PROCEDURE — P9016 RBC LEUKOCYTES REDUCED: HCPCS

## 2019-12-27 PROCEDURE — 86850 RBC ANTIBODY SCREEN: CPT

## 2019-12-27 PROCEDURE — 80048 BASIC METABOLIC PNL TOTAL CA: CPT

## 2019-12-27 PROCEDURE — 83735 ASSAY OF MAGNESIUM: CPT

## 2019-12-27 PROCEDURE — 83540 ASSAY OF IRON: CPT

## 2019-12-27 PROCEDURE — 6360000002 HC RX W HCPCS: Performed by: STUDENT IN AN ORGANIZED HEALTH CARE EDUCATION/TRAINING PROGRAM

## 2019-12-27 PROCEDURE — 82947 ASSAY GLUCOSE BLOOD QUANT: CPT

## 2019-12-27 PROCEDURE — 99232 SBSQ HOSP IP/OBS MODERATE 35: CPT | Performed by: INTERNAL MEDICINE

## 2019-12-27 PROCEDURE — 84100 ASSAY OF PHOSPHORUS: CPT

## 2019-12-27 PROCEDURE — 86901 BLOOD TYPING SEROLOGIC RH(D): CPT

## 2019-12-27 PROCEDURE — 83550 IRON BINDING TEST: CPT

## 2019-12-27 PROCEDURE — 2060000000 HC ICU INTERMEDIATE R&B

## 2019-12-27 PROCEDURE — 97605 NEG PRS WND THER DME<=50SQCM: CPT

## 2019-12-27 PROCEDURE — 82728 ASSAY OF FERRITIN: CPT

## 2019-12-27 PROCEDURE — 94761 N-INVAS EAR/PLS OXIMETRY MLT: CPT

## 2019-12-27 PROCEDURE — 6360000002 HC RX W HCPCS: Performed by: INTERNAL MEDICINE

## 2019-12-27 PROCEDURE — 97110 THERAPEUTIC EXERCISES: CPT

## 2019-12-27 PROCEDURE — 86900 BLOOD TYPING SEROLOGIC ABO: CPT

## 2019-12-27 PROCEDURE — 92610 EVALUATE SWALLOWING FUNCTION: CPT

## 2019-12-27 PROCEDURE — 36430 TRANSFUSION BLD/BLD COMPNT: CPT

## 2019-12-27 RX ORDER — 0.9 % SODIUM CHLORIDE 0.9 %
250 INTRAVENOUS SOLUTION INTRAVENOUS ONCE
Status: COMPLETED | OUTPATIENT
Start: 2019-12-27 | End: 2019-12-27

## 2019-12-27 RX ADMIN — SODIUM CHLORIDE, PRESERVATIVE FREE 10 ML: 5 INJECTION INTRAVENOUS at 08:04

## 2019-12-27 RX ADMIN — SODIUM CHLORIDE, PRESERVATIVE FREE 10 ML: 5 INJECTION INTRAVENOUS at 19:54

## 2019-12-27 RX ADMIN — HEPARIN SODIUM 1750 UNITS: 1000 INJECTION INTRAVENOUS; SUBCUTANEOUS at 09:15

## 2019-12-27 RX ADMIN — INSULIN LISPRO 3 UNITS: 100 INJECTION, SOLUTION INTRAVENOUS; SUBCUTANEOUS at 12:25

## 2019-12-27 RX ADMIN — HEPARIN SODIUM 5000 UNITS: 5000 INJECTION INTRAVENOUS; SUBCUTANEOUS at 06:42

## 2019-12-27 RX ADMIN — POLYETHYLENE GLYCOL 3350 17 G: 17 POWDER, FOR SOLUTION ORAL at 08:04

## 2019-12-27 RX ADMIN — FAMOTIDINE 20 MG: 20 TABLET, FILM COATED ORAL at 08:04

## 2019-12-27 RX ADMIN — OXYCODONE HYDROCHLORIDE 10 MG: 5 SOLUTION ORAL at 23:24

## 2019-12-27 RX ADMIN — CARVEDILOL 6.25 MG: 6.25 TABLET, FILM COATED ORAL at 08:04

## 2019-12-27 RX ADMIN — HEPARIN SODIUM 1900 UNITS: 1000 INJECTION INTRAVENOUS; SUBCUTANEOUS at 12:45

## 2019-12-27 RX ADMIN — OXYCODONE HYDROCHLORIDE 10 MG: 5 SOLUTION ORAL at 14:22

## 2019-12-27 RX ADMIN — HEPARIN SODIUM 5000 UNITS: 5000 INJECTION INTRAVENOUS; SUBCUTANEOUS at 23:24

## 2019-12-27 RX ADMIN — INSULIN LISPRO 3 UNITS: 100 INJECTION, SOLUTION INTRAVENOUS; SUBCUTANEOUS at 16:22

## 2019-12-27 RX ADMIN — DARBEPOETIN ALFA 100 MCG: 100 INJECTION, SOLUTION INTRAVENOUS; SUBCUTANEOUS at 12:32

## 2019-12-27 RX ADMIN — Medication 100 MG: at 08:04

## 2019-12-27 RX ADMIN — HEPARIN SODIUM 5000 UNITS: 5000 INJECTION INTRAVENOUS; SUBCUTANEOUS at 16:18

## 2019-12-27 RX ADMIN — Medication 15 ML: at 08:04

## 2019-12-27 RX ADMIN — HEPARIN SODIUM 500 UNITS: 1000 INJECTION INTRAVENOUS; SUBCUTANEOUS at 09:15

## 2019-12-27 RX ADMIN — SODIUM CHLORIDE 250 ML: 9 INJECTION, SOLUTION INTRAVENOUS at 11:30

## 2019-12-27 RX ADMIN — Medication: at 14:15

## 2019-12-27 ASSESSMENT — PAIN SCALES - GENERAL
PAINLEVEL_OUTOF10: 0
PAINLEVEL_OUTOF10: 8
PAINLEVEL_OUTOF10: 0
PAINLEVEL_OUTOF10: 0

## 2019-12-27 ASSESSMENT — PAIN DESCRIPTION - PAIN TYPE
TYPE: ACUTE PAIN
TYPE: ACUTE PAIN

## 2019-12-27 ASSESSMENT — PAIN DESCRIPTION - ONSET: ONSET: ON-GOING

## 2019-12-27 ASSESSMENT — PAIN DESCRIPTION - LOCATION
LOCATION: ABDOMEN;BUTTOCKS;FOOT
LOCATION: ABDOMEN;BUTTOCKS;FOOT

## 2019-12-27 ASSESSMENT — PAIN DESCRIPTION - FREQUENCY: FREQUENCY: CONTINUOUS

## 2019-12-27 ASSESSMENT — PAIN DESCRIPTION - PROGRESSION: CLINICAL_PROGRESSION: NOT CHANGED

## 2019-12-27 ASSESSMENT — PAIN DESCRIPTION - ORIENTATION: ORIENTATION: LEFT;POSTERIOR

## 2019-12-27 NOTE — PROGRESS NOTES
PROGRESS NOTE     PATIENT NAME: Austin Mcdaniel [address] RECORD NO. [id number]  DATE: 12/27/2019  SURGEON: Arturo Aleman  PRIMARY CARE PHYSICIAN: No primary care provider on file. HD: # 28    ASSESSMENT    Patient Active Problem List   Diagnosis    Necrotizing fasciitis ([address]    Hypertension    Diabetes ([address]    Diabetic foot ulcer ([address]    Septic shock ([address]    Bandemia    Acute respiratory failure with hypoxemia ([address]    Diabetic foot infection ([address]      11/29/2019 wide complex debridement of nec fasc involving gluteal/perianal/distal rectum  12/03/2019 open diverting end colostomy   12/06/2019 I&D  12/08/2019 Incision and debridement of necrotizing fasciitis buttock wound, scrotum, bilateral groin region and closure of midline abdominal wound  12/10/2019 Sharp excisional debridement of gluteal, marc-rectal and perineal wound to level of muscle, Sharp excisional debridement of scrotum (performed by Urology), Washout of gluteal/perineal/scrotal/inguinal wound, Partial primary closure of pubic wound, and Dakins Wet to dry dressing application  99/73/6836 sharp excisional debridement of gluteal and perineal wounds to muscle 30 x 40 cm. Urology exam under anesthesia   12/24/2019 sharp debridement, partial closure of scrotum, placement of wound vac   12/26/2019 s/p sharp debridement of L side lateral aspect of sacral wound, replacement of wound vac        MEDICAL DECISION MAKING AND PLAN    · Plan for sacral wound vac change in OR on regular basis. Dr. Dakota Frank, plastic surgery, following from [facility] for grafting and possible flap. · Urine leaking around lema. Will notify urology  · Hershal Aloe for tube feeds and PO when able to tolerate. Please do not remove flexi flow. Patient will need significant nutrition to heal wounds.  Bowel regimen: Colace and miralax daily  · Midline vac change M/W/F, wound RN following   · Monitor off ABX per ID   · Strict glucose control <180  · LLE wound per podiatry  · Nephrology following. SUBJECTIVE    Patient seen and examined. No acute changes. Patient speaking today. Reports pain is controlled. No nausea and no vomiting. Ostomy is functioning. OBJECTIVE  VITALS: /73   Pulse 73   Temp 99 °F (37.2 °C) (Axillary)   Resp 15   Ht 5' 7\" (1.702 m)   Wt 285 lb 4.4 oz (129.4 kg)   SpO2 98%   BMI 44.68 kg/m²     GENERAL: A&O. NAD   NEURO: responsive and interactive today   LUNGS: chest rise equal. resp even and unlabored   HEART: RRR  ABDOMEN: soft, ND, mild tenderness. Midline vac in place. Ostomy appliance in place, stool in bag.   - sacral and perineal vera flow wound vac in place. No leak noted    EXTREMITY: dressing to L foot is c/d/i. No cyanosis     I/O last 3 completed shifts: In: 190 [I.V.:189; NG/GT:218]  Out: 7316 [Urine:109; Drains:650; Blood:10]    Drain/tube output: In: 407 [I.V.:189; NG/GT:218]  Out: 539 [Urine:79; Drains:450]    LAB:  CBC:   Recent Labs     12/26/19  0414   WBC 7.4   HGB 7.2*   HCT 24.6*   MCV 97.2        BMP:   Recent Labs     12/25/19  0529 12/26/19  0414 12/27/19  0426    137 141   K 4.0 4.4 4.7    104 107   CO2 20 21 21   BUN 53* 64* 72*   CREATININE 1.77* 2.10* 2.51*   GLUCOSE 203* 118* 98         RADIOLOGY:   no new images   Noy Mosher DO  12/27/2019  7:09 AM           Trauma Attending Attestation      I have reviewed the above [facility] note(s) and confirmed the key elements of the medical history and physical exam. I have seen and examined the pt. I have discussed the findings, established the care plan and recommendations with Resident, [facility] RN, bedside nurse.         Lisa Taylor DO  12/28/2019  7:38 PM

## 2019-12-27 NOTE — PROGRESS NOTES
[facility]  Urology Progress Note     Subjective:   Bam Taylor is a 37 y.o. male. His/Her current Diet is: DIET TUBE FEED CONTINUOUS/CYCLIC NPO; Immune Enhancing; Nasogastric; Continuous; 10; 50; 24 ( ). He is no longer intubated or sedated but slow to respond to questions. Will answer yes and no. Undergoing dialysis this am. Received message from gen surg resident that patient urinating around lema and concern to keep wound vac intact and that no urine gets in wound. He had not been bladder scanned. He does not know how much urine he typically makes in a day. The patient is 1 Day Post-Op from Procedure(s):  INCISION AND DRAINAGE AND WOUND VAC CHANGE BUTTOCK, PERINEUM. Urology last was in [place] with patient on 12/20/19 for EUA, but no further surgery by urology.        Patient Vitals for the past 24 hrs:   BP Temp Temp src Pulse Resp SpO2 Weight   12/27/19 0955 -- 96.8 °F (36 °C) -- 90 -- -- --   12/27/19 0945 122/63 96.8 °F (36 °C) -- 74 -- -- --   12/27/19 0942 -- -- -- -- 13 97 % --   12/27/19 0930 133/71 96.8 °F (36 °C) -- 85 -- -- --   12/27/19 0915 138/79 96.8 °F (36 °C) -- 93 -- -- --   12/27/19 0830 132/78 98.2 °F (36.8 °C) -- 88 16 100 % 286 lb 13.1 oz (130.1 kg)   12/27/19 0700 127/79 -- -- 71 16 98 % --   12/27/19 0600 127/73 -- -- 73 15 98 % --   12/27/19 0500 (!) 143/97 -- -- 94 23 99 % --   12/27/19 0400 127/71 99 °F (37.2 °C) Axillary 82 15 98 % 285 lb 4.4 oz (129.4 kg)   12/27/19 0300 129/69 -- -- 69 16 99 % --   12/27/19 0200 116/71 -- -- 75 14 100 % --   12/27/19 0100 (!) 108/59 -- -- 79 16 100 % --   12/27/19 0000 123/75 -- Axillary 66 15 98 % --   12/26/19 2300 115/63 -- -- 81 11 98 % --   12/26/19 2200 (!) 143/77 -- -- 65 12 100 % --   12/26/19 2100 136/85 -- -- 71 13 100 % --   12/26/19 2000 (!) 147/85 98.2 °F (36.8 °C) Axillary 82 12 100 % --   12/26/19 1900 (!) 141/91 -- -- 83 18 96 % --   12/26/19 1815 (!) 144/90 -- -- 81 18 100 % -- 12/26/19 1800 126/72 -- -- 77 20 100 % --   12/26/19 1745 128/77 -- -- 77 19 100 % --   12/26/19 1731 115/70 97 °F (36.1 °C) Temporal 77 12 100 % --   12/26/19 1410 (!) 149/96 97.7 °F (36.5 °C) Temporal 88 20 98 % --   12/26/19 1253 -- 98.8 °F (37.1 °C) Oral 96 20 -- --   12/26/19 1201 -- -- -- -- 17 -- --   12/26/19 1200 (!) 159/83 98.2 °F (36.8 °C) -- 93 18 -- 279 lb 12.2 oz (126.9 kg)   12/26/19 1159 -- -- -- 91 -- -- --   12/26/19 1145 (!) 157/86 -- -- 87 -- -- --   12/26/19 1130 (!) 140/83 -- -- 83 -- -- --   12/26/19 1115 131/76 -- -- 88 -- -- --   12/26/19 1100 (!) 141/73 -- -- 91 -- -- --   12/26/19 1045 138/74 -- -- 81 -- -- --   12/26/19 1030 (!) 144/70 -- -- 94 -- -- --   12/26/19 1015 (!) 140/77 -- -- 90 -- -- --   12/26/19 1000 (!) 145/76 -- -- 87 17 -- --       Intake/Output Summary (Last 24 hours) at 12/27/2019 0959  Last data filed at 12/27/2019 0600  Gross per 24 hour   Intake 767 ml   Output 3284 ml   Net -2517 ml       Recent Labs     12/26/19  0414 12/27/19  0915   WBC 7.4 6.5   HGB 7.2* 6.8*   HCT 24.6* 23.9*   MCV 97.2 99.2    249     Recent Labs     12/25/19  0529 12/26/19  0414 12/27/19  0426    137 141   K 4.0 4.4 4.7    104 107   CO2 20 21 21   PHOS 1.5* 3.1 4.0   BUN 53* 64* 72*   CREATININE 1.77* 2.10* 2.51*       No results for input(s): COLORU, PHUR, LABCAST, WBCUA, RBCUA, MUCUS, TRICHOMONAS, YEAST, BACTERIA, CLARITYU, SPECGRAV, LEUKOCYTESUR, UROBILINOGEN, BILIRUBINUR, BLOODU in the last 72 hours. Invalid input(s): NITRATE, GLUCOSEUKETONESUAMORPHOUS    Physical Exam:   NAD  Awake, alert, not fully oriented, slow to respond to questions  Heart regular rhythm  Lungs respirations nonlabored, symmetric chest rise bilaterally  Abdomen with midline vac, ostomy on LLQ, stool in bag. Sacral and perineal wound vac in place.   14 Fr coudet lema in place, patient leaking around catheter  No calf ttp bilaterally  EPC cuffs on and functioning billaterally  Patient has

## 2019-12-27 NOTE — PROGRESS NOTES
[facility] - Progress Note    Today's Date and Time: 12/27/2019, 12:13 PM    Impression :   · Necrotizing fasciitis 11-29-19  · S/P perirectal I&D. Wide complex excisional debridement of gluteal and perineal areas on 11-29-19  · S/P debridement, washout of gluteal and perianal areas, diverting colostomy 12-3-19.   · S/P debridement, washout of gluteal and perianal areas, diverting colostomy 12-6-19.  · S/P Incision and debridement of necrotizing fasciitis buttock wound, scrotum, bilateral groin region and closure of midline abdominal wound 12-8-19   · S/P Incision and debridement of necrotizing fasciitis buttock wound, scrotum, bilateral groin region on 12-20-19. · Lactic acidosis  · DM 2  · HTN  · Hx of Low LVEF (20%) as per family  · DEBBY  · Fluid overload    Recommendations:     · D/C Meropenem 12-18-19  · Monitor off antibiotics, directional tip catheter to open up the collapse,  · Suggesting benefit from another chest x-ray to follow on the lobar collapse from the 23rd  · Might go back for another debridement of the perineal wounds  · Continue with wound care. · Poor outcome in general,  · Discussed with nurse        Medical Decision Making/Summary/Discussion:12/27/2019     · Patient with DM 2, prior diabetic foot infection  · Presented to [address] [place] [address] ER generalized weakness for the past few days  · Found to have soft tissue necrosis with subcutaneous emphysema in gluteal areas and perineum  · S/P I&D and wide complex debridement of affected tissues on 11-29-19  · Showing hypotension, requiring fluids and a vasopressor  · Cultures in progress  · Will cover with Zosyn. Wounds with Strep spp. And Gram negative bacilli . No Staph spp.   · Pt is a MRSA nasal carrier  · Zosyn D/C because of of poor LVEF, in order to reduce Na and fluid load  · Meropenem started adjusted for Cr Cl 30 cc  · Meropenem adjusted for CVVHD  · Per surgery after debridement on 12-8-19, infection diabetic foot infection, DEBBY. Patient more stable post surgery but with hypotension requiring a pressor. Zosyn D/C because of of poor LVEF, in order to reduce Na and fluid load  Meropenem started adjusted for Cr Cl 30 cc  Meropenem adjusted for HD  Per surgery after debridement on 12-8-19, infection has spread to deep planes with the urethra less viable. One more debridement in OR scheduled for 12-10-19 and then decision will be made to change code status or not. Pt did not tolerate HD on 12-9. It was stopped early and pt required vasopressor support with Levophed, remains on vent. Pt to OR 12-10 for further debridement      CURRENT EVALUATION :12/27/2019   Low-grade fever  Alert has a feeding tube in the nose, responsive,  Abdomen is soft and has a VAC in the middle abdominal wound. Left foot has a dystrophic skin, dressed,   12/26/19 Left foot wound evaluated of the VAC, he still has some necrotic tissue covering a also return that is taking most of the sole area but no signs of purulence or cellulitis it does not look infected    Tolerating dialysis, going to stepdown hopefully today. Mom on the bedside. No new chest x-ray since the 23rd      Cultures:  Urine:  · NA  Blood:  · 11-30-19: no growth  · 12-2-19: no growth  Sputum :  · NA  Wound:  · 11-29-19: Strep ssp and Gram negative bacilli     I have personally reviewed the past medical history, past surgical history, medications, social history, and family history, and I have updated the database accordingly.   Past Medical History:     Past Medical History:   Diagnosis Date    CHF (congestive heart failure) ([address]     Diabetes mellitus ([address]     Hypertension     Spina bifida aperta of lumbar spine ([address]        Past Surgical  History:     Past Surgical History:   Procedure Laterality Date    ABDOMEN SURGERY N/A 12/8/2019    DEBRIDEMENT  NECROTIZING FASCIITIS BUTTOCK, WOUND VAC REMOVAL DELAYED PRIMARY CLOSURE OF MIDLINE ABDOMINAL INCISION, OSTOMY BAG CHANGE performed by Doug Grijalva MD at [address] [facility] 12/10/2019    DEBRIDEMENT OF SACRAL WOUND performed by Ad Adams MD at [facility]  12/26/2019    INCISION AND DRAINAGE AND WOUND VAC CHANGE BUTTOCK, PERINEUM     COLOSTOMY N/A 12/3/2019    LAPAROSCOPIC CONVERTED TO OPEN DIVERTING LOOP COLOSTOMY; WOUND VAC APPLICATION performed by Shadi Cabrera MD at [facility] 11/29/2019    RECTAL PERIRECTAL INCISION AND DRAINAGE, [facility] LOOP COLOSTOMY CREATION performed by Erik Marin MD at [facility] N/A 12/6/2019    DEBRIDEMENT NECROTIZING FASCIAITIS BILAT BUTTOCK performed by Ad Adams MD at [facility] N/A 12/20/2019    DEBRIDEMENT GLUTEAL AND SCROTAL REGIONS performed by Erik Marin MD at [facility] N/A 12/26/2019    INCISION AND DRAINAGE AND WOUND VAC CHANGE BUTTOCK, PERINEUM performed by Manoj Lopez DO at [place] [address] N/A 12/10/2019    SCROTAL EXPLORATION WITH SCROTAL DEBRIDEMENT performed by Courtney Torres MD at [place] [address] 12/23/2019    WOUND DEBRIDEMENT  WOUND VAC CHANGE - [address] performed by Ad Adams MD at [place] [address]       Medications:      [START ON 1/3/2020] darbepoetin rohan-polysorbate  100 mcg Intravenous Weekly    darbepoetin rohan-polysorbate  100 mcg Intravenous Once    sodium chloride  250 mL Intravenous Once    docusate  100 mg Oral Daily    polyethylene glycol  17 g Oral Daily    insulin lispro  0-18 Units Subcutaneous Q4H    carvedilol  6.25 mg Per NG tube Daily    CENTRUM/CERTA-CHANCE with minerals oral  15 mL Oral Daily    alteplase  1 mg Intracatheter Once    povidone-iodine   Topical Daily    famotidine  20 mg Per NG tube Daily    sodium chloride flush  10 mL Intravenous 2 times per day    heparin (porcine)  5,000 Units Subcutaneous 3 times per day       Social History:     Social History     Socioeconomic History    Marital status: Unknown     Spouse name: Not on file    Number of children: Not on file    Years of education: Not on file    Highest education level: Not on file   Occupational History    Not on file   Social Needs    Financial resource strain: Not on file    Food insecurity:     Worry: Not on file     Inability: Not on file    Transportation needs:     Medical: Not on file     Non-medical: Not on file   Tobacco Use    Smoking status: Not on file   Substance and Sexual Activity    Alcohol use: Not on file    Drug use: Not on file    Sexual activity: Not on file   Lifestyle    Physical activity:     Days per week: Not on file     Minutes per session: Not on file    Stress: Not on file   Relationships    Social connections:     Talks on phone: Not on file     Gets together: Not on file     Attends Ten[employer] service: Not on file     Active member of club or organization: Not on file     Attends meetings of clubs or organizations: Not on file     Relationship status: Not on file    Intimate partner violence:     Fear of current or ex partner: Not on file     Emotionally abused: Not on file     Physically abused: Not on file     Forced sexual activity: Not on file   Other Topics Concern    Not on file   Social History Narrative    Not on file       Family History:   No family history on file. Allergies:   Patient has no known allergies. Review of Systems:     Review of Systems   Unable to perform ROS: Mental status change   Constitutional: Positive for activity change.            Physical Examination :     Patient Vitals for the past 8 hrs:   BP Temp Temp src Pulse Resp SpO2 Weight   12/27/19 1205 (!) 150/72 98.5 °F (36.9 °C) Oral 82 14 98 % --   12/27/19 1200 (!) 144/89 98.5 °F (36.9 °C) Oral 80 16 99 % --   12/27/19 1145 (!) 151/80 98.2 °F (36.8 °C) Oral 87 16 99 % --   12/27/19 1140 (!) 148/80 98.2 °F (36.8 bilateral inguinal and pelvic sidewall lymphadenopathy. Multiple mildly prominent retroperitoneal and upper abdominal lymph nodes are   similar to the prior study.  Percutaneous intraperitoneal surgical drains.           Impression   1.  Large soft tissue defects in the right inguinal region, scrotum and right   greater than left gluteal crease is with associated packing material, small   amount of surrounding soft tissue gas and skin thickening likely relates to   history of necrotizing fasciitis.       2.  Postsurgical changes of the bowel with left lower quadrant ostomy.  No   bowel obstruction.  Percutaneous intraperitoneal surgical drains.       3.  Mild abdominopelvic ascites.  Loculated fluid along the inferior aspect   of the liver.  No intraperitoneal free air.       4.  Diffuse anasarca.       5.   Bilateral pleural effusions with associated heterogeneous opacities and   consolidations.  Underlying infection is not excluded.               EXAMINATION:   ONE XRAY VIEW OF THE CHEST       11/29/2019 9:01 pm       COMPARISON:   4 hours ago       HISTORY:   ORDERING SYSTEM PROVIDED HISTORY: intubated   TECHNOLOGIST PROVIDED HISTORY:   intubated   Reason for Exam: portable supine/ intubated   Acuity: Acute   Type of Exam: Initial       FINDINGS:   New ET tube terminates 38 mm above the ron.  There appears to be a   possible enteric tube which is not well visualized distally.  Right IJ   catheter terminates in the right atrium and is unchanged.  Lungs are clear. Cardiomegaly.  Mediastinum normal.  Bony thorax intact.           Impression   New ET tube as above.  Possible new enteric tube not well visualized. Recommend follow-up KUB if clinically warranted. CT ABDOMEN AND PELVIS WITHOUT CONTRAST    COMPARISON:  3/22/2017    CLINICAL HISTORY: Infection in scrotum, lower abdominal pain, diabetic, 30,000 white blood cell count.     TECHNIQUE: Unenhanced axial images were obtained from the lung bases to the pubic symphysis with sagittal and coronal 2D reformatted images. Oral contrast administered:  No.  Automatic exposure control (AEC) was utilized. CT ABDOMEN FINDINGS:      The lung bases are unremarkable. There is a small pericardial effusion versus thickening. The liver, spleen, and pancreas demonstrate no acute abnormality given compromised evaluation without intravenous contrast.  There may be mild splenomegaly.  The adrenal glands appear within normal limits. There is no hydronephrosis or obstructing urinary tract calculi. Small amount of free fluid is seen adjacent to the liver inferiorly. .    The appendix is visualized in the right lower quadrant and appears within normal limits.       There is no evidence for bowel obstruction. Stranding is seen within the subcutaneous fat overlying both lateral abdominal walls left greater than right.  There is ill-defined fluid collection within the subcutaneous fat overlying the left lateral abdominal wall possibly representing phlegmon or developing abscess although no organized   abscess is seen. There is a large amount of soft tissue emphysema involving both the right and left buttock extending to the perineum tissue thickening is seen especially on the left involving the left buttock. CT PELVIS FINDINGS:        No distal ureteral calculi or bladder calculi. There is no free fluid in the pelvis. Catheter is seen within the urinary bladder. Osseous changes within the left hemipelvis which may be chronic in nature. IMPRESSION:    Extensive subcutaneous emphysema as described above involving both buttocks extending to the perineum.  The possibility of Ashley gangrene/necrotizing fasciitis cannot be excluded. Possible phlegmon or developing soft tissue abscess overlying the left lateral abdominal wall as noted above.  No organized abscess is seen however. Osseous changes within the left hemipelvis which may be chronic in nature.   Results

## 2019-12-27 NOTE — PROGRESS NOTES
Orientation: Mid   Wound Image    Wound Assessment Red;Granulation tissue;Tan;Slough  (tan fibrinous slough to distal wound base)   Liliam-wound Assessment Intact  (bordered with [employer] silicone and drape)   Dressing/Treatment Vacuum dressing   Dressing Changed Changed/New   Dressing Status Changed   Dressing Change Due 12/30/19   Negative Pressure Wound Therapy Abdomen Mid   Placement Date/Time: 12/20/19 1800   Pre-existing: No  Inserted by: Gabriel Primer  Location: Abdomen  Wound Location Orientation: Mid   Wound Type Surgical   Unit Type KCI VAC Ulta   Dressing Type Black foam   Number of pieces used 3   Cycle Continuous; On   Target Pressure (mmHg) 175   Dressing Status Changed   Dressing Changed Changed/New   Drainage Description Serosanguinous   Dressing Change Due 12/30/19   Wound Assessment   (See Incision LDA)     Abdominal dressing is y-site connected to [facility] with the right groin dressing. Groin dressing is changed by Trauma team; vacuum was maintained to the groin site during abdominal wound dressing change and drape reinforced to keep seal.  Both dressings appear firmly compressed with green seal indicator on machine. Response to treatment:  Well tolerated by patient. Premedicated: Yes    Plan   Plan of Care:   Routine VAC dressing changes M-W-F to the abdominal incision using black granufoam.  Colostomy pouch change with VAC dressing changes.       Specialty Bed Required : Yes   [x] Low Air Loss   [x] Pressure Redistribution  [] Fluid Immersion  [x] Bariatric  [] Total Pressure Relief  [] Other:     Current Diet: DIET DENTAL SOFT;       KALIN ALBERTO, RN, [employer]

## 2019-12-27 NOTE — PLAN OF CARE
Problem: Pain:  Goal: Pain level will decrease  Description  Pain level will decrease  Outcome: Ongoing  Goal: Control of acute pain  Description  Control of acute pain  Outcome: Ongoing  Goal: Control of chronic pain  Description  Control of chronic pain  Outcome: Ongoing     Problem: Skin Integrity:  Goal: Will show no infection signs and symptoms  Description  Will show no infection signs and symptoms  Outcome: Ongoing  Goal: Absence of new skin breakdown  Description  Absence of new skin breakdown  Outcome: Ongoing     Problem: OXYGENATION/RESPIRATORY FUNCTION  Goal: Patient will maintain patent airway  Outcome: Ongoing  Goal: Patient will achieve/maintain normal respiratory rate/effort  Description  Respiratory rate and effort will be within normal limits for the patient  Outcome: Ongoing     Problem: SKIN INTEGRITY  Goal: Skin integrity is maintained or improved  Outcome: Ongoing     Problem: Falls - Risk of:  Goal: Will remain free from falls  Description  Will remain free from falls  Outcome: Ongoing  Goal: Absence of physical injury  Description  Absence of physical injury  Outcome: Ongoing     Problem: Risk for Impaired Skin Integrity  Goal: Tissue integrity - skin and mucous membranes  Description  Structural intactness and normal physiological function of skin and  mucous membranes.   Outcome: Ongoing     Problem: Nutrition  Goal: Optimal nutrition therapy  Description  Nutrition Problem: Inadequate oral intake  Intervention: Food and/or Nutrient Delivery: Start Parenteral Nutrition  Nutritional Goals: Meet % of estimated nutrition needs   Outcome: Ongoing     Problem: Confusion - Acute:  Goal: Absence of continued neurological deterioration signs and symptoms  Description  Absence of continued neurological deterioration signs and symptoms  Outcome: Ongoing  Goal: Mental status will be restored to baseline  Description  Mental status will be restored to baseline  Outcome: Ongoing     Problem: nonreality-based thinking  Description  Able to interrupt nonreality-based thinking  Outcome: Ongoing     Problem: Sleep Pattern Disturbance:  Goal: Appears well-rested  Description  Appears well-rested  Outcome: Ongoing

## 2019-12-27 NOTE — PROGRESS NOTES
Speech Language Pathology  Facility/Department: [address] MICU   CLINICAL BEDSIDE SWALLOW EVALUATION    NAME: Sylvia Salvador  : 1985  MRN: [id number]    ADMISSION DATE: 2019  ADMITTING DIAGNOSIS: has Necrotizing fasciitis ([address] Hypertension; Diabetes ([address] Diabetic foot ulcer ([address] Septic shock ([address] Bandemia; Acute respiratory failure with hypoxemia ([address] and Diabetic foot infection ([address] on their problem list.    Date of Eval: 2019  Evaluating Therapist: Abraham Graham    Current Diet level:  Current Diet : NPO  Current Liquid Diet : NPO      Primary ComplaintWastanislav Castillo is a 37 y.o. with PMH of hypertension, diabetes and diabetic foot ulcer. Who presented to [facility] Emergency Department with complaint of generalized weakness for the past few days. On examination the emergency department staff noticed the patient had a foul smell which they thought he had a bowel movement. They turned the patient and noticed sloughing of skin on his back and buttocks.      In the emergency department patient was afebrile but tachycardic. Initial labs demonstrated hyponatremia of 130, bicarb 12, BUN 63 and creatinine 2.82, hypocalcemia 7.7, alkaline phosphatase 144, AST 83, ALT 16, lactate 1.4, leukocytosis of 30.4, hemoglobin 9.2. Chest xray demonstrated no acute process. CT abdomen and pelvis without contrast demonstrated extensive subcutaneous emphysema involving both buttocks extending to the perineum. Possible phlegmon or developing soft tissue abscess overlying the left lateral abdominal wall. Scrotal Ultrasound demonstrated no evidence of testicular torsion or mass however did show extensive scrotal wall edema. He was given zosyn 4/5 g IV, rocephin 1 g IV and flagyl 500 mg IV in the emergency department. He was transferred to [facility] ICU for Necrotizing Fasciitis and Acute Renal Failure.         Pain:  Pain Assessment  Pain Level: 0      Reason for Referral  Sylvia Salvador was referred for a bedside swallow evaluation to assess the efficiency of his swallow function, identify signs and symptoms of aspiration and make recommendations regarding safe dietary consistencies, effective compensatory strategies, and safe eating environment. Impression  Patient presents with probable safe swallow for Dysphagia soft and bite/sized (Dysphagia III) with thin liquids as evidenced by no overt s/s of aspiration noted with consistencies tested. Mild extended mastication and minimal labial residual noted on left side with regular solid. Required verbal cues from SLP to clear. Recommend small sips and bites, only feed when alert and awake and upright at 90 degrees for all PO intake. Recommend close monitoring for overt/clinical s/s of aspiration and D/C PO intake and complete Modified Barium Swallow Study should they occur. Results and recommendations reported to RN. Treatment Plan  Requires SLP Intervention: Yes  Duration/Frequency of Treatment: 2-3 x week   D/C Recommendations: Further therapy recommended at discharge. Recommended Diet and Intervention  Diet Solids Recommendation: Dysphagia Soft and Bite-Sized (Dysphagia III)  Liquid Consistency Recommendation: Thin  Therapeutic Interventions: Diet tolerance monitoring;Patient/Family education    Compensatory Swallowing Strategies  Compensatory Swallowing Strategies: Small bites/sips;Eat/Feed slowly;Upright as possible for all oral intake    Treatment/Goals  Dysphagia Goals: The patient will tolerate recommended diet without observed clinical signs of aspiration    General  Chart Reviewed: Yes  Behavior/Cognition: Alert; Cooperative  Temperature Spikes Noted: No  Communication Observation: Functional  Follows Directions: Simple  Dentition: Adequate  Patient Positioning: Upright in bed  Prior Dysphagia History: Mom reports no difficulty eating and drinking prior to admission   Consistencies Administered: Reg solid; Dysphagia Pureed (Dysphagia I); Dysphagia Soft and Bite-Sized (Dysphagia III); Thin - teaspoon; Thin - straw;Nectar - straw;Nectar - teaspoon    Vision/Hearing  Vision  Vision: Impaired(wears glasses)  Hearing  Hearing: Within functional limits    Oral Motor Deficits  Oral/Motor  Oral Motor: Within functional limits    Oral Phase Dysfunction  Oral Phase  Oral Phase: Exceptions  Oral Phase  Oral Phase - Comment: Mild extended mastication noted with regular solids. Minimal labial residual noted on left side with regular solid. Indicators of Pharyngeal Phase Dysfunction   Pharyngeal Phase  Pharyngeal Phase: WFL  Pharyngeal Phase   Pharyngeal: No s/s of aspiration noted with all conisstencies tested. Prognosis  Prognosis  Prognosis for safe diet advancement: fair  Individuals consulted  Consulted and agree with results and recommendations: Patient; Family member  Family member consulted: mom and aunt     Education  Patient Education: yes  Patient Education Response: Verbalizes understanding             Therapy Time  SLP Individual Minutes  Time In: 7840  Time Out: 4707  Minutes: Mana Garcia M.A.  CCC-SLP  12/27/2019 1:24 PM

## 2019-12-27 NOTE — PLAN OF CARE
injury  12/26/2019 2100 by Rosalind Antonio RN  Outcome: Ongoing  12/26/2019 0905 by Humphrey Cox RN  Outcome: Ongoing     Problem: Risk for Impaired Skin Integrity  Goal: Tissue integrity - skin and mucous membranes  Description  Structural intactness and normal physiological function of skin and  mucous membranes.   Outcome: Ongoing     Problem: Nutrition  Goal: Optimal nutrition therapy  Description  Nutrition Problem: Inadequate oral intake  Intervention: Food and/or Nutrient Delivery: Start Parenteral Nutrition  Nutritional Goals: Meet % of estimated nutrition needs   12/26/2019 2100 by Rosalind Antonio RN  Outcome: Ongoing  12/26/2019 0905 by Humphrey Cox RN  Outcome: Ongoing     Problem: Confusion - Acute:  Goal: Absence of continued neurological deterioration signs and symptoms  Description  Absence of continued neurological deterioration signs and symptoms  12/26/2019 2100 by Rosalind Antonio RN  Outcome: Ongoing  12/26/2019 0905 by Humphrey Cox RN  Outcome: Ongoing  Goal: Mental status will be restored to baseline  Description  Mental status will be restored to baseline  12/26/2019 2100 by Rosalind Antonio RN  Outcome: Ongoing  12/26/2019 0905 by Humphrey Cox RN  Outcome: Ongoing     Problem: Discharge Planning:  Goal: Ability to perform activities of daily living will improve  Description  Ability to perform activities of daily living will improve  12/26/2019 2100 by Rosalind Antonio RN  Outcome: Ongoing  12/26/2019 0905 by Humphrey Cox RN  Outcome: Ongoing  Goal: Participates in care planning  Description  Participates in care planning  12/26/2019 2100 by Rosalind Antonio RN  Outcome: Ongoing  12/26/2019 0905 by Humphrey Cox RN  Outcome: Ongoing     Problem: Injury - Risk of, Physical Injury:  Goal: Will remain free from falls  Description  Will remain free from falls  12/26/2019 2100 by Rosalind Antonio RN  Outcome: Ongoing  12/26/2019 0905 by Humphrey Cox RN  Outcome: Ongoing  Goal: Absence of physical injury  Description  Absence of physical injury  12/26/2019 2100 by Joselyn David RN  Outcome: Ongoing  12/26/2019 0905 by Bubba Marshall RN  Outcome: Ongoing     Problem: Mood - Altered:  Goal: Mood stable  Description  Mood stable  12/26/2019 2100 by Joselyn David RN  Outcome: Ongoing  12/26/2019 0905 by Bubba Marshall RN  Outcome: Ongoing  Goal: Absence of abusive behavior  Description  Absence of abusive behavior  12/26/2019 2100 by Joselyn David RN  Outcome: Ongoing  12/26/2019 0905 by Bubba Marshall RN  Outcome: Ongoing  Goal: Verbalizations of feeling emotionally comfortable while being cared for will increase  Description  Verbalizations of feeling emotionally comfortable while being cared for will increase  12/26/2019 2100 by Joselyn David RN  Outcome: Ongoing  12/26/2019 0905 by Bubba Marshall RN  Outcome: Ongoing     Problem: Psychomotor Activity - Altered:  Goal: Absence of psychomotor disturbance signs and symptoms  Description  Absence of psychomotor disturbance signs and symptoms  12/26/2019 2100 by Joselyn David RN  Outcome: Ongoing  12/26/2019 0905 by Bubba Marshall RN  Outcome: Ongoing     Problem: Sensory Perception - Impaired:  Goal: Demonstrations of improved sensory functioning will increase  Description  Demonstrations of improved sensory functioning will increase  12/26/2019 2100 by Joselyn David RN  Outcome: Ongoing  12/26/2019 0905 by Bubba Marshall RN  Outcome: Ongoing  Goal: Decrease in sensory misperception frequency  Description  Decrease in sensory misperception frequency  12/26/2019 2100 by Joselyn David RN  Outcome: Ongoing  12/26/2019 0905 by Bubba Marshall RN  Outcome: Ongoing  Goal: Able to refrain from responding to false sensory perceptions  Description  Able to refrain from responding to false sensory perceptions  12/26/2019 2100 by Joselyn David RN  Outcome: Ongoing  12/26/2019 0905 by Bubba Marshall RN  Outcome: Ongoing  Goal: Demonstrates accurate environmental perceptions  Description  Demonstrates accurate environmental perceptions  12/26/2019 2100 by Ramona Rodriguez RN  Outcome: Ongoing  12/26/2019 0905 by Kae Weber RN  Outcome: Ongoing  Goal: Able to distinguish between reality-based and nonreality-based thinking  Description  Able to distinguish between reality-based and nonreality-based thinking  12/26/2019 2100 by Ramona Rodriguez RN  Outcome: Ongoing  12/26/2019 0905 by Kae Weber RN  Outcome: Ongoing  Goal: Able to interrupt nonreality-based thinking  Description  Able to interrupt nonreality-based thinking  12/26/2019 2100 by Ramona Rodriguez RN  Outcome: Ongoing  12/26/2019 0905 by Kae Weber RN  Outcome: Ongoing     Problem: Sleep Pattern Disturbance:  Goal: Appears well-rested  Description  Appears well-rested  12/26/2019 2100 by Ramona Rodriguez RN  Outcome: Ongoing  12/26/2019 0905 by Kae Weber RN  Outcome: Ongoing

## 2019-12-27 NOTE — PROGRESS NOTES
Intake 767 ml   Output 3299 ml   Net -2532 ml     Wt Readings from Last 2 Encounters:   12/27/19 285 lb 4.4 oz (129.4 kg)     Body mass index is 44.68 kg/m². PHYSICAL EXAMINATION:    General appearance - alert, well appearing, and in no distress  Mental status - alert, oriented to person, place, and time  Eyes - pupils equal and reactive, extraocular eye movements intact  Ears - bilateral TM's and external ear canals normal  Nose - normal and patent, no erythema, discharge or polyps  Mouth - mucous membranes moist, pharynx normal without lesions  Neck - supple, no significant adenopathy  Chest - clear to auscultation, no wheezes, rales or rhonchi, symmetric air entry  Heart - normal rate, regular rhythm, normal S1, S2, no murmurs, rubs, clicks or gallops  Abdomen - soft, slightly tender, nondistended, no masses or organomegaly, ostomy site looks fine  Neurological -alert and oriented, not very verbal  Extremities -edema present, and diabetic foot ulcer on the left foot.   No other cyanosis  Skin - normal coloration and turgor, no rashes, no suspicious skin lesions noted     MEDICATIONS:    Scheduled Meds:   docusate  100 mg Oral Daily    polyethylene glycol  17 g Oral Daily    insulin lispro  0-18 Units Subcutaneous Q4H    carvedilol  6.25 mg Per NG tube Daily    CENTRUM/CERTA-CHANCE with minerals oral  15 mL Oral Daily    alteplase  1 mg Intracatheter Once    povidone-iodine   Topical Daily    famotidine  20 mg Per NG tube Daily    sodium chloride flush  10 mL Intravenous 2 times per day    heparin (porcine)  5,000 Units Subcutaneous 3 times per day     Continuous Infusions:   sodium chloride      dextrose      sodium chloride 10 mL/hr at 12/26/19 1420     PRN Meds:   oxyCODONE, 7.5 mg, Q6H PRN    Or  oxyCODONE, 10 mg, Q6H PRN  heparin (porcine), 1,900 Units, PRN  heparin (porcine), 1,900 Units, PRN  melatonin, 1 mg, Nightly PRN  acetaminophen, 650 mg, Q4H PRN  labetalol, 20 mg, Q6H PRN  heparin (porcine), 500 Units, PRN  heparin (porcine), 1,750 Units, PRN  glucose, 15 g, PRN  dextrose, 12.5 g, PRN  glucagon (rDNA), 1 mg, PRN  dextrose, 100 mL/hr, PRN  albumin human, 25 g, PRN  LORazepam, 1 mg, Q6H PRN  sodium chloride, 250 mL, PRN  sodium chloride, 150 mL, PRN  albumin human, 25 g, PRN  sodium chloride flush, 10 mL, PRN  potassium chloride, 40 mEq, PRN    Or  potassium alternative oral replacement, 40 mEq, PRN    Or  potassium chloride, 10 mEq, PRN  REFRESH LACRI-LUBE, , PRN  magnesium hydroxide, 30 mL, Daily PRN  ondansetron, 4 mg, Q6H PRN          VENT SETTINGS (Comprehensive) (if applicable):  Vent Information  $Ventilation: $Subsequent Day  Ventilator Started: Yes(transitioned from OR bag)  Ventilator Stopped: Yes  Skin Assessment: Clean, dry, & intact  Equipment ID: [id number]  Equipment Changed: (S) Vent Circuit(heater)  Vent Type: Servo i  Vent Mode: PRVC  Vt Ordered: 530 mL  Rate Set: 18 bmp  Pressure Support: 6 cmH20  FiO2 : 30 %  Sensitivity: 2  PEEP/CPAP: 5  I Time/ I Time %: 0.9 s  Cuff Pressure (cm H2O): 28 cm H2O  Humidification Source: Heated wire  Humidification Temp: 36  Humidification Temp Measured: 37  Circuit Condensation: Drained  Nitric Oxide/Epoprostenol In Use?: No  Additional Respiratory  Assessments  Pulse: 71  Resp: 16  SpO2: 98 %  End Tidal CO2: (on portable machine at side of patient. )  pCO2 (TCOM, mmHg): 53 mmHg  Position: Semi-Pagan's  Humidification Source: Heated wire  Humidification Temp: 36  Circuit Condensation: Drained  Oral Care Completed?: Yes  Oral Care: Mouthwash, Teeth brushed, Suction toothette  Subglottic Suction Done?: Yes  Cuff Pressure (cm H2O): 28 cm H2O  Skin barrier applied: No    Laboratory findings:    Complete Blood Count:   Recent Labs     12/26/19 0414   WBC 7.4   HGB 7.2*   HCT 24.6*           Last 3 Blood Glucose:   Recent Labs     12/25/19  0529 12/26/19 0414 12/27/19  0426   GLUCOSE 203* 118* 98        PT/INR:    Lab Results   Component Value Date    PROTIME 11.9 11/29/2019    INR 1.1 11/29/2019     PTT:  No results found for: APTT, PTT    Comprehensive Metabolic Profile:   Recent Labs     12/25/19  0529 12/26/19  0414 12/27/19  0426    137 141   K 4.0 4.4 4.7    104 107   CO2 20 21 21   BUN 53* 64* 72*   CREATININE 1.77* 2.10* 2.51*   GLUCOSE 203* 118* 98   CALCIUM 7.9* 7.9* 7.5*      Magnesium:   Lab Results   Component Value Date    MG 1.7 12/27/2019     Phosphorus:   Lab Results   Component Value Date    PHOS 4.0 12/27/2019     Ionized Calcium:   Lab Results   Component Value Date    CAION 1.05 12/03/2019        Urinalysis:     Troponin: No results for input(s): TROPONINI in the last 72 hours. Microbiology:    Cultures during this admission:     Blood cultures:                 [] None drawn      [x] Negative             []  Positive (Details:  )  Urine Culture:                   [] None drawn      [x] Negative             []  Positive (Details:  )  Sputum Culture:               [x] None drawn       [] Negative             []  Positive (Details:  )   Endotracheal aspirate:     [x] None drawn       [] Negative             []  Positive (Details:  )       ASSESSMENT:     · Principal Problem:  ·   Necrotizing fasciitis ([facility]  · Active Problems:  ·   Hypertension  ·   Diabetes ([facility] [facility]  ·   Diabetic foot ulcer ([facility]  ·   Septic shock ([facility] [address]  ·   Bandemia  ·   Acute respiratory failure with hypoxemia ([facility]  ·   Diabetic foot infection ([facility] [address]  · Resolved Problems:  ·   * No resolved hospital problems. *  ·   ·         PLAN:     WEAN PER PROTOCOL:                  []? No               []? Yes             [x]? N/A     DISCONTINUE ANY LABS:              [x]? No               []? Yes     ICU PROPHYLAXIS:  Stress ulcer:  []? PPI Agent               [x]? [id number]      []? Sucralfate               []? Other:  VTE:                []? Enoxaparin             [x]? Unfract.  Heparin Subcut                []? EPC Cuffs     NUTRITION:  []? NPO            []?

## 2019-12-27 NOTE — PROGRESS NOTES
wall. Scrotal Ultrasound demonstrated no evidence of testicular torsion or mass however did show extensive scrotal wall edema. He was given zosyn 4/5 g IV, rocephin 1 g IV and flagyl 500 mg IV in the emergency department. He was transferred to [facility] ICU for Necrotizing Fasciitis and Acute Renal Failure. Patient initially required pressors, but currently is off pressors. Patient was started on Vanco and Zosyn, ID was consulted, vancomycin was discontinued patient was started on meropenem later. Last dose of meropenem was on 12/18/2019. Patient is doing well off antibiotics. general surgery was consulted for the necrotizing fasciitis. Patient underwent extensive excision and debridement, diverting colostomy on 11/29/2019. Subsequently patient has had multiple incision and drainage and debridement procedures, last one was on 12/26/2019. As per surgery, no active infection noted. Nephrology is following for acute renal failure, patient currently on dialysis. Ambrosio is in situ.     podiatary is following for foot wound due to diabetes. No acute intervention needed at this time. Wound care following. Procedures during patient's ICU stay:     Central line placement, Eagle placement, IR guided tunneled catheter placed,arterial Line placement    Current Vitals:     /79   Pulse 71   Temp 99 °F (37.2 °C) (Axillary)   Resp 16   Ht 5' 7\" (1.702 m)   Wt 285 lb 4.4 oz (129.4 kg)   SpO2 98%   BMI 44.68 kg/m²       Cultures:     Blood cultures:                 [] None drawn      [x] Negative             []  Positive (Details:  )  Urine Culture:                   [] None drawn      [x] Negative             []  Positive (Details:  )  Sputum Culture:               [x] None drawn       [] Negative             []  Positive (Details:  )   Endotracheal aspirate:     [x] None drawn       [] Negative             []  Positive (Details:  )       Consults:     1. Nephrology  2.  General

## 2019-12-27 NOTE — PROGRESS NOTES
Dialysis Post Treatment Note  Vitals:    12/27/19 1253   BP: (!) 147/74   Pulse: 91   Resp: 16   Temp: 97.8 °F (36.6 °C)   SpO2: 98%     Pre-Weight = 130.1kg   Post-weight = Weight: 276 lb 14.4 oz (125.6 kg)  Total Liters Processed = Total Liters Processed (l/min): 83.3 l/min  Rinseback Volume (mL) = Rinseback Volume (ml): 350 ml  Net Removal (mL) = 3000  Type of access used=LIJ Perm Catheter  Length of treatment=210 minutes    Pt tolerated HD tx well without complaints. VSS throughout. Pt received 1 unit of PRBC's with today's treatment for today's Hemoglobin=6.8 and pt also received his 1st dose of Aranesp 100 mcg IV with today's treatment and will continue on weekly dose of Aranesp 100 mcg on Fridays. Pt had 800 ml urine output during HD tx as well.

## 2019-12-28 LAB
ABO/RH: NORMAL
ABSOLUTE EOS #: 0.77 K/UL (ref 0–0.44)
ABSOLUTE IMMATURE GRANULOCYTE: 0.06 K/UL (ref 0–0.3)
ABSOLUTE LYMPH #: 1.09 K/UL (ref 1.1–3.7)
ABSOLUTE MONO #: 0.7 K/UL (ref 0.1–1.2)
ANION GAP SERPL CALCULATED.3IONS-SCNC: 9 MMOL/L (ref 9–17)
ANTIBODY SCREEN: NEGATIVE
ARM BAND NUMBER: NORMAL
BASOPHILS # BLD: 0 % (ref 0–2)
BASOPHILS ABSOLUTE: 0 K/UL (ref 0–0.2)
BLD PROD TYP BPU: NORMAL
BUN BLDV-MCNC: 43 MG/DL (ref 6–20)
BUN/CREAT BLD: ABNORMAL (ref 9–20)
CALCIUM SERPL-MCNC: 7.7 MG/DL (ref 8.6–10.4)
CHLORIDE BLD-SCNC: 102 MMOL/L (ref 98–107)
CO2: 25 MMOL/L (ref 20–31)
CREAT SERPL-MCNC: 1.68 MG/DL (ref 0.7–1.2)
CROSSMATCH RESULT: NORMAL
DIFFERENTIAL TYPE: ABNORMAL
DISPENSE STATUS BLOOD BANK: NORMAL
EOSINOPHILS RELATIVE PERCENT: 12 % (ref 1–4)
EXPIRATION DATE: NORMAL
GFR AFRICAN AMERICAN: 56 ML/MIN
GFR NON-AFRICAN AMERICAN: 46 ML/MIN
GFR SERPL CREATININE-BSD FRML MDRD: ABNORMAL ML/MIN/{1.73_M2}
GFR SERPL CREATININE-BSD FRML MDRD: ABNORMAL ML/MIN/{1.73_M2}
GLUCOSE BLD-MCNC: 112 MG/DL (ref 75–110)
GLUCOSE BLD-MCNC: 135 MG/DL (ref 75–110)
GLUCOSE BLD-MCNC: 138 MG/DL (ref 75–110)
GLUCOSE BLD-MCNC: 142 MG/DL (ref 75–110)
GLUCOSE BLD-MCNC: 147 MG/DL (ref 70–99)
HCT VFR BLD CALC: 26.2 % (ref 40.7–50.3)
HEMOGLOBIN: 7.7 G/DL (ref 13–17)
IMMATURE GRANULOCYTES: 1 %
LYMPHOCYTES # BLD: 17 % (ref 24–43)
MCH RBC QN AUTO: 28.3 PG (ref 25.2–33.5)
MCHC RBC AUTO-ENTMCNC: 29.4 G/DL (ref 28.4–34.8)
MCV RBC AUTO: 96.3 FL (ref 82.6–102.9)
MONOCYTES # BLD: 11 % (ref 3–12)
MORPHOLOGY: ABNORMAL
MORPHOLOGY: ABNORMAL
NRBC AUTOMATED: 0 PER 100 WBC
PDW BLD-RTO: 20.8 % (ref 11.8–14.4)
PLATELET # BLD: 294 K/UL (ref 138–453)
PLATELET ESTIMATE: ABNORMAL
PMV BLD AUTO: 8.6 FL (ref 8.1–13.5)
POTASSIUM SERPL-SCNC: 3.8 MMOL/L (ref 3.7–5.3)
RBC # BLD: 2.72 M/UL (ref 4.21–5.77)
RBC # BLD: ABNORMAL 10*6/UL
SEG NEUTROPHILS: 59 % (ref 36–65)
SEGMENTED NEUTROPHILS ABSOLUTE COUNT: 3.78 K/UL (ref 1.5–8.1)
SODIUM BLD-SCNC: 136 MMOL/L (ref 135–144)
TRANSFUSION STATUS: NORMAL
UNIT DIVISION: 0
UNIT NUMBER: NORMAL
WBC # BLD: 6.4 K/UL (ref 3.5–11.3)
WBC # BLD: ABNORMAL 10*3/UL

## 2019-12-28 PROCEDURE — 80048 BASIC METABOLIC PNL TOTAL CA: CPT

## 2019-12-28 PROCEDURE — 99233 SBSQ HOSP IP/OBS HIGH 50: CPT | Performed by: INTERNAL MEDICINE

## 2019-12-28 PROCEDURE — 6370000000 HC RX 637 (ALT 250 FOR IP): Performed by: STUDENT IN AN ORGANIZED HEALTH CARE EDUCATION/TRAINING PROGRAM

## 2019-12-28 PROCEDURE — 36415 COLL VENOUS BLD VENIPUNCTURE: CPT

## 2019-12-28 PROCEDURE — 2580000003 HC RX 258: Performed by: STUDENT IN AN ORGANIZED HEALTH CARE EDUCATION/TRAINING PROGRAM

## 2019-12-28 PROCEDURE — 94761 N-INVAS EAR/PLS OXIMETRY MLT: CPT

## 2019-12-28 PROCEDURE — 6360000002 HC RX W HCPCS: Performed by: STUDENT IN AN ORGANIZED HEALTH CARE EDUCATION/TRAINING PROGRAM

## 2019-12-28 PROCEDURE — 2060000000 HC ICU INTERMEDIATE R&B

## 2019-12-28 PROCEDURE — 85025 COMPLETE CBC W/AUTO DIFF WBC: CPT

## 2019-12-28 RX ORDER — OXYCODONE HYDROCHLORIDE AND ACETAMINOPHEN 5; 325 MG/1; MG/1
1 TABLET ORAL ONCE
Status: COMPLETED | OUTPATIENT
Start: 2019-12-28 | End: 2019-12-28

## 2019-12-28 RX ADMIN — HEPARIN SODIUM 5000 UNITS: 5000 INJECTION INTRAVENOUS; SUBCUTANEOUS at 20:15

## 2019-12-28 RX ADMIN — POLYETHYLENE GLYCOL 3350 17 G: 17 POWDER, FOR SOLUTION ORAL at 08:45

## 2019-12-28 RX ADMIN — SODIUM CHLORIDE, PRESERVATIVE FREE 10 ML: 5 INJECTION INTRAVENOUS at 20:12

## 2019-12-28 RX ADMIN — Medication 100 MG: at 08:45

## 2019-12-28 RX ADMIN — FAMOTIDINE 20 MG: 20 TABLET, FILM COATED ORAL at 08:45

## 2019-12-28 RX ADMIN — OXYCODONE HYDROCHLORIDE 10 MG: 5 SOLUTION ORAL at 17:30

## 2019-12-28 RX ADMIN — HEPARIN SODIUM 5000 UNITS: 5000 INJECTION INTRAVENOUS; SUBCUTANEOUS at 05:27

## 2019-12-28 RX ADMIN — SODIUM CHLORIDE, PRESERVATIVE FREE 10 ML: 5 INJECTION INTRAVENOUS at 08:45

## 2019-12-28 RX ADMIN — OXYCODONE HYDROCHLORIDE 10 MG: 5 SOLUTION ORAL at 11:07

## 2019-12-28 RX ADMIN — SODIUM CHLORIDE: 9 INJECTION, SOLUTION INTRAVENOUS at 20:03

## 2019-12-28 RX ADMIN — CARVEDILOL 6.25 MG: 6.25 TABLET, FILM COATED ORAL at 08:45

## 2019-12-28 RX ADMIN — OXYCODONE HYDROCHLORIDE AND ACETAMINOPHEN 1 TABLET: 5; 325 TABLET ORAL at 18:23

## 2019-12-28 RX ADMIN — Medication: at 08:45

## 2019-12-28 RX ADMIN — Medication 15 ML: at 08:45

## 2019-12-28 RX ADMIN — HEPARIN SODIUM 5000 UNITS: 5000 INJECTION INTRAVENOUS; SUBCUTANEOUS at 15:15

## 2019-12-28 ASSESSMENT — PAIN SCALES - GENERAL
PAINLEVEL_OUTOF10: 3
PAINLEVEL_OUTOF10: 7
PAINLEVEL_OUTOF10: 8
PAINLEVEL_OUTOF10: 3

## 2019-12-28 ASSESSMENT — PAIN DESCRIPTION - PAIN TYPE
TYPE: ACUTE PAIN
TYPE: ACUTE PAIN

## 2019-12-28 ASSESSMENT — PAIN DESCRIPTION - LOCATION: LOCATION: ARM

## 2019-12-28 ASSESSMENT — PAIN DESCRIPTION - ORIENTATION: ORIENTATION: RIGHT

## 2019-12-28 NOTE — PROGRESS NOTES
PROGRESS NOTE     PATIENT NAME: Austin Mcdaniel [address] RECORD NO. [id number]  DATE: 12/28/2019  SURGEON: Mary Cavazos  PRIMARY CARE PHYSICIAN: No primary care provider on file. HD: # 29    ASSESSMENT    Patient Active Problem List   Diagnosis    Necrotizing fasciitis ([address]    Essential hypertension    Diabetes ([address]    Diabetic foot ulcer ([address]    Diabetic foot infection ([facility] [facility]    Iron deficiency anemia      11/29/2019 wide complex debridement of nec fasc involving gluteal/perianal/distal rectum  12/03/2019 open diverting end colostomy   12/06/2019 I&D  12/08/2019 Incision and debridement of necrotizing fasciitis buttock wound, scrotum, bilateral groin region and closure of midline abdominal wound  12/10/2019 Sharp excisional debridement of gluteal, marc-rectal and perineal wound to level of muscle, Sharp excisional debridement of scrotum (performed by Urology), Washout of gluteal/perineal/scrotal/inguinal wound, Partial primary closure of pubic wound, and Dakins Wet to dry dressing application  96/59/3072 sharp excisional debridement of gluteal and perineal wounds to muscle 30 x 40 cm. Urology exam under anesthesia   12/24/2019 sharp debridement, partial closure of scrotum, placement of wound vac   12/26/2019 s/p sharp debridement of L side lateral aspect of sacral wound, replacement of wound vac        MEDICAL DECISION MAKING AND PLAN    · Plan for sacral wound vac change in OR on regular basis. Dr. Melvi Bansal, plastic surgery, following from afar for grafting and possible flap. Plan for next change on Tuesday. · Ok for tube feeds and PO when able to tolerate. Please do not remove flexi flow. Patient will need significant nutrition to heal wounds. · Bowel regimen: Colace and miralax daily  · Midline vac change M/W/F, wound RN following   · Monitor off ABX per ID   · Strict glucose control <180  · LLE wound per podiatry  · Nephrology following. SUBJECTIVE    Patient seen and examined. No acute changes. Patient speaking. Reports pain is controlled. No nausea and no vomiting. Ostomy is functioning. OBJECTIVE  VITALS: BP (!) 154/83   Pulse 95   Temp 98.6 °F (37 °C) (Oral)   Resp 16   Ht 5' 7\" (1.702 m)   Wt 276 lb 14.4 oz (125.6 kg)   SpO2 98%   BMI 43.37 kg/m²     GENERAL: A&O. NAD   NEURO: responsive and interactive today   LUNGS: chest rise equal. resp even and unlabored   HEART: RRR  ABDOMEN: soft, ND, mild tenderness. Midline vac in place. Ostomy appliance in place, stool in bag.   - sacral and perineal vera flow wound vac in place. No leak noted    EXTREMITY: dressing to L foot is c/d/i. No cyanosis     I/O last 3 completed shifts: In: 8061 [P.O.:240; I.V.:20; NG/GT:866]  Out: 1945 [Urine:635; Drains:1310]    Drain/tube output: In: 428 [I.V.:20; NG/GT:408]  Out: 1030 [Urine:520; Drains:510]    LAB:  CBC:   Recent Labs     12/26/19  0414 12/27/19  0915 12/28/19  0953   WBC 7.4 6.5 6.4   HGB 7.2* 6.8* 7.7*   HCT 24.6* 23.9* 26.2*   MCV 97.2 99.2 96.3    249 294     BMP:   Recent Labs     12/26/19  0414 12/27/19  0426 12/28/19  0953    141 136   K 4.4 4.7 3.8    107 102   CO2 21 21 25   BUN 64* 72* 43*   CREATININE 2.10* 2.51* 1.68*   GLUCOSE 118* 98 147*         RADIOLOGY:   no new images   Noy Polo DO  12/28/2019  4:17 PM         Trauma Attending Attestation      I have reviewed the above [facility] note(s) and confirmed the key elements of the medical history and physical exam. I have seen and examined the pt. I have discussed the findings, established the care plan and recommendations with Resident, [facility]CS RN, bedside nurse.         Tushar Caal DO  12/28/2019  7:33 PM

## 2019-12-28 NOTE — PROGRESS NOTES
DATE: 2019    NAME: Edison Rudd  MRN: [id number]   : 1985    Patient not seen this date for Physical Therapy due to:  [] Blood transfusion in progress  [] Hemodialysis  [] Patient Declined  [] Spine Precautions   [] Strict Bedrest  [] Surgery/ Procedure  [] Testing      [x] Other - spoke with pt and pt's mother regarding PT re-eval. Pt is WC bound at baseline with pivot transfers only; non-ambulatory. Discussed sitting EOB for balance; however, pt with extensive buttock wounds. RN, PT, and pt's mother all in agreement to continue with bed mobility and avoid shearing of buttock with EOB transfers. Will continue PT per original POC. [] PT being discontinued at this time. Patient independent. No further needs. [] PT being discontinued at this time as the patient has been transferred to palliative care. No further needs.     Nita Sebastian, PT, DPT, CMPT

## 2019-12-28 NOTE — PROGRESS NOTES
[facility] - Progress Note    Today's Date and Time: 12/28/2019, 5:01 PM    Impression :   · Necrotizing fasciitis 11-29-19  · S/P perirectal I&D. Wide complex excisional debridement of gluteal and perineal areas on 11-29-19  · S/P debridement, washout of gluteal and perianal areas, diverting colostomy 12-3-19.   · S/P debridement, washout of gluteal and perianal areas, diverting colostomy 12-6-19.  · S/P Incision and debridement of necrotizing fasciitis buttock wound, scrotum, bilateral groin region and closure of midline abdominal wound 12-8-19   · S/P Incision and debridement of necrotizing fasciitis buttock wound, scrotum, bilateral groin region on 12-20-19. · Lactic acidosis  · DM 2  · HTN  · Hx of Low LVEF (20%) as per family  · DEBBY  · Fluid overload    Recommendations:       · Monitor off antibiotics,   · Directional tip catheter to open up the collapsed lung,  · Might go back for another debridement of the perineal wounds  · Continue with wound care. Medical Decision Making/Summary/Discussion:12/28/2019     · Patient with DM 2, prior diabetic foot infection  · Presented to [address] [place] [address] ER generalized weakness for the past few days  · Found to have soft tissue necrosis with subcutaneous emphysema in gluteal areas and perineum  · S/P I&D and wide complex debridement of affected tissues on 11-29-19  · Showing hypotension, requiring fluids and a vasopressor  · Cultures in progress  · Will cover with Zosyn. Wounds with Strep spp. And Gram negative bacilli . No Staph spp. 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    · One more debridement in OR scheduled for 12-10-19 and then decision will be made to change code status or not. · Pt did not tolerate HD on 12-9. It was stopped early and pt required vasopressor support with Levophed, remains on vent. · Pt to OR 12-10 for further debridement  · Repeat I&D planned for 12-16-19 was cancelled because of high K levels  · Bedside I & D on 12/17/19. · Patient to undergo additional I&D on 12-20-19  · 12/23 right upper lobe collapse  · 12/24 debridement with excision of sacral wound in preparation for a flap, partial closure of his scrotal wound, VAC and debridement over the perineum  Infection Control Recommendations   · Kemp Precautions  · Contact Isolation MRSA    Antimicrobial Stewardship Recommendations     Off antibiotics  Coordination of Outpatient Care:   · Estimated Length of IV antimicrobials: D/C 12-19-19  · Patient will need Midline Catheter Insertion: No  · Patient will need PICC line Insertion:Has Central line  · Patient will need: Home IV , [place],  SNF,  LTAC: TBD  · Patient will need outpatient wound care:Yes    Chief complaint/reason for consultation:   · Ashley's vs necrotizing fasciitis    History of Present Illness:   Ren Pena is a 37y.o.-year-old  male who was initially admitted on 11/29/2019. Patient seen at the request of . INITIAL HISTORY:    Patient presented through ER in [place] with complaints of weakness of a few days duration. Examination showed skin necrosis in the gluteal and perineal region. Patient transferred to [facility]. CT scan showed extensive subcutaneous emphysema. Patient underwent I&D and extensive complex debridement of affected tissues on 11-29-19. Gram stain of tissues showed Strep. Spp and Gram negative bacilli. 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 He has underlying DM 2, prior diabetic foot infection, DEBBY. Patient more stable post surgery but with hypotension requiring a pressor.     Zosyn D/C because of of poor LVEF, in order to reduce Na and fluid load  Meropenem started adjusted for Cr Cl 30 cc  Meropenem adjusted for HD  Per surgery after debridement on 12-8-19, infection has spread to deep planes with the urethra less viable. One more debridement in OR scheduled for 12-10-19 and then decision will be made to change code status or not. Pt did not tolerate HD on 12-9. It was stopped early and pt required vasopressor support with Levophed, remains on vent. Pt to OR 12-10 for further debridement      CURRENT EVALUATION :12/28/2019     Afebrile  VS stable, intermittent HTN    Alert has a feeding tube in the nose, responsive,  Abdomen is soft and has a VAC in the middle abdominal wound. Left foot has dystrophic skin, dressing in place. Residual ischemic tissues. Not infected    Tolerating dialysis. No new chest x-ray since the 23rd      Lab & Cultures: 12/28/2019    WBC 6.5  Hb 6.8  Plat 249    BUN 43  Cr 1.68    Urine:  · NA  Blood:  · 11-30-19: no growth  · 12-2-19: no growth  Sputum :  · NA  Wound:  · 11-29-19: Strep ssp and Gram negative bacilli     I have personally reviewed the past medical history, past surgical history, medications, social history, and family history, and I have updated the database accordingly.   Past Medical History:     Past Medical History:   Diagnosis Date    [facility]     Diabetes mellitus ([facility]     Hypertension     Septic shock ([facility]     Spina bifida aperta of lumbar spine ([facility]        Past Surgical  History:     Past Surgical History:   Procedure Laterality Date    ABDOMEN SURGERY N/A 12/8/2019    DEBRIDEMENT  NECROTIZING FASCIITIS BUTTOCK, WOUND VAC REMOVAL DELAYED PRIMARY CLOSURE OF MIDLINE ABDOMINAL INCISION, OSTOMY BAG CHANGE performed by Caleb Osborne MD at [address] [facility] N/A 12/10/2019    DEBRIDEMENT OF SACRAL WOUND performed by Selena Bishop MD at [address] [place] [address]  12/26/2019    INCISION AND DRAINAGE AND WOUND VAC CHANGE BUTTOCK, Worry: Not on file     Inability: Not on file    Transportation needs:     Medical: Not on file     Non-medical: Not on file   Tobacco Use    Smoking status: Not on file   Substance and Sexual Activity    Alcohol use: Not on file    Drug use: Not on file    Sexual activity: Not on file   Lifestyle    Physical activity:     Days per week: Not on file     Minutes per session: Not on file    Stress: Not on file   Relationships    Social connections:     Talks on phone: Not on file     Gets together: Not on file     Attends Judaism service: Not on file     Active member of club or organization: Not on file     Attends meetings of clubs or organizations: Not on file     Relationship status: Not on file    Intimate partner violence:     Fear of current or ex partner: Not on file     Emotionally abused: Not on file     Physically abused: Not on file     Forced sexual activity: Not on file   Other Topics Concern    Not on file   Social History Narrative    Not on file       Family History:   No family history on file. Allergies:   Patient has no known allergies. Review of Systems:     Review of Systems   Unable to perform ROS: Mental status change   Constitutional: Positive for activity change. Physical Examination :     No data found. Physical Exam  Constitutional:       Appearance: Normal appearance. He is not toxic-appearing or diaphoretic. HENT:      Head: Normocephalic and atraumatic. Nose: No congestion or rhinorrhea. Mouth/Throat:      Mouth: Mucous membranes are moist.   Eyes:      General: No scleral icterus. Pupils: Pupils are equal, round, and reactive to light. Neck:      Musculoskeletal: Neck supple. No neck rigidity or muscular tenderness. Cardiovascular:      Rate and Rhythm: Normal rate and regular rhythm. Heart sounds: Normal heart sounds. No murmur. No friction rub. No gallop.     Pulmonary:      Effort: Pulmonary effort is normal. No respiratory distress. Breath sounds: Normal breath sounds. No stridor. Abdominal:      General: Abdomen is flat. There is no distension. Palpations: There is no mass. Tenderness: There is no tenderness. Comments: Abdominal VAC in place   Genitourinary:     Comments:  Ambrosio in place. Musculoskeletal:         General: No swelling, tenderness or deformity. Skin:     General: Skin is warm and dry. Coloration: Skin is not jaundiced or pale. Findings: Lesion present. No erythema. Neurological:      General: No focal deficit present. Mental Status: He is alert. Mental status is at baseline. Cranial Nerves: No cranial nerve deficit. Psychiatric:         Mood and Affect: Mood normal.           Medical Decision Making -Laboratory:   I have independently reviewed/ordered the following labs:    CBC with Differential:   Recent Labs     19  0915 19  0953   WBC 6.5 6.4   HGB 6.8* 7.7*   HCT 23.9* 26.2*    294   LYMPHOPCT 23* 17*   MONOPCT 4 11     BMP:   Recent Labs     19  0426 19  0953    136   K 4.7 3.8    102   CO2 21 25   BUN 72* 43*   CREATININE 2.51* 1.68*   MG 1.7  --      Hepatic Function Panel:   No results for input(s): PROT, LABALBU, BILIDIR, IBILI, BILITOT, ALKPHOS, ALT, AST in the last 72 hours. No results for input(s): RPR in the last 72 hours. No results for input(s): HIV in the last 72 hours. No results for input(s): BC in the last 72 hours.   Lab Results   Component Value Date    MUCUS NOT REPORTED 2019    RBC 2.72 2019    TRICHOMONAS NOT REPORTED 2019    WBC 6.4 2019    YEAST NOT REPORTED 2019    TURBIDITY TURBID 2019     Lab Results   Component Value Date    CREATININE 1.68 2019    GLUCOSE 147 2019       Medical Decision Making-Imagin-9 CT Abd/pelvis  EXAMINATION:   CT OF THE ABDOMEN AND PELVIS WITH CONTRAST 2019 2:29 am       TECHNIQUE:   CT of the abdomen and pelvis was performed with the administration of   intravenous contrast. Multiplanar reformatted images are provided for review. Dose modulation, iterative reconstruction, and/or weight based adjustment of   the mA/kV was utilized to reduce the radiation dose to as low as reasonably   achievable.       COMPARISON:   November 29, 2019.       HISTORY:   ORDERING SYSTEM PROVIDED HISTORY: [facility] fasc   TECHNOLOGIST PROVIDED HISTORY:       nec fasc   Reason for Exam: nec fasc; Trauma   Acuity: Unknown   Type of Exam: Subsequent/Follow-up       FINDINGS:   Lower Chest: Partially visualized bilateral pleural effusions with bibasilar   heterogeneous opacities and bilateral lower lobe consolidations.  Central   venous catheter tip near the superior atrial caval junction.  Cardiomegaly. Trace pericardial fluid.       Liver: Normal.       Gallbladder and Bile Ducts: Normal.       Spleen: Splenomegaly.       Adrenal Glands: Normal.       Pancreas: Normal.       Genitourinary: Symmetric renal atrophy.  Redemonstration of multiple   hypodensities in the right kidney which likely represent benign cysts.  No   urinary stones or hydronephrosis.  Ambrosio catheter in the decompressed urinary   bladder.       Bowel: Normal caliber bowel.  Postsurgical changes of the bowel with left   lower quadrant ostomy.  No evidence of acute appendicitis.  No significant   diverticular disease.       Vasculature: Atherosclerosis.  No abdominal aortic aneurysm.       Bones and Soft Tissues: Diffuse soft tissue stranding and fluid.    Postsurgical changes in the anterior abdominal wall with midline incision   demonstrating expected small amount of fluid and air.  Large soft tissue   defects in the right inguinal region, scrotum, right greater than left   gluteal creases with associated packing material.  Small amount of   surrounding soft tissue gas.  There is associated skin thickening in these   regions.  Bilateral lower abdominal wall skin thickening.     Retroperitoneum/Mesentery: No intraperitoneal free air.  Mild abdominopelvic   ascites.  Loculated fluid along the inferior aspect of the liver. Redemonstration of bilateral inguinal and pelvic sidewall lymphadenopathy. Multiple mildly prominent retroperitoneal and upper abdominal lymph nodes are   similar to the prior study.  Percutaneous intraperitoneal surgical drains.           Impression   1.  Large soft tissue defects in the right inguinal region, scrotum and right   greater than left gluteal crease is with associated packing material, small   amount of surrounding soft tissue gas and skin thickening likely relates to   history of necrotizing fasciitis.       2.  Postsurgical changes of the bowel with left lower quadrant ostomy.  No   bowel obstruction.  Percutaneous intraperitoneal surgical drains.       3.  Mild abdominopelvic ascites.  Loculated fluid along the inferior aspect   of the liver.  No intraperitoneal free air.       4.  Diffuse anasarca.       5.   Bilateral pleural effusions with associated heterogeneous opacities and   consolidations.  Underlying infection is not excluded.               EXAMINATION:   ONE XRAY VIEW OF THE CHEST       11/29/2019 9:01 pm       COMPARISON:   4 hours ago       HISTORY:   ORDERING SYSTEM PROVIDED HISTORY: intubated   TECHNOLOGIST PROVIDED HISTORY:   intubated   Reason for Exam: portable supine/ intubated   Acuity: Acute   Type of Exam: Initial       FINDINGS:   New ET tube terminates 38 mm above the ron.  There appears to be a   possible enteric tube which is not well visualized distally.  Right IJ   catheter terminates in the right atrium and is unchanged.  Lungs are clear. Cardiomegaly.  Mediastinum normal.  Bony thorax intact.           Impression   New ET tube as above.  Possible new enteric tube not well visualized. Recommend follow-up KUB if clinically warranted.      CT ABDOMEN AND PELVIS WITHOUT CONTRAST    COMPARISON:  3/22/2017    CLINICAL HISTORY: Infection in scrotum, lower abdominal pain, diabetic, 30,000 white blood cell count. TECHNIQUE: Unenhanced axial images were obtained from the lung bases to the pubic symphysis with sagittal and coronal 2D reformatted images. Oral contrast administered:  No.  Automatic exposure control (AEC) was utilized. CT ABDOMEN FINDINGS:      The lung bases are unremarkable. There is a small pericardial effusion versus thickening. The liver, spleen, and pancreas demonstrate no acute abnormality given compromised evaluation without intravenous contrast.  There may be mild splenomegaly.  The adrenal glands appear within normal limits. There is no hydronephrosis or obstructing urinary tract calculi. Small amount of free fluid is seen adjacent to the liver inferiorly. .    The appendix is visualized in the right lower quadrant and appears within normal limits.       There is no evidence for bowel obstruction. Stranding is seen within the subcutaneous fat overlying both lateral abdominal walls left greater than right.  There is ill-defined fluid collection within the subcutaneous fat overlying the left lateral abdominal wall possibly representing phlegmon or developing abscess although no organized   abscess is seen. There is a large amount of soft tissue emphysema involving both the right and left buttock extending to the perineum tissue thickening is seen especially on the left involving the left buttock. CT PELVIS FINDINGS:        No distal ureteral calculi or bladder calculi. There is no free fluid in the pelvis. Catheter is seen within the urinary bladder. Osseous changes within the left hemipelvis which may be chronic in nature. IMPRESSION:    Extensive subcutaneous emphysema as described above involving both buttocks extending to the perineum.  The possibility of Ashley gangrene/necrotizing fasciitis cannot be excluded.     Possible phlegmon or developing soft tissue abscess overlying the

## 2019-12-28 NOTE — PROGRESS NOTES
given zosyn 4/5 g IV, rocephin 1 g IV and flagyl 500 mg IV in the emergency department.     He was transferred to [facility] ICU for Necrotizing Fasciitis and Acute Renal Failure.   Patient initially required pressors, but currently is off pressors.     Patient was started on Vanco and Zosyn, ID was consulted, vancomycin was discontinued patient was started on meropenem later. Last dose of meropenem was on 12/18/2019. Patient is doing well off antibiotics.     general surgery was consulted for the necrotizing fasciitis. Patient underwent extensive excision and debridement, diverting colostomy on 11/29/2019. Subsequently patient has had multiple incision and drainage and debridement procedures, last one was on 12/26/2019. As per surgery, no active infection noted.      Nephrology is following for acute renal failure, patient currently on dialysis. Ambrosio is in situ.      podiatary is following for foot wound due to diabetes. No acute intervention needed at this time. Wound care following. Physical Exam:  Vitals: /64   Pulse 84   Temp 100 °F (37.8 °C) (Oral)   Resp 17   Ht 5' 7\" (1.702 m)   Wt 276 lb 14.4 oz (125.6 kg)   SpO2 98%   BMI 43.37 kg/m²   24 hour intake/output:    Intake/Output Summary (Last 24 hours) at 12/27/2019 2219  Last data filed at 12/27/2019 2000  Gross per 24 hour   Intake 3015 ml   Output 6549 ml   Net -3534 ml     Last 3 weights: Wt Readings from Last 3 Encounters:   12/27/19 276 lb 14.4 oz (125.6 kg)       General appearance: alert and cooperative with exam  HEENT: Head: Normocephalic, no lesions, without obvious abnormality.   Neck: no adenopathy, no carotid bruit, no JVD, supple, symmetrical, trachea midline and thyroid not enlarged, symmetric, no tenderness/mass/nodules  Lungs: clear to auscultation bilaterally  Heart: regular rate and rhythm, S1, S2 normal, no murmur, click, rub or gallop  Abdomen: Abdomen is soft and has a VAC in the middle abdominal leukocytosis, fevers, any active infection. 7. Continue tube feeds for now.     Esha Batista MD             12/27/2019, 10:19 PM

## 2019-12-28 NOTE — PLAN OF CARE
Problem: Pain:  Goal: Pain level will decrease  Description  Pain level will decrease  12/28/2019 0648 by Rubin Mena RN  Outcome: Ongoing     Problem: Pain:  Goal: Control of acute pain  Description  Control of acute pain  12/28/2019 0648 by Rubin Mena RN  Outcome: Ongoing     Problem: Pain:  Goal: Control of chronic pain  Description  Control of chronic pain  12/28/2019 0648 by Rubin Mena RN  Outcome: Ongoing     Problem: Skin Integrity:  Goal: Will show no infection signs and symptoms  Description  Will show no infection signs and symptoms  12/28/2019 0648 by Rubin Mena RN  Outcome: Ongoing     Problem: Skin Integrity:  Goal: Absence of new skin breakdown  Description  Absence of new skin breakdown  12/28/2019 0648 by Rubin Mena RN  Outcome: Met This Shift     Problem: OXYGENATION/RESPIRATORY FUNCTION  Goal: Patient will maintain patent airway  12/28/2019 0648 by Rubin Mena RN  Outcome: Met This Shift     Problem: OXYGENATION/RESPIRATORY FUNCTION  Goal: Patient will achieve/maintain normal respiratory rate/effort  Description  Respiratory rate and effort will be within normal limits for the patient  12/28/2019 0648 by Rubin Mena RN  Outcome: Met This Shift     Problem: SKIN INTEGRITY  Goal: Skin integrity is maintained or improved  12/28/2019 0648 by Rubin Mena RN  Outcome: Met This Shift     Problem: Falls - Risk of:  Goal: Will remain free from falls  Description  Will remain free from falls  12/28/2019 0648 by Rubin Mena RN  Outcome: Met This Shift     Problem: Falls - Risk of:  Goal: Absence of physical injury  Description  Absence of physical injury  12/28/2019 0648 by Rubin Mena RN  Outcome: Met This Shift     Problem: Risk for Impaired Skin Integrity  Goal: Tissue integrity - skin and mucous membranes  Description  Structural intactness and normal physiological function of skin and  mucous membranes.   12/28/2019 0648 by Rubin Mena RN  Outcome: Ongoing     Problem: Nutrition  Goal: Optimal nutrition therapy  Description  Nutrition Problem: Inadequate oral intake  Intervention: Food and/or Nutrient Delivery: Start Parenteral Nutrition  Nutritional Goals: Meet % of estimated nutrition needs   12/28/2019 0648 by Joy Schofield RN  Outcome: Ongoing     Problem: Confusion - Acute:  Goal: Absence of continued neurological deterioration signs and symptoms  Description  Absence of continued neurological deterioration signs and symptoms  12/28/2019 0648 by Joy Schofield RN  Outcome: Met This Shift     Problem: Confusion - Acute:  Goal: Mental status will be restored to baseline  Description  Mental status will be restored to baseline  12/28/2019 0648 by Joy Schofield RN  Outcome: Ongoing     Problem: Discharge Planning:  Goal: Ability to perform activities of daily living will improve  Description  Ability to perform activities of daily living will improve  12/28/2019 0648 by Joy Schofield RN  Outcome: Ongoing     Problem: Discharge Planning:  Goal: Participates in care planning  Description  Participates in care planning  12/28/2019 0648 by Joy Schofield RN  Outcome: Ongoing     Problem: Injury - Risk of, Physical Injury:  Goal: Will remain free from falls  Description  Will remain free from falls  12/28/2019 0648 by Joy Schofield RN  Outcome: Met This Shift     Problem: Injury - Risk of, Physical Injury:  Goal: Absence of physical injury  Description  Absence of physical injury  12/28/2019 0648 by Joy Schofield RN  Outcome: Met This Shift     Problem: Mood - Altered:  Goal: Mood stable  Description  Mood stable  12/28/2019 0648 by Joy Schofield RN  Outcome: Met This Shift     Problem: Mood - Altered:  Goal: Absence of abusive behavior  Description  Absence of abusive behavior  12/28/2019 0648 by Joy Schofield RN  Outcome: Met This Shift     Problem: Mood - Altered:  Goal: Verbalizations of feeling emotionally [id number] by Marion Alexander RN  Outcome: Ongoing     Problem: DAILY CARE  Goal: Daily care needs are met  Outcome: Met This Shift     Problem: PAIN  Goal: Patient's pain/discomfort is manageable  Outcome: Ongoing     Problem: KNOWLEDGE DEFICIT  Goal: Patient/S.O. demonstrates understanding of disease process, treatment plan, medications, and discharge instructions. Outcome: Ongoing     Problem: DISCHARGE BARRIERS  Goal: Patient's continuum of care needs are met  Outcome: Met This Shift     Problem: Physical Regulation:  Goal: Diagnostic test results will improve  Description  Diagnostic test results will improve  Outcome: Ongoing  Goal: Will remain free from infection  Description  Will remain free from infection  Outcome: Ongoing  Goal: Ability to maintain vital signs within normal range will improve  Description  Ability to maintain vital signs within normal range will improve  Outcome: Met This Shift    Pt free from injury/falls. Vitals stable. All needs met at this time.      Electronically signed by Marion Alexander RN on 12/28/2019 at 6:50 AM

## 2019-12-28 NOTE — PROGRESS NOTES
no ascites   Extremities: no cyanosis, clubbing 1-2+ chronic edema, wrinkling in lower extremities evident  Access:  previous permacath    Current Medications:    [START ON 1/3/2020] darbepoetin rohan-polysorbate (ARANESP) injection 100 mcg, Weekly  oxyCODONE (ROXICODONE) 5 MG/5ML solution 7.5 mg, Q6H PRN    Or  oxyCODONE (ROXICODONE) 5 MG/5ML solution 10 mg, Q6H PRN  docusate (COLACE) 50 MG/5ML liquid 100 mg, Daily  heparin (porcine) injection 1,900 Units, PRN  heparin (porcine) injection 1,900 Units, PRN  melatonin tablet 1 mg, Nightly PRN  acetaminophen (TYLENOL) tablet 650 mg, Q4H PRN  polyethylene glycol (GLYCOLAX) packet 17 g, Daily  insulin lispro (HUMALOG) injection vial 0-18 Units, Q4H  carvedilol (COREG) tablet 6.25 mg, Daily  CENTRUM/CERTA-CHANCE with minerals oral solution 15 mL, Daily  labetalol (NORMODYNE;TRANDATE) injection 20 mg, Q6H PRN  sodium chloride 0.9 % irrigation 1,000 mL, Continuous  alteplase (CATHFLO) injection 1 mg, Once  heparin (porcine) injection 500 Units, PRN  heparin (porcine) injection 1,750 Units, PRN  glucose (GLUTOSE) 40 % oral gel 15 g, PRN  dextrose 50 % IV solution, PRN  glucagon (rDNA) injection 1 mg, PRN  dextrose 5 % solution, PRN  albumin human 25 % IV solution 25 g, PRN  LORazepam (ATIVAN) injection 1 mg, Q6H PRN  0.9 % sodium chloride bolus, PRN  0.9 % sodium chloride bolus, PRN  albumin human 25 % IV solution 25 g, PRN  povidone-iodine (BETADINE) 10 % external solution, Daily  sodium chloride flush 0.9 % injection 10 mL, PRN  potassium chloride (KLOR-CON M) extended release tablet 40 mEq, PRN    Or  potassium bicarb-citric acid (EFFER-K) effervescent tablet 40 mEq, PRN    Or  potassium chloride 10 mEq/100 mL IVPB (Peripheral Line), PRN  famotidine (PEPCID) tablet 20 mg, Daily  REFRESH LACRI-LUBE ointment OINT, PRN  0.9 % sodium chloride infusion, Continuous  sodium chloride flush 0.9 % injection 10 mL, 2 times per day  magnesium hydroxide (MILK OF MAGNESIA) 400 MG/5ML suspension 30 mL, Daily PRN  ondansetron (ZOFRAN) injection 4 mg, Q6H PRN  heparin (porcine) injection 5,000 Units, 3 times per day      Labs:   CBC:   Recent Labs     12/26/19 0414 12/27/19  0915   WBC 7.4 6.5   RBC 2.53* 2.41*   HGB 7.2* 6.8*   HCT 24.6* 23.9*    249   MPV 9.0 8.7      BMP:   Recent Labs     12/26/19 0414 12/27/19  0426    141   K 4.4 4.7    107   CO2 21 21   BUN 64* 72*   CREATININE 2.10* 2.51*   GLUCOSE 118* 98   CALCIUM 7.9* 7.5*        Phosphorus:    Recent Labs     12/26/19 0414 12/27/19 0426   PHOS 3.1 4.0     Magnesium:   Recent Labs     12/27/19 0426   MG 1.7     Albumin: No results for input(s): LABALBU in the last 72 hours. Dialysis bath: Dialysis Bath  K+ (Potassium): 2  Ca+ (Calcium): 3  Na+ (Sodium): 140  HCO3 (Bicarb): 35    Radiology:  Reviewed as available. Assessment:  1 Acute Kidney Injury: Secondary to ischemic ATN from sepsis syndrome from necrotizing fasciitis. He has been dialysis dependent.  Has a left IJ tunnel dialysis catheter in place, placed this week  2.  Chronic kidney diseae stage III from diabetic nephrosclerosis baseline 1.6-1.8 MG/DL.  3.  Necrotizing fasciitis status post wide complex debridement of gluteal, perineal region and open colostomy.    4.  Respiratory Failure: Was on the ventilator now extubated  5.  Protein calorie malnutrition on tube feeds  6.  Fluid overload: Continue to remove fluid on dialysis as tolerated, much better in the last 3 to 4 days with daily ultrafiltration/HD, about 10 L of fluid removed in the last week although he was getting significant amount of fluid volume from various infusions including TPN and tube feeds up until the last 24 hours. Anneliese Marie 7.  S/P diverting colostomy strict  8.  Anemia requiring blood transfusion  9.   Necrotizing fasciitis multiple re-debridements, involving up to the scrotal area, diverting loop colostomy placed, wound VAC placed, will need help with plastics to ultimately lean to

## 2019-12-28 NOTE — PROGRESS NOTES
[facility]  Internal Medicine Teaching Residency Program  Inpatient Daily Progress Note  ______________________________________________________________________________    Patient: Bam Taylor  YOB: 1985   IBZ:[id number]    Acct: [de-identified]     Room: [address] [place] [address]  Admit date: 11/29/2019  Today's date: 12/28/19  Number of days in the hospital: 34    SUBJECTIVE   Admitting Diagnosis: Necrotizing fasciitis ([address]  CC: Acute renal failure/Necritizing fascitis    Pt examined at bedside. Chart & results reviewed. He is answering questions today' alert and oriented  Says he has some complaint in th groin region  Wound vac draining 1000 ml at the sacral wound and 250 ml at the groin  Hemodialysis held for today  No other complaints overnight          ROS:  Constitutional:  negative for chills, fevers, sweats  Respiratory:  negative for cough, dyspnea on exertion, hemoptysis, shortness of breath, wheezing  Cardiovascular:  negative for chest pain, chest pressure/discomfort, lower extremity edema, palpitations  Gastrointestinal:  negative for abdominal pain, constipation, diarrhea, nausea, vomiting  Neurological:  negative for dizziness, headache  BRIEF HISTORY     Cyclone Mercury a 37 y.o. with PMH of spina bifida, hypertension, diabetes and diabetic foot ulcer. Who presented to [facility] Emergency Department with complaint of generalized weakness for the past few days. On examination the emergency department staff noticed the patient had a foul smell which they thought he had a bowel movement. They turned the patient and noticed sloughing of skin on his back and buttocks.      In the emergency department patient was afebrile but tachycardic. Initial labs demonstrated hyponatremia of 130, bicarb 12, BUN 63 and creatinine 2.82, hypocalcemia 7.7, alkaline phosphatase 144, AST 83, ALT 16, lactate 1.4, leukocytosis of 30.4, hemoglobin 9.2.  Chest xray demonstrated Thyromegaly  Pulmonary: clear to auscultation and no wheezing or rhonchi   Cardiovascular: normal S1 and S2. NO rubs and NO murmur.  No S3 OR S4   Abdomen: soft nontender, bowel sounds present, no organomegaly,  no ascites   Extremities: no cyanosis, clubbing 1-2+ chronic edema, wrinkling in lower extremities evident  Access:  previous permacath    Medications:  Scheduled Medications:    [START ON 1/3/2020] darbepoetin rohan-polysorbate  100 mcg Intravenous Weekly    docusate  100 mg Oral Daily    polyethylene glycol  17 g Oral Daily    insulin lispro  0-18 Units Subcutaneous Q4H    carvedilol  6.25 mg Per NG tube Daily    CENTRUM/CERTA-CHANCE with minerals oral  15 mL Oral Daily    alteplase  1 mg Intracatheter Once    povidone-iodine   Topical Daily    famotidine  20 mg Per NG tube Daily    sodium chloride flush  10 mL Intravenous 2 times per day    heparin (porcine)  5,000 Units Subcutaneous 3 times per day     Continuous Infusions:    sodium chloride      dextrose      sodium chloride 10 mL/hr at 12/26/19 1420     PRN MedicationsoxyCODONE, 7.5 mg, Q6H PRN    Or  oxyCODONE, 10 mg, Q6H PRN  heparin (porcine), 1,900 Units, PRN  heparin (porcine), 1,900 Units, PRN  melatonin, 1 mg, Nightly PRN  acetaminophen, 650 mg, Q4H PRN  labetalol, 20 mg, Q6H PRN  heparin (porcine), 500 Units, PRN  heparin (porcine), 1,750 Units, PRN  glucose, 15 g, PRN  dextrose, 12.5 g, PRN  glucagon (rDNA), 1 mg, PRN  dextrose, 100 mL/hr, PRN  albumin human, 25 g, PRN  LORazepam, 1 mg, Q6H PRN  sodium chloride, 250 mL, PRN  sodium chloride, 150 mL, PRN  albumin human, 25 g, PRN  sodium chloride flush, 10 mL, PRN  potassium chloride, 40 mEq, PRN    Or  potassium alternative oral replacement, 40 mEq, PRN    Or  potassium chloride, 10 mEq, PRN  REFRESH LACRI-LUBE, , PRN  magnesium hydroxide, 30 mL, Daily PRN  ondansetron, 4 mg, Q6H PRN        Diagnostic Labs:  CBC:   Recent Labs     12/26/19  0414 12/27/19  0915 12/28/19  0953   WBC

## 2019-12-29 ENCOUNTER — APPOINTMENT (OUTPATIENT)
Dept: GENERAL RADIOLOGY | Age: 34
DRG: 463 | End: 2019-12-29
Attending: INTERNAL MEDICINE
Payer: MEDICARE

## 2019-12-29 LAB
ABSOLUTE EOS #: 0.4 K/UL (ref 0–0.4)
ABSOLUTE IMMATURE GRANULOCYTE: 0 K/UL (ref 0–0.3)
ABSOLUTE LYMPH #: 1.27 K/UL (ref 1–4.8)
ABSOLUTE MONO #: 0.34 K/UL (ref 0.1–0.8)
ANION GAP SERPL CALCULATED.3IONS-SCNC: 8 MMOL/L (ref 9–17)
BASOPHILS # BLD: 0 % (ref 0–2)
BASOPHILS ABSOLUTE: 0 K/UL (ref 0–0.2)
BUN BLDV-MCNC: 50 MG/DL (ref 6–20)
BUN/CREAT BLD: ABNORMAL (ref 9–20)
CALCIUM SERPL-MCNC: 7.4 MG/DL (ref 8.6–10.4)
CHLORIDE BLD-SCNC: 102 MMOL/L (ref 98–107)
CO2: 25 MMOL/L (ref 20–31)
CREAT SERPL-MCNC: 1.76 MG/DL (ref 0.7–1.2)
DIFFERENTIAL TYPE: ABNORMAL
EOSINOPHILS RELATIVE PERCENT: 6 % (ref 1–4)
GFR AFRICAN AMERICAN: 53 ML/MIN
GFR NON-AFRICAN AMERICAN: 44 ML/MIN
GFR SERPL CREATININE-BSD FRML MDRD: ABNORMAL ML/MIN/{1.73_M2}
GFR SERPL CREATININE-BSD FRML MDRD: ABNORMAL ML/MIN/{1.73_M2}
GLUCOSE BLD-MCNC: 116 MG/DL (ref 75–110)
GLUCOSE BLD-MCNC: 125 MG/DL (ref 75–110)
GLUCOSE BLD-MCNC: 128 MG/DL (ref 75–110)
GLUCOSE BLD-MCNC: 138 MG/DL (ref 75–110)
GLUCOSE BLD-MCNC: 142 MG/DL (ref 70–99)
GLUCOSE BLD-MCNC: 174 MG/DL (ref 75–110)
HCT VFR BLD CALC: 26.8 % (ref 40.7–50.3)
HEMOGLOBIN: 8 G/DL (ref 13–17)
IMMATURE GRANULOCYTES: 0 %
LYMPHOCYTES # BLD: 19 % (ref 24–44)
MAGNESIUM: 1.5 MG/DL (ref 1.6–2.6)
MCH RBC QN AUTO: 28.6 PG (ref 25.2–33.5)
MCHC RBC AUTO-ENTMCNC: 29.9 G/DL (ref 28.4–34.8)
MCV RBC AUTO: 95.7 FL (ref 82.6–102.9)
MONOCYTES # BLD: 5 % (ref 1–7)
MORPHOLOGY: ABNORMAL
NRBC AUTOMATED: 0 PER 100 WBC
PDW BLD-RTO: 20.2 % (ref 11.8–14.4)
PHOSPHORUS: 2.2 MG/DL (ref 2.5–4.5)
PLATELET # BLD: 330 K/UL (ref 138–453)
PLATELET ESTIMATE: ABNORMAL
PMV BLD AUTO: 8.4 FL (ref 8.1–13.5)
POTASSIUM SERPL-SCNC: 4.1 MMOL/L (ref 3.7–5.3)
RBC # BLD: 2.8 M/UL (ref 4.21–5.77)
RBC # BLD: ABNORMAL 10*6/UL
SEG NEUTROPHILS: 70 % (ref 36–66)
SEGMENTED NEUTROPHILS ABSOLUTE COUNT: 4.69 K/UL (ref 1.8–7.7)
SODIUM BLD-SCNC: 135 MMOL/L (ref 135–144)
WBC # BLD: 6.7 K/UL (ref 3.5–11.3)
WBC # BLD: ABNORMAL 10*3/UL

## 2019-12-29 PROCEDURE — 6370000000 HC RX 637 (ALT 250 FOR IP): Performed by: STUDENT IN AN ORGANIZED HEALTH CARE EDUCATION/TRAINING PROGRAM

## 2019-12-29 PROCEDURE — 71045 X-RAY EXAM CHEST 1 VIEW: CPT

## 2019-12-29 PROCEDURE — 6370000000 HC RX 637 (ALT 250 FOR IP): Performed by: INTERNAL MEDICINE

## 2019-12-29 PROCEDURE — 6360000002 HC RX W HCPCS: Performed by: STUDENT IN AN ORGANIZED HEALTH CARE EDUCATION/TRAINING PROGRAM

## 2019-12-29 PROCEDURE — 94761 N-INVAS EAR/PLS OXIMETRY MLT: CPT

## 2019-12-29 PROCEDURE — 99232 SBSQ HOSP IP/OBS MODERATE 35: CPT | Performed by: INTERNAL MEDICINE

## 2019-12-29 PROCEDURE — 83735 ASSAY OF MAGNESIUM: CPT

## 2019-12-29 PROCEDURE — 82947 ASSAY GLUCOSE BLOOD QUANT: CPT

## 2019-12-29 PROCEDURE — 84100 ASSAY OF PHOSPHORUS: CPT

## 2019-12-29 PROCEDURE — 2060000000 HC ICU INTERMEDIATE R&B

## 2019-12-29 PROCEDURE — 80048 BASIC METABOLIC PNL TOTAL CA: CPT

## 2019-12-29 PROCEDURE — 36415 COLL VENOUS BLD VENIPUNCTURE: CPT

## 2019-12-29 PROCEDURE — 85025 COMPLETE CBC W/AUTO DIFF WBC: CPT

## 2019-12-29 PROCEDURE — 2580000003 HC RX 258: Performed by: STUDENT IN AN ORGANIZED HEALTH CARE EDUCATION/TRAINING PROGRAM

## 2019-12-29 RX ORDER — BUMETANIDE 1 MG/1
2 TABLET ORAL DAILY
Status: DISCONTINUED | OUTPATIENT
Start: 2019-12-29 | End: 2020-01-16 | Stop reason: HOSPADM

## 2019-12-29 RX ORDER — MAGNESIUM SULFATE 1 G/100ML
1 INJECTION INTRAVENOUS
Status: DISPENSED | OUTPATIENT
Start: 2019-12-29 | End: 2019-12-29

## 2019-12-29 RX ADMIN — Medication: at 09:32

## 2019-12-29 RX ADMIN — INSULIN LISPRO 3 UNITS: 100 INJECTION, SOLUTION INTRAVENOUS; SUBCUTANEOUS at 20:15

## 2019-12-29 RX ADMIN — OXYCODONE HYDROCHLORIDE 10 MG: 5 SOLUTION ORAL at 18:17

## 2019-12-29 RX ADMIN — MAGNESIUM SULFATE HEPTAHYDRATE 1 G: 1 INJECTION, SOLUTION INTRAVENOUS at 18:28

## 2019-12-29 RX ADMIN — MAGNESIUM SULFATE HEPTAHYDRATE 1 G: 1 INJECTION, SOLUTION INTRAVENOUS at 17:25

## 2019-12-29 RX ADMIN — BUMETANIDE 2 MG: 1 TABLET ORAL at 13:52

## 2019-12-29 RX ADMIN — HEPARIN SODIUM 5000 UNITS: 5000 INJECTION INTRAVENOUS; SUBCUTANEOUS at 05:21

## 2019-12-29 RX ADMIN — POLYETHYLENE GLYCOL 3350 17 G: 17 POWDER, FOR SOLUTION ORAL at 09:30

## 2019-12-29 RX ADMIN — HEPARIN SODIUM 5000 UNITS: 5000 INJECTION INTRAVENOUS; SUBCUTANEOUS at 12:38

## 2019-12-29 RX ADMIN — FAMOTIDINE 20 MG: 20 TABLET, FILM COATED ORAL at 09:30

## 2019-12-29 RX ADMIN — Medication 15 ML: at 09:31

## 2019-12-29 RX ADMIN — SODIUM CHLORIDE, PRESERVATIVE FREE 10 ML: 5 INJECTION INTRAVENOUS at 21:43

## 2019-12-29 RX ADMIN — OXYCODONE HYDROCHLORIDE 10 MG: 5 SOLUTION ORAL at 00:24

## 2019-12-29 RX ADMIN — INSULIN LISPRO 3 UNITS: 100 INJECTION, SOLUTION INTRAVENOUS; SUBCUTANEOUS at 09:43

## 2019-12-29 RX ADMIN — Medication 100 MG: at 09:31

## 2019-12-29 RX ADMIN — SODIUM CHLORIDE, PRESERVATIVE FREE 10 ML: 5 INJECTION INTRAVENOUS at 09:31

## 2019-12-29 RX ADMIN — CARVEDILOL 6.25 MG: 6.25 TABLET, FILM COATED ORAL at 09:34

## 2019-12-29 ASSESSMENT — PAIN DESCRIPTION - LOCATION
LOCATION: BUTTOCKS;SCROTUM
LOCATION: GENERALIZED

## 2019-12-29 ASSESSMENT — PAIN SCALES - GENERAL
PAINLEVEL_OUTOF10: 10
PAINLEVEL_OUTOF10: 4
PAINLEVEL_OUTOF10: 10
PAINLEVEL_OUTOF10: 5

## 2019-12-29 ASSESSMENT — PAIN DESCRIPTION - PAIN TYPE
TYPE: ACUTE PAIN;SURGICAL PAIN
TYPE: ACUTE PAIN;SURGICAL PAIN

## 2019-12-29 NOTE — PROGRESS NOTES
Nephrology Progress Note      Subjective: Last hemodialysis Friday, 3 L of fluid removed, urine output also continues to show improvement. Fairly equal fluid balance recently. Dry weight has been challenged, and making progress steadily. Hemodynamically stable. Lower extremity wrinkling present, edema continues to improve. Debridement of gluteal wounds done most recently yesterday. Continues to require intermittent debridements in the OR. Wound VAC in place. Colostomy functioning well. Tolerating tube feeds at goal rate, 50cc/hr, via flexiflo. Has been off TPN. Has been in hospital for one month now. Tunnel catheter works well. Off antibiotics at present. Cr 1.796. Na 135, K 4.1 CO2 25. He appears comfortable, minimal interaction, no family at bedside. Objective:  CURRENT TEMPERATURE:  Temp: 98.4 °F (36.9 °C)  MAXIMUM TEMPERATURE OVER 24HRS:  Temp (24hrs), Av.4 °F (36.9 °C), Min:98.1 °F (36.7 °C), Max:98.6 °F (37 °C)    CURRENT RESPIRATORY RATE:  Resp: 16  CURRENT PULSE:  Pulse: 97  CURRENT BLOOD PRESSURE:  BP: (!) 147/69  24HR BLOOD PRESSURE RANGE:  Systolic (46ANO), BHX:793 , Min:147 , KTT:865   ; Diastolic (28KCK), RCE:39, Min:69, Max:100    24HR INTAKE/OUTPUT:      Intake/Output Summary (Last 24 hours) at 2019 1002  Last data filed at 2019 0400  Gross per 24 hour   Intake 3172 ml   Output 2650 ml   Net 522 ml     Patient Vitals for the past 96 hrs (Last 3 readings):   Weight   19 1253 276 lb 14.4 oz (125.6 kg)   19 0830 286 lb 13.1 oz (130.1 kg)   19 0400 285 lb 4.4 oz (129.4 kg)         Physical Exam:  General appearance:Awake, follows commands in no distress   Skin: warm and dry, no rash or erythema  Eyes: conjunctivae normal and sclera anicteric  ENT:no thrush no pharyngeal congestion   Neck:  No JVD  Pulmonary: clear to auscultation and no wheezing or crackles  Cardiovascular: normal S1 and S2. NO rubs and NO murmur.  No S3  Abdomen: soft nontender, bowel sounds present, ML wound VAC good seal, +ostomy + stool in bag, no overt blood  Extremities: no cyanosis, clubbing 1-2+ chronic edema, wrinkling in lower extremities evident, dressing to L foot C/D/I      Current Medications:    [START ON 1/3/2020] darbepoetin rohan-polysorbate (ARANESP) injection 100 mcg, Weekly  oxyCODONE (ROXICODONE) 5 MG/5ML solution 7.5 mg, Q6H PRN    Or  oxyCODONE (ROXICODONE) 5 MG/5ML solution 10 mg, Q6H PRN  docusate (COLACE) 50 MG/5ML liquid 100 mg, Daily  heparin (porcine) injection 1,900 Units, PRN  heparin (porcine) injection 1,900 Units, PRN  melatonin tablet 1 mg, Nightly PRN  acetaminophen (TYLENOL) tablet 650 mg, Q4H PRN  polyethylene glycol (GLYCOLAX) packet 17 g, Daily  insulin lispro (HUMALOG) injection vial 0-18 Units, Q4H  carvedilol (COREG) tablet 6.25 mg, Daily  CENTRUM/CERTA-CHANCE with minerals oral solution 15 mL, Daily  labetalol (NORMODYNE;TRANDATE) injection 20 mg, Q6H PRN  sodium chloride 0.9 % irrigation 1,000 mL, Continuous  alteplase (CATHFLO) injection 1 mg, Once  heparin (porcine) injection 500 Units, PRN  heparin (porcine) injection 1,750 Units, PRN  glucose (GLUTOSE) 40 % oral gel 15 g, PRN  dextrose 50 % IV solution, PRN  glucagon (rDNA) injection 1 mg, PRN  dextrose 5 % solution, PRN  albumin human 25 % IV solution 25 g, PRN  LORazepam (ATIVAN) injection 1 mg, Q6H PRN  0.9 % sodium chloride bolus, PRN  0.9 % sodium chloride bolus, PRN  albumin human 25 % IV solution 25 g, PRN  povidone-iodine (BETADINE) 10 % external solution, Daily  sodium chloride flush 0.9 % injection 10 mL, PRN  potassium chloride (KLOR-CON M) extended release tablet 40 mEq, PRN    Or  potassium bicarb-citric acid (EFFER-K) effervescent tablet 40 mEq, PRN    Or  potassium chloride 10 mEq/100 mL IVPB (Peripheral Line), PRN  famotidine (PEPCID) tablet 20 mg, Daily  REFRESH LACRI-LUBE ointment OINT, PRN  0.9 % sodium chloride infusion, Continuous  sodium chloride flush 0.9 % injection 10 mL, 2 times per day  magnesium hydroxide (MILK OF MAGNESIA) 400 MG/5ML suspension 30 mL, Daily PRN  ondansetron (ZOFRAN) injection 4 mg, Q6H PRN  heparin (porcine) injection 5,000 Units, 3 times per day      Labs:   CBC:   Recent Labs     12/27/19  0915 12/28/19  0953 12/29/19  0823   WBC 6.5 6.4 6.7   RBC 2.41* 2.72* 2.80*   HGB 6.8* 7.7* 8.0*   HCT 23.9* 26.2* 26.8*    294 330   MPV 8.7 8.6 8.4      BMP:   Recent Labs     12/27/19  0426 12/28/19  0953 12/29/19  0823    136 135   K 4.7 3.8 4.1    102 102   CO2 21 25 25   BUN 72* 43* 50*   CREATININE 2.51* 1.68* 1.76*   GLUCOSE 98 147* 142*   CALCIUM 7.5* 7.7* 7.4*        Phosphorus:    Recent Labs     12/27/19  0426 12/29/19  0823   PHOS 4.0 2.2*     Magnesium:   Recent Labs     12/27/19  0426 12/29/19  0823   MG 1.7 1.5*     Albumin: No results for input(s): LABALBU in the last 72 hours. Radiology:  Reviewed as available. Assessment:  1 Acute Kidney Injury: Secondary to ischemic ATN from sepsis syndrome from necrotizing fasciitis. He has been dialysis dependent.  Has a left IJ tunnel dialysis catheter in place. 2.  Chronic kidney diseae stage III from diabetic nephrosclerosis baseline 1.6-1.8 MG/DL.    3.  Necrotizing fasciitis status post wide complex debridement of gluteal, perineal region and open colostomy.    4.  Respiratory Failure: Was on the ventilator now extubated  5.  Protein calorie malnutrition on tube feeds  6.  Fluid overload: Continue to remove fluid on dialysis as tolerated, much better in the last 3 to 4 days with daily ultrafiltration/HD, about 10 L of fluid removed in the last week although he was getting significant amount of fluid volume from various infusions including TPN and tube feeds up until Friday (12/27). 7.  S/P diverting colostomy   8.  Anemia requiring blood transfusion prn, stable today s/p one unit yesterday.    9.  Necrotizing fasciitis multiple re-debridements, involving up to the scrotal area, diverting loop colostomy placed, wound VAC placed. Area of wound and skin loss and deep tissue loss significantly high. General surgery following. Dr. Erich Michel (plastic surgery) following from a distance for grafting and possible flap. Plan for sacral would vac change in OR on regular schedule, next planned on Tuesday. Plan:  1. Hold hemodialysis today, will reassess daily, but suspect he may get dialysis tomorrow. 2.  Continue with Coreg  3. Cont pt on aranesp and zemplar per protocol. 4. Follow up labs ordered. 5.  Await general surgery plans-evolving  6. We will follow with you  7. Aranesp will continue  8. Tube feeds at current rate    Will discuss with Dr. Juan Bhatia. Please do not hesitate to call with questions. Electronically signed by GENEVIEVE Palacios on 12/29/2019 at 10:02 AM  Attending Physician Statement  I have discussed the care of Alissa Culp, including pertinent history and exam findings with the resident/fellow. I have reviewed the key elements of all parts of the encounter with the resident/fellow. I have seen and examined the patient with the resident/fellow. I agree with the assessment and plan and status of the problem list as documented. Addiitionally I recommend to start him on oral bumex daily and hold dialysis today   .   Dorys Reddy MD

## 2019-12-29 NOTE — PLAN OF CARE
Discharge Planning:  Goal: Ability to perform activities of daily living will improve  Description  Ability to perform activities of daily living will improve  Outcome: Ongoing  Goal: Participates in care planning  Description  Participates in care planning  Outcome: Ongoing     Problem: Injury - Risk of, Physical Injury:  Goal: Will remain free from falls  Description  Will remain free from falls  Outcome: Ongoing  Goal: Absence of physical injury  Description  Absence of physical injury  Outcome: Ongoing     Problem: Mood - Altered:  Goal: Mood stable  Description  Mood stable  Outcome: Ongoing  Goal: Absence of abusive behavior  Description  Absence of abusive behavior  Outcome: Ongoing  Goal: Verbalizations of feeling emotionally comfortable while being cared for will increase  Description  Verbalizations of feeling emotionally comfortable while being cared for will increase  Outcome: Ongoing     Problem: Psychomotor Activity - Altered:  Goal: Absence of psychomotor disturbance signs and symptoms  Description  Absence of psychomotor disturbance signs and symptoms  Outcome: Ongoing     Problem: Sensory Perception - Impaired:  Goal: Demonstrations of improved sensory functioning will increase  Description  Demonstrations of improved sensory functioning will increase  Outcome: Ongoing  Goal: Decrease in sensory misperception frequency  Description  Decrease in sensory misperception frequency  Outcome: Ongoing  Goal: Able to refrain from responding to false sensory perceptions  Description  Able to refrain from responding to false sensory perceptions  Outcome: Ongoing  Goal: Demonstrates accurate environmental perceptions  Description  Demonstrates accurate environmental perceptions  Outcome: Ongoing  Goal: Able to distinguish between reality-based and nonreality-based thinking  Description  Able to distinguish between reality-based and nonreality-based thinking  Outcome: Ongoing  Goal: Able to interrupt

## 2019-12-29 NOTE — OP NOTE
12/26/2019 12:30 PM   Dressing Changed Changed/New 12/26/2019  2:21 PM   Drainage Amount Moderate 12/26/2019 12:30 PM   Drainage Description Serosanguinous 12/26/2019 12:30 PM   Output (ml) 50 ml 12/26/2019  6:30 AM   Wound Assessment Other (Comment) 12/26/2019 12:30 PM   Liliam-wound Assessment Other (Comment) 12/26/2019 12:30 PM       NG/OG/NJ/NE Tube Orogastric 16 fr (Active)   Surrounding Skin Dry; Intact 12/26/2019  4:00 AM   Securement device Yes 12/26/2019  2:21 PM   Status Clamped 12/26/2019  2:21 PM   Placement Verified by Gastric Contents;by External Catheter Length 12/26/2019  8:55 AM   NG/OG/NJ/NE External Measurement (cm) 60 cm 12/26/2019  8:55 AM   Drainage Appearance Bile 12/24/2019  4:00 PM   Tube Feeding Immune Enhancing 12/26/2019  8:55 AM   Tube Feeding Status Stopped 12/26/2019  4:00 AM   Rate/Schedule 0 mL/hr 12/26/2019  8:55 AM   Tube Feeding Supplement Amount (mL) 166 12/26/2019 12:00 AM   Tube Feeding Intake (mL) 550 ml 12/25/2019  4:57 PM   Free Water Flush (mL) 30 mL 12/25/2019 11:55 PM   Free Water Rate 100 12/24/2019  8:00 AM   Residual Volume (ml) 0 ml 12/26/2019 12:00 AM   Output (mL) 0 ml 12/20/2019  6:00 PM       Colostomy LLQ Descending/sigmoid (Active)   Stomal Appliance 1 piece 12/24/2019  8:00 PM   Flange Size (inches) 1.5 Inches 12/23/2019 12:20 PM   Stoma  Assessment JESSI 12/26/2019 12:30 PM   Mucocutaneous Junction Intact 12/26/2019 12:30 PM   Peristomal Assessment Intact 12/26/2019 12:30 PM   Treatment Bag change; Liquid skin barrier 12/23/2019 12:20 PM   Stool Appearance Soft;Loose 12/26/2019 12:30 PM   Stool Color [place] Credit 12/26/2019 12:30 PM   Stool Amount Medium 12/25/2019 12:00 PM       Urethral Catheter (Active)   Catheter Indications Need for fluid management in critically ill patients in a critical care setting not able to be managed by other means such as BSC with hat, bedpan, urinal, condom catheter, or short term intermittent urethral catherization 12/26/2019 12:30 PM Site Assessment Pink;Swelling; No urethral drainage 12/26/2019 12:30 PM   Urine Color Yellow 12/26/2019 12:30 PM   Urine Appearance Sediment 12/26/2019 12:30 PM   Output (mL) 5 mL 12/26/2019 11:00 AM       [REMOVED] Closed/Suction Drain Superior Abdomen Bulb (Removed)   Site Description Healing 12/10/2019  4:00 PM   Dressing Status Other (Comment) 12/10/2019  4:00 PM   Drainage Appearance Serous 12/10/2019  4:00 PM   Status To bulb suction 12/10/2019  4:00 AM   Output (ml) 10 ml 12/10/2019  8:39 AM       [REMOVED] Negative Pressure Wound Therapy Abdomen Medial;Upper (Removed)   Wound Type Surgical 12/8/2019  6:00 PM   Dressing Type Black foam 12/8/2019  6:00 PM   Cycle Continuous 12/8/2019  6:00 PM   Target Pressure (mmHg) 125 12/8/2019  6:00 PM   Irrigation Solution Normal Saline 12/7/2019  8:00 PM   Canister changed? No 12/8/2019  6:00 PM   Dressing Status Clean;Dry; Intact 12/8/2019  6:00 PM   Dressing Changed Changed/New 12/3/2019  5:26 PM   Drainage Description Serosanguinous 12/8/2019  6:00 PM   Output (ml) 50 ml 12/5/2019  4:00 PM   Odor None 12/8/2019  6:00 PM       [REMOVED] NG/OG/NJ/NE Tube Orogastric  Center mouth (Removed)   Securement device Yes 11/30/2019 12:00 PM   Status Suction-low intermittent 11/30/2019 12:00 PM   Placement Verified by Gastric Contents 11/30/2019 12:00 PM   NG/OG/NJ/NE External Measurement (cm) 60 cm 11/30/2019 12:00 PM   Drainage Appearance Tan 11/30/2019  8:00 AM       [REMOVED] NG/OG/NJ/NE Tube Right nostril (Removed)   Surrounding Skin Dry; Intact 12/21/2019  8:00 AM   Securement device Yes 12/21/2019  8:00 AM   Status Clamped 12/21/2019  8:00 AM   NG/OG/NJ/NE External Measurement (cm) 55 cm 12/21/2019  4:00 AM       Findings:   Wound Class IV; portion of gluteal cleft approximated; portion of R upper thigh approximated.    - vera flow applied  - additional suction applied to R groin and then \"y\" connected to midline wound vac  - adaptic placed over rectum         Indications: This is a 36 y/o male who presented to the hospital with necrotizing fascitis. He was taken to the operating room several times for debridement and source of control of sacral wound with extension to his perineum and scrotum. He was able to have wound vac placed and he returns to OR today for replacement of wound vac. The risks and benefits of the procedure were discussed in detail with the patient's mom and signed consent was obtained. D    Operative Details: The patient was brought back to the operating room and transferred to the OR table in supine position. General anesthesia was induced via ETT. 2gm IV ancef was given. The vac was removed and he was prepped and draped in the usual sterile fashion. He was placed in left lateral decubitus position with his RLE was placed on a lew stand and secured in place for better exposure. A timeout was conducted to verify correct patient, procedure, and equipment in the room. There was some necrotic appearing tissues to the L laeral aspect of the wound. This was debrided with cautery down to healthy bleeding tissue. The debridement was very superficial. The gluteal cleft was then approximated with 0-PDS suture in a simple interrupted fashion. Coloplast was applied just above his gluteal cleft to assist with vac supply. mepilex Ag strips were cut and applied to the periphery of the wound. The vac sponges were applied to his sacrum followed by the acrylic drapes. The vera flow wafers were applied and the patient was then placed in supine position. The perineum was prepped in the same way with mepilex Ag strips along the periphery of the wound. The wound vac sponges were then applied followed by the acrylic drapes. An additional suction wafer was applied to the perineum and it was connected to the midline vac for additional suction. All vacs were checked and the seal was intact. All sponge and needle counts were correct at the end of the procedure.  The patient was

## 2019-12-29 NOTE — PROGRESS NOTES
or not. · Pt did not tolerate HD on 12-9. It was stopped early and pt required vasopressor support with Levophed, remains on vent. · Pt to OR 12-10 for further debridement  · Repeat I&D planned for 12-16-19 was cancelled because of high K levels  · Bedside I & D on 12/17/19. · Patient to undergo additional I&D on 12-20-19  · 12/23 right upper lobe collapse  · 12/24 debridement with excision of sacral wound in preparation for a flap, partial closure of his scrotal wound, VAC and debridement over the perineum  Infection Control Recommendations   · [place] Precautions  · Contact Isolation MRSA    Antimicrobial Stewardship Recommendations     Off antibiotics  Coordination of Outpatient Care:   · Estimated Length of IV antimicrobials: D/C 12-19-19  · Patient will need Midline Catheter Insertion: No  · Patient will need PICC line Insertion:Has Central line  · Patient will need: Home IV , [place],  SNF,  LTAC: TBD  · Patient will need outpatient wound care:Yes    Chief complaint/reason for consultation:   · Ashley's vs necrotizing fasciitis    History of Present Illness:   Xin Phelps is a 37y.o.-year-old  male who was initially admitted on 11/29/2019. Patient seen at the request of . INITIAL HISTORY:    Patient presented through ER in [place] with complaints of weakness of a few days duration. Examination showed skin necrosis in the gluteal and perineal region. Patient transferred to [place] [id number]. CT scan showed extensive subcutaneous emphysema. Patient underwent I&D and extensive complex debridement of affected tissues on 11-29-19. Gram stain of tissues showed Strep. Spp and Gram negative bacilli. 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 He has underlying DM 2, prior diabetic foot infection, DEBBY. Patient more stable post surgery but with hypotension requiring a pressor.     Zosyn D/C because of of poor LVEF, in order to reduce Na Musculoskeletal: Neck supple. No neck rigidity or muscular tenderness. Cardiovascular:      Rate and Rhythm: Normal rate and regular rhythm. Heart sounds: Normal heart sounds. No murmur. No friction rub. No gallop. Pulmonary:      Effort: Pulmonary effort is normal. No respiratory distress. Breath sounds: Normal breath sounds. No stridor. Abdominal:      General: Abdomen is flat. There is no distension. Palpations: There is no mass. Tenderness: There is no tenderness. Comments: Abdominal VAC in place   Genitourinary:     Comments:  Ambrosio in place. Musculoskeletal:         General: No swelling, tenderness or deformity. Skin:     General: Skin is warm and dry. Coloration: Skin is not jaundiced or pale. Findings: Lesion present. No erythema. Neurological:      General: No focal deficit present. Mental Status: He is alert. Mental status is at baseline. Cranial Nerves: No cranial nerve deficit. Psychiatric:         Mood and Affect: Mood normal.           Medical Decision Making -Laboratory:   I have independently reviewed/ordered the following labs:    CBC with Differential:   Recent Labs     12/28/19  0953 12/29/19  0823   WBC 6.4 6.7   HGB 7.7* 8.0*   HCT 26.2* 26.8*    330   LYMPHOPCT 17* 19*   MONOPCT 11 5     BMP:   Recent Labs     12/27/19  0426 12/28/19  0953 12/29/19  0823    136 135   K 4.7 3.8 4.1    102 102   CO2 21 25 25   BUN 72* 43* 50*   CREATININE 2.51* 1.68* 1.76*   MG 1.7  --  1.5*     Hepatic Function Panel:   No results for input(s): PROT, LABALBU, BILIDIR, IBILI, BILITOT, ALKPHOS, ALT, AST in the last 72 hours. No results for input(s): RPR in the last 72 hours. No results for input(s): HIV in the last 72 hours. No results for input(s): BC in the last 72 hours.   Lab Results   Component Value Date    MUCUS NOT REPORTED 11/29/2019    RBC 2.80 12/29/2019    TRICHOMONAS NOT REPORTED 11/29/2019    WBC 6.7 12/29/2019 YEAST NOT REPORTED 2019    TURBIDITY TURBID 2019     Lab Results   Component Value Date    CREATININE 1.76 2019    GLUCOSE 142 2019       Medical Decision Making-Imagin-9 CT Abd/pelvis  EXAMINATION:   CT OF THE ABDOMEN AND PELVIS WITH CONTRAST 2019 2:29 am       TECHNIQUE:   CT of the abdomen and pelvis was performed with the administration of   intravenous contrast. Multiplanar reformatted images are provided for review. Dose modulation, iterative reconstruction, and/or weight based adjustment of   the mA/kV was utilized to reduce the radiation dose to as low as reasonably   achievable.       COMPARISON:   2019.       HISTORY:   ORDERING SYSTEM PROVIDED HISTORY: Rick Calderon fasc   TECHNOLOGIST PROVIDED HISTORY:       nec fasc   Reason for Exam: nec fasc; Trauma   Acuity: Unknown   Type of Exam: Subsequent/Follow-up       FINDINGS:   Lower Chest: Partially visualized bilateral pleural effusions with bibasilar   heterogeneous opacities and bilateral lower lobe consolidations.  Central   venous catheter tip near the superior atrial caval junction.  Cardiomegaly. Trace pericardial fluid.       Liver: Normal.       Gallbladder and Bile Ducts: Normal.       Spleen: Splenomegaly.       Adrenal Glands: Normal.       Pancreas: Normal.       Genitourinary: Symmetric renal atrophy.  Redemonstration of multiple   hypodensities in the right kidney which likely represent benign cysts.  No   urinary stones or hydronephrosis.  Ambrosio catheter in the decompressed urinary   bladder.       Bowel: Normal caliber bowel.  Postsurgical changes of the bowel with left   lower quadrant ostomy.  No evidence of acute appendicitis.  No significant   diverticular disease.       Vasculature: Atherosclerosis.  No abdominal aortic aneurysm.       Bones and Soft Tissues: Diffuse soft tissue stranding and fluid.    Postsurgical changes in the anterior abdominal wall with midline incision   demonstrating expected small amount of fluid and air.  Large soft tissue   defects in the right inguinal region, scrotum, right greater than left   gluteal creases with associated packing material.  Small amount of   surrounding soft tissue gas.  There is associated skin thickening in these   regions.  Bilateral lower abdominal wall skin thickening.       Retroperitoneum/Mesentery: No intraperitoneal free air.  Mild abdominopelvic   ascites.  Loculated fluid along the inferior aspect of the liver. Redemonstration of bilateral inguinal and pelvic sidewall lymphadenopathy.    Multiple mildly prominent retroperitoneal and upper abdominal lymph nodes are   similar to the prior study.  Percutaneous intraperitoneal surgical drains.           Impression   1.  Large soft tissue defects in the right inguinal region, scrotum and right   greater than left gluteal crease is with associated packing material, small   amount of surrounding soft tissue gas and skin thickening likely relates to   history of necrotizing fasciitis.       2.  Postsurgical changes of the bowel with left lower quadrant ostomy.  No   bowel obstruction.  Percutaneous intraperitoneal surgical drains.       3.  Mild abdominopelvic ascites.  Loculated fluid along the inferior aspect   of the liver.  No intraperitoneal free air.       4.  Diffuse anasarca.       5.   Bilateral pleural effusions with associated heterogeneous opacities and   consolidations.  Underlying infection is not excluded.               EXAMINATION:   ONE XRAY VIEW OF THE CHEST       11/29/2019 9:01 pm       COMPARISON:   4 hours ago       HISTORY:   ORDERING SYSTEM PROVIDED HISTORY: intubated   TECHNOLOGIST PROVIDED HISTORY:   intubated   Reason for Exam: portable supine/ intubated   Acuity: Acute   Type of Exam: Initial       FINDINGS:   New ET tube terminates 38 mm above the ron.  There appears to be a   possible enteric tube which is not well visualized distally.  Right IJ   catheter terminates in the right atrium and is unchanged.  Lungs are clear. Cardiomegaly.  Mediastinum normal.  Bony thorax intact.           Impression   New ET tube as above.  Possible new enteric tube not well visualized. Recommend follow-up KUB if clinically warranted. CT ABDOMEN AND PELVIS WITHOUT CONTRAST    COMPARISON:  3/22/2017    CLINICAL HISTORY: Infection in scrotum, lower abdominal pain, diabetic, 30,000 white blood cell count. TECHNIQUE: Unenhanced axial images were obtained from the lung bases to the pubic symphysis with sagittal and coronal 2D reformatted images. Oral contrast administered:  No.  Automatic exposure control (AEC) was utilized. CT ABDOMEN FINDINGS:      The lung bases are unremarkable. There is a small pericardial effusion versus thickening. The liver, spleen, and pancreas demonstrate no acute abnormality given compromised evaluation without intravenous contrast.  There may be mild splenomegaly.  The adrenal glands appear within normal limits. There is no hydronephrosis or obstructing urinary tract calculi. Small amount of free fluid is seen adjacent to the liver inferiorly. .    The appendix is visualized in the right lower quadrant and appears within normal limits.       There is no evidence for bowel obstruction. Stranding is seen within the subcutaneous fat overlying both lateral abdominal walls left greater than right.  There is ill-defined fluid collection within the subcutaneous fat overlying the left lateral abdominal wall possibly representing phlegmon or developing abscess although no organized   abscess is seen. There is a large amount of soft tissue emphysema involving both the right and left buttock extending to the perineum tissue thickening is seen especially on the left involving the left buttock. CT PELVIS FINDINGS:        No distal ureteral calculi or bladder calculi. There is no free fluid in the pelvis.   Catheter is seen within the urinary bladder. Osseous changes within the left hemipelvis which may be chronic in nature. IMPRESSION:    Extensive subcutaneous emphysema as described above involving both buttocks extending to the perineum.  The possibility of Ashley gangrene/necrotizing fasciitis cannot be excluded. Possible phlegmon or developing soft tissue abscess overlying the left lateral abdominal wall as noted above.  No organized abscess is seen however. Osseous changes within the left hemipelvis which may be chronic in nature. Results the study were called to Dr. Jordyn Persaud [phone] on 11/29/2019  All CT scans at this facility use dose modulation, iterative reconstruction, and/or weight based dosing when appropriate to reduce radiation dose to as low as reasonably achievable. Medical Decision Avitxk-Evyypfho-Vkxjv:   11/29/2019  8:17 PM - Ana [place] Incoming Lab Results From [facility]     Specimen Information: Buttock; Tissue        Component Collected Lab   Specimen Description 11/29/2019  7:34  [address]   . BUTTOCK BILATERAL TS    Special Requests 11/29/2019  7:34  [address]   NOT REPORTED    Direct Exam 11/29/2019  7:34  [address]   NO NEUTROPHILS SEEN    Direct Exam Abnormal  11/29/2019  7:34  [facility] [place] [place]    Direct Exam Abnormal  11/29/2019  7:34 PM [address] NEGATIVE RODS    Culture 11/29/2019  7:34  [address]   PENDING          Matti Rincon MD

## 2019-12-29 NOTE — PROGRESS NOTES
RDW 21.7* 20.8* 20.2*    294 330     BMP:   Recent Labs     12/27/19  0426 12/28/19  0953 12/29/19  0823    136 135   K 4.7 3.8 4.1    102 102   CO2 21 25 25   PHOS 4.0  --  2.2*   BUN 72* 43* 50*   CREATININE 2.51* 1.68* 1.76*     BNP: No results for input(s): BNP in the last 72 hours. PT/INR: No results for input(s): PROTIME, INR in the last 72 hours. APTT: No results for input(s): APTT in the last 72 hours. CARDIAC ENZYMES: No results for input(s): CKMB, CKMBINDEX, TROPONINI in the last 72 hours. Invalid input(s): CKTOTAL;3  FASTING LIPID PANEL:  Lab Results   Component Value Date    TRIG 208 (H) 12/19/2019     LIVER PROFILE: No results for input(s): AST, ALT, ALB, BILIDIR, BILITOT, ALKPHOS in the last 72 hours. MICROBIOLOGY:   Lab Results   Component Value Date/Time    CULTURE NO GROWTH 6 DAYS 12/02/2019 04:13 AM       Imaging:    Xr Chest Portable    Result Date: 12/23/2019  1. New right upper lobe collapse. This could be due to mucous plugging. 2. ETT tip 3.3 cm above the ron. 3. Cardiomegaly. The findings were sent to the Radiology Results [address] at 10:05 pm on 12/23/2019to be communicated to a licensed caregiver. Xr Chest Portable    Result Date: 12/23/2019  1. No significant interval change since previous examination. 2. Support hardware, as the detailed above. Xr Chest Portable    Result Date: 12/22/2019  Limited low lung volume examination. Cardiomegaly and mild central vascular congestion. No obvious focal airspace consolidation. Ir Tunneled Cvc Place Wo Sq Port/pump > 5 Years    Result Date: 12/24/2019  Successful ultrasound and fluoroscopy guided tunneled catheter placement, as above. Catheter is ready for use at this time. Xr Abdomen For Ng/og/ne Tube Placement    Result Date: 12/21/2019  Enteric tube tip in the gastric body. ASSESSMENT & PLAN     ASSESSMENT / PLAN:     Principal Problem:  1.    Follow up on urology

## 2019-12-30 LAB
ABSOLUTE EOS #: 0.62 K/UL (ref 0–0.44)
ABSOLUTE IMMATURE GRANULOCYTE: 0.03 K/UL (ref 0–0.3)
ABSOLUTE LYMPH #: 1.32 K/UL (ref 1.1–3.7)
ABSOLUTE MONO #: 0.65 K/UL (ref 0.1–1.2)
ANION GAP SERPL CALCULATED.3IONS-SCNC: 11 MMOL/L (ref 9–17)
BASOPHILS # BLD: 0 % (ref 0–2)
BASOPHILS ABSOLUTE: 0.03 K/UL (ref 0–0.2)
BUN BLDV-MCNC: 53 MG/DL (ref 6–20)
BUN/CREAT BLD: ABNORMAL (ref 9–20)
CALCIUM SERPL-MCNC: 7.7 MG/DL (ref 8.6–10.4)
CHLORIDE BLD-SCNC: 102 MMOL/L (ref 98–107)
CO2: 22 MMOL/L (ref 20–31)
CREAT SERPL-MCNC: 1.84 MG/DL (ref 0.7–1.2)
DIFFERENTIAL TYPE: ABNORMAL
EOSINOPHILS RELATIVE PERCENT: 8 % (ref 1–4)
GFR AFRICAN AMERICAN: 51 ML/MIN
GFR NON-AFRICAN AMERICAN: 42 ML/MIN
GFR SERPL CREATININE-BSD FRML MDRD: ABNORMAL ML/MIN/{1.73_M2}
GFR SERPL CREATININE-BSD FRML MDRD: ABNORMAL ML/MIN/{1.73_M2}
GLUCOSE BLD-MCNC: 133 MG/DL (ref 75–110)
GLUCOSE BLD-MCNC: 141 MG/DL (ref 75–110)
GLUCOSE BLD-MCNC: 156 MG/DL (ref 75–110)
GLUCOSE BLD-MCNC: 159 MG/DL (ref 75–110)
GLUCOSE BLD-MCNC: 163 MG/DL (ref 70–99)
GLUCOSE BLD-MCNC: 165 MG/DL (ref 75–110)
GLUCOSE BLD-MCNC: 169 MG/DL (ref 75–110)
HCT VFR BLD CALC: 25.2 % (ref 40.7–50.3)
HEMOGLOBIN: 7.8 G/DL (ref 13–17)
IMMATURE GRANULOCYTES: 0 %
LYMPHOCYTES # BLD: 16 % (ref 24–43)
MCH RBC QN AUTO: 28.4 PG (ref 25.2–33.5)
MCHC RBC AUTO-ENTMCNC: 31 G/DL (ref 28.4–34.8)
MCV RBC AUTO: 91.6 FL (ref 82.6–102.9)
MONOCYTES # BLD: 8 % (ref 3–12)
NRBC AUTOMATED: 0 PER 100 WBC
PDW BLD-RTO: 19.8 % (ref 11.8–14.4)
PLATELET # BLD: 356 K/UL (ref 138–453)
PLATELET ESTIMATE: ABNORMAL
PMV BLD AUTO: 8.3 FL (ref 8.1–13.5)
POTASSIUM SERPL-SCNC: 4 MMOL/L (ref 3.7–5.3)
RBC # BLD: 2.75 M/UL (ref 4.21–5.77)
RBC # BLD: ABNORMAL 10*6/UL
SEG NEUTROPHILS: 67 % (ref 36–65)
SEGMENTED NEUTROPHILS ABSOLUTE COUNT: 5.47 K/UL (ref 1.5–8.1)
SODIUM BLD-SCNC: 135 MMOL/L (ref 135–144)
WBC # BLD: 8.1 K/UL (ref 3.5–11.3)
WBC # BLD: ABNORMAL 10*3/UL

## 2019-12-30 PROCEDURE — 6370000000 HC RX 637 (ALT 250 FOR IP): Performed by: STUDENT IN AN ORGANIZED HEALTH CARE EDUCATION/TRAINING PROGRAM

## 2019-12-30 PROCEDURE — 2580000003 HC RX 258: Performed by: STUDENT IN AN ORGANIZED HEALTH CARE EDUCATION/TRAINING PROGRAM

## 2019-12-30 PROCEDURE — 80048 BASIC METABOLIC PNL TOTAL CA: CPT

## 2019-12-30 PROCEDURE — 6360000002 HC RX W HCPCS: Performed by: STUDENT IN AN ORGANIZED HEALTH CARE EDUCATION/TRAINING PROGRAM

## 2019-12-30 PROCEDURE — 99232 SBSQ HOSP IP/OBS MODERATE 35: CPT | Performed by: INTERNAL MEDICINE

## 2019-12-30 PROCEDURE — 36415 COLL VENOUS BLD VENIPUNCTURE: CPT

## 2019-12-30 PROCEDURE — 85025 COMPLETE CBC W/AUTO DIFF WBC: CPT

## 2019-12-30 PROCEDURE — 2060000000 HC ICU INTERMEDIATE R&B

## 2019-12-30 PROCEDURE — 6370000000 HC RX 637 (ALT 250 FOR IP): Performed by: INTERNAL MEDICINE

## 2019-12-30 PROCEDURE — 82947 ASSAY GLUCOSE BLOOD QUANT: CPT

## 2019-12-30 PROCEDURE — 97110 THERAPEUTIC EXERCISES: CPT

## 2019-12-30 PROCEDURE — 97605 NEG PRS WND THER DME<=50SQCM: CPT

## 2019-12-30 RX ADMIN — HEPARIN SODIUM 5000 UNITS: 5000 INJECTION INTRAVENOUS; SUBCUTANEOUS at 21:54

## 2019-12-30 RX ADMIN — POLYETHYLENE GLYCOL 3350 17 G: 17 POWDER, FOR SOLUTION ORAL at 08:59

## 2019-12-30 RX ADMIN — FAMOTIDINE 20 MG: 20 TABLET, FILM COATED ORAL at 08:59

## 2019-12-30 RX ADMIN — CARVEDILOL 6.25 MG: 6.25 TABLET, FILM COATED ORAL at 08:59

## 2019-12-30 RX ADMIN — INSULIN LISPRO 3 UNITS: 100 INJECTION, SOLUTION INTRAVENOUS; SUBCUTANEOUS at 17:09

## 2019-12-30 RX ADMIN — BUMETANIDE 2 MG: 1 TABLET ORAL at 08:59

## 2019-12-30 RX ADMIN — Medication 15 ML: at 08:59

## 2019-12-30 RX ADMIN — Medication: at 13:22

## 2019-12-30 RX ADMIN — INSULIN LISPRO 3 UNITS: 100 INJECTION, SOLUTION INTRAVENOUS; SUBCUTANEOUS at 21:54

## 2019-12-30 RX ADMIN — Medication 1 MG: at 04:03

## 2019-12-30 RX ADMIN — INSULIN LISPRO 3 UNITS: 100 INJECTION, SOLUTION INTRAVENOUS; SUBCUTANEOUS at 13:27

## 2019-12-30 RX ADMIN — Medication 100 MG: at 08:59

## 2019-12-30 RX ADMIN — HEPARIN SODIUM 5000 UNITS: 5000 INJECTION INTRAVENOUS; SUBCUTANEOUS at 06:14

## 2019-12-30 RX ADMIN — SODIUM CHLORIDE, PRESERVATIVE FREE 10 ML: 5 INJECTION INTRAVENOUS at 13:23

## 2019-12-30 RX ADMIN — HEPARIN SODIUM 5000 UNITS: 5000 INJECTION INTRAVENOUS; SUBCUTANEOUS at 13:27

## 2019-12-30 RX ADMIN — SODIUM CHLORIDE, PRESERVATIVE FREE 10 ML: 5 INJECTION INTRAVENOUS at 21:52

## 2019-12-30 NOTE — PROGRESS NOTES
Nutrition Assessment (Enteral Nutrition)    Type and Reason for Visit: Reassess    Nutrition Recommendations: Continue dental soft diet. Continue Pivot at 50 ml per hour (1800 kcal, 113g protein)    Nutrition Assessment: Nursing states pt is consuming 25-50% of food provided. He is tolerating tube feed at goal of 50 ml per hour. Plan for wound vac change tomorrow. Malnutrition Assessment:  · Malnutrition Status: At risk for malnutrition  · Context: Chronic illness  · Findings of the 6 clinical characteristics of malnutrition (Minimum of 2 out of 6 clinical characteristics is required to make the diagnosis of moderate or severe Protein Calorie Malnutrition based on AND/ASPEN Guidelines):  1. Energy Intake-Greater than 75% of estimated energy requirement, Greater than or equal to 7 days(Intakes improved with start of TPN.)    2. Weight Loss-Unable to assess, unable to assess  3. Fat Loss-No significant subcutaneous fat loss,    4. Muscle Loss-No significant muscle mass loss,    5. Fluid Accumulation-Moderate to severe fluid accumulation, Extremities, Generalized  6.  Strength-Not measured    Nutrition Risk Level: High    Nutrition Needs:  · Estimated Daily Total Kcal: 2044-9726 kcal/day  · Estimated Daily Protein (g): 110-145 g pro/day   Nutrition Diagnosis:   · Problem: Increased nutrient needs  · Etiology: related to (healing )     Signs and symptoms:  as evidenced by (necrotizing fasciitis, wounds )    Objective Information:  · Nutrition-Focused Physical Findings: Colostomy.   · Wound Type: Multiple, Open Wounds, Surgical Wound, Diabetic Ulcer  · Current Nutrition Therapies:  · Oral Diet Orders: Dental Soft   · Oral Diet intake: 26-50%  · Tube Feeding (TF) Orders:   · Feeding Route: Nasogastric  · Formula: Immune Enhancing  · Rate (ml/hr):50 ml per hour    · Volume (ml/day): 1200 mLs   · Duration: Continuous  · Goal TF & Flush Orders Provides: 1800 kcals, 113 gms protein   · Anthropometric Measures:  · Ht: 5' 7\" (170.2 cm)   · Current Body Wt: 279 lb 12.2 oz (126.9 kg)  · Admission Body Wt: 291 lb 0.1 oz (132 kg)  · Usual Body Wt: (Dry weight - 319-330 lb per [place])  · Weight Change: Difficult to accurately assess weight change d/t variable weights - pt fluid overloaded   · Ideal Body Wt: 160 lb 15 oz (73 kg), % Ideal Body 174%  · BMI Classification: BMI > or equal to 40.0 Obese Class III    Nutrition Interventions:   Continue current diet, Continue current Tube Feeding  Continued Inpatient Monitoring, Education Not Indicated    Nutrition Evaluation:   · Evaluation: Goal achieved   · Goals: Meet % of estimated nutrition needs   · Monitoring: Meal Intake, TF Intake, TF Tolerance, Monitor Bowel Function, Pertinent Labs, Wound Healing      Electronically signed by Jefferson Vargas RD, LD on 12/30/19 at 1:55 PM    Contact Number: [phone]

## 2019-12-30 NOTE — PROGRESS NOTES
Renal Progress Note    Patient :  Dell Melendez; 37 y.o. MRN# [id number]  Location:  [id number]  Attending:  Laura Rod MD  Admit Date:  11/29/2019   Hospital Day: 31      Subjective:     Patient is seen and examined. No new issues overnight reported. Patient made about 3 L a urine last 24 hours. Patient serum creatinine today was 1.84 mg/DL. Electrolytes stable. His last hemodialysis was on 12/27/2019. His renal function seems to be improving. He still has overall positive fluid. Currently on tube feeds. Outpatient Medications:     No medications prior to admission. Current Medications:     Scheduled Meds:    bumetanide  2 mg Oral Daily    [START ON 1/3/2020] darbepoetin rohan-polysorbate  100 mcg Intravenous Weekly    docusate  100 mg Oral Daily    polyethylene glycol  17 g Oral Daily    insulin lispro  0-18 Units Subcutaneous Q4H    carvedilol  6.25 mg Per NG tube Daily    CENTRUM/CERTA-CHANCE with minerals oral  15 mL Oral Daily    alteplase  1 mg Intracatheter Once    povidone-iodine   Topical Daily    famotidine  20 mg Per NG tube Daily    sodium chloride flush  10 mL Intravenous 2 times per day    heparin (porcine)  5,000 Units Subcutaneous 3 times per day     Continuous Infusions:    sodium chloride      dextrose      sodium chloride 10 mL/hr at 12/28/19 2003     PRN Meds:  oxyCODONE **OR** oxyCODONE, heparin (porcine), heparin (porcine), melatonin, acetaminophen, labetalol, heparin (porcine), heparin (porcine), glucose, dextrose, glucagon (rDNA), dextrose, albumin human, LORazepam, sodium chloride, sodium chloride, albumin human, sodium chloride flush, potassium chloride **OR** potassium alternative oral replacement **OR** potassium chloride, REFRESH LACRI-LUBE, magnesium hydroxide, ondansetron    Input/Output:       I/O last 3 completed shifts: In: 1300 [P.O.:600; I.V.:200; NG/GT:500]  Out: [facility] [Urine:3700; Drains:1150; Stool:200].       Patient Vitals for the past 96 hrs (Last ABHIJEET Bocanegra M.D. 11/30/2019     C3:     Lab Results   Component Value Date    C3 97 11/30/2019     C4:     Lab Results   Component Value Date    C4 12 11/30/2019     Hep BsAg:         Lab Results   Component Value Date    HEPBSAG NONREACTIVE 11/30/2019     Hep C AB:          Lab Results   Component Value Date    HEPCAB NONREACTIVE 11/30/2019       Urinalysis/Chemistries:      Lab Results   Component Value Date    NITRU NEGATIVE 11/29/2019    COLORU YELLOW 11/29/2019    PHUR 5.0 11/29/2019    WBCUA 5 TO 10 11/29/2019    RBCUA 0 TO 2 11/29/2019    MUCUS NOT REPORTED 11/29/2019    TRICHOMONAS NOT REPORTED 11/29/2019    YEAST NOT REPORTED 11/29/2019    BACTERIA NOT REPORTED 11/29/2019    SPECGRAV 1.015 11/29/2019    LEUKOCYTESUR NEGATIVE 11/29/2019    UROBILINOGEN Normal 11/29/2019    BILIRUBINUR NEGATIVE 11/29/2019    GLUCOSEU NEGATIVE 11/29/2019    KETUA NEGATIVE 11/29/2019    AMORPHOUS NOT REPORTED 11/29/2019     Urine Sodium:     Lab Results   Component Value Date    DONOVAN 39 11/30/2019      Urine Creatinine:     Lab Results   Component Value Date    LABCREA 116.4 11/30/2019     Radiology:     Reviewed. Assessment:     1. Acute Kidney Injury: Secondary to ischemic ATN from sepsis syndrome from necrotizing fasciitis. He has been dialysis dependent.  Has a left IJ tunnel dialysis catheter in place. 2.  Chronic kidney diseae stage III from diabetic nephrosclerosis baseline 1.6-1.8 MG/DL.    3.  Necrotizing fasciitis status post wide complex debridement of gluteal, perineal region and open colostomy.    4.  Respiratory Failure: Was on the ventilator now extubated.   5.  Protein calorie malnutrition on tube feeds  6.  Fluid overload: Continue to remove fluid on dialysis as tolerated, much better in the last 3 to 4 days with daily ultrafiltration/HD, about 10 L of fluid removed in the last week although he was getting significant amount of fluid volume from various infusions including TPN and tube feeds up until Friday

## 2019-12-30 NOTE — PROGRESS NOTES
Progress Note  Podiatric Medicine and Surgery     Subjective     CC: Left foot wound    Patient seen and examined at bedside   Afebrile, vital signs stable   Pt is now extubated, more awake and alert  Per chart review plan for OR tomorrow with Gen Surg. HPI :  Tricia Moise is a 37 y.o. male seen at [address] for multiple wounds of his LLE. Patient is currently intubated and sedated, no family in the room to obtain history. HPI primarily obtained via EMR chart review. Patient presented to the ED yesterday for generalized malaise. Patient usually lives at home with his mother as a caretaker. He had been complaining of fatigue, fever, chills for multiple days before presenting to the ED. Patient found to have significant necrotizing infection to the gluteal region extending to the perineum. CT demonstrated extensive soft tissue emphysema. Patient admitted to the ICU for necrotizing fasciitis and acute renal failure. S/p I&D and complex debridement of affect tissues 11/29/19. Currently on Zosyn.      PCP is No primary care provider on file. ROS: Denies N/V/F/C/SOB/CP. Otherwise negative except at stated in the HPI.      Medications:  Scheduled Meds:   bumetanide  2 mg Oral Daily    [START ON 1/3/2020] darbepoetin rohan-polysorbate  100 mcg Intravenous Weekly    docusate  100 mg Oral Daily    polyethylene glycol  17 g Oral Daily    insulin lispro  0-18 Units Subcutaneous Q4H    carvedilol  6.25 mg Per NG tube Daily    CENTRUM/CERTA-CHANCE with minerals oral  15 mL Oral Daily    alteplase  1 mg Intracatheter Once    povidone-iodine   Topical Daily    famotidine  20 mg Per NG tube Daily    sodium chloride flush  10 mL Intravenous 2 times per day    heparin (porcine)  5,000 Units Subcutaneous 3 times per day       Continuous Infusions:   sodium chloride      dextrose      sodium chloride 10 mL/hr at 12/28/19 2003       PRN Meds:oxyCODONE **OR** oxyCODONE, heparin (porcine), heparin (porcine), melatonin, acetaminophen, labetalol, heparin (porcine), heparin (porcine), glucose, dextrose, glucagon (rDNA), dextrose, albumin human, LORazepam, sodium chloride, sodium chloride, albumin human, sodium chloride flush, potassium chloride **OR** potassium alternative oral replacement **OR** potassium chloride, REFRESH LACRI-LUBE, magnesium hydroxide, ondansetron    Objective     Vitals:  Patient Vitals for the past 8 hrs:   BP Temp Temp src Pulse Resp SpO2 Weight   19 1140 (!) 162/90 98.3 °F (36.8 °C) Axillary 103 18 -- --   19 0923 (!) 153/94 99 °F (37.2 °C) Oral 93 15 95 % --   19 0557 -- -- -- -- -- -- 279 lb 14.4 oz (127 kg)     Average, Min, and Max for last 24 hours Vitals:  TEMPERATURE:  Temp  Av °F (36.7 °C)  Min: 97.3 °F (36.3 °C)  Max: 99 °F (37.2 °C)    RESPIRATIONS RANGE: Resp  Av.7  Min: 14  Max: 19    PULSE RANGE: Pulse  Av.6  Min: 93  Max: 109    BLOOD PRESSURE RANGE:  Systolic (49ZAX), GET:797 , Min:145 , TUH:751   ; Diastolic (90OFB), JJU:27, Min:81, Max:94      PULSE OXIMETRY RANGE: SpO2  Av %  Min: 95 %  Max: 99 %    I/O last 3 completed shifts: In: 1200 [P.O.:500; I.V.:200; NG/GT:500]  Out: 4400 [Urine:3050; Drains:1150; Stool:200]    CBC:  Recent Labs     19  0953 19  0823 19  0549   WBC 6.4 6.7 8.1   HGB 7.7* 8.0* 7.8*   HCT 26.2* 26.8* 25.2*    330 356        BMP:  Recent Labs     19  0953 19  0823 19  0549    135 135   K 3.8 4.1 4.0    102 102   CO2 25 25 22   BUN 43* 50* 53*   CREATININE 1.68* 1.76* 1.84*   GLUCOSE 147* 142* 163*   CALCIUM 7.7* 7.4* 7.7*        Coags:  No results for input(s): APTT, PROT, INR in the last 72 hours. Lab Results   Component Value Date    SEDRATE 66 (H) 2019     No results for input(s): CRP in the last 72 hours. Lower Extremity Physical Exam:  Vascular: DP and PT pulses are non- palpable. BLE DP/PT audible on doppler. CFT <5 seconds to all digits.   Hair growth is absent to the level of the digits. Non pitting edema to b/l LE.       Neuro: Saph/sural/SP/DP/plantar sensation absent to light touch.     Musculoskeletal: Muscle strength is decreased ROM, decreased strength to all lower extremity muscle groups. Gross deformity is present with contracted external rotation of b/l LE.     Dermatologic: Full thickness ulcer #1 located plantar and measures approximately 14cm x 5.5 cm x 0.3cm. Base is 30% granular 70% necrotic. Periwound skin is dry. no drainage noted. No purulence drainage noted. Erythema absent with no associated increase in warmth. Does not probe to bone, sinus track, or undermine. No fluctuance, crepitus, or induration. Interdigital maceration absent. Clinical Images: 12/30/19            Imaging:   XR CHEST PORTABLE   Preliminary Result   1. Endotracheal tube not identified. 2. Venous catheters terminating at the cavoatrial junction. 3. Interstitial pulmonary edema. IR TUNNELED CVC PLACE WO SQ PORT/PUMP > 5 YEARS   Final Result   Successful ultrasound and fluoroscopy guided tunneled catheter placement, as   above. Catheter is ready for use at this time. XR CHEST PORTABLE   Final Result   1. New right upper lobe collapse. This could be due to mucous plugging. 2. ETT tip 3.3 cm above the ron. 3. Cardiomegaly. The findings were sent to the Radiology Results [address] at 10:05   pm on 12/23/2019to be communicated to a licensed caregiver. XR CHEST PORTABLE   Final Result   1. No significant interval change since previous examination. 2. Support hardware, as the detailed above. XR CHEST PORTABLE   Final Result   Limited low lung volume examination. Cardiomegaly and mild central vascular   congestion. No obvious focal airspace consolidation. XR ABDOMEN FOR NG/OG/NE TUBE PLACEMENT   Final Result   Enteric tube tip in the gastric body.          XR ABDOMEN FOR NG/OG/NE TUBE PLACEMENT Final Result   There are 2 tubes which appear to terminate overlying the left upper quadrant   of the abdomen, projected over the expected location of the stomach. CT ABDOMEN PELVIS W IV CONTRAST Additional Contrast? None   Final Result   1. Large soft tissue defects in the right inguinal region, scrotum and right   greater than left gluteal crease is with associated packing material, small   amount of surrounding soft tissue gas and skin thickening likely relates to   history of necrotizing fasciitis. 2.  Postsurgical changes of the bowel with left lower quadrant ostomy. No   bowel obstruction. Percutaneous intraperitoneal surgical drains. 3.  Mild abdominopelvic ascites. Loculated fluid along the inferior aspect   of the liver. No intraperitoneal free air. 4.  Diffuse anasarca. 5.   Bilateral pleural effusions with associated heterogeneous opacities and   consolidations. Underlying infection is not excluded. XR CHEST PORTABLE   Final Result   1. Increased left basilar airspace opacity potentially due to atelectasis,   pneumonia, and/or aspiration. 2. Decreased right basilar airspace opacity likely due to improved   atelectasis. 3. No significant change in central predominant alveolar interstitial edema   likely due to congestive heart failure given moderate cardiomegaly. Pneumonia could appear similar. 4. Suspected left pleural effusion. XR CHEST PORTABLE   Final Result   Worsening features of volume overload. New left IJ line in good position; no evidence of procedure related   pneumothorax. XR ABDOMEN (KUB) (SINGLE AP VIEW)   Final Result   Ovoid increased density projecting over the leftward aspect of the pelvis is   likely due to the patient's reported surgery. There are no defined surgical instruments noted. US RENAL COMPLETE   Final Result   Right renal cyst, otherwise grossly negative renal ultrasound.   Nondiagnostic bladder assessment. XR FOOT LEFT (MIN 3 VIEWS)   Final Result   Severe soft tissue swelling and laceration plantar aspect. XR TIBIA FIBULA LEFT (2 VIEWS)   Final Result   No acute osseous abnormality. Possible subcutaneous gas laterally. This might suggest infection. Recommend CT with IV contrast.      RECOMMENDATIONS:   The findings were sent to the Radiology Results [address] at 10:27   pm on 11/29/2019to be communicated to a licensed caregiver. XR CHEST (SINGLE VIEW FRONTAL)   Final Result   New ET tube as above. Possible new enteric tube not well visualized. Recommend follow-up KUB if clinically warranted. XR CHEST PORTABLE   Final Result   Moderate cardiomegaly. Lines as above. Assessment   Donal Rivera is a 37 y.o. male with   1. Stage III pressure ulcer to plantar foot, LLE  2. Stage I pressure ulcer to lateral leg, LLE-resolved  3. Necrotizing fascitis of gluteal region  4. Acute renal failure   5. Leukocytosis   6. S/p multiple gluteal debridements    Principal Problem:    Necrotizing fasciitis ([address]  Active Problems:    Essential hypertension    Diabetes ([address]    Diabetic foot ulcer ([address]    Diabetic foot infection ([address]    Iron deficiency anemia  Resolved Problems:    Septic shock ([address]    Bandemia    Acute respiratory failure with hypoxemia ([address]       Plan     · Patient examined and evaluated at bedside with Dr. Jadyn Riddle. · Radiographs reviewed- no overt signs of osteomyelitis. Possible soft tissue emphysema to lateral LLE however does not correlate clinically. There is no concern for infection at this site. · Continue to float bilateral foot to offload heels with use of pillows under the legs  · Daily dressing changes per nursing staff to left foot consisting of betadine gauze, dry gauze and DSD to left foot. · Dressing applied to Left Foot: Betadine DSD. · Pt to continue to wear Rooke boot for RLE.   · Elevate heels of bed.  · No surgical intervention indicated at this time. Will continue to follow from [facility]. Will see patient weekly while admitted. Continue with local wound care.    · NWB to Left lower extremity      Electronically signed by Theodora Carrion DPM on 12/30/2019 at 1:10 PM

## 2019-12-30 NOTE — PLAN OF CARE
Problem: Pain:  Goal: Pain level will decrease  Description  Pain level will decrease  Outcome: Ongoing  Goal: Control of acute pain  Description  Control of acute pain  Outcome: Ongoing  Goal: Control of chronic pain  Description  Control of chronic pain  Outcome: Ongoing     Problem: Skin Integrity:  Goal: Will show no infection signs and symptoms  Description  Will show no infection signs and symptoms  Outcome: Ongoing  Goal: Absence of new skin breakdown  Description  Absence of new skin breakdown  Outcome: Ongoing     Problem: OXYGENATION/RESPIRATORY FUNCTION  Goal: Patient will maintain patent airway  Outcome: Ongoing  Goal: Patient will achieve/maintain normal respiratory rate/effort  Description  Respiratory rate and effort will be within normal limits for the patient  Outcome: Ongoing     Problem: SKIN INTEGRITY  Goal: Skin integrity is maintained or improved  Outcome: Ongoing     Problem: Falls - Risk of:  Goal: Will remain free from falls  Description  Will remain free from falls  Outcome: Ongoing  Goal: Absence of physical injury  Description  Absence of physical injury  Outcome: Ongoing     Problem: Risk for Impaired Skin Integrity  Goal: Tissue integrity - skin and mucous membranes  Description  Structural intactness and normal physiological function of skin and  mucous membranes.   Outcome: Ongoing     Problem: Nutrition  Goal: Optimal nutrition therapy  Description  Nutrition Problem: Inadequate oral intake  Intervention: Food and/or Nutrient Delivery: Start Parenteral Nutrition  Nutritional Goals: Meet % of estimated nutrition needs   Outcome: Ongoing     Problem: Confusion - Acute:  Goal: Absence of continued neurological deterioration signs and symptoms  Description  Absence of continued neurological deterioration signs and symptoms  Outcome: Ongoing  Goal: Mental status will be restored to baseline  Description  Mental status will be restored to baseline  Outcome: Ongoing     Problem: Discharge Planning:  Goal: Ability to perform activities of daily living will improve  Description  Ability to perform activities of daily living will improve  Outcome: Ongoing  Goal: Participates in care planning  Description  Participates in care planning  Outcome: Ongoing     Problem: Injury - Risk of, Physical Injury:  Goal: Will remain free from falls  Description  Will remain free from falls  Outcome: Ongoing  Goal: Absence of physical injury  Description  Absence of physical injury  Outcome: Ongoing     Problem: Mood - Altered:  Goal: Mood stable  Description  Mood stable  Outcome: Ongoing  Goal: Absence of abusive behavior  Description  Absence of abusive behavior  Outcome: Ongoing  Goal: Verbalizations of feeling emotionally comfortable while being cared for will increase  Description  Verbalizations of feeling emotionally comfortable while being cared for will increase  Outcome: Ongoing     Problem: Psychomotor Activity - Altered:  Goal: Absence of psychomotor disturbance signs and symptoms  Description  Absence of psychomotor disturbance signs and symptoms  Outcome: Ongoing     Problem: Sensory Perception - Impaired:  Goal: Demonstrations of improved sensory functioning will increase  Description  Demonstrations of improved sensory functioning will increase  Outcome: Ongoing  Goal: Decrease in sensory misperception frequency  Description  Decrease in sensory misperception frequency  Outcome: Ongoing  Goal: Able to refrain from responding to false sensory perceptions  Description  Able to refrain from responding to false sensory perceptions  Outcome: Ongoing  Goal: Demonstrates accurate environmental perceptions  Description  Demonstrates accurate environmental perceptions  Outcome: Ongoing  Goal: Able to distinguish between reality-based and nonreality-based thinking  Description  Able to distinguish between reality-based and nonreality-based thinking  Outcome: Ongoing  Goal: Able to interrupt nonreality-based thinking  Description  Able to interrupt nonreality-based thinking  Outcome: Ongoing     Problem: Sleep Pattern Disturbance:  Goal: Appears well-rested  Description  Appears well-rested  Outcome: Ongoing     Problem: SAFETY  Goal: Free from accidental physical injury  Outcome: Ongoing  Goal: Free from intentional harm  Outcome: Ongoing     Problem: DAILY CARE  Goal: Daily care needs are met  Outcome: Ongoing     Problem: PAIN  Goal: Patient's pain/discomfort is manageable  Outcome: Ongoing     Problem: KNOWLEDGE DEFICIT  Goal: Patient/S.O. demonstrates understanding of disease process, treatment plan, medications, and discharge instructions.   Outcome: Ongoing     Problem: DISCHARGE BARRIERS  Goal: Patient's continuum of care needs are met  Outcome: Ongoing     Problem: Physical Regulation:  Goal: Diagnostic test results will improve  Description  Diagnostic test results will improve  Outcome: Ongoing  Goal: Will remain free from infection  Description  Will remain free from infection  Outcome: Ongoing  Goal: Ability to maintain vital signs within normal range will improve  Description  Ability to maintain vital signs within normal range will improve  Outcome: Ongoing

## 2019-12-30 NOTE — PROGRESS NOTES
Pt refusing RN to administer heparin subcutaneously. RN educated pt on importance of receiving medication and why he is getting it. Pt vitals stable. Will continue to monitor.

## 2019-12-30 NOTE — PROGRESS NOTES
12/03/19 1158   Present on Hospital Admission: No  Primary Wound Type: Surgical Type  Location: Abdomen  Wound Location Orientation: Mid   Wound Image    Wound Assessment Clean;Red;Granulation tissue   Liliam-wound Assessment Intact  (bordered with srtips of hydrocolloid)   Drainage Description Serosanguinous   Odor None   Dressing/Treatment Vacuum dressing   Dressing Changed Changed/New   Dressing Status Changed   Dressing Change Due 01/01/20   Negative Pressure Wound Therapy Abdomen Mid   Placement Date/Time: 12/20/19 1800   Pre-existing: No  Inserted by: Lesley Caro  Location: Abdomen  Wound Location Orientation: Mid   Wound Type Surgical   Unit Type KCI VAC ulta  (Y-site connected with right groin wound)   Dressing Type Black foam   Number of pieces used 3   Cycle Continuous; On   Target Pressure (mmHg) 175   Canister changed? No   Dressing Status Changed   Dressing Changed Changed/New   Dressing Change Due 01/01/20   Wound Assessment   (see incision LDA)            Response to treatment:  Well tolerated by patient. Plan   Plan of Care:  M-W-F dressing changes to the abdominal incision with colostomy care using black granufoam.    Surgery managing VAC Veriflo to sacral/perineal wounds.       Specialty Bed Required : Yes   [x] Low Air Loss   [x] Pressure Redistribution  [] Fluid Immersion  [] Bariatric  [] Total Pressure Relief  [] Other:     Current Diet: DIET DENTAL SOFT;  Diet Tube Feed Continuous/Cyclic w/ Diet  Dietician consult:  Yes        Patient/Caregiver Teaching:     Ying ALBERTO, RN, [employer]

## 2019-12-30 NOTE — PROGRESS NOTES
changes. Awake and alert. No respiratory distress. Vacs with good seal no leak. OBJECTIVE  VITALS: BP (!) 148/81   Pulse 105   Temp 97.6 °F (36.4 °C) (Oral)   Resp 17   Ht 5' 7\" (1.702 m)   Wt 279 lb 14.4 oz (127 kg)   SpO2 96%   BMI 43.84 kg/m²     GENERAL: A&O. NAD   NEURO: responsive, follows commands, cooperative  LUNGS: chest rise equal. resp even and unlabored   HEART: RRR  ABDOMEN: soft, ND, mild tenderness. Midline vac in place. Ostomy appliance in place, stool in bag.   - sacral and perineal vera flow wound vac in place. No leak noted    EXTREMITY: dressing to L foot is c/d/i. No cyanosis     I/O last 3 completed shifts: In: 1200 [P.O.:500; I.V.:200; NG/GT:500]  Out: 4400 [Urine:3050; Drains:1150; Stool:200]    Drain/tube output: In: 1000 [P.O.:300; I.V.:200; NG/GT:500]  Out: 3550 [Urine:2200; Drains:1150]    LAB:  CBC:   Recent Labs     12/28/19  0953 12/29/19  0823 12/30/19  0549   WBC 6.4 6.7 8.1   HGB 7.7* 8.0* 7.8*   HCT 26.2* 26.8* 25.2*   MCV 96.3 95.7 91.6    330 356     BMP:   Recent Labs     12/28/19  0953 12/29/19  0823 12/30/19  0549    135 135   K 3.8 4.1 4.0    102 102   CO2 25 25 22   BUN 43* 50* 53*   CREATININE 1.68* 1.76* 1.84*   GLUCOSE 147* 142* 163*         RADIOLOGY:   no new images   Noy Villanueva DO  12/30/2019        Attending Note      I have reviewed the above [facility] resident progress note and I either performed the key elements of the medical history and physical exam or was present when the resident performed them. I have discussed the findings, established the care plan and recommendations with resident, TECSS nurse and bedside nurse. The following confirms and/or amends the IMPRESSION, MEDICAL DECISION MAKING and PLAN from above.     ASSESSMENT    Patient Active Problem List   Diagnosis    [facility]ec[facility]izing fasciitis [facility]    Essential hypertension    Diabetes ([facility]    Diabetic foot ulcer ([facility]    Diabetic foot infection ([facility]   

## 2019-12-30 NOTE — PROGRESS NOTES
[facility]  Internal Medicine Teaching Residency Program  Inpatient Daily Progress Note  ______________________________________________________________________________    Patient: Lisa Musa  YOB: 1985   WJI:[id number]    Acct: [de-identified]     Room: [address] [place] [address]  Admit date: 11/29/2019  Today's date: 12/30/19  Number of days in the hospital: 32    SUBJECTIVE   Admitting Diagnosis: Necrotizing fasciitis ([facility] [address]  CC: Acute renal failure/Necritizing fascitis    Pt examined at bedside. Chart & results reviewed. Patient answering all questions appropriately  Tolerating oral feeds but not enough, continue tube feeds  Hypomagnesemia overnight, replaced  No other complains overnight        ROS:  Constitutional:  negative for chills, fevers, sweats  Respiratory:  negative for cough, dyspnea on exertion, hemoptysis, shortness of breath, wheezing  Cardiovascular:  negative for chest pain, chest pressure/discomfort, lower extremity edema, palpitations  Gastrointestinal:  negative for abdominal pain, constipation, diarrhea, nausea, vomiting  Neurological:  negative for dizziness, headache  BRIEF HISTORY     Olga Lux a 37 y.o. with PMH of spina bifida, hypertension, diabetes and diabetic foot ulcer. Who presented to [facility] Emergency Department with complaint of generalized weakness for the past few days. On examination the emergency department staff noticed the patient had a foul smell which they thought he had a bowel movement. They turned the patient and noticed sloughing of skin on his back and buttocks.      In the emergency department patient was afebrile but tachycardic. Initial labs demonstrated hyponatremia of 130, bicarb 12, BUN 63 and creatinine 2.82, hypocalcemia 7.7, alkaline phosphatase 144, AST 83, ALT 16, lactate 1.4, leukocytosis of 30.4, hemoglobin 9.2. Chest xray demonstrated no acute process.  CT abdomen and pelvis without contrast demonstrated extensive subcutaneous emphysema involving both buttocks extending to the perineum. Possible phlegmon or developing soft tissue abscess overlying the left lateral abdominal wall. Scrotal Ultrasound demonstrated no evidence of testicular torsion or mass however did show extensive scrotal wall edema. He was given zosyn 4/5 g IV, rocephin 1 g IV and flagyl 500 mg IV in the emergency department.     He was transferred to [address] [place] [address] ICU for Necrotizing Fasciitis and Acute Renal Failure.   Patient initially required pressors, but currently is off pressors.     Patient was started on Vanco and Zosyn, ID was consulted, vancomycin was discontinued patient was started on meropenem later.  Last dose of meropenem was on 2019. Tara Thomson is doing well off antibiotics.     general surgery was consulted for the necrotizing fasciitis.  Patient underwent extensive excision and debridement, diverting colostomy on 2019.  Subsequently patient has had multiple incision and drainage and debridement procedures, last one was on 2019.  As per surgery, no active infection noted.      Nephrology is following for acute renal failure, patient currently on dialysis. Ambrosio is in situ.      podiatary is following for foot wound due to diabetes.  No acute intervention needed at this time.  Wound care following.     OBJECTIVE     Vital Signs:  BP (!) 162/90   Pulse 103   Temp 98.3 °F (36.8 °C) (Axillary)   Resp 18   Ht 5' 7\" (1.702 m)   Wt 279 lb 14.4 oz (127 kg)   SpO2 95%   BMI 43.84 kg/m²     Temp (24hrs), Av °F (36.7 °C), Min:97.3 °F (36.3 °C), Max:99 °F (37.2 °C)    In: 1000   Out: 3550 [Urine:2200; Drains:1150]    Physical Exam:  General appearance:Awake, follows commands in no distress   Skin: warm and dry, no rash or erythema  Eyes: conjunctivae normal and sclera anicteric  ENT:no thrush no pharyngeal congestion   Neck:  No JVD, No Thyromegaly  Pulmonary: clear to auscultation and no wheezing or rhonchi   Cardiovascular: normal S1 and S2. NO rubs and NO murmur.  No S3 OR S4   Abdomen: soft nontender, bowel sounds present, no organomegaly,  no ascites   Extremities: no cyanosis, clubbing 1-2+ chronic edema, wrinkling in lower extremities evident  Access:  previous permacath    Medications:  Scheduled Medications:    bumetanide  2 mg Oral Daily    [START ON 1/3/2020] darbepoetin rohan-polysorbate  100 mcg Intravenous Weekly    docusate  100 mg Oral Daily    polyethylene glycol  17 g Oral Daily    insulin lispro  0-18 Units Subcutaneous Q4H    carvedilol  6.25 mg Per NG tube Daily    CENTRUM/CERTA-CHANCE with minerals oral  15 mL Oral Daily    alteplase  1 mg Intracatheter Once    povidone-iodine   Topical Daily    famotidine  20 mg Per NG tube Daily    sodium chloride flush  10 mL Intravenous 2 times per day    heparin (porcine)  5,000 Units Subcutaneous 3 times per day     Continuous Infusions:    sodium chloride      dextrose      sodium chloride 10 mL/hr at 12/28/19 2003     PRN MedicationsoxyCODONE, 7.5 mg, Q6H PRN    Or  oxyCODONE, 10 mg, Q6H PRN  heparin (porcine), 1,900 Units, PRN  heparin (porcine), 1,900 Units, PRN  melatonin, 1 mg, Nightly PRN  acetaminophen, 650 mg, Q4H PRN  labetalol, 20 mg, Q6H PRN  heparin (porcine), 500 Units, PRN  heparin (porcine), 1,750 Units, PRN  glucose, 15 g, PRN  dextrose, 12.5 g, PRN  glucagon (rDNA), 1 mg, PRN  dextrose, 100 mL/hr, PRN  albumin human, 25 g, PRN  LORazepam, 1 mg, Q6H PRN  sodium chloride, 250 mL, PRN  sodium chloride, 150 mL, PRN  albumin human, 25 g, PRN  sodium chloride flush, 10 mL, PRN  potassium chloride, 40 mEq, PRN    Or  potassium alternative oral replacement, 40 mEq, PRN    Or  potassium chloride, 10 mEq, PRN  REFRESH LACRI-LUBE, , PRN  magnesium hydroxide, 30 mL, Daily PRN  ondansetron, 4 mg, Q6H PRN        Diagnostic Labs:  CBC:   Recent Labs     12/28/19  0953 12/29/19  0823 12/30/19  0549   WBC 6.4 6.7 8.1   RBC 2.72* 2.80* 2.75*   HGB 7.7* 8.0* 7.8*   HCT 26.2* 26.8* 25.2*   MCV 96.3 95.7 91.6   RDW 20.8* 20.2* 19.8*    330 356     BMP:   Recent Labs     12/28/19  0953 12/29/19  0823 12/30/19  0549    135 135   K 3.8 4.1 4.0    102 102   CO2 25 25 22   PHOS  --  2.2*  --    BUN 43* 50* 53*   CREATININE 1.68* 1.76* 1.84*     BNP: No results for input(s): BNP in the last 72 hours. PT/INR: No results for input(s): PROTIME, INR in the last 72 hours. APTT: No results for input(s): APTT in the last 72 hours. CARDIAC ENZYMES: No results for input(s): CKMB, CKMBINDEX, TROPONINI in the last 72 hours. Invalid input(s): CKTOTAL;3  FASTING LIPID PANEL:  Lab Results   Component Value Date    TRIG 208 (H) 12/19/2019     LIVER PROFILE: No results for input(s): AST, ALT, ALB, BILIDIR, BILITOT, ALKPHOS in the last 72 hours. MICROBIOLOGY:   Lab Results   Component Value Date/Time    CULTURE NO GROWTH 6 DAYS 12/02/2019 04:13 AM       Imaging:    Xr Chest Portable    Result Date: 12/23/2019  1. New right upper lobe collapse. This could be due to mucous plugging. 2. ETT tip 3.3 cm above the ron. 3. Cardiomegaly. The findings were sent to the Radiology Results [address] at 10:05 pm on 12/23/2019to be communicated to a licensed caregiver. Xr Chest Portable    Result Date: 12/23/2019  1. No significant interval change since previous examination. 2. Support hardware, as the detailed above. Xr Chest Portable    Result Date: 12/22/2019  Limited low lung volume examination. Cardiomegaly and mild central vascular congestion. No obvious focal airspace consolidation. Ir Tunneled Cvc Place Wo Sq Port/pump > 5 Years    Result Date: 12/24/2019  Successful ultrasound and fluoroscopy guided tunneled catheter placement, as above. Catheter is ready for use at this time. Xr Abdomen For Ng/og/ne Tube Placement    Result Date: 12/21/2019  Enteric tube tip in the gastric body.        ASSESSMENT & PLAN     ASSESSMENT / PLAN:     Principal Problem:  1. Follow up on urology recommendations  2. Surgery planning wound vac change in the OR on a regular basis  3. Hemodialysis held for today  4. Patient will require speech therapy. 5.   Get home medication list from the pharmacy. 6.   Watch for leukocytosis, fevers, any active infection. 7.   Continue tube feeds for now ,if patient tolerates oral feeds will stop the tube feeds  8. Check magnesium and phosphorus in the AM    DVT ppx : Heparin  GI ppx: Pepcid    PT/OT: Onboard  Discharge Planning / SW: as per     Aaliyah Tellez MD  Internal Medicine Resident, PGY-1  [address], [place]  12/30/2019, 12:52 PM   Attending Physician Statement  I have discussed the care of Xin Phelps, including pertinent history and exam findings,  with the resident. I have seen and examined the patient and the key elements of all parts of the encounter have been performed by me. I agree with the assessment, plan and orders as documented by the resident with additions . Treatment plan Discussed with nursing staff in detail , all questions answered . Electronically signed by Cynthia Rivero MD on   12/30/19 at 6:10 PM    Please note that this chart was generated using voice recognition Dragon dictation software. Although every effort was made to ensure the accuracy of this automated transcription, some errors in transcription may have occurred.

## 2019-12-30 NOTE — PROGRESS NOTES
Pt beginning to question purpose of NG tube. When educated on importance and use pt did not seem to quite understand and wants to have it removed. RN tried to redirect and educate once more and the pt began to understand, but still needs teaching reinforcement regarding leaving NG in place. Pt still resting comfortably and vitals are stable. Will continue to monitor.

## 2019-12-30 NOTE — PROGRESS NOTES
surgery but with hypotension requiring a pressor. Zosyn D/C because of of poor LVEF, in order to reduce Na and fluid load  Meropenem started adjusted for Cr Cl 30 cc  Meropenem adjusted for HD  Per surgery after debridement on 12-8-19, infection has spread to deep planes with the urethra less viable. One more debridement in OR scheduled for 12-10-19 and then decision will be made to change code status or not. Pt did not tolerate HD on 12-9. It was stopped early and pt required vasopressor support with Levophed, remains on vent. Pt to OR 12-10 for further debridement  · Repeat I&D planned for 12-16-19 was cancelled because of high K levels  · Bedside I & D on 12/17/19. · Patient to undergo additional I&D on 12-20-19  · 12/23 right upper lobe collapse  · 12/24 debridement with excision of sacral wound in preparation for a flap, partial closure of his scrotal wound, VAC and debridement over the perineum  · Pt making urine. Last HD 12-27  · 12-31 Plan for return to OR for further debridement of perineal wound        CURRENT EVALUATION :12/30/2019     Afebrile  VS stable, intermittent HTN    Easily roused, nods appropriately to questions  Tolerating tube feeds at 55  Abdomen is soft, active BS, ostomy intact and has a VAC in the middle abdominal wound. Next VAC exchange planned for 12-31-19 in OR    Left foot has dystrophic skin, dressing in place. Residual ischemic tissues. Not infected    Last HD 12-27, making urine, HD on hold today 12-30  CXR 12-29:   Endotracheal tube not identified. 2. Venous catheters terminating at the cavoatrial junction. 3. Interstitial pulmonary edema.            Lab & Cultures: 12/30/2019    WBC 6.5->8.1  Hb 6.8->7.8  Plat 249->356    BUN 43->53  Cr 1.68->1.84    Urine:  · NA  Blood:  · 11-30-19: no growth  · 12-2-19: no growth  Sputum :  · NA  Wound:  · 11-29-19: Strep ssp and Gram negative bacilli     I have personally reviewed the past medical history, past surgical history, labs:    CBC with Differential:   Recent Labs     19  0823 19  0549   WBC 6.7 8.1   HGB 8.0* 7.8*   HCT 26.8* 25.2*    356   LYMPHOPCT 19* 16*   MONOPCT 5 8     BMP:   Recent Labs     19  0823 19  0549    135   K 4.1 4.0    102   CO2 25 22   BUN 50* 53*   CREATININE 1.76* 1.84*   MG 1.5*  --      Hepatic Function Panel:   No results for input(s): PROT, LABALBU, BILIDIR, IBILI, BILITOT, ALKPHOS, ALT, AST in the last 72 hours. No results for input(s): RPR in the last 72 hours. No results for input(s): HIV in the last 72 hours. No results for input(s): BC in the last 72 hours. Lab Results   Component Value Date    MUCUS NOT REPORTED 2019    RBC 2.75 2019    TRICHOMONAS NOT REPORTED 2019    WBC 8.1 2019    YEAST NOT REPORTED 2019    TURBIDITY TURBID 2019     Lab Results   Component Value Date    CREATININE 1.84 2019    GLUCOSE 163 2019       Medical Decision Making-Imagin-29 CXR    2019 3:07 pm       COMPARISON:   2019       HISTORY:   ORDERING SYSTEM PROVIDED HISTORY: follow  up   TECHNOLOGIST PROVIDED HISTORY:   follow  up   Acuity: Unknown   Type of Exam: Unknown       FINDINGS:   Endotracheal tube not visualized. Enteric tube courses below the diaphragm.       Left and right-sided catheters terminating at the cavoatrial junction.       Improved aeration of the right upper lobe.  Pulmonary vascular indistinctness   compatible with interstitial pulmonary edema.  Low lung volumes.  No   pneumothorax.  No pleural effusion.  Stable cardiomegaly.           Impression   1. Endotracheal tube not identified. 2. Venous catheters terminating at the cavoatrial junction. 3. Interstitial pulmonary edema.           CT Abd/pelvis  EXAMINATION:   CT OF THE ABDOMEN AND PELVIS WITH CONTRAST 2019 2:29 am       TECHNIQUE:   CT of the abdomen and pelvis was performed with the administration of   intravenous contrast. Multiplanar reformatted images are provided for review. Dose modulation, iterative reconstruction, and/or weight based adjustment of   the mA/kV was utilized to reduce the radiation dose to as low as reasonably   achievable.       COMPARISON:   November 29, 2019.       HISTORY:   ORDERING SYSTEM PROVIDED HISTORY: [facility] fasc   TECHNOLOGIST PROVIDED HISTORY:       [facility] fasc   Reason for Exam: [facility] fasc; Trauma   Acuity: Unknown   Type of Exam: Subsequent/Follow-up       FINDINGS:   Lower Chest: Partially visualized bilateral pleural effusions with bibasilar   heterogeneous opacities and bilateral lower lobe consolidations.  Central   venous catheter tip near the superior atrial caval junction.  Cardiomegaly. Trace pericardial fluid.       Liver: Normal.       Gallbladder and Bile Ducts: Normal.       Spleen: Splenomegaly.       Adrenal Glands: Normal.       Pancreas: Normal.       Genitourinary: Symmetric renal atrophy.  Redemonstration of multiple   hypodensities in the right kidney which likely represent benign cysts.  No   urinary stones or hydronephrosis.  Ambrosio catheter in the decompressed urinary   bladder.       Bowel: Normal caliber bowel.  Postsurgical changes of the bowel with left   lower quadrant ostomy.  No evidence of acute appendicitis.  No significant   diverticular disease.       Vasculature: Atherosclerosis.  No abdominal aortic aneurysm.       Bones and Soft Tissues: Diffuse soft tissue stranding and fluid. Postsurgical changes in the anterior abdominal wall with midline incision   demonstrating expected small amount of fluid and air.  Large soft tissue   defects in the right inguinal region, scrotum, right greater than left   gluteal creases with associated packing material.  Small amount of   surrounding soft tissue gas.  There is associated skin thickening in these   regions.  Bilateral lower abdominal wall skin thickening.       Retroperitoneum/Mesentery: No intraperitoneal free air. organized abscess is seen however. Osseous changes within the left hemipelvis which may be chronic in nature. Results the study were called to Dr. Seth Elder [phone] on 11/29/2019  All CT scans at this facility use dose modulation, iterative reconstruction, and/or weight based dosing when appropriate to reduce radiation dose to as low as reasonably achievable. Medical Decision [id number]:   11/29/2019  8:17 PM - Jesus, Kortneypn Incoming Lab Results From [facility]     Specimen Information: Buttock; Tissue        Component Collected Lab   Specimen Description 11/29/2019  7:[address]  [address]   . BUTTOCK BILATERAL TS    Special Requests 11/29/2019  7:34  [address]   NOT REPORTED    Direct Exam 11/29/2019  7:34  [address]   NO NEUTROPHILS SEEN    Direct Exam Abnormal  11/29/2019  7:34  [facility] [address] [facility] IN [place]    Direct Exam Abnormal  11/29/2019  7:34 PM [address] NEGATIVE RODS    Culture 11/29/2019  7:34  [address]   PENDING          Sky Lamb MD

## 2019-12-30 NOTE — FLOWSHEET NOTE
DATE: 2019  NAME: Edison Rudd  MRN: [id number]   : 1985    Discharge Recommendations: Further therapy recommended at discharge. Subjective: RN and pt agreeable to ROM   Pain: denies  Patient follows: Some Commands  Is patient on ventilator: No  Is patient on sedation: No  Precautions: avoid shearing on buttocks- DO NOT attempt dangling due to severity of wound    Therapeutic exercises:  UE/LE(s)  Bilateral AA/PROM all planes x 10 reps  bilateral gastrocnemius stretching 2 reps x 30 seconds    Goals  Short Term Goals  Short term goal 1: PROM for stretching and injury prevention   Short term goal 2: AROM/AAROM for strengthening   Short term goal 3: Progress mobility as appropriate           Plan: Progress functional mobility as medically appropriate.    Time In: 8116 am   Time Out: 1059 am  Time Coded Minutes (treatment minutes):17  Rehab Potential: guarded   Treatments/week: 2-3x    [address], [place]

## 2019-12-31 LAB
ABSOLUTE EOS #: 0.54 K/UL (ref 0–0.44)
ABSOLUTE IMMATURE GRANULOCYTE: 0.04 K/UL (ref 0–0.3)
ABSOLUTE LYMPH #: 1.29 K/UL (ref 1.1–3.7)
ABSOLUTE MONO #: 0.62 K/UL (ref 0.1–1.2)
ANION GAP SERPL CALCULATED.3IONS-SCNC: 12 MMOL/L (ref 9–17)
BASOPHILS # BLD: 1 % (ref 0–2)
BASOPHILS ABSOLUTE: 0.04 K/UL (ref 0–0.2)
BUN BLDV-MCNC: 56 MG/DL (ref 6–20)
BUN/CREAT BLD: ABNORMAL (ref 9–20)
CALCIUM SERPL-MCNC: 7.8 MG/DL (ref 8.6–10.4)
CHLORIDE BLD-SCNC: 100 MMOL/L (ref 98–107)
CO2: 23 MMOL/L (ref 20–31)
CREAT SERPL-MCNC: 1.87 MG/DL (ref 0.7–1.2)
DIFFERENTIAL TYPE: ABNORMAL
EOSINOPHILS RELATIVE PERCENT: 7 % (ref 1–4)
GFR AFRICAN AMERICAN: 50 ML/MIN
GFR NON-AFRICAN AMERICAN: 41 ML/MIN
GFR SERPL CREATININE-BSD FRML MDRD: ABNORMAL ML/MIN/{1.73_M2}
GFR SERPL CREATININE-BSD FRML MDRD: ABNORMAL ML/MIN/{1.73_M2}
GLUCOSE BLD-MCNC: 138 MG/DL (ref 75–110)
GLUCOSE BLD-MCNC: 146 MG/DL (ref 75–110)
GLUCOSE BLD-MCNC: 151 MG/DL (ref 70–99)
GLUCOSE BLD-MCNC: 153 MG/DL (ref 75–110)
GLUCOSE BLD-MCNC: 159 MG/DL (ref 75–110)
GLUCOSE BLD-MCNC: 160 MG/DL (ref 75–110)
GLUCOSE BLD-MCNC: 166 MG/DL (ref 75–110)
GLUCOSE BLD-MCNC: 222 MG/DL (ref 75–110)
HCT VFR BLD CALC: 27.6 % (ref 40.7–50.3)
HEMOGLOBIN: 8.1 G/DL (ref 13–17)
IMMATURE GRANULOCYTES: 1 %
LYMPHOCYTES # BLD: 15 % (ref 24–43)
MAGNESIUM: 1.6 MG/DL (ref 1.6–2.6)
MCH RBC QN AUTO: 28.3 PG (ref 25.2–33.5)
MCHC RBC AUTO-ENTMCNC: 29.3 G/DL (ref 28.4–34.8)
MCV RBC AUTO: 96.5 FL (ref 82.6–102.9)
MONOCYTES # BLD: 7 % (ref 3–12)
NRBC AUTOMATED: 0 PER 100 WBC
PDW BLD-RTO: 19.9 % (ref 11.8–14.4)
PHOSPHORUS: 1.4 MG/DL (ref 2.5–4.5)
PLATELET # BLD: 423 K/UL (ref 138–453)
PLATELET ESTIMATE: ABNORMAL
PMV BLD AUTO: 8.6 FL (ref 8.1–13.5)
POTASSIUM SERPL-SCNC: 4.1 MMOL/L (ref 3.7–5.3)
RBC # BLD: 2.86 M/UL (ref 4.21–5.77)
RBC # BLD: ABNORMAL 10*6/UL
SEG NEUTROPHILS: 69 % (ref 36–65)
SEGMENTED NEUTROPHILS ABSOLUTE COUNT: 5.84 K/UL (ref 1.5–8.1)
SODIUM BLD-SCNC: 135 MMOL/L (ref 135–144)
WBC # BLD: 8.4 K/UL (ref 3.5–11.3)
WBC # BLD: ABNORMAL 10*3/UL

## 2019-12-31 PROCEDURE — 2500000003 HC RX 250 WO HCPCS: Performed by: STUDENT IN AN ORGANIZED HEALTH CARE EDUCATION/TRAINING PROGRAM

## 2019-12-31 PROCEDURE — 2060000000 HC ICU INTERMEDIATE R&B

## 2019-12-31 PROCEDURE — 99233 SBSQ HOSP IP/OBS HIGH 50: CPT | Performed by: INTERNAL MEDICINE

## 2019-12-31 PROCEDURE — 6360000002 HC RX W HCPCS: Performed by: STUDENT IN AN ORGANIZED HEALTH CARE EDUCATION/TRAINING PROGRAM

## 2019-12-31 PROCEDURE — 6370000000 HC RX 637 (ALT 250 FOR IP): Performed by: STUDENT IN AN ORGANIZED HEALTH CARE EDUCATION/TRAINING PROGRAM

## 2019-12-31 PROCEDURE — 6370000000 HC RX 637 (ALT 250 FOR IP): Performed by: INTERNAL MEDICINE

## 2019-12-31 PROCEDURE — 36415 COLL VENOUS BLD VENIPUNCTURE: CPT

## 2019-12-31 PROCEDURE — 99232 SBSQ HOSP IP/OBS MODERATE 35: CPT | Performed by: INTERNAL MEDICINE

## 2019-12-31 PROCEDURE — 85025 COMPLETE CBC W/AUTO DIFF WBC: CPT

## 2019-12-31 PROCEDURE — 2580000003 HC RX 258: Performed by: STUDENT IN AN ORGANIZED HEALTH CARE EDUCATION/TRAINING PROGRAM

## 2019-12-31 PROCEDURE — 80048 BASIC METABOLIC PNL TOTAL CA: CPT

## 2019-12-31 PROCEDURE — 84100 ASSAY OF PHOSPHORUS: CPT

## 2019-12-31 PROCEDURE — 83735 ASSAY OF MAGNESIUM: CPT

## 2019-12-31 RX ORDER — MAGNESIUM SULFATE 1 G/100ML
1 INJECTION INTRAVENOUS
Status: COMPLETED | OUTPATIENT
Start: 2019-12-31 | End: 2019-12-31

## 2019-12-31 RX ADMIN — CARVEDILOL 6.25 MG: 6.25 TABLET, FILM COATED ORAL at 09:17

## 2019-12-31 RX ADMIN — INSULIN LISPRO 6 UNITS: 100 INJECTION, SOLUTION INTRAVENOUS; SUBCUTANEOUS at 17:16

## 2019-12-31 RX ADMIN — ACETAMINOPHEN 650 MG: 325 TABLET ORAL at 23:23

## 2019-12-31 RX ADMIN — HEPARIN SODIUM 5000 UNITS: 5000 INJECTION INTRAVENOUS; SUBCUTANEOUS at 14:07

## 2019-12-31 RX ADMIN — Medication 15 ML: at 09:18

## 2019-12-31 RX ADMIN — Medication 100 MG: at 09:19

## 2019-12-31 RX ADMIN — FAMOTIDINE 20 MG: 20 TABLET, FILM COATED ORAL at 09:17

## 2019-12-31 RX ADMIN — INSULIN LISPRO 3 UNITS: 100 INJECTION, SOLUTION INTRAVENOUS; SUBCUTANEOUS at 12:30

## 2019-12-31 RX ADMIN — INSULIN LISPRO 3 UNITS: 100 INJECTION, SOLUTION INTRAVENOUS; SUBCUTANEOUS at 20:47

## 2019-12-31 RX ADMIN — MAGNESIUM SULFATE HEPTAHYDRATE 1 G: 1 INJECTION, SOLUTION INTRAVENOUS at 11:33

## 2019-12-31 RX ADMIN — HEPARIN SODIUM 5000 UNITS: 5000 INJECTION INTRAVENOUS; SUBCUTANEOUS at 06:39

## 2019-12-31 RX ADMIN — MAGNESIUM SULFATE HEPTAHYDRATE 1 G: 1 INJECTION, SOLUTION INTRAVENOUS at 09:00

## 2019-12-31 RX ADMIN — POLYETHYLENE GLYCOL 3350 17 G: 17 POWDER, FOR SOLUTION ORAL at 09:18

## 2019-12-31 RX ADMIN — Medication 20 MG: at 06:40

## 2019-12-31 RX ADMIN — MAGNESIUM SULFATE HEPTAHYDRATE 1 G: 1 INJECTION, SOLUTION INTRAVENOUS at 12:36

## 2019-12-31 RX ADMIN — Medication: at 09:19

## 2019-12-31 RX ADMIN — SODIUM CHLORIDE, PRESERVATIVE FREE 10 ML: 5 INJECTION INTRAVENOUS at 20:47

## 2019-12-31 RX ADMIN — SODIUM CHLORIDE, PRESERVATIVE FREE 10 ML: 5 INJECTION INTRAVENOUS at 09:18

## 2019-12-31 RX ADMIN — Medication 20 MG: at 19:29

## 2019-12-31 RX ADMIN — INSULIN LISPRO 3 UNITS: 100 INJECTION, SOLUTION INTRAVENOUS; SUBCUTANEOUS at 04:34

## 2019-12-31 RX ADMIN — INSULIN LISPRO 3 UNITS: 100 INJECTION, SOLUTION INTRAVENOUS; SUBCUTANEOUS at 09:20

## 2019-12-31 RX ADMIN — MAGNESIUM SULFATE HEPTAHYDRATE 1 G: 1 INJECTION, SOLUTION INTRAVENOUS at 09:20

## 2019-12-31 RX ADMIN — HEPARIN SODIUM 5000 UNITS: 5000 INJECTION INTRAVENOUS; SUBCUTANEOUS at 20:53

## 2019-12-31 RX ADMIN — BUMETANIDE 2 MG: 1 TABLET ORAL at 09:17

## 2019-12-31 ASSESSMENT — PAIN SCALES - GENERAL: PAINLEVEL_OUTOF10: 4

## 2019-12-31 NOTE — PROGRESS NOTES
podiatry  · Nephrology following. SUBJECTIVE    Patient seen and examined. No acute changes. Awake and alert. No respiratory distress. Vacs with good seal no leak. OBJECTIVE  VITALS: BP (!) 143/89   Pulse 101   Temp 98.2 °F (36.8 °C) (Oral)   Resp 19   Ht 5' 7\" (1.702 m)   Wt 279 lb 14.4 oz (127 kg)   SpO2 95%   BMI 43.84 kg/m²     GENERAL: A&O. NAD   NEURO: responsive, follows commands, cooperative  LUNGS: chest rise equal. resp even and unlabored   HEART: RRR  ABDOMEN: soft, ND, mild tenderness. Midline vac in place. Ostomy appliance in place, stool in bag.   - sacral and perineal vera flow wound vac in place. No leak noted    EXTREMITY: dressing to L foot is c/d/i. No cyanosis     I/O last 3 completed shifts: In: 0307 [P. O.:600; I.V.:200; NG/GT:674]  Out: 0931 [Urine:3450; Drains:900]    Drain/tube output: In: 9787 [P.O.:300; I.V.:200; NG/GT:674]  Out: 5325 [Urine:1950; Drains:900]    LAB:  CBC:   Recent Labs     12/29/19  0823 12/30/19  0549 12/31/19  0356   WBC 6.7 8.1 8.4   HGB 8.0* 7.8* 8.1*   HCT 26.8* 25.2* 27.6*   MCV 95.7 91.6 96.5    356 423     BMP:   Recent Labs     12/29/19  0823 12/30/19  0549 12/31/19  0356    135 135   K 4.1 4.0 4.1    102 100   CO2 25 22 23   BUN 50* 53* 56*   CREATININE 1.76* 1.84* 1.87*   GLUCOSE 142* 163* 151*         RADIOLOGY:   no new images   No      Alia Buchanan DO  12/31/2019        Attending Note      I have reviewed the above [facility] resident progress note and I either performed the key elements of the medical history and physical exam or was present when the resident performed them. I have discussed the findings, established the care plan and recommendations with residents Leigha Hill and [place] nurse practitioner Andre Recinos and bedside nurse. The following confirms and/or amends the IMPRESSION, MEDICAL DECISION MAKING and PLAN from above.     ASSESSMENT    Patient Active Problem List   Diagnosis    Necrotizing fasciitis

## 2019-12-31 NOTE — PROGRESS NOTES
[facility]  Internal Medicine Teaching Residency Program  Inpatient Daily Progress Note  ______________________________________________________________________________    Patient: Rylee Collado  YOB: 1985   MIKE:[id number]    Acct: [de-identified]     Room: [address] [place] [address]  Admit date: 11/29/2019  Today's date: 12/31/19  Number of days in the hospital: 28    SUBJECTIVE   Admitting Diagnosis: Necrotizing fasciitis ([address]  CC: Acute renal failure/Necritizing fascitis    Pt examined at bedside. Chart & results reviewed. Answering questions appropriately   Wound vac drainage  Tolerating oral feeds  Sacral wound vac changes in OR on a regular basis, next change on 1/12019  Midline vac changes M/W/F  Off antibiotics  HE had another incision and drainage done in the OR today  Blood sugars well under control   no other complaints overnight             ROS:  Constitutional:  negative for chills, fevers, sweats  Respiratory:  negative for cough, dyspnea on exertion, hemoptysis, shortness of breath, wheezing  Cardiovascular:  negative for chest pain, chest pressure/discomfort, lower extremity edema, palpitations  Gastrointestinal:  negative for abdominal pain, constipation, diarrhea, nausea, vomiting  Neurological:  negative for dizziness, headache  BRIEF HISTORY     Amee Mini a 37 y.o. with PMH of spina bifida, hypertension, diabetes and diabetic foot ulcer. Who presented to [facility] Emergency Department with complaint of generalized weakness for the past few days. On examination the emergency department staff noticed the patient had a foul smell which they thought he had a bowel movement. They turned the patient and noticed sloughing of skin on his back and buttocks.      In the emergency department patient was afebrile but tachycardic.  Initial labs demonstrated hyponatremia of 130, bicarb 12, BUN 63 and creatinine 2.82, hypocalcemia 7.7, alkaline phosphatase chloride, 10 mEq, PRN  REFRESH LACRI-LUBE, , PRN  magnesium hydroxide, 30 mL, Daily PRN  ondansetron, 4 mg, Q6H PRN        Diagnostic Labs:  CBC:   Recent Labs     12/29/19  0823 12/30/19  0549 12/31/19  0356   WBC 6.7 8.1 8.4   RBC 2.80* 2.75* 2.86*   HGB 8.0* 7.8* 8.1*   HCT 26.8* 25.2* 27.6*   MCV 95.7 91.6 96.5   RDW 20.2* 19.8* 19.9*    356 423     BMP:   Recent Labs     12/29/19  0823 12/30/19  0549 12/31/19  0356    135 135   K 4.1 4.0 4.1    102 100   CO2 25 22 23   PHOS 2.2*  --  1.4*   BUN 50* 53* 56*   CREATININE 1.76* 1.84* 1.87*     BNP: No results for input(s): BNP in the last 72 hours. PT/INR: No results for input(s): PROTIME, INR in the last 72 hours. APTT: No results for input(s): APTT in the last 72 hours. CARDIAC ENZYMES: No results for input(s): CKMB, CKMBINDEX, TROPONINI in the last 72 hours. Invalid input(s): CKTOTAL;3  FASTING LIPID PANEL:  Lab Results   Component Value Date    TRIG 208 (H) 12/19/2019     LIVER PROFILE: No results for input(s): AST, ALT, ALB, BILIDIR, BILITOT, ALKPHOS in the last 72 hours. MICROBIOLOGY:   Lab Results   Component Value Date/Time    CULTURE NO GROWTH 6 DAYS 12/02/2019 04:13 AM       Imaging:    Xr Chest Portable    Result Date: 12/23/2019  1. New right upper lobe collapse. This could be due to mucous plugging. 2. ETT tip 3.3 cm above the ron. 3. Cardiomegaly. The findings were sent to the Radiology Results [address] at 10:05 pm on 12/23/2019to be communicated to a licensed caregiver. Xr Chest Portable    Result Date: 12/23/2019  1. No significant interval change since previous examination. 2. Support hardware, as the detailed above. Xr Chest Portable    Result Date: 12/22/2019  Limited low lung volume examination. Cardiomegaly and mild central vascular congestion. No obvious focal airspace consolidation.      Ir Tunneled Cvc Place Wo Sq Port/pump > 5 Years    Result Date: 12/24/2019  Successful ultrasound and

## 2019-12-31 NOTE — PROGRESS NOTES
[facility] - [facility]REAT - Progress Note    Today's Date and Time: 12/31/2019, 12:19 PM    Impression :   · Necrotizing fasciitis 11-29-19  · S/P perirectal I&D. Wide complex excisional debridement of gluteal and perineal areas on 11-29-19  · S/P debridement, washout of gluteal and perianal areas, diverting colostomy 12-3-19.   · S/P debridement, washout of gluteal and perianal areas, diverting colostomy 12-6-19.  · S/P Incision and debridement of necrotizing fasciitis buttock wound, scrotum, bilateral groin region and closure of midline abdominal wound 12-8-19   · S/P Incision and debridement of necrotizing fasciitis buttock wound, scrotum, bilateral groin region on 12-20-19. · Lactic acidosis  · DM 2  · HTN  · Hx of Low LVEF (20%) as per family  · DEBBY  · Fluid overload    Recommendations:     · Monitor off antibiotics,   · Directional tip catheter to open up the collapsed lung,  · Plan for OR 1-1-20 for another debridement of the perineal wounds and vac change  · Continue with wound care. Medical Decision Making/Summary/Discussion:12/31/2019     · Patient with DM 2, prior diabetic foot infection  · Presented to [address] [place] [address] ER generalized weakness for the past few days  · Found to have soft tissue necrosis with subcutaneous emphysema in gluteal areas and perineum  · S/P I&D and wide complex debridement of affected tissues on 11-29-19  · Showing hypotension, requiring fluids and a vasopressor  · Cultures in progress  · Will cover with Zosyn. Wounds with Strep spp. And Gram negative bacilli . No Staph spp. 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    · One more debridement in OR scheduled for 12-10-19 and then decision will be made to growth  Sputum :  · NA  Wound:  · 11-29-19: Strep ssp and Gram negative bacilli     I have personally reviewed the past medical history, past surgical history, medications, social history, and family history, and I have updated the database accordingly.   Past Medical History:     Past Medical History:   Diagnosis Date    [facility]estive [facility] failure[facility]     Diabetes mellitus ([facility]     Hypertension     Septic shock ([facility]     Spina bifida aperta of lumbar spine ([facility]        Past Surgical  History:     Past Surgical History:   Procedure Laterality Date    ABDOMEN SURGERY N/A 12/8/2019    DEBRIDEMENT  NECROTIZING FASCIITIS BUTTOCK, WOUND VAC REMOVAL DELAYED PRIMARY CLOSURE OF MIDLINE ABDOMINAL INCISION, OSTOMY BAG CHANGE performed by Bandar Ball MD at [address] [facility] N/A 12/10/2019    DEBRIDEMENT OF SACRAL WOUND performed by Marylee Jones, MD at [address] [place] [address]  12/26/2019    INCISION AND DRAINAGE AND WOUND VAC CHANGE BUTTOCK, PERINEUM     COLOSTOMY N/A 12/3/2019    LAPAROSCOPIC CONVERTED TO OPEN DIVERTING LOOP COLOSTOMY; WOUND VAC APPLICATION [facility] 11/29/2019    RECTAL PERIRECTAL INCISION AND DRAINAGE, [facility] LOOP COLOSTOMY CREATION performed by Marguerite Sandy MD at [facility] [address] N/A 12/6/2019    DEBRIDEMENT NECROTIZING FASCIAITIS BILAT BUTTOCK performed by Marylee Jones, MD at [facility] [address] N/A 12/20/2019    DEBRIDEMENT GLUTEAL AND SCROTAL REGIONS performed by Marguerite Sandy MD at [facility] [address] N/A 12/26/2019    INCISION AND DRAINAGE AND WOUND VAC CHANGE BUTTOCK, PERINEUM performed by Leslie Bernal DO at [address] N/A 12/10/2019    SCROTAL EXPLORATION WITH SCROTAL DEBRIDEMENT performed by Marlys Mcdermott MD at [place] [address] 12/23/2019    WOUND DEBRIDEMENT  WOUND VAC CHANGE - activity: Not on file   Other Topics Concern    Not on file   Social History Narrative    Not on file       Family History:   No family history on file. Allergies:   Patient has no known allergies. Review of Systems:     Review of Systems   Constitutional: Negative for activity change. Pt easily roused, nods to questions then back to sleep  Denies chills or fevers, no SOB    Per Mom, pt is eating small amounts of food. Physical Examination :     Patient Vitals for the past 8 hrs:   BP Temp Temp src Pulse Resp SpO2   12/31/19 1120 (!) 148/87 98.1 °F (36.7 °C) Oral 103 18 97 %   12/31/19 0917 (!) 143/89 -- -- 101 -- --   12/31/19 0630 (!) 174/92 98.2 °F (36.8 °C) Oral 114 19 95 %   12/31/19 0430 (!) 147/115 98.5 °F (36.9 °C) Oral 110 19 97 %     Physical Exam  Constitutional:       Appearance: Normal appearance. He is not toxic-appearing or diaphoretic. HENT:      Head: Normocephalic and atraumatic. Nose: No congestion or rhinorrhea. Mouth/Throat:      Mouth: Mucous membranes are moist.   Eyes:      General: No scleral icterus. Pupils: Pupils are equal, round, and reactive to light. Neck:      Musculoskeletal: Neck supple. No neck rigidity or muscular tenderness. Cardiovascular:      Rate and Rhythm: Normal rate and regular rhythm. Heart sounds: Normal heart sounds. No murmur. No friction rub. No gallop. Pulmonary:      Effort: Pulmonary effort is normal. No respiratory distress. Breath sounds: Normal breath sounds. No stridor. Abdominal:      General: Abdomen is flat. There is no distension. Palpations: There is no mass. Tenderness: There is no tenderness. Comments: Abdominal VAC in place   Genitourinary:     Comments:  Ambrosio in place. Perineal vac in place  Musculoskeletal:         General: No swelling, tenderness or deformity. Skin:     General: Skin is warm and dry. Coloration: Skin is not jaundiced or pale.       Findings: Lesion present. No erythema. Neurological:      General: No focal deficit present. Mental Status: He is alert. Mental status is at baseline. Cranial Nerves: No cranial nerve deficit. Psychiatric:      Comments: Easily roused, appropriate response           Medical Decision Making -Laboratory:   I have independently reviewed/ordered the following labs:    CBC with Differential:   Recent Labs     19  0549 19  0356   WBC 8.1 8.4   HGB 7.8* 8.1*   HCT 25.2* 27.6*    423   LYMPHOPCT 16* 15*   MONOPCT 8 7     BMP:   Recent Labs     19  0823 19  0549 19  0356    135 135   K 4.1 4.0 4.1    102 100   CO2 25 22 23   BUN 50* 53* 56*   CREATININE 1.76* 1.84* 1.87*   MG 1.5*  --  1.6     Hepatic Function Panel:   No results for input(s): PROT, LABALBU, BILIDIR, IBILI, BILITOT, ALKPHOS, ALT, AST in the last 72 hours. No results for input(s): RPR in the last 72 hours. No results for input(s): HIV in the last 72 hours. No results for input(s): BC in the last 72 hours. Lab Results   Component Value Date    MUCUS NOT REPORTED 2019    RBC 2.86 2019    TRICHOMONAS NOT REPORTED 2019    WBC 8.4 2019    YEAST NOT REPORTED 2019    TURBIDITY TURBID 2019     Lab Results   Component Value Date    CREATININE 1.87 2019    GLUCOSE 151 2019       Medical Decision Making-Imagin-29 CXR    2019 3:07 pm       COMPARISON:   2019       HISTORY:   ORDERING SYSTEM PROVIDED HISTORY: follow  up   TECHNOLOGIST PROVIDED HISTORY:   follow  up   Acuity: Unknown   Type of Exam: Unknown       FINDINGS:   Endotracheal tube not visualized.  Enteric tube courses below the diaphragm.       Left and right-sided catheters terminating at the cavoatrial junction.       Improved aeration of the right upper lobe.  Pulmonary vascular indistinctness   compatible with interstitial pulmonary edema.  Low lung volumes.  No   pneumothorax.  No pleural effusion.  Stable cardiomegaly.           Impression   1. Endotracheal tube not identified. 2. Venous catheters terminating at the cavoatrial junction. 3. Interstitial pulmonary edema. 12-9 CT Abd/pelvis  EXAMINATION:   CT OF THE ABDOMEN AND PELVIS WITH CONTRAST 12/9/2019 2:29 am       TECHNIQUE:   CT of the abdomen and pelvis was performed with the administration of   intravenous contrast. Multiplanar reformatted images are provided for review. Dose modulation, iterative reconstruction, and/or weight based adjustment of   the mA/kV was utilized to reduce the radiation dose to as low as reasonably   achievable.       COMPARISON:   November 29, 2019.       HISTORY:   ORDERING SYSTEM PROVIDED HISTORY: Donal Bermudez fasc   TECHNOLOGIST PROVIDED HISTORY:       [employer] Monitoring fasc   Reason for Exam: nec fasc; Trauma   Acuity: Unknown   Type of Exam: Subsequent/Follow-up       FINDINGS:   Lower Chest: Partially visualized bilateral pleural effusions with bibasilar   heterogeneous opacities and bilateral lower lobe consolidations.  Central   venous catheter tip near the superior atrial caval junction.  Cardiomegaly. Trace pericardial fluid.       Liver: Normal.       Gallbladder and Bile Ducts: Normal.       Spleen: Splenomegaly.       Adrenal Glands: Normal.       Pancreas: Normal.       Genitourinary: Symmetric renal atrophy.  Redemonstration of multiple   hypodensities in the right kidney which likely represent benign cysts.  No   urinary stones or hydronephrosis.  Ambrosio catheter in the decompressed urinary   bladder.       Bowel: Normal caliber bowel.  Postsurgical changes of the bowel with left   lower quadrant ostomy.  No evidence of acute appendicitis.  No significant   diverticular disease.       Vasculature: Atherosclerosis.  No abdominal aortic aneurysm.       Bones and Soft Tissues: Diffuse soft tissue stranding and fluid.    Postsurgical changes in the anterior abdominal wall with midline incision demonstrating expected small amount of fluid and air.  Large soft tissue   defects in the right inguinal region, scrotum, right greater than left   gluteal creases with associated packing material.  Small amount of   surrounding soft tissue gas.  There is associated skin thickening in these   regions.  Bilateral lower abdominal wall skin thickening.       Retroperitoneum/Mesentery: No intraperitoneal free air.  Mild abdominopelvic   ascites.  Loculated fluid along the inferior aspect of the liver. Redemonstration of bilateral inguinal and pelvic sidewall lymphadenopathy.    Multiple mildly prominent retroperitoneal and upper abdominal lymph nodes are   similar to the prior study.  Percutaneous intraperitoneal surgical drains.           Impression   1.  Large soft tissue defects in the right inguinal region, scrotum and right   greater than left gluteal crease is with associated packing material, small   amount of surrounding soft tissue gas and skin thickening likely relates to   history of necrotizing fasciitis.       2.  Postsurgical changes of the bowel with left lower quadrant ostomy.  No   bowel obstruction.  Percutaneous intraperitoneal surgical drains.       3.  Mild abdominopelvic ascites.  Loculated fluid along the inferior aspect   of the liver.  No intraperitoneal free air.       4.  Diffuse anasarca.       5.   Bilateral pleural effusions with associated heterogeneous opacities and   consolidations.  Underlying infection is not excluded.               EXAMINATION:   ONE XRAY VIEW OF THE CHEST       11/29/2019 9:01 pm       COMPARISON:   4 hours ago       HISTORY:   ORDERING SYSTEM PROVIDED HISTORY: intubated   TECHNOLOGIST PROVIDED HISTORY:   intubated   Reason for Exam: portable supine/ intubated   Acuity: Acute   Type of Exam: Initial       FINDINGS:   New ET tube terminates 38 mm above the ron.  There appears to be a   possible enteric tube which is not well visualized distally.  Right IJ urinary bladder. Osseous changes within the left hemipelvis which may be chronic in nature. IMPRESSION:    Extensive subcutaneous emphysema as described above involving both buttocks extending to the perineum.  The possibility of [place] gangrene/necrotizing fasciitis cannot be excluded. Possible phlegmon or developing soft tissue abscess overlying the left lateral abdominal wall as noted above.  No organized abscess is seen however. Osseous changes within the left hemipelvis which may be chronic in nature. Results the study were called to Dr. Chula Govea on 11/29/2019  All CT scans at this facility use dose modulation, iterative reconstruction, and/or weight based dosing when appropriate to reduce radiation dose to as low as reasonably achievable.       Medical Decision Igecmf-Mgswlijz-Pasxh:           Lazara Parikh MD

## 2020-01-01 ENCOUNTER — ANESTHESIA EVENT (OUTPATIENT)
Dept: OPERATING ROOM | Age: 35
DRG: 463 | End: 2020-01-01
Payer: MEDICARE

## 2020-01-01 ENCOUNTER — ANESTHESIA (OUTPATIENT)
Dept: OPERATING ROOM | Age: 35
DRG: 463 | End: 2020-01-01
Payer: MEDICARE

## 2020-01-01 VITALS — OXYGEN SATURATION: 97 % | SYSTOLIC BLOOD PRESSURE: 72 MMHG | DIASTOLIC BLOOD PRESSURE: 47 MMHG | TEMPERATURE: 97.6 F

## 2020-01-01 LAB
ABSOLUTE EOS #: 0.61 K/UL (ref 0–0.44)
ABSOLUTE IMMATURE GRANULOCYTE: 0.03 K/UL (ref 0–0.3)
ABSOLUTE LYMPH #: 1.48 K/UL (ref 1.1–3.7)
ABSOLUTE MONO #: 0.68 K/UL (ref 0.1–1.2)
ANION GAP SERPL CALCULATED.3IONS-SCNC: 12 MMOL/L (ref 9–17)
BASOPHILS # BLD: 0 % (ref 0–2)
BASOPHILS ABSOLUTE: 0.03 K/UL (ref 0–0.2)
BUN BLDV-MCNC: 54 MG/DL (ref 6–20)
BUN/CREAT BLD: ABNORMAL (ref 9–20)
CALCIUM SERPL-MCNC: 8.1 MG/DL (ref 8.6–10.4)
CHLORIDE BLD-SCNC: 100 MMOL/L (ref 98–107)
CO2: 25 MMOL/L (ref 20–31)
CREAT SERPL-MCNC: 1.77 MG/DL (ref 0.7–1.2)
DIFFERENTIAL TYPE: ABNORMAL
EOSINOPHILS RELATIVE PERCENT: 8 % (ref 1–4)
FERRITIN: 505 UG/L (ref 30–400)
GFR AFRICAN AMERICAN: 53 ML/MIN
GFR NON-AFRICAN AMERICAN: 44 ML/MIN
GFR SERPL CREATININE-BSD FRML MDRD: ABNORMAL ML/MIN/{1.73_M2}
GFR SERPL CREATININE-BSD FRML MDRD: ABNORMAL ML/MIN/{1.73_M2}
GLUCOSE BLD-MCNC: 101 MG/DL (ref 75–110)
GLUCOSE BLD-MCNC: 105 MG/DL (ref 75–110)
GLUCOSE BLD-MCNC: 112 MG/DL (ref 75–110)
GLUCOSE BLD-MCNC: 114 MG/DL (ref 70–99)
GLUCOSE BLD-MCNC: 115 MG/DL (ref 75–110)
GLUCOSE BLD-MCNC: 173 MG/DL (ref 75–110)
HCT VFR BLD CALC: 26.2 % (ref 40.7–50.3)
HEMOGLOBIN: 7.8 G/DL (ref 13–17)
IMMATURE GRANULOCYTES: 0 %
IRON SATURATION: 34 % (ref 20–55)
IRON: 49 UG/DL (ref 59–158)
LYMPHOCYTES # BLD: 18 % (ref 24–43)
MCH RBC QN AUTO: 28.5 PG (ref 25.2–33.5)
MCHC RBC AUTO-ENTMCNC: 29.8 G/DL (ref 28.4–34.8)
MCV RBC AUTO: 95.6 FL (ref 82.6–102.9)
MONOCYTES # BLD: 8 % (ref 3–12)
NRBC AUTOMATED: 0 PER 100 WBC
PDW BLD-RTO: 19.9 % (ref 11.8–14.4)
PLATELET # BLD: 399 K/UL (ref 138–453)
PLATELET ESTIMATE: ABNORMAL
PMV BLD AUTO: 8.2 FL (ref 8.1–13.5)
POTASSIUM SERPL-SCNC: 4.2 MMOL/L (ref 3.7–5.3)
RBC # BLD: 2.74 M/UL (ref 4.21–5.77)
RBC # BLD: ABNORMAL 10*6/UL
SEG NEUTROPHILS: 65 % (ref 36–65)
SEGMENTED NEUTROPHILS ABSOLUTE COUNT: 5.33 K/UL (ref 1.5–8.1)
SODIUM BLD-SCNC: 137 MMOL/L (ref 135–144)
TOTAL IRON BINDING CAPACITY: 144 UG/DL (ref 250–450)
UNSATURATED IRON BINDING CAPACITY: 95 UG/DL (ref 112–347)
WBC # BLD: 8.2 K/UL (ref 3.5–11.3)
WBC # BLD: ABNORMAL 10*3/UL

## 2020-01-01 PROCEDURE — 6370000000 HC RX 637 (ALT 250 FOR IP): Performed by: STUDENT IN AN ORGANIZED HEALTH CARE EDUCATION/TRAINING PROGRAM

## 2020-01-01 PROCEDURE — 2060000000 HC ICU INTERMEDIATE R&B

## 2020-01-01 PROCEDURE — 6360000002 HC RX W HCPCS: Performed by: STUDENT IN AN ORGANIZED HEALTH CARE EDUCATION/TRAINING PROGRAM

## 2020-01-01 PROCEDURE — 99232 SBSQ HOSP IP/OBS MODERATE 35: CPT | Performed by: INTERNAL MEDICINE

## 2020-01-01 PROCEDURE — 83550 IRON BINDING TEST: CPT

## 2020-01-01 PROCEDURE — 7100000000 HC PACU RECOVERY - FIRST 15 MIN: Performed by: SURGERY

## 2020-01-01 PROCEDURE — 6370000000 HC RX 637 (ALT 250 FOR IP): Performed by: INTERNAL MEDICINE

## 2020-01-01 PROCEDURE — 2709999900 HC NON-CHARGEABLE SUPPLY: Performed by: SURGERY

## 2020-01-01 PROCEDURE — 3600000015 HC SURGERY LEVEL 5 ADDTL 15MIN: Performed by: SURGERY

## 2020-01-01 PROCEDURE — 7100000001 HC PACU RECOVERY - ADDTL 15 MIN: Performed by: SURGERY

## 2020-01-01 PROCEDURE — 3700000000 HC ANESTHESIA ATTENDED CARE: Performed by: SURGERY

## 2020-01-01 PROCEDURE — 2580000003 HC RX 258: Performed by: SURGERY

## 2020-01-01 PROCEDURE — 80048 BASIC METABOLIC PNL TOTAL CA: CPT

## 2020-01-01 PROCEDURE — 82947 ASSAY GLUCOSE BLOOD QUANT: CPT

## 2020-01-01 PROCEDURE — 2580000003 HC RX 258: Performed by: STUDENT IN AN ORGANIZED HEALTH CARE EDUCATION/TRAINING PROGRAM

## 2020-01-01 PROCEDURE — 6360000002 HC RX W HCPCS: Performed by: NURSE ANESTHETIST, CERTIFIED REGISTERED

## 2020-01-01 PROCEDURE — 2500000003 HC RX 250 WO HCPCS: Performed by: NURSE ANESTHETIST, CERTIFIED REGISTERED

## 2020-01-01 PROCEDURE — 82728 ASSAY OF FERRITIN: CPT

## 2020-01-01 PROCEDURE — 83540 ASSAY OF IRON: CPT

## 2020-01-01 PROCEDURE — 85025 COMPLETE CBC W/AUTO DIFF WBC: CPT

## 2020-01-01 PROCEDURE — 0KBP0ZZ EXCISION OF LEFT HIP MUSCLE, OPEN APPROACH: ICD-10-PCS | Performed by: SURGERY

## 2020-01-01 PROCEDURE — 2580000003 HC RX 258: Performed by: NURSE ANESTHETIST, CERTIFIED REGISTERED

## 2020-01-01 PROCEDURE — 3700000001 HC ADD 15 MINUTES (ANESTHESIA): Performed by: SURGERY

## 2020-01-01 PROCEDURE — 3600000005 HC SURGERY LEVEL 5 BASE: Performed by: SURGERY

## 2020-01-01 PROCEDURE — 99233 SBSQ HOSP IP/OBS HIGH 50: CPT | Performed by: INTERNAL MEDICINE

## 2020-01-01 PROCEDURE — C1713 ANCHOR/SCREW BN/BN,TIS/BN: HCPCS | Performed by: SURGERY

## 2020-01-01 RX ORDER — FENTANYL CITRATE 50 UG/ML
50 INJECTION, SOLUTION INTRAMUSCULAR; INTRAVENOUS EVERY 5 MIN PRN
Status: DISCONTINUED | OUTPATIENT
Start: 2020-01-01 | End: 2020-01-01 | Stop reason: HOSPADM

## 2020-01-01 RX ORDER — 0.9 % SODIUM CHLORIDE 0.9 %
500 INTRAVENOUS SOLUTION INTRAVENOUS
Status: DISCONTINUED | OUTPATIENT
Start: 2020-01-01 | End: 2020-01-01 | Stop reason: HOSPADM

## 2020-01-01 RX ORDER — FENTANYL CITRATE 50 UG/ML
25 INJECTION, SOLUTION INTRAMUSCULAR; INTRAVENOUS EVERY 5 MIN PRN
Status: DISCONTINUED | OUTPATIENT
Start: 2020-01-01 | End: 2020-01-01 | Stop reason: HOSPADM

## 2020-01-01 RX ORDER — INSULIN GLARGINE 100 [IU]/ML
10 INJECTION, SOLUTION SUBCUTANEOUS DAILY
Status: DISCONTINUED | OUTPATIENT
Start: 2020-01-01 | End: 2020-01-16 | Stop reason: HOSPADM

## 2020-01-01 RX ORDER — NEOSTIGMINE METHYLSULFATE 5 MG/5 ML
SYRINGE (ML) INTRAVENOUS PRN
Status: DISCONTINUED | OUTPATIENT
Start: 2020-01-01 | End: 2020-01-01 | Stop reason: SDUPTHER

## 2020-01-01 RX ORDER — MAGNESIUM HYDROXIDE 1200 MG/15ML
LIQUID ORAL CONTINUOUS PRN
Status: COMPLETED | OUTPATIENT
Start: 2020-01-01 | End: 2020-01-01

## 2020-01-01 RX ORDER — OXYCODONE HYDROCHLORIDE AND ACETAMINOPHEN 5; 325 MG/1; MG/1
1 TABLET ORAL EVERY 4 HOURS PRN
Status: DISCONTINUED | OUTPATIENT
Start: 2020-01-01 | End: 2020-01-01 | Stop reason: HOSPADM

## 2020-01-01 RX ORDER — FENTANYL CITRATE 50 UG/ML
INJECTION, SOLUTION INTRAMUSCULAR; INTRAVENOUS PRN
Status: DISCONTINUED | OUTPATIENT
Start: 2020-01-01 | End: 2020-01-01 | Stop reason: SDUPTHER

## 2020-01-01 RX ORDER — PHENYLEPHRINE HYDROCHLORIDE 10 MG/ML
INJECTION INTRAVENOUS PRN
Status: DISCONTINUED | OUTPATIENT
Start: 2020-01-01 | End: 2020-01-01 | Stop reason: SDUPTHER

## 2020-01-01 RX ORDER — HYDRALAZINE HYDROCHLORIDE 20 MG/ML
5 INJECTION INTRAMUSCULAR; INTRAVENOUS EVERY 10 MIN PRN
Status: DISCONTINUED | OUTPATIENT
Start: 2020-01-01 | End: 2020-01-01 | Stop reason: HOSPADM

## 2020-01-01 RX ORDER — CARVEDILOL 25 MG/1
25 TABLET ORAL DAILY
Status: DISCONTINUED | OUTPATIENT
Start: 2020-01-01 | End: 2020-01-16 | Stop reason: HOSPADM

## 2020-01-01 RX ORDER — PROMETHAZINE HYDROCHLORIDE 25 MG/ML
6.25 INJECTION, SOLUTION INTRAMUSCULAR; INTRAVENOUS
Status: DISCONTINUED | OUTPATIENT
Start: 2020-01-01 | End: 2020-01-01 | Stop reason: HOSPADM

## 2020-01-01 RX ORDER — ONDANSETRON 2 MG/ML
4 INJECTION INTRAMUSCULAR; INTRAVENOUS
Status: DISCONTINUED | OUTPATIENT
Start: 2020-01-01 | End: 2020-01-01 | Stop reason: HOSPADM

## 2020-01-01 RX ORDER — LABETALOL HYDROCHLORIDE 5 MG/ML
5 INJECTION, SOLUTION INTRAVENOUS EVERY 10 MIN PRN
Status: DISCONTINUED | OUTPATIENT
Start: 2020-01-01 | End: 2020-01-01 | Stop reason: HOSPADM

## 2020-01-01 RX ORDER — CARVEDILOL 12.5 MG/1
12.5 TABLET ORAL DAILY
Status: DISCONTINUED | OUTPATIENT
Start: 2020-01-01 | End: 2020-01-01

## 2020-01-01 RX ORDER — SODIUM CHLORIDE, SODIUM LACTATE, POTASSIUM CHLORIDE, CALCIUM CHLORIDE 600; 310; 30; 20 MG/100ML; MG/100ML; MG/100ML; MG/100ML
INJECTION, SOLUTION INTRAVENOUS CONTINUOUS PRN
Status: DISCONTINUED | OUTPATIENT
Start: 2020-01-01 | End: 2020-01-01 | Stop reason: SDUPTHER

## 2020-01-01 RX ORDER — DIPHENHYDRAMINE HYDROCHLORIDE 50 MG/ML
12.5 INJECTION INTRAMUSCULAR; INTRAVENOUS
Status: DISCONTINUED | OUTPATIENT
Start: 2020-01-01 | End: 2020-01-01 | Stop reason: HOSPADM

## 2020-01-01 RX ORDER — SODIUM CHLORIDE 9 MG/ML
INJECTION, SOLUTION INTRAVENOUS CONTINUOUS PRN
Status: DISCONTINUED | OUTPATIENT
Start: 2020-01-01 | End: 2020-01-01 | Stop reason: SDUPTHER

## 2020-01-01 RX ORDER — PROPOFOL 10 MG/ML
INJECTION, EMULSION INTRAVENOUS PRN
Status: DISCONTINUED | OUTPATIENT
Start: 2020-01-01 | End: 2020-01-01 | Stop reason: SDUPTHER

## 2020-01-01 RX ORDER — ROCURONIUM BROMIDE 10 MG/ML
INJECTION, SOLUTION INTRAVENOUS PRN
Status: DISCONTINUED | OUTPATIENT
Start: 2020-01-01 | End: 2020-01-01 | Stop reason: SDUPTHER

## 2020-01-01 RX ORDER — LIDOCAINE HYDROCHLORIDE 10 MG/ML
INJECTION, SOLUTION EPIDURAL; INFILTRATION; INTRACAUDAL; PERINEURAL PRN
Status: DISCONTINUED | OUTPATIENT
Start: 2020-01-01 | End: 2020-01-01 | Stop reason: SDUPTHER

## 2020-01-01 RX ORDER — ONDANSETRON 2 MG/ML
INJECTION INTRAMUSCULAR; INTRAVENOUS PRN
Status: DISCONTINUED | OUTPATIENT
Start: 2020-01-01 | End: 2020-01-01 | Stop reason: SDUPTHER

## 2020-01-01 RX ORDER — GLYCOPYRROLATE 1 MG/5 ML
SYRINGE (ML) INTRAVENOUS PRN
Status: DISCONTINUED | OUTPATIENT
Start: 2020-01-01 | End: 2020-01-01 | Stop reason: SDUPTHER

## 2020-01-01 RX ADMIN — SODIUM CHLORIDE, POTASSIUM CHLORIDE, SODIUM LACTATE AND CALCIUM CHLORIDE: 600; 310; 30; 20 INJECTION, SOLUTION INTRAVENOUS at 12:48

## 2020-01-01 RX ADMIN — PHENYLEPHRINE HYDROCHLORIDE 100 MCG: 10 INJECTION INTRAVENOUS at 12:16

## 2020-01-01 RX ADMIN — BUMETANIDE 2 MG: 1 TABLET ORAL at 09:28

## 2020-01-01 RX ADMIN — FENTANYL CITRATE 50 MCG: 50 INJECTION INTRAMUSCULAR; INTRAVENOUS at 10:59

## 2020-01-01 RX ADMIN — PROPOFOL 100 MG: 10 INJECTION, EMULSION INTRAVENOUS at 12:38

## 2020-01-01 RX ADMIN — FENTANYL CITRATE 50 MCG: 50 INJECTION INTRAMUSCULAR; INTRAVENOUS at 13:32

## 2020-01-01 RX ADMIN — ROCURONIUM BROMIDE 50 MG: 10 INJECTION INTRAVENOUS at 10:59

## 2020-01-01 RX ADMIN — PHENYLEPHRINE HYDROCHLORIDE 100 MCG: 10 INJECTION INTRAVENOUS at 13:34

## 2020-01-01 RX ADMIN — PHENYLEPHRINE HYDROCHLORIDE 100 MCG: 10 INJECTION INTRAVENOUS at 12:48

## 2020-01-01 RX ADMIN — PHENYLEPHRINE HYDROCHLORIDE 100 MCG: 10 INJECTION INTRAVENOUS at 12:58

## 2020-01-01 RX ADMIN — LIDOCAINE HYDROCHLORIDE 50 MG: 10 INJECTION, SOLUTION EPIDURAL; INFILTRATION; INTRACAUDAL; PERINEURAL at 10:59

## 2020-01-01 RX ADMIN — Medication 3 MG: at 13:22

## 2020-01-01 RX ADMIN — PHENYLEPHRINE HYDROCHLORIDE 200 MCG: 10 INJECTION INTRAVENOUS at 11:15

## 2020-01-01 RX ADMIN — ONDANSETRON 4 MG: 2 INJECTION, SOLUTION INTRAMUSCULAR; INTRAVENOUS at 12:58

## 2020-01-01 RX ADMIN — Medication: at 09:34

## 2020-01-01 RX ADMIN — PHENYLEPHRINE HYDROCHLORIDE 100 MCG: 10 INJECTION INTRAVENOUS at 11:10

## 2020-01-01 RX ADMIN — HEPARIN SODIUM 5000 UNITS: 5000 INJECTION INTRAVENOUS; SUBCUTANEOUS at 05:02

## 2020-01-01 RX ADMIN — PROPOFOL 200 MG: 10 INJECTION, EMULSION INTRAVENOUS at 10:59

## 2020-01-01 RX ADMIN — FENTANYL CITRATE 50 MCG: 50 INJECTION INTRAMUSCULAR; INTRAVENOUS at 11:02

## 2020-01-01 RX ADMIN — SODIUM CHLORIDE, PRESERVATIVE FREE 10 ML: 5 INJECTION INTRAVENOUS at 09:34

## 2020-01-01 RX ADMIN — FAMOTIDINE 20 MG: 20 TABLET, FILM COATED ORAL at 09:32

## 2020-01-01 RX ADMIN — PHENYLEPHRINE HYDROCHLORIDE 200 MCG: 10 INJECTION INTRAVENOUS at 12:51

## 2020-01-01 RX ADMIN — IRON SUCROSE 200 MG: 20 INJECTION, SOLUTION INTRAVENOUS at 09:44

## 2020-01-01 RX ADMIN — PHENYLEPHRINE HYDROCHLORIDE 100 MCG: 10 INJECTION INTRAVENOUS at 12:22

## 2020-01-01 RX ADMIN — FENTANYL CITRATE 50 MCG: 50 INJECTION INTRAMUSCULAR; INTRAVENOUS at 13:21

## 2020-01-01 RX ADMIN — Medication 0.6 MG: at 13:22

## 2020-01-01 RX ADMIN — CARVEDILOL 25 MG: 25 TABLET, FILM COATED ORAL at 09:24

## 2020-01-01 RX ADMIN — PHENYLEPHRINE HYDROCHLORIDE 100 MCG: 10 INJECTION INTRAVENOUS at 11:45

## 2020-01-01 RX ADMIN — HEPARIN SODIUM 5000 UNITS: 5000 INJECTION INTRAVENOUS; SUBCUTANEOUS at 22:16

## 2020-01-01 RX ADMIN — INSULIN LISPRO 3 UNITS: 100 INJECTION, SOLUTION INTRAVENOUS; SUBCUTANEOUS at 22:15

## 2020-01-01 RX ADMIN — SODIUM CHLORIDE, PRESERVATIVE FREE 10 ML: 5 INJECTION INTRAVENOUS at 22:16

## 2020-01-01 RX ADMIN — PHENYLEPHRINE HYDROCHLORIDE 100 MCG: 10 INJECTION INTRAVENOUS at 11:43

## 2020-01-01 RX ADMIN — PHENYLEPHRINE HYDROCHLORIDE 100 MCG: 10 INJECTION INTRAVENOUS at 12:05

## 2020-01-01 RX ADMIN — PHENYLEPHRINE HYDROCHLORIDE 100 MCG: 10 INJECTION INTRAVENOUS at 11:06

## 2020-01-01 RX ADMIN — Medication 15 ML: at 16:15

## 2020-01-01 RX ADMIN — SODIUM CHLORIDE: 9 INJECTION, SOLUTION INTRAVENOUS at 10:51

## 2020-01-01 ASSESSMENT — PULMONARY FUNCTION TESTS
PIF_VALUE: 20
PIF_VALUE: 19
PIF_VALUE: 23
PIF_VALUE: 0
PIF_VALUE: 20
PIF_VALUE: 23
PIF_VALUE: 19
PIF_VALUE: 20
PIF_VALUE: 20
PIF_VALUE: 21
PIF_VALUE: 23
PIF_VALUE: 20
PIF_VALUE: 22
PIF_VALUE: 21
PIF_VALUE: 21
PIF_VALUE: 20
PIF_VALUE: 23
PIF_VALUE: 21
PIF_VALUE: 20
PIF_VALUE: 19
PIF_VALUE: 19
PIF_VALUE: 20
PIF_VALUE: 30
PIF_VALUE: 21
PIF_VALUE: 20
PIF_VALUE: 31
PIF_VALUE: 19
PIF_VALUE: 20
PIF_VALUE: 23
PIF_VALUE: 0
PIF_VALUE: 21
PIF_VALUE: 21
PIF_VALUE: 19
PIF_VALUE: 19
PIF_VALUE: 21
PIF_VALUE: 20
PIF_VALUE: 21
PIF_VALUE: 22
PIF_VALUE: 23
PIF_VALUE: 2
PIF_VALUE: 25
PIF_VALUE: 21
PIF_VALUE: 20
PIF_VALUE: 22
PIF_VALUE: 1
PIF_VALUE: 21
PIF_VALUE: 21
PIF_VALUE: 19
PIF_VALUE: 20
PIF_VALUE: 21
PIF_VALUE: 19
PIF_VALUE: 20
PIF_VALUE: 0
PIF_VALUE: 20
PIF_VALUE: 20
PIF_VALUE: 21
PIF_VALUE: 20
PIF_VALUE: 3
PIF_VALUE: 20
PIF_VALUE: 21
PIF_VALUE: 22
PIF_VALUE: 23
PIF_VALUE: 20
PIF_VALUE: 23
PIF_VALUE: 20
PIF_VALUE: 19
PIF_VALUE: 19
PIF_VALUE: 20
PIF_VALUE: 23
PIF_VALUE: 23
PIF_VALUE: 19
PIF_VALUE: 19
PIF_VALUE: 21
PIF_VALUE: 22
PIF_VALUE: 20
PIF_VALUE: 19
PIF_VALUE: 20
PIF_VALUE: 20
PIF_VALUE: 23
PIF_VALUE: 20
PIF_VALUE: 21
PIF_VALUE: 20
PIF_VALUE: 20
PIF_VALUE: 23
PIF_VALUE: 21
PIF_VALUE: 1
PIF_VALUE: 20
PIF_VALUE: 23
PIF_VALUE: 20
PIF_VALUE: 19
PIF_VALUE: 23
PIF_VALUE: 21
PIF_VALUE: 19
PIF_VALUE: 20
PIF_VALUE: 5
PIF_VALUE: 22
PIF_VALUE: 23
PIF_VALUE: 27
PIF_VALUE: 20
PIF_VALUE: 1
PIF_VALUE: 19
PIF_VALUE: 23
PIF_VALUE: 20
PIF_VALUE: 20
PIF_VALUE: 19
PIF_VALUE: 23
PIF_VALUE: 20
PIF_VALUE: 30
PIF_VALUE: 22
PIF_VALUE: 20
PIF_VALUE: 22
PIF_VALUE: 20
PIF_VALUE: 21
PIF_VALUE: 20
PIF_VALUE: 8
PIF_VALUE: 20
PIF_VALUE: 21
PIF_VALUE: 20
PIF_VALUE: 22
PIF_VALUE: 1
PIF_VALUE: 33
PIF_VALUE: 23
PIF_VALUE: 22
PIF_VALUE: 19
PIF_VALUE: 19
PIF_VALUE: 23
PIF_VALUE: 20
PIF_VALUE: 21
PIF_VALUE: 19
PIF_VALUE: 29
PIF_VALUE: 0
PIF_VALUE: 29
PIF_VALUE: 20
PIF_VALUE: 19
PIF_VALUE: 27
PIF_VALUE: 20
PIF_VALUE: 19
PIF_VALUE: 19
PIF_VALUE: 31
PIF_VALUE: 19
PIF_VALUE: 22
PIF_VALUE: 9
PIF_VALUE: 21
PIF_VALUE: 19
PIF_VALUE: 2
PIF_VALUE: 20
PIF_VALUE: 21
PIF_VALUE: 21
PIF_VALUE: 22
PIF_VALUE: 19
PIF_VALUE: 22
PIF_VALUE: 20
PIF_VALUE: 23
PIF_VALUE: 23
PIF_VALUE: 20
PIF_VALUE: 20

## 2020-01-01 ASSESSMENT — PAIN SCALES - WONG BAKER: WONGBAKER_NUMERICALRESPONSE: 0

## 2020-01-01 NOTE — PROGRESS NOTES
minerals oral  15 mL Oral Daily    [MAR Hold] alteplase  1 mg Intracatheter Once    [MAR Hold] povidone-iodine   Topical Daily    [MAR Hold] famotidine  20 mg Per NG tube Daily    [MAR Hold] sodium chloride flush  10 mL Intravenous 2 times per day    [MAR Hold] heparin (porcine)  5,000 Units Subcutaneous 3 times per day     Continuous Infusions:    sodium chloride      [MAR Hold] sodium chloride      [MAR Hold] dextrose      [MAR Hold] sodium chloride 10 mL/hr at 12/28/19 2003     PRN MedicationsfentanNYL, 25 mcg, Q5 Min PRN  fentanNYL, 50 mcg, Q5 Min PRN  fentanNYL, 25 mcg, Q5 Min PRN  fentanNYL, 50 mcg, Q5 Min PRN  oxyCODONE-acetaminophen, 1 tablet, Q4H PRN  diphenhydrAMINE, 12.5 mg, Once PRN  sodium chloride, 500 mL, Once PRN  ondansetron, 4 mg, Once PRN  promethazine, 6.25 mg, Once PRN  labetalol, 5 mg, Q10 Min PRN  hydrALAZINE, 5 mg, Q10 Min PRN  sodium chloride, , Continuous PRN  [MAR Hold] sodium phosphate IVPB, 0.16 mmol/kg, PRN    Or  [MAR Hold] sodium phosphate IVPB, 0.32 mmol/kg, PRN  [MAR Hold] oxyCODONE, 7.5 mg, Q6H PRN    Or  [MAR Hold] oxyCODONE, 10 mg, Q6H PRN  [MAR Hold] heparin (porcine), 1,900 Units, PRN  [MAR Hold] heparin (porcine), 1,900 Units, PRN  [MAR Hold] melatonin, 1 mg, Nightly PRN  [MAR Hold] acetaminophen, 650 mg, Q4H PRN  [MAR Hold] labetalol, 20 mg, Q6H PRN  [MAR Hold] heparin (porcine), 500 Units, PRN  [MAR Hold] heparin (porcine), 1,750 Units, PRN  [MAR Hold] glucose, 15 g, PRN  [MAR Hold] dextrose, 12.5 g, PRN  [MAR Hold] glucagon (rDNA), 1 mg, PRN  [MAR Hold] dextrose, 100 mL/hr, PRN  [MAR Hold] albumin human, 25 g, PRN  [MAR Hold] LORazepam, 1 mg, Q6H PRN  [MAR Hold] sodium chloride, 250 mL, PRN  [MAR Hold] sodium chloride, 150 mL, PRN  [MAR Hold] albumin human, 25 g, PRN  [MAR Hold] sodium chloride flush, 10 mL, PRN  [MAR Hold] potassium chloride, 40 mEq, PRN    Or  [MAR Hold] potassium alternative oral replacement, 40 mEq, PRN    Or  [MAR Hold] potassium chloride, 10 mEq, PRN  [MAR Hold] REFRESH LACRI-LUBE, , PRN  [MAR Hold] magnesium hydroxide, 30 mL, Daily PRN  [MAR Hold] ondansetron, 4 mg, Q6H PRN        Diagnostic Labs:  CBC:   Recent Labs     12/30/19  0549 12/31/19  0356 01/01/20  0600   WBC 8.1 8.4 8.2   RBC 2.75* 2.86* 2.74*   HGB 7.8* 8.1* 7.8*   HCT 25.2* 27.6* 26.2*   MCV 91.6 96.5 95.6   RDW 19.8* 19.9* 19.9*    423 399     BMP:   Recent Labs     12/30/19  0549 12/31/19 0356 01/01/20  0600    135 137   K 4.0 4.1 4.2    100 100   CO2 22 23 25   PHOS  --  1.4*  --    BUN 53* 56* 54*   CREATININE 1.84* 1.87* 1.77*     BNP: No results for input(s): BNP in the last 72 hours. PT/INR: No results for input(s): PROTIME, INR in the last 72 hours. APTT: No results for input(s): APTT in the last 72 hours. CARDIAC ENZYMES: No results for input(s): CKMB, CKMBINDEX, TROPONINI in the last 72 hours. Invalid input(s): CKTOTAL;3  FASTING LIPID PANEL:  Lab Results   Component Value Date    TRIG 208 (H) 12/19/2019     LIVER PROFILE: No results for input(s): AST, ALT, ALB, BILIDIR, BILITOT, ALKPHOS in the last 72 hours. MICROBIOLOGY:   Lab Results   Component Value Date/Time    CULTURE NO GROWTH 6 DAYS 12/02/2019 04:13 AM       Imaging:    Xr Chest Portable    Result Date: 12/30/2019  1. Endotracheal tube not identified. 2. Venous catheters terminating at the cavoatrial junction. 3. Interstitial pulmonary edema. ASSESSMENT & PLAN     ASSESSMENT / PLAN:     Principal Problem:    -Diabetes mellitus: Currently on high-dose correcting scale. Last HbA1c 8 done on 12/1/2019. Start the patient on Lantus 10 units daily.  -Hypertension: Increased Coreg to 25 mg.  -Iron deficiency anemia Hb 7.8: Follow-up iron studies, patient started on IV Venofer 200 mg  -DEBBY: Creatinine trending down today is 1.77. Continue to monitor.   Continue Bumex as per nephrology recommendations.  -Necrotizing fasciitis,Gluteal, perirectal, perianal wound: Wound VAC change today by

## 2020-01-01 NOTE — PROGRESS NOTES
urethra less viable. · One more debridement in OR scheduled for 12-10-19 and then decision will be made to change code status or not. · Pt did not tolerate HD on 12-9. It was stopped early and pt required vasopressor support with Levophed, remains on vent. · Pt to OR 12-10 for further debridement  · Repeat I&D planned for 12-16-19 was cancelled because of high K levels  · Bedside I & D on 12/17/19. · Patient to undergo additional I&D on 12-20-19  · 12/23 right upper lobe collapse  · 12/24 debridement with excision of sacral wound in preparation for a flap, partial closure of his scrotal wound, VAC and debridement over the perineum  · 12-30 Wound RN to change abdominal vac   · 1-1-20 Plan for return to OR for further debridement of perineal wound and vac change  Infection Control Recommendations   · [place] Precautions  · Contact Isolation MRSA    Antimicrobial Stewardship Recommendations     Off antibiotics  Coordination of Outpatient Care:   · Estimated Length of IV antimicrobials: D/C 12-19-19  · Patient will need Midline Catheter Insertion: No  · Patient will need PICC line Insertion:Has Central line  · Patient will need: Home IV , [place],  SNF,  LTAC: TBD  · Patient will need outpatient wound care:Yes    Chief complaint/reason for consultation:   · Ashley's vs necrotizing fasciitis    History of Present Illness:   Hay Man is a 37y.o.-year-old  male who was initially admitted on 11/29/2019. Patient seen at the request of . INITIAL HISTORY:    Patient presented through ER in [place] with complaints of weakness of a few days duration. Examination showed skin necrosis in the gluteal and perineal region. Patient transferred to [facility]. CT scan showed extensive subcutaneous emphysema. Patient underwent I&D and extensive complex debridement of affected tissues on 11-29-19. Gram stain of tissues showed Strep. Spp and Gram negative bacilli.   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 He has underlying DM 2, prior diabetic foot infection, DEBBY. Patient more stable post surgery but with hypotension requiring a pressor. Zosyn D/C because of of poor LVEF, in order to reduce Na and fluid load  Meropenem started adjusted for Cr Cl 30 cc  Meropenem adjusted for HD  Per surgery after debridement on 12-8-19, infection has spread to deep planes with the urethra less viable. One more debridement in OR scheduled for 12-10-19 and then decision will be made to change code status or not. Pt did not tolerate HD on 12-9. It was stopped early and pt required vasopressor support with Levophed, remains on vent. Pt to OR 12-10 for further debridement  · Repeat I&D planned for 12-16-19 was cancelled because of high K levels  · Bedside I & D on 12/17/19. · Patient to undergo additional I&D on 12-20-19  · 12/23 right upper lobe collapse  · 12/24 debridement with excision of sacral wound in preparation for a flap, partial closure of his scrotal wound, VAC and debridement over the perineum  · Pt making urine. Last HD 12-27  · 12-30 Abd vac change per wound RN  · 1-1-20 Returned to OR for further debridement of perineal wound and vac change    CURRENT EVALUATION :1/1/2020     Afebrile  VS stable, intermittent HTN    Easily roused, nods appropriately to questions  Tolerating NG tube feeds at 55  Abdomen is soft, active BS, ostomy intact and has a VAC in the middle abdominal wound. Had perineal VAC exchange on 1-1-20     Left foot has dystrophic skin, dressing in place. Residual ischemic tissues. Not infected    Last HD 12-27, making urine, HD on hold     CXR 12-29:   Endotracheal tube not identified. 2. Venous catheters terminating at the cavoatrial junction. 3. Interstitial pulmonary edema. Wound pics reviewed:  12-30          Lab & Cultures: 1/1/2020    WBC 6.5->8.1->8.4  Hb 6.8->7.8->8. 1  Plat 249->356->423    BUN 43->53->56  Cr or organizations: Not on file     Relationship status: Not on file    Intimate partner violence:     Fear of current or ex partner: Not on file     Emotionally abused: Not on file     Physically abused: Not on file     Forced sexual activity: Not on file   Other Topics Concern    Not on file   Social History Narrative    Not on file       Family History:   No family history on file. Allergies:   Patient has no known allergies. Review of Systems:     Review of Systems   Constitutional: Negative for activity change. Pt easily roused, nods to questions then back to sleep  Denies chills or fevers, no SOB    Per Mom, pt is eating small amounts of food. Physical Examination :     Patient Vitals for the past 8 hrs:   BP Temp Temp src Pulse Resp SpO2   01/01/20 1447 (!) 98/51 -- -- 77 13 100 %   01/01/20 1430 (!) 91/49 -- -- 74 12 100 %   01/01/20 1416 (!) 87/43 97.5 °F (36.4 °C) Temporal 74 12 100 %   01/01/20 1400 (!) 93/34 -- -- 74 14 100 %   01/01/20 1347 (!) 93/35 98.1 °F (36.7 °C) Temporal 74 15 100 %     Physical Exam  Constitutional:       Appearance: Normal appearance. He is not toxic-appearing or diaphoretic. HENT:      Head: Normocephalic and atraumatic. Nose: No congestion or rhinorrhea. Mouth/Throat:      Mouth: Mucous membranes are moist.   Eyes:      General: No scleral icterus. Pupils: Pupils are equal, round, and reactive to light. Neck:      Musculoskeletal: Neck supple. No neck rigidity or muscular tenderness. Cardiovascular:      Rate and Rhythm: Normal rate and regular rhythm. Heart sounds: Normal heart sounds. No murmur. No friction rub. No gallop. Pulmonary:      Effort: Pulmonary effort is normal. No respiratory distress. Breath sounds: Normal breath sounds. No stridor. Abdominal:      General: Abdomen is flat. There is no distension. Palpations: There is no mass. Tenderness: There is no tenderness.       Comments: Abdominal intubated   Acuity: Acute   Type of Exam: Initial       FINDINGS:   New ET tube terminates 38 mm above the ron.  There appears to be a   possible enteric tube which is not well visualized distally.  Right IJ   catheter terminates in the right atrium and is unchanged.  Lungs are clear. Cardiomegaly.  Mediastinum normal.  Bony thorax intact.           Impression   New ET tube as above.  Possible new enteric tube not well visualized. Recommend follow-up KUB if clinically warranted. CT ABDOMEN AND PELVIS WITHOUT CONTRAST    COMPARISON:  3/22/2017    CLINICAL HISTORY: Infection in scrotum, lower abdominal pain, diabetic, 30,000 white blood cell count. TECHNIQUE: Unenhanced axial images were obtained from the lung bases to the pubic symphysis with sagittal and coronal 2D reformatted images. Oral contrast administered:  No.  Automatic exposure control (AEC) was utilized. CT ABDOMEN FINDINGS:      The lung bases are unremarkable. There is a small pericardial effusion versus thickening. The liver, spleen, and pancreas demonstrate no acute abnormality given compromised evaluation without intravenous contrast.  There may be mild splenomegaly.  The adrenal glands appear within normal limits. There is no hydronephrosis or obstructing urinary tract calculi. Small amount of free fluid is seen adjacent to the liver inferiorly. .    The appendix is visualized in the right lower quadrant and appears within normal limits.       There is no evidence for bowel obstruction. Stranding is seen within the subcutaneous fat overlying both lateral abdominal walls left greater than right.  There is ill-defined fluid collection within the subcutaneous fat overlying the left lateral abdominal wall possibly representing phlegmon or developing abscess although no organized   abscess is seen.     There is a large amount of soft tissue emphysema involving both the right and left buttock extending to the perineum tissue thickening is seen especially on the left involving the left buttock. CT PELVIS FINDINGS:        No distal ureteral calculi or bladder calculi. There is no free fluid in the pelvis. Catheter is seen within the urinary bladder. Osseous changes within the left hemipelvis which may be chronic in nature. IMPRESSION:    Extensive subcutaneous emphysema as described above involving both buttocks extending to the perineum.  The possibility of Ashley gangrene/necrotizing fasciitis cannot be excluded. Possible phlegmon or developing soft tissue abscess overlying the left lateral abdominal wall as noted above.  No organized abscess is seen however. Osseous changes within the left hemipelvis which may be chronic in nature. Results the study were called to Dr. Yohana Alvarado 63 on 11/29/2019  All CT scans at this facility use dose modulation, iterative reconstruction, and/or weight based dosing when appropriate to reduce radiation dose to as low as reasonably achievable.       Medical Decision [id number]:           Cherie Bains MD

## 2020-01-01 NOTE — PROGRESS NOTES
Renal Progress Note    Patient :  Donal Rivera; 37 y.o. MRN# [id number]  Location:  5368/3222-35  Attending:  Judson Holland MD  Admit Date:  11/29/2019   Hospital Day: 35      Subjective:     Patient is seen and examined. No new issues overnight reported. Patient is status post I&D of gluteal wound and wound VAC exchange done today 1/1/2020. Patient made about 3.0 L of urine last 24 hours. Patient serum creatinine improved to 1.77 mg/DL today. Electrolytes ok. His last hemodialysis was on 12/27/2019. His renal function seems to be improving. Currently on tube feeds and now seems to be starting to tolerate oral diet. Outpatient Medications:     No medications prior to admission.     Current Medications:     Scheduled Meds:    iron sucrose  200 mg Intravenous Q24H    carvedilol  25 mg Per NG tube Daily    insulin glargine  10 Units Subcutaneous Daily    bumetanide  2 mg Oral Daily    [START ON 1/3/2020] darbepoetin rohan-polysorbate  100 mcg Intravenous Weekly    docusate  100 mg Oral Daily    polyethylene glycol  17 g Oral Daily    insulin lispro  0-18 Units Subcutaneous Q2P    CENTRUM/CERTA-CHANCE with minerals oral  15 mL Oral Daily    alteplase  1 mg Intracatheter Once    povidone-iodine   Topical Daily    famotidine  20 mg Per NG tube Daily    sodium chloride flush  10 mL Intravenous 2 times per day    heparin (porcine)  5,000 Units Subcutaneous 3 times per day     Continuous Infusions:    sodium chloride      dextrose      sodium chloride 10 mL/hr at 12/28/19 2003     PRN Meds:  sodium phosphate IVPB **OR** sodium phosphate IVPB, oxyCODONE **OR** oxyCODONE, heparin (porcine), heparin (porcine), melatonin, acetaminophen, labetalol, heparin (porcine), heparin (porcine), glucose, dextrose, glucagon (rDNA), dextrose, albumin human, LORazepam, sodium chloride, sodium chloride, albumin human, sodium chloride flush, potassium chloride **OR** potassium alternative oral replacement **OR** potassium chloride, REFRESH LACRI-LUBE, magnesium hydroxide, ondansetron    Input/Output:       I/O last 3 completed shifts: In: 2656 [P.O.:480; I.V.:1586; NG/GT:590]  Out: 5864 [Urine:3825; Drains:350; Stool:50; Blood:10]. Patient Vitals for the past 96 hrs (Last 3 readings):   Weight   19 0557 279 lb 14.4 oz (127 kg)       Vital Signs:   Temperature:  Temp: 97.5 °F (36.4 °C)  TMax:   Temp (24hrs), Av.9 °F (36.6 °C), Min:97.5 °F (36.4 °C), Max:98.5 °F (36.9 °C)    Respirations:  Resp: 13  Pulse:   Pulse: 77  BP:    BP: (!) 98/51  BP Range: Systolic (85BUQ), BS , Min:40 , MFR:830       Diastolic (04FWL), BFI:90, Min:29, Max:106      Physical Examination:     General:  AAO x 3, speaking in full sentences, no accessory muscle use. HEENT: Atraumatic, normocephalic, no throat congestion, moist mucosa. Eyes:   Pupils equal, round and reactive to light, EOMI. Neck:   Supple  Chest:   Bilateral vesicular breath sounds, no rales or wheezes. Cardiac:  S1 S2 RR, no murmurs, gallops or rubs. Abdomen: Soft, obese, non-tender, no masses or organomegaly, BS audible. :   No suprapubic or flank tenderness. Neuro:  AAO x 3, No FND. SKIN:  No rashes, good skin turgor. Extremities:  +1 bilateral lower extremity edema. Positive upper extremity edema. Positive wound VAC.     Labs:       Recent Labs     19  0549 19  0356 20  0600   WBC 8.1 8.4 8.2   RBC 2.75* 2.86* 2.74*   HGB 7.8* 8.1* 7.8*   HCT 25.2* 27.6* 26.2*   MCV 91.6 96.5 95.6   MCH 28.4 28.3 28.5   MCHC 31.0 29.3 29.8   RDW 19.8* 19.9* 19.9*    423 399   MPV 8.3 8.6 8.2      BMP:   Recent Labs     19  0549 19  0356 20  0600    135 137   K 4.0 4.1 4.2    100 100   CO2 22 23 25   BUN 53* 56* 54*   CREATININE 1.84* 1.87* 1.77*   GLUCOSE 163* 151* 114*   CALCIUM 7.7* 7.8* 8.1*      Phosphorus:     Recent Labs     19  0356   PHOS 1.4*     Magnesium:    Recent Labs     19  0356   MG 1.6

## 2020-01-01 NOTE — ANESTHESIA PRE PROCEDURE
Department of Anesthesiology  Preprocedure Note       Name:  Sravan Echavarria   Age:  37 y.o.  :  1985                                          MRN:  [id number]         Date:  2020      Procedure: IRRIGATION AND DEBRIDEMENT BUTTOCKS, WOUND VAC EXCHANGE (N/A )   Anesthesia type: General   Pre-op diagnosis: NECROTIZING FASCIITIS   Location: [facility] OR [address] [place] [address]   Surgeon: Cuca Douglas MD         Medications prior to admission:   Prior to Admission medications    Not on File       Current medications:    No current facility-administered medications for this visit. No current outpatient medications on file.      Facility-Administered Medications Ordered in Other Visits   Medication Dose Route Frequency Provider Last Rate Last Dose    sodium phosphate 20.31 mmol in dextrose 5 % 250 mL IVPB  0.16 mmol/kg Intravenous PRN Craley Davis MD        Or    sodium phosphate 40.65 mmol in dextrose 5 % 250 mL IVPB  0.32 mmol/kg Intravenous PRN Carley Davis MD        bumetanide (BUMEX) tablet 2 mg  2 mg Oral Daily Ross Alvarez MD   2 mg at 19 0917    [START ON 1/3/2020] darbepoetin rohan-polysorbate (ARANESP) injection 100 mcg  100 mcg Intravenous Weekly David Castaneda MD        oxyCODONE (ROXICODONE) 5 MG/5ML solution 7.5 mg  7.5 mg Oral Q6H PRN Alma Narda, DO        Or    oxyCODONE (ROXICODONE) 5 MG/5ML solution 10 mg  10 mg Oral Q6H PRN Alma Narda, DO   10 mg at 19 1817    docusate (COLACE) 50 MG/5ML liquid 100 mg  100 mg Oral Daily Alma Anrda, DO   100 mg at 19 0919    heparin (porcine) injection 1,900 Units  1,900 Units Intracatheter PRN Alma Narda, DO   1,900 Units at 19 1245    heparin (porcine) injection 1,900 Units  1,900 Units Intracatheter PRN Alma Narda, DO   1,900 Units at 19 1245    melatonin tablet 1 mg  1 mg Oral Nightly PRN Alma Narda, DO   1 mg at 19 0403    acetaminophen (TYLENOL) tablet 650 mg 650 mg Oral Q4H PRN Kathrin Pauly, DO   650 mg at 12/31/19 2323    polyethylene glycol (GLYCOLAX) packet 17 g  17 g Oral Daily Kathrin Pauly, DO   17 g at 12/31/19 4049    insulin lispro (HUMALOG) injection vial 0-18 Units  0-18 Units Subcutaneous Q4H Kathrin Pauly, DO   3 Units at 12/31/19 2047    carvedilol (COREG) tablet 6.25 mg  6.25 mg Per NG tube Daily Kathrin Pauly, DO   6.25 mg at 12/31/19 5034    CENTRUM/CERTA-CHANCE with minerals oral solution 15 mL  15 mL Oral Daily Kathrin Pauly, DO   15 mL at 12/31/19 5395    labetalol (NORMODYNE;TRANDATE) injection 20 mg  20 mg Intravenous Q6H PRN Kathrin Pauly, DO   20 mg at 12/31/19 1929    sodium chloride 0.9 % irrigation 1,000 mL  1,000 mL Irrigation Continuous Kathrin Pauly, DO   1,000 mL at 12/19/19 1800    alteplase (CATHFLO) injection 1 mg  1 mg Intracatheter Once Kathrin Pauly, DO        heparin (porcine) injection 500 Units  500 Units Intravenous PRN Kathrin Pauly, DO   500 Units at 12/27/19 0915    heparin (porcine) injection 1,750 Units  1,750 Units Intravenous PRN Kathrin Pauly, DO   1,750 Units at 12/27/19 0915    glucose (GLUTOSE) 40 % oral gel 15 g  15 g Oral PRN Kathrin Pauly, DO        dextrose 50 % IV solution  12.5 g Intravenous PRN Kathrin Pauly, DO        glucagon (rDNA) injection 1 mg  1 mg Intramuscular PRN Kathrin Pauly, DO        dextrose 5 % solution  100 mL/hr Intravenous PRN Kathrin Pauly, DO        albumin human 25 % IV solution 25 g  25 g Intravenous PRN Kathrin Pauly, DO   25 g at 12/16/19 1659    LORazepam (ATIVAN) injection 1 mg  1 mg Intravenous Q6H PRN Kathrin Pauly, DO        0.9 % sodium chloride bolus  250 mL Intravenous PRN Kathrin Pauly, DO        0.9 % sodium chloride bolus  150 mL Intravenous PRN Kathrin Pauly, DO        albumin human 25 % IV solution 25 g  25 g Intravenous PRN Kathrin Pauly, DO        povidone-iodine (BETADINE) 10 % external solution   Topical Daily Kathrin Coats, DO        sodium chloride flush 0.9 % injection 10 mL  10 mL Intravenous PRN Dock Grad, DO        potassium chloride (KLOR-CON M) extended release tablet 40 mEq  40 mEq Oral PRN Dock Grad, DO        Or    potassium bicarb-citric acid (EFFER-K) effervescent tablet 40 mEq  40 mEq Oral PRN Dock Grad, DO   40 mEq at 12/21/19 1150    Or    potassium chloride 10 mEq/100 mL IVPB (Peripheral Line)  10 mEq Intravenous PRN Dock Grad,  mL/hr at 12/24/19 0819 10 mEq at 12/24/19 0819    famotidine (PEPCID) tablet 20 mg  20 mg Per NG tube Daily Dock Grad, DO   20 mg at 12/31/19 4830    REFRESH LACRI-LUBE ointment OINT   Both Eyes PRN Dock Grad, DO        0.9 % sodium chloride infusion   Intravenous Continuous Dock Grad, DO 10 mL/hr at 12/28/19 2003      sodium chloride flush 0.9 % injection 10 mL  10 mL Intravenous 2 times per day Dock Grad, DO   10 mL at 12/31/19 2047    magnesium hydroxide (MILK OF MAGNESIA) 400 MG/5ML suspension 30 mL  30 mL Oral Daily PRN Dock Grad, DO   30 mL at 12/23/19 2332    ondansetron (ZOFRAN) injection 4 mg  4 mg Intravenous Q6H PRN Dock Grad, DO        heparin (porcine) injection 5,000 Units  5,000 Units Subcutaneous 3 times per day Dock Grad, DO   5,000 Units at 01/01/20 0502       Allergies:  No Known Allergies    Problem List:    Patient Active Problem List   Diagnosis Code    Necrotizing fasciitis ([facility] [address] M72.6    Essential hypertension I10    Diabetes ([address] E11.9    Diabetic foot ulcer ([address] E11.621, L97.509    Diabetic foot infection ([address] E11.628, L08.9    Iron deficiency anemia D50.9       Past Medical History:        Diagnosis Date    CHF (congestive heart failure) ([address]     Diabetes mellitus ([address]     Hypertension     Septic shock ([address]     Spina bifida aperta of lumbar spine ([address]        Past Surgical History:        Procedure Laterality Date    ABDOMEN SURGERY N/A 12/8/2019    DEBRIDEMENT  NECROTIZING FASCIITIS BUTTOCK, WOUND VAC REMOVAL DELAYED PRIMARY CLOSURE OF MIDLINE ABDOMINAL INCISION, OSTOMY BAG CHANGE performed by Nazario Llamas MD at [address] [facility] [place] [place] N/A 12/10/2019    DEBRIDEMENT OF SACRAL WOUND performed by Garcia Freed MD at [address] [facility]  12/26/2019    INCISION AND DRAINAGE AND WOUND VAC CHANGE BUTTOCK, PERINEUM     COLOSTOMY N/A 12/3/2019    LAPAROSCOPIC CONVERTED TO OPEN DIVERTING LOOP COLOSTOMY; WOUND VAC APPLICATION performed by Sheryl Scott MD at [facility] [address] Bilateral 11/29/2019    RECTAL PERIRECTAL INCISION AND DRAINAGE, [facility] LOOP COLOSTOMY CREATION performed by Daryle Crisp, MD at [facility] [address] N/A 12/6/2019    DEBRIDEMENT NECROTIZING FASCIAITIS BILAT BUTTOCK performed by Garcia Freed MD at [place] [id number] N/A 12/20/2019    DEBRIDEMENT GLUTEAL AND SCROTAL REGIONS performed by Daryle Crisp, MD at [facility] [address] N/A 12/26/2019    INCISION AND DRAINAGE AND WOUND VAC CHANGE BUTTOCK, PERINEUM performed by Teodora Holstein, DO at [address] N/A 12/10/2019    SCROTAL EXPLORATION WITH SCROTAL DEBRIDEMENT performed by Marguerite Diaz MD at [facility] N/A 12/23/2019    WOUND [address] performed by Garcia Freed MD at [address] [facility] History:    Social History     Tobacco Use    Smoking status: Not on file   Substance Use Topics    Alcohol use: Not on file                                Counseling given: Not Answered      Vital Signs (Current): There were no vitals filed for this visit.                                            BP Readings from Last 3 Encounters:   01/01/20 (!) 150/88   12/26/19 127/85   12/23/19 105/66       NPO Status:                                                                                 BMI:   Wt Readings from Last 3 Encounters:   12/30/19 279 lb 14.4 oz (127 kg)     There is no height or weight on file to

## 2020-01-02 LAB
ABSOLUTE EOS #: 0.68 K/UL (ref 0–0.4)
ABSOLUTE IMMATURE GRANULOCYTE: 0 K/UL (ref 0–0.3)
ABSOLUTE LYMPH #: 1.65 K/UL (ref 1–4.8)
ABSOLUTE MONO #: 0.68 K/UL (ref 0.1–0.8)
ANION GAP SERPL CALCULATED.3IONS-SCNC: 14 MMOL/L (ref 9–17)
BASOPHILS # BLD: 0 % (ref 0–2)
BASOPHILS ABSOLUTE: 0 K/UL (ref 0–0.2)
BUN BLDV-MCNC: 53 MG/DL (ref 6–20)
BUN/CREAT BLD: ABNORMAL (ref 9–20)
CALCIUM SERPL-MCNC: 7.8 MG/DL (ref 8.6–10.4)
CHLORIDE BLD-SCNC: 99 MMOL/L (ref 98–107)
CO2: 21 MMOL/L (ref 20–31)
CREAT SERPL-MCNC: 1.8 MG/DL (ref 0.7–1.2)
DIFFERENTIAL TYPE: ABNORMAL
EOSINOPHILS RELATIVE PERCENT: 7 % (ref 1–4)
GFR AFRICAN AMERICAN: 52 ML/MIN
GFR NON-AFRICAN AMERICAN: 43 ML/MIN
GFR SERPL CREATININE-BSD FRML MDRD: ABNORMAL ML/MIN/{1.73_M2}
GFR SERPL CREATININE-BSD FRML MDRD: ABNORMAL ML/MIN/{1.73_M2}
GLUCOSE BLD-MCNC: 121 MG/DL (ref 75–110)
GLUCOSE BLD-MCNC: 128 MG/DL (ref 75–110)
GLUCOSE BLD-MCNC: 135 MG/DL (ref 75–110)
GLUCOSE BLD-MCNC: 138 MG/DL (ref 70–99)
GLUCOSE BLD-MCNC: 147 MG/DL (ref 75–110)
GLUCOSE BLD-MCNC: 188 MG/DL (ref 75–110)
HCT VFR BLD CALC: 26.2 % (ref 40.7–50.3)
HEMOGLOBIN: 7.3 G/DL (ref 13–17)
IMMATURE GRANULOCYTES: 0 %
LYMPHOCYTES # BLD: 17 % (ref 24–44)
MAGNESIUM: 1.9 MG/DL (ref 1.6–2.6)
MCH RBC QN AUTO: 27.9 PG (ref 25.2–33.5)
MCHC RBC AUTO-ENTMCNC: 27.9 G/DL (ref 28.4–34.8)
MCV RBC AUTO: 100 FL (ref 82.6–102.9)
MONOCYTES # BLD: 7 % (ref 1–7)
MORPHOLOGY: ABNORMAL
MORPHOLOGY: ABNORMAL
NRBC AUTOMATED: 0 PER 100 WBC
PDW BLD-RTO: 20.2 % (ref 11.8–14.4)
PHOSPHORUS: 2.9 MG/DL (ref 2.5–4.5)
PLATELET # BLD: 410 K/UL (ref 138–453)
PLATELET ESTIMATE: ABNORMAL
PMV BLD AUTO: 8.2 FL (ref 8.1–13.5)
POTASSIUM SERPL-SCNC: 4.5 MMOL/L (ref 3.7–5.3)
RBC # BLD: 2.62 M/UL (ref 4.21–5.77)
RBC # BLD: ABNORMAL 10*6/UL
SEG NEUTROPHILS: 69 % (ref 36–66)
SEGMENTED NEUTROPHILS ABSOLUTE COUNT: 6.69 K/UL (ref 1.8–7.7)
SODIUM BLD-SCNC: 134 MMOL/L (ref 135–144)
WBC # BLD: 9.7 K/UL (ref 3.5–11.3)
WBC # BLD: ABNORMAL 10*3/UL

## 2020-01-02 PROCEDURE — 99232 SBSQ HOSP IP/OBS MODERATE 35: CPT | Performed by: INTERNAL MEDICINE

## 2020-01-02 PROCEDURE — 6370000000 HC RX 637 (ALT 250 FOR IP): Performed by: STUDENT IN AN ORGANIZED HEALTH CARE EDUCATION/TRAINING PROGRAM

## 2020-01-02 PROCEDURE — 82947 ASSAY GLUCOSE BLOOD QUANT: CPT

## 2020-01-02 PROCEDURE — 80048 BASIC METABOLIC PNL TOTAL CA: CPT

## 2020-01-02 PROCEDURE — 2580000003 HC RX 258: Performed by: STUDENT IN AN ORGANIZED HEALTH CARE EDUCATION/TRAINING PROGRAM

## 2020-01-02 PROCEDURE — 97110 THERAPEUTIC EXERCISES: CPT

## 2020-01-02 PROCEDURE — 2060000000 HC ICU INTERMEDIATE R&B

## 2020-01-02 PROCEDURE — 83735 ASSAY OF MAGNESIUM: CPT

## 2020-01-02 PROCEDURE — 85025 COMPLETE CBC W/AUTO DIFF WBC: CPT

## 2020-01-02 PROCEDURE — 6360000002 HC RX W HCPCS: Performed by: STUDENT IN AN ORGANIZED HEALTH CARE EDUCATION/TRAINING PROGRAM

## 2020-01-02 PROCEDURE — 36415 COLL VENOUS BLD VENIPUNCTURE: CPT

## 2020-01-02 PROCEDURE — 92526 ORAL FUNCTION THERAPY: CPT

## 2020-01-02 PROCEDURE — 84100 ASSAY OF PHOSPHORUS: CPT

## 2020-01-02 RX ORDER — DOCUSATE SODIUM 100 MG/1
100 CAPSULE, LIQUID FILLED ORAL DAILY
Status: DISCONTINUED | OUTPATIENT
Start: 2020-01-02 | End: 2020-01-06 | Stop reason: SDUPTHER

## 2020-01-02 RX ORDER — M-VIT,TX,IRON,MINS/CALC/FOLIC 27MG-0.4MG
1 TABLET ORAL DAILY
Status: DISCONTINUED | OUTPATIENT
Start: 2020-01-02 | End: 2020-01-16 | Stop reason: HOSPADM

## 2020-01-02 RX ADMIN — OXYCODONE HYDROCHLORIDE 10 MG: 5 SOLUTION ORAL at 20:55

## 2020-01-02 RX ADMIN — CARVEDILOL 25 MG: 25 TABLET, FILM COATED ORAL at 08:35

## 2020-01-02 RX ADMIN — ACETAMINOPHEN 650 MG: 325 TABLET ORAL at 16:56

## 2020-01-02 RX ADMIN — Medication 15 ML: at 08:35

## 2020-01-02 RX ADMIN — BUMETANIDE 2 MG: 1 TABLET ORAL at 08:35

## 2020-01-02 RX ADMIN — POLYETHYLENE GLYCOL 3350 17 G: 17 POWDER, FOR SOLUTION ORAL at 08:23

## 2020-01-02 RX ADMIN — IRON SUCROSE 200 MG: 20 INJECTION, SOLUTION INTRAVENOUS at 08:58

## 2020-01-02 RX ADMIN — INSULIN GLARGINE 10 UNITS: 100 INJECTION, SOLUTION SUBCUTANEOUS at 08:36

## 2020-01-02 RX ADMIN — HEPARIN SODIUM 5000 UNITS: 5000 INJECTION INTRAVENOUS; SUBCUTANEOUS at 14:43

## 2020-01-02 RX ADMIN — INSULIN LISPRO 3 UNITS: 100 INJECTION, SOLUTION INTRAVENOUS; SUBCUTANEOUS at 20:53

## 2020-01-02 RX ADMIN — Medication: at 08:37

## 2020-01-02 RX ADMIN — FAMOTIDINE 20 MG: 20 TABLET, FILM COATED ORAL at 08:23

## 2020-01-02 RX ADMIN — Medication 1 MG: at 02:39

## 2020-01-02 RX ADMIN — INSULIN LISPRO 3 UNITS: 100 INJECTION, SOLUTION INTRAVENOUS; SUBCUTANEOUS at 16:56

## 2020-01-02 RX ADMIN — SODIUM CHLORIDE, PRESERVATIVE FREE 10 ML: 5 INJECTION INTRAVENOUS at 21:00

## 2020-01-02 RX ADMIN — Medication 100 MG: at 08:35

## 2020-01-02 RX ADMIN — OXYCODONE HYDROCHLORIDE 10 MG: 5 SOLUTION ORAL at 14:41

## 2020-01-02 RX ADMIN — SODIUM CHLORIDE, PRESERVATIVE FREE 10 ML: 5 INJECTION INTRAVENOUS at 08:36

## 2020-01-02 ASSESSMENT — PAIN SCALES - GENERAL
PAINLEVEL_OUTOF10: 8
PAINLEVEL_OUTOF10: 10
PAINLEVEL_OUTOF10: 10

## 2020-01-02 NOTE — OP NOTE
performed. Antibiotics were given in accordance with SCIP. Once anesthesia was prepared, we shifted the patient into the left lateral decubitus position with his right side up and RLE on a lew stand, secured in place for increased exposure of the perineum. The wound vac was then removed and the wound was prepped with Hibiclens solution and then explored. There were no abscess cavities located and the majority of the gluteal, marc-rectal and perineal wounds were fairly clean at this point. There was a region of necrotic/non-viable tissue and fibrinous exudate along the upper aspect of the left gluteal wound which was sharp excisionally debrided with a scalpel and scissors to the level of healthy bleeding muscle tissue. Hemostasis was achieved with electrocautery. The gluteal cleft was further approximated with 0 prolene sutures for assist with sealing the upper aspect of the gluteal wound during vac application. We then proceeded to apply the VeraFlo irrigating vac to the gluteal and perirectal wound up into the posterior aspect of the perineum. The honeycomb sponges were placed over the wound bed and then covered with solid gray sponges for the vera jewel vac. The edges of the skin surrounding the wound were then bordered with duoderm and coloplast was used to build out the area posterior to the upper aspect of the wound in the gluteal cleft. The acrylic drapes were then applied and a bridge was created out onto the right lateral thigh for the suction apparatus. The irrigating portion of the apparatus was then applied to the chosen portion of the gluteal wound and these were then placed to suction/irrigation. The patient was then positioned in the supine position with legs frog-legged as much as possible given contractures. The right perineal/groin wound was clean without significant exudate as was the scrotal wound.  Black foam was placed within the right groin and coloplast and duoderm were placed in the right

## 2020-01-02 NOTE — PROGRESS NOTES
[facility]  Internal Medicine Teaching Residency Program  Inpatient Daily Progress Note  ______________________________________________________________________________    Patient: Baruch Landau  YOB: 1985   BGF:[id number]    Acct: [de-identified]     Room: 3003005-01  Admit date: 11/29/2019  Today's date: 01/02/20  Number of days in the hospital: 29    SUBJECTIVE   Admitting Diagnosis: Necrotizing fasciitis [facility])     Patient is doing better today. He still has his Flexiflo as per surgery recommendations. He is tolerating food well. Patient got his debridement and wound VAC change over yesterday. Encourage the patient to work with PT/OT. Patient got his hemodialysis 12/27/2019, nephrology is following the patient. ROS:  Constitutional:  negative for chills, fevers, sweats  Respiratory:  negative for cough, dyspnea on exertion, hemoptysis, shortness of breath, wheezing  Cardiovascular:  negative for chest pain, chest pressure/discomfort, lower extremity edema, palpitations  Gastrointestinal:  negative for abdominal pain, constipation, diarrhea, nausea, vomiting  Neurological:  negative for dizziness, headache  BRIEF HISTORY     Autumn Dunn is a 37 y.o. with PMH of spina bifida, hypertension, diabetes and diabetic foot ulcer. Who presented to [facility] Emergency Department with complaint of generalized weakness for the past few days. On examination the emergency department staff noticed the patient had a foul smell which they thought he had a bowel movement. They turned the patient and noticed sloughing of skin on his back and buttocks.      In the emergency department patient was afebrile but tachycardic. Initial labs demonstrated hyponatremia of 130, bicarb 12, BUN 63 and creatinine 2.82, hypocalcemia 7.7, alkaline phosphatase 144, AST 83, ALT 16, lactate 1.4, leukocytosis of 30.4, hemoglobin 9.2. Chest xray demonstrated no acute process.  CT abdomen and pelvis without contrast demonstrated extensive subcutaneous emphysema involving both buttocks extending to the perineum. Possible phlegmon or developing soft tissue abscess overlying the left lateral abdominal wall. Scrotal Ultrasound demonstrated no evidence of testicular torsion or mass however did show extensive scrotal wall edema. He was given zosyn 4/5 g IV, rocephin 1 g IV and flagyl 500 mg IV in the emergency department.     He was transferred to [facility] ICU for Necrotizing Fasciitis and Acute Renal Failure.   Patient initially required pressors, but currently is off pressors. OBJECTIVE     Vital Signs:  BP (!) 142/85   Pulse 92   Temp 98.7 °F (37.1 °C) (Oral)   Resp 16   Ht 5' 7\" (1.702 m)   Wt 279 lb 14.4 oz (127 kg)   SpO2 98%   BMI 43.84 kg/m²     Temp (24hrs), Av.9 °F (36.6 °C), Min:97.5 °F (36.4 °C), Max:99.5 °F (37.5 °C)    In: 2940   Out: 3210 [Urine:3150]    Physical Exam:    General appearance: alert and cooperative with exam  HEENT: Head: Normocephalic, no lesions, without obvious abnormality.   Neck: no adenopathy, no carotid bruit, no JVD, supple, symmetrical, trachea midline and thyroid not enlarged, symmetric, no tenderness/mass/nodules  Lungs: clear to auscultation bilaterally  Heart: regular rate and rhythm, S1, S2 normal, no murmur, click, rub or gallop  Abdomen: soft, non-tender; bowel sounds normal; no masses,  no organomegaly  Genitourinary: Gluteal, perineal, and perirectal wounds covered with dressing   Neurologic: Mental status: Alert, oriented, thought content appropriate    Medications:  Scheduled Medications:    iron sucrose  200 mg Intravenous Q24H    carvedilol  25 mg Per NG tube Daily    insulin glargine  10 Units Subcutaneous Daily    bumetanide  2 mg Oral Daily    [START ON 1/3/2020] darbepoetin rohan-polysorbate  100 mcg Intravenous Weekly    docusate  100 mg Oral Daily    polyethylene glycol  17 g Oral Daily    insulin lispro  0-18 Units Subcutaneous I5Y    CENTRUM/CERTA-CHANCE with minerals oral  15 mL Oral Daily    alteplase  1 mg Intracatheter Once    povidone-iodine   Topical Daily    famotidine  20 mg Per NG tube Daily    sodium chloride flush  10 mL Intravenous 2 times per day    heparin (porcine)  5,000 Units Subcutaneous 3 times per day     Continuous Infusions:    sodium chloride      dextrose      sodium chloride 10 mL/hr at 12/28/19 2003     PRN Medicationssodium phosphate IVPB, 0.16 mmol/kg, PRN    Or  sodium phosphate IVPB, 0.32 mmol/kg, PRN  oxyCODONE, 7.5 mg, Q6H PRN    Or  oxyCODONE, 10 mg, Q6H PRN  heparin (porcine), 1,900 Units, PRN  heparin (porcine), 1,900 Units, PRN  melatonin, 1 mg, Nightly PRN  acetaminophen, 650 mg, Q4H PRN  labetalol, 20 mg, Q6H PRN  heparin (porcine), 500 Units, PRN  heparin (porcine), 1,750 Units, PRN  glucose, 15 g, PRN  dextrose, 12.5 g, PRN  glucagon (rDNA), 1 mg, PRN  dextrose, 100 mL/hr, PRN  albumin human, 25 g, PRN  LORazepam, 1 mg, Q6H PRN  sodium chloride, 250 mL, PRN  sodium chloride, 150 mL, PRN  albumin human, 25 g, PRN  sodium chloride flush, 10 mL, PRN  potassium chloride, 40 mEq, PRN    Or  potassium alternative oral replacement, 40 mEq, PRN    Or  potassium chloride, 10 mEq, PRN  REFRESH LACRI-LUBE, , PRN  magnesium hydroxide, 30 mL, Daily PRN  ondansetron, 4 mg, Q6H PRN        Diagnostic Labs:  CBC:   Recent Labs     12/31/19  0356 01/01/20  0600   WBC 8.4 8.2   RBC 2.86* 2.74*   HGB 8.1* 7.8*   HCT 27.6* 26.2*   MCV 96.5 95.6   RDW 19.9* 19.9*    399     BMP:   Recent Labs     12/31/19  0356 01/01/20  0600    137   K 4.1 4.2    100   CO2 23 25   PHOS 1.4*  --    BUN 56* 54*   CREATININE 1.87* 1.77*     BNP: No results for input(s): BNP in the last 72 hours. PT/INR: No results for input(s): PROTIME, INR in the last 72 hours. APTT: No results for input(s): APTT in the last 72 hours.   CARDIAC ENZYMES: No results for input(s): CKMB, CKMBINDEX, TROPONINI in

## 2020-01-02 NOTE — PROGRESS NOTES
PROGRESS NOTE     PATIENT NAME: Austin Mcdaniel [address] RECORD NO. [id number]  DATE: 1/2/2020  SURGEON: Dane Mathew  PRIMARY CARE PHYSICIAN: No primary care provider on file. HD: # 34    ASSESSMENT    Patient Active Problem List   Diagnosis    Necrotizing fasciitis ([address]    Essential hypertension    Diabetes ([address]    Diabetic foot ulcer ([address]    [facility]    Iron deficiency anemia      11/29/2019 wide complex debridement of nec fasc involving gluteal/perianal/distal rectum  12/03/2019 open diverting end colostomy   12/06/2019 I&D  12/08/2019 Incision and debridement of necrotizing fasciitis buttock wound, scrotum, bilateral groin region and closure of midline abdominal wound  12/10/2019 Sharp excisional debridement of gluteal, marc-rectal and perineal wound to level of muscle, Sharp excisional debridement of scrotum (performed by Urology), Washout of gluteal/perineal/scrotal/inguinal wound, Partial primary closure of pubic wound, and Dakins Wet to dry dressing application  12/43/4131 sharp excisional debridement of gluteal and perineal wounds to muscle 30 x 40 cm. Urology exam under anesthesia   12/24/2019 sharp debridement, partial closure of scrotum, placement of wound vac   12/26/2019 s/p sharp debridement of L side lateral aspect of sacral wound, replacement of wound vac        MEDICAL DECISION MAKING AND PLAN    · S/p OR debridement and wound vac change to gluteal/marc-rectal/right perineal wound. Vera jewel irrigation vac to the gluteal wound which required some debridement. Standard vac to the perineal wound and midline laparotomy wound. · Dr. Jessica Adams, plastic surgery, following from [facility] for grafting and possible flap in future  · General diet. Encourage PO intake. Continue tube feeds post op. Please do not remove flexi flow. Patient will need significant nutrition to heal wounds until he can take adequate PO supplementation/shakes.   · Bowel regimen: Colace and miralax daily  · Midline vac change M/W/F, wound RN following. · Monitor off ABX per ID   · Strict glucose control <180  · LLE wound per podiatry  · Nephrology following. SUBJECTIVE    Patient seen and examined. No acute changes. Resting comfortably. No respiratory distress. Vacs with good seal no leak. OBJECTIVE  VITALS: BP (!) 167/87   Pulse 92   Temp 98.2 °F (36.8 °C) (Oral)   Resp 13   Ht 5' 7\" (1.702 m)   Wt 279 lb 14.4 oz (127 kg)   SpO2 99%   BMI 43.84 kg/m²     GENERAL: A&O. NAD   NEURO: responsive, follows commands, cooperative  LUNGS: chest rise equal. resp even and unlabored   HEART: RRR  ABDOMEN: soft, ND, mild tenderness. Midline vac in place. Ostomy appliance in place, stool in bag.   - sacral and perineal vera flow wound vac in place. No leak noted    EXTREMITY: dressing to L foot is c/d/i. No cyanosis     I/O last 3 completed shifts: In: 2940 [P.O.:1920; I.V.:1020]  Out: 3210 [Urine:3150; Stool:50; Blood:10]    Drain/tube output:  In: 2140 [P.O.:1920; I.V.:220]  Out: 2400 [Urine:2350]    LAB:  CBC:   Recent Labs     12/31/19  0356 01/01/20  0600   WBC 8.4 8.2   HGB 8.1* 7.8*   HCT 27.6* 26.2*   MCV 96.5 95.6    399     BMP:   Recent Labs     12/31/19  0356 01/01/20  0600    137   K 4.1 4.2    100   CO2 23 25   BUN 56* 54*   CREATININE 1.87* 1.77*   GLUCOSE 151* 114*         RADIOLOGY:   no new images   No      Odilia Villanueva DO  1/2/2020, 8:11AM      I personally evaluated the patient and directed the medical decision making with Resident/JUSTIN after the physical/radiologic exam and laboratory values were reviewed and confirmed.  HIRAM

## 2020-01-02 NOTE — PLAN OF CARE
Problem: Pain:  Goal: Pain level will decrease  Description  Pain level will decrease  Outcome: Ongoing  Goal: Control of acute pain  Description  Control of acute pain  Outcome: Ongoing  Goal: Control of chronic pain  Description  Control of chronic pain  Outcome: Ongoing     Problem: Skin Integrity:  Goal: Will show no infection signs and symptoms  Description  Will show no infection signs and symptoms  Outcome: Ongoing  Goal: Absence of new skin breakdown  Description  Absence of new skin breakdown  Outcome: Ongoing     Problem: OXYGENATION/RESPIRATORY FUNCTION  Goal: Patient will maintain patent airway  Outcome: Ongoing  Goal: Patient will achieve/maintain normal respiratory rate/effort  Description  Respiratory rate and effort will be within normal limits for the patient  Outcome: Ongoing     Problem: SKIN INTEGRITY  Goal: Skin integrity is maintained or improved  Outcome: Ongoing     Problem: Falls - Risk of:  Goal: Will remain free from falls  Description  Will remain free from falls  Outcome: Ongoing  Goal: Absence of physical injury  Description  Absence of physical injury  Outcome: Ongoing     Problem: Risk for Impaired Skin Integrity  Goal: Tissue integrity - skin and mucous membranes  Description  Structural intactness and normal physiological function of skin and  mucous membranes.   Outcome: Ongoing     Problem: Nutrition  Goal: Optimal nutrition therapy  Description  Nutrition Problem: Inadequate oral intake  Intervention: Food and/or Nutrient Delivery: Start Parenteral Nutrition  Nutritional Goals: Meet % of estimated nutrition needs   Outcome: Ongoing     Problem: Confusion - Acute:  Goal: Absence of continued neurological deterioration signs and symptoms  Description  Absence of continued neurological deterioration signs and symptoms  Outcome: Ongoing  Goal: Mental status will be restored to baseline  Description  Mental status will be restored to baseline  Outcome: Ongoing     Problem: Discharge Planning:  Goal: Ability to perform activities of daily living will improve  Description  Ability to perform activities of daily living will improve  Outcome: Ongoing  Goal: Participates in care planning  Description  Participates in care planning  Outcome: Ongoing     Problem: Injury - Risk of, Physical Injury:  Goal: Will remain free from falls  Description  Will remain free from falls  Outcome: Ongoing  Goal: Absence of physical injury  Description  Absence of physical injury  Outcome: Ongoing     Problem: Mood - Altered:  Goal: Mood stable  Description  Mood stable  Outcome: Ongoing  Goal: Absence of abusive behavior  Description  Absence of abusive behavior  Outcome: Ongoing  Goal: Verbalizations of feeling emotionally comfortable while being cared for will increase  Description  Verbalizations of feeling emotionally comfortable while being cared for will increase  Outcome: Ongoing     Problem: Psychomotor Activity - Altered:  Goal: Absence of psychomotor disturbance signs and symptoms  Description  Absence of psychomotor disturbance signs and symptoms  Outcome: Ongoing     Problem: Sensory Perception - Impaired:  Goal: Demonstrations of improved sensory functioning will increase  Description  Demonstrations of improved sensory functioning will increase  Outcome: Ongoing  Goal: Decrease in sensory misperception frequency  Description  Decrease in sensory misperception frequency  Outcome: Ongoing  Goal: Able to refrain from responding to false sensory perceptions  Description  Able to refrain from responding to false sensory perceptions  Outcome: Ongoing  Goal: Demonstrates accurate environmental perceptions  Description  Demonstrates accurate environmental perceptions  Outcome: Ongoing  Goal: Able to distinguish between reality-based and nonreality-based thinking  Description  Able to distinguish between reality-based and nonreality-based thinking  Outcome: Ongoing  Goal: Able to interrupt

## 2020-01-02 NOTE — PROGRESS NOTES
Renal Progress Note    Patient :  Sandi Morrison; 37 y.o. MRN# [id number]  Location:  [facility]4/3432-25  Attending:  Makenzie Diane MD  Admit Date:  11/29/2019   Hospital Day: 29      Subjective:     Patient is seen and examined. No new issues overnight reported. Patient is status post I&D of gluteal wound and wound VAC exchange done today 1/1/2020. Patient made about 3.1 L of urine last 24 hours. Patient serum creatinine 1.80 mg/DL today. Electrolytes stable. His last hemodialysis was on 12/27/2019. His renal function seems to be improving. Currently off tube feeds and now seems to be starting to tolerate oral diet. Outpatient Medications:     No medications prior to admission.     Current Medications:     Scheduled Meds:    therapeutic multivitamin-minerals  1 tablet Oral Daily    docusate sodium  100 mg Oral Daily    iron sucrose  200 mg Intravenous Q24H    carvedilol  25 mg Per NG tube Daily    insulin glargine  10 Units Subcutaneous Daily    bumetanide  2 mg Oral Daily    [START ON 1/3/2020] darbepoetin rohan-polysorbate  100 mcg Intravenous Weekly    docusate  100 mg Oral Daily    polyethylene glycol  17 g Oral Daily    insulin lispro  0-18 Units Subcutaneous Q4H    alteplase  1 mg Intracatheter Once    povidone-iodine   Topical Daily    famotidine  20 mg Per NG tube Daily    sodium chloride flush  10 mL Intravenous 2 times per day    heparin (porcine)  5,000 Units Subcutaneous 3 times per day     Continuous Infusions:    sodium chloride      dextrose      sodium chloride 10 mL/hr at 12/28/19 2003     PRN Meds:  sodium phosphate IVPB **OR** sodium phosphate IVPB, oxyCODONE **OR** oxyCODONE, heparin (porcine), heparin (porcine), melatonin, acetaminophen, labetalol, heparin (porcine), heparin (porcine), glucose, dextrose, glucagon (rDNA), dextrose, albumin human, LORazepam, sodium chloride, sodium chloride, albumin human, sodium chloride flush, potassium chloride **OR** potassium alternative oral replacement **OR** potassium chloride, REFRESH LACRI-LUBE, magnesium hydroxide, ondansetron    Input/Output:       I/O last 3 completed shifts: In: 2140 [P.O.:1920; I.V.:220]  Out: 2400 [Urine:2350; Stool:50]. Patient Vitals for the past 96 hrs (Last 3 readings):   Weight   19 0557 279 lb 14.4 oz (127 kg)       Vital Signs:   Temperature:  Temp: 97.7 °F (36.5 °C)  TMax:   Temp (24hrs), Av.5 °F (36.9 °C), Min:97.7 °F (36.5 °C), Max:99.5 °F (37.5 °C)    Respirations:  Resp: 22  Pulse:   Pulse: 91  BP:    BP: (!) 141/67  BP Range: Systolic (12DAI), BRN:616 , Min:127 , UZK:226       Diastolic (44FLF), XQK:99, Min:65, Max:87      Physical Examination:     General:  AAO x 3, speaking in full sentences, no accessory muscle use. HEENT: Atraumatic, normocephalic, no throat congestion, moist mucosa. Eyes:   Pupils equal, round and reactive to light, EOMI. Neck:   Supple  Chest:   Bilateral vesicular breath sounds, no rales or wheezes. Cardiac:  S1 S2 RR, no murmurs, gallops or rubs. Abdomen: Soft, obese, non-tender, no masses or organomegaly, BS audible. :   No suprapubic or flank tenderness. Neuro:  AAO x 3, No FND. SKIN:  No rashes, good skin turgor. Extremities:  +1 bilateral lower extremity edema. Positive upper extremity edema. Positive wound VAC.     Labs:       Recent Labs     19  0356 20  0600 20  0858   WBC 8.4 8.2 9.7   RBC 2.86* 2.74* 2.62*   HGB 8.1* 7.8* 7.3*   HCT 27.6* 26.2* 26.2*   MCV 96.5 95.6 100.0   MCH 28.3 28.5 27.9   MCHC 29.3 29.8 27.9*   RDW 19.9* 19.9* 20.2*    399 410   MPV 8.6 8.2 8.2      BMP:   Recent Labs     19  0600 20  0858    137 134*   K 4.1 4.2 4.5    100 99   CO2 23 25 21   BUN 56* 54* 53*   CREATININE 1.87* 1.77* 1.80*   GLUCOSE 151* 114* 138*   CALCIUM 7.8* 8.1* 7.8*      Phosphorus:     Recent Labs     19  0858   PHOS 1.4* 2.9     Magnesium:    Recent Labs feeds  6.  Fluid overload: Continue to remove fluid on dialysis as tolerated, much better in the last 3 to 4 days with daily ultrafiltration/HD, about 10 L of fluid removed in the last week although he was getting significant amount of fluid volume from various infusions including TPN and tube feeds up until Friday (12/27). 7.  S/P diverting colostomy   8.  Anemia requiring blood transfusion prn, stable today s/p one unit yesterday. 9.  Necrotizing fasciitis multiple re-debridements, involving up to the scrotal area, diverting loop colostomy placed, wound VAC placed. Area of wound and skin loss and deep tissue loss significantly high.  General surgery following. Dr. Melvi Bansal (plastic surgery) following from a distance for grafting and possible flap. Plan for sacral would vac change in OR on regular schedule, next planned on Tuesday. Plan:   1. No acute need for dialysis today. Continue to monitor renal function and strict I's and O's.    2.  Continue oral Bumex 2 mg daily. 3.  Continue monitor strict I's and O's renal function. 4.  Avoid any nephrotoxic agents. 5.  Follow-up surgery plan. 6.  BMP in a.m.  7.  Will follow. Patient's nurse was updated. Nutrition   Please ensure that patient is on a renal diet/TF. Avoid nephrotoxic drugs/contrast exposure. We will continue to follow along with you. Leonel Ferraro MD  [facility]     This note is created with the assistance of a speech-recognition program. While intending to generate a document that actually reflects the content of the visit, no guarantees can be provided that every mistake has been identified and corrected by editing.

## 2020-01-02 NOTE — PROGRESS NOTES
[facility] - Progress Note    Today's Date and Time: 1/2/2020, 11:10 AM    Impression :   · Necrotizing fasciitis 11-29-19  · S/P perirectal I&D. Wide complex excisional debridement of gluteal and perineal areas on 11-29-19  · S/P debridement, washout of gluteal and perianal areas, diverting colostomy 12-3-19.   · S/P debridement, washout of gluteal and perianal areas, diverting colostomy 12-6-19.  · S/P Incision and debridement of necrotizing fasciitis buttock wound, scrotum, bilateral groin region and closure of midline abdominal wound 12-8-19   · S/P Incision and debridement of necrotizing fasciitis buttock wound, scrotum, bilateral groin region on 12-20-19. · S/P Incision and debridement of necrotizing fasciitis buttock wound, scrotum, bilateral groin region on 1-1-20  · Lactic acidosis  · DM 2  · HTN  · Hx of Low LVEF (20%) as per family  · DEBBY  · Fluid overload    Recommendations:     · Monitor off antibiotics,   · Continue with wound care  · Nutrition    Medical Decision Making/Summary/Discussion:1/2/2020     · Patient with DM 2, prior diabetic foot infection  · Presented to [address] [place] [address] ER generalized weakness for the past few days  · Found to have soft tissue necrosis with subcutaneous emphysema in gluteal areas and perineum  · S/P I&D and wide complex debridement of affected tissues on 11-29-19  · Showing hypotension, requiring fluids and a vasopressor  · Cultures in progress  · Will cover with Zosyn. Wounds with Strep spp. And Gram negative bacilli . No Staph spp. 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    · One more debridement in OR scheduled for 12-10-19 and then decision will be made to change code status or not.   · Pt did not tolerate HD on 12-9. It was stopped early and pt required vasopressor support with Levophed, remains on vent. · Pt to OR 12-10 for further debridement  · Repeat I&D planned for 12-16-19 was cancelled because of high K levels  · Bedside I & D on 12/17/19. · Patient to undergo additional I&D on 12-20-19  · 12/23 right upper lobe collapse  · 12/24 debridement with excision of sacral wound in preparation for a flap, partial closure of his scrotal wound, VAC and debridement over the perineum  · 12-30 Wound RN to change abdominal vac   · 1-1-20 Returned to OR for further debridement of perineal wound and vac change  Infection Control Recommendations   · [place] Precautions  · Contact Isolation MRSA    Antimicrobial Stewardship Recommendations     Off antibiotics  Coordination of Outpatient Care:   · Estimated Length of IV antimicrobials: D/C 12-19-19  · Patient will need Midline Catheter Insertion: No  · Patient will need PICC line Insertion:Has Central line  · Patient will need: Home IV , [place],  SNF,  LTAC: TBD  · Patient will need outpatient wound care:Yes    Chief complaint/reason for consultation:   · Ashley's vs necrotizing fasciitis    History of Present Illness:   Sylvia Salvador is a 37y.o.-year-old  male who was initially admitted on 11/29/2019. Patient seen at the request of . INITIAL HISTORY:    Patient presented through ER in [place] with complaints of weakness of a few days duration. Examination showed skin necrosis in the gluteal and perineal region. Patient transferred to [place] [address]. CT scan showed extensive subcutaneous emphysema. Patient underwent I&D and extensive complex debridement of affected tissues on 11-29-19. Gram stain of tissues showed Strep. Spp and Gram negative bacilli. 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 He has underlying DM 2, prior diabetic foot infection, DEBBY.   Patient more stable post surgery but with hypotension requiring a pressor. Zosyn D/C because of of poor LVEF, in order to reduce Na and fluid load  Meropenem started adjusted for Cr Cl 30 cc  Meropenem adjusted for HD  Per surgery after debridement on 12-8-19, infection has spread to deep planes with the urethra less viable. One more debridement in OR scheduled for 12-10-19 and then decision will be made to change code status or not. Pt did not tolerate HD on 12-9. It was stopped early and pt required vasopressor support with Levophed, remains on vent. Pt to OR 12-10 for further debridement  · Repeat I&D planned for 12-16-19 was cancelled because of high K levels  · Bedside I & D on 12/17/19. · Patient to undergo additional I&D on 12-20-19  · 12/23 right upper lobe collapse  · 12/24 debridement with excision of sacral wound in preparation for a flap, partial closure of his scrotal wound, VAC and debridement over the perineum  · Pt making urine. Last HD 12-27  · 12-30 Abd vac change per wound RN  · 1-1-20 Returned to OR for further debridement of perineal wound and vac change    CURRENT EVALUATION :1/2/2020     Afebrile  VS stable, intermittent HTN    Somnolent but easily roused, nods appropriately to questions  Tolerating NG tube feeds at 55  Abdomen is soft, active BS, ostomy intact and has a VAC in the middle abdominal wound. Had perineal VAC exchange on 1-1-20     Left foot has dystrophic skin, dressing in place. Residual ischemic tissues.  Not infected    Last HD 12-27, making urine, HD on hold     Wound pics:  12-30          Lab & Cultures: 1/2/2020    WBC 6.5->8.1->8.4-->9.7  Hb 6.8->7.8->8.1-->7.3  Plat 249->356->410    BUN 43->53->53  Cr 1.68->1.84->1.80    Urine:  · NA  Blood:  · 11-30-19: no growth  · 12-2-19: no growth  Sputum :  · NA  Wound:  · 11-29-19: Strep ssp and Gram negative bacilli     I have personally reviewed the past medical history, past surgical history, medications, social history, and family history, and I have updated the database accordingly.   Past Medical History:     Past Medical History:   Diagnosis Date    CHF (congestive heart failure) ([facility]     Diabetes mellitus ([facility]     Hypertension     Septic shock ([facility]     Spina bifida aperta of lumbar spine ([facility]        Past Surgical  History:     Past Surgical History:   Procedure Laterality Date    ABDOMEN SURGERY N/A 12/8/2019    DEBRIDEMENT  NECROTIZING FASCIITIS BUTTOCK, WOUND VAC REMOVAL DELAYED PRIMARY CLOSURE OF MIDLINE ABDOMINAL INCISION, OSTOMY BAG CHANGE performed by Caleb Osborne MD at [address] [facility] N/A 12/10/2019    DEBRIDEMENT OF SACRAL WOUND performed by Selena Bishop MD at [address] [place] [address]  12/26/2019    INCISION AND DRAINAGE AND WOUND VAC CHANGE BUTTOCK, PERINEUM     ABSCESS DRAINAGE N/A 1/1/2020    IRRIGATION AND DEBRIDEMENT BUTTOCKS, SCROTUM, ABDOMEN, WOUND VAC CHANGE performed by Caleb Osborne MD at [facility] 12/3/2019    LAPAROSCOPIC CONVERTED TO OPEN DIVERTING LOOP COLOSTOMY; WOUND VAC APPLICATION performed by Calli Kapoor MD at [address] [place] [address]  01/01/2020     IRRIGATION AND DEBRIDEMENT BUTTOCKS, WOUND VAC EXCHANGE (N/A )    INCISION AND DRAINAGE Bilateral 11/29/2019    RECTAL PERIRECTAL INCISION AND DRAINAGE, DIVERING LOOP COLOSTOMY CREATION performed by Sally Levine MD at [facility] N/A 12/6/2019    DEBRIDEMENT NECROTIZING FASCIAITIS BILAT BUTTOCK performed by Selena Bishop MD at [facility] N/A 12/20/2019    DEBRIDEMENT GLUTEAL AND SCROTAL REGIONS performed by Sally Levine MD at [facility] N/A 12/26/2019    INCISION AND DRAINAGE AND WOUND VAC CHANGE BUTTOCK, PERINEUM performed by Kassie Ewing DO at [facility] N/A 12/10/2019    SCROTAL EXPLORATION WITH SCROTAL DEBRIDEMENT performed by Garrett Romero MD at [address] [place] [address] EXPLORATION N/A 12/23/2019    WOUND DEBRIDEMENT  WOUND VAC CHANGE - PERINIUM performed by Garcia Freed MD at [place] [address]       Medications:      therapeutic multivitamin-minerals  1 tablet Oral Daily    docusate sodium  100 mg Oral Daily    iron sucrose  200 mg Intravenous Q24H    carvedilol  25 mg Per NG tube Daily    insulin glargine  10 Units Subcutaneous Daily    bumetanide  2 mg Oral Daily    [START ON 1/3/2020] darbepoetin rohan-polysorbate  100 mcg Intravenous Weekly    docusate  100 mg Oral Daily    polyethylene glycol  17 g Oral Daily    insulin lispro  0-18 Units Subcutaneous Q4H    alteplase  1 mg Intracatheter Once    povidone-iodine   Topical Daily    famotidine  20 mg Per NG tube Daily    sodium chloride flush  10 mL Intravenous 2 times per day    heparin (porcine)  5,000 Units Subcutaneous 3 times per day       Social History:     Social History     Socioeconomic History    Marital status: Unknown     Spouse name: Not on file    Number of children: Not on file    Years of education: Not on file    Highest education level: Not on file   Occupational History    Not on file   Social Needs    Financial resource strain: Not on file    Food insecurity:     Worry: Not on file     Inability: Not on file    Transportation needs:     Medical: Not on file     Non-medical: Not on file   Tobacco Use    Smoking status: Not on file   Substance and Sexual Activity    Alcohol use: Not on file    Drug use: Not on file    Sexual activity: Not on file   Lifestyle    Physical activity:     Days per week: Not on file     Minutes per session: Not on file    Stress: Not on file   Relationships    Social connections:     Talks on phone: Not on file     Gets together: Not on file     Attends Jain service: Not on file     Active member of club or organization: Not on file     Attends meetings of clubs or organizations: Not on file     Relationship status: Not on file    Intimate is not jaundiced or pale. Findings: Lesion present. No erythema. Neurological:      General: No focal deficit present. Mental Status: He is alert. Mental status is at baseline. Cranial Nerves: No cranial nerve deficit. Psychiatric:      Comments: Easily roused, appropriate response           Medical Decision Making -Laboratory:   I have independently reviewed/ordered the following labs:    CBC with Differential:   Recent Labs     20  0600 20  0858   WBC 8.2 9.7   HGB 7.8* 7.3*   HCT 26.2* 26.2*    410   LYMPHOPCT 18* 17*   MONOPCT 8 7     BMP:   Recent Labs     19  0356 20  0600 20  0858    137 134*   K 4.1 4.2 4.5    100 99   CO2 23 25 21   BUN 56* 54* 53*   CREATININE 1.87* 1.77* 1.80*   MG 1.6  --  1.9     Hepatic Function Panel:   No results for input(s): PROT, LABALBU, BILIDIR, IBILI, BILITOT, ALKPHOS, ALT, AST in the last 72 hours. No results for input(s): RPR in the last 72 hours. No results for input(s): HIV in the last 72 hours. No results for input(s): BC in the last 72 hours. Lab Results   Component Value Date    MUCUS NOT REPORTED 2019    RBC 2.62 2020    TRICHOMONAS NOT REPORTED 2019    WBC 9.7 2020    YEAST NOT REPORTED 2019    TURBIDITY TURBID 2019     Lab Results   Component Value Date    CREATININE 1.80 2020    GLUCOSE 138 2020       Medical Decision Making-Imagin-29 CXR    2019 3:07 pm       COMPARISON:   2019       HISTORY:   ORDERING SYSTEM PROVIDED HISTORY: follow  up   TECHNOLOGIST PROVIDED HISTORY:   follow  up   Acuity: Unknown   Type of Exam: Unknown       FINDINGS:   Endotracheal tube not visualized.  Enteric tube courses below the diaphragm.       Left and right-sided catheters terminating at the cavoatrial junction.       Improved aeration of the right upper lobe.  Pulmonary vascular indistinctness   compatible with interstitial pulmonary edema.  Low lung volumes.  No   pneumothorax.  No pleural effusion.  Stable cardiomegaly.           Impression   1. Endotracheal tube not identified. 2. Venous catheters terminating at the cavoatrial junction. 3. Interstitial pulmonary edema. 12-9 CT Abd/pelvis  EXAMINATION:   CT OF THE ABDOMEN AND PELVIS WITH CONTRAST 12/9/2019 2:29 am       TECHNIQUE:   CT of the abdomen and pelvis was performed with the administration of   intravenous contrast. Multiplanar reformatted images are provided for review. Dose modulation, iterative reconstruction, and/or weight based adjustment of   the mA/kV was utilized to reduce the radiation dose to as low as reasonably   achievable.       COMPARISON:   November 29, 2019.       HISTORY:   ORDERING SYSTEM PROVIDED HISTORY: [facility] Speak With Me   TECHNOLOGIST PROVIDED HISTORY:       [facility] fasc   Reason for Exam: [facility] fasc; Trauma   Acuity: Unknown   Type of Exam: Subsequent/Follow-up       FINDINGS:   Lower Chest: Partially visualized bilateral pleural effusions with bibasilar   heterogeneous opacities and bilateral lower lobe consolidations.  Central   venous catheter tip near the superior atrial caval junction.  Cardiomegaly. Trace pericardial fluid.       Liver: Normal.       Gallbladder and Bile Ducts: Normal.       Spleen: Splenomegaly.       Adrenal Glands: Normal.       Pancreas: Normal.       Genitourinary: Symmetric renal atrophy.  Redemonstration of multiple   hypodensities in the right kidney which likely represent benign cysts.  No   urinary stones or hydronephrosis.  Ambrosio catheter in the decompressed urinary   bladder.       Bowel: Normal caliber bowel.  Postsurgical changes of the bowel with left   lower quadrant ostomy.  No evidence of acute appendicitis.  No significant   diverticular disease.       Vasculature: Atherosclerosis.  No abdominal aortic aneurysm.       Bones and Soft Tissues: Diffuse soft tissue stranding and fluid.    Postsurgical changes in the anterior abdominal wall with midline incision   demonstrating expected small amount of fluid and air.  Large soft tissue   defects in the right inguinal region, scrotum, right greater than left   gluteal creases with associated packing material.  Small amount of   surrounding soft tissue gas.  There is associated skin thickening in these   regions.  Bilateral lower abdominal wall skin thickening.       Retroperitoneum/Mesentery: No intraperitoneal free air.  Mild abdominopelvic   ascites.  Loculated fluid along the inferior aspect of the liver. Redemonstration of bilateral inguinal and pelvic sidewall lymphadenopathy.    Multiple mildly prominent retroperitoneal and upper abdominal lymph nodes are   similar to the prior study.  Percutaneous intraperitoneal surgical drains.           Impression   1.  Large soft tissue defects in the right inguinal region, scrotum and right   greater than left gluteal crease is with associated packing material, small   amount of surrounding soft tissue gas and skin thickening likely relates to   history of necrotizing fasciitis.       2.  Postsurgical changes of the bowel with left lower quadrant ostomy.  No   bowel obstruction.  Percutaneous intraperitoneal surgical drains.       3.  Mild abdominopelvic ascites.  Loculated fluid along the inferior aspect   of the liver.  No intraperitoneal free air.       4.  Diffuse anasarca.       5.   Bilateral pleural effusions with associated heterogeneous opacities and   consolidations.  Underlying infection is not excluded.               EXAMINATION:   ONE XRAY VIEW OF THE CHEST       11/29/2019 9:01 pm       COMPARISON:   4 hours ago       HISTORY:   ORDERING SYSTEM PROVIDED HISTORY: intubated   TECHNOLOGIST PROVIDED HISTORY:   intubated   Reason for Exam: portable supine/ intubated   Acuity: Acute   Type of Exam: Initial       FINDINGS:   New ET tube terminates 38 mm above the ron.  There appears to be a   possible enteric tube which is not well visualized distally.  Right IJ   catheter terminates in the right atrium and is unchanged.  Lungs are clear. Cardiomegaly.  Mediastinum normal.  Bony thorax intact.           Impression   New ET tube as above.  Possible new enteric tube not well visualized. Recommend follow-up KUB if clinically warranted. CT ABDOMEN AND PELVIS WITHOUT CONTRAST    COMPARISON:  3/22/2017    CLINICAL HISTORY: Infection in scrotum, lower abdominal pain, diabetic, 30,000 white blood cell count. TECHNIQUE: Unenhanced axial images were obtained from the lung bases to the pubic symphysis with sagittal and coronal 2D reformatted images. Oral contrast administered:  No.  Automatic exposure control (AEC) was utilized. CT ABDOMEN FINDINGS:      The lung bases are unremarkable. There is a small pericardial effusion versus thickening. The liver, spleen, and pancreas demonstrate no acute abnormality given compromised evaluation without intravenous contrast.  There may be mild splenomegaly.  The adrenal glands appear within normal limits. There is no hydronephrosis or obstructing urinary tract calculi. Small amount of free fluid is seen adjacent to the liver inferiorly. .    The appendix is visualized in the right lower quadrant and appears within normal limits.       There is no evidence for bowel obstruction. Stranding is seen within the subcutaneous fat overlying both lateral abdominal walls left greater than right.  There is ill-defined fluid collection within the subcutaneous fat overlying the left lateral abdominal wall possibly representing phlegmon or developing abscess although no organized   abscess is seen. There is a large amount of soft tissue emphysema involving both the right and left buttock extending to the perineum tissue thickening is seen especially on the left involving the left buttock. CT PELVIS FINDINGS:        No distal ureteral calculi or bladder calculi. There is no free fluid in the pelvis.   Catheter is seen within the urinary bladder. Osseous changes within the left hemipelvis which may be chronic in nature. IMPRESSION:    Extensive subcutaneous emphysema as described above involving both buttocks extending to the perineum.  The possibility of Ashley gangrene/necrotizing fasciitis cannot be excluded. Possible phlegmon or developing soft tissue abscess overlying the left lateral abdominal wall as noted above.  No organized abscess is seen however. Osseous changes within the left hemipelvis which may be chronic in nature. Results the study were called to Dr. Ayanna Avelar [phone] on 11/29/2019  All CT scans at this facility use dose modulation, iterative reconstruction, and/or weight based dosing when appropriate to reduce radiation dose to as low as reasonably achievable.       Medical Decision [id number]:           Breonna Julio MD

## 2020-01-03 ENCOUNTER — APPOINTMENT (OUTPATIENT)
Dept: GENERAL RADIOLOGY | Age: 35
DRG: 463 | End: 2020-01-03
Attending: INTERNAL MEDICINE
Payer: MEDICARE

## 2020-01-03 ENCOUNTER — APPOINTMENT (OUTPATIENT)
Dept: MRI IMAGING | Age: 35
DRG: 463 | End: 2020-01-03
Attending: INTERNAL MEDICINE
Payer: MEDICARE

## 2020-01-03 ENCOUNTER — APPOINTMENT (OUTPATIENT)
Dept: INTERVENTIONAL RADIOLOGY/VASCULAR | Age: 35
DRG: 463 | End: 2020-01-03
Attending: INTERNAL MEDICINE
Payer: MEDICARE

## 2020-01-03 LAB
ABSOLUTE EOS #: 0.75 K/UL (ref 0–0.4)
ABSOLUTE IMMATURE GRANULOCYTE: 0.08 K/UL (ref 0–0.3)
ABSOLUTE LYMPH #: 1.74 K/UL (ref 1–4.8)
ABSOLUTE MONO #: 0.42 K/UL (ref 0.1–0.8)
ANION GAP SERPL CALCULATED.3IONS-SCNC: 13 MMOL/L (ref 9–17)
ANION GAP SERPL CALCULATED.3IONS-SCNC: 15 MMOL/L (ref 9–17)
BASOPHILS # BLD: 2 % (ref 0–2)
BASOPHILS ABSOLUTE: 0.17 K/UL (ref 0–0.2)
BUN BLDV-MCNC: 50 MG/DL (ref 6–20)
BUN BLDV-MCNC: 53 MG/DL (ref 6–20)
BUN/CREAT BLD: ABNORMAL (ref 9–20)
BUN/CREAT BLD: ABNORMAL (ref 9–20)
CALCIUM SERPL-MCNC: 6.2 MG/DL (ref 8.6–10.4)
CALCIUM SERPL-MCNC: 8 MG/DL (ref 8.6–10.4)
CHLORIDE BLD-SCNC: 98 MMOL/L (ref 98–107)
CHLORIDE BLD-SCNC: 99 MMOL/L (ref 98–107)
CO2: 22 MMOL/L (ref 20–31)
CO2: 22 MMOL/L (ref 20–31)
CREAT SERPL-MCNC: 1.86 MG/DL (ref 0.7–1.2)
CREAT SERPL-MCNC: 1.87 MG/DL (ref 0.7–1.2)
DIFFERENTIAL TYPE: ABNORMAL
EKG ATRIAL RATE: 94 BPM
EKG P AXIS: 43 DEGREES
EKG P-R INTERVAL: 168 MS
EKG Q-T INTERVAL: 382 MS
EKG QRS DURATION: 114 MS
EKG QTC CALCULATION (BAZETT): 477 MS
EKG R AXIS: 12 DEGREES
EKG T AXIS: 152 DEGREES
EKG VENTRICULAR RATE: 94 BPM
EOSINOPHILS RELATIVE PERCENT: 9 % (ref 1–4)
GFR AFRICAN AMERICAN: 50 ML/MIN
GFR AFRICAN AMERICAN: 50 ML/MIN
GFR NON-AFRICAN AMERICAN: 41 ML/MIN
GFR NON-AFRICAN AMERICAN: 41 ML/MIN
GFR SERPL CREATININE-BSD FRML MDRD: ABNORMAL ML/MIN/{1.73_M2}
GLUCOSE BLD-MCNC: 102 MG/DL (ref 70–99)
GLUCOSE BLD-MCNC: 111 MG/DL (ref 75–110)
GLUCOSE BLD-MCNC: 135 MG/DL (ref 75–110)
GLUCOSE BLD-MCNC: 139 MG/DL (ref 75–110)
GLUCOSE BLD-MCNC: 159 MG/DL (ref 70–99)
HCT VFR BLD CALC: 22.7 % (ref 40.7–50.3)
HEMOGLOBIN: 7.1 G/DL (ref 13–17)
IMMATURE GRANULOCYTES: 1 %
LACTIC ACID, WHOLE BLOOD: 2 MMOL/L (ref 0.7–2.1)
LYMPHOCYTES # BLD: 21 % (ref 24–44)
MCH RBC QN AUTO: 28.5 PG (ref 25.2–33.5)
MCHC RBC AUTO-ENTMCNC: 31.3 G/DL (ref 28.4–34.8)
MCV RBC AUTO: 91.2 FL (ref 82.6–102.9)
MONOCYTES # BLD: 5 % (ref 1–7)
MORPHOLOGY: ABNORMAL
MORPHOLOGY: ABNORMAL
NRBC AUTOMATED: 0 PER 100 WBC
NUCLEATED RED BLOOD CELLS: 1 PER 100 WBC
PDW BLD-RTO: 20.1 % (ref 11.8–14.4)
PLATELET # BLD: 350 K/UL (ref 138–453)
PLATELET ESTIMATE: ABNORMAL
PMV BLD AUTO: 8.9 FL (ref 8.1–13.5)
POTASSIUM SERPL-SCNC: 4.6 MMOL/L (ref 3.7–5.3)
POTASSIUM SERPL-SCNC: 6.2 MMOL/L (ref 3.7–5.3)
RBC # BLD: 2.49 M/UL (ref 4.21–5.77)
RBC # BLD: ABNORMAL 10*6/UL
SEG NEUTROPHILS: 62 % (ref 36–66)
SEGMENTED NEUTROPHILS ABSOLUTE COUNT: 5.14 K/UL (ref 1.8–7.7)
SODIUM BLD-SCNC: 133 MMOL/L (ref 135–144)
SODIUM BLD-SCNC: 136 MMOL/L (ref 135–144)
WBC # BLD: 8.3 K/UL (ref 3.5–11.3)
WBC # BLD: ABNORMAL 10*3/UL

## 2020-01-03 PROCEDURE — 83605 ASSAY OF LACTIC ACID: CPT

## 2020-01-03 PROCEDURE — 6360000002 HC RX W HCPCS: Performed by: STUDENT IN AN ORGANIZED HEALTH CARE EDUCATION/TRAINING PROGRAM

## 2020-01-03 PROCEDURE — 6370000000 HC RX 637 (ALT 250 FOR IP): Performed by: STUDENT IN AN ORGANIZED HEALTH CARE EDUCATION/TRAINING PROGRAM

## 2020-01-03 PROCEDURE — 99232 SBSQ HOSP IP/OBS MODERATE 35: CPT | Performed by: INTERNAL MEDICINE

## 2020-01-03 PROCEDURE — 82947 ASSAY GLUCOSE BLOOD QUANT: CPT

## 2020-01-03 PROCEDURE — 85025 COMPLETE CBC W/AUTO DIFF WBC: CPT

## 2020-01-03 PROCEDURE — 80048 BASIC METABOLIC PNL TOTAL CA: CPT

## 2020-01-03 PROCEDURE — 2580000003 HC RX 258: Performed by: STUDENT IN AN ORGANIZED HEALTH CARE EDUCATION/TRAINING PROGRAM

## 2020-01-03 PROCEDURE — 97606 NEG PRS WND THER DME>50 SQCM: CPT

## 2020-01-03 PROCEDURE — 99223 1ST HOSP IP/OBS HIGH 75: CPT | Performed by: PSYCHIATRY & NEUROLOGY

## 2020-01-03 PROCEDURE — 93005 ELECTROCARDIOGRAM TRACING: CPT | Performed by: STUDENT IN AN ORGANIZED HEALTH CARE EDUCATION/TRAINING PROGRAM

## 2020-01-03 PROCEDURE — 73560 X-RAY EXAM OF KNEE 1 OR 2: CPT

## 2020-01-03 PROCEDURE — 93010 ELECTROCARDIOGRAM REPORT: CPT | Performed by: INTERNAL MEDICINE

## 2020-01-03 PROCEDURE — 2060000000 HC ICU INTERMEDIATE R&B

## 2020-01-03 PROCEDURE — 6370000000 HC RX 637 (ALT 250 FOR IP): Performed by: INTERNAL MEDICINE

## 2020-01-03 PROCEDURE — 2580000003 HC RX 258: Performed by: INTERNAL MEDICINE

## 2020-01-03 PROCEDURE — 02PYX3Z REMOVAL OF INFUSION DEVICE FROM GREAT VESSEL, EXTERNAL APPROACH: ICD-10-PCS | Performed by: RADIOLOGY

## 2020-01-03 PROCEDURE — 36415 COLL VENOUS BLD VENIPUNCTURE: CPT

## 2020-01-03 PROCEDURE — 72141 MRI NECK SPINE W/O DYE: CPT

## 2020-01-03 PROCEDURE — 99233 SBSQ HOSP IP/OBS HIGH 50: CPT | Performed by: INTERNAL MEDICINE

## 2020-01-03 RX ORDER — FLUDROCORTISONE ACETATE 0.1 MG/1
0.1 TABLET ORAL ONCE
Status: COMPLETED | OUTPATIENT
Start: 2020-01-03 | End: 2020-01-03

## 2020-01-03 RX ORDER — DEXTROSE MONOHYDRATE 25 G/50ML
25 INJECTION, SOLUTION INTRAVENOUS ONCE
Status: DISCONTINUED | OUTPATIENT
Start: 2020-01-03 | End: 2020-01-03

## 2020-01-03 RX ORDER — DEXTROSE MONOHYDRATE 50 MG/ML
100 INJECTION, SOLUTION INTRAVENOUS PRN
Status: DISCONTINUED | OUTPATIENT
Start: 2020-01-03 | End: 2020-01-16 | Stop reason: HOSPADM

## 2020-01-03 RX ORDER — DEXTROSE MONOHYDRATE 25 G/50ML
12.5 INJECTION, SOLUTION INTRAVENOUS PRN
Status: DISCONTINUED | OUTPATIENT
Start: 2020-01-03 | End: 2020-01-16 | Stop reason: HOSPADM

## 2020-01-03 RX ORDER — DEXTROSE MONOHYDRATE 25 G/50ML
50 INJECTION, SOLUTION INTRAVENOUS ONCE
Status: COMPLETED | OUTPATIENT
Start: 2020-01-03 | End: 2020-01-03

## 2020-01-03 RX ORDER — NICOTINE POLACRILEX 4 MG
15 LOZENGE BUCCAL PRN
Status: DISCONTINUED | OUTPATIENT
Start: 2020-01-03 | End: 2020-01-16 | Stop reason: HOSPADM

## 2020-01-03 RX ADMIN — SODIUM ZIRCONIUM CYCLOSILICATE 10 G: 10 POWDER, FOR SUSPENSION ORAL at 14:51

## 2020-01-03 RX ADMIN — DARBEPOETIN ALFA 100 MCG: 100 INJECTION, SOLUTION INTRAVENOUS; SUBCUTANEOUS at 20:15

## 2020-01-03 RX ADMIN — DOCUSATE SODIUM 100 MG: 100 CAPSULE, LIQUID FILLED ORAL at 08:14

## 2020-01-03 RX ADMIN — FLUDROCORTISONE ACETATE 0.1 MG: 0.1 TABLET ORAL at 11:32

## 2020-01-03 RX ADMIN — POLYETHYLENE GLYCOL 3350 17 G: 17 POWDER, FOR SOLUTION ORAL at 09:00

## 2020-01-03 RX ADMIN — OXYCODONE HYDROCHLORIDE 10 MG: 5 SOLUTION ORAL at 03:21

## 2020-01-03 RX ADMIN — Medication 100 MG: at 18:15

## 2020-01-03 RX ADMIN — CARVEDILOL 25 MG: 25 TABLET, FILM COATED ORAL at 08:14

## 2020-01-03 RX ADMIN — INSULIN HUMAN 10 UNITS: 100 INJECTION, SOLUTION PARENTERAL at 09:27

## 2020-01-03 RX ADMIN — FAMOTIDINE 20 MG: 20 TABLET, FILM COATED ORAL at 08:14

## 2020-01-03 RX ADMIN — OXYCODONE HYDROCHLORIDE 10 MG: 5 SOLUTION ORAL at 19:06

## 2020-01-03 RX ADMIN — HEPARIN SODIUM 5000 UNITS: 5000 INJECTION INTRAVENOUS; SUBCUTANEOUS at 20:16

## 2020-01-03 RX ADMIN — DEXTROSE MONOHYDRATE 50 G: 500 INJECTION PARENTERAL at 09:26

## 2020-01-03 RX ADMIN — ACETAMINOPHEN 650 MG: 325 TABLET ORAL at 01:57

## 2020-01-03 RX ADMIN — OXYCODONE HYDROCHLORIDE 10 MG: 5 SOLUTION ORAL at 11:59

## 2020-01-03 RX ADMIN — BUMETANIDE 2 MG: 1 TABLET ORAL at 08:14

## 2020-01-03 RX ADMIN — IRON SUCROSE 200 MG: 20 INJECTION, SOLUTION INTRAVENOUS at 09:26

## 2020-01-03 RX ADMIN — INSULIN LISPRO 3 UNITS: 100 INJECTION, SOLUTION INTRAVENOUS; SUBCUTANEOUS at 20:15

## 2020-01-03 RX ADMIN — MULTIPLE VITAMINS W/ MINERALS TAB 1 TABLET: TAB at 08:14

## 2020-01-03 ASSESSMENT — PAIN SCALES - GENERAL
PAINLEVEL_OUTOF10: 7
PAINLEVEL_OUTOF10: 10
PAINLEVEL_OUTOF10: 0
PAINLEVEL_OUTOF10: 10
PAINLEVEL_OUTOF10: 7

## 2020-01-03 NOTE — PROGRESS NOTES
[facility]  Internal Medicine Teaching Residency Program  Inpatient Daily Progress Note  ______________________________________________________________________________    Patient: Tricia Moise  YOB: 1985   UKR:[id number]    Acct: [de-identified]     Room: [address] [place] [address]  Admit date: 11/29/2019  Today's date: 01/03/20  Number of days in the hospital: 28    SUBJECTIVE   Admitting Diagnosis: Necrotizing fasciitis ([address]    No acute issues overnight. Patient's vitals are stable /86. His potassium this morning was 6.2, suspecting a lab error so ordered a repeat BMP. Started the patient on insulin dextrose and consulted nephrology as he has had dialysis in the past.  Elfreda Michael was removed yesterday patient is tolerating p.o. diet, encouraged the patient to take Ensure to meet the calorie requirements as per general surgery recommendations. Patient was found to have return of possible consult neurology  For the same and outpatient EMG study. Patient is refusing heparin      ROS:  Constitutional:  negative for chills, fevers, sweats  Respiratory:  negative for cough, dyspnea on exertion, hemoptysis, shortness of breath, wheezing  Cardiovascular:  negative for chest pain, chest pressure/discomfort, lower extremity edema, palpitations  Gastrointestinal:  negative for abdominal pain, constipation, diarrhea, nausea, vomiting  Neurological:  negative for dizziness, headache  BRIEF HISTORY     Matthew Dunn is a 37 y.o. with PMH of spina bifida, hypertension, diabetes and diabetic foot ulcer. Who presented to [facility] Emergency Department with complaint of generalized weakness for the past few days. On examination the emergency department staff noticed the patient had a foul smell which they thought he had a bowel movement.  They turned the patient and noticed sloughing of skin on his back and buttocks.      In the emergency department patient was afebrile but heparin (porcine)  5,000 Units Subcutaneous 3 times per day     Continuous Infusions:    sodium chloride      dextrose      sodium chloride 10 mL/hr at 12/28/19 2003     PRN Medicationssodium phosphate IVPB, 0.16 mmol/kg, PRN    Or  sodium phosphate IVPB, 0.32 mmol/kg, PRN  oxyCODONE, 7.5 mg, Q6H PRN    Or  oxyCODONE, 10 mg, Q6H PRN  heparin (porcine), 1,900 Units, PRN  heparin (porcine), 1,900 Units, PRN  melatonin, 1 mg, Nightly PRN  acetaminophen, 650 mg, Q4H PRN  labetalol, 20 mg, Q6H PRN  heparin (porcine), 500 Units, PRN  heparin (porcine), 1,750 Units, PRN  glucose, 15 g, PRN  dextrose, 12.5 g, PRN  glucagon (rDNA), 1 mg, PRN  dextrose, 100 mL/hr, PRN  albumin human, 25 g, PRN  LORazepam, 1 mg, Q6H PRN  sodium chloride, 250 mL, PRN  sodium chloride, 150 mL, PRN  albumin human, 25 g, PRN  sodium chloride flush, 10 mL, PRN  potassium chloride, 40 mEq, PRN    Or  potassium alternative oral replacement, 40 mEq, PRN    Or  potassium chloride, 10 mEq, PRN  REFRESH LACRI-LUBE, , PRN  magnesium hydroxide, 30 mL, Daily PRN  ondansetron, 4 mg, Q6H PRN        Diagnostic Labs:  CBC:   Recent Labs     01/01/20  0600 01/02/20  0858   WBC 8.2 9.7   RBC 2.74* 2.62*   HGB 7.8* 7.3*   HCT 26.2* 26.2*   MCV 95.6 100.0   RDW 19.9* 20.2*    410     BMP:   Recent Labs     01/01/20  0600 01/02/20  0858    134*   K 4.2 4.5    99   CO2 25 21   PHOS  --  2.9   BUN 54* 53*   CREATININE 1.77* 1.80*     BNP: No results for input(s): BNP in the last 72 hours. PT/INR: No results for input(s): PROTIME, INR in the last 72 hours. APTT: No results for input(s): APTT in the last 72 hours. CARDIAC ENZYMES: No results for input(s): CKMB, CKMBINDEX, TROPONINI in the last 72 hours. Invalid input(s): CKTOTAL;3  FASTING LIPID PANEL:  Lab Results   Component Value Date    TRIG 208 (H) 12/19/2019     LIVER PROFILE: No results for input(s): AST, ALT, ALB, BILIDIR, BILITOT, ALKPHOS in the last 72 hours.    MICROBIOLOGY:   Lab Results   Component Value Date/Time    CULTURE NO GROWTH 6 DAYS 12/02/2019 04:13 AM       Imaging:    Xr Chest Portable    Result Date: 12/30/2019  1. Endotracheal tube not identified. 2. Venous catheters terminating at the cavoatrial junction. 3. Interstitial pulmonary edema. ASSESSMENT & PLAN     ASSESSMENT / PLAN:     Patient is off the Flexiflo and is tolerating p.o. diet. Encouraged to use Ensure in between meals to maintain the caloric requirement. Principal Problem:   -Diabetes mellitus: Currently on  10 units Lantus and high-dose correcting scale.  Last HbA1c 8 done on 12/1/2019.    -Hypertension:  Continue Coreg  25 mg.  -Iron deficiency anemia Hb 7.3: Low iron studies, patient started on IV Venofer 200 mg.  -DEBBY on CKD stage III: Patient's last hemodialysis was on 12/27/2019. Baron Mike to monitor. Continue Bumex as per nephrology recommendations.  -Necrotizing fasciitis,Gluteal, perirectal, perianal wound: Wound VAC changed yesterday by general surgery.  ID following the patient and monitoring of the antibiotics for now. DVT ppx : Patient refused to use heparin  GI ppx: Pepcid    PT/OT: Working with PT/OT  Discharge Planning / SW: Ongoing    Lopez Alexander MD  Internal Medicine Resident, PGY-1  [address] [facility];  [place], [place]  1/3/2020, 6:29 AM

## 2020-01-03 NOTE — PROGRESS NOTES
Pt is currently refusing his heparin dose and repositioning. Writer explained why both are important to do and pt still refusing. Writer will reassess in 30 minutes. Will continue to monitor.

## 2020-01-03 NOTE — PROGRESS NOTES
and recent kidney disease. · Calorie count. Replace flex jeewl if not meeting caloric needs. · Bowel regimen: Colace and miralax daily  · Midline vac change M/W/F, wound RN following. Vac change to midline for today. · Strict glucose control <180  · Nephrology following. SUBJECTIVE    Patient seen and examined. No acute changes. Resting comfortably. Flexi jewel removed yesterday per primary. Patient states he will be able to eat 3 meals with shakes in between but understands he may need replacement of the tube if not meeting goals based on calorie count. OBJECTIVE  VITALS: BP (!) 147/81   Pulse 83   Temp 97.7 °F (36.5 °C) (Oral)   Resp 13   Ht 5' 7\" (1.702 m)   Wt 283 lb 11.2 oz (128.7 kg)   SpO2 100%   BMI 44.43 kg/m²     GENERAL: A&O. NAD   NEURO: responsive, follows commands, cooperative  LUNGS: chest rise equal. resp even and unlabored   HEART: RRR  ABDOMEN: soft, ND, mild tenderness. Midline vac in place with good seal. Ostomy appliance in place, stool in bag.   - sacral and perineal vera flow/standard wound vac in place. No leak noted        I/O last 3 completed shifts: In: 1736 [P.O.:3330]  Out: 0452 [Urine:3700; Drains:850]    Drain/tube output: In: 0178 [P.O.:3330]  Out: 5221 [Urine:3700; Drains:850]    LAB:  CBC:   Recent Labs     01/01/20  0600 01/02/20  0858 01/03/20  0613   WBC 8.2 9.7 8.3   HGB 7.8* 7.3* 7.1*   HCT 26.2* 26.2* 22.7*   MCV 95.6 100.0 91.2    410 350     BMP:   Recent Labs     01/01/20  0600 01/02/20  0858 01/03/20  0613    134* 133*   K 4.2 4.5 6.2*    99 98   CO2 25 21 22   BUN 54* 53* 53*   CREATININE 1.77* 1.80* 1.87*   GLUCOSE 114* 138* 102*         RADIOLOGY:   no new images   No      Brian Sutton,   1/3/2020, 8:11AM            Trauma Attending Attestation      I have reviewed the above [facility] note(s) and confirmed the key elements of the medical history and physical exam. I have seen and examined the pt.   I have discussed the findings,

## 2020-01-03 NOTE — PROGRESS NOTES
[facility] - Progress Note    Today's Date and Time: 1/3/2020, 3:42 PM    Impression :   · Necrotizing fasciitis 11-29-19  · S/P perirectal I&D. Wide complex excisional debridement of gluteal and perineal areas on 11-29-19  · S/P debridement, washout of gluteal and perianal areas, diverting colostomy 12-3-19.   · S/P debridement, washout of gluteal and perianal areas, diverting colostomy 12-6-19.  · S/P Incision and debridement of necrotizing fasciitis buttock wound, scrotum, bilateral groin region and closure of midline abdominal wound 12-8-19   · S/P Incision and debridement of necrotizing fasciitis buttock wound, scrotum, bilateral groin region on 12-20-19. · S/P Incision and debridement of necrotizing fasciitis buttock wound, scrotum, bilateral groin region on 1-1-20  · Lactic acidosis  · DM 2  · HTN  · Hx of Low LVEF (20%) as per family  · DEBBY  · Fluid overload    Recommendations:     · Monitor off antibiotics,   · Continue with wound care  · Nutrition    Medical Decision Making/Summary/Discussion:1/3/2020     · Patient with DM 2, prior diabetic foot infection  · Presented to [address] [place] [address] ER generalized weakness for the past few days  · Found to have soft tissue necrosis with subcutaneous emphysema in gluteal areas and perineum  · S/P I&D and wide complex debridement of affected tissues on 11-29-19  · Showing hypotension, requiring fluids and a vasopressor  · Cultures in progress  · Will cover with Zosyn. Wounds with Strep spp. And Gram negative bacilli . No Staph spp. 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    · One more debridement in OR scheduled for 12-10-19 and then decision will be made to change code status or showed Strep. Spp and Gram negative bacilli. 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 He has underlying DM 2, prior diabetic foot infection, DEBBY. Patient more stable post surgery but with hypotension requiring a pressor. Zosyn D/C because of of poor LVEF, in order to reduce Na and fluid load  Meropenem started adjusted for Cr Cl 30 cc  Meropenem adjusted for HD  Per surgery after debridement on 12-8-19, infection has spread to deep planes with the urethra less viable. One more debridement in OR scheduled for 12-10-19 and then decision will be made to change code status or not. Pt did not tolerate HD on 12-9. It was stopped early and pt required vasopressor support with Levophed, remains on vent. Pt to OR 12-10 for further debridement  · Repeat I&D planned for 12-16-19 was cancelled because of high K levels  · Bedside I & D on 12/17/19. · Patient to undergo additional I&D on 12-20-19  · 12/23 right upper lobe collapse  · 12/24 debridement with excision of sacral wound in preparation for a flap, partial closure of his scrotal wound, VAC and debridement over the perineum  · Pt making urine. Last HD 12-27  · 12-30 Abd vac change per wound RN  · 1-1-20 Returned to OR for further debridement of perineal wound and vac change    CURRENT EVALUATION :1/3/2020     Afebrile  VS stable    Pt is AA  Responding appropriately to questions  Denies chills or fevers. Is complaining of Rt knee pain  On exam there is a small effusion noted, no erythema or redness. Mild tenderness to palpation. Will check Xray    Abdomen is soft, active BS, ostomy intact and has a VAC in the middle abdominal wound. Had perineal VAC exchange on 1-1-20   Plan for further debridement and vac change on 1-5    Left foot has dystrophic skin, dressing in place. Residual ischemic tissues. Not infected    Last HD 12-27, making urine, HD on hold.  Plan to discontinue Lt tunnel catheter     Wound pics:  1-3                  Lab & Cultures: 1/3/2020    WBC 6.5->8.1->8.4-->9.7->8.3  Hb 6.8->7.8->8.1-->7.3->7.1  Plat 249->356->410->350    BUN 43->53->53->50  Cr 1.68->1.84->1.80->1.86    Urine:  · NA  Blood:  · 11-30-19: no growth  · 12-2-19: no growth  Sputum :  · NA  Wound:  · 11-29-19: Strep ssp and Gram negative bacilli     I have personally reviewed the past medical history, past surgical history, medications, social history, and family history, and I have updated the database accordingly.   Past Medical History:     Past Medical History:   Diagnosis Date    [facility]est[facility]     Diabetes mellitus ([facility]     Hypertension     Septic shock ([facility]     Spina bifida aperta of lumbar spine ([facility]        Past Surgical  History:     Past Surgical History:   Procedure Laterality Date    ABDOMEN SURGERY N/A 12/8/2019    DEBRIDEMENT  NECROTIZING FASCIITIS BUTTOCK, WOUND VAC REMOVAL DELAYED PRIMARY CLOSURE OF MIDLINE ABDOMINAL INCISION, OSTOMY BAG CHANGE performed by Moreno Block MD at [address] [facility] [facility] N/A 12/10/2019    DEBRIDEMENT OF SACRAL WOUND performed by Lex Landeros MD at [address] [place] [address]  12/26/2019    INCISION AND DRAINAGE AND WOUND VAC CHANGE BUTTOCK, PERINEUM     ABSCESS DRAINAGE N/A 1/1/2020    IRRIGATION AND DEBRIDEMENT BUTTOCKS, SCROTUM, ABDOMEN, WOUND VAC CHANGE performed by Moreno Block MD at [facility] 12/3/2019    LAPAROSCOPIC CONVERTED TO OPEN DIVERTING LOOP COLOSTOMY; WOUND VAC APPLICATION performed by Bigg Booth MD at [address] [place] [address]  01/01/2020     IRRIGATION AND DEBRIDEMENT BUTTOCKS, WOUND VAC EXCHANGE (N/A )    INCISION AND DRAINAGE Bilateral 11/29/2019    RECTAL PERIRECTAL INCISION AND DRAINAGE, DIVERING LOOP COLOSTOMY CREATION performed by Alice Plummer MD at [place] [address] N/A 12/6/2019    DEBRIDEMENT NECROTIZING FASCIAITIS BILAT BUTTOCK performed by Anibal Santillan Gloria Gorman MD at [facility] [address] N/A 12/20/2019    DEBRIDEMENT GLUTEAL AND SCROTAL REGIONS performed by Alice Plummer MD at [facility] [address] N/A 12/26/2019    INCISION AND DRAINAGE AND WOUND VAC CHANGE BUTTOCK, PERINEUM performed by Shaji Hung DO at [address] N/A 12/10/2019    SCROTAL EXPLORATION WITH SCROTAL DEBRIDEMENT performed by Ana Lynch MD at [facility] N/A 12/23/2019    WOUND [address] performed by Lex Landeros MD at [facility] [address]       Medications:      therapeutic multivitamin-minerals  1 tablet Oral Daily    docusate sodium  100 mg Oral Daily    iron sucrose  200 mg Intravenous Q24H    carvedilol  25 mg Per NG tube Daily    insulin glargine  10 Units Subcutaneous Daily    bumetanide  2 mg Oral Daily    darbepoetin rohan-polysorbate  100 mcg Intravenous Weekly    docusate  100 mg Oral Daily    polyethylene glycol  17 g Oral Daily    insulin lispro  0-18 Units Subcutaneous Q4H    alteplase  1 mg Intracatheter Once    povidone-iodine   Topical Daily    famotidine  20 mg Per NG tube Daily    sodium chloride flush  10 mL Intravenous 2 times per day    heparin (porcine)  5,000 Units Subcutaneous 3 times per day       Social History:     Social History     Socioeconomic History    Marital status: Unknown     Spouse name: Not on file    Number of children: Not on file    Years of education: Not on file    Highest education level: Not on file   Occupational History    Not on file   Social Needs    Financial resource strain: Not on file    Food insecurity:     Worry: Not on file     Inability: Not on file    Transportation needs:     Medical: Not on file     Non-medical: Not on file   Tobacco Use    Smoking status: Not on file   Substance and Sexual Activity    Alcohol use: Not on file    Drug use: Not on file    Sexual activity: Not on file   Lifestyle  Physical activity:     Days per week: Not on file     Minutes per session: Not on file    Stress: Not on file   Relationships    Social connections:     Talks on phone: Not on file     Gets together: Not on file     Attends Confucianism service: Not on file     Active member of club or organization: Not on file     Attends meetings of clubs or organizations: Not on file     Relationship status: Not on file    Intimate partner violence:     Fear of current or ex partner: Not on file     Emotionally abused: Not on file     Physically abused: Not on file     Forced sexual activity: Not on file   Other Topics Concern    Not on file   Social History Narrative    Not on file       Family History:   No family history on file. Allergies:   Patient has no known allergies. Review of Systems:     Review of Systems   Constitutional: Negative for activity change. Pt AA  Denies chills, fevers, SOB  Reports Rt knee pain             Physical Examination :     Patient Vitals for the past 8 hrs:   BP Temp Temp src Pulse Resp SpO2   01/03/20 1135 (!) 141/76 97.9 °F (36.6 °C) Oral 87 19 100 %     Physical Exam  Constitutional:       Appearance: Normal appearance. He is not toxic-appearing or diaphoretic. HENT:      Head: Normocephalic and atraumatic. Nose: No congestion or rhinorrhea. Mouth/Throat:      Mouth: Mucous membranes are moist.   Eyes:      General: No scleral icterus. Pupils: Pupils are equal, round, and reactive to light. Neck:      Musculoskeletal: Neck supple. No neck rigidity or muscular tenderness. Cardiovascular:      Rate and Rhythm: Normal rate and regular rhythm. Heart sounds: Normal heart sounds. No murmur. No friction rub. No gallop. Pulmonary:      Effort: Pulmonary effort is normal. No respiratory distress. Breath sounds: Normal breath sounds. No stridor. Abdominal:      General: Abdomen is flat. There is no distension. Palpations: There is no mass. bowel.  Postsurgical changes of the bowel with left   lower quadrant ostomy.  No evidence of acute appendicitis.  No significant   diverticular disease.       Vasculature: Atherosclerosis.  No abdominal aortic aneurysm.       Bones and Soft Tissues: Diffuse soft tissue stranding and fluid. Postsurgical changes in the anterior abdominal wall with midline incision   demonstrating expected small amount of fluid and air.  Large soft tissue   defects in the right inguinal region, scrotum, right greater than left   gluteal creases with associated packing material.  Small amount of   surrounding soft tissue gas.  There is associated skin thickening in these   regions.  Bilateral lower abdominal wall skin thickening.       Retroperitoneum/Mesentery: No intraperitoneal free air.  Mild abdominopelvic   ascites.  Loculated fluid along the inferior aspect of the liver. Redemonstration of bilateral inguinal and pelvic sidewall lymphadenopathy.    Multiple mildly prominent retroperitoneal and upper abdominal lymph nodes are   similar to the prior study.  Percutaneous intraperitoneal surgical drains.           Impression   1.  Large soft tissue defects in the right inguinal region, scrotum and right   greater than left gluteal crease is with associated packing material, small   amount of surrounding soft tissue gas and skin thickening likely relates to   history of necrotizing fasciitis.       2.  Postsurgical changes of the bowel with left lower quadrant ostomy.  No   bowel obstruction.  Percutaneous intraperitoneal surgical drains.       3.  Mild abdominopelvic ascites.  Loculated fluid along the inferior aspect   of the liver.  No intraperitoneal free air.       4.  Diffuse anasarca.       5.   Bilateral pleural effusions with associated heterogeneous opacities and   consolidations.  Underlying infection is not excluded.               EXAMINATION:   ONE XRAY VIEW OF THE CHEST       11/29/2019 9:01 pm       COMPARISON:   4 abscess is seen. There is a large amount of soft tissue emphysema involving both the right and left buttock extending to the perineum tissue thickening is seen especially on the left involving the left buttock. CT PELVIS FINDINGS:        No distal ureteral calculi or bladder calculi. There is no free fluid in the pelvis. Catheter is seen within the urinary bladder. Osseous changes within the left hemipelvis which may be chronic in nature. IMPRESSION:    Extensive subcutaneous emphysema as described above involving both buttocks extending to the perineum.  The possibility of Ashley gangrene/necrotizing fasciitis cannot be excluded. Possible phlegmon or developing soft tissue abscess overlying the left lateral abdominal wall as noted above.  No organized abscess is seen however. Osseous changes within the left hemipelvis which may be chronic in nature. Results the study were called to Dr. Mukund Stokes [phone] on 11/29/2019  All CT scans at this facility use dose modulation, iterative reconstruction, and/or weight based dosing when appropriate to reduce radiation dose to as low as reasonably achievable.       Medical Decision [facility]iv[facility]:           Priscilla Del Real MD

## 2020-01-03 NOTE — PROGRESS NOTES
Renal Progress Note    Patient :  Sandi Morrison; 37 y.o. MRN# [id number]  Location:  [id number]/[id number]  Attending:  Makenzie Diane MD  Admit Date:  11/29/2019   Hospital Day: 35      Subjective:     Patient is seen and examined. No new issues overnight reported. Patient is status post I&D of gluteal wound and wound VAC exchange done today 1/1/2020. Patient made about 3.7 L of urine last 24 hours. Patient serum creatinine 1.87 mg/DL today. Potassium was elevated to 6.2 today morning. Patient has recently started taking better oral intake and was getting regular protein supplements. His last hemodialysis was on 12/27/2019. His renal function seems to be improving. Outpatient Medications:     No medications prior to admission.     Current Medications:     Scheduled Meds:    therapeutic multivitamin-minerals  1 tablet Oral Daily    docusate sodium  100 mg Oral Daily    iron sucrose  200 mg Intravenous Q24H    carvedilol  25 mg Per NG tube Daily    insulin glargine  10 Units Subcutaneous Daily    bumetanide  2 mg Oral Daily    darbepoetin rohan-polysorbate  100 mcg Intravenous Weekly    docusate  100 mg Oral Daily    polyethylene glycol  17 g Oral Daily    insulin lispro  0-18 Units Subcutaneous Q4H    alteplase  1 mg Intracatheter Once    povidone-iodine   Topical Daily    famotidine  20 mg Per NG tube Daily    sodium chloride flush  10 mL Intravenous 2 times per day    heparin (porcine)  5,000 Units Subcutaneous 3 times per day     Continuous Infusions:    dextrose      sodium chloride      dextrose      sodium chloride 10 mL/hr at 12/28/19 2003     PRN Meds:  glucose, dextrose, glucagon (rDNA), dextrose, sodium phosphate IVPB **OR** sodium phosphate IVPB, oxyCODONE **OR** oxyCODONE, heparin (porcine), heparin (porcine), melatonin, acetaminophen, labetalol, heparin (porcine), heparin (porcine), glucose, dextrose, glucagon (rDNA), dextrose, albumin human, LORazepam, sodium chloride, sodium 01/02/20  0858   PHOS 2.9     Magnesium:    Recent Labs     01/02/20  0858   MG 1.9     MARINE:      Lab Results   Component Value Date    MARINE NEGATIVE 11/30/2019     SPEP:  Lab Results   Component Value Date    PROT 5.3 12/01/2019    PATH ELECTRONICALLY SIGNED. Cindy Plummer M.D. 11/30/2019     C3:     Lab Results   Component Value Date    C3 97 11/30/2019     C4:     Lab Results   Component Value Date    C4 12 11/30/2019     Hep BsAg:         Lab Results   Component Value Date    HEPBSAG NONREACTIVE 11/30/2019     Hep C AB:          Lab Results   Component Value Date    HEPCAB NONREACTIVE 11/30/2019       Urinalysis/Chemistries:      Lab Results   Component Value Date    NITRU NEGATIVE 11/29/2019    COLORU YELLOW 11/29/2019    PHUR 5.0 11/29/2019    WBCUA 5 TO 10 11/29/2019    RBCUA 0 TO 2 11/29/2019    MUCUS NOT REPORTED 11/29/2019    TRICHOMONAS NOT REPORTED 11/29/2019    YEAST NOT REPORTED 11/29/2019    BACTERIA NOT REPORTED 11/29/2019    SPECGRAV 1.015 11/29/2019    LEUKOCYTESUR NEGATIVE 11/29/2019    UROBILINOGEN Normal 11/29/2019    BILIRUBINUR NEGATIVE 11/29/2019    GLUCOSEU NEGATIVE 11/29/2019    KETUA NEGATIVE 11/29/2019    AMORPHOUS NOT REPORTED 11/29/2019     Urine Sodium:     Lab Results   Component Value Date    DONOVAN 39 11/30/2019      Urine Creatinine:     Lab Results   Component Value Date    LABCREA 116.4 11/30/2019     Radiology:     Reviewed. Assessment:     1. Acute Kidney Injury: Secondary to ischemic ATN from sepsis syndrome from necrotizing fasciitis. He has been dialysis dependent but now renal function seems to be improving.  Has a left IJ tunnel dialysis catheter in place. Last hemodialysis was on 12/27/2019.   2.  Chronic kidney diseae stage III from diabetic nephrosclerosis baseline 1.6-1.8 MG/DL.    3.  Necrotizing fasciitis status post wide complex debridement of gluteal, perineal region and open colostomy.    4.  Respiratory Failure: Was on the ventilator now identified and corrected by editing.

## 2020-01-03 NOTE — PROGRESS NOTES
[facility]        NAME:  Sandi Morrison  MEDICAL RECORD NUMBER:  [id number]  AGE: 37 y.o. GENDER: male  : 1985  TODAY'S DATE:  1/3/2020    Subjective:     Reason for WOCN Evaluation and Assessment: NPWT dressing change, ostomy care and education, dressing change left foot. Sandi Morrison is a 37 y.o. male referred by:   [x] Physician  [] Nursing  [] Other:     ALLERGIES    No Known Allergies    Objective:      BP (!) 141/76   Pulse 87   Temp 97.9 °F (36.6 °C) (Oral)   Resp 19   Ht 5' 7\" (1.702 m)   Wt 283 lb 11.2 oz (128.7 kg)   SpO2 100%   BMI 44.43 kg/m²   Hawk Risk Score: Hawk Scale Score: 14    LABS    CBC:   Lab Results   Component Value Date    WBC 8.3 2020    RBC 2.49 2020    HGB 7.1 2020     CMP:  Albumin:    Lab Results   Component Value Date    LABALBU 1.2 2019     PT/INR:    Lab Results   Component Value Date    PROTIME 11.9 2019    INR 1.1 2019     HgBA1c:    Lab Results   Component Value Date    LABA1C 8.0 2019     PTT: No components found for: LABPTT      Assessment:     Patient Active Problem List   Diagnosis    [facility] fasciitis ([facility]    Essential hypertension    Diabetes ([facility]    Diabetic foot ulcer ([facility]    Diabetic foot infection ([facility]    Iron deficiency anemia       Measurements:     20 1315   Wound 19 Heel Left   Date First Assessed/Time First Assessed: 19   Present on Hospital Admission: Yes  Primary Wound Type: Pressure Injury  Location: (c) Heel  Wound Location Orientation: Left   Wound Image    Wound Diabetic   Dressing Status Changed   Dressing Changed Changed/New   Dressing/Treatment Betadine swabs; Moist to moist;ABD;Roll gauze   Dressing Change Due 20   Wound Assessment Granulation tissue;Red;Black   Drainage Amount Small   Drainage Description Sanguinous   Odor None   Liliam-wound Assessment Intact;Dry   Negative Pressure Wound Therapy Abdomen Mid   Placement Date/Time: 12/20/19 1800   Pre-existing: No  Inserted by: Janak Cancino  Location: Abdomen  Wound Location Orientation: Mid   Wound Type Surgical   Unit Type KCI  (y-connected to rt groin wound)   Dressing Type Black foam   Number of pieces used 2   Cycle Continuous   Canister changed? No   Dressing Changed Changed/New   Dressing Change Due 01/06/20   Incision 12/03/19 Abdomen Mid   Date First Assessed/Time First Assessed: 12/03/19 1158   Present on Hospital Admission: No  Primary Wound Type: Surgical Type  Location: Abdomen  Wound Location Orientation: Mid   Wound Image    Wound Assessment Granulation tissue;Red;Slough; Yellow   Liliam-wound Assessment Dry;Hyperpigmented   Wound Length (cm) 21.5 cm   Wound Width (cm) 2.8 cm   Wound Depth (cm) 3 cm   Wound Volume (cm^3) 180.6 cm^3   Wound Healing % 50   Closure None   Drainage Amount Small   Drainage Description Serosanguinous; Sanguinous   Odor None   Dressing/Treatment Vacuum dressing   Dressing Changed Changed/New   Dressing Change Due 01/06/20     Left plantar foot proximal area of dry necrosis lifting from underlying wound bed. Distal portion of necrotic tissue boggy but firmly adhered. Selective surgical debridement at discretion of the surgeon vs allowing continued autolysis of the necrotic material. No foul/purulent drainage noted at this time. stoma    Mucocutaneous separation 6-12 o'clock. Superficial.       Response to treatment:  Well tolerated by patient. Plan:     Plan of Care:   M-W-F dressing changes to the abdominal incision with colostomy care using black granufoam. Border the wound with hydrocolloid (Premium wafer vs Hollihesive), acrylic drape over this, fill wound with thin strip of black foam, cover with acrylic drape. Then place the ostomy pouch.      Surgery managing VAC Veriflo to sacral/perineal wounds. Routine ostomy care: empty when 1/3 to 1/2 full, change with NPWT dressings.          Specialty Bed Required : Yes   [x] Low Air Loss   [x] Pressure Redistribution  [] Fluid Immersion  [] Bariatric  [] Total Pressure Relief  [] Other:     Discharge Plan:  tbd    Patient/Caregiver Teaching:    [] Indicates understanding       [] Needs reinforcement  [] Unsuccessful      [x] Verbal Understanding  [] Demonstrated understanding       [] No evidence of learning  [] Refused teaching         [] N/A       Electronically signed by Hanna Guzman RN, CWON on 1/3/2020 at 1:23 PM

## 2020-01-03 NOTE — CONSULTS
Diagnosis Date    [facility]est[facility]) [facility]     Diabetes mellitus ([facility]     Hypertension     Septic shock ([facility]     Spina bifida aperta of lumbar spine ([facility]         Past Surgical History:     Past Surgical History:   Procedure Laterality Date    ABDOMEN SURGERY N/A 12/8/2019    DEBRIDEMENT  NECROTIZING FASCIITIS BUTTOCK, WOUND VAC REMOVAL DELAYED PRIMARY CLOSURE OF MIDLINE ABDOMINAL INCISION, OSTOMY BAG CHANGE performed by Desi Dela Cruz MD at [place] [address] N/A 12/10/2019    DEBRIDEMENT OF SACRAL WOUND performed by Kae Brooks MD at [address] [place] [address]  12/26/2019    INCISION AND DRAINAGE AND WOUND VAC CHANGE BUTTOCK, PERINEUM     ABSCESS DRAINAGE N/A 1/1/2020    IRRIGATION AND DEBRIDEMENT BUTTOCKS, SCROTUM, ABDOMEN, WOUND VAC CHANGE performed by Desi Dela Cruz MD at [facility] 12/3/2019    LAPAROSCOPIC CONVERTED TO OPEN DIVERTING LOOP COLOSTOMY; WOUND VAC APPLICATION performed by Veronica Sage MD at [address] [place] [address]  01/01/2020     IRRIGATION AND DEBRIDEMENT BUTTOCKS, WOUND VAC EXCHANGE (N/A )    INCISION AND DRAINAGE Bilateral 11/29/2019    RECTAL PERIRECTAL INCISION AND DRAINAGE, DIVERING LOOP COLOSTOMY CREATION performed by Tracey Hernandez MD at [facility] [address] N/A 12/6/2019    DEBRIDEMENT NECROTIZING FASCIAITIS BILAT BUTTOCK performed by Kae Brooks MD at [facility] [address] N/A 12/20/2019    DEBRIDEMENT GLUTEAL AND SCROTAL REGIONS performed by Tracey Hernandez MD at [facility] [address] N/A 12/26/2019    INCISION AND DRAINAGE AND WOUND VAC CHANGE BUTTOCK, PERINEUM performed by Antonio Landry DO at [facility] N/A 12/10/2019    SCROTAL EXPLORATION WITH SCROTAL DEBRIDEMENT performed by Lance Guajardo MD at [place] [address] 12/23/2019    WOUND DEBRIDEMENT  WOUND VAC CHANGE - [address] performed by Jd Tong mL/min    GFR Comment          GFR Staging NOT REPORTED    CBC WITH AUTO DIFFERENTIAL    Collection Time: 01/03/20  6:13 AM   Result Value Ref Range    WBC 8.3 3.5 - 11.3 k/uL    RBC 2.49 (L) 4.21 - 5.77 m/uL    Hemoglobin 7.1 (L) 13.0 - 17.0 g/dL    Hematocrit 22.7 (L) 40.7 - 50.3 %    MCV 91.2 82.6 - 102.9 fL    MCH 28.5 25.2 - 33.5 pg    MCHC 31.3 28.4 - 34.8 g/dL    RDW 20.1 (H) 11.8 - 14.4 %    Platelets 253 098 - 178 k/uL    MPV 8.9 8.1 - 13.5 fL    NRBC Automated 0.0 0.0 per 100 WBC    Differential Type NOT REPORTED     WBC Morphology NOT REPORTED     RBC Morphology NOT REPORTED     Platelet Estimate NOT REPORTED     Immature Granulocytes 1 (H) 0 %    Seg Neutrophils 62 36 - 66 %    Lymphocytes 21 (L) 24 - 44 %    Monocytes 5 1 - 7 %    Eosinophils % 9 (H) 1 - 4 %    Basophils 2 0 - 2 %    nRBC 1 (H) 0 per 100 WBC    Absolute Immature Granulocyte 0.08 0.00 - 0.30 k/uL    Segs Absolute 5.14 1.8 - 7.7 k/uL    Absolute Lymph # 1.74 1.0 - 4.8 k/uL    Absolute Mono # 0.42 0.1 - 0.8 k/uL    Absolute Eos # 0.75 (H) 0.0 - 0.4 k/uL    Basophils Absolute 0.17 0.0 - 0.2 k/uL    Morphology ANISOCYTOSIS PRESENT     Morphology 1+ POLYCHROMASIA    EKG 12 lead    Collection Time: 01/03/20 11:10 AM   Result Value Ref Range    Ventricular Rate 94 BPM    Atrial Rate 94 BPM    P-R Interval 168 ms    QRS Duration 114 ms    Q-T Interval 382 ms    QTc Calculation (Bazett) 477 ms    P Axis 43 degrees    R Axis 12 degrees    T Axis 152 degrees   POC Glucose Fingerstick    Collection Time: 01/03/20 11:37 AM   Result Value Ref Range    POC Glucose 139 (H) 75 - 110 mg/dL         Assessment :      Primary Problem  Radial nerve palsy    Active Hospital Problems    Diagnosis Date Noted    Iron deficiency anemia [D50.9] 12/27/2019    Diabetic foot infection ([facility] [S13.138, L08.9]     [facility]ecrotizing fasciitis ([facility] [M72.6] 11/29/2019    Essential hypertension [I10] 11/29/2019    Diabetes ([facility] [E11.9] 11/29/2019    Diabetic foot ulcer [facility]) [id number], M79.565] 11/29/2019       Plan:     The patient mentioned above with past medical history of hypertension, diabetes mellitus , chronic kidney disease on dialysis, presented to the hospital with generalized weakness. Was found to have necrotizing fasciitis of the perineum. S/p multiple incision and drainage, debridement surgeries. Patient developed right hand weakness, right hand in flexion position. Week  strength. And weakness of the finger spreading. Involving of the radial nerve, ulnar nerve and median nerve. Differential diagnosis of compression, deconditioning, acute mononeuritis secondary to diabetes and kidney failure or amyloidosis. -Will get MRI of the C-spine to rule out compressive causes. -Will get MRI of the right brachial plexus. - We will have the PT for R hand splint.  -We will get EMG as outpatient. Will follow     Consultations:   IP CONSULT TO GENERAL SURGERY  IP CONSULT TO INFECTIOUS DISEASES  IP CONSULT TO UROLOGY  IP CONSULT TO PODIATRY  IP CONSULT TO NEPHROLOGY  IP CONSULT TO DIETITIAN  IP CONSULT TO IV TEAM  IP CONSULT TO DIETITIAN  IP CONSULT TO DIETITIAN  IP CONSULT TO PALLIATIVE CARE  IP CONSULT TO IV TEAM  IP CONSULT TO DIETITIAN  IP CONSULT TO DIETITIAN  IP CONSULT TO CASE MANAGEMENT  IP CONSULT TO NEUROLOGY     Patient is admitted as inpatient status because of co-morbidities listed above, severity of signs and symptoms as outlined, requirement for current medical therapies and most importantly because of direct risk to patient if care not provided in a hospital setting. Aura Jones MD  1/3/2020  1:20 PM    Copy sent to Dr. Villafuerte primary care provider on file.

## 2020-01-03 NOTE — PLAN OF CARE
Problem: Pain:  Goal: Pain level will decrease  Description  Pain level will decrease  1/2/2020 2017 by Cherie Braun RN  Outcome: Ongoing  1/2/2020 1820 by La Caba RN  Outcome: Ongoing  1/2/2020 0654 by Марина Alva RN  Outcome: Ongoing  Goal: Control of acute pain  Description  Control of acute pain  1/2/2020 2017 by Cherie Braun RN  Outcome: Ongoing  1/2/2020 1820 by La Caba RN  Outcome: Ongoing  1/2/2020 0654 by Марина Alva RN  Outcome: Ongoing  Goal: Control of chronic pain  Description  Control of chronic pain  1/2/2020 2017 by Cherie Braun RN  Outcome: Ongoing  1/2/2020 1820 by La Caba RN  Outcome: Ongoing  1/2/2020 0654 by Марина Alva RN  Outcome: Ongoing     Problem: Skin Integrity:  Goal: Will show no infection signs and symptoms  Description  Will show no infection signs and symptoms  1/2/2020 2017 by Cherie Braun RN  Outcome: Ongoing  1/2/2020 1820 by La Caba RN  Outcome: Ongoing  1/2/2020 0654 by Марина Alva RN  Outcome: Ongoing  Goal: Absence of new skin breakdown  Description  Absence of new skin breakdown  1/2/2020 2017 by Cherie Braun RN  Outcome: Ongoing  1/2/2020 1820 by La Caba RN  Outcome: Ongoing  1/2/2020 0654 by Марина Alva RN  Outcome: Ongoing     Problem: OXYGENATION/RESPIRATORY FUNCTION  Goal: Patient will maintain patent airway  1/2/2020 2017 by Cherie Braun RN  Outcome: Ongoing  1/2/2020 1820 by La Caba RN  Outcome: Ongoing  1/2/2020 0654 by Марина Alva RN  Outcome: Ongoing  Goal: Patient will achieve/maintain normal respiratory rate/effort  Description  Respiratory rate and effort will be within normal limits for the patient  1/2/2020 2017 by Cherie Braun RN  Outcome: Ongoing  1/2/2020 1820 by La Caba RN  Outcome: Ongoing  1/2/2020 0654 by Марина Alva RN  Outcome: Ongoing     Problem: SKIN INTEGRITY  Goal: Skin integrity is maintained or improved  1/2/2020 2017 by Israel Mcadams RN  Outcome: Ongoing  1/2/2020 1820 by Sabra Srivastava RN  Outcome: Ongoing  1/2/2020 0654 by Derek Mcgowan RN  Outcome: Ongoing     Problem: Falls - Risk of:  Goal: Will remain free from falls  Description  Will remain free from falls  1/2/2020 2017 by Israel Mcadams RN  Outcome: Ongoing  1/2/2020 1820 by Sabra Srivastava RN  Outcome: Ongoing  1/2/2020 0654 by Derek Mcgowan RN  Outcome: Ongoing  Goal: Absence of physical injury  Description  Absence of physical injury  1/2/2020 2017 by Israel Mcadams RN  Outcome: Ongoing  1/2/2020 1820 by Sabra Srivastava RN  Outcome: Ongoing  1/2/2020 0654 by Derek Mcgowan RN  Outcome: Ongoing     Problem: Risk for Impaired Skin Integrity  Goal: Tissue integrity - skin and mucous membranes  Description  Structural intactness and normal physiological function of skin and  mucous membranes.   1/2/2020 2017 by Israel Mcadams RN  Outcome: Ongoing  1/2/2020 1820 by Sabra Srivastava RN  Outcome: Ongoing  1/2/2020 0654 by Derek Mcgowan RN  Outcome: Ongoing     Problem: Nutrition  Goal: Optimal nutrition therapy  Description  Nutrition Problem: Inadequate oral intake  Intervention: Food and/or Nutrient Delivery: Start Parenteral Nutrition  Nutritional Goals: Meet % of estimated nutrition needs   1/2/2020 2017 by Israel Mcadams RN  Outcome: Ongoing  1/2/2020 1820 by Sabra Srivastava RN  Outcome: Ongoing  1/2/2020 1109 by Duke Sheehan RD, LD  Outcome: Ongoing  Note:   Nutrition Problem: Increased nutrient needs  Intervention: Food and/or Nutrient Delivery: Continue current diet, Start Tube Feeding, Modify current ONS  Nutritional Goals: Meet % of estimated nutrition needs   1/2/2020 0654 by Derek Mcgowan RN  Outcome: Ongoing     Problem: Confusion - Acute:  Goal: Absence of continued neurological deterioration signs and symptoms  Description  Absence of continued neurological

## 2020-01-03 NOTE — PROGRESS NOTES
Calorie Count Note    Type and Reason for Visit: Calorie Count    Nutrition Assessment: Calorie count ordered to start today. Will f/u on Monday for results. Current diet and supplement order:  Dietary Nutrition Supplements: Renal Oral Supplement  DIET GENERAL; Low Potassium        Comparative Standards (Estimated Nutrition Needs):   · Estimated Daily Total Kcal: 9386-8963 kcal/day  · Estimated Daily Protein (g): 110-145 g pro/day     Intervention & Recommendations:   1. Start Calorie Count  2. Continue general, Low K diet  3.   Recommend Nepro supplements QID     Electronically signed by Carmella Mahan RD, LD on 1/3/20 at 11:58 AM    Contact Number: [phone]

## 2020-01-03 NOTE — BRIEF OP NOTE
Brief Postoperative Note    Channing Chauhan  YOB: 1985  [id number]    Pre-operative Diagnosis: DEBBY    Post-operative Diagnosis: Same    Procedure: Tunneled dialysis catheter removal    Anesthesia: None    Surgeons/Assistants: Naman Yao PA-C and Dulce Rose MD    Estimated Blood Loss: less than 50     Complications: None    Specimens: Was Not Obtained    Findings: Removal of 14.5 Fr tunneled dialysis catheter. Hemostasis achieved with pressure. Pt tolerated well without complaint.       Electronically signed by JARON Mandujano on 1/3/2020 at 5:28 PM

## 2020-01-04 ENCOUNTER — ANESTHESIA EVENT (OUTPATIENT)
Dept: OPERATING ROOM | Age: 35
DRG: 463 | End: 2020-01-04
Payer: MEDICARE

## 2020-01-04 LAB
ABSOLUTE EOS #: 0.4 K/UL (ref 0–0.4)
ABSOLUTE IMMATURE GRANULOCYTE: 0 K/UL (ref 0–0.3)
ABSOLUTE LYMPH #: 1.34 K/UL (ref 1–4.8)
ABSOLUTE MONO #: 0.32 K/UL (ref 0.1–0.8)
ANION GAP SERPL CALCULATED.3IONS-SCNC: 14 MMOL/L (ref 9–17)
BASOPHILS # BLD: 0 % (ref 0–2)
BASOPHILS ABSOLUTE: 0 K/UL (ref 0–0.2)
BUN BLDV-MCNC: 48 MG/DL (ref 6–20)
BUN/CREAT BLD: ABNORMAL (ref 9–20)
CALCIUM SERPL-MCNC: 8 MG/DL (ref 8.6–10.4)
CHLORIDE BLD-SCNC: 100 MMOL/L (ref 98–107)
CO2: 24 MMOL/L (ref 20–31)
CREAT SERPL-MCNC: 1.81 MG/DL (ref 0.7–1.2)
DIFFERENTIAL TYPE: ABNORMAL
EOSINOPHILS RELATIVE PERCENT: 5 % (ref 1–4)
GFR AFRICAN AMERICAN: 52 ML/MIN
GFR NON-AFRICAN AMERICAN: 43 ML/MIN
GFR SERPL CREATININE-BSD FRML MDRD: ABNORMAL ML/MIN/{1.73_M2}
GFR SERPL CREATININE-BSD FRML MDRD: ABNORMAL ML/MIN/{1.73_M2}
GLUCOSE BLD-MCNC: 128 MG/DL (ref 75–110)
GLUCOSE BLD-MCNC: 135 MG/DL (ref 75–110)
GLUCOSE BLD-MCNC: 139 MG/DL (ref 75–110)
GLUCOSE BLD-MCNC: 145 MG/DL (ref 75–110)
GLUCOSE BLD-MCNC: 94 MG/DL (ref 70–99)
GLUCOSE BLD-MCNC: 99 MG/DL (ref 75–110)
HCT VFR BLD CALC: 25 % (ref 40.7–50.3)
HEMOGLOBIN: 7.5 G/DL (ref 13–17)
IMMATURE GRANULOCYTES: 0 %
LYMPHOCYTES # BLD: 17 % (ref 24–44)
MAGNESIUM: 1.7 MG/DL (ref 1.6–2.6)
MCH RBC QN AUTO: 29.2 PG (ref 25.2–33.5)
MCHC RBC AUTO-ENTMCNC: 30 G/DL (ref 28.4–34.8)
MCV RBC AUTO: 97.3 FL (ref 82.6–102.9)
MONOCYTES # BLD: 4 % (ref 1–7)
MORPHOLOGY: ABNORMAL
MORPHOLOGY: ABNORMAL
NRBC AUTOMATED: 0 PER 100 WBC
PDW BLD-RTO: 20.6 % (ref 11.8–14.4)
PHOSPHORUS: 4.4 MG/DL (ref 2.5–4.5)
PLATELET # BLD: 390 K/UL (ref 138–453)
PLATELET ESTIMATE: ABNORMAL
PMV BLD AUTO: 8.1 FL (ref 8.1–13.5)
POTASSIUM SERPL-SCNC: 4.1 MMOL/L (ref 3.7–5.3)
RBC # BLD: 2.57 M/UL (ref 4.21–5.77)
RBC # BLD: ABNORMAL 10*6/UL
SEG NEUTROPHILS: 74 % (ref 36–66)
SEGMENTED NEUTROPHILS ABSOLUTE COUNT: 5.84 K/UL (ref 1.8–7.7)
SODIUM BLD-SCNC: 138 MMOL/L (ref 135–144)
WBC # BLD: 7.9 K/UL (ref 3.5–11.3)
WBC # BLD: ABNORMAL 10*3/UL

## 2020-01-04 PROCEDURE — 36415 COLL VENOUS BLD VENIPUNCTURE: CPT

## 2020-01-04 PROCEDURE — 2500000003 HC RX 250 WO HCPCS: Performed by: STUDENT IN AN ORGANIZED HEALTH CARE EDUCATION/TRAINING PROGRAM

## 2020-01-04 PROCEDURE — 6370000000 HC RX 637 (ALT 250 FOR IP): Performed by: STUDENT IN AN ORGANIZED HEALTH CARE EDUCATION/TRAINING PROGRAM

## 2020-01-04 PROCEDURE — 6360000002 HC RX W HCPCS: Performed by: STUDENT IN AN ORGANIZED HEALTH CARE EDUCATION/TRAINING PROGRAM

## 2020-01-04 PROCEDURE — 99232 SBSQ HOSP IP/OBS MODERATE 35: CPT | Performed by: INTERNAL MEDICINE

## 2020-01-04 PROCEDURE — 82947 ASSAY GLUCOSE BLOOD QUANT: CPT

## 2020-01-04 PROCEDURE — 80048 BASIC METABOLIC PNL TOTAL CA: CPT

## 2020-01-04 PROCEDURE — 99232 SBSQ HOSP IP/OBS MODERATE 35: CPT | Performed by: PSYCHIATRY & NEUROLOGY

## 2020-01-04 PROCEDURE — 2060000000 HC ICU INTERMEDIATE R&B

## 2020-01-04 PROCEDURE — 2580000003 HC RX 258: Performed by: STUDENT IN AN ORGANIZED HEALTH CARE EDUCATION/TRAINING PROGRAM

## 2020-01-04 PROCEDURE — 83735 ASSAY OF MAGNESIUM: CPT

## 2020-01-04 PROCEDURE — 84100 ASSAY OF PHOSPHORUS: CPT

## 2020-01-04 PROCEDURE — 85025 COMPLETE CBC W/AUTO DIFF WBC: CPT

## 2020-01-04 PROCEDURE — 94761 N-INVAS EAR/PLS OXIMETRY MLT: CPT

## 2020-01-04 RX ORDER — MAGNESIUM SULFATE 1 G/100ML
1 INJECTION INTRAVENOUS PRN
Status: DISCONTINUED | OUTPATIENT
Start: 2020-01-04 | End: 2020-01-16 | Stop reason: HOSPADM

## 2020-01-04 RX ADMIN — Medication 100 MG: at 09:00

## 2020-01-04 RX ADMIN — SODIUM CHLORIDE 1000 ML: 900 IRRIGANT IRRIGATION at 02:29

## 2020-01-04 RX ADMIN — CARVEDILOL 25 MG: 25 TABLET, FILM COATED ORAL at 08:27

## 2020-01-04 RX ADMIN — OXYCODONE HYDROCHLORIDE 10 MG: 5 SOLUTION ORAL at 15:36

## 2020-01-04 RX ADMIN — Medication: at 09:00

## 2020-01-04 RX ADMIN — SODIUM CHLORIDE, PRESERVATIVE FREE 10 ML: 5 INJECTION INTRAVENOUS at 09:40

## 2020-01-04 RX ADMIN — OXYCODONE HYDROCHLORIDE 10 MG: 5 SOLUTION ORAL at 08:50

## 2020-01-04 RX ADMIN — OXYCODONE HYDROCHLORIDE 10 MG: 5 SOLUTION ORAL at 23:31

## 2020-01-04 RX ADMIN — ACETAMINOPHEN 650 MG: 325 TABLET ORAL at 01:36

## 2020-01-04 RX ADMIN — POLYETHYLENE GLYCOL 3350 17 G: 17 POWDER, FOR SOLUTION ORAL at 08:27

## 2020-01-04 RX ADMIN — INSULIN LISPRO 3 UNITS: 100 INJECTION, SOLUTION INTRAVENOUS; SUBCUTANEOUS at 21:02

## 2020-01-04 RX ADMIN — FAMOTIDINE 20 MG: 20 TABLET, FILM COATED ORAL at 08:28

## 2020-01-04 RX ADMIN — MULTIPLE VITAMINS W/ MINERALS TAB 1 TABLET: TAB at 08:27

## 2020-01-04 RX ADMIN — OXYCODONE HYDROCHLORIDE 10 MG: 5 SOLUTION ORAL at 02:28

## 2020-01-04 RX ADMIN — SODIUM CHLORIDE, PRESERVATIVE FREE 10 ML: 5 INJECTION INTRAVENOUS at 21:08

## 2020-01-04 RX ADMIN — INSULIN GLARGINE 10 UNITS: 100 INJECTION, SOLUTION SUBCUTANEOUS at 08:27

## 2020-01-04 RX ADMIN — Medication 20 MG: at 23:30

## 2020-01-04 RX ADMIN — BUMETANIDE 2 MG: 1 TABLET ORAL at 08:27

## 2020-01-04 RX ADMIN — SODIUM CHLORIDE, PRESERVATIVE FREE 10 ML: 5 INJECTION INTRAVENOUS at 00:09

## 2020-01-04 RX ADMIN — DOCUSATE SODIUM 100 MG: 100 CAPSULE, LIQUID FILLED ORAL at 08:27

## 2020-01-04 RX ADMIN — HEPARIN SODIUM 5000 UNITS: 5000 INJECTION INTRAVENOUS; SUBCUTANEOUS at 14:00

## 2020-01-04 RX ADMIN — IRON SUCROSE 200 MG: 20 INJECTION, SOLUTION INTRAVENOUS at 08:28

## 2020-01-04 ASSESSMENT — PAIN SCALES - GENERAL
PAINLEVEL_OUTOF10: 9
PAINLEVEL_OUTOF10: 7
PAINLEVEL_OUTOF10: 3
PAINLEVEL_OUTOF10: 9
PAINLEVEL_OUTOF10: 3
PAINLEVEL_OUTOF10: 9
PAINLEVEL_OUTOF10: 6

## 2020-01-04 ASSESSMENT — ENCOUNTER SYMPTOMS
PHOTOPHOBIA: 0
CHEST TIGHTNESS: 0
ABDOMINAL DISTENTION: 0
BACK PAIN: 0

## 2020-01-04 NOTE — PROGRESS NOTES
[facility] Neurology   IN-PATIENT SERVICE      NEUROLOGY PROGRESS  NOTE            Date:   1/4/2020  Patient name:  Xin Phelps  Date of admission:  11/29/2019  YOB: 1985      Interval History:     No changes overnight. RUE still weak distally. MRI brachial plexus couldn't be completed due to patient's size. C spine was done with motion artifcant and some mild to moderate areas of canal and foraminal stenosis. There is soft tissue edema present most prominent in lower cervical, upper thoracic regions. History of Present Illness:     78-year-old male with extensive hospital stay here, admitted on 11/29. He has had multiple debridements, incisions and drainages for necrotizing fasciitis. During his stay he developed weakness in his RUE in which neurology is consulted for.     Past Medical History:     Past Medical History:   Diagnosis Date    [facility]est[facility]) [facility]     Diabetes mellitus ([address]     Hypertension     Septic shock ([address]     Spina bifida aperta of lumbar spine ([facility]         Past Surgical History:     Past Surgical History:   Procedure Laterality Date    ABDOMEN SURGERY N/A 12/8/2019    DEBRIDEMENT  NECROTIZING FASCIITIS BUTTOCK, WOUND VAC REMOVAL DELAYED PRIMARY CLOSURE OF MIDLINE ABDOMINAL INCISION, OSTOMY BAG CHANGE performed by Sherley Vidales MD at [address] [facility] [facility] N/A 12/10/2019    DEBRIDEMENT OF SACRAL WOUND performed by Olivier Arriaga MD at [address] [place] [address]  12/26/2019    INCISION AND DRAINAGE AND WOUND VAC CHANGE BUTTOCK, PERINEUM     ABSCESS DRAINAGE N/A 1/1/2020    IRRIGATION AND DEBRIDEMENT BUTTOCKS, SCROTUM, ABDOMEN, WOUND VAC CHANGE performed by Sherley Vidales MD at [facility] 12/3/2019    LAPAROSCOPIC CONVERTED TO OPEN DIVERTING LOOP COLOSTOMY; WOUND VAC APPLICATION performed by Kenton Felix MD at [address] [place] [address]  01/01/2020     IRRIGATION AND DEBRIDEMENT BUTTOCKS, WOUND VAC EXCHANGE commands; speech is fluent, no dysarthria, aphasia. Cranial nerves   II - visual fields intact to confrontation; pupils reactive  III, IV, VI - extraocular muscles intact; no RACHEL; no nystagmus; no ptosis   V - normal facial sensation                                                               VII - normal facial symmetry                                                             VIII - intact hearing                                                                             IX, X - symmetrical palate elevation                                               XI - symmetrical shoulder shrug                                                       XII - midline tongue without atrophy or fasciculation     Motor function  RUE deltoid 4+/5, biceps 5/5, triceps 5/5, wrist extension 1/5, wrist flexion 2/5,  strength 2/5, intrinsic hand 2/5. Sensation intact in all nerve distributions. Reflexes 0/4 right biceps, brachioradialis, triceps. 1/4 left biceps, brachioradialis, triceps. Sensory function Intact to touch, pin, vibration, proprioception throughout     Cerebellar Intact finger-nose-finger testing. Reflex function 1/4 symmetric throughout, except 0/4 in RUE . Downgoing plantar response bilaterally. (-)Fields's sign bilaterally      Gait                  Not assessed              Diagnostics:      Laboratory Testing:  CBC:   Recent Labs     01/02/20  0858 01/03/20  0613 01/04/20  0607   WBC 9.7 8.3 7.9   HGB 7.3* 7.1* 7.5*    350 390     BMP:    Recent Labs     01/03/20  0613 01/03/20  1338 01/04/20  0607   * 136 138   K 6.2* 4.6 4.1   CL 98 99 100   CO2 22 22 24   BUN 53* 50* 48*   CREATININE 1.87* 1.86* 1.81*   GLUCOSE 102* 159* 94         Lab Results   Component Value Date    TRIG 208 (H) 12/19/2019    ALT 6 12/01/2019    AST 40 (H) 12/01/2019    INR 1.1 11/29/2019    LABA1C 8.0 (H) 12/01/2019       Imaging/Diagnostics:      MRI C spine - motion artifact.  Moderate areas of canal stenosis, and neuroforaminal stenosis. Soft tissue edema most prominent in lower cervical upper thoracic regions. All of the above medications, clinical laboratory, imaging and other diagnostic tests were reviewed by myself. Impression:      RUE distal weakness involving C6, C7, C8 in addition to radial, median, and ulnar nerve motor involvement. Soft tissue edema in lower cervical, upper thoracic region may be contributing. Plexopathy, compressive neuropathy/neuritis not excluded. Plan:     - MRI brachial plexus as outpatient, can be done at location with open MRI  - EMG/NCS in our office in 3 weeks  - Wrist extension splint  - PT OT  - No further neuro recommendations at this time. Will sign off. Please contact with any questions or concerns.        Electronically signed by Bethany Beal DO on 1/4/2020 at 9:23 AM      [facility]  Neurology

## 2020-01-04 NOTE — PROGRESS NOTES
PROGRESS NOTE      PATIENT NAME: Austin Virgen0 RECORD NO. [id number]  DATE: 1/4/2020  PRIMARY CARE PHYSICIAN: No primary care provider on file. HD: # 36    ASSESSMENT    Patient Active Problem List   Diagnosis    [facility]    Essential hypertension    Diabetes ([address]    Diabetic foot ulcer ([facility]    Diabetic foot infection ([facility]    Iron deficiency anemia    Muscle weakness of right upper extremity    Wrist drop, acquired, right   11/29/2019 wide complex debridement of nec fasc involving gluteal/perianal/distal rectum  12/03/2019 open diverting end colostomy   12/06/2019 I&D  12/08/2019 Incision and debridement of necrotizing fasciitis buttock wound, scrotum, bilateral groin region and closure of midline abdominal wound  12/10/2019 Sharp excisional debridement of gluteal, marc-rectal and perineal wound to level of muscle, Sharp excisional debridement of scrotum (performed by Urology), Washout of gluteal/perineal/scrotal/inguinal wound, Partial primary closure of pubic wound, and Dakins Wet to dry dressing application  40/81/2211 sharp excisional debridement of gluteal and perineal wounds to muscle 30 x 40 cm. Urology exam under anesthesia   12/24/2019 sharp debridement, partial closure of scrotum, placement of wound vac   12/26/2019 s/p sharp debridement of L side lateral aspect of sacral wound, replacement of wound vac         MEDICAL DECISION MAKING AND PLAN    1. Plan for vac change 1/5  2. Dr. Melvi Bansal, plastic surgery[facility] and possible flap in future  3. General diet. Encourage PO intake especially with removal of flexi jewel per primary. Will need to take in ensure between each meal. NPO at midnight for OR 1/5  4. Continue calorie count. May require replacement of flexiflo if not meeting needs  5. Bowel regimen: colace, miralax   6. Midline vac changes-wound RN following   7.  Strict glucose control <180         SUBJECTIVE    [employer] is seen and

## 2020-01-04 NOTE — PROGRESS NOTES
[facility]  Internal Medicine Teaching Residency Program  Inpatient Daily Progress Note  ______________________________________________________________________________    Patient: Ferna Prader  YOB: 1985   REMINGTON:[id number]    Acct: [de-identified]     Room: [facility]-01  Admit date: 11/29/2019  Today's date: 01/04/20  Number of days in the hospital: 39    SUBJECTIVE   Admitting Diagnosis: Necrotizing fasciitis ([address]    No acute issues overnight. Patient is tolerating diet and is placed on renal diet now. Vitals are stable. Patient was found to have right-sided upper extremity weakness and neurology was consulted and they ordered MRI cervical spine, right brachial plexus and wrist extension splint will be placed. Patient's tunneled catheter was removed yesterday as per nephrology recommendations as there is no acute need for dialysis. We will continue oral Bumex 2 mg. MRI cervical spine:  Detail is limited due to large amount of artifact, particularly in the canal.       Multilevel degenerative disc disease is noted, as described.  See above for   details of each level.       There is a large amount of posterior soft tissue edema in the cervical spine   and upper thoracic spine.  This is not completely included on the   examination.  This is indeterminate may be due to multifocal myositis. Rhabdomyolysis or infectious processes are possible as well. ROS:  Constitutional:  negative for chills, fevers, sweats  Respiratory:  negative for cough, dyspnea on exertion, hemoptysis, shortness of breath, wheezing  Cardiovascular:  negative for chest pain, chest pressure/discomfort, lower extremity edema, palpitations  Gastrointestinal:  negative for abdominal pain, constipation, diarrhea, nausea, vomiting  Neurological:  negative for dizziness, headache  BRIEF HISTORY       ulcer.  Who presented [place] [facility] with complaint of generalized weakness for the past few days. On examination the emergency department staff noticed the patient had a foul smell which they thought he had a bowel movement. They turned the patient and noticed sloughing of skin on his back and buttocks.      In the emergency department patient was afebrile but tachycardic. Initial labs demonstrated hyponatremia of 130, bicarb 12, BUN 63 and creatinine 2.82, hypocalcemia 7.7, alkaline phosphatase 144, AST 83, ALT 16, lactate 1.4, leukocytosis of 30.4, hemoglobin 9.2. Chest xray demonstrated no acute process. CT abdomen and pelvis without contrast demonstrated extensive subcutaneous emphysema involving both buttocks extending to the perineum. Possible phlegmon or developing soft tissue abscess overlying the left lateral abdominal wall. Scrotal Ultrasound demonstrated no evidence of testicular torsion or mass however did show extensive scrotal wall edema. He was given zosyn 4/5 g IV, rocephin 1 g IV and flagyl 500 mg IV in the emergency department.     He was transferred to [address] [place] [address] ICU for Necrotizing Fasciitis and Acute Renal Failure.   Patient initially required pressors, but currently is off pressors. Lonny Jeans a 37 y.o. with PMH of spina bifida, hypertension, diabetes and diabetic foot ulcer. Who presented to [facility] Emergency Department with complaint of generalized weakness for the past few days. On examination the emergency department staff noticed the patient had a foul smell which they thought he had a bowel movement.  They turned the patient and noticed sloughing of skin on his back and buttocks.       11/29/2019 wide complex debridement of nec fasc involving gluteal/perianal/distal rectum  12/03/2019 open diverting end colostomy   12/06/2019 I&D  12/08/2019 Incision and debridement of necrotizing fasciitis buttock wound, scrotum, bilateral groin region and closure of midline abdominal wound  12/10/2019 Sharp excisional debridement of gluteal, marc-rectal and perineal wound to level of muscle, Sharp excisional debridement of scrotum (performed by Urology), Washout of gluteal/perineal/scrotal/inguinal wound, Partial primary closure of pubic wound, and Dakins Wet to dry dressing application   sharp excisional debridement of gluteal and perineal wounds to muscle 30 x 40 cm. Urology exam under anesthesia   2019 sharp debridement, partial closure of scrotum, placement of wound vac   2019 s/p sharp debridement of L side lateral aspect of sacral wound, replacement of wound vac    OBJECTIVE     Vital Signs:  BP (!) 145/79   Pulse 94   Temp 98.3 °F (36.8 °C) (Oral)   Resp 18   Ht 5' 7\" (1.702 m)   Wt 284 lb 2.8 oz (128.9 kg)   SpO2 98%   BMI 44.51 kg/m²     Temp (24hrs), Av.1 °F (36.7 °C), Min:97.9 °F (36.6 °C), Max:98.3 °F (36.8 °C)    In: 1090   Out: 1280 [Urine:1240; Drains:40]    Physical Exam:    General appearance: alert and cooperative with exam  HEENT: Head: Normocephalic, no lesions, without obvious abnormality. Neck: no adenopathy, no carotid bruit, no JVD, supple, symmetrical, trachea midline and thyroid not enlarged, symmetric, no tenderness/mass/nodules  Lungs: clear to auscultation bilaterally  Heart: regular rate and rhythm, S1, S2 normal, no murmur, click, rub or gallop  Abdomen: Soft, mild tenderness.   Midline VAC in place with good seal  Extremities: Covered with dressing  Neurologic: Mental status: Alert, oriented, thought content appropriate    Medications:  Scheduled Medications:    therapeutic multivitamin-minerals  1 tablet Oral Daily    docusate sodium  100 mg Oral Daily    iron sucrose  200 mg Intravenous Q24H    carvedilol  25 mg Per NG tube Daily    insulin glargine  10 Units Subcutaneous Daily    bumetanide  2 mg Oral Daily    darbepoetin rohan-polysorbate  100 mcg Intravenous Weekly    docusate  100 mg Oral Daily    polyethylene glycol  17 g Oral Daily    insulin lispro  0-18 Units Subcutaneous Q4H    alteplase  1 mg Intracatheter Once    povidone-iodine   Topical Daily    famotidine  20 mg Per NG tube Daily    sodium chloride flush  10 mL Intravenous 2 times per day    heparin (porcine)  5,000 Units Subcutaneous 3 times per day     Continuous Infusions:    dextrose      sodium chloride      dextrose      sodium chloride 10 mL/hr at 12/28/19 2003     PRN Medicationsglucose, 15 g, PRN  dextrose, 12.5 g, PRN  glucagon (rDNA), 1 mg, PRN  dextrose, 100 mL/hr, PRN  sodium phosphate IVPB, 0.16 mmol/kg, PRN    Or  sodium phosphate IVPB, 0.32 mmol/kg, PRN  oxyCODONE, 7.5 mg, Q6H PRN    Or  oxyCODONE, 10 mg, Q6H PRN  heparin (porcine), 1,900 Units, PRN  heparin (porcine), 1,900 Units, PRN  melatonin, 1 mg, Nightly PRN  acetaminophen, 650 mg, Q4H PRN  labetalol, 20 mg, Q6H PRN  heparin (porcine), 500 Units, PRN  heparin (porcine), 1,750 Units, PRN  glucose, 15 g, PRN  dextrose, 12.5 g, PRN  glucagon (rDNA), 1 mg, PRN  dextrose, 100 mL/hr, PRN  albumin human, 25 g, PRN  LORazepam, 1 mg, Q6H PRN  sodium chloride, 250 mL, PRN  sodium chloride, 150 mL, PRN  albumin human, 25 g, PRN  sodium chloride flush, 10 mL, PRN  potassium chloride, 40 mEq, PRN    Or  potassium alternative oral replacement, 40 mEq, PRN    Or  potassium chloride, 10 mEq, PRN  REFRESH LACRI-LUBE, , PRN  magnesium hydroxide, 30 mL, Daily PRN  ondansetron, 4 mg, Q6H PRN        Diagnostic Labs:  CBC:   Recent Labs     01/02/20  0858 01/03/20  0613 01/04/20  0607   WBC 9.7 8.3 7.9   RBC 2.62* 2.49* 2.57*   HGB 7.3* 7.1* 7.5*   HCT 26.2* 22.7* 25.0*   .0 91.2 97.3   RDW 20.2* 20.1* 20.6*    350 390     BMP:   Recent Labs     01/02/20  0858 01/03/20  0613 01/03/20  1338 01/04/20  0607   * 133* 136 138   K 4.5 6.2* 4.6 4.1   CL 99 98 99 100   CO2 21 22 22 24   PHOS 2.9  --   --  4.4   BUN 53* 53* 50* 48*   CREATININE 1.80* 1.87* 1.86* 1.81*     BNP: No results for input(s): BNP in the last 72 hours.   PT/INR: No PGY-1  [address] [facility];  [place], [place]  1/4/2020, 8:06 AM

## 2020-01-04 NOTE — PROGRESS NOTES
not tolerate HD on 12-9. It was stopped early and pt required vasopressor support with Levophed, remains on vent. · Pt to OR 12-10 for further debridement  · Repeat I&D planned for 12-16-19 was cancelled because of high K levels  · Bedside I & D on 12/17/19. · Patient to undergo additional I&D on 12-20-19  · 12/23 right upper lobe collapse  · 12/24 debridement with excision of sacral wound in preparation for a flap, partial closure of his scrotal wound, VAC and debridement over the perineum  · 12-30 Wound RN to change abdominal vac   · 1-1-20 Returned to OR for further debridement of perineal wound and vac change  · 1-3 Lt heel and abd wound vac changed per wound care  · 1-3 Order for removal of Lt tunnel cath removal. Last HD 12-27, pt showing signs of renal recovery. · 1-5 Plan for further debridement and vac change  Infection Control Recommendations   · [place] Precautions  · Contact Isolation MRSA    Antimicrobial Stewardship Recommendations     Off antibiotics  Coordination of Outpatient Care:   · Estimated Length of IV antimicrobials: D/C 12-19-19  · Patient will need Midline Catheter Insertion: No  · Patient will need PICC line Insertion:Has Central line  · Patient will need: Home IV , [place],  SNF,  LTAC: TBD  · Patient will need outpatient wound care:Yes    Chief complaint/reason for consultation:   · Ashley's vs necrotizing fasciitis    History of Present Illness:   Ren Pena is a 37y.o.-year-old  male who was initially admitted on 11/29/2019. Patient seen at the request of . INITIAL HISTORY:    Patient presented through ER in [place] with complaints of weakness of a few days duration. Examination showed skin necrosis in the gluteal and perineal region. Patient transferred to [facility]. CT scan showed extensive subcutaneous emphysema. Patient underwent I&D and extensive complex debridement of affected tissues on 11-29-19. Gram stain of tissues showed Strep.  Spp and medications, social history, and family history, and I have updated the database accordingly.   Past Medical History:     Past Medical History:   Diagnosis Date    [facility] (congestive heart failure) [facility]     Diabetes mellitus ([facility]     Hypertension     Septic shock ([facility]     Spina bifida aperta of lumbar spine ([facility]        Past Surgical  History:     Past Surgical History:   Procedure Laterality Date    ABDOMEN SURGERY N/A 12/8/2019    DEBRIDEMENT  NECROTIZING FASCIITIS BUTTOCK, WOUND VAC REMOVAL DELAYED PRIMARY CLOSURE OF MIDLINE ABDOMINAL INCISION, OSTOMY BAG CHANGE performed by Benny Santana MD at [address] [facility] [facility] N/A 12/10/2019    DEBRIDEMENT OF SACRAL WOUND performed by Key Handley MD at [address] [place] [address]  12/26/2019    INCISION AND DRAINAGE AND WOUND VAC CHANGE BUTTOCK, PERINEUM     ABSCESS DRAINAGE N/A 1/1/2020    IRRIGATION AND DEBRIDEMENT BUTTOCKS, SCROTUM, ABDOMEN, WOUND VAC CHANGE performed by Benny Santana MD at [facility] 12/3/2019    LAPAROSCOPIC CONVERTED TO OPEN DIVERTING LOOP COLOSTOMY; WOUND VAC APPLICATION performed by Amy Robles MD at [address] [place] [address]  01/01/2020     IRRIGATION AND DEBRIDEMENT BUTTOCKS, WOUND VAC EXCHANGE (N/A )    INCISION AND DRAINAGE Bilateral 11/29/2019    RECTAL PERIRECTAL INCISION AND DRAINAGE, DIVERING LOOP COLOSTOMY CREATION performed by Flory Fish MD at [address] [place] [address] N/A 12/6/2019    DEBRIDEMENT NECROTIZING FASCIAITIS BILAT BUTTOCK performed by Key Handley MD at [address] [facility] N/A 12/20/2019    DEBRIDEMENT GLUTEAL AND SCROTAL REGIONS performed by Flory Fish MD at [address] [place] [address] N/A 12/26/2019    INCISION AND DRAINAGE AND WOUND VAC CHANGE BUTTOCK, PERINEUM performed by Denzel Maguire DO at [place] [address] N/A 12/10/2019    SCROTAL EXPLORATION WITH SCROTAL DEBRIDEMENT performed by [facility] Max Astorga MD at [address] N/A 12/23/2019    WOUND DEBRIDEMENT  WOUND VAC CHANGE - [address] performed by Quinton Bonilla MD at [facility]       Medications:      therapeutic multivitamin-minerals  1 tablet Oral Daily    docusate sodium  100 mg Oral Daily    iron sucrose  200 mg Intravenous Q24H    carvedilol  25 mg Per NG tube Daily    insulin glargine  10 Units Subcutaneous Daily    bumetanide  2 mg Oral Daily    darbepoetin rohan-polysorbate  100 mcg Intravenous Weekly    docusate  100 mg Oral Daily    polyethylene glycol  17 g Oral Daily    insulin lispro  0-18 Units Subcutaneous Q4H    alteplase  1 mg Intracatheter Once    povidone-iodine   Topical Daily    famotidine  20 mg Per NG tube Daily    sodium chloride flush  10 mL Intravenous 2 times per day    heparin (porcine)  5,000 Units Subcutaneous 3 times per day       Social History:     Social History     Socioeconomic History    Marital status: Unknown     Spouse name: Not on file    Number of children: Not on file    Years of education: Not on file    Highest education level: Not on file   Occupational History    Not on file   Social Needs    Financial resource strain: Not on file    Food insecurity:     Worry: Not on file     Inability: Not on file    Transportation needs:     Medical: Not on file     Non-medical: Not on file   Tobacco Use    Smoking status: Not on file   Substance and Sexual Activity    Alcohol use: Not on file    Drug use: Not on file    Sexual activity: Not on file   Lifestyle    Physical activity:     Days per week: Not on file     Minutes per session: Not on file    Stress: Not on file   Relationships    Social connections:     Talks on phone: Not on file     Gets together: Not on file     Attends [employer] service: Not on file     Active member of club or organization: Not on file     Attends meetings of clubs or organizations: Not on file     Relationship status: Not on file   Trupti Ramos Gallbladder and Bile Ducts: Normal.       Spleen: Splenomegaly.       Adrenal Glands: Normal.       Pancreas: Normal.       Genitourinary: Symmetric renal atrophy.  Redemonstration of multiple   hypodensities in the right kidney which likely represent benign cysts.  No   urinary stones or hydronephrosis.  Ambrosio catheter in the decompressed urinary   bladder.       Bowel: Normal caliber bowel.  Postsurgical changes of the bowel with left   lower quadrant ostomy.  No evidence of acute appendicitis.  No significant   diverticular disease.       Vasculature: Atherosclerosis.  No abdominal aortic aneurysm.       Bones and Soft Tissues: Diffuse soft tissue stranding and fluid. Postsurgical changes in the anterior abdominal wall with midline incision   demonstrating expected small amount of fluid and air.  Large soft tissue   defects in the right inguinal region, scrotum, right greater than left   gluteal creases with associated packing material.  Small amount of   surrounding soft tissue gas.  There is associated skin thickening in these   regions.  Bilateral lower abdominal wall skin thickening.       Retroperitoneum/Mesentery: No intraperitoneal free air.  Mild abdominopelvic   ascites.  Loculated fluid along the inferior aspect of the liver. Redemonstration of bilateral inguinal and pelvic sidewall lymphadenopathy. Multiple mildly prominent retroperitoneal and upper abdominal lymph nodes are   similar to the prior study.  Percutaneous intraperitoneal surgical drains.           Impression   1.  Large soft tissue defects in the right inguinal region, scrotum and right   greater than left gluteal crease is with associated packing material, small   amount of surrounding soft tissue gas and skin thickening likely relates to   history of necrotizing fasciitis.       2.  Postsurgical changes of the bowel with left lower quadrant ostomy.  No   bowel obstruction.  Percutaneous intraperitoneal surgical drains.       3.  Mild abdominopelvic ascites.  Loculated fluid along the inferior aspect   of the liver.  No intraperitoneal free air.       4.  Diffuse anasarca.       5.   Bilateral pleural effusions with associated heterogeneous opacities and   consolidations.  Underlying infection is not excluded.               EXAMINATION:   ONE XRAY VIEW OF THE CHEST       11/29/2019 9:01 pm       COMPARISON:   4 hours ago       HISTORY:   ORDERING SYSTEM PROVIDED HISTORY: intubated   TECHNOLOGIST PROVIDED HISTORY:   intubated   Reason for Exam: portable supine/ intubated   Acuity: Acute   Type of Exam: Initial       FINDINGS:   New ET tube terminates 38 mm above the ron.  There appears to be a   possible enteric tube which is not well visualized distally.  Right IJ   catheter terminates in the right atrium and is unchanged.  Lungs are clear. Cardiomegaly.  Mediastinum normal.  Bony thorax intact.           Impression   New ET tube as above.  Possible new enteric tube not well visualized. Recommend follow-up KUB if clinically warranted. CT ABDOMEN AND PELVIS WITHOUT CONTRAST    COMPARISON:  3/22/2017    CLINICAL HISTORY: Infection in scrotum, lower abdominal pain, diabetic, 30,000 white blood cell count. TECHNIQUE: Unenhanced axial images were obtained from the lung bases to the pubic symphysis with sagittal and coronal 2D reformatted images. Oral contrast administered:  No.  Automatic exposure control (AEC) was utilized. CT ABDOMEN FINDINGS:      The lung bases are unremarkable. There is a small pericardial effusion versus thickening. The liver, spleen, and pancreas demonstrate no acute abnormality given compromised evaluation without intravenous contrast.  There may be mild splenomegaly.  The adrenal glands appear within normal limits. There is no hydronephrosis or obstructing urinary tract calculi. Small amount of free fluid is seen adjacent to the liver inferiorly. .    The appendix is visualized in the right lower quadrant and appears within normal limits.       There is no evidence for bowel obstruction. Stranding is seen within the subcutaneous fat overlying both lateral abdominal walls left greater than right.  There is ill-defined fluid collection within the subcutaneous fat overlying the left lateral abdominal wall possibly representing phlegmon or developing abscess although no organized   abscess is seen. There is a large amount of soft tissue emphysema involving both the right and left buttock extending to the perineum tissue thickening is seen especially on the left involving the left buttock. CT PELVIS FINDINGS:        No distal ureteral calculi or bladder calculi. There is no free fluid in the pelvis. Catheter is seen within the urinary bladder. Osseous changes within the left hemipelvis which may be chronic in nature. IMPRESSION:    Extensive subcutaneous emphysema as described above involving both buttocks extending to the perineum.  The possibility of Ashley gangrene/necrotizing fasciitis cannot be excluded. Possible phlegmon or developing soft tissue abscess overlying the left lateral abdominal wall as noted above.  No organized abscess is seen however. Osseous changes within the left hemipelvis which may be chronic in nature. Results the study were called to Dr. Seth Elder [phone] on 11/29/2019  All CT scans at this facility use dose modulation, iterative reconstruction, and/or weight based dosing when appropriate to reduce radiation dose to as low as reasonably achievable.       Medical Decision [id number]:         Hamlet Bello MD. Infectious Diseases

## 2020-01-04 NOTE — PROGRESS NOTES
Renal Progress Note    Patient :  Navdeep Alexander; 37 y.o. MRN# [id number]  Location:  [facility]/1350-69  Attending:  Korin Brary MD  Admit Date:  11/29/2019   Hospital Day: 39      Subjective:     Patient is seen and examined. No new issues overnight reported. Patient is status post I&D of gluteal wound and wound VAC exchange done today 1/1/2020. Plan for wound VAC change in 1/5/2020. Plastic surgery to follow him for grafting and possible flap in future. Patient made about 1.2 L of urine last 24 hours. Patient serum creatinine 1.81 mg/DL today. Potassium now 4.1 today. Patient has recently started taking better oral intake and no change on low potassium protein supplements. His last hemodialysis was on 12/27/2019. His renal function seems to be improving and/or plateau at 0.82 today. Outpatient Medications:     No medications prior to admission.     Current Medications:     Scheduled Meds:    therapeutic multivitamin-minerals  1 tablet Oral Daily    docusate sodium  100 mg Oral Daily    iron sucrose  200 mg Intravenous Q24H    carvedilol  25 mg Per NG tube Daily    insulin glargine  10 Units Subcutaneous Daily    bumetanide  2 mg Oral Daily    darbepoetin rohan-polysorbate  100 mcg Intravenous Weekly    docusate  100 mg Oral Daily    polyethylene glycol  17 g Oral Daily    insulin lispro  0-18 Units Subcutaneous Q4H    alteplase  1 mg Intracatheter Once    povidone-iodine   Topical Daily    famotidine  20 mg Per NG tube Daily    sodium chloride flush  10 mL Intravenous 2 times per day    heparin (porcine)  5,000 Units Subcutaneous 3 times per day     Continuous Infusions:    dextrose      sodium chloride      dextrose      sodium chloride 10 mL/hr at 12/28/19 2003     PRN Meds:  glucose, dextrose, glucagon (rDNA), dextrose, sodium phosphate IVPB **OR** sodium phosphate IVPB, oxyCODONE **OR** oxyCODONE, heparin (porcine), heparin (porcine), melatonin, acetaminophen, labetalol, heparin

## 2020-01-05 ENCOUNTER — ANESTHESIA (OUTPATIENT)
Dept: OPERATING ROOM | Age: 35
DRG: 463 | End: 2020-01-05
Payer: MEDICARE

## 2020-01-05 VITALS — TEMPERATURE: 98.3 F | OXYGEN SATURATION: 100 % | SYSTOLIC BLOOD PRESSURE: 86 MMHG | DIASTOLIC BLOOD PRESSURE: 53 MMHG

## 2020-01-05 LAB
ABSOLUTE EOS #: 0.49 K/UL (ref 0–0.4)
ABSOLUTE IMMATURE GRANULOCYTE: 0.08 K/UL (ref 0–0.3)
ABSOLUTE LYMPH #: 1.46 K/UL (ref 1–4.8)
ABSOLUTE MONO #: 0.73 K/UL (ref 0.1–0.8)
ANION GAP SERPL CALCULATED.3IONS-SCNC: 13 MMOL/L (ref 9–17)
BASOPHILS # BLD: 1 % (ref 0–2)
BASOPHILS ABSOLUTE: 0.08 K/UL (ref 0–0.2)
BUN BLDV-MCNC: 45 MG/DL (ref 6–20)
BUN/CREAT BLD: ABNORMAL (ref 9–20)
CALCIUM SERPL-MCNC: 7.8 MG/DL (ref 8.6–10.4)
CHLORIDE BLD-SCNC: 102 MMOL/L (ref 98–107)
CO2: 22 MMOL/L (ref 20–31)
CREAT SERPL-MCNC: 1.82 MG/DL (ref 0.7–1.2)
DIFFERENTIAL TYPE: ABNORMAL
EOSINOPHILS RELATIVE PERCENT: 6 % (ref 1–4)
GFR AFRICAN AMERICAN: 51 ML/MIN
GFR NON-AFRICAN AMERICAN: 42 ML/MIN
GFR SERPL CREATININE-BSD FRML MDRD: ABNORMAL ML/MIN/{1.73_M2}
GFR SERPL CREATININE-BSD FRML MDRD: ABNORMAL ML/MIN/{1.73_M2}
GLUCOSE BLD-MCNC: 112 MG/DL (ref 75–110)
GLUCOSE BLD-MCNC: 180 MG/DL (ref 75–110)
GLUCOSE BLD-MCNC: 279 MG/DL (ref 75–110)
GLUCOSE BLD-MCNC: 301 MG/DL (ref 75–110)
GLUCOSE BLD-MCNC: 83 MG/DL (ref 75–110)
GLUCOSE BLD-MCNC: 87 MG/DL (ref 75–110)
GLUCOSE BLD-MCNC: 93 MG/DL (ref 70–99)
HCT VFR BLD CALC: 26.3 % (ref 40.7–50.3)
HEMOGLOBIN: 7.7 G/DL (ref 13–17)
IMMATURE GRANULOCYTES: 1 %
LYMPHOCYTES # BLD: 18 % (ref 24–44)
MAGNESIUM: 1.7 MG/DL (ref 1.6–2.6)
MCH RBC QN AUTO: 28.7 PG (ref 25.2–33.5)
MCHC RBC AUTO-ENTMCNC: 29.3 G/DL (ref 28.4–34.8)
MCV RBC AUTO: 98.1 FL (ref 82.6–102.9)
MONOCYTES # BLD: 9 % (ref 1–7)
MORPHOLOGY: ABNORMAL
MORPHOLOGY: ABNORMAL
NRBC AUTOMATED: 0 PER 100 WBC
PDW BLD-RTO: 20.8 % (ref 11.8–14.4)
PLATELET # BLD: 362 K/UL (ref 138–453)
PLATELET ESTIMATE: ABNORMAL
PMV BLD AUTO: 8 FL (ref 8.1–13.5)
POTASSIUM SERPL-SCNC: 4 MMOL/L (ref 3.7–5.3)
RBC # BLD: 2.68 M/UL (ref 4.21–5.77)
RBC # BLD: ABNORMAL 10*6/UL
SEG NEUTROPHILS: 65 % (ref 36–66)
SEGMENTED NEUTROPHILS ABSOLUTE COUNT: 5.26 K/UL (ref 1.8–7.7)
SODIUM BLD-SCNC: 137 MMOL/L (ref 135–144)
WBC # BLD: 8.1 K/UL (ref 3.5–11.3)
WBC # BLD: ABNORMAL 10*3/UL

## 2020-01-05 PROCEDURE — 6370000000 HC RX 637 (ALT 250 FOR IP): Performed by: STUDENT IN AN ORGANIZED HEALTH CARE EDUCATION/TRAINING PROGRAM

## 2020-01-05 PROCEDURE — 80048 BASIC METABOLIC PNL TOTAL CA: CPT

## 2020-01-05 PROCEDURE — 6360000002 HC RX W HCPCS: Performed by: NURSE ANESTHETIST, CERTIFIED REGISTERED

## 2020-01-05 PROCEDURE — 2580000003 HC RX 258: Performed by: STUDENT IN AN ORGANIZED HEALTH CARE EDUCATION/TRAINING PROGRAM

## 2020-01-05 PROCEDURE — 85025 COMPLETE CBC W/AUTO DIFF WBC: CPT

## 2020-01-05 PROCEDURE — 3700000000 HC ANESTHESIA ATTENDED CARE: Performed by: SURGERY

## 2020-01-05 PROCEDURE — 82947 ASSAY GLUCOSE BLOOD QUANT: CPT

## 2020-01-05 PROCEDURE — 99233 SBSQ HOSP IP/OBS HIGH 50: CPT | Performed by: INTERNAL MEDICINE

## 2020-01-05 PROCEDURE — 7100000001 HC PACU RECOVERY - ADDTL 15 MIN: Performed by: SURGERY

## 2020-01-05 PROCEDURE — 0JB90ZZ EXCISION OF BUTTOCK SUBCUTANEOUS TISSUE AND FASCIA, OPEN APPROACH: ICD-10-PCS | Performed by: SURGERY

## 2020-01-05 PROCEDURE — 2709999900 HC NON-CHARGEABLE SUPPLY: Performed by: SURGERY

## 2020-01-05 PROCEDURE — 6360000002 HC RX W HCPCS: Performed by: STUDENT IN AN ORGANIZED HEALTH CARE EDUCATION/TRAINING PROGRAM

## 2020-01-05 PROCEDURE — 2500000003 HC RX 250 WO HCPCS: Performed by: NURSE ANESTHETIST, CERTIFIED REGISTERED

## 2020-01-05 PROCEDURE — 36415 COLL VENOUS BLD VENIPUNCTURE: CPT

## 2020-01-05 PROCEDURE — 7100000000 HC PACU RECOVERY - FIRST 15 MIN: Performed by: SURGERY

## 2020-01-05 PROCEDURE — 2580000003 HC RX 258: Performed by: NURSE ANESTHETIST, CERTIFIED REGISTERED

## 2020-01-05 PROCEDURE — 2060000000 HC ICU INTERMEDIATE R&B

## 2020-01-05 PROCEDURE — 2580000003 HC RX 258: Performed by: SURGERY

## 2020-01-05 PROCEDURE — 83735 ASSAY OF MAGNESIUM: CPT

## 2020-01-05 PROCEDURE — 3700000001 HC ADD 15 MINUTES (ANESTHESIA): Performed by: SURGERY

## 2020-01-05 PROCEDURE — 3600000013 HC SURGERY LEVEL 3 ADDTL 15MIN: Performed by: SURGERY

## 2020-01-05 PROCEDURE — 94761 N-INVAS EAR/PLS OXIMETRY MLT: CPT

## 2020-01-05 PROCEDURE — 3600000003 HC SURGERY LEVEL 3 BASE: Performed by: SURGERY

## 2020-01-05 RX ORDER — GLYCOPYRROLATE 1 MG/5 ML
SYRINGE (ML) INTRAVENOUS PRN
Status: DISCONTINUED | OUTPATIENT
Start: 2020-01-05 | End: 2020-01-05 | Stop reason: SDUPTHER

## 2020-01-05 RX ORDER — DEXAMETHASONE SODIUM PHOSPHATE 10 MG/ML
INJECTION INTRAMUSCULAR; INTRAVENOUS PRN
Status: DISCONTINUED | OUTPATIENT
Start: 2020-01-05 | End: 2020-01-05 | Stop reason: SDUPTHER

## 2020-01-05 RX ORDER — FENTANYL CITRATE 50 UG/ML
INJECTION, SOLUTION INTRAMUSCULAR; INTRAVENOUS PRN
Status: DISCONTINUED | OUTPATIENT
Start: 2020-01-05 | End: 2020-01-05 | Stop reason: SDUPTHER

## 2020-01-05 RX ORDER — NEOSTIGMINE METHYLSULFATE 5 MG/5 ML
SYRINGE (ML) INTRAVENOUS PRN
Status: DISCONTINUED | OUTPATIENT
Start: 2020-01-05 | End: 2020-01-05 | Stop reason: SDUPTHER

## 2020-01-05 RX ORDER — ROCURONIUM BROMIDE 10 MG/ML
INJECTION, SOLUTION INTRAVENOUS PRN
Status: DISCONTINUED | OUTPATIENT
Start: 2020-01-05 | End: 2020-01-05 | Stop reason: SDUPTHER

## 2020-01-05 RX ORDER — MAGNESIUM HYDROXIDE 1200 MG/15ML
LIQUID ORAL CONTINUOUS PRN
Status: COMPLETED | OUTPATIENT
Start: 2020-01-05 | End: 2020-01-05

## 2020-01-05 RX ORDER — OXYBUTYNIN CHLORIDE 5 MG/1
5 TABLET ORAL 3 TIMES DAILY
Status: DISCONTINUED | OUTPATIENT
Start: 2020-01-05 | End: 2020-01-06

## 2020-01-05 RX ORDER — PROPOFOL 10 MG/ML
INJECTION, EMULSION INTRAVENOUS PRN
Status: DISCONTINUED | OUTPATIENT
Start: 2020-01-05 | End: 2020-01-05 | Stop reason: SDUPTHER

## 2020-01-05 RX ORDER — ONDANSETRON 2 MG/ML
INJECTION INTRAMUSCULAR; INTRAVENOUS PRN
Status: DISCONTINUED | OUTPATIENT
Start: 2020-01-05 | End: 2020-01-05 | Stop reason: SDUPTHER

## 2020-01-05 RX ORDER — SODIUM CHLORIDE, SODIUM LACTATE, POTASSIUM CHLORIDE, CALCIUM CHLORIDE 600; 310; 30; 20 MG/100ML; MG/100ML; MG/100ML; MG/100ML
INJECTION, SOLUTION INTRAVENOUS CONTINUOUS PRN
Status: DISCONTINUED | OUTPATIENT
Start: 2020-01-05 | End: 2020-01-05 | Stop reason: SDUPTHER

## 2020-01-05 RX ORDER — LIDOCAINE HYDROCHLORIDE 10 MG/ML
INJECTION, SOLUTION EPIDURAL; INFILTRATION; INTRACAUDAL; PERINEURAL PRN
Status: DISCONTINUED | OUTPATIENT
Start: 2020-01-05 | End: 2020-01-05 | Stop reason: SDUPTHER

## 2020-01-05 RX ORDER — PHENYLEPHRINE HYDROCHLORIDE 10 MG/ML
INJECTION INTRAVENOUS PRN
Status: DISCONTINUED | OUTPATIENT
Start: 2020-01-05 | End: 2020-01-05 | Stop reason: SDUPTHER

## 2020-01-05 RX ADMIN — PHENYLEPHRINE HYDROCHLORIDE 300 MCG: 10 INJECTION INTRAVENOUS at 11:09

## 2020-01-05 RX ADMIN — OXYBUTYNIN CHLORIDE 5 MG: 5 TABLET ORAL at 20:39

## 2020-01-05 RX ADMIN — ONDANSETRON 4 MG: 2 INJECTION, SOLUTION INTRAMUSCULAR; INTRAVENOUS at 10:43

## 2020-01-05 RX ADMIN — MULTIPLE VITAMINS W/ MINERALS TAB 1 TABLET: TAB at 13:18

## 2020-01-05 RX ADMIN — PHENYLEPHRINE HYDROCHLORIDE 150 MCG: 10 INJECTION INTRAVENOUS at 10:42

## 2020-01-05 RX ADMIN — OXYCODONE HYDROCHLORIDE 10 MG: 5 SOLUTION ORAL at 07:28

## 2020-01-05 RX ADMIN — Medication: at 13:45

## 2020-01-05 RX ADMIN — Medication 0.8 MG: at 10:49

## 2020-01-05 RX ADMIN — FAMOTIDINE 20 MG: 20 TABLET, FILM COATED ORAL at 13:17

## 2020-01-05 RX ADMIN — PHENYLEPHRINE HYDROCHLORIDE 200 MCG: 10 INJECTION INTRAVENOUS at 10:57

## 2020-01-05 RX ADMIN — INSULIN LISPRO 12 UNITS: 100 INJECTION, SOLUTION INTRAVENOUS; SUBCUTANEOUS at 20:41

## 2020-01-05 RX ADMIN — SODIUM CHLORIDE, PRESERVATIVE FREE 10 ML: 5 INJECTION INTRAVENOUS at 09:00

## 2020-01-05 RX ADMIN — SODIUM CHLORIDE, PRESERVATIVE FREE 10 ML: 5 INJECTION INTRAVENOUS at 20:40

## 2020-01-05 RX ADMIN — Medication 100 MG: at 13:17

## 2020-01-05 RX ADMIN — DOCUSATE SODIUM 100 MG: 100 CAPSULE, LIQUID FILLED ORAL at 13:17

## 2020-01-05 RX ADMIN — PHENYLEPHRINE HYDROCHLORIDE 300 MCG: 10 INJECTION INTRAVENOUS at 10:51

## 2020-01-05 RX ADMIN — POLYETHYLENE GLYCOL 3350 17 G: 17 POWDER, FOR SOLUTION ORAL at 13:18

## 2020-01-05 RX ADMIN — PHENYLEPHRINE HYDROCHLORIDE 200 MCG: 10 INJECTION INTRAVENOUS at 11:03

## 2020-01-05 RX ADMIN — INSULIN LISPRO 9 UNITS: 100 INJECTION, SOLUTION INTRAVENOUS; SUBCUTANEOUS at 16:45

## 2020-01-05 RX ADMIN — CARVEDILOL 25 MG: 25 TABLET, FILM COATED ORAL at 13:18

## 2020-01-05 RX ADMIN — HEPARIN SODIUM 5000 UNITS: 5000 INJECTION INTRAVENOUS; SUBCUTANEOUS at 13:19

## 2020-01-05 RX ADMIN — OXYBUTYNIN CHLORIDE 5 MG: 5 TABLET ORAL at 14:15

## 2020-01-05 RX ADMIN — SODIUM CHLORIDE, POTASSIUM CHLORIDE, SODIUM LACTATE AND CALCIUM CHLORIDE: 600; 310; 30; 20 INJECTION, SOLUTION INTRAVENOUS at 10:03

## 2020-01-05 RX ADMIN — DEXAMETHASONE SODIUM PHOSPHATE 10 MG: 10 INJECTION INTRAMUSCULAR; INTRAVENOUS at 10:43

## 2020-01-05 RX ADMIN — Medication 4 MG: at 10:49

## 2020-01-05 RX ADMIN — FENTANYL CITRATE 100 MCG: 50 INJECTION INTRAMUSCULAR; INTRAVENOUS at 10:09

## 2020-01-05 RX ADMIN — OXYCODONE HYDROCHLORIDE 10 MG: 5 SOLUTION ORAL at 15:15

## 2020-01-05 RX ADMIN — LIDOCAINE HYDROCHLORIDE 50 MG: 10 INJECTION, SOLUTION EPIDURAL; INFILTRATION; INTRACAUDAL; PERINEURAL at 10:09

## 2020-01-05 RX ADMIN — PROPOFOL 200 MG: 10 INJECTION, EMULSION INTRAVENOUS at 10:09

## 2020-01-05 RX ADMIN — INSULIN GLARGINE 10 UNITS: 100 INJECTION, SOLUTION SUBCUTANEOUS at 13:25

## 2020-01-05 RX ADMIN — PROPOFOL 100 MG: 10 INJECTION, EMULSION INTRAVENOUS at 10:10

## 2020-01-05 RX ADMIN — HEPARIN SODIUM 5000 UNITS: 5000 INJECTION INTRAVENOUS; SUBCUTANEOUS at 20:41

## 2020-01-05 RX ADMIN — ROCURONIUM BROMIDE 50 MG: 10 INJECTION INTRAVENOUS at 10:09

## 2020-01-05 RX ADMIN — BUMETANIDE 2 MG: 1 TABLET ORAL at 13:18

## 2020-01-05 RX ADMIN — IRON SUCROSE 200 MG: 20 INJECTION, SOLUTION INTRAVENOUS at 13:18

## 2020-01-05 RX ADMIN — PHENYLEPHRINE HYDROCHLORIDE 100 MCG: 10 INJECTION INTRAVENOUS at 10:45

## 2020-01-05 RX ADMIN — INSULIN LISPRO 3 UNITS: 100 INJECTION, SOLUTION INTRAVENOUS; SUBCUTANEOUS at 23:55

## 2020-01-05 ASSESSMENT — PULMONARY FUNCTION TESTS
PIF_VALUE: 1
PIF_VALUE: 21
PIF_VALUE: 20
PIF_VALUE: 2
PIF_VALUE: 20
PIF_VALUE: 21
PIF_VALUE: 2
PIF_VALUE: 22
PIF_VALUE: 21
PIF_VALUE: 20
PIF_VALUE: 19
PIF_VALUE: 21
PIF_VALUE: 1
PIF_VALUE: 22
PIF_VALUE: 20
PIF_VALUE: 22
PIF_VALUE: 20
PIF_VALUE: 21
PIF_VALUE: 22
PIF_VALUE: 20
PIF_VALUE: 22
PIF_VALUE: 21
PIF_VALUE: 0
PIF_VALUE: 21
PIF_VALUE: 19
PIF_VALUE: 21
PIF_VALUE: 20
PIF_VALUE: 22
PIF_VALUE: 20
PIF_VALUE: 21
PIF_VALUE: 20
PIF_VALUE: 21
PIF_VALUE: 22
PIF_VALUE: 21
PIF_VALUE: 19
PIF_VALUE: 19
PIF_VALUE: 20
PIF_VALUE: 21
PIF_VALUE: 20
PIF_VALUE: 21
PIF_VALUE: 20
PIF_VALUE: 1
PIF_VALUE: 21
PIF_VALUE: 0
PIF_VALUE: 1
PIF_VALUE: 0
PIF_VALUE: 21
PIF_VALUE: 0
PIF_VALUE: 19
PIF_VALUE: 22
PIF_VALUE: 19
PIF_VALUE: 12
PIF_VALUE: 20
PIF_VALUE: 22
PIF_VALUE: 21
PIF_VALUE: 20
PIF_VALUE: 22
PIF_VALUE: 21
PIF_VALUE: 22
PIF_VALUE: 19
PIF_VALUE: 0
PIF_VALUE: 26
PIF_VALUE: 22
PIF_VALUE: 21
PIF_VALUE: 20
PIF_VALUE: 20
PIF_VALUE: 19
PIF_VALUE: 0
PIF_VALUE: 21
PIF_VALUE: 0
PIF_VALUE: 25
PIF_VALUE: 21
PIF_VALUE: 19
PIF_VALUE: 22
PIF_VALUE: 21
PIF_VALUE: 3
PIF_VALUE: 24
PIF_VALUE: 21
PIF_VALUE: 21
PIF_VALUE: 22
PIF_VALUE: 27
PIF_VALUE: 20
PIF_VALUE: 24
PIF_VALUE: 20
PIF_VALUE: 9
PIF_VALUE: 20

## 2020-01-05 ASSESSMENT — PAIN SCALES - GENERAL
PAINLEVEL_OUTOF10: 10
PAINLEVEL_OUTOF10: 3
PAINLEVEL_OUTOF10: 9
PAINLEVEL_OUTOF10: 3

## 2020-01-05 NOTE — PROGRESS NOTES
[facility]  Internal Medicine Teaching Residency Program  Inpatient Daily Progress Note  ______________________________________________________________________________    Patient: Charmaine Dickens  YOB: 1985   PCI:[id number]    Acct: [de-identified]     Room: [facility]-  Admit date: 11/29/2019  Today's date: 01/05/20  Number of days in the hospital: 40    SUBJECTIVE   Admitting Diagnosis: Necrotizing fasciitis ([address]    No acute issues overnight. Patient was seen this morning and was sleeping comfortably. N.p.o. since midnight as per GEN surge plan to do sacral wound VAC change today. Will discuss with  regarding LTAC placement. Patient's vitals stable /79, heart rate 84. Patient is passing urine, nephrology is following the patient. As per the nurse patient has urinary incontinence and leaking from his lema, will follow up with urology recommendations. ROS:  Constitutional:  negative for chills, fevers, sweats  Respiratory:  negative for cough, dyspnea on exertion, hemoptysis, shortness of breath, wheezing  Cardiovascular:  negative for chest pain, chest pressure/discomfort, lower extremity edema, palpitations  Gastrointestinal:  negative for abdominal pain, constipation, diarrhea, nausea, vomiting  Neurological:  negative for dizziness, headache  BRIEF HISTORY     Who presented to [facility] Emergency Department with complaint of generalized weakness for the past few days. On examination the emergency department staff noticed the patient had a foul smell which they thought he had a bowel movement. They turned the patient and noticed sloughing of skin on his back and buttocks.      In the emergency department patient was afebrile but tachycardic.  Initial labs demonstrated hyponatremia of 130, bicarb 12, BUN 63 and creatinine 2.82, hypocalcemia 7.7, alkaline phosphatase 144, AST 83, ALT 16, lactate 1.4, leukocytosis of 30.4, hemoglobin 9.2. Chest xray demonstrated no acute process. CT abdomen and pelvis without contrast demonstrated extensive subcutaneous emphysema involving both buttocks extending to the perineum. Possible phlegmon or developing soft tissue abscess overlying the left lateral abdominal wall. Scrotal Ultrasound demonstrated no evidence of testicular torsion or mass however did show extensive scrotal wall edema. He was given zosyn 4/5 g IV, rocephin 1 g IV and flagyl 500 mg IV in the emergency department.     He was transferred to [facility] ICU for Necrotizing Fasciitis and Acute Renal Failure.   Patient initially required pressors, but currently is off pressors. Ken Tinoco a 37 y.o. with PMH of spina bifida, hypertension, diabetes and diabetic foot ulcer. Who presented to [facility] Emergency Department with complaint of generalized weakness for the past few days. On examination the emergency department staff noticed the patient had a foul smell which they thought he had a bowel movement. They turned the patient and noticed sloughing of skin on his back and buttocks.       11/29/2019 wide complex debridement of nec fasc involving gluteal/perianal/distal rectum  12/03/2019 open diverting end colostomy   12/06/2019 I&D  12/08/2019 Incision and debridement of necrotizing fasciitis buttock wound, scrotum, bilateral groin region and closure of midline abdominal wound  12/10/2019 Sharp excisional debridement of gluteal, marc-rectal and perineal wound to level of muscle, Sharp excisional debridement of scrotum (performed by Urology), Washout of gluteal/perineal/scrotal/inguinal wound, Partial primary closure of pubic wound, and Dakins Wet to dry dressing application  81/49/4018 sharp excisional debridement of gluteal and perineal wounds to muscle 30 x 40 cm.  Urology exam under anesthesia   12/24/2019 sharp debridement, partial closure of scrotum, placement of wound vac   12/26/2019 s/p sharp debridement of L side lateral aspect of sacral wound, replacement of wound vac    2019: Jeanne Gillette flow irrigation VAC to gluteal wound with debridement. Started VAC to perineal wound on midline laparotomy wound  1/3/2020:Patient is off the Flexiflo and is tolerating p.o. diet.  Encouraged to use Ensure in between meals to maintain the caloric requirement. Tunneled catheter removed by IR as per nephrology recommendations as the patient does not need dialysis. 2020: Placed on renal diet. Neurology will place right wrist splint    OBJECTIVE     Vital Signs:  BP (!) 151/79   Pulse 90   Temp 97.7 °F (36.5 °C) (Oral)   Resp 13   Ht 5' 7\" (1.702 m)   Wt 285 lb (129.3 kg)   SpO2 99%   BMI 44.64 kg/m²     Temp (24hrs), Av °F (36.7 °C), Min:97.7 °F (36.5 °C), Max:98.4 °F (36.9 °C)    In: 200   Out: 595 [Urine:525; Drains:70]    Physical Exam:    General appearance: alert and cooperative with exam  HEENT: Head: Normocephalic, no lesions, without obvious abnormality.   Neck: no adenopathy, no carotid bruit, no JVD, supple, symmetrical, trachea midline and thyroid not enlarged, symmetric, no tenderness/mass/nodules  Lungs: clear to auscultation bilaterally  Heart: regular rate and rhythm, S1, S2 normal, no murmur, click, rub or gallop  Abdomen: soft, non-tender; bowel sounds normal; no masses,  no organomegaly  Extremities: Multiple wound sacral/perineal/perirectal/gluteal.  Covered with dressing  Neurologic: Mental status: Alert, oriented, thought content appropriate    Medications:  Scheduled Medications:    therapeutic multivitamin-minerals  1 tablet Oral Daily    docusate sodium  100 mg Oral Daily    iron sucrose  200 mg Intravenous Q24H    carvedilol  25 mg Per NG tube Daily    insulin glargine  10 Units Subcutaneous Daily    bumetanide  2 mg Oral Daily    darbepoetin rohan-polysorbate  100 mcg Intravenous Weekly    docusate  100 mg Oral Daily    polyethylene glycol  17 g Oral Daily    insulin lispro  0-18 Units Subcutaneous Q4H    alteplase  1 mg Intracatheter Once    povidone-iodine   Topical Daily    famotidine  20 mg Per NG tube Daily    sodium chloride flush  10 mL Intravenous 2 times per day    heparin (porcine)  5,000 Units Subcutaneous 3 times per day     Continuous Infusions:    dextrose      sodium chloride      dextrose      sodium chloride 10 mL/hr at 12/28/19 2003     PRN Medicationsmagnesium sulfate, 1 g, PRN  glucose, 15 g, PRN  dextrose, 12.5 g, PRN  glucagon (rDNA), 1 mg, PRN  dextrose, 100 mL/hr, PRN  sodium phosphate IVPB, 0.16 mmol/kg, PRN    Or  sodium phosphate IVPB, 0.32 mmol/kg, PRN  oxyCODONE, 7.5 mg, Q6H PRN    Or  oxyCODONE, 10 mg, Q6H PRN  heparin (porcine), 1,900 Units, PRN  heparin (porcine), 1,900 Units, PRN  melatonin, 1 mg, Nightly PRN  acetaminophen, 650 mg, Q4H PRN  labetalol, 20 mg, Q6H PRN  heparin (porcine), 500 Units, PRN  heparin (porcine), 1,750 Units, PRN  glucose, 15 g, PRN  dextrose, 12.5 g, PRN  glucagon (rDNA), 1 mg, PRN  dextrose, 100 mL/hr, PRN  albumin human, 25 g, PRN  LORazepam, 1 mg, Q6H PRN  sodium chloride, 250 mL, PRN  sodium chloride, 150 mL, PRN  albumin human, 25 g, PRN  sodium chloride flush, 10 mL, PRN  potassium chloride, 40 mEq, PRN    Or  potassium alternative oral replacement, 40 mEq, PRN    Or  potassium chloride, 10 mEq, PRN  REFRESH LACRI-LUBE, , PRN  magnesium hydroxide, 30 mL, Daily PRN  ondansetron, 4 mg, Q6H PRN        Diagnostic Labs:  CBC:   Recent Labs     01/03/20  0613 01/04/20  0607 01/05/20  0551   WBC 8.3 7.9 8.1   RBC 2.49* 2.57* 2.68*   HGB 7.1* 7.5* 7.7*   HCT 22.7* 25.0* 26.3*   MCV 91.2 97.3 98.1   RDW 20.1* 20.6* 20.8*    390 362     BMP:   Recent Labs     01/02/20  0858  01/03/20  1338 01/04/20  0607 01/05/20  0551   *   < > 136 138 137   K 4.5   < > 4.6 4.1 4.0   CL 99   < > 99 100 102   CO2 21   < > 22 24 22   PHOS 2.9  --   --  4.4  --    BUN 53*   < > 50* 48* 45*   CREATININE 1.80*   < > 1.86* 1.81* 1.82*    < > = debridement of scrotum (performed by Urology), Washout of gluteal/perineal/scrotal/inguinal wound, Partial primary closure of pubic wound, and Dakins Wet to dry dressing application  23/15/6845 sharp excisional debridement of gluteal and perineal wounds to muscle 30 x 40 cm. Urology exam under anesthesia   12/24/2019 sharp debridement, partial closure of scrotum, placement of wound vac   12/26/2019 s/p sharp debridement of L side lateral aspect of sacral wound, replacement of wound vac    1/1/2019: Juana Brink flow irrigation VAC to gluteal wound with debridement. Started VAC to perineal wound on midline laparotomy wound  1/3/2020:Patient is off the Flexiflo and is tolerating p.o. diet.  Encouraged to use Ensure in between meals to maintain the caloric requirement. Tunneled catheter removed by IR as per nephrology recommendations as the patient does not need dialysis. 1/4/2020: Placed on renal diet. Neurology will place right wrist splint  1/5/2020: Sacral wound VAC changed today by skin surgery.     Principal Problem:   -Diabetes mellitus: Currently on 10 units Lantus and high-dose correcting scale.  Last HbA1c 8 done on 12/1/2019.       -Hypertension:  Continue Coreg  25 mg.     -Iron deficiency anemia Hb 7.3: Low iron studies, continue IV Venofer 200 mg.     -DEBBY on CKD stage III: Patient's last hemodialysis was on 12/27/2019. tunneled catheter removed yesterday as per nephrology patient is not in need of acute dialysis right now. Continue Bumex 2 mg.       -Hyperkalemia: Resolved. Patient was given a dose of Lokelma and placed on renal diet.     -Necrotizing fasciitis,Gluteal, perirectal, perianal wound: Sacral wound VAC change today in the OR. Midline wound VAC changes M/W/F and debridement as per by general surgery.  ID following the patient and monitoring of the antibiotics for now.     -Right upper extremity weakness: Has a wrist extension splint.   Follow-up with MRI right brachial plexus as per neurology recommendations. Outpatient EMG in 3 to 4 weeks.     DVT ppx : Patient refused to use heparin  GI ppx: Pepcid     PT/OT: Working with PT/OT  Discharge Planning / SW: Ongoing, will discuss with  regarding LTAC placement. Makenzie Murrieta MD  Internal Medicine Resident, PGY-1  [address], [place]  1/5/2020, 8:05 AM

## 2020-01-05 NOTE — PROGRESS NOTES
<180      SUBJECTIVE    Tricia Moise is seen and examined. States he is doing well this AM. Pain is controlled. No current concerns. Denies nausea, emesis. NPO since midnight for surgery today. OBJECTIVE  VITALS: Temp: Temp: 97.7 °F (36.5 °C)Temp  Av °F (36.7 °C)  Min: 97.7 °F (36.5 °C)  Max: 98.4 °F (62.6 °C) BP Systolic (02EPS), RSM:368 , Min:100 , JIQ:515   Diastolic (97SJR), GDB:36, Min:42, Max:85   Pulse Pulse  Av.6  Min: 85  Max: 96 Resp Resp  Avg: 15  Min: 13  Max: 16 Pulse ox SpO2  Av.4 %  Min: 97 %  Max: 100 %  GENERAL: alert and oriented, no distress  NEURO: CNII-XII grossly intact  HEENT: atraumatic, normocephalic, sclera sclear, nose without deformity, trachea midline   LUNGS: clear to ausculation, without wheezes, rales or rhonci  HEART: normal rate and regular rhythm  ABDOMEN: soft, non-tender, non-distended, no guarding or peritoneal signs, wound vac in place with good seal. Ostomy appliance in place with +gas, small amount of stool. Sacral/perineal wound vac in place with good seal.   EXTREMITY: no cyanosis, clubbing or edema    I/O last 3 completed shifts: In: 560 [P.O.:560]  Out: 930 [Urine:860; Drains:70]    Drain/tube output:  In: 200 [P.O.:200]  Out: 595 [Urine:525; Drains:70]    LAB:  CBC:   Recent Labs     20  0613 20  0607 20  0551   WBC 8.3 7.9 8.1   HGB 7.1* 7.5* 7.7*   HCT 22.7* 25.0* 26.3*   MCV 91.2 97.3 98.1    390 362     BMP:   Recent Labs     20  1338 20  0607 20  0551    138 137   K 4.6 4.1 4.0   CL 99 100 102   CO2 22 24 22   BUN 50* 48* 45*   CREATININE 1.86* 1.81* 1.82*   GLUCOSE 159* 94 93     COAGS: No results for input(s): APTT, PROT, INR in the last 72 hours.     RADIOLOGY:  No new imaging     Colleen Conroy DO  General Surgery Resident

## 2020-01-05 NOTE — PROGRESS NOTES
Renal Progress Note    Patient :  Ren Sickle; 37 y.o. MRN# [id number]  Location:  6935/3341-25  Attending:  Chris Kee MD  Admit Date:  11/29/2019   Hospital Day: 37      Subjective:     Patient is seen and examined. No new issues overnight reported. s/p wound VAC change today. Patient made about 860 mL of urine last 24 hours but he has been leaking urine around the lema site- Urology has been reconsulted. Patient serum creatinine 1.82 mg/DL today. Potassium now 4.0 today. Hgb 7.7 today. Patient was getting LR currently post OR. Outpatient Medications:     No medications prior to admission.     Current Medications:     Scheduled Meds:    oxybutynin  5 mg Oral TID    therapeutic multivitamin-minerals  1 tablet Oral Daily    docusate sodium  100 mg Oral Daily    iron sucrose  200 mg Intravenous Q24H    carvedilol  25 mg Per NG tube Daily    insulin glargine  10 Units Subcutaneous Daily    bumetanide  2 mg Oral Daily    darbepoetin rohan-polysorbate  100 mcg Intravenous Weekly    docusate  100 mg Oral Daily    polyethylene glycol  17 g Oral Daily    insulin lispro  0-18 Units Subcutaneous Q4H    alteplase  1 mg Intracatheter Once    povidone-iodine   Topical Daily    famotidine  20 mg Per NG tube Daily    sodium chloride flush  10 mL Intravenous 2 times per day    heparin (porcine)  5,000 Units Subcutaneous 3 times per day     Continuous Infusions:    dextrose      sodium chloride      dextrose      sodium chloride 10 mL/hr at 12/28/19 2003     PRN Meds:  magnesium sulfate, glucose, dextrose, glucagon (rDNA), dextrose, sodium phosphate IVPB **OR** sodium phosphate IVPB, oxyCODONE **OR** oxyCODONE, heparin (porcine), heparin (porcine), melatonin, acetaminophen, labetalol, heparin (porcine), heparin (porcine), glucose, dextrose, glucagon (rDNA), dextrose, albumin human, LORazepam, sodium chloride, sodium chloride, albumin human, sodium chloride flush, potassium chloride **OR** potassium alternative oral replacement **OR** potassium chloride, REFRESH LACRI-LUBE, magnesium hydroxide, ondansetron    Input/Output:       I/O last 3 completed shifts: In: 12 [P.O.:560]  Out: 930 [Urine:860; Drains:70]. Patient Vitals for the past 96 hrs (Last 3 readings):   Weight   20 0643 285 lb (129.3 kg)   20 0546 284 lb 2.8 oz (128.9 kg)   20 0617 283 lb 11.2 oz (128.7 kg)       Vital Signs:   Temperature:  Temp: 97.7 °F (36.5 °C)  TMax:   Temp (24hrs), Av °F (36.7 °C), Min:97.7 °F (36.5 °C), Max:98.3 °F (36.8 °C)    Respirations:  Resp: 13  Pulse:   Pulse: 90  BP:    BP: (!) 151/79  BP Range: Systolic (29KXD), OFZ:937 , Min:126 , QXJ:646       Diastolic (39DCJ), MTJ:21, Min:75, Max:85      Physical Examination:     General:  AAO x 3, speaking in full sentences, no accessory muscle use. HEENT: Atraumatic, normocephalic, no throat congestion, moist mucosa. Eyes:   Pupils equal, round and reactive to light, EOMI. Neck:   Supple  Chest:   Bilateral vesicular breath sounds, no rales or wheezes. Cardiac:  S1 S2 RR, no murmurs, gallops or rubs. Abdomen: Soft, obese, non-tender, no masses or organomegaly, BS audible. :   No suprapubic or flank tenderness. Neuro:  AAO x 3, No FND. SKIN:  No rashes, good skin turgor. Extremities:  +1 bilateral lower extremity edema. Positive upper extremity edema. Positive wound VAC.     Labs:       Recent Labs     20  0613 20  0607 20  0551   WBC 8.3 7.9 8.1   RBC 2.49* 2.57* 2.68*   HGB 7.1* 7.5* 7.7*   HCT 22.7* 25.0* 26.3*   MCV 91.2 97.3 98.1   MCH 28.5 29.2 28.7   MCHC 31.3 30.0 29.3   RDW 20.1* 20.6* 20.8*    390 362   MPV 8.9 8.1 8.0*      BMP:   Recent Labs     20  1338 20  0607 20  0551    138 137   K 4.6 4.1 4.0   CL 99 100 102   CO2 22 24 22   BUN 50* 48* 45*   CREATININE 1.86* 1.81* 1.82*   GLUCOSE 159* 94 93   CALCIUM 8.0* 8.0* 7.8*      Phosphorus:     Recent Labs     20  0607   PHOS 4.4     Magnesium:    Recent Labs     01/04/20  0607 01/05/20  0551   MG 1.7 1.7     MARINE:      Lab Results   Component Value Date    MARINE NEGATIVE 11/30/2019     SPEP:  Lab Results   Component Value Date    PROT 5.3 12/01/2019    PATH ELECTRONICALLY SIGNED. Francis Ruggiero M.D. 11/30/2019     C3:     Lab Results   Component Value Date    C3 97 11/30/2019     C4:     Lab Results   Component Value Date    C4 12 11/30/2019     Hep BsAg:         Lab Results   Component Value Date    HEPBSAG NONREACTIVE 11/30/2019     Hep C AB:          Lab Results   Component Value Date    HEPCAB NONREACTIVE 11/30/2019       Urinalysis/Chemistries:      Lab Results   Component Value Date    NITRU NEGATIVE 11/29/2019    COLORU YELLOW 11/29/2019    PHUR 5.0 11/29/2019    WBCUA 5 TO 10 11/29/2019    RBCUA 0 TO 2 11/29/2019    MUCUS NOT REPORTED 11/29/2019    TRICHOMONAS NOT REPORTED 11/29/2019    YEAST NOT REPORTED 11/29/2019    BACTERIA NOT REPORTED 11/29/2019    SPECGRAV 1.015 11/29/2019    LEUKOCYTESUR NEGATIVE 11/29/2019    UROBILINOGEN Normal 11/29/2019    BILIRUBINUR NEGATIVE 11/29/2019    GLUCOSEU NEGATIVE 11/29/2019    KETUA NEGATIVE 11/29/2019    AMORPHOUS NOT REPORTED 11/29/2019     Urine Sodium:     Lab Results   Component Value Date    DONOVAN 39 11/30/2019      Urine Creatinine:     Lab Results   Component Value Date    LABCREA 116.4 11/30/2019     Radiology:     Reviewed. Assessment:     1. Acute Kidney Injury: Secondary to ischemic ATN from sepsis syndrome from necrotizing fasciitis. He has been dialysis dependent but now renal function seems to be improving.  Has a left IJ tunnel dialysis catheter in place. Last hemodialysis was on 12/27/2019. 1.8 mg/dL today. 2.  Chronic kidney diseae stage III from diabetic nephrosclerosis baseline 1.6-1.8 MG/DL.  At baseline.   3.  Necrotizing fasciitis status post wide complex debridement of gluteal, perineal region and open colostomy.    4.  Respiratory Failure: Was on the elements of all parts of the encounter with the Resident/CNP. I agree with the assessment, plan and orders as documented by the Resident/CNP. Leonel Isabel MD   Nephrology [address] [place] [address] Drive    This note is created with the assistance of a speech-recognition program. While intending to generate a document that actually reflects the content of the visit, no guarantees can be provided that every mistake has been identified and corrected by editing.

## 2020-01-05 NOTE — BRIEF OP NOTE
Brief Postoperative Note  ______________________________________________________________    Patient: Kadeem James  YOB: 1985  MRN: [id number]  Date of Procedure: 1/5/2020    Pre-Op Diagnosis: NECROTIZING FASCIITIS    Post-Op Diagnosis: Same       Procedure(s):  GLUTEAL WOUND DEBRIDEMENT, WOUND VAC CHANGE    Anesthesia: General    Surgeon(s):  Bernardo Julio MD    Assistant: Efrain Moctezuma PGY2    Estimated Blood Loss (mL): less than 50     Complications: None    Specimens:   * No specimens in log *    Implants:  * No implants in log *      Drains:   Negative Pressure Wound Therapy (Active)   Number of pieces used 1 12/16/2019 12:00 AM   Output (ml) 50 ml 12/17/2019  6:03 AM   Wound Assessment Fragile; Red 12/14/2019 12:00 AM       Negative Pressure Wound Therapy Abdomen Mid (Active)   Wound Type Surgical 1/5/2020  8:00 AM   Unit Type KCI 1/3/2020  1:15 PM   Dressing Type Black foam 1/5/2020  8:00 AM   Number of pieces used 2 1/3/2020  1:15 PM   Cycle Continuous 1/5/2020  8:00 AM   Target Pressure (mmHg) 175 1/5/2020  8:00 AM   Canister changed? No 1/5/2020  8:00 AM   Dressing Status Clean;Dry; Intact 1/5/2020  8:00 AM   Dressing Changed Changed/New 1/3/2020  1:15 PM   Drainage Amount Moderate 1/5/2020  8:00 AM   Drainage Description Tan 1/5/2020  8:00 AM   Dressing Change Due 01/06/20 1/5/2020  8:00 AM   Output (ml) 0 ml 1/5/2020  8:00 AM   Wound Assessment Other (Comment) 1/5/2020  8:00 AM   Liliam-wound Assessment Other (Comment) 1/5/2020  8:00 AM   Odor None 1/5/2020  8:00 AM       Negative Pressure Wound Therapy Other (Comment) Lower;Mid (Active)       Colostomy LLQ Descending/sigmoid (Active)   Stomal Appliance 1 piece 1/5/2020  6:22 AM   Flange Size (inches) 1.5 Inches 12/23/2019 12:20 PM   Stoma  Assessment JESSI 1/5/2020  8:00 AM   Mucocutaneous Junction Intact 1/5/2020  8:00 AM   Peristomal Assessment Intact 1/5/2020  8:00 AM   Treatment Other (Comment) 12/31/2019  8:00 AM   Stool Appearance Soft 1/5/2020  8:00 AM   Stool Color Brown 1/5/2020  8:00 AM   Stool Amount Medium 1/5/2020  8:00 AM   Output (mL) 0 ml 1/4/2020  4:00 AM       Urethral Catheter (Active)   Catheter Indications Urology/Urologist seeing this patient or inserted indwelling catheter 1/5/2020  8:00 AM   Securement Device Date Changed 12/30/19 12/30/2019 10:01 PM   Site Assessment Other (Comment) 1/5/2020  4:00 AM   Urine Color Yellow 1/5/2020  8:00 AM   Urine Appearance Sediment 1/5/2020  8:00 AM   Output (mL) 350 mL 1/5/2020  6:22 AM       [REMOVED] Closed/Suction Drain Superior Abdomen Bulb (Removed)   Site Description Healing 12/10/2019  4:00 PM   Dressing Status Other (Comment) 12/10/2019  4:00 PM   Drainage Appearance Serous 12/10/2019  4:00 PM   Status To bulb suction 12/10/2019  4:00 AM   Output (ml) 10 ml 12/10/2019  8:39 AM       [REMOVED] Negative Pressure Wound Therapy Abdomen Medial;Upper (Removed)   Wound Type Surgical 12/8/2019  6:00 PM   Dressing Type Black foam 12/8/2019  6:00 PM   Cycle Continuous 12/8/2019  6:00 PM   Target Pressure (mmHg) 125 12/8/2019  6:00 PM   Irrigation Solution Normal Saline 12/7/2019  8:00 PM   Canister changed? No 12/8/2019  6:00 PM   Dressing Status Clean;Dry; Intact 12/8/2019  6:00 PM   Dressing Changed Changed/New 12/3/2019  5:26 PM   Drainage Description Serosanguinous 12/8/2019  6:00 PM   Output (ml) 50 ml 12/5/2019  4:00 PM   Odor None 12/8/2019  6:00 PM       [REMOVED] Negative Pressure Wound Therapy Buttocks (Removed)   Wound Type Surgical 1/5/2020  8:00 AM   Unit Type vac ultra veraflo 12/24/2019  8:00 PM   Dressing Type Black foam 1/5/2020  8:00 AM   Cycle Continuous 1/5/2020  8:00 AM   Target Pressure (mmHg) Other (Comment) 1/3/2020  8:00 PM   Irrigation Solution Normal Saline 12/31/2019  8:00 AM   Canister changed? No 1/5/2020  8:00 AM   Dressing Status Clean;Dry; Intact 1/5/2020  8:00 AM   Dressing Changed Changed/New 12/26/2019  2:21 PM   Drainage Amount Large 1/5/2020  8:00 AM

## 2020-01-05 NOTE — PROGRESS NOTES
POST OP NOTE    SUBJECTIVE  Pt s/p wound vac change. Tolerated well without complications. Pain well managed, tolerating PO diet. OBJECTIVE  VITALS:  /78   Pulse 79   Temp 98.2 °F (36.8 °C) (Oral)   Resp 11   Ht 5' 7\" (1.702 m)   Wt 285 lb (129.3 kg)   SpO2 97%   BMI 44.64 kg/m²         GENERAL:  awake and alert. No acute distress  CARDIOVASCULAR:  regular rate and rhythm   LUNGS:  CTA Bilaterally  ABDOMEN:   Abdomen soft, non-tender, non-distended  INCISION: Incision clean/dry/intact    ASSESSMENT  1. POD# 0 s/p wound vac change. Continue with wound management. PLAN  1. Pain management- MMPT per primary  2.  DVT proph- Heparin 5000U Q8h  3. GI proph-pepcid        [facility]  Trauma/Surgery Service  1/5/2020 at 5:25 PM

## 2020-01-05 NOTE — CARE COORDINATION
OR today.   Spoke with Hernan Gonzalez at [facility], notified of patient's move to [facility], they are following

## 2020-01-05 NOTE — ANESTHESIA PRE PROCEDURE
Department of Anesthesiology  Preprocedure Note       Name:  Dell Melendez   Age:  37 y.o.  :  1985                                          MRN:  [id number]         Date:  2020      Gluteal  Wound debridement, wound vac application   Anesthesia type: General   Pre-op diagnosis: NECROTIZING FASCIITIS   Location: [facility] OR [address] [place] [address]            Medications prior to admission:   Prior to Admission medications    Not on File       Current medications:    Current Facility-Administered Medications   Medication Dose Route Frequency Provider Last Rate Last Dose    glucose (GLUTOSE) 40 % oral gel 15 g  15 g Oral PRN Emiliano Lamb MD        dextrose 50 % IV solution  12.5 g Intravenous PRN Emiliano Lamb MD        glucagon (rDNA) injection 1 mg  1 mg Intramuscular PRN Emiliano Lamb MD        dextrose 5 % solution  100 mL/hr Intravenous PRN Emiliano Lamb MD        therapeutic multivitamin-minerals 1 tablet  1 tablet Oral Daily Az Montalvo MD   1 tablet at 20    docusate sodium (COLACE) capsule 100 mg  100 mg Oral Daily Az Montalvo MD   100 mg at 20    iron sucrose (VENOFER) 200 mg in sodium chloride 0.9 % 100 mL IVPB  200 mg Intravenous Q24H Brendalyn Banister, DO   Stopped at 20 09    carvedilol (COREG) tablet 25 mg  25 mg Per NG tube Daily Brendalyn Banister, DO   25 mg at 20    insulin glargine (LANTUS) injection vial 10 Units  10 Units Subcutaneous Daily Brendalyn Banister, DO   10 Units at 20    sodium phosphate 20.31 mmol in dextrose 5 % 250 mL IVPB  0.16 mmol/kg Intravenous PRN Brendalyn Banister, DO        Or    sodium phosphate 40.65 mmol in dextrose 5 % 250 mL IVPB  0.32 mmol/kg Intravenous PRN Brendalyn Banister, DO        bumetanide [place]) tablet 2 mg  2 mg Oral Daily Brendalyn Banister, DO   2 mg at 20    darbepoetin rohan-polysorbate (ARANESP) injection 100 mcg  100 mcg Intravenous Weekly Brendalyn Banister, DO   100 mcg at 01/03/20 2015    oxyCODONE (ROXICODONE) 5 MG/5ML solution 7.5 mg  7.5 mg Oral Q6H PRN Stuart Pelon, DO        Or    oxyCODONE (ROXICODONE) 5 MG/5ML solution 10 mg  10 mg Oral Q6H PRN Stuart Pelon, DO   10 mg at 01/04/20 1536    docusate (COLACE) 50 MG/5ML liquid 100 mg  100 mg Oral Daily Frances Parikh MD   100 mg at 01/04/20 0900    heparin (porcine) injection 1,900 Units  1,900 Units Intracatheter PRN Stuart Pelon, DO   1,900 Units at 12/27/19 1245    heparin (porcine) injection 1,900 Units  1,900 Units Intracatheter PRN Stuart Pelon, DO   1,900 Units at 12/27/19 1245    melatonin tablet 1 mg  1 mg Oral Nightly PRN Stuart Pelon, DO   1 mg at 01/02/20 0239    acetaminophen (TYLENOL) tablet 650 mg  650 mg Oral Q4H PRN Stuart Pelon, DO   650 mg at 01/04/20 0136    polyethylene glycol (GLYCOLAX) packet 17 g  17 g Oral Daily Stuart Pelon, DO   17 g at 01/04/20 0827    insulin lispro (HUMALOG) injection vial 0-18 Units  0-18 Units Subcutaneous Q4H Stuart Pelon, DO   3 Units at 01/04/20 2102    labetalol (NORMODYNE;TRANDATE) injection 20 mg  20 mg Intravenous Q6H PRN Stuart Pelon, DO   20 mg at 12/31/19 1929    sodium chloride 0.9 % irrigation 1,000 mL  1,000 mL Irrigation Continuous Stuart Pelon, DO   1,000 mL at 01/04/20 0229    alteplase (CATHFLO) injection 1 mg  1 mg Intracatheter Once Stuart Pelon, DO        heparin (porcine) injection 500 Units  500 Units Intravenous PRN Stuart Pelon, DO   500 Units at 12/27/19 0915    heparin (porcine) injection 1,750 Units  1,750 Units Intravenous PRN Stuart Pelon, DO   1,750 Units at 12/27/19 0915    glucose (GLUTOSE) 40 % oral gel 15 g  15 g Oral PRN Stuart Pelon, DO        dextrose 50 % IV solution  12.5 g Intravenous PRN Stuart Pelon, DO        glucagon (rDNA) injection 1 mg  1 mg Intramuscular PRN Stuart Pelon, DO        dextrose 5 % solution  100 mL/hr Intravenous PRN Stuart Pelon, DO        albumin human 25 % IV solution 25 g  25 g Intravenous PRN Alessia Prim, DO   25 g at 12/16/19 1659    LORazepam (ATIVAN) injection 1 mg  1 mg Intravenous Q6H PRN [place] Prim, DO        0.9 % sodium chloride bolus  250 mL Intravenous PRN Alessia Prim, DO        0.9 % sodium chloride bolus  150 mL Intravenous PRN [place] Prim, DO        albumin human 25 % IV solution 25 g  25 g Intravenous PRN [place] Prim, DO        povidone-iodine (BETADINE) 10 % external solution   Topical Daily [place] Prim, DO        sodium chloride flush 0.9 % injection 10 mL  10 mL Intravenous PRN Alessia Prim, DO        potassium chloride (KLOR-CON M) extended release tablet 40 mEq  40 mEq Oral PRN [place] Prim, DO        Or    potassium bicarb-citric acid (EFFER-K) effervescent tablet 40 mEq  40 mEq Oral PRN Alessia Prim, DO   40 mEq at 12/21/19 1150    Or    potassium chloride 10 mEq/100 mL IVPB (Peripheral Line)  10 mEq Intravenous PRN Alessia Prim,  mL/hr at 12/24/19 0819 10 mEq at 12/24/19 0819    famotidine (PEPCID) tablet 20 mg  20 mg Per NG tube Daily Alessia Prim, DO   20 mg at 01/04/20 4830    REFRESH LACRI-LUBE ointment OINT   Both Eyes PRN [place] Prim, DO        0.9 % sodium chloride infusion   Intravenous Continuous [place] Prim, DO 10 mL/hr at 12/28/19 2003      sodium chloride flush 0.9 % injection 10 mL  10 mL Intravenous 2 times per day [place] Prim, DO   10 mL at 01/04/20 2108    magnesium hydroxide (MILK OF MAGNESIA) 400 MG/5ML suspension 30 mL  30 mL Oral Daily PRN [place] Prim, DO   30 mL at 12/23/19 2332    ondansetron (ZOFRAN) injection 4 mg  4 mg Intravenous Q6H PRN Alessia Prim, DO        heparin (porcine) injection 5,000 Units  5,000 Units Subcutaneous 3 times per day [place] Prim, DO   5,000 Units at 01/04/20 1400       Allergies:  No Known Allergies    Problem List:    Patient Active Problem List   Diagnosis Code    Necrotizing fasciitis ([facility] [address] M72.6    Essential hypertension I10 STVZ OR    ME EXPLORE SCROTUM N/A 12/10/2019    SCROTAL EXPLORATION WITH SCROTAL DEBRIDEMENT performed by Guanaco Mccauley MD at [address] [place] N/A 12/23/2019    WOUND [address] - [address] performed by Zia Navarro MD at [address] [facility] History:    Social History     Tobacco Use    Smoking status: Not on file   Substance Use Topics    Alcohol use: Not on file                                Counseling given: Not Answered      Vital Signs (Current):   Vitals:    01/04/20 0921 01/04/20 1128 01/04/20 1649 01/04/20 1955   BP: (!) 100/42 (!) 143/75 126/76 (!) 156/80   Pulse: 96   90   Resp:  16  15   Temp:  97.9 °F (36.6 °C) 98.2 °F (36.8 °C) 98.1 °F (36.7 °C)   TempSrc:  Oral Oral Oral   SpO2:  98%  97%   Weight:       Height:                                                  BP Readings from Last 3 Encounters:   01/04/20 (!) 156/80   01/01/20 (!) 72/47   12/26/19 127/85       NPO Status: Time of last liquid consumption: 2359                        Time of last solid consumption: 2359                        Date of last liquid consumption: 12/25/19                        Date of last solid food consumption: 12/25/19    BMI:   Wt Readings from Last 3 Encounters:   01/04/20 284 lb 2.8 oz (128.9 kg)     Body mass index is 44.51 kg/m².     CBC:   Lab Results   Component Value Date    WBC 7.9 01/04/2020    RBC 2.57 01/04/2020    HGB 7.5 01/04/2020    HCT 25.0 01/04/2020    MCV 97.3 01/04/2020    RDW 20.6 01/04/2020     01/04/2020       CMP:   Lab Results   Component Value Date     01/04/2020    K 4.1 01/04/2020     01/04/2020    CO2 24 01/04/2020    BUN 48 01/04/2020    CREATININE 1.81 01/04/2020    GFRAA 52 01/04/2020    LABGLOM 43 01/04/2020    GLUCOSE 94 01/04/2020    PROT 5.3 12/01/2019    CALCIUM 8.0 01/04/2020    BILITOT 1.20 12/01/2019    ALKPHOS 127 12/01/2019    AST 40 12/01/2019    ALT 6 12/01/2019       POC Tests:   Recent Labs

## 2020-01-05 NOTE — PLAN OF CARE
Problem: Pain:  Description  Pain management should include both nonpharmacologic and pharmacologic interventions. Goal: Pain level will decrease  Description  Pain level will decrease  Outcome: Ongoing  Goal: Control of acute pain  Description  Control of acute pain  Outcome: Ongoing  Goal: Control of chronic pain  Description  Control of chronic pain  Outcome: Ongoing     Problem: Skin Integrity:  Goal: Will show no infection signs and symptoms  Description  Will show no infection signs and symptoms  Outcome: Ongoing  Goal: Absence of new skin breakdown  Description  Absence of new skin breakdown  Outcome: Ongoing     Problem: OXYGENATION/RESPIRATORY FUNCTION  Goal: Patient will maintain patent airway  Outcome: Ongoing  Goal: Patient will achieve/maintain normal respiratory rate/effort  Description  Respiratory rate and effort will be within normal limits for the patient  Outcome: Ongoing     Problem: SKIN INTEGRITY  Goal: Skin integrity is maintained or improved  Outcome: Ongoing     Problem: Falls - Risk of:  Goal: Will remain free from falls  Description  Will remain free from falls  Outcome: Ongoing  Goal: Absence of physical injury  Description  Absence of physical injury  Outcome: Ongoing     Problem: Risk for Impaired Skin Integrity  Goal: Tissue integrity - skin and mucous membranes  Description  Structural intactness and normal physiological function of skin and  mucous membranes.   Outcome: Ongoing     Problem: Nutrition  Goal: Optimal nutrition therapy  Description  Nutrition Problem: Inadequate oral intake  Intervention: Food and/or Nutrient Delivery: Start Parenteral Nutrition  Nutritional Goals: Meet % of estimated nutrition needs   Outcome: Ongoing     Problem: Confusion - Acute:  Goal: Absence of continued neurological deterioration signs and symptoms  Description  Absence of continued neurological deterioration signs and symptoms  Outcome: Ongoing  Goal: Mental status will be restored to

## 2020-01-06 LAB
ABSOLUTE EOS #: 0 K/UL (ref 0–0.4)
ABSOLUTE IMMATURE GRANULOCYTE: 0.09 K/UL (ref 0–0.3)
ABSOLUTE LYMPH #: 1.47 K/UL (ref 1–4.8)
ABSOLUTE MONO #: 0.74 K/UL (ref 0.1–0.8)
ANION GAP SERPL CALCULATED.3IONS-SCNC: 16 MMOL/L (ref 9–17)
BASOPHILS # BLD: 0 % (ref 0–2)
BASOPHILS ABSOLUTE: 0 K/UL (ref 0–0.2)
BUN BLDV-MCNC: 49 MG/DL (ref 6–20)
BUN/CREAT BLD: ABNORMAL (ref 9–20)
CALCIUM SERPL-MCNC: 7.8 MG/DL (ref 8.6–10.4)
CHLORIDE BLD-SCNC: 96 MMOL/L (ref 98–107)
CO2: 22 MMOL/L (ref 20–31)
CREAT SERPL-MCNC: 1.9 MG/DL (ref 0.7–1.2)
DIFFERENTIAL TYPE: ABNORMAL
EOSINOPHILS RELATIVE PERCENT: 0 % (ref 1–4)
GFR AFRICAN AMERICAN: 49 ML/MIN
GFR NON-AFRICAN AMERICAN: 40 ML/MIN
GFR SERPL CREATININE-BSD FRML MDRD: ABNORMAL ML/MIN/{1.73_M2}
GFR SERPL CREATININE-BSD FRML MDRD: ABNORMAL ML/MIN/{1.73_M2}
GLUCOSE BLD-MCNC: 116 MG/DL (ref 70–99)
GLUCOSE BLD-MCNC: 117 MG/DL (ref 75–110)
GLUCOSE BLD-MCNC: 120 MG/DL (ref 75–110)
GLUCOSE BLD-MCNC: 159 MG/DL (ref 75–110)
GLUCOSE BLD-MCNC: 169 MG/DL (ref 75–110)
GLUCOSE BLD-MCNC: 170 MG/DL (ref 75–110)
HCT VFR BLD CALC: 26.2 % (ref 40.7–50.3)
HEMOGLOBIN: 7.7 G/DL (ref 13–17)
IMMATURE GRANULOCYTES: 1 %
LYMPHOCYTES # BLD: 16 % (ref 24–44)
MAGNESIUM: 1.8 MG/DL (ref 1.6–2.6)
MCH RBC QN AUTO: 28.3 PG (ref 25.2–33.5)
MCHC RBC AUTO-ENTMCNC: 29.4 G/DL (ref 28.4–34.8)
MCV RBC AUTO: 96.3 FL (ref 82.6–102.9)
MONOCYTES # BLD: 8 % (ref 1–7)
MORPHOLOGY: ABNORMAL
MORPHOLOGY: ABNORMAL
NRBC AUTOMATED: 0 PER 100 WBC
PDW BLD-RTO: 20.6 % (ref 11.8–14.4)
PHOSPHORUS: 5.4 MG/DL (ref 2.5–4.5)
PLATELET # BLD: 377 K/UL (ref 138–453)
PLATELET ESTIMATE: ABNORMAL
PMV BLD AUTO: 8 FL (ref 8.1–13.5)
POTASSIUM SERPL-SCNC: 4.7 MMOL/L (ref 3.7–5.3)
PREALBUMIN: 14 MG/DL (ref 20–40)
RBC # BLD: 2.72 M/UL (ref 4.21–5.77)
RBC # BLD: ABNORMAL 10*6/UL
SEG NEUTROPHILS: 75 % (ref 36–66)
SEGMENTED NEUTROPHILS ABSOLUTE COUNT: 6.9 K/UL (ref 1.8–7.7)
SODIUM BLD-SCNC: 134 MMOL/L (ref 135–144)
WBC # BLD: 9.2 K/UL (ref 3.5–11.3)
WBC # BLD: ABNORMAL 10*3/UL

## 2020-01-06 PROCEDURE — 6370000000 HC RX 637 (ALT 250 FOR IP): Performed by: STUDENT IN AN ORGANIZED HEALTH CARE EDUCATION/TRAINING PROGRAM

## 2020-01-06 PROCEDURE — 36415 COLL VENOUS BLD VENIPUNCTURE: CPT

## 2020-01-06 PROCEDURE — 2580000003 HC RX 258: Performed by: STUDENT IN AN ORGANIZED HEALTH CARE EDUCATION/TRAINING PROGRAM

## 2020-01-06 PROCEDURE — 80048 BASIC METABOLIC PNL TOTAL CA: CPT

## 2020-01-06 PROCEDURE — 2500000003 HC RX 250 WO HCPCS: Performed by: STUDENT IN AN ORGANIZED HEALTH CARE EDUCATION/TRAINING PROGRAM

## 2020-01-06 PROCEDURE — 6360000002 HC RX W HCPCS: Performed by: STUDENT IN AN ORGANIZED HEALTH CARE EDUCATION/TRAINING PROGRAM

## 2020-01-06 PROCEDURE — 97760 ORTHOTIC MGMT&TRAING 1ST ENC: CPT

## 2020-01-06 PROCEDURE — 99232 SBSQ HOSP IP/OBS MODERATE 35: CPT | Performed by: INTERNAL MEDICINE

## 2020-01-06 PROCEDURE — 2060000000 HC ICU INTERMEDIATE R&B

## 2020-01-06 PROCEDURE — 97605 NEG PRS WND THER DME<=50SQCM: CPT

## 2020-01-06 PROCEDURE — 97167 OT EVAL HIGH COMPLEX 60 MIN: CPT

## 2020-01-06 PROCEDURE — 84134 ASSAY OF PREALBUMIN: CPT

## 2020-01-06 PROCEDURE — 85025 COMPLETE CBC W/AUTO DIFF WBC: CPT

## 2020-01-06 PROCEDURE — 83735 ASSAY OF MAGNESIUM: CPT

## 2020-01-06 PROCEDURE — 84100 ASSAY OF PHOSPHORUS: CPT

## 2020-01-06 PROCEDURE — 97110 THERAPEUTIC EXERCISES: CPT

## 2020-01-06 RX ORDER — HYDRALAZINE HYDROCHLORIDE 20 MG/ML
10 INJECTION INTRAMUSCULAR; INTRAVENOUS EVERY 4 HOURS PRN
Status: DISCONTINUED | OUTPATIENT
Start: 2020-01-06 | End: 2020-01-16 | Stop reason: HOSPADM

## 2020-01-06 RX ADMIN — HEPARIN SODIUM 5000 UNITS: 5000 INJECTION INTRAVENOUS; SUBCUTANEOUS at 08:23

## 2020-01-06 RX ADMIN — OXYBUTYNIN CHLORIDE 5 MG: 5 TABLET ORAL at 08:23

## 2020-01-06 RX ADMIN — INSULIN LISPRO 3 UNITS: 100 INJECTION, SOLUTION INTRAVENOUS; SUBCUTANEOUS at 16:30

## 2020-01-06 RX ADMIN — INSULIN LISPRO 3 UNITS: 100 INJECTION, SOLUTION INTRAVENOUS; SUBCUTANEOUS at 21:16

## 2020-01-06 RX ADMIN — SODIUM CHLORIDE, PRESERVATIVE FREE 10 ML: 5 INJECTION INTRAVENOUS at 08:28

## 2020-01-06 RX ADMIN — Medication: at 10:33

## 2020-01-06 RX ADMIN — CARVEDILOL 25 MG: 25 TABLET, FILM COATED ORAL at 08:22

## 2020-01-06 RX ADMIN — BUMETANIDE 2 MG: 1 TABLET ORAL at 08:22

## 2020-01-06 RX ADMIN — POLYETHYLENE GLYCOL 3350 17 G: 17 POWDER, FOR SOLUTION ORAL at 08:23

## 2020-01-06 RX ADMIN — ACETAMINOPHEN 650 MG: 325 TABLET ORAL at 21:22

## 2020-01-06 RX ADMIN — SODIUM CHLORIDE, PRESERVATIVE FREE 10 ML: 5 INJECTION INTRAVENOUS at 20:52

## 2020-01-06 RX ADMIN — DOCUSATE SODIUM 100 MG: 100 CAPSULE, LIQUID FILLED ORAL at 08:22

## 2020-01-06 RX ADMIN — INSULIN GLARGINE 10 UNITS: 100 INJECTION, SOLUTION SUBCUTANEOUS at 08:25

## 2020-01-06 RX ADMIN — HEPARIN SODIUM 5000 UNITS: 5000 INJECTION INTRAVENOUS; SUBCUTANEOUS at 14:00

## 2020-01-06 RX ADMIN — FAMOTIDINE 20 MG: 20 TABLET, FILM COATED ORAL at 08:22

## 2020-01-06 RX ADMIN — MULTIPLE VITAMINS W/ MINERALS TAB 1 TABLET: TAB at 08:22

## 2020-01-06 RX ADMIN — Medication 100 MG: at 08:23

## 2020-01-06 RX ADMIN — HEPARIN SODIUM 5000 UNITS: 5000 INJECTION INTRAVENOUS; SUBCUTANEOUS at 21:15

## 2020-01-06 RX ADMIN — DOCUSATE SODIUM 100 MG: 50 LIQUID ORAL at 21:14

## 2020-01-06 RX ADMIN — OXYCODONE HYDROCHLORIDE 10 MG: 5 SOLUTION ORAL at 05:54

## 2020-01-06 RX ADMIN — Medication 20 MG: at 04:10

## 2020-01-06 ASSESSMENT — PAIN SCALES - GENERAL
PAINLEVEL_OUTOF10: 10
PAINLEVEL_OUTOF10: 10

## 2020-01-06 NOTE — PROGRESS NOTES
PROGRESS NOTE    PATIENT NAME: Austin Virgen0 RECORD NO. [id number]  DATE: 1/6/2020  PRIMARY CARE PHYSICIAN: No primary care provider on file. HD: # 45    ASSESSMENT    Patient Active Problem List   Diagnosis    Necrotizing fasciitis ([address]    Essential hypertension    Diabetes ([address]    Diabetic foot ulcer ([address]    Diabetic foot infection ([facility] [address]    Iron deficiency anemia    Muscle weakness of right upper extremity    Wrist drop, acquired, right     11/29/2019 wide complex debridement of nec fasc involving gluteal/perianal/distal rectum  12/03/2019 open diverting end colostomy   12/06/2019 I&D  12/08/2019 Incision and debridement of necrotizing fasciitis buttock wound, scrotum, bilateral groin region and closure of midline abdominal wound  12/10/2019 Sharp excisional debridement of gluteal, marc-rectal and perineal wound to level of muscle, Sharp excisional debridement of scrotum (performed by Urology), Washout of gluteal/perineal/scrotal/inguinal wound, Partial primary closure of pubic wound, and Dakins Wet to dry dressing application  57/59/1114 sharp excisional debridement of gluteal and perineal wounds to muscle 30 x 40 cm. Urology exam under anesthesia   12/24/2019 sharp debridement, partial closure of scrotum, placement of wound vac   12/26/2019 s/p sharp debridement of L side lateral aspect of sacral wound, replacement of wound vac    1/5/2020 gluteal wound debridement, veraflo wound vac change    1/8/2020 plan for vac change      MEDICAL DECISION MAKING AND PLAN    1. Sacral debridement and wound vac changed 1/5. Next vac change planned for 1/8.   2. Dr. Sondra Willard, plastic surgery, following from [facility] for grafting and possible flap in future  3. General diet. Encourage PO intake. 4.  Continue calorie count. May require replacement of flexiflo if not meeting needs  5. Bowel regimen: colace, miralax   6. Midline vac changes-wound RN following   7.  Strict glucose control <180      SUBJECTIVE    Lisa Outhouse is seen and examined. Sitting in bed resting comfortably. Pain is controlled. Denies nausea, emesis, CP, SOB. States he ate dinner post op yesterday. Veraflo vac in place with good seal.       OBJECTIVE  VITALS: Temp: Temp: 98.2 °F (36.8 °C)Temp  Av.2 °F (36.8 °C)  Min: 85.1 °F (29.5 °C)  Max: 98.9 °F (24.6 °C) BP Systolic (60FOS), CPQ:334 , Min:78 , MZV:447   Diastolic (73WJA), HSH:34, Min:43, Max:101   Pulse Pulse  Av.9  Min: 76  Max: 92 Resp Resp  Avg: 10.7  Min: 0  Max: 24 Pulse ox SpO2  Av.7 %  Min: 95 %  Max: 100 %  GENERAL: alert and oriented, no distress  NEURO: CNII-XII grossly intact  HEENT: atraumatic, normocephalic, sclera sclear, nose without deformity, trachea midline   LUNGS: clear to ausculation, without wheezes, rales or rhonci  HEART: normal rate and regular rhythm  ABDOMEN: soft, non-tender, non-distended, no guarding or peritoneal signs, midline wound vac in place with good seal. Ostomy appliance in place with liquid stool. Sacral/perineal wound vac in place with good seal.   EXTREMITY: no cyanosis, clubbing or edema    I/O last 3 completed shifts: In: 2180 [P.O.:680; I.V.:1500]  Out: 7276 [Urine:550; Drains:700; Blood:23]    Drain/tube output: In: 440 [P.O.:440]  Out: 1050 [Urine:350; Drains:700]    LAB:  CBC:   Recent Labs     20   WBC 7.9 8.1 9.2   HGB 7.5* 7.7* 7.7*   HCT 25.0* 26.3* 26.2*   MCV 97.3 98.1 96.3    362 377     BMP:   Recent Labs     20    137 134*   K 4.1 4.0 4.7    102 96*   CO2 24 22 22   BUN 48* 45* 49*   CREATININE 1.81* 1.82* 1.90*   GLUCOSE 94 93 116*     COAGS: No results for input(s): APTT, PROT, INR in the last 72 hours.     RADIOLOGY:  No new imaging     Bianac Barr,   General Surgery Resident      I personally evaluated the patient and directed the medical decision making with Resident/JUSTIN after the physical/radiologic exam and laboratory values were reviewed and confirmed.

## 2020-01-06 NOTE — CARE COORDINATION
Transition planning Next vac changes by gen surg 1/8. Dr. Russell Bowen, plastic surgery, [place] for grafting and possible flap in future.  called carina with graciela continuing to follow

## 2020-01-06 NOTE — PROGRESS NOTES
Podiatry Sign Off Note    No further podiatric surgical intervention planned at this time. Nursing to continue daily dressing changes to left foot. Podiatry will sign off. Please [facility] with any further questions or concerns. Thank you for the consult. We appreciate being involved in the care of your patient. Patient to follow-up in [facility] wound care on discharge.       Brandon Fields DPM   Podiatric Medicine & Surgery   1/6/2020 at 10:52 AM

## 2020-01-06 NOTE — PROGRESS NOTES
in bed   Balance  Sitting Balance: Unable to assess(comment)  Standing Balance: Unable to assess(comment)  Functional Mobility  Functional Mobility Comments: JESSI d/t need to mniimize shear on buttocks wounds      ADL  Feeding: Setup;Modified independent   Grooming: Minimal assistance;Setup  UE Bathing: Moderate assistance;Maximum assistance;Setup  LE Bathing: Dependent/Total  UE Dressing: Minimal assistance; Moderate assistance;Setup  LE Dressing: Dependent/Total  Toileting: Dependent/Total  Additional Comments: Pt supine in bed on arrival. Pt assisted to adjust shoulders in bed, mod A with VCs. Pt assisted to complete ROM to BUE. Unable to complete further bed mobility d/t extensive buttocks wounds and to minimize shearing. Pt completed AAROM to LUE. Resting hand slpint placed to RUE d/t inability to extend wrist following collaboration with hand therapist (most appropriate for inapatient needs at this time). Pt educated in wearing schedule, maintaining skin integrity and to notify staff if areas become red/ irritated. Pt verbalized good understanding, RN notified. Pt remained in bed, call light in reach and RN notified on therapist exit. Tone RUE  RUE Tone: Normotonic  Tone LUE  LUE Tone: Normotonic  Coordination  Movements Are Fluid And Coordinated: No  Coordination and Movement description: Right UE;Fine motor impairments(wrist drop noted, unable to extend wrist)        Bed mobility  Comment: Pt assisted to reposition B shoulders with mod A to assist with ROM evaluation.  JESSI further bed mobility d/t 2x wound vacs and need to prevent shearing d/t extensive buttocks wounds      Transfers  Transfer Comments: JESSI d/t extent of wounds        Cognition  Overall Cognitive Status: WFL     Perception  Overall Perceptual Status: WFL        Sensation  Overall Sensation Status: WFL(Pt denies numbness/ tingling )        LUE AROM (degrees)  LUE AROM : WFL  LUE General AROM: AAROM completed d/t increased fatigue  Left Hand

## 2020-01-06 NOTE — PROGRESS NOTES
Physical Therapy  DATE: 2020  NAME: Xin Phelps  MRN: [id number]   : 1985    Discharge Recommendations: Continue to Assess (pending progress)     Subjective: pt and RN agreeable to PT. Pt alert in bed on arrival, pleasant and cooperative throughout treatment  Pain: yes (pt did not quantify, reports \"pain is better than yesterday\")  Patient follows: Follows all Commands  Is patient on ventilator: no  Is patient on sedation: no  Precautions: two wound vacs, lema, B FDS    Therapeutic exercises:  UE/LE(s)  Bilateral LE: PROM all planes x 10 reps, pt able to assist with L hip abd/add (noted decreased ROM in B knees)  BUE: primarily AAROM RUE; PROM with fatigue. AA/AROM LUE- pt gives little effort due to fatigue. Pt with noted R wrist splint donned throughout treatment  bilateral gastrocnemius stretching 3 reps x 30 seconds      Goals  Short Term Goals  Short term goal 1: PROM for stretching and injury prevention   Short term goal 2: AROM/AAROM for strengthening   Short term goal 3: Progress mobility as appropriate           Plan: Progress functional mobility as medically appropriate.    Time In: 1330  Time Out: 1400  Time Coded Minutes (treatment minutes): 30  Rehab Potential: fair  Treatments/week: 2-3 x week    Rose Homans, PTA

## 2020-01-06 NOTE — PROGRESS NOTES
[facility] Wound Ostomy  Nurse  Follow up Note       NAME:  Austin Virgen0 RECORD NUMBER:  [id number]  AGE: 37 y.o. GENDER: male  : 1985  TODAY'S DATE:  2020    Subjective   Reason for [id number] Nurse Evaluation and Assessment:   NPWT dressing change to abdominal incision with colostomy care.        Objective    BP (!) 162/88   Pulse 85   Temp 98.2 °F (36.8 °C) (Oral)   Resp 16   Ht 5' 7\" (1.702 m)   Wt 287 lb (130.2 kg)   SpO2 99%   BMI 44.95 kg/m²     LABS:  WBC:    Lab Results   Component Value Date    WBC 9.2 2020     H/H:    Lab Results   Component Value Date    HGB 7.7 2020    HCT 26.2 2020     PTT:  No results found for: APTT, PTT[APTT}  PT/INR:    Lab Results   Component Value Date    PROTIME 11.9 2019    INR 1.1 2019     HgBA1c:    Lab Results   Component Value Date    LABA1C 8.0 2019       Assessment   Hawk Risk Score: Hawk Scale Score: 13    Patient Active Problem List   Diagnosis Code    Necrotizing fasciitis ([address] M72.6    Essential hypertension I10    Diabetes ([address] E11.9    Diabetic foot ulcer ([address] E11.621, L97.509    Diabetic foot infection ([facility] [address] E11.628, L08.9    Iron deficiency anemia D50.9    Muscle weakness of right upper extremity M62.81    Wrist drop, acquired, right M21.331       Measurements:     20 1202   Incision 19 Abdomen Mid   Date First Assessed/Time First Assessed: 19 1158   Present on Hospital Admission: No  Primary Wound Type: Surgical Type  Location: Abdomen  Wound Location Orientation: Mid   Wound Image    Wound Assessment Red;Granulation tissue;Tan;Slough  (slough in distal wound base)   Liliam-wound Assessment Intact  (bordered w/ hydrocolloid & drape)   Drainage Description Serosanguinous   Dressing/Treatment Vacuum dressing   Dressing Changed Changed/New   Dressing Status Changed   Dressing Change Due 20   Negative Pressure Wound Therapy Abdomen Mid   Placement Date/Time: 19 1800 Potassium    Discharge Plan:  Placement for patient upon discharge: skilled nursing       KALIN MICHELEN, RN, Serena Palmer

## 2020-01-06 NOTE — PROGRESS NOTES
[facility] - Progress Note    Today's Date and Time: 1/6/2020, 2:33 PM    Impression :   · Necrotizing fasciitis 11-29-19  · S/P perirectal I&D. Wide complex excisional debridement of gluteal and perineal areas on 11-29-19  · S/P debridement, washout of gluteal and perianal areas, diverting colostomy 12-3-19.   · S/P debridement, washout of gluteal and perianal areas, diverting colostomy 12-6-19.  · S/P Incision and debridement of necrotizing fasciitis buttock wound, scrotum, bilateral groin region and closure of midline abdominal wound 12-8-19   · S/P Incision and debridement of necrotizing fasciitis buttock wound, scrotum, bilateral groin region on 12-20-19. · S/P Incision and debridement of necrotizing fasciitis buttock wound, scrotum, bilateral groin region on 1-1-20  · Lactic acidosis  · DM 2  · HTN  · Hx of Low LVEF (20%) as per family  · DEBBY  · Fluid overload    Recommendations:     · Monitor off antibiotics,   · Continue with wound care  · Nutrition    Medical Decision Making/Summary/Discussion:1/6/2020     · Patient with DM 2, prior diabetic foot infection  · Presented to [address] [place] [address] ER generalized weakness for the past few days  · Found to have soft tissue necrosis with subcutaneous emphysema in gluteal areas and perineum  · S/P I&D and wide complex debridement of affected tissues on 11-29-19  · Showing hypotension, requiring fluids and a vasopressor  · Cultures in progress  · Will cover with Zosyn. Wounds with Strep spp. And Gram negative bacilli . No Staph spp. 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    · One more debridement in OR scheduled for 12-10-19 and then decision will be made to change code status or not.   · Pt did not tolerate HD on 12-9. It was stopped early and pt required vasopressor support with Levophed, remains on vent. · Pt to OR 12-10 for further debridement  · Repeat I&D planned for 12-16-19 was cancelled because of high K levels  · Bedside I & D on 12/17/19. · Patient to undergo additional I&D on 12-20-19  · 12/23 right upper lobe collapse  · 12/24 debridement with excision of sacral wound in preparation for a flap, partial closure of his scrotal wound, VAC and debridement over the perineum  · 12-30 Wound RN to change abdominal vac   · 1-1-20 Returned to OR for further debridement of perineal wound and vac change  · 1-3 Lt heel and abd wound vac changed per wound care  · 1-3 Order for removal of Lt tunnel cath removal. Last HD 12-27, pt showing signs of renal recovery. · 1-5 to OR for further debridement and vac change  · Scheduled for OR 1-8 for debridement and vac change  Infection Control Recommendations   · [place] Precautions  · Contact Isolation MRSA    Antimicrobial Stewardship Recommendations     Off antibiotics  Coordination of Outpatient Care:   · Estimated Length of IV antimicrobials: D/C 12-19-19  · Patient will need Midline Catheter Insertion: No  · Patient will need PICC line Insertion:Has Central line  · Patient will need: Home IV , [place],  SNF,  LTAC: TBD  · Patient will need outpatient wound care:Yes    Chief complaint/reason for consultation:   · Ashley's vs necrotizing fasciitis    History of Present Illness:   Rhett Reagan is a 37y.o.-year-old  male who was initially admitted on 11/29/2019. Patient seen at the request of . INITIAL HISTORY:    Patient presented through ER in [place] with complaints of weakness of a few days duration. Examination showed skin necrosis in the gluteal and perineal region. Patient transferred to [place] [address]. CT scan showed extensive subcutaneous emphysema.     Patient underwent I&D and extensive complex debridement of affected tissues on 11-29-19. Gram stain of tissues showed Strep. Spp and Gram negative bacilli. 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 He has underlying DM 2, prior diabetic foot infection, DEBBY. Patient more stable post surgery but with hypotension requiring a pressor. Zosyn D/C because of of poor LVEF, in order to reduce Na and fluid load  Meropenem started adjusted for Cr Cl 30 cc  Meropenem adjusted for HD  Per surgery after debridement on 12-8-19, infection has spread to deep planes with the urethra less viable. One more debridement in OR scheduled for 12-10-19 and then decision will be made to change code status or not. Pt did not tolerate HD on 12-9. It was stopped early and pt required vasopressor support with Levophed, remains on vent. Pt to OR 12-10 for further debridement  · Repeat I&D planned for 12-16-19 was cancelled because of high K levels  · Bedside I & D on 12/17/19. · Patient to undergo additional I&D on 12-20-19  · 12/23 right upper lobe collapse  · 12/24 debridement with excision of sacral wound in preparation for a flap, partial closure of his scrotal wound, VAC and debridement over the perineum  · Pt making urine. Last HD 12-27  · 12-30 Abd vac change per wound RN  · 1-1-20 Returned to OR for further debridement of perineal wound and vac change  · 1-4 Tunnel cath discontinued  · 1-5 to OR for debridement and vac change    CURRENT EVALUATION :1/6/2020     Afebrile  VS stable - HTN    Pt is AA  Responding appropriately to questions  Denies chills or fevers. No complaints    Per RN, minimal ostomy output today, constipated appearing  Pt is on colace, miralax    Abdomen is soft, active BS, ostomy intact with pink stoma, and has a VAC in the middle abdominal wound.   Had perineal VAC exchange on 1-5-20   Plan for further debridement and vac change on 1-8  Tentative plan for transfer to [facility] sometime next week, unless plan for a Bilateral 11/29/2019    RECTAL PERIRECTAL INCISION AND DRAINAGE, [address] [facility] LOOP COLOSTOMY CREATION performed by Sneha Valencia MD at [facility] [address] N/A 12/6/2019    DEBRIDEMENT NECROTIZING FASCIAITIS BILAT BUTTOCK performed by Isaac Osman MD at [facility] [address] N/A 12/20/2019    DEBRIDEMENT GLUTEAL AND SCROTAL REGIONS performed by Sneha Valencia MD at [facility] [address] N/A 12/26/2019    INCISION AND DRAINAGE AND WOUND VAC CHANGE BUTTOCK, PERINEUM performed by Amada Cooper DO at [address] N/A 12/10/2019    SCROTAL EXPLORATION WITH SCROTAL DEBRIDEMENT performed by Ramo Noble MD at [facility] N/A 12/23/2019    WOUND [address] performed by Isaac Osman MD at [place] [address]       Medications:      docusate  100 mg Oral BID    therapeutic multivitamin-minerals  1 tablet Oral Daily    carvedilol  25 mg Per NG tube Daily    insulin glargine  10 Units Subcutaneous Daily    bumetanide  2 mg Oral Daily    darbepoetin rohan-polysorbate  100 mcg Intravenous Weekly    polyethylene glycol  17 g Oral Daily    insulin lispro  0-18 Units Subcutaneous Q4H    alteplase  1 mg Intracatheter Once    povidone-iodine   Topical Daily    famotidine  20 mg Per NG tube Daily    sodium chloride flush  10 mL Intravenous 2 times per day    heparin (porcine)  5,000 Units Subcutaneous 3 times per day       Social History:     Social History     Socioeconomic History    Marital status: Unknown     Spouse name: Not on file    Number of children: Not on file    Years of education: Not on file    Highest education level: Not on file   Occupational History    Not on file   Social Needs    Financial resource strain: Not on file    Food insecurity:     Worry: Not on file     Inability: Not on file    Transportation needs:     Medical: Not on file     Non-medical: Not on file follow  up   Acuity: Unknown   Type of Exam: Unknown       FINDINGS:   Endotracheal tube not visualized. Enteric tube courses below the diaphragm.       Left and right-sided catheters terminating at the cavoatrial junction.       Improved aeration of the right upper lobe.  Pulmonary vascular indistinctness   compatible with interstitial pulmonary edema.  Low lung volumes.  No   pneumothorax.  No pleural effusion.  Stable cardiomegaly.           Impression   1. Endotracheal tube not identified. 2. Venous catheters terminating at the cavoatrial junction. 3. Interstitial pulmonary edema. 12-9 CT Abd/pelvis  EXAMINATION:   CT OF THE ABDOMEN AND PELVIS WITH CONTRAST 12/9/2019 2:29 am       TECHNIQUE:   CT of the abdomen and pelvis was performed with the administration of   intravenous contrast. Multiplanar reformatted images are provided for review. Dose modulation, iterative reconstruction, and/or weight based adjustment of   the mA/kV was utilized to reduce the radiation dose to as low as reasonably   achievable.       COMPARISON:   November 29, 2019.       HISTORY:   ORDERING SYSTEM PROVIDED HISTORY: Trisha galindo   TECHNOLOGIST PROVIDED HISTORY:       nec fasc   Reason for Exam: nec fasc; Trauma   Acuity: Unknown   Type of Exam: Subsequent/Follow-up       FINDINGS:   Lower Chest: Partially visualized bilateral pleural effusions with bibasilar   heterogeneous opacities and bilateral lower lobe consolidations.  Central   venous catheter tip near the superior atrial caval junction.  Cardiomegaly.    Trace pericardial fluid.       Liver: Normal.       Gallbladder and Bile Ducts: Normal.       Spleen: Splenomegaly.       Adrenal Glands: Normal.       Pancreas: Normal.       Genitourinary: Symmetric renal atrophy.  Redemonstration of multiple   hypodensities in the right kidney which likely represent benign cysts.  No   urinary stones or hydronephrosis.  Ambrosio catheter in the decompressed urinary   bladder.     Bowel: Normal caliber bowel.  Postsurgical changes of the bowel with left   lower quadrant ostomy.  No evidence of acute appendicitis.  No significant   diverticular disease.       Vasculature: Atherosclerosis.  No abdominal aortic aneurysm.       Bones and Soft Tissues: Diffuse soft tissue stranding and fluid. Postsurgical changes in the anterior abdominal wall with midline incision   demonstrating expected small amount of fluid and air.  Large soft tissue   defects in the right inguinal region, scrotum, right greater than left   gluteal creases with associated packing material.  Small amount of   surrounding soft tissue gas.  There is associated skin thickening in these   regions.  Bilateral lower abdominal wall skin thickening.       Retroperitoneum/Mesentery: No intraperitoneal free air.  Mild abdominopelvic   ascites.  Loculated fluid along the inferior aspect of the liver. Redemonstration of bilateral inguinal and pelvic sidewall lymphadenopathy.    Multiple mildly prominent retroperitoneal and upper abdominal lymph nodes are   similar to the prior study.  Percutaneous intraperitoneal surgical drains.           Impression   1.  Large soft tissue defects in the right inguinal region, scrotum and right   greater than left gluteal crease is with associated packing material, small   amount of surrounding soft tissue gas and skin thickening likely relates to   history of necrotizing fasciitis.       2.  Postsurgical changes of the bowel with left lower quadrant ostomy.  No   bowel obstruction.  Percutaneous intraperitoneal surgical drains.       3.  Mild abdominopelvic ascites.  Loculated fluid along the inferior aspect   of the liver.  No intraperitoneal free air.       4.  Diffuse anasarca.       5.   Bilateral pleural effusions with associated heterogeneous opacities and   consolidations.  Underlying infection is not excluded.               EXAMINATION:   ONE XRAY VIEW OF THE CHEST       11/29/2019 9:01 pm although no organized   abscess is seen. There is a large amount of soft tissue emphysema involving both the right and left buttock extending to the perineum tissue thickening is seen especially on the left involving the left buttock. CT PELVIS FINDINGS:        No distal ureteral calculi or bladder calculi. There is no free fluid in the pelvis. Catheter is seen within the urinary bladder. Osseous changes within the left hemipelvis which may be chronic in nature. IMPRESSION:    Extensive subcutaneous emphysema as described above involving both buttocks extending to the perineum.  The possibility of Ashley gangrene/necrotizing fasciitis cannot be excluded. Possible phlegmon or developing soft tissue abscess overlying the left lateral abdominal wall as noted above.  No organized abscess is seen however. Osseous changes within the left hemipelvis which may be chronic in nature. Results the study were called to Dr. Alicia Steinberg [phone] on 11/29/2019  All CT scans at this facility use dose modulation, iterative reconstruction, and/or weight based dosing when appropriate to reduce radiation dose to as low as reasonably achievable.       Medical Decision Gtoshe-Xabxomla-Uqqnu:           Lynda Olivas MD

## 2020-01-06 NOTE — PROGRESS NOTES
[facility]  Internal Medicine Teaching Residency Program  Inpatient Daily Progress Note  ______________________________________________________________________________    Patient: Raymond Mata  YOB: 1985   QSW:[id number]    Acct: [de-identified]     [place] [facility] [place]  Admit date: 11/29/2019  Today's date: 01/06/20  Number of days in the hospital: 45    SUBJECTIVE   Admitting Diagnosis: Necrotizing fasciitis ([address]    No acute issues overnight. Patient is tolerating food, is urinating and urology is following the patient. Vitals patient's blood pressure 162/82, heart rate 90. General surgery is planning to do another sacral debridement and wound VAC change on 1/8/2020 last one was done on 1/5/2020. Urology saw the patient and do not recommend suprapubic catheter at this time because of multiple abdominal surgeries resulting in high risk for bowel injury. They will monitor for leakage and would consider upsizing Ambrosio if no improvement. Talked with the  regarding placement and they have an LTAC facility available for the patient. ROS:  Constitutional:  negative for chills, fevers, sweats  Respiratory:  negative for cough, dyspnea on exertion, hemoptysis, shortness of breath, wheezing  Cardiovascular:  negative for chest pain, chest pressure/discomfort, lower extremity edema, palpitations  Gastrointestinal:  negative for abdominal pain, constipation, diarrhea, nausea, vomiting  Neurological:  negative for dizziness, headache  BRIEF HISTORY     Who presented to [facility] Emergency Department with complaint of generalized weakness for the past few days. On examination the emergency department staff noticed the patient had a foul smell which they thought he had a bowel movement. They turned the patient and noticed sloughing of skin on his back and buttocks.      In the emergency department patient was afebrile but tachycardic.  Initial labs muscle 30 x 40 cm. Urology exam under anesthesia   2019 sharp debridement, partial closure of scrotum, placement of wound vac   2019 s/p sharp debridement of L side lateral aspect of sacral wound, replacement of wound vac    2019: Vera flow irrigation VAC to gluteal wound with debridement.  Started VAC to perineal wound on midline laparotomy wound  1/3/2020:Patient is off the Flexiflo and is tolerating p.o. diet.  Encouraged to use Ensure in between meals to maintain the caloric requirement.  Tunneled catheter removed by IR as per nephrology recommendations as the patient does not need dialysis. 2020: Placed on renal diet.  Neurology will place right wrist splint  2020: Sacral wound VAC changed today by  surgery. 2020: Sacral wound VAC change and debridement by general surgery  OBJECTIVE     Vital Signs:  BP (!) 162/88   Pulse 85   Temp 98.2 °F (36.8 °C) (Oral)   Resp 16   Ht 5' 7\" (1.702 m)   Wt 287 lb (130.2 kg)   SpO2 99%   BMI 44.95 kg/m²     Temp (24hrs), Av.2 °F (36.8 °C), Min:85.1 °F (29.5 °C), Max:98.9 °F (37.2 °C)    In: 440   Out: 1050 [Urine:350; Drains:700]    Physical Exam:    General appearance: alert and cooperative with exam  HEENT: Head: Normocephalic, no lesions, without obvious abnormality.   Neck: no adenopathy, no carotid bruit, no JVD, supple, symmetrical, trachea midline and thyroid not enlarged, symmetric, no tenderness/mass/nodules  Lungs: clear to auscultation bilaterally  Heart: regular rate and rhythm, S1, S2 normal, no murmur, click, rub or gallop  Abdomen: Soft, colostomy bag  Extremities: Right radial opacity  Neurologic: Mental status: Alert, oriented, thought content appropriate    Medications:  Scheduled Medications:    oxybutynin  5 mg Oral TID    therapeutic multivitamin-minerals  1 tablet Oral Daily    docusate sodium  100 mg Oral Daily    carvedilol  25 mg Per NG tube Daily    insulin glargine  10 Units Subcutaneous Daily    bumetanide  2 mg Oral Daily    darbepoetin rohan-polysorbate  100 mcg Intravenous Weekly    docusate  100 mg Oral Daily    polyethylene glycol  17 g Oral Daily    insulin lispro  0-18 Units Subcutaneous Q4H    alteplase  1 mg Intracatheter Once    povidone-iodine   Topical Daily    famotidine  20 mg Per NG tube Daily    sodium chloride flush  10 mL Intravenous 2 times per day    heparin (porcine)  5,000 Units Subcutaneous 3 times per day     Continuous Infusions:    dextrose      sodium chloride      dextrose      sodium chloride 10 mL/hr at 12/28/19 2003     PRN MedicationshydrALAZINE, 10 mg, Q4H PRN  magnesium sulfate, 1 g, PRN  glucose, 15 g, PRN  dextrose, 12.5 g, PRN  glucagon (rDNA), 1 mg, PRN  dextrose, 100 mL/hr, PRN  sodium phosphate IVPB, 0.16 mmol/kg, PRN    Or  sodium phosphate IVPB, 0.32 mmol/kg, PRN  oxyCODONE, 7.5 mg, Q6H PRN    Or  oxyCODONE, 10 mg, Q6H PRN  heparin (porcine), 1,900 Units, PRN  heparin (porcine), 1,900 Units, PRN  melatonin, 1 mg, Nightly PRN  acetaminophen, 650 mg, Q4H PRN  labetalol, 20 mg, Q6H PRN  heparin (porcine), 500 Units, PRN  heparin (porcine), 1,750 Units, PRN  glucose, 15 g, PRN  dextrose, 12.5 g, PRN  glucagon (rDNA), 1 mg, PRN  dextrose, 100 mL/hr, PRN  albumin human, 25 g, PRN  LORazepam, 1 mg, Q6H PRN  sodium chloride, 250 mL, PRN  sodium chloride, 150 mL, PRN  albumin human, 25 g, PRN  sodium chloride flush, 10 mL, PRN  potassium chloride, 40 mEq, PRN    Or  potassium alternative oral replacement, 40 mEq, PRN    Or  potassium chloride, 10 mEq, PRN  REFRESH LACRI-LUBE, , PRN  magnesium hydroxide, 30 mL, Daily PRN  ondansetron, 4 mg, Q6H PRN        Diagnostic Labs:  CBC:   Recent Labs     01/04/20  0607 01/05/20  0551 01/06/20  0627   WBC 7.9 8.1 9.2   RBC 2.57* 2.68* 2.72*   HGB 7.5* 7.7* 7.7*   HCT 25.0* 26.3* 26.2*   MCV 97.3 98.1 96.3   RDW 20.6* 20.8* 20.6*    362 377     BMP:   Recent Labs     01/04/20  0607 01/05/20  0551 01/06/20  0627   NA

## 2020-01-06 NOTE — PROGRESS NOTES
Calorie Count Note    Type and Reason for Visit: Reassess, Calorie Count    Current diet and supplement order:  Pt is drinking 100% of all of his supplements and consuming % of his meals per RN. All three meals were recorded on 1/3 and pt was meeting above adequate PO needs. Will d/c calorie count. Comparative Standards (Estimated Nutrition Needs):   · Estimated Daily Total Kcal: 2906-8191 kcal/day  · Estimated Daily Protein (g): 110-145 g pro/day     Date Consumed PO Intake Kcal %   Kcal met PO Intake grams protein %  Protein met   Comments   1/3 2630 kcals >100% 127 gms  >100% 3/3 meals recorded    1/4 1055 kcals JESSI  41 gms protein JESSI  1/3 meals recorded    1/5 1305 kcals JESSI  47 gms  JESSI  2/3 meals recorded                            **Results will be posted as available. Intervention & Recommendations:   1. Discontinue Calorie Count 2/2 pt meeting adequate needs via PO  2. Continue renal, low K diet   3. Continue Nepro supplements QID  4.  Will continue to monitor po intake weights      Electronically signed by Milton Mae RD, LD on 1/6/20 at 1:54 PM    Contact Number:[phone]

## 2020-01-07 ENCOUNTER — ANESTHESIA EVENT (OUTPATIENT)
Dept: OPERATING ROOM | Age: 35
DRG: 463 | End: 2020-01-07
Payer: MEDICARE

## 2020-01-07 LAB
ABSOLUTE EOS #: 0.39 K/UL (ref 0–0.4)
ABSOLUTE IMMATURE GRANULOCYTE: 0 K/UL (ref 0–0.3)
ABSOLUTE LYMPH #: 2.26 K/UL (ref 1–4.8)
ABSOLUTE MONO #: 0.16 K/UL (ref 0.1–0.8)
ANION GAP SERPL CALCULATED.3IONS-SCNC: 13 MMOL/L (ref 9–17)
BASOPHILS # BLD: 1 % (ref 0–2)
BASOPHILS ABSOLUTE: 0.08 K/UL (ref 0–0.2)
BUN BLDV-MCNC: 49 MG/DL (ref 6–20)
BUN/CREAT BLD: ABNORMAL (ref 9–20)
CALCIUM SERPL-MCNC: 8.2 MG/DL (ref 8.6–10.4)
CHLORIDE BLD-SCNC: 99 MMOL/L (ref 98–107)
CO2: 22 MMOL/L (ref 20–31)
CREAT SERPL-MCNC: 1.92 MG/DL (ref 0.7–1.2)
DIFFERENTIAL TYPE: ABNORMAL
EOSINOPHILS RELATIVE PERCENT: 5 % (ref 1–4)
GFR AFRICAN AMERICAN: 48 ML/MIN
GFR NON-AFRICAN AMERICAN: 40 ML/MIN
GFR SERPL CREATININE-BSD FRML MDRD: ABNORMAL ML/MIN/{1.73_M2}
GFR SERPL CREATININE-BSD FRML MDRD: ABNORMAL ML/MIN/{1.73_M2}
GLUCOSE BLD-MCNC: 112 MG/DL (ref 70–99)
GLUCOSE BLD-MCNC: 114 MG/DL (ref 75–110)
GLUCOSE BLD-MCNC: 130 MG/DL (ref 75–110)
GLUCOSE BLD-MCNC: 146 MG/DL (ref 75–110)
GLUCOSE BLD-MCNC: 161 MG/DL (ref 75–110)
GLUCOSE BLD-MCNC: 97 MG/DL (ref 75–110)
HCT VFR BLD CALC: 27.8 % (ref 40.7–50.3)
HEMOGLOBIN: 8.1 G/DL (ref 13–17)
IMMATURE GRANULOCYTES: 0 %
LYMPHOCYTES # BLD: 29 % (ref 24–44)
MAGNESIUM: 1.7 MG/DL (ref 1.6–2.6)
MCH RBC QN AUTO: 28.8 PG (ref 25.2–33.5)
MCHC RBC AUTO-ENTMCNC: 29.1 G/DL (ref 28.4–34.8)
MCV RBC AUTO: 98.9 FL (ref 82.6–102.9)
MONOCYTES # BLD: 2 % (ref 1–7)
MORPHOLOGY: ABNORMAL
MORPHOLOGY: ABNORMAL
NRBC AUTOMATED: 0 PER 100 WBC
PDW BLD-RTO: 20.6 % (ref 11.8–14.4)
PLATELET # BLD: 363 K/UL (ref 138–453)
PLATELET ESTIMATE: ABNORMAL
PMV BLD AUTO: 8.2 FL (ref 8.1–13.5)
POTASSIUM SERPL-SCNC: 4.3 MMOL/L (ref 3.7–5.3)
RBC # BLD: 2.81 M/UL (ref 4.21–5.77)
RBC # BLD: ABNORMAL 10*6/UL
SEG NEUTROPHILS: 63 % (ref 36–66)
SEGMENTED NEUTROPHILS ABSOLUTE COUNT: 4.91 K/UL (ref 1.8–7.7)
SODIUM BLD-SCNC: 134 MMOL/L (ref 135–144)
WBC # BLD: 7.8 K/UL (ref 3.5–11.3)
WBC # BLD: ABNORMAL 10*3/UL

## 2020-01-07 PROCEDURE — 36415 COLL VENOUS BLD VENIPUNCTURE: CPT

## 2020-01-07 PROCEDURE — 2580000003 HC RX 258: Performed by: STUDENT IN AN ORGANIZED HEALTH CARE EDUCATION/TRAINING PROGRAM

## 2020-01-07 PROCEDURE — 6360000002 HC RX W HCPCS: Performed by: STUDENT IN AN ORGANIZED HEALTH CARE EDUCATION/TRAINING PROGRAM

## 2020-01-07 PROCEDURE — 99232 SBSQ HOSP IP/OBS MODERATE 35: CPT | Performed by: INTERNAL MEDICINE

## 2020-01-07 PROCEDURE — 6370000000 HC RX 637 (ALT 250 FOR IP): Performed by: STUDENT IN AN ORGANIZED HEALTH CARE EDUCATION/TRAINING PROGRAM

## 2020-01-07 PROCEDURE — 6370000000 HC RX 637 (ALT 250 FOR IP): Performed by: NURSE PRACTITIONER

## 2020-01-07 PROCEDURE — 85025 COMPLETE CBC W/AUTO DIFF WBC: CPT

## 2020-01-07 PROCEDURE — 82947 ASSAY GLUCOSE BLOOD QUANT: CPT

## 2020-01-07 PROCEDURE — 94761 N-INVAS EAR/PLS OXIMETRY MLT: CPT

## 2020-01-07 PROCEDURE — 83735 ASSAY OF MAGNESIUM: CPT

## 2020-01-07 PROCEDURE — 80048 BASIC METABOLIC PNL TOTAL CA: CPT

## 2020-01-07 PROCEDURE — 2060000000 HC ICU INTERMEDIATE R&B

## 2020-01-07 RX ORDER — DOCUSATE SODIUM 100 MG/1
100 CAPSULE, LIQUID FILLED ORAL 2 TIMES DAILY
Status: DISCONTINUED | OUTPATIENT
Start: 2020-01-07 | End: 2020-01-16 | Stop reason: HOSPADM

## 2020-01-07 RX ORDER — SENNOSIDES 8.8 MG/5ML
5 LIQUID ORAL NIGHTLY
Status: DISCONTINUED | OUTPATIENT
Start: 2020-01-07 | End: 2020-01-07

## 2020-01-07 RX ORDER — OXYBUTYNIN CHLORIDE 5 MG/1
5 TABLET ORAL 3 TIMES DAILY
Status: DISCONTINUED | OUTPATIENT
Start: 2020-01-07 | End: 2020-01-07

## 2020-01-07 RX ORDER — SENNA PLUS 8.6 MG/1
1 TABLET ORAL NIGHTLY
Status: DISCONTINUED | OUTPATIENT
Start: 2020-01-07 | End: 2020-01-16 | Stop reason: HOSPADM

## 2020-01-07 RX ADMIN — POLYETHYLENE GLYCOL 3350 17 G: 17 POWDER, FOR SOLUTION ORAL at 07:52

## 2020-01-07 RX ADMIN — FAMOTIDINE 20 MG: 20 TABLET, FILM COATED ORAL at 07:52

## 2020-01-07 RX ADMIN — OXYCODONE HYDROCHLORIDE 10 MG: 5 SOLUTION ORAL at 18:13

## 2020-01-07 RX ADMIN — SODIUM CHLORIDE, PRESERVATIVE FREE 10 ML: 5 INJECTION INTRAVENOUS at 07:53

## 2020-01-07 RX ADMIN — HEPARIN SODIUM 5000 UNITS: 5000 INJECTION INTRAVENOUS; SUBCUTANEOUS at 21:31

## 2020-01-07 RX ADMIN — DOCUSATE SODIUM 100 MG: 50 LIQUID ORAL at 07:51

## 2020-01-07 RX ADMIN — ACETAMINOPHEN 650 MG: 325 TABLET ORAL at 15:54

## 2020-01-07 RX ADMIN — HEPARIN SODIUM 5000 UNITS: 5000 INJECTION INTRAVENOUS; SUBCUTANEOUS at 14:10

## 2020-01-07 RX ADMIN — HEPARIN SODIUM 5000 UNITS: 5000 INJECTION INTRAVENOUS; SUBCUTANEOUS at 06:04

## 2020-01-07 RX ADMIN — INSULIN LISPRO 3 UNITS: 100 INJECTION, SOLUTION INTRAVENOUS; SUBCUTANEOUS at 21:53

## 2020-01-07 RX ADMIN — SODIUM CHLORIDE, PRESERVATIVE FREE 10 ML: 5 INJECTION INTRAVENOUS at 22:04

## 2020-01-07 RX ADMIN — Medication 1 MG: at 23:56

## 2020-01-07 RX ADMIN — INSULIN GLARGINE 10 UNITS: 100 INJECTION, SOLUTION SUBCUTANEOUS at 07:52

## 2020-01-07 RX ADMIN — MULTIPLE VITAMINS W/ MINERALS TAB 1 TABLET: TAB at 07:52

## 2020-01-07 RX ADMIN — SENNOSIDES 8.8 MG: 8.8 LIQUID ORAL at 21:31

## 2020-01-07 RX ADMIN — DOCUSATE SODIUM 100 MG: 50 LIQUID ORAL at 21:31

## 2020-01-07 RX ADMIN — Medication: at 09:00

## 2020-01-07 RX ADMIN — CARVEDILOL 25 MG: 25 TABLET, FILM COATED ORAL at 07:52

## 2020-01-07 RX ADMIN — BUMETANIDE 2 MG: 1 TABLET ORAL at 07:52

## 2020-01-07 RX ADMIN — Medication 1 MG: at 00:03

## 2020-01-07 ASSESSMENT — PAIN SCALES - GENERAL
PAINLEVEL_OUTOF10: 2
PAINLEVEL_OUTOF10: 0
PAINLEVEL_OUTOF10: 3
PAINLEVEL_OUTOF10: 9

## 2020-01-07 NOTE — PROGRESS NOTES
[facility] - Progress Note    Today's Date and Time: 1/7/2020, 11:16 AM    Impression :   · Necrotizing fasciitis 11-29-19  · S/P perirectal I&D. Wide complex excisional debridement of gluteal and perineal areas on 11-29-19  · S/P debridement, washout of gluteal and perianal areas, diverting colostomy 12-3-19.   · S/P debridement, washout of gluteal and perianal areas, diverting colostomy 12-6-19.  · S/P Incision and debridement of necrotizing fasciitis buttock wound, scrotum, bilateral groin region and closure of midline abdominal wound 12-8-19   · S/P Incision and debridement of necrotizing fasciitis buttock wound, scrotum, bilateral groin region on 12-20-19. · S/P Incision and debridement of necrotizing fasciitis buttock wound, scrotum, bilateral groin region on 1-1-20  · Lactic acidosis  · DM 2  · HTN  · Hx of Low LVEF (20%) as per family  · DBEBY  · Fluid overload    Recommendations:     · Monitor off antibiotics,   · Continue with wound care  · Nutrition    Medical Decision Making/Summary/Discussion:1/7/2020     · Patient with DM 2, prior diabetic foot infection  · Presented to [address] [place] [address] ER generalized weakness for the past few days  · Found to have soft tissue necrosis with subcutaneous emphysema in gluteal areas and perineum  · S/P I&D and wide complex debridement of affected tissues on 11-29-19  · Showing hypotension, requiring fluids and a vasopressor  · Cultures in progress  · Will cover with Zosyn. Wounds with Strep spp. And Gram negative bacilli . No Staph spp. 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    · One more debridement in OR scheduled for 12-10-19 and then decision will be made to change code status or not.   · Pt did not tolerate HD on 12-9. It was stopped early and pt required vasopressor support with Levophed, remains on vent. · Pt to OR 12-10 for further debridement  · Repeat I&D planned for 12-16-19 was cancelled because of high K levels  · Bedside I & D on 12/17/19. · Patient to undergo additional I&D on 12-20-19  · 12/23 right upper lobe collapse  · 12/24 debridement with excision of sacral wound in preparation for a flap, partial closure of his scrotal wound, VAC and debridement over the perineum  · 12-30 Wound RN to change abdominal vac   · 1-1-20 Returned to OR for further debridement of perineal wound and vac change  · 1-3 Lt heel and abd wound vac changed per wound care  · 1-3 Order for removal of Lt tunnel cath removal. Last HD 12-27, pt showing signs of renal recovery. · 1-5 to OR for further debridement and vac change  · Scheduled for OR 1-8 for debridement and vac change, plastics following for possible flap in future  Infection Control Recommendations   · [place] Precautions  · Contact Isolation MRSA    Antimicrobial Stewardship Recommendations     Off antibiotics  Coordination of Outpatient Care:   · Estimated Length of IV antimicrobials: D/C 12-19-19  · Patient will need Midline Catheter Insertion: No  · Patient will need PICC line Insertion:Has Central line  · Patient will need: Home IV , [place],  SNF,  LTAC: TBD  · Patient will need outpatient wound care:Yes    Chief complaint/reason for consultation:   · Ashley's vs necrotizing fasciitis    History of Present Illness:   Lisa Musa is a 37y.o.-year-old  male who was initially admitted on 11/29/2019. Patient seen at the request of . INITIAL HISTORY:    Patient presented through ER in [place] with complaints of weakness of a few days duration. Examination showed skin necrosis in the gluteal and perineal region. Patient transferred to [facility]. CT scan showed extensive subcutaneous emphysema.     Patient underwent I&D and extensive complex debridement of affected tissues on 11-29-19. Gram stain of tissues showed Strep. Spp and Gram negative bacilli. 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 He has underlying DM 2, prior diabetic foot infection, DEBBY. Patient more stable post surgery but with hypotension requiring a pressor. Zosyn D/C because of of poor LVEF, in order to reduce Na and fluid load  Meropenem started adjusted for Cr Cl 30 cc  Meropenem adjusted for HD  Per surgery after debridement on 12-8-19, infection has spread to deep planes with the urethra less viable. One more debridement in OR scheduled for 12-10-19 and then decision will be made to change code status or not. Pt did not tolerate HD on 12-9. It was stopped early and pt required vasopressor support with Levophed, remains on vent. Pt to OR 12-10 for further debridement  · Repeat I&D planned for 12-16-19 was cancelled because of high K levels  · Bedside I & D on 12/17/19. · Patient to undergo additional I&D on 12-20-19  · 12/23 right upper lobe collapse  · 12/24 debridement with excision of sacral wound in preparation for a flap, partial closure of his scrotal wound, VAC and debridement over the perineum  · Pt making urine. Last HD 12-27  · 12-30 Abd vac change per wound RN  · 1-1-20 Returned to OR for further debridement of perineal wound and vac change  · 1-4 Tunnel cath discontinued  · 1-5 to OR for debridement and vac change  · 1-8 to OR for vac change, plastics following for possible flap in future    CURRENT EVALUATION :1/7/2020     Afebrile  VS stable - HTN    Pt is AA  Responding appropriately to questions  Denies chills or fevers. No complaints    Per RN, minimal ostomy output constipated   Pt is on colace, miralax  Continued leakage from lema catheter, urology is on board    Abdomen is soft, active BS, ostomy intact with pink stoma, constipated stool.   VAC in the middle 2019    RBC 2.81 2020    TRICHOMONAS NOT REPORTED 2019    WBC 7.8 2020    YEAST NOT REPORTED 2019    TURBIDITY TURBID 2019     Lab Results   Component Value Date    CREATININE 1.92 2020    GLUCOSE 112 2020       Medical Decision Making-Imagin-29 CXR    2019 3:07 pm       COMPARISON:   2019       HISTORY:   ORDERING SYSTEM PROVIDED HISTORY: follow  up   TECHNOLOGIST PROVIDED HISTORY:   follow  up   Acuity: Unknown   Type of Exam: Unknown       FINDINGS:   Endotracheal tube not visualized. Enteric tube courses below the diaphragm.       Left and right-sided catheters terminating at the cavoatrial junction.       Improved aeration of the right upper lobe.  Pulmonary vascular indistinctness   compatible with interstitial pulmonary edema.  Low lung volumes.  No   pneumothorax.  No pleural effusion.  Stable cardiomegaly.           Impression   1. Endotracheal tube not identified. 2. Venous catheters terminating at the cavoatrial junction. 3. Interstitial pulmonary edema.  CT Abd/pelvis  EXAMINATION:   CT OF THE ABDOMEN AND PELVIS WITH CONTRAST 2019 2:29 am       TECHNIQUE:   CT of the abdomen and pelvis was performed with the administration of   intravenous contrast. Multiplanar reformatted images are provided for review.    Dose modulation, iterative reconstruction, and/or weight based adjustment of   the mA/kV was utilized to reduce the radiation dose to as low as reasonably   achievable.       COMPARISON:   2019.       HISTORY:   ORDERING SYSTEM PROVIDED HISTORY: Ariane galindo   TECHNOLOGIST PROVIDED HISTORY:       nec fasc   Reason for Exam: nec fasc; Trauma   Acuity: Unknown   Type of Exam: Subsequent/Follow-up       FINDINGS:   Lower Chest: Partially visualized bilateral pleural effusions with bibasilar   heterogeneous opacities and bilateral lower lobe consolidations.  Central   venous catheter tip near the superior is seen adjacent to the liver inferiorly. .    The appendix is visualized in the right lower quadrant and appears within normal limits.       There is no evidence for bowel obstruction. Stranding is seen within the subcutaneous fat overlying both lateral abdominal walls left greater than right.  There is ill-defined fluid collection within the subcutaneous fat overlying the left lateral abdominal wall possibly representing phlegmon or developing abscess although no organized   abscess is seen. There is a large amount of soft tissue emphysema involving both the right and left buttock extending to the perineum tissue thickening is seen especially on the left involving the left buttock. CT PELVIS FINDINGS:        No distal ureteral calculi or bladder calculi. There is no free fluid in the pelvis. Catheter is seen within the urinary bladder. Osseous changes within the left hemipelvis which may be chronic in nature. IMPRESSION:    Extensive subcutaneous emphysema as described above involving both buttocks extending to the perineum.  The possibility of Ashley gangrene/necrotizing fasciitis cannot be excluded. Possible phlegmon or developing soft tissue abscess overlying the left lateral abdominal wall as noted above.  No organized abscess is seen however. Osseous changes within the left hemipelvis which may be chronic in nature. Results the study were called to Dr. ePggy El [phone] on 11/29/2019  All CT scans at this facility use dose modulation, iterative reconstruction, and/or weight based dosing when appropriate to reduce radiation dose to as low as reasonably achievable.       Medical Decision [id number]:           Edwin Figueroa MD

## 2020-01-07 NOTE — PROGRESS NOTES
[facility]  Internal Medicine Teaching Residency Program  Inpatient Daily Progress Note  ______________________________________________________________________________    Patient: Eun Kay  YOB: 1985   [id number]    Acct: [de-identified]     Room: [facility]  Admit date: 11/29/2019  Today's date: 01/07/20  Number of days in the hospital: 44    SUBJECTIVE   Admitting Diagnosis: Necrotizing fasciitis ([address]    Next sacral wound debridement and wound vac change tomorrow. Blood sugar in control and patient's blood pressure keeps fluctuating. Patient was put on a oxbutynin patch yesterday for urinary incontinence. ROS:  Constitutional:  negative for chills, fevers, sweats  Respiratory:  negative for cough, dyspnea on exertion, hemoptysis, shortness of breath, wheezing  Cardiovascular:  negative for chest pain, chest pressure/discomfort, lower extremity edema, palpitations  Gastrointestinal:  negative for abdominal pain, constipation, diarrhea, nausea, vomiting  Neurological:  negative for dizziness, headache  BRIEF HISTORY     [address] [place] [address] Emergency Department with complaint of generalized weakness for the past few days. On examination the emergency department staff noticed the patient had a foul smell which they thought he had a bowel movement. They turned the patient and noticed sloughing of skin on his back and buttocks.      In the emergency department patient was afebrile but tachycardic. Initial labs demonstrated hyponatremia of 130, bicarb 12, BUN 63 and creatinine 2.82, hypocalcemia 7.7, alkaline phosphatase 144, AST 83, ALT 16, lactate 1.4, leukocytosis of 30.4, hemoglobin 9.2. Chest xray demonstrated no acute process. CT abdomen and pelvis without contrast demonstrated extensive subcutaneous emphysema involving both buttocks extending to the perineum.  Possible phlegmon or developing soft tissue abscess overlying the left lateral abdominal wall. Scrotal Ultrasound demonstrated no evidence of testicular torsion or mass however did show extensive scrotal wall edema. He was given zosyn 4/5 g IV, rocephin 1 g IV and flagyl 500 mg IV in the emergency department.     He was transferred to [facility] ICU for Necrotizing Fasciitis and Acute Renal Failure.   Patient initially required pressors, but currently is off pressors. Ryan Serrano a 37 y.o. with PMH of spina bifida, hypertension, diabetes and diabetic foot ulcer. Who presented to [facility] Emergency Department with complaint of generalized weakness for the past few days. On examination the emergency department staff noticed the patient had a foul smell which they thought he had a bowel movement. They turned the patient and noticed sloughing of skin on his back and buttocks.       11/29/2019 wide complex debridement of nec fasc involving gluteal/perianal/distal rectum  12/03/2019 open diverting end colostomy   12/06/2019 I&D  12/08/2019 Incision and debridement of necrotizing fasciitis buttock wound, scrotum, bilateral groin region and closure of midline abdominal wound  12/10/2019 Sharp excisional debridement of gluteal, marc-rectal and perineal wound to level of muscle, Sharp excisional debridement of scrotum (performed by Urology), Washout of gluteal/perineal/scrotal/inguinal wound, Partial primary closure of pubic wound, and Dakins Wet to dry dressing application  47/15/3830 sharp excisional debridement of gluteal and perineal wounds to muscle 30 x 40 cm.  Urology exam under anesthesia   12/24/2019 sharp debridement, partial closure of scrotum, placement of wound vac   12/26/2019 s/p sharp debridement of L side lateral aspect of sacral wound, replacement of wound vac    1/1/2019: Vera flow irrigation VAC to gluteal wound with debridement.  Started VAC to perineal wound on midline laparotomy wound  1/3/2020:Patient is off the Flexiflo and is tolerating p.o. diet.  Encouraged to use Ensure in between meals to maintain the caloric requirement.  Tunneled catheter removed by IR as per nephrology recommendations as the patient does not need dialysis. 2020: Placed on renal diet.  Neurology will place right wrist splint  2020: Sacral wound VAC changed today by  surgery. 2020: Sacral wound VAC change and debridement by general surgery    OBJECTIVE     Vital Signs:  BP (!) 162/102   Pulse 88   Temp 99.4 °F (37.4 °C) (Oral)   Resp 14   Ht 5' 7\" (1.702 m)   Wt 286 lb 9.6 oz (130 kg)   SpO2 99%   BMI 44.89 kg/m²     Temp (24hrs), Av.8 °F (37.1 °C), Min:98.1 °F (36.7 °C), Max:99.6 °F (37.6 °C)    In: 850   Out: 1825 [Urine:375; Drains:1450]    Physical Exam:    General appearance: alert and cooperative with exam  HEENT: Head: Normocephalic, no lesions, without obvious abnormality.   Neck: no adenopathy, no carotid bruit, no JVD, supple, symmetrical, trachea midline and thyroid not enlarged, symmetric, no tenderness/mass/nodules  Lungs: clear to auscultation bilaterally  Heart: regular rate and rhythm, S1, S2 normal, no murmur, click, rub or gallop  Abdomen: soft, colostomy bag  Extremities: bilateral lower extremity swelling, covered with dressing  Neurologic: Mental status: Alert, oriented, thought content appropriate    Medications:  Scheduled Medications:    docusate  100 mg Oral BID    therapeutic multivitamin-minerals  1 tablet Oral Daily    carvedilol  25 mg Per NG tube Daily    insulin glargine  10 Units Subcutaneous Daily    bumetanide  2 mg Oral Daily    darbepoetin rohan-polysorbate  100 mcg Intravenous Weekly    polyethylene glycol  17 g Oral Daily    insulin lispro  0-18 Units Subcutaneous Q4H    alteplase  1 mg Intracatheter Once    povidone-iodine   Topical Daily    famotidine  20 mg Per NG tube Daily    sodium chloride flush  10 mL Intravenous 2 times per day    heparin (porcine)  5,000 Units Subcutaneous 3 times per day     Continuous Infusions:    dextrose      sodium chloride      dextrose      sodium chloride 10 mL/hr at 12/28/19 2003     PRN MedicationshydrALAZINE, 10 mg, Q4H PRN  magnesium sulfate, 1 g, PRN  glucose, 15 g, PRN  dextrose, 12.5 g, PRN  glucagon (rDNA), 1 mg, PRN  dextrose, 100 mL/hr, PRN  sodium phosphate IVPB, 0.16 mmol/kg, PRN    Or  sodium phosphate IVPB, 0.32 mmol/kg, PRN  oxyCODONE, 7.5 mg, Q6H PRN    Or  oxyCODONE, 10 mg, Q6H PRN  heparin (porcine), 1,900 Units, PRN  heparin (porcine), 1,900 Units, PRN  melatonin, 1 mg, Nightly PRN  acetaminophen, 650 mg, Q4H PRN  labetalol, 20 mg, Q6H PRN  heparin (porcine), 500 Units, PRN  heparin (porcine), 1,750 Units, PRN  glucose, 15 g, PRN  dextrose, 12.5 g, PRN  glucagon (rDNA), 1 mg, PRN  dextrose, 100 mL/hr, PRN  albumin human, 25 g, PRN  LORazepam, 1 mg, Q6H PRN  sodium chloride, 250 mL, PRN  sodium chloride, 150 mL, PRN  albumin human, 25 g, PRN  sodium chloride flush, 10 mL, PRN  potassium chloride, 40 mEq, PRN    Or  potassium alternative oral replacement, 40 mEq, PRN    Or  potassium chloride, 10 mEq, PRN  REFRESH LACRI-LUBE, , PRN  magnesium hydroxide, 30 mL, Daily PRN  ondansetron, 4 mg, Q6H PRN        Diagnostic Labs:  CBC:   Recent Labs     01/05/20  0551 01/06/20  0627 01/07/20  0814   WBC 8.1 9.2 7.8   RBC 2.68* 2.72* 2.81*   HGB 7.7* 7.7* 8.1*   HCT 26.3* 26.2* 27.8*   MCV 98.1 96.3 98.9   RDW 20.8* 20.6* 20.6*    377 363     BMP:   Recent Labs     01/05/20  0551 01/06/20  0627 01/07/20  0814    134* 134*   K 4.0 4.7 4.3    96* 99   CO2 22 22 22   PHOS  --  5.4*  --    BUN 45* 49* 49*   CREATININE 1.82* 1.90* 1.92*     BNP: No results for input(s): BNP in the last 72 hours. PT/INR: No results for input(s): PROTIME, INR in the last 72 hours. APTT: No results for input(s): APTT in the last 72 hours. CARDIAC ENZYMES: No results for input(s): CKMB, CKMBINDEX, TROPONINI in the last 72 hours.     Invalid input(s): CKTOTAL;3  FASTING LIPID PANEL:  Lab Results Component Value Date    TRIG 208 (H) 12/19/2019     LIVER PROFILE: No results for input(s): AST, ALT, ALB, BILIDIR, BILITOT, ALKPHOS in the last 72 hours. MICROBIOLOGY:   Lab Results   Component Value Date/Time    CULTURE NO GROWTH 6 DAYS 12/02/2019 04:13 AM       Imaging:    Xr Knee Right (1-2 Views)    Result Date: 1/3/2020  No acute osseous abnormality No effusion     Mri Cervical Spine Wo Contrast    Result Date: 1/3/2020  Detail is limited due to large amount of artifact, particularly in the canal. Multilevel degenerative disc disease is noted, as described. See above for details of each level. There is a large amount of posterior soft tissue edema in the cervical spine and upper thoracic spine. This is not completely included on the examination. This is indeterminate may be due to multifocal myositis. Rhabdomyolysis or infectious processes are possible as well. ASSESSMENT & PLAN     ASSESSMENT / PLAN:     Principal Problem:   11/29/2019 wide complex debridement of nec fasc involving gluteal/perianal/distal rectum  12/03/2019 open diverting end colostomy   12/06/2019 I&D  12/08/2019 Incision and debridement of necrotizing fasciitis buttock wound, scrotum, bilateral groin region and closure of midline abdominal wound  12/10/2019 Sharp excisional debridement of gluteal, marc-rectal and perineal wound to level of muscle, Sharp excisional debridement of scrotum (performed by Urology), Washout of gluteal/perineal/scrotal/inguinal wound, Partial primary closure of pubic wound, and Dakins Wet to dry dressing application  04/74/7741 sharp excisional debridement of gluteal and perineal wounds to muscle 30 x 40 cm.  Urology exam under anesthesia   12/24/2019 sharp debridement, partial closure of scrotum, placement of wound vac   12/26/2019 s/p sharp debridement of L side lateral aspect of sacral wound, replacement of wound vac    1/1/2019: Vera flow irrigation VAC to gluteal wound with debridement.  Started

## 2020-01-07 NOTE — PLAN OF CARE
Problem: Pain:  Description  Pain management should include both nonpharmacologic and pharmacologic interventions. Goal: Pain level will decrease  Description  Pain level will decrease  Outcome: Ongoing  Goal: Control of acute pain  Description  Control of acute pain  Outcome: Ongoing  Goal: Control of chronic pain  Description  Control of chronic pain  Outcome: Ongoing     Problem: Skin Integrity:  Goal: Will show no infection signs and symptoms  Description  Will show no infection signs and symptoms  Outcome: Ongoing  Goal: Absence of new skin breakdown  Description  Absence of new skin breakdown  Outcome: Ongoing     Problem: OXYGENATION/RESPIRATORY FUNCTION  Goal: Patient will maintain patent airway  Outcome: Ongoing  Goal: Patient will achieve/maintain normal respiratory rate/effort  Description  Respiratory rate and effort will be within normal limits for the patient  Outcome: Ongoing     Problem: SKIN INTEGRITY  Goal: Skin integrity is maintained or improved  Outcome: Ongoing     Problem: Falls - Risk of:  Goal: Will remain free from falls  Description  Will remain free from falls  Outcome: Ongoing  Goal: Absence of physical injury  Description  Absence of physical injury  Outcome: Ongoing     Problem: Risk for Impaired Skin Integrity  Goal: Tissue integrity - skin and mucous membranes  Description  Structural intactness and normal physiological function of skin and  mucous membranes.   Outcome: Ongoing     Problem: Nutrition  Goal: Optimal nutrition therapy  Description  Nutrition Problem: Inadequate oral intake  Intervention: Food and/or Nutrient Delivery: Start Parenteral Nutrition  Nutritional Goals: Meet % of estimated nutrition needs   Outcome: Ongoing     Problem: Confusion - Acute:  Goal: Absence of continued neurological deterioration signs and symptoms  Description  Absence of continued neurological deterioration signs and symptoms  Outcome: Ongoing  Goal: Mental status will be restored to baseline  Description  Mental status will be restored to baseline  Outcome: Ongoing     Problem: Discharge Planning:  Goal: Ability to perform activities of daily living will improve  Description  Ability to perform activities of daily living will improve  Outcome: Ongoing  Goal: Participates in care planning  Description  Participates in care planning  Outcome: Ongoing     Problem: Injury - Risk of, Physical Injury:  Goal: Will remain free from falls  Description  Will remain free from falls  Outcome: Ongoing  Goal: Absence of physical injury  Description  Absence of physical injury  Outcome: Ongoing     Problem: Mood - Altered:  Goal: Mood stable  Description  Mood stable  Outcome: Ongoing  Goal: Absence of abusive behavior  Description  Absence of abusive behavior  Outcome: Ongoing  Goal: Verbalizations of feeling emotionally comfortable while being cared for will increase  Description  Verbalizations of feeling emotionally comfortable while being cared for will increase  Outcome: Ongoing     Problem: Psychomotor Activity - Altered:  Goal: Absence of psychomotor disturbance signs and symptoms  Description  Absence of psychomotor disturbance signs and symptoms  Outcome: Ongoing     Problem: Sensory Perception - Impaired:  Goal: Demonstrations of improved sensory functioning will increase  Description  Demonstrations of improved sensory functioning will increase  Outcome: Ongoing  Goal: Decrease in sensory misperception frequency  Description  Decrease in sensory misperception frequency  Outcome: Ongoing  Goal: Able to refrain from responding to false sensory perceptions  Description  Able to refrain from responding to false sensory perceptions  Outcome: Ongoing  Goal: Demonstrates accurate environmental perceptions  Description  Demonstrates accurate environmental perceptions  Outcome: Ongoing  Goal: Able to distinguish between reality-based and nonreality-based thinking  Description  Able to distinguish between reality-based and nonreality-based thinking  Outcome: Ongoing  Goal: Able to interrupt nonreality-based thinking  Description  Able to interrupt nonreality-based thinking  Outcome: Ongoing     Problem: Sleep Pattern Disturbance:  Goal: Appears well-rested  Description  Appears well-rested  Outcome: Ongoing     Problem: SAFETY  Goal: Free from accidental physical injury  Outcome: Ongoing  Goal: Free from intentional harm  Outcome: Ongoing     Problem: DAILY CARE  Goal: Daily care needs are met  Outcome: Ongoing     Problem: PAIN  Goal: Patient's pain/discomfort is manageable  Outcome: Ongoing     Problem: KNOWLEDGE DEFICIT  Goal: Patient/S.O. demonstrates understanding of disease process, treatment plan, medications, and discharge instructions. Outcome: Ongoing     Problem: DISCHARGE BARRIERS  Goal: Patient's continuum of care needs are met  Outcome: Ongoing     Problem: Physical Regulation:  Goal: Diagnostic test results will improve  Description  Diagnostic test results will improve  Outcome: Ongoing  Goal: Will remain free from infection  Description  Will remain free from infection  Outcome: Ongoing  Goal: Ability to maintain vital signs within normal range will improve  Description  Ability to maintain vital signs within normal range will improve  Outcome: Ongoing  Patient is alert and oriented x4, denies any needs at this time, and is appropriate with call light. Will continue to monitor.

## 2020-01-07 NOTE — PLAN OF CARE
Problem: Pain:  Description  Pain management should include both nonpharmacologic and pharmacologic interventions. Goal: Pain level will decrease  Description  Pain level will decrease  Outcome: Ongoing  Goal: Control of acute pain  Description  Control of acute pain  Outcome: Ongoing  Goal: Control of chronic pain  Description  Control of chronic pain  Outcome: Ongoing     Problem: Skin Integrity:  Goal: Will show no infection signs and symptoms  Description  Will show no infection signs and symptoms  Outcome: Ongoing  Goal: Absence of new skin breakdown  Description  Absence of new skin breakdown  Outcome: Ongoing     Problem: OXYGENATION/RESPIRATORY FUNCTION  Goal: Patient will maintain patent airway  Outcome: Ongoing  Goal: Patient will achieve/maintain normal respiratory rate/effort  Description  Respiratory rate and effort will be within normal limits for the patient  Outcome: Ongoing     Problem: SKIN INTEGRITY  Goal: Skin integrity is maintained or improved  Outcome: Ongoing     Problem: Falls - Risk of:  Goal: Will remain free from falls  Description  Will remain free from falls  Outcome: Ongoing  Goal: Absence of physical injury  Description  Absence of physical injury  Outcome: Ongoing     Problem: Risk for Impaired Skin Integrity  Goal: Tissue integrity - skin and mucous membranes  Description  Structural intactness and normal physiological function of skin and  mucous membranes.   Outcome: Ongoing     Problem: Nutrition  Goal: Optimal nutrition therapy  Description  Nutrition Problem: Inadequate oral intake  Intervention: Food and/or Nutrient Delivery: Start Parenteral Nutrition  Nutritional Goals: Meet % of estimated nutrition needs   Outcome: Ongoing     Problem: Confusion - Acute:  Goal: Absence of continued neurological deterioration signs and symptoms  Description  Absence of continued neurological deterioration signs and symptoms  Outcome: Ongoing  Goal: Mental status will be restored to reality-based and nonreality-based thinking  Outcome: Ongoing  Goal: Able to interrupt nonreality-based thinking  Description  Able to interrupt nonreality-based thinking  Outcome: Ongoing     Problem: Sleep Pattern Disturbance:  Goal: Appears well-rested  Description  Appears well-rested  Outcome: Ongoing     Problem: SAFETY  Goal: Free from accidental physical injury  Outcome: Ongoing  Goal: Free from intentional harm  Outcome: Ongoing     Problem: DAILY CARE  Goal: Daily care needs are met  Outcome: Ongoing     Problem: PAIN  Goal: Patient's pain/discomfort is manageable  Outcome: Ongoing     Problem: KNOWLEDGE DEFICIT  Goal: Patient/S.O. demonstrates understanding of disease process, treatment plan, medications, and discharge instructions. Outcome: Ongoing     Problem: DISCHARGE BARRIERS  Goal: Patient's continuum of care needs are met  Outcome: Ongoing     Problem: Physical Regulation:  Goal: Diagnostic test results will improve  Description  Diagnostic test results will improve  Outcome: Ongoing  Goal: Will remain free from infection  Description  Will remain free from infection  Outcome: Ongoing  Goal: Ability to maintain vital signs within normal range will improve  Description  Ability to maintain vital signs within normal range will improve  Outcome: Ongoing  Patient is alert and oriented x4, denies any needs at this time, and is appropriate with call light. Will continue to monitor.

## 2020-01-07 NOTE — PLAN OF CARE
Problem: Pain:  Goal: Pain level will decrease  Description  Pain level will decrease  1/7/2020 0017 by Candice Recinos RN  Outcome: Ongoing  1/6/2020 1906 by Juan Alberto Young RN  Outcome: Ongoing  Goal: Control of acute pain  Description  Control of acute pain  1/7/2020 0017 by Candice Recinos RN  Outcome: Ongoing  1/6/2020 1906 by Juan Alberto Young RN  Outcome: Ongoing  Goal: Control of chronic pain  Description  Control of chronic pain  1/7/2020 0017 by Candice Recinos RN  Outcome: Ongoing  1/6/2020 1906 by Juan Alberto Young RN  Outcome: Ongoing     Problem: Skin Integrity:  Goal: Will show no infection signs and symptoms  Description  Will show no infection signs and symptoms  1/7/2020 0017 by Candice Recinos RN  Outcome: Ongoing  1/6/2020 1906 by Juan Alberto Young RN  Outcome: Ongoing  Goal: Absence of new skin breakdown  Description  Absence of new skin breakdown  1/7/2020 0017 by Candice Recinos RN  Outcome: Ongoing  1/6/2020 1906 by Juan Alberto Young RN  Outcome: Ongoing     Problem: OXYGENATION/RESPIRATORY FUNCTION  Goal: Patient will maintain patent airway  1/7/2020 0017 by Candice Recinos RN  Outcome: Ongoing  1/6/2020 1906 by Juan Alberto Young RN  Outcome: Ongoing  Goal: Patient will achieve/maintain normal respiratory rate/effort  Description  Respiratory rate and effort will be within normal limits for the patient  1/7/2020 0017 by Candice Recinos RN  Outcome: Ongoing  1/6/2020 1906 by Juan Alberto Young RN  Outcome: Ongoing     Problem: SKIN INTEGRITY  Goal: Skin integrity is maintained or improved  1/7/2020 0017 by Candice Recinos RN  Outcome: Ongoing  1/6/2020 1906 by Juan Alberto Young RN  Outcome: Ongoing     Problem: Falls - Risk of:  Goal: Will remain free from falls  Description  Will remain free from falls  1/7/2020 0017 by Candice Recinos RN  Outcome: Ongoing  1/6/2020 1906 by Juan Alberto Young RN  Outcome: Ongoing  Goal: Absence of physical injury  Description  Absence of physical injury  1/7/2020 0017 by Candice Recinos RN  Outcome: Ongoing  1/6/2020 perceptions  1/7/2020 0017 by Favio Rivers RN  Outcome: Ongoing  1/6/2020 1906 by Ara Auguste RN  Outcome: Ongoing  Goal: Able to distinguish between reality-based and nonreality-based thinking  Description  Able to distinguish between reality-based and nonreality-based thinking  1/7/2020 0017 by Favio Rivers RN  Outcome: Ongoing  1/6/2020 1906 by Ara Auguste RN  Outcome: Ongoing  Goal: Able to interrupt nonreality-based thinking  Description  Able to interrupt nonreality-based thinking  1/7/2020 0017 by Favio Rivers RN  Outcome: Ongoing  1/6/2020 1906 by Ara Auguste RN  Outcome: Ongoing     Problem: Sleep Pattern Disturbance:  Goal: Appears well-rested  Description  Appears well-rested  1/7/2020 0017 by Favio Rivers RN  Outcome: Ongoing  1/6/2020 1906 by Ara Auguste RN  Outcome: Ongoing     Problem: SAFETY  Goal: Free from accidental physical injury  1/7/2020 0017 by Favio Rivers RN  Outcome: Ongoing  1/6/2020 1906 by Ara Auguste RN  Outcome: Ongoing  Goal: Free from intentional harm  1/7/2020 0017 by Favio Rivers RN  Outcome: Ongoing  1/6/2020 1906 by Ara Auguste RN  Outcome: Ongoing     Problem: DAILY CARE  Goal: Daily care needs are met  1/7/2020 0017 by Favio Rivers RN  Outcome: Ongoing  1/6/2020 1906 by Ara Auguste RN  Outcome: Ongoing     Problem: PAIN  Goal: Patient's pain/discomfort is manageable  1/7/2020 0017 by Favio Rivers RN  Outcome: Ongoing  1/6/2020 1906 by Ara Auguste RN  Outcome: Ongoing     Problem: KNOWLEDGE DEFICIT  Goal: Patient/S.O. demonstrates understanding of disease process, treatment plan, medications, and discharge instructions.   1/7/2020 0017 by Favio Rivers RN  Outcome: Ongoing  1/6/2020 1906 by Ara Auguste RN  Outcome: Ongoing     Problem: DISCHARGE BARRIERS  Goal: Patient's continuum of care needs are met  1/7/2020 0017 by Favio Rivers RN  Outcome: Ongoing  1/6/2020 1906 by Ara Auguste RN  Outcome: Ongoing     Problem: Physical Regulation:  Goal: Diagnostic test results will improve  Description  Diagnostic test results will improve  1/7/2020 0017 by Alissa Looney RN  Outcome: Ongoing  1/6/2020 1906 by Alban Peterson RN  Outcome: Ongoing  Goal: Will remain free from infection  Description  Will remain free from infection  1/7/2020 0017 by Alissa Looney RN  Outcome: Ongoing  1/6/2020 1906 by Alban Peterson RN  Outcome: Ongoing  Goal: Ability to maintain vital signs within normal range will improve  Description  Ability to maintain vital signs within normal range will improve  1/7/2020 0017 by Alissa Looney RN  Outcome: Ongoing  1/6/2020 1906 by Alban Peterson RN  Outcome: Ongoing

## 2020-01-07 NOTE — PROGRESS NOTES
replacement **OR** potassium chloride, REFRESH LACRI-LUBE, magnesium hydroxide, ondansetron    Input/Output:       I/O last 3 completed shifts: In: 18 [P.O.:990]  Out: 2150 [Urine:700; Drains:1450]. Patient Vitals for the past 96 hrs (Last 3 readings):   Weight   20 0015 286 lb 9.6 oz (130 kg)   20 0612 287 lb (130.2 kg)   20 0643 285 lb (129.3 kg)       Vital Signs:   Temperature:  Temp: 99 °F (37.2 °C)  TMax:   Temp (24hrs), Av.9 °F (37.2 °C), Min:98.1 °F (36.7 °C), Max:99.6 °F (37.6 °C)    Respirations:  Resp: 11  Pulse:   Pulse: 84  BP:    BP: (!) 165/64  BP Range: Systolic (14GSP), NIURKA:475 , Min:143 , ANL:676       Diastolic (77LDW), BYN:06, Min:64, Max:102      Physical Examination:     General:  AAO x 3, speaking in full sentences, no accessory muscle use. HEENT: Atraumatic, normocephalic, no throat congestion, moist mucosa. Eyes:   Pupils equal, round and reactive to light, EOMI. Neck:   Supple  Chest:   Bilateral vesicular breath sounds, no rales or wheezes. Cardiac:  S1 S2 RR, no murmurs, gallops or rubs. Abdomen: Soft, obese, non-tender, no masses or organomegaly, BS audible. :   No suprapubic or flank tenderness. Neuro:  AAO x 3, No FND. SKIN:  No rashes, good skin turgor. Extremities:  +1 bilateral lower extremity edema. Positive upper extremity edema. Positive wound VAC.     Labs:       Recent Labs     20  0551 20  0627 20  0814   WBC 8.1 9.2 7.8   RBC 2.68* 2.72* 2.81*   HGB 7.7* 7.7* 8.1*   HCT 26.3* 26.2* 27.8*   MCV 98.1 96.3 98.9   MCH 28.7 28.3 28.8   MCHC 29.3 29.4 29.1   RDW 20.8* 20.6* 20.6*    377 363   MPV 8.0* 8.0* 8.2      BMP:   Recent Labs     20  0551 20  0620  0814    134* 134*   K 4.0 4.7 4.3    96* 99   CO2 22 22 22   BUN 45* 49* 49*   CREATININE 1.82* 1.90* 1.92*   GLUCOSE 93 116* 112*   CALCIUM 7.8* 7.8* 8.2*      Phosphorus:     Recent Labs     20   PHOS 5.4* Magnesium:    Recent Labs     01/05/20  0551 01/06/20  0627 01/07/20  0814   MG 1.7 1.8 1.7     MARINE:      Lab Results   Component Value Date    MARINE NEGATIVE 11/30/2019     SPEP:  Lab Results   Component Value Date    PROT 5.3 12/01/2019    PATH ELECTRONICALLY SIGNED. Castro Osei M.D. 11/30/2019     C3:     Lab Results   Component Value Date    C3 97 11/30/2019     C4:     Lab Results   Component Value Date    C4 12 11/30/2019     Hep BsAg:         Lab Results   Component Value Date    HEPBSAG NONREACTIVE 11/30/2019     Hep C AB:          Lab Results   Component Value Date    HEPCAB NONREACTIVE 11/30/2019       Urinalysis/Chemistries:      Lab Results   Component Value Date    NITRU NEGATIVE 11/29/2019    COLORU YELLOW 11/29/2019    PHUR 5.0 11/29/2019    WBCUA 5 TO 10 11/29/2019    RBCUA 0 TO 2 11/29/2019    MUCUS NOT REPORTED 11/29/2019    TRICHOMONAS NOT REPORTED 11/29/2019    YEAST NOT REPORTED 11/29/2019    BACTERIA NOT REPORTED 11/29/2019    SPECGRAV 1.015 11/29/2019    LEUKOCYTESUR NEGATIVE 11/29/2019    UROBILINOGEN Normal 11/29/2019    BILIRUBINUR NEGATIVE 11/29/2019    GLUCOSEU NEGATIVE 11/29/2019    KETUA NEGATIVE 11/29/2019    AMORPHOUS NOT REPORTED 11/29/2019     Urine Sodium:     Lab Results   Component Value Date    DONOVAN 39 11/30/2019      Urine Creatinine:     Lab Results   Component Value Date    LABCREA 116.4 11/30/2019     Radiology:     Reviewed. Assessment:     1. Acute Kidney Injury: Secondary to ischemic ATN from sepsis syndrome from necrotizing fasciitis. Last hemodialysis was on 12/27/2019. Patient does not require dialysis anymore. Tunneled catheter has been removed. Renal function stable. 2.  Chronic kidney diseae stage III from diabetic nephrosclerosis baseline 1.6-1.8 MG/DL.  At baseline. 3.  Necrotizing fasciitis status post wide complex debridement of gluteal, perineal region and open colostomy.    4. DM  5. S/P diverting colostomy. Plan:   1.   Continue oral Bumex 2 mg daily. 2.   Avoid NEPHORTOXINS. 3.  Continue monitor strict I's and O's renal function. 4.  Amborsio management as per urology. 5.  Since renal function is stable and urine output good, nephrology will sign off. Please call with any other questions. Patient's nurse was updated. Nutrition   Please ensure that patient is on a renal diet/TF. Avoid nephrotoxic drugs/contrast exposure. Leonel De Jesus MD  [facility]       This note is created with the assistance of a speech-recognition program. While intending to generate a document that actually reflects the content of the visit, no guarantees can be provided that every mistake has been identified and corrected by editing.

## 2020-01-07 NOTE — PROGRESS NOTES
PROGRESS NOTE    PATIENT NAME: Austin Mcdaniel 14[address] RECORD NO. [id number]  DATE: 1/7/2020  PRIMARY CARE PHYSICIAN: No primary care provider on file. HD: # 44    ASSESSMENT    Patient Active Problem List   Diagnosis    [facility] [address]    Essential hypertension    Diabetes [facility] [address]    Diabetic foot ulcer [facility] [address]    Diabetic foot infection [facility] [address]    Iron deficiency anemia    Muscle weakness of right upper extremity    Wrist drop, acquired, right     11/29/2019 wide complex debridement of nec fasc involving gluteal/perianal/distal rectum  12/03/2019 open diverting end colostomy   12/06/2019 I&D  12/08/2019 Incision and debridement of necrotizing fasciitis buttock wound, scrotum, bilateral groin region and closure of midline abdominal wound  12/10/2019 Sharp excisional debridement of gluteal, marc-rectal and perineal wound to level of muscle, Sharp excisional debridement of scrotum (performed by Urology), Washout of gluteal/perineal/scrotal/inguinal wound, Partial primary closure of pubic wound, and Dakins Wet to dry dressing application  40/53/4796 sharp excisional debridement of gluteal and perineal wounds to muscle 30 x 40 cm. Urology exam under anesthesia   12/24/2019 sharp debridement, partial closure of scrotum, placement of wound vac   12/26/2019 s/p sharp debridement of L side lateral aspect of sacral wound, replacement of wound vac    1/5/2020 gluteal wound debridement, veraflo wound vac change    1/8/2020 plan for vac change      MEDICAL DECISION MAKING AND PLAN    1. Vac change planned for 1/8. NPO at midnight tonight for surgery tomorrow. 2. Dr. Tiffany Orozco, plastic [place] grafting and possible flap in future  3. General diet. Encourage PO intake. NPO at midnight. 4.  Continue calorie count. May require replacement of flexiflo if not meeting needs  5. Bowel regimen: colace, miralax   6. Midline vac changes-wound RN following   7.  Strict glucose control <180      SUBJECTIVE    Kadeem James is seen and examined. Resting comfortably. Afebrile, VSS. Denies pain this AM. He has been eating his meals and supplementing with boost shakes. Nursing notes they did have to reinforce wound vac to keep a good seal yesterday and overnight. Currently has good seal.       OBJECTIVE  VITALS: Temp: Temp: 99.4 °F (37.4 °C)Temp  Av.8 °F (37.1 °C)  Min: 98.1 °F (36.7 °C)  Max: 99.6 °F (39.5 °C) BP Systolic (64PWG), UIR:065 , Min:143 , WQH:258   Diastolic (69LKO), ETQ:33, Min:75, Max:102   Pulse Pulse  Av.3  Min: 76  Max: 90 Resp Resp  Av.3  Min: 11  Max: 14 Pulse ox SpO2  Av.3 %  Min: 99 %  Max: 100 %  GENERAL: alert and oriented, no distress  NEURO: CNII-XII grossly intact  HEENT: atraumatic, normocephalic, sclera sclear, nose without deformity, trachea midline   LUNGS: effort normal, no respiratory distress, no accessory muscle use   HEART: normal rate and regular rhythm  ABDOMEN: soft, non-tender, non-distended, no guarding or peritoneal signs, midline wound vac in place with good seal. Ostomy appliance in place with small amount of stool present. Sacral/perineal wound vac in place with good seal.   EXTREMITY: no cyanosis, clubbing or edema. Bilateral foot wounds. I/O last 3 completed shifts: In: 18 [P.O.:990]  Out: 2150 [Urine:700; Drains:1450]    Drain/tube output: In: 350 [P.O.:350]  Out: 1825 [Urine:375; Drains:1450]    LAB:  CBC:   Recent Labs     20   WBC 8.1 9.2   HGB 7.7* 7.7*   HCT 26.3* 26.2*   MCV 98.1 96.3    377     BMP:   Recent Labs     20    134*   K 4.0 4.7    96*   CO2 22 22   BUN 45* 49*   CREATININE 1.82* 1.90*   GLUCOSE 93 116*     COAGS: No results for input(s): APTT, PROT, INR in the last 72 hours.     RADIOLOGY:  No new imaging         Berry Perez DO  General Surgery Resident        I personally evaluated the patient and directed the medical decision making with Resident/JUSTIN after the physical/radiologic exam and laboratory values were reviewed and confirmed.

## 2020-01-07 NOTE — PROGRESS NOTES
Writer offered bed bath to patient, but patient refused. Writer told patient that if doesn't want a full bath, I at least needed to complete lema care. Lema care was completed, but bed bath was refused. Will attempt again later.     Electronically signed by Alissa Looney RN on 1/6/2020 at 9:46 PM

## 2020-01-07 NOTE — ANESTHESIA PRE PROCEDURE
Active Problem List   Diagnosis Code    Necrotizing fasciitis ([facility] M72.6    Essential hypertension I10    Diabetes ([facility] E11.9    Diabetic foot ulcer ([facility] E11.621, L97.509    Diabetic foot infection ([facility] E11.628, L08.9    Iron deficiency anemia D50.9    Muscle weakness of right upper extremity M62.81    Wrist drop, acquired, right M21.331       Past Medical History:        Diagnosis Date    CHF (congestive heart failure) (formerly [facility])     Diabetes mellitus ([facility]     Hypertension     Septic shock ([facility]     Spina bifida aperta of lumbar spine ([facility]        Past Surgical History:        Procedure Laterality Date    ABDOMEN SURGERY N/A 12/8/2019    DEBRIDEMENT  NECROTIZING FASCIITIS BUTTOCK, WOUND VAC REMOVAL DELAYED PRIMARY CLOSURE OF MIDLINE ABDOMINAL INCISION, OSTOMY BAG CHANGE performed by Fartun Mcgill MD at [address] [place] [address],[address] N/A 12/10/2019    DEBRIDEMENT OF SACRAL WOUND performed by Roberto Brooks MD at [address] [place] [address],[address] N/A 1/5/2020    GLUTEAL WOUND DEBRIDEMENT, WOUND VAC CHANGE performed by Roberto Brooks MD at [place] [address]. 1/1/2020    IRRIGATION AND DEBRIDEMENT BUTTOCKS, SCROTUM, ABDOMEN, WOUND VAC CHANGE performed by Fartun Mcgill MD at [facility] 12/3/2019    LAPAROSCOPIC CONVERTED TO OPEN DIVERTING LOOP COLOSTOMY; WOUND VAC APPLICATION performed by Steph Cuellar MD at [place] [address]  01/05/2020    [address], WOUND VAC CHANGE     INCISION AND DRAINAGE Bilateral 11/29/2019    RECTAL PERIRECTAL INCISION AND DRAINAGE, DIVERING LOOP COLOSTOMY CREATION performed by Merlin Pih, MD at [facility] N/A 12/6/2019    DEBRIDEMENT NECROTIZING FASCIAITIS BILAT BUTTOCK performed by Roberto Brooks MD at [facility] [address] N/A 12/20/2019    DEBRIDEMENT GLUTEAL AND SCROTAL REGIONS performed by Merlin Pih, MD at [place] [address] [id number], BEART, [id number]     Type & Screen (If Applicable):  No results found for: LABABO, [address]    Anesthesia Evaluation  Patient summary reviewed and Nursing notes reviewed no history of anesthetic complications:   Airway: Mallampati: III       Comment: ETT in place    Dental:          Pulmonary:normal exam                               Cardiovascular:  Exercise tolerance: poor (<4 METS),   (+) hypertension:, CHF: diastolic and systolic,     (-) past MI, CABG/stent, dysrhythmias and  angina    ECG reviewed  Rhythm: regular  Rate: abnormal  Echocardiogram reviewed  Stress test reviewed                Neuro/Psych:   (+) neuromuscular disease:,              ROS comment: Hydrocephelus, non functioning  shunt  GI/Hepatic/Renal:   (+) morbid obesity          Endo/Other:    (+) DiabetesType II DM, poorly controlled, , blood dyscrasia: anemia:., .                 Abdominal:           Vascular: negative vascular ROS. Anesthesia Plan      general     ASA 4       Induction: intravenous. MIPS: Postoperative opioids intended.                       Wilner Bustillos MD   1/7/2020

## 2020-01-08 ENCOUNTER — ANESTHESIA (OUTPATIENT)
Dept: OPERATING ROOM | Age: 35
DRG: 463 | End: 2020-01-08
Payer: MEDICARE

## 2020-01-08 VITALS — DIASTOLIC BLOOD PRESSURE: 78 MMHG | OXYGEN SATURATION: 100 % | TEMPERATURE: 97 F | SYSTOLIC BLOOD PRESSURE: 114 MMHG

## 2020-01-08 LAB
ABSOLUTE EOS #: 0.3 K/UL (ref 0–0.4)
ABSOLUTE IMMATURE GRANULOCYTE: 0.07 K/UL (ref 0–0.3)
ABSOLUTE LYMPH #: 1.18 K/UL (ref 1–4.8)
ABSOLUTE MONO #: 0.74 K/UL (ref 0.1–0.8)
ANION GAP SERPL CALCULATED.3IONS-SCNC: 15 MMOL/L (ref 9–17)
BASOPHILS # BLD: 1 % (ref 0–2)
BASOPHILS ABSOLUTE: 0.07 K/UL (ref 0–0.2)
BUN BLDV-MCNC: 46 MG/DL (ref 6–20)
BUN/CREAT BLD: ABNORMAL (ref 9–20)
CALCIUM SERPL-MCNC: 8.1 MG/DL (ref 8.6–10.4)
CHLORIDE BLD-SCNC: 99 MMOL/L (ref 98–107)
CO2: 21 MMOL/L (ref 20–31)
CREAT SERPL-MCNC: 1.77 MG/DL (ref 0.7–1.2)
DIFFERENTIAL TYPE: ABNORMAL
EOSINOPHILS RELATIVE PERCENT: 4 % (ref 1–4)
GFR AFRICAN AMERICAN: 53 ML/MIN
GFR NON-AFRICAN AMERICAN: 44 ML/MIN
GFR SERPL CREATININE-BSD FRML MDRD: ABNORMAL ML/MIN/{1.73_M2}
GFR SERPL CREATININE-BSD FRML MDRD: ABNORMAL ML/MIN/{1.73_M2}
GLUCOSE BLD-MCNC: 101 MG/DL (ref 75–110)
GLUCOSE BLD-MCNC: 102 MG/DL (ref 75–110)
GLUCOSE BLD-MCNC: 104 MG/DL (ref 75–110)
GLUCOSE BLD-MCNC: 108 MG/DL (ref 75–110)
GLUCOSE BLD-MCNC: 160 MG/DL (ref 75–110)
GLUCOSE BLD-MCNC: 85 MG/DL (ref 75–110)
GLUCOSE BLD-MCNC: 98 MG/DL (ref 75–110)
GLUCOSE BLD-MCNC: 99 MG/DL (ref 70–99)
HCT VFR BLD CALC: 27.2 % (ref 40.7–50.3)
HCT VFR BLD CALC: 28.4 % (ref 40.7–50.3)
HEMOGLOBIN: 8.2 G/DL (ref 13–17)
HEMOGLOBIN: 8.5 G/DL (ref 13–17)
IMMATURE GRANULOCYTES: 1 %
LYMPHOCYTES # BLD: 16 % (ref 24–44)
MAGNESIUM: 1.6 MG/DL (ref 1.6–2.6)
MCH RBC QN AUTO: 28.9 PG (ref 25.2–33.5)
MCHC RBC AUTO-ENTMCNC: 29.9 G/DL (ref 28.4–34.8)
MCV RBC AUTO: 96.6 FL (ref 82.6–102.9)
MONOCYTES # BLD: 10 % (ref 1–7)
MORPHOLOGY: ABNORMAL
MORPHOLOGY: ABNORMAL
NRBC AUTOMATED: 0 PER 100 WBC
PDW BLD-RTO: 20.1 % (ref 11.8–14.4)
PHOSPHORUS: 4.5 MG/DL (ref 2.5–4.5)
PLATELET # BLD: 382 K/UL (ref 138–453)
PLATELET ESTIMATE: ABNORMAL
PMV BLD AUTO: 8.3 FL (ref 8.1–13.5)
POTASSIUM SERPL-SCNC: 4.2 MMOL/L (ref 3.7–5.3)
RBC # BLD: 2.94 M/UL (ref 4.21–5.77)
RBC # BLD: ABNORMAL 10*6/UL
SEG NEUTROPHILS: 68 % (ref 36–66)
SEGMENTED NEUTROPHILS ABSOLUTE COUNT: 5.04 K/UL (ref 1.8–7.7)
SODIUM BLD-SCNC: 135 MMOL/L (ref 135–144)
WBC # BLD: 7.4 K/UL (ref 3.5–11.3)
WBC # BLD: ABNORMAL 10*3/UL

## 2020-01-08 PROCEDURE — 2500000003 HC RX 250 WO HCPCS: Performed by: SPECIALIST

## 2020-01-08 PROCEDURE — 7100000001 HC PACU RECOVERY - ADDTL 15 MIN: Performed by: SURGERY

## 2020-01-08 PROCEDURE — 6370000000 HC RX 637 (ALT 250 FOR IP): Performed by: STUDENT IN AN ORGANIZED HEALTH CARE EDUCATION/TRAINING PROGRAM

## 2020-01-08 PROCEDURE — 6360000002 HC RX W HCPCS: Performed by: SPECIALIST

## 2020-01-08 PROCEDURE — 6360000002 HC RX W HCPCS: Performed by: STUDENT IN AN ORGANIZED HEALTH CARE EDUCATION/TRAINING PROGRAM

## 2020-01-08 PROCEDURE — 97606 NEG PRS WND THER DME>50 SQCM: CPT

## 2020-01-08 PROCEDURE — 85025 COMPLETE CBC W/AUTO DIFF WBC: CPT

## 2020-01-08 PROCEDURE — 3700000000 HC ANESTHESIA ATTENDED CARE: Performed by: SURGERY

## 2020-01-08 PROCEDURE — 2580000003 HC RX 258: Performed by: SURGERY

## 2020-01-08 PROCEDURE — 85014 HEMATOCRIT: CPT

## 2020-01-08 PROCEDURE — 2580000003 HC RX 258: Performed by: STUDENT IN AN ORGANIZED HEALTH CARE EDUCATION/TRAINING PROGRAM

## 2020-01-08 PROCEDURE — 2720000010 HC SURG SUPPLY STERILE: Performed by: SURGERY

## 2020-01-08 PROCEDURE — 84100 ASSAY OF PHOSPHORUS: CPT

## 2020-01-08 PROCEDURE — 3600000005 HC SURGERY LEVEL 5 BASE: Performed by: SURGERY

## 2020-01-08 PROCEDURE — 99232 SBSQ HOSP IP/OBS MODERATE 35: CPT | Performed by: INTERNAL MEDICINE

## 2020-01-08 PROCEDURE — 1200000000 HC SEMI PRIVATE

## 2020-01-08 PROCEDURE — 2580000003 HC RX 258: Performed by: SPECIALIST

## 2020-01-08 PROCEDURE — 2709999900 HC NON-CHARGEABLE SUPPLY: Performed by: SURGERY

## 2020-01-08 PROCEDURE — 3700000001 HC ADD 15 MINUTES (ANESTHESIA): Performed by: SURGERY

## 2020-01-08 PROCEDURE — 2060000000 HC ICU INTERMEDIATE R&B

## 2020-01-08 PROCEDURE — 82947 ASSAY GLUCOSE BLOOD QUANT: CPT

## 2020-01-08 PROCEDURE — 36415 COLL VENOUS BLD VENIPUNCTURE: CPT

## 2020-01-08 PROCEDURE — 85018 HEMOGLOBIN: CPT

## 2020-01-08 PROCEDURE — 0HD6XZZ EXTRACTION OF BACK SKIN, EXTERNAL APPROACH: ICD-10-PCS | Performed by: SURGERY

## 2020-01-08 PROCEDURE — 83735 ASSAY OF MAGNESIUM: CPT

## 2020-01-08 PROCEDURE — 7100000000 HC PACU RECOVERY - FIRST 15 MIN: Performed by: SURGERY

## 2020-01-08 PROCEDURE — 80048 BASIC METABOLIC PNL TOTAL CA: CPT

## 2020-01-08 PROCEDURE — 3600000015 HC SURGERY LEVEL 5 ADDTL 15MIN: Performed by: SURGERY

## 2020-01-08 RX ORDER — ROCURONIUM BROMIDE 10 MG/ML
INJECTION, SOLUTION INTRAVENOUS PRN
Status: DISCONTINUED | OUTPATIENT
Start: 2020-01-08 | End: 2020-01-08 | Stop reason: SDUPTHER

## 2020-01-08 RX ORDER — MIDAZOLAM HYDROCHLORIDE 1 MG/ML
INJECTION INTRAMUSCULAR; INTRAVENOUS PRN
Status: DISCONTINUED | OUTPATIENT
Start: 2020-01-08 | End: 2020-01-08 | Stop reason: SDUPTHER

## 2020-01-08 RX ORDER — LABETALOL HYDROCHLORIDE 5 MG/ML
5 INJECTION, SOLUTION INTRAVENOUS EVERY 10 MIN PRN
Status: DISCONTINUED | OUTPATIENT
Start: 2020-01-08 | End: 2020-01-08

## 2020-01-08 RX ORDER — SODIUM CHLORIDE, SODIUM LACTATE, POTASSIUM CHLORIDE, CALCIUM CHLORIDE 600; 310; 30; 20 MG/100ML; MG/100ML; MG/100ML; MG/100ML
INJECTION, SOLUTION INTRAVENOUS CONTINUOUS PRN
Status: DISCONTINUED | OUTPATIENT
Start: 2020-01-08 | End: 2020-01-08 | Stop reason: SDUPTHER

## 2020-01-08 RX ORDER — FENTANYL CITRATE 50 UG/ML
25 INJECTION, SOLUTION INTRAMUSCULAR; INTRAVENOUS EVERY 5 MIN PRN
Status: DISCONTINUED | OUTPATIENT
Start: 2020-01-08 | End: 2020-01-08

## 2020-01-08 RX ORDER — LIDOCAINE HYDROCHLORIDE 10 MG/ML
INJECTION, SOLUTION INFILTRATION; PERINEURAL PRN
Status: DISCONTINUED | OUTPATIENT
Start: 2020-01-08 | End: 2020-01-08 | Stop reason: SDUPTHER

## 2020-01-08 RX ORDER — GLYCOPYRROLATE 1 MG/5 ML
SYRINGE (ML) INTRAVENOUS PRN
Status: DISCONTINUED | OUTPATIENT
Start: 2020-01-08 | End: 2020-01-08 | Stop reason: SDUPTHER

## 2020-01-08 RX ORDER — PHENYLEPHRINE HYDROCHLORIDE 10 MG/ML
INJECTION INTRAVENOUS PRN
Status: DISCONTINUED | OUTPATIENT
Start: 2020-01-08 | End: 2020-01-08 | Stop reason: SDUPTHER

## 2020-01-08 RX ORDER — OXYCODONE HYDROCHLORIDE AND ACETAMINOPHEN 5; 325 MG/1; MG/1
1 TABLET ORAL PRN
Status: DISCONTINUED | OUTPATIENT
Start: 2020-01-08 | End: 2020-01-08

## 2020-01-08 RX ORDER — AMLODIPINE BESYLATE 10 MG/1
10 TABLET ORAL DAILY
Status: DISCONTINUED | OUTPATIENT
Start: 2020-01-08 | End: 2020-01-16 | Stop reason: HOSPADM

## 2020-01-08 RX ORDER — FENTANYL CITRATE 50 UG/ML
INJECTION, SOLUTION INTRAMUSCULAR; INTRAVENOUS PRN
Status: DISCONTINUED | OUTPATIENT
Start: 2020-01-08 | End: 2020-01-08 | Stop reason: SDUPTHER

## 2020-01-08 RX ORDER — OXYCODONE HYDROCHLORIDE 5 MG/1
10 TABLET ORAL EVERY 4 HOURS PRN
Status: DISCONTINUED | OUTPATIENT
Start: 2020-01-08 | End: 2020-01-16 | Stop reason: HOSPADM

## 2020-01-08 RX ORDER — ONDANSETRON 2 MG/ML
4 INJECTION INTRAMUSCULAR; INTRAVENOUS
Status: DISCONTINUED | OUTPATIENT
Start: 2020-01-08 | End: 2020-01-08

## 2020-01-08 RX ORDER — DIPHENHYDRAMINE HYDROCHLORIDE 50 MG/ML
12.5 INJECTION INTRAMUSCULAR; INTRAVENOUS
Status: DISCONTINUED | OUTPATIENT
Start: 2020-01-08 | End: 2020-01-08

## 2020-01-08 RX ORDER — PROPOFOL 10 MG/ML
INJECTION, EMULSION INTRAVENOUS PRN
Status: DISCONTINUED | OUTPATIENT
Start: 2020-01-08 | End: 2020-01-08 | Stop reason: SDUPTHER

## 2020-01-08 RX ORDER — NEOSTIGMINE METHYLSULFATE 5 MG/5 ML
SYRINGE (ML) INTRAVENOUS PRN
Status: DISCONTINUED | OUTPATIENT
Start: 2020-01-08 | End: 2020-01-08 | Stop reason: SDUPTHER

## 2020-01-08 RX ORDER — MAGNESIUM HYDROXIDE 1200 MG/15ML
LIQUID ORAL CONTINUOUS PRN
Status: COMPLETED | OUTPATIENT
Start: 2020-01-08 | End: 2020-01-08

## 2020-01-08 RX ORDER — OXYCODONE HYDROCHLORIDE AND ACETAMINOPHEN 5; 325 MG/1; MG/1
2 TABLET ORAL PRN
Status: DISCONTINUED | OUTPATIENT
Start: 2020-01-08 | End: 2020-01-08

## 2020-01-08 RX ORDER — OXYCODONE HYDROCHLORIDE 5 MG/1
5 TABLET ORAL EVERY 4 HOURS PRN
Status: DISCONTINUED | OUTPATIENT
Start: 2020-01-08 | End: 2020-01-16 | Stop reason: HOSPADM

## 2020-01-08 RX ORDER — MORPHINE SULFATE 2 MG/ML
2 INJECTION, SOLUTION INTRAMUSCULAR; INTRAVENOUS EVERY 5 MIN PRN
Status: DISCONTINUED | OUTPATIENT
Start: 2020-01-08 | End: 2020-01-08

## 2020-01-08 RX ADMIN — PHENYLEPHRINE HYDROCHLORIDE 200 MCG: 10 INJECTION INTRAVENOUS at 10:58

## 2020-01-08 RX ADMIN — DOCUSATE SODIUM 100 MG: 100 CAPSULE, LIQUID FILLED ORAL at 08:31

## 2020-01-08 RX ADMIN — PHENYLEPHRINE HYDROCHLORIDE 100 MCG: 10 INJECTION INTRAVENOUS at 10:28

## 2020-01-08 RX ADMIN — MULTIPLE VITAMINS W/ MINERALS TAB 1 TABLET: TAB at 08:31

## 2020-01-08 RX ADMIN — PHENYLEPHRINE HYDROCHLORIDE 100 MCG: 10 INJECTION INTRAVENOUS at 10:20

## 2020-01-08 RX ADMIN — LIDOCAINE HYDROCHLORIDE 50 MG: 10 INJECTION, SOLUTION INFILTRATION; PERINEURAL at 10:02

## 2020-01-08 RX ADMIN — DOCUSATE SODIUM 100 MG: 100 CAPSULE, LIQUID FILLED ORAL at 21:09

## 2020-01-08 RX ADMIN — CARVEDILOL 25 MG: 25 TABLET, FILM COATED ORAL at 08:31

## 2020-01-08 RX ADMIN — PROPOFOL 200 MG: 10 INJECTION, EMULSION INTRAVENOUS at 10:02

## 2020-01-08 RX ADMIN — ROCURONIUM BROMIDE 50 MG: 10 INJECTION INTRAVENOUS at 10:02

## 2020-01-08 RX ADMIN — Medication 0.8 MG: at 11:55

## 2020-01-08 RX ADMIN — PHENYLEPHRINE HYDROCHLORIDE 200 MCG: 10 INJECTION INTRAVENOUS at 11:04

## 2020-01-08 RX ADMIN — PHENYLEPHRINE HYDROCHLORIDE 200 MCG: 10 INJECTION INTRAVENOUS at 10:52

## 2020-01-08 RX ADMIN — FENTANYL CITRATE 50 MCG: 50 INJECTION, SOLUTION INTRAMUSCULAR; INTRAVENOUS at 11:11

## 2020-01-08 RX ADMIN — Medication: at 16:41

## 2020-01-08 RX ADMIN — FENTANYL CITRATE 50 MCG: 50 INJECTION, SOLUTION INTRAMUSCULAR; INTRAVENOUS at 10:02

## 2020-01-08 RX ADMIN — Medication 3 G: at 10:31

## 2020-01-08 RX ADMIN — INSULIN LISPRO 3 UNITS: 100 INJECTION, SOLUTION INTRAVENOUS; SUBCUTANEOUS at 21:08

## 2020-01-08 RX ADMIN — PHENYLEPHRINE HYDROCHLORIDE 100 MCG: 10 INJECTION INTRAVENOUS at 10:34

## 2020-01-08 RX ADMIN — INSULIN GLARGINE 10 UNITS: 100 INJECTION, SOLUTION SUBCUTANEOUS at 08:32

## 2020-01-08 RX ADMIN — ACETAMINOPHEN 650 MG: 325 TABLET ORAL at 02:10

## 2020-01-08 RX ADMIN — OXYCODONE HYDROCHLORIDE 10 MG: 5 TABLET ORAL at 21:17

## 2020-01-08 RX ADMIN — Medication 0.2 MG: at 10:01

## 2020-01-08 RX ADMIN — FAMOTIDINE 20 MG: 20 TABLET, FILM COATED ORAL at 08:31

## 2020-01-08 RX ADMIN — Medication 1 MG: at 23:57

## 2020-01-08 RX ADMIN — SENNOSIDES 8.6 MG: 8.6 TABLET, FILM COATED ORAL at 21:10

## 2020-01-08 RX ADMIN — MIDAZOLAM HYDROCHLORIDE 2 MG: 1 INJECTION, SOLUTION INTRAMUSCULAR; INTRAVENOUS at 10:01

## 2020-01-08 RX ADMIN — HEPARIN SODIUM 5000 UNITS: 5000 INJECTION INTRAVENOUS; SUBCUTANEOUS at 23:57

## 2020-01-08 RX ADMIN — PHENYLEPHRINE HYDROCHLORIDE 200 MCG: 10 INJECTION INTRAVENOUS at 10:45

## 2020-01-08 RX ADMIN — Medication 4 MG: at 11:56

## 2020-01-08 RX ADMIN — BUMETANIDE 2 MG: 1 TABLET ORAL at 08:31

## 2020-01-08 RX ADMIN — ACETAMINOPHEN 650 MG: 325 TABLET ORAL at 18:10

## 2020-01-08 RX ADMIN — SODIUM CHLORIDE, POTASSIUM CHLORIDE, SODIUM LACTATE AND CALCIUM CHLORIDE: 600; 310; 30; 20 INJECTION, SOLUTION INTRAVENOUS at 09:51

## 2020-01-08 RX ADMIN — SODIUM CHLORIDE, PRESERVATIVE FREE 10 ML: 5 INJECTION INTRAVENOUS at 21:21

## 2020-01-08 ASSESSMENT — PULMONARY FUNCTION TESTS
PIF_VALUE: 22
PIF_VALUE: 23
PIF_VALUE: 22
PIF_VALUE: 24
PIF_VALUE: 22
PIF_VALUE: 22
PIF_VALUE: 23
PIF_VALUE: 23
PIF_VALUE: 22
PIF_VALUE: 23
PIF_VALUE: 23
PIF_VALUE: 22
PIF_VALUE: 22
PIF_VALUE: 23
PIF_VALUE: 26
PIF_VALUE: 24
PIF_VALUE: 22
PIF_VALUE: 22
PIF_VALUE: 23
PIF_VALUE: 22
PIF_VALUE: 23
PIF_VALUE: 22
PIF_VALUE: 23
PIF_VALUE: 22
PIF_VALUE: 23
PIF_VALUE: 23
PIF_VALUE: 22
PIF_VALUE: 1
PIF_VALUE: 22
PIF_VALUE: 23
PIF_VALUE: 28
PIF_VALUE: 22
PIF_VALUE: 0
PIF_VALUE: 22
PIF_VALUE: 23
PIF_VALUE: 22
PIF_VALUE: 23
PIF_VALUE: 22
PIF_VALUE: 22
PIF_VALUE: 24
PIF_VALUE: 22
PIF_VALUE: 28
PIF_VALUE: 25
PIF_VALUE: 23
PIF_VALUE: 22
PIF_VALUE: 23
PIF_VALUE: 22
PIF_VALUE: 24
PIF_VALUE: 23
PIF_VALUE: 22
PIF_VALUE: 23
PIF_VALUE: 22
PIF_VALUE: 22
PIF_VALUE: 24
PIF_VALUE: 23
PIF_VALUE: 12
PIF_VALUE: 22
PIF_VALUE: 25
PIF_VALUE: 23
PIF_VALUE: 23
PIF_VALUE: 22
PIF_VALUE: 23
PIF_VALUE: 23
PIF_VALUE: 22
PIF_VALUE: 24
PIF_VALUE: 22
PIF_VALUE: 22
PIF_VALUE: 23
PIF_VALUE: 20
PIF_VALUE: 22
PIF_VALUE: 27
PIF_VALUE: 23
PIF_VALUE: 26
PIF_VALUE: 23
PIF_VALUE: 23
PIF_VALUE: 24
PIF_VALUE: 22
PIF_VALUE: 22
PIF_VALUE: 21
PIF_VALUE: 23
PIF_VALUE: 24
PIF_VALUE: 22
PIF_VALUE: 23
PIF_VALUE: 21
PIF_VALUE: 23
PIF_VALUE: 21
PIF_VALUE: 24
PIF_VALUE: 23
PIF_VALUE: 3
PIF_VALUE: 22
PIF_VALUE: 24
PIF_VALUE: 23
PIF_VALUE: 23
PIF_VALUE: 22
PIF_VALUE: 23
PIF_VALUE: 25
PIF_VALUE: 22

## 2020-01-08 ASSESSMENT — PAIN SCALES - GENERAL
PAINLEVEL_OUTOF10: 8
PAINLEVEL_OUTOF10: 7
PAINLEVEL_OUTOF10: 0
PAINLEVEL_OUTOF10: 10
PAINLEVEL_OUTOF10: 0

## 2020-01-08 ASSESSMENT — PAIN - FUNCTIONAL ASSESSMENT: PAIN_FUNCTIONAL_ASSESSMENT: 0-10

## 2020-01-08 ASSESSMENT — PAIN DESCRIPTION - DESCRIPTORS: DESCRIPTORS: CONSTANT;ACHING

## 2020-01-08 NOTE — PLAN OF CARE
Problem: Pain:  Goal: Pain level will decrease  Description  Pain level will decrease  1/7/2020 2346 by Emir Cerda RN  Outcome: Ongoing  1/7/2020 1724 by Yamlieth Foy RN  Outcome: Ongoing  Goal: Control of acute pain  Description  Control of acute pain  1/7/2020 2346 by Emir Cerda RN  Outcome: Ongoing  1/7/2020 1724 by Yamileth Foy RN  Outcome: Ongoing  Goal: Control of chronic pain  Description  Control of chronic pain  1/7/2020 2346 by Emir Cerda RN  Outcome: Ongoing  1/7/2020 1724 by Yamileth Foy RN  Outcome: Ongoing     Problem: Skin Integrity:  Goal: Will show no infection signs and symptoms  Description  Will show no infection signs and symptoms  1/7/2020 2346 by Emir Cerda RN  Outcome: Ongoing  1/7/2020 1724 by Yamileth Foy RN  Outcome: Ongoing  Goal: Absence of new skin breakdown  Description  Absence of new skin breakdown  1/7/2020 2346 by Emir Cerda RN  Outcome: Ongoing  1/7/2020 1724 by Yamileth Foy RN  Outcome: Ongoing     Problem: OXYGENATION/RESPIRATORY FUNCTION  Goal: Patient will maintain patent airway  1/7/2020 1724 by Yamileth Foy RN  Outcome: Ongoing  Goal: Patient will achieve/maintain normal respiratory rate/effort  Description  Respiratory rate and effort will be within normal limits for the patient  1/7/2020 1724 by Yamileth Foy RN  Outcome: Ongoing     Problem: SKIN INTEGRITY  Goal: Skin integrity is maintained or improved  1/7/2020 1724 by Yamileth Foy RN  Outcome: Ongoing     Problem: Falls - Risk of:  Goal: Will remain free from falls  Description  Will remain free from falls  1/7/2020 2346 by Emir Cerda RN  Outcome: Ongoing  1/7/2020 1724 by Yamileth Foy RN  Outcome: Ongoing  Goal: Absence of physical injury  Description  Absence of physical injury  1/7/2020 2346 by Emir Cerda RN  Outcome: Ongoing  1/7/2020 1724 by Yamileth Foy RN  Outcome: Ongoing     Problem: Risk for Impaired Skin Integrity  Goal: Tissue integrity - skin and mucous membranes  Description  Structural intactness and normal physiological function of skin and  mucous membranes.   1/7/2020 2346 by Robi Al RN  Outcome: Ongoing  1/7/2020 1724 by Genoveva Burt RN  Outcome: Ongoing     Problem: Nutrition  Goal: Optimal nutrition therapy  Description  Nutrition Problem: Inadequate oral intake  Intervention: Food and/or Nutrient Delivery: Start Parenteral Nutrition  Nutritional Goals: Meet % of estimated nutrition needs   1/7/2020 1724 by Genoveva Burt RN  Outcome: Ongoing     Problem: Confusion - Acute:  Goal: Absence of continued neurological deterioration signs and symptoms  Description  Absence of continued neurological deterioration signs and symptoms  1/7/2020 1724 by Genoveva Burt RN  Outcome: Ongoing  Goal: Mental status will be restored to baseline  Description  Mental status will be restored to baseline  1/7/2020 1724 by Genoveva Burt RN  Outcome: Ongoing     Problem: Discharge Planning:  Goal: Ability to perform activities of daily living will improve  Description  Ability to perform activities of daily living will improve  1/7/2020 1724 by Genoveva Burt RN  Outcome: Ongoing  Goal: Participates in care planning  Description  Participates in care planning  1/7/2020 1724 by Genoveva Burt RN  Outcome: Ongoing     Problem: Injury - Risk of, Physical Injury:  Goal: Will remain free from falls  Description  Will remain free from falls  1/7/2020 2346 by Robi Al RN  Outcome: Ongoing  1/7/2020 1724 by Genoveva Burt RN  Outcome: Ongoing  Goal: Absence of physical injury  Description  Absence of physical injury  1/7/2020 2346 by Robi Al RN  Outcome: Ongoing  1/7/2020 1724 by Genoveva Burt RN  Outcome: Ongoing     Problem: Mood - Altered:  Goal: Mood stable  Description  Mood stable  1/7/2020 1724 by Genoveva Burt RN  Outcome: Ongoing  Goal: Absence of abusive behavior  Description  Absence of abusive behavior  1/7/2020 1724 by Genoveva Burt RN  Outcome: Ongoing  Goal: Ongoing     Problem: DAILY CARE  Goal: Daily care needs are met  1/7/2020 1724 by Anahi Winslow RN  Outcome: Ongoing     Problem: PAIN  Goal: Patient's pain/discomfort is manageable  1/7/2020 1724 by Anahi Winslow RN  Outcome: Ongoing     Problem: KNOWLEDGE DEFICIT  Goal: Patient/S.O. demonstrates understanding of disease process, treatment plan, medications, and discharge instructions.   1/7/2020 1724 by Anahi Winslow RN  Outcome: Ongoing     Problem: DISCHARGE BARRIERS  Goal: Patient's continuum of care needs are met  1/7/2020 1724 by Anahi Winslow RN  Outcome: Ongoing     Problem: Physical Regulation:  Goal: Diagnostic test results will improve  Description  Diagnostic test results will improve  1/7/2020 1724 by Anahi Winslow RN  Outcome: Ongoing  Goal: Will remain free from infection  Description  Will remain free from infection  1/7/2020 1724 by Anahi Winslow RN  Outcome: Ongoing  Goal: Ability to maintain vital signs within normal range will improve  Description  Ability to maintain vital signs within normal range will improve  1/7/2020 1724 by Anahi Winslow RN  Outcome: Ongoing

## 2020-01-08 NOTE — PROGRESS NOTES
Physical Therapy  DATE: 2020    NAME: Kadeem James  MRN: [id number]   : 1985    Patient not seen this date for Physical Therapy due to:  [] Blood transfusion in progress  [] Hemodialysis  []  Patient Declined  [] Spine Precautions   [] Strict Bedrest  [x] Surgery/ Procedure (pt off the floor for wound vac change. Will check back )  [] Testing      [] Other        [] PT being discontinued at this time. Patient independent. No further needs. [] PT being discontinued at this time as the patient has been transferred to palliative care. No further needs.     Cheryl Paige, PTA

## 2020-01-08 NOTE — PROGRESS NOTES
[facility]  Internal Medicine Teaching Residency Program  Inpatient Daily Progress Note  ______________________________________________________________________________    Patient: Sylvia Salvador  YOB: 1985   [facility]:[id number]    Acct: [de-identified]     Room: [facility]  Admit date: 11/29/2019  Today's date: 01/08/20  Number of days in the hospital: 36    SUBJECTIVE   Admitting Diagnosis: Necrotizing fasciitis ([address]    No acute issues overnight. Patient is scheduled for wound VAC change today in the OR by general surgery. Patient was still experiencing some urinary leakage outside the Ambrosio and will consider upsizing Ambrosio as there is been no improvement. Patient's blood pressure has been uncontrolled given that he is in and out of surgery every other day, will start the patient on Norvasc 10 mg and hold it if SBP is less than 130. Patient is tolerating food and denies any complaints. ROS:  Constitutional:  negative for chills, fevers, sweats  Respiratory:  negative for cough, dyspnea on exertion, hemoptysis, shortness of breath, wheezing  Cardiovascular:  negative for chest pain, chest pressure/discomfort, lower extremity edema, palpitations  Gastrointestinal:  negative for abdominal pain, constipation, diarrhea, nausea, vomiting  Neurological:  negative for dizziness, headache  BRIEF HISTORY     [address] [place] [address] Emergency Department with complaint of generalized weakness for the past few days. On examination the emergency department staff noticed the patient had a foul smell which they thought he had a bowel movement. They turned the patient and noticed sloughing of skin on his back and buttocks.      In the emergency department patient was afebrile but tachycardic.  Initial labs demonstrated hyponatremia of 130, bicarb 12, BUN 63 and creatinine 2.82, hypocalcemia 7.7, alkaline phosphatase 144, AST 83, ALT 16, lactate 1.4, leukocytosis of 30.4, hemoglobin 9.2. Chest xray demonstrated no acute process. CT abdomen and pelvis without contrast demonstrated extensive subcutaneous emphysema involving both buttocks extending to the perineum. Possible phlegmon or developing soft tissue abscess overlying the left lateral abdominal wall. Scrotal Ultrasound demonstrated no evidence of testicular torsion or mass however did show extensive scrotal wall edema. He was given zosyn 4/5 g IV, rocephin 1 g IV and flagyl 500 mg IV in the emergency department.     He was transferred to [facility] ICU for Necrotizing Fasciitis and Acute Renal Failure.   Patient initially required pressors, but currently is off pressors. Griselda Plan a 37 y.o. with PMH of spina bifida, hypertension, diabetes and diabetic foot ulcer. Who presented to [facility] Emergency Department with complaint of generalized weakness for the past few days. On examination the emergency department staff noticed the patient had a foul smell which they thought he had a bowel movement. They turned the patient and noticed sloughing of skin on his back and buttocks.       11/29/2019 wide complex debridement of nec fasc involving gluteal/perianal/distal rectum  12/03/2019 open diverting end colostomy   12/06/2019 I&D  12/08/2019 Incision and debridement of necrotizing fasciitis buttock wound, scrotum, bilateral groin region and closure of midline abdominal wound  12/10/2019 Sharp excisional debridement of gluteal, marc-rectal and perineal wound to level of muscle, Sharp excisional debridement of scrotum (performed by Urology), Washout of gluteal/perineal/scrotal/inguinal wound, Partial primary closure of pubic wound, and Dakins Wet to dry dressing application  90/85/3102 sharp excisional debridement of gluteal and perineal wounds to muscle 30 x 40 cm.  Urology exam under anesthesia   12/24/2019 sharp debridement, partial closure of scrotum, placement of wound vac   12/26/2019 s/p sharp debridement of L side lateral aspect of sacral wound, replacement of wound vac    2019: Vera flow irrigation VAC to gluteal wound with debridement.  Started VAC to perineal wound on midline laparotomy wound  1/3/2020:Patient is off the Flexiflo and is tolerating p.o. diet.  Encouraged to use Ensure in between meals to maintain the caloric requirement.  Tunneled catheter removed by IR as per nephrology recommendations as the patient does not need dialysis. 2020: Placed on renal diet.  Neurology will place right wrist splint  2020: Sacral wound VAC changed today by  surgery. 2020: Sacral wound VAC change and debridement by general surgery    OBJECTIVE     Vital Signs:  BP (!) 156/89   Pulse 85   Temp 97.7 °F (36.5 °C) (Axillary)   Resp 11   Ht 5' 7\" (1.702 m)   Wt 286 lb 9.6 oz (130 kg)   SpO2 100%   BMI 44.89 kg/m²     Temp (24hrs), Av.4 °F (36.9 °C), Min:97.7 °F (36.5 °C), Max:99 °F (37.2 °C)    In: 910   Out: 4725 [Urine:3200; Drains:1525]    Physical Exam:    General appearance: alert and cooperative with exam  HEENT: Head: Normocephalic, no lesions, without obvious abnormality.   Neck: no adenopathy, no carotid bruit, no JVD, supple, symmetrical, trachea midline and thyroid not enlarged, symmetric, no tenderness/mass/nodules  Lungs: clear to auscultation bilaterally  Heart: regular rate and rhythm, S1, S2 normal, no murmur, click, rub or gallop  Abdomen: Colostomy tube, soft positive BS  Extremities: edema Bilaterally and Covered with dressing  Neurologic: Mental status: Alert, oriented, thought content appropriate    Medications:  Scheduled Medications:    oxybutynin  1 patch Transdermal Once per day on     docusate sodium  100 mg Oral BID    senna  1 tablet Oral Nightly    therapeutic multivitamin-minerals  1 tablet Oral Daily    carvedilol  25 mg Per NG tube Daily    insulin glargine  10 Units Subcutaneous Daily    bumetanide  2 mg Oral Daily    darbepoetin rohan-polysorbate  100 mcg Intravenous Weekly    polyethylene glycol  17 g Oral Daily    insulin lispro  0-18 Units Subcutaneous Q4H    alteplase  1 mg Intracatheter Once    povidone-iodine   Topical Daily    famotidine  20 mg Per NG tube Daily    sodium chloride flush  10 mL Intravenous 2 times per day    [Held by provider] heparin (porcine)  5,000 Units Subcutaneous 3 times per day     Continuous Infusions:    dextrose      sodium chloride      dextrose      sodium chloride 10 mL/hr at 12/28/19 2003     PRN MedicationshydrALAZINE, 10 mg, Q4H PRN  magnesium sulfate, 1 g, PRN  glucose, 15 g, PRN  dextrose, 12.5 g, PRN  glucagon (rDNA), 1 mg, PRN  dextrose, 100 mL/hr, PRN  sodium phosphate IVPB, 0.16 mmol/kg, PRN    Or  sodium phosphate IVPB, 0.32 mmol/kg, PRN  oxyCODONE, 7.5 mg, Q6H PRN    Or  oxyCODONE, 10 mg, Q6H PRN  heparin (porcine), 1,900 Units, PRN  heparin (porcine), 1,900 Units, PRN  melatonin, 1 mg, Nightly PRN  acetaminophen, 650 mg, Q4H PRN  labetalol, 20 mg, Q6H PRN  heparin (porcine), 500 Units, PRN  heparin (porcine), 1,750 Units, PRN  glucose, 15 g, PRN  dextrose, 12.5 g, PRN  glucagon (rDNA), 1 mg, PRN  dextrose, 100 mL/hr, PRN  albumin human, 25 g, PRN  LORazepam, 1 mg, Q6H PRN  sodium chloride, 250 mL, PRN  sodium chloride, 150 mL, PRN  albumin human, 25 g, PRN  sodium chloride flush, 10 mL, PRN  potassium chloride, 40 mEq, PRN    Or  potassium alternative oral replacement, 40 mEq, PRN    Or  potassium chloride, 10 mEq, PRN  REFRESH LACRI-LUBE, , PRN  magnesium hydroxide, 30 mL, Daily PRN  ondansetron, 4 mg, Q6H PRN        Diagnostic Labs:  CBC:   Recent Labs     01/06/20  0627 01/07/20  0814 01/08/20  0633   WBC 9.2 7.8 7.4   RBC 2.72* 2.81* 2.94*   HGB 7.7* 8.1* 8.5*   HCT 26.2* 27.8* 28.4*   MCV 96.3 98.9 96.6   RDW 20.6* 20.6* 20.1*    363 382     BMP:   Recent Labs     01/06/20  0627 01/07/20  0814 01/08/20  0633   * 134* 135   K 4.7 4.3 4.2   CL 96* 99 99   CO2 22 22 21   PHOS 5.4*  --  4.5 BUN 49* 49* 46*   CREATININE 1.90* 1.92* 1.77*     BNP: No results for input(s): BNP in the last 72 hours. PT/INR: No results for input(s): PROTIME, INR in the last 72 hours. APTT: No results for input(s): APTT in the last 72 hours. CARDIAC ENZYMES: No results for input(s): CKMB, CKMBINDEX, TROPONINI in the last 72 hours. Invalid input(s): CKTOTAL;3  FASTING LIPID PANEL:  Lab Results   Component Value Date    TRIG 208 (H) 12/19/2019     LIVER PROFILE: No results for input(s): AST, ALT, ALB, BILIDIR, BILITOT, ALKPHOS in the last 72 hours. MICROBIOLOGY:   Lab Results   Component Value Date/Time    CULTURE NO GROWTH 6 DAYS 12/02/2019 04:13 AM       Imaging:    Xr Knee Right (1-2 Views)    Result Date: 1/3/2020  No acute osseous abnormality No effusion     Mri Cervical Spine Wo Contrast    Result Date: 1/3/2020  Detail is limited due to large amount of artifact, particularly in the canal. Multilevel degenerative disc disease is noted, as described. See above for details of each level. There is a large amount of posterior soft tissue edema in the cervical spine and upper thoracic spine. This is not completely included on the examination. This is indeterminate may be due to multifocal myositis. Rhabdomyolysis or infectious processes are possible as well.        ASSESSMENT & PLAN     ASSESSMENT / PLAN:     Principal Problem:    11/29/2019 wide complex debridement of nec fasc involving gluteal/perianal/distal rectum  12/03/2019 open diverting end colostomy   12/06/2019 I&D  12/08/2019 Incision and debridement of necrotizing fasciitis buttock wound, scrotum, bilateral groin region and closure of midline abdominal wound  12/10/2019 Sharp excisional debridement of gluteal, marc-rectal and perineal wound to level of muscle, Sharp excisional debridement of scrotum (performed by Urology), Washout of gluteal/perineal/scrotal/inguinal wound, Partial primary closure of pubic wound, and Dakins Wet to dry dressing surgeries and the risk of bowel injury.  Recommended oxybutynin patch and upsizing Ambrosio catheter.        DVT ppx : heparin  PT/OT: Consulted  Discharge Planning / SW: Ongoing    Waynette Burkitt, MD  Internal Medicine Resident, PGY-1  [facility];  [place], [place]  1/8/2020, 8:25 AM

## 2020-01-08 NOTE — PROGRESS NOTES
Reason for [id number]  Nurse Evaluation and Assessment:   NPWT dressing change to abdominal incision with colostomy care. NAME:  Ren Pena  YOB: 1985  MEDICAL RECORD NUMBER:  [id number]  DATE:  11/29/2019 01/08/20 1537   Colostomy LLQ Descending/sigmoid   Placement Date: 12/03/19   Location: LLQ  Colostomy Type: Descending/sigmoid   Stomal Appliance 1 piece; Changed   Stoma  Assessment Pink;Moist;Flush   Mucocutaneous Junction Separation  (minimal, superficial 7-11 o'clock)   Peristomal Assessment Intact   Treatment Bag change; Liquid skin barrier  (barrier ring)   Stool Appearance Formed   Stool Color Brown   Stool Amount Large   Incision 12/03/19 Abdomen Mid   Date First Assessed/Time First Assessed: 12/03/19 1158   Present on Hospital Admission: No  Primary Wound Type: Surgical Type  Location: Abdomen  Wound Location Orientation: Mid   Wound Image    Wound Assessment Epithelialization;Pink;Red;Granulation tissue;Slough; Yellow   Liliam-wound Assessment Dry; Intact   Closure None   Drainage Amount Small   Drainage Description Sanguinous   Odor None   Dressing/Treatment Vacuum dressing   Dressing Changed Changed/New   Dressing Change Due 01/08/20   Negative Pressure Wound Therapy Abdomen Mid   Placement Date/Time: 12/20/19 1800   Pre-existing: No  Inserted by: Noel Rebolledo  Location: Abdomen  Wound Location Orientation: Mid   Wound Type Surgical   Unit Type VAC Ulta   Dressing Type Black foam   Number of pieces used 2   Cycle Continuous; On   Target Pressure (mmHg) 125   Canister changed? No   Dressing Status Changed   Dressing Changed Changed/New   Wound 11/30/19 Heel Left   Date First Assessed/Time First Assessed: 11/30/19 2000   Present on Hospital Admission: Yes  Primary Wound Type: Pressure Injury  Location: (c) Heel  Wound Location Orientation: Left   Wound Image    Wound Diabetic   Dressing Status Changed   Dressing Changed Changed/New   Dressing/Treatment Betadine swabs; Moist to moist;ABD;Roll gauze   Wound Cleansed Rinsed/Irrigated with saline   Dressing Change Due 01/09/20   Wound Assessment Red;Black   Drainage Amount Moderate   Drainage Description Serous; Yellow   Odor None   Marc-wound Assessment Dry; Intact     Routine ostomy care completed with NPWT dressing change   Betadine/saline moistened gauze applied to left plantar foot under ABD and roll gauze. Continued autolysis of the necrotic material noted. Loose piece of eschar removed. NPWT:    Applied Negative Pressure to abdomen wound.  [x] Applied skin barrier prep to marc-wound.  [x] Cut strips of hydrocolloid and plastic drape to picture frame wound so that marc-wound is covered with the drape.  [] If bridging dressing to less prominent site, cover any intact skin that will come in contact with the Negative Pressure Therapy sponge, gauze or channel drain with plastic drape. The sponge should never touch intact skin.  [x] Cut sponge, gauze or channel drain to size which will fit into the wound/ulcer bed without being forced.  [x] Be sure the sponge is large enough to hold the entire round plastic flange which is attached to the tubing. Never allow flange to be larger than the sponge or it will produce suction damaging intact skin.  Total number of individual pieces of foam used within the wound bed: 2     [] If bridging the dressing away from the primary site, be sure the bridge leads to a piece of sponge large enough to hold the entire flange without allowing any of the flange to overlap onto intact skin.  [x] Covered sponge, gauze or channel drain with plastic drape.  [x] Cut a hole in this plastic drape directly over the sponge the same size as the plastic drain tubing.  [x] Removed plastic liner from flange and apply it directly over the hole you cut.  [x] Removed the plastic cover from the flange.     [x] Attached the tubing to the wound/ulcer Negative Pressure Therapy and turn it on to be sure a vacuum is

## 2020-01-08 NOTE — BRIEF OP NOTE
Brief Postoperative Note  ______________________________________________________________    Patient: Gisselle Sanches  YOB: 1985  MRN: [id number]  Date of Procedure: 1/8/2020    Pre-Op Diagnosis: NECROTIZING FASCIITIS    Post-Op Diagnosis: Same       Procedure(s):  WOUND VAC CHANGE, SACRAL WOUND DEBRIDEMENT    Anesthesia: General    Surgeon(s):  Kita Foy MD    Assistant: Lynn Munoz    Estimated Blood Loss (mL): 20 ml    Fluids: 356 ml    Complications: None    Specimens:   * No specimens in log *    Implants:  * No implants in log *      Drains:   Negative Pressure Wound Therapy Abdomen Mid (Active)   Wound Type Surgical 1/8/2020  9:25 AM   Unit Type vac ultra 1/7/2020  8:00 PM   Dressing Type Black foam 1/8/2020  9:25 AM   Number of pieces used 2 1/6/2020  4:00 PM   Cycle Continuous; On 1/8/2020  9:25 AM   Target Pressure (mmHg) 125 1/8/2020  9:25 AM   Canister changed? No 1/8/2020  9:25 AM   Dressing Status Clean;Dry; Intact 1/8/2020  9:25 AM   Dressing Changed Changed/New 1/8/2020  9:25 AM   Drainage Amount Moderate 1/8/2020  9:25 AM   Drainage Description Serosanguinous; Tan 1/8/2020  9:25 AM   Dressing Change Due 01/08/20 1/8/2020  9:25 AM   Output (ml) 50 ml 1/8/2020  6:42 AM   Liliam-wound Assessment Other (Comment) 1/8/2020  9:25 AM   Odor None 1/8/2020  9:25 AM       Negative Pressure Wound Therapy Buttocks Lower;Mid (Active)   Wound Type Surgical 1/8/2020  9:25 AM   Unit Type KCI VAC VERAFLO 1/7/2020  8:00 PM   Dressing Type Black foam 1/8/2020  9:25 AM   Cycle Continuous; On 1/8/2020  9:25 AM   Target Pressure (mmHg) Other (Comment) 1/8/2020  9:25 AM   Irrigation Solution Normal Saline 1/7/2020  8:00 PM   Dwell Volume 50 mL 1/6/2020  4:00 PM   Soak Duration 2 minutes 1/6/2020  4:00 PM   Soak Frequency 2 hours 1/6/2020  4:00 PM   Canister changed? Yes 1/8/2020  9:25 AM   Dressing Status Clean;Dry; Intact 1/8/2020  9:25 AM   Dressing Changed Changed/New 1/8/2020 11:05 AM   Output (ml) 475 ml 1/8/2020  5:10 AM       Colostomy LLQ Descending/sigmoid (Active)   Stomal Appliance 1 piece 1/6/2020 12:14 PM   Flange Size (inches) 1.5 Inches 12/23/2019 12:20 PM   Stoma  Assessment JESSI 1/8/2020  9:25 AM   Mucocutaneous Junction Intact 1/8/2020  9:25 AM   Peristomal Assessment Intact 1/8/2020  9:25 AM   Treatment Bag change 1/6/2020 12:14 PM   Stool Appearance Soft; Formed 1/8/2020  9:25 AM   Stool Color Brown 1/8/2020  9:25 AM   Stool Amount Medium 1/8/2020  9:25 AM   Output (mL) 0 ml 1/4/2020  4:00 AM       Urethral Catheter Coude 18 fr (Active)   Catheter Indications Urology/Urologist seeing this patient or inserted indwelling catheter 1/8/2020  9:25 AM   Urine Color Yellow 1/8/2020  9:25 AM   Urine Appearance Clear 1/8/2020  9:25 AM   Output (mL) 900 mL 1/8/2020  6:41 AM       [REMOVED] Closed/Suction Drain Superior Abdomen Bulb (Removed)   Site Description Healing 12/10/2019  4:00 PM   Dressing Status Other (Comment) 12/10/2019  4:00 PM   Drainage Appearance Serous 12/10/2019  4:00 PM   Status To bulb suction 12/10/2019  4:00 AM   Output (ml) 10 ml 12/10/2019  8:39 AM       [REMOVED] Negative Pressure Wound Therapy Abdomen Medial;Upper (Removed)   Wound Type Surgical 12/8/2019  6:00 PM   Dressing Type Black foam 12/8/2019  6:00 PM   Cycle Continuous 12/8/2019  6:00 PM   Target Pressure (mmHg) 125 12/8/2019  6:00 PM   Irrigation Solution Normal Saline 12/7/2019  8:00 PM   Canister changed? No 12/8/2019  6:00 PM   Dressing Status Clean;Dry; Intact 12/8/2019  6:00 PM   Dressing Changed Changed/New 12/3/2019  5:26 PM   Drainage Description Serosanguinous 12/8/2019  6:00 PM   Output (ml) 50 ml 12/5/2019  4:00 PM   Odor None 12/8/2019  6:00 PM       [REMOVED] Negative Pressure Wound Therapy (Removed)   Number of pieces used 1 12/16/2019 12:00 AM   Output (ml) 50 ml 12/17/2019  6:03 AM   Wound Assessment Fragile; Red 12/14/2019 12:00 AM       [REMOVED] Negative Pressure Wound Therapy Buttocks (Removed)   Wound Skin Dry; Intact 12/21/2019  8:00 AM   Securement device Yes 12/21/2019  8:00 AM   Status Clamped 12/21/2019  8:00 AM   NG/OG/NJ/NE External Measurement (cm) 55 cm 12/21/2019  4:00 AM       [REMOVED] Urethral Catheter (Removed)   Catheter Indications Urology/Urologist seeing this patient or inserted indwelling catheter 1/7/2020  4:00 PM   Securement Device Date Changed 01/06/20 1/6/2020  8:00 PM   Urine Color Yellow 1/7/2020  4:00 PM   Urine Appearance Sediment 1/7/2020  4:00 PM   Output (mL) 150 mL 1/7/2020  6:23 AM       Findings: Wound class 4.  Debridement of sacral wound with ultrasonic debridement, successful placement of wound vac    Isirdo Downing DO  Date: 1/8/2020  Time: 12:21 PM

## 2020-01-08 NOTE — FLOWSHEET NOTE
Assessment:   made pre-surgery visit with patient while rounding. Upon entry, patient was sitting up in bed watching television. Patient did not readily enter into conversation, but attempted to answer 's questions. He is supported by his mother. Intervention:   probed into patient's feelings regarding his disease and how his life has been affected. Patient had difficulty at times answering 's questions, including what the surgery tomorrow entails. Patient ny with his difficulties by taking life one day at a time. Patient declined 's offer of prayer. Outcome:  Evidently, 's questions were too personal, as patient requested  leave because this was patient's quiet time. Martinez Monterroso     01/07/20 2015   Encounter Summary   Services provided to: Patient   Referral/Consult From: [facility]   Support System Parent   Continue Visiting   (1/07/2020)   Complexity of Encounter Low   Length of Encounter 15 minutes   Routine   Type Pre-procedure   Assessment Calm; Approachable;Coping   Intervention Active listening;Explored feelings, thoughts, concerns;Explored coping resources;Nurtured hope   Outcome Refused/declined

## 2020-01-08 NOTE — OP NOTE
previously approximated at portions was again revisited and the remaining open scrotral area was approximated with 0 Prolene sutures extending posteriorly toward the sacral wound. Two pieces of black vac foam were placed in a 1 inch area that was left open on the posterior aspect of the groin region to assist with keeping a good vac seal.         The edges of the skin of the sacral wound were protected with Mepilex. Coloplast was used in the superior portion of the sacral wound to assist with achieving a good seal on the area. We then began applying the Veraflo irrigating wound VAC by placing the gray honeycomb sponge first on the wound bed followed by the solid gray sponge. Acrylic drape was applied over all portions of the gray sponge and the duoderm protecting the wound. The VAC was then hooked to the wound VAC system on continuous suction without evidence of leak. This concluded the procedure.      All instrument, sponge, and needle counts were correct at closure and at the conclusion of the case. The patient tolerated the procedure well and was taken to the PACU in stable condition     Dr. Alejandrina Paiz was present and scrubbed for the entire case.        Media (images taken in OR today prior to beginning of procedure)       Anterior scrotal wound      Posterior sacral wound              Radha Brink DO  General Surgery Resident

## 2020-01-08 NOTE — PROGRESS NOTES
PROGRESS NOTE    PATIENT NAME: Austin Mcdaniel [address] RECORD NO. [id number]  DATE: 1/8/2020  PRIMARY CARE PHYSICIAN: No primary care provider on file. HD: # 40    ASSESSMENT    Patient Active Problem List   Diagnosis    [facility]    Essential hypertension    Diabetes [facility]    Diabetic foot ulcer [facility]    Diabetic foot infection [facility]    Iron deficiency anemia    Muscle weakness of right upper extremity    Wrist drop, acquired, right     11/29/2019 wide complex debridement of nec fasc involving gluteal/perianal/distal rectum  12/03/2019 open diverting end colostomy   12/06/2019 I&D  12/08/2019 Incision and debridement of necrotizing fasciitis buttock wound, scrotum, bilateral groin region and closure of midline abdominal wound  12/10/2019 Sharp excisional debridement of gluteal, marc-rectal and perineal wound to level of muscle, Sharp excisional debridement of scrotum (performed by Urology), Washout of gluteal/perineal/scrotal/inguinal wound, Partial primary closure of pubic wound, and Dakins Wet to dry dressing application  63/84/8713 sharp excisional debridement of gluteal and perineal wounds to muscle 30 x 40 cm. Urology exam under anesthesia   12/24/2019 sharp debridement, partial closure of scrotum, placement of wound vac   12/26/2019 s/p sharp debridement of L side lateral aspect of sacral wound, replacement of wound vac    1/5/2020 gluteal wound debridement, veraflo wound vac change    1/8/2020 plan for vac change  today    MEDICAL DECISION MAKING AND PLAN    1. Vac change planned for today. 2. Dr. Dakota Frank, plastic surgery, following from [facility] for grafting and possible flap in future. Discussed case yesterday, will be present for the next dressing change to assess for potential intervention  3. NPO. General diet after surgery  4. Continue calorie count. 5. Bowel regimen: colace, miralax   6. Midline vac changes-wound RN following   7.  Strict glucose control

## 2020-01-08 NOTE — PROGRESS NOTES
viable. · One more debridement in OR scheduled for 12-10-19 and then decision will be made to change code status or not. · Pt did not tolerate HD on 12-9. It was stopped early and pt required vasopressor support with Levophed, remains on vent. · Pt to OR 12-10 for further debridement  · Repeat I&D planned for 12-16-19 was cancelled because of high K levels  · Bedside I & D on 12/17/19. · Patient to undergo additional I&D on 12-20-19  · 12/23 right upper lobe collapse  · 12/24 debridement with excision of sacral wound in preparation for a flap, partial closure of his scrotal wound, VAC and debridement over the perineum  · 12-30 Wound RN to change abdominal vac   · 1-1-20 Returned to OR for further debridement of perineal wound and vac change  · 1-3 Lt heel and abd wound vac changed per wound care  · 1-3 Order for removal of Lt tunnel cath removal. Last HD 12-27, pt showing signs of renal recovery. · 1-5 to OR for further debridement and vac change  · S/P OR 1-8 for debridement and vac change. Infection Control Recommendations   · [place] Precautions  · Contact Isolation MRSA    Antimicrobial Stewardship Recommendations     Off antibiotics  Coordination of Outpatient Care:   · Estimated Length of IV antimicrobials: D/C 12-19-19  · Patient will need Midline Catheter Insertion: No  · Patient will need PICC line Insertion:Has Central line  · Patient will need: Home IV , [place],  SNF,  LTAC: TBD  · Patient will need outpatient wound care:Yes    Chief complaint/reason for consultation:   · Ashley's vs necrotizing fasciitis    History of Present Illness:   Lisa Musa is a 37y.o.-year-old  male who was initially admitted on 11/29/2019. Patient seen at the request of . INITIAL HISTORY:    Patient presented through ER in [place] with complaints of weakness of a few days duration. Examination showed skin necrosis in the gluteal and perineal region. Patient transferred to [place] [address].  CT scan showed extensive subcutaneous emphysema. Patient underwent I&D and extensive complex debridement of affected tissues on 11-29-19. Gram stain of tissues showed Strep. Spp and Gram negative bacilli. 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 He has underlying DM 2, prior diabetic foot infection, DEBBY. Patient more stable post surgery but with hypotension requiring a pressor. Zosyn D/C because of of poor LVEF, in order to reduce Na and fluid load  Meropenem started adjusted for Cr Cl 30 cc  Meropenem adjusted for HD  Per surgery after debridement on 12-8-19, infection has spread to deep planes with the urethra less viable. One more debridement in OR scheduled for 12-10-19 and then decision will be made to change code status or not. Pt did not tolerate HD on 12-9. It was stopped early and pt required vasopressor support with Levophed, remains on vent. Pt to OR 12-10 for further debridement  · Repeat I&D planned for 12-16-19 was cancelled because of high K levels  · Bedside I & D on 12/17/19. · Patient to undergo additional I&D on 12-20-19  · 12/23 right upper lobe collapse  · 12/24 debridement with excision of sacral wound in preparation for a flap, partial closure of his scrotal wound, VAC and debridement over the perineum  · Pt making urine. Last HD 12-27  · 12-30 Abd vac change per wound RN  · 1-1-20 Returned to OR for further debridement of perineal wound and vac change  · 1-4 Tunnel cath discontinued  · 1-5 to OR for debridement and vac change  · 1-8 to OR for vac change, plastics following for possible flap in future    CURRENT EVALUATION :1/8/2020     Afebrile  VS stable - HTN    Pt is AA  Responding appropriately to questions  Denies chills or fevers.   No complaints    Per RN, minimal ostomy output constipated   Pt is on colace, miralax  Continued leakage from lema catheter, urology is on board    Abdomen is soft, active BS, ostomy intact HCT 27.8* 28.4*    382   LYMPHOPCT 29 16*   MONOPCT 2 10*     BMP:   Recent Labs     20  0814 20  0633   * 135   K 4.3 4.2   CL 99 99   CO2 22 21   BUN 49* 46*   CREATININE 1.92* 1.77*   MG 1.7 1.6     Hepatic Function Panel:   No results for input(s): PROT, LABALBU, BILIDIR, IBILI, BILITOT, ALKPHOS, ALT, AST in the last 72 hours. No results for input(s): RPR in the last 72 hours. No results for input(s): HIV in the last 72 hours. No results for input(s): BC in the last 72 hours. Lab Results   Component Value Date    MUCUS NOT REPORTED 2019    RBC 2.94 2020    TRICHOMONAS NOT REPORTED 2019    WBC 7.4 2020    YEAST NOT REPORTED 2019    TURBIDITY TURBID 2019     Lab Results   Component Value Date    CREATININE 1.77 2020    GLUCOSE 99 2020       Medical Decision Making-Imagin-29 CXR    2019 3:07 pm       COMPARISON:   2019       HISTORY:   ORDERING SYSTEM PROVIDED HISTORY: follow  up   TECHNOLOGIST PROVIDED HISTORY:   follow  up   Acuity: Unknown   Type of Exam: Unknown       FINDINGS:   Endotracheal tube not visualized. Enteric tube courses below the diaphragm.       Left and right-sided catheters terminating at the cavoatrial junction.       Improved aeration of the right upper lobe.  Pulmonary vascular indistinctness   compatible with interstitial pulmonary edema.  Low lung volumes.  No   pneumothorax.  No pleural effusion.  Stable cardiomegaly.           Impression   1. Endotracheal tube not identified. 2. Venous catheters terminating at the cavoatrial junction. 3. Interstitial pulmonary edema.  CT Abd/pelvis  EXAMINATION:   CT OF THE ABDOMEN AND PELVIS WITH CONTRAST 2019 2:29 am       TECHNIQUE:   CT of the abdomen and pelvis was performed with the administration of   intravenous contrast. Multiplanar reformatted images are provided for review.    Dose modulation, iterative reconstruction, and/or weight based adjustment of   the mA/kV was utilized to reduce the radiation dose to as low as reasonably   achievable.       COMPARISON:   November 29, 2019.       HISTORY:   ORDERING SYSTEM PROVIDED HISTORY: Katlyn galindo   TECHNOLOGIST PROVIDED HISTORY:       [facility] fasc   Reason for Exam: nec fasc; Trauma   Acuity: Unknown   Type of Exam: Subsequent/Follow-up       FINDINGS:   Lower Chest: Partially visualized bilateral pleural effusions with bibasilar   heterogeneous opacities and bilateral lower lobe consolidations.  Central   venous catheter tip near the superior atrial caval junction.  Cardiomegaly. Trace pericardial fluid.       Liver: Normal.       Gallbladder and Bile Ducts: Normal.       Spleen: Splenomegaly.       Adrenal Glands: Normal.       Pancreas: Normal.       Genitourinary: Symmetric renal atrophy.  Redemonstration of multiple   hypodensities in the right kidney which likely represent benign cysts.  No   urinary stones or hydronephrosis.  Ambrosio catheter in the decompressed urinary   bladder.       Bowel: Normal caliber bowel.  Postsurgical changes of the bowel with left   lower quadrant ostomy.  No evidence of acute appendicitis.  No significant   diverticular disease.       Vasculature: Atherosclerosis.  No abdominal aortic aneurysm.       Bones and Soft Tissues: Diffuse soft tissue stranding and fluid. Postsurgical changes in the anterior abdominal wall with midline incision   demonstrating expected small amount of fluid and air.  Large soft tissue   defects in the right inguinal region, scrotum, right greater than left   gluteal creases with associated packing material.  Small amount of   surrounding soft tissue gas.  There is associated skin thickening in these   regions.  Bilateral lower abdominal wall skin thickening.       Retroperitoneum/Mesentery: No intraperitoneal free air.  Mild abdominopelvic   ascites.  Loculated fluid along the inferior aspect of the liver.    Redemonstration of the pubic symphysis with sagittal and coronal 2D reformatted images. Oral contrast administered:  No.  Automatic exposure control (AEC) was utilized. CT ABDOMEN FINDINGS:      The lung bases are unremarkable. There is a small pericardial effusion versus thickening. The liver, spleen, and pancreas demonstrate no acute abnormality given compromised evaluation without intravenous contrast.  There may be mild splenomegaly.  The adrenal glands appear within normal limits. There is no hydronephrosis or obstructing urinary tract calculi. Small amount of free fluid is seen adjacent to the liver inferiorly. .    The appendix is visualized in the right lower quadrant and appears within normal limits.       There is no evidence for bowel obstruction. Stranding is seen within the subcutaneous fat overlying both lateral abdominal walls left greater than right.  There is ill-defined fluid collection within the subcutaneous fat overlying the left lateral abdominal wall possibly representing phlegmon or developing abscess although no organized   abscess is seen. There is a large amount of soft tissue emphysema involving both the right and left buttock extending to the perineum tissue thickening is seen especially on the left involving the left buttock. CT PELVIS FINDINGS:        No distal ureteral calculi or bladder calculi. There is no free fluid in the pelvis. Catheter is seen within the urinary bladder. Osseous changes within the left hemipelvis which may be chronic in nature. IMPRESSION:    Extensive subcutaneous emphysema as described above involving both buttocks extending to the perineum.  The possibility of Ashley gangrene/necrotizing fasciitis cannot be excluded. Possible phlegmon or developing soft tissue abscess overlying the left lateral abdominal wall as noted above.  No organized abscess is seen however. Osseous changes within the left hemipelvis which may be chronic in nature.   Results the study were called to Dr. Levar Palomino [phone] on 11/29/2019  All CT scans at this facility use dose modulation, iterative reconstruction, and/or weight based dosing when appropriate to reduce radiation dose to as low as reasonably achievable.       Medical Decision Rpnniw-[id number]-[id number]:           Fiorella Miguel MD

## 2020-01-09 LAB
ABSOLUTE EOS #: 0.34 K/UL (ref 0–0.4)
ABSOLUTE IMMATURE GRANULOCYTE: 0.09 K/UL (ref 0–0.3)
ABSOLUTE LYMPH #: 1.45 K/UL (ref 1–4.8)
ABSOLUTE MONO #: 1.02 K/UL (ref 0.1–0.8)
ANION GAP SERPL CALCULATED.3IONS-SCNC: 14 MMOL/L (ref 9–17)
BASOPHILS # BLD: 0 % (ref 0–2)
BASOPHILS ABSOLUTE: 0 K/UL (ref 0–0.2)
BUN BLDV-MCNC: 44 MG/DL (ref 6–20)
BUN/CREAT BLD: ABNORMAL (ref 9–20)
CALCIUM SERPL-MCNC: 8.1 MG/DL (ref 8.6–10.4)
CHLORIDE BLD-SCNC: 100 MMOL/L (ref 98–107)
CO2: 21 MMOL/L (ref 20–31)
CREAT SERPL-MCNC: 1.64 MG/DL (ref 0.7–1.2)
DIFFERENTIAL TYPE: ABNORMAL
EOSINOPHILS RELATIVE PERCENT: 4 % (ref 1–4)
GFR AFRICAN AMERICAN: 58 ML/MIN
GFR NON-AFRICAN AMERICAN: 48 ML/MIN
GFR SERPL CREATININE-BSD FRML MDRD: ABNORMAL ML/MIN/{1.73_M2}
GFR SERPL CREATININE-BSD FRML MDRD: ABNORMAL ML/MIN/{1.73_M2}
GLUCOSE BLD-MCNC: 104 MG/DL (ref 75–110)
GLUCOSE BLD-MCNC: 107 MG/DL (ref 75–110)
GLUCOSE BLD-MCNC: 110 MG/DL (ref 70–99)
GLUCOSE BLD-MCNC: 131 MG/DL (ref 75–110)
GLUCOSE BLD-MCNC: 136 MG/DL (ref 75–110)
GLUCOSE BLD-MCNC: 148 MG/DL (ref 75–110)
GLUCOSE BLD-MCNC: 156 MG/DL (ref 75–110)
HCT VFR BLD CALC: 28.5 % (ref 40.7–50.3)
HEMOGLOBIN: 8.4 G/DL (ref 13–17)
IMMATURE GRANULOCYTES: 1 %
LYMPHOCYTES # BLD: 17 % (ref 24–44)
MAGNESIUM: 1.6 MG/DL (ref 1.6–2.6)
MCH RBC QN AUTO: 29.1 PG (ref 25.2–33.5)
MCHC RBC AUTO-ENTMCNC: 29.5 G/DL (ref 28.4–34.8)
MCV RBC AUTO: 98.6 FL (ref 82.6–102.9)
MONOCYTES # BLD: 12 % (ref 1–7)
MORPHOLOGY: ABNORMAL
MORPHOLOGY: ABNORMAL
NRBC AUTOMATED: 0 PER 100 WBC
PDW BLD-RTO: 20.4 % (ref 11.8–14.4)
PLATELET # BLD: 398 K/UL (ref 138–453)
PLATELET ESTIMATE: ABNORMAL
PMV BLD AUTO: 8.3 FL (ref 8.1–13.5)
POTASSIUM SERPL-SCNC: 4.3 MMOL/L (ref 3.7–5.3)
RBC # BLD: 2.89 M/UL (ref 4.21–5.77)
RBC # BLD: ABNORMAL 10*6/UL
SEG NEUTROPHILS: 66 % (ref 36–66)
SEGMENTED NEUTROPHILS ABSOLUTE COUNT: 5.6 K/UL (ref 1.8–7.7)
SODIUM BLD-SCNC: 135 MMOL/L (ref 135–144)
WBC # BLD: 8.5 K/UL (ref 3.5–11.3)
WBC # BLD: ABNORMAL 10*3/UL

## 2020-01-09 PROCEDURE — 83735 ASSAY OF MAGNESIUM: CPT

## 2020-01-09 PROCEDURE — 6370000000 HC RX 637 (ALT 250 FOR IP): Performed by: STUDENT IN AN ORGANIZED HEALTH CARE EDUCATION/TRAINING PROGRAM

## 2020-01-09 PROCEDURE — 82947 ASSAY GLUCOSE BLOOD QUANT: CPT

## 2020-01-09 PROCEDURE — 99232 SBSQ HOSP IP/OBS MODERATE 35: CPT | Performed by: INTERNAL MEDICINE

## 2020-01-09 PROCEDURE — 97110 THERAPEUTIC EXERCISES: CPT

## 2020-01-09 PROCEDURE — 2580000003 HC RX 258: Performed by: STUDENT IN AN ORGANIZED HEALTH CARE EDUCATION/TRAINING PROGRAM

## 2020-01-09 PROCEDURE — 76937 US GUIDE VASCULAR ACCESS: CPT

## 2020-01-09 PROCEDURE — 85025 COMPLETE CBC W/AUTO DIFF WBC: CPT

## 2020-01-09 PROCEDURE — 36415 COLL VENOUS BLD VENIPUNCTURE: CPT

## 2020-01-09 PROCEDURE — 6360000002 HC RX W HCPCS: Performed by: STUDENT IN AN ORGANIZED HEALTH CARE EDUCATION/TRAINING PROGRAM

## 2020-01-09 PROCEDURE — 1200000000 HC SEMI PRIVATE

## 2020-01-09 PROCEDURE — 80048 BASIC METABOLIC PNL TOTAL CA: CPT

## 2020-01-09 RX ADMIN — DOCUSATE SODIUM 100 MG: 100 CAPSULE, LIQUID FILLED ORAL at 22:33

## 2020-01-09 RX ADMIN — CARVEDILOL 25 MG: 25 TABLET, FILM COATED ORAL at 08:53

## 2020-01-09 RX ADMIN — MAGNESIUM SULFATE HEPTAHYDRATE 1 G: 1 INJECTION, SOLUTION INTRAVENOUS at 22:33

## 2020-01-09 RX ADMIN — SODIUM CHLORIDE, PRESERVATIVE FREE 10 ML: 5 INJECTION INTRAVENOUS at 22:39

## 2020-01-09 RX ADMIN — SENNOSIDES 8.6 MG: 8.6 TABLET, FILM COATED ORAL at 21:00

## 2020-01-09 RX ADMIN — FAMOTIDINE 20 MG: 20 TABLET, FILM COATED ORAL at 09:06

## 2020-01-09 RX ADMIN — INSULIN LISPRO 3 UNITS: 100 INJECTION, SOLUTION INTRAVENOUS; SUBCUTANEOUS at 22:33

## 2020-01-09 RX ADMIN — OXYCODONE HYDROCHLORIDE 10 MG: 5 TABLET ORAL at 18:26

## 2020-01-09 RX ADMIN — SODIUM CHLORIDE: 9 INJECTION, SOLUTION INTRAVENOUS at 19:47

## 2020-01-09 RX ADMIN — MAGNESIUM SULFATE HEPTAHYDRATE 1 G: 1 INJECTION, SOLUTION INTRAVENOUS at 19:47

## 2020-01-09 RX ADMIN — POLYETHYLENE GLYCOL 3350 17 G: 17 POWDER, FOR SOLUTION ORAL at 08:54

## 2020-01-09 RX ADMIN — OXYCODONE HYDROCHLORIDE 10 MG: 5 TABLET ORAL at 06:52

## 2020-01-09 RX ADMIN — BUMETANIDE 2 MG: 1 TABLET ORAL at 08:53

## 2020-01-09 RX ADMIN — DOCUSATE SODIUM 100 MG: 100 CAPSULE, LIQUID FILLED ORAL at 08:53

## 2020-01-09 RX ADMIN — AMLODIPINE BESYLATE 10 MG: 10 TABLET ORAL at 08:54

## 2020-01-09 RX ADMIN — INSULIN LISPRO 3 UNITS: 100 INJECTION, SOLUTION INTRAVENOUS; SUBCUTANEOUS at 12:58

## 2020-01-09 RX ADMIN — HEPARIN SODIUM 5000 UNITS: 5000 INJECTION INTRAVENOUS; SUBCUTANEOUS at 22:33

## 2020-01-09 RX ADMIN — INSULIN GLARGINE 10 UNITS: 100 INJECTION, SOLUTION SUBCUTANEOUS at 08:54

## 2020-01-09 RX ADMIN — HEPARIN SODIUM 5000 UNITS: 5000 INJECTION INTRAVENOUS; SUBCUTANEOUS at 13:01

## 2020-01-09 RX ADMIN — SODIUM CHLORIDE, PRESERVATIVE FREE 10 ML: 5 INJECTION INTRAVENOUS at 09:07

## 2020-01-09 RX ADMIN — MULTIPLE VITAMINS W/ MINERALS TAB 1 TABLET: TAB at 08:53

## 2020-01-09 RX ADMIN — Medication: at 09:06

## 2020-01-09 ASSESSMENT — PAIN SCALES - GENERAL
PAINLEVEL_OUTOF10: 7
PAINLEVEL_OUTOF10: 7

## 2020-01-09 NOTE — PROGRESS NOTES
Physical Therapy  DATE: 2020  NAME: Ruddy Madrigal  MRN: [id number]   : 1985    Discharge Recommendations: Continue to Assess (pending progress)     Subjective: Pt and RN agreeable to therapy. Pt found resting in bed watching television with family present. Pain: Denies  Patient follows: Most Commands  Is patient on ventilator: No  Is patient on sedation: No  Precautions: contact    Therapeutic exercises:  UE/LE(s)  Right/Left Passive range of motion all planes x 10 reps ea  bilateral gastroc, HS and ADD stretching 3 reps x 30 seconds    Pt has intermittent spasticity specifically in HS with LE stretches and minimal knee flex bilaterally. Goals  Short Term Goals  Short term goal 1: PROM for stretching and injury prevention   Short term goal 2: AROM/AAROM for strengthening   Short term goal 3: Progress mobility as appropriate           Plan: Progress functional mobility as medically appropriate. Time In: 1543  Time Out: 1610  Time Coded Minutes (treatment minutes): 27  Rehab Potential: Fair  Treatments/week: 2-3x/wk    Jeni Galvez   This treatment/evaluation completed by signing SPT. Signing PT agrees with treatment and documentation.

## 2020-01-09 NOTE — PROGRESS NOTES
[facility] - Progress Note    Today's Date and Time: 1/9/2020, 11:10 AM    Impression :   · Necrotizing fasciitis 11-29-19  · S/P perirectal I&D. Wide complex excisional debridement of gluteal and perineal areas on 11-29-19  · S/P debridement, washout of gluteal and perianal areas, diverting colostomy 12-3-19.   · S/P debridement, washout of gluteal and perianal areas, diverting colostomy 12-6-19.  · S/P Incision and debridement of necrotizing fasciitis buttock wound, scrotum, bilateral groin region and closure of midline abdominal wound 12-8-19   · S/P Incision and debridement of necrotizing fasciitis buttock wound, scrotum, bilateral groin region on 12-20-19. · S/P Incision and debridement of necrotizing fasciitis buttock wound, scrotum, bilateral groin region on 1-1-20   · S/P Incision and debridement of necrotizing fasciitis buttock wound, scrotum, bilateral groin region on 1-8-20  · Lactic acidosis  · DM 2  · HTN  · Hx of Low LVEF (20%) as per family  · DEBBY  · Fluid overload    Recommendations:     · Monitor off antibiotics,   · Continue with wound care  · Nutrition    Medical Decision Making/Summary/Discussion:1/9/2020     · Patient with DM 2, prior diabetic foot infection  · Presented to [address] [place] [address] ER generalized weakness for the past few days  · Found to have soft tissue necrosis with subcutaneous emphysema in gluteal areas and perineum  · S/P I&D and wide complex debridement of affected tissues on 11-29-19  · Showing hypotension, requiring fluids and a vasopressor  · Cultures in progress  · Will cover with Zosyn. Wounds with Strep spp. And Gram negative bacilli . No Staph spp.   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 · One more debridement in OR scheduled for 12-10-19 and then decision will be made to change code status or not. · Pt did not tolerate HD on 12-9. It was stopped early and pt required vasopressor support with Levophed, remains on vent. · Pt to OR 12-10 for further debridement  · Repeat I&D planned for 12-16-19 was cancelled because of high K levels  · Bedside I & D on 12/17/19. · Patient to undergo additional I&D on 12-20-19  · 12/23 right upper lobe collapse  · 12/24 debridement with excision of sacral wound in preparation for a flap, partial closure of his scrotal wound, VAC and debridement over the perineum  · 12-30 Wound RN to change abdominal vac   · 1-1-20 Returned to OR for further debridement of perineal wound and vac change  · 1-3 Lt heel and abd wound vac changed per wound care  · 1-3 Order for removal of Lt tunnel cath removal. Last HD 12-27, pt showing signs of renal recovery. · 1-5 to OR for further debridement and vac change  · S/P OR 1-8 for debridement and vac change. Infection Control Recommendations   · Sabael Precautions  · Contact Isolation MRSA    Antimicrobial Stewardship Recommendations     Off antibiotics  Coordination of Outpatient Care:   · Estimated Length of IV antimicrobials: D/C 12-19-19  · Patient will need Midline Catheter Insertion: No  · Patient will need PICC line Insertion:Has Central line  · Patient will need: Home IV , [place],  SNF,  LTAC: TBD  · Patient will need outpatient wound care:Yes    Chief complaint/reason for consultation:   · Ashley's vs necrotizing fasciitis    History of Present Illness:   Della Hill is a 37y.o.-year-old  male who was initially admitted on 11/29/2019. Patient seen at the request of . INITIAL HISTORY:    Patient presented through ER in [place] with complaints of weakness of a few days duration. Examination showed skin necrosis in the gluteal and perineal region. Patient transferred to [place] [address].  CT OR    COLOSTOMY N/A 12/3/2019    LAPAROSCOPIC CONVERTED TO OPEN DIVERTING LOOP COLOSTOMY; WOUND VAC APPLICATION [facility]  01/05/2020    GLUTEAL WOUND DEBRIDEMENT, WOUND VAC CHANGE     DEBRIDEMENT  01/08/2020    WOUND VAC CHANGE SACRAL DECUBITUS, POSSIBLE DEBRIDEMENT    INCISION AND DRAINAGE Bilateral 11/29/2019    RECTAL PERIRECTAL INCISION AND DRAINAGE, [facility] LOOP COLOSTOMY CREATION performed by Smiley Rinne, MD at [facility] N/A 12/6/2019    DEBRIDEMENT NECROTIZING FASCIAITIS BILAT BUTTOCK performed by Serena Pemberton MD at [facility] [address] N/A 12/20/2019    DEBRIDEMENT GLUTEAL AND SCROTAL REGIONS performed by Smiley Rinne, MD at [facility] [address] N/A 12/26/2019    INCISION AND DRAINAGE AND WOUND VAC CHANGE BUTTOCK, PERINEUM performed by Antonette Coreas DO at [address] N/A 12/10/2019    SCROTAL EXPLORATION WITH SCROTAL DEBRIDEMENT performed by Olvin Neumann MD at [facility] N/A 12/23/2019    WOUND [address] performed by Serena Pemberton MD at [place] [address]       Medications:      amLODIPine  10 mg Oral Daily    oxybutynin  1 patch Transdermal Once per day on Mon Thu    docusate sodium  100 mg Oral BID    senna  1 tablet Oral Nightly    therapeutic multivitamin-minerals  1 tablet Oral Daily    carvedilol  25 mg Per NG tube Daily    insulin glargine  10 Units Subcutaneous Daily    bumetanide  2 mg Oral Daily    darbepoetin rohan-polysorbate  100 mcg Intravenous Weekly    polyethylene glycol  17 g Oral Daily    insulin lispro  0-18 Units Subcutaneous Q4H    alteplase  1 mg Intracatheter Once    povidone-iodine   Topical Daily    famotidine  20 mg Per NG tube Daily    sodium chloride flush  10 mL Intravenous 2 times per day    heparin (porcine)  5,000 Units Subcutaneous 3 times per day       Social History:     Social History     Socioeconomic History    Marital status: Unknown     Spouse name: Not on file    Number of children: Not on file    Years of education: Not on file    Highest education level: Not on file   Occupational History    Not on file   Social Needs    Financial resource strain: Not on file    Food insecurity:     Worry: Not on file     Inability: Not on file    Transportation needs:     Medical: Not on file     Non-medical: Not on file   Tobacco Use    Smoking status: Not on file   Substance and Sexual Activity    Alcohol use: Not on file    Drug use: Not on file    Sexual activity: Not on file   Lifestyle    Physical activity:     Days per week: Not on file     Minutes per session: Not on file    Stress: Not on file   Relationships    Social connections:     Talks on phone: Not on file     Gets together: Not on file     Attends [employer] service: Not on file     Active member of club or organization: Not on file     Attends meetings of clubs or organizations: Not on file     Relationship status: Not on file    Intimate partner violence:     Fear of current or ex partner: Not on file     Emotionally abused: Not on file     Physically abused: Not on file     Forced sexual activity: Not on file   Other Topics Concern    Not on file   Social History Narrative    Not on file       Family History:   No family history on file. Allergies:   Patient has no known allergies. Review of Systems:     Review of Systems   Constitutional: Negative for activity change. Pt AA  Denies chills, fevers, SOB  No complaints or issues           Physical Examination :     Patient Vitals for the past 8 hrs:   BP Temp Temp src Pulse Resp SpO2   01/09/20 0625 (!) 148/80 97.7 °F (36.5 °C) Oral 88 -- 100 %   01/09/20 0501 (!) 143/82 98 °F (36.7 °C) Oral 86 15 99 %     Physical Exam  Exam conducted with a chaperone present. Constitutional:       Appearance: Normal appearance.  He is not toxic-appearing or diaphoretic. HENT:      Head: Normocephalic and atraumatic. Nose: No congestion or rhinorrhea. Mouth/Throat:      Mouth: Mucous membranes are moist.   Eyes:      General: No scleral icterus. Pupils: Pupils are equal, round, and reactive to light. Neck:      Musculoskeletal: Neck supple. No neck rigidity or muscular tenderness. Cardiovascular:      Rate and Rhythm: Normal rate and regular rhythm. Heart sounds: Normal heart sounds. No murmur. No friction rub. No gallop. Pulmonary:      Effort: Pulmonary effort is normal. No respiratory distress. Breath sounds: Normal breath sounds. No stridor. Abdominal:      General: Abdomen is flat. There is no distension. Palpations: There is no mass. Tenderness: There is no tenderness. Comments: Abdominal VAC in place   Genitourinary:     Comments:  Ambrosio in place. Perineal vac in place  Musculoskeletal:         General: No swelling, tenderness or deformity. Comments: Rt knee with small effusion. No erythema or warmth   Skin:     General: Skin is warm and dry. Coloration: Skin is not jaundiced or pale. Findings: Lesion present. No erythema. Neurological:      General: No focal deficit present. Mental Status: He is alert. Mental status is at baseline. Cranial Nerves: No cranial nerve deficit.    Psychiatric:      Comments: AA, appropriate response           Medical Decision Making -Laboratory:   I have independently reviewed/ordered the following labs:    CBC with Differential:   Recent Labs     01/08/20  0633 01/08/20  1817 01/09/20  0558   WBC 7.4  --  8.5   HGB 8.5* 8.2* 8.4*   HCT 28.4* 27.2* 28.5*     --  398   LYMPHOPCT 16*  --  17*   MONOPCT 10*  --  12*     BMP:   Recent Labs     01/08/20  0633 01/09/20  0558    135   K 4.2 4.3   CL 99 100   CO2 21 21   BUN 46* 44*   CREATININE 1.77* 1.64*   MG 1.6 1.6     Hepatic Function Panel:   No results for input(s): PROT, LABALBU, BILIDIR, IBILI, BILITOT, ALKPHOS, ALT, AST in the last 72 hours. No results for input(s): RPR in the last 72 hours. No results for input(s): HIV in the last 72 hours. No results for input(s): BC in the last 72 hours. Lab Results   Component Value Date    MUCUS NOT REPORTED 2019    RBC 2.89 2020    TRICHOMONAS NOT REPORTED 2019    WBC 8.5 2020    YEAST NOT REPORTED 2019    TURBIDITY TURBID 2019     Lab Results   Component Value Date    CREATININE 1.64 2020    GLUCOSE 110 2020       Medical Decision Making-Imagin-29 CXR    2019 3:07 pm       COMPARISON:   2019       HISTORY:   ORDERING SYSTEM PROVIDED HISTORY: follow  up   TECHNOLOGIST PROVIDED HISTORY:   follow  up   Acuity: Unknown   Type of Exam: Unknown       FINDINGS:   Endotracheal tube not visualized. Enteric tube courses below the diaphragm.       Left and right-sided catheters terminating at the cavoatrial junction.       Improved aeration of the right upper lobe.  Pulmonary vascular indistinctness   compatible with interstitial pulmonary edema.  Low lung volumes.  No   pneumothorax.  No pleural effusion.  Stable cardiomegaly.           Impression   1. Endotracheal tube not identified. 2. Venous catheters terminating at the cavoatrial junction. 3. Interstitial pulmonary edema.  CT Abd/pelvis  EXAMINATION:   CT OF THE ABDOMEN AND PELVIS WITH CONTRAST 2019 2:29 am       TECHNIQUE:   CT of the abdomen and pelvis was performed with the administration of   intravenous contrast. Multiplanar reformatted images are provided for review.    Dose modulation, iterative reconstruction, and/or weight based adjustment of   the mA/kV was utilized to reduce the radiation dose to as low as reasonably   achievable.       COMPARISON:   2019.       HISTORY:   ORDERING SYSTEM PROVIDED HISTORY: Tera Billy fasc   TECHNOLOGIST PROVIDED HISTORY:       lory galindo   Reason for Exam: spleen, and pancreas demonstrate no acute abnormality given compromised evaluation without intravenous contrast.  There may be mild splenomegaly.  The adrenal glands appear within normal limits. There is no hydronephrosis or obstructing urinary tract calculi. Small amount of free fluid is seen adjacent to the liver inferiorly. .    The appendix is visualized in the right lower quadrant and appears within normal limits.       There is no evidence for bowel obstruction. Stranding is seen within the subcutaneous fat overlying both lateral abdominal walls left greater than right.  There is ill-defined fluid collection within the subcutaneous fat overlying the left lateral abdominal wall possibly representing phlegmon or developing abscess although no organized   abscess is seen. There is a large amount of soft tissue emphysema involving both the right and left buttock extending to the perineum tissue thickening is seen especially on the left involving the left buttock. CT PELVIS FINDINGS:        No distal ureteral calculi or bladder calculi. There is no free fluid in the pelvis. Catheter is seen within the urinary bladder. Osseous changes within the left hemipelvis which may be chronic in nature. IMPRESSION:    Extensive subcutaneous emphysema as described above involving both buttocks extending to the perineum.  The possibility of Ashley gangrene/necrotizing fasciitis cannot be excluded. Possible phlegmon or developing soft tissue abscess overlying the left lateral abdominal wall as noted above.  No organized abscess is seen however. Osseous changes within the left hemipelvis which may be chronic in nature. Results the study were called to Dr. Shelli Virgen [phone] on 11/29/2019  All CT scans at this facility use dose modulation, iterative reconstruction, and/or weight based dosing when appropriate to reduce radiation dose to as low as reasonably achievable.       Medical Decision Anphay-[id number]-Aamsg:       ATTESTATION:    I have discussed the case, including pertinent history and exam findings with the residents and students. I have seen and examined the patient and the key elements of the encounter have been performed by me. I was present when the student obtained his information or examined the patient. I have reviewed the laboratory data, other diagnostic studies and discussed them with the residents. I have updated the medical record where necessary. I agree with the assessment, plan and orders as documented by the resident/ student.     Breonna Julio MD.      Breonna Julio MD

## 2020-01-09 NOTE — PROGRESS NOTES
[facility]  Internal Medicine Teaching Residency Program  Inpatient Daily Progress Note  ______________________________________________________________________________    Patient: Bola Byers  YOB: 1985   EXK:[id number]    Acct: [de-identified]     Room: [address] [place] [address]  Admit date: 11/29/2019  Today's date: 01/09/20  Number of days in the hospital: 39    SUBJECTIVE   Admitting Diagnosis: Necrotizing fasciitis [facility])    Patient got his wound VAC changed yesterday in the OR. Urology changed his Ambrosio's yesterday and has been tolerating it well. Denies any urinary leakage or incontinence. Patient has a central line placed which can be removed after 2 peripheral IV lines are secure. His vitals are stable /80 was started on Norvasc 10 mg yesterday. ROS:  Constitutional:  negative for chills, fevers, sweats  Respiratory:  negative for cough, dyspnea on exertion, hemoptysis, shortness of breath, wheezing  Cardiovascular:  negative for chest pain, chest pressure/discomfort, lower extremity edema, palpitations  Gastrointestinal:  negative for abdominal pain, constipation, diarrhea, nausea, vomiting  Neurological:  negative for dizziness, headache  BRIEF HISTORY     [address] [place] [address] Emergency Department with complaint of generalized weakness for the past few days. On examination the emergency department staff noticed the patient had a foul smell which they thought he had a bowel movement. They turned the patient and noticed sloughing of skin on his back and buttocks.      In the emergency department patient was afebrile but tachycardic. Initial labs demonstrated hyponatremia of 130, bicarb 12, BUN 63 and creatinine 2.82, hypocalcemia 7.7, alkaline phosphatase 144, AST 83, ALT 16, lactate 1.4, leukocytosis of 30.4, hemoglobin 9.2. Chest xray demonstrated no acute process.  CT abdomen and pelvis without contrast demonstrated extensive subcutaneous emphysema involving both buttocks extending to the perineum. Possible phlegmon or developing soft tissue abscess overlying the left lateral abdominal wall. Scrotal Ultrasound demonstrated no evidence of testicular torsion or mass however did show extensive scrotal wall edema. He was given zosyn 4/5 g IV, rocephin 1 g IV and flagyl 500 mg IV in the emergency department.     He was transferred to [facility] ICU for Necrotizing Fasciitis and Acute Renal Failure.   Patient initially required pressors, but currently is off pressors. Kan Medico a 37 y.o. with PMH of spina bifida, hypertension, diabetes and diabetic foot ulcer. Who presented to [facility] Emergency Department with complaint of generalized weakness for the past few days. On examination the emergency department staff noticed the patient had a foul smell which they thought he had a bowel movement. They turned the patient and noticed sloughing of skin on his back and buttocks.       11/29/2019 wide complex debridement of nec fasc involving gluteal/perianal/distal rectum  12/03/2019 open diverting end colostomy   12/06/2019 I&D  12/08/2019 Incision and debridement of necrotizing fasciitis buttock wound, scrotum, bilateral groin region and closure of midline abdominal wound  12/10/2019 Sharp excisional debridement of gluteal, marc-rectal and perineal wound to level of muscle, Sharp excisional debridement of scrotum (performed by Urology), Washout of gluteal/perineal/scrotal/inguinal wound, Partial primary closure of pubic wound, and Dakins Wet to dry dressing application  07/53/8687 sharp excisional debridement of gluteal and perineal wounds to muscle 30 x 40 cm. Urology exam under anesthesia   12/24/2019 sharp debridement, partial closure of scrotum, placement of wound vac   12/26/2019 s/p sharp debridement of L side lateral aspect of sacral wound, replacement of wound vac    1/1/2019: Vera flow irrigation VAC to gluteal wound with debridement. 30 x 40 cm. Urology exam under anesthesia   12/24/2019 sharp debridement, partial closure of scrotum, placement of wound vac   12/26/2019 s/p sharp debridement of L side lateral aspect of sacral wound, replacement of wound vac    1/1/2019: Vera flow irrigation VAC to gluteal wound with debridement.  Started VAC to perineal wound on midline laparotomy wound  1/3/2020:Patient is off the Flexiflo and is tolerating p.o. diet.  Encouraged to use Ensure in between meals to maintain the caloric requirement.  Tunneled catheter removed by IR as per nephrology recommendations as the patient does not need dialysis. 1/4/2020: Placed on renal diet.  Neurology will place right wrist splint  1/5/2020: Sacral wound VAC changed today by skin surgery  1/8/2020: Wound VAC change        Principal Problem:    Principal Problem:   -Diabetes mellitus: Currently on 10 units Lantus and high-dose correcting scale.  Last HbA1c 8 done on 12/1/2019.       -Hypertension: In control.  Continue Coreg  25 mg and Norvasc 10 mg.     -Iron deficiency anemia Hb 8.1: Low iron studies, continue IV Venofer 200 mg.     -DEBBY on CKD stage III: Patient's last hemodialysis was on 12/27/2019. tunneled catheter removed as per nephrology patient is not in need of acute dialysis right now.  Continue Bumex 2 mg.       -Hyperkalemia: Resolved.  Patient was given a dose of Lokelma and placed on renal diet.     -Necrotizing fasciitis,Gluteal, perirectal, perianal wound:  Next sacral wound VAC change this weekend.  Midline wound VAC changes and debridement as per by general surgery.  ID following the patient and monitoring of the antibiotics for now.     -Right upper extremity weakness: Has a wrist extension splint.  Outpatient EMG in 3 to 4 weeks.     -Urinary incontinence: Seen by urology they do not want to do suprapubic catheter as patient has had multiple abdominal surgeries and the risk of bowel injury.  On oxybutynin patch and upsizing of Ambrosio catheter done

## 2020-01-09 NOTE — PLAN OF CARE
Problem: Pain:  Goal: Pain level will decrease  Description  Pain level will decrease  1/8/2020 2300 by Jenny Mcnair RN  Outcome: Ongoing  1/8/2020 1927 by Kirsty Stark RN  Outcome: Ongoing  Goal: Control of acute pain  Description  Control of acute pain  1/8/2020 2300 by Jenny Mcnair RN  Outcome: Ongoing  1/8/2020 1927 by Kirsty Stark RN  Outcome: Ongoing  Goal: Control of chronic pain  Description  Control of chronic pain  1/8/2020 2300 by Jenny Mcnair RN  Outcome: Ongoing  1/8/2020 1927 by Kirsty Stark RN  Outcome: Ongoing     Problem: Skin Integrity:  Goal: Will show no infection signs and symptoms  Description  Will show no infection signs and symptoms  1/8/2020 2300 by Jenny Mcnair RN  Outcome: Ongoing  1/8/2020 1927 by Kirsty Stark RN  Outcome: Ongoing  Goal: Absence of new skin breakdown  Description  Absence of new skin breakdown  1/8/2020 2300 by Jenny Mcnair RN  Outcome: Ongoing  1/8/2020 1927 by Kirsty Stark RN  Outcome: Ongoing     Problem: OXYGENATION/RESPIRATORY FUNCTION  Goal: Patient will maintain patent airway  1/8/2020 1927 by Kirsty Stark RN  Outcome: Ongoing  Goal: Patient will achieve/maintain normal respiratory rate/effort  Description  Respiratory rate and effort will be within normal limits for the patient  1/8/2020 1927 by Kirsty Stark RN  Outcome: Ongoing     Problem: SKIN INTEGRITY  Goal: Skin integrity is maintained or improved  1/8/2020 1927 by Kirsty Stark RN  Outcome: Ongoing     Problem: Falls - Risk of:  Goal: Will remain free from falls  Description  Will remain free from falls  1/8/2020 2300 by Jenny Mcnair RN  Outcome: Ongoing  1/8/2020 1927 by Kirsty Stark RN  Outcome: Ongoing  Goal: Absence of physical injury  Description  Absence of physical injury  1/8/2020 2300 by Jenny Mcnair RN  Outcome: Ongoing  1/8/2020 1927 by Rg Brooks Agueda Abel RN  Outcome: Ongoing     Problem: Risk for Impaired Skin Integrity  Goal: Tissue integrity - skin and mucous membranes  Description  Structural intactness and normal physiological function of skin and  mucous membranes.   1/8/2020 1927 by Stuart Bobo RN  Outcome: Ongoing     Problem: Nutrition  Goal: Optimal nutrition therapy  Description  Nutrition Problem: Inadequate oral intake  Intervention: Food and/or Nutrient Delivery: Start Parenteral Nutrition  Nutritional Goals: Meet % of estimated nutrition needs   1/8/2020 1927 by Stuart Bobo RN  Outcome: Ongoing  1/8/2020 1243 by Milton Mae RD, LD  Outcome: Ongoing  Note:   Nutrition Problem: Increased nutrient needs  Intervention: Food and/or Nutrient Delivery: Continue NPO(Restart diet and Nepro supplements TID as able )  Nutritional Goals: Meet % of estimated nutrition needs      Problem: Confusion - Acute:  Goal: Absence of continued neurological deterioration signs and symptoms  Description  Absence of continued neurological deterioration signs and symptoms  1/8/2020 1927 by Stuart Bobo RN  Outcome: Ongoing  Goal: Mental status will be restored to baseline  Description  Mental status will be restored to baseline  1/8/2020 1927 by Stuart Bobo RN  Outcome: Ongoing     Problem: Discharge Planning:  Goal: Ability to perform activities of daily living will improve  Description  Ability to perform activities of daily living will improve  1/8/2020 1927 by Stuart Bobo RN  Outcome: Ongoing  Goal: Participates in care planning  Description  Participates in care planning  1/8/2020 1927 by Stuart Bobo RN  Outcome: Ongoing     Problem: Injury - Risk of, Physical Injury:  Goal: Will remain free from falls  Description  Will remain free from falls  1/8/2020 2300 by Dave York RN  Outcome: Ongoing  1/8/2020 1927 by Stuart Bobo RN  Outcome: Ongoing  Goal:

## 2020-01-10 ENCOUNTER — ANESTHESIA EVENT (OUTPATIENT)
Dept: OPERATING ROOM | Age: 35
DRG: 463 | End: 2020-01-10
Payer: MEDICARE

## 2020-01-10 LAB
ABSOLUTE EOS #: 0.41 K/UL (ref 0–0.44)
ABSOLUTE IMMATURE GRANULOCYTE: 0.08 K/UL (ref 0–0.3)
ABSOLUTE LYMPH #: 1.23 K/UL (ref 1.1–3.7)
ABSOLUTE MONO #: 0.82 K/UL (ref 0.1–1.2)
ANION GAP SERPL CALCULATED.3IONS-SCNC: 14 MMOL/L (ref 9–17)
BASOPHILS # BLD: 0 % (ref 0–2)
BASOPHILS ABSOLUTE: 0 K/UL (ref 0–0.2)
BUN BLDV-MCNC: 38 MG/DL (ref 6–20)
BUN/CREAT BLD: ABNORMAL (ref 9–20)
CALCIUM SERPL-MCNC: 8.5 MG/DL (ref 8.6–10.4)
CHLORIDE BLD-SCNC: 97 MMOL/L (ref 98–107)
CO2: 21 MMOL/L (ref 20–31)
CREAT SERPL-MCNC: 1.47 MG/DL (ref 0.7–1.2)
DIFFERENTIAL TYPE: ABNORMAL
EOSINOPHILS RELATIVE PERCENT: 5 % (ref 1–4)
GFR AFRICAN AMERICAN: >60 ML/MIN
GFR NON-AFRICAN AMERICAN: 54 ML/MIN
GFR SERPL CREATININE-BSD FRML MDRD: ABNORMAL ML/MIN/{1.73_M2}
GFR SERPL CREATININE-BSD FRML MDRD: ABNORMAL ML/MIN/{1.73_M2}
GLUCOSE BLD-MCNC: 118 MG/DL (ref 75–110)
GLUCOSE BLD-MCNC: 118 MG/DL (ref 75–110)
GLUCOSE BLD-MCNC: 122 MG/DL (ref 70–99)
GLUCOSE BLD-MCNC: 124 MG/DL (ref 75–110)
GLUCOSE BLD-MCNC: 128 MG/DL (ref 75–110)
GLUCOSE BLD-MCNC: 143 MG/DL (ref 75–110)
GLUCOSE BLD-MCNC: 213 MG/DL (ref 75–110)
HCT VFR BLD CALC: 26.5 % (ref 40.7–50.3)
HEMOGLOBIN: 8.3 G/DL (ref 13–17)
IMMATURE GRANULOCYTES: 1 %
LYMPHOCYTES # BLD: 15 % (ref 24–43)
MAGNESIUM: 1.8 MG/DL (ref 1.6–2.6)
MCH RBC QN AUTO: 30.4 PG (ref 25.2–33.5)
MCHC RBC AUTO-ENTMCNC: 31.3 G/DL (ref 28.4–34.8)
MCV RBC AUTO: 97.1 FL (ref 82.6–102.9)
MONOCYTES # BLD: 10 % (ref 3–12)
MORPHOLOGY: ABNORMAL
MORPHOLOGY: ABNORMAL
NRBC AUTOMATED: 0 PER 100 WBC
PDW BLD-RTO: 21 % (ref 11.8–14.4)
PHOSPHORUS: 4.3 MG/DL (ref 2.5–4.5)
PLATELET # BLD: 340 K/UL (ref 138–453)
PLATELET ESTIMATE: ABNORMAL
PMV BLD AUTO: 8.2 FL (ref 8.1–13.5)
POTASSIUM SERPL-SCNC: 4.1 MMOL/L (ref 3.7–5.3)
RBC # BLD: 2.73 M/UL (ref 4.21–5.77)
RBC # BLD: ABNORMAL 10*6/UL
SEG NEUTROPHILS: 69 % (ref 36–65)
SEGMENTED NEUTROPHILS ABSOLUTE COUNT: 5.66 K/UL (ref 1.5–8.1)
SODIUM BLD-SCNC: 132 MMOL/L (ref 135–144)
WBC # BLD: 8.2 K/UL (ref 3.5–11.3)
WBC # BLD: ABNORMAL 10*3/UL

## 2020-01-10 PROCEDURE — 1200000000 HC SEMI PRIVATE

## 2020-01-10 PROCEDURE — 82947 ASSAY GLUCOSE BLOOD QUANT: CPT

## 2020-01-10 PROCEDURE — 6370000000 HC RX 637 (ALT 250 FOR IP): Performed by: STUDENT IN AN ORGANIZED HEALTH CARE EDUCATION/TRAINING PROGRAM

## 2020-01-10 PROCEDURE — 6360000002 HC RX W HCPCS: Performed by: STUDENT IN AN ORGANIZED HEALTH CARE EDUCATION/TRAINING PROGRAM

## 2020-01-10 PROCEDURE — 84100 ASSAY OF PHOSPHORUS: CPT

## 2020-01-10 PROCEDURE — 99232 SBSQ HOSP IP/OBS MODERATE 35: CPT | Performed by: INTERNAL MEDICINE

## 2020-01-10 PROCEDURE — 85025 COMPLETE CBC W/AUTO DIFF WBC: CPT

## 2020-01-10 PROCEDURE — 36415 COLL VENOUS BLD VENIPUNCTURE: CPT

## 2020-01-10 PROCEDURE — 80048 BASIC METABOLIC PNL TOTAL CA: CPT

## 2020-01-10 PROCEDURE — 97605 NEG PRS WND THER DME<=50SQCM: CPT

## 2020-01-10 PROCEDURE — 2580000003 HC RX 258: Performed by: STUDENT IN AN ORGANIZED HEALTH CARE EDUCATION/TRAINING PROGRAM

## 2020-01-10 PROCEDURE — 83735 ASSAY OF MAGNESIUM: CPT

## 2020-01-10 RX ORDER — LOSARTAN POTASSIUM 25 MG/1
25 TABLET ORAL DAILY
Status: DISCONTINUED | OUTPATIENT
Start: 2020-01-10 | End: 2020-01-11

## 2020-01-10 RX ADMIN — CARVEDILOL 25 MG: 25 TABLET, FILM COATED ORAL at 08:56

## 2020-01-10 RX ADMIN — SODIUM CHLORIDE, PRESERVATIVE FREE 10 ML: 5 INJECTION INTRAVENOUS at 09:12

## 2020-01-10 RX ADMIN — OXYCODONE HYDROCHLORIDE 5 MG: 5 TABLET ORAL at 19:47

## 2020-01-10 RX ADMIN — DOCUSATE SODIUM 100 MG: 100 CAPSULE, LIQUID FILLED ORAL at 20:48

## 2020-01-10 RX ADMIN — DOCUSATE SODIUM 100 MG: 100 CAPSULE, LIQUID FILLED ORAL at 08:56

## 2020-01-10 RX ADMIN — AMLODIPINE BESYLATE 10 MG: 10 TABLET ORAL at 08:56

## 2020-01-10 RX ADMIN — POLYETHYLENE GLYCOL 3350 17 G: 17 POWDER, FOR SOLUTION ORAL at 08:56

## 2020-01-10 RX ADMIN — INSULIN LISPRO 6 UNITS: 100 INJECTION, SOLUTION INTRAVENOUS; SUBCUTANEOUS at 20:49

## 2020-01-10 RX ADMIN — DARBEPOETIN ALFA 100 MCG: 100 INJECTION, SOLUTION INTRAVENOUS; SUBCUTANEOUS at 10:53

## 2020-01-10 RX ADMIN — Medication: at 10:26

## 2020-01-10 RX ADMIN — OXYCODONE HYDROCHLORIDE 10 MG: 5 TABLET ORAL at 09:16

## 2020-01-10 RX ADMIN — LOSARTAN POTASSIUM 25 MG: 25 TABLET, FILM COATED ORAL at 10:26

## 2020-01-10 RX ADMIN — INSULIN GLARGINE 10 UNITS: 100 INJECTION, SOLUTION SUBCUTANEOUS at 08:56

## 2020-01-10 RX ADMIN — SODIUM CHLORIDE, PRESERVATIVE FREE 10 ML: 5 INJECTION INTRAVENOUS at 20:51

## 2020-01-10 RX ADMIN — HEPARIN SODIUM 5000 UNITS: 5000 INJECTION INTRAVENOUS; SUBCUTANEOUS at 20:48

## 2020-01-10 RX ADMIN — BUMETANIDE 2 MG: 1 TABLET ORAL at 08:56

## 2020-01-10 RX ADMIN — SENNOSIDES 8.6 MG: 8.6 TABLET, FILM COATED ORAL at 20:48

## 2020-01-10 RX ADMIN — FAMOTIDINE 20 MG: 20 TABLET, FILM COATED ORAL at 08:56

## 2020-01-10 RX ADMIN — MULTIPLE VITAMINS W/ MINERALS TAB 1 TABLET: TAB at 08:56

## 2020-01-10 ASSESSMENT — PAIN SCALES - GENERAL
PAINLEVEL_OUTOF10: 0
PAINLEVEL_OUTOF10: 6
PAINLEVEL_OUTOF10: 7
PAINLEVEL_OUTOF10: 7

## 2020-01-10 NOTE — ANESTHESIA PRE PROCEDURE
Department of Anesthesiology  Preprocedure Note       Name:  Baruch Landau   Age:  37 y.o.  :  1985                                          MRN:  [id number]         Date:  1/10/2020      Gluteal  Wound debridement, wound vac application   Anesthesia type: General   Pre-op diagnosis: NECROTIZING FASCIITIS   Location: [facility] OR [address] [place] [address]            Medications prior to admission:   Prior to Admission medications    Not on File       Current medications:    No current facility-administered medications for this visit. No current outpatient medications on file.      Facility-Administered Medications Ordered in Other Visits   Medication Dose Route Frequency Provider Last Rate Last Dose    losartan (COZAAR) tablet 25 mg  25 mg Oral Daily Latrell Mackenzie MD   25 mg at 01/10/20 1026    amLODIPine (NORVASC) tablet 10 mg  10 mg Oral Daily Isidro Richard, DO   10 mg at 01/10/20 0856    oxyCODONE (ROXICODONE) immediate release tablet 5 mg  5 mg Oral Q4H PRN Ermias Simons MD        Or    oxyCODONE (ROXICODONE) immediate release tablet 10 mg  10 mg Oral Q4H PRN Ermias Amor MD   10 mg at 01/10/20 0916    oxybutynin (OXYTROL) 3.9 MG/24HR 1 patch  1 patch Transdermal Once per day on  Isidro Richard, DO   1 patch at 20 1337    docusate sodium (COLACE) capsule 100 mg  100 mg Oral BID Isidro Richard, DO   100 mg at 01/10/20 1432    senna (SENOKOT) tablet 8.6 mg  1 tablet Oral Nightly Isidro Richard, DO   8.6 mg at 20 2100    hydrALAZINE (APRESOLINE) injection 10 mg  10 mg Intravenous Q4H PRN Isidro Richard, DO        magnesium sulfate 1 g in dextrose 5% 100 mL IVPB  1 g Intravenous PRN Isidro Richard, DO   Stopped at 20 2352    glucose (GLUTOSE) 40 % oral gel 15 g  15 g Oral PRN Isidro Richard, DO        dextrose 50 % IV solution  12.5 g Intravenous PRN Isidro Richard, DO        glucagon (rDNA) injection 1 mg  1 mg  heparin (porcine) injection 500 Units  500 Units Intravenous PRN Isidro Bren Profit., DO   500 Units at 12/27/19 0915    heparin (porcine) injection 1,750 Units  1,750 Units Intravenous PRN Isidro Bren Profit., DO   1,750 Units at 12/27/19 0915    glucose (GLUTOSE) 40 % oral gel 15 g  15 g Oral PRN Isidro Bren Profit., DO        dextrose 50 % IV solution  12.5 g Intravenous PRN Isidro Bren Profit., DO        glucagon (rDNA) injection 1 mg  1 mg Intramuscular PRN Isidro Bren Profit., DO        dextrose 5 % solution  100 mL/hr Intravenous PRN Isidro Bren Profit., DO        albumin human 25 % IV solution 25 g  25 g Intravenous PRN Isidro Bren Profit., DO   25 g at 12/16/19 1659    LORazepam (ATIVAN) injection 1 mg  1 mg Intravenous Q6H PRN Isidro Bren Profit., DO        0.9 % sodium chloride bolus  250 mL Intravenous PRN Isidro Bren Profit., DO        0.9 % sodium chloride bolus  150 mL Intravenous PRN Isidro Bren Profit., DO        albumin human 25 % IV solution 25 g  25 g Intravenous PRN Isidro Bren Profit., DO        povidone-iodine (BETADINE) 10 % external solution   Topical Daily Isidro Bren Profit., DO        sodium chloride flush 0.9 % injection 10 mL  10 mL Intravenous PRN Isidro Bren Profit., DO        potassium chloride (KLOR-CON M) extended release tablet 40 mEq  40 mEq Oral PRN Isidro Bren Profit., DO        Or    potassium bicarb-citric acid (EFFER-K) effervescent tablet 40 mEq  40 mEq Oral PRN Isidro Bren Profit., DO   40 mEq at 12/21/19 1150    Or    potassium chloride 10 mEq/100 mL IVPB (Peripheral Line)  10 mEq Intravenous PRN Isidro Bren Profit.,  mL/hr at 12/24/19 0819 10 mEq at 12/24/19 0819    famotidine (PEPCID) tablet 20 mg  20 mg Per NG tube Daily Isidro Bren Profit., DO   20 mg at 01/10/20 0856    REFRESH LACRI-LUBE ointment OINT   Both Eyes PRN Isidro Bren Profit., DO        0.9 % sodium chloride infusion   Intravenous Continuous Isidro Bren Profit., DO   Stopped at 01/09/20 2352    sodium chloride flush 0.9 % injection 10 mL  10 mL Intravenous 2 times per Lab Results   Component Value Date     01/10/2020    K 4.1 01/10/2020    CL 97 01/10/2020    CO2 21 01/10/2020    BUN 38 01/10/2020    CREATININE 1.47 01/10/2020    GFRAA >60 01/10/2020    LABGLOM 54 01/10/2020    GLUCOSE 122 01/10/2020    PROT 5.3 12/01/2019    CALCIUM 8.5 01/10/2020    BILITOT 1.20 12/01/2019    ALKPHOS 127 12/01/2019    AST 40 12/01/2019    ALT 6 12/01/2019       POC Tests:   Recent Labs     01/10/20  1102   POCGLU 124*       Coags:   Lab Results   Component Value Date    PROTIME 11.9 11/29/2019    INR 1.1 11/29/2019       HCG (If Applicable): No results found for: PREGTESTUR, PREGSERUM, HCG, HCGQUANT     ABGs: No results found for: PHART, PO2ART, NLD8QVK, BXH3FMY, BEART, [id number]     Type & Screen (If Applicable):  No results found for: LABABO, [address]    Anesthesia Evaluation  Patient summary reviewed and Nursing notes reviewed no history of anesthetic complications:   Airway: Mallampati: III       Comment: ETT in place    Dental:          Pulmonary:normal exam                               Cardiovascular:  Exercise tolerance: poor (<4 METS),   (+) hypertension:, CHF: diastolic and systolic,     (-) past MI, CABG/stent, dysrhythmias and  angina    ECG reviewed  Rhythm: regular  Rate: abnormal  Echocardiogram reviewed  Stress test reviewed                Neuro/Psych:   (+) neuromuscular disease:,              ROS comment: Hydrocephelus, non functioning  shunt  GI/Hepatic/Renal:   (+) morbid obesity          Endo/Other:    (+) DiabetesType II DM, poorly controlled, , blood dyscrasia: anemia:., .                 Abdominal:           Vascular: negative vascular ROS. TTE 11/29/2019  Summary  Left ventricle is normal in size moderate to severely reduced systolic function. Estimated ejection fraction is 30%. ( heart rate 120)  Right ventricular dilatation with reduced systolic function. Aortic sclerosis without stenosis.   Thickening of mitral valve leaflets without stenosis. Mild tricuspid regurgitation. Estimated right ventricular systolic pressure is 29 mmHg. Epicardial fat pad is noted. Trivial pericardial effusion       Anesthesia Plan      general     ASA 4       Induction: intravenous. MIPS: Postoperative opioids intended.                       Bruce Marie MD   1/10/2020

## 2020-01-10 NOTE — PROGRESS NOTES
[facility] - Progress Note    Today's Date and Time: 1/10/2020, 12:10 PM    Impression :   · Necrotizing fasciitis 11-29-19  · S/P perirectal I&D. Wide complex excisional debridement of gluteal and perineal areas on 11-29-19  · S/P debridement, washout of gluteal and perianal areas, diverting colostomy 12-3-19.   · S/P debridement, washout of gluteal and perianal areas, diverting colostomy 12-6-19.  · S/P Incision and debridement of necrotizing fasciitis buttock wound, scrotum, bilateral groin region and closure of midline abdominal wound 12-8-19   · S/P Incision and debridement of necrotizing fasciitis buttock wound, scrotum, bilateral groin region on 12-20-19. · S/P Incision and debridement of necrotizing fasciitis buttock wound, scrotum, bilateral groin region on 1-1-20   · S/P Incision and debridement of necrotizing fasciitis buttock wound, scrotum, bilateral groin region on 1-8-20  · Lactic acidosis  · DM 2  · HTN  · Hx of Low LVEF (20%) as per family  · DEBBY  · Fluid overload    Recommendations:     · Monitor off antibiotics,   · Continue with wound care  · Nutrition    Medical Decision Making/Summary/Discussion:1/10/2020     · Patient with DM 2, prior diabetic foot infection  · Presented to [facility] ER generalized weakness for the past few days  · Found to have soft tissue necrosis with subcutaneous emphysema in gluteal areas and perineum  · S/P I&D and wide complex debridement of affected tissues on 11-29-19  · Showing hypotension, requiring fluids and a vasopressor  · Cultures in progress  · Will cover with Zosyn. Wounds with Strep spp. And Gram negative bacilli . No Staph spp.   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 · One more debridement in OR scheduled for 12-10-19 and then decision will be made to change code status or not. · Pt did not tolerate HD on 12-9. It was stopped early and pt required vasopressor support with Levophed, remains on vent. · Pt to OR 12-10 for further debridement  · Repeat I&D planned for 12-16-19 was cancelled because of high K levels  · Bedside I & D on 12/17/19. · Patient to undergo additional I&D on 12-20-19  · 12/23 right upper lobe collapse  · 12/24 debridement with excision of sacral wound in preparation for a flap, partial closure of his scrotal wound, VAC and debridement over the perineum  · 12-30 Wound RN to change abdominal vac   · 1-1-20 Returned to OR for further debridement of perineal wound and vac change  · 1-3 Lt heel and abd wound vac changed per wound care  · 1-3 Order for removal of Lt tunnel cath removal. Last HD 12-27, pt showing signs of renal recovery. · 1-5 to OR for further debridement and vac change  · S/P OR 1-8 for debridement and vac change. Infection Control Recommendations   · [place] Precautions  · Contact Isolation MRSA    Antimicrobial Stewardship Recommendations     Off antibiotics  Coordination of Outpatient Care:   · Estimated Length of IV antimicrobials: D/C 12-19-19  · Patient will need Midline Catheter Insertion: No  · Patient will need PICC line Insertion:Has Central line  · Patient will need: Home IV , [place],  SNF,  LTAC: TBD  · Patient will need outpatient wound care:Yes    Chief complaint/reason for consultation:   · Ashley's vs necrotizing fasciitis    History of Present Illness:   Becky Mloina is a 37y.o.-year-old  male who was initially admitted on 11/29/2019. Patient seen at the request of . INITIAL HISTORY:    Patient presented through ER in [place] with complaints of weakness of a few days duration. Examination showed skin necrosis in the gluteal and perineal region. Patient transferred to [facility].  CT scan showed extensive subcutaneous emphysema. Patient underwent I&D and extensive complex debridement of affected tissues on 11-29-19. Gram stain of tissues showed Strep. Spp and Gram negative bacilli. 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 He has underlying DM 2, prior diabetic foot infection, DEBBY. Patient more stable post surgery but with hypotension requiring a pressor. Zosyn D/C because of of poor LVEF, in order to reduce Na and fluid load  Meropenem started adjusted for Cr Cl 30 cc  Meropenem adjusted for HD  Per surgery after debridement on 12-8-19, infection has spread to deep planes with the urethra less viable. One more debridement in OR scheduled for 12-10-19 and then decision will be made to change code status or not. Pt did not tolerate HD on 12-9. It was stopped early and pt required vasopressor support with Levophed, remains on vent. Pt to OR 12-10 for further debridement  · Repeat I&D planned for 12-16-19 was cancelled because of high K levels  · Bedside I & D on 12/17/19. · Patient to undergo additional I&D on 12-20-19  · 12/23 right upper lobe collapse  · 12/24 debridement with excision of sacral wound in preparation for a flap, partial closure of his scrotal wound, VAC and debridement over the perineum  · Pt making urine. Last HD 12-27  · 12-30 Abd vac change per wound RN  · 1-1-20 Returned to OR for further debridement of perineal wound and vac change  · 1-4 Tunnel cath discontinued  · 1-5 to OR for debridement and vac change  · 1-8 to OR for vac change, plastics following for possible flap in future    CURRENT EVALUATION :1/10/2020     Afebrile  VS stable - HTN    Pt seen with abd wound vac change  The wound is clean   Rt foot plantar area with necrosis, beefy red underneath    Pt is AA  Responding appropriately to questions  Denies chills or fevers. Eating/drinking well. Ambrosio in place.   No complaints    Abdomen is soft, active BS, ostomy intact with pink stoma, stool. VAC in the middle abdominal wound. Had perineal VAC exchange on 1-5-20   Underwent further debridement and vac change on 1-8    Tentative plan for wash out on Sunday 1-12 and then transfer to [facility] sometime next week. Left foot has dystrophic skin, dressing in place. Residual ischemic tissues. Not infected    Last HD 12-27, making urine, HD on hold. Plan to remove R IJ from neck. Nephrology signed off. Wound pics:  1-8    1-6:                1-3                  Lab & Cultures: 1/10/2020    WBC 9.7->8.3->9.2->7.8->8.5->8.2  Hb 7.3->7.1->7.7->8.1->8.4->8.3  Plat 350->377->363->398->340    BUN 49->44->38  Cr 1.68->1.84->1.80->1.86->1.9->1.64->1.47    Urine:  · NA  Blood:  · 11-30-19: no growth  · 12-2-19: no growth  Sputum :  · NA  Wound:  · 11-29-19: Strep ssp and Gram negative bacilli     I have personally reviewed the past medical history, past surgical history, medications, social history, and family history, and I have updated the database accordingly.   Past Medical History:     Past Medical History:   Diagnosis Date    [facility]     Diabetes mellitus [facility]     Hypertension     Septic shock ([facility]     Spina bifida aperta of lumbar spine ([facility]        Past Surgical  History:     Past Surgical History:   Procedure Laterality Date    ABDOMEN SURGERY N/A 12/8/2019    DEBRIDEMENT  NECROTIZING FASCIITIS BUTTOCK, WOUND VAC REMOVAL DELAYED PRIMARY CLOSURE OF MIDLINE ABDOMINAL INCISION, OSTOMY BAG CHANGE performed by Abhay Johnston MD at [facility] 43 N/A 12/10/2019    DEBRIDEMENT OF SACRAL WOUND performed by Allyson Christian MD at [facility] 43 N/A 1/5/2020    GLUTEAL WOUND DEBRIDEMENT, WOUND VAC CHANGE performed by Allyson Christian MD at [facility] 2. 1/1/2020    IRRIGATION AND DEBRIDEMENT BUTTOCKS, SCROTUM, ABDOMEN, WOUND VAC CHANGE performed by Abhay Johnston [facility] OR    COLOSTOMY N/A 12/3/2019    LAPAROSCOPIC CONVERTED TO OPEN DIVERTING LOOP COLOSTOMY; WOUND VAC APPLICATION performed by Jacklyn Pineda MD at [facility] 110  01/05/2020    GLUTEAL WOUND DEBRIDEMENT, WOUND VAC CHANGE     DEBRIDEMENT  01/08/2020    WOUND VAC CHANGE SACRAL DECUBITUS, POSSIBLE DEBRIDEMENT    INCISION AND DRAINAGE Bilateral 11/29/2019    RECTAL PERIRECTAL INCISION AND DRAINAGE, DIVERING LOOP COLOSTOMY CREATION performed by Mireya Calderon MD at [facility]141 N/A 12/6/2019    DEBRIDEMENT NECROTIZING FASCIAITIS BILAT BUTTOCK performed by Bernardo Julio MD at [facility] N/A 12/20/2019    DEBRIDEMENT GLUTEAL AND SCROTAL REGIONS performed by Mireya Calderon MD at [facility]141 N/A 12/26/2019    INCISION AND DRAINAGE AND WOUND VAC CHANGE BUTTOCK, PERINEUM performed by Angela Fung DO at [facility]141 N/A 1/8/2020    WOUND VAC CHANGE SACRAL DECUBITUS, DEBRIDEMENT performed by Bernardo Julio MD at [address] N/A 12/10/2019    SCROTAL EXPLORATION WITH SCROTAL DEBRIDEMENT performed by Melonie Landeros MD at [facility] N/A 12/23/2019    WOUND DEBRIDEMENT  WOUND VAC CHANGE - [address] performed by Bernardo Julio MD at [facility]       Medications:      losartan  25 mg Oral Daily    amLODIPine  10 mg Oral Daily    oxybutynin  1 patch Transdermal Once per day on Mon Thu    docusate sodium  100 mg Oral BID    senna  1 tablet Oral Nightly    therapeutic multivitamin-minerals  1 tablet Oral Daily    carvedilol  25 mg Per NG tube Daily    insulin glargine  10 Units Subcutaneous Daily    bumetanide  2 mg Oral Daily    darbepoetin rohan-polysorbate  100 mcg Intravenous Weekly    polyethylene glycol  17 g Oral Daily    insulin lispro  0-18 Units Subcutaneous Q4H    alteplase  1 mg Intracatheter Once    povidone-iodine   Topical Daily    famotidine  20 mg Per NG tube Daily    sodium chloride flush  10 mL Intravenous 2 times per day    heparin (porcine)  5,000 Units Subcutaneous 3 times per day       Social History:     Social History     Socioeconomic History    Marital status: Unknown     Spouse name: Not on file    Number of children: Not on file    Years of education: Not on file    Highest education level: Not on file   Occupational History    Not on file   Social Needs    Financial resource strain: Not on file    Food insecurity:     Worry: Not on file     Inability: Not on file    Transportation needs:     Medical: Not on file     Non-medical: Not on file   Tobacco Use    Smoking status: Not on file   Substance and Sexual Activity    Alcohol use: Not on file    Drug use: Not on file    Sexual activity: Not on file   Lifestyle    Physical activity:     Days per week: Not on file     Minutes per session: Not on file    Stress: Not on file   Relationships    Social connections:     Talks on phone: Not on file     Gets together: Not on file     Attends [employer] service: Not on file     Active member of club or organization: Not on file     Attends meetings of clubs or organizations: Not on file     Relationship status: Not on file    Intimate partner violence:     Fear of current or ex partner: Not on file     Emotionally abused: Not on file     Physically abused: Not on file     Forced sexual activity: Not on file   Other Topics Concern    Not on file   Social History Narrative    Not on file       Family History:   No family history on file. Allergies:   Patient has no known allergies. Review of Systems:     Review of Systems   Constitutional: Negative for activity change.         Pt AA  Denies chills, fevers, SOB  No complaints or issues           Physical Examination :     Patient Vitals for the past 8 hrs:   BP Temp Temp src Pulse Resp SpO2   01/10/20 0506 (!) 150/81 98 °F (36.7 °C) Oral 85 19 100 %     Physical Exam  Exam conducted with a chaperone present. Constitutional:       Appearance: Normal appearance. He is not toxic-appearing or diaphoretic. HENT:      Head: Normocephalic and atraumatic. Nose: No congestion or rhinorrhea. Mouth/Throat:      Mouth: Mucous membranes are moist.   Eyes:      General: No scleral icterus. Pupils: Pupils are equal, round, and reactive to light. Neck:      Musculoskeletal: Neck supple. No neck rigidity or muscular tenderness. Cardiovascular:      Rate and Rhythm: Normal rate and regular rhythm. Heart sounds: Normal heart sounds. No murmur. No friction rub. No gallop. Pulmonary:      Effort: Pulmonary effort is normal. No respiratory distress. Breath sounds: Normal breath sounds. No stridor. Abdominal:      General: Abdomen is flat. There is no distension. Palpations: There is no mass. Tenderness: There is no tenderness. Comments: Abdominal wound examined, granulation tissue noted   Genitourinary:     Comments:  Ambrosio in place. Perineal vac in place  Musculoskeletal:         General: No swelling, tenderness or deformity. Skin:     General: Skin is warm and dry. Coloration: Skin is not jaundiced or pale. Findings: Lesion present. No erythema. Neurological:      General: No focal deficit present. Mental Status: He is alert. Mental status is at baseline. Cranial Nerves: No cranial nerve deficit.    Psychiatric:      Comments: AA, appropriate response           Medical Decision Making -Laboratory:   I have independently reviewed/ordered the following labs:    CBC with Differential:   Recent Labs     01/09/20  0558 01/10/20  0707   WBC 8.5 8.2   HGB 8.4* 8.3*   HCT 28.5* 26.5*    340   LYMPHOPCT 17* 15*   MONOPCT 12* 10     BMP:   Recent Labs     01/09/20  0558 01/10/20  0707    132*   K 4.3 4.1    97*   CO2 21 21   BUN 44* 38*   CREATININE 1.64* 1.47*   MG 1.6 1.8     Hepatic Function Panel:   No nec fasc   Reason for Exam: Donalee Batres; Trauma   Acuity: Unknown   Type of Exam: Subsequent/Follow-up       FINDINGS:   Lower Chest: Partially visualized bilateral pleural effusions with bibasilar   heterogeneous opacities and bilateral lower lobe consolidations.  Central   venous catheter tip near the superior atrial caval junction.  Cardiomegaly. Trace pericardial fluid.       Liver: Normal.       Gallbladder and Bile Ducts: Normal.       Spleen: Splenomegaly.       Adrenal Glands: Normal.       Pancreas: Normal.       Genitourinary: Symmetric renal atrophy.  Redemonstration of multiple   hypodensities in the right kidney which likely represent benign cysts.  No   urinary stones or hydronephrosis.  Ambrosio catheter in the decompressed urinary   bladder.       Bowel: Normal caliber bowel.  Postsurgical changes of the bowel with left   lower quadrant ostomy.  No evidence of acute appendicitis.  No significant   diverticular disease.       Vasculature: Atherosclerosis.  No abdominal aortic aneurysm.       Bones and Soft Tissues: Diffuse soft tissue stranding and fluid. Postsurgical changes in the anterior abdominal wall with midline incision   demonstrating expected small amount of fluid and air.  Large soft tissue   defects in the right inguinal region, scrotum, right greater than left   gluteal creases with associated packing material.  Small amount of   surrounding soft tissue gas.  There is associated skin thickening in these   regions.  Bilateral lower abdominal wall skin thickening.       Retroperitoneum/Mesentery: No intraperitoneal free air.  Mild abdominopelvic   ascites.  Loculated fluid along the inferior aspect of the liver. Redemonstration of bilateral inguinal and pelvic sidewall lymphadenopathy.    Multiple mildly prominent retroperitoneal and upper abdominal lymph nodes are   similar to the prior study.  Percutaneous intraperitoneal surgical drains.           Impression   1.  Large soft tissue defects in the right inguinal region, scrotum and right   greater than left gluteal crease is with associated packing material, small   amount of surrounding soft tissue gas and skin thickening likely relates to   history of necrotizing fasciitis.       2.  Postsurgical changes of the bowel with left lower quadrant ostomy.  No   bowel obstruction.  Percutaneous intraperitoneal surgical drains.       3.  Mild abdominopelvic ascites.  Loculated fluid along the inferior aspect   of the liver.  No intraperitoneal free air.       4.  Diffuse anasarca.       5.   Bilateral pleural effusions with associated heterogeneous opacities and   consolidations.  Underlying infection is not excluded.               EXAMINATION:   ONE XRAY VIEW OF THE CHEST       11/29/2019 9:01 pm       COMPARISON:   4 hours ago       HISTORY:   ORDERING SYSTEM PROVIDED HISTORY: intubated   TECHNOLOGIST PROVIDED HISTORY:   intubated   Reason for Exam: portable supine/ intubated   Acuity: Acute   Type of Exam: Initial       FINDINGS:   New ET tube terminates 38 mm above the ron.  There appears to be a   possible enteric tube which is not well visualized distally.  Right IJ   catheter terminates in the right atrium and is unchanged.  Lungs are clear. Cardiomegaly.  Mediastinum normal.  Bony thorax intact.           Impression   New ET tube as above.  Possible new enteric tube not well visualized. Recommend follow-up KUB if clinically warranted. CT ABDOMEN AND PELVIS WITHOUT CONTRAST    COMPARISON:  3/22/2017    CLINICAL HISTORY: Infection in scrotum, lower abdominal pain, diabetic, 30,000 white blood cell count. TECHNIQUE: Unenhanced axial images were obtained from the lung bases to the pubic symphysis with sagittal and coronal 2D reformatted images. Oral contrast administered:  No.  Automatic exposure control (AEC) was utilized. CT ABDOMEN FINDINGS:      The lung bases are unremarkable.     There is a small pericardial effusion versus thickening. The liver, spleen, and pancreas demonstrate no acute abnormality given compromised evaluation without intravenous contrast.  There may be mild splenomegaly.  The adrenal glands appear within normal limits. There is no hydronephrosis or obstructing urinary tract calculi. Small amount of free fluid is seen adjacent to the liver inferiorly. .    The appendix is visualized in the right lower quadrant and appears within normal limits.       There is no evidence for bowel obstruction. Stranding is seen within the subcutaneous fat overlying both lateral abdominal walls left greater than right.  There is ill-defined fluid collection within the subcutaneous fat overlying the left lateral abdominal wall possibly representing phlegmon or developing abscess although no organized   abscess is seen. There is a large amount of soft tissue emphysema involving both the right and left buttock extending to the perineum tissue thickening is seen especially on the left involving the left buttock. CT PELVIS FINDINGS:        No distal ureteral calculi or bladder calculi. There is no free fluid in the pelvis. Catheter is seen within the urinary bladder. Osseous changes within the left hemipelvis which may be chronic in nature. IMPRESSION:    Extensive subcutaneous emphysema as described above involving both buttocks extending to the perineum.  The possibility of Ashley gangrene/necrotizing fasciitis cannot be excluded. Possible phlegmon or developing soft tissue abscess overlying the left lateral abdominal wall as noted above.  No organized abscess is seen however. Osseous changes within the left hemipelvis which may be chronic in nature. Results the study were called to Dr. Downing [facility] [phone] on 11/29/2019  All CT scans at this facility use dose modulation, iterative reconstruction, and/or weight based dosing when appropriate to reduce radiation dose to as low as reasonably achievable.       Medical

## 2020-01-10 NOTE — PROGRESS NOTES
[facility]  Internal Medicine Teaching Residency Program  Inpatient Daily Progress Note  ______________________________________________________________________________    Patient: Sylvia Salvador  YOB: 1985   YWK:[id number]    Acct: [de-identified]     Room: [facility]300-  Admit date: 11/29/2019  Today's date: 01/10/20  Number of days in the hospital: 43    SUBJECTIVE   Admitting Diagnosis: Necrotizing fasciitis ([address]    Afebrile. Hemodynamically stable with blood pressure on the higher end. Labs stable. Hgb stable. Encourage oral intake. Surgery plan for next vac change 1/11/20. Plastic surgery will eval at this time as well for possible graft and flap. ROS:  Constitutional:  negative for chills, fevers, sweats  Respiratory:  negative for cough, dyspnea on exertion, hemoptysis, shortness of breath, wheezing  Cardiovascular:  negative for chest pain, chest pressure/discomfort, lower extremity edema, palpitations  Gastrointestinal:  negative for abdominal pain, constipation, diarrhea, nausea, vomiting  Neurological:  negative for dizziness, headache  BRIEF HISTORY     [address] Emergency Department with complaint of generalized weakness for the past few days. On examination the emergency department staff noticed the patient had a foul smell which they thought he had a bowel movement. They turned the patient and noticed sloughing of skin on his back and buttocks.      In the emergency department patient was afebrile but tachycardic. Initial labs demonstrated hyponatremia of 130, bicarb 12, BUN 63 and creatinine 2.82, hypocalcemia 7.7, alkaline phosphatase 144, AST 83, ALT 16, lactate 1.4, leukocytosis of 30.4, hemoglobin 9.2. Chest xray demonstrated no acute process. CT abdomen and pelvis without contrast demonstrated extensive subcutaneous emphysema involving both buttocks extending to the perineum.  Possible phlegmon or developing soft tissue is tolerating p.o. diet.  Encouraged to use Ensure in between meals to maintain the caloric requirement.  Tunneled catheter removed by IR as per nephrology recommendations as the patient does not need dialysis. 2020: Placed on renal diet.  Neurology will place right wrist splint  2020: Sacral wound VAC changed today by  surgery. 2020: Sacral wound VAC change and debridement by general surgery    OBJECTIVE     Vital Signs:  BP (!) 150/81   Pulse 85   Temp 98 °F (36.7 °C) (Oral)   Resp 19   Ht 5' 7\" (1.702 m)   Wt 286 lb 9.6 oz (130 kg)   SpO2 100%   BMI 44.89 kg/m²     Temp (24hrs), Av.4 °F (36.9 °C), Min:98 °F (36.7 °C), Max:98.8 °F (37.1 °C)    In: 1100   Out: 4700 [Urine:4700]    Physical Exam:    General appearance: alert and cooperative with exam  HEENT: Head: Normocephalic, no lesions, without obvious abnormality. Neck: no adenopathy, no carotid bruit, no JVD, supple, symmetrical, trachea midline and thyroid not enlarged, symmetric, no tenderness/mass/nodules  Lungs: clear to auscultation bilaterally  Heart: regular rate and rhythm, S1, S2 normal, no murmur, click, rub or gallop  Abdomen: Soft, distended, has a colostomy bag  Extremities: Bilateral edema covered with dressing.   Neurologic: Mental status: Alert, oriented, thought content appropriate    Medications:  Scheduled Medications:    losartan  25 mg Oral Daily    amLODIPine  10 mg Oral Daily    oxybutynin  1 patch Transdermal Once per day on     docusate sodium  100 mg Oral BID    senna  1 tablet Oral Nightly    therapeutic multivitamin-minerals  1 tablet Oral Daily    carvedilol  25 mg Per NG tube Daily    insulin glargine  10 Units Subcutaneous Daily    bumetanide  2 mg Oral Daily    darbepoetin rohan-polysorbate  100 mcg Intravenous Weekly    polyethylene glycol  17 g Oral Daily    insulin lispro  0-18 Units Subcutaneous Q4H    alteplase  1 mg Intracatheter Once    povidone-iodine   Topical Daily    last 72 hours. APTT: No results for input(s): APTT in the last 72 hours. CARDIAC ENZYMES: No results for input(s): CKMB, CKMBINDEX, TROPONINI in the last 72 hours. Invalid input(s): CKTOTAL;3  FASTING LIPID PANEL:  Lab Results   Component Value Date    TRIG 208 (H) 12/19/2019     LIVER PROFILE: No results for input(s): AST, ALT, ALB, BILIDIR, BILITOT, ALKPHOS in the last 72 hours. MICROBIOLOGY:   Lab Results   Component Value Date/Time    CULTURE NO GROWTH 6 DAYS 12/02/2019 04:13 AM       Imaging:    Xr Knee Right (1-2 Views)    Result Date: 1/3/2020  No acute osseous abnormality No effusion     Mri Cervical Spine Wo Contrast    Result Date: 1/3/2020  Detail is limited due to large amount of artifact, particularly in the canal. Multilevel degenerative disc disease is noted, as described. See above for details of each level. There is a large amount of posterior soft tissue edema in the cervical spine and upper thoracic spine. This is not completely included on the examination. This is indeterminate may be due to multifocal myositis. Rhabdomyolysis or infectious processes are possible as well. ASSESSMENT & PLAN     ASSESSMENT / PLAN:     Principal Problem:      11/29/2019 wide complex debridement of nec fasc involving gluteal/perianal/distal rectum  12/03/2019 open diverting end colostomy   12/06/2019 I&D  12/08/2019 Incision and debridement of necrotizing fasciitis buttock wound, scrotum, bilateral groin region and closure of midline abdominal wound  12/10/2019 Sharp excisional debridement of gluteal, marc-rectal and perineal wound to level of muscle, Sharp excisional debridement of scrotum (performed by Urology), Washout of gluteal/perineal/scrotal/inguinal wound, Partial primary closure of pubic wound, and Dakins Wet to dry dressing application  63/58/3647 sharp excisional debridement of gluteal and perineal wounds to muscle 30 x 40 cm.  Urology exam under anesthesia   12/24/2019 sharp debridement, partial closure of scrotum, placement of wound vac   12/26/2019 s/p sharp debridement of L side lateral aspect of sacral wound, replacement of wound vac    1/1/2019: Vera flow irrigation VAC to gluteal wound with debridement.  Started VAC to perineal wound on midline laparotomy wound  1/3/2020:Patient is off the Flexiflo and is tolerating p.o. diet.  Encouraged to use Ensure in between meals to maintain the caloric requirement.  Tunneled catheter removed by IR as per nephrology recommendations as the patient does not need dialysis. 1/4/2020: Placed on renal diet.  Neurology will place right wrist splint  1/5/2020: Sacral wound VAC changed today by skin surgery  1/8/2020: Wound VAC change        Principal Problem:    Principal Problem:   -Diabetes mellitus: Currently on 10 units Lantus and high-dose correcting scale.  Last HbA1c 8 done on 12/1/2019.       -Hypertension: In control.  Continue Coreg  25 mg and Norvasc 10 mg.     -Iron deficiency anemia Hb: Low iron studies, Venofer 5 days completed.      -DEBBY on CKD stage III: Patient's last hemodialysis was on 12/27/2019. tunneled catheter removed as per nephrology patient is not in need of acute dialysis right now.  Continue Bumex 2 mg. Creatinine at baseline.      -Hyperkalemia: Resolved.       -Necrotizing fasciitis,Gluteal, perirectal, perianal wound:  Next sacral wound VAC change this weekend.  Midline wound VAC changes and debridement as per by general surgery.  ID following the patient and monitoring of the antibiotics for now.     -Right upper extremity weakness: Has a wrist extension splint.  Outpatient EMG in 3 to 4 weeks.     -Urinary incontinence: Seen by urology they do not want to do suprapubic catheter as patient has had multiple abdominal surgeries and the risk of bowel injury.  On oxybutynin patch and upsizing of Ambrosio catheter done yesterday by urology.        DVT ppx : heparin  PT/OT: Consulted  Discharge Planning / SW: Ongoing    Griselda Smack, MD  Internal Medicine Resident, PGY-2  [facility];  [place], [place]  1/10/2020, 12:06 PM

## 2020-01-10 NOTE — PROGRESS NOTES
PROGRESS NOTE    PATIENT NAME: Austin Mcdaniel 1620 RECORD NO. [id number]  DATE: 1/10/2020  PRIMARY CARE PHYSICIAN: No primary care provider on file. HD: # [id number]    ASSESSMENT    Patient Active Problem List   Diagnosis    Necrotizing fasciitis ([address]    Essential hypertension    Diabetes ([address]    Diabetic foot ulcer ([address]    Diabetic foot infection [facility]    Iron deficiency anemia    Muscle weakness of right upper extremity    Wrist drop, acquired, right     11/29/2019 wide complex debridement of nec fasc involving gluteal/perianal/distal rectum  12/03/2019 open diverting end colostomy   12/06/2019 I&D  12/08/2019 Incision and debridement of necrotizing fasciitis buttock wound, scrotum, bilateral groin region and closure of midline abdominal wound  12/10/2019 Sharp excisional debridement of gluteal, marc-rectal and perineal wound to level of muscle, Sharp excisional debridement of scrotum (performed by Urology), Washout of gluteal/perineal/scrotal/inguinal wound, Partial primary closure of pubic wound, and Dakins Wet to dry dressing application  63/38/8949 sharp excisional debridement of gluteal and perineal wounds to muscle 30 x 40 cm. Urology exam under anesthesia   12/24/2019 sharp debridement, partial closure of scrotum, placement of wound vac   12/26/2019 s/p sharp debridement of L side lateral aspect of sacral wound, replacement of wound vac    1/5/2020 gluteal wound debridement, veraflo wound vac change  1/8/2020 gluteal wound debridement, veraflo wound vac change     MEDICAL DECISION MAKING AND PLAN    1. Plan for next vac change Saturday 1/11. Will evaluate the wound at that time and possibly try to begin changing the vac at bedside after Saturday   2. Dr. Lazaro Castro, plastic surgery, following from [facility] for grafting and possible flap in future. 3. General diet. NPO at mn.    4. Continue calorie count. 5. Bowel regimen: colace, miralax   6. Midline vac changes-wound RN following   7.  Strict glucose control <180      SUBJECTIVE    Della Morning is seen and examined. Afebrile, VSS. No clinical change. Denies pain this AM. Tolerating diet and drinking additional protein supplements. +ostomy output. Wound vacs with good seal.       OBJECTIVE  VITALS: Temp: Temp: 98 °F (36.7 °C)Temp  Av.4 °F (36.9 °C)  Min: 98 °F (36.7 °C)  Max: 98.8 °F (00.0 °C) BP Systolic (67TRS), XJD:682 , Min:143 , ZRC:758   Diastolic (75XIZ), KLQ:92, Min:77, Max:83   Pulse Pulse  Av  Min: 85  Max: 98 Resp Resp  Av  Min: 15  Max: 19 Pulse ox SpO2  Av %  Min: 100 %  Max: 100 %  GENERAL: alert and oriented, no distress  NEURO: CNII-XII grossly intact  HEENT: atraumatic, normocephalic, sclera sclear, nose without deformity, trachea midline   LUNGS: effort normal, no respiratory distress, no accessory muscle use   HEART: normal rate and regular rhythm  ABDOMEN: soft, non-tender, non-distended, no guarding or peritoneal signs, midline wound vac in place with good seal. Ostomy appliance in place with formed stool present. Sacral/perineal wound vac in place with good seal.   EXTREMITY: no cyanosis, clubbing or edema. Bilateral foot wounds. I/O last 3 completed shifts: In: 1550 [P.O.:1550]  Out: 3800 [Urine:3800]    Drain/tube output: In: 1100 [P.O.:1100]  Out: 4700 [Urine:4700]    LAB:  CBC:   Recent Labs     20  0814 20  0633 20  1817 20  0558   WBC 7.8 7.4  --  8.5   HGB 8.1* 8.5* 8.2* 8.4*   HCT 27.8* 28.4* 27.2* 28.5*   MCV 98.9 96.6  --  98.6    382  --  398     BMP:   Recent Labs     20  0820  0633 20  0558   * 135 135   K 4.3 4.2 4.3   CL 99 99 100   CO2 22 21 21   BUN 49* 46* 44*   CREATININE 1.92* 1.77* 1.64*   GLUCOSE 112* 99 110*     COAGS: No results for input(s): APTT, PROT, INR in the last 72 hours.     RADIOLOGY:  No new imaging         Zion Wing DO   General Surgery Resident   Trauma, Emergency and Critical Surgical Services  Attending Note      I have reviewed the above TECSS note(s) and confirmed the key elements of the medical history and physical exam. I have discussed the findings, established the care plan and recommendations with Resident, TECSS RN, bedside nurse. Seen and examined by me this am.  States doing well. Answers questions appropriately.   Wound vac change tomorrow am.    Alex Mora MD  1/10/2020  7:45 AM

## 2020-01-10 NOTE — PROGRESS NOTES
[facility] Wound Ostomy  Nurse  Follow up Note       NAME:  Austin Mcdaniel 1620 RECORD NUMBER:  [id number]  AGE: 37 y.o. GENDER: male  : 1985  TODAY'S DATE:  1/10/2020    Subjective   Reason for [id number] Nurse Evaluation and Assessment:   NPWT dressing change and wound assessment to mid abdominal surgical wound. Colostomy care. Dressing change to left heel, chronic wound to plantar surface with eschar. (Podiatry signed off)  Surgery team continues with Roena Jerrod therapy to the sacral/perineal wound; next dressing change in OR planned for tomorrow. Right groin dressing connected to VAC via y-site with abdominal wound; good seal maintained. .          Objective    BP (!) 150/81   Pulse 85   Temp 98 °F (36.7 °C) (Oral)   Resp 19   Ht 5' 7\" (1.702 m)   Wt 286 lb 9.6 oz (130 kg)   SpO2 100%   BMI 44.89 kg/m²     LABS:  WBC:    Lab Results   Component Value Date    WBC 8.2 01/10/2020     H/H:    Lab Results   Component Value Date    HGB 8.3 01/10/2020    HCT 26.5 01/10/2020     PTT:  No results found for: APTT, PTT[APTT}  PT/INR:    Lab Results   Component Value Date    PROTIME 11.9 2019    INR 1.1 2019     HgBA1c:    Lab Results   Component Value Date    LABA1C 8.0 2019       Assessment   Hawk Risk Score: Hawk Scale Score: 12    Patient Active Problem List   Diagnosis Code    Necrotizing fasciitis ([address] M72.6    Essential hypertension I10    Diabetes ([address] E11.9    Diabetic foot ulcer ([address] E11.621, L97.509    Diabetic foot infection ([address] E11.628, L08.9    Iron deficiency anemia D50.9    Muscle weakness of right upper extremity M62.81    Wrist drop, acquired, right M21.331       Measurements:     01/10/20 1250   Wound 19 Heel Left   Date First Assessed/Time First Assessed: 19   Present on Hospital Admission: Yes  Primary Wound Type: Pressure Injury  Location: (c) Heel  Wound Location Orientation: Left   Wound Image    Wound Diabetic   Dressing Status Changed Dressing Changed Changed/New   Dressing/Treatment Betadine swabs; Moist to moist;ABD;Dry dressing   Wound Cleansed Rinsed/Irrigated with saline; Other (Comment)  (betadine)   Dressing Change Due 01/11/20   Wound Assessment Red;Granulation tissue;Black;Dry;Yellow;Slough   Drainage Amount Moderate   Drainage Description Serous; Yellow   Negative Pressure Wound Therapy Abdomen Mid   Placement Date/Time: 12/20/19 1800   Pre-existing: No  Inserted by: Thierry Luis  Location: Abdomen  Wound Location Orientation: Mid   Wound Type Surgical   Unit Type VAC ulta   Dressing Type Black foam   Number of pieces used 1   Cycle Continuous; On   Target Pressure (mmHg) 125   Dressing Status Changed   Dressing Changed Changed/New   Drainage Description Serosanguinous   Dressing Change Due 01/13/20   Wound Assessment   (See LDA)   Incision 12/03/19 Abdomen Mid   Date First Assessed/Time First Assessed: 12/03/19 1158   Present on Hospital Admission: No  Primary Wound Type: Surgical Type  Location: Abdomen  Wound Location Orientation: Mid   Wound Image    Wound Assessment Clean;Red;Granulation tissue; Yellow;Slough  (small slough to base of distal wound bed)   Liliam-wound Assessment Clean; Intact  (bordered with brava strips and drape)   Drainage Description Serosanguinous   Odor None   Dressing/Treatment Vacuum dressing   Dressing Changed Changed/New   Dressing Status Changed   Dressing Change Due 01/13/20     Dressing changed following 's guidelines. Liliam-wound skin protected with strips of Brava wafer and drape. Good seal achieved. Response to treatment:  Well tolerated by patient. Plan   Plan of Care:   NPWT dressing changes to abdomen M-W-F  With colostomy care. Vacuum set at -125;  y-site conection with the right groin VAC dressing, changed by Surgery team.     Stoma is flush with mucocutaneous separation noted. Stool is formed, soft.    Brava protective ring added to flat Coloplast pouch; receiving Miralax daily, may benefit from [address] in place to buttock/perineal wounds, changes per Surgery in OR, next planned for 1/11. Podiatry (signed off) consulted for foot wounds; daily dressings per nurisng staff using betadine.     Specialty Bed Required : Yes   [x] Low Air Loss   [x] Pressure Redistribution  [] Fluid Immersion  [x] Bariatric  [] Total Pressure Relief  [] Other:     Current Diet: Dietary Nutrition Supplements: Renal Oral Supplement  DIET GENERAL; Low Potassium  Diet NPO, After Midnight Exceptions are: Sips with Meds    Discharge Plan:  Placement for patient upon discharge: skilled nursing       KALIN ALBERTO, RN, [employer]

## 2020-01-11 ENCOUNTER — ANESTHESIA (OUTPATIENT)
Dept: OPERATING ROOM | Age: 35
DRG: 463 | End: 2020-01-11
Payer: MEDICARE

## 2020-01-11 VITALS — SYSTOLIC BLOOD PRESSURE: 88 MMHG | DIASTOLIC BLOOD PRESSURE: 58 MMHG | TEMPERATURE: 98.4 F | OXYGEN SATURATION: 100 %

## 2020-01-11 LAB
ABSOLUTE EOS #: 0.4 K/UL (ref 0–0.44)
ABSOLUTE IMMATURE GRANULOCYTE: 0.05 K/UL (ref 0–0.3)
ABSOLUTE LYMPH #: 1.46 K/UL (ref 1.1–3.7)
ABSOLUTE MONO #: 0.96 K/UL (ref 0.1–1.2)
ANION GAP SERPL CALCULATED.3IONS-SCNC: 14 MMOL/L (ref 9–17)
BASOPHILS # BLD: 1 % (ref 0–2)
BASOPHILS ABSOLUTE: 0.06 K/UL (ref 0–0.2)
BUN BLDV-MCNC: 38 MG/DL (ref 6–20)
BUN/CREAT BLD: ABNORMAL (ref 9–20)
CALCIUM SERPL-MCNC: 8.7 MG/DL (ref 8.6–10.4)
CHLORIDE BLD-SCNC: 99 MMOL/L (ref 98–107)
CO2: 21 MMOL/L (ref 20–31)
CREAT SERPL-MCNC: 1.47 MG/DL (ref 0.7–1.2)
DIFFERENTIAL TYPE: ABNORMAL
EOSINOPHILS RELATIVE PERCENT: 5 % (ref 1–4)
GFR AFRICAN AMERICAN: >60 ML/MIN
GFR NON-AFRICAN AMERICAN: 54 ML/MIN
GFR SERPL CREATININE-BSD FRML MDRD: ABNORMAL ML/MIN/{1.73_M2}
GFR SERPL CREATININE-BSD FRML MDRD: ABNORMAL ML/MIN/{1.73_M2}
GLUCOSE BLD-MCNC: 100 MG/DL (ref 75–110)
GLUCOSE BLD-MCNC: 101 MG/DL (ref 75–110)
GLUCOSE BLD-MCNC: 104 MG/DL (ref 70–99)
GLUCOSE BLD-MCNC: 172 MG/DL (ref 75–110)
GLUCOSE BLD-MCNC: 191 MG/DL (ref 75–110)
GLUCOSE BLD-MCNC: 98 MG/DL (ref 75–110)
HCT VFR BLD CALC: 29.8 % (ref 40.7–50.3)
HEMOGLOBIN: 8.7 G/DL (ref 13–17)
IMMATURE GRANULOCYTES: 1 %
LYMPHOCYTES # BLD: 17 % (ref 24–43)
MAGNESIUM: 1.7 MG/DL (ref 1.6–2.6)
MCH RBC QN AUTO: 28.6 PG (ref 25.2–33.5)
MCHC RBC AUTO-ENTMCNC: 29.2 G/DL (ref 28.4–34.8)
MCV RBC AUTO: 98 FL (ref 82.6–102.9)
MONOCYTES # BLD: 11 % (ref 3–12)
NRBC AUTOMATED: 0 PER 100 WBC
PDW BLD-RTO: 19.8 % (ref 11.8–14.4)
PLATELET # BLD: 415 K/UL (ref 138–453)
PLATELET ESTIMATE: ABNORMAL
PMV BLD AUTO: 7.9 FL (ref 8.1–13.5)
POTASSIUM SERPL-SCNC: 4.1 MMOL/L (ref 3.7–5.3)
RBC # BLD: 3.04 M/UL (ref 4.21–5.77)
RBC # BLD: ABNORMAL 10*6/UL
SEG NEUTROPHILS: 66 % (ref 36–65)
SEGMENTED NEUTROPHILS ABSOLUTE COUNT: 5.67 K/UL (ref 1.5–8.1)
SODIUM BLD-SCNC: 134 MMOL/L (ref 135–144)
WBC # BLD: 8.6 K/UL (ref 3.5–11.3)
WBC # BLD: ABNORMAL 10*3/UL

## 2020-01-11 PROCEDURE — 6370000000 HC RX 637 (ALT 250 FOR IP): Performed by: STUDENT IN AN ORGANIZED HEALTH CARE EDUCATION/TRAINING PROGRAM

## 2020-01-11 PROCEDURE — 3700000000 HC ANESTHESIA ATTENDED CARE: Performed by: SURGERY

## 2020-01-11 PROCEDURE — 82947 ASSAY GLUCOSE BLOOD QUANT: CPT

## 2020-01-11 PROCEDURE — 3600000005 HC SURGERY LEVEL 5 BASE: Performed by: SURGERY

## 2020-01-11 PROCEDURE — 6360000002 HC RX W HCPCS: Performed by: STUDENT IN AN ORGANIZED HEALTH CARE EDUCATION/TRAINING PROGRAM

## 2020-01-11 PROCEDURE — 2580000003 HC RX 258: Performed by: SURGERY

## 2020-01-11 PROCEDURE — 2500000003 HC RX 250 WO HCPCS: Performed by: NURSE ANESTHETIST, CERTIFIED REGISTERED

## 2020-01-11 PROCEDURE — 36415 COLL VENOUS BLD VENIPUNCTURE: CPT

## 2020-01-11 PROCEDURE — 99232 SBSQ HOSP IP/OBS MODERATE 35: CPT | Performed by: INTERNAL MEDICINE

## 2020-01-11 PROCEDURE — 2709999900 HC NON-CHARGEABLE SUPPLY: Performed by: SURGERY

## 2020-01-11 PROCEDURE — 6360000002 HC RX W HCPCS: Performed by: NURSE ANESTHETIST, CERTIFIED REGISTERED

## 2020-01-11 PROCEDURE — 7100000000 HC PACU RECOVERY - FIRST 15 MIN: Performed by: SURGERY

## 2020-01-11 PROCEDURE — 7100000001 HC PACU RECOVERY - ADDTL 15 MIN: Performed by: SURGERY

## 2020-01-11 PROCEDURE — 85025 COMPLETE CBC W/AUTO DIFF WBC: CPT

## 2020-01-11 PROCEDURE — 83735 ASSAY OF MAGNESIUM: CPT

## 2020-01-11 PROCEDURE — 0HQ6XZZ REPAIR BACK SKIN, EXTERNAL APPROACH: ICD-10-PCS | Performed by: SURGERY

## 2020-01-11 PROCEDURE — 1200000000 HC SEMI PRIVATE

## 2020-01-11 PROCEDURE — 3700000001 HC ADD 15 MINUTES (ANESTHESIA): Performed by: SURGERY

## 2020-01-11 PROCEDURE — 2580000003 HC RX 258: Performed by: STUDENT IN AN ORGANIZED HEALTH CARE EDUCATION/TRAINING PROGRAM

## 2020-01-11 PROCEDURE — 3600000015 HC SURGERY LEVEL 5 ADDTL 15MIN: Performed by: SURGERY

## 2020-01-11 PROCEDURE — 0HD6XZZ EXTRACTION OF BACK SKIN, EXTERNAL APPROACH: ICD-10-PCS | Performed by: SURGERY

## 2020-01-11 PROCEDURE — 80048 BASIC METABOLIC PNL TOTAL CA: CPT

## 2020-01-11 RX ORDER — ONDANSETRON 2 MG/ML
INJECTION INTRAMUSCULAR; INTRAVENOUS PRN
Status: DISCONTINUED | OUTPATIENT
Start: 2020-01-11 | End: 2020-01-11 | Stop reason: SDUPTHER

## 2020-01-11 RX ORDER — CEFAZOLIN SODIUM 1 G/3ML
INJECTION, POWDER, FOR SOLUTION INTRAMUSCULAR; INTRAVENOUS PRN
Status: DISCONTINUED | OUTPATIENT
Start: 2020-01-11 | End: 2020-01-11 | Stop reason: SDUPTHER

## 2020-01-11 RX ORDER — PROPOFOL 10 MG/ML
INJECTION, EMULSION INTRAVENOUS PRN
Status: DISCONTINUED | OUTPATIENT
Start: 2020-01-11 | End: 2020-01-11 | Stop reason: SDUPTHER

## 2020-01-11 RX ORDER — MAGNESIUM SULFATE 1 G/100ML
1 INJECTION INTRAVENOUS
Status: ACTIVE | OUTPATIENT
Start: 2020-01-11 | End: 2020-01-11

## 2020-01-11 RX ORDER — ROCURONIUM BROMIDE 10 MG/ML
INJECTION, SOLUTION INTRAVENOUS PRN
Status: DISCONTINUED | OUTPATIENT
Start: 2020-01-11 | End: 2020-01-11 | Stop reason: SDUPTHER

## 2020-01-11 RX ORDER — FENTANYL CITRATE 50 UG/ML
INJECTION, SOLUTION INTRAMUSCULAR; INTRAVENOUS PRN
Status: DISCONTINUED | OUTPATIENT
Start: 2020-01-11 | End: 2020-01-11 | Stop reason: SDUPTHER

## 2020-01-11 RX ORDER — MAGNESIUM HYDROXIDE 1200 MG/15ML
LIQUID ORAL CONTINUOUS PRN
Status: COMPLETED | OUTPATIENT
Start: 2020-01-11 | End: 2020-01-11

## 2020-01-11 RX ORDER — NEOSTIGMINE METHYLSULFATE 5 MG/5 ML
SYRINGE (ML) INTRAVENOUS PRN
Status: DISCONTINUED | OUTPATIENT
Start: 2020-01-11 | End: 2020-01-11 | Stop reason: SDUPTHER

## 2020-01-11 RX ORDER — GLYCOPYRROLATE 1 MG/5 ML
SYRINGE (ML) INTRAVENOUS PRN
Status: DISCONTINUED | OUTPATIENT
Start: 2020-01-11 | End: 2020-01-11 | Stop reason: SDUPTHER

## 2020-01-11 RX ORDER — LIDOCAINE HYDROCHLORIDE 10 MG/ML
INJECTION, SOLUTION EPIDURAL; INFILTRATION; INTRACAUDAL; PERINEURAL PRN
Status: DISCONTINUED | OUTPATIENT
Start: 2020-01-11 | End: 2020-01-11 | Stop reason: SDUPTHER

## 2020-01-11 RX ORDER — LOSARTAN POTASSIUM 50 MG/1
50 TABLET ORAL DAILY
Status: DISCONTINUED | OUTPATIENT
Start: 2020-01-11 | End: 2020-01-16 | Stop reason: HOSPADM

## 2020-01-11 RX ORDER — EPHEDRINE SULFATE/0.9% NACL/PF 50 MG/5 ML
SYRINGE (ML) INTRAVENOUS PRN
Status: DISCONTINUED | OUTPATIENT
Start: 2020-01-11 | End: 2020-01-11 | Stop reason: SDUPTHER

## 2020-01-11 RX ADMIN — SODIUM CHLORIDE, PRESERVATIVE FREE 10 ML: 5 INJECTION INTRAVENOUS at 19:50

## 2020-01-11 RX ADMIN — OXYCODONE HYDROCHLORIDE 10 MG: 5 TABLET ORAL at 17:49

## 2020-01-11 RX ADMIN — OXYCODONE HYDROCHLORIDE 10 MG: 5 TABLET ORAL at 09:09

## 2020-01-11 RX ADMIN — Medication 4 MG: at 11:47

## 2020-01-11 RX ADMIN — POLYETHYLENE GLYCOL 3350 17 G: 17 POWDER, FOR SOLUTION ORAL at 09:08

## 2020-01-11 RX ADMIN — PROPOFOL 200 MG: 10 INJECTION, EMULSION INTRAVENOUS at 11:02

## 2020-01-11 RX ADMIN — Medication 15 MG: at 12:08

## 2020-01-11 RX ADMIN — LOSARTAN POTASSIUM 50 MG: 50 TABLET, FILM COATED ORAL at 09:08

## 2020-01-11 RX ADMIN — HEPARIN SODIUM 5000 UNITS: 5000 INJECTION INTRAVENOUS; SUBCUTANEOUS at 20:00

## 2020-01-11 RX ADMIN — FAMOTIDINE 20 MG: 20 TABLET, FILM COATED ORAL at 09:08

## 2020-01-11 RX ADMIN — ONDANSETRON 4 MG: 2 INJECTION, SOLUTION INTRAMUSCULAR; INTRAVENOUS at 11:45

## 2020-01-11 RX ADMIN — LIDOCAINE HYDROCHLORIDE 50 MG: 10 INJECTION, SOLUTION EPIDURAL; INFILTRATION; INTRACAUDAL; PERINEURAL at 11:02

## 2020-01-11 RX ADMIN — DOCUSATE SODIUM 100 MG: 100 CAPSULE, LIQUID FILLED ORAL at 19:50

## 2020-01-11 RX ADMIN — FENTANYL CITRATE 50 MCG: 50 INJECTION INTRAMUSCULAR; INTRAVENOUS at 10:57

## 2020-01-11 RX ADMIN — DOCUSATE SODIUM 100 MG: 100 CAPSULE, LIQUID FILLED ORAL at 09:08

## 2020-01-11 RX ADMIN — SODIUM CHLORIDE: 9 INJECTION, SOLUTION INTRAVENOUS at 10:13

## 2020-01-11 RX ADMIN — CEFAZOLIN 2000 MG: 1 INJECTION, POWDER, FOR SOLUTION INTRAMUSCULAR; INTRAVENOUS at 11:20

## 2020-01-11 RX ADMIN — MAGNESIUM SULFATE HEPTAHYDRATE 1 G: 1 INJECTION, SOLUTION INTRAVENOUS at 14:14

## 2020-01-11 RX ADMIN — MULTIPLE VITAMINS W/ MINERALS TAB 1 TABLET: TAB at 09:08

## 2020-01-11 RX ADMIN — Medication 20 MG: at 12:15

## 2020-01-11 RX ADMIN — INSULIN LISPRO 3 UNITS: 100 INJECTION, SOLUTION INTRAVENOUS; SUBCUTANEOUS at 20:00

## 2020-01-11 RX ADMIN — OXYCODONE HYDROCHLORIDE 10 MG: 5 TABLET ORAL at 14:14

## 2020-01-11 RX ADMIN — ROCURONIUM BROMIDE 40 MG: 10 INJECTION INTRAVENOUS at 11:02

## 2020-01-11 RX ADMIN — SODIUM CHLORIDE, PRESERVATIVE FREE 10 ML: 5 INJECTION INTRAVENOUS at 16:38

## 2020-01-11 RX ADMIN — INSULIN GLARGINE 10 UNITS: 100 INJECTION, SOLUTION SUBCUTANEOUS at 09:08

## 2020-01-11 RX ADMIN — FENTANYL CITRATE 50 MCG: 50 INJECTION INTRAMUSCULAR; INTRAVENOUS at 11:01

## 2020-01-11 RX ADMIN — Medication 0.6 MG: at 11:47

## 2020-01-11 RX ADMIN — SENNOSIDES 8.6 MG: 8.6 TABLET, FILM COATED ORAL at 19:50

## 2020-01-11 RX ADMIN — AMLODIPINE BESYLATE 10 MG: 10 TABLET ORAL at 09:08

## 2020-01-11 RX ADMIN — Medication 15 MG: at 12:00

## 2020-01-11 RX ADMIN — Medication: at 09:00

## 2020-01-11 RX ADMIN — BUMETANIDE 2 MG: 1 TABLET ORAL at 09:08

## 2020-01-11 RX ADMIN — ACETAMINOPHEN 650 MG: 325 TABLET ORAL at 23:38

## 2020-01-11 RX ADMIN — OXYCODONE HYDROCHLORIDE 10 MG: 5 TABLET ORAL at 23:38

## 2020-01-11 RX ADMIN — Medication 1 MG: at 23:39

## 2020-01-11 RX ADMIN — INSULIN LISPRO 3 UNITS: 100 INJECTION, SOLUTION INTRAVENOUS; SUBCUTANEOUS at 00:33

## 2020-01-11 RX ADMIN — CARVEDILOL 25 MG: 25 TABLET, FILM COATED ORAL at 09:08

## 2020-01-11 ASSESSMENT — PULMONARY FUNCTION TESTS
PIF_VALUE: 21
PIF_VALUE: 15
PIF_VALUE: 15
PIF_VALUE: 16
PIF_VALUE: 16
PIF_VALUE: 21
PIF_VALUE: 15
PIF_VALUE: 16
PIF_VALUE: 15
PIF_VALUE: 0
PIF_VALUE: 21
PIF_VALUE: 15
PIF_VALUE: 21
PIF_VALUE: 27
PIF_VALUE: 15
PIF_VALUE: 15
PIF_VALUE: 16
PIF_VALUE: 15
PIF_VALUE: 5
PIF_VALUE: 16
PIF_VALUE: 26
PIF_VALUE: 21
PIF_VALUE: 23
PIF_VALUE: 23
PIF_VALUE: 21
PIF_VALUE: 16
PIF_VALUE: 21
PIF_VALUE: 16
PIF_VALUE: 15
PIF_VALUE: 15
PIF_VALUE: 21
PIF_VALUE: 15
PIF_VALUE: 16
PIF_VALUE: 20
PIF_VALUE: 21
PIF_VALUE: 15
PIF_VALUE: 16
PIF_VALUE: 16
PIF_VALUE: 15
PIF_VALUE: 15
PIF_VALUE: 20
PIF_VALUE: 15
PIF_VALUE: 21
PIF_VALUE: 16
PIF_VALUE: 15
PIF_VALUE: 21
PIF_VALUE: 15
PIF_VALUE: 16
PIF_VALUE: 15
PIF_VALUE: 21
PIF_VALUE: 3
PIF_VALUE: 0
PIF_VALUE: 10
PIF_VALUE: 0
PIF_VALUE: 15
PIF_VALUE: 16
PIF_VALUE: 16
PIF_VALUE: 15
PIF_VALUE: 21
PIF_VALUE: 15
PIF_VALUE: 0
PIF_VALUE: 21
PIF_VALUE: 15
PIF_VALUE: 16
PIF_VALUE: 16
PIF_VALUE: 21
PIF_VALUE: 1
PIF_VALUE: 15
PIF_VALUE: 7
PIF_VALUE: 21
PIF_VALUE: 16
PIF_VALUE: 15
PIF_VALUE: 16
PIF_VALUE: 15
PIF_VALUE: 20
PIF_VALUE: 21
PIF_VALUE: 15
PIF_VALUE: 15
PIF_VALUE: 23
PIF_VALUE: 15
PIF_VALUE: 20
PIF_VALUE: 1
PIF_VALUE: 16
PIF_VALUE: 21
PIF_VALUE: 15
PIF_VALUE: 21
PIF_VALUE: 23
PIF_VALUE: 20
PIF_VALUE: 21
PIF_VALUE: 16
PIF_VALUE: 15
PIF_VALUE: 0
PIF_VALUE: 1
PIF_VALUE: 22
PIF_VALUE: 0
PIF_VALUE: 15
PIF_VALUE: 21
PIF_VALUE: 21
PIF_VALUE: 15
PIF_VALUE: 16
PIF_VALUE: 21
PIF_VALUE: 16
PIF_VALUE: 21
PIF_VALUE: 16

## 2020-01-11 ASSESSMENT — PAIN SCALES - GENERAL
PAINLEVEL_OUTOF10: 0
PAINLEVEL_OUTOF10: 0
PAINLEVEL_OUTOF10: 8
PAINLEVEL_OUTOF10: 7
PAINLEVEL_OUTOF10: 0
PAINLEVEL_OUTOF10: 0
PAINLEVEL_OUTOF10: 7
PAINLEVEL_OUTOF10: 10

## 2020-01-11 ASSESSMENT — PAIN - FUNCTIONAL ASSESSMENT: PAIN_FUNCTIONAL_ASSESSMENT: 0-10

## 2020-01-11 ASSESSMENT — LIFESTYLE VARIABLES: SMOKING_STATUS: 0

## 2020-01-11 NOTE — PROGRESS NOTES
[facility]  Internal Medicine Teaching Residency Program  Inpatient Daily Progress Note  ______________________________________________________________________________    Patient: Della Morning  YOB: 1985   UQR:[id number]    Acct: [de-identified]     Room: [facility] OR POOL RM/NONE  Admit date: 11/29/2019  Today's date: 01/11/20  Number of days in the hospital: 37    SUBJECTIVE   Admitting Diagnosis: Necrotizing fasciitis [facility])    Patient seen and examined at bedside. No acute events overnight. Patient has remained afebrile and hemodynamically stable. Plan for wound VAC change today per general surgery. Dr. Sherrell Infante, with plastic [place] and possible flap in future. ID following, monitoring off of antibiotics. ROS:  Constitutional:  negative for chills, fevers, sweats  Respiratory:  negative for cough, dyspnea on exertion, hemoptysis, shortness of breath, wheezing  Cardiovascular:  negative for chest pain, chest pressure/discomfort, lower extremity edema, palpitations  Gastrointestinal:  negative for abdominal pain, constipation, diarrhea, nausea, vomiting  Neurological:  negative for dizziness, headache  BRIEF HISTORY     [address] Emergency Department with complaint of generalized weakness for the past few days. On examination the emergency department staff noticed the patient had a foul smell which they thought he had a bowel movement. They turned the patient and noticed sloughing of skin on his back and buttocks.      In the emergency department patient was afebrile but tachycardic. Initial labs demonstrated hyponatremia of 130, bicarb 12, BUN 63 and creatinine 2.82, hypocalcemia 7.7, alkaline phosphatase 144, AST 83, ALT 16, lactate 1.4, leukocytosis of 30.4, hemoglobin 9.2. Chest xray demonstrated no acute process.  CT abdomen and pelvis without contrast demonstrated extensive subcutaneous emphysema involving both buttocks extending to the perineum. Possible phlegmon or developing soft tissue abscess overlying the left lateral abdominal wall. Scrotal Ultrasound demonstrated no evidence of testicular torsion or mass however did show extensive scrotal wall edema. He was given zosyn 4/5 g IV, rocephin 1 g IV and flagyl 500 mg IV in the emergency department.     He was transferred to [facility] ICU for Necrotizing Fasciitis and Acute Renal Failure.   Patient initially required pressors, but currently is off pressors. Alexandro Hilton a 37 y.o. with PMH of spina bifida, hypertension, diabetes and diabetic foot ulcer. Who presented to [facility] Emergency Department with complaint of generalized weakness for the past few days. On examination the emergency department staff noticed the patient had a foul smell which they thought he had a bowel movement. They turned the patient and noticed sloughing of skin on his back and buttocks.       11/29/2019 wide complex debridement of nec fasc involving gluteal/perianal/distal rectum  12/03/2019 open diverting end colostomy   12/06/2019 I&D  12/08/2019 Incision and debridement of necrotizing fasciitis buttock wound, scrotum, bilateral groin region and closure of midline abdominal wound  12/10/2019 Sharp excisional debridement of gluteal, marc-rectal and perineal wound to level of muscle, Sharp excisional debridement of scrotum (performed by Urology), Washout of gluteal/perineal/scrotal/inguinal wound, Partial primary closure of pubic wound, and Dakins Wet to dry dressing application  41/92/1768 sharp excisional debridement of gluteal and perineal wounds to muscle 30 x 40 cm.  Urology exam under anesthesia   12/24/2019 sharp debridement, partial closure of scrotum, placement of wound vac   12/26/2019 s/p sharp debridement of L side lateral aspect of sacral wound, replacement of wound vac    1/1/2019: Vera flow irrigation VAC to gluteal wound with debridement.  Started VAC to nec fasc involving gluteal/perianal/distal rectum  12/03/2019 open diverting end colostomy   12/06/2019 I&D  12/08/2019 Incision and debridement of necrotizing fasciitis buttock wound, scrotum, bilateral groin region and closure of midline abdominal wound  12/10/2019 Sharp excisional debridement of gluteal, marc-rectal and perineal wound to level of muscle, Sharp excisional debridement of scrotum (performed by Urology), Washout of gluteal/perineal/scrotal/inguinal wound, Partial primary closure of pubic wound, and Dakins Wet to dry dressing application  79/20/6714 sharp excisional debridement of gluteal and perineal wounds to muscle 30 x 40 cm. Urology exam under anesthesia   12/24/2019 sharp debridement, partial closure of scrotum, placement of wound vac   12/26/2019 s/p sharp debridement of L side lateral aspect of sacral wound, replacement of wound vac    1/1/2019: Vera flow irrigation VAC to gluteal wound with debridement.  Started VAC to perineal wound on midline laparotomy wound  1/3/2020:Patient is off the Flexiflo and is tolerating p.o. diet.  Encouraged to use Ensure in between meals to maintain the caloric requirement.  Tunneled catheter removed by IR as per nephrology recommendations as the patient does not need dialysis. 1/4/2020: Placed on renal diet.  Neurology will place right wrist splint  1/5/2020: Sacral wound VAC changed today by skin surgery  1/8/2020: Wound VAC change        Principal Problem:    Principal Problem:   -Diabetes mellitus: Currently on 10 units Lantus and high-dose correcting scale.  Last HbA1c 8 done on 12/1/2019.       -Hypertension: Controlled. continue Coreg  25 mg and Norvasc 10 mg.     -Iron deficiency anemia Hb: Low iron studies, Venofer 5 days completed.      -DBEBY on CKD stage III: Patient's last hemodialysis was on 12/27/2019. tunneled catheter removed as per nephrology patient is not in need of acute dialysis right now.  Continue Bumex 2 mg. Creatinine at baseline.

## 2020-01-11 NOTE — BRIEF OP NOTE
Wound Therapy Buttocks (Removed)   Wound Type Surgical 1/5/2020  8:00 AM   Unit Type vac ultra veraflo 12/24/2019  8:00 PM   Dressing Type Black foam 1/5/2020  8:00 AM   Cycle Continuous 1/5/2020  8:00 AM   Target Pressure (mmHg) Other (Comment) 1/3/2020  8:00 PM   Irrigation Solution Normal Saline 12/31/2019  8:00 AM   Canister changed? No 1/5/2020  8:00 AM   Dressing Status Clean;Dry; Intact 1/5/2020  8:00 AM   Dressing Changed Changed/New 12/26/2019  2:21 PM   Drainage Amount Large 1/5/2020  8:00 AM   Drainage Description Tan 1/5/2020  8:00 AM   Dressing Change Due 01/06/20 1/5/2020  8:00 AM   Output (ml) 0 ml 1/5/2020  8:00 AM   Wound Assessment Other (Comment) 1/5/2020  8:00 AM   Liliam-wound Assessment Other (Comment) 1/5/2020  8:00 AM   Odor None 1/5/2020  8:00 AM       [REMOVED] NG/OG/NJ/NE Tube Orogastric  Center mouth (Removed)   Securement device Yes 11/30/2019 12:00 PM   Status Suction-low intermittent 11/30/2019 12:00 PM   Placement Verified by Gastric Contents 11/30/2019 12:00 PM   NG/OG/NJ/NE External Measurement (cm) 60 cm 11/30/2019 12:00 PM   Drainage Appearance Tan 11/30/2019  8:00 AM       [REMOVED] NG/OG/NJ/NE Tube Orogastric 16 fr (Removed)   Surrounding Skin Dry; Intact 1/2/2020  4:00 PM   Securement device Yes 1/2/2020  4:00 PM   Status Clamped 1/2/2020  6:52 AM   Placement Verified by Gastric Contents 1/1/2020  1:47 PM   NG/OG/NJ/NE External Measurement (cm) 60 cm 12/29/2019  4:00 AM   Drainage Appearance Bile 12/28/2019  6:44 AM   Tube Feeding Semi-elemental 12/29/2019  4:00 AM   Tube Feeding Status Continuous 12/31/2019  4:00 PM   Rate/Schedule 50 mL/hr 12/31/2019  8:00 AM   Tube Feeding Supplement Amount (mL) 166 12/26/2019 12:00 AM   Tube Feeding Intake (mL) 490 ml 12/31/2019  5:36 PM   Free Water Flush (mL) 100 mL 12/31/2019  5:36 PM   Free Water Rate 100 12/24/2019  8:00 AM   Residual Volume (ml) 0 ml 12/30/2019  3:29 PM   Output (mL) 0 ml 12/20/2019  6:00 PM       [REMOVED] NG/OG/NJ/NE Tube Right nostril (Removed)   Surrounding Skin Dry; Intact 12/21/2019  8:00 AM   Securement device Yes 12/21/2019  8:00 AM   Status Clamped 12/21/2019  8:00 AM   NG/OG/NJ/NE External Measurement (cm) 55 cm 12/21/2019  4:00 AM       [REMOVED] Urethral Catheter (Removed)   Catheter Indications Urology/Urologist seeing this patient or inserted indwelling catheter 1/7/2020  4:00 PM   Securement Device Date Changed 01/06/20 1/6/2020  8:00 PM   Urine Color Yellow 1/7/2020  4:00 PM   Urine Appearance Sediment 1/7/2020  4:00 PM   Output (mL) 150 mL 1/7/2020  6:23 AM     Findings:   Wound class 4. Debridement of sacral wound with curette. Placement of black foam wound vac. Kati Jerry DO  Date: 1/11/2020  Time: 12:47 PM  Present throughout case.

## 2020-01-11 NOTE — PROGRESS NOTES
[facility] - Progress Note    Today's Date and Time: 1/11/2020, 2:55 PM    Impression :   · Necrotizing fasciitis 11-29-19  · S/P perirectal I&D. Wide complex excisional debridement of gluteal and perineal areas on 11-29-19  · S/P debridement, washout of gluteal and perianal areas, diverting colostomy 12-3-19.   · S/P debridement, washout of gluteal and perianal areas, diverting colostomy 12-6-19.  · S/P Incision and debridement of necrotizing fasciitis buttock wound, scrotum, bilateral groin region and closure of midline abdominal wound 12-8-19   · S/P Incision and debridement of necrotizing fasciitis buttock wound, scrotum, bilateral groin region on 12-20-19. · S/P Incision and debridement of necrotizing fasciitis buttock wound, scrotum, bilateral groin region on 1-1-20   · S/P Incision and debridement of necrotizing fasciitis buttock wound, scrotum, bilateral groin region on 1-8-20  · S/P sacral debridement and VAC change 1-11-20  · Lactic acidosis--Resolved  · DM 2  · HTN  · Hx of Low LVEF (20%) as per family  · DEBBY    Recommendations:     · Monitor off antibiotics,   · Continue with wound care  · Nutrition    Medical Decision Making/Summary/Discussion:1/11/2020     · Patient with DM 2, prior diabetic foot infection  · Presented to [address] [place] [address] ER generalized weakness for the past few days  · Found to have soft tissue necrosis with subcutaneous emphysema in gluteal areas and perineum  · S/P I&D and wide complex debridement of affected tissues on 11-29-19  · Showing hypotension, requiring fluids and a vasopressor  · Cultures in progress  · Will cover with Zosyn. Wounds with Strep spp. And Gram negative bacilli . No Staph spp.   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 · One more debridement in OR scheduled for 12-10-19 and then decision will be made to change code status or not. · Pt did not tolerate HD on 12-9. It was stopped early and pt required vasopressor support with Levophed, remains on vent. · Pt to OR 12-10 for further debridement  · Repeat I&D planned for 12-16-19 was cancelled because of high K levels  · Bedside I & D on 12/17/19. · Patient to undergo additional I&D on 12-20-19  · 12/23 right upper lobe collapse  · 12/24 debridement with excision of sacral wound in preparation for a flap, partial closure of his scrotal wound, VAC and debridement over the perineum  · 12-30 Wound RN to change abdominal vac   · 1-1-20 Returned to OR for further debridement of perineal wound and vac change  · 1-3 Lt heel and abd wound vac changed per wound care  · 1-3 Order for removal of Lt tunnel cath removal. Last HD 12-27, pt showing signs of renal recovery. · 1-5 to OR for further debridement and vac change  · S/P OR 1-8 for debridement and vac change. · S/P OR 1-11 for debridement and vac change. ·   Infection Control Recommendations   · [place] Precautions  · Contact Isolation MRSA    Antimicrobial Stewardship Recommendations     Off antibiotics  Coordination of Outpatient Care:   · Estimated Length of IV antimicrobials: D/C 12-19-19  · Patient will need Midline Catheter Insertion: No  · Patient will need PICC line Insertion:Has Central line  · Patient will need: Home IV , [place],  SNF,  LTAC: TBD  · Patient will need outpatient wound care:Yes    Chief complaint/reason for consultation:   · Ashley's vs necrotizing fasciitis    History of Present Illness:   Trey Deshpande is a 37y.o.-year-old  male who was initially admitted on 11/29/2019. Patient seen at the request of . INITIAL HISTORY:    Patient presented through ER in [place] with complaints of weakness of a few days duration.   Examination Oral Daily    insulin lispro  0-18 Units Subcutaneous Q4H    alteplase  1 mg Intracatheter Once    povidone-iodine   Topical Daily    famotidine  20 mg Per NG tube Daily    sodium chloride flush  10 mL Intravenous 2 times per day    heparin (porcine)  5,000 Units Subcutaneous 3 times per day       Social History:     Social History     Socioeconomic History    Marital status: Unknown     Spouse name: Not on file    Number of children: Not on file    Years of education: Not on file    Highest education level: Not on file   Occupational History    Not on file   Social Needs    Financial resource strain: Not on file    Food insecurity:     Worry: Not on file     Inability: Not on file    Transportation needs:     Medical: Not on file     Non-medical: Not on file   Tobacco Use    Smoking status: Not on file   Substance and Sexual Activity    Alcohol use: Not on file    Drug use: Not on file    Sexual activity: Not on file   Lifestyle    Physical activity:     Days per week: Not on file     Minutes per session: Not on file    Stress: Not on file   Relationships    Social connections:     Talks on phone: Not on file     Gets together: Not on file     Attends [employer] service: Not on file     Active member of club or organization: Not on file     Attends meetings of clubs or organizations: Not on file     Relationship status: Not on file    Intimate partner violence:     Fear of current or ex partner: Not on file     Emotionally abused: Not on file     Physically abused: Not on file     Forced sexual activity: Not on file   Other Topics Concern    Not on file   Social History Narrative    Not on file       Family History:   No family history on file. Allergies:   Patient has no known allergies. Review of Systems:     Review of Systems   Constitutional: Negative for activity change.         Pt AA  Denies chills, fevers, SOB  No complaints or issues           Physical Examination :     Patient reformatted images are provided for review. Dose modulation, iterative reconstruction, and/or weight based adjustment of   the mA/kV was utilized to reduce the radiation dose to as low as reasonably   achievable.       COMPARISON:   November 29, 2019.       HISTORY:   ORDERING SYSTEM PROVIDED HISTORY: Ariane galindo   TECHNOLOGIST PROVIDED HISTORY:       nec fasc   Reason for Exam: nec fasc; Trauma   Acuity: Unknown   Type of Exam: Subsequent/Follow-up       FINDINGS:   Lower Chest: Partially visualized bilateral pleural effusions with bibasilar   heterogeneous opacities and bilateral lower lobe consolidations.  Central   venous catheter tip near the superior atrial caval junction.  Cardiomegaly. Trace pericardial fluid.       Liver: Normal.       Gallbladder and Bile Ducts: Normal.       Spleen: Splenomegaly.       Adrenal Glands: Normal.       Pancreas: Normal.       Genitourinary: Symmetric renal atrophy.  Redemonstration of multiple   hypodensities in the right kidney which likely represent benign cysts.  No   urinary stones or hydronephrosis.  Ambrosio catheter in the decompressed urinary   bladder.       Bowel: Normal caliber bowel.  Postsurgical changes of the bowel with left   lower quadrant ostomy.  No evidence of acute appendicitis.  No significant   diverticular disease.       Vasculature: Atherosclerosis.  No abdominal aortic aneurysm.       Bones and Soft Tissues: Diffuse soft tissue stranding and fluid.    Postsurgical changes in the anterior abdominal wall with midline incision   demonstrating expected small amount of fluid and air.  Large soft tissue   defects in the right inguinal region, scrotum, right greater than left   gluteal creases with associated packing material.  Small amount of   surrounding soft tissue gas.  There is associated skin thickening in these   regions.  Bilateral lower abdominal wall skin thickening.       Retroperitoneum/Mesentery: No intraperitoneal free air.  Mild abdominopelvic ascites.  Loculated fluid along the inferior aspect of the liver. Redemonstration of bilateral inguinal and pelvic sidewall lymphadenopathy. Multiple mildly prominent retroperitoneal and upper abdominal lymph nodes are   similar to the prior study.  Percutaneous intraperitoneal surgical drains.           Impression   1.  Large soft tissue defects in the right inguinal region, scrotum and right   greater than left gluteal crease is with associated packing material, small   amount of surrounding soft tissue gas and skin thickening likely relates to   history of necrotizing fasciitis.       2.  Postsurgical changes of the bowel with left lower quadrant ostomy.  No   bowel obstruction.  Percutaneous intraperitoneal surgical drains.       3.  Mild abdominopelvic ascites.  Loculated fluid along the inferior aspect   of the liver.  No intraperitoneal free air.       4.  Diffuse anasarca.       5.   Bilateral pleural effusions with associated heterogeneous opacities and   consolidations.  Underlying infection is not excluded.               EXAMINATION:   ONE XRAY VIEW OF THE CHEST       11/29/2019 9:01 pm       COMPARISON:   4 hours ago       HISTORY:   ORDERING SYSTEM PROVIDED HISTORY: intubated   TECHNOLOGIST PROVIDED HISTORY:   intubated   Reason for Exam: portable supine/ intubated   Acuity: Acute   Type of Exam: Initial       FINDINGS:   New ET tube terminates 38 mm above the ron.  There appears to be a   possible enteric tube which is not well visualized distally.  Right IJ   catheter terminates in the right atrium and is unchanged.  Lungs are clear. Cardiomegaly.  Mediastinum normal.  Bony thorax intact.           Impression   New ET tube as above.  Possible new enteric tube not well visualized. Recommend follow-up KUB if clinically warranted.      CT ABDOMEN AND PELVIS WITHOUT CONTRAST    COMPARISON:  3/22/2017    CLINICAL HISTORY: Infection in scrotum, lower abdominal pain, diabetic, 30,000 white blood cell count. TECHNIQUE: Unenhanced axial images were obtained from the lung bases to the pubic symphysis with sagittal and coronal 2D reformatted images. Oral contrast administered:  No.  Automatic exposure control (AEC) was utilized. CT ABDOMEN FINDINGS:      The lung bases are unremarkable. There is a small pericardial effusion versus thickening. The liver, spleen, and pancreas demonstrate no acute abnormality given compromised evaluation without intravenous contrast.  There may be mild splenomegaly.  The adrenal glands appear within normal limits. There is no hydronephrosis or obstructing urinary tract calculi. Small amount of free fluid is seen adjacent to the liver inferiorly. .    The appendix is visualized in the right lower quadrant and appears within normal limits.       There is no evidence for bowel obstruction. Stranding is seen within the subcutaneous fat overlying both lateral abdominal walls left greater than right.  There is ill-defined fluid collection within the subcutaneous fat overlying the left lateral abdominal wall possibly representing phlegmon or developing abscess although no organized   abscess is seen. There is a large amount of soft tissue emphysema involving both the right and left buttock extending to the perineum tissue thickening is seen especially on the left involving the left buttock. CT PELVIS FINDINGS:        No distal ureteral calculi or bladder calculi. There is no free fluid in the pelvis. Catheter is seen within the urinary bladder. Osseous changes within the left hemipelvis which may be chronic in nature. IMPRESSION:    Extensive subcutaneous emphysema as described above involving both buttocks extending to the perineum.  The possibility of Ashley gangrene/necrotizing fasciitis cannot be excluded.     Possible phlegmon or developing soft tissue abscess overlying the left lateral abdominal wall as noted above.  No organized abscess is seen

## 2020-01-11 NOTE — ANESTHESIA PRE PROCEDURE
Department of Anesthesiology  Preprocedure Note       Name:  Tricia Moise   Age:  37 y.o.  :  1985                                          MRN:  [id number]         Date:  2020      Gluteal  Wound debridement, wound vac application   Anesthesia type: General   Pre-op diagnosis: NECROTIZING FASCIITIS   Location: [facility] OR [address] [place] [address]            Medications prior to admission:   Prior to Admission medications    Not on File       Current medications:    No current facility-administered medications for this visit. No current outpatient medications on file.      Facility-Administered Medications Ordered in Other Visits   Medication Dose Route Frequency Provider Last Rate Last Dose    [MAR Hold] magnesium sulfate 1 g in dextrose 5% 100 mL IVPB  1 g Intravenous Jocy Champagne MD        [facility] Hold] losartan (COZAAR) tablet 50 mg  50 mg Oral Daily Geovani Platt MD   50 mg at 20    [MAR Hold] amLODIPine (NORVASC) tablet 10 mg  10 mg Oral Daily Isidro Cloretta Soja., DO   10 mg at 20    [MAR Hold] oxyCODONE (ROXICODONE) immediate release tablet 5 mg  5 mg Oral Q4H PRN Ermias Amor MD   5 mg at 01/10/20 1947    Or    [MAR Hold] oxyCODONE (ROXICODONE) immediate release tablet 10 mg  10 mg Oral Q4H PRN Ermias Amor MD   10 mg at 20    [MAR Hold] oxybutynin (OXYTROL) 3.9 MG/24HR 1 patch  1 patch Transdermal Once per day on  Isidro Cloretta Soja., DO   1 patch at 20 1337    [MAR Hold] docusate sodium (COLACE) capsule 100 mg  100 mg Oral BID Isidro Cloretta Soja., DO   100 mg at 20    [MAR Hold] senna (SENOKOT) tablet 8.6 mg  1 tablet Oral Nightly Isidro Cloretta Soja., DO   8.6 mg at 01/10/20 2048    [MAR Hold] hydrALAZINE (APRESOLINE) injection 10 mg  10 mg Intravenous Q4H PRN Isidro Cloretta Soja., DO        [facility] Hold] magnesium sulfate 1 g in dextrose 5% 100 mL IVPB  1 g Intravenous PRN Isidro Schmidt., DO Stopped at 01/09/20 2352    [MAR Hold] glucose (GLUTOSE) 40 % oral gel 15 g  15 g Oral PRN Isidro Baby Theron., DO        [MAR Hold] dextrose 50 % IV solution  12.5 g Intravenous PRN Isidro Baby Theron., DO        [MAR Hold] glucagon (rDNA) injection 1 mg  1 mg Intramuscular PRN Isidro Baby Theron., DO        [facility] Hold] dextrose 5 % solution  100 mL/hr Intravenous PRN Isidro Baby Theron., DO        [facility] Hold] therapeutic multivitamin-minerals 1 tablet  1 tablet Oral Daily Isidro Baby Theron., DO   1 tablet at 01/11/20 0908    [MAR Hold] carvedilol (COREG) tablet 25 mg  25 mg Per NG tube Daily Isidro Baby Theron., DO   25 mg at 01/11/20 0908    [MAR Hold] insulin glargine (LANTUS) injection vial 10 Units  10 Units Subcutaneous Daily Isidro Baby Theron., DO   10 Units at 01/11/20 0908    [MAR Hold] sodium phosphate 20.31 mmol in dextrose 5 % 250 mL IVPB  0.16 mmol/kg Intravenous PRN Isidro Baby Theron., DO        Or    [MAR Hold] sodium phosphate 40.65 mmol in dextrose 5 % 250 mL IVPB  0.32 mmol/kg Intravenous PRN Isidro Baby Theron., DO        [MAR Hold] bumetanide (BUMEX) tablet 2 mg  2 mg Oral Daily Isidro Baby Theron., DO   2 mg at 01/11/20 0908    [MAR Hold] darbepoetin rohan-polysorbate (ARANESP) injection 100 mcg  100 mcg Intravenous Weekly Isidro Baby Theron., DO   100 mcg at 01/10/20 1053    [MAR Hold] heparin (porcine) injection 1,900 Units  1,900 Units Intracatheter PRN Isidro Baby Theron., DO   1,900 Units at 12/27/19 1245    [MAR Hold] heparin (porcine) injection 1,900 Units  1,900 Units Intracatheter PRN Isidro Baby Theron., DO   1,900 Units at 12/27/19 1245    [MAR Hold] melatonin tablet 1 mg  1 mg Oral Nightly PRN Isidro Baby Theron., DO   1 mg at 01/08/20 2357    [MAR Hold] acetaminophen (TYLENOL) tablet 650 mg  650 mg Oral Q4H PRN Isidro Baby Theron., DO   650 mg at 01/08/20 1810    [MAR Hold] polyethylene glycol (GLYCOLAX) packet 17 g  17 g Oral Daily Isidro Baby Theron., DO   17 g at 01/11/20 0908    [MAR Hold] insulin lispro (HUMALOG) injection vial 0-18 Units 0-18 Units Subcutaneous Q4H Isidro Belynda Leisure., DO   3 Units at 01/11/20 0033    [MAR Hold] labetalol (NORMODYNE;TRANDATE) injection 20 mg  20 mg Intravenous Q6H PRN Isidro Belynda Leisure., DO   20 mg at 01/06/20 0410    [MAR Hold] sodium chloride 0.9 % irrigation 1,000 mL  1,000 mL Irrigation Continuous Isidro Belynda Leisure., DO   1,000 mL at 01/04/20 0229    [MAR Hold] alteplase (CATHFLO) injection 1 mg  1 mg Intracatheter Once Isidro Belynda Leisure., DO        [facility] Hold] heparin (porcine) injection 500 Units  500 Units Intravenous PRN Isidro Belynda Leisure., DO   500 Units at 12/27/19 0915    [MAR Hold] heparin (porcine) injection 1,750 Units  1,750 Units Intravenous PRN Isidro Belynda Leisure., DO   1,750 Units at 12/27/19 0915    [MAR Hold] glucose (GLUTOSE) 40 % oral gel 15 g  15 g Oral PRN Isidro Belynda Leisure., DO        [facility] Hold] dextrose 50 % IV solution  12.5 g Intravenous PRN Isidro Belynda Leisure., DO        [MAR Hold] glucagon (rDNA) injection 1 mg  1 mg Intramuscular PRN Isidro Belynda Leisure., DO        [facility] Hold] dextrose 5 % solution  100 mL/hr Intravenous PRN Isidro Belynda Leisure., DO        [facility] Hold] albumin human 25 % IV solution 25 g  25 g Intravenous PRN Isidro Belynda Leisure., DO   25 g at 12/16/19 1659    [MAR Hold] LORazepam (ATIVAN) injection 1 mg  1 mg Intravenous Q6H PRN Isidro Belynda Leisure., DO        [facility] Hold] 0.9 % sodium chloride bolus  250 mL Intravenous PRN Isidro Belynda Leisure., DO        [facility] Hold] 0.9 % sodium chloride bolus  150 mL Intravenous PRN Isidro Belynda Leisure., DO        [facility] Hold] albumin human 25 % IV solution 25 g  25 g Intravenous PRN Isidro Belynda Leisure., DO        [facility] Hold] povidone-iodine (BETADINE) 10 % external solution   Topical Daily [employer] Leisure., DO        [facility] Hold] sodium chloride flush 0.9 % injection 10 mL  10 mL Intravenous PRN [employer] Leisure., DO        [facility] Hold] potassium chloride (KLOR-CON M) extended release tablet 40 mEq  40 mEq Oral PRN Isidro Savareeda Leisure., DO        Or   [facility] [IIA Hold] potassium bicarb-citric acid (EFFER-K) Counseling given: Not Answered      Vital Signs (Current): There were no vitals filed for this visit. BP Readings from Last 3 Encounters:   01/11/20 (!) 143/77   01/08/20 114/78   01/05/20 (!) 86/53       NPO Status:                                                                                 BMI:   Wt Readings from Last 3 Encounters:   01/07/20 286 lb 9.6 oz (130 kg)     There is no height or weight on file to calculate BMI.    CBC:   Lab Results   Component Value Date    WBC 8.6 01/11/2020    RBC 3.04 01/11/2020    HGB 8.7 01/11/2020    HCT 29.8 01/11/2020    MCV 98.0 01/11/2020    RDW 19.8 01/11/2020     01/11/2020       CMP:   Lab Results   Component Value Date     01/11/2020    K 4.1 01/11/2020    CL 99 01/11/2020    CO2 21 01/11/2020    BUN 38 01/11/2020    CREATININE 1.47 01/11/2020    GFRAA >60 01/11/2020    LABGLOM 54 01/11/2020    GLUCOSE 104 01/11/2020    PROT 5.3 12/01/2019    CALCIUM 8.7 01/11/2020    BILITOT 1.20 12/01/2019    ALKPHOS 127 12/01/2019    AST 40 12/01/2019    ALT 6 12/01/2019       POC Tests:   Recent Labs     01/11/20  0454   POCGLU 100       Coags:   Lab Results   Component Value Date    PROTIME 11.9 11/29/2019    INR 1.1 11/29/2019       HCG (If Applicable): No results found for: PREGTESTUR, PREGSERUM, HCG, HCGQUANT     ABGs: No results found for: PHART, PO2ART, AHN4XZM, JUT6MFU, BEART, I3BYUJEC     Type & Screen (If Applicable):  No results found for: LABABO, [address]    Anesthesia Evaluation  Patient summary reviewed and Nursing notes reviewed no history of anesthetic complications:   Airway: Mallampati: III       Comment: ETT in place    Dental: normal exam         Pulmonary:normal exam        (-) not a current smoker          Patient did not smoke on day of surgery.                  Cardiovascular:  Exercise tolerance: poor (<4 METS),   (+) hypertension:, CHF: diastolic and systolic,     (-) past MI, CABG/stent,

## 2020-01-11 NOTE — PROGRESS NOTES
Occupational Therapy    Occupational Therapy Not Seen Note    DATE: 2020  Name: Sravan Echavarria  : 1985  MRN: [id number]    Patient not available for Occupational Therapy due to: Other: Pt just back from sx.     Next Scheduled Treatment: 20    Electronically signed by ELBA Wolff on 2020 at 4:19 PM

## 2020-01-11 NOTE — PROGRESS NOTES
PROGRESS NOTE    PATIENT NAME: Austin Mcdaniel 1620 RECORD NO. [id number]  DATE: 1/11/2020  PRIMARY CARE PHYSICIAN: No primary care provider on file. HD: # 37    ASSESSMENT    Patient Active Problem List   Diagnosis    Necrotizing fasciitis ([address]    Essential hypertension    Diabetes ([address]    Diabetic foot ulcer ([address]    Diabetic foot infection [facility]    Iron deficiency anemia    Muscle weakness of right upper extremity    Wrist drop, acquired, right     11/29/2019 wide complex debridement of nec fasc involving gluteal/perianal/distal rectum  12/03/2019 open diverting end colostomy   12/06/2019 I&D  12/08/2019 Incision and debridement of necrotizing fasciitis buttock wound, scrotum, bilateral groin region and closure of midline abdominal wound  12/10/2019 Sharp excisional debridement of gluteal, marc-rectal and perineal wound to level of muscle, Sharp excisional debridement of scrotum (performed by Urology), Washout of gluteal/perineal/scrotal/inguinal wound, Partial primary closure of pubic wound, and Dakins Wet to dry dressing application  19/10/1634 sharp excisional debridement of gluteal and perineal wounds to muscle 30 x 40 cm. Urology exam under anesthesia   12/24/2019 sharp debridement, partial closure of scrotum, placement of wound vac   12/26/2019 s/p sharp debridement of L side lateral aspect of sacral wound, replacement of wound vac    1/5/2020 gluteal wound debridement, veraflo wound vac change  1/8/2020 gluteal wound debridement, veraflo wound vac change     MEDICAL DECISION MAKING AND PLAN    1. Wound vac change scheduled for this AM 1/11  2. Dr. Valentina Kay, plastic surgery, following from [facility] for grafting and possible flap in future. 3. General diet postop. NPO since mn.    4. Continue calorie count. 5. Bowel regimen: colace, miralax   6. Midline vac changes-wound RN following   7. Strict glucose control <180  8. Follow up AM labs       SUBJECTIVE    Izzy Jacques is seen and examined.

## 2020-01-12 LAB
ABSOLUTE EOS #: 0.4 K/UL (ref 0–0.44)
ABSOLUTE IMMATURE GRANULOCYTE: 0.1 K/UL (ref 0–0.3)
ABSOLUTE LYMPH #: 1.82 K/UL (ref 1.1–3.7)
ABSOLUTE MONO #: 1.21 K/UL (ref 0.1–1.2)
ANION GAP SERPL CALCULATED.3IONS-SCNC: 14 MMOL/L (ref 9–17)
BASOPHILS # BLD: 1 % (ref 0–2)
BASOPHILS ABSOLUTE: 0.1 K/UL (ref 0–0.2)
BUN BLDV-MCNC: 39 MG/DL (ref 6–20)
BUN/CREAT BLD: ABNORMAL (ref 9–20)
CALCIUM SERPL-MCNC: 8.3 MG/DL (ref 8.6–10.4)
CHLORIDE BLD-SCNC: 98 MMOL/L (ref 98–107)
CO2: 19 MMOL/L (ref 20–31)
CREAT SERPL-MCNC: 1.67 MG/DL (ref 0.7–1.2)
DIFFERENTIAL TYPE: ABNORMAL
EOSINOPHILS RELATIVE PERCENT: 4 % (ref 1–4)
GFR AFRICAN AMERICAN: 57 ML/MIN
GFR NON-AFRICAN AMERICAN: 47 ML/MIN
GFR SERPL CREATININE-BSD FRML MDRD: ABNORMAL ML/MIN/{1.73_M2}
GFR SERPL CREATININE-BSD FRML MDRD: ABNORMAL ML/MIN/{1.73_M2}
GLUCOSE BLD-MCNC: 102 MG/DL (ref 70–99)
GLUCOSE BLD-MCNC: 139 MG/DL (ref 75–110)
GLUCOSE BLD-MCNC: 156 MG/DL (ref 75–110)
GLUCOSE BLD-MCNC: 209 MG/DL (ref 75–110)
GLUCOSE BLD-MCNC: 91 MG/DL (ref 75–110)
HCT VFR BLD CALC: 27 % (ref 40.7–50.3)
HEMOGLOBIN: 8.3 G/DL (ref 13–17)
IMMATURE GRANULOCYTES: 1 %
LYMPHOCYTES # BLD: 18 % (ref 24–43)
MAGNESIUM: 2 MG/DL (ref 1.6–2.6)
MCH RBC QN AUTO: 31 PG (ref 25.2–33.5)
MCHC RBC AUTO-ENTMCNC: 30.7 G/DL (ref 28.4–34.8)
MCV RBC AUTO: 100.7 FL (ref 82.6–102.9)
MONOCYTES # BLD: 12 % (ref 3–12)
MORPHOLOGY: ABNORMAL
MORPHOLOGY: ABNORMAL
NRBC AUTOMATED: 0 PER 100 WBC
PDW BLD-RTO: 20.6 % (ref 11.8–14.4)
PHOSPHORUS: 5.4 MG/DL (ref 2.5–4.5)
PLATELET # BLD: 336 K/UL (ref 138–453)
PLATELET ESTIMATE: ABNORMAL
PMV BLD AUTO: 8 FL (ref 8.1–13.5)
POTASSIUM SERPL-SCNC: 4.6 MMOL/L (ref 3.7–5.3)
RBC # BLD: 2.68 M/UL (ref 4.21–5.77)
RBC # BLD: ABNORMAL 10*6/UL
SEG NEUTROPHILS: 64 % (ref 36–65)
SEGMENTED NEUTROPHILS ABSOLUTE COUNT: 6.47 K/UL (ref 1.5–8.1)
SODIUM BLD-SCNC: 131 MMOL/L (ref 135–144)
WBC # BLD: 10.1 K/UL (ref 3.5–11.3)
WBC # BLD: ABNORMAL 10*3/UL

## 2020-01-12 PROCEDURE — 6370000000 HC RX 637 (ALT 250 FOR IP): Performed by: STUDENT IN AN ORGANIZED HEALTH CARE EDUCATION/TRAINING PROGRAM

## 2020-01-12 PROCEDURE — 99232 SBSQ HOSP IP/OBS MODERATE 35: CPT | Performed by: INTERNAL MEDICINE

## 2020-01-12 PROCEDURE — 80048 BASIC METABOLIC PNL TOTAL CA: CPT

## 2020-01-12 PROCEDURE — 83735 ASSAY OF MAGNESIUM: CPT

## 2020-01-12 PROCEDURE — 1200000000 HC SEMI PRIVATE

## 2020-01-12 PROCEDURE — 6360000002 HC RX W HCPCS: Performed by: STUDENT IN AN ORGANIZED HEALTH CARE EDUCATION/TRAINING PROGRAM

## 2020-01-12 PROCEDURE — 2580000003 HC RX 258: Performed by: STUDENT IN AN ORGANIZED HEALTH CARE EDUCATION/TRAINING PROGRAM

## 2020-01-12 PROCEDURE — 82947 ASSAY GLUCOSE BLOOD QUANT: CPT

## 2020-01-12 PROCEDURE — 36415 COLL VENOUS BLD VENIPUNCTURE: CPT

## 2020-01-12 PROCEDURE — 84100 ASSAY OF PHOSPHORUS: CPT

## 2020-01-12 PROCEDURE — 85025 COMPLETE CBC W/AUTO DIFF WBC: CPT

## 2020-01-12 RX ADMIN — BUMETANIDE 2 MG: 1 TABLET ORAL at 08:17

## 2020-01-12 RX ADMIN — OXYCODONE HYDROCHLORIDE 10 MG: 5 TABLET ORAL at 19:39

## 2020-01-12 RX ADMIN — AMLODIPINE BESYLATE 10 MG: 10 TABLET ORAL at 08:17

## 2020-01-12 RX ADMIN — DOCUSATE SODIUM 100 MG: 100 CAPSULE, LIQUID FILLED ORAL at 08:17

## 2020-01-12 RX ADMIN — HEPARIN SODIUM 5000 UNITS: 5000 INJECTION INTRAVENOUS; SUBCUTANEOUS at 15:02

## 2020-01-12 RX ADMIN — DOCUSATE SODIUM 100 MG: 100 CAPSULE, LIQUID FILLED ORAL at 19:34

## 2020-01-12 RX ADMIN — POLYETHYLENE GLYCOL 3350 17 G: 17 POWDER, FOR SOLUTION ORAL at 08:18

## 2020-01-12 RX ADMIN — MULTIPLE VITAMINS W/ MINERALS TAB 1 TABLET: TAB at 08:17

## 2020-01-12 RX ADMIN — OXYCODONE HYDROCHLORIDE 10 MG: 5 TABLET ORAL at 08:17

## 2020-01-12 RX ADMIN — INSULIN GLARGINE 10 UNITS: 100 INJECTION, SOLUTION SUBCUTANEOUS at 08:18

## 2020-01-12 RX ADMIN — LOSARTAN POTASSIUM 50 MG: 50 TABLET, FILM COATED ORAL at 08:17

## 2020-01-12 RX ADMIN — CARVEDILOL 25 MG: 25 TABLET, FILM COATED ORAL at 08:17

## 2020-01-12 RX ADMIN — INSULIN LISPRO 3 UNITS: 100 INJECTION, SOLUTION INTRAVENOUS; SUBCUTANEOUS at 12:23

## 2020-01-12 RX ADMIN — SODIUM CHLORIDE, PRESERVATIVE FREE 10 ML: 5 INJECTION INTRAVENOUS at 19:35

## 2020-01-12 RX ADMIN — FAMOTIDINE 20 MG: 20 TABLET, FILM COATED ORAL at 09:00

## 2020-01-12 RX ADMIN — INSULIN LISPRO 6 UNITS: 100 INJECTION, SOLUTION INTRAVENOUS; SUBCUTANEOUS at 20:59

## 2020-01-12 RX ADMIN — Medication: at 10:16

## 2020-01-12 RX ADMIN — SENNOSIDES 8.6 MG: 8.6 TABLET, FILM COATED ORAL at 19:34

## 2020-01-12 RX ADMIN — SODIUM CHLORIDE, PRESERVATIVE FREE 10 ML: 5 INJECTION INTRAVENOUS at 10:16

## 2020-01-12 RX ADMIN — OXYCODONE HYDROCHLORIDE 10 MG: 5 TABLET ORAL at 15:02

## 2020-01-12 RX ADMIN — ACETAMINOPHEN 650 MG: 325 TABLET ORAL at 19:40

## 2020-01-12 RX ADMIN — HEPARIN SODIUM 5000 UNITS: 5000 INJECTION INTRAVENOUS; SUBCUTANEOUS at 20:46

## 2020-01-12 ASSESSMENT — PAIN SCALES - GENERAL
PAINLEVEL_OUTOF10: 7

## 2020-01-12 NOTE — OP NOTE
Present throughout. Operative Note  ______________________________________________________________     Patient: Dell Melendez  YOB: 1985  MRN: [id number]  Date of Procedure: 1/11/2020     Pre-Op Diagnosis: NECROTIZING FASCIITIS     Post-Op Diagnosis: Same       Procedure(s):  SACRAL DEBRIDEMENT WITH WOUND VAC CHANGE     Anesthesia: General     Surgeon(s):  Robb Xie MD     Assistant: Babar Arzola DO, Harpreet Montaño DO     Estimated Blood Loss (mL): 89YE     Complications: None     Specimens:   * No specimens in log *     Implants:  * No implants in log *    HISTORY: The patient is [age] y.o. year old male with history of necrotizing fasciitis of the sacrum, perineum, and scrotum who has been taken to the operating room several times for debridement, source control and wound VAC placements.  Patient will be taken back to the OR today for a wound VAC change and  further debridement.  Risks, benefits, expected outcome, and alternatives to the procedure were explained and all questions answered. Patient understands and signed a consent for procedure.     PROCEDURE: The patient was brought to the operating room and general anesthesia was given prior to positioning.  Patient was transferred onto the operating table and placed in a left lateral decubitus position on a beanbag for support.  An axillary roll was placed and the right leg was placed on a Doty stand and secured in place. Preoperative antibiotics were administered.       A time out was performed to confirm patient, procedure, location, and allergies. The prior Veriflo wound VAC was taken down and the site was then positioned, prepped with Hibiclens, and draped in the usual sterile fashion.     The sacral wound bed was without evidence of necrosis or purulent drainage. Small areas of unhealthy appearing tissue were removed with scissor. The wound was then irrigated with saline.  Portions of the superior aspect of the sacral wound were approximated with 0 Prolene. The scrotal area that had been previously approximated at portions was again revisited and the remaining open scrotral area was approximated completely with 0 Prolene sutures. The edges of the skin of the sacral wound were protected with Mepilex. Coloplast and mastisol was used throughout including the superior gluteal cleft to assist with achieving a good vac seal over the area. We then began applying granufoam wound VAC by placing the black polyurethane sponge into the wound bed. Acrylic drape was applied over all portions of the black sponge and the duoderm protecting the wound. The VAC was then hooked to the wound VAC system on continuous suction with adequate seal.This concluded the procedure.      All instrument, sponge, and needle counts were correct at closure and at the conclusion of the case. The patient tolerated the procedure well and was taken to the PACU in stable condition. Dr. Blaire Rios was present and scrubbed for the entire case.        Photos taken in [address] [place] [address] prior to surgery 1/11              Saint Rocher, DO  General Surgery PGY-1

## 2020-01-12 NOTE — PROGRESS NOTES
acute process. CT abdomen and pelvis without contrast demonstrated extensive subcutaneous emphysema involving both buttocks extending to the perineum. Possible phlegmon or developing soft tissue abscess overlying the left lateral abdominal wall. Scrotal Ultrasound demonstrated no evidence of testicular torsion or mass however did show extensive scrotal wall edema. He was given zosyn 4/5 g IV, rocephin 1 g IV and flagyl 500 mg IV in the emergency department.     He was transferred to [facility] ICU for Necrotizing Fasciitis and Acute Renal Failure.   Patient initially required pressors, but currently is off pressors. Bong Abreu a 37 y.o. with PMH of spina bifida, hypertension, diabetes and diabetic foot ulcer. Who presented to [facility] Emergency Department with complaint of generalized weakness for the past few days. On examination the emergency department staff noticed the patient had a foul smell which they thought he had a bowel movement. They turned the patient and noticed sloughing of skin on his back and buttocks.       11/29/2019 wide complex debridement of nec fasc involving gluteal/perianal/distal rectum  12/03/2019 open diverting end colostomy   12/06/2019 I&D  12/08/2019 Incision and debridement of necrotizing fasciitis buttock wound, scrotum, bilateral groin region and closure of midline abdominal wound  12/10/2019 Sharp excisional debridement of gluteal, marc-rectal and perineal wound to level of muscle, Sharp excisional debridement of scrotum (performed by Urology), Washout of gluteal/perineal/scrotal/inguinal wound, Partial primary closure of pubic wound, and Dakins Wet to dry dressing application  73/74/7691 sharp excisional debridement of gluteal and perineal wounds to muscle 30 x 40 cm.  Urology exam under anesthesia   12/24/2019 sharp debridement, partial closure of scrotum, placement of wound vac   12/26/2019 s/p sharp debridement of L side lateral aspect of sacral wound, 131*   K 4.1 4.1 4.6   CL 97* 99 98   CO2 21 21 19*   PHOS 4.3  --  5.4*   BUN 38* 38* 39*   CREATININE 1.47* 1.47* 1.67*     BNP: No results for input(s): BNP in the last 72 hours. PT/INR: No results for input(s): PROTIME, INR in the last 72 hours. APTT: No results for input(s): APTT in the last 72 hours. CARDIAC ENZYMES: No results for input(s): CKMB, CKMBINDEX, TROPONINI in the last 72 hours. Invalid input(s): CKTOTAL;3  FASTING LIPID PANEL:  Lab Results   Component Value Date    TRIG 208 (H) 12/19/2019     LIVER PROFILE: No results for input(s): AST, ALT, ALB, BILIDIR, BILITOT, ALKPHOS in the last 72 hours. MICROBIOLOGY:   Lab Results   Component Value Date/Time    CULTURE NO GROWTH 6 DAYS 12/02/2019 04:13 AM       Imaging:    No results found. ASSESSMENT & PLAN     ASSESSMENT / PLAN:     Principal Problem:   Principal Problem:      11/29/2019 wide complex debridement of nec fasc involving gluteal/perianal/distal rectum  12/03/2019 open diverting end colostomy   12/06/2019 I&D  12/08/2019 Incision and debridement of necrotizing fasciitis buttock wound, scrotum, bilateral groin region and closure of midline abdominal wound  12/10/2019 Sharp excisional debridement of gluteal, marc-rectal and perineal wound to level of muscle, Sharp excisional debridement of scrotum (performed by Urology), Washout of gluteal/perineal/scrotal/inguinal wound, Partial primary closure of pubic wound, and Dakins Wet to dry dressing application  24/77/6376 sharp excisional debridement of gluteal and perineal wounds to muscle 30 x 40 cm.  Urology exam under anesthesia   12/24/2019 sharp debridement, partial closure of scrotum, placement of wound vac   12/26/2019 s/p sharp debridement of L side lateral aspect of sacral wound, replacement of wound vac    1/1/2019: Vera flow irrigation VAC to gluteal wound with debridement.  Started VAC to perineal wound on midline laparotomy wound  1/3/2020:Patient is off the Flexiflo and is tolerating p.o. diet.  Encouraged to use Ensure in between meals to maintain the caloric requirement.  Tunneled catheter removed by IR as per nephrology recommendations as the patient does not need dialysis. 1/4/2020: Placed on renal diet.  Neurology will place right wrist splint  1/5/2020: Sacral wound VAC changed today by skin surgery  1/8/2020: Gluteal wound debridement and wound VAC change  1/11/2020:gluteal wound debridement, black sponge vac applied       Principal Problem:    Principal Problem:   -Diabetes mellitus: Currently on 10 units Lantus and high-dose correcting scale.  Last HbA1c 8 done on 12/1/2019.       -Hypertension: Controlled. continue Coreg  25 mg and Norvasc 10 mg.     -Iron deficiency anemia Hb: Low iron studies, Venofer 5 days completed.      -DEBBY on CKD stage III: Resolving. Creatinine on 1.67 today. Patient's last hemodialysis was on 12/27/2019. tunneled catheter removed as per nephrology patient is not in need of acute dialysis right now.  Continue Bumex 2 mg. Creatinine at baseline.      -Hyperkalemia: Resolved.       -Necrotizing fasciitis,Gluteal, perirectal, perianal wound: Wound VAC change today. General surgery following.  Dr. Hugo Rich, plastic surgery, following from [facility] for grafting and possible flap in future. ID following the patient and monitoring of the antibiotics.    -Right upper extremity weakness: Has a wrist extension splint. Outpatient EMG in 3 to 4 weeks.     -Urinary incontinence: Seen by urology they do not want to do suprapubic catheter as patient has had multiple abdominal surgeries and the risk of bowel injury.  On oxybutynin patch and upsizing of Ambrosio catheter.       DVT ppx : Heparin  GI ppx: Pepcid 20 mg    PT/OT: Consulted  Discharge Planning / SW: Ongoing    Rita Dela Cruz MD  Internal Medicine Resident, PGY-1  [facility];  [place], [place]  1/12/2020, 9:09 AM

## 2020-01-12 NOTE — PROGRESS NOTES
PROGRESS NOTE    PATIENT NAME: Austin Virgen0 RECORD NO. [id number]  DATE: 1/12/2020  PRIMARY CARE PHYSICIAN: No primary care provider on file. HD: # 40    ASSESSMENT    Patient Active Problem List   Diagnosis    Necrotizing fasciitis ([address]    Essential hypertension    Diabetes ([address]    Diabetic foot ulcer ([address]    Diabetic foot infection ([address]    Iron deficiency anemia    Muscle weakness of right upper extremity    Wrist drop, acquired, right     11/29/2019 wide complex debridement of nec fasc involving gluteal/perianal/distal rectum  12/03/2019 open diverting end colostomy   12/06/2019 I&D  12/08/2019 Incision and debridement of necrotizing fasciitis buttock wound, scrotum, bilateral groin region and closure of midline abdominal wound  12/10/2019 Sharp excisional debridement of gluteal, marc-rectal and perineal wound to level of muscle, Sharp excisional debridement of scrotum (performed by Urology), Washout of gluteal/perineal/scrotal/inguinal wound, Partial primary closure of pubic wound, and Dakins Wet to dry dressing application  16/41/2355 sharp excisional debridement of gluteal and perineal wounds to muscle 30 x 40 cm. Urology exam under anesthesia   12/24/2019 sharp debridement, partial closure of scrotum, placement of wound vac   12/26/2019 s/p sharp debridement of L side lateral aspect of sacral wound, replacement of wound vac    1/5/2020 gluteal wound debridement, veraflo wound vac change  1/8/2020 gluteal wound debridement, veraflo wound vac change   1/11/2020 gluteal wound debridement, black sponge vac applied     MEDICAL DECISION MAKING AND PLAN    1. Wound vac changed in OR 1/11-changed to a black foam (no longer veriflo). May attempt next vac change at bedside (1/14)   2. Dr. Madelin Altamirano, plastic surgery, [place] grafting and possible flap in future. 3. General diet   4. Continue calorie count. 5. Bowel regimen: colace, miralax   6.  Midline vac changes-wound RN following

## 2020-01-12 NOTE — PROGRESS NOTES
Oral Daily    insulin lispro  0-18 Units Subcutaneous Q4H    alteplase  1 mg Intracatheter Once    povidone-iodine   Topical Daily    famotidine  20 mg Per NG tube Daily    sodium chloride flush  10 mL Intravenous 2 times per day    heparin (porcine)  5,000 Units Subcutaneous 3 times per day       Social History:     Social History     Socioeconomic History    Marital status: Unknown     Spouse name: Not on file    Number of children: Not on file    Years of education: Not on file    Highest education level: Not on file   Occupational History    Not on file   Social Needs    Financial resource strain: Not on file    Food insecurity:     Worry: Not on file     Inability: Not on file    Transportation needs:     Medical: Not on file     Non-medical: Not on file   Tobacco Use    Smoking status: Not on file   Substance and Sexual Activity    Alcohol use: Not on file    Drug use: Not on file    Sexual activity: Not on file   Lifestyle    Physical activity:     Days per week: Not on file     Minutes per session: Not on file    Stress: Not on file   Relationships    Social connections:     Talks on phone: Not on file     Gets together: Not on file     Attends Baptism service: Not on file     Active member of club or organization: Not on file     Attends meetings of clubs or organizations: Not on file     Relationship status: Not on file    Intimate partner violence:     Fear of current or ex partner: Not on file     Emotionally abused: Not on file     Physically abused: Not on file     Forced sexual activity: Not on file   Other Topics Concern    Not on file   Social History Narrative    Not on file       Family History:   No family history on file. Allergies:   Patient has no known allergies. Review of Systems:     Review of Systems   Constitutional: Negative for activity change.         Pt AA  Denies chills, fevers, SOB  No complaints or issues           Physical Examination :     Patient 134* 131*   K 4.1 4.6   CL 99 98   CO2 21 19*   BUN 38* 39*   CREATININE 1.47* 1.67*   MG 1.7 2.0     Hepatic Function Panel:   No results for input(s): PROT, LABALBU, BILIDIR, IBILI, BILITOT, ALKPHOS, ALT, AST in the last 72 hours. No results for input(s): RPR in the last 72 hours. No results for input(s): HIV in the last 72 hours. No results for input(s): BC in the last 72 hours. Lab Results   Component Value Date    MUCUS NOT REPORTED 2019    RBC 2.68 2020    TRICHOMONAS NOT REPORTED 2019    WBC 10.1 2020    YEAST NOT REPORTED 2019    TURBIDITY TURBID 2019     Lab Results   Component Value Date    CREATININE 1.67 2020    GLUCOSE 102 2020       Medical Decision Making-Imagin-29 CXR    2019 3:07 pm       COMPARISON:   2019       HISTORY:   ORDERING SYSTEM PROVIDED HISTORY: follow  up   TECHNOLOGIST PROVIDED HISTORY:   follow  up   Acuity: Unknown   Type of Exam: Unknown       FINDINGS:   Endotracheal tube not visualized. Enteric tube courses below the diaphragm.       Left and right-sided catheters terminating at the cavoatrial junction.       Improved aeration of the right upper lobe.  Pulmonary vascular indistinctness   compatible with interstitial pulmonary edema.  Low lung volumes.  No   pneumothorax.  No pleural effusion.  Stable cardiomegaly.           Impression   1. Endotracheal tube not identified. 2. Venous catheters terminating at the cavoatrial junction. 3. Interstitial pulmonary edema.  CT Abd/pelvis  EXAMINATION:   CT OF THE ABDOMEN AND PELVIS WITH CONTRAST 2019 2:29 am       TECHNIQUE:   CT of the abdomen and pelvis was performed with the administration of   intravenous contrast. Multiplanar reformatted images are provided for review.    Dose modulation, iterative reconstruction, and/or weight based adjustment of   the mA/kV was utilized to reduce the radiation dose to as low as reasonably   achievable.     COMPARISON:   November 29, 2019.       HISTORY:   ORDERING SYSTEM PROVIDED HISTORY: Anastacio Quan fasc   TECHNOLOGIST PROVIDED HISTORY:       nec fasc   Reason for Exam: nec fasc; Trauma   Acuity: Unknown   Type of Exam: Subsequent/Follow-up       FINDINGS:   Lower Chest: Partially visualized bilateral pleural effusions with bibasilar   heterogeneous opacities and bilateral lower lobe consolidations.  Central   venous catheter tip near the superior atrial caval junction.  Cardiomegaly. Trace pericardial fluid.       Liver: Normal.       Gallbladder and Bile Ducts: Normal.       Spleen: Splenomegaly.       Adrenal Glands: Normal.       Pancreas: Normal.       Genitourinary: Symmetric renal atrophy.  Redemonstration of multiple   hypodensities in the right kidney which likely represent benign cysts.  No   urinary stones or hydronephrosis.  Ambrosio catheter in the decompressed urinary   bladder.       Bowel: Normal caliber bowel.  Postsurgical changes of the bowel with left   lower quadrant ostomy.  No evidence of acute appendicitis.  No significant   diverticular disease.       Vasculature: Atherosclerosis.  No abdominal aortic aneurysm.       Bones and Soft Tissues: Diffuse soft tissue stranding and fluid. Postsurgical changes in the anterior abdominal wall with midline incision   demonstrating expected small amount of fluid and air.  Large soft tissue   defects in the right inguinal region, scrotum, right greater than left   gluteal creases with associated packing material.  Small amount of   surrounding soft tissue gas.  There is associated skin thickening in these   regions.  Bilateral lower abdominal wall skin thickening.       Retroperitoneum/Mesentery: No intraperitoneal free air.  Mild abdominopelvic   ascites.  Loculated fluid along the inferior aspect of the liver. Redemonstration of bilateral inguinal and pelvic sidewall lymphadenopathy.    Multiple mildly prominent retroperitoneal and upper abdominal lymph control (AEC) was utilized. CT ABDOMEN FINDINGS:      The lung bases are unremarkable. There is a small pericardial effusion versus thickening. The liver, spleen, and pancreas demonstrate no acute abnormality given compromised evaluation without intravenous contrast.  There may be mild splenomegaly.  The adrenal glands appear within normal limits. There is no hydronephrosis or obstructing urinary tract calculi. Small amount of free fluid is seen adjacent to the liver inferiorly. .    The appendix is visualized in the right lower quadrant and appears within normal limits.       There is no evidence for bowel obstruction. Stranding is seen within the subcutaneous fat overlying both lateral abdominal walls left greater than right.  There is ill-defined fluid collection within the subcutaneous fat overlying the left lateral abdominal wall possibly representing phlegmon or developing abscess although no organized   abscess is seen. There is a large amount of soft tissue emphysema involving both the right and left buttock extending to the perineum tissue thickening is seen especially on the left involving the left buttock. CT PELVIS FINDINGS:        No distal ureteral calculi or bladder calculi. There is no free fluid in the pelvis. Catheter is seen within the urinary bladder. Osseous changes within the left hemipelvis which may be chronic in nature. IMPRESSION:    Extensive subcutaneous emphysema as described above involving both buttocks extending to the perineum.  The possibility of Ashley gangrene/necrotizing fasciitis cannot be excluded. Possible phlegmon or developing soft tissue abscess overlying the left lateral abdominal wall as noted above.  No organized abscess is seen however. Osseous changes within the left hemipelvis which may be chronic in nature.   Results the study were called to Dr. Derek Slade [phone] on 11/29/2019  All CT scans at this facility use dose modulation, iterative

## 2020-01-13 LAB
ABSOLUTE EOS #: 0.39 K/UL (ref 0–0.44)
ABSOLUTE IMMATURE GRANULOCYTE: 0.08 K/UL (ref 0–0.3)
ABSOLUTE LYMPH #: 1.76 K/UL (ref 1.1–3.7)
ABSOLUTE MONO #: 1.11 K/UL (ref 0.1–1.2)
ANION GAP SERPL CALCULATED.3IONS-SCNC: 12 MMOL/L (ref 9–17)
BASOPHILS # BLD: 0 % (ref 0–2)
BASOPHILS ABSOLUTE: 0.04 K/UL (ref 0–0.2)
BUN BLDV-MCNC: 41 MG/DL (ref 6–20)
BUN/CREAT BLD: ABNORMAL (ref 9–20)
CALCIUM SERPL-MCNC: 8.6 MG/DL (ref 8.6–10.4)
CHLORIDE BLD-SCNC: 97 MMOL/L (ref 98–107)
CO2: 20 MMOL/L (ref 20–31)
CREAT SERPL-MCNC: 1.6 MG/DL (ref 0.7–1.2)
DIFFERENTIAL TYPE: ABNORMAL
EOSINOPHILS RELATIVE PERCENT: 4 % (ref 1–4)
GFR AFRICAN AMERICAN: 60 ML/MIN
GFR NON-AFRICAN AMERICAN: 49 ML/MIN
GFR SERPL CREATININE-BSD FRML MDRD: ABNORMAL ML/MIN/{1.73_M2}
GFR SERPL CREATININE-BSD FRML MDRD: ABNORMAL ML/MIN/{1.73_M2}
GLUCOSE BLD-MCNC: 109 MG/DL (ref 75–110)
GLUCOSE BLD-MCNC: 113 MG/DL (ref 70–99)
GLUCOSE BLD-MCNC: 161 MG/DL (ref 75–110)
GLUCOSE BLD-MCNC: 184 MG/DL (ref 75–110)
GLUCOSE BLD-MCNC: 219 MG/DL (ref 75–110)
HCT VFR BLD CALC: 23.8 % (ref 40.7–50.3)
HEMOGLOBIN: 7.4 G/DL (ref 13–17)
IMMATURE GRANULOCYTES: 1 %
LYMPHOCYTES # BLD: 19 % (ref 24–43)
MAGNESIUM: 1.7 MG/DL (ref 1.6–2.6)
MCH RBC QN AUTO: 28.7 PG (ref 25.2–33.5)
MCHC RBC AUTO-ENTMCNC: 31.1 G/DL (ref 28.4–34.8)
MCV RBC AUTO: 92.2 FL (ref 82.6–102.9)
MONOCYTES # BLD: 12 % (ref 3–12)
NRBC AUTOMATED: 0 PER 100 WBC
PDW BLD-RTO: 19.3 % (ref 11.8–14.4)
PLATELET # BLD: 400 K/UL (ref 138–453)
PLATELET ESTIMATE: ABNORMAL
PMV BLD AUTO: 7.9 FL (ref 8.1–13.5)
POTASSIUM SERPL-SCNC: 4.2 MMOL/L (ref 3.7–5.3)
RBC # BLD: 2.58 M/UL (ref 4.21–5.77)
RBC # BLD: ABNORMAL 10*6/UL
SEG NEUTROPHILS: 64 % (ref 36–65)
SEGMENTED NEUTROPHILS ABSOLUTE COUNT: 6.06 K/UL (ref 1.5–8.1)
SODIUM BLD-SCNC: 129 MMOL/L (ref 135–144)
WBC # BLD: 9.4 K/UL (ref 3.5–11.3)
WBC # BLD: ABNORMAL 10*3/UL

## 2020-01-13 PROCEDURE — 6370000000 HC RX 637 (ALT 250 FOR IP): Performed by: STUDENT IN AN ORGANIZED HEALTH CARE EDUCATION/TRAINING PROGRAM

## 2020-01-13 PROCEDURE — 82947 ASSAY GLUCOSE BLOOD QUANT: CPT

## 2020-01-13 PROCEDURE — 36415 COLL VENOUS BLD VENIPUNCTURE: CPT

## 2020-01-13 PROCEDURE — 80048 BASIC METABOLIC PNL TOTAL CA: CPT

## 2020-01-13 PROCEDURE — 1200000000 HC SEMI PRIVATE

## 2020-01-13 PROCEDURE — 85025 COMPLETE CBC W/AUTO DIFF WBC: CPT

## 2020-01-13 PROCEDURE — 97605 NEG PRS WND THER DME<=50SQCM: CPT

## 2020-01-13 PROCEDURE — 99232 SBSQ HOSP IP/OBS MODERATE 35: CPT | Performed by: INTERNAL MEDICINE

## 2020-01-13 PROCEDURE — 2580000003 HC RX 258: Performed by: STUDENT IN AN ORGANIZED HEALTH CARE EDUCATION/TRAINING PROGRAM

## 2020-01-13 PROCEDURE — 83735 ASSAY OF MAGNESIUM: CPT

## 2020-01-13 PROCEDURE — 6360000002 HC RX W HCPCS: Performed by: STUDENT IN AN ORGANIZED HEALTH CARE EDUCATION/TRAINING PROGRAM

## 2020-01-13 PROCEDURE — 97110 THERAPEUTIC EXERCISES: CPT

## 2020-01-13 RX ADMIN — AMLODIPINE BESYLATE 10 MG: 10 TABLET ORAL at 09:00

## 2020-01-13 RX ADMIN — OXYCODONE HYDROCHLORIDE 10 MG: 5 TABLET ORAL at 16:51

## 2020-01-13 RX ADMIN — INSULIN GLARGINE 10 UNITS: 100 INJECTION, SOLUTION SUBCUTANEOUS at 09:00

## 2020-01-13 RX ADMIN — DOCUSATE SODIUM 100 MG: 100 CAPSULE, LIQUID FILLED ORAL at 09:00

## 2020-01-13 RX ADMIN — Medication: at 09:05

## 2020-01-13 RX ADMIN — Medication 1 MG: at 22:24

## 2020-01-13 RX ADMIN — CARVEDILOL 25 MG: 25 TABLET, FILM COATED ORAL at 09:00

## 2020-01-13 RX ADMIN — INSULIN LISPRO 3 UNITS: 100 INJECTION, SOLUTION INTRAVENOUS; SUBCUTANEOUS at 16:46

## 2020-01-13 RX ADMIN — INSULIN LISPRO 3 UNITS: 100 INJECTION, SOLUTION INTRAVENOUS; SUBCUTANEOUS at 11:06

## 2020-01-13 RX ADMIN — HEPARIN SODIUM 5000 UNITS: 5000 INJECTION INTRAVENOUS; SUBCUTANEOUS at 06:32

## 2020-01-13 RX ADMIN — MULTIPLE VITAMINS W/ MINERALS TAB 1 TABLET: TAB at 09:00

## 2020-01-13 RX ADMIN — POLYETHYLENE GLYCOL 3350 17 G: 17 POWDER, FOR SOLUTION ORAL at 09:00

## 2020-01-13 RX ADMIN — LOSARTAN POTASSIUM 50 MG: 50 TABLET, FILM COATED ORAL at 09:00

## 2020-01-13 RX ADMIN — SODIUM CHLORIDE, PRESERVATIVE FREE 10 ML: 5 INJECTION INTRAVENOUS at 20:48

## 2020-01-13 RX ADMIN — DOCUSATE SODIUM 100 MG: 100 CAPSULE, LIQUID FILLED ORAL at 20:45

## 2020-01-13 RX ADMIN — FAMOTIDINE 20 MG: 20 TABLET, FILM COATED ORAL at 09:00

## 2020-01-13 RX ADMIN — BUMETANIDE 2 MG: 1 TABLET ORAL at 09:00

## 2020-01-13 RX ADMIN — ACETAMINOPHEN 650 MG: 325 TABLET ORAL at 20:45

## 2020-01-13 RX ADMIN — INSULIN LISPRO 6 UNITS: 100 INJECTION, SOLUTION INTRAVENOUS; SUBCUTANEOUS at 20:48

## 2020-01-13 RX ADMIN — Medication 1 MG: at 00:54

## 2020-01-13 RX ADMIN — SENNOSIDES 8.6 MG: 8.6 TABLET, FILM COATED ORAL at 20:45

## 2020-01-13 RX ADMIN — SODIUM CHLORIDE, PRESERVATIVE FREE 10 ML: 5 INJECTION INTRAVENOUS at 09:05

## 2020-01-13 RX ADMIN — HEPARIN SODIUM 5000 UNITS: 5000 INJECTION INTRAVENOUS; SUBCUTANEOUS at 22:24

## 2020-01-13 ASSESSMENT — PAIN SCALES - GENERAL
PAINLEVEL_OUTOF10: 0
PAINLEVEL_OUTOF10: 7
PAINLEVEL_OUTOF10: 7

## 2020-01-13 NOTE — PROGRESS NOTES
wound  1/3/2020:Patient is off the Flexiflo and is tolerating p.o. diet.  Encouraged to use Ensure in between meals to maintain the caloric requirement.  Tunneled catheter removed by IR as per nephrology recommendations as the patient does not need dialysis. 2020: Placed on renal diet.  Neurology will place right wrist splint  2020: Sacral wound VAC changed today by  surgery. 2020: Sacral wound VAC change and debridement by general surgery  2020: Gluteal wound debridement and wound VAC change       OBJECTIVE     Vital Signs:  /63   Pulse 90   Temp 98.3 °F (36.8 °C) (Oral)   Resp 16   Ht 5' 7\" (1.702 m)   Wt 288 lb (130.6 kg)   SpO2 98%   BMI 45.11 kg/m²     Temp (24hrs), Av.1 °F (36.7 °C), Min:97.6 °F (36.4 °C), Max:98.3 °F (36.8 °C)    In: 660   Out: 1375 [Urine:1375]    Physical Exam:    General appearance: alert and cooperative with exam  HEENT: Head: Normocephalic, no lesions, without obvious abnormality. Neck: no adenopathy, no carotid bruit, no JVD, supple, symmetrical, trachea midline and thyroid not enlarged, symmetric, no tenderness/mass/nodules  Lungs: clear to auscultation bilaterally  Heart: regular rate and rhythm, S1, S2 normal, no murmur, click, rub or gallop  Abdomen: Soft, nontender has a colostomy bag  Extremities: Bilateral lower extremity edema covered with dressing. Multiple sacral/perineal/gluteal wounds covered with dressing.   Neurologic: Mental status: Alert, oriented, thought content appropriate    Medications:  Scheduled Medications:    insulin lispro  0-18 Units Subcutaneous 4x Daily AC & HS    losartan  50 mg Oral Daily    amLODIPine  10 mg Oral Daily    oxybutynin  1 patch Transdermal Once per day on Mon Thu    docusate sodium  100 mg Oral BID    senna  1 tablet Oral Nightly    therapeutic multivitamin-minerals  1 tablet Oral Daily    carvedilol  25 mg Per NG tube Daily    insulin glargine  10 Units Subcutaneous Daily    bumetanide  2 mg 39*   CREATININE 1.47* 1.47* 1.67*     BNP: No results for input(s): BNP in the last 72 hours. PT/INR: No results for input(s): PROTIME, INR in the last 72 hours. APTT: No results for input(s): APTT in the last 72 hours. CARDIAC ENZYMES: No results for input(s): CKMB, CKMBINDEX, TROPONINI in the last 72 hours. Invalid input(s): CKTOTAL;3  FASTING LIPID PANEL:  Lab Results   Component Value Date    TRIG 208 (H) 12/19/2019     LIVER PROFILE: No results for input(s): AST, ALT, ALB, BILIDIR, BILITOT, ALKPHOS in the last 72 hours. MICROBIOLOGY:   Lab Results   Component Value Date/Time    CULTURE NO GROWTH 6 DAYS 12/02/2019 04:13 AM       Imaging:    No results found. ASSESSMENT & PLAN     ASSESSMENT / PLAN:     Principal Problem:   11/29/2019 wide complex debridement of nec fasc involving gluteal/perianal/distal rectum  12/03/2019 open diverting end colostomy   12/06/2019 I&D  12/08/2019 Incision and debridement of necrotizing fasciitis buttock wound, scrotum, bilateral groin region and closure of midline abdominal wound  12/10/2019 Sharp excisional debridement of gluteal, marc-rectal and perineal wound to level of muscle, Sharp excisional debridement of scrotum (performed by Urology), Washout of gluteal/perineal/scrotal/inguinal wound, Partial primary closure of pubic wound, and Dakins Wet to dry dressing application  23/72/1326 sharp excisional debridement of gluteal and perineal wounds to muscle 30 x 40 cm.  Urology exam under anesthesia   12/24/2019 sharp debridement, partial closure of scrotum, placement of wound vac   12/26/2019 s/p sharp debridement of L side lateral aspect of sacral wound, replacement of wound vac    1/1/2019: Vera flow irrigation VAC to gluteal wound with debridement.  Started VAC to perineal wound on midline laparotomy wound  1/3/2020:Patient is off the Flexiflo and is tolerating p.o. diet.  Encouraged to use Ensure in between meals to maintain the caloric requirement.  Tunneled catheter removed by IR as per nephrology recommendations as the patient does not need dialysis. 1/4/2020: Placed on renal diet.  Neurology will place right wrist splint  1/5/2020: Sacral wound VAC changed today by skin surgery  1/8/2020: Gluteal wound debridement and wound VAC change  1/11/2020:gluteal wound debridement, black sponge vac applied       Principal Problem:    Principal Problem:   -Diabetes mellitus: Currently on 10 units Lantus and high-dose correcting scale.  Last HbA1c 8 done on 12/1/2019.       -Hypertension: Controlled. continue Coreg  25 mg and Norvasc 10 mg.     -Iron deficiency anemia Hb: Low iron studies, Venofer 5 days completed.      -DEBBY on CKD stage III: Resolving. Creatinine on 1.60 today. Patient's last hemodialysis was on 12/27/2019. tunneled catheter removed as per nephrology patient is not in need of acute dialysis right now.  Continue Bumex 2 mg. Creatinine at baseline.      -Hyperkalemia: Resolved.       -Necrotizing fasciitis,Gluteal, perirectal, perianal wound: Next wound VAC change on Tuesday  General surgery following.  Dr. Anthony Tucker, plastic surgery, following from [facility] for grafting and possible flap in future. ID following the patient and monitoring of the antibiotics.    -Right upper extremity weakness: Has a wrist extension splint. Outpatient EMG in 3 to 4 weeks.     -Urinary incontinence: Seen by urology they do not want to do suprapubic catheter as patient has had multiple abdominal surgeries and the risk of bowel injury.  On oxybutynin patch and upsizing of Ambrosio catheter.        DVT ppx : Heparin  GI ppx: Pepcid 20 mg     PT/OT: Consulted  Discharge Planning / SW: Ongoing       Juan F Montoya MD  Internal Medicine Resident, PGY-1  Gabrielle Wolfe;  [place], [place]  1/13/2020, 3:06 AM

## 2020-01-13 NOTE — PROGRESS NOTES
EVALUATION :1/13/2020     Afebrile  VS stable    Pt seen with abd wound vac change 1-13  The wound is clean    Rt foot plantar area with necrosis, beefy red underneath  Pubic area with dressing in place with creamy drainage. Pt is AA  Responding appropriately to questions  Denies chills or fevers  Ambrosio in place. No complaints    Abdomen is soft, active BS, ostomy intact with pink stoma, stool. VAC in the middle abdominal wound. Had perineal VAC exchange on 1-5-20   Underwent further debridement and vac change on 1-8    Had debridement and VAC exchange 1-11. Plans for bedside perineal vac change in am 1-14. If pt tolerates, plan for transfer to [facility] sometime next week. Left foot has dystrophic skin, dressing in place. Residual ischemic tissues. Not infected    Last HD 12-27, making urine, HD on hold. Plan to remove R IJ from neck. Nephrology signed off. Wound pics:  1-11 1-8    1-6:                1-3                  Lab & Cultures: 1/13/2020    WBC 9.7->8.3->9.2->7.8->8.5->8.2->9.4  Hb 7.3->7.1->7.7->8.1->8.4->8.3->7.4  Plat 350->377->363->398->340->400    BUN 49->44->38->41  Cr 1.68->1.84->1.80->1.86->1.9->1.64->1.47->1.6    Urine:  · NA  Blood:  · 11-30-19: no growth  · 12-2-19: no growth  Sputum :  · NA  Wound:  · 11-29-19: Strep ssp and Gram negative bacilli     I have personally reviewed the past medical history, past surgical history, medications, social history, and family history, and I have updated the database accordingly.   Past Medical History:     Past Medical History:   Diagnosis Date    [facility]     Diabetes mellitus [facility]     Hypertension     Septic shock ([facility]     Spina bifida aperta of lumbar spine ([facility]        Past Surgical  History:     Past Surgical History:   Procedure Laterality Date    ABDOMEN SURGERY N/A 12/8/2019    DEBRIDEMENT  NECROTIZING FASCIITIS BUTTOCK, WOUND VAC REMOVAL DELAYED PRIMARY CLOSURE OF MIDLINE ABDOMINAL Fear of current or ex partner: Not on file     Emotionally abused: Not on file     Physically abused: Not on file     Forced sexual activity: Not on file   Other Topics Concern    Not on file   Social History Narrative    Not on file       Family History:   No family history on file. Allergies:   Patient has no known allergies. Review of Systems:     Review of Systems   Constitutional: Negative for activity change. Pt AA  Denies chills, fevers, SOB  No complaints or issues           Physical Examination :     Patient Vitals for the past 8 hrs:   BP Temp Temp src Pulse Resp   01/13/20 1055 130/71 98.5 °F (36.9 °C) Oral 86 19   01/13/20 0630 -- -- -- 78 19     Physical Exam  Exam conducted with a chaperone present. Constitutional:       Appearance: Normal appearance. He is not toxic-appearing or diaphoretic. HENT:      Head: Normocephalic and atraumatic. Nose: No congestion or rhinorrhea. Mouth/Throat:      Mouth: Mucous membranes are moist.   Eyes:      General: No scleral icterus. Pupils: Pupils are equal, round, and reactive to light. Neck:      Musculoskeletal: Neck supple. No neck rigidity or muscular tenderness. Cardiovascular:      Rate and Rhythm: Normal rate and regular rhythm. Heart sounds: Normal heart sounds. No murmur. No friction rub. No gallop. Pulmonary:      Effort: Pulmonary effort is normal. No respiratory distress. Breath sounds: Normal breath sounds. No stridor. Abdominal:      General: Abdomen is flat. There is no distension. Palpations: There is no mass. Tenderness: There is no tenderness. Comments: Abdominal wound examined, granulation tissue noted   Genitourinary:     Comments:  Ambrosio in place. Perineal vac in place  Musculoskeletal:         General: No swelling, tenderness or deformity. Skin:     General: Skin is warm and dry. Coloration: Skin is not jaundiced or pale. Findings: Lesion present. No erythema. Neurological:      General: No focal deficit present. Mental Status: He is alert. Mental status is at baseline. Cranial Nerves: No cranial nerve deficit. Psychiatric:      Comments: AA, appropriate response           Medical Decision Making -Laboratory:   I have independently reviewed/ordered the following labs:    CBC with Differential:   Recent Labs     20  0614 20  0822   WBC 10.1 9.4   HGB 8.3* 7.4*   HCT 27.0* 23.8*    400   LYMPHOPCT 18* 19*   MONOPCT 12 12     BMP:   Recent Labs     20  0614 20  0822   * 129*   K 4.6 4.2   CL 98 97*   CO2 19* 20   BUN 39* 41*   CREATININE 1.67* 1.60*   MG 2.0 1.7     Hepatic Function Panel:   No results for input(s): PROT, LABALBU, BILIDIR, IBILI, BILITOT, ALKPHOS, ALT, AST in the last 72 hours. No results for input(s): RPR in the last 72 hours. No results for input(s): HIV in the last 72 hours. No results for input(s): BC in the last 72 hours. Lab Results   Component Value Date    MUCUS NOT REPORTED 2019    RBC 2.58 2020    TRICHOMONAS NOT REPORTED 2019    WBC 9.4 2020    YEAST NOT REPORTED 2019    TURBIDITY TURBID 2019     Lab Results   Component Value Date    CREATININE 1.60 2020    GLUCOSE 113 2020       Medical Decision Making-Imagin-29 CXR    2019 3:07 pm       COMPARISON:   2019       HISTORY:   ORDERING SYSTEM PROVIDED HISTORY: follow  up   TECHNOLOGIST PROVIDED HISTORY:   follow  up   Acuity: Unknown   Type of Exam: Unknown       FINDINGS:   Endotracheal tube not visualized. Enteric tube courses below the diaphragm.       Left and right-sided catheters terminating at the cavoatrial junction.       Improved aeration of the right upper lobe.  Pulmonary vascular indistinctness   compatible with interstitial pulmonary edema.  Low lung volumes.  No   pneumothorax.  No pleural effusion.  Stable cardiomegaly.           Impression   1.  Endotracheal tube not identified. 2. Venous catheters terminating at the cavoatrial junction. 3. Interstitial pulmonary edema. 12-9 CT Abd/pelvis  EXAMINATION:   CT OF THE ABDOMEN AND PELVIS WITH CONTRAST 12/9/2019 2:29 am       TECHNIQUE:   CT of the abdomen and pelvis was performed with the administration of   intravenous contrast. Multiplanar reformatted images are provided for review. Dose modulation, iterative reconstruction, and/or weight based adjustment of   the mA/kV was utilized to reduce the radiation dose to as low as reasonably   achievable.       COMPARISON:   November 29, 2019.       HISTORY:   ORDERING SYSTEM PROVIDED HISTORY: [facility]   TECHNOLOGIST PROVIDED HISTORY:       Elysia fasc   Reason for Exam: nec fasc; Trauma   Acuity: Unknown   Type of Exam: Subsequent/Follow-up       FINDINGS:   Lower Chest: Partially visualized bilateral pleural effusions with bibasilar   heterogeneous opacities and bilateral lower lobe consolidations.  Central   venous catheter tip near the superior atrial caval junction.  Cardiomegaly. Trace pericardial fluid.       Liver: Normal.       Gallbladder and Bile Ducts: Normal.       Spleen: Splenomegaly.       Adrenal Glands: Normal.       Pancreas: Normal.       Genitourinary: Symmetric renal atrophy.  Redemonstration of multiple   hypodensities in the right kidney which likely represent benign cysts.  No   urinary stones or hydronephrosis.  Ambrosio catheter in the decompressed urinary   bladder.       Bowel: Normal caliber bowel.  Postsurgical changes of the bowel with left   lower quadrant ostomy.  No evidence of acute appendicitis.  No significant   diverticular disease.       Vasculature: Atherosclerosis.  No abdominal aortic aneurysm.       Bones and Soft Tissues: Diffuse soft tissue stranding and fluid.    Postsurgical changes in the anterior abdominal wall with midline incision   demonstrating expected small amount of fluid and air.  Large soft tissue   defects in the

## 2020-01-13 NOTE — PROGRESS NOTES
PROGRESS NOTE    PATIENT NAME: Austin Mcdaniel [address] RECORD NO. [id number]  DATE: 1/13/2020  PRIMARY CARE PHYSICIAN: No primary care provider on file. HD: # 39    ASSESSMENT    Patient Active Problem List   Diagnosis    Necrotizing fasciitis ([address]    Essential hypertension    Diabetes ([address]    Diabetic foot ulcer ([address]    Diabetic foot infection ([facility]    Iron deficiency anemia    Muscle weakness of right upper extremity    Wrist drop, acquired, right     11/29/2019 wide complex debridement of nec fasc involving gluteal/perianal/distal rectum  12/03/2019 open diverting end colostomy   12/06/2019 I&D  12/08/2019 Incision and debridement of necrotizing fasciitis buttock wound, scrotum, bilateral groin region and closure of midline abdominal wound  12/10/2019 Sharp excisional debridement of gluteal, marc-rectal and perineal wound to level of muscle, Sharp excisional debridement of scrotum (performed by Urology), Washout of gluteal/perineal/scrotal/inguinal wound, Partial primary closure of pubic wound, and Dakins Wet to dry dressing application  58/26/1317 sharp excisional debridement of gluteal and perineal wounds to muscle 30 x 40 cm. Urology exam under anesthesia   12/24/2019 sharp debridement, partial closure of scrotum, placement of wound vac   12/26/2019 s/p sharp debridement of L side lateral aspect of sacral wound, replacement of wound vac    1/5/2020 gluteal wound debridement, veraflo wound vac change  1/8/2020 gluteal wound debridement, veraflo wound vac change   1/11/2020 gluteal wound debridement, black sponge vac applied     MEDICAL DECISION MAKING AND PLAN    1. Next wound vac change tomorrow 1/14. Will attempt at bedside. 2. Dr. Lazaro Castro, plastic [place] and possible flap in future. 3. General diet  - Protein supplements  4. Continue calorie count. 5. Bowel regimen: colace, miralax   6. Midline vac changes-wound RN following   7.  Strict glucose control <180 8. Follow up AM labs       SUBJECTIVE    Queenie Lackey is seen and examined. Afebrile, VSS. Pt wouldn't talk this morning and asked me to leave -  \"Stop talking and checking on me\". +BM from stoma, midline vac and gluteal vac with good seal.       OBJECTIVE  VITALS: Temp: Temp: (pt refuses vitals and blood sugar check )Temp  Av.1 °F (36.7 °C)  Min: 97.5 °F (36.4 °C)  Max: 98.3 °F (83.0 °C) BP Systolic (01CYB), ISB:318 , Min:114 , LSK:372   Diastolic (24AXJ), OHO:49, Min:63, Max:71   Pulse Pulse  Av.6  Min: 78  Max: 92 Resp Resp  Av.9  Min: 15  Max: 19 Pulse ox SpO2  Av %  Min: 98 %  Max: 98 %  GENERAL: alert and oriented, no distress  NEURO: CNII-XII grossly intact  HEENT: atraumatic, normocephalic, sclera sclear, nose without deformity, trachea midline   LUNGS: effort normal, no respiratory distress, no accessory muscle use   HEART: normal rate and regular rhythm  ABDOMEN: soft, non-tender, non-distended, no guarding or peritoneal signs, midline wound vac in place with good seal. Ostomy appliance in place with formed stool present. Sacral/perineal wound vac in place with good seal.   EXTREMITY: no cyanosis, clubbing or edema. Bilateral foot wounds. I/O last 3 completed shifts: In: 660 [P.O.:660]  Out: 1825 [Urine:1825]    Drain/tube output: In: 660 [P.O.:660]  Out: [address] [Urine:1375]    LAB:  CBC:   Recent Labs     01/10/20  0707 20  0711 20  0614   WBC 8.2 8.6 10.1   HGB 8.3* 8.7* 8.3*   HCT 26.5* 29.8* 27.0*   MCV 97.1 98.0 100.7    415 336     BMP:   Recent Labs     01/10/20  0707 20  0711 20  0614   * 134* 131*   K 4.1 4.1 4.6   CL 97* 99 98   CO2 21 21 19*   BUN 38* 38* 39*   CREATININE 1.47* 1.47* 1.67*   GLUCOSE 122* 104* 102*     COAGS: No results for input(s): APTT, PROT, INR in the last 72 hours.     RADIOLOGY:  No new imaging         [facility],    20         Attending Note      I have reviewed the above [facility] resident progress note and I either performed the key elements of the medical history and physical exam or was present when the resident performed them. I have discussed the findings, established the care plan and recommendations with residents Rodrick Wheatley, GCS nurse Merlin Osuna, NP Jacqueline Field and bedside nurse. The following confirms and/or amends the IMPRESSION, MEDICAL DECISION MAKING and PLAN from above.     ASSESSMENT    Patient Active Problem List   Diagnosis    Necrotizing fasciitis ([address]    Essential hypertension    Diabetes ([address]    Diabetic foot ulcer ([address]    Diabetic foot infection ([address]    Iron deficiency anemia    Muscle weakness of right upper extremity    Wrist drop, acquired, right       [address] bedside wound vac change tomorrow in anticipation of discharge to LTACH/SNF  Patito Roberts MD  1/13/2020  10:49 AM

## 2020-01-13 NOTE — PROGRESS NOTES
tissue; Yellow;Slough  (slough cont'd in distal wound base)   Liliam-wound Assessment Intact  (bordered with hydrocolloid & drape)   Drainage Amount Small   Drainage Description Serosanguinous   Dressing/Treatment Vacuum dressing   Dressing Changed Changed/New   Dressing Status Changed   Dressing Change Due 01/15/20   Incision 12/10/19 Scrotum   Date First Assessed/Time First Assessed: 12/10/19 1427   Primary Wound Type: (c) Other (comment)  Location: Scrotum  Wound Description (Comments): lower abdominal fold to right groin fold   Wound Assessment Painful  (approximated incision)   Liliam-wound Assessment Hyperpigmented   Closure Sutures   Drainage Amount Moderate   Drainage Description Serosanguinous   Odor Mild   Dressing/Treatment Alginate with Ag   Dressing Changed Changed/New   Dressing Status Changed   Incision 12/10/19 Buttocks   Date First Assessed/Time First Assessed: 12/10/19 1429   Present on Hospital Admission: No  Primary Wound Type: (c) Other (comment)  Location: Buttocks   Drainage Amount Moderate   Drainage Description Sanguinous   Dressing/Treatment Vacuum dressing   Dressing Changed Dressing reinforced   Dressing Status Reinforced     NPWT dressing changed using black granufoam following 's guidelines. Wound perimeter bordered with hydrocolloid and drape; SurepREP applied. Good seal achieved. Noted  pooling of malodorous, serosanguinous drainage in the right groin fold under VAC drape extending from posterior perineal wound. Strip paste under VAC drape across lower anterior abdominal fold and into the right groin fold used for sealing perineal VAC loose and device signalling leak. Strip paste & drape carefully removed from suture line and wet drape cut away posteriorly. VAC dressing patched; silver hyrdorfiber placed to wound edge under strips of Hollihesive, then drape re-applied. Good seal achieved.   Strips of Silver hydrofiber tucked into both groin folds anteriorly and across lower anterior abdominal fold to absorb drainage and cover incisions. Response to treatment:  Well tolerated by patient. Plan   Plan of Care:   Routine M-W-F VAC dressng changes to mid abdominal wounds using black granufoam with routine colostomy pouch change. Surgery considering VAC change to buttock wound at bedside tomorrow; Veraflo stopped.     Specialty Bed Required : Yes   [x] Low Air Loss   [x] Pressure Redistribution  [x] Fluid Immersion  [] Bariatric  [] Total Pressure Relief  [] Other:     Current Diet: Dietary Nutrition Supplements: Renal Oral Supplement  DIET GENERAL; Low Potassium      Discharge Plan:  Placement for patient upon discharge: skilled nursing       KALIN ALBERTO, RN, [employer]

## 2020-01-13 NOTE — PROGRESS NOTES
Physical Therapy  DATE: 2020  NAME: Channing Chauhan  MRN: [id number]   : 1985    Discharge Recommendations: Further therapy recommended at discharge. Subjective: Pt and mother agreeable to therapy, pt in bed upon arrival.  Pain: Pt asked about pain level and states \"no more than usual\"  Patient follows: All Commands  Is patient on ventilator: no  Is patient on sedation: no  Precautions: contact, B FDS, wound vac, lema,     Therapeutic exercises:  Supine Exercises: Ankle Pumps, Gluteal sets, Hamstring Sets, Heel Slides, Hip ADD, Hip IR/ER, Quad Sets, SAQ, SLR. Reps: 1x10, AA/PROM, pt able to initiate exercise but needs assistance to complete full ROM  Upper extremity exercises: Bicep curl, shoulder flexion/extension, punches, tricep curl, shoulder abduction/adduction. Reps: [id number], Red theraband    Goals  Short Term Goals  Short term goal 1: PROM for stretching and injury prevention   Short term goal 2: AROM/AAROM for strengthening   Short term goal 3: Progress mobility as appropriate           Plan: Progress functional mobility as medically appropriate.    Time In: 1321  Time Out: 1351  Time Coded Minutes (treatment minutes): 30  Rehab Potential: Fair  Treatments/week: 3-5/week    Nanda Best PTA

## 2020-01-13 NOTE — CARE COORDINATION
Care Transition  Called Ness she is following patient she will review his case to see if still qualifies for [employer] [place] [employer].

## 2020-01-14 LAB
-: NORMAL
ABSOLUTE EOS #: 0.38 K/UL (ref 0–0.44)
ABSOLUTE IMMATURE GRANULOCYTE: 0.07 K/UL (ref 0–0.3)
ABSOLUTE LYMPH #: 1.65 K/UL (ref 1.1–3.7)
ABSOLUTE MONO #: 0.92 K/UL (ref 0.1–1.2)
ANION GAP SERPL CALCULATED.3IONS-SCNC: 12 MMOL/L (ref 9–17)
ANION GAP SERPL CALCULATED.3IONS-SCNC: 13 MMOL/L (ref 9–17)
ANION GAP SERPL CALCULATED.3IONS-SCNC: 15 MMOL/L (ref 9–17)
BASOPHILS # BLD: 0 % (ref 0–2)
BASOPHILS ABSOLUTE: 0.04 K/UL (ref 0–0.2)
BUN BLDV-MCNC: 31 MG/DL (ref 6–20)
BUN BLDV-MCNC: 45 MG/DL (ref 6–20)
BUN BLDV-MCNC: 46 MG/DL (ref 6–20)
BUN/CREAT BLD: ABNORMAL (ref 9–20)
CALCIUM SERPL-MCNC: 6.1 MG/DL (ref 8.6–10.4)
CALCIUM SERPL-MCNC: 9 MG/DL (ref 8.6–10.4)
CALCIUM SERPL-MCNC: 9.1 MG/DL (ref 8.6–10.4)
CHLORIDE BLD-SCNC: 62 MMOL/L (ref 98–107)
CHLORIDE BLD-SCNC: 94 MMOL/L (ref 98–107)
CHLORIDE BLD-SCNC: 95 MMOL/L (ref 98–107)
CO2: 13 MMOL/L (ref 20–31)
CO2: 21 MMOL/L (ref 20–31)
CO2: 21 MMOL/L (ref 20–31)
CREAT SERPL-MCNC: 1.17 MG/DL (ref 0.7–1.2)
CREAT SERPL-MCNC: 1.66 MG/DL (ref 0.7–1.2)
CREAT SERPL-MCNC: 1.67 MG/DL (ref 0.7–1.2)
DIFFERENTIAL TYPE: ABNORMAL
EOSINOPHILS RELATIVE PERCENT: 4 % (ref 1–4)
GFR AFRICAN AMERICAN: 57 ML/MIN
GFR AFRICAN AMERICAN: 57 ML/MIN
GFR AFRICAN AMERICAN: >60 ML/MIN
GFR NON-AFRICAN AMERICAN: 47 ML/MIN
GFR NON-AFRICAN AMERICAN: 47 ML/MIN
GFR NON-AFRICAN AMERICAN: >60 ML/MIN
GFR SERPL CREATININE-BSD FRML MDRD: ABNORMAL ML/MIN/{1.73_M2}
GLUCOSE BLD-MCNC: 111 MG/DL (ref 70–99)
GLUCOSE BLD-MCNC: 118 MG/DL (ref 75–110)
GLUCOSE BLD-MCNC: 190 MG/DL (ref 75–110)
GLUCOSE BLD-MCNC: 199 MG/DL (ref 75–110)
GLUCOSE BLD-MCNC: 224 MG/DL (ref 70–99)
GLUCOSE BLD-MCNC: 254 MG/DL (ref 75–110)
GLUCOSE BLD-MCNC: ABNORMAL MG/DL (ref 70–99)
HCT VFR BLD CALC: 28.4 % (ref 40.7–50.3)
HEMOGLOBIN: 8.7 G/DL (ref 13–17)
IMMATURE GRANULOCYTES: 1 %
LYMPHOCYTES # BLD: 18 % (ref 24–43)
MAGNESIUM: 1.6 MG/DL (ref 1.6–2.6)
MAGNESIUM: 2 MG/DL (ref 1.6–2.6)
MAGNESIUM: NORMAL MG/DL (ref 1.6–2.6)
MCH RBC QN AUTO: 29.3 PG (ref 25.2–33.5)
MCHC RBC AUTO-ENTMCNC: 30.6 G/DL (ref 28.4–34.8)
MCV RBC AUTO: 95.6 FL (ref 82.6–102.9)
MONOCYTES # BLD: 10 % (ref 3–12)
NRBC AUTOMATED: 0 PER 100 WBC
PDW BLD-RTO: 19.2 % (ref 11.8–14.4)
PHOSPHORUS: 4.6 MG/DL (ref 2.5–4.5)
PLATELET # BLD: 426 K/UL (ref 138–453)
PLATELET ESTIMATE: ABNORMAL
PMV BLD AUTO: 7.9 FL (ref 8.1–13.5)
POTASSIUM SERPL-SCNC: 3.1 MMOL/L (ref 3.7–5.3)
POTASSIUM SERPL-SCNC: 4.2 MMOL/L (ref 3.7–5.3)
POTASSIUM SERPL-SCNC: 4.3 MMOL/L (ref 3.7–5.3)
RBC # BLD: 2.97 M/UL (ref 4.21–5.77)
RBC # BLD: ABNORMAL 10*6/UL
REASON FOR REJECTION: NORMAL
SEG NEUTROPHILS: 67 % (ref 36–65)
SEGMENTED NEUTROPHILS ABSOLUTE COUNT: 6.3 K/UL (ref 1.5–8.1)
SODIUM BLD-SCNC: 128 MMOL/L (ref 135–144)
SODIUM BLD-SCNC: 131 MMOL/L (ref 135–144)
SODIUM BLD-SCNC: 87 MMOL/L (ref 135–144)
WBC # BLD: 9.4 K/UL (ref 3.5–11.3)
WBC # BLD: ABNORMAL 10*3/UL
ZZ NTE CLEAN UP: ORDERED TEST: NORMAL
ZZ NTE WITH NAME CLEAN UP: SPECIMEN SOURCE: NORMAL

## 2020-01-14 PROCEDURE — 83735 ASSAY OF MAGNESIUM: CPT

## 2020-01-14 PROCEDURE — 82947 ASSAY GLUCOSE BLOOD QUANT: CPT

## 2020-01-14 PROCEDURE — 6370000000 HC RX 637 (ALT 250 FOR IP): Performed by: STUDENT IN AN ORGANIZED HEALTH CARE EDUCATION/TRAINING PROGRAM

## 2020-01-14 PROCEDURE — 97110 THERAPEUTIC EXERCISES: CPT

## 2020-01-14 PROCEDURE — 99232 SBSQ HOSP IP/OBS MODERATE 35: CPT | Performed by: INTERNAL MEDICINE

## 2020-01-14 PROCEDURE — 36415 COLL VENOUS BLD VENIPUNCTURE: CPT

## 2020-01-14 PROCEDURE — 80048 BASIC METABOLIC PNL TOTAL CA: CPT

## 2020-01-14 PROCEDURE — 2500000003 HC RX 250 WO HCPCS: Performed by: STUDENT IN AN ORGANIZED HEALTH CARE EDUCATION/TRAINING PROGRAM

## 2020-01-14 PROCEDURE — 6360000002 HC RX W HCPCS: Performed by: STUDENT IN AN ORGANIZED HEALTH CARE EDUCATION/TRAINING PROGRAM

## 2020-01-14 PROCEDURE — 1200000000 HC SEMI PRIVATE

## 2020-01-14 PROCEDURE — 2580000003 HC RX 258: Performed by: STUDENT IN AN ORGANIZED HEALTH CARE EDUCATION/TRAINING PROGRAM

## 2020-01-14 PROCEDURE — 97606 NEG PRS WND THER DME>50 SQCM: CPT

## 2020-01-14 PROCEDURE — 85025 COMPLETE CBC W/AUTO DIFF WBC: CPT

## 2020-01-14 PROCEDURE — 84100 ASSAY OF PHOSPHORUS: CPT

## 2020-01-14 RX ORDER — MAGNESIUM SULFATE 1 G/100ML
1 INJECTION INTRAVENOUS
Status: DISPENSED | OUTPATIENT
Start: 2020-01-14 | End: 2020-01-14

## 2020-01-14 RX ORDER — BUMETANIDE 0.25 MG/ML
0.5 INJECTION, SOLUTION INTRAMUSCULAR; INTRAVENOUS ONCE
Status: COMPLETED | OUTPATIENT
Start: 2020-01-14 | End: 2020-01-14

## 2020-01-14 RX ADMIN — INSULIN LISPRO 3 UNITS: 100 INJECTION, SOLUTION INTRAVENOUS; SUBCUTANEOUS at 17:01

## 2020-01-14 RX ADMIN — INSULIN LISPRO 9 UNITS: 100 INJECTION, SOLUTION INTRAVENOUS; SUBCUTANEOUS at 21:33

## 2020-01-14 RX ADMIN — INSULIN LISPRO 3 UNITS: 100 INJECTION, SOLUTION INTRAVENOUS; SUBCUTANEOUS at 13:10

## 2020-01-14 RX ADMIN — Medication 10 ML: at 11:26

## 2020-01-14 RX ADMIN — MAGNESIUM SULFATE HEPTAHYDRATE 1 G: 1 INJECTION, SOLUTION INTRAVENOUS at 11:27

## 2020-01-14 RX ADMIN — POLYETHYLENE GLYCOL 3350 17 G: 17 POWDER, FOR SOLUTION ORAL at 09:37

## 2020-01-14 RX ADMIN — OXYCODONE HYDROCHLORIDE 10 MG: 5 TABLET ORAL at 21:31

## 2020-01-14 RX ADMIN — FAMOTIDINE 20 MG: 20 TABLET, FILM COATED ORAL at 09:37

## 2020-01-14 RX ADMIN — SODIUM CHLORIDE, PRESERVATIVE FREE 10 ML: 5 INJECTION INTRAVENOUS at 09:37

## 2020-01-14 RX ADMIN — OXYCODONE HYDROCHLORIDE 10 MG: 5 TABLET ORAL at 14:59

## 2020-01-14 RX ADMIN — MAGNESIUM SULFATE HEPTAHYDRATE 1 G: 1 INJECTION, SOLUTION INTRAVENOUS at 09:43

## 2020-01-14 RX ADMIN — BUMETANIDE 0.5 MG: 0.25 INJECTION INTRAMUSCULAR; INTRAVENOUS at 09:43

## 2020-01-14 RX ADMIN — BUMETANIDE 2 MG: 1 TABLET ORAL at 09:36

## 2020-01-14 RX ADMIN — DOCUSATE SODIUM 100 MG: 100 CAPSULE, LIQUID FILLED ORAL at 09:37

## 2020-01-14 RX ADMIN — HEPARIN SODIUM 5000 UNITS: 5000 INJECTION INTRAVENOUS; SUBCUTANEOUS at 06:28

## 2020-01-14 RX ADMIN — CARVEDILOL 25 MG: 25 TABLET, FILM COATED ORAL at 09:36

## 2020-01-14 RX ADMIN — OXYCODONE HYDROCHLORIDE 10 MG: 5 TABLET ORAL at 10:13

## 2020-01-14 RX ADMIN — DOCUSATE SODIUM 100 MG: 100 CAPSULE, LIQUID FILLED ORAL at 21:28

## 2020-01-14 RX ADMIN — SODIUM CHLORIDE, PRESERVATIVE FREE 10 ML: 5 INJECTION INTRAVENOUS at 21:28

## 2020-01-14 RX ADMIN — OXYCODONE HYDROCHLORIDE 10 MG: 5 TABLET ORAL at 00:18

## 2020-01-14 RX ADMIN — SENNOSIDES 8.6 MG: 8.6 TABLET, FILM COATED ORAL at 21:28

## 2020-01-14 RX ADMIN — MULTIPLE VITAMINS W/ MINERALS TAB 1 TABLET: TAB at 09:37

## 2020-01-14 RX ADMIN — LORAZEPAM 1 MG: 2 INJECTION INTRAMUSCULAR; INTRAVENOUS at 11:26

## 2020-01-14 ASSESSMENT — PAIN SCALES - GENERAL
PAINLEVEL_OUTOF10: 10
PAINLEVEL_OUTOF10: 10
PAINLEVEL_OUTOF10: 8
PAINLEVEL_OUTOF10: 10
PAINLEVEL_OUTOF10: 10

## 2020-01-14 NOTE — PROGRESS NOTES
hydrocolloid and pectin based rings.  [] If bridging dressing to less prominent site, cover any intact skin that will come in contact with the Negative Pressure Therapy sponge, gauze or channel drain with plastic drape. The sponge should never touch intact skin.  [x] Cut sponge, gauze or channel drain to size which will fit into the wound/ulcer bed without being forced. Cover the anus with oil emulsion gauze.  [x] Be sure the sponge is large enough to hold the entire round plastic flange which is attached to the tubing. Never allow flange to be larger than the sponge or it will produce suction damaging intact skin.  Total number of individual pieces of foam and oil emulsion gauze used within the wound bed: 3     [x] If bridging the dressing away from the primary site, be sure the bridge leads to a piece of sponge large enough to hold the entire flange without allowing any of the flange to overlap onto intact skin. Bridged to right thigh today.  [x] Covered sponge, gauze or channel drain with plastic drape.  [x] Cut a hole in this plastic drape directly over the sponge the same size as the plastic drain tubing.  [x] Removed plastic liner from flange and apply it directly over the hole you cut.  [x] Removed the plastic cover from the flange.  [x] Attached the tubing to the wound/ulcer Negative Pressure Therapy and turn it on to be sure a vacuum is created and that there are no leaks.  [x] If air leaks occur, use plastic drape to patch them.  [] Secured Negative Pressure Therapy dressing with ace wrap loosely if located on an extremity. Maintain tubing outside of ace wrap. Tubing must not exert pressure on intact skin. NPWT applied per [employer] Guidelines    Sutured wounds in the groin fold and perineum covered with silver hydrofiber and ABD pads to absorb drainage.   Patient tolerated dressing change very well with Roxicodone and lorazepam.     Electronically signed by Minh Resendiz RN CWON on 1/14/2020 at 12:59 PM

## 2020-01-14 NOTE — PROGRESS NOTES
Physical Therapy  DATE: 2020  NAME: Lisa Musa  MRN: [id number]   : 1985    Discharge Recommendations: Continue to Assess (pending progress)     Subjective: Pt agreeable to therapy, in bed upon arrival. States he did not sleep well and feels fatigued. Pain: Denies having any this session. Patient follows: All Commands  Is patient on ventilator: NO  Is patient on sedation: NO  Precautions: contact, B FDS, wound vac, lema    Therapeutic exercises:  Supine Exercises: Ankle Pumps, Gluteal sets, Hamstring Sets, Hip ABD/ADD, Quad Sets, SAQ, and SLR with AA/PROM for 1x10 each  Bilateral gastrocnemius stretching 3 reps x 30 seconds  Pt states he will complete B UE exercises independently later this date. Goals  Short Term Goals  Short term goal 1: PROM for stretching and injury prevention   Short term goal 2: AROM/AAROM for strengthening   Short term goal 3: Progress mobility as appropriate           Plan: Progress functional mobility as medically appropriate.    Time In: 9714  Time Out: 0912  Time Coded Minutes (treatment minutes): 18  Rehab Potential: Fair  Treatments/week: 3-5/week    Smita velarde PTA

## 2020-01-14 NOTE — PROGRESS NOTES
PROGRESS NOTE    PATIENT NAME: Austin Mcdaniel [address] RECORD NO. [id number]  DATE: 1/14/2020  PRIMARY CARE PHYSICIAN: No primary care provider on file. HD: # 55    ASSESSMENT    Patient Active Problem List   Diagnosis    Necrotizing fasciitis ([address]    Essential hypertension    Diabetes ([address]    Diabetic foot ulcer ([address]    Diabetic foot infection ([address]    Iron deficiency anemia    Muscle weakness of right upper extremity    Wrist drop, acquired, right     11/29/2019 wide complex debridement of nec fasc involving gluteal/perianal/distal rectum  12/03/2019 open diverting end colostomy   12/06/2019 I&D  12/08/2019 Incision and debridement of necrotizing fasciitis buttock wound, scrotum, bilateral groin region and closure of midline abdominal wound  12/10/2019 Sharp excisional debridement of gluteal, marc-rectal and perineal wound to level of muscle, Sharp excisional debridement of scrotum (performed by Urology), Washout of gluteal/perineal/scrotal/inguinal wound, Partial primary closure of pubic wound, and Dakins Wet to dry dressing application  84/79/7891 sharp excisional debridement of gluteal and perineal wounds to muscle 30 x 40 cm. Urology exam under anesthesia   12/24/2019 sharp debridement, partial closure of scrotum, placement of wound vac   12/26/2019 s/p sharp debridement of L side lateral aspect of sacral wound, replacement of wound vac    1/5/2020 gluteal wound debridement, veraflo wound vac change  1/8/2020 gluteal wound debridement, veraflo wound vac change   1/11/2020 gluteal wound debridement, black sponge vac applied       MEDICAL DECISION MAKING AND PLAN    1. Next wound vac change today 1/14. Will attempt at bedside this AM.   2. Dr. Anthony Tuckre, plastic surgery[place] grafting and possible flap in future. 3. General diet  - Protein supplements  4. Continue calorie count. 5. Bowel regimen: colace, miralax   6. Midline vac changes-wound RN following   7.  Strict glucose control <180       SUBJECTIVE    Tricia Moise is seen and examined. Afebrile, VSS. Conversive. Denies pain this AM. Wound care assisted with helping achieve wound vac seal yesterday after leak in the groin area. +BM from stoma, midline vac and gluteal vac with good seal. Tolerating diet without issues. OBJECTIVE  VITALS: Temp: Temp: 98.4 °F (36.9 °C)Temp  Av.3 °F (36.8 °C)  Min: 98 °F (36.7 °C)  Max: 98.5 °F (13.1 °C) BP Systolic (12EXV), YZS:156 , Min:119 , IOI:377   Diastolic (91SXR), AMANDA:73, Min:61, Max:82   Pulse Pulse  Av.8  Min: 86  Max: 100 Resp Resp  Av.3  Min: 16  Max: 19 Pulse ox SpO2  Av %  Min: 98 %  Max: 98 %  GENERAL: alert and oriented, no distress  NEURO: CNII-XII grossly intact  HEENT: atraumatic, normocephalic, sclera sclear, nose without deformity, trachea midline   LUNGS: effort normal, no respiratory distress, no accessory muscle use   HEART: normal rate and regular rhythm  ABDOMEN: soft, non-tender, non-distended, no guarding or peritoneal signs, midline wound vac in place with good seal. Ostomy appliance in place with formed stool present. Sacral/perineal wound vac in place with good seal.   EXTREMITY: no cyanosis, clubbing or edema. Bilateral foot wounds. I/O last 3 completed shifts: In:  [P.O.:]  Out: 2900 [Urine:2900]    Drain/tube output:  In:  [P.O.:]  Out: 2900 [Urine:2900]    LAB:  CBC:   Recent Labs     20  0614 20  0820  0532   WBC 10.1 9.4 9.4   HGB 8.3* 7.4* 8.7*   HCT 27.0* 23.8* 28.4*   .7 92.2 95.6    400 426     BMP:   Recent Labs     20  0614 20  0822 20  0532   * 129* 128*   K 4.6 4.2 4.2   CL 98 97* 94*   CO2 19* 20 21   BUN 39* 41* 45*   CREATININE 1.67* 1.60* 1.67*   GLUCOSE 102* 113* 111*     COAGS: No results for input(s): APTT, PROT, INR in the last 72 hours.     RADIOLOGY:  No new imaging         Colleen Conroy DO   General Surgery Resident        Attending Note      I

## 2020-01-14 NOTE — PROGRESS NOTES
[facility] - Progress Note    Today's Date and Time: 1/14/2020, 11:32 AM    Impression :   · Necrotizing fasciitis 11-29-19  · S/P perirectal I&D. Wide complex excisional debridement of gluteal and perineal areas on 11-29-19  · S/P debridement, washout of gluteal and perianal areas, diverting colostomy 12-3-19.   · S/P debridement, washout of gluteal and perianal areas, diverting colostomy 12-6-19.  · S/P Incision and debridement of necrotizing fasciitis buttock wound, scrotum, bilateral groin region and closure of midline abdominal wound 12-8-19   · S/P Incision and debridement of necrotizing fasciitis buttock wound, scrotum, bilateral groin region on 12-20-19. · S/P Incision and debridement of necrotizing fasciitis buttock wound, scrotum, bilateral groin region on 1-1-20   · S/P Incision and debridement of necrotizing fasciitis buttock wound, scrotum, bilateral groin region on 1-8-20  · S/P sacral debridement and VAC change 1-11-20  · Lactic acidosis--Resolved  · DM 2  · HTN  · Hx of Low LVEF (20%) as per family  · DEBBY    Recommendations:   · ID wise he has been stable for several days. He needs continued nutrition, wound care. · Would avoid antibiotics unless a new problem  Arises. · ID will sign off at this stage. · Please re-consult if his condition changes. Medical Decision Making/Summary/Discussion:1/14/2020     · Patient with DM 2, prior diabetic foot infection  · Presented to [facility] ER generalized weakness for the past few days  · Found to have soft tissue necrosis with subcutaneous emphysema in gluteal areas and perineum  · S/P I&D and wide complex debridement of affected tissues on 11-29-19  · Showing hypotension, requiring fluids and a vasopressor  · Cultures in progress  · Will cover with Zosyn. Wounds with Strep spp. And Gram negative bacilli . No Staph spp.   · Pt is a MRSA nasal carrier  · Zosyn D/C because of of poor LVEF, in order to reduce Na · Ashley's vs necrotizing fasciitis    History of Present Illness:   Sandi Morrison is a 37y.o.-year-old  male who was initially admitted on 11/29/2019. Patient seen at the request of . INITIAL HISTORY:    Patient presented through ER in [place] with complaints of weakness of a few days duration. Examination showed skin necrosis in the gluteal and perineal region. Patient transferred to [facility]. CT scan showed extensive subcutaneous emphysema. Patient underwent I&D and extensive complex debridement of affected tissues on 11-29-19. Gram stain of tissues showed Strep. Spp and Gram negative bacilli. 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 He has underlying DM 2, prior diabetic foot infection, DEBBY. Patient more stable post surgery but with hypotension requiring a pressor. Zosyn D/C because of of poor LVEF, in order to reduce Na and fluid load  Meropenem started adjusted for Cr Cl 30 cc  Meropenem adjusted for HD  Per surgery after debridement on 12-8-19, infection has spread to deep planes with the urethra less viable. One more debridement in OR scheduled for 12-10-19 and then decision will be made to change code status or not. Pt did not tolerate HD on 12-9. It was stopped early and pt required vasopressor support with Levophed, remains on vent. Pt to OR 12-10 for further debridement  · Repeat I&D planned for 12-16-19 was cancelled because of high K levels  · Bedside I & D on 12/17/19. · Patient to undergo additional I&D on 12-20-19  · 12/23 right upper lobe collapse  · 12/24 debridement with excision of sacral wound in preparation for a flap, partial closure of his scrotal wound, VAC and debridement over the perineum  · Pt making urine.  Last HD 12-27  · 12-30 Abd vac change per wound RN  · 1-1-20 Returned to OR for further debridement of perineal wound and vac change  · 1-4 Tunnel cath discontinued  · 1-5 to OR for debridement and vac change  · 1-8 to OR for vac change, plastics following for possible flap in future  · Plan for bedside perineal wound vac change in am 1-14. If pt tolerates, will transfer to [facility] for further care  · 1-14 Bedside wound vac change, pt tolerated well. Wound is clean    CURRENT EVALUATION :1/14/2020     Afebrile  VS stable    Pt seen with abd wound vac change 1-13  The wound is clean    Rt foot plantar area with necrosis, beefy red underneath  Pubic area with sutures, clean    1-14 Sacral wound examined during bedside wound vac change  Pt tolerated the procedure well  Wound is extensive, clean, see pic below    Pt is AA  Responding appropriately to questions  Denies chills or fevers  Ambrosio in place. No complaints    Abdomen is soft, active BS, ostomy intact with pink stoma, stool. VAC in the middle abdominal wound. Left foot has dystrophic skin, dressing in place. Residual ischemic tissues. Not infected    Last HD 12-27, making urine, HD on hold. Plan to remove R IJ from neck. Nephrology signed off. ID wise he has been stable for several days. He needs continued nutrition, wound care. Would avoid antibiotics unless a new problem  Arises. ID will sign off at this stage. Please re-consult if his condition changes. Wound pics:  1-14 1-11              1-8    1-6:                1-3              Lab & Cultures: 1/14/2020    WBC 8.5->8.2->9.4  Hb 8.3->7.4->8.7  Plat 400->426    BUN 41->45  Cr 1.47->1.6->1.6    Urine:  · NA  Blood:  · 11-30-19: no growth  · 12-2-19: no growth  Sputum :  · NA  Wound:  · 11-29-19: Strep ssp and Gram negative bacilli     I have personally reviewed the past medical history, past surgical history, medications, social history, and family history, and I have updated the database accordingly.   Past Medical History:     Past Medical History:   Diagnosis Date    [facility] (congestive [facility] failure) [facility]     Diabetes mellitus [facility]     Hypertension     Septic DEBRIDEMENT performed by Dami Dixon MD at [address] N/A 12/10/2019    SCROTAL EXPLORATION WITH SCROTAL DEBRIDEMENT performed by Donnie Jane MD at [address] N/A 12/23/2019    WOUND DEBRIDEMENT  WOUND VAC CHANGE - [address] performed by Dami Dixon MD at [facility]       Medications:      insulin lispro  0-18 Units Subcutaneous 4x Daily AC & HS    losartan  50 mg Oral Daily    amLODIPine  10 mg Oral Daily    oxybutynin  1 patch Transdermal Once per day on Mon Thu    docusate sodium  100 mg Oral BID    senna  1 tablet Oral Nightly    therapeutic multivitamin-minerals  1 tablet Oral Daily    carvedilol  25 mg Per NG tube Daily    insulin glargine  10 Units Subcutaneous Daily    bumetanide  2 mg Oral Daily    darbepoetin rohan-polysorbate  100 mcg Intravenous Weekly    polyethylene glycol  17 g Oral Daily    alteplase  1 mg Intracatheter Once    povidone-iodine   Topical Daily    famotidine  20 mg Per NG tube Daily    sodium chloride flush  10 mL Intravenous 2 times per day    heparin (porcine)  5,000 Units Subcutaneous 3 times per day       Social History:     Social History     Socioeconomic History    Marital status: Unknown     Spouse name: Not on file    Number of children: Not on file    Years of education: Not on file    Highest education level: Not on file   Occupational History    Not on file   Social Needs    Financial resource strain: Not on file    Food insecurity:     Worry: Not on file     Inability: Not on file    Transportation needs:     Medical: Not on file     Non-medical: Not on file   Tobacco Use    Smoking status: Not on file   Substance and Sexual Activity    Alcohol use: Not on file    Drug use: Not on file    Sexual activity: Not on file   Lifestyle    Physical activity:     Days per week: Not on file     Minutes per session: Not on file    Stress: Not on file   Relationships    Social connections: Talks on phone: Not on file     Gets together: Not on file     Attends [employer] service: Not on file     Active member of club or organization: Not on file     Attends meetings of clubs or organizations: Not on file     Relationship status: Not on file    Intimate partner violence:     Fear of current or ex partner: Not on file     Emotionally abused: Not on file     Physically abused: Not on file     Forced sexual activity: Not on file   Other Topics Concern    Not on file   Social History Narrative    Not on file       Family History:   No family history on file. Allergies:   Patient has no known allergies. Review of Systems:     Review of Systems   Constitutional: Negative for activity change. Pt AA  Denies chills, fevers, SOB  No complaints or issues   Psychiatric/Behavioral:        Anxious about vac change           Physical Examination :     Patient Vitals for the past 8 hrs:   BP Temp Temp src Pulse Resp SpO2   01/14/20 1100 108/67 97.6 °F (36.4 °C) Oral -- -- 97 %   01/14/20 0936 (!) 117/56 -- -- 91 -- --   01/14/20 0700 119/61 98.4 °F (36.9 °C) Oral -- -- 98 %   01/14/20 0435 121/71 98 °F (36.7 °C) Oral 91 16 98 %     Physical Exam  Exam conducted with a chaperone present. Constitutional:       Appearance: Normal appearance. He is not toxic-appearing or diaphoretic. HENT:      Head: Normocephalic and atraumatic. Nose: No congestion or rhinorrhea. Mouth/Throat:      Mouth: Mucous membranes are moist.   Eyes:      General: No scleral icterus. Pupils: Pupils are equal, round, and reactive to light. Neck:      Musculoskeletal: Neck supple. No neck rigidity or muscular tenderness. Cardiovascular:      Rate and Rhythm: Normal rate and regular rhythm. Heart sounds: Normal heart sounds. No murmur. No friction rub. No gallop. Pulmonary:      Effort: Pulmonary effort is normal. No respiratory distress. Breath sounds: Normal breath sounds. No stridor. PROVIDED HISTORY:   follow  up   Acuity: Unknown   Type of Exam: Unknown       FINDINGS:   Endotracheal tube not visualized. Enteric tube courses below the diaphragm.       Left and right-sided catheters terminating at the cavoatrial junction.       Improved aeration of the right upper lobe.  Pulmonary vascular indistinctness   compatible with interstitial pulmonary edema.  Low lung volumes.  No   pneumothorax.  No pleural effusion.  Stable cardiomegaly.           Impression   1. Endotracheal tube not identified. 2. Venous catheters terminating at the cavoatrial junction. 3. Interstitial pulmonary edema. 12-9 CT Abd/pelvis  EXAMINATION:   CT OF THE ABDOMEN AND PELVIS WITH CONTRAST 12/9/2019 2:29 am       TECHNIQUE:   CT of the abdomen and pelvis was performed with the administration of   intravenous contrast. Multiplanar reformatted images are provided for review. Dose modulation, iterative reconstruction, and/or weight based adjustment of   the mA/kV was utilized to reduce the radiation dose to as low as reasonably   achievable.       COMPARISON:   November 29, 2019.       HISTORY:   ORDERING SYSTEM PROVIDED HISTORY: Neha galindo   TECHNOLOGIST PROVIDED HISTORY:       nec fasc   Reason for Exam: nec fasc; Trauma   Acuity: Unknown   Type of Exam: Subsequent/Follow-up       FINDINGS:   Lower Chest: Partially visualized bilateral pleural effusions with bibasilar   heterogeneous opacities and bilateral lower lobe consolidations.  Central   venous catheter tip near the superior atrial caval junction.  Cardiomegaly.    Trace pericardial fluid.       Liver: Normal.       Gallbladder and Bile Ducts: Normal.       Spleen: Splenomegaly.       Adrenal Glands: Normal.       Pancreas: Normal.       Genitourinary: Symmetric renal atrophy.  Redemonstration of multiple   hypodensities in the right kidney which likely represent benign cysts.  No   urinary stones or hydronephrosis.  Ambrosio catheter in the decompressed     11/29/2019 9:01 pm       COMPARISON:   4 hours ago       HISTORY:   ORDERING SYSTEM PROVIDED HISTORY: intubated   TECHNOLOGIST PROVIDED HISTORY:   intubated   Reason for Exam: portable supine/ intubated   Acuity: Acute   Type of Exam: Initial       FINDINGS:   New ET tube terminates 38 mm above the ron.  There appears to be a   possible enteric tube which is not well visualized distally.  Right IJ   catheter terminates in the right atrium and is unchanged.  Lungs are clear. Cardiomegaly.  Mediastinum normal.  Bony thorax intact.           Impression   New ET tube as above.  Possible new enteric tube not well visualized. Recommend follow-up KUB if clinically warranted. CT ABDOMEN AND PELVIS WITHOUT CONTRAST    COMPARISON:  3/22/2017    CLINICAL HISTORY: Infection in scrotum, lower abdominal pain, diabetic, 30,000 white blood cell count. TECHNIQUE: Unenhanced axial images were obtained from the lung bases to the pubic symphysis with sagittal and coronal 2D reformatted images. Oral contrast administered:  No.  Automatic exposure control (AEC) was utilized. CT ABDOMEN FINDINGS:      The lung bases are unremarkable. There is a small pericardial effusion versus thickening. The liver, spleen, and pancreas demonstrate no acute abnormality given compromised evaluation without intravenous contrast.  There may be mild splenomegaly.  The adrenal glands appear within normal limits. There is no hydronephrosis or obstructing urinary tract calculi. Small amount of free fluid is seen adjacent to the liver inferiorly. .    The appendix is visualized in the right lower quadrant and appears within normal limits.       There is no evidence for bowel obstruction.   Stranding is seen within the subcutaneous fat overlying both lateral abdominal walls left greater than right.  There is ill-defined fluid collection within the subcutaneous fat overlying the left lateral abdominal wall possibly representing phlegmon or developing abscess although no organized   abscess is seen. There is a large amount of soft tissue emphysema involving both the right and left buttock extending to the perineum tissue thickening is seen especially on the left involving the left buttock. CT PELVIS FINDINGS:        No distal ureteral calculi or bladder calculi. There is no free fluid in the pelvis. Catheter is seen within the urinary bladder. Osseous changes within the left hemipelvis which may be chronic in nature. IMPRESSION:    Extensive subcutaneous emphysema as described above involving both buttocks extending to the perineum.  The possibility of Ashley gangrene/necrotizing fasciitis cannot be excluded. Possible phlegmon or developing soft tissue abscess overlying the left lateral abdominal wall as noted above.  No organized abscess is seen however. Osseous changes within the left hemipelvis which may be chronic in nature. Results the study were called to Dr. Monroy [facility] [phone] on 11/29/2019  All CT scans at this facility use dose modulation, iterative reconstruction, and/or weight based dosing when appropriate to reduce radiation dose to as low as reasonably achievable. Medical Decision [id number]:     .     Fer Mcclain MD.

## 2020-01-15 LAB
ABSOLUTE EOS #: 0.34 K/UL (ref 0–0.44)
ABSOLUTE IMMATURE GRANULOCYTE: 0.06 K/UL (ref 0–0.3)
ABSOLUTE LYMPH #: 1.67 K/UL (ref 1.1–3.7)
ABSOLUTE MONO #: 1 K/UL (ref 0.1–1.2)
ANION GAP SERPL CALCULATED.3IONS-SCNC: 14 MMOL/L (ref 9–17)
BASOPHILS # BLD: 0 % (ref 0–2)
BASOPHILS ABSOLUTE: 0.04 K/UL (ref 0–0.2)
BUN BLDV-MCNC: 54 MG/DL (ref 6–20)
BUN/CREAT BLD: ABNORMAL (ref 9–20)
CALCIUM SERPL-MCNC: 8.9 MG/DL (ref 8.6–10.4)
CHLORIDE BLD-SCNC: 96 MMOL/L (ref 98–107)
CO2: 20 MMOL/L (ref 20–31)
CREAT SERPL-MCNC: 2.01 MG/DL (ref 0.7–1.2)
DIFFERENTIAL TYPE: ABNORMAL
EOSINOPHILS RELATIVE PERCENT: 4 % (ref 1–4)
GFR AFRICAN AMERICAN: 46 ML/MIN
GFR NON-AFRICAN AMERICAN: 38 ML/MIN
GFR SERPL CREATININE-BSD FRML MDRD: ABNORMAL ML/MIN/{1.73_M2}
GFR SERPL CREATININE-BSD FRML MDRD: ABNORMAL ML/MIN/{1.73_M2}
GLUCOSE BLD-MCNC: 140 MG/DL (ref 75–110)
GLUCOSE BLD-MCNC: 145 MG/DL (ref 70–99)
GLUCOSE BLD-MCNC: 147 MG/DL (ref 75–110)
GLUCOSE BLD-MCNC: 148 MG/DL (ref 75–110)
GLUCOSE BLD-MCNC: 212 MG/DL (ref 75–110)
HCT VFR BLD CALC: 25.2 % (ref 40.7–50.3)
HEMOGLOBIN: 7.5 G/DL (ref 13–17)
IMMATURE GRANULOCYTES: 1 %
LYMPHOCYTES # BLD: 18 % (ref 24–43)
MAGNESIUM: 1.9 MG/DL (ref 1.6–2.6)
MCH RBC QN AUTO: 28.8 PG (ref 25.2–33.5)
MCHC RBC AUTO-ENTMCNC: 29.8 G/DL (ref 28.4–34.8)
MCV RBC AUTO: 96.9 FL (ref 82.6–102.9)
MONOCYTES # BLD: 11 % (ref 3–12)
NRBC AUTOMATED: 0 PER 100 WBC
PDW BLD-RTO: 19.2 % (ref 11.8–14.4)
PLATELET # BLD: 361 K/UL (ref 138–453)
PLATELET ESTIMATE: ABNORMAL
PMV BLD AUTO: 7.8 FL (ref 8.1–13.5)
POTASSIUM SERPL-SCNC: 4.5 MMOL/L (ref 3.7–5.3)
RBC # BLD: 2.6 M/UL (ref 4.21–5.77)
RBC # BLD: ABNORMAL 10*6/UL
SEG NEUTROPHILS: 67 % (ref 36–65)
SEGMENTED NEUTROPHILS ABSOLUTE COUNT: 6.26 K/UL (ref 1.5–8.1)
SODIUM BLD-SCNC: 130 MMOL/L (ref 135–144)
WBC # BLD: 9.4 K/UL (ref 3.5–11.3)
WBC # BLD: ABNORMAL 10*3/UL

## 2020-01-15 PROCEDURE — 6370000000 HC RX 637 (ALT 250 FOR IP): Performed by: STUDENT IN AN ORGANIZED HEALTH CARE EDUCATION/TRAINING PROGRAM

## 2020-01-15 PROCEDURE — 83735 ASSAY OF MAGNESIUM: CPT

## 2020-01-15 PROCEDURE — 36415 COLL VENOUS BLD VENIPUNCTURE: CPT

## 2020-01-15 PROCEDURE — 6360000002 HC RX W HCPCS: Performed by: STUDENT IN AN ORGANIZED HEALTH CARE EDUCATION/TRAINING PROGRAM

## 2020-01-15 PROCEDURE — 85025 COMPLETE CBC W/AUTO DIFF WBC: CPT

## 2020-01-15 PROCEDURE — 97605 NEG PRS WND THER DME<=50SQCM: CPT

## 2020-01-15 PROCEDURE — 99232 SBSQ HOSP IP/OBS MODERATE 35: CPT | Performed by: INTERNAL MEDICINE

## 2020-01-15 PROCEDURE — 82947 ASSAY GLUCOSE BLOOD QUANT: CPT

## 2020-01-15 PROCEDURE — 80048 BASIC METABOLIC PNL TOTAL CA: CPT

## 2020-01-15 PROCEDURE — 2580000003 HC RX 258: Performed by: STUDENT IN AN ORGANIZED HEALTH CARE EDUCATION/TRAINING PROGRAM

## 2020-01-15 PROCEDURE — 76937 US GUIDE VASCULAR ACCESS: CPT

## 2020-01-15 PROCEDURE — 1200000000 HC SEMI PRIVATE

## 2020-01-15 PROCEDURE — 97535 SELF CARE MNGMENT TRAINING: CPT

## 2020-01-15 RX ADMIN — DOCUSATE SODIUM 100 MG: 100 CAPSULE, LIQUID FILLED ORAL at 11:28

## 2020-01-15 RX ADMIN — ACETAMINOPHEN 650 MG: 325 TABLET ORAL at 17:55

## 2020-01-15 RX ADMIN — MULTIPLE VITAMINS W/ MINERALS TAB 1 TABLET: TAB at 11:28

## 2020-01-15 RX ADMIN — DOCUSATE SODIUM 100 MG: 100 CAPSULE, LIQUID FILLED ORAL at 21:21

## 2020-01-15 RX ADMIN — POLYETHYLENE GLYCOL 3350 17 G: 17 POWDER, FOR SOLUTION ORAL at 11:28

## 2020-01-15 RX ADMIN — INSULIN GLARGINE 10 UNITS: 100 INJECTION, SOLUTION SUBCUTANEOUS at 11:29

## 2020-01-15 RX ADMIN — Medication: at 11:40

## 2020-01-15 RX ADMIN — OXYCODONE HYDROCHLORIDE 10 MG: 5 TABLET ORAL at 01:35

## 2020-01-15 RX ADMIN — FAMOTIDINE 20 MG: 20 TABLET, FILM COATED ORAL at 13:02

## 2020-01-15 RX ADMIN — OXYCODONE HYDROCHLORIDE 5 MG: 5 TABLET ORAL at 21:21

## 2020-01-15 RX ADMIN — AMLODIPINE BESYLATE 10 MG: 10 TABLET ORAL at 11:28

## 2020-01-15 RX ADMIN — INSULIN LISPRO 6 UNITS: 100 INJECTION, SOLUTION INTRAVENOUS; SUBCUTANEOUS at 21:28

## 2020-01-15 RX ADMIN — SODIUM CHLORIDE, PRESERVATIVE FREE 10 ML: 5 INJECTION INTRAVENOUS at 21:22

## 2020-01-15 RX ADMIN — HEPARIN SODIUM 5000 UNITS: 5000 INJECTION INTRAVENOUS; SUBCUTANEOUS at 16:29

## 2020-01-15 RX ADMIN — INSULIN LISPRO 3 UNITS: 100 INJECTION, SOLUTION INTRAVENOUS; SUBCUTANEOUS at 16:49

## 2020-01-15 RX ADMIN — SODIUM CHLORIDE, PRESERVATIVE FREE 10 ML: 5 INJECTION INTRAVENOUS at 11:30

## 2020-01-15 RX ADMIN — HEPARIN SODIUM 5000 UNITS: 5000 INJECTION INTRAVENOUS; SUBCUTANEOUS at 21:21

## 2020-01-15 RX ADMIN — INSULIN LISPRO 3 UNITS: 100 INJECTION, SOLUTION INTRAVENOUS; SUBCUTANEOUS at 11:28

## 2020-01-15 RX ADMIN — CARVEDILOL 25 MG: 25 TABLET, FILM COATED ORAL at 11:28

## 2020-01-15 RX ADMIN — OXYCODONE HYDROCHLORIDE 5 MG: 5 TABLET ORAL at 23:10

## 2020-01-15 ASSESSMENT — PAIN DESCRIPTION - LOCATION: LOCATION: ABDOMEN;BUTTOCKS

## 2020-01-15 ASSESSMENT — PAIN SCALES - GENERAL
PAINLEVEL_OUTOF10: 7
PAINLEVEL_OUTOF10: 10
PAINLEVEL_OUTOF10: 7
PAINLEVEL_OUTOF10: 6
PAINLEVEL_OUTOF10: 7
PAINLEVEL_OUTOF10: 10
PAINLEVEL_OUTOF10: 10

## 2020-01-15 ASSESSMENT — PAIN DESCRIPTION - PAIN TYPE
TYPE: ACUTE PAIN;SURGICAL PAIN
TYPE: ACUTE PAIN

## 2020-01-15 ASSESSMENT — PAIN DESCRIPTION - PROGRESSION: CLINICAL_PROGRESSION: NOT CHANGED

## 2020-01-15 NOTE — PROGRESS NOTES
[facility] Wound Ostomy  Nurse  Follow Up Note       NAME:  Dede Aaron RECORD NUMBER:  [id number]  AGE: 37 y.o. GENDER: male  : 1985  TODAY'S DATE:  1/15/2020    Subjective   Reason for [id number] Nurse Evaluation and Assessment:   NPWT dressing change to mid abdominal surgical incision / colostomy care           Objective    /70   Pulse 93   Temp 98.3 °F (36.8 °C) (Oral)   Resp 14   Ht 5' 7\" (1.702 m)   Wt 284 lb 14.4 oz (129.2 kg)   SpO2 100%   BMI 44.62 kg/m²     LABS:  WBC:    Lab Results   Component Value Date    WBC 9.4 01/15/2020     H/H:    Lab Results   Component Value Date    HGB 7.5 01/15/2020    HCT 25.2 01/15/2020     PTT:  No results found for: APTT, PTT[APTT}  PT/INR:    Lab Results   Component Value Date    PROTIME 11.9 2019    INR 1.1 2019     HgBA1c:    Lab Results   Component Value Date    LABA1C 8.0 2019       Assessment   Hawk Risk Score: Hawk Scale Score: 14    Patient Active Problem List   Diagnosis Code    Necrotizing fasciitis ([facility] [address] M72.6    Essential hypertension I10    Diabetes ([facility] [address] E11.9    Diabetic foot ulcer ([facility] [address] E11.621, L97.509    Diabetic foot infection ([facility] [address] E11.628, L08.9    Iron deficiency anemia D50.9    Muscle weakness of right upper extremity M62.81    Wrist drop, acquired, right M21.331       Measurements:     01/15/20 1434   Colostomy LLQ Descending/sigmoid   Placement Date: 19   Location: LLQ  Colostomy Type: Descending/sigmoid   Stomal Appliance 1 piece   Stoma  Assessment Moist;Red;Protrudes   Mucocutaneous Junction Intact   Peristomal Assessment Clean; Intact   Treatment Bag change;Site care; Liquid skin barrier  (Brava protective seal)   Stool Appearance Formed   Stool Color Brown   Stool Amount Small   Incision 19 Abdomen Mid   Date First Assessed/Time First Assessed: 19 1158   Present on Hospital Admission: No  Primary Wound Type: Surgical Type  Location: Abdomen  Wound Location Orientation: Mid Wound Image    Wound Assessment Clean;Granulation tissue;Red;Slough; Yellow   Liliam-wound Assessment Dry; Intact   Wound Length (cm) 17.4 cm   Wound Width (cm) 2.6 cm   Wound Depth (cm) 2.5 cm   Wound Volume (cm^3) 113.1 cm^3   Wound Healing % 69   Drainage Amount Small   Drainage Description Serosanguinous; Sanguinous   Odor None   Dressing/Treatment Vacuum dressing   Dressing Changed Changed/New   Dressing Change Due 01/17/20   Incision 12/10/19 Scrotum   Date First Assessed/Time First Assessed: 12/10/19 1427   Primary Wound Type: (c) Other (comment)  Location: (c) Scrotum   Wound Assessment Edges well approximated   Liliam-wound Assessment Maceration   Closure Sutures   Drainage Description Serous   Dressing/Treatment Alginate with Ag;ABD   Dressing Changed Changed/New   Dressing Status Changed   Dressing Change Due 01/16/20   Incision 12/10/19 Buttocks   Date First Assessed/Time First Assessed: 12/10/19 1429   Present on Hospital Admission: No  Primary Wound Type: (c) Other (comment)  Location: Buttocks   Dressing/Treatment Vacuum dressing   Negative Pressure Wound Therapy Abdomen Mid   Placement Date/Time: 12/20/19 1800   Pre-existing: No  Inserted by: Ashtyn Edgar  Location: Abdomen  Wound Location Orientation: Mid   Wound Type Surgical   Unit Type KCI   Number of pieces used 1   Cycle Continuous; On   Target Pressure (mmHg) Other (Comment)  (150)   Canister changed? No   Dressing Status Changed   Dressing Changed Changed/New   Drainage Description Serosanguinous   Dressing Change Due 01/17/20   Wound Assessment   (see wound LDA)   Negative Pressure Wound Therapy Buttocks Lower;Mid   Placement Date: 01/05/20   Pre-existing: Yes  Inserted by: DR ORTEGA [facility]  Location: Buttocks  Wound Location Orientation: Lower;Mid   Wound Type Surgical   Unit Type KCI Vac Ulta   Cycle Continuous; On   Target Pressure (mmHg) 125   Dressing Status Intact     NPWT dressing changed to abdominal wound following 's guidelines

## 2020-01-15 NOTE — PROGRESS NOTES
9.2. Chest xray demonstrated no acute process. CT abdomen and pelvis without contrast demonstrated extensive subcutaneous emphysema involving both buttocks extending to the perineum. Possible phlegmon or developing soft tissue abscess overlying the left lateral abdominal wall. Scrotal Ultrasound demonstrated no evidence of testicular torsion or mass however did show extensive scrotal wall edema. He was given zosyn 4/5 g IV, rocephin 1 g IV and flagyl 500 mg IV in the emergency department.     He was transferred to [facility] ICU for Necrotizing Fasciitis and Acute Renal Failure.   Patient initially required pressors, but currently is off pressors. Mireille stoddard  [address] y.o. with PMH of spina bifida, hypertension, diabetes and diabetic foot ulcer. Who presented to [facility] Emergency Department with complaint of generalized weakness for the past few days. On examination the emergency department staff noticed the patient had a foul smell which they thought he had a bowel movement. They turned the patient and noticed sloughing of skin on his back and buttocks.       11/29/2019 wide complex debridement of nec fasc involving gluteal/perianal/distal rectum  12/03/2019 open diverting end colostomy   12/06/2019 I&D  12/08/2019 Incision and debridement of necrotizing fasciitis buttock wound, scrotum, bilateral groin region and closure of midline abdominal wound  12/10/2019 Sharp excisional debridement of gluteal, marc-rectal and perineal wound to level of muscle, Sharp excisional debridement of scrotum (performed by Urology), Washout of gluteal/perineal/scrotal/inguinal wound, Partial primary closure of pubic wound, and Dakins Wet to dry dressing application  28/34/2238 sharp excisional debridement of gluteal and perineal wounds to muscle 30 x 40 cm.  Urology exam under anesthesia   12/24/2019 sharp debridement, partial closure of scrotum, placement of wound vac   12/26/2019 s/p sharp debridement of L side catheter.     -Chronic systolic CHF: EF 36%, continue Bumex 2 mg.       DVT ppx : Heparin  GI ppx: Pepcid 20 mg     PT/OT: Consulted  Discharge Planning / SW: Likely today or day after. Would need LTACH and follow up with Surg as OP. Would need wound care as Donal Garcia MD  Internal Medicine Resident, PGY-1  [address], [place]  1/15/2020, 11:14 AM

## 2020-01-15 NOTE — PLAN OF CARE
Problem: Falls - Risk of:  Goal: Will remain free from falls  Description  Will remain free from falls  1/14/2020 1900 by David Aguiar RN  Outcome: Met This Shift  1/14/2020 0649 by Karyn Saleem RN  Outcome: Met This Shift     Problem: Falls - Risk of:  Goal: Will remain free from falls  Description  Will remain free from falls  1/14/2020 1900 by David Aguiar RN  Outcome: Met This Shift     Problem: Falls - Risk of:  Goal: Will remain free from falls  Description  Will remain free from falls  1/14/2020 0649 by Karyn Saleem RN  Outcome: Met This Shift     Problem: Falls - Risk of:  Goal: Absence of physical injury  Description  Absence of physical injury  1/14/2020 1900 by David Aguiar RN  Outcome: Met This Shift  1/14/2020 0649 by Karyn Saleem RN  Outcome: Met This Shift   Patient remained free of falls during shift. Bed in lowest position. Bed breaks locked. 2/4 side rails up. Call light and bed side table within reach. Continue to monitor.

## 2020-01-15 NOTE — PROGRESS NOTES
PROGRESS NOTE    PATIENT NAME: Austin Mcdaniel [address] RECORD NO. [id number]  DATE: 1/15/2020  PRIMARY CARE PHYSICIAN: No primary care provider on file. HD: # 52    ASSESSMENT    Patient Active Problem List   Diagnosis    Necrotizing fasciitis ([address]    Essential hypertension    Diabetes ([address]    Diabetic foot ulcer ([address]    Diabetic foot infection ([facility] [address]    Iron deficiency anemia    Muscle weakness of right upper extremity    Wrist drop, acquired, right     11/29/2019 wide complex debridement of nec fasc involving gluteal/perianal/distal rectum  12/03/2019 open diverting end colostomy   12/06/2019 I&D  12/08/2019 Incision and debridement of necrotizing fasciitis buttock wound, scrotum, bilateral groin region and closure of midline abdominal wound  12/10/2019 Sharp excisional debridement of gluteal, marc-rectal and perineal wound to level of muscle, Sharp excisional debridement of scrotum (performed by Urology), Washout of gluteal/perineal/scrotal/inguinal wound, Partial primary closure of pubic wound, and Dakins Wet to dry dressing application  78/47/8212 sharp excisional debridement of gluteal and perineal wounds to muscle 30 x 40 cm. Urology exam under anesthesia   12/24/2019 sharp debridement, partial closure of scrotum, placement of wound vac   12/26/2019 s/p sharp debridement of L side lateral aspect of sacral wound, replacement of wound vac    1/5/2020 gluteal wound debridement, veraflo wound vac change  1/8/2020 gluteal wound debridement, veraflo wound vac change   1/11/2020 gluteal wound debridement, black sponge vac applied   1/14/2020 bedside wound vac change       MEDICAL DECISION MAKING AND PLAN    1. Wound vac change at bedside on 1/14. Dispo planning to [facility]. Patient will require extensive wound care. 2. Dr. Sana Altamirano, plastic surgery, following from [facility] for grafting and possible flap in future. Patient will need to see Dr Sana Altamirano as an outpatient soon after discharge   3.  General diet  - Protein supplements  4. Bowel regimen: colace, miralax   5. Midline, sacral vac changes-wound RN following   6. Strict glucose control <180       SUBJECTIVE    Claudius Eisenmenger is seen and examined. Afebrile, VSS. Talkative this AM. Denies pain this AM. Wound care assisted with wound vac change at bedside yesterday. +BM from stoma, midline vac and gluteal vac with good seal. Tolerating diet without issues. OBJECTIVE  VITALS: Temp: Temp: 98.4 °F (36.9 °C)Temp  Av °F (36.7 °C)  Min: 97.6 °F (36.4 °C)  Max: 98.4 °F (86.1 °C) BP Systolic (07ZCK), KJC:030 , Min:108 , TMC:036   Diastolic (47NIT), GXI:57, Min:54, Max:75   Pulse Pulse  Av.2  Min: 89  Max: 99 Resp Resp  Av.5  Min: 17  Max: 23 Pulse ox SpO2  Av.8 %  Min: 97 %  Max: 100 %  GENERAL: alert and oriented, no distress  NEURO: CNII-XII grossly intact  HEENT: atraumatic, normocephalic, sclera sclear, nose without deformity, trachea midline   LUNGS: effort normal, no respiratory distress, no accessory muscle use   HEART: normal rate and regular rhythm  ABDOMEN: soft, non-tender, non-distended, no guarding or peritoneal signs, midline wound vac in place with good seal. Ostomy appliance in place with formed stool present. Sacral/perineal wound vac in place with good seal.   EXTREMITY: no cyanosis, clubbing or edema. Bilateral foot wounds. I/O last 3 completed shifts: In: 1225 [P.O.:1466; I.V.:10]  Out: 2650 [Urine:1650; Drains:1000]    Drain/tube output:   In: 966 [P.O.:956; I.V.:10]  Out: 2650 [Urine:1650; Drains:1000]    LAB:  CBC:   Recent Labs     20  0822 20  0532 01/15/20  0631   WBC 9.4 9.4 9.4   HGB 7.4* 8.7* 7.5*   HCT 23.8* 28.4* 25.2*   MCV 92.2 95.6 96.9    426 361     BMP:   Recent Labs     20  1210 20  1442 01/15/20  0631   NA 87* 131* 130*   K 3.1* 4.3 4.5   CL 62* 95* 96*   CO2 13* 21 20   BUN 31* 46* 54*   CREATININE 1.17 1.66* 2.01*   GLUCOSE Specimen Contaminated 224* 145*     COAGS: No results for input(s): APTT, PROT, INR in the last 72 hours. RADIOLOGY:  No new imaging         Braxton Dress, DO   General Surgery Resident        Trauma, Emergency and Critical Surgical Services  Attending Note      I have reviewed the above TECSS note(s) and confirmed the key elements of the medical history and physical exam. I have discussed the findings, established the care plan and recommendations with Resident, TECSS RN, bedside nurse. Tolerated bedside VAC change. Plan per plastics.       Soraya Romero MD  1/15/2020  8:13 AM

## 2020-01-15 NOTE — PLAN OF CARE
Problem: Pain:  Goal: Pain level will decrease  Description  Pain level will decrease  1/15/2020 1717 by Master Galeano RN  Outcome: Ongoing  1/15/2020 0352 by Erich Garza RN  Outcome: Ongoing  1/15/2020 0351 by Erich Garza RN  Outcome: Ongoing  Goal: Control of acute pain  Description  Control of acute pain  1/15/2020 1717 by Master Galeano RN  Outcome: Ongoing  1/15/2020 0352 by Erich Garza RN  Outcome: Ongoing  1/15/2020 0351 by Erich Garza RN  Outcome: Ongoing  Goal: Control of chronic pain  Description  Control of chronic pain  1/15/2020 1717 by Master Galeano RN  Outcome: Ongoing  1/15/2020 0352 by Erich Garza RN  Outcome: Ongoing  1/15/2020 0351 by Erich Garza RN  Outcome: Ongoing     Problem: Skin Integrity:  Goal: Will show no infection signs and symptoms  Description  Will show no infection signs and symptoms  1/15/2020 1717 by Master Galeano RN  Outcome: Ongoing  1/15/2020 0352 by Erich Garza RN  Outcome: Ongoing  1/15/2020 0351 by Erich Garza RN  Outcome: Ongoing  Goal: Absence of new skin breakdown  Description  Absence of new skin breakdown  1/15/2020 1717 by Master Galeano RN  Outcome: Ongoing  1/15/2020 0352 by Erich Garza RN  Outcome: Ongoing  1/15/2020 0351 by Erich Garza RN  Outcome: Ongoing  Goal: Risk for impaired skin integrity will decrease  Description  Risk for impaired skin integrity will decrease  Outcome: Ongoing     Problem: OXYGENATION/RESPIRATORY FUNCTION  Goal: Patient will maintain patent airway  Outcome: Ongoing     Problem: SKIN INTEGRITY  Goal: Skin integrity is maintained or improved  1/15/2020 1717 by Master Galeano RN  Outcome: Ongoing  1/15/2020 0351 by Erich Garza RN  Outcome: Ongoing     Problem: Falls - Risk of:  Goal: Will remain free from falls  Description  Will remain free from falls  1/15/2020 0351 by Erich Garza RN  Outcome: Ongoing     Problem: Risk for Impaired Skin Integrity  Goal: Tissue integrity - skin and mucous membranes  Description  Structural intactness and normal physiological function of skin and  mucous membranes.   1/15/2020 0351 by Sharon Isabel RN  Outcome: Ongoing     Problem: Nutrition  Goal: Optimal nutrition therapy  Description  Nutrition Problem: Inadequate oral intake  Intervention: Food and/or Nutrient Delivery: Start Parenteral Nutrition  Nutritional Goals: Meet % of estimated nutrition needs   1/15/2020 0351 by Sharon Isabel RN  Outcome: Ongoing     Problem: Confusion - Acute:  Goal: Absence of continued neurological deterioration signs and symptoms  Description  Absence of continued neurological deterioration signs and symptoms  1/15/2020 0351 by Sharon Isabel RN  Outcome: Ongoing  Goal: Mental status will be restored to baseline  Description  Mental status will be restored to baseline  1/15/2020 0351 by Sharon Isabel RN  Outcome: Ongoing     Problem: Injury - Risk of, Physical Injury:  Goal: Will remain free from falls  Description  Will remain free from falls  1/15/2020 0351 by Sharon Isabel RN  Outcome: Ongoing

## 2020-01-15 NOTE — PROGRESS NOTES
and drainage (N/A, 1/8/2020); debridement (01/11/2020); and Abdomen surgery (N/A, 1/11/2020). Restrictions  Restrictions/Precautions  Restrictions/Precautions: General Precautions, Fall Risk, Isolation, Contact Precautions  Required Braces or Orthoses?: No  Position Activity Restriction  Other position/activity restrictions: No OOB/ EOB activity d/t extensive buttocks wounds   Subjective   General  Patient assessed for rehabilitation services?: Yes  Family / Caregiver Present: No  Pain Assessment  Pain Assessment: 0-10  Pain Level: 7  Pain Type: Acute pain  Pain Location: Abdomen;Buttocks  Clinical Progression: Not changed  Response to Pain Intervention: Patient Satisfied  Vital Signs  Patient Currently in Pain: Yes   Objective    ADL  Feeding: Modified independent ;Setup; Increased time to complete(Assist to open containers, cut food)  Grooming: Minimal assistance;Setup; Increased time to complete(Assist to put toothpaste onto toothbrush, Declined to wash hair, SBA-wash face)  UE Bathing: Moderate assistance;Setup; Increased time to complete(w/washing BUE and back)  LE Bathing: Maximum assistance;Setup; Increased time to complete(w/BLE pt could reach very tops of thighs only)  UE Dressing: Minimal assistance;Setup(w/gown)  LE Dressing: Dependent/Total(w/footies and bilat foot drop splints)  Toileting: Stand by assistance;Dependent/Total(Colostomy, has urinal at bed side)      Pt in bed upon arrival. Splint check completed along with self care using sx soap (see above for LOF). Pt remained in bed, call light and phone in reach. RN notified.      Balance  Sitting Balance: Stand by assistance(Seated in bed, HOB elevated to 90 degrees unsupported)  Standing Balance: Unable to assess(No EOB or OOB activity d/t extensive wounds on buttocks)      Plan   Plan  Times per week: 2-3x/wk   Current Treatment Recommendations: Endurance Training, Neuromuscular Re-education, Safety Education & Training, Patient/Caregiver Education & Training, Self-Care / ADL, Equipment Evaluation, Education, & procurement, Manual Therapy:  STM    Goals  Short term goals  Time Frame for Short term goals: by discharge, pt will  Short term goal 1: demo mod A for simple grooming/ self- feeding with AD as needed  Short term goal 2: demo understanding and I use of resting hand spling, including wearing schedule and tech to maintain skin integrity   Short term goal 3: demo understanding and I use of self PROM to R digits/ wrist to assist with ADL/ functional activities   Short term goal 4: demo mod A for UE ADL activities with adaptive tech as needed  Long term goals  Long term goal 1: notify OTR to update goals for further ADL/ functional activit and functional transfer goals as pt able      Therapy Time   Individual Concurrent Group Co-treatment   Time In  1320         Time Out  1400         Minutes                   [address]IANNE

## 2020-01-16 VITALS
BODY MASS INDEX: 44.72 KG/M2 | OXYGEN SATURATION: 100 % | SYSTOLIC BLOOD PRESSURE: 128 MMHG | WEIGHT: 284.9 LBS | DIASTOLIC BLOOD PRESSURE: 68 MMHG | HEART RATE: 83 BPM | RESPIRATION RATE: 20 BRPM | HEIGHT: 67 IN | TEMPERATURE: 97.9 F

## 2020-01-16 LAB
ABSOLUTE EOS #: 0.29 K/UL (ref 0–0.44)
ABSOLUTE IMMATURE GRANULOCYTE: 0.08 K/UL (ref 0–0.3)
ABSOLUTE LYMPH #: 1.47 K/UL (ref 1.1–3.7)
ABSOLUTE MONO #: 0.98 K/UL (ref 0.1–1.2)
ANION GAP SERPL CALCULATED.3IONS-SCNC: 15 MMOL/L (ref 9–17)
BASOPHILS # BLD: 0 % (ref 0–2)
BASOPHILS ABSOLUTE: 0.03 K/UL (ref 0–0.2)
BUN BLDV-MCNC: 54 MG/DL (ref 6–20)
BUN/CREAT BLD: ABNORMAL (ref 9–20)
CALCIUM SERPL-MCNC: 9 MG/DL (ref 8.6–10.4)
CHLORIDE BLD-SCNC: 98 MMOL/L (ref 98–107)
CO2: 18 MMOL/L (ref 20–31)
CREAT SERPL-MCNC: 1.73 MG/DL (ref 0.7–1.2)
DIFFERENTIAL TYPE: ABNORMAL
EOSINOPHILS RELATIVE PERCENT: 3 % (ref 1–4)
GFR AFRICAN AMERICAN: 54 ML/MIN
GFR NON-AFRICAN AMERICAN: 45 ML/MIN
GFR SERPL CREATININE-BSD FRML MDRD: ABNORMAL ML/MIN/{1.73_M2}
GFR SERPL CREATININE-BSD FRML MDRD: ABNORMAL ML/MIN/{1.73_M2}
GLUCOSE BLD-MCNC: 124 MG/DL (ref 75–110)
GLUCOSE BLD-MCNC: 125 MG/DL (ref 70–99)
GLUCOSE BLD-MCNC: 212 MG/DL (ref 75–110)
HCT VFR BLD CALC: 24.8 % (ref 40.7–50.3)
HEMOGLOBIN: 7.5 G/DL (ref 13–17)
IMMATURE GRANULOCYTES: 1 %
LYMPHOCYTES # BLD: 16 % (ref 24–43)
MAGNESIUM: 1.8 MG/DL (ref 1.6–2.6)
MCH RBC QN AUTO: 29.2 PG (ref 25.2–33.5)
MCHC RBC AUTO-ENTMCNC: 30.2 G/DL (ref 28.4–34.8)
MCV RBC AUTO: 96.5 FL (ref 82.6–102.9)
MONOCYTES # BLD: 11 % (ref 3–12)
NRBC AUTOMATED: 0.2 PER 100 WBC
PDW BLD-RTO: 19.1 % (ref 11.8–14.4)
PHOSPHORUS: 4.9 MG/DL (ref 2.5–4.5)
PLATELET # BLD: 340 K/UL (ref 138–453)
PLATELET ESTIMATE: ABNORMAL
PMV BLD AUTO: 7.8 FL (ref 8.1–13.5)
POTASSIUM SERPL-SCNC: 4.4 MMOL/L (ref 3.7–5.3)
RBC # BLD: 2.57 M/UL (ref 4.21–5.77)
RBC # BLD: ABNORMAL 10*6/UL
SEG NEUTROPHILS: 69 % (ref 36–65)
SEGMENTED NEUTROPHILS ABSOLUTE COUNT: 6.28 K/UL (ref 1.5–8.1)
SODIUM BLD-SCNC: 131 MMOL/L (ref 135–144)
WBC # BLD: 9.1 K/UL (ref 3.5–11.3)
WBC # BLD: ABNORMAL 10*3/UL

## 2020-01-16 PROCEDURE — 6370000000 HC RX 637 (ALT 250 FOR IP): Performed by: STUDENT IN AN ORGANIZED HEALTH CARE EDUCATION/TRAINING PROGRAM

## 2020-01-16 PROCEDURE — 85025 COMPLETE CBC W/AUTO DIFF WBC: CPT

## 2020-01-16 PROCEDURE — 80048 BASIC METABOLIC PNL TOTAL CA: CPT

## 2020-01-16 PROCEDURE — 82947 ASSAY GLUCOSE BLOOD QUANT: CPT

## 2020-01-16 PROCEDURE — 36415 COLL VENOUS BLD VENIPUNCTURE: CPT

## 2020-01-16 PROCEDURE — 83735 ASSAY OF MAGNESIUM: CPT

## 2020-01-16 PROCEDURE — 84100 ASSAY OF PHOSPHORUS: CPT

## 2020-01-16 PROCEDURE — 97110 THERAPEUTIC EXERCISES: CPT

## 2020-01-16 PROCEDURE — 2580000003 HC RX 258: Performed by: STUDENT IN AN ORGANIZED HEALTH CARE EDUCATION/TRAINING PROGRAM

## 2020-01-16 RX ORDER — OXYCODONE HYDROCHLORIDE 5 MG/1
5 TABLET ORAL EVERY 6 HOURS PRN
Qty: 10 TABLET | Refills: 0 | Status: SHIPPED | OUTPATIENT
Start: 2020-01-16 | End: 2020-01-19

## 2020-01-16 RX ORDER — BUMETANIDE 2 MG/1
2 TABLET ORAL DAILY
Qty: 30 TABLET | Refills: 3 | Status: SHIPPED | OUTPATIENT
Start: 2020-01-16 | End: 2020-09-02

## 2020-01-16 RX ORDER — INSULIN GLARGINE 100 [IU]/ML
10 INJECTION, SOLUTION SUBCUTANEOUS DAILY
Qty: 1 VIAL | Refills: 3 | Status: ON HOLD | OUTPATIENT
Start: 2020-01-16 | End: 2021-04-02 | Stop reason: HOSPADM

## 2020-01-16 RX ORDER — LOSARTAN POTASSIUM 50 MG/1
50 TABLET ORAL DAILY
Qty: 30 TABLET | Refills: 3 | Status: SHIPPED | OUTPATIENT
Start: 2020-01-16 | End: 2020-09-02

## 2020-01-16 RX ORDER — UREA 10 %
1 LOTION (ML) TOPICAL NIGHTLY PRN
Qty: 30 TABLET | Refills: 3 | Status: SHIPPED | OUTPATIENT
Start: 2020-01-16 | End: 2020-09-02

## 2020-01-16 RX ORDER — CARVEDILOL 25 MG/1
12.5 TABLET ORAL 2 TIMES DAILY
Qty: 60 TABLET | Refills: 3 | Status: SHIPPED | OUTPATIENT
Start: 2020-01-16 | End: 2020-09-02

## 2020-01-16 RX ORDER — AMLODIPINE BESYLATE 10 MG/1
10 TABLET ORAL DAILY
Qty: 30 TABLET | Refills: 3 | Status: SHIPPED | OUTPATIENT
Start: 2020-01-16 | End: 2020-09-02

## 2020-01-16 RX ORDER — M-VIT,TX,IRON,MINS/CALC/FOLIC 27MG-0.4MG
1 TABLET ORAL DAILY
Qty: 30 TABLET | Refills: 3 | Status: SHIPPED | OUTPATIENT
Start: 2020-01-16

## 2020-01-16 RX ORDER — POLYETHYLENE GLYCOL 3350 17 G/17G
17 POWDER, FOR SOLUTION ORAL DAILY
Qty: 527 G | Refills: 1 | Status: SHIPPED | OUTPATIENT
Start: 2020-01-16 | End: 2020-02-15

## 2020-01-16 RX ORDER — SENNA PLUS 8.6 MG/1
1 TABLET ORAL NIGHTLY
Qty: 30 TABLET | Refills: 0 | Status: SHIPPED | OUTPATIENT
Start: 2020-01-16 | End: 2020-02-15

## 2020-01-16 RX ADMIN — OXYCODONE HYDROCHLORIDE 10 MG: 5 TABLET ORAL at 06:42

## 2020-01-16 RX ADMIN — INSULIN LISPRO 6 UNITS: 100 INJECTION, SOLUTION INTRAVENOUS; SUBCUTANEOUS at 12:32

## 2020-01-16 RX ADMIN — Medication: at 09:26

## 2020-01-16 RX ADMIN — AMLODIPINE BESYLATE 10 MG: 10 TABLET ORAL at 09:23

## 2020-01-16 RX ADMIN — FAMOTIDINE 20 MG: 20 TABLET, FILM COATED ORAL at 09:24

## 2020-01-16 RX ADMIN — BUMETANIDE 2 MG: 1 TABLET ORAL at 09:34

## 2020-01-16 RX ADMIN — LOSARTAN POTASSIUM 50 MG: 50 TABLET, FILM COATED ORAL at 09:34

## 2020-01-16 RX ADMIN — SODIUM CHLORIDE, PRESERVATIVE FREE 10 ML: 5 INJECTION INTRAVENOUS at 09:27

## 2020-01-16 RX ADMIN — CARVEDILOL 25 MG: 25 TABLET, FILM COATED ORAL at 09:24

## 2020-01-16 RX ADMIN — MULTIPLE VITAMINS W/ MINERALS TAB 1 TABLET: TAB at 09:26

## 2020-01-16 RX ADMIN — INSULIN GLARGINE 10 UNITS: 100 INJECTION, SOLUTION SUBCUTANEOUS at 09:34

## 2020-01-16 ASSESSMENT — PAIN SCALES - WONG BAKER: WONGBAKER_NUMERICALRESPONSE: 0

## 2020-01-16 ASSESSMENT — PAIN SCALES - GENERAL
PAINLEVEL_OUTOF10: 0
PAINLEVEL_OUTOF10: 10
PAINLEVEL_OUTOF10: 0
PAINLEVEL_OUTOF10: 0

## 2020-01-16 ASSESSMENT — PAIN DESCRIPTION - PROGRESSION: CLINICAL_PROGRESSION: NOT CHANGED

## 2020-01-16 NOTE — PROGRESS NOTES
The sacral wound was evaluated at bedside during bedside vac change on 1/14/2020. The wound continues to heal and form new granulation tissue. The vac was successfully changed at bedside. Plan for bedside vac changes with wound care team moving forward. Patient will need wound care and continued vac changes at [facility]. Once discharged, the patient should follow up as soon as possible with Dr. Anthony Tucker, plastic surgery, for evaluation for possible reconstruction of the area. Dr. Anthony Tucker follow up info placed in discharge. Surgery will sign off, but be available for any questions or concerns.      Greg Moon, DO  General Surgery Resident

## 2020-01-16 NOTE — PROGRESS NOTES
Occupational Therapy  Facility/Department: [facility] CAR 3  Daily Treatment Note  NAME: Sandi Morrison  : 1985  MRN: [id number]    Date of Service: 2020    Discharge Recommendations:    Further therapy recommended at discharge. OT Equipment Recommendations  Other: CTA pending progress     Assessment   Performance deficits / Impairments: Decreased functional mobility ; Decreased ADL status; Decreased endurance;Decreased fine motor control;Decreased coordination;Decreased strength;Decreased high-level IADLs  Assessment: Pt would benefit from continued acute care and post acute care OT to address maximal deficits in ADL/ functional activities, endurance and functional transfers/ mobility folliowing admission. Pt limtied on this date d/t extensive wounds. Resting hand splint placed to maintain joints/ wrist extension following noted wrist drop. Pt again educated in self ROM and wearing schedule for splint  Prognosis: Fair  Decision Making: High Complexity  OT Education: OT Role;Plan of Care  Patient Education: Importance of wearing splint, maintaining skin integrity, puspose of splint, self- ROM- fair return   REQUIRES OT FOLLOW UP: Yes  Activity Tolerance  Activity Tolerance: Treatment limited secondary to medical complications (free text)  Activity Tolerance: extensive wounds on buttocks  Safety Devices  Safety Devices in place: Yes  Type of devices: Call light within reach; Left in bed;Nurse notified  Restraints  Initially in place: No         Patient Diagnosis(es): The primary encounter diagnosis was Necrotizing fasciitis ([facility] [address] A diagnosis of Stage 3 chronic kidney disease (HCC) was also pertinent to this visit. has a past medical history of CHF (congestive heart failure) ([address] Diabetes mellitus ([address] Hypertension, Septic shock ([address] and Spina bifida aperta of lumbar spine ([facility] [address] has a past surgical history that includes incision and drainage (Bilateral, 2019); colostomy (N/A, 12/3/2019);  Abdomen surgery (N/A, 12/8/2019); incision and drainage (N/A, 12/6/2019); Abdomen surgery (N/A, 12/10/2019); pr explore scrotum (N/A, 12/10/2019); incision and drainage (N/A, 12/20/2019); Wound Exploration (N/A, 12/23/2019); incision and drainage (N/A, 12/26/2019); Abscess Drainage (N/A, 1/1/2020); debridement (01/05/2020); Abdomen surgery (N/A, 1/5/2020); debridement (01/08/2020); incision and drainage (N/A, 1/8/2020); debridement (01/11/2020); and Abdomen surgery (N/A, 1/11/2020). Restrictions  Restrictions/Precautions  Restrictions/Precautions: General Precautions, Fall Risk, Isolation, Contact Precautions  Required Braces or Orthoses?: No  Position Activity Restriction  Other position/activity restrictions: No OOB/ EOB activity d/t extensive buttocks wounds      Subjective   General  Patient assessed for rehabilitation services?: Yes  Family / Caregiver Present: No  Pain Assessment  Pain Assessment: 0-10(Pt reports intermittent pain in R wrist)  Vital Signs  Patient Currently in Pain: Yes     Orientation  Orientation  Overall Orientation Status: Within Functional Limits     Objective    ADL  UE Bathing: Moderate assistance;Setup; Increased time to complete(to wash UE and don/doff sleeve)  Additional Comments: Pt supine in bed on arrival. Pt assisted to complete washing of RUE. Pt reports RUE wrist pain with brace on and off, with and without ROM. PROM x10 repsPt demo increased tone, minimal tightness. Pt educated in splint wearing schedule and importance of self ROM as able, fair return. Mother present to recieve education, Per RN, pt to follow up outpatient  for R wrist drop.       Balance  Sitting Balance: Stand by assistance  Standing Balance: Unable to assess(comment)(No EOB or OOB activity d/t extensive buttocks wounds)  Functional Mobility  Functional Mobility Comments: JESSI d/t need to mniimize shear on buttocks wounds         Transfers  Transfer Comments: JESSI d/t extent of wounds      Cognition  Overall Cognitive Status: Exceptions  Arousal/Alertness: Delayed responses to stimuli  Cognition Comment: Pt required increased time and effort to respond to commands , flat affect, lethargic         Perception  Overall Perceptual Status: WFL      Right Hand PROM (degrees)  Right Hand PROM: WFL  Right Hand General PROM: Pt unable to complete AROM of R hand. Pt assisted to complete PROM to flexion/ extension to wrist, composite fist/ extension to hand. Sensation intact. Placed resting hand splint with stockinette to protect skin integrity. Increased tone noted. Pt educated in importance of self ROM and proper wearing schedule (2 hours on, 2 hours off)    Plan   Plan  Times per week: 2-3x/wk   Current Treatment Recommendations: Endurance Training, Neuromuscular Re-education, Safety Education & Training, Patient/Caregiver Education & Training, Self-Care / ADL, Equipment Evaluation, Education, & procurement, Manual Therapy:  STM    Goals  Short term goals  Time Frame for Short term goals: by discharge, pt will  Short term goal 1: demo mod A for simple grooming/ self- feeding with AD as needed  Short term goal 2: demo understanding and I use of resting hand spling, including wearing schedule and tech to maintain skin integrity   Short term goal 3: demo understanding and I use of self PROM to R digits/ wrist to assist with ADL/ functional activities   Short term goal 4: demo mod A for UE ADL activities with adaptive tech as needed  Long term goals  Long term goal 1: notify OTR to update goals for further ADL/ functional activit and functional transfer goals as pt able        Therapy Time   Individual Concurrent Group Co-treatment   Time In 1119         Time Out 1129         Minutes 10          See above for LOF. RN reports patient is medically stable for therapy treatment this date. Chart reviewed prior to treatment and patient is agreeable for therapy. All lines intact and patient positioned comfortably at end of treatment.

## 2020-01-16 NOTE — PROGRESS NOTES
[id number], report called to Barbara Wooten. All belongings sent with pt and mother.

## 2020-01-16 NOTE — PROGRESS NOTES
[facility]  Internal Medicine Teaching Residency Program  Inpatient Daily Progress Note  ______________________________________________________________________________    Patient: Chuy Rivers  YOB: 1985   ORZ:[id number]    Acct: [de-identified]     Room: [address] [place] [address]  Admit date: 11/29/2019  Today's date: 01/16/20  Number of days in the hospital: 50    SUBJECTIVE   Admitting Diagnosis: Necrotizing fasciitis ([address]    No acute issues overnight. Patient is hemodynamically stable, afebrile with /69. Patient could not be discharged yesterday as his creatinine increased to 2.01. He got an extra dose of Bumex day before yesterday, we held his bumex and started him on NS infusion. Today his creatinine down trended to 1.73 which is his baseline. Talked with the  regarding discharging the patient to [facility] facility with extensive wound care for further management. Patient is tolerating general diet with protein supplements. ROS:  Constitutional:  negative for chills, fevers, sweats  Respiratory:  negative for cough, dyspnea on exertion, hemoptysis, shortness of breath, wheezing  Cardiovascular:  negative for chest pain, chest pressure/discomfort, lower extremity edema, palpitations  Gastrointestinal:  negative for abdominal pain, constipation, diarrhea, nausea, vomiting  Neurological:  negative for dizziness, headache  BRIEF HISTORY      [address] Emergency Department with complaint of generalized weakness for the past few days. On examination the emergency department staff noticed the patient had a foul smell which they thought he had a bowel movement. They turned the patient and noticed sloughing of skin on his back and buttocks.      In the emergency department patient was afebrile but tachycardic.  Initial labs demonstrated hyponatremia of 130, bicarb 12, BUN 63 and creatinine 2.82, hypocalcemia 7.7, alkaline phosphatase 144, AST 83, ALT 16, lactate 1.4, leukocytosis of 30.4, hemoglobin 9.2. Chest xray demonstrated no acute process. CT abdomen and pelvis without contrast demonstrated extensive subcutaneous emphysema involving both buttocks extending to the perineum. Possible phlegmon or developing soft tissue abscess overlying the left lateral abdominal wall. Scrotal Ultrasound demonstrated no evidence of testicular torsion or mass however did show extensive scrotal wall edema. He was given zosyn 4/5 g IV, rocephin 1 g IV and flagyl 500 mg IV in the emergency department.     He was transferred to [facility] ICU for Necrotizing Fasciitis and Acute Renal Failure.   Patient initially required pressors, but currently is off pressors. Curtis Rao a 37 y.o. with PMH of spina bifida, hypertension, diabetes and diabetic foot ulcer. Who presented to [facility] Emergency Department with complaint of generalized weakness for the past few days. On examination the emergency department staff noticed the patient had a foul smell which they thought he had a bowel movement. They turned the patient and noticed sloughing of skin on his back and buttocks.       11/29/2019 wide complex debridement of nec fasc involving gluteal/perianal/distal rectum  12/03/2019 open diverting end colostomy   12/06/2019 I&D  12/08/2019 Incision and debridement of necrotizing fasciitis buttock wound, scrotum, bilateral groin region and closure of midline abdominal wound  12/10/2019 Sharp excisional debridement of gluteal, marc-rectal and perineal wound to level of muscle, Sharp excisional debridement of scrotum (performed by Urology), Washout of gluteal/perineal/scrotal/inguinal wound, Partial primary closure of pubic wound, and Dakins Wet to dry dressing application  87/08/3062 sharp excisional debridement of gluteal and perineal wounds to muscle 30 x 40 cm.  Urology exam under anesthesia   12/24/2019 sharp debridement, partial closure of scrotum, placement of wound vac Subcutaneous 4x Daily AC & HS    losartan  50 mg Oral Daily    amLODIPine  10 mg Oral Daily    oxybutynin  1 patch Transdermal Once per day on Mon Thu    docusate sodium  100 mg Oral BID    senna  1 tablet Oral Nightly    therapeutic multivitamin-minerals  1 tablet Oral Daily    carvedilol  25 mg Per NG tube Daily    insulin glargine  10 Units Subcutaneous Daily    bumetanide  2 mg Oral Daily    darbepoetin rohan-polysorbate  100 mcg Intravenous Weekly    polyethylene glycol  17 g Oral Daily    alteplase  1 mg Intracatheter Once    povidone-iodine   Topical Daily    famotidine  20 mg Per NG tube Daily    sodium chloride flush  10 mL Intravenous 2 times per day    heparin (porcine)  5,000 Units Subcutaneous 3 times per day     Continuous Infusions:    dextrose      sodium chloride      dextrose       PRN MedicationsoxyCODONE, 5 mg, Q4H PRN    Or  oxyCODONE, 10 mg, Q4H PRN  hydrALAZINE, 10 mg, Q4H PRN  magnesium sulfate, 1 g, PRN  glucose, 15 g, PRN  dextrose, 12.5 g, PRN  glucagon (rDNA), 1 mg, PRN  dextrose, 100 mL/hr, PRN  sodium phosphate IVPB, 0.16 mmol/kg, PRN    Or  sodium phosphate IVPB, 0.32 mmol/kg, PRN  heparin (porcine), 1,900 Units, PRN  heparin (porcine), 1,900 Units, PRN  melatonin, 1 mg, Nightly PRN  acetaminophen, 650 mg, Q4H PRN  labetalol, 20 mg, Q6H PRN  heparin (porcine), 500 Units, PRN  heparin (porcine), 1,750 Units, PRN  glucose, 15 g, PRN  dextrose, 12.5 g, PRN  glucagon (rDNA), 1 mg, PRN  dextrose, 100 mL/hr, PRN  albumin human, 25 g, PRN  LORazepam, 1 mg, Q6H PRN  albumin human, 25 g, PRN  sodium chloride flush, 10 mL, PRN  potassium chloride, 40 mEq, PRN    Or  potassium alternative oral replacement, 40 mEq, PRN    Or  potassium chloride, 10 mEq, PRN  REFRESH LACRI-LUBE, , PRN  magnesium hydroxide, 30 mL, Daily PRN  ondansetron, 4 mg, Q6H PRN        Diagnostic Labs:  CBC:   Recent Labs     01/14/20  0532 01/15/20  0631 01/16/20  0647   WBC 9.4 9.4 9.1   RBC 2.97* 2.60*

## 2020-01-17 NOTE — DISCHARGE SUMMARY
[address]     Department of Internal Medicine - Staff Internal Medicine Teaching Service    INPATIENT DISCHARGE SUMMARY      Patient Identification:  Navdeep Alexander is a 37 y.o. male. :  1985  MRN: [id number]     Acct: [de-identified]   PCP: No primary care provider on file. Admit Date:  2019  Discharge date and time: 2020  3:10 PM   Attending Provider: No att. providers found                                     [address] [facility] [address] Problem Lists:  Principal Problem:    Necrotizing fasciitis ([address]  Active Problems:    Essential hypertension    Diabetes ([address]    Diabetic foot ulcer ([address]    Diabetic foot infection ([address]    Iron deficiency anemia    Muscle weakness of right upper extremity    Wrist drop, acquired, right  Resolved Problems:    Septic shock ([address]    Bandemia    Acute respiratory failure with hypoxemia [facility])      HOSPITAL STAY     Brief Inpatient course:   Navdeep Alexander is a 37 y.o. male with PMH of spina bifida, hypertension, diabetes and diabetic foot ulcer who was admitted for the management of Necrotizing fasciitis ([address] presented to the emergency department with generalized weakness for the past few days. On examination patient was found to have sloughing of his skin on his back and buttocks. In the ER patient was afebrile but tachycardic initial labs demonstrated hyponatremia, bicarb 12, BUN 63 and creatinine 2.82, , leukocytosis of 30.4 and Hb 9.2. CT abdomen and pelvis without contrast demonstrated extensive subcutaneous emphysema involving both buttocks extending to the perineum. Possible phlegmon or developing soft tissue abscess overlying the left lateral abdominal wall. Scrotal Ultrasound demonstrated no evidence of testicular torsion or mass however did show extensive scrotal wall edema. He was given zosyn 4/5 g IV, rocephin 1 g IV and flagyl 500 mg IV in the emergency department.     [facility] for necrotizing fasciitis and acute renal failure. Patient received hemodialysis and his last dialysis was on 12/27/2019. Over the course of ICU patient stabilized his renal function improved and he was hemodynamically stable to be transferred to the medicine unit. Series of procedures done during the patient's hospital stay:  11/29/2019 wide complex debridement of nec fasc involving gluteal/perianal/distal rectum  12/03/2019 open diverting end colostomy   12/06/2019 I&D  12/08/2019 Incision and debridement of necrotizing fasciitis buttock wound, scrotum, bilateral groin region and closure of midline abdominal wound  12/10/2019 Sharp excisional debridement of gluteal, marc-rectal and perineal wound to level of muscle, Sharp excisional debridement of scrotum (performed by Urology), Washout of gluteal/perineal/scrotal/inguinal wound, Partial primary closure of pubic wound, and Dakins Wet to dry dressing application  12/94/1096 sharp excisional debridement of gluteal and perineal wounds to muscle 30 x 40 cm. Urology exam under anesthesia   12/24/2019 sharp debridement, partial closure of scrotum, placement of wound vac   12/26/2019 s/p sharp debridement of L side lateral aspect of sacral wound, replacement of wound vac    1/1/2019: Vera flow irrigation VAC to gluteal wound with debridement.  Started VAC to perineal wound on midline laparotomy wound  1/3/2020:Patient is off the Flexiflo and is tolerating p.o. diet.  Encouraged to use Ensure in between meals to maintain the caloric requirement.  Tunneled catheter removed by IR as per nephrology recommendations as the patient does not need dialysis. 1/4/2020: Placed on renal diet.  Neurology will place right wrist splint  1/5/2020: Sacral wound VAC changed today by  surgery.   1/8/2020: Sacral wound VAC change and debridement by general surgery  1/11/2020: Gluteal wound debridement and wound VAC change  1/14/2020: Wound VAC changed at

## 2020-04-28 RX ORDER — MAGNESIUM OXIDE 400 MG/1
400 TABLET ORAL DAILY
COMMUNITY

## 2020-04-28 RX ORDER — ATORVASTATIN CALCIUM 80 MG/1
100 TABLET, FILM COATED ORAL DAILY
Status: ON HOLD | COMMUNITY
Start: 2019-07-23 | End: 2021-04-02 | Stop reason: HOSPADM

## 2020-04-28 RX ORDER — FERROUS SULFATE 325(65) MG
325 TABLET ORAL
COMMUNITY

## 2020-04-28 RX ORDER — POLYETHYLENE GLYCOL 3350 17 G/17G
17 POWDER, FOR SOLUTION ORAL DAILY PRN
COMMUNITY

## 2020-04-28 RX ORDER — INSULIN DETEMIR 100 [IU]/ML
INJECTION, SOLUTION SUBCUTANEOUS
Status: ON HOLD | COMMUNITY
End: 2021-04-02 | Stop reason: HOSPADM

## 2020-04-28 RX ORDER — SENNA PLUS 8.6 MG/1
1 TABLET ORAL NIGHTLY
COMMUNITY

## 2020-04-28 RX ORDER — OXYCODONE HYDROCHLORIDE 5 MG/1
10 TABLET ORAL EVERY 4 HOURS PRN
Status: ON HOLD | COMMUNITY
End: 2021-04-02 | Stop reason: SDUPTHER

## 2020-04-28 SDOH — HEALTH STABILITY: MENTAL HEALTH: HOW OFTEN DO YOU HAVE A DRINK CONTAINING ALCOHOL?: NEVER

## 2020-04-28 NOTE — PROGRESS NOTES
[facility]            [address]          [place], [address] [facility]          Dept: [phone]          Dept Fax: [phone]        MD Antonietta Shah MD Ahmed B. Sydnee Cleaver, MD Nathaneil Pleva, MD Michele Rainwater, MD Lyndy Sports, [facility] VISIT        4/28/2020    HISTORY OF PRESENT ILLNESS:       I had the pleasure of seeing Melissa Ladd for evaluation of right arm weakness. Patient is a 57-year-old man who was recently hospitalized at [address] [place] [address] with fasciitis. During that extensive hospitalization, neurology was consulted for right upper extremity weakness. The patient is nonambulatory and has been all of his life due to what I would suspect is related to his spina bifida. MRI of the cervical spine was completed which showed some moderate areas of canal and foraminal stenosis and some soft tissue edema. An MRI of the brachial plexus was audibly done at that time. The patient is now living in a skilled nursing facility primarily for wound care and antibiotics. He continues to be nonambulatory. He continues to have weakness of his right arm. He has strength with flexion and extension of his elbow, but his hand has minimal movement in dorsiflexion, flexion,  strength or interossei function. He is using his left hand to feed himself. He is attending physical and Occupational Therapy. There has been minimal progress in the extremities strength since his discharge from the hospital in January.       Prior testing reviewed:    -MRI cervical spine 1/3/20     FINDINGS:   BONES/ALIGNMENT: Detail is limited due to artifact.  There is straightening   of the normal lordotic curvature.       SPINAL CORD: Cord signal evaluation is markedly limited due to artifact.       SOFT TISSUES: There is a large amount of soft tissue edema in the posterior   soft tissues.  This extends from the C1-2 level into the thoracic region. This is greatest in the lower cervical and upper thoracic region.       C2-C3: Bilateral facet hypertrophic changes are noted       C3-C4: Right paracentral and proximal foraminal disc protrusion is suggested.    There is probable proximal foraminal narrowing.  Endplate and facet   hypertrophic changes are noted.  There is suggested mild narrowing of the   canal.       C4-C5: Small broad-based right paracentral protrusion is suggested.  This   extends to the right proximal foraminal region. Martha Holster is also mild left   proximal foraminal disc bulging.  Endplate and facet hypertrophic changes are   noted.  Foraminal detail is limited.  There is suggested bilateral foraminal   narrowing.  There is also a suggested moderate to severe narrowing of the   canal.       C5-C6: Facet hypertrophic changes are noted.       C6-C7: Facet hypertrophic changes are noted       C7-T1: Facet hypertrophic changes are noted             PAST MEDICAL HISTORY:         Diagnosis Date    [facility]estive [facility] failure) [facility]     Diabetes mellitus ([facility]     Hypertension     Septic shock ([facility]     Spina bifida aperta of lumbar spine ([facility]         PAST SURGICAL HISTORY:         Procedure Laterality Date    ABDOMEN SURGERY N/A 12/8/2019    DEBRIDEMENT  NECROTIZING FASCIITIS BUTTOCK, WOUND VAC REMOVAL DELAYED PRIMARY CLOSURE OF MIDLINE ABDOMINAL INCISION, OSTOMY BAG CHANGE performed by Won Turner MD at [facility],[address] [place] [address] N/A 12/10/2019    DEBRIDEMENT OF SACRAL WOUND performed by Kip Culver MD at [facility],[address] [place] [address] N/A 1/5/2020    GLUTEAL WOUND DEBRIDEMENT, WOUND VAC CHANGE performed by Kip Culver MD at [facility],[address] [place] [address] N/A 1/11/2020    SACRAL DEBRIDEMENT WITH WOUND VAC CHANGE performed by Sagrario Alcaraz MD at [facility]. 1/1/2020    IRRIGATION AND DEBRIDEMENT BUTTOCKS, SCROTUM, ABDOMEN, site at Patient Location: home and Provider Location of [place], [place]. Verbal consent to participate in video visit was obtained. Pursuant to the emergency declaration under the [facility], Critical access hospital waiver authority and the [employer] Act, this Virtual Visit was conducted, with patient's consent, to reduce the patient's risk of exposure to COVID-19 and provide continuity of care for an established/new patient. Services were provided through a video synchronous discussion virtually to substitute for in-person clinic visit. I discussed with the patient the nature of our telehealth visits via interactive/real-time audio/video that:  - I would evaluate the patient and recommend diagnostics and treatments based on my assessment  - Our sessions are not being recorded and that personal health information is protected  - Our team would provide follow up care in person if/when the patient needs it. Thank you very much for the kind referral of Hansa Smith. I look forward to working with you in the neurological care of your patient. 1. Right upper extremity weakness involving C6, C7, C8, in addition to the radial, median, and ulnar nerve distribution. There is been minimal improvement since his discharge from the hospital in January 2020. Differentials include a plexopathy or compressive neuropathy  1. Obtain MRI of the brachial plexus on the right with and without contrast   2. Obtain EMG/NCV of the right upper extremity  3. Patient to continue with physical and Occupational Therapy  4. Patient to return for reevaluation in 3 months.   He is currently in a skilled nursing facility for wound care may not be able to go out of his facility due to the coronavirus pandemic to obtain additional testing at this time    Signed: Sami Muhammad CNP  Please note that this chart was generated using voice recognition Dragon dictation

## 2020-04-30 ENCOUNTER — TELEMEDICINE (OUTPATIENT)
Dept: NEUROLOGY | Age: 35
End: 2020-04-30
Payer: MEDICARE

## 2020-04-30 PROBLEM — G82.20 CHRONIC PARAPLEGIA (HCC): Status: ACTIVE | Noted: 2020-04-30

## 2020-04-30 PROCEDURE — G8427 DOCREV CUR MEDS BY ELIG CLIN: HCPCS | Performed by: NURSE PRACTITIONER

## 2020-04-30 PROCEDURE — 99214 OFFICE O/P EST MOD 30 MIN: CPT | Performed by: NURSE PRACTITIONER

## 2020-09-02 ENCOUNTER — OFFICE VISIT (OUTPATIENT)
Dept: NEUROLOGY | Age: 35
End: 2020-09-02
Payer: MEDICARE

## 2020-09-02 VITALS
DIASTOLIC BLOOD PRESSURE: 93 MMHG | HEART RATE: 86 BPM | HEIGHT: 69 IN | TEMPERATURE: 98.4 F | SYSTOLIC BLOOD PRESSURE: 153 MMHG | BODY MASS INDEX: 32.58 KG/M2 | WEIGHT: 220 LBS

## 2020-09-02 PROCEDURE — 1036F TOBACCO NON-USER: CPT | Performed by: NURSE PRACTITIONER

## 2020-09-02 PROCEDURE — G8417 CALC BMI ABV UP PARAM F/U: HCPCS | Performed by: NURSE PRACTITIONER

## 2020-09-02 PROCEDURE — G8427 DOCREV CUR MEDS BY ELIG CLIN: HCPCS | Performed by: NURSE PRACTITIONER

## 2020-09-02 PROCEDURE — 99214 OFFICE O/P EST MOD 30 MIN: CPT | Performed by: NURSE PRACTITIONER

## 2020-09-02 RX ORDER — BACLOFEN 5 MG/1
5 TABLET ORAL 2 TIMES DAILY
Qty: 60 TABLET | Refills: 5 | Status: SHIPPED | OUTPATIENT
Start: 2020-09-02

## 2020-09-02 RX ORDER — CHLORHEXIDINE GLUCONATE 0.12 MG/ML
15 RINSE ORAL 2 TIMES DAILY
COMMUNITY

## 2020-09-02 RX ORDER — SODIUM BICARBONATE 650 MG/1
650 TABLET ORAL 4 TIMES DAILY
Status: ON HOLD | COMMUNITY
End: 2021-04-02 | Stop reason: HOSPADM

## 2020-09-02 RX ORDER — SEVELAMER CARBONATE 800 MG/1
2 TABLET, FILM COATED ORAL
COMMUNITY

## 2020-09-02 RX ORDER — OXYBUTYNIN CHLORIDE 5 MG/1
TABLET ORAL
Status: ON HOLD | COMMUNITY
Start: 2020-08-08 | End: 2021-04-02 | Stop reason: HOSPADM

## 2020-09-02 RX ORDER — ACETAMINOPHEN 325 MG/1
650 TABLET ORAL EVERY 6 HOURS PRN
COMMUNITY

## 2020-09-02 NOTE — PROGRESS NOTES
1/3/20      FINDINGS:   BONES/ALIGNMENT: Detail is limited due to artifact.  There is straightening   of the normal lordotic curvature.       SPINAL CORD: Cord signal evaluation is markedly limited due to artifact.       SOFT TISSUES: There is a large amount of soft tissue edema in the posterior   soft tissues.  This extends from the C1-2 level into the thoracic region. This is greatest in the lower cervical and upper thoracic region.       C2-C3: Bilateral facet hypertrophic changes are noted       C3-C4: Right paracentral and proximal foraminal disc protrusion is suggested.    There is probable proximal foraminal narrowing.  Endplate and facet   hypertrophic changes are noted.  There is suggested mild narrowing of the   canal.       C4-C5: Small broad-based right paracentral protrusion is suggested.  This   extends to the right proximal foraminal region. Celia Kail is also mild left   proximal foraminal disc bulging.  Endplate and facet hypertrophic changes are   noted.  Foraminal detail is limited.  There is suggested bilateral foraminal   narrowing.  There is also a suggested moderate to severe narrowing of the   canal.       C5-C6: Facet hypertrophic changes are noted.       C6-C7: Facet hypertrophic changes are noted       C7-T1: Facet hypertrophic changes are noted            PAST MEDICAL HISTORY:         Diagnosis Date    CHF (congestive heart failure) ([address]     Diabetes mellitus ([address]     Hypertension     Septic shock ([address]     Spina bifida aperta of lumbar spine ([address]         PAST SURGICAL HISTORY:         Procedure Laterality Date    ABDOMEN SURGERY N/A 12/8/2019    DEBRIDEMENT  NECROTIZING FASCIITIS BUTTOCK, WOUND VAC REMOVAL DELAYED PRIMARY CLOSURE OF MIDLINE ABDOMINAL INCISION, OSTOMY BAG CHANGE performed by Guerrero Landrum MD at [address] [facility] [facility] N/A 12/10/2019    DEBRIDEMENT OF SACRAL WOUND performed by Ruby Zhang MD at [address] [facility] [facility] N/A 1/5/2020    GLUTEAL WOUND DEBRIDEMENT, WOUND VAC CHANGE performed by Adrien Navarrete MD at [facility] 336 N/A 1/11/2020    SACRAL DEBRIDEMENT WITH WOUND VAC CHANGE performed by Nita Yin MD at [facility] 2. 1/1/2020    IRRIGATION AND DEBRIDEMENT BUTTOCKS, SCROTUM, ABDOMEN, WOUND VAC CHANGE performed by Lc Thompson MD at [address] [facility] N/A 12/3/2019    LAPAROSCOPIC CONVERTED TO OPEN DIVERTING LOOP COLOSTOMY; WOUND VAC APPLICATION performed by Marcin Child MD at [facility] 110  01/05/2020    GLUTEAL WOUND DEBRIDEMENT, WOUND VAC CHANGE     DEBRIDEMENT  01/08/2020    WOUND VAC CHANGE SACRAL DECUBITUS, POSSIBLE DEBRIDEMENT    DEBRIDEMENT  01/11/2020     SACRAL DEBRIDEMENT WITH WOUND VAC CHANGE     INCISION AND DRAINAGE Bilateral 11/29/2019    RECTAL PERIRECTAL INCISION AND DRAINAGE, [address] [facility] LOOP COLOSTOMY CREATION performed by Nita Yin MD at [address] [facility] 69 N/A 12/6/2019    DEBRIDEMENT NECROTIZING FASCIAITIS BILAT BUTTOCK performed by Adrien Navarrete MD at [address] [facility] 69 N/A 12/20/2019    DEBRIDEMENT GLUTEAL AND SCROTAL REGIONS performed by Nita Yin MD at [address] [facility] 69 N/A 12/26/2019    INCISION AND DRAINAGE AND WOUND VAC CHANGE BUTTOCK, PERINEUM performed by Geronimo Leiva DO at [address] [facility] 69 N/A 1/8/2020    WOUND VAC CHANGE SACRAL DECUBITUS, DEBRIDEMENT performed by Adrien Navarrete MD at [facility] 58 N/A 12/10/2019    SCROTAL EXPLORATION WITH SCROTAL DEBRIDEMENT performed by Ravindra Szymanski MD at [address] [facility] N/A 12/23/2019    WOUND [address] performed by [facility]:     Social History     Socioeconomic History    Marital status: Unknown     Spouse name: Not on file    Number of children: Not on file    Years of education: Not on file    Highest education level: Not on file   Occupational History    Not on file   Social Needs    Financial resource strain: Not on file    Food insecurity     Worry: Not on file     Inability: Not on file    Transportation needs     Medical: Not on file     Non-medical: Not on file   Tobacco Use    Smoking status: Never Smoker    Smokeless tobacco: Never Used   Substance and Sexual Activity    Alcohol use: Never     Frequency: Never    Drug use: Never    Sexual activity: Not on file   Lifestyle    Physical activity     Days per week: Not on file     Minutes per session: Not on file    Stress: Not on file   Relationships    Social connections     Talks on phone: Not on file     Gets together: Not on file     Attends Buddhist service: Not on file     Active member of club or organization: Not on file     Attends meetings of clubs or organizations: Not on file     Relationship status: Not on file    Intimate partner violence     Fear of current or ex partner: Not on file     Emotionally abused: Not on file     Physically abused: Not on file     Forced sexual activity: Not on file   Other Topics Concern    Not on file   Social History Narrative    Not on file       CURRENT MEDICATIONS:     Current Outpatient Medications   Medication Sig Dispense Refill    sevelamer (RENVELA) 800 MG tablet Take 2 tablets by mouth 3 times daily (with meals)      sodium bicarbonate 650 MG tablet Take 650 mg by mouth 4 times daily      chlorhexidine (PERIDEX) 0.12 % solution Take 15 mLs by mouth 2 times daily      acetaminophen (TYLENOL) 325 MG tablet Take 650 mg by mouth every 6 hours as needed for Pain      Baclofen (LIORESAL) 5 MG tablet Take 1 tablet by mouth 2 times daily 60 tablet 5    Epoetin Chaitanya-epbx (RETACRIT IJ) Inject 10,000 Units as directed Indications: M W F if levels are correct      insulin detemir (LEVEMIR FLEXTOUCH) 100 UNIT/ML injection pen Levemir FlexTouch U-100 Insulin 100 unit/mL (3 mL) subcutaneous pen      ferrous sulfate (IRON 325) 325 (65 Fe) MG tablet Take 325 mg by mouth 3 times daily (with meals)      magnesium oxide (MAG-OX) 400 MG tablet Take 400 mg by mouth daily      senna (SENOKOT) 8.6 MG tablet Take 1 tablet by mouth nightly      polyethylene glycol (GLYCOLAX) 17 g packet Take 17 g by mouth daily as needed for Constipation      oxyCODONE (ROXICODONE) 5 MG immediate release tablet Take 10 mg by mouth every 4 hours as needed for Pain.       oxybutynin (OXYTROL) 3.9 MG/24HR Place 1 patch onto the skin Twice a Week (Patient taking differently: Place 5 patches onto the skin Twice a Week ) 8 patch 3    Multiple Vitamins-Minerals (THERAPEUTIC MULTIVITAMIN-MINERALS) tablet Take 1 tablet by mouth daily 30 tablet 3    carvedilol (COREG) 25 MG tablet Take 0.5 tablets by mouth 2 times daily (Patient taking differently: Take 6.25 mg by mouth 2 times daily ) 60 tablet 3    losartan (COZAAR) 50 MG tablet Take 1 tablet by mouth daily (Patient taking differently: Take 100 mg by mouth daily ) 30 tablet 3    insulin glargine (LANTUS) 100 UNIT/ML injection vial Inject 10 Units into the skin daily 1 vial 3    Liraglutide (VICTOZA) 18 MG/3ML SOPN SC injection 1.8 mg      oxybutynin (DITROPAN) 5 MG tablet       atorvastatin (LIPITOR) 80 MG tablet Take 100 mg by mouth daily       enoxaparin (LOVENOX) 40 MG/0.4ML injection Inject 0.4 mg into the skin 2 times daily      melatonin 1 MG tablet Take 1 tablet by mouth nightly as needed for Sleep (Patient not taking: Reported on 9/2/2020) 30 tablet 3    darbepoetin rohan-polysorbate (ARANESP) 100 MCG/0.5ML SOSY injection Infuse 0.5 mLs intravenously once a week (Patient not taking: Reported on 9/2/2020) 4.2 mL 0    bumetanide (BUMEX) 2 MG tablet Take 1 tablet by mouth daily (Patient not taking: Reported on 9/2/2020) 30 tablet 3    amLODIPine (NORVASC) 10 MG tablet Take 1 tablet by mouth daily (Patient not taking: Reported on 9/2/2020) 30 tablet 3 No current facility-administered medications for this visit. ALLERGIES:     Allergies   Allergen Reactions    Other      Mask adhesive causes hives                                 REVIEW OF SYSTEMS                   All items selected indicate a positive finding. Those items not selected are negative. Constitutional [x] Weight loss/gain   [] Fatigue  [] Fever/Chills   HEENT [] Hearing Loss  [] Visual Disturbance  [] Tinnitus  [] Eye pain   Respiratory [] Shortness of Breath  [] Cough  [] Snoring   Cardiovascular [] Chest Pain  [] Palpitations  [] Lightheaded   GI [] Constipation  [] Diarrhea  [] Swallowing change  [] Nausea/vomiting    [] Urinary Frequency  [] Urinary Urgency   Musculoskeletal [] Neck pain  [] Back pain  [] Muscle pain  [] Restless legs   Dermatologic [x] Skin changes   Neurologic [] Memory loss/confusion  [] Seizures  [x] Trouble walking or imbalance  [] Dizziness  [] Sleep disturbance  [x] Weakness  [x] Numbness  [] Tremors  [] Speech Difficulty  [] Headaches  [] Light Sensitivity  [] Sound Sensitivity   Endocrinology []Excessive thirst  []Excessive hunger   Psychiatric [] Anxiety/Depression  [] Hallucination   Allergy/immunology []Hives/environmental allergies   Hematologic/lymph [] Abnormal bleeding  [] Abnormal bruising         PHYSICAL EXAMINATION:       Vitals:    09/02/20 1532   BP: (!) 153/93   Pulse: 86   Temp: 98.4 °F (36.9 °C)                                              .                                                                                                     General Appearance:  Alert, cooperative, no signs of distress, appears stated age   Head:  Normocephalic, no signs of trauma   Eyes:  Conjunctiva/corneas clear;  eyelids intact   Ears:  Normal external ear and canals   Nose: Nares normal, mucosa normal, no drainage    Throat: Lips and tongue normal; teeth normal;  gums normal   Neck: Supple, intact flexion, extension and rotation;   trachea midline;  no

## 2020-11-05 DIAGNOSIS — R29.898 WEAKNESS OF RIGHT UPPER EXTREMITY: ICD-10-CM

## 2021-01-20 NOTE — PROGRESS NOTES
[facility]  [address] [address]  [phone]  Dept: [phone]    PRE-OP EVALUATION:  Odell Rice is a 12 year old male, here for a pre-operative evaluation, accompanied by his mother     Today's date: 1/20/2021  Proposed procedure: right knee arthroscopy   Date of Surgery/ Procedure: 01/22/2021  Hospital/Surgical Facility:  [facility] [place]   Surgeon/ Procedure Provider: Dr. Jain  This report will be faxed to [phone]  Primary Physician: Aditi Russell  Type of Anesthesia Anticipated: General    1. No - In the last week, has your child had any illness, including a cold, cough, shortness of breath or wheezing?  2. Yes - In the last week, has your child used ibuprofen or aspirin? Ibuprofen   3. No - Does your child use herbal medications?   4. No - In the past 3 weeks, has your child been exposed to Chicken pox, Whooping cough, Fifth disease, Measles, or Tuberculosis?  5. No - Has your child ever had wheezing or asthma?  6. No - Does your child use supplemental oxygen or a C-PAP machine?   7. No - Has your child ever had anesthesia or been put under for a procedure?  8. No - Has your child or anyone in your family ever had problems with anesthesia?  9. No - Does your child or anyone in your family have a serious bleeding problem or easy bruising?  10. No - Has your child ever had a blood transfusion?  11. No - Does your child have an implanted device (for example: cochlear implant, pacemaker,  shunt)?        HPI:     Brief HPI related to upcoming procedure: Odell dislocated his right knee for the 4th time last week.  He requires surgery to possibly trim cartilage and place a graft.    Medical History:     PROBLEM LIST  There are no active problems to display for this patient.      SURGICAL HISTORY  History reviewed. No pertinent surgical history.    MEDICATIONS  No current outpatient medications on file prior to visit.  No current  "facility-administered medications on file prior to visit.       ALLERGIES  No Known Allergies     Review of Systems:   Constitutional, eye, ENT, skin, respiratory, cardiac, and GI are normal except as otherwise noted.      Physical Exam:     /68 (BP Location: Right arm, Patient Position: Sitting, Cuff Size: Adult Regular)   Pulse 74   Temp 96.2  F (35.7  C) (Tympanic)   Ht 5' 5.16\" (1.655 m)   Wt 130 lb 2 oz (59 kg)   SpO2 100%   BMI 21.55 kg/m    42 %ile (Z= -0.19) based on CDC (Boys, 2-20 Years) Stature-for-age data based on Stature recorded on 1/20/2021.  69 %ile (Z= 0.50) based on CDC (Boys, 2-20 Years) weight-for-age data using vitals from 1/20/2021.  75 %ile (Z= 0.68) based on CDC (Boys, 2-20 Years) BMI-for-age based on BMI available as of 1/20/2021.  Blood pressure reading is in the normal blood pressure range based on the 2017 AAP Clinical Practice Guideline.  GENERAL: Active, alert, in no acute distress.  SKIN: Clear. No significant rash, abnormal pigmentation or lesions  HEAD: Normocephalic.  EYES:  No discharge or erythema. Normal pupils and EOM.  EARS: Normal canals. Tympanic membranes are normal; gray and translucent.  NOSE: Normal without discharge.  MOUTH/THROAT: Clear. No oral lesions. Teeth intact without obvious abnormalities.  NECK: Supple, no masses.  LYMPH NODES: No adenopathy  LUNGS: Clear. No rales, rhonchi, wheezing or retractions  HEART: Regular rhythm. Normal S1/S2. No murmurs.  ABDOMEN: Soft, non-tender, not distended, no masses or hepatosplenomegaly. Bowel sounds normal.   EXTREMITIES: using crutches - non-weight-bearing on right leg  NEUROLOGIC: No focal findings. Cranial nerves grossly intact: DTR's normal. Normal gait, strength and tone  PSYCH: Age-appropriate alertness and orientation      Diagnostics:   Odell had positive Covid-19 test on 11/16/2020     Assessment/Plan:   Odell Rice is a 12 year old male, presenting for:  1. Preop general physical exam  OK to proceed " 2 times per day  sodium chloride flush 0.9 % injection 10 mL, PRN  magnesium hydroxide (MILK OF MAGNESIA) 400 MG/5ML suspension 30 mL, Daily PRN  ondansetron (ZOFRAN) injection 4 mg, Q6H PRN  glucagon (rDNA) injection 1 mg, PRN  heparin (porcine) injection 5,000 Units, 3 times per day  norepinephrine (LEVOPHED) 32 mg in dextrose 5 % 250 mL infusion, Continuous      Labs:   CBC:   Recent Labs     12/09/19 0431 12/09/19 1944 12/10/19  0536 12/11/19 0438   WBC 16.9*  --  16.9* 16.2*   RBC 2.72*  --  2.93* 2.51*   HGB 7.0* 8.0* 7.7* 7.0*   HCT 24.8* 27.7* 26.0* 24.2*     --  346 398   MPV 9.8  --  9.8 9.9      BMP:   Recent Labs     12/09/19  0431 12/10/19  0536 12/11/19  0438   * 135 135   K 4.0 3.8 4.1    105 107   CO2 21 19* 17*   BUN 42* 39* 50*   CREATININE 2.69* 2.39* 2.59*   GLUCOSE 170* 180* 178*   CALCIUM 7.3* 7.7* 7.8*        Phosphorus:    Recent Labs     12/09/19  0431 12/10/19  0536   PHOS 2.3* 2.6       Dialysis bath: Dialysis Bath  K+ (Potassium): 3  Ca+ (Calcium): 3  Na+ (Sodium): 140  HCO3 (Bicarb): 30      Assessment:    1.  Acute Kidney Injury: Secondary to ischemic ATN from sepsis syndrome from necrotizing fasciitis. He has been  dialysis dependent      2.  Chronic kidney diseae stage III from diabetic nephrosclerosis baseline 1.6-1.8  3.  Necrotizing fasciitis status post wide complex debridement of gluteal, perineal region and open colostomy. 4.  Respiratory Failure: On ventilator   5.  Protein calorie malnutrition  6.  Fluid overload: Improved with ultrafiltration  7. S/P diverting colostomy     Plan:    1. Wt removal and dialysis orders reviewed. 2.  Transfuse 1 unit of blood on dialysis. 3.  Antibiotics to continue. Supportive care to be continued. .   4.  Prognosis is guarded. Please do not hesitate to call with questions.     Electronically signed by Cheng Urbano MD on 12/11/2019 at 11:41 AM with anesthesia and surgery as scheduled.  Advised no further use of ibuprofen  If Odell were to develop fever, congestion, cough, or vomiting, parent will call clinic or reschedule surgery    2. Injury of right knee, subsequent encounter      Airway/Pulmonary Risk: None identified  Cardiac Risk: None identified  Hematology/Coagulation Risk: None identified  Metabolic Risk: None identified  Pain/Comfort Risk: None identified     Approval given to proceed with proposed procedure, without further diagnostic evaluation    Copy of this evaluation report is provided to requesting physician.    ____________________________________  January 20, 2021      Signed Electronically by: MEHUL Rodríguez [address] [address]  Phone: [phone]

## 2021-03-19 ENCOUNTER — HOSPITAL ENCOUNTER (INPATIENT)
Age: 36
LOS: 14 days | Discharge: NURSING, REHAB OR LONG-TERM CARE | DRG: 628 | End: 2021-04-02
Attending: INTERNAL MEDICINE
Payer: MEDICARE

## 2021-03-19 DIAGNOSIS — E11.628 DIABETIC FOOT INFECTION (HCC): ICD-10-CM

## 2021-03-19 DIAGNOSIS — S91.302D OPEN WOUND OF LEFT FOOT, SUBSEQUENT ENCOUNTER: ICD-10-CM

## 2021-03-19 DIAGNOSIS — L08.9 DIABETIC FOOT INFECTION (HCC): ICD-10-CM

## 2021-03-19 DIAGNOSIS — N17.9 AKI (ACUTE KIDNEY INJURY) (HCC): Primary | ICD-10-CM

## 2021-03-19 DIAGNOSIS — M86.672 OTHER CHRONIC OSTEOMYELITIS OF LEFT FOOT (HCC): ICD-10-CM

## 2021-03-19 PROBLEM — Z93.3 COLOSTOMY IN PLACE (HCC): Chronic | Status: ACTIVE | Noted: 2021-03-19

## 2021-03-19 PROBLEM — Z97.8 FOLEY CATHETER IN PLACE: Chronic | Status: ACTIVE | Noted: 2021-03-19

## 2021-03-19 PROBLEM — S91.302A OPEN WOUND OF LEFT FOOT: Status: ACTIVE | Noted: 2021-03-19

## 2021-03-19 PROBLEM — R29.898 RIGHT ARM WEAKNESS: Status: ACTIVE | Noted: 2021-03-19

## 2021-03-19 PROBLEM — S31.000A SACRAL WOUND: Chronic | Status: ACTIVE | Noted: 2021-03-19

## 2021-03-19 PROBLEM — I50.22 CHRONIC SYSTOLIC HEART FAILURE (HCC): Status: ACTIVE | Noted: 2021-03-19

## 2021-03-19 LAB
-: ABNORMAL
AMORPHOUS: ABNORMAL
ANION GAP SERPL CALCULATED.3IONS-SCNC: 11 MMOL/L (ref 9–17)
BACTERIA: ABNORMAL
BILIRUBIN URINE: NEGATIVE
BUN BLDV-MCNC: 42 MG/DL (ref 6–20)
BUN/CREAT BLD: ABNORMAL (ref 9–20)
CALCIUM SERPL-MCNC: 8.2 MG/DL (ref 8.6–10.4)
CASTS UA: ABNORMAL /LPF (ref 0–8)
CHLORIDE BLD-SCNC: 106 MMOL/L (ref 98–107)
CO2: 20 MMOL/L (ref 20–31)
COLOR: YELLOW
COMMENT UA: ABNORMAL
CREAT SERPL-MCNC: 2.81 MG/DL (ref 0.7–1.2)
CREATININE URINE: 35.3 MG/DL (ref 39–259)
CRYSTALS, UA: ABNORMAL /HPF
EPITHELIAL CELLS UA: ABNORMAL /HPF (ref 0–5)
GFR AFRICAN AMERICAN: 31 ML/MIN
GFR NON-AFRICAN AMERICAN: 26 ML/MIN
GFR SERPL CREATININE-BSD FRML MDRD: ABNORMAL ML/MIN/{1.73_M2}
GFR SERPL CREATININE-BSD FRML MDRD: ABNORMAL ML/MIN/{1.73_M2}
GLUCOSE BLD-MCNC: 107 MG/DL (ref 70–99)
GLUCOSE BLD-MCNC: 134 MG/DL (ref 75–110)
GLUCOSE URINE: NEGATIVE
KETONES, URINE: NEGATIVE
LEUKOCYTE ESTERASE, URINE: ABNORMAL
MUCUS: ABNORMAL
NITRITE, URINE: NEGATIVE
OTHER OBSERVATIONS UA: ABNORMAL
PH UA: 7.5 (ref 5–8)
POTASSIUM SERPL-SCNC: 4 MMOL/L (ref 3.7–5.3)
PROTEIN UA: ABNORMAL
RBC UA: ABNORMAL /HPF (ref 0–4)
RENAL EPITHELIAL, UA: ABNORMAL /HPF
SODIUM BLD-SCNC: 137 MMOL/L (ref 135–144)
SODIUM,UR: 75 MMOL/L
SPECIFIC GRAVITY UA: 1.01 (ref 1–1.03)
TRICHOMONAS: ABNORMAL
TURBIDITY: ABNORMAL
URINE HGB: ABNORMAL
UROBILINOGEN, URINE: NORMAL
WBC UA: ABNORMAL /HPF (ref 0–5)
YEAST: ABNORMAL

## 2021-03-19 PROCEDURE — 84300 ASSAY OF URINE SODIUM: CPT

## 2021-03-19 PROCEDURE — 6360000002 HC RX W HCPCS: Performed by: INTERNAL MEDICINE

## 2021-03-19 PROCEDURE — 99222 1ST HOSP IP/OBS MODERATE 55: CPT | Performed by: CLINICAL NURSE SPECIALIST

## 2021-03-19 PROCEDURE — 82947 ASSAY GLUCOSE BLOOD QUANT: CPT

## 2021-03-19 PROCEDURE — 6370000000 HC RX 637 (ALT 250 FOR IP): Performed by: INTERNAL MEDICINE

## 2021-03-19 PROCEDURE — 82570 ASSAY OF URINE CREATININE: CPT

## 2021-03-19 PROCEDURE — 36415 COLL VENOUS BLD VENIPUNCTURE: CPT

## 2021-03-19 PROCEDURE — 87040 BLOOD CULTURE FOR BACTERIA: CPT

## 2021-03-19 PROCEDURE — 87086 URINE CULTURE/COLONY COUNT: CPT

## 2021-03-19 PROCEDURE — 80048 BASIC METABOLIC PNL TOTAL CA: CPT

## 2021-03-19 PROCEDURE — 87205 SMEAR GRAM STAIN: CPT

## 2021-03-19 PROCEDURE — 2060000000 HC ICU INTERMEDIATE R&B

## 2021-03-19 PROCEDURE — 81001 URINALYSIS AUTO W/SCOPE: CPT

## 2021-03-19 PROCEDURE — 6370000000 HC RX 637 (ALT 250 FOR IP): Performed by: CLINICAL NURSE SPECIALIST

## 2021-03-19 PROCEDURE — 2580000003 HC RX 258: Performed by: INTERNAL MEDICINE

## 2021-03-19 RX ORDER — M-VIT,TX,IRON,MINS/CALC/FOLIC 27MG-0.4MG
1 TABLET ORAL DAILY
Status: DISCONTINUED | OUTPATIENT
Start: 2021-03-19 | End: 2021-04-02 | Stop reason: HOSPADM

## 2021-03-19 RX ORDER — ONDANSETRON 2 MG/ML
4 INJECTION INTRAMUSCULAR; INTRAVENOUS EVERY 6 HOURS PRN
Status: DISCONTINUED | OUTPATIENT
Start: 2021-03-19 | End: 2021-04-02 | Stop reason: HOSPADM

## 2021-03-19 RX ORDER — POTASSIUM CHLORIDE 7.45 MG/ML
10 INJECTION INTRAVENOUS PRN
Status: DISCONTINUED | OUTPATIENT
Start: 2021-03-19 | End: 2021-04-02 | Stop reason: HOSPADM

## 2021-03-19 RX ORDER — SENNA PLUS 8.6 MG/1
1 TABLET ORAL NIGHTLY
Status: DISCONTINUED | OUTPATIENT
Start: 2021-03-19 | End: 2021-04-02 | Stop reason: HOSPADM

## 2021-03-19 RX ORDER — SODIUM CHLORIDE 0.9 % (FLUSH) 0.9 %
10 SYRINGE (ML) INJECTION EVERY 12 HOURS SCHEDULED
Status: DISCONTINUED | OUTPATIENT
Start: 2021-03-19 | End: 2021-04-02 | Stop reason: HOSPADM

## 2021-03-19 RX ORDER — OXYBUTYNIN CHLORIDE 5 MG/1
5 TABLET ORAL 2 TIMES DAILY
Status: DISCONTINUED | OUTPATIENT
Start: 2021-03-19 | End: 2021-03-24

## 2021-03-19 RX ORDER — SODIUM CHLORIDE 0.9 % (FLUSH) 0.9 %
10 SYRINGE (ML) INJECTION PRN
Status: DISCONTINUED | OUTPATIENT
Start: 2021-03-19 | End: 2021-04-02 | Stop reason: HOSPADM

## 2021-03-19 RX ORDER — ACETAMINOPHEN 325 MG/1
650 TABLET ORAL EVERY 6 HOURS PRN
Status: DISCONTINUED | OUTPATIENT
Start: 2021-03-19 | End: 2021-03-30 | Stop reason: SDUPTHER

## 2021-03-19 RX ORDER — DEXTROSE MONOHYDRATE 50 MG/ML
100 INJECTION, SOLUTION INTRAVENOUS PRN
Status: DISCONTINUED | OUTPATIENT
Start: 2021-03-19 | End: 2021-04-02 | Stop reason: HOSPADM

## 2021-03-19 RX ORDER — MAGNESIUM SULFATE 1 G/100ML
1000 INJECTION INTRAVENOUS PRN
Status: DISCONTINUED | OUTPATIENT
Start: 2021-03-19 | End: 2021-04-02 | Stop reason: HOSPADM

## 2021-03-19 RX ORDER — DEXTROSE MONOHYDRATE 25 G/50ML
12.5 INJECTION, SOLUTION INTRAVENOUS PRN
Status: DISCONTINUED | OUTPATIENT
Start: 2021-03-19 | End: 2021-04-02 | Stop reason: HOSPADM

## 2021-03-19 RX ORDER — POLYETHYLENE GLYCOL 3350 17 G/17G
17 POWDER, FOR SOLUTION ORAL DAILY PRN
Status: DISCONTINUED | OUTPATIENT
Start: 2021-03-19 | End: 2021-04-02 | Stop reason: HOSPADM

## 2021-03-19 RX ORDER — SODIUM BICARBONATE 650 MG/1
650 TABLET ORAL 4 TIMES DAILY
Status: DISCONTINUED | OUTPATIENT
Start: 2021-03-19 | End: 2021-03-23

## 2021-03-19 RX ORDER — NICOTINE POLACRILEX 4 MG
15 LOZENGE BUCCAL PRN
Status: DISCONTINUED | OUTPATIENT
Start: 2021-03-19 | End: 2021-04-02 | Stop reason: HOSPADM

## 2021-03-19 RX ORDER — PROMETHAZINE HYDROCHLORIDE 12.5 MG/1
12.5 TABLET ORAL EVERY 6 HOURS PRN
Status: DISCONTINUED | OUTPATIENT
Start: 2021-03-19 | End: 2021-04-02 | Stop reason: HOSPADM

## 2021-03-19 RX ORDER — SEVELAMER CARBONATE 800 MG/1
1600 TABLET, FILM COATED ORAL
Status: DISCONTINUED | OUTPATIENT
Start: 2021-03-19 | End: 2021-03-24

## 2021-03-19 RX ORDER — ACETAMINOPHEN 650 MG/1
650 SUPPOSITORY RECTAL EVERY 6 HOURS PRN
Status: DISCONTINUED | OUTPATIENT
Start: 2021-03-19 | End: 2021-04-02 | Stop reason: HOSPADM

## 2021-03-19 RX ORDER — LANOLIN ALCOHOL/MO/W.PET/CERES
325 CREAM (GRAM) TOPICAL
Status: DISCONTINUED | OUTPATIENT
Start: 2021-03-19 | End: 2021-03-24

## 2021-03-19 RX ORDER — POTASSIUM CHLORIDE 20 MEQ/1
40 TABLET, EXTENDED RELEASE ORAL PRN
Status: DISCONTINUED | OUTPATIENT
Start: 2021-03-19 | End: 2021-04-02 | Stop reason: HOSPADM

## 2021-03-19 RX ORDER — INSULIN GLARGINE 100 [IU]/ML
70 INJECTION, SOLUTION SUBCUTANEOUS NIGHTLY
Status: DISCONTINUED | OUTPATIENT
Start: 2021-03-19 | End: 2021-03-21

## 2021-03-19 RX ORDER — FUROSEMIDE 10 MG/ML
40 INJECTION INTRAMUSCULAR; INTRAVENOUS 2 TIMES DAILY
Status: DISCONTINUED | OUTPATIENT
Start: 2021-03-19 | End: 2021-03-20

## 2021-03-19 RX ORDER — HEPARIN SODIUM 5000 [USP'U]/ML
5000 INJECTION, SOLUTION INTRAVENOUS; SUBCUTANEOUS EVERY 8 HOURS SCHEDULED
Status: DISCONTINUED | OUTPATIENT
Start: 2021-03-19 | End: 2021-04-02 | Stop reason: HOSPADM

## 2021-03-19 RX ORDER — BACLOFEN 10 MG/1
5 TABLET ORAL 2 TIMES DAILY
Status: DISCONTINUED | OUTPATIENT
Start: 2021-03-19 | End: 2021-04-02 | Stop reason: HOSPADM

## 2021-03-19 RX ORDER — ATORVASTATIN CALCIUM 40 MG/1
40 TABLET, FILM COATED ORAL DAILY
Status: DISCONTINUED | OUTPATIENT
Start: 2021-03-20 | End: 2021-04-02 | Stop reason: HOSPADM

## 2021-03-19 RX ORDER — ATORVASTATIN CALCIUM 80 MG/1
80 TABLET, FILM COATED ORAL DAILY
Status: DISCONTINUED | OUTPATIENT
Start: 2021-03-19 | End: 2021-03-19

## 2021-03-19 RX ORDER — ACETAMINOPHEN 325 MG/1
650 TABLET ORAL EVERY 6 HOURS PRN
Status: DISCONTINUED | OUTPATIENT
Start: 2021-03-19 | End: 2021-04-02 | Stop reason: HOSPADM

## 2021-03-19 RX ADMIN — FUROSEMIDE 40 MG: 10 INJECTION, SOLUTION INTRAMUSCULAR; INTRAVENOUS at 22:06

## 2021-03-19 RX ADMIN — HEPARIN SODIUM 5000 UNITS: 5000 INJECTION INTRAVENOUS; SUBCUTANEOUS at 04:00

## 2021-03-19 RX ADMIN — BACLOFEN 5 MG: 10 TABLET ORAL at 22:06

## 2021-03-19 RX ADMIN — STANDARDIZED SENNA CONCENTRATE 8.6 MG: 8.6 TABLET ORAL at 22:06

## 2021-03-19 RX ADMIN — SODIUM CHLORIDE, PRESERVATIVE FREE 10 ML: 5 INJECTION INTRAVENOUS at 22:16

## 2021-03-19 RX ADMIN — INSULIN GLARGINE 70 UNITS: 100 INJECTION, SOLUTION SUBCUTANEOUS at 22:07

## 2021-03-19 RX ADMIN — METOPROLOL TARTRATE 25 MG: 25 TABLET ORAL at 22:06

## 2021-03-19 RX ADMIN — MAGNESIUM GLUCONATE 500 MG ORAL TABLET 400 MG: 500 TABLET ORAL at 22:06

## 2021-03-19 RX ADMIN — Medication 1 TABLET: at 22:05

## 2021-03-19 ASSESSMENT — ENCOUNTER SYMPTOMS
SHORTNESS OF BREATH: 1
COUGH: 1
ABDOMINAL PAIN: 0
BLOOD IN STOOL: 0
SORE THROAT: 0
TROUBLE SWALLOWING: 0

## 2021-03-19 NOTE — CARE COORDINATION
Case Management Initial Discharge Plan  Bam Perez,             Met with:patient to discuss discharge plans. Information verified: address, contacts, phone number, , insurance Yes    Emergency Contact/Next of Kin name & number: gallo Cohen, [phone]    PCP: No primary care provider on file. Date of last visit:     Insurance Provider: Medicare and Medicaid    Discharge Planning    Living Arrangements:      Support Systems:       Home has 1 stories  ramp to get into front door,  Location of bedroom/bathroom in home main    Patient able to perform ADL's:Dependent    Current Services (outpatient & in home) DME  DME equipment: wheelchair  DME provider: [employer]    Receiving oral anticoagulation therapy? No    If indicated:   Physician managing anticoagulation treatment:   Where does patient obtain lab work for ATC treatment? Potential Assistance Needed:       Patient agreeable to home care: Yes  Freedom of choice provided:  yes    Prior SNF/Rehab Placement and Facility: was at [address] [place] [address] in January  Agreeable to SNF/Rehab: No  [place] of choice provided: n/a     Evaluation: n/a    Expected Discharge date:       Patient expects to be discharged to: Follow Up Appointment: Best Day/ Time:      Transportation provider: parent   Transportation arrangements needed for discharge: No    Readmission Risk              Risk of Unplanned Readmission:        12             Does patient have a readmission risk score greater than 14?: No  If yes, follow-up appointment must be made within 7 days of discharge.      Goals of Care:       Discharge Plan: goal is home with mom, has aide, may need HH for wound care, has wheelchair          Electronically signed by Gagan Alexandra RN on 3/19/21 at 6:06 PM EDT

## 2021-03-19 NOTE — H&P
[facility]  Office: [address], DO, Jose Cabezas, DO, Torsten Pradhan, DO, Fealina Monahan, DO, Renata Spaulding MD, Mayelin Bowman MD, Marlene Vilchis MD, Eva Romero MD, Taty Morales MD, Donalynn Osler, MD, Larry Infante MD, Jean Mauro MD, Radha Lott MD, Grady Cedillo DO, Tonie Bhardwaj MD, Aneudy Light DO, Roverto Metcalf MD,  Pedro Pablo Pozo DO, Becca Gilliland MD, Carmen Buckner MD, Lisa Mcguire, [facility], [facility], CNP, Velvet Theodore, CNP, Valentina Owens, CNS, Nilam Chen, CNP, Aba Gomez, CNP, Saud Salomon, CNP, Ralph Montaño, CNP, Shavon Gonzalez, CNP, Candace Huddleston PA-C, Sky Anderson, TANESHA, Ifeoma Dobbs, CNP, Eben Scott, CNP, Paulina Patel, CNP, Ariela Lancaster, CNP, Sreedhar Deras, CNP, Marya Sterling, CNP         [facility]    HISTORY AND PHYSICAL EXAMINATION            Date:   3/19/2021  Patient name:  Christal Burton  Date of admission:  3/19/2021  5:34 PM  MRN:   [id number]  Account:  [de-identified]  YOB: 1985  PCP:    No primary care provider on file. Room:   2002/2002-01  Code Status:    Full Code    Chief Complaint:     Shortness of Breath      History Obtained From:     patient, electronic medical record    History of Present Illness:     Christal Burton is a 38 y.o. male who was directly admitted from [facility] ED March 19 for the management of DEBBY (acute kidney injury) ([facility] He has a hx of spina bifida, CHF with EF 30% on Echo 12/6/19, CKD-baseline creat ~1.5, DM, chronic Lt foot ulcer and prolonged admit (11/29/19 - 1/16/20) for Necrotizing fasciitis buttocks. During that admit he underwent approx 11 I&D procedures, temporary dialysis and colostomy creation. He has a chronic lema cath. Pt was in SNF until about 4 mo ago. Pt presented to ED with worsening shortness of breath. EKG: Mult PVC's, old inferior & anteroseptal infarct, .   CXR showed congestion, BNP 1200 (<100), Trop 0.09 (0-0.04), Lactic 1.5, BUN/Cr 46/3.05, Glucose 122, Na 138, K 4.2, Mg 1.2, LFTs wnl, INR 1.4, WBC 9.9, hgb 10.7, platelets 931. UA:  Lg leuko est, + protein, Mod hgb. Covid Neg    Pt reports gradual decline in strength, chills, discomfort to his buttocks, increase in size of buttock wounds, shortness of breath and worsening edema x 2 mos. His visiting nurse started Doxycycline 2 days ago for possible wound infection and told him he needed more care than could be provided in the home. He lives with his mom who is 79 yr old and has some health issues.        Past Medical History:     Past Medical History:   Diagnosis Date    [facility]     Diabetes mellitus ([facility]     Hypertension     Septic shock ([facility]     Spina bifida aperta of lumbar spine ([facility]         Past Surgical History:     Past Surgical History:   Procedure Laterality Date    ABDOMEN SURGERY N/A 12/8/2019    DEBRIDEMENT  NECROTIZING FASCIITIS BUTTOCK, WOUND VAC REMOVAL DELAYED PRIMARY CLOSURE OF MIDLINE ABDOMINAL INCISION, OSTOMY BAG CHANGE performed by Briana Powers MD at [facility] N/A 12/10/2019    DEBRIDEMENT OF SACRAL WOUND performed by Tiffanie Kirkpatrick MD at [facility] N/A 1/5/2020    GLUTEAL WOUND DEBRIDEMENT, WOUND VAC CHANGE performed by Tiffanie Kirkpatrick MD at [facility] Floor N/A 1/11/2020    SACRAL DEBRIDEMENT WITH WOUND VAC CHANGE performed by Kemar Aburto MD at [facility]. 1/1/2020    IRRIGATION AND DEBRIDEMENT BUTTOCKS, SCROTUM, ABDOMEN, WOUND VAC CHANGE performed by Briana Powers MD at [facility] 12/3/2019    LAPAROSCOPIC CONVERTED TO OPEN DIVERTING LOOP COLOSTOMY; WOUND VAC APPLICATION performed by Atif Douglas MD at [place] [address]  01/05/2020    GLUTEAL WOUND DEBRIDEMENT, WOUND VAC CHANGE     DEBRIDEMENT  01/08/2020    WOUND VAC CHANGE SACRAL DECUBITUS, POSSIBLE DEBRIDEMENT    DEBRIDEMENT  01/11/2020     SACRAL DEBRIDEMENT WITH WOUND VAC CHANGE     INCISION AND DRAINAGE Bilateral 11/29/2019    RECTAL PERIRECTAL INCISION AND DRAINAGE, [facility] LOOP COLOSTOMY CREATION performed by Ambar White MD at [facility] [address] N/A 12/6/2019    DEBRIDEMENT NECROTIZING FASCIAITIS BILAT BUTTOCK [facility] N/A 12/20/2019    DEBRIDEMENT GLUTEAL AND SCROTAL REGIONS performed by Ambar White MD at [facility] N/A 12/26/2019    INCISION AND DRAINAGE AND WOUND VAC CHANGE BUTTOCK, PERINEUM performed by El Roche DO at [facility] N/A 1/8/2020    WOUND VAC CHANGE SACRAL DECUBITUS, DEBRIDEMENT [facility] N/A 12/10/2019    SCROTAL EXPLORATION WITH SCROTAL DEBRIDEMENT performed by Neno Medrano MD at [facility] N/A 12/23/2019    WOUND [address] performed by Phan Rocha MD at [place] [address]        Medications Prior to Admission:     Prior to Admission medications    Medication Sig Start Date End Date Taking?  Authorizing Provider   sevelamer (RENVELA) 800 MG tablet Take 2 tablets by mouth 3 times daily (with meals)    Historical Provider, MD   sodium bicarbonate 650 MG tablet Take 650 mg by mouth 4 times daily    Historical Provider, MD   chlorhexidine (PERIDEX) 0.12 % solution Take 15 mLs by mouth 2 times daily    Historical Provider, MD   acetaminophen (TYLENOL) 325 MG tablet Take 650 mg by mouth every 6 hours as needed for Pain    Historical Provider, MD   Liraglutide (VICTOZA) 18 MG/3ML SOPN SC injection 1.8 mg 10/12/19   Historical Provider, MD   oxybutynin (DITROPAN) 5 MG tablet  8/8/20   Historical Provider, MD   Baclofen (LIORESAL) 5 MG tablet Take 1 tablet by mouth 2 times daily 9/2/20   MIRTA Simpson - CNP   Epoetin Chaitanya-epbx (RETACRIT IJ) Inject 10,000 Units as directed Indications: M W F if levels are correct    Historical Provider, MD   insulin detemir (LEVEMIR FLEXTOUCH) 100 UNIT/ML injection pen Levemir FlexTouch U-100 Insulin 100 unit/mL (3 mL) subcutaneous pen    Historical Provider, MD   atorvastatin (LIPITOR) 80 MG tablet Take 100 mg by mouth daily  7/23/19   Historical Provider, MD   enoxaparin (LOVENOX) 40 MG/0.4ML injection Inject 0.4 mg into the skin 2 times daily    Historical Provider, MD   ferrous sulfate (IRON 325) 325 (65 Fe) MG tablet Take 325 mg by mouth 3 times daily (with meals)    Historical Provider, MD   magnesium oxide (MAG-OX) 400 MG tablet Take 400 mg by mouth daily    Historical Provider, MD   senna (SENOKOT) 8.6 MG tablet Take 1 tablet by mouth nightly    Historical Provider, MD   polyethylene glycol (GLYCOLAX) 17 g packet Take 17 g by mouth daily as needed for Constipation    Historical Provider, MD   oxyCODONE (ROXICODONE) 5 MG immediate release tablet Take 10 mg by mouth every 4 hours as needed for Pain. Historical Provider, MD   Multiple Vitamins-Minerals (THERAPEUTIC MULTIVITAMIN-MINERALS) tablet Take 1 tablet by mouth daily 1/16/20   Mark Rojas MD   insulin glargine (LANTUS) 100 UNIT/ML injection vial Inject 10 Units into the skin daily 1/16/20   Mark Rojas MD        Allergies: Other    Social History:     Tobacco:    reports that he has never smoked. He has never used smokeless tobacco.  Alcohol:      reports no history of alcohol use. Drug Use:  reports no history of drug use. Family History:     No family history on file. Review of Systems:     Positive and Negative as described in HPI. Review of Systems   Constitutional: Positive for chills. Negative for fever and unexpected weight change. HENT: Negative for sore throat and trouble swallowing. Respiratory: Positive for cough and shortness of breath. Cardiovascular: Positive for leg swelling (chronic). Negative for chest pain and palpitations. Gastrointestinal: Negative for abdominal pain and blood in stool. Colostomy    Genitourinary: Positive for scrotal swelling. Negative for hematuria. Ambrosio    Musculoskeletal:        Pt has minimal sensation below the waist    Skin: Positive for wound. Neurological: Positive for weakness (generalized, unable to transfer out of bed). Negative for dizziness and headaches. All other systems reviewed and are negative. Physical Exam:   BP (!) 171/137   Pulse 110   Temp 98.4 °F (36.9 °C) (Axillary)   Resp 18   Ht 5' 9\" (1.753 m)   Wt (!) 325 lb 9.6 oz (147.7 kg)   BMI 48.08 kg/m²   Temp (24hrs), Av.4 °F (36.9 °C), Min:98.4 °F (36.9 °C), Max:98.4 °F (36.9 °C)    Recent Labs     21   POCGLU 134*       Intake/Output Summary (Last 24 hours) at 3/19/2021 2030  Last data filed at 3/19/2021 1800  Gross per 24 hour   Intake    Output 100 ml   Net -100 ml       Physical Exam  Vitals signs and nursing note reviewed. Constitutional:       General: He is in acute distress (mild distress due to shortness of breath). Appearance: He is obese. He is ill-appearing. HENT:      Head: Normocephalic. Mouth/Throat:      Mouth: Mucous membranes are moist.      Pharynx: Oropharynx is clear. Eyes:      General: No scleral icterus. Extraocular Movements: Extraocular movements intact. Pupils: Pupils are equal, round, and reactive to light. Cardiovascular:      Rate and Rhythm: Normal rate and regular rhythm. Comments: occas pvc's  Pulmonary:      Comments: Diminished throughout. Very difficult for pt to lie flat for pic of sacral wound  Abdominal:      Tenderness: There is no abdominal tenderness. Comments: Colostomy with pink stoma that has mild protrusion, semi formed stool. Abdomen distended with indurated skin   Musculoskeletal:      Comments: Diffuse 3+ pitting edema toes to abdomen with scrotal edema.    Skin: Modified POA    * (Principal) DEBBY (acute kidney injury) ([facility] 3/19/2021 Yes    Chronic systolic heart failure ([address] 3/19/2021 Yes    Right arm weakness 3/19/2021 Yes    Essential hypertension 3/19/2021 Yes    DM II (diabetes mellitus, type II), controlled ([address] 3/19/2021 Yes    Chronic paraplegia ([address] 3/19/2021 Yes    Sacral wound (Chronic) 3/19/2021 Yes    Open wound of left foot 3/19/2021 Yes    Lema catheter in place (Chronic) 3/19/2021 Yes    Colostomy in place [facility]) (Chronic) 3/19/2021 Yes    Overview Signed 3/19/2021  7:56 PM by MIRTA Sam - CNS     December '19               Plan:     Patient status inpatient in the Progressive Unit/Step down    1. DEBBY   -Avoid nephrotoxins   -Renal ultrasound   -UA with C&S   -Urine Na, Eosinophils, Creat   -Uric acid, BMP, CK in am    2. Chronic systolic heart failure, EF 30% Dec '19   -Lasix 40 bid, monitor BUN/Creat closely   -BNP in am   -Echocardiogram in am    3. DM with long term insulin use    -A1c in am   -Sliding scale insulin high dose ac & hs   -Continue home Lantus 70 U q hs    4. HTN   -Lisinopril on hold due to DEBBY   -Start metoprolol 25 mg bid    5. Sacral wound, Chronic Left foot wound   -Wound care consult, may need gen surgery   -Chronic lema and colostomy in place   -Case management consult will likely need SNF    6. Possible wound infection/osteo, pt having chills and increased sensation to buttocks   -CBC, sed rate, CRP, Procalcitonin, in am   -Hold off on ATBX for now    7. Paraplegia due to spina bifida   -PT/OT eval and treat. Pt was able to transfer from bed to chair  prior to current decline      Consultations:   IP CONSULT TO CASE MANAGEMENT    Patient is admitted as inpatient status because of co-morbidities listed above, severity of signs and symptoms as outlined, requirement for current medical therapies and most importantly because of direct risk to patient if care not provided in a hospital setting.   Expected length of

## 2021-03-19 NOTE — LETTER
[facility] [address]  [address] [facility] [address]  Phone: [phone]    No name on file. March 28, 2021     Patient: Kemi Salinas   YOB: 1985   Date of Visit: 3/19/2021       To Whom It May Concern:   Amee Omalley is currently under care at [facility] since 3/19/2021. If you have any questions or concerns, please don't hesitate to call. Sincerely,        No name on file.

## 2021-03-20 ENCOUNTER — APPOINTMENT (OUTPATIENT)
Dept: ULTRASOUND IMAGING | Age: 36
DRG: 628 | End: 2021-03-20
Attending: INTERNAL MEDICINE
Payer: MEDICARE

## 2021-03-20 LAB
ABSOLUTE EOS #: 0.32 K/UL (ref 0–0.44)
ABSOLUTE IMMATURE GRANULOCYTE: 0.04 K/UL (ref 0–0.3)
ABSOLUTE LYMPH #: 1.28 K/UL (ref 1.1–3.7)
ABSOLUTE MONO #: 0.66 K/UL (ref 0.1–1.2)
ANION GAP SERPL CALCULATED.3IONS-SCNC: 12 MMOL/L (ref 9–17)
BASOPHILS # BLD: 0 % (ref 0–2)
BASOPHILS ABSOLUTE: 0.04 K/UL (ref 0–0.2)
BNP INTERPRETATION: ABNORMAL
BUN BLDV-MCNC: 45 MG/DL (ref 6–20)
BUN/CREAT BLD: ABNORMAL (ref 9–20)
C-REACTIVE PROTEIN: 56.2 MG/L (ref 0–5)
CALCIUM SERPL-MCNC: 8 MG/DL (ref 8.6–10.4)
CHLORIDE BLD-SCNC: 104 MMOL/L (ref 98–107)
CO2: 20 MMOL/L (ref 20–31)
CREAT SERPL-MCNC: 2.95 MG/DL (ref 0.7–1.2)
DIFFERENTIAL TYPE: ABNORMAL
EOSINOPHILS RELATIVE PERCENT: 4 % (ref 1–4)
GFR AFRICAN AMERICAN: 29 ML/MIN
GFR NON-AFRICAN AMERICAN: 24 ML/MIN
GFR SERPL CREATININE-BSD FRML MDRD: ABNORMAL ML/MIN/{1.73_M2}
GFR SERPL CREATININE-BSD FRML MDRD: ABNORMAL ML/MIN/{1.73_M2}
GLUCOSE BLD-MCNC: 122 MG/DL (ref 75–110)
GLUCOSE BLD-MCNC: 74 MG/DL (ref 75–110)
GLUCOSE BLD-MCNC: 87 MG/DL (ref 75–110)
GLUCOSE BLD-MCNC: 88 MG/DL (ref 70–99)
GLUCOSE BLD-MCNC: 92 MG/DL (ref 75–110)
HCT VFR BLD CALC: 38.3 % (ref 40.7–50.3)
HEMOGLOBIN: 10.7 G/DL (ref 13–17)
IMMATURE GRANULOCYTES: 0 %
INR BLD: 1.1
LV EF: 25 %
LVEF MODALITY: NORMAL
LYMPHOCYTES # BLD: 14 % (ref 24–43)
MCH RBC QN AUTO: 23.2 PG (ref 25.2–33.5)
MCHC RBC AUTO-ENTMCNC: 27.9 G/DL (ref 28.4–34.8)
MCV RBC AUTO: 82.9 FL (ref 82.6–102.9)
MONOCYTES # BLD: 7 % (ref 3–12)
NRBC AUTOMATED: 0 PER 100 WBC
PARTIAL THROMBOPLASTIN TIME: 25.8 SEC (ref 20.5–30.5)
PDW BLD-RTO: 18.4 % (ref 11.8–14.4)
PLATELET # BLD: 322 K/UL (ref 138–453)
PLATELET ESTIMATE: ABNORMAL
PMV BLD AUTO: 8.5 FL (ref 8.1–13.5)
POTASSIUM SERPL-SCNC: 3.6 MMOL/L (ref 3.7–5.3)
PRO-BNP: ABNORMAL PG/ML
PROCALCITONIN: 0.14 NG/ML
PROTHROMBIN TIME: 12 SEC (ref 9.1–12.3)
RBC # BLD: 4.62 M/UL (ref 4.21–5.77)
RBC # BLD: ABNORMAL 10*6/UL
SEDIMENTATION RATE, ERYTHROCYTE: 66 MM (ref 0–15)
SEG NEUTROPHILS: 75 % (ref 36–65)
SEGMENTED NEUTROPHILS ABSOLUTE COUNT: 6.86 K/UL (ref 1.5–8.1)
SODIUM BLD-SCNC: 136 MMOL/L (ref 135–144)
WBC # BLD: 9.2 K/UL (ref 3.5–11.3)
WBC # BLD: ABNORMAL 10*3/UL

## 2021-03-20 PROCEDURE — 6370000000 HC RX 637 (ALT 250 FOR IP): Performed by: CLINICAL NURSE SPECIALIST

## 2021-03-20 PROCEDURE — 83036 HEMOGLOBIN GLYCOSYLATED A1C: CPT

## 2021-03-20 PROCEDURE — 86140 C-REACTIVE PROTEIN: CPT

## 2021-03-20 PROCEDURE — 80048 BASIC METABOLIC PNL TOTAL CA: CPT

## 2021-03-20 PROCEDURE — 83880 ASSAY OF NATRIURETIC PEPTIDE: CPT

## 2021-03-20 PROCEDURE — 51702 INSERT TEMP BLADDER CATH: CPT

## 2021-03-20 PROCEDURE — 76775 US EXAM ABDO BACK WALL LIM: CPT

## 2021-03-20 PROCEDURE — 36415 COLL VENOUS BLD VENIPUNCTURE: CPT

## 2021-03-20 PROCEDURE — 84145 PROCALCITONIN (PCT): CPT

## 2021-03-20 PROCEDURE — 93306 TTE W/DOPPLER COMPLETE: CPT

## 2021-03-20 PROCEDURE — 94761 N-INVAS EAR/PLS OXIMETRY MLT: CPT

## 2021-03-20 PROCEDURE — 85652 RBC SED RATE AUTOMATED: CPT

## 2021-03-20 PROCEDURE — 6370000000 HC RX 637 (ALT 250 FOR IP): Performed by: INTERNAL MEDICINE

## 2021-03-20 PROCEDURE — 6360000002 HC RX W HCPCS: Performed by: INTERNAL MEDICINE

## 2021-03-20 PROCEDURE — 85610 PROTHROMBIN TIME: CPT

## 2021-03-20 PROCEDURE — 2580000003 HC RX 258: Performed by: INTERNAL MEDICINE

## 2021-03-20 PROCEDURE — 82947 ASSAY GLUCOSE BLOOD QUANT: CPT

## 2021-03-20 PROCEDURE — 85730 THROMBOPLASTIN TIME PARTIAL: CPT

## 2021-03-20 PROCEDURE — 2060000000 HC ICU INTERMEDIATE R&B

## 2021-03-20 PROCEDURE — 85025 COMPLETE CBC W/AUTO DIFF WBC: CPT

## 2021-03-20 PROCEDURE — 99233 SBSQ HOSP IP/OBS HIGH 50: CPT | Performed by: INTERNAL MEDICINE

## 2021-03-20 PROCEDURE — 99223 1ST HOSP IP/OBS HIGH 75: CPT | Performed by: INTERNAL MEDICINE

## 2021-03-20 RX ORDER — SODIUM CHLORIDE 9 MG/ML
INJECTION, SOLUTION INTRAVENOUS CONTINUOUS
Status: DISCONTINUED | OUTPATIENT
Start: 2021-03-20 | End: 2021-03-21

## 2021-03-20 RX ADMIN — METOPROLOL TARTRATE 25 MG: 25 TABLET ORAL at 10:25

## 2021-03-20 RX ADMIN — CEFEPIME HYDROCHLORIDE 1000 MG: 1 INJECTION, POWDER, FOR SOLUTION INTRAMUSCULAR; INTRAVENOUS at 21:44

## 2021-03-20 RX ADMIN — Medication 1 TABLET: at 10:26

## 2021-03-20 RX ADMIN — VANCOMYCIN HYDROCHLORIDE 2000 MG: 1 INJECTION, POWDER, LYOPHILIZED, FOR SOLUTION INTRAVENOUS at 13:29

## 2021-03-20 RX ADMIN — DESMOPRESSIN ACETATE 40 MG: 0.2 TABLET ORAL at 10:26

## 2021-03-20 RX ADMIN — SODIUM CHLORIDE: 9 INJECTION, SOLUTION INTRAVENOUS at 13:55

## 2021-03-20 RX ADMIN — ACETAMINOPHEN 650 MG: 325 TABLET ORAL at 21:42

## 2021-03-20 RX ADMIN — HEPARIN SODIUM 5000 UNITS: 5000 INJECTION INTRAVENOUS; SUBCUTANEOUS at 13:40

## 2021-03-20 RX ADMIN — FUROSEMIDE 40 MG: 10 INJECTION, SOLUTION INTRAMUSCULAR; INTRAVENOUS at 10:25

## 2021-03-20 RX ADMIN — SODIUM CHLORIDE, PRESERVATIVE FREE 10 ML: 5 INJECTION INTRAVENOUS at 10:26

## 2021-03-20 RX ADMIN — BACLOFEN 5 MG: 10 TABLET ORAL at 10:26

## 2021-03-20 RX ADMIN — INSULIN GLARGINE 70 UNITS: 100 INJECTION, SOLUTION SUBCUTANEOUS at 21:45

## 2021-03-20 RX ADMIN — BACLOFEN 5 MG: 10 TABLET ORAL at 21:41

## 2021-03-20 RX ADMIN — MAGNESIUM GLUCONATE 500 MG ORAL TABLET 400 MG: 500 TABLET ORAL at 10:26

## 2021-03-20 RX ADMIN — METOPROLOL TARTRATE 25 MG: 25 TABLET ORAL at 21:41

## 2021-03-20 RX ADMIN — CEFEPIME HYDROCHLORIDE 1000 MG: 1 INJECTION, POWDER, FOR SOLUTION INTRAMUSCULAR; INTRAVENOUS at 10:27

## 2021-03-20 RX ADMIN — HEPARIN SODIUM 5000 UNITS: 5000 INJECTION INTRAVENOUS; SUBCUTANEOUS at 21:45

## 2021-03-20 RX ADMIN — STANDARDIZED SENNA CONCENTRATE 8.6 MG: 8.6 TABLET ORAL at 21:41

## 2021-03-20 NOTE — PROGRESS NOTES
Pharmacy Note  Vancomycin Consult    Walter Navas is a 38 y.o. male started on Vancomycin for sacral wound infection; consult received from Dr. Nacho Walker to manage therapy. Also receiving the following antibiotics: cefepime. Patient Active Problem List   Diagnosis    Necrotizing fasciitis ([address]    Essential hypertension    DM II (diabetes mellitus, type II), controlled ([address]    Diabetic foot ulcer ([address]    Diabetic foot infection ([address]    Iron deficiency anemia    Muscle weakness of right upper extremity    Wrist drop, acquired, right    Chronic paraplegia (HCC)    DEBBY (acute kidney injury) ([address]    Right arm weakness    Chronic systolic heart failure ([address]    Sacral wound    Open wound of left foot    Ambrosio catheter in place    Colostomy in place [facility])     Allergies: Other     Temp max: 98.6 today    Recent Labs     03/19/21  1830   BUN 42*   CREATININE 2.81*       Intake/Output Summary (Last 24 hours) at 3/20/2021 0940  Last data filed at 3/20/2021 0400  Gross per 24 hour   Intake    Output 2475 ml   Net -2475 ml     Culture Date      Source                       Results  3/19                     urine                          pending  3/19                      blood                        pending    Ht Readings from Last 1 Encounters:   03/19/21 5' 9\" (1.753 m)        Wt Readings from Last 1 Encounters:   03/19/21 (!) 331 lb 1.6 oz (150.2 kg)       Body mass index is 48.89 kg/m². Estimated Creatinine Clearance: 52 mL/min (A) (based on SCr of 2.81 mg/dL (H)). Goal Trough Level: 15-20 mcg/mL    Assessment/Plan:  Will initiate Vancomycin with a one time loading dose of 2000 mg x1, followed by 1250 mg IV every 12 hours. Timing of trough level will be determined based on culture results, renal function, and clinical response. Thank you for the consult. Will continue to follow. [address] [place] [address]  Ph., CACP, Clinical Pharmacist  Anticoagulation Services, [address] [facility]mad[facility]  3/20/2021 9:46 AM

## 2021-03-20 NOTE — PROGRESS NOTES
nutrient needs related to (healing) as evidenced by (presence of wounds)      Nutrition Interventions:   Food and/or Nutrient Delivery:  Modify Current Diet, Start Oral Nutrition Supplement  Nutrition Education/Counseling:  No recommendation at this time   Coordination of Nutrition Care:  Continue to monitor while inpatient    Goals:  Meet greater than or equal to 75% of estimated nutrient needs for wound healing with PO intake       Nutrition Monitoring and Evaluation:   Behavioral-Environmental Outcomes:  None Identified   Food/Nutrient Intake Outcomes:  Food and Nutrient Intake, Supplement Intake  Physical Signs/Symptoms Outcomes:  Weight, Skin, Biochemical Data, Nutrition Focused Physical Findings, Fluid Status or Edema     Discharge Planning:     Too soon to determine     Electronically signed by Dominik Ramirez MS, RD, LD on 3/20/21 at 10:53 AM EDT    Contact: [phone]

## 2021-03-20 NOTE — CONSULTS
General Surgery:    Consult Note        PATIENT NAME: Angelique Sherwood OF BIRTH: 1985    ADMISSION DATE: 3/19/2021  5:34 PM     Admitting Provider: Dr. Oren Araiza Physician: Dr. Leonel Sherwood: 3/20/2021    Chief Complaint:  SOB  Consult Regarding:  Sacral and perineal wounds     HISTORY OF PRESENT ILLNESS:  The patient is a 38 y.o. male  who was admitted on 3/19/21 for shortness of breath. Patient has extensive medical and surgical history including spina bifida, CHF, CKD, diabetes, previous necrotizing fasciitis of the perineum and gluteal region requiring extensive debridement and end colostomy creation for diversion 11/20/19 to 1/16/2020. General surgery been consulted for evaluation of the perineal and gluteal wounds.     Past Medical History:        Diagnosis Date    CHF (congestive heart failure) (HCC)     Diabetes mellitus ([facility]     Hypertension     Septic shock ([facility]     Spina bifida aperta of lumbar spine ([facility]        Past Surgical History:        Procedure Laterality Date    ABDOMEN SURGERY N/A 12/8/2019    DEBRIDEMENT  NECROTIZING FASCIITIS BUTTOCK, WOUND VAC REMOVAL DELAYED PRIMARY CLOSURE OF MIDLINE ABDOMINAL INCISION, OSTOMY BAG CHANGE performed by eKe Shrestha MD at [address] [facility] N/A 12/10/2019    DEBRIDEMENT OF SACRAL WOUND performed by Luis Daniel Arizmendi MD at [facility] Floor N/A 1/5/2020    GLUTEAL WOUND DEBRIDEMENT, WOUND VAC CHANGE performed by Luis Daniel Arizmendi MD at [facility] Floor N/A 1/11/2020    SACRAL DEBRIDEMENT WITH WOUND VAC CHANGE performed by Rachael Tejada MD at [facility] 2. 1/1/2020    IRRIGATION AND DEBRIDEMENT BUTTOCKS, SCROTUM, ABDOMEN, WOUND VAC CHANGE performed by Kee Shrestha MD at [facility] 12/3/2019    LAPAROSCOPIC CONVERTED TO OPEN DIVERTING LOOP COLOSTOMY; WOUND VAC APPLICATION performed by Dax Freire MD at [place] [address] 01/05/2020    GLUTEAL WOUND DEBRIDEMENT, WOUND VAC CHANGE     DEBRIDEMENT  01/08/2020    WOUND VAC CHANGE SACRAL DECUBITUS, POSSIBLE DEBRIDEMENT    DEBRIDEMENT  01/11/2020     SACRAL DEBRIDEMENT WITH WOUND VAC CHANGE     INCISION AND DRAINAGE Bilateral 11/29/2019    RECTAL PERIRECTAL INCISION AND DRAINAGE, [facility] LOOP COLOSTOMY CREATION performed by Bernie Duarte MD at [facility] [address] N/A 12/6/2019    DEBRIDEMENT NECROTIZING FASCIAITIS BILAT BUTTOCK [facility] N/A 12/20/2019    DEBRIDEMENT GLUTEAL AND SCROTAL REGIONS performed [facility] N/A 12/26/2019    INCISION AND DRAINAGE AND WOUND VAC CHANGE BUTTOCK, PERINEUM performed by Iwona Arnold DO [facility] N/A 1/8/2020    WOUND VAC CHANGE SACRAL DECUBITUS, DEBRIDEMENT performed by Kd Arguello MD at [place] [address] N/A 12/10/2019    SCROTAL EXPLORATION WITH SCROTAL DEBRIDEMENT performed by Ricci Solares MD at [facility] N/A 12/23/2019    WOUND [address] performed by Kd Arguello MD at [facility] [address]       Medications Prior to Admission:   Medications Prior to Admission: sevelamer (RENVELA) 800 MG tablet, Take 2 tablets by mouth 3 times daily (with meals)  sodium bicarbonate 650 MG tablet, Take 650 mg by mouth 4 times daily  chlorhexidine (PERIDEX) 0.12 % solution, Take 15 mLs by mouth 2 times daily  acetaminophen (TYLENOL) 325 MG tablet, Take 650 mg by mouth every 6 hours as needed for Pain  Liraglutide (VICTOZA) 18 MG/3ML SOPN SC injection, 1.8 mg  oxybutynin (DITROPAN) 5 MG tablet,   Baclofen (LIORESAL) 5 MG tablet, Take 1 tablet by mouth 2 times daily  Epoetin Chaitanya-epbx (RETACRIT IJ), Inject 10,000 Units as directed Indications: M W F if levels are correct  insulin detemir (LEVEMIR FLEXTOUCH) 100 UNIT/ML injection pen, Levemir FlexTouch U-100 Insulin 100 unit/mL (3 mL) subcutaneous pen  atorvastatin (LIPITOR) 80 MG tablet, Take 100 mg by mouth daily   enoxaparin (LOVENOX) 40 MG/0.4ML injection, Inject 0.4 mg into the skin 2 times daily  ferrous sulfate (IRON 325) 325 (65 Fe) MG tablet, Take 325 mg by mouth 3 times daily (with meals)  magnesium oxide (MAG-OX) 400 MG tablet, Take 400 mg by mouth daily  senna (SENOKOT) 8.6 MG tablet, Take 1 tablet by mouth nightly  polyethylene glycol (GLYCOLAX) 17 g packet, Take 17 g by mouth daily as needed for Constipation  oxyCODONE (ROXICODONE) 5 MG immediate release tablet, Take 10 mg by mouth every 4 hours as needed for Pain. Multiple Vitamins-Minerals (THERAPEUTIC MULTIVITAMIN-MINERALS) tablet, Take 1 tablet by mouth daily  insulin glargine (LANTUS) 100 UNIT/ML injection vial, Inject 10 Units into the skin daily    Allergies:   Other    Social History:   Social History     Socioeconomic History    Marital status: Unknown     Spouse name: Not on file    Number of children: Not on file    Years of education: Not on file    Highest education level: Not on file   Occupational History    Not on file   Social Needs    Financial resource strain: Not on file    Food insecurity     Worry: Not on file     Inability: Not on file    Transportation needs     Medical: Not on file     Non-medical: Not on file   Tobacco Use    Smoking status: Never Smoker    Smokeless tobacco: Never Used   Substance and Sexual Activity    Alcohol use: Never     Frequency: Never    Drug use: Never    Sexual activity: Not on file   Lifestyle    Physical activity     Days per week: Not on file     Minutes per session: Not on file    Stress: Not on file   Relationships    Social connections     Talks on phone: Not on file     Gets together: Not on file     Attends Amish service: Not on file     Active member of club or organization: Not on file     Attends meetings of clubs or organizations: Not on file     Relationship status: Not on file    Intimate partner violence     Fear of current or ex partner: Not on file     Emotionally abused: Not on file     Physically abused: Not on file     Forced sexual activity: Not on file   Other Topics Concern    Not on file   Social History Narrative    Not on file       Family History:   No family history on file. REVIEW OF SYSTEMS:    CONSTITUTIONAL:  Denies fevers  HEENT:  No nasal congestion or drainage. CARDIOVASCULAR:  No chest pain  GASTROINTESTINAL:  No abdominal pain, nausea, or vomiting. No constipation/diarrhea. No rectal bleeding  GENITOURINARY: Chronic Ambrosio  HEMATOLOGIC/LYMPHATIC:  No easy bruising. No history of cancer  ENDOCRINE: negative  Review of systems negative unless listed above. PHYSICAL EXAM:    VITALS:  /85   Pulse 95   Temp 97.3 °F (36.3 °C) (Axillary)   Resp 19   Ht 5' 9\" (1.753 m)   Wt (!) 331 lb 1.6 oz (150.2 kg)   SpO2 98%   BMI 48.89 kg/m²   INTAKE/OUTPUT:     Intake/Output Summary (Last 24 hours) at 3/20/2021 1052  Last data filed at 3/20/2021 0400  Gross per 24 hour   Intake    Output 2475 ml   Net -2475 ml       CONSTITUTIONAL:  awake, alert, not distressed   ENT:  normocephalic/atraumatic, without obvious abnormality  NECK:  supple, symmetrical, trachea midline   LUNGS: Equal chest rise and fall nonlabored breathing,  CARDIOVASCULAR:  regular rate and rhythm   ABDOMEN: soft, non-distended, non-tender to palpation, obese, left lower quadrant ostomy site intact, stool noted. no guarding, Scars consistent w/ surgical history   SKIN:  Wounds chronic gluteal and perineal wounds noted with good granulation tissue and no signs of active drainage or erythema. Old sutures noted and removed at bedside.    MUSCULOSKELETAL: no acute findings   NEUROLOGIC:  Mental Status Exam:  Level of Alertness:   alert    CBC with Differential:    Lab Results   Component Value Date    WBC 9.2 03/20/2021    RBC 4.62 03/20/2021 HGB 10.7 03/20/2021    HCT 38.3 03/20/2021     03/20/2021    MCV 82.9 03/20/2021    MCH 23.2 03/20/2021    MCHC 27.9 03/20/2021    RDW 18.4 03/20/2021    NRBC 1 01/03/2020    LYMPHOPCT 14 03/20/2021    MONOPCT 7 03/20/2021    BASOPCT 0 03/20/2021    MONOSABS 0.66 03/20/2021    LYMPHSABS 1.28 03/20/2021    EOSABS 0.32 03/20/2021    BASOSABS 0.04 03/20/2021    DIFFTYPE NOT REPORTED 03/20/2021       Pertinent Radiology:       ASSESSMENT:  Active Hospital Problems    Diagnosis Date Noted    DEBBY (acute kidney injury) ([facility] [N17.9] 03/19/2021     Priority: High    Chronic systolic heart failure ([facility] [I50.22] 03/19/2021     Priority: High    Right arm weakness [R29.898] 03/19/2021     Priority: Medium    Sacral wound [S31.000A] 03/19/2021    Open wound of left foot [S91.302A] 03/19/2021    Ambrosio catheter in place [Z97.8] 03/19/2021    Colostomy in place [facility]) [Z93.3] 03/19/2021    Chronic paraplegia ([facility] [G82.20] 04/30/2020    DM II (diabetes mellitus, type II), controlled ([facility] [E11.9] 11/29/2019    Essential hypertension [I10] 11/29/2019       38 y.o. male with extensive medical and surgical Hx, general surgery consulted for evaluation of perineal and gluteal wounds    Plan:  1. Continue medical mgmt and supportive care per primary  2. Wound examined at bedside and previous sutures removed, recommend for continued wound care per wound ostomy service and offloading as able, no plans for further debridement at this time  3. Consult wound ostomy   4.  Please contact general surgery with any questions or concerns    Plan was formulated and discussed with Dr. Familia Mason     Thank you,    Electronically signed by Steve Marr DO  on 3/20/2021 at 10:52 AM       Attending Note      I have reviewed the above [facility] note(s) and I either performed the key elements of the medical history and physical exam or was present with the surgery resident when the key elements of the medical history and physical exam were

## 2021-03-20 NOTE — PROGRESS NOTES
was directly admitted from [facility] ED March 19 for the management of DEBBY (acute kidney injury) ([facility]    He has a hx of spina bifida, CHF with EF 30% on Echo 12/6/19, CKD-baseline creat ~1.5, DM, chronic Lt foot ulcer and prolonged admit (11/29/19 - 1/16/20) for Necrotizing fasciitis buttocks. During that admit he underwent approx 11 I&D procedures, temporary dialysis and colostomy creation. He has a chronic lema cath. Pt was in SNF until about 4 mo ago.     Pt presented to ED with worsening shortness of breath. EKG: Mult PVC's, old inferior & anteroseptal infarct, . CXR showed congestion, BNP 1200 (<100), Trop 0.09 (0-0.04), Lactic 1.5, BUN/Cr 46/3.05, Glucose 122, Na 138, K 4.2, Mg 1.2, LFTs wnl, INR 1.4, WBC 9.9, hgb 10.7, platelets 246. UA:  Lg leuko est, + protein, Mod hgb. Covid Neg     Pt reports gradual decline in strength, chills, discomfort to his buttocks, increase in size of buttock wounds, shortness of breath and worsening edema x 2 mos. His visiting nurse started Doxycycline 2 days ago for possible wound infection and told him he needed more care than could be provided in the home. He lives with his mom who is 79 yr old and has some health issues. Review of Systems:     Constitutional:  negative for chills, fevers, sweats  Respiratory:  negative for cough, dyspnea on exertion, shortness of breath, wheezing  Cardiovascular: Reports baseline leg swelling; negative for chest pain, chest pressure/discomfort, palpitations  Gastrointestinal:  negative for abdominal pain, constipation, diarrhea, nausea, vomiting  Skin: Reports multiple ulcers to his low back, buttocks  Neurological:  negative for dizziness, headache    Medications: Allergies:     Allergies   Allergen Reactions    Other      Mask adhesive causes hives       Current Meds:   Scheduled Meds:    cefepime  1,000 mg Intravenous Q12H    vancomycin  2,000 mg Intravenous Once    vancomycin  1,250 mg Intravenous 03/20/21 0728 03/20/21  1147   POCGLU 134* 74* 87       I/O (24Hr): Intake/Output Summary (Last 24 hours) at 3/20/2021 1456  Last data filed at 3/20/2021 0400  Gross per 24 hour   Intake    Output 2475 ml   Net -2475 ml       Labs:  Hematology:  Recent Labs     03/20/21  0929   WBC 9.2   RBC 4.62   HGB 10.7*   HCT 38.3*   MCV 82.9   MCH 23.2*   MCHC 27.9*   RDW 18.4*      MPV 8.5   SEDRATE 66*   CRP 56.2*   INR 1.1     Chemistry:  Recent Labs     03/19/21  1830 03/20/21  0929    136   K 4.0 3.6*    104   CO2 20 20   GLUCOSE 107* 88   BUN 42* 45*   CREATININE 2.81* 2.95*   ANIONGAP 11 12   LABGLOM 26* 24*   GFRAA 31* 29*   CALCIUM 8.2* 8.0*   PROBNP  --  43,534*     Recent Labs     03/19/21 2009 03/20/21 0728 03/20/21  1147   POCGLU 134* 74* 87     ABG:  Lab Results   Component Value Date    POCPH 7.381 12/24/2019    POCPCO2 35.3 12/24/2019    POCPO2 140.3 12/24/2019    POCHCO3 20.9 12/24/2019    NBEA 4 12/24/2019    PBEA NOT REPORTED 12/24/2019    GEO5GGO 22 12/24/2019    JBPO9PVV 99 12/24/2019    FIO2 40.0 12/24/2019     Lab Results   Component Value Date/Time    SPECIAL L AC 2ML 03/19/2021 08:52 PM     Lab Results   Component Value Date/Time    CULTURE NO GROWTH 14 HOURS 03/19/2021 08:52 PM       Radiology:  No results found. Physical Examination:        General appearance:  alert, cooperative and no distress, obese  gentleman sitting up in bed  Mental Status:  oriented to person, place and time and normal affect  Lungs:  clear to auscultation bilaterally, normal effort, no wheezes or rhonchi present  Heart:  regular rate and rhythm, no murmur  Abdomen:  soft, nontender, nondistended, normal bowel sounds, no masses, hepatomegaly, splenomegaly, postsurgical changes noted to the lower abdominal wall with well-healed cicatrix.   He does have dependent edema which is weeping and significantly pitting, ostomy is present in the left lower quadrant with gas and stool output Extremities: 2+ bilateral lower extremity pitting edema, no redness or tenderness in the calves  Skin: Chronic venous stasis changes noted to the bilateral lower extremities    Assessment:        Hospital Problems           Last Modified POA    * (Principal) DEBBY (acute kidney injury) ([address] 3/19/2021 Yes    Chronic systolic heart failure ([address] 3/19/2021 Yes    DM II (diabetes mellitus, type II), controlled ([address] 3/19/2021 Yes    Chronic paraplegia ([address] 3/19/2021 Yes    Right arm weakness 3/19/2021 Yes    Essential hypertension 3/19/2021 Yes    Sacral wound (Chronic) 3/19/2021 Yes    Open wound of left foot 3/19/2021 Yes    Ambrosio catheter in place (Chronic) 3/19/2021 Yes    Colostomy in place [facility]) (Chronic) 3/19/2021 Yes    Overview Signed 3/19/2021  7:56 PM by MIRTA Emanuel - [facility]     December '19               Plan:        1. Acute kidney injury on CKDconsult nephrology today. Stop IV Lasix. Start IV fluids @ 50 cc/hr as creatinine has worsened despite Lasix administration. FeNa - 4.36% suggesting intrinsic renal failure  2. History of necrotizing fasciitis status post colostomy creation with significant woundswound care consult. General surgery has evaluated the patient and is recommending no further debridement at this time. Wound ostomy service to resume care on Monday. 3. Urinalysis suggestive of acute cystitis with hematuria. Start cefepime and vancomycin. Await urine culture for further guidance on treatment. 4. Longstanding indwelling Ambrosio catheterpatient unsure as when this was last exchanged. States it has been several months. Discussed with nursing. Exchange today. Send urine culture. 5. CRP and ESR mildly elevated. 6. Volume overloadmay require hemodialysis. Nephrology following  7. Continue to trend daily labs  8. DM2continue insulin sliding scale Lantus 7 units nightly  9. DVT prophylaxis with heparin 5000 units every 8 hours  10.  Continue high intensity statin therapy

## 2021-03-20 NOTE — CONSULTS
Renal Consult Note    Patient :  Abdiaziz Sanders; 38 y.o. MRN# [id number]  Location:  2002/2002-01  Attending:  Fady Frost DO  Admit Date:  3/19/2021   Hospital Day: 1    Reason for Consult:     Asked by Dr Fady Frost DO to see for DEBBY/Elevated Creatinine. History Obtained From:     patient, electronic medical record    History of Present Illness:     Abdiaziz Sanders; 38 y.o. male with past medical history of spina bifida, HTN,CHF with EF 30%, diabetes, chronic left foot wound for necrotizing fascitis, requiring multiple I& D procedures resulting in DEBBY on CKD requiring HD for several months 11/19-12/2020. He was transferred from [facility] yesterday for DEBBY. He presented to [facility] due to weakness, chills, increasing size of wound on buttocks, edema, and dyspnea. His creatinine was 3.05, so he was transferred to [address] [place] [address] Currently on NS @ 50/hr. This morning creatinine is 2.95. Bicarb 20, Potassium is 3.6. Blood pressure stable without pressor support. Nephrology consulted for DEBBY. Previously seen  12/2019 to January 2020 while hospitalized   Today he was started on Vancomycin for sacral wound. Pharmacy initiated today 2000 mg X1. Per patient no fevers at home. Recently started on doxycycline for concerns of wound infection. He has an chronic indwelling lema catheter. There is a concern that it is the same foely catheter that was placed over one year ago. Urine with 3+ protein, large leukocytes. He does not appear to be on diuretics, or ACE/ARB at home. He lives with his elderly mother. Not sure who helps him with ADL's but per nursing he was extremely dirty when he arrived at [facility]    Last kidney imaging on 11/2019 normal cortical echogenicity.          No history of recent contrast exposure  No h/o prolonged NSAIDs use in the past,   No h/o nephrolithiasis, No recent skin rashes or arthralgias   No hematuria or pyuria noticed in the recent past.   Doesn't report any reduction in the urine output recently. Non report of any obstructive urinary symptoms (urgency, frequency, weak stream, straining while urination). No h/o recurrent UTIs in the past.    Past History/Allergies? Social History:     Past Medical History:   Diagnosis Date    [facility]     Diabetes mellitus [facility]     Hypertension     [facility]     Spina bifida aperta of lumbar spine ([facility]        Allergies   Allergen Reactions    Other      Mask adhesive causes hives       Social History     Socioeconomic History    Marital status: Unknown     Spouse name: Not on file    Number of children: Not on file    Years of education: Not on file    Highest education level: Not on file   Occupational History    Not on file   Social Needs    Financial resource strain: Not on file    Food insecurity     Worry: Not on file     Inability: Not on file    Transportation needs     Medical: Not on file     Non-medical: Not on file   Tobacco Use    Smoking status: Never Smoker    Smokeless tobacco: Never Used   Substance and Sexual Activity    Alcohol use: Never     Frequency: Never    Drug use: Never    Sexual activity: Not on file   Lifestyle    Physical activity     Days per week: Not on file     Minutes per session: Not on file    Stress: Not on file   Relationships    Social connections     Talks on phone: Not on file     Gets together: Not on file     Attends Taoist service: Not on file     Active member of club or organization: Not on file     Attends meetings of clubs or organizations: Not on file     Relationship status: Not on file    Intimate partner violence     Fear of current or ex partner: Not on file     Emotionally abused: Not on file     Physically abused: Not on file     Forced sexual activity: Not on file   Other Topics Concern    Not on file   Social History Narrative    Not on file       Family History:      No family history on file.     Outpatient Medications:     Medications Prior to Admission: sevelamer (RENVELA) 800 MG tablet, Take 2 tablets by mouth 3 times daily (with meals)  sodium bicarbonate 650 MG tablet, Take 650 mg by mouth 4 times daily  chlorhexidine (PERIDEX) 0.12 % solution, Take 15 mLs by mouth 2 times daily  acetaminophen (TYLENOL) 325 MG tablet, Take 650 mg by mouth every 6 hours as needed for Pain  Liraglutide (VICTOZA) 18 MG/3ML SOPN SC injection, 1.8 mg  oxybutynin (DITROPAN) 5 MG tablet,   Baclofen (LIORESAL) 5 MG tablet, Take 1 tablet by mouth 2 times daily  Epoetin Chaitanya-epbx (RETACRIT IJ), Inject 10,000 Units as directed Indications: M W F if levels are correct  insulin detemir (LEVEMIR FLEXTOUCH) 100 UNIT/ML injection pen, Levemir FlexTouch U-100 Insulin 100 unit/mL (3 mL) subcutaneous pen  atorvastatin (LIPITOR) 80 MG tablet, Take 100 mg by mouth daily   enoxaparin (LOVENOX) 40 MG/0.4ML injection, Inject 0.4 mg into the skin 2 times daily  ferrous sulfate (IRON 325) 325 (65 Fe) MG tablet, Take 325 mg by mouth 3 times daily (with meals)  magnesium oxide (MAG-OX) 400 MG tablet, Take 400 mg by mouth daily  senna (SENOKOT) 8.6 MG tablet, Take 1 tablet by mouth nightly  polyethylene glycol (GLYCOLAX) 17 g packet, Take 17 g by mouth daily as needed for Constipation  oxyCODONE (ROXICODONE) 5 MG immediate release tablet, Take 10 mg by mouth every 4 hours as needed for Pain.   Multiple Vitamins-Minerals (THERAPEUTIC MULTIVITAMIN-MINERALS) tablet, Take 1 tablet by mouth daily  insulin glargine (LANTUS) 100 UNIT/ML injection vial, Inject 10 Units into the skin daily    Current Medications:     Scheduled Meds:    cefepime  1,000 mg Intravenous Q12H    vancomycin  2,000 mg Intravenous Once    vancomycin  1,250 mg Intravenous Q12H    [START ON 3/21/2021] vancomycin (VANCOCIN) intermittent dosing (placeholder)   Other RX Placeholder    Baclofen  5 mg Oral BID    [Held by provider] ferrous sulfate  325 mg Oral TID WC    magnesium oxide  400 mg ejection fraction of 30%, spina bifida with restricted mobility, chronic nonhealing gluteal wounds requiring diverting colostomy, chronic indwelling Ambrosio, recurrent UTIs. Also has an element of chronic kidney disease baseline creatinine 1.82.0, proteinuria has been around 2 g predominantly albuminuria. He has been seen by our service during his hospitalization from December 2019 to January 2020 when he came in for nonhealing wounds, required several debridements, was placed on vancomycin, developed acute kidney injury, required hemodialysis for about 2 to 3 weeks while in hospital.  Thereafter was discharged to an [facility] and now is home. Last creatinine in November was 1.8. He now comes in to the hospital because a visiting nurse felt his wounds were getting infected. There was drainage and foul-smelling discharge according to him. Also the urine was cloudy as well. He was normotensive. Urinalysis showed too numerous to count WBCs. Creatinine was found to be 2.95 which is higher than his baseline. Nephrology has been consulted for acute kidney injury. On examination appears in no distress, chronic lower extremity edema, Ambrosio catheter in place, heart sounds normal lungs clear  Impression  1. Acute kidney injury secondary to ischemic ATN from depleted circulating volume, systemic inflammatory spine syndrome baseline 1.82.0 peaked up to 2.95  2. Chronic kidney disease stage IV secondary to diabetic nephrosclerosis proteinuria about 2 g baseline 1.82.0  3. Nonhealing sacral wounds with some suspicion for infection although still surgery reviewed his wounds and did not feel they were overtly infected  4. Complicated UTI from an indwelling Ambrosio  5. Spina bifida  6. Status post colostomy  7. Cardiomyopathy  8. Type 2 diabetes  Plan #1 normal saline at 50 mils an hour  2. Discontinue vancomycin avoid vancomycin as likelihood of nephrotoxicity very high  3.   Consider ID consult for choice of antibiotics  4. Leave Ambrosio  5. Follow renal function  6.   We will follow

## 2021-03-20 NOTE — FLOWSHEET NOTE
Assessment.  was rounding on the floor and initiated a visit. Patient appeared very tired.  and patient were able to talk for a few seconds, but the patient appeared too tired. Intervention.  used presence. Outcome. Gratitude. Plan. Chaplains will remain available to offer spiritual and emotional support as needed. 03/20/21 1532   Encounter Summary   Services provided to: Patient   Referral/Consult From: Rounding   Support System Unknown   Continue Visiting   (3/20/2021)   Complexity of Encounter Low   Length of Encounter 15 minutes   Routine   Type Initial   Assessment Passive  (Sleepy)   Intervention   ([place].  Presence. )   Outcome Expressed gratitude

## 2021-03-20 NOTE — CARE COORDINATION
TRANSITIONAL CARE PLANNING/ 2 [facility] [place] Day: 1    Reason for Admission: Acute kidney injury ([facility] [address] [N17.9]          Readmission Risk              Risk of Unplanned Readmission:        19          Met with patient today to discuss transition plan offered home care his response was \"I need to go to a facility I cannot transfer myself to my wheelchair\"    Referrals Made:   [facility]- call from [facility] cannot accept   [employer]

## 2021-03-21 PROBLEM — T38.3X5A HYPOGLYCEMIA DUE TO INSULIN: Status: ACTIVE | Noted: 2021-03-21

## 2021-03-21 PROBLEM — E16.0 HYPOGLYCEMIA DUE TO INSULIN: Status: ACTIVE | Noted: 2021-03-21

## 2021-03-21 PROBLEM — E87.70 VOLUME OVERLOAD: Status: ACTIVE | Noted: 2021-03-21

## 2021-03-21 LAB
ABSOLUTE EOS #: 0.14 K/UL (ref 0–0.4)
ABSOLUTE IMMATURE GRANULOCYTE: 0 K/UL (ref 0–0.3)
ABSOLUTE LYMPH #: 0.68 K/UL (ref 1–4.8)
ABSOLUTE MONO #: 0.27 K/UL (ref 0.1–0.8)
ALBUMIN SERPL-MCNC: 2.5 G/DL (ref 3.5–5.2)
ANION GAP SERPL CALCULATED.3IONS-SCNC: 13 MMOL/L (ref 9–17)
BASOPHILS # BLD: 1 % (ref 0–2)
BASOPHILS ABSOLUTE: 0.07 K/UL (ref 0–0.2)
BUN BLDV-MCNC: 52 MG/DL (ref 6–20)
BUN/CREAT BLD: ABNORMAL (ref 9–20)
CALCIUM SERPL-MCNC: 7.8 MG/DL (ref 8.6–10.4)
CHLORIDE BLD-SCNC: 106 MMOL/L (ref 98–107)
CO2: 17 MMOL/L (ref 20–31)
CREAT SERPL-MCNC: 3.09 MG/DL (ref 0.7–1.2)
CULTURE: ABNORMAL
DIFFERENTIAL TYPE: ABNORMAL
EOSINOPHILS RELATIVE PERCENT: 2 % (ref 1–4)
ESTIMATED AVERAGE GLUCOSE: 143 MG/DL
GFR AFRICAN AMERICAN: 28 ML/MIN
GFR NON-AFRICAN AMERICAN: 23 ML/MIN
GFR SERPL CREATININE-BSD FRML MDRD: ABNORMAL ML/MIN/{1.73_M2}
GFR SERPL CREATININE-BSD FRML MDRD: ABNORMAL ML/MIN/{1.73_M2}
GLUCOSE BLD-MCNC: 101 MG/DL (ref 75–110)
GLUCOSE BLD-MCNC: 156 MG/DL (ref 75–110)
GLUCOSE BLD-MCNC: 51 MG/DL (ref 75–110)
GLUCOSE BLD-MCNC: 52 MG/DL (ref 75–110)
GLUCOSE BLD-MCNC: 82 MG/DL (ref 70–99)
GLUCOSE BLD-MCNC: 87 MG/DL (ref 75–110)
HBA1C MFR BLD: 6.6 % (ref 4–6)
HCT VFR BLD CALC: 40.1 % (ref 40.7–50.3)
HEMOGLOBIN: 11 G/DL (ref 13–17)
IMMATURE GRANULOCYTES: 0 %
LYMPHOCYTES # BLD: 10 % (ref 24–44)
Lab: ABNORMAL
MCH RBC QN AUTO: 23.5 PG (ref 25.2–33.5)
MCHC RBC AUTO-ENTMCNC: 27.4 G/DL (ref 28.4–34.8)
MCV RBC AUTO: 85.5 FL (ref 82.6–102.9)
MONOCYTES # BLD: 4 % (ref 1–7)
MORPHOLOGY: ABNORMAL
MORPHOLOGY: ABNORMAL
NRBC AUTOMATED: 0 PER 100 WBC
PDW BLD-RTO: 18.5 % (ref 11.8–14.4)
PHOSPHORUS: 5.2 MG/DL (ref 2.5–4.5)
PLATELET # BLD: ABNORMAL K/UL (ref 138–453)
PLATELET ESTIMATE: ABNORMAL
PLATELET, FLUORESCENCE: NORMAL K/UL (ref 138–453)
PMV BLD AUTO: ABNORMAL FL (ref 8.1–13.5)
POTASSIUM SERPL-SCNC: 4 MMOL/L (ref 3.7–5.3)
RBC # BLD: 4.69 M/UL (ref 4.21–5.77)
RBC # BLD: ABNORMAL 10*6/UL
SEG NEUTROPHILS: 83 % (ref 36–66)
SEGMENTED NEUTROPHILS ABSOLUTE COUNT: 5.64 K/UL (ref 1.8–7.7)
SODIUM BLD-SCNC: 136 MMOL/L (ref 135–144)
SPECIMEN DESCRIPTION: ABNORMAL
TOTAL CK: 103 U/L (ref 39–308)
URIC ACID: 10.7 MG/DL (ref 3.4–7)
WBC # BLD: 6.8 K/UL (ref 3.5–11.3)
WBC # BLD: ABNORMAL 10*3/UL

## 2021-03-21 PROCEDURE — 2060000000 HC ICU INTERMEDIATE R&B

## 2021-03-21 PROCEDURE — 82570 ASSAY OF URINE CREATININE: CPT

## 2021-03-21 PROCEDURE — 99233 SBSQ HOSP IP/OBS HIGH 50: CPT | Performed by: INTERNAL MEDICINE

## 2021-03-21 PROCEDURE — 6370000000 HC RX 637 (ALT 250 FOR IP): Performed by: INTERNAL MEDICINE

## 2021-03-21 PROCEDURE — 82550 ASSAY OF CK (CPK): CPT

## 2021-03-21 PROCEDURE — 82947 ASSAY GLUCOSE BLOOD QUANT: CPT

## 2021-03-21 PROCEDURE — 80069 RENAL FUNCTION PANEL: CPT

## 2021-03-21 PROCEDURE — 36415 COLL VENOUS BLD VENIPUNCTURE: CPT

## 2021-03-21 PROCEDURE — 85055 RETICULATED PLATELET ASSAY: CPT

## 2021-03-21 PROCEDURE — 99232 SBSQ HOSP IP/OBS MODERATE 35: CPT | Performed by: INTERNAL MEDICINE

## 2021-03-21 PROCEDURE — 2580000003 HC RX 258: Performed by: INTERNAL MEDICINE

## 2021-03-21 PROCEDURE — 85025 COMPLETE CBC W/AUTO DIFF WBC: CPT

## 2021-03-21 PROCEDURE — 6370000000 HC RX 637 (ALT 250 FOR IP): Performed by: CLINICAL NURSE SPECIALIST

## 2021-03-21 PROCEDURE — 6360000002 HC RX W HCPCS: Performed by: INTERNAL MEDICINE

## 2021-03-21 PROCEDURE — 51702 INSERT TEMP BLADDER CATH: CPT

## 2021-03-21 PROCEDURE — 84156 ASSAY OF PROTEIN URINE: CPT

## 2021-03-21 PROCEDURE — 84550 ASSAY OF BLOOD/URIC ACID: CPT

## 2021-03-21 RX ORDER — ALLOPURINOL 100 MG/1
100 TABLET ORAL DAILY
Status: DISCONTINUED | OUTPATIENT
Start: 2021-03-21 | End: 2021-04-02 | Stop reason: HOSPADM

## 2021-03-21 RX ORDER — TORSEMIDE 20 MG/1
40 TABLET ORAL DAILY
Status: DISCONTINUED | OUTPATIENT
Start: 2021-03-21 | End: 2021-03-25

## 2021-03-21 RX ORDER — SODIUM BICARBONATE 650 MG/1
1300 TABLET ORAL 2 TIMES DAILY
Status: DISCONTINUED | OUTPATIENT
Start: 2021-03-21 | End: 2021-03-26

## 2021-03-21 RX ORDER — INSULIN GLARGINE 100 [IU]/ML
50 INJECTION, SOLUTION SUBCUTANEOUS NIGHTLY
Status: DISCONTINUED | OUTPATIENT
Start: 2021-03-21 | End: 2021-03-22

## 2021-03-21 RX ADMIN — SODIUM BICARBONATE 1300 MG: 650 TABLET ORAL at 21:15

## 2021-03-21 RX ADMIN — METOPROLOL TARTRATE 25 MG: 25 TABLET ORAL at 21:16

## 2021-03-21 RX ADMIN — HEPARIN SODIUM 5000 UNITS: 5000 INJECTION INTRAVENOUS; SUBCUTANEOUS at 21:23

## 2021-03-21 RX ADMIN — SODIUM CHLORIDE, PRESERVATIVE FREE 10 ML: 5 INJECTION INTRAVENOUS at 21:16

## 2021-03-21 RX ADMIN — INSULIN GLARGINE 50 UNITS: 100 INJECTION, SOLUTION SUBCUTANEOUS at 21:22

## 2021-03-21 RX ADMIN — Medication 1 TABLET: at 08:16

## 2021-03-21 RX ADMIN — SODIUM CHLORIDE, PRESERVATIVE FREE 10 ML: 5 INJECTION INTRAVENOUS at 08:16

## 2021-03-21 RX ADMIN — BACLOFEN 5 MG: 10 TABLET ORAL at 21:17

## 2021-03-21 RX ADMIN — STANDARDIZED SENNA CONCENTRATE 8.6 MG: 8.6 TABLET ORAL at 21:15

## 2021-03-21 RX ADMIN — DESMOPRESSIN ACETATE 40 MG: 0.2 TABLET ORAL at 08:16

## 2021-03-21 RX ADMIN — BACLOFEN 5 MG: 10 TABLET ORAL at 08:16

## 2021-03-21 RX ADMIN — HEPARIN SODIUM 5000 UNITS: 5000 INJECTION INTRAVENOUS; SUBCUTANEOUS at 08:15

## 2021-03-21 RX ADMIN — METOPROLOL TARTRATE 25 MG: 25 TABLET ORAL at 08:16

## 2021-03-21 RX ADMIN — CEFEPIME HYDROCHLORIDE 1000 MG: 1 INJECTION, POWDER, FOR SOLUTION INTRAMUSCULAR; INTRAVENOUS at 12:13

## 2021-03-21 RX ADMIN — CEFEPIME HYDROCHLORIDE 1000 MG: 1 INJECTION, POWDER, FOR SOLUTION INTRAMUSCULAR; INTRAVENOUS at 21:16

## 2021-03-21 RX ADMIN — TORSEMIDE 40 MG: 20 TABLET ORAL at 21:15

## 2021-03-21 RX ADMIN — MAGNESIUM GLUCONATE 500 MG ORAL TABLET 400 MG: 500 TABLET ORAL at 08:16

## 2021-03-21 RX ADMIN — ALLOPURINOL 100 MG: 100 TABLET ORAL at 19:49

## 2021-03-21 NOTE — PLAN OF CARE
Problem: Skin Integrity:  Goal: Will show no infection signs and symptoms  Description: Will show no infection signs and symptoms  Outcome: Ongoing  Goal: Absence of new skin breakdown  Description: Absence of new skin breakdown  Outcome: Ongoing     Problem: Falls - Risk of:  Goal: Will remain free from falls  Description: Will remain free from falls  Outcome: Ongoing  Goal: Absence of physical injury  Description: Absence of physical injury  Outcome: Ongoing     Problem: Infection:  Goal: Will remain free from infection  Description: Will remain free from infection  Outcome: Ongoing     Problem: Safety:  Goal: Free from accidental physical injury  Description: Free from accidental physical injury  Outcome: Ongoing  Goal: Free from intentional harm  Description: Free from intentional harm  Outcome: Ongoing     Problem: Daily Care:  Goal: Daily care needs are met  Description: Daily care needs are met  Outcome: Ongoing     Problem: Skin Integrity:  Goal: Skin integrity will stabilize  Description: Skin integrity will stabilize  Outcome: Ongoing     Problem: Discharge Planning:  Goal: Patients continuum of care needs are met  Description: Patients continuum of care needs are met  Outcome: Ongoing     Problem: Nutrition  Goal: Optimal nutrition therapy  Description: Nutrition Problem #1: Increased nutrient needs  Intervention: Food and/or Nutrient Delivery: Modify Current Diet, Start Oral Nutrition Supplement  Nutritional Goals: Meet greater than or equal to 75% of estimated nutrient needs for wound healing with PO intake     Outcome: Ongoing

## 2021-03-21 NOTE — PROGRESS NOTES
[facility]  Office: [phone]  Marc Claros DO, Damian Vanegas, DO, Jessenia Salazar, DO, Mychal Monahan, DO, Denis Hilario MD, Marilyn Barr MD, Connie Meadows MD, Ariana Orantes MD, Melisa Goldmann, MD, Julianna Jhaveri MD, Edward Espinal MD, Milagro Manuel MD, Radha Sanchez MD, Facundo Duggan DO, Lizette Toscano MD, Sabas Muir DO, Trina Augustine MD,  Jenny Alvarez DO, Josr Johns MD, Alondra Romano MD, Donovan Osorio [facility], [facility], CNP, Ronald Haider, CNP, Kevin Bennett, CNS, Jess Hinkle, CNP, Hazel Luke, CNP, Jacklyn Rodriguez, CNP, hSakira Shrestha, CNP, Geraldo Torres, CNP, Wanda Ha PA-C, Steven Montes, [facility], Justo Fierro, CNP, Randa Franco, CNP, Jeronimo Culp, CNP, Joyce Rosado, CNP, Zoey Morrison, CNP, Claudette David, [address] [place] [address]    Progress Note    3/21/2021    12:14 PM    Name:   Garcia Contreras  MRN:     [id number]     Acct:      [de-identified]   Room:   2002/2002-01   Day:  2  Admit Date:  3/19/2021  5:34 PM    PCP:   No primary care provider on file. Code Status:  Full Code    Subjective:     C/C: Inability to care for himself, wounds, leg swelling    Interval History Status: not changed. Pt seen and examined this morning. No acute events overnight. Reports that his swelling in his legs persist.  He is currently sitting up in bed. Discussed with nursing staff, patient with \"great appetite. \"  Continues to be profoundly weak. No labs performed this morning. Blood pressure mildly elevated. Patient lost IV access overnight. Brief History:     Garcia Contreras is a 38 y.o. male who was directly admitted from [facility] Emergency [facility] ED March 19 for the management of DEBBY (acute kidney injury) ([facility] [address]    He has a hx of spina bifida, CHF with EF 30% on Echo 12/6/19, CKD-baseline creat ~1.5, DM, chronic Lt foot ulcer and prolonged admit (11/29/19 - 1/16/20) for Necrotizing fasciitis buttocks.  During that admit he underwent approx 11 I&D procedures, temporary dialysis and colostomy creation. He has a chronic lema cath. Pt was in SNF until about 4 mo ago.     Pt presented to ED with worsening shortness of breath. EKG: Mult PVC's, old inferior & anteroseptal infarct, . CXR showed congestion, BNP 1200 (<100), Trop 0.09 (0-0.04), Lactic 1.5, BUN/Cr 46/3.05, Glucose 122, Na 138, K 4.2, Mg 1.2, LFTs wnl, INR 1.4, WBC 9.9, hgb 10.7, platelets 856. UA:  Lg leuko est, + protein, Mod hgb. Covid Neg     Pt reports gradual decline in strength, chills, discomfort to his buttocks, increase in size of buttock wounds, shortness of breath and worsening edema x 2 mos. His visiting nurse started Doxycycline 2 days ago for possible wound infection and told him he needed more care than could be provided in the home. He lives with his mom who is 79 yr old and has some health issues. -Nephrology following  -General surgery evaluated. No plans for debridement.  -Cultures with NGTD    Review of Systems:     Constitutional:  negative for chills, fevers, sweats  Respiratory:  negative for cough, dyspnea on exertion, shortness of breath, wheezing  Cardiovascular: Reports persistent leg swelling; negative for chest pain, chest pressure/discomfort, palpitations  Gastrointestinal:  negative for abdominal pain, constipation, diarrhea, nausea, vomiting  Skin: Reports multiple ulcers to his low back, buttocks  Neurological:  negative for dizziness, headache    Medications: Allergies:     Allergies   Allergen Reactions    Other      Mask adhesive causes hives       Current Meds:   Scheduled Meds:    insulin glargine  50 Units Subcutaneous Nightly    cefepime  1,000 mg Intravenous Q12H    Baclofen  5 mg Oral BID    [Held by provider] ferrous sulfate  325 mg Oral TID WC    magnesium oxide  400 mg Oral Daily    therapeutic multivitamin-minerals  1 tablet Oral Daily    [Held by provider] oxybutynin  5 mg Oral BID    85.5   MCH 23.2* 23.5*   MCHC 27.9* 27.4*   RDW 18.4* 18.5*    See Reflexed IPF Result   MPV 8.5 NOT REPORTED   SEDRATE 66*  --    CRP 56.2*  --    INR 1.1  --      Chemistry:  Recent Labs     03/19/21  1830 03/20/21  0929 03/21/21  1040    136 136   K 4.0 3.6* 4.0    104 106   CO2 20 20 17*   GLUCOSE 107* 88 82   BUN 42* 45* 52*   CREATININE 2.81* 2.95* 3.09*   ANIONGAP 11 12 13   LABGLOM 26* 24* 23*   GFRAA 31* 29* 28*   CALCIUM 8.2* 8.0* 7.8*   PHOS  --   --  5.2*   PROBNP  --  43,534*  --    CKTOTAL  --   --  103     Recent Labs     03/20/21  0728 03/20/21  1147 03/20/21  1653 03/20/21  2035 03/21/21  0753 03/21/21  0759 03/21/21  1040   LABALBU  --   --   --   --   --   --  2.5*   URICACID  --   --   --   --   --   --  10.7*   POCGLU 74* 87 92 122* 51* 52*  --      ABG:  Lab Results   Component Value Date    POCPH 7.381 12/24/2019    POCPCO2 35.3 12/24/2019    POCPO2 140.3 12/24/2019    POCHCO3 20.9 12/24/2019    NBEA 4 12/24/2019    PBEA NOT REPORTED 12/24/2019    WMY3BWK 22 12/24/2019    UXET7UKT 99 12/24/2019    FIO2 40.0 12/24/2019     Lab Results   Component Value Date/Time    SPECIAL NOT REPORTED 03/19/2021 10:54 PM     Lab Results   Component Value Date/Time    CULTURE CULTURE IN PROGRESS 03/19/2021 10:54 PM       Radiology:  No results found. Physical Examination:        General appearance:  alert, cooperative and no distress, obese  gentleman sitting up in bed  Mental Status:  oriented to person, place and time and normal affect  Lungs:  clear to auscultation bilaterally, normal effort, no wheezes or rhonchi present  Heart:  regular rate and rhythm, no murmur  Abdomen:  soft, nontender, nondistended, normal bowel sounds, no masses, hepatomegaly, splenomegaly, postsurgical changes noted to the lower abdominal wall with well-healed cicatrix.   He does have dependent edema which is weeping and significantly pitting, ostomy is present in the left lower quadrant with gas and stool output  Extremities: 2+ bilateral lower extremity pitting edema, no redness or tenderness in the calves  Skin: Chronic venous stasis changes noted to the bilateral lower extremities    Assessment:        Hospital Problems           Last Modified POA    * (Principal) DEBBY (acute kidney injury) ([address] 3/19/2021 Yes    Chronic systolic heart failure ([address] 3/19/2021 Yes    Volume overload 3/21/2021 Yes    Hypoglycemia due to insulin 3/21/2021 No    DM II (diabetes mellitus, type II), controlled ([address] 3/19/2021 Yes    Chronic paraplegia ([address] 3/19/2021 Yes    Right arm weakness 3/19/2021 Yes    Essential hypertension 3/19/2021 Yes    Sacral wound (Chronic) 3/19/2021 Yes    Open wound of left foot 3/19/2021 Yes    Ambrosio catheter in place (Chronic) 3/19/2021 Yes    Colostomy in place [facility]) (Chronic) 3/19/2021 Yes    Overview Signed 3/19/2021  7:56 PM by MIRTA Valentin - EBEN     December '19               Plan:        1. Acute kidney injury on CKDappreciate nephrology input. Patient lost IV access overnight and has been off of fluids since. We are currently working on ultrasound-guided IV to be established. 2. History of necrotizing fasciitis status post colostomy creation with significant woundswound care to resume with dressing changes and wound care Monday. General surgery has evaluated the patient and is recommending no further debridement at this time. 3. Urinalysis suggestive of acute cystitis with hematuria - continue cefepime. Awaiting urine culture results. No growth in blood cultures x 2 days. Patient appears nontoxic. Vancomycin discontinued at nephrology's discretion. 4. Longstanding indwelling Ambrosio catheterexchanged on 3/20. Await urine culture data. 5. CRP and ESR mildly elevated. 6. Volume overload  persists. Nephrology following  7. Continue to trend daily labs - no labs were performed as ordered this morning. Discussed with nursing. 8. DM2continue insulin sliding scale. Decrease Lantus to 50 units nightly due to hypoglycemic episodes in the morning. 9. DVT prophylaxis with heparin 5000 units every 8 hours  10. Continue high intensity statin therapy with Lipitor 40 mg  11. Continue baclofen  12. Bowel regimen with Senokot  13. PT/OT  14. Discussed with CM. Patient likely will need SNF/ECF. Once creatinine stabilizes and cleared from nephrology standpoint, we will get him to SNF.        33 Zuleima Hinds DO  3/21/2021  12:14 PM

## 2021-03-21 NOTE — PROGRESS NOTES
Physical Therapy    DATE: 3/21/2021    NAME: Abdiaziz Sanders  MRN: [id number]   : 1985      Patient not seen this date for Physical Therapy due to:     Other: lunch; check back later as time allows      Electronically signed by Mendy Meza PT on 3/21/2021 at 12:06 PM

## 2021-03-21 NOTE — PROGRESS NOTES
Renal Progress Note        Subjective:   Nephrology was consulted yesterday for DEBBY. Known history of type 2 diabetes with poor control, hemoglobin A1c's have been as high as 11, morbid obesity, cardiomyopathy ejection fraction of 30%, spina bifida with restricted mobility, chronic nonhealing gluteal wounds requiring diverting colostomy, chronic indwelling Ambrosio, recurrent UTIs. Also has an element of chronic kidney disease baseline creatinine 1.82.0, proteinuria has been around 2 g predominantly albuminuria. He has been seen by our service during his hospitalization from 2019 to 2020 when he came in for nonhealing wounds, required several debridements, was placed on vancomycin, developed acute kidney injury, required hemodialysis for about 2 to 3 weeks while in hospital.  Thereafter was discharged to an F and now is home. Last creatinine in November was 1.8. He now comes in to the hospital because a visiting nurse felt his wounds were getting infected. There was drainage and foul-smelling discharge according to him. Also the urine was cloudy as well. He was normotensive. Urinalysis showed too numerous to count WBCs. Creatinine was found to be 2.95 which is higher than his baseline. 3/21/21  No acute events overnight. Concerned with persistent leg swelling. Eating well, feels weak. Ambrosio was replace here had been in for 1.5 years it seems without changing prior to this admission. Urine is clear. On Maxipime  On NS 50cc/hr. U/o  past 24 hours  He feels SOB when lying flat, but lungs clear. Renal US unremarkable right kidney, left not visualized due to body habitus and bowel gas  +colostomy with about 500cc out past 24 hours  Objective:     I/O last 3 completed shifts:   In: 783 [I.V.:783]  Out:  [Urine:]    Vital Signs:   Temperature:  Temp: 97.7 °F (36.5 °C)  TMax:   Temp (24hrs), Av °F (36.7 °C), Min:97.7 °F (36.5 °C), Max:98.4 °F (36.9 °C)    Respirations: Resp: 13  Pulse:   Pulse: 87  BP:    BP: (!) 148/95  BP Range: Systolic (89FLR), NBH:593 , Min:136 , ICZ:492       Diastolic (66MEK), NAY:32, Min:87, Max:109      Physical Examination:     General:  AAO x 3, speaking in full sentences, no accessory muscle use. HEENT: Atraumatic, normocephalic, no throat congestion, moist mucosa. Eyes:   Pupils equal, round and reactive to light, EOMI. Neck:   No JVD, no thyromegaly, no lymphadenopathy. Chest:   Diminished in bases, wet cough. Cardiac:  S1 S2 RR, no murmurs, gallops or rubs, JVP not raised. Abdomen: Obese, +colostomy ? Why, +bS  :   No suprapubic or flank tenderness. Neuro:  AAO x 3, No FND. SKIN:  No rashes, good skin turgor. Extremities:  2-3+, weeping of RLE LLE wrapped    Labs:       Recent Labs     03/20/21  0929 03/21/21  1040   WBC 9.2 6.8   RBC 4.62 4.69   HGB 10.7* 11.0*   HCT 38.3* 40.1*   MCV 82.9 85.5   MCH 23.2* 23.5*   MCHC 27.9* 27.4*   RDW 18.4* 18.5*    See Reflexed IPF Result   MPV 8.5 NOT REPORTED      BMP:   Recent Labs     03/19/21  1830 03/20/21  0929 03/21/21  1040    136 136   K 4.0 3.6* 4.0    104 106   CO2 20 20 17*   BUN 42* 45* 52*   CREATININE 2.81* 2.95* 3.09*   GLUCOSE 107* 88 82   CALCIUM 8.2* 8.0* 7.8*      MARINE:      Lab Results   Component Value Date    MARINE NEGATIVE 11/30/2019     SPEP:  Lab Results   Component Value Date    PROT 5.3 12/01/2019    PATH ELECTRONICALLY SIGNED.  Lang Rosenthal M.D. 11/30/2019   C3:     Lab Results   Component Value Date    C3 97 11/30/2019     C4:     Lab Results   Component Value Date    C4 12 11/30/2019     Hep BsAg:         Lab Results   Component Value Date    HEPBSAG NONREACTIVE 11/30/2019     Hep C AB:          Lab Results   Component Value Date    HEPCAB NONREACTIVE 11/30/2019       Urinalysis/Chemistries:      Lab Results   Component Value Date    NITRU NEGATIVE 03/19/2021    COLORU YELLOW 03/19/2021    PHUR 7.5 03/19/2021    WBCUA TOO NUMEROUS TO COUNT 03/19/2021    RBCUA 5 TO 10 03/19/2021    MUCUS NOT REPORTED 03/19/2021    TRICHOMONAS NOT REPORTED 03/19/2021    YEAST NOT REPORTED 03/19/2021    BACTERIA MODERATE 03/19/2021    SPECGRAV 1.010 03/19/2021    LEUKOCYTESUR LARGE 03/19/2021    UROBILINOGEN Normal 03/19/2021    BILIRUBINUR NEGATIVE 03/19/2021    GLUCOSEU NEGATIVE 03/19/2021    KETUA NEGATIVE 03/19/2021    AMORPHOUS NOT REPORTED 03/19/2021     Urine Sodium:     Lab Results   Component Value Date    DONOVAN 75 03/19/2021     Urine Creatinine:     Lab Results   Component Value Date    LABCREA 35.3 03/19/2021           Radiology:     CXR:   Findings:     *   Central line overlies proximal SVC. *  Heart size prominent. *  Vasculature mildly engorged. *  No pneumothorax. EXAMINATION:   ULTRASOUND OF THE KIDNEYS       3/20/2021 2:45 pm       COMPARISON:   CT abdomen and pelvis December 9, 2019       HISTORY:   ORDERING SYSTEM PROVIDED HISTORY: DEBBY   TECHNOLOGIST PROVIDED HISTORY:   Bilateral Renal Ultrasound   DEBBY       FINDINGS:   The right kidney measures 14.9 cm in length and the left kidney is not   identified.       Kidneys demonstrate normal cortical echogenicity.  No hydronephrosis or   intrarenal stones.  No focal lesions.           Impression   Unremarkable right kidney.  Left kidney not visualized due to patient body   habitus and bowel gas.           Assessment:     1. Acute Kidney Injury: Ischemic ATN from sepsis/systemic inflammatory response syndrome, versus progression of underlying disease, creatinine now in the 2.83.0 range. 2. Chronic Kidney Disease from diabetic nephrosclerosis with baseline 1.8-1.9, till November 2020. Required HD briefly during hospital admission 12/2019  3. Diabetes  4. Hx of necrotizing fasciitis with multiple debridements involving the scrotal areas requiring diverting colostomy 2019  5. Chronic systolic heart failure with EF 30%  6. Sacral wound and chronic left foot wound with possible infection/osteo.   Sed rate 66, pro calcitonin 0.14. Surgery consulted and no plans for procedures per nursing  7. Paraplegia due to spina bifida  8. Chronic indwelling lema catheter. Plan:   1. Discontinue IV fluids  2. Daily Labs  3. Hold diuretics for now. 5. Avoid nephro toxic drugs  6. Increase oral intake  7. Recommend wound RN evaluation if surgery is not planning debridement. Will discuss with Dr. Jena Mancia. Nutrition   Please ensure that patient is on a renal diet/TF. Avoid nephrotoxic drugs/contrast exposure. MIRTA Huerta  [facility]      Admitted for worsening gluteal and sacral wounds, was seen by visiting nurse and felt that he needed more acute care. He was complaining of weakness, chills. He has a chronic indwelling Lema from spina bifida. Urine was cloudy  cultures were negative. Previous echocardiograms have shown ejection fraction of 30%. Proteinuria has been in the 2-3 g range. His baseline creatinine is 1.82.0 range. On presentation creatinine was up to 2.8. Since admission he has been stable from a hemodynamic perspective. Urine output good. No shortness of breath orthopnea  Plan  1. Discontinue IV fluids  2. Oral hydration  3. Antibiotics per primary service  4. Await wound care recommendation  5. Follow renal function  6. Start Demadex 40 mg daily    Attending Physician Statement  I have discussed the care of [facility], including pertinent history and exam findings with the resident/fellow. I have reviewed the key elements of all parts of the encounter with the resident/fellow. I have seen and examined the patient with the resident/fellow. I agree with the assessment and plan and status of the problem list as documented.       .  Electronically signed by Wendy Clements MD on 3/21/2021 at 6:26 PM

## 2021-03-22 ENCOUNTER — APPOINTMENT (OUTPATIENT)
Dept: GENERAL RADIOLOGY | Age: 36
DRG: 628 | End: 2021-03-22
Attending: INTERNAL MEDICINE
Payer: MEDICARE

## 2021-03-22 LAB
ABSOLUTE EOS #: 0.36 K/UL (ref 0–0.44)
ABSOLUTE IMMATURE GRANULOCYTE: 0.04 K/UL (ref 0–0.3)
ABSOLUTE LYMPH #: 1.5 K/UL (ref 1.1–3.7)
ABSOLUTE MONO #: 0.98 K/UL (ref 0.1–1.2)
ALBUMIN SERPL-MCNC: 2.6 G/DL (ref 3.5–5.2)
ANION GAP SERPL CALCULATED.3IONS-SCNC: 11 MMOL/L (ref 9–17)
BASOPHILS # BLD: 1 % (ref 0–2)
BASOPHILS ABSOLUTE: 0.06 K/UL (ref 0–0.2)
BUN BLDV-MCNC: 57 MG/DL (ref 6–20)
BUN/CREAT BLD: ABNORMAL (ref 9–20)
CALCIUM SERPL-MCNC: 7.8 MG/DL (ref 8.6–10.4)
CHLORIDE BLD-SCNC: 107 MMOL/L (ref 98–107)
CO2: 20 MMOL/L (ref 20–31)
CREAT SERPL-MCNC: 3.17 MG/DL (ref 0.7–1.2)
CREATININE URINE: 26 MG/DL (ref 39–259)
DIFFERENTIAL TYPE: ABNORMAL
EOSINOPHIL,URINE: NORMAL
EOSINOPHILS RELATIVE PERCENT: 3 % (ref 1–4)
GFR AFRICAN AMERICAN: 27 ML/MIN
GFR NON-AFRICAN AMERICAN: 22 ML/MIN
GFR SERPL CREATININE-BSD FRML MDRD: ABNORMAL ML/MIN/{1.73_M2}
GFR SERPL CREATININE-BSD FRML MDRD: ABNORMAL ML/MIN/{1.73_M2}
GLUCOSE BLD-MCNC: 120 MG/DL (ref 75–110)
GLUCOSE BLD-MCNC: 149 MG/DL (ref 75–110)
GLUCOSE BLD-MCNC: 174 MG/DL (ref 75–110)
GLUCOSE BLD-MCNC: 41 MG/DL (ref 75–110)
GLUCOSE BLD-MCNC: 44 MG/DL (ref 70–99)
GLUCOSE BLD-MCNC: 46 MG/DL (ref 75–110)
GLUCOSE BLD-MCNC: 82 MG/DL (ref 75–110)
HCT VFR BLD CALC: 37.3 % (ref 40.7–50.3)
HEMOGLOBIN: 10.6 G/DL (ref 13–17)
IMMATURE GRANULOCYTES: 0 %
LYMPHOCYTES # BLD: 13 % (ref 24–43)
MCH RBC QN AUTO: 23.1 PG (ref 25.2–33.5)
MCHC RBC AUTO-ENTMCNC: 28.4 G/DL (ref 28.4–34.8)
MCV RBC AUTO: 81.3 FL (ref 82.6–102.9)
MONOCYTES # BLD: 9 % (ref 3–12)
NRBC AUTOMATED: 0 PER 100 WBC
PDW BLD-RTO: 18.4 % (ref 11.8–14.4)
PHOSPHORUS: 4.9 MG/DL (ref 2.5–4.5)
PLATELET # BLD: 345 K/UL (ref 138–453)
PLATELET ESTIMATE: ABNORMAL
PMV BLD AUTO: 8.7 FL (ref 8.1–13.5)
POTASSIUM SERPL-SCNC: 3.9 MMOL/L (ref 3.7–5.3)
RBC # BLD: 4.59 M/UL (ref 4.21–5.77)
RBC # BLD: ABNORMAL 10*6/UL
SEG NEUTROPHILS: 75 % (ref 36–65)
SEGMENTED NEUTROPHILS ABSOLUTE COUNT: 8.65 K/UL (ref 1.5–8.1)
SODIUM BLD-SCNC: 138 MMOL/L (ref 135–144)
TOTAL PROTEIN, URINE: 194 MG/DL
WBC # BLD: 11.6 K/UL (ref 3.5–11.3)
WBC # BLD: ABNORMAL 10*3/UL

## 2021-03-22 PROCEDURE — 73630 X-RAY EXAM OF FOOT: CPT

## 2021-03-22 PROCEDURE — 87185 SC STD ENZYME DETCJ PER NZM: CPT

## 2021-03-22 PROCEDURE — 87077 CULTURE AEROBIC IDENTIFY: CPT

## 2021-03-22 PROCEDURE — 2060000000 HC ICU INTERMEDIATE R&B

## 2021-03-22 PROCEDURE — 97163 PT EVAL HIGH COMPLEX 45 MIN: CPT

## 2021-03-22 PROCEDURE — 6370000000 HC RX 637 (ALT 250 FOR IP): Performed by: INTERNAL MEDICINE

## 2021-03-22 PROCEDURE — 99232 SBSQ HOSP IP/OBS MODERATE 35: CPT | Performed by: INTERNAL MEDICINE

## 2021-03-22 PROCEDURE — 97530 THERAPEUTIC ACTIVITIES: CPT

## 2021-03-22 PROCEDURE — 97535 SELF CARE MNGMENT TRAINING: CPT

## 2021-03-22 PROCEDURE — 87076 CULTURE ANAEROBE IDENT EACH: CPT

## 2021-03-22 PROCEDURE — 85025 COMPLETE CBC W/AUTO DIFF WBC: CPT

## 2021-03-22 PROCEDURE — 82947 ASSAY GLUCOSE BLOOD QUANT: CPT

## 2021-03-22 PROCEDURE — 97166 OT EVAL MOD COMPLEX 45 MIN: CPT

## 2021-03-22 PROCEDURE — 6370000000 HC RX 637 (ALT 250 FOR IP): Performed by: CLINICAL NURSE SPECIALIST

## 2021-03-22 PROCEDURE — 87075 CULTR BACTERIA EXCEPT BLOOD: CPT

## 2021-03-22 PROCEDURE — 6360000002 HC RX W HCPCS: Performed by: INTERNAL MEDICINE

## 2021-03-22 PROCEDURE — 2580000003 HC RX 258: Performed by: INTERNAL MEDICINE

## 2021-03-22 PROCEDURE — 80069 RENAL FUNCTION PANEL: CPT

## 2021-03-22 PROCEDURE — 99213 OFFICE O/P EST LOW 20 MIN: CPT

## 2021-03-22 PROCEDURE — 87070 CULTURE OTHR SPECIMN AEROBIC: CPT

## 2021-03-22 PROCEDURE — 36415 COLL VENOUS BLD VENIPUNCTURE: CPT

## 2021-03-22 RX ORDER — INSULIN GLARGINE 100 [IU]/ML
25 INJECTION, SOLUTION SUBCUTANEOUS NIGHTLY
Status: DISCONTINUED | OUTPATIENT
Start: 2021-03-22 | End: 2021-03-23

## 2021-03-22 RX ADMIN — MAGNESIUM GLUCONATE 500 MG ORAL TABLET 400 MG: 500 TABLET ORAL at 09:08

## 2021-03-22 RX ADMIN — INSULIN LISPRO 2 UNITS: 100 INJECTION, SOLUTION INTRAVENOUS; SUBCUTANEOUS at 22:31

## 2021-03-22 RX ADMIN — HEPARIN SODIUM 5000 UNITS: 5000 INJECTION INTRAVENOUS; SUBCUTANEOUS at 22:25

## 2021-03-22 RX ADMIN — DESMOPRESSIN ACETATE 40 MG: 0.2 TABLET ORAL at 09:08

## 2021-03-22 RX ADMIN — TORSEMIDE 40 MG: 20 TABLET ORAL at 09:07

## 2021-03-22 RX ADMIN — INSULIN LISPRO 3 UNITS: 100 INJECTION, SOLUTION INTRAVENOUS; SUBCUTANEOUS at 17:07

## 2021-03-22 RX ADMIN — ALLOPURINOL 100 MG: 100 TABLET ORAL at 09:32

## 2021-03-22 RX ADMIN — BACLOFEN 5 MG: 10 TABLET ORAL at 09:08

## 2021-03-22 RX ADMIN — SODIUM CHLORIDE, PRESERVATIVE FREE 10 ML: 5 INJECTION INTRAVENOUS at 09:08

## 2021-03-22 RX ADMIN — BACLOFEN 5 MG: 10 TABLET ORAL at 22:00

## 2021-03-22 RX ADMIN — INSULIN GLARGINE 25 UNITS: 100 INJECTION, SOLUTION SUBCUTANEOUS at 22:29

## 2021-03-22 RX ADMIN — Medication 1 TABLET: at 09:07

## 2021-03-22 RX ADMIN — STANDARDIZED SENNA CONCENTRATE 8.6 MG: 8.6 TABLET ORAL at 22:00

## 2021-03-22 RX ADMIN — SODIUM CHLORIDE, PRESERVATIVE FREE 10 ML: 5 INJECTION INTRAVENOUS at 22:00

## 2021-03-22 RX ADMIN — METOPROLOL TARTRATE 25 MG: 25 TABLET ORAL at 09:08

## 2021-03-22 RX ADMIN — METOPROLOL TARTRATE 25 MG: 25 TABLET ORAL at 22:00

## 2021-03-22 RX ADMIN — SODIUM BICARBONATE 1300 MG: 650 TABLET ORAL at 22:00

## 2021-03-22 RX ADMIN — SODIUM BICARBONATE 1300 MG: 650 TABLET ORAL at 09:08

## 2021-03-22 RX ADMIN — CEFEPIME HYDROCHLORIDE 1000 MG: 1 INJECTION, POWDER, FOR SOLUTION INTRAMUSCULAR; INTRAVENOUS at 09:08

## 2021-03-22 ASSESSMENT — ENCOUNTER SYMPTOMS
NAUSEA: 0
VOMITING: 0
CHOKING: 0
COUGH: 0
SHORTNESS OF BREATH: 1

## 2021-03-22 NOTE — PROGRESS NOTES
Pt called Rn in to room do to colostomy coming loose on rt-side. RN noticed that the dressing placed by wound care was missing on the abdominal crease. Pt stated \"I didn't like it so I took it off\"  RN educated pt of the importance of covering the wound for proper healing. Rn will continue to monitor.

## 2021-03-22 NOTE — PLAN OF CARE
Problem: Skin Integrity:  Goal: Will show no infection signs and symptoms  Description: Will show no infection signs and symptoms  3/22/2021 0958 by Sarah Joyce RN  Outcome: Ongoing  3/21/2021 2144 by Mandy Brito RN  Outcome: Ongoing  Goal: Absence of new skin breakdown  Description: Absence of new skin breakdown  3/22/2021 0958 by Sarah Joyce RN  Outcome: Ongoing  3/21/2021 2144 by Mandy Brito RN  Outcome: Ongoing  Goal: Demonstration of wound healing without infection will improve  Description: Demonstration of wound healing without infection will improve  Outcome: Ongoing  Goal: Complications related to intravenous access or infusion will be avoided or minimized  Description: Complications related to intravenous access or infusion will be avoided or minimized  Outcome: Ongoing     Problem: Falls - Risk of:  Goal: Will remain free from falls  Description: Will remain free from falls  3/22/2021 0958 by Sarah Joyce RN  Outcome: Ongoing  3/21/2021 2144 by Mandy Brito RN  Outcome: Ongoing  Goal: Absence of physical injury  Description: Absence of physical injury  3/22/2021 0958 by Sarah Joyce RN  Outcome: Ongoing  3/21/2021 2144 by Mandy Brito RN  Outcome: Ongoing     Problem: Infection:  Goal: Will remain free from infection  Description: Will remain free from infection  3/22/2021 0958 by Sarah Joyce RN  Outcome: Ongoing  3/21/2021 2144 by Mandy Brito RN  Outcome: Ongoing     Problem: Safety:  Goal: Free from accidental physical injury  Description: Free from accidental physical injury  3/22/2021 0958 by Sarah Joyce RN  Outcome: Ongoing  3/21/2021 2144 by Mandy Brito RN  Outcome: Ongoing  Goal: Free from intentional harm  Description: Free from intentional harm  3/22/2021 0958 by Sarah Joyce RN  Outcome: Ongoing  3/21/2021 2144 by Mandy Brito RN  Outcome: Ongoing     Problem: Daily Care:  Goal: Daily care needs are met Description: Daily care needs are met  3/22/2021 0958 by Monico Mcpherson RN  Outcome: Ongoing  3/21/2021 2144 by Vera Lopez RN  Outcome: Ongoing     Problem: Skin Integrity:  Goal: Skin integrity will stabilize  Description: Skin integrity will stabilize  3/22/2021 0958 by Monico Mcpherson RN  Outcome: Ongoing  3/21/2021 2144 by Vera Lopez RN  Outcome: Ongoing     Problem: Discharge Planning:  Goal: Patients continuum of care needs are met  Description: Patients continuum of care needs are met  3/22/2021 0958 by Monico Mcpherson RN  Outcome: Ongoing  3/21/2021 2144 by Vera Lopez RN  Outcome: Ongoing     Problem: Nutrition  Goal: Optimal nutrition therapy  Description: Nutrition Problem #1: Increased nutrient needs  Intervention: Food and/or Nutrient Delivery: Modify Current Diet, Start Oral Nutrition Supplement  Nutritional Goals: Meet greater than or equal to 75% of estimated nutrient needs for wound healing with PO intake     3/22/2021 0958 by Monico Mcpherson RN  Outcome: Ongoing  3/21/2021 2144 by Vera Lopez RN  Outcome: Ongoing     Problem: Nutritional:  Goal: Nutritional status will improve  Description: Nutritional status will improve  Outcome: Ongoing     Problem: Physical Regulation:  Goal: Will remain free from infection  Description: Will remain free from infection  3/22/2021 0958 by Monico Mcpherson RN  Outcome: Ongoing  3/21/2021 2144 by Vera Lopez RN  Outcome: Ongoing  Goal: Diagnostic test results will improve  Description: Diagnostic test results will improve  Outcome: Ongoing  Goal: Ability to maintain vital signs within normal range will improve  Description: Ability to maintain vital signs within normal range will improve  Outcome: Ongoing     Problem: Respiratory:  Goal: Ability to maintain normal respiratory secretions will improve  Description: Ability to maintain normal respiratory secretions will improve  Outcome: Ongoing

## 2021-03-22 NOTE — PROGRESS NOTES
Vitamins-Minerals (THERAPEUTIC MULTIVITAMIN-MINERALS) tablet, Take 1 tablet by mouth daily  insulin glargine (LANTUS) 100 UNIT/ML injection vial, Inject 10 Units into the skin daily    Current Medications:     Scheduled Meds:    insulin glargine  25 Units Subcutaneous Nightly    sodium bicarbonate  1,300 mg Oral BID    allopurinol  100 mg Oral Daily    torsemide  40 mg Oral Daily    cefepime  1,000 mg Intravenous Q12H    Baclofen  5 mg Oral BID    [Held by provider] ferrous sulfate  325 mg Oral TID WC    magnesium oxide  400 mg Oral Daily    therapeutic multivitamin-minerals  1 tablet Oral Daily    [Held by provider] oxybutynin  5 mg Oral BID    [Held by provider] sevelamer  1,600 mg Oral TID WC    senna  1 tablet Oral Nightly    [Held by provider] sodium bicarbonate  650 mg Oral 4x Daily    sodium chloride flush  10 mL Intravenous 2 times per day    heparin (porcine)  5,000 Units Subcutaneous 3 times per day    insulin lispro  0-18 Units Subcutaneous TID WC    insulin lispro  0-9 Units Subcutaneous Nightly    atorvastatin  40 mg Oral Daily    metoprolol tartrate  25 mg Oral BID     Continuous Infusions:    dextrose       PRN Meds:  acetaminophen, polyethylene glycol, sodium chloride flush, potassium chloride **OR** potassium alternative oral replacement **OR** potassium chloride, magnesium sulfate, promethazine **OR** ondansetron, acetaminophen **OR** acetaminophen, glucose, dextrose, glucagon (rDNA), dextrose, magnesium hydroxide    Input/Output:       I/O last 3 completed shifts:  In: -   Out: 1345 [Urine:4325; Stool:800].       Patient Vitals for the past 96 hrs (Last 3 readings):   Weight   21 (!) 338 lb 8 oz (153.5 kg)   21 211 (!) 330 lb 14.4 oz (150.1 kg)   21 2153 (!) 331 lb 1.6 oz (150.2 kg)       Vital Signs:   Temperature:  Temp: 98.6 °F (37 °C)  TMax:   Temp (24hrs), Av.3 °F (36.8 °C), Min:98 °F (36.7 °C), Max:98.6 °F (37 °C)    Respirations:  Resp: 23 Pulse:   Pulse: 91  BP:    BP: (!) 136/97  BP Range: Systolic (37RFP), GGO:797 , Min:136 , DDL:140       Diastolic (85FKQ), JFW:12, Min:81, Max:97      Physical Examination:     General:  AAO x 3, speaking in full sentences, no accessory muscle use. HEENT: Atraumatic, normocephalic, no throat congestion, moist mucosa. Eyes:   Pupils equal, round and reactive to light, EOMI. Neck:   Supple  Chest:   Bilateral vesicular breath sounds, no rales or wheezes. Cardiac:  S1 S2 RR, no murmurs, gallops or rubs. Abdomen: Soft, non-tender, no masses or organomegaly, BS audible. :   No suprapubic or flank tenderness. Neuro:  AAO x 3, No FND. SKIN:  No rashes, good skin turgor. Extremities:  +ve 2-3+ edema, +ve anasarca. Labs:       Recent Labs     03/20/21  0929 03/21/21  1040 03/22/21  0747   WBC 9.2 6.8 11.6*   RBC 4.62 4.69 4.59   HGB 10.7* 11.0* 10.6*   HCT 38.3* 40.1* 37.3*   MCV 82.9 85.5 81.3*   MCH 23.2* 23.5* 23.1*   MCHC 27.9* 27.4* 28.4   RDW 18.4* 18.5* 18.4*    See Reflexed IPF Result 345   MPV 8.5 NOT REPORTED 8.7      BMP:   Recent Labs     03/20/21  0929 03/21/21  1040 03/22/21  0747    136 138   K 3.6* 4.0 3.9    106 107   CO2 20 17* 20   BUN 45* 52* 57*   CREATININE 2.95* 3.09* 3.17*   GLUCOSE 88 82 44*   CALCIUM 8.0* 7.8* 7.8*      Phosphorus:     Recent Labs     03/21/21  1040 03/22/21  0747   PHOS 5.2* 4.9*     Albumin:    Recent Labs     03/21/21  1040 03/22/21  0747   LABALBU 2.5* 2.6*     MARINE:      Lab Results   Component Value Date    MARINE NEGATIVE 11/30/2019     SPEP:  Lab Results   Component Value Date    PROT 5.3 12/01/2019    PATH ELECTRONICALLY SIGNED.  Martha Snyder M.D. 11/30/2019     C3:     Lab Results   Component Value Date    C3 97 11/30/2019     C4:     Lab Results   Component Value Date    C4 12 11/30/2019     Hep BsAg:         Lab Results   Component Value Date    HEPBSAG NONREACTIVE 11/30/2019     Hep C AB:          Lab Results   Component Value Date HEPCAB NONREACTIVE 11/30/2019       Urinalysis/Chemistries:      Lab Results   Component Value Date    NITRU NEGATIVE 03/19/2021    COLORU YELLOW 03/19/2021    PHUR 7.5 03/19/2021    WBCUA TOO NUMEROUS TO COUNT 03/19/2021    RBCUA 5 TO 10 03/19/2021    MUCUS NOT REPORTED 03/19/2021    TRICHOMONAS NOT REPORTED 03/19/2021    YEAST NOT REPORTED 03/19/2021    BACTERIA MODERATE 03/19/2021    SPECGRAV 1.010 03/19/2021    LEUKOCYTESUR LARGE 03/19/2021    UROBILINOGEN Normal 03/19/2021    BILIRUBINUR NEGATIVE 03/19/2021    GLUCOSEU NEGATIVE 03/19/2021    KETUA NEGATIVE 03/19/2021    AMORPHOUS NOT REPORTED 03/19/2021     Urine Sodium:     Lab Results   Component Value Date    DONOVAN 75 03/19/2021      Urine Creatinine:     Lab Results   Component Value Date    LABCREA 35.3 03/19/2021     Radiology:     Reviewed. Assessment:     1. Acute Kidney Injury nonoliguric likely secondary to ischemic ATN from underlying infection. Creatinine still evolving. Baseline creatinine seems to be 1.8 to 2 mg/DL. 2.  Worsening gluteal and sacral wounds. 3.  Type 2 diabetes with poor control. 4.  Morbid obesity with BMI of 49.99.  5.  Cardiomyopathy with EF of 25% with global hypokinesia. 2D echo done on 3/20/2021.  6.  Chronic indwelling Ambrosio catheter. 7.  Recurrent UTIs. 8.  Spina bifida with history of immobility. 9.  Chronic nonhealing gluteal wounds with history of necrotizing fasciitis status post multiple debridements with diverting colostomy in 2019.  10.  Nonnephrotic proteinuria. Plan:   1. Continue Demadex 40 mg daily. 2.  Monitor strict I's and O's and renal function. 3.  Start fluid restrictions 1500 cc/day. 4.  BMP in AM.  5.  Will follow. Nutrition   Please ensure that patient is on a renal diet/TF. Avoid nephrotoxic drugs/contrast exposure. We will continue to follow along with you. Leonel Marks MD  [facility]     This note is created with the assistance of richi speech-recognition program. While intending to generate a document that actually reflects the content of the visit, no guarantees can be provided that every mistake has been identified and corrected by editing.

## 2021-03-22 NOTE — CONSULTS
Consultation Note  Podiatric Medicine and Surgery     Subjective     Chief Complaint: Bilateral foot wounds    HPI:  Kemi Salinas is a 38 y.o. male who has been struggling to heal chronic heel wounds for many years without success. Typically he is seen by wound care specialists and he cannot recall how they typically treat his heel pressure wounds. He was seen by Dr. Napoleon Laurent in late 2019 and early 2020 on a previous hospitalization and at that time no surgical intervention was merited and they were treated conservatively. Has not followed up with Dr. Napoleon Laurent outpatient. He rarely is able to feel any sensation to his feet but pain is noted randomly. He is a type 2 diabetic and his last A1C was 6.6 (3/20/21). Pt reports gradual decline in strength, chills, discomfort to his buttocks, increase in size of buttock wounds, shortness of breath and worsening edema x 2 mos. His visiting nurse started Doxycycline 2 days ago for possible wound infection and told him he needed more care than could be provided in the home. He lives with his mom who is 79 yr old and has some health issues. PCP is No primary care provider on file. ROS:   Review of Systems   Constitutional: Negative for chills and fever. Respiratory: Positive for shortness of breath. Negative for cough and choking. Cardiovascular: Positive for leg swelling. Gastrointestinal: Negative for nausea and vomiting. Musculoskeletal: Positive for gait problem. Skin: Positive for wound. Psychiatric/Behavioral: Positive for agitation. Negative for confusion. Past Medical History   has a past medical history of CHF (congestive heart failure) ([facility] Diabetes mellitus ([facility] [address] Hypertension, Septic shock ([facility] [address] and Spina bifida aperta of lumbar spine ([facility] [address] Past Surgical History   has a past surgical history that includes incision and drainage (Bilateral, 11/29/2019); colostomy (N/A, 12/3/2019);  Abdomen surgery (N/A, 12/8/2019); incision and drainage (N/A, 12/6/2019); Abdomen surgery (N/A, 12/10/2019); pr explore scrotum (N/A, 12/10/2019); incision and drainage (N/A, 12/20/2019); Wound Exploration (N/A, 12/23/2019); incision and drainage (N/A, 12/26/2019); Abscess Drainage (N/A, 1/1/2020); debridement (01/05/2020); Abdomen surgery (N/A, 1/5/2020); debridement (01/08/2020); incision and drainage (N/A, 1/8/2020); debridement (01/11/2020); and Abdomen surgery (N/A, 1/11/2020). Medications  Prior to Admission medications    Medication Sig Start Date End Date Taking?  Authorizing Provider   sevelamer (RENVELA) 800 MG tablet Take 2 tablets by mouth 3 times daily (with meals)    Historical Provider, MD   sodium bicarbonate 650 MG tablet Take 650 mg by mouth 4 times daily    Historical Provider, MD   chlorhexidine (PERIDEX) 0.12 % solution Take 15 mLs by mouth 2 times daily    Historical Provider, MD   acetaminophen (TYLENOL) 325 MG tablet Take 650 mg by mouth every 6 hours as needed for Pain    Historical Provider, MD   Liraglutide (VICTOZA) 18 MG/3ML SOPN SC injection 1.8 mg 10/12/19   Historical Provider, MD   oxybutynin (DITROPAN) 5 MG tablet  8/8/20   Historical Provider, MD   Baclofen (LIORESAL) 5 MG tablet Take 1 tablet by mouth 2 times daily 9/2/20   MIRTA Will - CNP   Epoetin Chaitanya-epbx (RETACRIT IJ) Inject 10,000 Units as directed Indications: M W F if levels are correct    Historical Provider, MD   insulin detemir (LEVEMIR FLEXTOUCH) 100 UNIT/ML injection pen Levemir FlexTouch U-100 Insulin 100 unit/mL (3 mL) subcutaneous pen    Historical Provider, MD   atorvastatin (LIPITOR) 80 MG tablet Take 100 mg by mouth daily  7/23/19   Historical Provider, MD   enoxaparin (LOVENOX) 40 MG/0.4ML injection Inject 0.4 mg into the skin 2 times daily    Historical Provider, MD   ferrous sulfate (IRON 325) 325 (65 Fe) MG tablet Take 325 mg by mouth 3 times daily (with meals)    Historical Provider, MD   magnesium oxide (MAG-OX) 400 MG tablet Take 400 mg by mouth daily    Historical Provider, MD silvana (SENOKOT) 8.6 MG tablet Take 1 tablet by mouth nightly    Historical Provider, MD   polyethylene glycol (GLYCOLAX) 17 g packet Take 17 g by mouth daily as needed for Constipation    Historical Provider, MD   oxyCODONE (ROXICODONE) 5 MG immediate release tablet Take 10 mg by mouth every 4 hours as needed for Pain. Historical Provider, MD   Multiple Vitamins-Minerals (THERAPEUTIC MULTIVITAMIN-MINERALS) tablet Take 1 tablet by mouth daily 1/16/20   Jonnie West MD   insulin glargine (LANTUS) 100 UNIT/ML injection vial Inject 10 Units into the skin daily 1/16/20   Jonnie West MD    Scheduled Meds:  [facility] insulin glargine  25 Units Subcutaneous Nightly    sodium bicarbonate  1,300 mg Oral BID    allopurinol  100 mg Oral Daily    torsemide  40 mg Oral Daily    Baclofen  5 mg Oral BID    [Held by provider] ferrous sulfate  325 mg Oral TID WC    magnesium oxide  400 mg Oral Daily    therapeutic multivitamin-minerals  1 tablet Oral Daily    [Held by provider] oxybutynin  5 mg Oral BID    [Held by provider] sevelamer  1,600 mg Oral TID WC    senna  1 tablet Oral Nightly    [Held by provider] sodium bicarbonate  650 mg Oral 4x Daily    sodium chloride flush  10 mL Intravenous 2 times per day    heparin (porcine)  5,000 Units Subcutaneous 3 times per day    insulin lispro  0-18 Units Subcutaneous TID     insulin lispro  0-9 Units Subcutaneous Nightly    atorvastatin  40 mg Oral Daily    metoprolol tartrate  25 mg Oral BID     Continuous Infusions:   dextrose       PRN Meds:.acetaminophen, polyethylene glycol, sodium chloride flush, potassium chloride **OR** potassium alternative oral replacement **OR** potassium chloride, magnesium sulfate, promethazine **OR** ondansetron, acetaminophen **OR** acetaminophen, glucose, dextrose, glucagon (rDNA), dextrose, magnesium hydroxide    Allergies  is allergic to other.     Family History  family history is not on file. Social History   reports that he has never smoked. He has never used smokeless tobacco.   reports no history of alcohol use. reports no history of drug use. Objective     Vitals:  Patient Vitals for the past 8 hrs:   BP Temp Temp src Pulse Resp SpO2   21 1540 135/82 98.8 °F (37.1 °C)  90 20 95 %   21 1225 (!) 136/97 98.6 °F (37 °C) Oral 91 23      Average, Min, and Max for last 24 hours Vitals:  TEMPERATURE:  Temp  Av.4 °F (36.9 °C)  Min: 98 °F (36.7 °C)  Max: 98.8 °F (37.1 °C)    RESPIRATIONS RANGE: Resp  Av.3  Min: 10  Max: 23    PULSE RANGE: Pulse  Av.4  Min: 77  Max: 96    BLOOD PRESSURE RANGE:  Systolic (68UXV), UKO:275 , Min:135 , ZEX:128   ; Diastolic (82LKH), ABC:02, Min:81, Max:97      PULSE OXIMETRY RANGE: SpO2  Av %  Min: 95 %  Max: 98 %  I&O:  I/O last 3 completed shifts:  In: -   Out: 6000 [Urine:4700; SIURV:8475]    CBC:  Recent Labs     21  0929 21  1040 21  0747   WBC 9.2 6.8 11.6*   HGB 10.7* 11.0* 10.6*   HCT 38.3* 40.1* 37.3*    See Reflexed IPF Result 345   CRP 56.2*  --   --         BMP:  Recent Labs     21  0929 21  1040 21  0747    136 138   K 3.6* 4.0 3.9    106 107   CO2 20 17* 20   BUN 45* 52* 57*   CREATININE 2.95* 3.09* 3.17*   GLUCOSE 88 82 44*   CALCIUM 8.0* 7.8* 7.8*        Coags:  Recent Labs     21  0929   APTT 25.8   INR 1.1       Lab Results   Component Value Date    LABA1C 6.6 (H) 2021     Lab Results   Component Value Date    SEDRATE 66 (H) 2021     Lab Results   Component Value Date    CRP 56.2 (H) 2021         Lower Extremity Physical Exam:    Vascular: DP and PT pulses are Non-palpable, BLE DP/PT audible on doppler. CFT <5 seconds to all digits. Hair growth is absent to the level of the digits. Heavy +3 pitting edema is present to bilateral lower extremities ankles and feet.     Neuro: Saph/sural/SP/DP/plantar sensation absent to light touch. Musculoskeletal: Muscle strength to all lower extremity muscle groups 0/5. Gross deformity is present left fifth toe amputation. Dermatologic: Full thickness ulcer #1 located left plantar heel measures approximately 6cm x 4cm x 4cm. The wound base is 50% granular, 35% fibrotic, 15% necrotic. Periwound skin is erythematous. sero-sanguinous drainage noted with out associated mal odor. Erythema present with out associated increase in warmth. Probes directly to the calcaneus, which is noted to be soft. Does not sinus track or undermine. No fluctuance, crepitus, or induration. ulcer #2 is located on the right posterior medial heel measuring approximately 3 cm x 4 cm x 0.3 cm. The wound base is fibrotic with very limited serous drainage 1 mL. Periwound skin is mildly erythemic. It does not probe to bone sinus track or undermine. There is no fluctuance crepitus or induration appreciated. Interdigital maceration absent. Clinical:                      Imaging:   US RETROPERITONEAL LIMITED   Final Result   Unremarkable right kidney. Left kidney not visualized due to patient body   habitus and bowel gas. XR FOOT LEFT (MIN 3 VIEWS)    (Results Pending)       Cultures:  3/22/21 - left heel wound: pending  3/22/21 - 2nd left heel wound: pending    Assessment     Gi Allison is a 38 y.o. male with   1. Diabetic foot ulcer down to bone left foot  2. Diabetic foot ulcer down to subcutaneous tissue right foot  3. Type II DM with secondary peripheral neuropathy  4. DEBBY   5. CHF  6. Paraplegia   7.  S/p L 5th toe amputation    Principal Problem:    DEBBY (acute kidney injury) ([address]  Active Problems:    Chronic systolic heart failure (HCC)    Volume overload    Hypoglycemia due to insulin    Right arm weakness    Essential hypertension    DM II (diabetes mellitus, type II), controlled ([address]    Chronic paraplegia ([address]    Sacral wound    Open wound of left foot    Ambrosio catheter in place Colostomy in place [facility])  Resolved Problems:    * No resolved hospital problems. *        Plan     · Patient examined and evaluated at bedside. · Treatment options discussed in detail with the patient. · Right and left foot plain film radiographs ordered STAT - Neg gas  · Discussed osseous changes visualized on comparison left foot films from 2019 - erosive changes suggestive of OM calcaneus left foot. · Low suspicion gas/om R foot  · Will discuss treatment options following MRI results  · Left foot and ankle MRI wo contrast ordered STAT to r/o OM left calc/ankle  · Culture x 2 an/aerobic taken and sent - left heel wound  · Recommend consideration of ID consult; L plantar heel ulceration probes directly to bone with WBC trending up  · PVRs ordered to assess vascular status  · Off-load both heels with pillows so heels do not contact bed  · Consult placed for orthotist - b/l Rooke boots to off-load heels  · Non-weight bearing to bilateral lower extremities  · Dressing applied to right foot: betadine, 4x4's, kerlix  · Dressing applied to left foot: maxsorb (silver alginate), 4x4's, abd's, kerlix  · Discussed with  Dr. Joaquina Hernandez      Electronically signed by Marla Peace DPM on 3/22/2021 at 7:37 PM   Senior Resident Statement:  I have discussed the case, including pertinent history and exam findings with the resident. I agree with the assessment, plan and orders as documented by the intern. Any changes were made in the note above. F/u MRI to evaluate extent of L foot OM, r/o deep abscess. PVRs ordered. Rec ID consult for abx recs.     Electronically signed by Zia Ruiz DPM on 3/22/2021 at 10:56 PM

## 2021-03-22 NOTE — PROGRESS NOTES
[facility] Wound Ostomy  Nurse  Consult Note       NAME:  Kamryn Hoover RECORD NUMBER:  [id number]  AGE: 38 y.o. GENDER: male  : 1985  TODAY'S DATE:  3/22/2021    Subjective   Reason for [address]  Nurse Evaluation and Assessment:   Chronic sacral and heel wounds. Known from past admissions for necrotizing fasciitis involving buttocks, and scrotum with wide resection and  multiple I&D's with NPWT changes, , Dec. 2019-2020 with Trauma Surgeons. Colostomy creation. History of Spina Bifida. Presents with:  Unstagable pressure injury to the right heel; fibrinous moist eschar. Stage 4 pressure injury to the left heel with exposed bone. Podiatry had seen on prior admission  Sacral wound appears clean and granular with serosanguinous drainage. Small right ischial ulceration noted. Abdominal wound (delayed primary closure), with small open area.         Rahul Betts is a 38 y.o. male referred by:   [x] Physician  [] Nursing  [] Other:         PAST MEDICAL HISTORY        Diagnosis Date    CHF (congestive heart failure) ([address]     Diabetes mellitus ([address]     Hypertension     Septic shock ([facility] [address]     Spina bifida aperta of lumbar spine ([facility] [address]        PAST SURGICAL HISTORY    Past Surgical History:   Procedure Laterality Date    ABDOMEN SURGERY N/A 2019    DEBRIDEMENT  NECROTIZING FASCIITIS BUTTOCK, WOUND VAC REMOVAL DELAYED PRIMARY CLOSURE OF MIDLINE ABDOMINAL INCISION, OSTOMY BAG CHANGE performed by Wood Linda MD at [address] [facility] N/A 12/10/2019    DEBRIDEMENT OF SACRAL WOUND performed by Dimitris Zaragoza MD at [address] [facility] N/A 2020    GLUTEAL WOUND DEBRIDEMENT, WOUND VAC CHANGE performed by Dimitris Zaragoza MD at [address] [facility] N/A 2020    SACRAL DEBRIDEMENT WITH WOUND VAC CHANGE performed by Efrain Barney MD at [facility] 2. 2020    IRRIGATION AND DEBRIDEMENT BUTTOCKS, SCROTUM, ABDOMEN, WOUND VAC CHANGE performed by Tj Marvin MD at [address] [facility] [address] N/A 12/3/2019    LAPAROSCOPIC CONVERTED TO OPEN DIVERTING LOOP COLOSTOMY; WOUND VAC APPLICATION performed by Mario Deleon MD at [facility]  01/05/2020    GLUTEAL WOUND DEBRIDEMENT, WOUND VAC CHANGE     DEBRIDEMENT  01/08/2020    WOUND VAC CHANGE SACRAL DECUBITUS, POSSIBLE DEBRIDEMENT    DEBRIDEMENT  01/11/2020     SACRAL DEBRIDEMENT WITH WOUND VAC CHANGE     INCISION AND DRAINAGE Bilateral 11/29/2019    RECTAL PERIRECTAL INCISION AND DRAINAGE, [facility] LOOP COLOSTOMY CREATION performed by Mir Apple MD at [facility] N/A 12/6/2019    DEBRIDEMENT NECROTIZING FASCIAITIS BILAT BUTTOCK performed by Cullen Borja MD at [facility] N/A 12/20/2019    DEBRIDEMENT GLUTEAL AND SCROTAL REGIONS performed by Mir Apple MD at [facility] N/A 12/26/2019    INCISION AND DRAINAGE AND WOUND VAC CHANGE BUTTOCK, PERINEUM performed by Lynsey Downs DO at [facility] N/A 1/8/2020    WOUND VAC CHANGE SACRAL DECUBITUS, DEBRIDEMENT performed by Cullen Borja MD at [address] N/A 12/10/2019    SCROTAL EXPLORATION WITH SCROTAL DEBRIDEMENT performed by Antonia Tripp MD at [address] [facility] N/A 12/23/2019    WOUND [address] performed by Cullen Borja MD at [address] [facility]    No family history on file. SOCIAL HISTORY    Social History     Tobacco Use    Smoking status: Never Smoker    Smokeless tobacco: Never Used   Substance Use Topics    Alcohol use: Never     Frequency: Never    Drug use: Never       ALLERGIES    Allergies   Allergen Reactions    Other      Mask adhesive causes hives       MEDICATIONS    No current facility-administered medications on file prior to encounter.       Current Outpatient Medications on File Prior to Encounter   Medication Sig Dispense Refill    sevelamer (RENVELA) 800 MG tablet Take 2 tablets by mouth 3 times daily (with meals)      sodium bicarbonate 650 MG tablet Take 650 mg by mouth 4 times daily      chlorhexidine (PERIDEX) 0.12 % solution Take 15 mLs by mouth 2 times daily      acetaminophen (TYLENOL) 325 MG tablet Take 650 mg by mouth every 6 hours as needed for Pain      Liraglutide (VICTOZA) 18 MG/3ML SOPN SC injection 1.8 mg      oxybutynin (DITROPAN) 5 MG tablet       Baclofen (LIORESAL) 5 MG tablet Take 1 tablet by mouth 2 times daily 60 tablet 5    Epoetin Chaitanya-epbx (RETACRIT IJ) Inject 10,000 Units as directed Indications: M W F if levels are correct      insulin detemir (LEVEMIR FLEXTOUCH) 100 UNIT/ML injection pen Levemir FlexTouch U-100 Insulin 100 unit/mL (3 mL) subcutaneous pen      atorvastatin (LIPITOR) 80 MG tablet Take 100 mg by mouth daily       enoxaparin (LOVENOX) 40 MG/0.4ML injection Inject 0.4 mg into the skin 2 times daily      ferrous sulfate (IRON 325) 325 (65 Fe) MG tablet Take 325 mg by mouth 3 times daily (with meals)      magnesium oxide (MAG-OX) 400 MG tablet Take 400 mg by mouth daily      senna (SENOKOT) 8.6 MG tablet Take 1 tablet by mouth nightly      polyethylene glycol (GLYCOLAX) 17 g packet Take 17 g by mouth daily as needed for Constipation      oxyCODONE (ROXICODONE) 5 MG immediate release tablet Take 10 mg by mouth every 4 hours as needed for Pain.       Multiple Vitamins-Minerals (THERAPEUTIC MULTIVITAMIN-MINERALS) tablet Take 1 tablet by mouth daily 30 tablet 3    insulin glargine (LANTUS) 100 UNIT/ML injection vial Inject 10 Units into the skin daily 1 vial 3       Objective    /81   Pulse 86   Temp 98.2 °F (36.8 °C) (Oral)   Resp 14   Ht 5' 9\" (1.753 m)   Wt (!) 338 lb 8 oz (153.5 kg)   SpO2 98%   BMI 49.99 kg/m²     LABS:  WBC:    Lab Results   Component Value Date    WBC 11.6 03/22/2021     H/H:    Lab Results   Component Value Assessment Edematous   Wound 03/19/21 Heel Right pressure wound   Date First Assessed/Time First Assessed: 03/19/21 1800   Present on Hospital Admission: Yes  Primary Wound Type: Pressure Injury  Location: Heel  Wound Location Orientation: Right  Wound Description (Comments): pressure wound   Wound Image    Wound Etiology Pressure Unstageable   Wound Cleansed Cleansed with saline   Dressing/Treatment Honey gel/honey paste; Hydrating gel   Dressing Change Due 03/24/21   Wound Length (cm) 2.4 cm   Wound Width (cm) 2.9 cm   Wound Depth (cm) 0.2 cm   Wound Surface Area (cm^2) 6.96 cm^2   Wound Volume (cm^3) 1.39 cm^3   Wound Assessment Eschar moist;Fibrinous   Drainage Amount Scant   Drainage Description Serous; Yellow   Odor None   Liliam-wound Assessment Intact   Wound 11/30/19 Heel Left   Date First Assessed/Time First Assessed: 11/30/19 2000   Present on Hospital Admission: Yes  Primary Wound Type: Pressure Injury  Location: (c) Heel  Wound Location Orientation: Left   Wound Image    Wound Etiology Pressure Stage  4   Dressing Status New dressing applied   Wound Cleansed Cleansed with saline   Dressing/Treatment Moist to moist;Moisten with saline; Roll gauze   Dressing Change Due 03/22/21  (each shift)   Wound Length (cm) 4.2 cm   Wound Width (cm) 4.8 cm   Wound Depth (cm) 1.5 cm   Wound Surface Area (cm^2) 20.16 cm^2   Wound Volume (cm^3) 30.24 cm^3   Wound Assessment Exposed structure bone;Pink/red;Slough   Drainage Amount Small   Drainage Description Serous; Yellow   Odor None   Liliam-wound Assessment Maceration   Wound 03/22/21 Ischium Right   Date First Assessed/Time First Assessed: 03/22/21 0959   Present on Hospital Admission: Yes  Primary Wound Type: Pressure Injury  Location: Ischium  Wound Location Orientation: Right   Wound Image    Wound Etiology Pressure Stage  3   Dressing Status New dressing applied   Wound Cleansed Irrigated with saline   Dressing/Treatment Hydrocolloid   Dressing Change Due 03/25/21 Wound Length (cm) 1.5 cm   Wound Width (cm) 5 cm   Wound Surface Area (cm^2) 7.5 cm^2   Wound Assessment Subcutaneous;Pink/red   Drainage Amount Scant   Drainage Description Serous   Odor None   Liliam-wound Assessment Intact   Incision 12/03/19 Abdomen Mid   Date First Assessed/Time First Assessed: 12/03/19 1158   Present on Hospital Admission: No  Primary Wound Type: Surgical Type  Location: Abdomen  Wound Location Orientation: Mid   Wound Image    Dressing Status New dressing applied   Dressing Change Due 03/24/21   Incision Cleansed Cleansed with saline   Dressing/Treatment Alginate; Foam   Incision Length (cm) 5   Incision Width (cm) 1.5 cm   Incision Depth (cm) 0.1 cm   Incision Assessment   (superficial, red)   Drainage Amount Scant   Drainage Description Serous   Odor None   Liliam-incision Assessment Intact            Response to treatment:  With complaints of pain. Tolerated HOB lowered poorly with c/o dyspnea. Plan   Plan of Care:   SACRAL/BUTTOCK:  Apply Maxorb alginate to open area and cover with a large Sacral Mepilex. Change every 48 hours and prn if drainage is excessive. RIGHT ISCHIUM:  Hyrdocolloid 4x4. Change every 72 hours. SCROTUM:  Apply zinc oxide paste BID and prn. LEFT HEEL:  Saline moistened gauze, ABD and secure with roll gauze. OR per Podiatry recommendation. RIGHT HEEL:  Medihoney HCS with 4x4 Optifoam dressing. Change every 48 hours. OR per Podiatry recommendation. Elevate heels off of mattress  Turn every 2 hours    Use Comfort [place] to reposition patient to minimize potential for shear injury. Routine  Ostomy care. Change pouch 2 times/week. Empty with 1/3-1/2 full    Moisture wicking under pads    Interdry cloths to abdominal skin folds.       Specialty Bed Required : Yes   [x] Low Air Loss   [x] Pressure Redistribution  [] Fluid Immersion  [x] Bariatric  [] Total Pressure Relief  [] Other:     Current Diet: Dietary Nutrition Supplements: Wound Healing Oral Supplement  DIET RENAL; Low Sodium (2 GM)    Discharge Plan:  Placement for patient upon discharge: skilled nursing    Patient appropriate for Outpatient [address]: Yes     KALIN MICHELEN, RN, [employer]

## 2021-03-22 NOTE — PROGRESS NOTES
Occupational Therapy   Occupational Therapy Initial Assessment  Date: 3/22/2021   Patient Name: Jenna Woodruff  MRN: [id number]     : 1985    Date of Service: 3/22/2021    Discharge Recommendations:    Further therapy recommended at discharge. Assessment   Performance deficits / Impairments: Decreased functional mobility ; Decreased ADL status; Decreased endurance;Decreased high-level IADLs;Decreased balance  Assessment: pt would benefit from further therapy at discharge in order to increase ability to complete functional transfers and participate in ADLS. Treatment Diagnosis: DEBBY  Prognosis: Good  Decision Making: Medium Complexity  Patient Education: pt ed on POC, purpose of eval, importance of movement, hand placement during bed mobility, importance of breathing through movement. good return  REQUIRES OT FOLLOW UP: Yes  Activity Tolerance  Activity Tolerance: Patient Tolerated treatment well;Patient limited by fatigue  Safety Devices  Safety Devices in place: Yes  Type of devices: Call light within reach; Left in bed;Nurse notified  Restraints  Initially in place: No           Patient Diagnosis(es): There were no encounter diagnoses. has a past medical history of CHF (congestive heart failure) ([facility] Diabetes mellitus ([facility] Hypertension, Septic shock ([facility] and Spina bifida aperta of lumbar spine ([facility] has a past surgical history that includes incision and drainage (Bilateral, 2019); colostomy (N/A, 12/3/2019); Abdomen surgery (N/A, 2019); incision and drainage (N/A, 2019); Abdomen surgery (N/A, 12/10/2019); pr explore scrotum (N/A, 12/10/2019); incision and drainage (N/A, 2019); Wound Exploration (N/A, 2019); incision and drainage (N/A, 2019); Abscess Drainage (N/A, 2020); debridement (2020); Abdomen surgery (N/A, 2020); debridement (2020); incision and drainage (N/A, 2020); debridement (2020); and Abdomen surgery (N/A, 2020). Treatment Diagnosis: DEBBY      Restrictions  Restrictions/Precautions  Restrictions/Precautions: Fall Risk  Required Braces or Orthoses?: No  Position Activity Restriction  Other position/activity restrictions: up with assist. Hx Spina bifida aperta of lumbar spine, buttock wound    Subjective   General  Patient assessed for rehabilitation services?: Yes  Family / Caregiver Present: No  Diagnosis: SOB  General Comment  Comments: RN ok'd for therapy this morning.  Pt agreeable to participate in session and cooperative throughout, pt motivated to participate in session  Patient Currently in Pain: Denies    Oxygen Therapy  O2 Device: None (Room air)    Social/Functional History  Social/Functional History  Lives With: Family(mom)  Type of Home: House  Home Layout: Two level, Able to Live on Main level with bedroom/bathroom  Home Access: Ramped entrance  Bathroom Shower/Tub: Walk-in shower(pt reported has been sponge bathing due to inability to get into shower)  Bathroom Toilet: Standard  Bathroom Equipment: Grab bars in shower, Shower chair  Bathroom Accessibility: Accessible  Home Equipment: Kathrin Horseman, Standard walker(pt reported primarily using w/c but hasn't been in w/c for 4 months)  Receives Help From: Home health(pt reported HHA 4hr/day and 5days/wk but recently HHA decreased to 2 days/wk)  ADL Assistance: Needs assistance(Ptreported HHA assists with ADLs)  Homemaking Assistance: Needs assistance  Homemaking Responsibilities: No(pt reported HHA and mom completes IADLs)  Ambulation Assistance: Needs assistance(pt reported has been in bed for last 4 months, but primarily uses w/c prior)  Transfer Assistance: Needs assistance(pt reported hasn't been out of bed for 4 months but was previously able to complete stand pivot transfers)  Active : No  Patient's  Info: mother  Mode of Transportation: Car  Additional Comments: not stood for ~4 months       Objective   Vision: Impaired  Vision 1326)  AM-PAC Inpatient ADL T-Scale Score : 29.04 (03/22/21 1326)  ADL Inpatient CMS 0-100% Score: 70.42 (03/22/21 1326)  ADL Inpatient CMS G-Code Modifier : CL (03/22/21 1326)    Goals  Short term goals  Time Frame for Short term goals: pt will, by discharge  Short term goal 1: complete LB ADLs and toileting tasks with max A, set up and AE, as needed  Short term goal 2: complete UB ADLs with min A, and set up  Short term goal 3: increase activity tolerance to 15+ minutes in order to participate in daily tasks  Short term goal 4: dem mod A during bed mobility in order to increase independence  Short term goal 5: dem ~10 minutes static/dynamic sitting balance on eOB with CGA in order to complete functional tasks  Long term goals  Time Frame for Long term goals : NOTIFY OTR TO UPDATE MOBILITY GOALS AS APPROPRIATE       Therapy Time   Individual Concurrent Group Co-treatment   Time In [phone]         Time Out [facility]         Minutes 24         Timed Code Treatment Minutes: Bradley 0216, OTR/L

## 2021-03-22 NOTE — PROGRESS NOTES
Physical Therapy    Facility/Department: [facility] 2  Initial Assessment    NAME: Donte Sandoval  : 1985  MRN: [id number]    No chief complaint on file.  -Shortness of breath  -Sacral and perineal wounds       Date of Service: 3/22/2021    Discharge Recommendations:    Further therapy recommended at discharge. PT Equipment Recommendations  Other: CTA    Assessment   Body structures, Functions, Activity limitations: Decreased functional mobility ; Decreased strength;Decreased endurance;Decreased balance  Assessment: Pt maxAx2 bed mobility, Pricsila EOB increasing to modA with fatigue and maxA just prior to returning supine. Pt would benefit from continued acute PT to address deficits. Prognosis: Good  Decision Making: High Complexity  PT Education: Plan of Care;PT Role;General Safety  REQUIRES PT FOLLOW UP: Yes  Activity Tolerance  Activity Tolerance: Patient limited by fatigue;Patient limited by endurance; Patient Tolerated treatment well       Patient Diagnosis(es): There were no encounter diagnoses. has a past medical history of CHF (congestive heart failure) ([facility] Diabetes mellitus ([facility] Hypertension, Septic shock ([facility] and Spina bifida aperta of lumbar spine ([facility] has a past surgical history that includes incision and drainage (Bilateral, 2019); colostomy (N/A, 12/3/2019); Abdomen surgery (N/A, 2019); incision and drainage (N/A, 2019); Abdomen surgery (N/A, 12/10/2019); pr explore scrotum (N/A, 12/10/2019); incision and drainage (N/A, 2019); Wound Exploration (N/A, 2019); incision and drainage (N/A, 2019); Abscess Drainage (N/A, 2020); debridement (2020); Abdomen surgery (N/A, 2020); debridement (2020); incision and drainage (N/A, 2020); debridement (2020); and Abdomen surgery (N/A, 2020).     Restrictions  Restrictions/Precautions  Restrictions/Precautions: General Precautions, Fall Risk  Required Braces or Orthoses?: No  Position Activity Restriction  Other position/activity restrictions: up with assist. Hx Spina bifida aperta of lumbar spine, little LE movement  Vision/Hearing        Subjective  General  Chart Reviewed: Yes  Patient assessed for rehabilitation services?: Yes  Response To Previous Treatment: Not applicable  Family / Caregiver Present: No  Follows Commands: Within Functional Limits  General Comment  Comments: OT co-eval  Subjective  Subjective: RN and pt agreeable to PT. Pt alert in bed upon arrival.  Pain Screening  Patient Currently in Pain: Denies  Vital Signs  Patient Currently in Pain: Denies  Pre Treatment Pain Screening  Intervention List: Patient able to continue with treatment    Orientation  Orientation  Overall Orientation Status: Within Functional Limits  Social/Functional History  Social/Functional History  Lives With: (mother)  Type of Home: House  Home Layout: Two level, Able to Live on Main level with bedroom/bathroom  Home Access: Ramped entrance  Bathroom Shower/Tub: Walk-in shower(sponge bathes)  Bathroom Equipment: Grab bars in shower, Shower chair  Bathroom Accessibility: Accessible  Home Equipment: [address] Help From: Family, Home health(prior aide 5x/wk, 4 hrs each.  now 2x/wk)  ADL Assistance: Needs assistance(aide helsp bathe/ dress.)  Homemaking Assistance: Needs assistance  Homemaking Responsibilities: No(mother or aide do cooking/ cleaning/ laundry)  Ambulation Assistance: (no-amb. propells w/c)  Transfer Assistance: Independent(pivots only)  Active : No  Patient's  Info: mother  Mode of Transportation: Car  Additional Comments: not stood for ~4 months  Cognition        Objective          PROM RLE (degrees)  RLE General PROM: stiff  PROM LLE (degrees)  LLE General PROM: stiff  AROM RUE (degrees)  RUE AROM : WFL  AROM LUE (degrees)  LUE AROM : WFL  Strength RLE  Comment: no AROm observed  Strength LLE  Comment: no AROM observed  Strength RUE  Comment: antigravity, see OT  Strength LUE  Comment: antigravity, see OT     Sensation  Overall Sensation Status: WFL(Pt denies any numbness or tingling)  Bed mobility  Supine to Sit: Maximum assistance;2 Person assistance  Sit to Supine: Maximum assistance;2 Person assistance  Transfers  Comment: unsafe to attempt  Ambulation  Ambulation?: No(non-ambulatory baseline)  Stairs/Curb  Stairs?: No     Balance  Sitting - Static: Fair  Sitting - Dynamic: Fair;-  Exercises  Comments: EOB ~6min Priscila increased to modA with fatigue     Plan   Plan  Times per week: 5-6x/wk  Current Treatment Recommendations: Balance Training, ROM, Strengthening, Functional Mobility Training, Endurance Training, Transfer Training, Home Exercise Program, Safety Education & Training, Patient/Caregiver Education & Training, Equipment Evaluation, Education, & procurement  Safety Devices  Type of devices: Call light within reach, Nurse notified, Patient at risk for falls, Left in bed, All fall risk precautions in place  Restraints  Initially in place: No    AM-PAC Score     AM-PAC Inpatient Mobility without Stair Climbing Raw Score : 7 (03/22/21 1247)  AM-PAC Inpatient without Stair Climbing T-Scale Score : 28.66 (03/22/21 1247)  Mobility Inpatient CMS 0-100% Score: 86.29 (03/22/21 1247)  Mobility Inpatient without Stair CMS G-Code Modifier : CM (03/22/21 1247)       Goals  Short term goals  Time Frame for Short term goals: 14 visits  Short term goal 1: Pt will be Priscila bed mobility  Short term goal 2: Pt will be CGA EOB 2min  Short term goal 3: Pt will be maxA to stand  Short term goal 4: Pt will be safe to attempt pivot transfer       Therapy Time   Individual Concurrent Group Co-treatment   Time In 0907         Time Out 0931         Minutes 24         Timed Code Treatment Minutes: 8 Minutes       Gladys Gaxiola, PT

## 2021-03-22 NOTE — PROGRESS NOTES
[facility]  Office: [phone]  Angeline Platt, DO, Elvi Serum, DO, Cheryl Hernandez, DO, Aníbal Melendez Blood, DO, Kunal Botello MD, Enoch Shaikh MD, Mary Jo Villanueva MD, Friddie Harada, MD, Daren Viera MD, Maxine Gee MD, Omar Crystal MD, Isma Dumont MD, Radha Damon MD, Freddie Coffman DO, Geoff Lopez MD, Tiffany Mejias DO, Mike Wallace MD,  Humaira Gant DO, Rere Cole MD, Bessie Singh MD, Jacinda Poon, [facility], [facility], CNP, Gómez Medellin, CNP, Stef Lomeli, CNS, Rita Mireles, CNP, Darrell Luna, CNP, Patricia Beal, CNP, Joie Coates, CNP, [place], CNP, Octavio Donovan PASeverianoC, Syed Mahan, [facility], Serafin Cheadle, CNP, Aby Torres, CNP, Annia Mcnamara, CNP, Suki Muller, CNP, Jessica Antony, [facility], Demetri Jordan, [address] [place] [address]    Progress Note    3/22/2021    3:33 PM    Name:   Gi Allison  MRN:     [id number]     Acct:      [de-identified]   Room:   2002/2002-01   Day:  3  Admit Date:  3/19/2021  5:34 PM    PCP:   No primary care provider on file. Code Status:  Full Code    Subjective:     C/C: Inability to care for himself, wounds, leg swelling    Interval History Status: not changed. Pt seen and examined this morning. No acute events overnight. Reports that his swelling in his legs persist.  He is currently sitting up in bed. Hypoglycemic again this morning. States that he usually is eating whatever he wants at home. States that his glucoses are likely low secondary to the fact that he is not eating his typical diet. 4.3 L of urine output over the past 24 hours. Brief History:     Gi Allison is a 38 y.o. male who was directly admitted from [facility] ED March 19 for the management of DEBBY (acute kidney injury) ([facility] [address]        He has a hx of spina bifida, CHF with EF 30% on Echo 12/6/19, CKD-baseline creat ~1.5, DM, chronic Lt foot ulcer and prolonged admit (11/29/19 - 1/16/20) for Necrotizing fasciitis buttocks. During that admit he underwent approx 11 I&D procedures, temporary dialysis and colostomy creation. He has a chronic lema cath. Pt was in SNF until about 4 mo ago.     Pt presented to ED with worsening shortness of breath. EKG: Mult PVC's, old inferior & anteroseptal infarct, . CXR showed congestion, BNP 1200 (<100), Trop 0.09 (0-0.04), Lactic 1.5, BUN/Cr 46/3.05, Glucose 122, Na 138, K 4.2, Mg 1.2, LFTs wnl, INR 1.4, WBC 9.9, hgb 10.7, platelets 469. UA:  Lg leuko est, + protein, Mod hgb. Covid Neg     Pt reports gradual decline in strength, chills, discomfort to his buttocks, increase in size of buttock wounds, shortness of breath and worsening edema x 2 mos. His visiting nurse started Doxycycline 2 days ago for possible wound infection and told him he needed more care than could be provided in the home. He lives with his mom who is 79 yr old and has some health issues. -Nephrology following  -General surgery evaluated. No plans for debridement.  -Cultures with NGTD  -Stopping antibiotics today  -Podiatry evaluation for heel wound  -Creatinine continues to evolve  -Will be discharged to nursing home once creatinine is stable    Review of Systems:     Constitutional:  negative for chills, fevers, sweats  Respiratory:  negative for cough, dyspnea on exertion, shortness of breath, wheezing  Cardiovascular: Reports persistent leg swelling; negative for chest pain, chest pressure/discomfort, palpitations  Gastrointestinal:  negative for abdominal pain, constipation, diarrhea, nausea, vomiting  Skin: Reports multiple ulcers to his low back, buttocks  Neurological:  negative for dizziness, headache    Medications: Allergies:     Allergies   Allergen Reactions    Other      Mask adhesive causes hives       Current Meds:   Scheduled Meds:    insulin glargine  25 Units Subcutaneous Nightly    sodium bicarbonate  1,300 mg Oral BID    allopurinol  100 mg Oral Daily    torsemide  40 mg Oral Daily    cefepime  1,000 mg Intravenous Q12H    Baclofen  5 mg Oral BID    [Held by provider] ferrous sulfate  325 mg Oral TID WC    magnesium oxide  400 mg Oral Daily    therapeutic multivitamin-minerals  1 tablet Oral Daily    [Held by provider] oxybutynin  5 mg Oral BID    [Held by provider] sevelamer  1,600 mg Oral TID WC    senna  1 tablet Oral Nightly    [Held by provider] sodium bicarbonate  650 mg Oral 4x Daily    sodium chloride flush  10 mL Intravenous 2 times per day    heparin (porcine)  5,000 Units Subcutaneous 3 times per day    insulin lispro  0-18 Units Subcutaneous TID WC    insulin lispro  0-9 Units Subcutaneous Nightly    atorvastatin  40 mg Oral Daily    metoprolol tartrate  25 mg Oral BID     Continuous Infusions:    dextrose       PRN Meds: acetaminophen, polyethylene glycol, sodium chloride flush, potassium chloride **OR** potassium alternative oral replacement **OR** potassium chloride, magnesium sulfate, promethazine **OR** ondansetron, acetaminophen **OR** acetaminophen, glucose, dextrose, glucagon (rDNA), dextrose, magnesium hydroxide    Data:     Past Medical History:   has a past medical history of CHF (congestive heart failure) ([facility] [address] Diabetes mellitus ([facility] [address] Hypertension, Septic shock ([facility] [address] and Spina bifida aperta of lumbar spine ([facility] [address] Social History:   reports that he has never smoked. He has never used smokeless tobacco. He reports that he does not drink alcohol or use drugs. Family History: No family history on file. Vitals:  BP (!) 136/97   Pulse 91   Temp 98.6 °F (37 °C) (Oral)   Resp 23   Ht 5' 9\" (1.753 m)   Wt (!) 338 lb 8 oz (153.5 kg)   SpO2 98%   BMI 49.99 kg/m²   Temp (24hrs), Av.3 °F (36.8 °C), Min:98 °F (36.7 °C), Max:98.6 °F (37 °C)    Recent Labs     21  0744 21  0749 21  0858 21  1111   POCGLU 46* 41* 120* 82       I/O (24Hr):     Intake/Output Summary (Last 24 hours) at 3/22/2021 1533  Last data filed at 3/22/2021 0900  Gross per 24 hour   Intake    Output 5050 ml   Net -5050 ml       Labs:  Hematology:  Recent Labs     03/20/21  0929 03/21/21  1040 03/22/21  0747   WBC 9.2 6.8 11.6*   RBC 4.62 4.69 4.59   HGB 10.7* 11.0* 10.6*   HCT 38.3* 40.1* 37.3*   MCV 82.9 85.5 81.3*   MCH 23.2* 23.5* 23.1*   MCHC 27.9* 27.4* 28.4   RDW 18.4* 18.5* 18.4*    See Reflexed IPF Result 345   MPV 8.5 NOT REPORTED 8.7   SEDRATE 66*  --   --    CRP 56.2*  --   --    INR 1.1  --   --      Chemistry:  Recent Labs     03/20/21  0929 03/21/21  1040 03/22/21  0747    136 138   K 3.6* 4.0 3.9    106 107   CO2 20 17* 20   GLUCOSE 88 82 44*   BUN 45* 52* 57*   CREATININE 2.95* 3.09* 3.17*   ANIONGAP 12 13 11   LABGLOM 24* 23* 22*   GFRAA 29* 28* 27*   CALCIUM 8.0* 7.8* 7.8*   PHOS  --  5.2* 4.9*   PROBNP 43,534*  --   --    CKTOTAL  --  103  --      Recent Labs     03/20/21  0929 03/20/21  0929 03/21/21  1040 03/21/21  1040 03/21/21  1536 03/21/21  2107 03/22/21  0744 03/22/21  0747 03/22/21  0749 03/22/21  0858 03/22/21  1111   LABALBU  --   --  2.5*  --   --   --   --  2.6*  --   --   --    LABA1C 6.6*  --   --   --   --   --   --   --   --   --   --    URICACID  --   --  10.7*  --   --   --   --   --   --   --   --    POCGLU  --    < >  --    < > 101 156* 46*  --  41* 120* 82    < > = values in this interval not displayed. ABG:  Lab Results   Component Value Date    POCPH 7.381 12/24/2019    POCPCO2 35.3 12/24/2019    POCPO2 140.3 12/24/2019    POCHCO3 20.9 12/24/2019    NBEA 4 12/24/2019    PBEA NOT REPORTED 12/24/2019    BPU9RNV 22 12/24/2019    QPDJ2KVQ 99 12/24/2019    FIO2 40.0 12/24/2019     Lab Results   Component Value Date/Time    SPECIAL NOT REPORTED 03/19/2021 10:54 PM     Lab Results   Component Value Date/Time    CULTURE (A) 03/19/2021 10:54 PM     Several types of bacteria were identified in this specimen.   Further ID and susceptibility testing is generally not helpful in this circumstance and has not been performed. Consider recollection if clinically indicated. Please contact the Micro lab ([phone]) if you feel the management ofthis particular situation would be helped by further testing. Radiology:  No results found. Physical Examination:        General appearance:  alert, cooperative and no distress, obese  gentleman sitting up in bed  Mental Status:  oriented to person, place and time and normal affect  Lungs:  clear to auscultation bilaterally, normal effort, no wheezes or rhonchi present  Heart:  regular rate and rhythm, no murmur  Abdomen:  soft, nontender, nondistended, normal bowel sounds, no masses, hepatomegaly, splenomegaly, postsurgical changes noted to the lower abdominal wall with well-healed cicatrix. He does have dependent edema which is weeping and significantly pitting, ostomy is present in the left lower quadrant with gas and stool output  Extremities: 2+ bilateral lower extremity pitting edema, no redness or tenderness in the calves  Skin: Chronic venous stasis changes noted to the bilateral lower extremities.   Multiple wounds as shown below      Colostomy stoma:  :    Right ischium      Coccyx      Lower abdomen scar        Left heel      Right heel          Assessment:        Hospital Problems           Last Modified POA    * (Principal) DEBBY (acute kidney injury) ([address] 3/19/2021 Yes    Chronic systolic heart failure ([address] 3/19/2021 Yes    Volume overload 3/21/2021 Yes    Hypoglycemia due to insulin 3/21/2021 No    DM II (diabetes mellitus, type II), controlled ([address] 3/19/2021 Yes    Chronic paraplegia ([address] 3/19/2021 Yes    Right arm weakness 3/19/2021 Yes    Essential hypertension 3/19/2021 Yes    Sacral wound (Chronic) 3/19/2021 Yes    Open wound of left foot 3/19/2021 Yes    Ambrosio catheter in place (Chronic) 3/19/2021 Yes    Colostomy in place [facility]) (Chronic) 3/19/2021 Yes    Overview Signed 3/19/2021  7:56 PM by Arie Hidalgo Alison Mendoza, APRN - CNS     December '19               Plan:        1. Acute kidney injury on CKDappreciate nephrology input. Being given Demadex. Fluid restriction today. Creatinine uptrending. 2. History of necrotizing fasciitis status post colostomy creation with significant woundsappreciate wound care's evaluation. General surgery has evaluated the patient. No plans for further debridement  3. Left stage IV heel woundpodiatry consultation today  4. Urinalysis suggestive of acute cystitis with hematuria -stop antibiotics today. Urine culture with multiple bacteria present. Patient is nontoxic. Blood cultures with no growth x3 days. He is not toxic. 5. Longstanding indwelling Ambrosio catheterexchanged on 3/20. Polymicrobial urine culture. Monitor off of antibiotics  6. CRP and ESR mildly elevated. 7. Volume overload  persists. Nephrology following. Uptitrating diuretics  8. Continue to trend daily labs  9. DM2liberalize patient's diet. Renal diet only. Low sodium. Fluid restriction. Decrease Lantus to 25 units nightly. Will need to uptitrate as he increases his diet. Continue insulin sliding scale as well. 10. DVT prophylaxis with heparin 5000 units every 8 hours  11. Continue high intensity statin therapy with Lipitor 40 mg  12. Continue baclofen  13. Bowel regimen with Senokot  14. PT/OT  15. Discussed with CM. Patient likely will need SNF/ECF. Once creatinine stabilizes and cleared from nephrology standpoint, we will get him to SNF. Podiatry evaluation today.       33 Zuleima Hinds DO  3/22/2021  3:33 PM

## 2021-03-23 ENCOUNTER — APPOINTMENT (OUTPATIENT)
Dept: MRI IMAGING | Age: 36
DRG: 628 | End: 2021-03-23
Attending: INTERNAL MEDICINE
Payer: MEDICARE

## 2021-03-23 PROBLEM — N18.4 CKD (CHRONIC KIDNEY DISEASE) STAGE 4, GFR 15-29 ML/MIN (HCC): Status: ACTIVE | Noted: 2021-03-23

## 2021-03-23 LAB
ABSOLUTE EOS #: 0.1 K/UL (ref 0–0.4)
ABSOLUTE IMMATURE GRANULOCYTE: 0 K/UL (ref 0–0.3)
ABSOLUTE LYMPH #: 1.1 K/UL (ref 1–4.8)
ABSOLUTE MONO #: 0.8 K/UL (ref 0.1–0.8)
ALBUMIN SERPL-MCNC: 2.6 G/DL (ref 3.5–5.2)
ANION GAP SERPL CALCULATED.3IONS-SCNC: 11 MMOL/L (ref 9–17)
BASOPHILS # BLD: 1 % (ref 0–2)
BASOPHILS ABSOLUTE: 0.1 K/UL (ref 0–0.2)
BUN BLDV-MCNC: 61 MG/DL (ref 6–20)
BUN/CREAT BLD: ABNORMAL (ref 9–20)
C-REACTIVE PROTEIN: 51.8 MG/L (ref 0–5)
CALCIUM SERPL-MCNC: 7.8 MG/DL (ref 8.6–10.4)
CHLORIDE BLD-SCNC: 106 MMOL/L (ref 98–107)
CO2: 22 MMOL/L (ref 20–31)
CREAT SERPL-MCNC: 3.11 MG/DL (ref 0.7–1.2)
DIFFERENTIAL TYPE: ABNORMAL
EOSINOPHILS RELATIVE PERCENT: 1 % (ref 1–4)
GFR AFRICAN AMERICAN: 28 ML/MIN
GFR NON-AFRICAN AMERICAN: 23 ML/MIN
GFR SERPL CREATININE-BSD FRML MDRD: ABNORMAL ML/MIN/{1.73_M2}
GFR SERPL CREATININE-BSD FRML MDRD: ABNORMAL ML/MIN/{1.73_M2}
GLUCOSE BLD-MCNC: 134 MG/DL (ref 75–110)
GLUCOSE BLD-MCNC: 50 MG/DL (ref 75–110)
GLUCOSE BLD-MCNC: 69 MG/DL (ref 70–99)
GLUCOSE BLD-MCNC: 81 MG/DL (ref 75–110)
GLUCOSE BLD-MCNC: 91 MG/DL (ref 75–110)
HCT VFR BLD CALC: 34.7 % (ref 40.7–50.3)
HEMOGLOBIN: 9.8 G/DL (ref 13–17)
IMMATURE GRANULOCYTES: 0 %
LYMPHOCYTES # BLD: 11 % (ref 24–44)
MCH RBC QN AUTO: 23.1 PG (ref 25.2–33.5)
MCHC RBC AUTO-ENTMCNC: 28.2 G/DL (ref 28.4–34.8)
MCV RBC AUTO: 81.6 FL (ref 82.6–102.9)
MONOCYTES # BLD: 8 % (ref 1–7)
MORPHOLOGY: ABNORMAL
NRBC AUTOMATED: 0 PER 100 WBC
PDW BLD-RTO: 18.4 % (ref 11.8–14.4)
PHOSPHORUS: 4.8 MG/DL (ref 2.5–4.5)
PLATELET # BLD: 288 K/UL (ref 138–453)
PLATELET ESTIMATE: ABNORMAL
PMV BLD AUTO: 8.7 FL (ref 8.1–13.5)
POTASSIUM SERPL-SCNC: 3.7 MMOL/L (ref 3.7–5.3)
RBC # BLD: 4.25 M/UL (ref 4.21–5.77)
RBC # BLD: ABNORMAL 10*6/UL
SEDIMENTATION RATE, ERYTHROCYTE: 43 MM (ref 0–15)
SEG NEUTROPHILS: 79 % (ref 36–66)
SEGMENTED NEUTROPHILS ABSOLUTE COUNT: 7.9 K/UL (ref 1.8–7.7)
SODIUM BLD-SCNC: 139 MMOL/L (ref 135–144)
WBC # BLD: 10 K/UL (ref 3.5–11.3)
WBC # BLD: ABNORMAL 10*3/UL

## 2021-03-23 PROCEDURE — 87205 SMEAR GRAM STAIN: CPT

## 2021-03-23 PROCEDURE — 6370000000 HC RX 637 (ALT 250 FOR IP): Performed by: CLINICAL NURSE SPECIALIST

## 2021-03-23 PROCEDURE — 2060000000 HC ICU INTERMEDIATE R&B

## 2021-03-23 PROCEDURE — 6360000002 HC RX W HCPCS: Performed by: INTERNAL MEDICINE

## 2021-03-23 PROCEDURE — 87185 SC STD ENZYME DETCJ PER NZM: CPT

## 2021-03-23 PROCEDURE — 85025 COMPLETE CBC W/AUTO DIFF WBC: CPT

## 2021-03-23 PROCEDURE — 87176 TISSUE HOMOGENIZATION CULTR: CPT

## 2021-03-23 PROCEDURE — 99232 SBSQ HOSP IP/OBS MODERATE 35: CPT | Performed by: INTERNAL MEDICINE

## 2021-03-23 PROCEDURE — 93923 UPR/LXTR ART STDY 3+ LVLS: CPT

## 2021-03-23 PROCEDURE — 80069 RENAL FUNCTION PANEL: CPT

## 2021-03-23 PROCEDURE — 6370000000 HC RX 637 (ALT 250 FOR IP): Performed by: INTERNAL MEDICINE

## 2021-03-23 PROCEDURE — 93970 EXTREMITY STUDY: CPT

## 2021-03-23 PROCEDURE — 87076 CULTURE ANAEROBE IDENT EACH: CPT

## 2021-03-23 PROCEDURE — 73718 MRI LOWER EXTREMITY W/O DYE: CPT

## 2021-03-23 PROCEDURE — 82947 ASSAY GLUCOSE BLOOD QUANT: CPT

## 2021-03-23 PROCEDURE — 6370000000 HC RX 637 (ALT 250 FOR IP): Performed by: NURSE PRACTITIONER

## 2021-03-23 PROCEDURE — 0QBM0ZX EXCISION OF LEFT TARSAL, OPEN APPROACH, DIAGNOSTIC: ICD-10-PCS | Performed by: PODIATRIST

## 2021-03-23 PROCEDURE — 99232 SBSQ HOSP IP/OBS MODERATE 35: CPT | Performed by: FAMILY MEDICINE

## 2021-03-23 PROCEDURE — 85652 RBC SED RATE AUTOMATED: CPT

## 2021-03-23 PROCEDURE — 88311 DECALCIFY TISSUE: CPT

## 2021-03-23 PROCEDURE — 73721 MRI JNT OF LWR EXTRE W/O DYE: CPT

## 2021-03-23 PROCEDURE — 2580000003 HC RX 258: Performed by: INTERNAL MEDICINE

## 2021-03-23 PROCEDURE — 36415 COLL VENOUS BLD VENIPUNCTURE: CPT

## 2021-03-23 PROCEDURE — 87070 CULTURE OTHR SPECIMN AEROBIC: CPT

## 2021-03-23 PROCEDURE — APPSS180 APP SPLIT SHARED TIME > 60 MINUTES: Performed by: NURSE PRACTITIONER

## 2021-03-23 PROCEDURE — 86140 C-REACTIVE PROTEIN: CPT

## 2021-03-23 PROCEDURE — 88307 TISSUE EXAM BY PATHOLOGIST: CPT

## 2021-03-23 PROCEDURE — 87075 CULTR BACTERIA EXCEPT BLOOD: CPT

## 2021-03-23 RX ORDER — METOPROLOL TARTRATE 50 MG/1
50 TABLET, FILM COATED ORAL 2 TIMES DAILY
Status: DISCONTINUED | OUTPATIENT
Start: 2021-03-23 | End: 2021-04-02 | Stop reason: HOSPADM

## 2021-03-23 RX ORDER — INSULIN GLARGINE 100 [IU]/ML
20 INJECTION, SOLUTION SUBCUTANEOUS NIGHTLY
Status: DISCONTINUED | OUTPATIENT
Start: 2021-03-23 | End: 2021-03-24

## 2021-03-23 RX ORDER — TORSEMIDE 20 MG/1
40 TABLET ORAL DAILY
Qty: 30 TABLET | Refills: 3 | Status: CANCELLED | DISCHARGE
Start: 2021-03-24

## 2021-03-23 RX ORDER — INSULIN GLARGINE 100 [IU]/ML
20 INJECTION, SOLUTION SUBCUTANEOUS NIGHTLY
Qty: 1 VIAL | Refills: 3 | Status: CANCELLED | DISCHARGE
Start: 2021-03-23

## 2021-03-23 RX ORDER — ATORVASTATIN CALCIUM 40 MG/1
40 TABLET, FILM COATED ORAL DAILY
Qty: 30 TABLET | Refills: 3 | Status: CANCELLED | DISCHARGE
Start: 2021-03-24

## 2021-03-23 RX ORDER — FAMOTIDINE 20 MG/1
20 TABLET, FILM COATED ORAL DAILY
Status: DISCONTINUED | OUTPATIENT
Start: 2021-03-23 | End: 2021-04-02 | Stop reason: HOSPADM

## 2021-03-23 RX ORDER — HEPARIN SODIUM 5000 [USP'U]/ML
5000 INJECTION, SOLUTION INTRAVENOUS; SUBCUTANEOUS EVERY 8 HOURS SCHEDULED
Status: CANCELLED | DISCHARGE
Start: 2021-03-23

## 2021-03-23 RX ORDER — SODIUM HYPOCHLORITE 1.25 MG/ML
SOLUTION TOPICAL DAILY
Status: DISCONTINUED | OUTPATIENT
Start: 2021-03-23 | End: 2021-04-02 | Stop reason: HOSPADM

## 2021-03-23 RX ORDER — METOPROLOL TARTRATE 50 MG/1
50 TABLET, FILM COATED ORAL 2 TIMES DAILY
Qty: 60 TABLET | Refills: 3 | Status: CANCELLED | DISCHARGE
Start: 2021-03-23

## 2021-03-23 RX ORDER — OXYCODONE HYDROCHLORIDE 5 MG/1
5 TABLET ORAL EVERY 6 HOURS PRN
Qty: 12 TABLET | Refills: 0 | Status: CANCELLED | DISCHARGE
Start: 2021-03-23 | End: 2021-03-26

## 2021-03-23 RX ORDER — ALLOPURINOL 100 MG/1
100 TABLET ORAL DAILY
Qty: 30 TABLET | Refills: 3 | Status: CANCELLED | DISCHARGE
Start: 2021-03-24

## 2021-03-23 RX ADMIN — BACLOFEN 5 MG: 10 TABLET ORAL at 20:38

## 2021-03-23 RX ADMIN — METOPROLOL TARTRATE 50 MG: 25 TABLET ORAL at 09:13

## 2021-03-23 RX ADMIN — SODIUM CHLORIDE, PRESERVATIVE FREE 10 ML: 5 INJECTION INTRAVENOUS at 20:49

## 2021-03-23 RX ADMIN — ALLOPURINOL 100 MG: 100 TABLET ORAL at 09:14

## 2021-03-23 RX ADMIN — METOPROLOL TARTRATE 50 MG: 25 TABLET ORAL at 20:43

## 2021-03-23 RX ADMIN — BACLOFEN 5 MG: 10 TABLET ORAL at 09:14

## 2021-03-23 RX ADMIN — DEXTROSE MONOHYDRATE 12.5 G: 25 INJECTION, SOLUTION INTRAVENOUS at 07:38

## 2021-03-23 RX ADMIN — MAGNESIUM GLUCONATE 500 MG ORAL TABLET 400 MG: 500 TABLET ORAL at 09:14

## 2021-03-23 RX ADMIN — SODIUM CHLORIDE, PRESERVATIVE FREE 10 ML: 5 INJECTION INTRAVENOUS at 09:14

## 2021-03-23 RX ADMIN — SODIUM BICARBONATE 1300 MG: 650 TABLET ORAL at 20:49

## 2021-03-23 RX ADMIN — SODIUM BICARBONATE 1300 MG: 650 TABLET ORAL at 09:13

## 2021-03-23 RX ADMIN — Medication: at 09:13

## 2021-03-23 RX ADMIN — DESMOPRESSIN ACETATE 40 MG: 0.2 TABLET ORAL at 09:14

## 2021-03-23 RX ADMIN — HEPARIN SODIUM 5000 UNITS: 5000 INJECTION INTRAVENOUS; SUBCUTANEOUS at 05:26

## 2021-03-23 RX ADMIN — INSULIN GLARGINE 20 UNITS: 100 INJECTION, SOLUTION SUBCUTANEOUS at 20:44

## 2021-03-23 RX ADMIN — TORSEMIDE 40 MG: 20 TABLET ORAL at 09:13

## 2021-03-23 RX ADMIN — HEPARIN SODIUM 5000 UNITS: 5000 INJECTION INTRAVENOUS; SUBCUTANEOUS at 20:37

## 2021-03-23 ASSESSMENT — ENCOUNTER SYMPTOMS
APNEA: 0
ABDOMINAL DISTENTION: 0
EYE REDNESS: 0

## 2021-03-23 ASSESSMENT — PAIN SCALES - GENERAL: PAINLEVEL_OUTOF10: 0

## 2021-03-23 NOTE — PROGRESS NOTES
NEPHROLOGY PROGRESS NOTE      SUBJECTIVE     Excellent diuresis with Demadex. Plans for MRI without contrast to blood osteomyelitis of the foot. Blood pressure stable. No shortness of breath. Appetite decent. Renal function plateau. OBJECTIVE     Vitals:    03/22/21 2300 03/23/21 0000 03/23/21 0100 03/23/21 0913   BP: (!) 141/86  (!) 142/87 (!) 154/76   Pulse: 97 94 96 79   Resp: 16 22 17 20   Temp:    98.8 °F (37.1 °C)   TempSrc:    Oral   SpO2:    92%   Weight:       Height:         24HR INTAKE/OUTPUT:      Intake/Output Summary (Last 24 hours) at 3/23/2021 1122  Last data filed at 3/23/2021 0800  Gross per 24 hour   Intake    Output 5350 ml   Net -5350 ml       General appearance:Awake, alert, in no acute distress  HEENT: PERRLA  Respiratory::vesicular breath sounds,no wheeze/crackles  Cardiovascular:S1 S2 normal,no gallop or organic murmur. Abdomen: Present colostomy  Extremities: No Cyanosis or Clubbing, present lower extremity edema  Neurological:Alert and oriented. No abnormalities of mood, affect, memory, mentation, or behavior are noted      MEDICATIONS     Scheduled Meds:    metoprolol tartrate  50 mg Oral BID    famotidine  20 mg Oral Daily    insulin glargine  25 Units Subcutaneous Nightly    sodium bicarbonate  1,300 mg Oral BID    allopurinol  100 mg Oral Daily    torsemide  40 mg Oral Daily    Baclofen  5 mg Oral BID    [Held by provider] ferrous sulfate  325 mg Oral TID WC    magnesium oxide  400 mg Oral Daily    therapeutic multivitamin-minerals  1 tablet Oral Daily    [Held by provider] oxybutynin  5 mg Oral BID    [Held by provider] sevelamer  1,600 mg Oral TID WC    senna  1 tablet Oral Nightly    [Held by provider] sodium bicarbonate  650 mg Oral 4x Daily    sodium chloride flush  10 mL Intravenous 2 times per day    heparin (porcine)  5,000 Units Subcutaneous 3 times per day    insulin lispro  0-18 Units Subcutaneous TID WC    insulin lispro  0-9 Units Subcutaneous Nightly    atorvastatin  40 mg Oral Daily     Continuous Infusions:    dextrose       PRN Meds:  acetaminophen, polyethylene glycol, sodium chloride flush, potassium chloride **OR** potassium alternative oral replacement **OR** potassium chloride, magnesium sulfate, promethazine **OR** ondansetron, acetaminophen **OR** acetaminophen, glucose, dextrose, glucagon (rDNA), dextrose, magnesium hydroxide  Home Meds:                Medications Prior to Admission: sevelamer (RENVELA) 800 MG tablet, Take 2 tablets by mouth 3 times daily (with meals)  sodium bicarbonate 650 MG tablet, Take 650 mg by mouth 4 times daily  chlorhexidine (PERIDEX) 0.12 % solution, Take 15 mLs by mouth 2 times daily  acetaminophen (TYLENOL) 325 MG tablet, Take 650 mg by mouth every 6 hours as needed for Pain  Liraglutide (VICTOZA) 18 MG/3ML SOPN SC injection, 1.8 mg  oxybutynin (DITROPAN) 5 MG tablet,   Baclofen (LIORESAL) 5 MG tablet, Take 1 tablet by mouth 2 times daily  Epoetin Chaitanya-epbx (RETACRIT IJ), Inject 10,000 Units as directed Indications: M W F if levels are correct  insulin detemir (LEVEMIR FLEXTOUCH) 100 UNIT/ML injection pen, Levemir FlexTouch U-100 Insulin 100 unit/mL (3 mL) subcutaneous pen  atorvastatin (LIPITOR) 80 MG tablet, Take 100 mg by mouth daily   enoxaparin (LOVENOX) 40 MG/0.4ML injection, Inject 0.4 mg into the skin 2 times daily  ferrous sulfate (IRON 325) 325 (65 Fe) MG tablet, Take 325 mg by mouth 3 times daily (with meals)  magnesium oxide (MAG-OX) 400 MG tablet, Take 400 mg by mouth daily  senna (SENOKOT) 8.6 MG tablet, Take 1 tablet by mouth nightly  polyethylene glycol (GLYCOLAX) 17 g packet, Take 17 g by mouth daily as needed for Constipation  oxyCODONE (ROXICODONE) 5 MG immediate release tablet, Take 10 mg by mouth every 4 hours as needed for Pain.   Multiple Vitamins-Minerals (THERAPEUTIC MULTIVITAMIN-MINERALS) tablet, Take 1 tablet by mouth daily  insulin glargine (LANTUS) 100 UNIT/ML injection vial, Inject 10 Units into the skin daily    INVESTIGATIONS     Last 3 CMP:    Recent Labs     03/21/21  1040 03/22/21  0747 03/23/21  0356    138 139   K 4.0 3.9 3.7    107 106   CO2 17* 20 22   BUN 52* 57* 61*   CREATININE 3.09* 3.17* 3.11*   CALCIUM 7.8* 7.8* 7.8*   LABALBU 2.5* 2.6* 2.6*       Last 3 CBC:  Recent Labs     03/21/21  1040 03/22/21  0747 03/23/21  0356   WBC 6.8 11.6* 10.0   RBC 4.69 4.59 4.25   HGB 11.0* 10.6* 9.8*   HCT 40.1* 37.3* 34.7*   MCV 85.5 81.3* 81.6*   MCH 23.5* 23.1* 23.1*   MCHC 27.4* 28.4 28.2*   RDW 18.5* 18.4* 18.4*   PLT See Reflexed IPF Result [id number]   MPV NOT REPORTED 8.7 8.7       ASSESSMENT     1. Acute kidney injury secondary to ischemic ATN from infection versus progression of underlying disease. Creatinine peaking to 3.1.  2.  Chronic kidney disease stage IIIb secondary diabetic nephrosclerosis/adaptive FSGS from obesity with baseline creatinine 1.8-2.  3.  Diabetic foot ulcer left and right  4. Worsening gluteal/sacral wounds  5. Chronic indwelling Ambrosio catheter in place from spina bifida rendering him immobile  6. Type 2 diabetes  7. Cardiomyopathy ejection fraction 25%  8.   Diverting colostomy since 2019    PLAN     #1 continue Demadex  #2 fluid restriction  #3 antibiotics as per GFR less than 15  #4 avoid contrast with MRI    Please do not hesitate to call with questions    This note is created with the assistance of a speech-recognition program. While intending to generate a document that actually reflects the content of the visit, no guarantees can be provided that every mistake has been identified and corrected by editing    Francia Paget MD, [facility] Dee Elkins, FACP   3/23/2021 11:22 AM  [facility]

## 2021-03-23 NOTE — DISCHARGE INSTR - DIET

## 2021-03-23 NOTE — PROGRESS NOTES
[facility]  Office: [phone]  Wolf Wayne DO, Ct Ellis DO, Ramos Newell, DO, Konstantin Monahan DO, Santo Ventura MD, Denis Salinas MD, Lars Menendez MD, Urvashi Nicole MD, Pritesh Mcmillan MD, Spencer Rios MD, Renita Villela MD, Karen Yanes MD, Radha Kaplan MD, Kimbelry Martinez DO, Tyrone Amor MD, Rafael Moreno DO, Gonzalez Garcia MD,  Becca Valdes DO, Eros Martines MD, Liz Brenner MD, Pérez Sanders, [facility], [facility], [facility], Mohan Youngblood, CNP, Raulito Cornell, CNS, Carmen Randle, CNP, Katelyn Smith, CNP, Tera Young, CNP, Jonathan Vickers, CNP, Nick Rodriguez, CNP, Dave Enriquez PA-C, Aaron Feliciano, [facility], Freedom Marie, CNP, Jeananne Goltz, CNP, Marleny Stiles, CNP, Dawn Almodovar, CNP, Tootie Fatima, CNP, Skinny Mccormack, [address] [place] [address]    Progress Note    3/23/2021    7:21 AM    Name:   Abdiaziz Sanders  MRN:     [id number]     Acct:      [de-identified]   Room:   2002/2002-01  IP Day:  4  Admit Date:  3/19/2021  5:34 PM    PCP:   No primary care provider on file. Code Status:  Full Code    Subjective:     C/C: DEBBY/failure to thrive  Interval History Status: not changed. Patient evaluated in room sitting in bed in no acute distress  Afebrile overnight, vital signs stable. Tolerating room air  Generalized anasarca continues   UOP 2.1 L; -15.7 L fluid deficit overall on Demadex 40 mg daily    Brief History:     Kaylin Charles a 38 y. o. male who was directly admitted from [facility] ED March 19 for the management of DEBBY (acute kidney injury) ([facility] [address]        He has a hx of spina bifida, CHF with EF 30% on Echo 12/6/19, CKD-baseline creat ~1.6-1.9, DM, chronic Lt foot ulcer and prolonged admit (11/29/19 - 1/16/20) for Necrotizing fasciitis buttocks.  During that admit he underwent approx 11 I&D procedures, temporary dialysis and colostomy creation. Emani Mcneil has a chronic lema cath.  Pt was in SNF until about 4 mo ago.    Review of Systems:     Constitutional:  negative for chills, fevers, sweats  Respiratory:  negative for cough, dyspnea on exertion, shortness of breath, wheezing  Cardiovascular:  negative for chest pain, chest pressure/discomfort,+ lower extremity edema, palpitations  Gastrointestinal:  negative for abdominal pain, constipation, diarrhea, nausea, vomiting  Neurological:  negative for dizziness, headache    Medications: Allergies:     Allergies   Allergen Reactions    Other      Mask adhesive causes hives       Current Meds:   Scheduled Meds:    insulin glargine  25 Units Subcutaneous Nightly    sodium bicarbonate  1,300 mg Oral BID    allopurinol  100 mg Oral Daily    torsemide  40 mg Oral Daily    Baclofen  5 mg Oral BID    [Held by provider] ferrous sulfate  325 mg Oral TID WC    magnesium oxide  400 mg Oral Daily    therapeutic multivitamin-minerals  1 tablet Oral Daily    [Held by provider] oxybutynin  5 mg Oral BID    [Held by provider] sevelamer  1,600 mg Oral TID WC    senna  1 tablet Oral Nightly    [Held by provider] sodium bicarbonate  650 mg Oral 4x Daily    sodium chloride flush  10 mL Intravenous 2 times per day    heparin (porcine)  5,000 Units Subcutaneous 3 times per day    insulin lispro  0-18 Units Subcutaneous TID WC    insulin lispro  0-9 Units Subcutaneous Nightly    atorvastatin  40 mg Oral Daily    metoprolol tartrate  25 mg Oral BID     Continuous Infusions:    dextrose       PRN Meds: acetaminophen, polyethylene glycol, sodium chloride flush, potassium chloride **OR** potassium alternative oral replacement **OR** potassium chloride, magnesium sulfate, promethazine **OR** ondansetron, acetaminophen **OR** acetaminophen, glucose, dextrose, glucagon (rDNA), dextrose, magnesium hydroxide    Data:     Past Medical History:   has a past medical history of CHF (congestive heart failure) ([facility] [address] [facility] Hypertension, Septic shock ([facility] and Spina --  2.6*   LABA1C 6.6*  --   --   --   --   --   --   --   --   --   --   --    URICACID  --   --  10.7*  --   --   --   --   --   --   --   --   --    POCGLU  --    < >  --    < > 46*  --  41* 120* 82 174* 149*  --     < > = values in this interval not displayed. ABG:  Lab Results   Component Value Date    POCPH 7.381 12/24/2019    POCPCO2 35.3 12/24/2019    POCPO2 140.3 12/24/2019    POCHCO3 20.9 12/24/2019    NBEA 4 12/24/2019    PBEA NOT REPORTED 12/24/2019    VRJ9SLI 22 12/24/2019    MOPD5MRB 99 12/24/2019    FIO2 40.0 12/24/2019     Lab Results   Component Value Date/Time    SPECIAL NOT REPORTED 03/22/2021 09:53 PM     Lab Results   Component Value Date/Time    CULTURE PENDING 03/22/2021 09:53 PM       Radiology:  Xr Foot Left (min 3 Views)    Result Date: 3/22/2021  Soft tissue ulceration overlying the talus with underlying cortical disruption, concerning for osteomyelitis. Xr Foot Right (min 3 Views)    Result Date: 3/22/2021  No acute bony abnormalities are noted     Us Retroperitoneal Limited    Result Date: 3/20/2021  Unremarkable right kidney. Left kidney not visualized due to patient body habitus and bowel gas. Physical Examination:        General appearance:  alert, cooperative and no distress, labile mood  Mental Status:  oriented to person, place and time and normal affect  Lungs:  clear to auscultation bilaterally, normal effort  Heart:  regular rate and rhythm, no murmur  Abdomen: Obese,  nontender, distended, normal bowel sounds, no masses, hepatomegaly, splenomegaly, ostomy present  Extremities: Generalized +2-3 edema extending into abdomen and left upper extremity, redness, tenderness in the calves  Skin: Blistering lateral lower extremities.   Bilateral pedal wounds inaccessible due to dressing    Assessment:        Hospital Problems           Last Modified POA    * (Principal) DEBBY (acute kidney injury) ([facility] [address] 3/19/2021 Yes    Chronic systolic heart failure ([facility] [address] 3/19/2021 Yes Volume overload 3/21/2021 Yes    Hypoglycemia due to insulin 3/21/2021 No    Right arm weakness 3/19/2021 Yes    Essential hypertension 3/19/2021 Yes    DM II (diabetes mellitus, type II), controlled ([address] 3/19/2021 Yes    Chronic paraplegia ([address] 3/19/2021 Yes    Sacral wound (Chronic) 3/19/2021 Yes    Open wound of left foot 3/19/2021 Yes    Ambrosio catheter in place (Chronic) 3/19/2021 Yes    Colostomy in place [facility]) (Chronic) 3/19/2021 Yes    Overview Signed 3/19/2021  7:56 PM by MIRTA Gomez - CNS     December '19               Plan:        1. DEBBY: guarded, nephrology following  -Renal ultrasound completed 3/20/21 without acute process  -On Demadex 40 mg daily  -Baseline CRT ~ 1.6-1.9: now 3.1 appears to be a plateau at this point per nephrology's note    2. Chronic systolic CHF:   -ECHO completed 3/20 showing EF 25%    3. Protein calorie malnutrition with poor wound healing:  -Chronic sacral wound and left foot wound present on admission  -Follow-up on foot wound cultures, currently NGTD  -Evaluated by general surgery and podiatry but no plans for debridement  -Wound care following  -MRI today per podiatry. If no osteomyelitis, patient is stable for discharge    4. Essential hypertension: Stable  -Continue beta-blocker, increase to 50 mg twice daily    5. Diabetes mellitus type 2: A.m. blood sugar stable  -Episode of hypoglycemia on 3/22  -Patient taking Lantus 25 units nightly, HIISS. Monitor patient for hyper/hypoglycemic events. Decrease to 20 units with monitored diet    6. History of spina bifida with adult failure to thrive with chronic paraplegia and chronic urinary retention:   -Chronic debility status noted  -ECF at discharge  -Baclofen  -Chronic Ambrosio catheter: Exchanged on 3/20, polymicrobial UA without systemic symptoms. Off antibiotics    7. Morbid obesity: BMI 49.9 on admit  - Weight loss encouraged. -Dietary supplement twice daily    8. Dyslipidemia: Continue statin    9.  Gout: Continue allopurinol    10. GI/DVT prophylaxis: Pepcid, heparin    11.  Dispo: ECF after MRI completed and reviewed today    MIRTA Mansfield NP  3/23/2021  7:21 AM

## 2021-03-23 NOTE — DISCHARGE INSTR - COC
L08.9    Iron deficiency anemia D50.9    Muscle weakness of right upper extremity M62.81    Wrist drop, acquired, right M21.331    Chronic paraplegia ([facility]) G82.20    DEBBY (acute kidney injury) ([facility]) N17.9    Right arm weakness R29.898    Chronic systolic heart failure ([facility]) I50.22    Sacral wound S31.000A    Open wound of left foot S91.302A    Ambrosio catheter in place Z97.8    Colostomy in place ([facility]) Z93.3    Volume overload E87.70    Hypoglycemia due to insulin E16.0, T38.3X5A    CKD (chronic kidney disease) stage 4, GFR 15-29 ml/min ([facility]) N18.4    Osteomyelitis of left foot ([facility]) M86.9    Physical debility R53.81    PAD (peripheral artery disease) ([facility]) I73.9    Acute osteomyelitis of left calcaneus ([facility]) M86.172    Pressure injury of left heel, stage 4 ([facility]) L89.624    Left foot pain M79.672       Isolation/Infection:   Isolation            Contact          Unreconciled Outside Infections       Enable clinical decision support by reconciling outside information with the patient's chart.     .      Infection Onset Last Indicated Last Received Source    Infections with existing documentation    MRSA 08/28/12 06/22/17 03/19/21 [facility] [employer] [facility]          Patient Infection Status       Infection Onset Added Last Indicated Last Indicated By Review Planned Expiration Resolved Resolved By    MRSA 11/29/19 11/29/19 11/29/19 MRSA DNA Probe, Nasal                Nurse Assessment:  Last Vital Signs: BP (!) 161/105   Pulse 78   Temp 98.1 °F (36.7 °C) (Axillary)   Resp 18   Ht 5' 9\" (1.753 m)   Wt 300 lb (136.1 kg)   SpO2 95%   BMI 44.30 kg/m²     Last documented pain score (0-10 scale): Pain Level: 8  Last Weight:   Wt Readings from Last 1 Encounters:   03/30/21 300 lb (136.1 kg)     Mental Status:  oriented and alert    IV Access:  Midline 4.5 Fr, 18G left arm    Nursing Mobility/ADLs:  Walking   Dependent  Transfer  Dependent  Bathing  Dependent  Dressing  Dependent  Toileting  Dependent Feeding  Independent  Med Admin  Assisted  Med Delivery   whole    Wound Care Documentation and Therapy:  Wound 11/30/19 Heel Left (Active)   Wound Image   03/22/21 0959   Wound Etiology Pressure Stage  4 03/31/21 0400   Dressing Status Clean;Dry; Intact 03/31/21 0400   Wound Cleansed Other (Comment) 03/31/21 0400   Dressing/Treatment Other (comment) 03/27/21 1900   Offloading for Diabetic Foot Ulcers Yes (type); Offloading boot 03/27/21 1600   Dressing Change Due 03/28/21 03/27/21 1600   Wound Length (cm) 4.2 cm 03/22/21 0959   Wound Width (cm) 4.8 cm 03/22/21 0959   Wound Depth (cm) 1.5 cm 03/22/21 0959   Wound Surface Area (cm^2) 20.16 cm^2 03/22/21 0959   Wound Volume (cm^3) 30.24 cm^3 03/22/21 0959   Wound Assessment Other (Comment) 03/31/21 0400   Drainage Amount None 03/31/21 0400   Drainage Description Purulent 03/31/21 0400   Odor None 03/31/21 0400   Liliam-wound Assessment Maceration 03/31/21 0400   Number of days: 486       Wound 03/19/21 Coccyx 12/6/2021 incision necrotizing fasciitis (Active)   Wound Image   03/22/21 0959   Wound Etiology Other 03/31/21 0400   Dressing Status Clean;Dry; Intact 03/31/21 0400   Wound Cleansed Soap and water 03/31/21 0400   Dressing/Treatment Other (comment) 03/31/21 0400   Offloading for Diabetic Foot Ulcers Yes (type) 03/31/21 0400   Dressing Change Due 03/31/21 03/31/21 0400   Wound Length (cm) 17 cm 03/22/21 0959   Wound Width (cm) 5 cm 03/22/21 0959   Wound Depth (cm) 0.1 cm 03/22/21 0959   Wound Surface Area (cm^2) 85 cm^2 03/22/21 0959   Wound Volume (cm^3) 8.5 cm^3 03/22/21 0959   Wound Assessment Bleeding 03/31/21 0400   Drainage Amount Moderate 03/31/21 0400   Drainage Description Thick; Serosanguinous; Yellow;Brown 03/31/21 0400   Odor Malodorous/putrid 03/31/21 0400   Liliam-wound Assessment Fragile 03/31/21 0400   Number of days: 11       Wound 03/19/21 Heel Right pressure wound (Active)   Wound Image   03/22/21 0959   Wound Etiology Pressure Unstageable 03/31/21 0400 Dressing Status Clean;Dry; Intact 03/31/21 0400   Wound Cleansed Other (Comment) 03/31/21 0400   Dressing/Treatment Other (comment) 03/31/21 0400   Offloading for Diabetic Foot Ulcers Yes (type); Offloading boot 03/31/21 0400   Dressing Change Due 03/31/21 03/31/21 0400   Wound Length (cm) 2.4 cm 03/22/21 0959   Wound Width (cm) 2.9 cm 03/22/21 0959   Wound Depth (cm) 0.2 cm 03/22/21 0959   Wound Surface Area (cm^2) 6.96 cm^2 03/22/21 0959   Wound Volume (cm^3) 1.39 cm^3 03/22/21 0959   Wound Assessment Other (Comment) 03/31/21 0400   Drainage Amount None 03/29/21 2000   Drainage Description Other (Comment) 03/31/21 0400   Odor None 03/31/21 0400   Liliam-wound Assessment Other (Comment) 03/29/21 2000   Number of days: 11       Wound 03/22/21 Ischium Right (Active)   Wound Image   03/22/21 0959   Wound Etiology Pressure Stage  3 03/31/21 0400   Dressing Status Clean;Dry; Intact 03/31/21 0400   Wound Cleansed Not Cleansed 03/31/21 0400   Dressing/Treatment Hydrocolloid 03/31/21 0400   Offloading for Diabetic Foot Ulcers Yes (type); Offloading boot 03/31/21 0400   Dressing Change Due 03/31/21 03/31/21 0400   Wound Length (cm) 1.5 cm 03/22/21 0959   Wound Width (cm) 5 cm 03/22/21 0959   Wound Surface Area (cm^2) 7.5 cm^2 03/22/21 0959   Wound Assessment Other (Comment) 03/31/21 0400   Drainage Amount Scant 03/31/21 0400   Drainage Description Serous 03/31/21 0400   Odor None 03/31/21 0400   Liliam-wound Assessment Other (Comment) 03/31/21 0400   Number of days: 8        Elimination:  Continence:   · Bowel: colostomy  · Bladder: No, lema  Urinary Catheter:  Insertion Date: 3/20/21    Colostomy: Yes  Colostomy LLQ Descending/sigmoid-Stomal Appliance: 1 piece  Colostomy LLQ Descending/sigmoid-Flange Size (inches): 2.1 Inches  Colostomy LLQ Descending/sigmoid-Stoma  Assessment: Red, Moist, Protrudes  Colostomy LLQ Descending/sigmoid-Mucocutaneous Junction: Intact  Colostomy LLQ Descending/sigmoid-Peristomal Assessment: Clean, Intact  Colostomy LLQ Descending/sigmoid-Treatment: Bag change  Colostomy LLQ Descending/sigmoid-Stool Appearance: Loose, Watery, Soft  Colostomy LLQ Descending/sigmoid-Stool Color: Brown  Colostomy LLQ Descending/sigmoid-Stool Amount: Small  Colostomy LLQ Descending/sigmoid-Output (mL): 350 ml     Ostomy pouch one piece 62992, 3/8\"-2 1/8\"    Date of Last BM: colostomy    Intake/Output Summary (Last 24 hours) at 3/31/2021 0858  Last data filed at 3/31/2021 0600  Gross per 24 hour   Intake 1585 ml   Output 2650 ml   Net -1065 ml     I/O last 3 completed shifts: In: 3927 [P.O.:1200; I.V.:625]  Out: 3300 [Urine:2600; Stool:700]    Safety Concerns: At Risk for Falls    Impairments/Disabilities:      None    Nutrition Therapy:  Current Nutrition Therapy:   Cardiac diet, 1500 ml fluid restriction    Routes of Feeding: Oral  Liquids: No Restrictions  Daily Fluid Restriction: yes - amount 1500 ml  Last Modified Barium Swallow with Video (Video Swallowing Test): not done    Treatments at the Time of Hospital Discharge:   Respiratory Treatments: PRN  Oxygen Therapy:  is not on home oxygen therapy.   Ventilator:    - No ventilator support    Rehab Therapies: Physical Therapy and Occupational Therapy  Weight Bearing Status/Restrictions: No weight bearing restirctions-wounds on feet  Other Medical Equipment (for information only, NOT a DME order):  wheelchair, walker and hospital bed  Other Treatments: wound care    Patient's personal belongings (please select all that are sent with patient):  Glasses, shirt, pants, underclothes, shoes, cell phone    RN SIGNATURE:  Electronically signed by Jenni Odell RN on 4/2/21 at 11:03 AM EDT    CASE MANAGEMENT/SOCIAL WORK SECTION    Inpatient Status Date: 3-    Readmission Risk Assessment Score:  Readmission Risk              Risk of Unplanned Readmission:        27           Discharging to Facility/ Agency   · Name: [facility]  · Address:[place]  · Phone:  · Fax:    Dialysis Facility (if applicable)   · Name:  · Address:  · Dialysis Schedule:  · Phone:  · Fax:    / signature: Electronically signed by Sony Gallardo RN on 4/2/21 at 8:51 AM EDT    PHYSICIAN SECTION    Prognosis: Good    Condition at Discharge: Stable    Rehab Potential (if transferring to Rehab): Poor    Recommended Labs or Other Treatments After Discharge: Follow-up BMP weekly X 1 month. Send results to PCP. BMP on 4-6-2021-results to nephrology  Wound care as follows   SACROCOCCYGEAL:  Apply Maxorb alginate to open area and cover with a large Sacral Mepilex. Change every 48 hours and prn if drainage is excessive. RIGHT ISCHIUM:  Hyrdocolloid 4x4. Change every 72 hours. SCROTUM:  Apply zinc oxide paste BID and prn. LEFT HEEL:  per Podiatry orders  RIGHT HEEL:  per Podiatry orders     Elevate heels off of mattress  Turn every 2 hours     Use Comfort [place]zuma to reposition patient to minimize potential for shear injury. Routine  Ostomy care. Change pouch 2 times/week. Empty with 1/3-1/2 full     Moisture wicking under pads    Interdry cloths to abdominal skin folds. Podiatry orders:   Left lower extremity dressing changes: We applied wound VAC with white foam(use adaptic if no white foam) against bone, black foam, 4 x 4's, Kerlix, Ace bandage up to the knee. Wound VAC settings are continuous 125 mmHg. Please change wound VAC on a Monday, Wednesday, Friday schedule.  Right lower extremity dressing changes: Please change dressing daily with Betadine to heel wound, 4 x 4's, Kerlix, Ace bandage up to the knee.  Please place bilateral lower extremities and offloading boots while in bed. Strict nonweightbearing to bilateral lower extremities. Follow-up with Dr. Almas Turner within 1 week of discharge for wound care.    BMP in 5 days and follow results with nephrology    Physician Certification: I certify the above information and transfer of Isidro Nguyen  is necessary for the

## 2021-03-23 NOTE — PROGRESS NOTES
Progress Note  Podiatric Medicine and Surgery     Subjective     CC: sarah heel wounds    Patient seen and examined at bedside. Afebrile, vital signs stable  Awaiting MRI  No pain to feet  No n/c/v/f/new sob/cp    HPI :  Davina Poon is a 38 y.o. male who has been struggling to heal chronic heel wounds for many years without success. Typically he is seen by wound care specialists and he cannot recall how they typically treat his heel pressure wounds. He was seen by Dr. Gracie Osborn in late 2019 and early 2020 on a previous hospitalization and at that time no surgical intervention was merited and they were treated conservatively. Has not followed up with Dr. Gracie Osborn outpatient. He rarely is able to feel any sensation to his feet but pain is noted randomly. He is a type 2 diabetic and his last A1C was 6.6 (3/20/21).      Pt reports gradual decline in strength, chills, discomfort to his buttocks, increase in size of buttock wounds, shortness of breath and worsening edema x 2 mos.  His visiting nurse started Doxycycline 2 days ago for possible wound infection and told him he needed more care than could be provided in the home. Yesi Guajardo lives with his mom who is 79 yr old and has some health issues.         PCP is No primary care provider on file. ROS: Denies N/V/F/C/SOB/CP. Otherwise negative except at stated in the HPI.      Medications:  Scheduled Meds:   insulin glargine  25 Units Subcutaneous Nightly    sodium bicarbonate  1,300 mg Oral BID    allopurinol  100 mg Oral Daily    torsemide  40 mg Oral Daily    Baclofen  5 mg Oral BID    [Held by provider] ferrous sulfate  325 mg Oral TID     magnesium oxide  400 mg Oral Daily    therapeutic multivitamin-minerals  1 tablet Oral Daily    [Held by provider] oxybutynin  5 mg Oral BID    [Held by provider] sevelamer  1,600 mg Oral TID     senna  1 tablet Oral Nightly    [Held by provider] sodium bicarbonate  650 mg Oral 4x Daily    sodium chloride flush 10 mL Intravenous 2 times per day    heparin (porcine)  5,000 Units Subcutaneous 3 times per day    insulin lispro  0-18 Units Subcutaneous TID WC    insulin lispro  0-9 Units Subcutaneous Nightly    atorvastatin  40 mg Oral Daily    metoprolol tartrate  25 mg Oral BID       Continuous Infusions:   dextrose         PRN Meds:acetaminophen, polyethylene glycol, sodium chloride flush, potassium chloride **OR** potassium alternative oral replacement **OR** potassium chloride, magnesium sulfate, promethazine **OR** ondansetron, acetaminophen **OR** acetaminophen, glucose, dextrose, glucagon (rDNA), dextrose, magnesium hydroxide    Objective     Vitals:  Patient Vitals for the past 8 hrs:   BP Pulse Resp   21 0100 (!) 142/87 96 17   21 0000  94 22   21 2300 (!) 141/86 97 16     Average, Min, and Max for last 24 hours Vitals:  TEMPERATURE:  Temp  Av.5 °F (36.9 °C)  Min: 98.2 °F (36.8 °C)  Max: 98.8 °F (37.1 °C)    RESPIRATIONS RANGE: Resp  Av.7  Min: 14  Max: 23    PULSE RANGE: Pulse  Av.3  Min: 86  Max: 97    BLOOD PRESSURE RANGE:  Systolic (01NBO), BNE:862 , Min:135 , IYS:947   ; Diastolic (37RUW), BRH:52, Min:81, Max:97      PULSE OXIMETRY RANGE: SpO2  Av %  Min: 95 %  Max: 95 %    I/O last 3 completed shifts:  In: -   Out: 2492 [Urine:5350; Stool:800]    CBC:  Recent Labs     21  0929 21  1040 21  0747 21  0356   WBC 9.2 6.8 11.6* 10.0   HGB 10.7* 11.0* 10.6* 9.8*   HCT 38.3* 40.1* 37.3* 34.7*    See Reflexed IPF Result 345 288   CRP 56.2*  --   --   --         BMP:  Recent Labs     21  1040 21  0747 21  0356    138 139   K 4.0 3.9 3.7    107 106   CO2 17* 20 22   BUN 52* 57* 61*   CREATININE 3.09* 3.17* 3.11*   GLUCOSE 82 44* 69*   CALCIUM 7.8* 7.8* 7.8*        Coags:  Recent Labs     21  0929   APTT 25.8   INR 1.1       Lab Results   Component Value Date    SEDRATE 66 (H) 2021     Recent Labs 03/20/21  0929   CRP 56.2*       Lower Extremity Physical Exam:     Vascular: DP and PT pulses are Non-palpable, BLE DP/PT audible on doppler. CFT <5 seconds to all digits. Hair growth is absent to the level of the digits. pitting edema is present to bilateral lower extremities ankles and feet.     Neuro: Saph/sural/SP/DP/plantar sensation absent to light touch.     Musculoskeletal: Muscle strength to all lower extremity muscle groups 0/5. Gross deformity is present left fifth toe amputation. Negative pain on calf squeeze. LE muscle compartments soft and compressible.     Dermatologic: Full thickness ulcer #1 located left plantar heel measures approximately 6cm x 4cm x 4cm. The wound base is 50% granular, 35% fibrotic, 15% necrotic. Periwound skin is erythematous. sero-sanguinous drainage noted without associated mal odor. Erythema present with out associated increase in warmth. Probes directly to the calcaneus, with soft cortical bone noted. Does not sinus track or undermine. No fluctuance, crepitus, or induration.       ulcer #2 is located on the right posterior medial heel measuring approximately 3 cm x 4 cm x 0.3 cm. The wound base is fibrotic with serous drainage 1 mL. No erythema or increase in warmth. It does not probe to bone sinus track or undermine. There is no fluctuance crepitus or induration appreciated.     Interdigital maceration absent. Clinical Images:  R        L              Imaging:   XR FOOT LEFT (MIN 3 VIEWS)   Final Result   Soft tissue ulceration overlying the talus with underlying cortical   disruption, concerning for osteomyelitis. XR FOOT RIGHT (MIN 3 VIEWS)   Final Result   No acute bony abnormalities are noted         US RETROPERITONEAL LIMITED   Final Result   Unremarkable right kidney. Left kidney not visualized due to patient body   habitus and bowel gas.          MRI FOOT LEFT WO CONTRAST    (Results Pending)   VL LOWER EXTREMITY ARTERIAL SEGMENTAL PRESSURES W PPG (Results Pending)   MRI ANKLE LEFT WO CONTRAST    (Results Pending)       Cultures:    3/22/21 - L foot - no growth    Assessment   Antonio Moore is a 38 y.o. male with   1. Diabetic foot ulcer down to bone left foot  2. Osteomyelitis Left calcaneus  3. Diabetic foot ulcer down to subcutaneous tissue right foot  4. Type II DM with secondary peripheral neuropathy  5. DEBBY   6. CHF  7. Paraplegia   8. S/p L 5th toe amputation    Principal Problem:    DEBBY (acute kidney injury) ([address]  Active Problems:    Chronic systolic heart failure (HCC)    Volume overload    Hypoglycemia due to insulin    Right arm weakness    Essential hypertension    DM II (diabetes mellitus, type II), controlled ([address]    Chronic paraplegia ([address]    Sacral wound    Open wound of left foot    Ambrosio catheter in place    Colostomy in place [facility])  Resolved Problems:    * No resolved hospital problems. *       Plan     · Patient examined and evaluated at bedside. · Treatment options discussed in detail with the patient. · Left foot and ankle MRI wo contrast ordered to assess extent OM/r/o deep abcess left calc  · Cx obtained, prev cx no growth  · Venous duplex ordered to r/o DVT  · ID consulted for abx recs  · PVRs pending  · Off-load both heels with pillows so heels do not contact bed. Per nursing pt was being argumentative over keeping his heels off the bed. On further discussion, pt amenable.   ? Consult placed for orthotist - b/l Rooke boots to off-load heels  · Non-weight bearing to bilateral lower extremities  · Dressing applied to right foot: betadine, 4x4's, kerlix, light ace  · Dressing applied to left foot:  4x4's, abd's, kerlix, light ace  · Discussed with  Dr. Agustin Ojeda, DWAYNEM   Podiatric Medicine & Surgery   3/23/2021 at 6:06 AM
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
## 2021-03-23 NOTE — PROGRESS NOTES
Physical Therapy    DATE: 3/23/2021    NAME: Abdiaziz Sanders  MRN: [id number]   : 1985      Patient not seen this date for Physical Therapy due to:    Testing: pt leaving for MRI.  Will check back 3/24      Electronically signed by Nata Blanca PTA on 3/23/2021 at 1:55 PM

## 2021-03-23 NOTE — PROGRESS NOTES
Reviewed MRI  no drainable abscess appreciated. Concern for osteomyelitis left calcaneus. After discussion with ID team, recommended holding antibiotics until bone biopsy for culture and pathology obtained. Patient seen bedside with mother and Dr. Griffin Borja present. Discussed risks and benefits of bedside bone biopsy for targeted antibiotic therapy of left calcaneal osteomyelitis. Patient amenable to procedure. Consent signed, witnessed, and in chart. Correct side marked. Procedure: Patient understands that left calcaneal bone biopsy needs to be performed. Patient agrees to procedure. Left foot was marked. Timeout performed with patient, RN, and writer in room. No local anesthesia required given patient neuropathy. Left foot was prepped with Betadine and sterilely draped. A 1 cm skin incision was made with sterile #15 blade to the posterior medial left heel care to avoid tendonous and neurovascular structures. Sterile hemostat was used to bluntly dissect down to bone. Utilizing sterile Jamshidi needle bone sample of left calcaneus was obtained and placed in sterile container for aerobic and anaerobic cultures. No purulence was expressed. A second bone biopsy left calcaneus was obtained in the fashion with the incision made distal to the first and sent for surgical pathology. The biopsy sites were irrigated with copious amounts of NSS. Dry sterile dressing applied. Hemostasis achieved with direct pressure. Reports 0/10 post debridement pain. Patient tolerated procedure well. Following bone biopsy discussed with patient and mother risks and benefits of potential treatment options which included: IV antibiotic therapy, partial calcanectomy with wound VAC placement, and left below-knee amputation. Patient and mother related that they would like to think it over and discuss further tomorrow. Awaiting PVRs.     Electronically signed by Ricci Araiza DPM on 3/23/2021 at 6:35 PM
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
## 2021-03-23 NOTE — PLAN OF CARE
Problem: Skin Integrity:  Goal: Will show no infection signs and symptoms  Description: Will show no infection signs and symptoms  Outcome: Ongoing  Goal: Absence of new skin breakdown  Description: Absence of new skin breakdown  Outcome: Ongoing  Goal: Demonstration of wound healing without infection will improve  Description: Demonstration of wound healing without infection will improve  Outcome: Ongoing  Goal: Complications related to intravenous access or infusion will be avoided or minimized  Description: Complications related to intravenous access or infusion will be avoided or minimized  Outcome: Ongoing     Problem: Falls - Risk of:  Goal: Will remain free from falls  Description: Will remain free from falls  Outcome: Ongoing  Goal: Absence of physical injury  Description: Absence of physical injury  Outcome: Ongoing     Problem: Infection:  Goal: Will remain free from infection  Description: Will remain free from infection  Outcome: Ongoing     Problem: Safety:  Goal: Free from accidental physical injury  Description: Free from accidental physical injury  Outcome: Ongoing  Goal: Free from intentional harm  Description: Free from intentional harm  Outcome: Ongoing     Problem: Daily Care:  Goal: Daily care needs are met  Description: Daily care needs are met  Outcome: Ongoing     Problem: Skin Integrity:  Goal: Skin integrity will stabilize  Description: Skin integrity will stabilize  Outcome: Ongoing     Problem: Discharge Planning:  Goal: Patients continuum of care needs are met  Description: Patients continuum of care needs are met  Outcome: Ongoing     Problem: Nutrition  Goal: Optimal nutrition therapy  Description: Nutrition Problem #1: Increased nutrient needs  Intervention: Food and/or Nutrient Delivery: Modify Current Diet, Start Oral Nutrition Supplement  Nutritional Goals: Meet greater than or equal to 75% of estimated nutrient needs for wound healing with PO intake     Outcome: Ongoing Problem: Nutritional:  Goal: Nutritional status will improve  Description: Nutritional status will improve  Outcome: Ongoing     Problem: Physical Regulation:  Goal: Will remain free from infection  Description: Will remain free from infection  Outcome: Ongoing  Goal: Diagnostic test results will improve  Description: Diagnostic test results will improve  Outcome: Ongoing  Goal: Ability to maintain vital signs within normal range will improve  Description: Ability to maintain vital signs within normal range will improve  Outcome: Ongoing     Problem: Respiratory:  Goal: Ability to maintain normal respiratory secretions will improve  Description: Ability to maintain normal respiratory secretions will improve  Outcome: Ongoing

## 2021-03-24 PROBLEM — M86.9 OSTEOMYELITIS OF LEFT FOOT (HCC): Status: ACTIVE | Noted: 2021-03-24

## 2021-03-24 PROBLEM — R53.81 PHYSICAL DEBILITY: Status: ACTIVE | Noted: 2021-03-24

## 2021-03-24 LAB
ABSOLUTE EOS #: 0.29 K/UL (ref 0–0.44)
ABSOLUTE IMMATURE GRANULOCYTE: 0.1 K/UL (ref 0–0.3)
ABSOLUTE LYMPH #: 1.18 K/UL (ref 1.1–3.7)
ABSOLUTE MONO #: 0.69 K/UL (ref 0.1–1.2)
ANION GAP SERPL CALCULATED.3IONS-SCNC: 12 MMOL/L (ref 9–17)
BASOPHILS # BLD: 1 % (ref 0–2)
BASOPHILS ABSOLUTE: 0.1 K/UL (ref 0–0.2)
BUN BLDV-MCNC: 69 MG/DL (ref 6–20)
BUN/CREAT BLD: ABNORMAL (ref 9–20)
CALCIUM SERPL-MCNC: 8.2 MG/DL (ref 8.6–10.4)
CHLORIDE BLD-SCNC: 105 MMOL/L (ref 98–107)
CO2: 19 MMOL/L (ref 20–31)
CREAT SERPL-MCNC: 3.28 MG/DL (ref 0.7–1.2)
DIFFERENTIAL TYPE: ABNORMAL
EOSINOPHILS RELATIVE PERCENT: 3 % (ref 1–4)
GFR AFRICAN AMERICAN: 26 ML/MIN
GFR NON-AFRICAN AMERICAN: 21 ML/MIN
GFR SERPL CREATININE-BSD FRML MDRD: ABNORMAL ML/MIN/{1.73_M2}
GFR SERPL CREATININE-BSD FRML MDRD: ABNORMAL ML/MIN/{1.73_M2}
GLUCOSE BLD-MCNC: 108 MG/DL (ref 75–110)
GLUCOSE BLD-MCNC: 135 MG/DL (ref 75–110)
GLUCOSE BLD-MCNC: 147 MG/DL (ref 75–110)
GLUCOSE BLD-MCNC: 71 MG/DL (ref 75–110)
GLUCOSE BLD-MCNC: 81 MG/DL (ref 70–99)
HCT VFR BLD CALC: 36.6 % (ref 40.7–50.3)
HEMOGLOBIN: 10.2 G/DL (ref 13–17)
IMMATURE GRANULOCYTES: 1 %
LYMPHOCYTES # BLD: 12 % (ref 24–43)
MAGNESIUM: 1.6 MG/DL (ref 1.6–2.6)
MCH RBC QN AUTO: 22.9 PG (ref 25.2–33.5)
MCHC RBC AUTO-ENTMCNC: 27.9 G/DL (ref 28.4–34.8)
MCV RBC AUTO: 82.2 FL (ref 82.6–102.9)
MONOCYTES # BLD: 7 % (ref 3–12)
MORPHOLOGY: ABNORMAL
NRBC AUTOMATED: 0 PER 100 WBC
PDW BLD-RTO: 18.6 % (ref 11.8–14.4)
PLATELET # BLD: 316 K/UL (ref 138–453)
PLATELET ESTIMATE: ABNORMAL
PMV BLD AUTO: 9.2 FL (ref 8.1–13.5)
POTASSIUM SERPL-SCNC: 3.5 MMOL/L (ref 3.7–5.3)
RBC # BLD: 4.45 M/UL (ref 4.21–5.77)
RBC # BLD: ABNORMAL 10*6/UL
SEG NEUTROPHILS: 76 % (ref 36–65)
SEGMENTED NEUTROPHILS ABSOLUTE COUNT: 7.44 K/UL (ref 1.5–8.1)
SODIUM BLD-SCNC: 136 MMOL/L (ref 135–144)
WBC # BLD: 9.8 K/UL (ref 3.5–11.3)
WBC # BLD: ABNORMAL 10*3/UL

## 2021-03-24 PROCEDURE — 80048 BASIC METABOLIC PNL TOTAL CA: CPT

## 2021-03-24 PROCEDURE — 2060000000 HC ICU INTERMEDIATE R&B

## 2021-03-24 PROCEDURE — 6370000000 HC RX 637 (ALT 250 FOR IP): Performed by: CLINICAL NURSE SPECIALIST

## 2021-03-24 PROCEDURE — 2580000003 HC RX 258: Performed by: INTERNAL MEDICINE

## 2021-03-24 PROCEDURE — 6370000000 HC RX 637 (ALT 250 FOR IP): Performed by: NURSE PRACTITIONER

## 2021-03-24 PROCEDURE — 36415 COLL VENOUS BLD VENIPUNCTURE: CPT

## 2021-03-24 PROCEDURE — 2500000003 HC RX 250 WO HCPCS: Performed by: NURSE PRACTITIONER

## 2021-03-24 PROCEDURE — 97535 SELF CARE MNGMENT TRAINING: CPT

## 2021-03-24 PROCEDURE — 85025 COMPLETE CBC W/AUTO DIFF WBC: CPT

## 2021-03-24 PROCEDURE — 6360000002 HC RX W HCPCS: Performed by: INTERNAL MEDICINE

## 2021-03-24 PROCEDURE — APPSS60 APP SPLIT SHARED TIME 46-60 MINUTES: Performed by: NURSE PRACTITIONER

## 2021-03-24 PROCEDURE — 82947 ASSAY GLUCOSE BLOOD QUANT: CPT

## 2021-03-24 PROCEDURE — 6370000000 HC RX 637 (ALT 250 FOR IP): Performed by: INTERNAL MEDICINE

## 2021-03-24 PROCEDURE — 99232 SBSQ HOSP IP/OBS MODERATE 35: CPT | Performed by: FAMILY MEDICINE

## 2021-03-24 PROCEDURE — 97110 THERAPEUTIC EXERCISES: CPT

## 2021-03-24 PROCEDURE — 99222 1ST HOSP IP/OBS MODERATE 55: CPT | Performed by: INTERNAL MEDICINE

## 2021-03-24 PROCEDURE — 99232 SBSQ HOSP IP/OBS MODERATE 35: CPT | Performed by: INTERNAL MEDICINE

## 2021-03-24 PROCEDURE — 97530 THERAPEUTIC ACTIVITIES: CPT

## 2021-03-24 PROCEDURE — 83735 ASSAY OF MAGNESIUM: CPT

## 2021-03-24 RX ORDER — METOPROLOL TARTRATE 5 MG/5ML
5 INJECTION INTRAVENOUS ONCE
Status: COMPLETED | OUTPATIENT
Start: 2021-03-24 | End: 2021-03-24

## 2021-03-24 RX ORDER — INSULIN GLARGINE 100 [IU]/ML
15 INJECTION, SOLUTION SUBCUTANEOUS NIGHTLY
Status: DISCONTINUED | OUTPATIENT
Start: 2021-03-24 | End: 2021-03-30

## 2021-03-24 RX ORDER — HYDRALAZINE HYDROCHLORIDE 25 MG/1
25 TABLET, FILM COATED ORAL EVERY 8 HOURS SCHEDULED
Status: DISCONTINUED | OUTPATIENT
Start: 2021-03-24 | End: 2021-03-25

## 2021-03-24 RX ADMIN — DESMOPRESSIN ACETATE 40 MG: 0.2 TABLET ORAL at 09:07

## 2021-03-24 RX ADMIN — HYDRALAZINE HYDROCHLORIDE 25 MG: 25 TABLET, FILM COATED ORAL at 09:07

## 2021-03-24 RX ADMIN — HEPARIN SODIUM 5000 UNITS: 5000 INJECTION INTRAVENOUS; SUBCUTANEOUS at 21:48

## 2021-03-24 RX ADMIN — BACLOFEN 5 MG: 10 TABLET ORAL at 21:44

## 2021-03-24 RX ADMIN — METOPROLOL TARTRATE 5 MG: 1 INJECTION, SOLUTION INTRAVENOUS at 22:06

## 2021-03-24 RX ADMIN — METOPROLOL TARTRATE 50 MG: 25 TABLET ORAL at 20:24

## 2021-03-24 RX ADMIN — SODIUM BICARBONATE 1300 MG: 650 TABLET ORAL at 09:07

## 2021-03-24 RX ADMIN — STANDARDIZED SENNA CONCENTRATE 8.6 MG: 8.6 TABLET ORAL at 20:24

## 2021-03-24 RX ADMIN — INSULIN GLARGINE 15 UNITS: 100 INJECTION, SOLUTION SUBCUTANEOUS at 21:47

## 2021-03-24 RX ADMIN — HEPARIN SODIUM 5000 UNITS: 5000 INJECTION INTRAVENOUS; SUBCUTANEOUS at 14:38

## 2021-03-24 RX ADMIN — FAMOTIDINE 20 MG: 20 TABLET, FILM COATED ORAL at 09:07

## 2021-03-24 RX ADMIN — INSULIN LISPRO 2 UNITS: 100 INJECTION, SOLUTION INTRAVENOUS; SUBCUTANEOUS at 21:48

## 2021-03-24 RX ADMIN — METOPROLOL TARTRATE 50 MG: 25 TABLET ORAL at 09:07

## 2021-03-24 RX ADMIN — BACLOFEN 5 MG: 10 TABLET ORAL at 09:07

## 2021-03-24 RX ADMIN — SODIUM BICARBONATE 1300 MG: 650 TABLET ORAL at 20:24

## 2021-03-24 RX ADMIN — HYDRALAZINE HYDROCHLORIDE 25 MG: 25 TABLET, FILM COATED ORAL at 20:24

## 2021-03-24 RX ADMIN — HYDRALAZINE HYDROCHLORIDE 25 MG: 25 TABLET, FILM COATED ORAL at 14:38

## 2021-03-24 RX ADMIN — ALLOPURINOL 100 MG: 100 TABLET ORAL at 09:07

## 2021-03-24 RX ADMIN — SODIUM CHLORIDE, PRESERVATIVE FREE 10 ML: 5 INJECTION INTRAVENOUS at 09:08

## 2021-03-24 RX ADMIN — SODIUM CHLORIDE, PRESERVATIVE FREE 10 ML: 5 INJECTION INTRAVENOUS at 20:24

## 2021-03-24 RX ADMIN — TORSEMIDE 40 MG: 20 TABLET ORAL at 09:07

## 2021-03-24 RX ADMIN — Medication: at 09:07

## 2021-03-24 RX ADMIN — MAGNESIUM GLUCONATE 500 MG ORAL TABLET 400 MG: 500 TABLET ORAL at 09:07

## 2021-03-24 ASSESSMENT — PAIN DESCRIPTION - PAIN TYPE: TYPE: ACUTE PAIN

## 2021-03-24 NOTE — PROGRESS NOTES
Physical Therapy  Facility/Department: [facility] CAR 2  Daily Treatment Note  NAME: Antonia Trujillo  : 1985  MRN: [id number]    Date of Service: 3/24/2021    Discharge Recommendations:  Patient would benefit from continued therapy after discharge   PT Equipment Recommendations  Other: CTA    Assessment   Body structures, Functions, Activity limitations: Decreased functional mobility ; Decreased strength;Decreased endurance;Decreased balance  Assessment: Pt maxAx2 bed mobility, once sitting EOB required leaning on L side for support. B UE and LE performed while supine in bed. Pt would benefit from continued skilled PT to address deficits. Prognosis: Good  PT Education: Home Exercise Program  Patient Education: Given HEP print outs for supine LE exercises and UE exercises with theraband. REQUIRES PT FOLLOW UP: Yes  Activity Tolerance  Activity Tolerance: Patient limited by fatigue;Patient limited by endurance     Patient Diagnosis(es): There were no encounter diagnoses. has a past medical history of CHF (congestive heart failure) ([facility] [address] Diabetes mellitus ([facility] [address] Hypertension, Septic shock ([facility] [address] and Spina bifida aperta of lumbar spine ([facility] [address] has a past surgical history that includes incision and drainage (Bilateral, 2019); colostomy (N/A, 12/3/2019); Abdomen surgery (N/A, 2019); incision and drainage (N/A, 2019); Abdomen surgery (N/A, 12/10/2019); pr explore scrotum (N/A, 12/10/2019); incision and drainage (N/A, 2019); Wound Exploration (N/A, 2019); incision and drainage (N/A, 2019); Abscess Drainage (N/A, 2020); debridement (2020); Abdomen surgery (N/A, 2020); debridement (2020); incision and drainage (N/A, 2020); debridement (2020); and Abdomen surgery (N/A, 2020).     Restrictions  Restrictions/Precautions  Restrictions/Precautions: Weight Bearing  Required Braces or Orthoses?: No  Lower Extremity Weight Bearing Restrictions  Right Lower Extremity Weight Bearing: Non Weight Bearing  Left Lower Extremity Weight Bearing: Non Weight Bearing  Position Activity Restriction  Other position/activity restrictions: up with assist. Hx Spina bifida aperta of lumbar spine, buttock wound  Subjective   General  Response To Previous Treatment: Patient with no complaints from previous session. Family / Caregiver Present: No  Subjective  Subjective: RN and pt agreeable to PT. Pt supine and alert in bed upon arrival.  General Comment  Comments: Left pt supine in bed with call light in reach, nurse notified. Pain Screening  Patient Currently in Pain: Yes  Pain Assessment  Pain Assessment: 0-10  Pain Level: 2  Pain Type: Acute pain  Pain Location: Knee  Pain Orientation: Right;Left  Vital Signs  Patient Currently in Pain: Yes       Orientation  Orientation  Overall Orientation Status: Within Functional Limits  Objective   Bed mobility  Supine to Sit: Maximum assistance;2 Person assistance  Sit to Supine: Maximum assistance;2 Person assistance  Scooting: Maximal assistance  Transfers  Comment: Unsafe to attempt, d/t NWB on BLE  Ambulation  Ambulation?: No  Stairs/Curb  Stairs?: No     Balance  Posture: Poor  Sitting - Static: Poor;+  Sitting - Dynamic: Poor;-  Comments: Pt sat EOB ~8 min, unable to maintain upright sitting posture, leaned on L UE throughout. Exercises:   Supine LE Exercises: Hip ABD/ADD, Heel slides, SLR. Reps: x10  B UE AROM Theraband Exercises: Shoulder flexion/extension, shoulder IR/ER, elbow flexion/extension. Reps: x10    Comments: Limited ROM in B LE. PROM performed.       Goals  Short term goals  Time Frame for Short term goals: 14 visits  Short term goal 1: Pt will be Priscila bed mobility  Short term goal 2: Pt will be CGA EOB 2min  Short term goal 3: Pt will be maxA to stand  Short term goal 4: Pt will be safe to attempt pivot transfer    Plan    Plan  Times per week: 5-6x/wk  Current Treatment Recommendations: Balance Training, ROM,

## 2021-03-24 NOTE — PROGRESS NOTES
Occupational Therapy  Facility/Department: [facility] CAR 2  Daily Treatment Note  NAME: Sherman Bender  : 1985  MRN: [id number]    Date of Service: 3/24/2021    Discharge Recommendations:  Patient would benefit from continued therapy after discharge       Assessment   Performance deficits / Impairments: Decreased functional mobility ; Decreased ADL status; Decreased endurance;Decreased balance;Decreased high-level IADLs  Treatment Diagnosis: DEBBY  Prognosis: Good  REQUIRES OT FOLLOW UP: Yes  Activity Tolerance  Activity Tolerance: Patient Tolerated treatment well;Patient limited by fatigue  Safety Devices  Safety Devices in place: Yes  Type of devices: Call light within reach; Left in bed;Nurse notified         Patient Diagnosis(es): There were no encounter diagnoses. has a past medical history of CHF (congestive heart failure) ([facility] [address] Diabetes mellitus ([facility] [address] Hypertension, Septic shock ([facility] [address] and Spina bifida aperta of lumbar spine ([facility] [address] has a past surgical history that includes incision and drainage (Bilateral, 2019); colostomy (N/A, 12/3/2019); Abdomen surgery (N/A, 2019); incision and drainage (N/A, 2019); Abdomen surgery (N/A, 12/10/2019); pr explore scrotum (N/A, 12/10/2019); incision and drainage (N/A, 2019); Wound Exploration (N/A, 2019); incision and drainage (N/A, 2019); Abscess Drainage (N/A, 2020); debridement (2020); Abdomen surgery (N/A, 2020); debridement (2020); incision and drainage (N/A, 2020); debridement (2020); and Abdomen surgery (N/A, 2020).     Restrictions  Restrictions/Precautions  Restrictions/Precautions: General Precautions, Fall Risk  Required Braces or Orthoses?: No  Lower Extremity Weight Bearing Restrictions  Right Lower Extremity Weight Bearing: Non Weight Bearing  Left Lower Extremity Weight Bearing: Non Weight Bearing  Position Activity Restriction (per chart pt has not stood in approx 4 months)  Other position/activity restrictions: Hx Spina bifida aperta of lumbar spine, buttock wound  Subjective   General  Patient assessed for rehabilitation services?: Yes  Family / Caregiver Present: No  Diagnosis: SOB  General Comment  Comments: RN ok'd for OT tx. Pt agreeable to participate in session and cooperative throughout  Vital Signs  Patient Currently in Pain: Denies      Objective    ADL  Grooming: Stand by assistance;Maximum assistance;Setup; Increased time to complete(SBA brush teeth/ mouthwash/wash face, Max A washing hair d/t decreased ROM)  UE Bathing: Moderate assistance;Setup; Increased time to complete(assist w/underarms and back d/t decreased ROM)  LE Bathing: Dependent/Total;Setup; Increased time to complete(RN completed BLE self care)  UE Dressing: Minimal assistance;Setup; Increased time to complete(Min A to snap/tie gown d/t decreased ROM)  Additional Comments: Tray table set up for self care      Upon arrival pt supine in bed. Elevated HOB and set up at tray table for self care, see above for LOF. Mod A x2 to roll in bed to wash back and bottom and assisted RN w/changing linens. Call light in reach, phone in reach, pt in bed, RN notified.          Bed mobility  Rolling to Left: Moderate assistance;2 Person assistance(Mod A to roll, held on to hand and bed rails)  Rolling to Right: Moderate assistance;2 Person assistance(Mod A to roll, held hand and bed rail)  Scooting: Maximal assistance      Plan   Plan  Times per week: 3-4x/wk    Goals  Short term goals  Time Frame for Short term goals: pt will, by discharge  Short term goal 1: complete LB ADLs and toileting tasks with max A, set up and AE, as needed  Short term goal 2: complete UB ADLs with min A, and set up  Short term goal 3: increase activity tolerance to 15+ minutes in order to participate in daily tasks  Short term goal 4: dem mod A during bed mobility in order to increase independence  Short term goal 5: dem ~10 minutes static/dynamic sitting balance on eOB with CGA in order to complete functional tasks  Long term goals  Time Frame for Long term goals : NOTIFY OTR TO UPDATE MOBILITY GOALS AS APPROPRIATE       Therapy Time   Individual Concurrent Group Co-treatment   Time In  8909         Time Out  1540         Minutes  55 total tx time                 [facility] S/VIVEK  Anahy [place] MORIN/L

## 2021-03-24 NOTE — PROGRESS NOTES
Pt complaining of moisture seeping in his groin area, torey was patent. Place washcloths underneath patients scrotum. Pt then requested something be placed in his groin folds, tucked absorbant chucks pad in to each side of his inguinal folds to help absorb moisture and prevent chaffing. Pt told me that that wasn't acceptable to him, explained reasoning for choice, pt stated that writer didn't have a clue what he was doing. Writer stated that that was an opinion. Pt stated that it was a fact and then instructed writer\" to get the fuck out of my room. \"  Writer repositioned pt and made sure call light was in place and pt was comfortable before leaving room. gloria was witness to the entire interaction.

## 2021-03-24 NOTE — PROGRESS NOTES
[facility]  Office: [phone]  Paulina Jones, DO, Verena Ruiz, DO, Joseph Castrejon, DO, Diana Monahan, DO, Rodrick Cooper MD, Elizabeth Felix MD, Gucci Hays MD, Soham Ashley MD, Stefani Bennett MD, Demetrio Damian MD, Krysta Rivera MD, Alexander Matamoros MD, Mbonu Enid Gilford, MD, Mallory Arias DO, Juan Fowler MD, Ally Vilchis DO, Harrietta Goldberg, MD,  Mihir Harper DO, Lori Rodrigez MD, Chika Khanna MD, [facility], [facility], [facility], CNP, Shaji Lombardi, CNP, Sherice Valerio, CNS, Norris Bowen, CNP, Pedro Villasenor, CNP, Sherrell Vela, CNP, Precious Ardon, CNP, Jm Hernández, CNP, Ca Luciano PA-C, Ginger Cummins, [facility], Andria Britton, CNP, Mohsen Hernandez, CNP, Renetta García, CNP, Juany Trinh, CNP, Ricki Plush, CNP, [address], [address] [place] [address]    Progress Note    3/24/2021    11:57 AM    Name:   Davina Poon  MRN:     [id number]     Acct:      [de-identified]   Room:   2002/2002-01  IP Day:  5  Admit Date:  3/19/2021  5:34 PM    PCP:   No primary care provider on file. Code Status:  Full Code    Subjective:     C/C: DEBBY/failure to thrive  Interval History Status: not changed. Patient evaluated in room sitting in bed in no acute distress  Afebrile overnight, vital signs stable. Tolerating room air  Status post bone biopsy yesterday with podiatry, OM identified on MRI left heel- treatment options discussed with patient, awaiting decision  Continues off antibiotics per ID recs  Generalized anasarca continues- bmp from today pending  Hydralazine added for BP support   UOP 8.2 L; -21.3 L fluid deficit overall on Demadex 40 mg daily  Pt verbally aggressive stating that he is \"going to beat the sh** out of that lino\" if he loses his leg     Brief History:     Donald Sands a 38 y. o. male who was directly admitted from [facility] ED March 19 for the management of DEBBY (acute kidney injury) ([facility] [address]        He has a hx of spina bifida, CHF with EF 30% on Echo 12/6/19, CKD-baseline creat ~1.6-1.9, DM, chronic Lt foot ulcer and prolonged admit (11/29/19 - 1/16/20) for Necrotizing fasciitis buttocks. During that admit he underwent approx 11 I&D procedures, temporary dialysis and colostomy creation. [facility] has a chronic lema cath.  Pt was in SNF until about 4 mo ago. Review of Systems:     Constitutional:  negative for chills, fevers, sweats, left leg pain  Respiratory:  negative for cough, dyspnea on exertion, shortness of breath, wheezing  Cardiovascular:  negative for chest pain, chest pressure/discomfort,+ lower extremity edema, palpitations  Gastrointestinal:  negative for abdominal pain, constipation, diarrhea, nausea, vomiting  Neurological:  negative for dizziness, headache    Medications: Allergies:     Allergies   Allergen Reactions    Other      Mask adhesive causes hives       Current Meds:   Scheduled Meds:    hydrALAZINE  25 mg Oral 3 times per day    insulin glargine  15 Units Subcutaneous Nightly    metoprolol tartrate  50 mg Oral BID    famotidine  20 mg Oral Daily    sodium hypochlorite   Irrigation Daily    sodium bicarbonate  1,300 mg Oral BID    allopurinol  100 mg Oral Daily    torsemide  40 mg Oral Daily    Baclofen  5 mg Oral BID    magnesium oxide  400 mg Oral Daily    therapeutic multivitamin-minerals  1 tablet Oral Daily    senna  1 tablet Oral Nightly    sodium chloride flush  10 mL Intravenous 2 times per day    heparin (porcine)  5,000 Units Subcutaneous 3 times per day    insulin lispro  0-18 Units Subcutaneous TID WC    insulin lispro  0-9 Units Subcutaneous Nightly    atorvastatin  40 mg Oral Daily     Continuous Infusions:    dextrose       PRN Meds: acetaminophen, polyethylene glycol, sodium chloride flush, potassium chloride **OR** potassium alternative oral replacement **OR** potassium chloride, magnesium sulfate, promethazine **OR** ondansetron, acetaminophen **OR** 0706   LABALBU 2.6*  --   --  2.6*  --   --   --   --   --    POCGLU  --    < > 149*  --  50* 81 91 134* 71*    < > = values in this interval not displayed. ABG:  Lab Results   Component Value Date    POCPH 7.381 12/24/2019    POCPCO2 35.3 12/24/2019    POCPO2 140.3 12/24/2019    POCHCO3 20.9 12/24/2019    NBEA 4 12/24/2019    PBEA NOT REPORTED 12/24/2019    TMW6JEE 22 12/24/2019    KFTA3GYU 99 12/24/2019    FIO2 40.0 12/24/2019     Lab Results   Component Value Date/Time    SPECIAL NOT REPORTED 03/23/2021 06:56 PM     Lab Results   Component Value Date/Time    CULTURE PENDING 03/23/2021 06:56 PM       Radiology:  Deanna Metcalfa Foot Left (min 3 Views)    Result Date: 3/22/2021  Soft tissue ulceration overlying the talus with underlying cortical disruption, concerning for osteomyelitis. Xr Foot Right (min 3 Views)    Result Date: 3/22/2021  No acute bony abnormalities are noted     Us Retroperitoneal Limited    Result Date: 3/20/2021  Unremarkable right kidney. Left kidney not visualized due to patient body habitus and bowel gas. Physical Examination:        General appearance:  alert, cooperative and no distress, labile mood  Mental Status:  oriented to person, place and time and normal affect  Lungs:  clear to auscultation bilaterally, normal effort  Heart:  regular rate and rhythm, no murmur  Abdomen: Obese,  nontender, distended, normal bowel sounds, no masses, hepatomegaly, splenomegaly, ostomy present  Extremities: Generalized +3 edema extending into abdomen and left upper extremity,new redness to LLE, RLE with decreased edema. tenderness in the calves  Skin: Blistering bilateral lower extremities.   Bilateral pedal wounds inaccessible due to dressing    Assessment:        Hospital Problems           Last Modified POA    * (Principal) Osteomyelitis of left foot ([facility] [address] 3/24/2021 Yes    DEBBY (acute kidney injury) ([facility] [address] 3/24/2021 Yes    Chronic systolic heart failure ([facility] [address] 3/19/2021 Yes    Volume overload 3/21/2021 Yes    Hypoglycemia due to insulin 3/21/2021 No    Right arm weakness 3/19/2021 Yes    Essential hypertension 3/19/2021 Yes    DM II (diabetes mellitus, type II), controlled ([address] 3/19/2021 Yes    Diabetic foot ulcer ([address] 3/24/2021 Yes    Diabetic foot infection ([address] 3/24/2021 Yes    Chronic paraplegia ([address] 3/19/2021 Yes    Sacral wound (Chronic) 3/19/2021 Yes    Open wound of left foot 3/19/2021 Yes    Ambrosio catheter in place (Chronic) 3/19/2021 Yes    Colostomy in place [facility]) (Chronic) 3/19/2021 Yes    Overview Signed 3/19/2021  7:56 PM by MIRTA Barkley - CNS     December '19         CKD (chronic kidney disease) stage 4, GFR 15-29 ml/min ([address] 3/23/2021 Yes    Physical debility 3/24/2021 Yes          Plan:        1. DEBBY: guarded, nephrology following  -Renal ultrasound completed 3/20/21 without acute process  -On Demadex 40 mg daily  -Baseline CRT ~ 1.6-1.9: now 3.2 appears to be a plateau at this point per nephrology's note  - BMP from today pending     2. Chronic systolic CHF:   -ECHO completed 3/20 showing EF 25%  -continue BB, demadex   -excellent UOP    3. Protein calorie malnutrition with poor wound healing and OM in left heel:  -Chronic sacral wound and left foot wound present on admission  -Left heel osteomyelitis secondary to uncontrolled diabetes mellitus type 2 with diabetic foot infection and immobility  -Follow-up on foot wound cultures, currently NGTD  -Evaluated by general surgery and podiatry but no plans for debridement  -Wound care following  -MRI today per podiatry. OM present in left heel   -off ABX therapy without systemic involvement. Podiatry discussed treatment options including left calcaneus extraction and left BKA- patent states that he would prefer calcaneus removal    4. Essential hypertension: Stable  -Continue beta-blocker, increase to 50 mg twice daily  - add hydralazine 25 mg po tid 3/24    5.  Diabetes mellitus type 2 with episodes of hypoglycemia: A.m. blood

## 2021-03-24 NOTE — PROGRESS NOTES
NEPHROLOGY PROGRESS NOTE      SUBJECTIVE     Excellent diuresis with Demadex. MRI findings noted. Blood pressure stable. No shortness of breath. Appetite decent. Pending BMP results. OBJECTIVE     Vitals:    03/23/21 1200 03/23/21 1653 03/23/21 2030 03/24/21 0900   BP: (!) 158/101 (!) 163/85 (!) 163/106 (!) 155/89   Pulse: 82 87 92 78   Resp: 11 20 20 19   Temp: 98.5 °F (36.9 °C) 98.5 °F (36.9 °C) 97.9 °F (36.6 °C) 98.5 °F (36.9 °C)   TempSrc: Oral Oral Oral Axillary   SpO2: 96%      Weight:       Height:         24HR INTAKE/OUTPUT:      Intake/Output Summary (Last 24 hours) at 3/24/2021 1104  Last data filed at 3/24/2021 0654  Gross per 24 hour   Intake 950 ml   Output 6525 ml   Net -5575 ml       General appearance:Awake, alert, in no acute distress  HEENT: PERRLA  Respiratory::vesicular breath sounds,no wheeze/crackles  Cardiovascular:S1 S2 normal,no gallop or organic murmur. Abdomen: Present colostomy  Extremities: No Cyanosis or Clubbing, present lower extremity edema  Neurological:Alert and oriented. No abnormalities of mood, affect, memory, mentation, or behavior are noted      MEDICATIONS     Scheduled Meds:    hydrALAZINE  25 mg Oral 3 times per day    insulin glargine  15 Units Subcutaneous Nightly    metoprolol tartrate  50 mg Oral BID    famotidine  20 mg Oral Daily    sodium hypochlorite   Irrigation Daily    sodium bicarbonate  1,300 mg Oral BID    allopurinol  100 mg Oral Daily    torsemide  40 mg Oral Daily    Baclofen  5 mg Oral BID    magnesium oxide  400 mg Oral Daily    therapeutic multivitamin-minerals  1 tablet Oral Daily    senna  1 tablet Oral Nightly    sodium chloride flush  10 mL Intravenous 2 times per day    heparin (porcine)  5,000 Units Subcutaneous 3 times per day    insulin lispro  0-18 Units Subcutaneous TID WC    insulin lispro  0-9 Units Subcutaneous Nightly    atorvastatin  40 mg Oral Daily     Continuous Infusions:    dextrose       PRN Meds: acetaminophen, polyethylene glycol, sodium chloride flush, potassium chloride **OR** potassium alternative oral replacement **OR** potassium chloride, magnesium sulfate, promethazine **OR** ondansetron, acetaminophen **OR** acetaminophen, glucose, dextrose, glucagon (rDNA), dextrose, magnesium hydroxide  Home Meds:                Medications Prior to Admission: sevelamer (RENVELA) 800 MG tablet, Take 2 tablets by mouth 3 times daily (with meals)  sodium bicarbonate 650 MG tablet, Take 650 mg by mouth 4 times daily  chlorhexidine (PERIDEX) 0.12 % solution, Take 15 mLs by mouth 2 times daily  acetaminophen (TYLENOL) 325 MG tablet, Take 650 mg by mouth every 6 hours as needed for Pain  Liraglutide (VICTOZA) 18 MG/3ML SOPN SC injection, 1.8 mg  oxybutynin (DITROPAN) 5 MG tablet,   Baclofen (LIORESAL) 5 MG tablet, Take 1 tablet by mouth 2 times daily  Epoetin Chaitanya-epbx (RETACRIT IJ), Inject 10,000 Units as directed Indications: M W F if levels are correct  insulin detemir (LEVEMIR FLEXTOUCH) 100 UNIT/ML injection pen, Levemir FlexTouch U-100 Insulin 100 unit/mL (3 mL) subcutaneous pen  atorvastatin (LIPITOR) 80 MG tablet, Take 100 mg by mouth daily   enoxaparin (LOVENOX) 40 MG/0.4ML injection, Inject 0.4 mg into the skin 2 times daily  ferrous sulfate (IRON 325) 325 (65 Fe) MG tablet, Take 325 mg by mouth 3 times daily (with meals)  magnesium oxide (MAG-OX) 400 MG tablet, Take 400 mg by mouth daily  senna (SENOKOT) 8.6 MG tablet, Take 1 tablet by mouth nightly  polyethylene glycol (GLYCOLAX) 17 g packet, Take 17 g by mouth daily as needed for Constipation  oxyCODONE (ROXICODONE) 5 MG immediate release tablet, Take 10 mg by mouth every 4 hours as needed for Pain.   Multiple Vitamins-Minerals (THERAPEUTIC MULTIVITAMIN-MINERALS) tablet, Take 1 tablet by mouth daily  insulin glargine (LANTUS) 100 UNIT/ML injection vial, Inject 10 Units into the skin daily    INVESTIGATIONS     Last 3 CMP:    Recent Labs     03/22/21  7017 03/23/21  0356    139   K 3.9 3.7    106   CO2 20 22   BUN 57* 61*   CREATININE 3.17* 3.11*   CALCIUM 7.8* 7.8*   LABALBU 2.6* 2.6*       Last 3 CBC:  Recent Labs     03/22/21  0747 03/23/21  0356 03/24/21  0450   WBC 11.6* 10.0 9.8   RBC 4.59 4.25 4.45   HGB 10.6* 9.8* 10.2*   HCT 37.3* 34.7* 36.6*   MCV 81.3* 81.6* 82.2*   MCH 23.1* 23.1* 22.9*   MCHC 28.4 28.2* 27.9*   RDW 18.4* 18.4* 18.6*    288 316   MPV 8.7 8.7 9.2       ASSESSMENT     1. Acute kidney injury secondary to ischemic ATN from infection versus progression of underlying disease. Creatinine peaking to 3.1.  2.  Chronic kidney disease stage IIIb secondary diabetic nephrosclerosis/adaptive FSGS from obesity with baseline creatinine 1.8-2.  3.  Diabetic foot ulcer left and right with MRI revealing evidence of osteomyelitis  4. Worsening gluteal/sacral wounds  5. Chronic indwelling Ambrosio catheter in place from spina bifida rendering him immobile  6. Type 2 diabetes  7. Cardiomyopathy ejection fraction 25%  8. Diverting colostomy since 2019    PLAN     #1 continue Demadex  #2 fluid restriction  #3 antibiotics as per GFR less than 15  #4  Will follow. Repeat BMP pending.     Please do not hesitate to call with questions    This note is created with the assistance of a speech-recognition program. While intending to generate a document that actually reflects the content of the visit, no guarantees can be provided that every mistake has been identified and corrected by editing    Amy Martinez MD, [facility] Angelica Owens), [address]   3/24/2021 11:04 AM  [facility] [place]

## 2021-03-24 NOTE — PROGRESS NOTES
Comprehensive Nutrition Assessment    Type and Reason for Visit:  Reassess    Nutrition Recommendations/Plan:   -Continue renal, 2 gm Na diet w/ 1500 mL FR   -Recommend modifying Blake supplements BID to nepro supplements BID   -Will continue to monitor po intake, weights and wound healing     Nutrition Assessment:  Pt stated that he has a good appetite and is consuming 100% of his meals. Pt reports that he dislikes the Blake supplements and agreed to the nepro supplements on his meal trays. Pt stated UBW of 275 lbs - pt noted w/ severe edema, predict wt gain r/t fluid. Pt w/ multiple pressure ulcers - will continue ONS to aide in wound healing progression. Will monitor. Malnutrition Assessment:  Malnutrition Status: At risk for malnutrition (Comment)    Context:  Chronic Illness     Findings of the 6 clinical characteristics of malnutrition:  Energy Intake:  No significant decrease in energy intake  Weight Loss:  Unable to assess(d/t current edema)     Body Fat Loss:  No significant body fat loss     Muscle Mass Loss:  No significant muscle mass loss (r/t upper body)  Fluid Accumulation:  7 - Severe Extremities   Strength:  Not Performed    Estimated Daily Nutrient Needs:  Energy (kcal):  1.2-1.3 ~> 2300-3250 kcals/d; Weight Used for Energy Requirements:  Admission     Protein (g):  1.8-2 gm/kg = 120-135 gm/day; Weight Used for Protein Requirements:  Ideal(67 kg for paraplegia)          Nutrition Related Findings:  Active bowel sounds; Phos 4.8      Wounds:  Pressure Injury, Stage III, Stage IV, Unstageable, Open Wounds       Current Nutrition Therapies:    Dietary Nutrition Supplements: Wound Healing Oral Supplement  DIET RENAL; Low Sodium (2 GM);  Daily Fluid Restriction: 1500 ml    Anthropometric Measures:  · Height: 5' 9\" (175.3 cm)  · Current Body Weight: 338 lb (153.3 kg)   · Admission Body Weight: 331 lb (150.1 kg)    · Usual Body Weight: 275 lb (124.7 kg)(per pt)     · Ideal Body Weight: 160 lbs; % Ideal Body Weight 211.3 %   · BMI: 49.9  · Adjusted Body Weight: 356; Paraplegia   · Adjusted BMI: 52.6    · BMI Categories: Obese Class 3 (BMI 40.0 or greater)       Nutrition Diagnosis:   · Increased nutrient needs related to (wound healing) as evidenced by wounds(Need for ONS)      Nutrition Interventions:   Food and/or Nutrient Delivery:  Continue Current Diet, Modify Oral Nutrition Supplement  Nutrition Education/Counseling:  Education not indicated   Coordination of Nutrition Care:  Continue to monitor while inpatient    Goals: Achieved   Meet greater than or equal to 75% of estimated nutrient needs for wound healing with PO intake       Nutrition Monitoring and Evaluation:   Food/Nutrient Intake Outcomes:  Food and Nutrient Intake, Supplement Intake  Physical Signs/Symptoms Outcomes:  Biochemical Data, Nutrition Focused Physical Findings, Skin, Weight, GI Status, Fluid Status or Edema     Discharge Planning:     Too soon to determine     Electronically signed by Soraya Clemens RD, LD on 3/24/21 at 11:07 AM EDT    Contact: [phone]

## 2021-03-24 NOTE — PROGRESS NOTES
Subcutaneous 3 times per day    insulin lispro  0-18 Units Subcutaneous TID     insulin lispro  0-9 Units Subcutaneous Nightly    atorvastatin  40 mg Oral Daily       Continuous Infusions:   dextrose         PRN Meds:acetaminophen, polyethylene glycol, sodium chloride flush, potassium chloride **OR** potassium alternative oral replacement **OR** potassium chloride, magnesium sulfate, promethazine **OR** ondansetron, acetaminophen **OR** acetaminophen, glucose, dextrose, glucagon (rDNA), dextrose, magnesium hydroxide    Objective     Vitals:  No data found. Average, Min, and Max for last 24 hours Vitals:  TEMPERATURE:  Temp  Av.4 °F (36.9 °C)  Min: 97.9 °F (36.6 °C)  Max: 98.8 °F (37.1 °C)    RESPIRATIONS RANGE: Resp  Av.8  Min: 11  Max: 20    PULSE RANGE: Pulse  Av  Min: 79  Max: 92    BLOOD PRESSURE RANGE:  Systolic (86HAT), QZ , Min:154 , PSU:371   ; Diastolic (73RXS), EC, Min:76, Max:106      PULSE OXIMETRY RANGE: SpO2  Av %  Min: 92 %  Max: 96 %    I/O last 3 completed shifts: In: 950 [P.O.:950]  Out: 8625 [Urine:8275; Stool:350]    CBC:  Recent Labs     21  0747 21  0356 21  1658 21  0450   WBC 11.6* 10.0  --  9.8   HGB 10.6* 9.8*  --  10.2*   HCT 37.3* 34.7*  --  36.6*    288  --  316   CRP  --   --  51.8*  --         BMP:  Recent Labs     21  1040 21  0747 21  0356    138 139   K 4.0 3.9 3.7    107 106   CO2 17* 20 22   BUN 52* 57* 61*   CREATININE 3.09* 3.17* 3.11*   GLUCOSE 82 44* 69*   CALCIUM 7.8* 7.8* 7.8*        Coags:  No results for input(s): APTT, PROT, INR in the last 72 hours. Lab Results   Component Value Date    SEDRATE 43 (H) 2021     Recent Labs     21  1658   CRP 51.8*       Lower Extremity Physical Exam:     Vascular: DP and PT pulses are Non-palpable, BLE DP/PT audible on doppler. CFT <5 seconds to all digits. Hair growth is absent to the level of the digits.  pitting edema is present to bilateral lower extremities ankles and feet.     Neuro: Saph/sural/SP/DP/plantar sensation absent to light touch.     Musculoskeletal: Muscle strength to all lower extremity muscle groups 0/5. Gross deformity is present left fifth toe amputation. Negative pain on calf squeeze. LE muscle compartments soft and compressible.     Dermatologic: Full thickness ulcer #1 located left plantar heel measures approximately 6cm x 4cm x 4cm. The wound base is 50% granular, 35% fibrotic, 15% necrotic. Periwound skin is erythematous. sero-sanguinous drainage noted without associated mal odor. Erythema present with out associated increase in warmth. Probes directly to the calcaneus, with soft cortical bone noted. Does not sinus track or undermine. No fluctuance, crepitus, or induration.       ulcer #2 is located on the right posterior medial heel measuring approximately 3 cm x 4 cm x 0.3 cm. The wound base is fibrotic with serous drainage 1 mL. No erythema or increase in warmth. It does not probe to bone sinus track or undermine. There is no fluctuance crepitus or induration appreciated.     Interdigital maceration absent. Clinical Images:  R        L              Imaging:   VL LOWER EXTREMITY ARTERIAL SEGMENTAL PRESSURES W PPG   Final Result      MRI FOOT LEFT WO CONTRAST   Final Result   Deep soft tissue ulcer overlying the calcaneus. Cortical disruption of the   exposed calcaneus with underlying marrow edema, compatible with osteomyelitis. Diffuse skin thickening and subcutaneous soft tissue edema surrounding the   calcaneal ulcer as well as within the dorsum of the foot, compatible with   myositis and cellulitis. No drainable fluid collection identified to suggest   abscess formation. No additional foci of osteomyelitis identified. MRI ANKLE LEFT WO CONTRAST   Final Result   Deep soft tissue ulcer overlying the calcaneus.   Cortical disruption of the   exposed calcaneus with underlying Ambrosio catheter in place    Colostomy in place [facility])    CKD (chronic kidney disease) stage 4, GFR 15-29 ml/min ([facility])  Resolved Problems:    * No resolved hospital problems. *       Plan     · Patient examined and evaluated at bedside. · Treatment options discussed in detail with the patient. · MRI left foot without abscess, likely calcaneal osteomyelitis. · Bedside bone biopsy done 3/23/2021 -- await results  · Patient states that he discussed his treatment options with his mother yesterday after the bone biopsy was performed. Patient states that he would like to proceed with a partial calcanectomy and IV antibiotics. I discussed with the patient that he is still at risk for need for more proximal amputation given the extent of his infection combined with the microvascular disease. Patient verbalized understanding. · Wound cx: GPC  ? ID on board  · Venous duplex ordered to r/o DVT  · NIVS - mild vascular insufficiency with microvascular disease -- which likely limits re-vascularization options. TYLER:Right: 0.78. Left: 0.88. · Venous duplex US -- negative for DVT  · Cont to elevate heels off the bed. ? Consult placed for orthotist - b/l Rooke boots to off-load heels  · Will consult cardiology for cardiac clearance prior to scheduling the patient for surgery.   · Non-weight bearing to bilateral lower extremities  · Dressing to right foot: betadine, 4x4's, kerlix, light ace  · Dressing to left foot:  4x4's, abd's, kerlix, light ace  · Discussed with  Dr. Donny De La Garza, DPM   Podiatric Medicine & Surgery   3/24/2021 at 8:40 AM

## 2021-03-24 NOTE — PROGRESS NOTES
Pt bathed and dressings changed to coccyx and buttocks, noted large amount of drainage with pad being saturated. Educated patient the importance of offloading pressure on his bottom and pt refused, educated that his wound will not heal unless the pressure is relieved, pt refused to turn again. Ambrosio care also completed. VS stable will continue to monitor.

## 2021-03-24 NOTE — PLAN OF CARE
Problem: Skin Integrity:  Goal: Will show no infection signs and symptoms  Description: Will show no infection signs and symptoms  Outcome: Ongoing  Goal: Absence of new skin breakdown  Description: Absence of new skin breakdown  Outcome: Ongoing  Goal: Demonstration of wound healing without infection will improve  Description: Demonstration of wound healing without infection will improve  Outcome: Ongoing  Goal: Complications related to intravenous access or infusion will be avoided or minimized  Description: Complications related to intravenous access or infusion will be avoided or minimized  Outcome: Ongoing     Problem: Falls - Risk of:  Goal: Will remain free from falls  Description: Will remain free from falls  Outcome: Ongoing  Goal: Absence of physical injury  Description: Absence of physical injury  Outcome: Ongoing     Problem: Infection:  Goal: Will remain free from infection  Description: Will remain free from infection  Outcome: Ongoing     Problem: Safety:  Goal: Free from accidental physical injury  Description: Free from accidental physical injury  Outcome: Ongoing  Goal: Free from intentional harm  Description: Free from intentional harm  Outcome: Ongoing     Problem: Daily Care:  Goal: Daily care needs are met  Description: Daily care needs are met  Outcome: Ongoing     Problem: Skin Integrity:  Goal: Skin integrity will stabilize  Description: Skin integrity will stabilize  Outcome: Ongoing     Problem: Discharge Planning:  Goal: Patients continuum of care needs are met  Description: Patients continuum of care needs are met  Outcome: Ongoing     Problem: Nutrition  Goal: Optimal nutrition therapy  Description: Nutrition Problem #1: Increased nutrient needs  Intervention: Food and/or Nutrient Delivery: Modify Current Diet, Start Oral Nutrition Supplement  Nutritional Goals: Meet greater than or equal to 75% of estimated nutrient needs for wound healing with PO intake     3/24/2021 1732 by Mallory Kidd RICO Kwan  Outcome: Ongoing  3/24/2021 1110 by Nirav Doyle RD, LD  Outcome: Ongoing  Note: Nutrition Problem #1: Increased nutrient needs  Intervention: Food and/or Nutrient Delivery: Continue Current Diet, Modify Oral Nutrition Supplement  Nutritional Goals: Meet greater than or equal to 75% of estimated nutrient needs for wound healing with PO intake      Problem: Nutritional:  Goal: Nutritional status will improve  Description: Nutritional status will improve  Outcome: Ongoing     Problem: Physical Regulation:  Goal: Will remain free from infection  Description: Will remain free from infection  Outcome: Ongoing  Goal: Diagnostic test results will improve  Description: Diagnostic test results will improve  Outcome: Ongoing  Goal: Ability to maintain vital signs within normal range will improve  Description: Ability to maintain vital signs within normal range will improve  Outcome: Ongoing     Problem: Respiratory:  Goal: Ability to maintain normal respiratory secretions will improve  Description: Ability to maintain normal respiratory secretions will improve  Outcome: Ongoing     Problem: Pain:  Goal: Pain level will decrease  Description: Pain level will decrease  Outcome: Ongoing  Goal: Control of acute pain  Description: Control of acute pain  Outcome: Ongoing  Goal: Control of chronic pain  Description: Control of chronic pain  Outcome: Ongoing

## 2021-03-24 NOTE — PLAN OF CARE
Ability to maintain vital signs within normal range will improve  3/23/2021 2339 by Elodia Hernandez RN  Outcome: Ongoing  3/23/2021 1144 by Katelyn Adrian RN  Outcome: Ongoing     Problem: Respiratory:  Goal: Ability to maintain normal respiratory secretions will improve  Description: Ability to maintain normal respiratory secretions will improve  3/23/2021 2339 by Elodia Hernandez RN  Outcome: Ongoing  3/23/2021 1144 by Katelyn Adrian RN  Outcome: Ongoing

## 2021-03-25 ENCOUNTER — APPOINTMENT (OUTPATIENT)
Dept: GENERAL RADIOLOGY | Age: 36
DRG: 628 | End: 2021-03-25
Attending: INTERNAL MEDICINE
Payer: MEDICARE

## 2021-03-25 LAB
ABSOLUTE EOS #: 0.35 K/UL (ref 0–0.4)
ABSOLUTE IMMATURE GRANULOCYTE: 0.09 K/UL (ref 0–0.3)
ABSOLUTE LYMPH #: 0.87 K/UL (ref 1–4.8)
ABSOLUTE MONO #: 0.7 K/UL (ref 0.1–0.8)
ANION GAP SERPL CALCULATED.3IONS-SCNC: 13 MMOL/L (ref 9–17)
BASOPHILS # BLD: 1 % (ref 0–2)
BASOPHILS ABSOLUTE: 0.09 K/UL (ref 0–0.2)
BUN BLDV-MCNC: 75 MG/DL (ref 6–20)
BUN/CREAT BLD: ABNORMAL (ref 9–20)
CALCIUM SERPL-MCNC: 8.1 MG/DL (ref 8.6–10.4)
CHLORIDE BLD-SCNC: 106 MMOL/L (ref 98–107)
CO2: 21 MMOL/L (ref 20–31)
CREAT SERPL-MCNC: 3.35 MG/DL (ref 0.7–1.2)
CULTURE: NORMAL
CULTURE: NORMAL
DIFFERENTIAL TYPE: ABNORMAL
EOSINOPHILS RELATIVE PERCENT: 4 % (ref 1–4)
GFR AFRICAN AMERICAN: 25 ML/MIN
GFR NON-AFRICAN AMERICAN: 21 ML/MIN
GFR SERPL CREATININE-BSD FRML MDRD: ABNORMAL ML/MIN/{1.73_M2}
GFR SERPL CREATININE-BSD FRML MDRD: ABNORMAL ML/MIN/{1.73_M2}
GLUCOSE BLD-MCNC: 101 MG/DL (ref 70–99)
GLUCOSE BLD-MCNC: 117 MG/DL (ref 75–110)
GLUCOSE BLD-MCNC: 166 MG/DL (ref 75–110)
GLUCOSE BLD-MCNC: 190 MG/DL (ref 75–110)
GLUCOSE BLD-MCNC: 204 MG/DL (ref 75–110)
HCT VFR BLD CALC: 33.9 % (ref 40.7–50.3)
HEMOGLOBIN: 9.5 G/DL (ref 13–17)
IMMATURE GRANULOCYTES: 1 %
LYMPHOCYTES # BLD: 10 % (ref 24–44)
Lab: NORMAL
Lab: NORMAL
MAGNESIUM: 1.5 MG/DL (ref 1.6–2.6)
MCH RBC QN AUTO: 23.4 PG (ref 25.2–33.5)
MCHC RBC AUTO-ENTMCNC: 28 G/DL (ref 28.4–34.8)
MCV RBC AUTO: 83.5 FL (ref 82.6–102.9)
MONOCYTES # BLD: 8 % (ref 1–7)
MORPHOLOGY: ABNORMAL
NRBC AUTOMATED: 0 PER 100 WBC
PDW BLD-RTO: 18.6 % (ref 11.8–14.4)
PLATELET # BLD: 289 K/UL (ref 138–453)
PLATELET ESTIMATE: ABNORMAL
PMV BLD AUTO: 9 FL (ref 8.1–13.5)
POTASSIUM SERPL-SCNC: 3.3 MMOL/L (ref 3.7–5.3)
RBC # BLD: 4.06 M/UL (ref 4.21–5.77)
RBC # BLD: ABNORMAL 10*6/UL
SEG NEUTROPHILS: 76 % (ref 36–66)
SEGMENTED NEUTROPHILS ABSOLUTE COUNT: 6.6 K/UL (ref 1.8–7.7)
SODIUM BLD-SCNC: 140 MMOL/L (ref 135–144)
SPECIMEN DESCRIPTION: NORMAL
SPECIMEN DESCRIPTION: NORMAL
SURGICAL PATHOLOGY REPORT: NORMAL
WBC # BLD: 8.7 K/UL (ref 3.5–11.3)
WBC # BLD: ABNORMAL 10*3/UL

## 2021-03-25 PROCEDURE — 83735 ASSAY OF MAGNESIUM: CPT

## 2021-03-25 PROCEDURE — 2060000000 HC ICU INTERMEDIATE R&B

## 2021-03-25 PROCEDURE — APPSS45 APP SPLIT SHARED TIME 31-45 MINUTES: Performed by: NURSE PRACTITIONER

## 2021-03-25 PROCEDURE — 6370000000 HC RX 637 (ALT 250 FOR IP): Performed by: CLINICAL NURSE SPECIALIST

## 2021-03-25 PROCEDURE — 80048 BASIC METABOLIC PNL TOTAL CA: CPT

## 2021-03-25 PROCEDURE — 82947 ASSAY GLUCOSE BLOOD QUANT: CPT

## 2021-03-25 PROCEDURE — 99232 SBSQ HOSP IP/OBS MODERATE 35: CPT | Performed by: INTERNAL MEDICINE

## 2021-03-25 PROCEDURE — 36415 COLL VENOUS BLD VENIPUNCTURE: CPT

## 2021-03-25 PROCEDURE — 85025 COMPLETE CBC W/AUTO DIFF WBC: CPT

## 2021-03-25 PROCEDURE — 2580000003 HC RX 258: Performed by: INTERNAL MEDICINE

## 2021-03-25 PROCEDURE — 99232 SBSQ HOSP IP/OBS MODERATE 35: CPT | Performed by: FAMILY MEDICINE

## 2021-03-25 PROCEDURE — 6360000002 HC RX W HCPCS: Performed by: INTERNAL MEDICINE

## 2021-03-25 PROCEDURE — 99223 1ST HOSP IP/OBS HIGH 75: CPT | Performed by: SURGERY

## 2021-03-25 PROCEDURE — 6370000000 HC RX 637 (ALT 250 FOR IP): Performed by: INTERNAL MEDICINE

## 2021-03-25 PROCEDURE — 6370000000 HC RX 637 (ALT 250 FOR IP): Performed by: NURSE PRACTITIONER

## 2021-03-25 PROCEDURE — 6370000000 HC RX 637 (ALT 250 FOR IP): Performed by: SURGERY

## 2021-03-25 PROCEDURE — 51702 INSERT TEMP BLADDER CATH: CPT

## 2021-03-25 PROCEDURE — 71045 X-RAY EXAM CHEST 1 VIEW: CPT

## 2021-03-25 RX ORDER — TORSEMIDE 20 MG/1
20 TABLET ORAL DAILY
Status: DISCONTINUED | OUTPATIENT
Start: 2021-03-26 | End: 2021-03-30

## 2021-03-25 RX ORDER — HYDRALAZINE HYDROCHLORIDE 50 MG/1
50 TABLET, FILM COATED ORAL EVERY 8 HOURS SCHEDULED
Status: DISCONTINUED | OUTPATIENT
Start: 2021-03-25 | End: 2021-04-02 | Stop reason: HOSPADM

## 2021-03-25 RX ORDER — SPIRONOLACTONE 25 MG/1
25 TABLET ORAL DAILY
Status: DISCONTINUED | OUTPATIENT
Start: 2021-03-25 | End: 2021-04-01

## 2021-03-25 RX ORDER — LANOLIN ALCOHOL/MO/W.PET/CERES
3 CREAM (GRAM) TOPICAL NIGHTLY PRN
Status: DISCONTINUED | OUTPATIENT
Start: 2021-03-25 | End: 2021-04-02 | Stop reason: HOSPADM

## 2021-03-25 RX ORDER — ASPIRIN 81 MG/1
81 TABLET, CHEWABLE ORAL DAILY
Status: DISCONTINUED | OUTPATIENT
Start: 2021-03-25 | End: 2021-04-02 | Stop reason: HOSPADM

## 2021-03-25 RX ADMIN — POTASSIUM CHLORIDE 40 MEQ: 1500 TABLET, EXTENDED RELEASE ORAL at 12:23

## 2021-03-25 RX ADMIN — DESMOPRESSIN ACETATE 40 MG: 0.2 TABLET ORAL at 08:48

## 2021-03-25 RX ADMIN — INSULIN LISPRO 3 UNITS: 100 INJECTION, SOLUTION INTRAVENOUS; SUBCUTANEOUS at 12:23

## 2021-03-25 RX ADMIN — METOPROLOL TARTRATE 50 MG: 25 TABLET ORAL at 21:04

## 2021-03-25 RX ADMIN — ACETAMINOPHEN 650 MG: 325 TABLET ORAL at 22:00

## 2021-03-25 RX ADMIN — SPIRONOLACTONE 25 MG: 25 TABLET ORAL at 15:31

## 2021-03-25 RX ADMIN — HEPARIN SODIUM 5000 UNITS: 5000 INJECTION INTRAVENOUS; SUBCUTANEOUS at 06:00

## 2021-03-25 RX ADMIN — BACLOFEN 5 MG: 10 TABLET ORAL at 08:47

## 2021-03-25 RX ADMIN — MAGNESIUM GLUCONATE 500 MG ORAL TABLET 400 MG: 500 TABLET ORAL at 08:47

## 2021-03-25 RX ADMIN — ASPIRIN 81 MG: 81 TABLET, CHEWABLE ORAL at 21:04

## 2021-03-25 RX ADMIN — HYDRALAZINE HYDROCHLORIDE 50 MG: 50 TABLET, FILM COATED ORAL at 15:31

## 2021-03-25 RX ADMIN — SODIUM CHLORIDE, PRESERVATIVE FREE 10 ML: 5 INJECTION INTRAVENOUS at 08:43

## 2021-03-25 RX ADMIN — TORSEMIDE 40 MG: 20 TABLET ORAL at 08:44

## 2021-03-25 RX ADMIN — SODIUM CHLORIDE, PRESERVATIVE FREE 10 ML: 5 INJECTION INTRAVENOUS at 21:15

## 2021-03-25 RX ADMIN — HYDRALAZINE HYDROCHLORIDE 25 MG: 25 TABLET, FILM COATED ORAL at 06:00

## 2021-03-25 RX ADMIN — FAMOTIDINE 20 MG: 20 TABLET, FILM COATED ORAL at 08:43

## 2021-03-25 RX ADMIN — INSULIN GLARGINE 15 UNITS: 100 INJECTION, SOLUTION SUBCUTANEOUS at 21:09

## 2021-03-25 RX ADMIN — SODIUM BICARBONATE 1300 MG: 650 TABLET ORAL at 08:48

## 2021-03-25 RX ADMIN — STANDARDIZED SENNA CONCENTRATE 8.6 MG: 8.6 TABLET ORAL at 21:04

## 2021-03-25 RX ADMIN — BACLOFEN 5 MG: 10 TABLET ORAL at 21:04

## 2021-03-25 RX ADMIN — ALLOPURINOL 100 MG: 100 TABLET ORAL at 08:43

## 2021-03-25 RX ADMIN — HEPARIN SODIUM 5000 UNITS: 5000 INJECTION INTRAVENOUS; SUBCUTANEOUS at 21:09

## 2021-03-25 RX ADMIN — Medication 1 TABLET: at 08:47

## 2021-03-25 RX ADMIN — METOPROLOL TARTRATE 50 MG: 25 TABLET ORAL at 08:43

## 2021-03-25 RX ADMIN — SODIUM BICARBONATE 1300 MG: 650 TABLET ORAL at 21:04

## 2021-03-25 RX ADMIN — HYDRALAZINE HYDROCHLORIDE 50 MG: 50 TABLET, FILM COATED ORAL at 21:04

## 2021-03-25 RX ADMIN — HEPARIN SODIUM 5000 UNITS: 5000 INJECTION INTRAVENOUS; SUBCUTANEOUS at 15:31

## 2021-03-25 RX ADMIN — INSULIN LISPRO 6 UNITS: 100 INJECTION, SOLUTION INTRAVENOUS; SUBCUTANEOUS at 17:37

## 2021-03-25 RX ADMIN — INSULIN LISPRO 2 UNITS: 100 INJECTION, SOLUTION INTRAVENOUS; SUBCUTANEOUS at 21:09

## 2021-03-25 ASSESSMENT — ENCOUNTER SYMPTOMS
EYE REDNESS: 0
ABDOMINAL DISTENTION: 0
EYE PAIN: 0
PHOTOPHOBIA: 0
APNEA: 0

## 2021-03-25 ASSESSMENT — PAIN DESCRIPTION - LOCATION: LOCATION: FOOT;KNEE

## 2021-03-25 ASSESSMENT — PAIN SCALES - GENERAL: PAINLEVEL_OUTOF10: 7

## 2021-03-25 ASSESSMENT — PAIN DESCRIPTION - PAIN TYPE: TYPE: ACUTE PAIN

## 2021-03-25 ASSESSMENT — PAIN DESCRIPTION - ORIENTATION: ORIENTATION: RIGHT;LEFT

## 2021-03-25 NOTE — PROGRESS NOTES
times per day    heparin (porcine)  5,000 Units Subcutaneous 3 times per day    insulin lispro  0-18 Units Subcutaneous TID WC    insulin lispro  0-9 Units Subcutaneous Nightly    atorvastatin  40 mg Oral Daily       Continuous Infusions:   dextrose         PRN Meds:acetaminophen, polyethylene glycol, sodium chloride flush, potassium chloride **OR** potassium alternative oral replacement **OR** potassium chloride, magnesium sulfate, promethazine **OR** ondansetron, acetaminophen **OR** acetaminophen, glucose, dextrose, glucagon (rDNA), dextrose, magnesium hydroxide    Objective     Vitals:  Patient Vitals for the past 8 hrs:   BP Temp Temp src Pulse Resp SpO2   21 0745 (!) 184/109 97.9 °F (36.6 °C) Oral 83 24 97 %     Average, Min, and Max for last 24 hours Vitals:  TEMPERATURE:  Temp  Av.1 °F (36.7 °C)  Min: 97.9 °F (36.6 °C)  Max: 98.4 °F (36.9 °C)    RESPIRATIONS RANGE: Resp  Av.9  Min: 16  Max: 24    PULSE RANGE: Pulse  Av.8  Min: 74  Max: 95    BLOOD PRESSURE RANGE:  Systolic (59QZX), VEE:209 , Min:146 , MUU:399   ; Diastolic (10VVM), MGF:14, Min:89, Max:110      PULSE OXIMETRY RANGE: SpO2  Av.5 %  Min: 91 %  Max: 97 %    I/O last 3 completed shifts: In: 1346 [P.O.:1346]  Out: 4700 [Urine:4400; Stool:300]    CBC:  Recent Labs     21  0356 21  1658 21  0450 21  0744   WBC 10.0  --  9.8 8.7   HGB 9.8*  --  10.2* 9.5*   HCT 34.7*  --  36.6* 33.9*     --  316 289   CRP  --  51.8*  --   --         BMP:  Recent Labs     21  0356 21  0450 21  0744    136 140   K 3.7 3.5* 3.3*    105 106   CO2 22 19* 21   BUN 61* 69* 75*   CREATININE 3.11* 3.28* 3.35*   GLUCOSE 69* 81 101*   CALCIUM 7.8* 8.2* 8.1*        Coags:  No results for input(s): APTT, PROT, INR in the last 72 hours.     Lab Results   Component Value Date    SEDRATE 43 (H) 2021     Recent Labs     21  1658   CRP 51.8*       Lower Extremity Physical Exam:    Vascular: DP and PT pulses are Non-palpable, BLE DP/PT audible on doppler. CFT <5 seconds to all digits. Hair growth is absent to the level of the digits. pitting edema is present to bilateral lower extremities ankles and feet.     Neuro: Saph/sural/SP/DP/plantar sensation absent to light touch.     Musculoskeletal: Muscle strength to all lower extremity muscle groups 0/5. Gross deformity is present left fifth toe amputation. Negative pain on calf squeeze. LE muscle compartments soft and compressible.     Dermatologic: Full thickness ulcer #1 located left plantar heel measures approximately 6cm x 4cm x 4cm. The wound base is mixed granular, fibrotic, necrotic. Periwound skin is erythematous. Sero-sanguinous drainage noted without associated mal odor. Erythema present with out associated increase in warmth. Probes directly to the calcaneus, with soft cortical bone noted. Does not sinus track or undermine. No fluctuance, crepitus, or induration.       ulcer #2 is located on the right posterior medial heel measuring approximately 3 cm x 4 cm x 0.3 cm. The wound base is fibrotic with serous drainage. No erythema or increase in warmth. It does not probe to bone sinus track or undermine. There is no fluctuance crepitus or induration appreciated.     Interdigital maceration absent. Clinical Images:  R        L        Imaging:   VL LOWER EXTREMITY ARTERIAL SEGMENTAL PRESSURES W PPG   Final Result      MRI FOOT LEFT WO CONTRAST   Final Result   Deep soft tissue ulcer overlying the calcaneus. Cortical disruption of the   exposed calcaneus with underlying marrow edema, compatible with osteomyelitis. Diffuse skin thickening and subcutaneous soft tissue edema surrounding the   calcaneal ulcer as well as within the dorsum of the foot, compatible with   myositis and cellulitis. No drainable fluid collection identified to suggest   abscess formation. No additional foci of osteomyelitis identified.          MRI ANKLE LEFT WO CONTRAST   Final Result   Deep soft tissue ulcer overlying the calcaneus. Cortical disruption of the   exposed calcaneus with underlying marrow edema, compatible with osteomyelitis. Diffuse skin thickening and subcutaneous soft tissue edema surrounding the   calcaneal ulcer as well as within the dorsum of the foot, compatible with   myositis and cellulitis. No drainable fluid collection identified to suggest   abscess formation. No additional foci of osteomyelitis identified. VL Lower Extremity Bilateral Venous Duplex   Final Result      XR FOOT LEFT (MIN 3 VIEWS)   Final Result   Soft tissue ulceration overlying the talus with underlying cortical   disruption, concerning for osteomyelitis. XR FOOT RIGHT (MIN 3 VIEWS)   Final Result   No acute bony abnormalities are noted         US RETROPERITONEAL LIMITED   Final Result   Unremarkable right kidney. Left kidney not visualized due to patient body   habitus and bowel gas. Venous Duplex (3/23): Negative for deep or superficial thrombosis. NIVS: 3/23/2021       Right:    [id number] abnormal PVRs    TYLER suggests mild vascular insufficiency at rest    Digit waveforms suggests microvascular level of disease        Left:    Mildly abnormal PVRs    TYLER suggests mild vascular insufficiency at rest    Digit waveforms suggests microvascular level of disease     TYLER:Right: 0.78. Left: 0.88. Wound Culture 3/23/2021: GPC       Bone biopsy 3/23/2021: Neutrophils. NGTD. Assessment   Milagro Crew is a 38 y.o. male with   1. S/p bedside bone biopsy (DOS: 3/23/2021)  2. Diabetic foot ulcer down to bone left foot  3. Osteomyelitis Left calcaneus  4. Diabetic foot ulcer down to subcutaneous tissue right foot  5. 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 DEBBY   7. CHF  8. Paraplegia   9.  S/p L 5th toe amputation    Principal Problem:    Osteomyelitis of left foot (HCC)  Active Problems:    DEBBY (acute kidney injury) ([address]    Chronic systolic heart failure (HCC)    Volume overload    Hypoglycemia due to insulin    Right arm weakness    Essential hypertension    DM II (diabetes mellitus, type II), controlled ([address]    Diabetic foot ulcer ([address]    Diabetic foot infection ([address]    Chronic paraplegia ([address]    Sacral wound    Open wound of left foot    Ambrosio catheter in place    Colostomy in place [facility])    CKD (chronic kidney disease) stage 4, GFR 15-29 ml/min ([facility])    Physical debility  Resolved Problems:    * No resolved hospital problems. *       Plan     · Patient examined and evaluated at bedside. · Treatment options discussed in detail with the patient. · Patient states that he would still like to proceed with a partial calcanectomy and IV antibiotics for the treatment of calcaneal osteomyelitis. Discussed with the patient that he is still at risk for need for more proximal amputation given the extent of his infection combined with the microvascular disease. Patient verbalized understanding. · Wound cx: GPC  ? ID on board  · NIVS - mild vascular insufficiency with microvascular disease -- which likely limits re-vascularization options. · Cont to elevate heels off the bed. ? Consult placed for orthotist - b/l Rooke boots to off-load heels  · Will await further risk stratification from cardiology standpoint prior to scheduling the patient for surgery. · Non-weight bearing to bilateral lower extremities  · Dressings applied to BLE: betadine, 4x4s, Kerlix, Abd, light ace.    · Discussed with  Gregg Mcneil 26   Podiatric Medicine & Surgery   3/25/2021 at 9:32 AM

## 2021-03-25 NOTE — CONSULTS
[facility]    Inpatient Consultation Note               Today's Date: 3/25/2021  Patient Name: Navdeep Roldan  Date of admission: 3/19/2021  5:34 PM  Patient's age: 38 y.o., 1985  Admission Dx: Acute kidney injury ([facility] [N17.9]    Reason for  Consult:  Risk stratification    Requesting Physician: Jannet Contreras DO    CHIEF COMPLAINT:     No chief complaint on file. History Obtained From:  patient, electronic medical record    HISTORY OF PRESENT ILLNESS:      The patient is a 38 y.o. male who is admitted to the hospital for chronic foot ulcer. PMH of HFrEF (EF 25%), HTN, T2DM on insulin, CKD presented to the ED due to worsening LE edema and sob. He reports having to deal with his osteomyelitis for quite some time but recently he has noted he's more short of breath and has been having worsening LE edema. Reports no chest pain, palpitations, PND or other complaints. Of note, he was diagnosed with an EF of 20-25% in April 2019 and subsequently underwent a stress test which showed a large inferolateral MI with minimal marc-infarct ischemia. He reports not undergoing a coronary angiography subsequently or any further testing. Reports exercise capacity limited due to osteomyelitis. Cardiology consulted for risk stratification. Past Medical History:   has a past medical history of CHF (congestive heart failure) ([address] Diabetes mellitus ([address] Hypertension, Septic shock ([facility] and Spina bifida aperta of lumbar spine ([facility] [facility] Past Surgical History:   has a past surgical history that includes incision and drainage (Bilateral, 11/29/2019); colostomy (N/A, 12/3/2019); Abdomen surgery (N/A, 12/8/2019); incision and drainage (N/A, 12/6/2019); Abdomen surgery (N/A, 12/10/2019); pr explore scrotum (N/A, 12/10/2019); incision and drainage (N/A, 12/20/2019); Wound Exploration (N/A, 12/23/2019); incision and drainage (N/A, 12/26/2019);  Abscess Drainage (N/A, 1/1/2020); debridement (01/05/2020); Abdomen surgery (N/A, 1/5/2020); debridement (01/08/2020); incision and drainage (N/A, 1/8/2020); debridement (01/11/2020); and Abdomen surgery (N/A, 1/11/2020). Home Medications:    Prior to Admission medications    Medication Sig Start Date End Date Taking?  Authorizing Provider   sevelamer (RENVELA) 800 MG tablet Take 2 tablets by mouth 3 times daily (with meals)    Historical Provider, MD   sodium bicarbonate 650 MG tablet Take 650 mg by mouth 4 times daily    Historical Provider, MD   chlorhexidine (PERIDEX) 0.12 % solution Take 15 mLs by mouth 2 times daily    Historical Provider, MD   acetaminophen (TYLENOL) 325 MG tablet Take 650 mg by mouth every 6 hours as needed for Pain    Historical Provider, MD   Liraglutide (VICTOZA) 18 MG/3ML SOPN SC injection 1.8 mg 10/12/19   Historical Provider, MD   oxybutynin (DITROPAN) 5 MG tablet  8/8/20   Historical Provider, MD   Baclofen (LIORESAL) 5 MG tablet Take 1 tablet by mouth 2 times daily 9/2/20   Vergie Ok, APRN - CNP   Epoetin Chaitanya-epbx (RETACRIT IJ) Inject 10,000 Units as directed Indications: M W F if levels are correct    Historical Provider, MD   insulin detemir (LEVEMIR FLEXTOUCH) 100 UNIT/ML injection pen Levemir FlexTouch U-100 Insulin 100 unit/mL (3 mL) subcutaneous pen    Historical Provider, MD   atorvastatin (LIPITOR) 80 MG tablet Take 100 mg by mouth daily  7/23/19   Historical Provider, MD   enoxaparin (LOVENOX) 40 MG/0.4ML injection Inject 0.4 mg into the skin 2 times daily    Historical Provider, MD   ferrous sulfate (IRON 325) 325 (65 Fe) MG tablet Take 325 mg by mouth 3 times daily (with meals)    Historical Provider, MD   magnesium oxide (MAG-OX) 400 MG tablet Take 400 mg by mouth daily    Historical Provider, MD   senna (SENOKOT) 8.6 MG tablet Take 1 tablet by mouth nightly    Historical Provider, MD   polyethylene glycol (GLYCOLAX) 17 g packet Take 17 g by mouth daily as needed for Constipation    Historical Provider, MD   oxyCODONE (ROXICODONE) 5 MG immediate release tablet Take 10 mg by mouth every 4 hours as needed for Pain.     Historical Provider, MD   Multiple Vitamins-Minerals (THERAPEUTIC MULTIVITAMIN-MINERALS) tablet Take 1 tablet by mouth daily 1/16/20   Cheko Delatorre MD   insulin glargine (LANTUS) 100 UNIT/ML injection vial Inject 10 Units into the skin daily 1/16/20   Cheko Delatorre MD        Current Facility-Administered Medications: hydrALAZINE (APRESOLINE) tablet 50 mg, 50 mg, Oral, 3 times per day  insulin glargine (LANTUS) injection vial 15 Units, 15 Units, Subcutaneous, Nightly  metoprolol tartrate (LOPRESSOR) tablet 50 mg, 50 mg, Oral, BID  famotidine (PEPCID) tablet 20 mg, 20 mg, Oral, Daily  sodium hypochlorite (DAKINS) 0.125 % external solution, , Irrigation, Daily  sodium bicarbonate tablet 1,300 mg, 1,300 mg, Oral, BID  allopurinol (ZYLOPRIM) tablet 100 mg, 100 mg, Oral, Daily  torsemide (DEMADEX) tablet 40 mg, 40 mg, Oral, Daily  acetaminophen (TYLENOL) tablet 650 mg, 650 mg, Oral, Q6H PRN  baclofen (LIORESAL) tablet 5 mg, 5 mg, Oral, BID  magnesium oxide (MAG-OX) tablet 400 mg, 400 mg, Oral, Daily  therapeutic multivitamin-minerals 1 tablet, 1 tablet, Oral, Daily  senna (SENOKOT) tablet 8.6 mg, 1 tablet, Oral, Nightly  polyethylene glycol (GLYCOLAX) packet 17 g, 17 g, Oral, Daily PRN  sodium chloride flush 0.9 % injection 10 mL, 10 mL, Intravenous, 2 times per day  sodium chloride flush 0.9 % injection 10 mL, 10 mL, Intravenous, PRN  potassium chloride (KLOR-CON M) extended release tablet 40 mEq, 40 mEq, Oral, PRN **OR** potassium bicarb-citric acid (EFFER-K) effervescent tablet 40 mEq, 40 mEq, Oral, PRN **OR** potassium chloride 10 mEq/100 mL IVPB (Peripheral Line), 10 mEq, Intravenous, PRN  magnesium sulfate 1000 mg in dextrose 5% 100 mL IVPB, 1,000 mg, Intravenous, PRN  promethazine (PHENERGAN) tablet 12.5 mg, 12.5 mg, Oral, Q6H PRN **OR** ondansetron (ZOFRAN) injection 4 mg, 4 mg, Intravenous, Q6H PRN acetaminophen (TYLENOL) tablet 650 mg, 650 mg, Oral, Q6H PRN **OR** acetaminophen (TYLENOL) suppository 650 mg, 650 mg, Rectal, Q6H PRN  heparin (porcine) injection 5,000 Units, 5,000 Units, Subcutaneous, 3 times per day  insulin lispro (HUMALOG) injection vial 0-18 Units, 0-18 Units, Subcutaneous, TID WC  insulin lispro (HUMALOG) injection vial 0-9 Units, 0-9 Units, Subcutaneous, Nightly  glucose (GLUTOSE) 40 % oral gel 15 g, 15 g, Oral, PRN  dextrose 50 % IV solution, 12.5 g, Intravenous, PRN  glucagon (rDNA) injection 1 mg, 1 mg, Intramuscular, PRN  dextrose 5 % solution, 100 mL/hr, Intravenous, PRN  magnesium hydroxide (MILK OF MAGNESIA) 400 MG/5ML suspension 30 mL, 30 mL, Oral, Daily PRN  atorvastatin (LIPITOR) tablet 40 mg, 40 mg, Oral, Daily    Allergies: Other    Social History:   reports that he has never smoked. He has never used smokeless tobacco. He reports that he does not drink alcohol or use drugs. Family History: family history is not on file. REVIEW OF SYSTEMS:      · Constitutional: there has been no unanticipated weight loss. · Eyes: No visual changes or diplopia. · ENT: No Headaches  · Cardiovascular:  Remaining as above  · Respiratory: No cough  · Gastrointestinal: No abdominal pain. No change in bowel or bladder habits. · Genitourinary: No dysuria, trouble voiding, or hematuria. · Musculoskeletal: No joint complaints.   · Neurological: No headache  · Hematologic/Lymphatic: No abnormal bruising or bleeding      PHYSICAL EXAM:      BP (!) 184/109   Pulse 83   Temp 97.9 °F (36.6 °C) (Oral)   Resp 24   Ht 5' 9\" (1.753 m)   Wt (!) 338 lb 8 oz (153.5 kg)   SpO2 97%   BMI 49.99 kg/m²      Intake/Output Summary (Last 24 hours) at 3/25/2021 1009  Last data filed at 3/25/2021 4845  Gross per 24 hour   Intake 1346 ml   Output 4700 ml   Net -3354 ml         Constitutional and General Appearance:    Alert, cooperative, no distress and appears stated age  Respiratory:  · No for increased work of breathing. · On auscultation: diminished breath sounds bilaterally  Cardiovascular:  · Regular S1 and S2.   · No murmurs  Abdomen:   · No masses or tenderness  · Bowel sounds present  Extremities:  ·  No Cyanosis or Clubbing  ·  Lower extremity edema: +1  Neurological:  · Alert and oriented. DATA:    Diagnostics:    ECHO 3/20/2021: EF 25%, global hypokinesia, no valvular abnormalities.      STRESS 4/18/19: Large inferolateral MI with minimal marc-infarct ischemia. EF 34%.      ECHO 4/16/19: EF 20-25%. Labs:     CBC:   Recent Labs     03/24/21  0450 03/25/21  0744   WBC 9.8 8.7   HGB 10.2* 9.5*   HCT 36.6* 33.9*    289     BMP:   Recent Labs     03/24/21  0450 03/25/21  0744    140   K 3.5* 3.3*   CO2 19* 21   BUN 69* 75*   CREATININE 3.28* 3.35*   LABGLOM 21* 21*   GLUCOSE 81 101*       FASTING LIPID PANEL:  Lab Results   Component Value Date    TRIG 208 12/19/2019     LIVER PROFILE:  Recent Labs     03/23/21  0356   LABALBU 2.6*         Patient's Active Problem List  Principal Problem:    Osteomyelitis of left foot (HCC)  Active Problems:    DEBBY (acute kidney injury) ([address]    Chronic systolic heart failure (HCC)    Volume overload    Hypoglycemia due to insulin    Right arm weakness    Essential hypertension    DM II (diabetes mellitus, type II), controlled ([address]    Diabetic foot ulcer ([address]    Diabetic foot infection ([address]    Chronic paraplegia ([address]    Sacral wound    Open wound of left foot    Ambrosio catheter in place    Colostomy in place [facility])    CKD (chronic kidney disease) stage 4, GFR 15-29 ml/min ([facility])    Physical debility  Resolved Problems:    * No resolved hospital problems. *        IMPRESSION:    1. Risk stratification for partial calcanectomy  2. Acute on chronic systolic congestive heart failure (EF 20-25%)  3. CMP (EF 25%)  4. T2DM on insulin  5. DEBBY on CKD (Creatinine 3.35 today)  6. HTN  7. Osteomyelitis with chronic ulcer of Lt foot    RECOMMENDATIONS:  1. Continue diuresis per Nephrology recommendations. Once fluid volume improves and heart failure is compensated can proceed with partial calcanectomy with intermediate to high risk. Would ideally like to do a coronary angiography prior to risk stratification but in the setting of DEBBY on CKD will hold off as can result in patient requiring dialysis. 2. Continue atorvastatin 40 mg daily  3. Continue Lopressor 50 mg BID  4. Continue hydralazine 50 mg q8hr  5. Continue torsemide 40 mg daily per Nephrology recommendations  6. Nephrology on board, appreciate recs. 7. ID on board, appreciate recs. 8. K>4, Mg>2    Thank you for allowing us to participate in the care of Kanika Contreras. If you have any questions or concerns, please do not hesitate to contact us. Discussed with patient and Nurse. Steve García M.D. Fellow, [address] [place] [address]        Please note that part of this chart were generated using voice recognition  dictation software. Although every effort was made to ensure the accuracy of this automated transcription, some errors in transcription may have occurred. Attending Physician Statement  I have discussed the case of Kanika Contreras including pertinent history and exam findings with the resident. I have seen and examined the patient and the key elements of the encounter have been performed by me. I agree with the assessment, plan and orders as documented by the resident With changes made to the note.      Electronically signed by Linda Oretga MD on 3/25/2021 at [facility]      [phone]

## 2021-03-25 NOTE — PROGRESS NOTES
[facility] -   Infectious diseases evaluation  admission date 3/19/2021    reason for consultation:   Heel ulcers w bone exposed    Impression :   Current:  · bilat heel non healing ulcers -  · Left calcaneus osteomyelitis  · Bone biopsy taken L calca  · Sacral ulcer  · DM2     Other:  ·   Discussion / summary of stay / plan of care   ·   Recommendations   · Awaiting vascular and cardiology for possible procedures  · Pending podiatry plans for debridement of the calcaneus bone  · Start the patient on Zosyn, and adjust to bone culture final results  · Hoping for po AB at DC if possible    Infection Control Recommendations   · [place] Precautions  · Contact Isolation       Antimicrobial Stewardship Recommendations   · Simplification of therapy  · Targeted therapy  · IV to oral conversion  · Per Kg dosing  Coordination ofOutpatient Care:   · Estimated Length of IV antimicrobials:  · Patient will need Midline / picc Catheter Insertion:   · Patient will need SNF:  · Patient will need outpatient wound care:     History of Present Illness:   Initial history:  Kanika Contreras is a 38y.o.-year-old male bilat heel chronic wounds getting worse and bone exposed on the left - no cellulitis and no fever.   WBC normal  MRI ordered    Also has buttock wounds  ID called  DM2     Bone bx left calcan 3/23  Wound cx 3/23                    Interval changes  3/25/2021   Patient Vitals for the past 8 hrs:   BP Temp Temp src Pulse Resp SpO2   03/25/21 1137 (!) 147/90 97.5 °F (36.4 °C) Oral 77 18    03/25/21 0745 (!) 184/109 97.9 °F (36.6 °C) Oral 83 24 97 %     Alert appropriate,  Vascular on board to evaluate for possible peripheral vascular disease  Cardiology planning for cardiac cath before any other procedure  Podiatry planning further debridement of the calcaneus    Bone biopsy from the calcaneus 3/23 remain negative  Wound culture from the open left heel is showing for negative rods and Enterococcus 3/23    Summary of relevant labs:  Labs:  W 9.8  Micro:   BC  Left calc bone biopsy cx 3/23 pending  Left heel wound cx 3/23 Enterococcus and gram-negative rods  Imaging:      I have personally reviewed the past medical history, past surgical history, medications, social history, and family history, and I haveupdated the database accordingly. Allergies: Other     Review of Systems:     Review of Systems   Constitutional: Positive for activity change. Negative for appetite change and fever. HENT: Negative for congestion. Eyes: Negative for photophobia, pain and redness. Respiratory: Negative for apnea. Cardiovascular: Negative for chest pain. Gastrointestinal: Negative for abdominal distention. Endocrine: Negative for polyphagia. Genitourinary: Negative for dysuria. Musculoskeletal: Negative for arthralgias. Skin: Positive for wound. Allergic/Immunologic: Negative for immunocompromised state. Neurological: Negative for dizziness. Hematological: Negative for adenopathy. Psychiatric/Behavioral: Negative for agitation. Physical Examination :       Physical Exam  Constitutional:       Appearance: Normal appearance. He is obese. He is not ill-appearing. HENT:      Head: Normocephalic and atraumatic. Nose: Nose normal. No congestion. Mouth/Throat:      Mouth: Mucous membranes are moist.   Eyes:      Conjunctiva/sclera: Conjunctivae normal.   Neck:      Musculoskeletal: Neck supple. No neck rigidity. Cardiovascular:      Rate and Rhythm: Normal rate and regular rhythm. Heart sounds: Normal heart sounds. No murmur. Pulmonary:      Effort: No respiratory distress. Breath sounds: Normal breath sounds. Abdominal:      General: There is no distension. Palpations: Abdomen is soft. Tenderness: There is no abdominal tenderness.       Comments: osteomy - mid abd scratch large wounds x 2 - clean   Genitourinary:     Comments: No lema Musculoskeletal:         General: No swelling or deformity. Skin:     General: Skin is dry. Coloration: Skin is not jaundiced. Findings: No bruising or erythema. Neurological:      General: No focal deficit present. Mental Status: He is alert and oriented to person, place, and time. Mental status is at baseline. Psychiatric:         Mood and Affect: Mood normal.         Thought Content:  Thought content normal.         Past Medical History:     Past Medical History:   Diagnosis Date    CHF [facility]congestive [facility] failure) ([facility]     [facility]     Hypertension     Sept[facility] ([facility]     Spina bifida aperta of lumbar spine ([facility]        Past Surgical  History:     Past Surgical History:   Procedure Laterality Date    ABDOMEN SURGERY N/A 12/8/2019    DEBRIDEMENT  NECROTIZING FASCIITIS BUTTOCK, WOUND VAC REMOVAL DELAYED PRIMARY CLOSURE OF MIDLINE ABDOMINAL INCISION, OSTOMY BAG CHANGE performed by Tobin Sullivan MD at [facility] [facility] N/A 12/10/2019    DEBRIDEMENT OF SACRAL WOUND performed by Brody Mckeon MD at [facility] Floor N/A 1/5/2020    GLUTEAL WOUND DEBRIDEMENT, WOUND VAC CHANGE performed by Brody Mckeon MD at [facility] Floor N/A 1/11/2020    SACRAL DEBRIDEMENT WITH WOUND VAC CHANGE performed by Jeffrey Gonzalez MD at [place] [address]. 1/1/2020    IRRIGATION AND DEBRIDEMENT BUTTOCKS, SCROTUM, ABDOMEN, WOUND VAC CHANGE performed by Tobin Sullivan MD at [facility] 12/3/2019    LAPAROSCOPIC CONVERTED TO OPEN DIVERTING LOOP COLOSTOMY; WOUND VAC APPLICATION performed by Arlen Brower MD at [place] [address]  01/05/2020    GLUTEAL WOUND DEBRIDEMENT, WOUND VAC CHANGE     DEBRIDEMENT  01/08/2020    WOUND VAC CHANGE SACRAL DECUBITUS, POSSIBLE DEBRIDEMENT    DEBRIDEMENT  01/11/2020     SACRAL DEBRIDEMENT WITH WOUND VAC CHANGE     INCISION AND DRAINAGE Bilateral 11/29/2019    RECTAL PERIRECTAL file    Highest education level: Not on file   Occupational History    Not on file   Social Needs    Financial resource strain: Not on file    Food insecurity     Worry: Not on file     Inability: Not on file    Transportation needs     Medical: Not on file     Non-medical: Not on file   Tobacco Use    Smoking status: Never Smoker    Smokeless tobacco: Never Used   Substance and Sexual Activity    Alcohol use: Never     Frequency: Never    Drug use: Never    Sexual activity: Not on file   Lifestyle    Physical activity     Days per week: Not on file     Minutes per session: Not on file    Stress: Not on file   Relationships    Social connections     Talks on phone: Not on file     Gets together: Not on file     Attends [employer] service: Not on file     Active member of club or organization: Not on file     Attends meetings of clubs or organizations: Not on file     Relationship status: Not on file    Intimate partner violence     Fear of current or ex partner: Not on file     Emotionally abused: Not on file     Physically abused: Not on file     Forced sexual activity: Not on file   Other Topics Concern    Not on file   Social History Narrative    Not on file       Family History:   No family history on file. Medical Decision Making:   I have independently reviewed/ordered the following labs:    CBC with Differential:   Recent Labs     03/24/21  0450 03/25/21  0744   WBC 9.8 8.7   HGB 10.2* 9.5*   HCT 36.6* 33.9*    289   LYMPHOPCT 12* 10*   MONOPCT 7 8*     BMP:  Recent Labs     03/24/21  0450 03/25/21  0744    140   K 3.5* 3.3*    106   CO2 19* 21   BUN 69* 75*   CREATININE 3.28* 3.35*   MG 1.6  --      Hepatic Function Panel:   Recent Labs     03/23/21  0356   LABALBU 2.6*     No results for input(s): RPR in the last 72 hours. No results for input(s): HIV in the last 72 hours. No results for input(s): BC in the last 72 hours.   Lab Results   Component Value Date CREATININE 3.35 03/25/2021    GLUCOSE 101 03/25/2021       Detailed results: Thank you for allowing us to participate in the care of this patient. Please call with questions. This note is created with the assistance of a speech recognition program.  While intending to generate adocument that actually reflects the content of the visit, the document can still have some errors including those of syntax and sound a like substitutions which may escape proof reading. It such instances, actual meaningcan be extrapolated by contextual diversion.     Lorne Aparicio MD  Office: [phone]  Perfect serve / office [phone]

## 2021-03-25 NOTE — CONSULTS
Division of Vascular Surgery        New Consult      Physician Requesting Consult:  Dr. Ioana Benítez. Airam Ruby     Reason for Consult:  Chronic wound to left heel    Chief Complaint:      Chronic wound to left heel    History of Present Illness:      Scott Felder is a 38 y.o. gentleman with a history of DEBBY, diabetes mellitus, dyslipidemia, CHF,  obesity, Spina bifida aperta of lumbar spine  who presents with osteomyelitis of left foot. Podiatry are planning for partial calcanectomy and concerned for the wound  not healing properly after surgery and we were consulted for evaluation for peripheral vascular disease   Patient is paraplegic and doesn't ambulate and states has a little sensation to his  lower extremities. Edema L>R noted to lower extremities.  Peripheral pulses per doppler strong     Medical History:     Past Medical History:   Diagnosis Date    CHF (congestive heart failure) (HCC)     Diabetes mellitus ([facility]     Hypertension     Septic shock ([facility]     Spina bifida aperta of lumbar spine ([facility]        Surgical History:     Past Surgical History:   Procedure Laterality Date    ABDOMEN SURGERY N/A 12/8/2019    DEBRIDEMENT  NECROTIZING FASCIITIS BUTTOCK, WOUND VAC REMOVAL DELAYED PRIMARY CLOSURE OF MIDLINE ABDOMINAL INCISION, OSTOMY BAG CHANGE performed by Suellen Wheat MD at [facility] N/A 12/10/2019    DEBRIDEMENT OF SACRAL WOUND performed by Kd Arguello MD at [facility] Floor N/A 1/5/2020    GLUTEAL WOUND DEBRIDEMENT, WOUND VAC CHANGE performed by Kd Arguello MD at [facility] Floor N/A 1/11/2020    SACRAL DEBRIDEMENT WITH WOUND VAC CHANGE performed by Bernie Duarte MD at [facility]. 1/1/2020    IRRIGATION AND DEBRIDEMENT BUTTOCKS, SCROTUM, ABDOMEN, WOUND VAC CHANGE performed by Suellen Wheat MD at [facility] 12/3/2019    LAPAROSCOPIC CONVERTED TO OPEN DIVERTING LOOP COLOSTOMY; WOUND VAC APPLICATION performed by Davi Spears MD at [facility]  01/05/2020    GLUTEAL WOUND DEBRIDEMENT, WOUND VAC CHANGE     DEBRIDEMENT  01/08/2020    WOUND VAC CHANGE SACRAL DECUBITUS, POSSIBLE DEBRIDEMENT    DEBRIDEMENT  01/11/2020     SACRAL DEBRIDEMENT WITH WOUND VAC CHANGE     INCISION AND DRAINAGE Bilateral 11/29/2019    RECTAL PERIRECTAL INCISION AND DRAINAGE, [facility] LOOP COLOSTOMY CREATION performed by Sivan Horvath MD at [address] [facility] N/A 12/6/2019    DEBRIDEMENT NECROTIZING FASCIAITIS BILAT BUTTOCK performed by Heather Washington MD at [address] [facility] N/A 12/20/2019    DEBRIDEMENT GLUTEAL AND SCROTAL REGIONS performed by Sivan Horvath MD at [address] [facility] N/A 12/26/2019    INCISION AND DRAINAGE AND WOUND VAC CHANGE BUTTOCK, PERINEUM performed by Gil Shay DO at [address] [facility] N/A 1/8/2020    WOUND VAC CHANGE SACRAL DECUBITUS, DEBRIDEMENT performed by Heather Washington MD at [place] [address] N/A 12/10/2019    SCROTAL EXPLORATION WITH SCROTAL DEBRIDEMENT performed by Jose Pope MD at [facility] N/A 12/23/2019    WOUND [address] performed by Heather Washington MD at [place] [address] History:     No family history on file. Allergies:        Other    Medications:      Current Facility-Administered Medications   Medication Dose Route Frequency Provider Last Rate Last Admin    hydrALAZINE (APRESOLINE) tablet 50 mg  50 mg Oral 3 times per day MIRTA Guerra NP [START ON 3/26/2021] torsemide (DEMADEX) tablet 20 mg  20 mg Oral Daily Rodrigo Salinas MD        spironolactone (ALDACTONE) tablet 25 mg  25 mg Oral Daily Rodrigo Gan MD        insulin glargine (LANTUS) injection vial 15 Units  15 Units Subcutaneous Nightly MIRTA Guerra NP   15 Units at 03/24/21 2147    metoprolol tartrate (LOPRESSOR) tablet 50 mg  50 mg Oral BID Jp Sonam, APRN - NP   50 mg at 03/25/21 0843    famotidine (PEPCID) tablet 20 mg  20 mg Oral Daily Jp Sonam, APRN - NP   20 mg at 03/25/21 0843    sodium hypochlorite (DAKINS) 0.125 % external solution   Irrigation Daily Jamse Theresa, DPM        sodium bicarbonate tablet 1,300 mg  1,300 mg Oral BID Kyle Ruiz MD   1,300 mg at 03/25/21 0848    allopurinol (ZYLOPRIM) tablet 100 mg  100 mg Oral Daily Kyle Ruiz MD   100 mg at 03/25/21 0843    acetaminophen (TYLENOL) tablet 650 mg  650 mg Oral Q6H PRN Bj Tate, DO        baclofen (LIORESAL) tablet 5 mg  5 mg Oral BID Bj Lang Hajonathon, DO   5 mg at 03/25/21 0847    magnesium oxide (MAG-OX) tablet 400 mg  400 mg Oral Daily Bj Tate, DO   400 mg at 03/25/21 8889    therapeutic multivitamin-minerals 1 tablet  1 tablet Oral Daily Bj Archertor Hauger, DO   1 tablet at 03/25/21 0847    senna (SENOKOT) tablet 8.6 mg  1 tablet Oral Nightly Bj Archertor Hauger, DO   8.6 mg at 03/24/21 2024    polyethylene glycol (GLYCOLAX) packet 17 g  17 g Oral Daily PRN Bj Rogerr, DO        sodium chloride flush 0.9 % injection 10 mL  10 mL Intravenous 2 times per day Bj Tate, DO   10 mL at 03/25/21 3035    sodium chloride flush 0.9 % injection 10 mL  10 mL Intravenous PRN Bj Lang Hapeper, DO        potassium chloride (KLOR-CON M) extended release tablet 40 mEq  40 mEq Oral PRN Bj Lang Hauger, DO        Or    potassium bicarb-citric acid (EFFER-K) effervescent tablet 40 mEq  40 mEq Oral PRN Bj Lang Hauger, DO        Or    potassium chloride 10 mEq/100 mL IVPB (Peripheral Line)  10 mEq Intravenous PRN Bj Lang Hapeper, DO        magnesium sulfate 1000 mg in dextrose 5% 100 mL IVPB  1,000 mg Intravenous PRN Bj Rogerr, DO        promethazine (PHENERGAN) tablet 12.5 mg  12.5 mg Oral Q6H PRN Bj Tate DO        Or    ondansetron [facility] injection 4 mg  4 mg Intravenous Q6H PRN [place] Mckeon Hauger, DO        acetaminophen (TYLENOL) tablet 650 mg  650 mg Oral Q6H PRN [place] Mckeon Hauger, DO   650 mg at 03/20/21 2142    Or    acetaminophen (TYLENOL) suppository 650 mg  650 mg Rectal Q6H PRN [place] Mckeon Hauger, DO        heparin (porcine) injection 5,000 Units  5,000 Units Subcutaneous 3 times per day Flostephen Gael, DO   5,000 Units at 03/25/21 0600    insulin lispro (HUMALOG) injection vial 0-18 Units  0-18 Units Subcutaneous TID WC [place] Mckeon Hauger, DO   3 Units at 03/22/21 1707    insulin lispro (HUMALOG) injection vial 0-9 Units  0-9 Units Subcutaneous Nightly [place] Mckeon Hauger, DO   2 Units at 03/24/21 2148    glucose (GLUTOSE) 40 % oral gel 15 g  15 g Oral PRN [place] Mckeon Hauger, DO        dextrose 50 % IV solution  12.5 g Intravenous PRN [place] Mckeon Hauger, DO   12.5 g at 03/23/21 8879    glucagon (rDNA) injection 1 mg  1 mg Intramuscular PRN [place] Mckeon Hauger, DO        dextrose 5 % solution  100 mL/hr Intravenous PRN [place] Mckeon Hauger, DO        magnesium hydroxide (MILK OF MAGNESIA) 400 MG/5ML suspension 30 mL  30 mL Oral Daily PRN [place] Mckeon Hauger, DO        atorvastatin (LIPITOR) tablet 40 mg  40 mg Oral Daily MIRTA León   40 mg at 03/25/21 0848       Social History:     Tobacco:    reports that he has never smoked. He has never used smokeless tobacco.  Alcohol:      reports no history of alcohol use. Drug Use:  reports no history of drug use.   Occupation:  disabled    Review of Systems:     Positive and Negative as described in HPI     Constitutional:  negative for  fevers, chills, sweats, fatigue, and weight loss  HEENT:  negative for vision or hearing changes,   Respiratory:  negative for shortness of breath, cough, or congestion  Cardiovascular:  negative for  chest pain, palpitations  Gastrointestinal:  negative for nausea, vomiting, diarrhea, constipation, abdominal pain Genitourinary:  chronic lema    Integument:  positive for skin irritation and wounds   Chest/Breast:  No painful inspiration or expiration, no rib sternal pain  Musculoskeletal:  negative for muscle aches or joint pain, positive for weakness    Neurological:  negative for headaches, dizziness, lightheadedness,positve for  Numbness to lower extremities  Lymphatics: no lymphadenopathy or painful masses  Behavior/Psych:  negative for depression and anxiety    Physical Exam:     Vitals:  BP (!) 147/90   Pulse 77   Temp 97.5 °F (36.4 °C) (Oral)   Resp 18   Ht 5' 9\" (1.753 m)   Wt (!) 338 lb 8 oz (153.5 kg)   SpO2 97%   BMI 49.99 kg/m²     General appearance - alert, well appearing and in no acute distress  Mental status - oriented to person, place and time with normal affect  Head - normocephalic and atraumatic  Eyes - extraocular eye movements intact, conjunctiva clear  Ears - hearing appears to be intact  Nose - no drainage noted  Mouth - mucous membranes moist  Neck - supple  Chest - unlabored respirations  Heart - normal rate, regular rhythm  Abdomen - soft, non-tender, non-distended  Neurological - normal speech,  paraplegic   Extremities - peripheral pulses with strong dopplers , no ecchymoses, no pain, positive for  swelling to lower extremities more to left than right   Skin - see pictures below                   Imaging/Labs:     3/23- bilateral  venous doppler to lower extremities was negative for DVT    3/23- Bilateral lower arterial doppler revealed mild vascular insufficiency at rest with microvascular disease -TYLER's- 0.78 on right and 0.88 on left    Assessment and Plan:     Non healing wound to left heel , Peripheral Insufficiency     Recommend optimal medical therapy with antiplatelet and statin therapy  Compression therapy with ace wrap  Continue local wound care per podiatry  No plan for vascualr intervention  at this time     Plan discussed with Patient and Dr. Trang Mcginnis  Thank you kindly for your consult     Electronically signed by MIRTA Baker NP on 3/25/21 at 3:25 PM EDT      [address] [facility]  Office: [phone]  Cell: [phone]  Email: [email or web address]

## 2021-03-25 NOTE — PLAN OF CARE
Problem: Skin Integrity:  Goal: Will show no infection signs and symptoms  Description: Will show no infection signs and symptoms  3/24/2021 2327 by Krystal Fuller RN  Outcome: Ongoing  3/24/2021 1732 by Nash Nunn RN  Outcome: Ongoing  Goal: Absence of new skin breakdown  Description: Absence of new skin breakdown  3/24/2021 2327 by Krystal Fuller RN  Outcome: Ongoing  3/24/2021 1732 by Nash Nunn RN  Outcome: Ongoing  Goal: Demonstration of wound healing without infection will improve  Description: Demonstration of wound healing without infection will improve  3/24/2021 2327 by Krystal Fuller RN  Outcome: Ongoing  3/24/2021 1732 by Nash Nunn RN  Outcome: Ongoing  Goal: Complications related to intravenous access or infusion will be avoided or minimized  Description: Complications related to intravenous access or infusion will be avoided or minimized  3/24/2021 2327 by Krystal Fuller RN  Outcome: Ongoing  3/24/2021 1732 by Nash Nunn RN  Outcome: Ongoing     Problem: Falls - Risk of:  Goal: Will remain free from falls  Description: Will remain free from falls  3/24/2021 2327 by Krystal Fuller RN  Outcome: Ongoing  3/24/2021 1732 by Nash Nunn RN  Outcome: Ongoing  Goal: Absence of physical injury  Description: Absence of physical injury  3/24/2021 2327 by Krystal Fuller RN  Outcome: Ongoing  3/24/2021 1732 by Nash Nunn RN  Outcome: Ongoing     Problem: Infection:  Goal: Will remain free from infection  Description: Will remain free from infection  3/24/2021 2327 by Krystal Fuller RN  Outcome: Ongoing  3/24/2021 1732 by Nash Nunn RN  Outcome: Ongoing     Problem: Safety:  Goal: Free from accidental physical injury  Description: Free from accidental physical injury  3/24/2021 2327 by Krystal Fuller RN  Outcome: Ongoing  3/24/2021 1732 by Nash Nunn RN  Outcome: Ongoing  Goal: Free from intentional harm Description: Free from intentional harm  3/24/2021 2327 by Abbey Lam RN  Outcome: Ongoing  3/24/2021 1732 by Kylee Leiva RN  Outcome: Ongoing     Problem: Daily Care:  Goal: Daily care needs are met  Description: Daily care needs are met  3/24/2021 2327 by Abbey Lam RN  Outcome: Ongoing  3/24/2021 1732 by Kylee Leiva RN  Outcome: Ongoing     Problem: Skin Integrity:  Goal: Skin integrity will stabilize  Description: Skin integrity will stabilize  3/24/2021 2327 by Abbey Lam RN  Outcome: Ongoing  3/24/2021 1732 by Kylee Leiva RN  Outcome: Ongoing     Problem: Discharge Planning:  Goal: Patients continuum of care needs are met  Description: Patients continuum of care needs are met  3/24/2021 2327 by Abbey Lam RN  Outcome: Ongoing  3/24/2021 1732 by Kylee Leiva RN  Outcome: Ongoing     Problem: Nutrition  Goal: Optimal nutrition therapy  Description: Nutrition Problem #1: Increased nutrient needs  Intervention: Food and/or Nutrient Delivery: Modify Current Diet, Start Oral Nutrition Supplement  Nutritional Goals: Meet greater than or equal to 75% of estimated nutrient needs for wound healing with PO intake     3/24/2021 2327 by Abbey Lam RN  Outcome: Ongoing  3/24/2021 1732 by Kylee Leiva RN  Outcome: Ongoing  3/24/2021 1110 by Rachel Mcdermott RD, LD  Outcome: Ongoing  Note: Nutrition Problem #1: Increased nutrient needs  Intervention: Food and/or Nutrient Delivery: Continue Current Diet, Modify Oral Nutrition Supplement  Nutritional Goals: Meet greater than or equal to 75% of estimated nutrient needs for wound healing with PO intake      Problem: Nutritional:  Goal: Nutritional status will improve  Description: Nutritional status will improve  3/24/2021 2327 by Abbey Lam RN  Outcome: Ongoing  3/24/2021 1732 by Kylee Leiva RN  Outcome: Ongoing     Problem: Physical Regulation:  Goal: Will remain free from infection

## 2021-03-25 NOTE — PROGRESS NOTES
NEPHROLOGY PROGRESS NOTE      SUBJECTIVE     Urine output has been excellent with 4.4 L last 24 hours. Overall negative balance of 24 L. On oral diuretics Demadex 40 mg a day. MRI of foot suggestive of osteomyelitis. Status post bone biopsy done yesterday. Infectious disease input noted. Blood pressure stable. Patient is asymptomatic. OBJECTIVE     Vitals:    03/24/21 2146 03/24/21 2147 03/24/21 2257 03/25/21 0745   BP: (!) 170/110  (!) 154/96 (!) 184/109   Pulse: 89 92 86 83   Resp: 16 22 22 24   Temp:   98 °F (36.7 °C) 97.9 °F (36.6 °C)   TempSrc:   Oral Oral   SpO2: 96% 96% 97% 97%   Weight:       Height:         24HR INTAKE/OUTPUT:      Intake/Output Summary (Last 24 hours) at 3/25/2021 1109  Last data filed at 3/25/2021 9785  Gross per 24 hour   Intake 1346 ml   Output 4700 ml   Net -3354 ml       General appearance:Awake, alert, in no acute distress  HEENT: PERRLA  Respiratory::vesicular breath sounds,no wheeze/crackles  Cardiovascular:S1 S2 normal,no gallop or organic murmur. Abdomen: Present colostomy  Extremities: No Cyanosis or Clubbing, present lower extremity edema  Neurological:Alert and oriented. No abnormalities of mood, affect, memory, mentation, or behavior are noted      MEDICATIONS     Scheduled Meds:    hydrALAZINE  50 mg Oral 3 times per day    [START ON 3/26/2021] torsemide  20 mg Oral Daily    spironolactone  25 mg Oral Daily    insulin glargine  15 Units Subcutaneous Nightly    metoprolol tartrate  50 mg Oral BID    famotidine  20 mg Oral Daily    sodium hypochlorite   Irrigation Daily    sodium bicarbonate  1,300 mg Oral BID    allopurinol  100 mg Oral Daily    Baclofen  5 mg Oral BID    magnesium oxide  400 mg Oral Daily    therapeutic multivitamin-minerals  1 tablet Oral Daily    senna  1 tablet Oral Nightly    sodium chloride flush  10 mL Intravenous 2 times per day    heparin (porcine)  5,000 Units Subcutaneous 3 times per day    insulin lispro  0-18 Units Subcutaneous TID WC    insulin lispro  0-9 Units Subcutaneous Nightly    atorvastatin  40 mg Oral Daily     Continuous Infusions:    dextrose       PRN Meds:  acetaminophen, polyethylene glycol, sodium chloride flush, potassium chloride **OR** potassium alternative oral replacement **OR** potassium chloride, magnesium sulfate, promethazine **OR** ondansetron, acetaminophen **OR** acetaminophen, glucose, dextrose, glucagon (rDNA), dextrose, magnesium hydroxide  Home Meds:                Medications Prior to Admission: sevelamer (RENVELA) 800 MG tablet, Take 2 tablets by mouth 3 times daily (with meals)  sodium bicarbonate 650 MG tablet, Take 650 mg by mouth 4 times daily  chlorhexidine (PERIDEX) 0.12 % solution, Take 15 mLs by mouth 2 times daily  acetaminophen (TYLENOL) 325 MG tablet, Take 650 mg by mouth every 6 hours as needed for Pain  Liraglutide (VICTOZA) 18 MG/3ML SOPN SC injection, 1.8 mg  oxybutynin (DITROPAN) 5 MG tablet,   Baclofen (LIORESAL) 5 MG tablet, Take 1 tablet by mouth 2 times daily  Epoetin Chaitanya-epbx (RETACRIT IJ), Inject 10,000 Units as directed Indications: M W F if levels are correct  insulin detemir (LEVEMIR FLEXTOUCH) 100 UNIT/ML injection pen, Levemir FlexTouch U-100 Insulin 100 unit/mL (3 mL) subcutaneous pen  atorvastatin (LIPITOR) 80 MG tablet, Take 100 mg by mouth daily   enoxaparin (LOVENOX) 40 MG/0.4ML injection, Inject 0.4 mg into the skin 2 times daily  ferrous sulfate (IRON 325) 325 (65 Fe) MG tablet, Take 325 mg by mouth 3 times daily (with meals)  magnesium oxide (MAG-OX) 400 MG tablet, Take 400 mg by mouth daily  senna (SENOKOT) 8.6 MG tablet, Take 1 tablet by mouth nightly  polyethylene glycol (GLYCOLAX) 17 g packet, Take 17 g by mouth daily as needed for Constipation  oxyCODONE (ROXICODONE) 5 MG immediate release tablet, Take 10 mg by mouth every 4 hours as needed for Pain.   Multiple Vitamins-Minerals (THERAPEUTIC MULTIVITAMIN-MINERALS) tablet, Take 1 tablet by mouth daily insulin glargine (LANTUS) 100 UNIT/ML injection vial, Inject 10 Units into the skin daily    INVESTIGATIONS     Last 3 CMP:    Recent Labs     03/23/21  0356 03/24/21  0450 03/25/21  0744    136 140   K 3.7 3.5* 3.3*    105 106   CO2 22 19* 21   BUN 61* 69* 75*   CREATININE 3.11* 3.28* 3.35*   CALCIUM 7.8* 8.2* 8.1*   LABALBU 2.6*  --   --        Last 3 CBC:  Recent Labs     03/23/21  0356 03/24/21  0450 03/25/21  0744   WBC 10.0 9.8 8.7   RBC 4.25 4.45 4.06*   HGB 9.8* 10.2* 9.5*   HCT 34.7* 36.6* 33.9*   MCV 81.6* 82.2* 83.5   MCH 23.1* 22.9* 23.4*   MCHC 28.2* 27.9* 28.0*   RDW 18.4* 18.6* 18.6*    316 289   MPV 8.7 9.2 9.0       ASSESSMENT     1. Acute kidney injury secondary to ischemic ATN from infection - Evolving   2. Chronic kidney disease stage IIIb secondary diabetic nephrosclerosis/adaptive FSGS from obesity with baseline creatinine 1.8-2.  3.  Diabetic foot ulcer left and right with MRI revealing evidence of osteomyelitis status post bedside bone biopsy on 23 March  4.  Worsening gluteal/sacral wounds  5. Chronic indwelling Ambrosio catheter in place from spina bifida rendering him immobile  6. Type 2 diabetes  7. Cardiomyopathy ejection fraction 25%  8. Diverting colostomy since 2019    PLAN     #1 Reduce  Demadex AND add Aldactone   #2 fluid restriction  #3 antibiotics as per GFR less than 15  #4  Will follow.       Please do not hesitate to call with questions    This note is created with the assistance of a speech-recognition program. While intending to generate a document that actually reflects the content of the visit, no guarantees can be provided that every mistake has been identified and corrected by editing    Wilfrid Fajardo MD, MRCP Xochilt Stock   3/25/2021 11:09 AM  [facility]

## 2021-03-25 NOTE — PROGRESS NOTES
has a chronic lema cath.  Pt was in SNF until about 4 mo ago. Review of Systems:     Constitutional:  negative for chills, fevers, sweats, left leg pain  Respiratory:  negative for cough, dyspnea on exertion, shortness of breath, wheezing  Cardiovascular:  negative for chest pain, chest pressure/discomfort,+ lower extremity edema, palpitations  Gastrointestinal:  negative for abdominal pain, constipation, diarrhea, nausea, vomiting  Neurological:  negative for dizziness, headache    Medications: Allergies: Allergies   Allergen Reactions    Other      Mask adhesive causes hives       Current Meds:   Scheduled Meds:    hydrALAZINE  25 mg Oral 3 times per day    insulin glargine  15 Units Subcutaneous Nightly    metoprolol tartrate  50 mg Oral BID    famotidine  20 mg Oral Daily    sodium hypochlorite   Irrigation Daily    sodium bicarbonate  1,300 mg Oral BID    allopurinol  100 mg Oral Daily    torsemide  40 mg Oral Daily    Baclofen  5 mg Oral BID    magnesium oxide  400 mg Oral Daily    therapeutic multivitamin-minerals  1 tablet Oral Daily    senna  1 tablet Oral Nightly    sodium chloride flush  10 mL Intravenous 2 times per day    heparin (porcine)  5,000 Units Subcutaneous 3 times per day    insulin lispro  0-18 Units Subcutaneous TID WC    insulin lispro  0-9 Units Subcutaneous Nightly    atorvastatin  40 mg Oral Daily     Continuous Infusions:    dextrose       PRN Meds: acetaminophen, polyethylene glycol, sodium chloride flush, potassium chloride **OR** potassium alternative oral replacement **OR** potassium chloride, magnesium sulfate, promethazine **OR** ondansetron, acetaminophen **OR** acetaminophen, glucose, dextrose, glucagon (rDNA), dextrose, magnesium hydroxide    Data:     Past Medical History:   has a past medical history of CHF (congestive heart failure) ([facility] [address] [address] Diabetes mellitus ([facility] [address] Hypertension, Septic shock ([facility] and Spina bifida aperta of lumbar spine ([facility] Social History:   reports that he has never smoked. He has never used smokeless tobacco. He reports that he does not drink alcohol or use drugs. Family History: No family history on file. Vitals:  BP (!) 184/109   Pulse 83   Temp 97.9 °F (36.6 °C) (Oral)   Resp 24   Ht 5' 9\" (1.753 m)   Wt (!) 338 lb 8 oz (153.5 kg)   SpO2 97%   BMI 49.99 kg/m²   Temp (24hrs), Av.2 °F (36.8 °C), Min:97.9 °F (36.6 °C), Max:98.5 °F (36.9 °C)    Recent Labs     21  1306 21  1641 21  0650   POCGLU 135* 108 147* 117*       I/O (24Hr): Intake/Output Summary (Last 24 hours) at 3/25/2021 0811  Last data filed at 3/25/2021 0612  Gross per 24 hour   Intake 1346 ml   Output 4700 ml   Net -3354 ml       Labs:  Hematology:  Recent Labs     21  0356 21  1658 21  0450   WBC 10.0  --  9.8   RBC 4.25  --  4.45   HGB 9.8*  --  10.2*   HCT 34.7*  --  36.6*   MCV 81.6*  --  82.2*   MCH 23.1*  --  22.9*   MCHC 28.2*  --  27.9*   RDW 18.4*  --  18.6*     --  316   MPV 8.7  --  9.2   SEDRATE  --  43*  --    CRP  --  51.8*  --      Chemistry:  Recent Labs     21  03521  0450    136   K 3.7 3.5*    105   CO2 22 19*   GLUCOSE 69* 81   BUN 61* 69*   CREATININE 3.11* 3.28*   MG  --  1.6   ANIONGAP 11 12   LABGLOM 23* 21*   GFRAA 28* 26*   CALCIUM 7.8* 8.2*   PHOS 4.8*  --      Recent Labs     21  0356 21  0356 21  0706 21  1306 21  1641 21  0650   LABALBU 2.6*  --   --   --   --   --   --   --    POCGLU  --    < > 134* 71* 135* 108 147* 117*    < > = values in this interval not displayed.      ABG:  Lab Results   Component Value Date    POCPH 7.381 2019    POCPCO2 35.3 2019    POCPO2 140.3 2019    POCHCO3 20.9 2019    NBEA 4 2019    PBEA NOT REPORTED 2019    FYY7GYX 22 2019    FGQF6AZR 99 2019    FIO2 40.0 2019     Lab Results   Component place (Chronic) 3/19/2021 Yes    Colostomy in place [facility]) (Chronic) 3/19/2021 Yes    Overview Signed 3/19/2021  7:56 PM by MIRTA Mast - CNS     December '19         CKD (chronic kidney disease) stage 4, GFR 15-29 ml/min ([facility] 3/23/2021 Yes    Physical debility 3/24/2021 Yes          Plan:        1. DEBBY: guarded, nephrology following  -Renal ultrasound completed 3/20/21 without acute process  -On Demadex 40 mg daily  -Baseline CRT ~ 1.6-1.9: now 3.2 appears to be a plateau at this point per nephrology's note  - BMP from today showing worsening kidney function  -Nephrology planning to decrease Demadex and add spironolactone    2. Chronic systolic CHF:   -ECHO completed 3/20 showing EF 25%  -continue BB, demadex   -excellent UOP    3. Protein calorie malnutrition with poor wound healing and OM in left heel:  -Chronic sacral wound and left foot wound present on admission  -Left heel osteomyelitis secondary to uncontrolled diabetes mellitus type 2 with diabetic foot infection and immobility  -Follow-up on foot wound cultures, currently NGTD  -Evaluated by general surgery and podiatry but no plans for debridement  -Wound care following  -MRI today per podiatry. OM present in left heel   -off ABX therapy without systemic involvement. Podiatry discussed treatment options including left calcaneus extraction and left BKA- patent states that he would prefer calcaneus removal  -Cardiology following for risk stratification for upcoming surgery, requiring cardiac cath prior to risk ratification. Will need nephrology's clearance prior to cath given worsening function    4. Essential hypertension: Stable  -Continue beta-blocker, increase to 50 mg twice daily  - add hydralazine 25 mg po tid 3/24- increase on 3/25    5. Diabetes mellitus type 2 with episodes of hypoglycemia: A.m. blood sugar stable  -Episode of hypoglycemia on 3/22  -Patient taking Lantus 25 units nightly, HIISS.   Monitor patient for hyper/hypoglycemic events. Decrease to 15 units with monitored diet    6. History of spina bifida with adult failure to thrive with chronic paraplegia and chronic urinary retention:   -Chronic debility status noted  -ECF at discharge  -Baclofen  -Chronic Ambrosio catheter: Exchanged on 3/20, polymicrobial UA without systemic symptoms. Off antibiotics, ID following     7. Morbid obesity: BMI 49.9 on admit  - Weight loss encouraged. -Dietary supplement twice daily    8. Dyslipidemia: Continue statin    9. Gout: Continue allopurinol    10. Physical debility: Secondary to spina bifida and chronic paraplegia    11. GI/DVT prophylaxis: Pepcid, heparin    12.  Dispo: ECF after left foot surgical intervention is completed      MIRTA Arshad NP  3/25/2021  8:11 AM

## 2021-03-25 NOTE — PLAN OF CARE
Problem: Skin Integrity:  Goal: Will show no infection signs and symptoms  Description: Will show no infection signs and symptoms  3/25/2021 0808 by Zoie Boston RN  Outcome: Ongoing  3/24/2021 2327 by Pam Mayo RN  Outcome: Ongoing  Goal: Absence of new skin breakdown  Description: Absence of new skin breakdown  3/25/2021 0808 by Zoie Boston RN  Outcome: Ongoing  3/24/2021 2327 by Pam Mayo RN  Outcome: Ongoing  Goal: Demonstration of wound healing without infection will improve  Description: Demonstration of wound healing without infection will improve  3/25/2021 0808 by Zoie Boston RN  Outcome: Ongoing  3/24/2021 2327 by Pam Mayo RN  Outcome: Ongoing  Goal: Complications related to intravenous access or infusion will be avoided or minimized  Description: Complications related to intravenous access or infusion will be avoided or minimized  3/25/2021 0808 by Zoie Boston RN  Outcome: Ongoing  3/24/2021 2327 by Pam Mayo RN  Outcome: Ongoing     Problem: Falls - Risk of:  Goal: Will remain free from falls  Description: Will remain free from falls  3/25/2021 0808 by Zoie Boston RN  Outcome: Ongoing  3/24/2021 2327 by Pam Mayo RN  Outcome: Ongoing  Goal: Absence of physical injury  Description: Absence of physical injury  3/25/2021 0808 by Zoie Boston RN  Outcome: Ongoing  3/24/2021 2327 by Pam Mayo RN  Outcome: Ongoing     Problem: Infection:  Goal: Will remain free from infection  Description: Will remain free from infection  3/25/2021 0808 by Zoie Boston RN  Outcome: Ongoing  3/24/2021 2327 by Pam Mayo RN  Outcome: Ongoing     Problem: Safety:  Goal: Free from accidental physical injury  Description: Free from accidental physical injury  3/25/2021 0808 by Zoie Boston RN  Outcome: Ongoing  3/24/2021 2327 by Pam Mayo RN  Outcome: Ongoing  Goal: Free from intentional harm  Description: Free from intentional harm  3/25/2021 4783 by Marifer Latif RN  Outcome: Ongoing  3/24/2021 2327 by Severiano Imus, RN  Outcome: Ongoing     Problem: Daily Care:  Goal: Daily care needs are met  Description: Daily care needs are met  3/25/2021 0808 by Marifer Latif RN  Outcome: Ongoing  3/24/2021 2327 by Severiano Imus, RN  Outcome: Ongoing     Problem: Skin Integrity:  Goal: Skin integrity will stabilize  Description: Skin integrity will stabilize  3/25/2021 0808 by Marifer Latif RN  Outcome: Ongoing  3/24/2021 2327 by Severiano Imus, RN  Outcome: Ongoing     Problem: Discharge Planning:  Goal: Patients continuum of care needs are met  Description: Patients continuum of care needs are met  3/25/2021 0808 by Marifer Latif RN  Outcome: Ongoing  3/24/2021 2327 by Severiano Imus, RN  Outcome: Ongoing     Problem: Nutrition  Goal: Optimal nutrition therapy  Description: Nutrition Problem #1: Increased nutrient needs  Intervention: Food and/or Nutrient Delivery: Modify Current Diet, Start Oral Nutrition Supplement  Nutritional Goals: Meet greater than or equal to 75% of estimated nutrient needs for wound healing with PO intake     3/25/2021 0808 by Marifer Latif RN  Outcome: Ongoing  3/24/2021 2327 by Severiano Imus, RN  Outcome: Ongoing     Problem: Nutritional:  Goal: Nutritional status will improve  Description: Nutritional status will improve  3/25/2021 0808 by Marifer Latif RN  Outcome: Ongoing  3/24/2021 2327 by Severiano Imus, RN  Outcome: Ongoing     Problem: Physical Regulation:  Goal: Will remain free from infection  Description: Will remain free from infection  3/25/2021 0808 by Marifer Latif RN  Outcome: Ongoing  3/24/2021 2327 by Severiano Imus, RN  Outcome: Ongoing  Goal: Diagnostic test results will improve  Description: Diagnostic test results will improve  3/25/2021 0808 by Marifer Latif RN  Outcome: Ongoing  3/24/2021 2327 by Severiano Imus, RN  Outcome: Ongoing  Goal: Ability to maintain vital signs within normal range will improve  Description: Ability to maintain vital signs within normal range will improve  3/25/2021 0808 by Beba Fajardo RN  Outcome: Ongoing  3/24/2021 2327 by Janell Kaplan RN  Outcome: Ongoing     Problem: Respiratory:  Goal: Ability to maintain normal respiratory secretions will improve  Description: Ability to maintain normal respiratory secretions will improve  3/25/2021 0808 by Beba Fajardo RN  Outcome: Ongoing  3/24/2021 2327 by Janell Kaplan RN  Outcome: Ongoing     Problem: Pain:  Goal: Pain level will decrease  Description: Pain level will decrease  3/25/2021 0808 by Beba Fajardo RN  Outcome: Ongoing  3/24/2021 2327 by Janell Kaplan RN  Outcome: Ongoing  Goal: Control of acute pain  Description: Control of acute pain  3/25/2021 0808 by Beba Fajardo RN  Outcome: Ongoing  3/24/2021 2327 by Janell Kaplan RN  Outcome: Ongoing  Goal: Control of chronic pain  Description: Control of chronic pain  3/25/2021 0808 by Beba Fajardo RN  Outcome: Ongoing  3/24/2021 2327 by Janell Kaplan RN  Outcome: Ongoing     Problem: Discharge Planning:  Goal: Discharged to appropriate level of care  Description: Discharged to appropriate level of care  Outcome: Ongoing     Problem: Serum Glucose Level - Abnormal:  Goal: Ability to maintain appropriate glucose levels will improve  Description: Ability to maintain appropriate glucose levels will improve  Outcome: Ongoing     Problem: Sensory Perception - Impaired:  Goal: Ability to maintain a stable neurologic state will improve  Description: Ability to maintain a stable neurologic state will improve  Outcome: Ongoing

## 2021-03-26 LAB
ABSOLUTE EOS #: 0.26 K/UL (ref 0–0.44)
ABSOLUTE IMMATURE GRANULOCYTE: 0.09 K/UL (ref 0–0.3)
ABSOLUTE LYMPH #: 1.06 K/UL (ref 1.1–3.7)
ABSOLUTE MONO #: 0.7 K/UL (ref 0.1–1.2)
ANION GAP SERPL CALCULATED.3IONS-SCNC: 13 MMOL/L (ref 9–17)
BASOPHILS # BLD: 1 % (ref 0–2)
BASOPHILS ABSOLUTE: 0.09 K/UL (ref 0–0.2)
BUN BLDV-MCNC: 75 MG/DL (ref 6–20)
BUN/CREAT BLD: ABNORMAL (ref 9–20)
CALCIUM SERPL-MCNC: 8.1 MG/DL (ref 8.6–10.4)
CHLORIDE BLD-SCNC: 104 MMOL/L (ref 98–107)
CO2: 22 MMOL/L (ref 20–31)
CREAT SERPL-MCNC: 3.34 MG/DL (ref 0.7–1.2)
CULTURE: ABNORMAL
DIFFERENTIAL TYPE: ABNORMAL
DIRECT EXAM: ABNORMAL
EOSINOPHILS RELATIVE PERCENT: 3 % (ref 1–4)
GFR AFRICAN AMERICAN: 25 ML/MIN
GFR NON-AFRICAN AMERICAN: 21 ML/MIN
GFR SERPL CREATININE-BSD FRML MDRD: ABNORMAL ML/MIN/{1.73_M2}
GFR SERPL CREATININE-BSD FRML MDRD: ABNORMAL ML/MIN/{1.73_M2}
GLUCOSE BLD-MCNC: 107 MG/DL (ref 75–110)
GLUCOSE BLD-MCNC: 135 MG/DL (ref 75–110)
GLUCOSE BLD-MCNC: 136 MG/DL (ref 75–110)
GLUCOSE BLD-MCNC: 215 MG/DL (ref 75–110)
GLUCOSE BLD-MCNC: 82 MG/DL (ref 70–99)
HCT VFR BLD CALC: 33.8 % (ref 40.7–50.3)
HEMOGLOBIN: 9.5 G/DL (ref 13–17)
IMMATURE GRANULOCYTES: 1 %
INTERVENTION: NORMAL
LYMPHOCYTES # BLD: 12 % (ref 24–43)
Lab: ABNORMAL
Lab: ABNORMAL
MCH RBC QN AUTO: 23.2 PG (ref 25.2–33.5)
MCHC RBC AUTO-ENTMCNC: 28.1 G/DL (ref 28.4–34.8)
MCV RBC AUTO: 82.6 FL (ref 82.6–102.9)
MONOCYTES # BLD: 8 % (ref 3–12)
MORPHOLOGY: ABNORMAL
NRBC AUTOMATED: 0 PER 100 WBC
PDW BLD-RTO: 18.8 % (ref 11.8–14.4)
PLATELET # BLD: 271 K/UL (ref 138–453)
PLATELET ESTIMATE: ABNORMAL
PMV BLD AUTO: 9 FL (ref 8.1–13.5)
POTASSIUM SERPL-SCNC: 3.2 MMOL/L (ref 3.7–5.3)
RBC # BLD: 4.09 M/UL (ref 4.21–5.77)
RBC # BLD: ABNORMAL 10*6/UL
SEG NEUTROPHILS: 75 % (ref 36–65)
SEGMENTED NEUTROPHILS ABSOLUTE COUNT: 6.6 K/UL (ref 1.5–8.1)
SODIUM BLD-SCNC: 139 MMOL/L (ref 135–144)
SPECIMEN DESCRIPTION: ABNORMAL
SPECIMEN DESCRIPTION: ABNORMAL
WBC # BLD: 8.8 K/UL (ref 3.5–11.3)
WBC # BLD: ABNORMAL 10*3/UL

## 2021-03-26 PROCEDURE — 6370000000 HC RX 637 (ALT 250 FOR IP): Performed by: INTERNAL MEDICINE

## 2021-03-26 PROCEDURE — U0003 INFECTIOUS AGENT DETECTION BY NUCLEIC ACID (DNA OR RNA); SEVERE ACUTE RESPIRATORY SYNDROME CORONAVIRUS 2 (SARS-COV-2) (CORONAVIRUS DISEASE [COVID-19]), AMPLIFIED PROBE TECHNIQUE, MAKING USE OF HIGH THROUGHPUT TECHNOLOGIES AS DESCRIBED BY CMS-2020-01-R: HCPCS

## 2021-03-26 PROCEDURE — 6370000000 HC RX 637 (ALT 250 FOR IP): Performed by: SURGERY

## 2021-03-26 PROCEDURE — 99232 SBSQ HOSP IP/OBS MODERATE 35: CPT | Performed by: INTERNAL MEDICINE

## 2021-03-26 PROCEDURE — 94761 N-INVAS EAR/PLS OXIMETRY MLT: CPT

## 2021-03-26 PROCEDURE — 6360000002 HC RX W HCPCS: Performed by: INTERNAL MEDICINE

## 2021-03-26 PROCEDURE — U0005 INFEC AGEN DETEC AMPLI PROBE: HCPCS

## 2021-03-26 PROCEDURE — 6370000000 HC RX 637 (ALT 250 FOR IP): Performed by: STUDENT IN AN ORGANIZED HEALTH CARE EDUCATION/TRAINING PROGRAM

## 2021-03-26 PROCEDURE — 82947 ASSAY GLUCOSE BLOOD QUANT: CPT

## 2021-03-26 PROCEDURE — 6370000000 HC RX 637 (ALT 250 FOR IP): Performed by: NURSE PRACTITIONER

## 2021-03-26 PROCEDURE — 6370000000 HC RX 637 (ALT 250 FOR IP): Performed by: CLINICAL NURSE SPECIALIST

## 2021-03-26 PROCEDURE — 97110 THERAPEUTIC EXERCISES: CPT

## 2021-03-26 PROCEDURE — 85025 COMPLETE CBC W/AUTO DIFF WBC: CPT

## 2021-03-26 PROCEDURE — 2060000000 HC ICU INTERMEDIATE R&B

## 2021-03-26 PROCEDURE — 51702 INSERT TEMP BLADDER CATH: CPT

## 2021-03-26 PROCEDURE — 6360000002 HC RX W HCPCS: Performed by: FAMILY MEDICINE

## 2021-03-26 PROCEDURE — 2580000003 HC RX 258: Performed by: INTERNAL MEDICINE

## 2021-03-26 PROCEDURE — 80048 BASIC METABOLIC PNL TOTAL CA: CPT

## 2021-03-26 PROCEDURE — 36415 COLL VENOUS BLD VENIPUNCTURE: CPT

## 2021-03-26 RX ORDER — POTASSIUM CHLORIDE 20 MEQ/1
40 TABLET, EXTENDED RELEASE ORAL ONCE
Status: DISCONTINUED | OUTPATIENT
Start: 2021-03-26 | End: 2021-04-02 | Stop reason: HOSPADM

## 2021-03-26 RX ORDER — MAGNESIUM SULFATE IN WATER 40 MG/ML
2000 INJECTION, SOLUTION INTRAVENOUS ONCE
Status: COMPLETED | OUTPATIENT
Start: 2021-03-26 | End: 2021-03-26

## 2021-03-26 RX ADMIN — INSULIN GLARGINE 15 UNITS: 100 INJECTION, SOLUTION SUBCUTANEOUS at 20:48

## 2021-03-26 RX ADMIN — BACLOFEN 5 MG: 10 TABLET ORAL at 20:47

## 2021-03-26 RX ADMIN — INSULIN LISPRO 3 UNITS: 100 INJECTION, SOLUTION INTRAVENOUS; SUBCUTANEOUS at 20:48

## 2021-03-26 RX ADMIN — DESMOPRESSIN ACETATE 40 MG: 0.2 TABLET ORAL at 09:31

## 2021-03-26 RX ADMIN — HEPARIN SODIUM 5000 UNITS: 5000 INJECTION INTRAVENOUS; SUBCUTANEOUS at 06:03

## 2021-03-26 RX ADMIN — ASPIRIN 81 MG: 81 TABLET, CHEWABLE ORAL at 09:31

## 2021-03-26 RX ADMIN — DAKIN'S SOLUTION 0.125% (QUARTER STRENGTH): 0.12 SOLUTION at 09:29

## 2021-03-26 RX ADMIN — SPIRONOLACTONE 25 MG: 25 TABLET ORAL at 09:30

## 2021-03-26 RX ADMIN — TORSEMIDE 20 MG: 20 TABLET ORAL at 09:30

## 2021-03-26 RX ADMIN — PIPERACILLIN AND TAZOBACTAM 3375 MG: 3; .375 INJECTION, POWDER, LYOPHILIZED, FOR SOLUTION INTRAVENOUS at 00:10

## 2021-03-26 RX ADMIN — STANDARDIZED SENNA CONCENTRATE 8.6 MG: 8.6 TABLET ORAL at 20:46

## 2021-03-26 RX ADMIN — HEPARIN SODIUM 5000 UNITS: 5000 INJECTION INTRAVENOUS; SUBCUTANEOUS at 22:01

## 2021-03-26 RX ADMIN — BACLOFEN 5 MG: 10 TABLET ORAL at 09:30

## 2021-03-26 RX ADMIN — HYDRALAZINE HYDROCHLORIDE 50 MG: 50 TABLET, FILM COATED ORAL at 22:01

## 2021-03-26 RX ADMIN — MAGNESIUM GLUCONATE 500 MG ORAL TABLET 400 MG: 500 TABLET ORAL at 09:31

## 2021-03-26 RX ADMIN — Medication 1 TABLET: at 09:30

## 2021-03-26 RX ADMIN — MAGNESIUM SULFATE 2000 MG: 2 INJECTION INTRAVENOUS at 12:31

## 2021-03-26 RX ADMIN — ALLOPURINOL 100 MG: 100 TABLET ORAL at 09:31

## 2021-03-26 RX ADMIN — SODIUM BICARBONATE 1300 MG: 650 TABLET ORAL at 09:31

## 2021-03-26 RX ADMIN — PIPERACILLIN AND TAZOBACTAM 3375 MG: 3; .375 INJECTION, POWDER, LYOPHILIZED, FOR SOLUTION INTRAVENOUS at 23:38

## 2021-03-26 RX ADMIN — Medication 3 MG: at 00:11

## 2021-03-26 RX ADMIN — METOPROLOL TARTRATE 50 MG: 25 TABLET ORAL at 20:47

## 2021-03-26 RX ADMIN — FAMOTIDINE 20 MG: 20 TABLET, FILM COATED ORAL at 09:31

## 2021-03-26 RX ADMIN — HEPARIN SODIUM 5000 UNITS: 5000 INJECTION INTRAVENOUS; SUBCUTANEOUS at 13:30

## 2021-03-26 RX ADMIN — METOPROLOL TARTRATE 50 MG: 25 TABLET ORAL at 09:30

## 2021-03-26 RX ADMIN — HYDRALAZINE HYDROCHLORIDE 50 MG: 50 TABLET, FILM COATED ORAL at 13:29

## 2021-03-26 RX ADMIN — PIPERACILLIN AND TAZOBACTAM 3375 MG: 3; .375 INJECTION, POWDER, LYOPHILIZED, FOR SOLUTION INTRAVENOUS at 16:02

## 2021-03-26 RX ADMIN — PIPERACILLIN AND TAZOBACTAM 3375 MG: 3; .375 INJECTION, POWDER, LYOPHILIZED, FOR SOLUTION INTRAVENOUS at 08:31

## 2021-03-26 RX ADMIN — POTASSIUM CHLORIDE 40 MEQ: 1500 TABLET, EXTENDED RELEASE ORAL at 09:39

## 2021-03-26 RX ADMIN — HYDRALAZINE HYDROCHLORIDE 50 MG: 50 TABLET, FILM COATED ORAL at 06:02

## 2021-03-26 ASSESSMENT — ENCOUNTER SYMPTOMS
COUGH: 0
PHOTOPHOBIA: 0
EYE REDNESS: 0
EYE PAIN: 0
ABDOMINAL DISTENTION: 0
APNEA: 0

## 2021-03-26 ASSESSMENT — PAIN SCALES - GENERAL
PAINLEVEL_OUTOF10: 0
PAINLEVEL_OUTOF10: 0

## 2021-03-26 NOTE — PROGRESS NOTES
Physical Therapy  Facility/Department: [facility]  Daily Treatment Note  NAME: Bam Perez  : 1985  MRN: [id number]    Date of Service: 3/26/2021    Discharge Recommendations:  Patient would benefit from continued therapy after discharge   PT Equipment Recommendations  Other: CTA    Assessment   Body structures, Functions, Activity limitations: Decreased functional mobility ; Decreased strength;Decreased endurance;Decreased balance  Assessment: No mobility this date, Not safe to attempt bed mobility w/o 2nd person to assist, A/AAROM noted on BUE/LE, minimal ROM in BLE's,. Pt would benefit from continued skilled PT to address deficits. Prognosis: Good  PT Education: Home Exercise Program;PT Role  REQUIRES PT FOLLOW UP: Yes  Activity Tolerance  Activity Tolerance: Patient limited by fatigue;Patient limited by endurance     Patient Diagnosis(es): There were no encounter diagnoses. has a past medical history of CHF (congestive heart failure) ([facility] Diabetes mellitus ([facility] Hypertension, Septic shock ([facility] and Spina bifida aperta of lumbar spine ([facility] has a past surgical history that includes incision and drainage (Bilateral, 2019); colostomy (N/A, 12/3/2019); Abdomen surgery (N/A, 2019); incision and drainage (N/A, 2019); Abdomen surgery (N/A, 12/10/2019); pr explore scrotum (N/A, 12/10/2019); incision and drainage (N/A, 2019); Wound Exploration (N/A, 2019); incision and drainage (N/A, 2019); Abscess Drainage (N/A, 2020); debridement (2020); Abdomen surgery (N/A, 2020); debridement (2020); incision and drainage (N/A, 2020); debridement (2020); and Abdomen surgery (N/A, 2020).     Restrictions  Restrictions/Precautions  Restrictions/Precautions: Fall Risk, Weight Bearing  Required Braces or Orthoses?: No  Lower Extremity Weight Bearing Restrictions  Right Lower Extremity Weight Bearing: Non Weight Bearing  Left Lower Extremity Weight

## 2021-03-26 NOTE — PLAN OF CARE
Problem: Skin Integrity:  Goal: Will show no infection signs and symptoms  Description: Will show no infection signs and symptoms  Outcome: Ongoing  Goal: Absence of new skin breakdown  Description: Absence of new skin breakdown  Outcome: Ongoing  Goal: Demonstration of wound healing without infection will improve  Description: Demonstration of wound healing without infection will improve  Outcome: Ongoing  Goal: Complications related to intravenous access or infusion will be avoided or minimized  Description: Complications related to intravenous access or infusion will be avoided or minimized  Outcome: Ongoing     Problem: Falls - Risk of:  Goal: Will remain free from falls  Description: Will remain free from falls  Outcome: Ongoing  Goal: Absence of physical injury  Description: Absence of physical injury  Outcome: Ongoing     Problem: Infection:  Goal: Will remain free from infection  Description: Will remain free from infection  Outcome: Ongoing     Problem: Safety:  Goal: Free from accidental physical injury  Description: Free from accidental physical injury  Outcome: Ongoing  Goal: Free from intentional harm  Description: Free from intentional harm  Outcome: Ongoing     Problem: Daily Care:  Goal: Daily care needs are met  Description: Daily care needs are met  Outcome: Ongoing     Problem: Skin Integrity:  Goal: Skin integrity will stabilize  Description: Skin integrity will stabilize  Outcome: Ongoing     Problem: Discharge Planning:  Goal: Patients continuum of care needs are met  Description: Patients continuum of care needs are met  Outcome: Ongoing     Problem: Nutrition  Goal: Optimal nutrition therapy  Description: Nutrition Problem #1: Increased nutrient needs  Intervention: Food and/or Nutrient Delivery: Modify Current Diet, Start Oral Nutrition Supplement  Nutritional Goals: Meet greater than or equal to 75% of estimated nutrient needs for wound healing with PO intake     Outcome: Ongoing Problem: Nutritional:  Goal: Nutritional status will improve  Description: Nutritional status will improve  Outcome: Ongoing     Problem: Physical Regulation:  Goal: Will remain free from infection  Description: Will remain free from infection  Outcome: Ongoing  Goal: Diagnostic test results will improve  Description: Diagnostic test results will improve  Outcome: Ongoing  Goal: Ability to maintain vital signs within normal range will improve  Description: Ability to maintain vital signs within normal range will improve  Outcome: Ongoing     Problem: Respiratory:  Goal: Ability to maintain normal respiratory secretions will improve  Description: Ability to maintain normal respiratory secretions will improve  Outcome: Ongoing     Problem: Pain:  Goal: Pain level will decrease  Description: Pain level will decrease  Outcome: Ongoing  Goal: Control of acute pain  Description: Control of acute pain  Outcome: Ongoing  Goal: Control of chronic pain  Description: Control of chronic pain  Outcome: Ongoing     Problem: Discharge Planning:  Goal: Discharged to appropriate level of care  Description: Discharged to appropriate level of care  Outcome: Ongoing     Problem: Serum Glucose Level - Abnormal:  Goal: Ability to maintain appropriate glucose levels will improve  Description: Ability to maintain appropriate glucose levels will improve  Outcome: Ongoing     Problem: Sensory Perception - Impaired:  Goal: Ability to maintain a stable neurologic state will improve  Description: Ability to maintain a stable neurologic state will improve  Outcome: Ongoing

## 2021-03-26 NOTE — PROGRESS NOTES
Renal Progress Note    Patient :  Chad Taylor; 38 y.o. MRN# [id number]  Location:  2002/2002-01  Attending:  Romana Begin, MD  Admit Date:  3/19/2021   Hospital Day: 7      Subjective:     Patient was seen and examined. No new issues reported overnight. Lab essentially the same like yesterday with BUN 75 and creatinine of 3.34 mg/DL today. Potassium 3.2 other electrolytes okay. Magnesium not available from today. Urine output documented as 5.3 L in the last 24 hours. -ve -31 since admission. Patient is currently on torsemide 20 mg p.o. daily and Aldactone 25 mg p.o. daily. Also getting sodium bicarbonate 1300 mg twice a day.     Outpatient Medications:     Medications Prior to Admission: sevelamer (RENVELA) 800 MG tablet, Take 2 tablets by mouth 3 times daily (with meals)  sodium bicarbonate 650 MG tablet, Take 650 mg by mouth 4 times daily  chlorhexidine (PERIDEX) 0.12 % solution, Take 15 mLs by mouth 2 times daily  acetaminophen (TYLENOL) 325 MG tablet, Take 650 mg by mouth every 6 hours as needed for Pain  Liraglutide (VICTOZA) 18 MG/3ML SOPN SC injection, 1.8 mg  oxybutynin (DITROPAN) 5 MG tablet,   Baclofen (LIORESAL) 5 MG tablet, Take 1 tablet by mouth 2 times daily  Epoetin Chaitanya-epbx (RETACRIT IJ), Inject 10,000 Units as directed Indications: M W F if levels are correct  insulin detemir (LEVEMIR FLEXTOUCH) 100 UNIT/ML injection pen, Levemir FlexTouch U-100 Insulin 100 unit/mL (3 mL) subcutaneous pen  atorvastatin (LIPITOR) 80 MG tablet, Take 100 mg by mouth daily   enoxaparin (LOVENOX) 40 MG/0.4ML injection, Inject 0.4 mg into the skin 2 times daily  ferrous sulfate (IRON 325) 325 (65 Fe) MG tablet, Take 325 mg by mouth 3 times daily (with meals)  magnesium oxide (MAG-OX) 400 MG tablet, Take 400 mg by mouth daily  senna (SENOKOT) 8.6 MG tablet, Take 1 tablet by mouth nightly  polyethylene glycol (GLYCOLAX) 17 g packet, Take 17 g by mouth daily as needed for Constipation  oxyCODONE (ROXICODONE) 5 MG immediate release tablet, Take 10 mg by mouth every 4 hours as needed for Pain. Multiple Vitamins-Minerals (THERAPEUTIC MULTIVITAMIN-MINERALS) tablet, Take 1 tablet by mouth daily  insulin glargine (LANTUS) 100 UNIT/ML injection vial, Inject 10 Units into the skin daily    Current Medications:     Scheduled Meds:    magnesium sulfate  2,000 mg Intravenous Once    potassium chloride  40 mEq Oral Once    hydrALAZINE  50 mg Oral 3 times per day    torsemide  20 mg Oral Daily    spironolactone  25 mg Oral Daily    aspirin  81 mg Oral Daily    piperacillin-tazobactam  3,375 mg Intravenous Q8H    insulin glargine  15 Units Subcutaneous Nightly    metoprolol tartrate  50 mg Oral BID    famotidine  20 mg Oral Daily    sodium hypochlorite   Irrigation Daily    sodium bicarbonate  1,300 mg Oral BID    allopurinol  100 mg Oral Daily    Baclofen  5 mg Oral BID    magnesium oxide  400 mg Oral Daily    therapeutic multivitamin-minerals  1 tablet Oral Daily    senna  1 tablet Oral Nightly    sodium chloride flush  10 mL Intravenous 2 times per day    heparin (porcine)  5,000 Units Subcutaneous 3 times per day    insulin lispro  0-18 Units Subcutaneous TID WC    insulin lispro  0-9 Units Subcutaneous Nightly    atorvastatin  40 mg Oral Daily     Continuous Infusions:    dextrose       PRN Meds:  melatonin, acetaminophen, polyethylene glycol, sodium chloride flush, potassium chloride **OR** potassium alternative oral replacement **OR** potassium chloride, magnesium sulfate, promethazine **OR** ondansetron, acetaminophen **OR** acetaminophen, glucose, dextrose, glucagon (rDNA), dextrose, magnesium hydroxide    Input/Output:       I/O last 3 completed shifts:  In: -   Out: 5900 [[id number]; Stool:525]. No data found.     Vital Signs:   Temperature:  Temp: 97.7 °F (36.5 °C)  TMax:   Temp (24hrs), Av.3 °F (36.8 °C), Min:97.7 °F (36.5 °C), Max:98.9 °F (37.2 °C)    Respirations:  Resp: 16  Pulse:   Pulse: 69 BP:    BP: (!) 155/98  BP Range: Systolic (83QLU), IZ , Min:135 , IJC:884       Diastolic (18TPS), WTD:47, Min:77, Max:103      Physical Examination:     General:  AAO x 3, speaking in full sentences, no accessory muscle use. HEENT: Atraumatic, normocephalic, no throat congestion, moist mucosa. Eyes:   Pupils equal, round and reactive to light, EOMI. Neck:   Supple  Chest:   Bilateral vesicular breath sounds, no rales or wheezes. Cardiac:  S1 S2 RR, no murmurs, gallops or rubs. Abdomen: Soft, non-tender, no masses or organomegaly, BS audible. :   No suprapubic or flank tenderness. Neuro:  AAO x 3, No FND. SKIN:  No rashes, good skin turgor. Extremities:  +ve 1-2+ edema. Positive anasarca. Labs:       Recent Labs     21  04521  0744 21  0613   WBC 9.8 8.7 8.8   RBC 4.45 4.06* 4.09*   HGB 10.2* 9.5* 9.5*   HCT 36.6* 33.9* 33.8*   MCV 82.2* 83.5 82.6   MCH 22.9* 23.4* 23.2*   MCHC 27.9* 28.0* 28.1*   RDW 18.6* 18.6* 18.8*    289 271   MPV 9.2 9.0 9.0      BMP:   Recent Labs     21  0450 21  0744 21  0613    140 139   K 3.5* 3.3* 3.2*    106 104   CO2 19* 21 22   BUN 69* 75* 75*   CREATININE 3.28* 3.35* 3.34*   GLUCOSE 81 101* 82   CALCIUM 8.2* 8.1* 8.1*      Magnesium:    Recent Labs     21  0450 21  1435   MG 1.6 1.5*     MARINE:      Lab Results   Component Value Date    MARINE NEGATIVE 2019     SPEP:  Lab Results   Component Value Date    PROT 5.3 2019    PATH ELECTRONICALLY SIGNED.  Vish Trammell M.D. 2019     C3:     Lab Results   Component Value Date    C3 97 2019     C4:     Lab Results   Component Value Date    C4 12 2019     Hep BsAg:         Lab Results   Component Value Date    HEPBSAG NONREACTIVE 2019     Hep C AB:          Lab Results   Component Value Date    HEPCAB NONREACTIVE 2019       Urinalysis/Chemistries:      Lab Results   Component Value Date    NITRU NEGATIVE 2021 continue to follow along with you. Leonel Burciaga MD  [facility]     This note is created with the assistance of a speech-recognition program. While intending to generate a document that actually reflects the content of the visit, no guarantees can be provided that every mistake has been identified and corrected by editing.

## 2021-03-26 NOTE — PROGRESS NOTES
Progress Note  Podiatric Medicine and Surgery     Subjective     CC: Heel wound, b/l    Patient seen and examined at bedside. Patient denies pain to the BLE. Tolerating diet well. Afebrile, hypertensive, saturating well on room air. Denies  N,v,f,c,SOB,CP. HPI :  Garcia Contreras is a 38 y.o. male who has been struggling to heal chronic heel wounds for many years without success. Typically he is seen by wound care specialists and he cannot recall how they typically treat his heel pressure wounds. He was seen by Dr. Merlene Rose in late 2019 and early 2020 on a previous hospitalization and at that time no surgical intervention was merited and they were treated conservatively. Has not followed up with Dr. Merlene Rose outpatient. He rarely is able to feel any sensation to his feet but pain is noted randomly. He is a type 2 diabetic and his last A1C was 6.6 (3/20/21).      Pt reports gradual decline in strength, chills, discomfort to his buttocks, increase in size of buttock wounds, shortness of breath and worsening edema x 2 mos.  His visiting nurse started Doxycycline 2 days ago for possible wound infection and told him he needed more care than could be provided in the home. Cal Bartlett lives with his mom who is 79 yr old and has some health issues.        PCP is No primary care provider on file.       Medications:  Scheduled Meds:   magnesium sulfate  2,000 mg Intravenous Once    hydrALAZINE  50 mg Oral 3 times per day    torsemide  20 mg Oral Daily    spironolactone  25 mg Oral Daily    aspirin  81 mg Oral Daily    piperacillin-tazobactam  3,375 mg Intravenous Q8H    insulin glargine  15 Units Subcutaneous Nightly    metoprolol tartrate  50 mg Oral BID    famotidine  20 mg Oral Daily    sodium hypochlorite   Irrigation Daily    sodium bicarbonate  1,300 mg Oral BID    allopurinol  100 mg Oral Daily    Baclofen  5 mg Oral BID    magnesium oxide  400 mg Oral Daily    therapeutic multivitamin-minerals  1 tablet Oral Daily    senna  1 tablet Oral Nightly    sodium chloride flush  10 mL Intravenous 2 times per day    heparin (porcine)  5,000 Units Subcutaneous 3 times per day    insulin lispro  0-18 Units Subcutaneous TID WC    insulin lispro  0-9 Units Subcutaneous Nightly    atorvastatin  40 mg Oral Daily       Continuous Infusions:   dextrose         PRN Meds:melatonin, acetaminophen, polyethylene glycol, sodium chloride flush, potassium chloride **OR** potassium alternative oral replacement **OR** potassium chloride, magnesium sulfate, promethazine **OR** ondansetron, acetaminophen **OR** acetaminophen, glucose, dextrose, glucagon (rDNA), dextrose, magnesium hydroxide    Objective     Vitals:  Patient Vitals for the past 8 hrs:   BP Temp Temp src Pulse Resp   21 0830  97.7 °F (36.5 °C) Oral     21 0600 135/77   84 16   21 0400  98.4 °F (36.9 °C) Oral 64 11     Average, Min, and Max for last 24 hours Vitals:  TEMPERATURE:  Temp  Av.2 °F (36.8 °C)  Min: 97.5 °F (36.4 °C)  Max: 98.9 °F (37.2 °C)    RESPIRATIONS RANGE: Resp  Av.7  Min: 11  Max: 20    PULSE RANGE: Pulse  Av.2  Min: 64  Max: 91    BLOOD PRESSURE RANGE:  Systolic (45LYW), ULQ:814 , Min:135 , HSA:004   ; Diastolic (11SDT), ECY:94, Min:77, Max:103      PULSE OXIMETRY RANGE: SpO2  Av.5 %  Min: 98 %  Max: 99 %    I/O last 3 completed shifts:  In: -   Out: 5900 [Urine:5375; Stool:525]    CBC:  Recent Labs     21  1658 21  0450 21  0744 21  0613   WBC  --  9.8 8.7 8.8   HGB  --  10.2* 9.5* 9.5*   HCT  --  36.6* 33.9* 33.8*   PLT  --  316 289 271   CRP 51.8*  --   --   --         BMP:  Recent Labs     21  0450 21  0744 21  0613    140 139   K 3.5* 3.3* 3.2*    106 104   CO2 19* 21 22   BUN 69* 75* 75*   CREATININE 3.28* 3.35* 3.34*   GLUCOSE 81 101* 82   CALCIUM 8.2* 8.1* 8.1*        Coags:  No results for input(s): APTT, PROT, INR in the last 72 hours.     Lab Results   Component Value Date    SEDRATE 43 (H) 03/23/2021     Recent Labs     03/23/21  1658   CRP 51.8*       Lower Extremity Physical Exam:     Vascular: DP and PT pulses are Non-palpable, BLE DP/PT audible on doppler. CFT <5 seconds to all digits. Hair growth is absent to the level of the digits. pitting edema is present to bilateral lower extremities ankles and feet.     Neuro: Saph/sural/SP/DP/plantar sensation absent to light touch.     Musculoskeletal: Muscle strength to all lower extremity muscle groups 0/5. Gross deformity is present left fifth toe amputation. Negative pain on calf squeeze. LE muscle compartments soft and compressible.     Dermatologic: Full thickness ulcer #1 located left plantar heel measures approximately 6cm x 4cm x 4cm. The wound base is mixed granular, fibrotic, necrotic. Periwound skin is erythematous. Sero-sanguinous drainage noted without associated mal odor. Erythema present with out associated increase in warmth. Probes directly to the calcaneus, with soft cortical bone noted. Does not sinus track or undermine. No fluctuance, crepitus, or induration.       ulcer #2 is located on the right posterior medial heel measuring approximately 3 cm x 4 cm x 0.3 cm. The wound base is fibrotic with serous drainage. No erythema or increase in warmth. It does not probe to bone sinus track or undermine. There is no fluctuance crepitus or induration appreciated.     Interdigital maceration absent. Clinical Images:  R        L        Imaging:   XR CHEST PORTABLE   Final Result   No acute cardiopulmonary disease         VL LOWER EXTREMITY ARTERIAL SEGMENTAL PRESSURES W PPG   Final Result      MRI FOOT LEFT WO CONTRAST   Final Result   Deep soft tissue ulcer overlying the calcaneus. Cortical disruption of the   exposed calcaneus with underlying marrow edema, compatible with osteomyelitis.       Diffuse skin thickening and subcutaneous soft tissue edema surrounding the   calcaneal ulcer as well as within the dorsum of the foot, compatible with   myositis and cellulitis. No drainable fluid collection identified to suggest   abscess formation. No additional foci of osteomyelitis identified. MRI ANKLE LEFT WO CONTRAST   Final Result   Deep soft tissue ulcer overlying the calcaneus. Cortical disruption of the   exposed calcaneus with underlying marrow edema, compatible with osteomyelitis. Diffuse skin thickening and subcutaneous soft tissue edema surrounding the   calcaneal ulcer as well as within the dorsum of the foot, compatible with   myositis and cellulitis. No drainable fluid collection identified to suggest   abscess formation. No additional foci of osteomyelitis identified. VL Lower Extremity Bilateral Venous Duplex   Final Result      XR FOOT LEFT (MIN 3 VIEWS)   Final Result   Soft tissue ulceration overlying the talus with underlying cortical   disruption, concerning for osteomyelitis. XR FOOT RIGHT (MIN 3 VIEWS)   Final Result   No acute bony abnormalities are noted         US RETROPERITONEAL LIMITED   Final Result   Unremarkable right kidney. Left kidney not visualized due to patient body   habitus and bowel gas. Venous Duplex (3/23): Negative for deep or superficial thrombosis. NIVS: 3/23/2021       Right:    SVIHJK abnormal PVRs    TYLER suggests mild vascular insufficiency at rest    Digit waveforms suggests microvascular level of disease        Left:    Mildly abnormal PVRs    TYLER suggests mild vascular insufficiency at rest    Digit waveforms suggests microvascular level of disease     TYLER:Right: 0.78. Left: 0.88. Wound Culture 3/23/2021: GPC       Bone biopsy 3/23/2021: Neutrophils. NGTD. Assessment   Luis Armando Kent is a 38 y.o. male with   1. S/p bedside bone biopsy (DOS: 3/23/2021)  2. Diabetic foot ulcer down to bone left foot  3. Osteomyelitis Left calcaneus  4.  Diabetic foot ulcer down to subcutaneous tissue right foot 5. 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 DEBBY   7. CHF  8. Paraplegia   9. S/p L 5th toe amputation    Principal Problem:    Osteomyelitis of left foot ([facility])  Active Problems:    DEBBY (acute kidney injury) ([facility] [address]    Chronic systolic heart failure ([facility])    Volume overload    Hypoglycemia due to insulin    Right arm weakness    Essential hypertension    DM II (diabetes mellitus, type II), controlled ([facility] [address]    Diabetic foot ulcer ([address]    Diabetic foot infection ([address]    Chronic paraplegia ([facility] [address]    Sacral wound    Open wound of left foot    Ambrosio catheter in place    Colostomy in place [facility])    CKD (chronic kidney disease) stage 4, GFR 15-29 ml/min ([facility])    Physical debility    PAD (peripheral artery disease) ([facility] [address]  Resolved Problems:    * No resolved hospital problems. *       Plan     · Patient examined and evaluated at bedside. · Treatment options discussed in detail with the patient. · Extensive discussion had with patient regarding the most beneficial treatment options for the calcaneal wound and osteomyelitis: Debating between diabetic limb salvage reconstruction and more proximal amputation. Patient states that at this point in time he wants to avoid proximal amputation if possible. Patient like to discuss further surgical options with his mother before making a final decision. · Podiatry team has discussed with vascular, cardiology, infectious disease, and radiology regarding flap viability and patient risk. Cardiology team has expressed that patient will remain at high risk for any surgical intervention including staged procedures. Given the overall clinical picture, limb salvage is guarded at best.   · Wound cx: GPC  ? ID on board  · Cont to elevate heels off the bed. ? Consult placed for orthotist - b/l Rooke boots to off-load heels  · Will await further risk stratification from cardiology standpoint prior to scheduling the patient for surgery.   · Non-weight bearing to bilateral lower

## 2021-03-26 NOTE — PROGRESS NOTES
[facility]   Progress Note                   Date:   3/26/2021  Patient name: Kolby Herrera  Date of admission:  3/19/2021  5:34 PM  MRN:   [id number]  YOB: 1985  PCP: No primary care provider on file. Reason for Admission: Acute kidney injury ([facility] [address] [N17.9]    Subjective:       Clinical Changes / Abnormalities : Patient seen and examined at the bed side, no new acute events overnight. Patient is doing well, has no complaints. Denies chest pain or SOB. I/O -5900 past 24hrs. No recent weight.  Last weight 3/21/21. -11,417 since admission      Medications:   Scheduled Meds:   magnesium sulfate  2,000 mg Intravenous Once    hydrALAZINE  50 mg Oral 3 times per day    torsemide  20 mg Oral Daily    spironolactone  25 mg Oral Daily    aspirin  81 mg Oral Daily    piperacillin-tazobactam  3,375 mg Intravenous Q8H    insulin glargine  15 Units Subcutaneous Nightly    metoprolol tartrate  50 mg Oral BID    famotidine  20 mg Oral Daily    sodium hypochlorite   Irrigation Daily    sodium bicarbonate  1,300 mg Oral BID    allopurinol  100 mg Oral Daily    Baclofen  5 mg Oral BID    magnesium oxide  400 mg Oral Daily    therapeutic multivitamin-minerals  1 tablet Oral Daily    senna  1 tablet Oral Nightly    sodium chloride flush  10 mL Intravenous 2 times per day    heparin (porcine)  5,000 Units Subcutaneous 3 times per day    insulin lispro  0-18 Units Subcutaneous TID WC    insulin lispro  0-9 Units Subcutaneous Nightly    atorvastatin  40 mg Oral Daily     Continuous Infusions:   dextrose       CBC:   Recent Labs     03/24/21  0450 03/25/21  0744 03/26/21  0613   WBC 9.8 8.7 8.8   HGB 10.2* 9.5* 9.5*    289 271     BMP:    Recent Labs     03/24/21  0450 03/25/21  0744 03/26/21  0613    140 139   K 3.5* 3.3* 3.2*    106 104   CO2 19* 21 22   BUN 69* 75* 75*   CREATININE 3.28* 3.35* 3.34*   GLUCOSE 81 101* 82     Hepatic: No results for input(s): AST, ALT, ALB, BILITOT, ALKPHOS in the last 72 hours. Troponin: No results for input(s): TROPHS in the last 72 hours. BNP: No results for input(s): BNP in the last 72 hours. Lipids: No results for input(s): CHOL, HDL in the last 72 hours. Invalid input(s): LDLCALCU  INR: No results for input(s): INR in the last 72 hours. Objective:   Vitals: BP (!) 155/98   Pulse 69   Temp 97.7 °F (36.5 °C) (Oral)   Resp 16   Ht 5' 9\" (1.753 m)   Wt (!) 338 lb 8 oz (153.5 kg)   SpO2 98%   BMI 49.99 kg/m²   General appearance: alert and cooperative with exam  HEENT: Head: Normocephalic, no lesions, without obvious abnormality. Neck: no JVD, trachea midline, no adenopathy  Lungs: Clear to auscultation  Heart: Regular rate and rhythm, s1/s2 auscultated, no murmurs  Abdomen: soft, non-tender, bowel sounds active  Extremities: no edema  Neurologic: not done        Assessment / Acute Cardiac Problems:   1. Risk stratification for partial calcanectomy  2. Acute on chronic systolic congestive heart failure (EF 20-25%)  3. CMP (EF 25%)  4. T2DM on insulin  5. DEBBY on CKD (Creatinine 3.35 today)  6. HTN  7.  Osteomyelitis with chronic ulcer of Lt foot    Patient Active Problem List:     Necrotizing fasciitis ([address]     Essential hypertension     DM II (diabetes mellitus, type II), controlled ([address]     Diabetic foot ulcer ([address]     Diabetic foot infection ([address]     Iron deficiency anemia     Muscle weakness of right upper extremity     Wrist drop, acquired, right     Chronic paraplegia (HCC)     DEBBY (acute kidney injury) ([address]     Right arm weakness     Chronic systolic heart failure ([address]     Sacral wound     Open wound of left foot     Ambrosio catheter in place     Colostomy in place ([address]     Volume overload     Hypoglycemia due to insulin     CKD (chronic kidney disease) stage 4, GFR 15-29 ml/min (HCC)     Osteomyelitis of left foot (HCC)     Physical debility     PAD (peripheral artery disease) ([address]      Plan of Treatment:

## 2021-03-27 LAB
ABSOLUTE EOS #: 0.29 K/UL (ref 0–0.44)
ABSOLUTE IMMATURE GRANULOCYTE: 0.09 K/UL (ref 0–0.3)
ABSOLUTE LYMPH #: 0.78 K/UL (ref 1.1–3.7)
ABSOLUTE MONO #: 0.95 K/UL (ref 0.1–1.2)
ANION GAP SERPL CALCULATED.3IONS-SCNC: 13 MMOL/L (ref 9–17)
BASOPHILS # BLD: 1 % (ref 0–2)
BASOPHILS ABSOLUTE: 0.06 K/UL (ref 0–0.2)
BUN BLDV-MCNC: 74 MG/DL (ref 6–20)
BUN/CREAT BLD: ABNORMAL (ref 9–20)
CALCIUM SERPL-MCNC: 8.2 MG/DL (ref 8.6–10.4)
CHLORIDE BLD-SCNC: 101 MMOL/L (ref 98–107)
CO2: 22 MMOL/L (ref 20–31)
CREAT SERPL-MCNC: 3.13 MG/DL (ref 0.7–1.2)
DIFFERENTIAL TYPE: ABNORMAL
EOSINOPHILS RELATIVE PERCENT: 2 % (ref 1–4)
GFR AFRICAN AMERICAN: 27 ML/MIN
GFR NON-AFRICAN AMERICAN: 23 ML/MIN
GFR SERPL CREATININE-BSD FRML MDRD: ABNORMAL ML/MIN/{1.73_M2}
GFR SERPL CREATININE-BSD FRML MDRD: ABNORMAL ML/MIN/{1.73_M2}
GLUCOSE BLD-MCNC: 114 MG/DL (ref 75–110)
GLUCOSE BLD-MCNC: 140 MG/DL (ref 70–99)
GLUCOSE BLD-MCNC: 159 MG/DL (ref 75–110)
GLUCOSE BLD-MCNC: 166 MG/DL (ref 75–110)
GLUCOSE BLD-MCNC: 169 MG/DL (ref 75–110)
HCT VFR BLD CALC: 34.5 % (ref 40.7–50.3)
HEMOGLOBIN: 9.9 G/DL (ref 13–17)
IMMATURE GRANULOCYTES: 1 %
LYMPHOCYTES # BLD: 6 % (ref 24–43)
MAGNESIUM: 1.9 MG/DL (ref 1.6–2.6)
MCH RBC QN AUTO: 23.2 PG (ref 25.2–33.5)
MCHC RBC AUTO-ENTMCNC: 28.7 G/DL (ref 28.4–34.8)
MCV RBC AUTO: 80.8 FL (ref 82.6–102.9)
MONOCYTES # BLD: 7 % (ref 3–12)
NRBC AUTOMATED: 0 PER 100 WBC
PDW BLD-RTO: 18.9 % (ref 11.8–14.4)
PLATELET # BLD: 287 K/UL (ref 138–453)
PLATELET ESTIMATE: ABNORMAL
PMV BLD AUTO: 8.8 FL (ref 8.1–13.5)
POTASSIUM SERPL-SCNC: 3.5 MMOL/L (ref 3.7–5.3)
RBC # BLD: 4.27 M/UL (ref 4.21–5.77)
RBC # BLD: ABNORMAL 10*6/UL
SARS-COV-2: NORMAL
SARS-COV-2: NOT DETECTED
SEG NEUTROPHILS: 83 % (ref 36–65)
SEGMENTED NEUTROPHILS ABSOLUTE COUNT: 11.07 K/UL (ref 1.5–8.1)
SODIUM BLD-SCNC: 136 MMOL/L (ref 135–144)
SOURCE: NORMAL
WBC # BLD: 13.2 K/UL (ref 3.5–11.3)
WBC # BLD: ABNORMAL 10*3/UL

## 2021-03-27 PROCEDURE — 2580000003 HC RX 258: Performed by: INTERNAL MEDICINE

## 2021-03-27 PROCEDURE — 6370000000 HC RX 637 (ALT 250 FOR IP): Performed by: CLINICAL NURSE SPECIALIST

## 2021-03-27 PROCEDURE — 2060000000 HC ICU INTERMEDIATE R&B

## 2021-03-27 PROCEDURE — 6360000002 HC RX W HCPCS: Performed by: INTERNAL MEDICINE

## 2021-03-27 PROCEDURE — 51702 INSERT TEMP BLADDER CATH: CPT

## 2021-03-27 PROCEDURE — 82947 ASSAY GLUCOSE BLOOD QUANT: CPT

## 2021-03-27 PROCEDURE — 6370000000 HC RX 637 (ALT 250 FOR IP): Performed by: INTERNAL MEDICINE

## 2021-03-27 PROCEDURE — 6370000000 HC RX 637 (ALT 250 FOR IP): Performed by: NURSE PRACTITIONER

## 2021-03-27 PROCEDURE — 94760 N-INVAS EAR/PLS OXIMETRY 1: CPT

## 2021-03-27 PROCEDURE — 36415 COLL VENOUS BLD VENIPUNCTURE: CPT

## 2021-03-27 PROCEDURE — 6370000000 HC RX 637 (ALT 250 FOR IP): Performed by: SURGERY

## 2021-03-27 PROCEDURE — 99232 SBSQ HOSP IP/OBS MODERATE 35: CPT | Performed by: FAMILY MEDICINE

## 2021-03-27 PROCEDURE — 80048 BASIC METABOLIC PNL TOTAL CA: CPT

## 2021-03-27 PROCEDURE — 83735 ASSAY OF MAGNESIUM: CPT

## 2021-03-27 PROCEDURE — 99232 SBSQ HOSP IP/OBS MODERATE 35: CPT | Performed by: INTERNAL MEDICINE

## 2021-03-27 PROCEDURE — 85025 COMPLETE CBC W/AUTO DIFF WBC: CPT

## 2021-03-27 PROCEDURE — 6370000000 HC RX 637 (ALT 250 FOR IP): Performed by: STUDENT IN AN ORGANIZED HEALTH CARE EDUCATION/TRAINING PROGRAM

## 2021-03-27 RX ADMIN — DAKIN'S SOLUTION 0.125% (QUARTER STRENGTH): 0.12 SOLUTION at 13:30

## 2021-03-27 RX ADMIN — Medication 3 MG: at 21:17

## 2021-03-27 RX ADMIN — SPIRONOLACTONE 25 MG: 25 TABLET ORAL at 09:32

## 2021-03-27 RX ADMIN — METOPROLOL TARTRATE 50 MG: 25 TABLET ORAL at 09:32

## 2021-03-27 RX ADMIN — Medication 1 TABLET: at 09:31

## 2021-03-27 RX ADMIN — BACLOFEN 5 MG: 10 TABLET ORAL at 21:17

## 2021-03-27 RX ADMIN — INSULIN GLARGINE 15 UNITS: 100 INJECTION, SOLUTION SUBCUTANEOUS at 21:24

## 2021-03-27 RX ADMIN — TORSEMIDE 20 MG: 20 TABLET ORAL at 10:02

## 2021-03-27 RX ADMIN — ASPIRIN 81 MG: 81 TABLET, CHEWABLE ORAL at 09:32

## 2021-03-27 RX ADMIN — INSULIN LISPRO 3 UNITS: 100 INJECTION, SOLUTION INTRAVENOUS; SUBCUTANEOUS at 17:41

## 2021-03-27 RX ADMIN — DESMOPRESSIN ACETATE 40 MG: 0.2 TABLET ORAL at 09:32

## 2021-03-27 RX ADMIN — MAGNESIUM GLUCONATE 500 MG ORAL TABLET 400 MG: 500 TABLET ORAL at 09:32

## 2021-03-27 RX ADMIN — FAMOTIDINE 20 MG: 20 TABLET, FILM COATED ORAL at 09:32

## 2021-03-27 RX ADMIN — HYDRALAZINE HYDROCHLORIDE 50 MG: 50 TABLET, FILM COATED ORAL at 06:10

## 2021-03-27 RX ADMIN — INSULIN LISPRO 3 UNITS: 100 INJECTION, SOLUTION INTRAVENOUS; SUBCUTANEOUS at 13:31

## 2021-03-27 RX ADMIN — PIPERACILLIN AND TAZOBACTAM 3375 MG: 3; .375 INJECTION, POWDER, LYOPHILIZED, FOR SOLUTION INTRAVENOUS at 17:41

## 2021-03-27 RX ADMIN — ACETAMINOPHEN 650 MG: 325 TABLET ORAL at 02:15

## 2021-03-27 RX ADMIN — PIPERACILLIN AND TAZOBACTAM 3375 MG: 3; .375 INJECTION, POWDER, LYOPHILIZED, FOR SOLUTION INTRAVENOUS at 10:01

## 2021-03-27 RX ADMIN — ALLOPURINOL 100 MG: 100 TABLET ORAL at 09:32

## 2021-03-27 RX ADMIN — BACLOFEN 5 MG: 10 TABLET ORAL at 09:32

## 2021-03-27 RX ADMIN — POTASSIUM CHLORIDE 40 MEQ: 1500 TABLET, EXTENDED RELEASE ORAL at 14:16

## 2021-03-27 RX ADMIN — STANDARDIZED SENNA CONCENTRATE 8.6 MG: 8.6 TABLET ORAL at 21:17

## 2021-03-27 RX ADMIN — SODIUM CHLORIDE, PRESERVATIVE FREE 10 ML: 5 INJECTION INTRAVENOUS at 09:33

## 2021-03-27 RX ADMIN — HYDRALAZINE HYDROCHLORIDE 50 MG: 50 TABLET, FILM COATED ORAL at 13:31

## 2021-03-27 RX ADMIN — HYDRALAZINE HYDROCHLORIDE 50 MG: 50 TABLET, FILM COATED ORAL at 21:17

## 2021-03-27 RX ADMIN — METOPROLOL TARTRATE 50 MG: 25 TABLET ORAL at 21:17

## 2021-03-27 RX ADMIN — INSULIN LISPRO 2 UNITS: 100 INJECTION, SOLUTION INTRAVENOUS; SUBCUTANEOUS at 21:25

## 2021-03-27 ASSESSMENT — ENCOUNTER SYMPTOMS
COUGH: 0
PHOTOPHOBIA: 0
EYE PAIN: 0
EYE REDNESS: 0
ABDOMINAL DISTENTION: 0
EYE ITCHING: 0
APNEA: 0

## 2021-03-27 NOTE — PROGRESS NOTES
[facility] -   Infectious diseases evaluation  admission date 3/19/2021    reason for consultation:   Heel ulcers w bone exposed    Impression :   Current:  · bilat heel non healing diabetic foot ulcers -  · Left calcaneus osteomyelitis  · Bone biopsy taken L calca - shows acute and chronic osteo. Cultures in progress  · Sacral ulcer  · DM2   · Acute on chronic CHF  · DEBBY    Other:  · Morbid obesity  · History of nec fash s/p multiple debridement with diverting colostomy in 2019  Discussion / summary of stay / plan of care   ·   Recommendations   · I.M to high risk for surgical intervention as per cardiology  · Pending podiatry plans for debridement of the calcaneus bone. Vascular agreeing to their plan  · Meanwhile keep Zosyn, and adjust to bone culture final results  · Hoping for po AB at [place] if possible    Infection Control Recommendations   · [place] Precautions  · Contact Isolation   Antimicrobial Stewardship Recommendations   · Simplification of therapy  · Targeted therapy  · IV to oral conversion  · Per Kg dosing  Coordination ofOutpatient Care:   · Estimated Length of IV antimicrobials:  · Patient will need Midline / picc Catheter Insertion:   · Patient will need SNF:  · Patient will need outpatient wound care:     History of Present Illness:   Initial history:  Gracy Rogers is a 38y.o.-year-old male bilat heel chronic wounds getting worse and bone exposed on the left - no cellulitis and no fever. WBC normal  MRI ordered    Also has buttock wounds  ID called  DM2     Bone bx left calcan 3/23  Wound cx 3/23                    Interval changes  3/27/2021   Patient Vitals for the past 8 hrs:   BP Temp Temp src Pulse Resp SpO2   03/27/21 0326 (!) 160/98 98.6 °F (37 °C) Oral 95 23 100 %     Alert and oriented. No active complaints reported. Intermediate to high risk per cardiology for surgical intervention.   Disc w podiatry, planning on shaving the calcaneus and flapping over it - closing the wound to protect it  Labs reviewed    Bone biopsy from the calcaneus 3/23 negative  Wound culture from the open left heel is showing for negative rods and Enterococcus bacteroides 3/23    Summary of relevant labs:  Labs:  W 9.8  Micro:   BC  Left calc bone biopsy cx 3/23 pending  Left heel wound cx 3/23 Enterococcus and gram-negative rods  Imaging:      I have personally reviewed the past medical history, past surgical history, medications, social history, and family history, and I haveupdated the database accordingly. Allergies: Other     Review of Systems:     Review of Systems   Constitutional: Positive for activity change. Negative for appetite change, fatigue and fever. HENT: Negative for congestion. Eyes: Negative for photophobia, pain and redness. Respiratory: Negative for apnea and cough. Cardiovascular: Negative for chest pain. Gastrointestinal: Negative for abdominal distention. Endocrine: Negative for polyphagia. Genitourinary: Negative for dysuria. Musculoskeletal: Negative for arthralgias. Skin: Positive for wound. Allergic/Immunologic: Negative for immunocompromised state. Neurological: Negative for dizziness. Hematological: Negative for adenopathy. Psychiatric/Behavioral: Negative for agitation. Physical Examination :       Physical Exam  Constitutional:       Appearance: Normal appearance. He is obese. He is not ill-appearing. HENT:      Head: Normocephalic and atraumatic. Nose: Nose normal. No congestion. Mouth/Throat:      Mouth: Mucous membranes are moist.   Eyes:      Conjunctiva/sclera: Conjunctivae normal.   Neck:      Musculoskeletal: Neck supple. No neck rigidity. Cardiovascular:      Rate and Rhythm: Normal rate and regular rhythm. Heart sounds: Normal heart sounds. No murmur. Pulmonary:      Effort: No respiratory distress. Breath sounds: Normal breath sounds.    Abdominal:      General: There is no distension. Palpations: Abdomen is soft. Tenderness: There is no abdominal tenderness. Comments: osteomy - mid abd scratch large wounds x 2 - clean   Genitourinary:     Comments: No lema  Musculoskeletal:         General: No swelling or deformity. Skin:     General: Skin is dry. Coloration: Skin is not pale. Findings: No bruising or erythema. Neurological:      General: No focal deficit present. Mental Status: He is alert and oriented to person, place, and time. Mental status is at baseline. Psychiatric:         Mood and Affect: Mood normal.         Thought Content:  Thought content normal.         Past Medical History:     Past Medical History:   Diagnosis Date    [facility]     Diabetes mellitus [facility]     Hypertension     Septic shock ([facility]     Spina bifida aperta of lumbar spine ([facility]        Past Surgical  History:     Past Surgical History:   Procedure Laterality Date    ABDOMEN SURGERY N/A 12/8/2019    DEBRIDEMENT  NECROTIZING FASCIITIS BUTTOCK, WOUND VAC REMOVAL DELAYED PRIMARY CLOSURE OF MIDLINE ABDOMINAL INCISION, OSTOMY BAG CHANGE performed by Manuel Martins MD at [facility] N/A 12/10/2019    DEBRIDEMENT OF SACRAL WOUND performed by Yue Lacy MD at [address] [place] [address],[address] [place] [address] N/A 1/5/2020    GLUTEAL WOUND DEBRIDEMENT, WOUND VAC CHANGE performed by Yue Lacy MD at [address] [place] [address],[address] [place] [address] N/A 1/11/2020    SACRAL DEBRIDEMENT WITH WOUND VAC CHANGE performed by Ervin Herring MD at [facility]. 1/1/2020    IRRIGATION AND DEBRIDEMENT BUTTOCKS, SCROTUM, ABDOMEN, WOUND VAC CHANGE performed by Manuel Martins MD at [facility] 12/3/2019    LAPAROSCOPIC CONVERTED TO OPEN DIVERTING LOOP COLOSTOMY; WOUND VAC APPLICATION performed by Edgar Randle MD at [place] [address]  01/05/2020    GLUTEAL WOUND DEBRIDEMENT, [address] [place] [address] 01/08/2020    WOUND VAC CHANGE SACRAL DECUBITUS, POSSIBLE DEBRIDEMENT    DEBRIDEMENT  01/11/2020     SACRAL DEBRIDEMENT WITH WOUND VAC CHANGE     INCISION AND DRAINAGE Bilateral 11/29/2019    RECTAL PERIRECTAL INCISION AND DRAINAGE, [facility] LOOP COLOSTOMY CREATION [facility] N/A 12/6/2019    DEBRIDEMENT NECROTIZING FASCIAITIS BILAT BUTTOCK performed by Yue Lacy MD at [facility] [address] N/A 12/20/2019    DEBRIDEMENT GLUTEAL AND SCROTAL REGIONS performed by Ervin Herring MD at [facility] [address] N/A 12/26/2019    INCISION AND DRAINAGE AND WOUND VAC CHANGE BUTTOCK, PERINEUM performed by Derek Santana DO at [facility] [address] N/A 1/8/2020    WOUND VAC CHANGE SACRAL DECUBITUS, DEBRIDEMENT performed by Yue Lacy MD at [address] N/A 12/10/2019    SCROTAL EXPLORATION WITH SCROTAL DEBRIDEMENT performed by Milla Arellano MD at [facility] N/A 12/23/2019    WOUND [address] performed by Yue Lacy MD at [email or web address] OR       Medications:      potassium chloride  40 mEq Oral Once    hydrALAZINE  50 mg Oral 3 times per day    torsemide  20 mg Oral Daily    spironolactone  25 mg Oral Daily    aspirin  81 mg Oral Daily    piperacillin-tazobactam  3,375 mg Intravenous Q8H    insulin glargine  15 Units Subcutaneous Nightly    metoprolol tartrate  50 mg Oral BID    famotidine  20 mg Oral Daily    sodium hypochlorite   Irrigation Daily    allopurinol  100 mg Oral Daily    Baclofen  5 mg Oral BID    magnesium oxide  400 mg Oral Daily    therapeutic multivitamin-minerals  1 tablet Oral Daily    senna  1 tablet Oral Nightly    sodium chloride flush  10 mL Intravenous 2 times per day    heparin (porcine)  5,000 Units Subcutaneous 3 times per day    insulin lispro  0-18 Units Subcutaneous TID WC    insulin lispro  0-9 Units Subcutaneous Nightly    atorvastatin  40 mg Oral Daily       Social History:     Social History     Socioeconomic History    Marital status: Unknown     Spouse name: Not on file    Number of children: Not on file    Years of education: Not on file    Highest education level: Not on file   Occupational History    Not on file   Social Needs    Financial resource strain: Not on file    Food insecurity     Worry: Not on file     Inability: Not on file    Transportation needs     Medical: Not on file     Non-medical: Not on file   Tobacco Use    Smoking status: Never Smoker    Smokeless tobacco: Never Used   Substance and Sexual Activity    Alcohol use: Never     Frequency: Never    Drug use: Never    Sexual activity: Not on file   Lifestyle    Physical activity     Days per week: Not on file     Minutes per session: Not on file    Stress: Not on file   Relationships    Social connections     Talks on phone: Not on file     Gets together: Not on file     Attends Cheondo[employer] service: Not on file     Active member of club or organization: Not on file     Attends meetings of clubs or organizations: Not on file     Relationship status: Not on file    Intimate partner violence     Fear of current or ex partner: Not on file     Emotionally abused: Not on file     Physically abused: Not on file     Forced sexual activity: Not on file   Other Topics Concern    Not on file   Social History Narrative    Not on file       Family History:   No family history on file.    Medical Decision Making:   I have independently reviewed/ordered the following labs:    CBC with Differential:   Recent Labs     03/26/21  0613 03/27/21  0419   WBC 8.8 13.2*   HGB 9.5* 9.9*   HCT 33.8* 34.5*    287   LYMPHOPCT 12* 6*   MONOPCT 8 7     BMP:  Recent Labs     03/25/21  1435 03/26/21  0613 03/27/21  0419   NA  --  139 136   K  --  3.2* 3.5*   CL  --  104 101   CO2  --  22 22   BUN  --  75* 74* CREATININE  --  3.34* 3.13*   MG 1.5*  --  1.9     Hepatic Function Panel:   No results for input(s): PROT, LABALBU, BILIDIR, IBILI, BILITOT, ALKPHOS, ALT, AST in the last 72 hours. No results for input(s): RPR in the last 72 hours. No results for input(s): HIV in the last 72 hours. No results for input(s): BC in the last 72 hours. Lab Results   Component Value Date    CREATININE 3.13 03/27/2021    GLUCOSE 140 03/27/2021       Detailed results: Thank you for allowing us to participate in the care of this patient. Please call with questions. This note is created with the assistance of a speech recognition program.  While intending to generate adocument that actually reflects the content of the visit, the document can still have some errors including those of syntax and sound a like substitutions which may escape proof reading. It such instances, actual meaningcan be extrapolated by contextual diversion.     Elizabeth Denis MD  Office: [phone]  Perfect serve / office [phone]

## 2021-03-27 NOTE — PROGRESS NOTES
Physical Therapy    DATE: 3/27/2021    NAME: Isidro Nguyen  MRN: [id number]   : 1985      Patient not seen this date for Physical Therapy due to:    Patient Declined: Pt eating lunch. Will check on pt later today if time allows. RN informed.       Electronically signed by Lea Matias PTA on 3/27/2021 at 2:27 PM

## 2021-03-27 NOTE — PROGRESS NOTES
received a call from Dr. María Gibson (podiatry) to update that attentively Monday 03/29/2021 the patient will have his procedure.   updated the patient with this information

## 2021-03-27 NOTE — PROGRESS NOTES
Regular Covid 19 swab taken from right nare, labeled, placed in red dot bag, and handed off to second healthcare worker outside of room for transport to laboratory per hospital policy and procedure.   Patient tolerated procedure poorly

## 2021-03-27 NOTE — PLAN OF CARE
Problem: Skin Integrity:  Goal: Will show no infection signs and symptoms  Description: Will show no infection signs and symptoms  Outcome: Ongoing     Problem: Skin Integrity:  Goal: Absence of new skin breakdown  Description: Absence of new skin breakdown  Outcome: Ongoing     Problem: Falls - Risk of:  Goal: Will remain free from falls  Description: Will remain free from falls  Outcome: Ongoing

## 2021-03-27 NOTE — PROGRESS NOTES
[facility] -   Infectious diseases evaluation  admission date 3/19/2021    reason for consultation:   Heel ulcers w bone exposed    Impression :   Current:  · bilat heel non healing ulcers -  · Left calcaneus osteomyelitis  · Bone biopsy taken L calca  · Sacral ulcer  · DM2     Other:  ·   Discussion / summary of stay / plan of care   ·   Recommendations   · Awaiting vascular and cardiology for possible procedures  · Pending podiatry plans for debridement of the calcaneus bone  · Zosyn, and adjust to bone culture final results  · Hoping for po AB at [place] if possible    Infection Control Recommendations   · [place] Precautions  · Contact Isolation       Antimicrobial Stewardship Recommendations   · Simplification of therapy  · Targeted therapy  · IV to oral conversion  · Per Kg dosing  Coordination ofOutpatient Care:   · Estimated Length of IV antimicrobials:  · Patient will need Midline / picc Catheter Insertion:   · Patient will need SNF:  · Patient will need outpatient wound care:     History of Present Illness:   Initial history:  Theodora Rueda is a 38y.o.-year-old male bilat heel chronic wounds getting worse and bone exposed on the left - no cellulitis and no fever.   WBC normal  MRI ordered    Also has buttock wounds  ID called  DM2     Bone bx left calcan 3/23  Wound cx 3/23                    Interval changes  3/26/2021   Patient Vitals for the past 8 hrs:   BP Temp Temp src Pulse Resp SpO2   03/26/21 1947 (!) 153/94 97.7 °F (36.5 °C) Oral 90 17 99 %   03/26/21 1651 (!) 147/93  Oral  16 98 %   03/26/21 1326 (!) 145/75 98.2 °F (36.8 °C) Oral   99 %     Alert appropriate,  Vascular - good pulses - no vasc issues  Card deferring cath due to NAHID  Disc w podiatry, planning on shaving the calcaneus and flapping over it - closing the wound to protect it    Alert ox 3  Mother on the bedside  No rash -  abd soft  Labs reviewed    Bone biopsy from the calcaneus 3/23 negative  Wound culture from the open left heel is showing for negative rods and Enterococcus bacteroides 3/23    Summary of relevant labs:  Labs:  W 9.8  Micro:   BC  Left calc bone biopsy cx 3/23 pending  Left heel wound cx 3/23 Enterococcus and gram-negative rods  Imaging:      I have personally reviewed the past medical history, past surgical history, medications, social history, and family history, and I haveupdated the database accordingly. Allergies: Other     Review of Systems:     Review of Systems   Constitutional: Positive for activity change. Negative for appetite change, fatigue and fever. HENT: Negative for congestion. Eyes: Negative for photophobia, pain and redness. Respiratory: Negative for apnea and cough. Cardiovascular: Negative for chest pain. Gastrointestinal: Negative for abdominal distention. Endocrine: Negative for polyphagia. Genitourinary: Negative for dysuria. Musculoskeletal: Negative for arthralgias. Skin: Positive for wound. Allergic/Immunologic: Negative for immunocompromised state. Neurological: Negative for dizziness. Hematological: Negative for adenopathy. Psychiatric/Behavioral: Negative for agitation. Physical Examination :       Physical Exam  Constitutional:       Appearance: Normal appearance. He is obese. He is not ill-appearing. HENT:      Head: Normocephalic and atraumatic. Nose: Nose normal. No congestion. Mouth/Throat:      Mouth: Mucous membranes are moist.   Eyes:      Conjunctiva/sclera: Conjunctivae normal.   Neck:      Musculoskeletal: Neck supple. No neck rigidity. Cardiovascular:      Rate and Rhythm: Normal rate and regular rhythm. Heart sounds: Normal heart sounds. No murmur. Pulmonary:      Effort: No respiratory distress. Breath sounds: Normal breath sounds. Abdominal:      General: There is no distension. Palpations: Abdomen is soft. Tenderness: There is no abdominal tenderness. Comments: osteomy - mid abd scratch large wounds x 2 - clean   Genitourinary:     Comments: No lema  Musculoskeletal:         General: No swelling or deformity. Skin:     General: Skin is dry. Coloration: Skin is not pale. Findings: No bruising or erythema. Neurological:      General: No focal deficit present. Mental Status: He is alert and oriented to person, place, and time. Mental status is at baseline. Psychiatric:         Mood and Affect: Mood normal.         Thought Content:  Thought content normal.         Past Medical History:     Past Medical History:   Diagnosis Date    [facility]     Diabetes [facility]     Hypertension     [facility]     Spina bifida aperta of lumbar spine ([facility]        Past Surgical  History:     Past Surgical History:   Procedure Laterality Date    ABDOMEN SURGERY N/A 12/8/2019    DEBRIDEMENT  NECROTIZING FASCIITIS BUTTOCK, WOUND VAC REMOVAL DELAYED PRIMARY CLOSURE OF MIDLINE ABDOMINAL INCISION, OSTOMY BAG CHANGE performed by Jg Nugent MD at [address] [place] [address] N/A 12/10/2019    DEBRIDEMENT OF SACRAL WOUND performed by Luisito Sterling MD at [address] [place] [address] N/A 1/5/2020    GLUTEAL WOUND DEBRIDEMENT, WOUND VAC CHANGE performed by Luisito Sterling MD at [address] [place] [address] N/A 1/11/2020    SACRAL DEBRIDEMENT WITH WOUND VAC CHANGE performed by Amalia Perez MD at [facility]. 1/1/2020    IRRIGATION AND DEBRIDEMENT BUTTOCKS, SCROTUM, ABDOMEN, WOUND VAC CHANGE performed by Jg Nugent MD at [facility] 12/3/2019    LAPAROSCOPIC CONVERTED TO OPEN DIVERTING LOOP COLOSTOMY; WOUND VAC APPLICATION performed by Melissa Osei MD at [place] [address]  01/05/2020    GLUTEAL WOUND DEBRIDEMENT, WOUND VAC CHANGE     DEBRIDEMENT  01/08/2020    WOUND VAC CHANGE SACRAL DECUBITUS, POSSIBLE DEBRIDEMENT    DEBRIDEMENT  01/11/2020     SACRAL DEBRIDEMENT WITH WOUND VAC CHANGE     INCISION AND DRAINAGE Bilateral 11/29/2019    RECTAL PERIRECTAL INCISION AND DRAINAGE, [facility] LOOP COLOSTOMY CREATION performed by Dheeraj Blas MD at [facility] [address] N/A 12/6/2019    DEBRIDEMENT NECROTIZING FASCIAITIS BILAT BUTTOCK performed by Layla Max MD at [facility] [address] N/A 12/20/2019    DEBRIDEMENT GLUTEAL AND SCROTAL REGIONS performed by Dheeraj Blas MD at [facility] [address] N/A 12/26/2019    INCISION AND DRAINAGE AND WOUND VAC CHANGE BUTTOCK, PERINEUM performed by Ryanne Lopez DO at [facility] [address] N/A 1/8/2020    WOUND VAC CHANGE SACRAL DECUBITUS, DEBRIDEMENT performed by Layla Max MD at [address] N/A 12/10/2019    SCROTAL EXPLORATION WITH SCROTAL DEBRIDEMENT performed by Mckenzie García MD at [facility] N/A 12/23/2019    WOUND [address] performed by Layla Max MD at [facility] OR       Medications:      potassium chloride  40 mEq Oral Once    hydrALAZINE  50 mg Oral 3 times per day    torsemide  20 mg Oral Daily    spironolactone  25 mg Oral Daily    aspirin  81 mg Oral Daily    piperacillin-tazobactam  3,375 mg Intravenous Q8H    insulin glargine  15 Units Subcutaneous Nightly    metoprolol tartrate  50 mg Oral BID    famotidine  20 mg Oral Daily    sodium hypochlorite   Irrigation Daily    allopurinol  100 mg Oral Daily    Baclofen  5 mg Oral BID    magnesium oxide  400 mg Oral Daily    therapeutic multivitamin-minerals  1 tablet Oral Daily    senna  1 tablet Oral Nightly    sodium chloride flush  10 mL Intravenous 2 times per day    heparin (porcine)  5,000 Units Subcutaneous 3 times per day    insulin lispro  0-18 Units Subcutaneous TID WC    insulin lispro  0-9 Units Subcutaneous Nightly    atorvastatin  40 mg Oral Daily       Social History: Social History     Socioeconomic History    Marital status: Unknown     Spouse name: Not on file    Number of children: Not on file    Years of education: Not on file    Highest education level: Not on file   Occupational History    Not on file   Social Needs    Financial resource strain: Not on file    Food insecurity     Worry: Not on file     Inability: Not on file    Transportation needs     Medical: Not on file     Non-medical: Not on file   Tobacco Use    Smoking status: Never Smoker    Smokeless tobacco: Never Used   Substance and Sexual Activity    Alcohol use: Never     Frequency: Never    Drug use: Never    Sexual activity: Not on file   Lifestyle    Physical activity     Days per week: Not on file     Minutes per session: Not on file    Stress: Not on file   Relationships    Social connections     Talks on phone: Not on file     Gets together: Not on file     Attends [employer] service: Not on file     Active member of club or organization: Not on file     Attends meetings of clubs or organizations: Not on file     Relationship status: Not on file    Intimate partner violence     Fear of current or ex partner: Not on file     Emotionally abused: Not on file     Physically abused: Not on file     Forced sexual activity: Not on file   Other Topics Concern    Not on file   Social History Narrative    Not on file       Family History:   No family history on file.    Medical Decision Making:   I have independently reviewed/ordered the following labs:    CBC with Differential:   Recent Labs     03/25/21  0744 03/26/21  0613   WBC 8.7 8.8   HGB 9.5* 9.5*   HCT 33.9* 33.8*    271   LYMPHOPCT 10* 12*   MONOPCT 8* 8     BMP:  Recent Labs     03/24/21  0450 03/25/21  0744 03/25/21  1435 03/26/21  0613    140  --  139   K 3.5* 3.3*  --  3.2*    106  --  104   CO2 19* 21  --  22   BUN 69* 75*  --  75*   CREATININE 3.28* 3.35*  --  3.34*   MG 1.6  --  1.5*  --      Hepatic Function Panel:   No results for input(s): PROT, LABALBU, BILIDIR, IBILI, BILITOT, ALKPHOS, ALT, AST in the last 72 hours. No results for input(s): RPR in the last 72 hours. No results for input(s): HIV in the last 72 hours. No results for input(s): BC in the last 72 hours. Lab Results   Component Value Date    CREATININE 3.34 03/26/2021    GLUCOSE 82 03/26/2021       Detailed results: Thank you for allowing us to participate in the care of this patient. Please call with questions. This note is created with the assistance of a speech recognition program.  While intending to generate adocument that actually reflects the content of the visit, the document can still have some errors including those of syntax and sound a like substitutions which may escape proof reading. It such instances, actual meaningcan be extrapolated by contextual diversion.     Lucero Galicia MD  Office: [phone]  Perfect serve / office [phone]

## 2021-03-27 NOTE — PROGRESS NOTES
hypochlorite   Irrigation Daily    allopurinol  100 mg Oral Daily    Baclofen  5 mg Oral BID    magnesium oxide  400 mg Oral Daily    therapeutic multivitamin-minerals  1 tablet Oral Daily    senna  1 tablet Oral Nightly    sodium chloride flush  10 mL Intravenous 2 times per day    heparin (porcine)  5,000 Units Subcutaneous 3 times per day    insulin lispro  0-18 Units Subcutaneous TID WC    insulin lispro  0-9 Units Subcutaneous Nightly    atorvastatin  40 mg Oral Daily     Continuous Infusions:    dextrose       PRN Meds: melatonin, acetaminophen, polyethylene glycol, sodium chloride flush, potassium chloride **OR** potassium alternative oral replacement **OR** potassium chloride, magnesium sulfate, promethazine **OR** ondansetron, acetaminophen **OR** acetaminophen, glucose, dextrose, glucagon (rDNA), dextrose, magnesium hydroxide    Data:     Past Medical History:   has a past medical history of CHF (congestive heart failure) ([facility] [address] Diabetes mellitus ([facility] [address] Hypertension, Septic shock ([facility] and Spina bifida aperta of lumbar spine ([facility] Social History:   reports that he has never smoked. He has never used smokeless tobacco. He reports that he does not drink alcohol or use drugs. Family History: No family history on file. Vitals:  BP (!) 160/98   Pulse 95   Temp 98.6 °F (37 °C) (Oral)   Resp 23   Ht 5' 9\" (1.753 m)   Wt (!) 338 lb 8 oz (153.5 kg)   SpO2 100%   BMI 49.99 kg/m²   Temp (24hrs), Av °F (36.7 °C), Min:97.7 °F (36.5 °C), Max:98.6 °F (37 °C)    Recent Labs     21  1250 21  1658 21  1945 21  0645   POCGLU 135* 136* 215* 114*       I/O (24Hr):     Intake/Output Summary (Last 24 hours) at 3/27/2021 0739  Last data filed at 3/27/2021 4918  Gross per 24 hour   Intake 1132.8 ml   Output 4050 ml   Net -2917.2 ml       Labs:  Hematology:  Recent Labs     21  0744 21  0613 21  0419   WBC 8.7 8.8 13.2*   RBC 4.06* 4.09* 4.27   HGB 9.5* 9.5* 9.9*   HCT 33.9* 33.8* 34.5*   MCV 83.5 82.6 80.8*   MCH 23.4* 23.2* 23.2*   MCHC 28.0* 28.1* 28.7   RDW 18.6* 18.8* 18.9*    271 287   MPV 9.0 9.0 8.8     Chemistry:  Recent Labs     03/25/21  0744 03/25/21  1435 03/26/21  0613 03/27/21  0419     --  139 136   K 3.3*  --  3.2* 3.5*     --  104 101   CO2 21  --  22 22   GLUCOSE 101*  --  82 140*   BUN 75*  --  75* 74*   CREATININE 3.35*  --  3.34* 3.13*   MG  --  1.5*  --  1.9   ANIONGAP 13  --  13 13   LABGLOM 21*  --  21* 23*   GFRAA 25*  --  25* 27*   CALCIUM 8.1*  --  8.1* 8.2*     Recent Labs     03/25/21  2004 03/26/21  0938 03/26/21  1250 03/26/21  1658 03/26/21  1945 03/27/21  0645   POCGLU 166* 107 135* 136* 215* 114*     ABG:  Lab Results   Component Value Date    POCPH 7.381 12/24/2019    POCPCO2 35.3 12/24/2019    POCPO2 140.3 12/24/2019    POCHCO3 20.9 12/24/2019    NBEA 4 12/24/2019    PBEA NOT REPORTED 12/24/2019    JEM8RFJ 22 12/24/2019    UBLC7SJQ 99 12/24/2019    FIO2 40.0 12/24/2019     Lab Results   Component Value Date/Time    SPECIAL NOT REPORTED 03/23/2021 06:56 PM     Lab Results   Component Value Date/Time    CULTURE NO GROWTH 3 DAYS 03/23/2021 06:56 PM       Radiology:  Sandro Reeder Foot Left (min 3 Views)    Result Date: 3/22/2021  Soft tissue ulceration overlying the talus with underlying cortical disruption, concerning for osteomyelitis. Xr Foot Right (min 3 Views)    Result Date: 3/22/2021  No acute bony abnormalities are noted     Xr Chest Portable    Result Date: 3/25/2021  No acute cardiopulmonary disease     Mri Foot Left Wo Contrast    Addendum Date: 3/26/2021    ADDENDUM: There is generalized skin thickening and a 1 cm rind of soft tissue edema along the medial plantar aspect of the foot. Subjacent to this, there is an additional layer of subcutaneous fat. Deep to this, there is complete atrophy/fatty infiltration of the intrinsic musculature of the foot.      Result Date: 3/26/2021  Deep soft tissue ulcer overlying the calcaneus. Cortical disruption of the exposed calcaneus with underlying marrow edema, compatible with osteomyelitis. Diffuse skin thickening and subcutaneous soft tissue edema surrounding the calcaneal ulcer as well as within the dorsum of the foot, compatible with myositis and cellulitis. No drainable fluid collection identified to suggest abscess formation. No additional foci of osteomyelitis identified. Mri Ankle Left Wo Contrast    Addendum Date: 3/26/2021    ADDENDUM: There is generalized skin thickening and a 1 cm rind of soft tissue edema along the medial plantar aspect of the foot. Subjacent to this, there is an additional layer of subcutaneous fat. Deep to this, there is complete atrophy/fatty infiltration of the intrinsic musculature of the foot. Result Date: 3/26/2021  Deep soft tissue ulcer overlying the calcaneus. Cortical disruption of the exposed calcaneus with underlying marrow edema, compatible with osteomyelitis. Diffuse skin thickening and subcutaneous soft tissue edema surrounding the calcaneal ulcer as well as within the dorsum of the foot, compatible with myositis and cellulitis. No drainable fluid collection identified to suggest abscess formation. No additional foci of osteomyelitis identified. Us Retroperitoneal Limited    Result Date: 3/20/2021  Unremarkable right kidney. Left kidney not visualized due to patient body habitus and bowel gas. Physical Examination:        General appearance:  alert, cooperative and no distress, labile mood  Mental Status:  oriented to person, place and time and normal affect  Lungs:  clear to auscultation bilaterally, normal effort  Heart:  regular rate and rhythm, no murmur  Abdomen: Obese,  nontender, distended, normal bowel sounds, no masses, hepatomegaly, splenomegaly, ostomy present.    : Ambrosio present  Extremities: Generalized +3 edema extending into abdomen and left upper extremity,new redness to LLE, RLE with decreased edema.  tenderness in the calves  Skin: Blistering bilateral lower extremities. Bilateral pedal wounds inaccessible due to dressing    Assessment:        Hospital Problems           Last Modified POA    * (Principal) Osteomyelitis of left foot ([address] 3/24/2021 Yes    DEBBY (acute kidney injury) ([address] 3/24/2021 Yes    Chronic systolic heart failure ([address] 3/19/2021 Yes    Volume overload 3/21/2021 Yes    Hypoglycemia due to insulin 3/21/2021 No    Right arm weakness 3/19/2021 Yes    Essential hypertension 3/19/2021 Yes    DM II (diabetes mellitus, type II), controlled ([address] 3/19/2021 Yes    Diabetic foot ulcer ([address] 3/24/2021 Yes    Diabetic foot infection ([address] 3/24/2021 Yes    Chronic paraplegia ([address] 3/19/2021 Yes    Sacral wound (Chronic) 3/19/2021 Yes    Open wound of left foot 3/19/2021 Yes    Ambrosio catheter in place (Chronic) 3/19/2021 Yes    Colostomy in place [facility]) (Chronic) 3/19/2021 Yes    Overview Signed 3/19/2021  7:56 PM by MIRTA Quintero - CNS     December '19         CKD (chronic kidney disease) stage 4, GFR 15-29 ml/min ([address] 3/23/2021 Yes    Physical debility 3/24/2021 Yes    PAD (peripheral artery disease) ([address] 3/25/2021 Yes          Plan:        1. DEBBY:    - Renal ultrasound completed 3/20/21 without acute process  - nephrology on board   - On Demadex 20 mg daily and aldactone  - continue sodium bicarb  - Baseline CRT ~ 1.6-1.9: now 3.2 appears to be a plateau at this point per nephrology's note  - replace potassium   - renally dose all meds   - avoid nephrotoxic agents     2. Chronic systolic CHF:   -ECHO completed 3/20 showing EF 25%  -continue BB, demadex  And aldactone  -excellent UOP     3.  Protein calorie malnutrition with poor wound healing and OM in left heel:  - Chronic sacral wound and left foot wound present on admission  - Left heel osteomyelitis secondary to uncontrolled diabetes mellitus type 2 with diabetic foot infection and immobility  - Follow-up on foot wound cultures, currently NGTD  - Evaluated by general surgery and podiatry   - Wound care following  - MRI showed OM of left heel   - continue IV zosyn at this time per ID recs   - pending podiatry plans for debridement of calcaneus bone   - seen by cardiology and determined to be intermediate to high risk for partial calcanectomy      4. Essential hypertension: Stable  - Continue beta-blocker, increase to 50 mg twice daily  - add hydralazine 25 mg po tid 3/24- increase on 3/25     5. Diabetes mellitus type 2 with episodes of hypoglycemia:    - continue lantus at decreased dose of 15 units qhs  - continue accu checks ac/hs with ISS    6. History of spina bifida with adult failure to thrive with chronic paraplegia and chronic urinary retention:   - Chronic debility status noted  - ECF at discharge  - Baclofen  - Chronic Ambrosio catheter: Exchanged on 3/20, polymicrobial UA without systemic symptoms. Off antibiotics, ID following      7. Morbid obesity: BMI 49.9 on admit  - Weight loss encouraged. - Dietary supplement twice daily     8. Dyslipidemia: Continue statin     9. Gout: Continue allopurinol     10. Physical debility: Secondary to spina bifida and chronic paraplegia     11. GI/DVT prophylaxis: Pepcid, heparin     12.  Dispo: ECF after left foot surgical intervention is completed     Yashira Gomez MD  3/27/2021  7:39 AM

## 2021-03-27 NOTE — PROGRESS NOTES
Progress Note  Podiatric Medicine and Surgery     Subjective     CC: Heel wound, b/l    Patient seen and examined at bedside. Patient denies pain to the BLE. Tolerating diet well. Afebrile, hypertensive, tachycardic, saturating well on room air. Denies  N,v,f,c,SOB,CP. HPI :  Gracy Rogers is a 38 y.o. male who has been struggling to heal chronic heel wounds for many years without success. Typically he is seen by wound care specialists and he cannot recall how they typically treat his heel pressure wounds. He was seen by Dr. Margy Zuniga in late 2019 and early 2020 on a previous hospitalization and at that time no surgical intervention was merited and they were treated conservatively. Has not followed up with Dr. Margy Zuniga outpatient. He rarely is able to feel any sensation to his feet but pain is noted randomly. He is a type 2 diabetic and his last A1C was 6.6 (3/20/21).      Pt reports gradual decline in strength, chills, discomfort to his buttocks, increase in size of buttock wounds, shortness of breath and worsening edema x 2 mos.  His visiting nurse started Doxycycline 2 days ago for possible wound infection and told him he needed more care than could be provided in the home. Rashad Diallo lives with his mom who is 79 yr old and has some health issues.        PCP is No primary care provider on file.       Medications:  Scheduled Meds:   potassium chloride  40 mEq Oral Once    hydrALAZINE  50 mg Oral 3 times per day    torsemide  20 mg Oral Daily    spironolactone  25 mg Oral Daily    aspirin  81 mg Oral Daily    piperacillin-tazobactam  3,375 mg Intravenous Q8H    insulin glargine  15 Units Subcutaneous Nightly    metoprolol tartrate  50 mg Oral BID    famotidine  20 mg Oral Daily    sodium hypochlorite   Irrigation Daily    allopurinol  100 mg Oral Daily    Baclofen  5 mg Oral BID    magnesium oxide  400 mg Oral Daily    therapeutic multivitamin-minerals  1 tablet Oral Daily    senna  1 tablet Oral Physical Exam:     Vascular: DP and PT pulses are Non-palpable, BLE DP/PT audible on doppler. CFT <5 seconds to all digits. Hair growth is absent to the level of the digits. pitting edema is present to bilateral lower extremities ankles and feet.     Neuro: Saph/sural/SP/DP/plantar sensation absent to light touch.     Musculoskeletal: Muscle strength to all lower extremity muscle groups 0/5. Gross deformity is present left fifth toe amputation. Negative pain on calf squeeze. LE muscle compartments soft and compressible.     Dermatologic: Full thickness ulcer #1 located left plantar heel measures approximately 6cm x 4cm x 4cm. The wound base is mixed granular, fibrotic, necrotic. Periwound skin is erythematous. Sero-sanguinous drainage noted without associated mal odor. Erythema present with out associated increase in warmth. Probes directly to the calcaneus, with soft cortical bone noted. Does not sinus track or undermine. No fluctuance, crepitus, or induration.       ulcer #2 is located on the right posterior medial heel measuring approximately 3 cm x 4 cm x 0.3 cm. The wound base is fibrotic with serous drainage. No erythema or increase in warmth. It does not probe to bone sinus track or undermine. There is no fluctuance crepitus or induration appreciated.     Interdigital maceration absent. Clinical Images:  R        L        Imaging:   XR CHEST PORTABLE   Final Result   No acute cardiopulmonary disease         VL LOWER EXTREMITY ARTERIAL SEGMENTAL PRESSURES W PPG   Final Result      MRI FOOT LEFT WO CONTRAST   Final Result   Addendum 1 of 1   ADDENDUM:   There is generalized skin thickening and a 1 cm rind of soft tissue edema   along the medial plantar aspect of the foot. Subjacent to this, there is    an   additional layer of subcutaneous fat. Deep to this, there is complete   atrophy/fatty infiltration of the intrinsic musculature of the foot.          Final      MRI ANKLE LEFT WO CONTRAST   Final Result   Addendum 1 of 1   ADDENDUM:   There is generalized skin thickening and a 1 cm rind of soft tissue edema   along the medial plantar aspect of the foot. Subjacent to this, there is    an   additional layer of subcutaneous fat. Deep to this, there is complete   atrophy/fatty infiltration of the intrinsic musculature of the foot. Final      VL Lower Extremity Bilateral Venous Duplex   Final Result      XR FOOT LEFT (MIN 3 VIEWS)   Final Result   Soft tissue ulceration overlying the talus with underlying cortical   disruption, concerning for osteomyelitis. XR FOOT RIGHT (MIN 3 VIEWS)   Final Result   No acute bony abnormalities are noted         US RETROPERITONEAL LIMITED   Final Result   Unremarkable right kidney. Left kidney not visualized due to patient body   habitus and bowel gas. Venous Duplex (3/23): Negative for deep or superficial thrombosis. NIVS: 3/23/2021       Right:    BIWMTS abnormal PVRs    TYLER suggests mild vascular insufficiency at rest    Digit waveforms suggests microvascular level of disease        Left:    Mildly abnormal PVRs    TYLER suggests mild vascular insufficiency at rest    Digit waveforms suggests microvascular level of disease     TYLER:Right: 0.78. Left: 0.88. Wound Culture 3/23/2021: GPC, bacteroides    Culture bone, L calc (3/23): Neutrophils. NGTD. Surgical path, bone biopsy L calc (3/23): Acute and chronic osteomyelitis     Assessment   Christal Burton is a 38 y.o. male with   1. S/p bedside bone biopsy (DOS: 3/23/2021)  2. Diabetic foot ulcer down to bone left foot  3. Osteomyelitis Left calcaneus  4. Diabetic foot ulcer down to subcutaneous tissue right foot  5. 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 DEBBY   7. CHF  8. Paraplegia   9.  S/p L 5th toe amputation    Principal Problem:    Osteomyelitis of left foot (HCC)  Active Problems:    DEBBY (acute kidney injury) ([facility] [address]    Chronic systolic heart failure (HCC)    Volume overload betadine (right), 4x4s, Kerlix, Abd, light ace. · Will discuss with  Dr. Remi Wheeler.        Deanna Sotelo DPM   Podiatric Medicine & Surgery   3/27/2021 at 8:46 AM

## 2021-03-27 NOTE — PROGRESS NOTES
[facility] -   Infectious diseases evaluation  admission date 3/19/2021    reason for consultation:   Heel ulcers w bone exposed    Impression :   Current:  · bilat heel non healing ulcers -  · Left calcaneus osteomyelitis-medullary fibrosis   with reactive bone changes and focal acute and chronic osteomyelitis. · Bone biopsy taken L calca  · Sacral ulcer  · DM2     Other:  ·   Discussion / summary of stay / plan of care   ·   Recommendations     · podiatry plans for debridement of the calcaneus bone  · Zosyn, and adjust to bone culture final results  · Bone cx is neg but path suggesting acute on chronic OM      Infection Control Recommendations   · [place] Precautions  · Contact Isolation       Antimicrobial Stewardship Recommendations   · Simplification of therapy  · Targeted therapy  · IV to oral conversion  · Per Kg dosing  Coordination ofOutpatient Care:   · Estimated Length of IV antimicrobials:  · Patient will need Midline / picc Catheter Insertion:   · Patient will need SNF:  · Patient will need outpatient wound care:     History of Present Illness:   Initial history:  Kalina Huertas is a 38y.o.-year-old male bilat heel chronic wounds getting worse and bone exposed on the left - no cellulitis and no fever.   WBC normal  MRI ordered    Also has buttock wounds  ID called  DM2     Bone bx left calcan 3/23  Wound cx 3/23                    Interval changes  3/27/2021   Patient Vitals for the past 8 hrs:   BP Temp Temp src Pulse Resp SpO2   03/27/21 1200 128/78 98.4 °F (36.9 °C) Oral 83 21 (!) 89 %     Alert appropriate,  Vascular - good pulses - no vasc issues  Card deferring cath due to NAHID  Disc w podiatry, planning on shaving the calcaneus and flapping over it - closing the wound to protect it    No fever-no chills - abd soft  Not SOB  Labs reviewed  Sx monday    Bone biopsy from the calcaneus 3/23 negative -  Pathology medullary fibrosis  with reactive bone changes and focal acute and chronic osteomyelitis. Wound culture from the open left heel is showing for negative rods and Enterococcus bacteroides 3/23    Summary of relevant labs:  Labs:  W 9.8  Micro:   BC  Left calc bone biopsy cx 3/23 pending  Left heel wound cx 3/23 Enterococcus and gram-negative rods  Imaging:      I have personally reviewed the past medical history, past surgical history, medications, social history, and family history, and I haveupdated the database accordingly. Allergies: Other     Review of Systems:     Review of Systems   Constitutional: Positive for activity change. Negative for appetite change and diaphoresis. HENT: Negative for congestion. Eyes: Negative for pain, itching and visual disturbance. Respiratory: Negative for apnea and cough. Cardiovascular: Negative for chest pain. Gastrointestinal: Negative for abdominal distention. Endocrine: Negative for polyphagia. Genitourinary: Negative for dysuria and frequency. Musculoskeletal: Negative for arthralgias. Skin: Positive for wound. Allergic/Immunologic: Negative for immunocompromised state. Neurological: Negative for dizziness. Hematological: Negative for adenopathy. Psychiatric/Behavioral: Negative for agitation. Physical Examination :       Physical Exam  Constitutional:       Appearance: Normal appearance. He is obese. He is not ill-appearing. HENT:      Head: Normocephalic and atraumatic. Nose: Nose normal. No congestion. Mouth/Throat:      Mouth: Mucous membranes are moist.   Eyes:      Conjunctiva/sclera: Conjunctivae normal.   Neck:      Musculoskeletal: Neck supple. No neck rigidity. Cardiovascular:      Rate and Rhythm: Normal rate and regular rhythm. Heart sounds: Normal heart sounds. No murmur. No friction rub. Pulmonary:      Effort: No respiratory distress. Breath sounds: Normal breath sounds. Abdominal:      General: There is no distension. Palpations: Abdomen is soft. Tenderness: There is no abdominal tenderness. Comments: osteomy - mid abd scratch large wounds x 2 - clean   Genitourinary:     Comments: No lema  Musculoskeletal:         General: No swelling or deformity. Skin:     General: Skin is dry. Coloration: Skin is not pale. Findings: Lesion present. No erythema. Neurological:      General: No focal deficit present. Mental Status: He is alert and oriented to person, place, and time. Mental status is at baseline. Psychiatric:         Mood and Affect: Mood normal.         Thought Content:  Thought content normal.         Past Medical History:     Past Medical History:   Diagnosis Date    [facility]est[facility] failure[facility]     Diabetes mellitus ([facility]     Hypertension     [facility]     Spina bifida aperta of lumbar spine ([facility]        Past Surgical  History:     Past Surgical History:   Procedure Laterality Date    ABDOMEN SURGERY N/A 12/8/2019    DEBRIDEMENT  NECROTIZING FASCIITIS BUTTOCK, WOUND VAC REMOVAL DELAYED PRIMARY CLOSURE OF MIDLINE ABDOMINAL INCISION, OSTOMY BAG CHANGE performed by Kee Shrestha MD at [address] [place] [address] N/A 12/10/2019    DEBRIDEMENT OF SACRAL WOUND performed by Luis Daniel Arizmendi MD at [address] [place] [address] N/A 1/5/2020    GLUTEAL WOUND DEBRIDEMENT, WOUND VAC CHANGE performed by Luis Daniel Arizmendi MD at [address] [place] [address] N/A 1/11/2020    SACRAL DEBRIDEMENT WITH WOUND VAC CHANGE performed by Rachael Teajda MD at [facility]. 1/1/2020    IRRIGATION AND DEBRIDEMENT BUTTOCKS, SCROTUM, ABDOMEN, WOUND VAC CHANGE performed by Kee Shrestha MD at [facility] 12/3/2019    LAPAROSCOPIC CONVERTED TO OPEN DIVERTING LOOP COLOSTOMY; WOUND VAC APPLICATION performed by Dax Freire MD at [place] [address]  01/05/2020    GLUTEAL WOUND DEBRIDEMENT, WOUND VAC CHANGE     DEBRIDEMENT  01/08/2020    WOUND VAC Units Subcutaneous Nightly    atorvastatin  40 mg Oral Daily       Social History:     Social History     Socioeconomic History    Marital status: Unknown     Spouse name: Not on file    Number of children: Not on file    Years of education: Not on file    Highest education level: Not on file   Occupational History    Not on file   Social Needs    Financial resource strain: Not on file    Food insecurity     Worry: Not on file     Inability: Not on file    Transportation needs     Medical: Not on file     Non-medical: Not on file   Tobacco Use    Smoking status: Never Smoker    Smokeless tobacco: Never Used   Substance and Sexual Activity    Alcohol use: Never     Frequency: Never    Drug use: Never    Sexual activity: Not on file   Lifestyle    Physical activity     Days per week: Not on file     Minutes per session: Not on file    Stress: Not on file   Relationships    Social connections     Talks on phone: Not on file     Gets together: Not on file     Attends [employer]ism service: Not on file     Active member of club or organization: Not on file     Attends meetings of clubs or organizations: Not on file     Relationship status: Not on file    Intimate partner violence     Fear of current or ex partner: Not on file     Emotionally abused: Not on file     Physically abused: Not on file     Forced sexual activity: Not on file   Other Topics Concern    Not on file   Social History Narrative    Not on file       Family History:   No family history on file.    Medical Decision Making:   I have independently reviewed/ordered the following labs:    CBC with Differential:   Recent Labs     03/26/21  0613 03/27/21  0419   WBC 8.8 13.2*   HGB 9.5* 9.9*   HCT 33.8* 34.5*    287   LYMPHOPCT 12* 6*   MONOPCT 8 7     BMP:  Recent Labs     03/25/21  1435 03/26/21  0613 03/27/21  0419   NA  --  139 136   K  --  3.2* 3.5*   CL  --  104 101   CO2  --  22 22   BUN  --  75* 74*   CREATININE  --  3.34* 3.13* MG 1.5*  --  1.9     Hepatic Function Panel:   No results for input(s): PROT, LABALBU, BILIDIR, IBILI, BILITOT, ALKPHOS, ALT, AST in the last 72 hours. No results for input(s): RPR in the last 72 hours. No results for input(s): HIV in the last 72 hours. No results for input(s): BC in the last 72 hours. Lab Results   Component Value Date    CREATININE 3.13 03/27/2021    GLUCOSE 140 03/27/2021       Detailed results: Thank you for allowing us to participate in the care of this patient. Please call with questions. This note is created with the assistance of a speech recognition program.  While intending to generate adocument that actually reflects the content of the visit, the document can still have some errors including those of syntax and sound a like substitutions which may escape proof reading. It such instances, actual meaningcan be extrapolated by contextual diversion.     Linda Beavers MD  Office: [phone]  Perfect serve / office [phone]

## 2021-03-27 NOTE — PROGRESS NOTES
Rook boots placed on pt. All dressing changes complete, Ambrosio care done, New colostomy placed. IV consult in for IV team per pt request. Covid swab taken by resp. Pt scheduled for Sx Monday around 5pm per podiatry.

## 2021-03-27 NOTE — PROGRESS NOTES
Renal Progress Note    Patient :  Wendy Ballard; 38 y.o. MRN# [id number]  Location:  2002/2002-01  Attending:  Gael Overton MD  Admit Date:  3/19/2021   Hospital Day: 8      Subjective:     Patient was seen and examined. No new issues reported overnight. Patient continues on no IV fluids  Patient continues on medication: torsemide 20 mg p.o. daily and Aldactone 25 mg p.o. daily. Also getting sodium bicarbonate 1300 mg twice a day. Urine output over the last 24 hours 3.6 L overall -33 L since admission  Labs reviewed. Creatinine continues to improve 3.1 today was 3.3 yesterday potassium improved to 3.5 was 3.2 yesterday hemoglobin stable at 9.9. Chart reviewed.   Looks as though the podiatry procedure will be done on Monday    Outpatient Medications:     Medications Prior to Admission: sevelamer (RENVELA) 800 MG tablet, Take 2 tablets by mouth 3 times daily (with meals)  sodium bicarbonate 650 MG tablet, Take 650 mg by mouth 4 times daily  chlorhexidine (PERIDEX) 0.12 % solution, Take 15 mLs by mouth 2 times daily  acetaminophen (TYLENOL) 325 MG tablet, Take 650 mg by mouth every 6 hours as needed for Pain  Liraglutide (VICTOZA) 18 MG/3ML SOPN SC injection, 1.8 mg  oxybutynin (DITROPAN) 5 MG tablet,   Baclofen (LIORESAL) 5 MG tablet, Take 1 tablet by mouth 2 times daily  Epoetin Chaitanya-epbx (RETACRIT IJ), Inject 10,000 Units as directed Indications: M W F if levels are correct  insulin detemir (LEVEMIR FLEXTOUCH) 100 UNIT/ML injection pen, Levemir FlexTouch U-100 Insulin 100 unit/mL (3 mL) subcutaneous pen  atorvastatin (LIPITOR) 80 MG tablet, Take 100 mg by mouth daily   enoxaparin (LOVENOX) 40 MG/0.4ML injection, Inject 0.4 mg into the skin 2 times daily  ferrous sulfate (IRON 325) 325 (65 Fe) MG tablet, Take 325 mg by mouth 3 times daily (with meals)  magnesium oxide (MAG-OX) 400 MG tablet, Take 400 mg by mouth daily  senna (SENOKOT) 8.6 MG tablet, Take 1 tablet by mouth nightly  polyethylene glycol (GLYCOLAX) 17 g packet, Take 17 g by mouth daily as needed for Constipation  oxyCODONE (ROXICODONE) 5 MG immediate release tablet, Take 10 mg by mouth every 4 hours as needed for Pain. Multiple Vitamins-Minerals (THERAPEUTIC MULTIVITAMIN-MINERALS) tablet, Take 1 tablet by mouth daily  insulin glargine (LANTUS) 100 UNIT/ML injection vial, Inject 10 Units into the skin daily    Current Medications:     Scheduled Meds:    potassium chloride  40 mEq Oral Once    hydrALAZINE  50 mg Oral 3 times per day    torsemide  20 mg Oral Daily    spironolactone  25 mg Oral Daily    aspirin  81 mg Oral Daily    piperacillin-tazobactam  3,375 mg Intravenous Q8H    insulin glargine  15 Units Subcutaneous Nightly    metoprolol tartrate  50 mg Oral BID    famotidine  20 mg Oral Daily    sodium hypochlorite   Irrigation Daily    allopurinol  100 mg Oral Daily    Baclofen  5 mg Oral BID    magnesium oxide  400 mg Oral Daily    therapeutic multivitamin-minerals  1 tablet Oral Daily    senna  1 tablet Oral Nightly    sodium chloride flush  10 mL Intravenous 2 times per day    heparin (porcine)  5,000 Units Subcutaneous 3 times per day    insulin lispro  0-18 Units Subcutaneous TID WC    insulin lispro  0-9 Units Subcutaneous Nightly    atorvastatin  40 mg Oral Daily     Continuous Infusions:    dextrose       PRN Meds:  melatonin, acetaminophen, polyethylene glycol, sodium chloride flush, potassium chloride **OR** potassium alternative oral replacement **OR** potassium chloride, magnesium sulfate, promethazine **OR** ondansetron, acetaminophen **OR** acetaminophen, glucose, dextrose, glucagon (rDNA), dextrose, magnesium hydroxide    Input/Output:       I/O last 3 completed shifts: In: 1132.8 [P.O.:950; I.V.:182.8]  Out: 4169 [Urine:3600; Stool:450]. No data found.     Vital Signs:   Temperature:  Temp: 98.6 °F (37 °C)  TMax:   Temp (24hrs), Av °F (36.7 °C), Min:97.7 °F (36.5 °C), Max:98.6 °F (37 °C)    Respirations:  Resp: 23 Pulse:   Pulse: 95  BP:    BP: (!) 160/98  BP Range: Systolic (89ODQ), NGH:806 , Min:130 , XNA:332       Diastolic (92GAL), ZUK:71, Min:75, Max:98      Physical Examination:     General:  AAO x 3, speaking in full sentences, no accessory muscle use. HEENT: Atraumatic, normocephalic, no throat congestion, moist mucosa. Eyes:   Pupils equal, round and reactive to light, EOMI. Neck:   Supple  Chest:   Bilateral vesicular breath sounds, no rales or wheezes. Cardiac:  S1 S2 RR, no murmurs, gallops or rubs. Abdomen: Soft, non-tender, no masses or organomegaly, BS audible. :   No suprapubic or flank tenderness. Neuro:  AAO x 3, No FND. SKIN:  No rashes, good skin turgor. Extremities:  +ve 1-2+ edema. Positive anasarca. Labs:       Recent Labs     03/25/21  0744 03/26/21  0613 03/27/21  0419   WBC 8.7 8.8 13.2*   RBC 4.06* 4.09* 4.27   HGB 9.5* 9.5* 9.9*   HCT 33.9* 33.8* 34.5*   MCV 83.5 82.6 80.8*   MCH 23.4* 23.2* 23.2*   MCHC 28.0* 28.1* 28.7   RDW 18.6* 18.8* 18.9*    271 287   MPV 9.0 9.0 8.8      BMP:   Recent Labs     03/25/21  0744 03/26/21  0613 03/27/21  0419    139 136   K 3.3* 3.2* 3.5*    104 101   CO2 21 22 22   BUN 75* 75* 74*   CREATININE 3.35* 3.34* 3.13*   GLUCOSE 101* 82 140*   CALCIUM 8.1* 8.1* 8.2*      Magnesium:    Recent Labs     03/25/21  1435 03/27/21  0419   MG 1.5* 1.9     MARINE:      Lab Results   Component Value Date    MARINE NEGATIVE 11/30/2019     SPEP:  Lab Results   Component Value Date    PROT 5.3 12/01/2019    PATH ELECTRONICALLY SIGNED.  [place] Lat, M.D. 11/30/2019     C3:     Lab Results   Component Value Date    C3 97 11/30/2019     C4:     Lab Results   Component Value Date    C4 12 11/30/2019     Hep BsAg:         Lab Results   Component Value Date    HEPBSAG NONREACTIVE 11/30/2019     Hep C AB:          Lab Results   Component Value Date    HEPCAB NONREACTIVE 11/30/2019       Urinalysis/Chemistries:      Lab Results   Component Value Date    NITRU NEGATIVE 03/19/2021    COLORU YELLOW 03/19/2021    PHUR 7.5 03/19/2021    WBCUA TOO NUMEROUS TO COUNT 03/19/2021    RBCUA 5 TO 10 03/19/2021    MUCUS NOT REPORTED 03/19/2021    TRICHOMONAS NOT REPORTED 03/19/2021    YEAST NOT REPORTED 03/19/2021    BACTERIA MODERATE 03/19/2021    SPECGRAV 1.010 03/19/2021    LEUKOCYTESUR LARGE 03/19/2021    UROBILINOGEN Normal 03/19/2021    BILIRUBINUR NEGATIVE 03/19/2021    GLUCOSEU NEGATIVE 03/19/2021    KETUA NEGATIVE 03/19/2021    AMORPHOUS NOT REPORTED 03/19/2021     Urine Sodium:     Lab Results   Component Value Date    DONOVAN 75 03/19/2021      Urine Creatinine:     Lab Results   Component Value Date    LABCREA 26.0 03/21/2021       Radiology:     Reviewed. Assessment:     1. Acute Kidney Injury nonoliguric likely secondary to ischemic ATN from underlying infection. Creatinine still evolving. 3.1 today was 3.3 yesterday  Baseline creatinine seems to be 1.8 to 2 mg/DL. 2.  Diabetic left foot ulcer. 3.  Osteomyelitis left calcaneus. 4.  Diabetic right foot ulcer. 5.  Worsening gluteal and sacral wounds. 6.  Type 2 diabetes with poor control. 7.  Morbid obesity with BMI of 49.99.  8.  Cardiomyopathy with EF of 25% with global hypokinesia. 2D echo done on 3/20/2021.  9.  Chronic indwelling Ambrosio catheter. 10.  Recurrent UTIs. 6.  Spina bifida with history of immobility. 12.  Chronic nonhealing gluteal wounds with history of necrotizing fasciitis status post multiple debridements with diverting colostomy in 2019. 13.  Nonnephrotic proteinuria. Plan:   1. Continue current diuretics. 2.  Monitor strict I's and O's and renal function. 3.  Podiatry discussing further treatment options about patient's left foot ulcer and osteomyelitis, treatments including although up to amputations being addressed. 4.  Antibiotics as per renal dosing per infectious disease. 5.  Labs in a.m.  6.  Will follow. Nutrition   Please ensure that patient is on a renal diet/TF.  Avoid nephrotoxic drugs/contrast exposure. We will continue to follow along with you. Electronically signed by Annamaria Frazier CNP, APRN - CNP on 3/27/2021 at 8:03 AM   [facility]     Attending Physician Statement  I have discussed the care of Colby Arciniega, including pertinent history and exam findings with the CNP. I have reviewed the key elements of all parts of the encounter with the CNP. I have seen and examined the patient. I agree with the assessment and plan and status of the problem list as documented. Timo Tyson MD  Nephrology Attending Physician  [facility]    This note is created with the assistance of a speech-recognition program. While intending to generate a document that actually reflects the content of the visit, no guarantees can be provided that every mistake has been identified and corrected by editing.

## 2021-03-27 NOTE — PROGRESS NOTES
received a PS from Tri Agustin NP that the letter will be provided in the morning to the patient for Medicare

## 2021-03-28 ENCOUNTER — ANESTHESIA EVENT (OUTPATIENT)
Dept: OPERATING ROOM | Age: 36
DRG: 628 | End: 2021-03-28
Payer: MEDICARE

## 2021-03-28 LAB
ABSOLUTE EOS #: 0.22 K/UL (ref 0–0.44)
ABSOLUTE IMMATURE GRANULOCYTE: 0.07 K/UL (ref 0–0.3)
ABSOLUTE LYMPH #: 0.95 K/UL (ref 1.1–3.7)
ABSOLUTE MONO #: 0.97 K/UL (ref 0.1–1.2)
ANION GAP SERPL CALCULATED.3IONS-SCNC: 12 MMOL/L (ref 9–17)
BASOPHILS # BLD: 0 % (ref 0–2)
BASOPHILS ABSOLUTE: 0.05 K/UL (ref 0–0.2)
BUN BLDV-MCNC: 72 MG/DL (ref 6–20)
BUN/CREAT BLD: ABNORMAL (ref 9–20)
CALCIUM SERPL-MCNC: 8.6 MG/DL (ref 8.6–10.4)
CHLORIDE BLD-SCNC: 102 MMOL/L (ref 98–107)
CO2: 23 MMOL/L (ref 20–31)
CREAT SERPL-MCNC: 3.24 MG/DL (ref 0.7–1.2)
CULTURE: ABNORMAL
DIFFERENTIAL TYPE: ABNORMAL
DIRECT EXAM: ABNORMAL
EOSINOPHILS RELATIVE PERCENT: 2 % (ref 1–4)
GFR AFRICAN AMERICAN: 26 ML/MIN
GFR NON-AFRICAN AMERICAN: 22 ML/MIN
GFR SERPL CREATININE-BSD FRML MDRD: ABNORMAL ML/MIN/{1.73_M2}
GFR SERPL CREATININE-BSD FRML MDRD: ABNORMAL ML/MIN/{1.73_M2}
GLUCOSE BLD-MCNC: 116 MG/DL (ref 75–110)
GLUCOSE BLD-MCNC: 123 MG/DL (ref 70–99)
GLUCOSE BLD-MCNC: 185 MG/DL (ref 75–110)
GLUCOSE BLD-MCNC: 188 MG/DL (ref 75–110)
GLUCOSE BLD-MCNC: 203 MG/DL (ref 75–110)
HCT VFR BLD CALC: 35.5 % (ref 40.7–50.3)
HEMOGLOBIN: 10.2 G/DL (ref 13–17)
IMMATURE GRANULOCYTES: 1 %
LYMPHOCYTES # BLD: 8 % (ref 24–43)
Lab: ABNORMAL
Lab: ABNORMAL
MCH RBC QN AUTO: 23.2 PG (ref 25.2–33.5)
MCHC RBC AUTO-ENTMCNC: 28.7 G/DL (ref 28.4–34.8)
MCV RBC AUTO: 80.9 FL (ref 82.6–102.9)
MONOCYTES # BLD: 8 % (ref 3–12)
NRBC AUTOMATED: 0 PER 100 WBC
PDW BLD-RTO: 19.1 % (ref 11.8–14.4)
PLATELET # BLD: 283 K/UL (ref 138–453)
PLATELET ESTIMATE: ABNORMAL
PMV BLD AUTO: 8.6 FL (ref 8.1–13.5)
POTASSIUM SERPL-SCNC: 4 MMOL/L (ref 3.7–5.3)
RBC # BLD: 4.39 M/UL (ref 4.21–5.77)
RBC # BLD: ABNORMAL 10*6/UL
SEG NEUTROPHILS: 81 % (ref 36–65)
SEGMENTED NEUTROPHILS ABSOLUTE COUNT: 9.73 K/UL (ref 1.5–8.1)
SODIUM BLD-SCNC: 137 MMOL/L (ref 135–144)
SPECIMEN DESCRIPTION: ABNORMAL
SPECIMEN DESCRIPTION: ABNORMAL
WBC # BLD: 12 K/UL (ref 3.5–11.3)
WBC # BLD: ABNORMAL 10*3/UL

## 2021-03-28 PROCEDURE — 99232 SBSQ HOSP IP/OBS MODERATE 35: CPT | Performed by: INTERNAL MEDICINE

## 2021-03-28 PROCEDURE — 99232 SBSQ HOSP IP/OBS MODERATE 35: CPT | Performed by: FAMILY MEDICINE

## 2021-03-28 PROCEDURE — 2580000003 HC RX 258: Performed by: INTERNAL MEDICINE

## 2021-03-28 PROCEDURE — 51702 INSERT TEMP BLADDER CATH: CPT

## 2021-03-28 PROCEDURE — 97110 THERAPEUTIC EXERCISES: CPT

## 2021-03-28 PROCEDURE — 6370000000 HC RX 637 (ALT 250 FOR IP): Performed by: INTERNAL MEDICINE

## 2021-03-28 PROCEDURE — 2060000000 HC ICU INTERMEDIATE R&B

## 2021-03-28 PROCEDURE — 6370000000 HC RX 637 (ALT 250 FOR IP): Performed by: NURSE PRACTITIONER

## 2021-03-28 PROCEDURE — 6370000000 HC RX 637 (ALT 250 FOR IP): Performed by: FAMILY MEDICINE

## 2021-03-28 PROCEDURE — 82947 ASSAY GLUCOSE BLOOD QUANT: CPT

## 2021-03-28 PROCEDURE — 85025 COMPLETE CBC W/AUTO DIFF WBC: CPT

## 2021-03-28 PROCEDURE — 80048 BASIC METABOLIC PNL TOTAL CA: CPT

## 2021-03-28 PROCEDURE — 6370000000 HC RX 637 (ALT 250 FOR IP): Performed by: STUDENT IN AN ORGANIZED HEALTH CARE EDUCATION/TRAINING PROGRAM

## 2021-03-28 PROCEDURE — 6370000000 HC RX 637 (ALT 250 FOR IP): Performed by: SURGERY

## 2021-03-28 PROCEDURE — 6360000002 HC RX W HCPCS: Performed by: INTERNAL MEDICINE

## 2021-03-28 PROCEDURE — 97530 THERAPEUTIC ACTIVITIES: CPT

## 2021-03-28 PROCEDURE — 6370000000 HC RX 637 (ALT 250 FOR IP): Performed by: CLINICAL NURSE SPECIALIST

## 2021-03-28 PROCEDURE — 36415 COLL VENOUS BLD VENIPUNCTURE: CPT

## 2021-03-28 RX ORDER — BENZONATATE 100 MG/1
100 CAPSULE ORAL 3 TIMES DAILY PRN
Status: DISCONTINUED | OUTPATIENT
Start: 2021-03-28 | End: 2021-04-02 | Stop reason: HOSPADM

## 2021-03-28 RX ADMIN — PIPERACILLIN AND TAZOBACTAM 3375 MG: 3; .375 INJECTION, POWDER, LYOPHILIZED, FOR SOLUTION INTRAVENOUS at 09:05

## 2021-03-28 RX ADMIN — Medication 1 TABLET: at 09:02

## 2021-03-28 RX ADMIN — BACLOFEN 5 MG: 10 TABLET ORAL at 21:22

## 2021-03-28 RX ADMIN — HEPARIN SODIUM 5000 UNITS: 5000 INJECTION INTRAVENOUS; SUBCUTANEOUS at 05:30

## 2021-03-28 RX ADMIN — PIPERACILLIN AND TAZOBACTAM 3375 MG: 3; .375 INJECTION, POWDER, LYOPHILIZED, FOR SOLUTION INTRAVENOUS at 17:26

## 2021-03-28 RX ADMIN — METOPROLOL TARTRATE 50 MG: 25 TABLET ORAL at 09:02

## 2021-03-28 RX ADMIN — HYDRALAZINE HYDROCHLORIDE 50 MG: 50 TABLET, FILM COATED ORAL at 05:30

## 2021-03-28 RX ADMIN — BENZONATATE 100 MG: 100 CAPSULE ORAL at 14:44

## 2021-03-28 RX ADMIN — TORSEMIDE 20 MG: 20 TABLET ORAL at 09:02

## 2021-03-28 RX ADMIN — MAGNESIUM GLUCONATE 500 MG ORAL TABLET 400 MG: 500 TABLET ORAL at 09:02

## 2021-03-28 RX ADMIN — PIPERACILLIN AND TAZOBACTAM 3375 MG: 3; .375 INJECTION, POWDER, LYOPHILIZED, FOR SOLUTION INTRAVENOUS at 01:55

## 2021-03-28 RX ADMIN — ALLOPURINOL 100 MG: 100 TABLET ORAL at 09:03

## 2021-03-28 RX ADMIN — HYDRALAZINE HYDROCHLORIDE 50 MG: 50 TABLET, FILM COATED ORAL at 21:23

## 2021-03-28 RX ADMIN — DESMOPRESSIN ACETATE 40 MG: 0.2 TABLET ORAL at 09:02

## 2021-03-28 RX ADMIN — INSULIN LISPRO 2 UNITS: 100 INJECTION, SOLUTION INTRAVENOUS; SUBCUTANEOUS at 21:26

## 2021-03-28 RX ADMIN — STANDARDIZED SENNA CONCENTRATE 8.6 MG: 8.6 TABLET ORAL at 21:22

## 2021-03-28 RX ADMIN — METOPROLOL TARTRATE 50 MG: 25 TABLET ORAL at 21:23

## 2021-03-28 RX ADMIN — INSULIN LISPRO 3 UNITS: 100 INJECTION, SOLUTION INTRAVENOUS; SUBCUTANEOUS at 13:01

## 2021-03-28 RX ADMIN — HYDRALAZINE HYDROCHLORIDE 50 MG: 50 TABLET, FILM COATED ORAL at 13:23

## 2021-03-28 RX ADMIN — SODIUM CHLORIDE, PRESERVATIVE FREE 10 ML: 5 INJECTION INTRAVENOUS at 09:04

## 2021-03-28 RX ADMIN — ACETAMINOPHEN 650 MG: 325 TABLET ORAL at 09:12

## 2021-03-28 RX ADMIN — INSULIN LISPRO 3 UNITS: 100 INJECTION, SOLUTION INTRAVENOUS; SUBCUTANEOUS at 17:26

## 2021-03-28 RX ADMIN — ASPIRIN 81 MG: 81 TABLET, CHEWABLE ORAL at 09:03

## 2021-03-28 RX ADMIN — BACLOFEN 5 MG: 10 TABLET ORAL at 09:02

## 2021-03-28 RX ADMIN — FAMOTIDINE 20 MG: 20 TABLET, FILM COATED ORAL at 09:03

## 2021-03-28 RX ADMIN — Medication 3 MG: at 21:26

## 2021-03-28 RX ADMIN — SPIRONOLACTONE 25 MG: 25 TABLET ORAL at 09:02

## 2021-03-28 RX ADMIN — DAKIN'S SOLUTION 0.125% (QUARTER STRENGTH): 0.12 SOLUTION at 09:04

## 2021-03-28 RX ADMIN — HEPARIN SODIUM 5000 UNITS: 5000 INJECTION INTRAVENOUS; SUBCUTANEOUS at 13:00

## 2021-03-28 RX ADMIN — BENZONATATE 100 MG: 100 CAPSULE ORAL at 23:24

## 2021-03-28 RX ADMIN — INSULIN GLARGINE 15 UNITS: 100 INJECTION, SOLUTION SUBCUTANEOUS at 21:27

## 2021-03-28 ASSESSMENT — ENCOUNTER SYMPTOMS
STRIDOR: 0
COUGH: 0
ABDOMINAL DISTENTION: 0
EYE ITCHING: 0
EYE PAIN: 0
APNEA: 0

## 2021-03-28 ASSESSMENT — PAIN SCALES - GENERAL: PAINLEVEL_OUTOF10: 1

## 2021-03-28 NOTE — PROGRESS NOTES
[facility] -   Infectious diseases evaluation  admission date 3/19/2021    reason for consultation:   Heel ulcers w bone exposed    Impression :   Current:  · bilat heel non healing ulcers -  · Left calcaneus osteomyelitis-medullary fibrosis   with reactive bone changes and focal acute and chronic osteomyelitis. · Bone biopsy taken L calca  · Sacral ulcer  · DM2  · Acute on chronic CHF  · DEBBY    Other:  · Morbid obesity  · History of nec fash s/p multiple debridement with diverting colostomy in 2019  Discussion / summary of stay / plan of care   ·   Recommendations     · Keep Zosyn, and adjust to bone culture final results  · Bone cx is neg but path suggesting acute on chronic OM  · Planned for left partial calcanectomy and antibiotic bead placement with wound VAC tomorrow under local anesthesia    Infection Control Recommendations   · Reseda Precautions  · Contact Isolation       Antimicrobial Stewardship Recommendations   · Simplification of therapy  · Targeted therapy  · IV to oral conversion  · Per Kg dosing  Coordination ofOutpatient Care:   · Estimated Length of IV antimicrobials:  · Patient will need Midline / picc Catheter Insertion:   · Patient will need SNF:  · Patient will need outpatient wound care:     History of Present Illness:   Initial history:  Lary Sandy is a 38y.o.-year-old male bilat heel chronic wounds getting worse and bone exposed on the left - no cellulitis and no fever. WBC normal  MRI ordered    Also has buttock wounds  ID called  DM2     Bone bx left calcan 3/23  Wound cx 3/23                    Interval changes  3/28/2021   Patient Vitals for the past 8 hrs:   BP Temp Temp src Weight   03/28/21 0800 (!) 148/98 98 °F (36.7 °C) Oral    03/28/21 0423    (!) 338 lb (153.3 kg)     Alert appropriate, no complaints reported. He is aware that he is scheduled for surgery tomorrow. Hemodynamically stable.   Local wound cultures growing Neck supple. No neck rigidity or muscular tenderness. Cardiovascular:      Rate and Rhythm: Normal rate and regular rhythm. Heart sounds: Normal heart sounds. No murmur. No friction rub. Pulmonary:      Effort: No respiratory distress. Breath sounds: Normal breath sounds. Abdominal:      General: There is no distension. Palpations: Abdomen is soft. Tenderness: There is no abdominal tenderness. Comments: osteomy - mid abd scratch large wounds x 2 - clean   Genitourinary:     Comments: No lema  Musculoskeletal:         General: No swelling or deformity. Skin:     General: Skin is dry. Coloration: Skin is not pale. Findings: Lesion present. No erythema. Neurological:      General: No focal deficit present. Mental Status: He is alert and oriented to person, place, and time. Mental status is at baseline. Psychiatric:         Mood and Affect: Mood normal.         Thought Content:  Thought content normal.         Past Medical History:     Past Medical History:   Diagnosis Date    [facility]estive [facility] failure) [facility]     Diabetes mellitus ([facility]     Hypertension     Septic shock ([facility]     Spina bifida aperta of lumbar spine ([facility]        Past Surgical  History:     Past Surgical History:   Procedure Laterality Date    ABDOMEN SURGERY N/A 12/8/2019    DEBRIDEMENT  NECROTIZING FASCIITIS BUTTOCK, WOUND VAC REMOVAL DELAYED PRIMARY CLOSURE OF MIDLINE ABDOMINAL INCISION, OSTOMY BAG CHANGE performed by Ryan Junior MD at [address] [facility] [facility] N/A 12/10/2019    DEBRIDEMENT OF SACRAL WOUND performed by Hanna Briseno MD at [address] [facility] N/A 1/5/2020    GLUTEAL WOUND DEBRIDEMENT, WOUND VAC CHANGE performed by Hanna Briseno MD at [address] [facility] Floor N/A 1/11/2020    SACRAL DEBRIDEMENT WITH WOUND VAC CHANGE performed by Jude Duke MD at [facility]. 1/1/2020    IRRIGATION AND DEBRIDEMENT BUTTOCKS, SCROTUM, ABDOMEN, WOUND VAC CHANGE performed by Shabbir Wyatt MD at [address] [facility] N/A 12/3/2019    LAPAROSCOPIC CONVERTED TO OPEN DIVERTING LOOP COLOSTOMY; WOUND VAC APPLICATION performed by Jose A Pappas MD at [facility] [address]  01/05/2020    GLUTEAL WOUND DEBRIDEMENT, WOUND VAC CHANGE     DEBRIDEMENT  01/08/2020    WOUND VAC CHANGE SACRAL DECUBITUS, POSSIBLE DEBRIDEMENT    DEBRIDEMENT  01/11/2020     SACRAL DEBRIDEMENT WITH WOUND VAC CHANGE     INCISION AND DRAINAGE Bilateral 11/29/2019    RECTAL PERIRECTAL INCISION AND DRAINAGE, [facility] LOOP COLOSTOMY CREATION performed by Xochitl Mojica MD at [address] N/A 12/6/2019    DEBRIDEMENT NECROTIZING FASCIAITIS BILAT BUTTOCK performed by Julian Chan MD at [address] N/A 12/20/2019    DEBRIDEMENT GLUTEAL AND SCROTAL REGIONS performed by Xochitl Mojica MD at [address] N/A 12/26/2019    INCISION AND DRAINAGE AND WOUND VAC CHANGE BUTTOCK, PERINEUM performed by Latanya Cottrell DO at [address] N/A 1/8/2020    WOUND VAC CHANGE SACRAL DECUBITUS, DEBRIDEMENT performed by Julian Chan MD at [place] [address] N/A 12/10/2019    SCROTAL EXPLORATION WITH SCROTAL DEBRIDEMENT performed by Donovan Tatum MD at [place] [address] 12/23/2019    WOUND [address] performed by Julian Chan MD at [facility] OR       Medications:      potassium chloride  40 mEq Oral Once    hydrALAZINE  50 mg Oral 3 times per day    torsemide  20 mg Oral Daily    spironolactone  25 mg Oral Daily    aspirin  81 mg Oral Daily    piperacillin-tazobactam  3,375 mg Intravenous Q8H    insulin glargine  15 Units Subcutaneous Nightly    metoprolol tartrate  50 mg Oral BID    famotidine  20 mg Oral Daily    sodium hypochlorite   Irrigation Daily    allopurinol  100 mg Oral Daily    Baclofen  5 mg Oral BID    magnesium oxide  400 mg Oral Daily    therapeutic multivitamin-minerals  1 tablet Oral Daily    senna  1 tablet Oral Nightly    sodium chloride flush  10 mL Intravenous 2 times per day    heparin (porcine)  5,000 Units Subcutaneous 3 times per day    insulin lispro  0-18 Units Subcutaneous TID WC    insulin lispro  0-9 Units Subcutaneous Nightly    atorvastatin  40 mg Oral Daily       Social History:     Social History     Socioeconomic History    Marital status: Unknown     Spouse name: Not on file    Number of children: Not on file    Years of education: Not on file    Highest education level: Not on file   Occupational History    Not on file   Social Needs    Financial resource strain: Not on file    Food insecurity     Worry: Not on file     Inability: Not on file    Transportation needs     Medical: Not on file     Non-medical: Not on file   Tobacco Use    Smoking status: Never Smoker    Smokeless tobacco: Never Used   Substance and Sexual Activity    Alcohol use: Never     Frequency: Never    Drug use: Never    Sexual activity: Not on file   Lifestyle    Physical activity     Days per week: Not on file     Minutes per session: Not on file    Stress: Not on file   Relationships    Social connections     Talks on phone: Not on file     Gets together: Not on file     Attends Confucianism service: Not on file     Active member of club or organization: Not on file     Attends meetings of clubs or organizations: Not on file     Relationship status: Not on file    Intimate partner violence     Fear of current or ex partner: Not on file     Emotionally abused: Not on file     Physically abused: Not on file     Forced sexual activity: Not on file   Other Topics Concern    Not on file   Social History Narrative    Not on file       Family History:   No family history on file.    Medical Decision Making:   I have independently reviewed/ordered the following labs:    CBC with

## 2021-03-28 NOTE — ANESTHESIA PRE PROCEDURE
Department of Anesthesiology  Preprocedure Note       Name:  Veto Thao   Age:  38 y.o.  :  1985                                          MRN:  [id number]         Date:  3/28/2021      Surgeon: Rodriguez Hawk):  Lino Holbrook DPM    Procedure: Procedure(s):  LEFT PARTIAL CALCANECTOMY WITH WOUND VAC PLACEMENT. NEEDS POPLITEAL BLOCK, REQ 1700    Medications prior to admission:   Prior to Admission medications    Medication Sig Start Date End Date Taking?  Authorizing Provider   sevelamer (RENVELA) 800 MG tablet Take 2 tablets by mouth 3 times daily (with meals)    Historical Provider, MD   sodium bicarbonate 650 MG tablet Take 650 mg by mouth 4 times daily    Historical Provider, MD   chlorhexidine (PERIDEX) 0.12 % solution Take 15 mLs by mouth 2 times daily    Historical Provider, MD   acetaminophen (TYLENOL) 325 MG tablet Take 650 mg by mouth every 6 hours as needed for Pain    Historical Provider, MD   Liraglutide (VICTOZA) 18 MG/3ML SOPN SC injection 1.8 mg 10/12/19   Historical Provider, MD   oxybutynin (DITROPAN) 5 MG tablet  20   Historical Provider, MD   Baclofen (LIORESAL) 5 MG tablet Take 1 tablet by mouth 2 times daily 20   Aura Keenan APRN - CNP   Epoetin Chaitanya-epbx (RETACRIT IJ) Inject 10,000 Units as directed Indications: M W F if levels are correct    Historical Provider, MD   insulin detemir (LEVEMIR FLEXTOUCH) 100 UNIT/ML injection pen Levemir FlexTouch U-100 Insulin 100 unit/mL (3 mL) subcutaneous pen    Historical Provider, MD   atorvastatin (LIPITOR) 80 MG tablet Take 100 mg by mouth daily  19   Historical Provider, MD   enoxaparin (LOVENOX) 40 MG/0.4ML injection Inject 0.4 mg into the skin 2 times daily    Historical Provider, MD   ferrous sulfate (IRON 325) 325 (65 Fe) MG tablet Take 325 mg by mouth 3 times daily (with meals)    Historical Provider, MD   magnesium oxide (MAG-OX) 400 MG tablet Take 400 mg by mouth daily    Historical Provider, MD   senna (SENOKOT) 8.6 MG tablet Take 1 tablet by mouth nightly    Historical Provider, MD   polyethylene glycol (GLYCOLAX) 17 g packet Take 17 g by mouth daily as needed for Constipation    Historical Provider, MD   oxyCODONE (ROXICODONE) 5 MG immediate release tablet Take 10 mg by mouth every 4 hours as needed for Pain.     Historical Provider, MD   Multiple Vitamins-Minerals (THERAPEUTIC MULTIVITAMIN-MINERALS) tablet Take 1 tablet by mouth daily 1/16/20   Davina Pink MD   insulin glargine (LANTUS) 100 UNIT/ML injection vial Inject 10 Units into the skin daily 1/16/20   Davina Pink MD       Current medications:    Current Facility-Administered Medications   Medication Dose Route Frequency Provider Last Rate Last Admin    potassium chloride (KLOR-CON M) extended release tablet 40 mEq  40 mEq Oral Once MIRTA Campos CNP        hydrALAZINE (APRESOLINE) tablet 50 mg  50 mg Oral 3 times per day Dearl ANALILIA ArcherN - NP   50 mg at 03/28/21 0530    torsemide (DEMADEX) tablet 20 mg  20 mg Oral Daily Clayton Wu MD   20 mg at 03/28/21 0902    spironolactone (ALDACTONE) tablet 25 mg  25 mg Oral Daily Clayton Wu MD   25 mg at 03/28/21 0902    aspirin chewable tablet 81 mg  81 mg Oral Daily MIRTA Nolasco NP   81 mg at 03/28/21 9623    melatonin tablet 3 mg  3 mg Oral Nightly PRN MIRTA East CNP   3 mg at 03/27/21 2117    piperacillin-tazobactam (ZOSYN) 3,375 mg in dextrose 5 % 50 mL IVPB extended infusion (mini-bag)  3,375 mg Intravenous Q8H Diana Diaz MD 12.5 mL/hr at 03/28/21 0905 3,375 mg at 03/28/21 0905    insulin glargine (LANTUS) injection vial 15 Units  15 Units Subcutaneous Nightly Dearl Cinnamon, APRN - NP   15 Units at 03/27/21 2124    metoprolol tartrate (LOPRESSOR) tablet 50 mg  50 mg Oral BID Dearl Cinnamon, APRN - NP   50 mg at 03/28/21 0902    famotidine (PEPCID) tablet 20 mg  20 mg Oral Daily Dearl Cinnamon, APRN - NP   20 mg at 03/28/21 0903    sodium hypochlorite (DAKINS) 0.125 % external solution   Irrigation Daily [place] Palm DPM   Given at 03/28/21 2815    allopurinol (ZYLOPRIM) tablet 100 mg  100 mg Oral Daily Shazia Marie MD   100 mg at 03/28/21 0976    acetaminophen (TYLENOL) tablet 650 mg  650 mg Oral Q6H PRN [facility] Lobito Rogerr, DO   650 mg at 03/28/21 0912    baclofen (LIORESAL) tablet 5 mg  5 mg Oral BID Gagan Lobitoharleen Tate, DO   5 mg at 03/28/21 0902    magnesium oxide (MAG-OX) tablet 400 mg  400 mg Oral Daily [facility]   400 mg at 03/28/21 3701    therapeutic multivitamin-minerals 1 tablet  1 tablet Oral Daily Gagan Lobitoharleen Tate, DO   1 tablet at 03/28/21 8455    senna (SENOKOT) tablet 8.6 mg  1 tablet Oral Nightly Gagan Lobitoharleen Tate DO   8.6 mg at 03/27/21 2117    polyethylene glycol (GLYCOLAX) packet 17 g  17 g Oral Daily PRN [facility] Lobito Rogerr, DO        sodium chloride flush 0.9 % injection 10 mL  10 mL Intravenous 2 times per day Gagan Lobitoharleen Tate DO   10 mL at 03/28/21 6528    sodium chloride flush 0.9 % injection 10 mL  10 mL Intravenous PRN Gagan Lobitoharleen Ttae, DO        potassium chloride (KLOR-CON M) extended release tablet 40 mEq  40 mEq Oral PRN [facility], DO   40 mEq at 03/27/21 1416    Or    potassium bicarb-citric acid (EFFER-K) effervescent tablet 40 mEq  40 mEq Oral PRN [facility] Rog[facility], DO        Or    potassium chloride 10 mEq/100 mL IVPB (Peripheral Line)  10 mEq Intravenous PRN Gagan Lobitoharleen Tate, DO        magnesium sulfate 1000 mg in dextrose 5% 100 mL IVPB  1,000 mg Intravenous PRN Gagan Lobitoharleen Tate, DO        promethazine (PHENERGAN) tablet 12.5 mg  12.5 mg Oral Q6H PRN Gagan Lobitoharleen Tate, DO        Or    [facility]etron [facility]) injection 4 mg  4 mg Intravenous Q6H PRN Gagan Lobitoharleen Tate, DO        acetaminophen (TYLENOL) tablet 650 mg  650 mg Oral Q6H PRN Gagan Tate DO   650 mg at 03/20/21 2142    Or    acetaminophen (TYLENOL) suppository 650 mg  650 mg Rectal Q6H PRN Maylon Combe, DO        heparin (porcine) injection 5,000 Units  5,000 Units Subcutaneous 3 times per day Maylon Combe, DO   5,000 Units at 03/28/21 0530    insulin lispro (HUMALOG) injection vial 0-18 Units  0-18 Units Subcutaneous TID WC Barrera Crea Hauger, DO   3 Units at 03/27/21 1741    insulin lispro (HUMALOG) injection vial 0-9 Units  0-9 Units Subcutaneous Nightly Barrera Crea Hauger, DO   2 Units at 03/27/21 2125    glucose (GLUTOSE) 40 % oral gel 15 g  15 g Oral PRN Barrera Crea Hauger, DO        dextrose 50 % IV solution  12.5 g Intravenous PRN Barrera Crea Hauger, DO   12.5 g at 03/23/21 9175    glucagon (rDNA) injection 1 mg  1 mg Intramuscular PRN Barrera Crea Hauger, DO        dextrose 5 % solution  100 mL/hr Intravenous PRN Barrera Crea Hauger, DO        magnesium hydroxide (MILK OF MAGNESIA) 400 MG/5ML suspension 30 mL  30 mL Oral Daily PRN Barrera Crea Hauger, DO        atorvastatin (LIPITOR) tablet 40 mg  40 mg Oral Daily Evelyne Shira, APRN - CNS   40 mg at 03/28/21 4352       Allergies:     Allergies   Allergen Reactions    Other      Mask adhesive causes hives       Problem List:    Patient Active Problem List   Diagnosis Code    Necrotizing fasciitis ([facility] M72.6    Essential hypertension I10    DM II (diabetes mellitus, type II), controlled ([address] E11.9    Diabetic foot ulcer ([address] E11.621, L97.509    Diabetic foot infection ([address] E11.628, L08.9    Iron deficiency anemia D50.9    Muscle weakness of right upper extremity M62.81    Wrist drop, acquired, right M21.331    Chronic paraplegia ([facility]) G82.20    DEBBY (acute kidney injury) ([address] N17.9    Right arm weakness R29.898    Chronic systolic heart failure ([facility]) I50.22    Sacral wound S31.000A    Open wound of left foot S91.302A    Ambrosio catheter in place Z97.8    Colostomy in place ([facility]) Z93.3    Volume overload E87.70    Hypoglycemia due to insulin E16.0, T38.3X5A    CKD (chronic kidney AND SCROTAL REGIONS performed by Ree Guadalupe MD at [facility] N/A 12/26/2019    INCISION AND DRAINAGE AND WOUND VAC CHANGE BUTTOCK, PERINEUM performed by Manuel Bravo DO at [facility] [address] N/A 1/8/2020    WOUND VAC CHANGE SACRAL DECUBITUS, DEBRIDEMENT performed by Natasha Latif MD at [facility] N/A 12/10/2019    SCROTAL EXPLORATION WITH SCROTAL DEBRIDEMENT performed by Calista Levine MD at [address] [facility] N/A 12/23/2019    [address] performed by Natasha Latif MD at [place] [address] History:    Social History     Tobacco Use    Smoking status: Never Smoker    Smokeless tobacco: Never Used   Substance Use Topics    Alcohol use: Never     Frequency: Never                                Counseling given: Not Answered      Vital Signs (Current):   Vitals:    03/28/21 0200 03/28/21 0230 03/28/21 0423 03/28/21 0800   BP: (!) 152/96 (!) 159/112  (!) 148/98   Pulse: 90 90     Resp: 14 12     Temp:    98 °F (36.7 °C)   TempSrc:    Oral   SpO2:       Weight:   (!) 338 lb (153.3 kg)    Height:                                                  BP Readings from Last 3 Encounters:   03/28/21 (!) 148/98   09/02/20 (!) 153/93   01/16/20 128/68       NPO Status:                                                                                 BMI:   Wt Readings from Last 3 Encounters:   03/28/21 (!) 338 lb (153.3 kg)   09/02/20 220 lb (99.8 kg)   01/13/20 284 lb 14.4 oz (129.2 kg)     Body mass index is 49.91 kg/m².     CBC:   Lab Results   Component Value Date    WBC 12.0 03/28/2021    RBC 4.39 03/28/2021    HGB 10.2 03/28/2021    HCT 35.5 03/28/2021    MCV 80.9 03/28/2021    RDW 19.1 03/28/2021     03/28/2021       CMP:   Lab Results   Component Value Date     03/28/2021    K 4.0 03/28/2021     03/28/2021    CO2 23 03/28/2021    BUN 72 03/28/2021    CREATININE 3.24 03/28/2021    GFRAA 26 03/28/2021    LABGLOM 22 03/28/2021    GLUCOSE 123 03/28/2021    PROT 5.3 12/01/2019    CALCIUM 8.6 03/28/2021    BILITOT 1.20 12/01/2019    ALKPHOS 127 12/01/2019    AST 40 12/01/2019    ALT 6 12/01/2019       POC Tests:   Recent Labs     03/28/21  0659   POCGLU 116*       Coags:   Lab Results   Component Value Date    PROTIME 12.0 03/20/2021    INR 1.1 03/20/2021    APTT 25.8 03/20/2021       HCG (If Applicable): No results found for: PREGTESTUR, PREGSERUM, HCG, HCGQUANT     ABGs: No results found for: PHART, PO2ART, TSI1OWN, DDZ8IHG, BEART, H5EVPWHV     Type & Screen (If Applicable):  No results found for: LABABO, LABRH    Drug/Infectious Status (If Applicable):  Lab Results   Component Value Date    HEPCAB NONREACTIVE 11/30/2019       COVID-19 Screening (If Applicable):   Lab Results   Component Value Date    COVID19 Not Detected 03/26/2021           Anesthesia Evaluation  Patient summary reviewed and Nursing notes reviewed  Airway: Mallampati: III  TM distance: >3 FB   Neck ROM: full  Mouth opening: > = 3 FB Dental:          Pulmonary:Negative Pulmonary ROS                              Cardiovascular:  Exercise tolerance: poor (<4 METS),   (+) hypertension: no interval change, CHF (EF 55%): systolic,         Rhythm: regular  Rate: normal  Echocardiogram reviewed    Cleared by cardiology              Neuro/Psych:   Negative Neuro/Psych ROS               ROS comment: Spina bifida  Paraplegia  Immobility GI/Hepatic/Renal:   (+) renal disease: CRI and ARF, morbid obesity         ROS comment: Chronic elma. Endo/Other:    (+) DiabetesType II DM, poorly controlled, using insulin, . ROS comment: Diabetic foot ulcer with necrotizing fasciitis Abdominal:   (+) obese,         Vascular:   + PVD, aortic or cerebral, . TTE 3/19/2021  Severe LV systolic dysfunction with EF 25%.   Global hypokinesia  Technically difficult study and unable to assess walls well.  NO significant valvular abnormalities. Anesthesia Plan      regional and MAC     ASA 4     (Popliteal block  Cardiology evaluated as intermediate to high risk for surgery)  Induction: intravenous. MIPS: Postoperative opioids intended and Prophylactic antiemetics administered. Anesthetic plan and risks discussed with patient.       Plan discussed with CRNA and surgical team.                Tunde Sigala MD   3/28/2021

## 2021-03-28 NOTE — PROGRESS NOTES
[facility]  Office: [address], DO, Hector Ghosh, DO, Alba Chuy, DO, Dave Tolentino Blood, DO, Shazia Tyson MD, Madeleine Wilson MD, Leona Whitman MD, Bonnielee Nissen, MD, Misty Long MD, Joselito Molina MD, Estelle Cartwright MD, Sal Jones MD, Hunter Hernandez, DO, Joanne Gonzalez MD, Gita Chavez, DO, Christina Jones MD,  Bladimir Yao, DO, Cait Roberts MD, Vishal Medellin MD, Marlo Smith MD, Charley Smith MD, Clare Oliver, Lorenzo Goldstein, CNP, Esthela Tate, CNP, Layla Pierre, CNS, Paty Shea, CNP, Adrian Adorno, CNP, Zoie Willingham, CNP, Sophie Portillo, CNP, Loreta Faria, CNP, Renard Henderson, PASeverianoC, Hector Bingham, [facility], Sj Beck, CNP, Nicola Cheng, CNP, Terence Armstrong, CNP, Darrin Hubbard, CNP, Mayelin Engel, CNP, Audrey Barreto, [address] [place] [address]    Progress Note    3/28/2021    7:45 AM    Name:   Lary Sandy  MRN:     [id number]     Acct:      [de-identified]   Room:   2002/2002-01   Day:  9  Admit Date:  3/19/2021  5:34 PM    PCP:   No primary care provider on file. Code Status:  Full Code    Subjective:     C/C: kyle/foot pain     Interval History Status: not changed.      Pt was seen and examined this morning  No acute events overnight   No copmalints at this time         Review of Systems:      12 point ROS performed and negative for anything other than what was stated in subjective       Medications: Allergies:     Allergies   Allergen Reactions    Other      Mask adhesive causes hives       Current Meds:   Scheduled Meds:    potassium chloride  40 mEq Oral Once    hydrALAZINE  50 mg Oral 3 times per day    torsemide  20 mg Oral Daily    spironolactone  25 mg Oral Daily    aspirin  81 mg Oral Daily    piperacillin-tazobactam  3,375 mg Intravenous Q8H    insulin glargine  15 Units Subcutaneous Nightly    metoprolol tartrate  50 mg Oral BID    famotidine  20 mg Oral Daily    sodium hypochlorite   Irrigation Daily    allopurinol  100 mg Oral Daily    Baclofen  5 mg Oral BID    magnesium oxide  400 mg Oral Daily    therapeutic multivitamin-minerals  1 tablet Oral Daily    senna  1 tablet Oral Nightly    sodium chloride flush  10 mL Intravenous 2 times per day    heparin (porcine)  5,000 Units Subcutaneous 3 times per day    insulin lispro  0-18 Units Subcutaneous TID WC    insulin lispro  0-9 Units Subcutaneous Nightly    atorvastatin  40 mg Oral Daily     Continuous Infusions:    dextrose       PRN Meds: melatonin, acetaminophen, polyethylene glycol, sodium chloride flush, potassium chloride **OR** potassium alternative oral replacement **OR** potassium chloride, magnesium sulfate, promethazine **OR** ondansetron, acetaminophen **OR** acetaminophen, glucose, dextrose, glucagon (rDNA), dextrose, magnesium hydroxide    Data:     Past Medical History:   has a past medical history of CHF (congestive heart failure) ([facility] Diabetes mellitus ([facility] [address] Hypertension, [facility] shock [facility] and Spina bifida aperta of lumbar spine ([facility] Social History:   reports that he has never smoked. He has never used smokeless tobacco. He reports that he does not drink alcohol or use drugs. Family History: No family history on file. Vitals:  BP (!) 159/112   Pulse 90   Temp 98 °F (36.7 °C) (Oral)   Resp 12   Ht 5' 9\" (1.753 m)   Wt (!) 338 lb (153.3 kg)   SpO2 97%   BMI 49.91 kg/m²   Temp (24hrs), Av.2 °F (36.8 °C), Min:98 °F (36.7 °C), Max:98.4 °F (36.9 °C)    Recent Labs     21  1156 21  1639 21  1955 21  0659   POCGLU 166* 169* 159* 116*       I/O (24Hr):     Intake/Output Summary (Last 24 hours) at 3/28/2021 0745  Last data filed at 3/28/2021 0544  Gross per 24 hour   Intake 560 ml   Output 3650 ml   Net -3090 ml       Labs:  Hematology:  Recent Labs     21  0613 21  0419 21  0543   WBC 8.8 13.2* 12.0*   RBC 4.09* 4.27 4.39   HGB 9.5* 9.9* 10.2*   HCT 33.8* 34.5* 35.5*   MCV 82.6 80.8* 80.9*   MCH 23.2* 23.2* 23.2*   MCHC 28.1* 28.7 28.7   RDW 18.8* 18.9* 19.1*    287 283   MPV 9.0 8.8 8.6     Chemistry:  Recent Labs     03/25/21  1435 03/26/21  0613 03/27/21  0419 03/28/21  0543   NA  --  139 136 137   K  --  3.2* 3.5* 4.0   CL  --  104 101 102   CO2  --  22 22 23   GLUCOSE  --  82 140* 123*   BUN  --  75* 74* 72*   CREATININE  --  3.34* 3.13* 3.24*   MG 1.5*  --  1.9  --    ANIONGAP  --  13 13 12   LABGLOM  --  21* 23* 22*   GFRAA  --  25* 27* 26*   CALCIUM  --  8.1* 8.2* 8.6     Recent Labs     03/26/21  1945 03/27/21  0645 03/27/21  1156 03/27/21  1639 03/27/21  1955 03/28/21  0659   POCGLU 215* 114* 166* 169* 159* 116*     ABG:  Lab Results   Component Value Date    POCPH 7.381 12/24/2019    POCPCO2 35.3 12/24/2019    POCPO2 140.3 12/24/2019    POCHCO3 20.9 12/24/2019    NBEA 4 12/24/2019    PBEA NOT REPORTED 12/24/2019    TAQ2RPS 22 12/24/2019    RIOL6ATL 99 12/24/2019    FIO2 40.0 12/24/2019     Lab Results   Component Value Date/Time    SPECIAL NOT REPORTED 03/23/2021 06:56 PM     Lab Results   Component Value Date/Time    CULTURE NO GROWTH 4 DAYS 03/23/2021 06:56 PM       Radiology:  Neha [place] Foot Left (min 3 Views)    Result Date: 3/22/2021  Soft tissue ulceration overlying the talus with underlying cortical disruption, concerning for osteomyelitis. Xr Foot Right (min 3 Views)    Result Date: 3/22/2021  No acute bony abnormalities are noted     Xr Chest Portable    Result Date: 3/25/2021  No acute cardiopulmonary disease     Mri Foot Left Wo Contrast    Addendum Date: 3/26/2021    ADDENDUM: There is generalized skin thickening and a 1 cm rind of soft tissue edema along the medial plantar aspect of the foot. Subjacent to this, there is an additional layer of subcutaneous fat. Deep to this, there is complete atrophy/fatty infiltration of the intrinsic musculature of the foot.      Result Date: 3/26/2021  Deep soft tissue ulcer overlying the calcaneus. Cortical disruption of the exposed calcaneus with underlying marrow edema, compatible with osteomyelitis. Diffuse skin thickening and subcutaneous soft tissue edema surrounding the calcaneal ulcer as well as within the dorsum of the foot, compatible with myositis and cellulitis. No drainable fluid collection identified to suggest abscess formation. No additional foci of osteomyelitis identified. Mri Ankle Left Wo Contrast    Addendum Date: 3/26/2021    ADDENDUM: There is generalized skin thickening and a 1 cm rind of soft tissue edema along the medial plantar aspect of the foot. Subjacent to this, there is an additional layer of subcutaneous fat. Deep to this, there is complete atrophy/fatty infiltration of the intrinsic musculature of the foot. Result Date: 3/26/2021  Deep soft tissue ulcer overlying the calcaneus. Cortical disruption of the exposed calcaneus with underlying marrow edema, compatible with osteomyelitis. Diffuse skin thickening and subcutaneous soft tissue edema surrounding the calcaneal ulcer as well as within the dorsum of the foot, compatible with myositis and cellulitis. No drainable fluid collection identified to suggest abscess formation. No additional foci of osteomyelitis identified. Physical Examination:        General appearance:  alert, cooperative and no distress, labile mood  Mental Status:  oriented to person, place and time and normal affect  Lungs:  clear to auscultation bilaterally, normal effort  Heart:  regular rate and rhythm, no murmur  Abdomen: Obese,  nontender, distended, normal bowel sounds, no masses, hepatomegaly, splenomegaly, ostomy present.    : Ambrosio present  Extremities: Generalized +2 edema extending into abdomen and left upper extremity,new redness to LLE, RLE with decreased edema.  tenderness in the calves  Skin: Blistering bilateral lower extremities.  Bilateral pedal wounds inaccessible due to dressing    Assessment: Hospital Problems           Last Modified POA    * (Principal) Osteomyelitis of left foot ([address] 3/24/2021 Yes    DEBBY (acute kidney injury) ([address] 3/24/2021 Yes    Chronic systolic heart failure ([address] 3/19/2021 Yes    Volume overload 3/21/2021 Yes    Hypoglycemia due to insulin 3/21/2021 No    Right arm weakness 3/19/2021 Yes    Essential hypertension 3/19/2021 Yes    DM II (diabetes mellitus, type II), controlled ([address] 3/19/2021 Yes    Diabetic foot ulcer ([address] 3/24/2021 Yes    Diabetic foot infection ([address] 3/24/2021 Yes    Chronic paraplegia ([address] 3/19/2021 Yes    Sacral wound (Chronic) 3/19/2021 Yes    Open wound of left foot 3/19/2021 Yes    Ambrosio catheter in place (Chronic) 3/19/2021 Yes    Colostomy in place [facility]) (Chronic) 3/19/2021 Yes    Overview Signed 3/19/2021  7:56 PM by MIRTA Bar - [facility]     December '19         CKD (chronic kidney disease) stage 4, GFR 15-29 ml/min ([address] 3/23/2021 Yes    Physical debility 3/24/2021 Yes    PAD (peripheral artery disease) ([address] 3/25/2021 Yes          Plan:        1. DEBBY:    - Renal ultrasound completed 3/20/21 without acute process  - nephrology on board   - On Demadex 20 mg daily and aldactone  - continue sodium bicarb  - Baseline CRT ~ 1.6-1.9: now 3.2 appears to be a plateau at this point per nephrology's note  - renally dose all meds   - avoid nephrotoxic agents      2. Chronic systolic CHF:   -ECHO completed 3/20 showing EF 25%  -continue BB, demadex  And aldactone  -excellent UOP     3. Protein calorie malnutrition with poor wound healing   4.  Left calcaneus osteomyelitis    - Chronic sacral wound and left foot wound present on admission  - Left heel osteomyelitis secondary to uncontrolled diabetes mellitus type 2 with diabetic foot infection and immobility  - Follow-up on foot wound cultures, currently NGTD  - Evaluated by general surgery and podiatry   - Wound care following  - MRI showed OM of left heel   - continue IV zosyn at this time per ID recs   - Patient is tentatively scheduled for OR on 3/29/2021 at 5:00 PM for left partial calcanectomy and antibiotic bead placement with wound VAC under local block, no general anesthesia. - seen by cardiology and determined to be intermediate to high risk for partial calcanectomy      4. Essential hypertension: Stable  - Continue beta-blocker, increase to 50 mg twice daily  - add hydralazine 25 mg po tid 3/24- increase on 3/25  - v/s per unit protocol      5. Diabetes mellitus type 2 with episodes of hypoglycemia:    - continue lantus at decreased dose of 15 units qhs  - continue accu checks ac/hs with ISS     6. History of spina bifida with adult failure to thrive with chronic paraplegia and chronic urinary retention:   - Chronic debility status noted  - ECF at discharge  - Baclofen  - Chronic Ambrosio catheter: Exchanged on 3/20, polymicrobial UA without systemic symptoms.  Off antibiotics, ID following      7. Morbid obesity: BMI 49.9 on admit  - Weight loss encouraged. - Dietary supplement twice daily     8. Dyslipidemia: Continue statin     9. Gout: Continue allopurinol     10. Physical debility: Secondary to spina bifida and chronic paraplegia     11. GI/DVT prophylaxis: Pepcid, heparin     12.  Dispo: ECF after left foot surgical intervention is completed     Gael Overton MD  3/28/2021  7:45 AM

## 2021-03-28 NOTE — PLAN OF CARE
Problem: Skin Integrity:  Goal: Will show no infection signs and symptoms  Description: Will show no infection signs and symptoms  3/27/2021 2258 by Britton Romano  Outcome: Ongoing  3/27/2021 1633 by Dereck Trammell RN  Outcome: Ongoing  Goal: Absence of new skin breakdown  Description: Absence of new skin breakdown  3/27/2021 2258 by Britton Romano  Outcome: Ongoing  3/27/2021 1633 by Dereck Trammell RN  Outcome: Ongoing  Goal: Demonstration of wound healing without infection will improve  Description: Demonstration of wound healing without infection will improve  3/27/2021 2258 by Britton Romano  Outcome: Ongoing  3/27/2021 1633 by Dereck Trammell RN  Outcome: Ongoing  Goal: Complications related to intravenous access or infusion will be avoided or minimized  Description: Complications related to intravenous access or infusion will be avoided or minimized  3/27/2021 2258 by Britton Romano  Outcome: Ongoing  3/27/2021 1633 by Dereck Trammell RN  Outcome: Ongoing     Problem: Falls - Risk of:  Goal: Will remain free from falls  Description: Will remain free from falls  3/27/2021 2258 by Britton Romano  Outcome: Ongoing  3/27/2021 1633 by Dereck Trammell RN  Outcome: Ongoing  Goal: Absence of physical injury  Description: Absence of physical injury  3/27/2021 2258 by Britton Romano  Outcome: Ongoing  3/27/2021 1633 by Dereck Trammell RN  Outcome: Ongoing     Problem: Infection:  Goal: Will remain free from infection  Description: Will remain free from infection  3/27/2021 2258 by Britton Romano  Outcome: Ongoing  3/27/2021 1633 by Dereck Trammell RN  Outcome: Ongoing     Problem: Safety:  Goal: Free from accidental physical injury  Description: Free from accidental physical injury  3/27/2021 2258 by Britton Romano  Outcome: Ongoing  3/27/2021 1633 by Dereck Trammell RN  Outcome: Ongoing  Goal: Free from intentional harm  Description: Free from intentional harm  3/27/2021 2258 by Katelynn Mitchell Brittani  Outcome: Ongoing  3/27/2021 1633 by Devan Hsieh RN  Outcome: Ongoing     Problem: Daily Care:  Goal: Daily care needs are met  Description: Daily care needs are met  3/27/2021 2258 by Bridger Joshi  Outcome: Ongoing  3/27/2021 1633 by Devan Hsieh RN  Outcome: Ongoing     Problem: Skin Integrity:  Goal: Skin integrity will stabilize  Description: Skin integrity will stabilize  3/27/2021 2258 by Bridger Joshi  Outcome: Ongoing  3/27/2021 1633 by Devan Hsieh RN  Outcome: Ongoing     Problem: Discharge Planning:  Goal: Patients continuum of care needs are met  Description: Patients continuum of care needs are met  3/27/2021 2258 by Bridger Joshi  Outcome: Ongoing  3/27/2021 1633 by Devan Hsieh RN  Outcome: Ongoing     Problem: Nutrition  Goal: Optimal nutrition therapy  Description: Nutrition Problem #1: Increased nutrient needs  Intervention: Food and/or Nutrient Delivery: Modify Current Diet, Start Oral Nutrition Supplement  Nutritional Goals: Meet greater than or equal to 75% of estimated nutrient needs for wound healing with PO intake     3/27/2021 2258 by Bridger Joshi  Outcome: Ongoing  3/27/2021 1633 by Devan Hsieh RN  Outcome: Ongoing     Problem: Nutritional:  Goal: Nutritional status will improve  Description: Nutritional status will improve  3/27/2021 2258 by Bridger Joshi  Outcome: Ongoing  3/27/2021 1633 by Devan Hsieh RN  Outcome: Ongoing     Problem: Physical Regulation:  Goal: Will remain free from infection  Description: Will remain free from infection  3/27/2021 2258 by Bridger Joshi  Outcome: Ongoing  3/27/2021 1633 by Devan Hsieh RN  Outcome: Ongoing  Goal: Diagnostic test results will improve  Description: Diagnostic test results will improve  3/27/2021 2258 by Bridger Joshi  Outcome: Ongoing  3/27/2021 1633 by Devan Hsieh RN  Outcome: Ongoing  Goal: Ability to maintain vital signs within normal range will improve  Description: Ability to maintain vital signs within normal range will improve  3/27/2021 2258 by Britton Romano  Outcome: Ongoing  3/27/2021 1633 by Dereck Trammell RN  Outcome: Ongoing     Problem: Respiratory:  Goal: Ability to maintain normal respiratory secretions will improve  Description: Ability to maintain normal respiratory secretions will improve  3/27/2021 2258 by Britton Romano  Outcome: Ongoing  3/27/2021 1633 by Dereck Trammell RN  Outcome: Ongoing     Problem: Pain:  Goal: Pain level will decrease  Description: Pain level will decrease  3/27/2021 2258 by Britton Romano  Outcome: Ongoing  3/27/2021 1633 by Dereck Trammell RN  Outcome: Ongoing  Goal: Control of acute pain  Description: Control of acute pain  3/27/2021 2258 by Britton Romano  Outcome: Ongoing  3/27/2021 1633 by Dereck Trammell RN  Outcome: Ongoing  Goal: Control of chronic pain  Description: Control of chronic pain  3/27/2021 2258 by Britton Romano  Outcome: Ongoing  3/27/2021 1633 by Dereck Trammell RN  Outcome: Ongoing     Problem: Discharge Planning:  Goal: Discharged to appropriate level of care  Description: Discharged to appropriate level of care  3/27/2021 2258 by Britton Romano  Outcome: Ongoing  3/27/2021 1633 by Dereck Trammell RN  Outcome: Ongoing     Problem: Serum Glucose Level - Abnormal:  Goal: Ability to maintain appropriate glucose levels will improve  Description: Ability to maintain appropriate glucose levels will improve  3/27/2021 2258 by Britton Romano  Outcome: Ongoing  3/27/2021 1633 by Dereck Trammell RN  Outcome: Ongoing     Problem: Sensory Perception - Impaired:  Goal: Ability to maintain a stable neurologic state will improve  Description: Ability to maintain a stable neurologic state will improve  3/27/2021 2258 by Britton Romano  Outcome: Ongoing  3/27/2021 1633 by Dereck Trammell RN  Outcome: Ongoing

## 2021-03-28 NOTE — PROGRESS NOTES
Physical Therapy  Facility/Department: [facility] 2  Daily Treatment Note  NAME: Wendy Ballard  : 1985  MRN: [id number]    Date of Service: 3/28/2021    Discharge Recommendations:  Patient would benefit from continued therapy after discharge     PT Equipment Recommendations  Other: CTA    Assessment   Body structures, Functions, Activity limitations: Decreased functional mobility ; Decreased strength;Decreased endurance;Decreased balance  Assessment: No mobility this date, . Pt would benefit from continued skilled PT to address deficits. Prognosis: Good        Patient Diagnosis(es): There were no encounter diagnoses. has a past medical history of CHF (congestive heart failure) ([facility] Diabetes mellitus ([facility] Hypertension, Septic shock ([facility] and Spina bifida aperta of lumbar spine ([facility] has a past surgical history that includes incision and drainage (Bilateral, 2019); colostomy (N/A, 12/3/2019); Abdomen surgery (N/A, 2019); incision and drainage (N/A, 2019); Abdomen surgery (N/A, 12/10/2019); pr explore scrotum (N/A, 12/10/2019); incision and drainage (N/A, 2019); Wound Exploration (N/A, 2019); incision and drainage (N/A, 2019); Abscess Drainage (N/A, 2020); debridement (2020); Abdomen surgery (N/A, 2020); debridement (2020); incision and drainage (N/A, 2020); debridement (2020); and Abdomen surgery (N/A, 2020). Restrictions  Restrictions/Precautions  Restrictions/Precautions: Fall Risk, Weight Bearing  Required Braces or Orthoses?: No  Lower Extremity Weight Bearing Restrictions  Right Lower Extremity Weight Bearing: Non Weight Bearing  Left Lower Extremity Weight Bearing: Non Weight Bearing  Position Activity Restriction  Other position/activity restrictions: up with assist. Hx Spina bifida aperta of lumbar spine, buttock wound  Subjective   Pt in bed upon arrival. Pt's mother is present. Pt has no c/o pain.   Orientation    Overall Orientation Status: Within Functional Limits    Objective    Bed Mobility  Rolling Right: Max A x 2  Rolling Left: Max A x 2  Transfers  Bed <> chair: Maxi  dependent (Green Sling)  Comment: Pt is B LE NWB. Ex's  Upper extremity exercises: Bicep curl, shoulder flexion/extension, punches 2 x 10 AAROM 2lb on a SPC. Biceps 4 lbs. B Knees are swollen very little ROM available.        Goals  Short term goals  Time Frame for Short term goals: 14 visits  Short term goal 1: Pt will be Priscila bed mobility  Short term goal 2: Pt will be CGA EOB 2min  Short term goal 3: Pt will be maxA to stand  Short term goal 4: Pt will be safe to attempt pivot transfer    Plan    Plan  Times per week: 5-6x/wk  Current Treatment Recommendations: Balance Training, ROM, Strengthening, Functional Mobility Training, Endurance Training, Transfer Training, Home Exercise Program, Safety Education & Training, Patient/Caregiver Education & Training, Equipment Evaluation, Education, & procurement  Safety Devices  Type of devices: Call light within reach, Nurse notified, Patient at risk for falls, Left in alarmed chair, All fall risk precautions in place  Restraints  Initially in place: No     Therapy Time   Individual Concurrent Group Co-treatment   Time In  115         Time Out  145         Minutes  [address], [place]

## 2021-03-28 NOTE — PROGRESS NOTES
Progress Note  Podiatric Medicine and Surgery     Subjective     CC: Heel wound, b/l    Patient seen and examined at bedside. Patient states he has no questions or concerns regarding tomorrow's surgical plan. Patient denies pain to the BLE. Heel-offloading boots in place. Tolerating diet well. Afebrile, hypertensive, tachycardic, saturating well on room air. Denies  N,v,f,c,SOB,CP. HPI :  Isidro Nguyen is a 38 y.o. male who has been struggling to heal chronic heel wounds for many years without success. Typically he is seen by wound care specialists and he cannot recall how they typically treat his heel pressure wounds. He was seen by Dr. Adam Funes in late 2019 and early 2020 on a previous hospitalization and at that time no surgical intervention was merited and they were treated conservatively. Has not followed up with Dr. Adam Funes outpatient. He rarely is able to feel any sensation to his feet but pain is noted randomly. He is a type 2 diabetic and his last A1C was 6.6 (3/20/21).      Pt reports gradual decline in strength, chills, discomfort to his buttocks, increase in size of buttock wounds, shortness of breath and worsening edema x 2 mos.  His visiting nurse started Doxycycline 2 days ago for possible wound infection and told him he needed more care than could be provided in the home. [facility] lives with his mom who is 79 yr old and has some health issues.        PCP is No primary care provider on file.       Medications:  Scheduled Meds:   potassium chloride  40 mEq Oral Once    hydrALAZINE  50 mg Oral 3 times per day    torsemide  20 mg Oral Daily    spironolactone  25 mg Oral Daily    aspirin  81 mg Oral Daily    piperacillin-tazobactam  3,375 mg Intravenous Q8H    insulin glargine  15 Units Subcutaneous Nightly    metoprolol tartrate  50 mg Oral BID    famotidine  20 mg Oral Daily    sodium hypochlorite   Irrigation Daily    allopurinol  100 mg Oral Daily    Baclofen  5 mg Oral BID    additional layer of subcutaneous fat. Deep to this, there is complete   atrophy/fatty infiltration of the intrinsic musculature of the foot. Final      MRI ANKLE LEFT WO CONTRAST   Final Result   Addendum 1 of 1   ADDENDUM:   There is generalized skin thickening and a 1 cm rind of soft tissue edema   along the medial plantar aspect of the foot. Subjacent to this, there is    an   additional layer of subcutaneous fat. Deep to this, there is complete   atrophy/fatty infiltration of the intrinsic musculature of the foot. Final      VL Lower Extremity Bilateral Venous Duplex   Final Result      XR FOOT LEFT (MIN 3 VIEWS)   Final Result   Soft tissue ulceration overlying the talus with underlying cortical   disruption, concerning for osteomyelitis. XR FOOT RIGHT (MIN 3 VIEWS)   Final Result   No acute bony abnormalities are noted         US RETROPERITONEAL LIMITED   Final Result   Unremarkable right kidney. Left kidney not visualized due to patient body   habitus and bowel gas. Venous Duplex (3/23): Negative for deep or superficial thrombosis. NIVS: 3/23/2021       Right:    XOZLMA abnormal PVRs    TYLER suggests mild vascular insufficiency at rest    Digit waveforms suggests microvascular level of disease        Left:    Mildly abnormal PVRs    TYLER suggests mild vascular insufficiency at rest    Digit waveforms suggests microvascular level of disease     TYLER:Right: 0.78. Left: 0.88. Wound Culture 3/23/2021: GPC, bacteroides    Culture bone, L calc (3/23): Neutrophils. NGTD. Surgical path, bone biopsy L calc (3/23): Acute and chronic osteomyelitis     Assessment   Isidro Nguyen is a 38 y.o. male with   1. S/p bedside bone biopsy (DOS: 3/23/2021)  2. Diabetic foot ulcer down to bone left foot  3. Osteomyelitis Left calcaneus  4. Diabetic foot ulcer down to subcutaneous tissue right foot  5. 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 DEBBY   7. CHF  8. Paraplegia   9.

## 2021-03-28 NOTE — PROGRESS NOTES
Renal Progress Note    Patient :  Delores Hernandez; 38 y.o. MRN# [id number]  Location:  2002/2002-01  Attending:  Cathy Freeman MD  Admit Date:  3/19/2021   Hospital Day: 9      Subjective:     Patient was seen and examined. No new issues reported overnight. Patient continues on no IV fluids  Patient continues on medication: torsemide 20 mg p.o. daily and Aldactone 25 mg p.o. daily. Urine output over the last 24 hours 3.4 L overall -36.6 L since admission  Labs reviewed. Creatinine 3.2 today was 3.1 yesterday potassium improved to 4.0 was 3.5 yesterday hemoglobin stable at 10.2. Chart reviewed.   Looks as though the podiatry procedure will be done on Monday    Outpatient Medications:     Medications Prior to Admission: sevelamer (RENVELA) 800 MG tablet, Take 2 tablets by mouth 3 times daily (with meals)  sodium bicarbonate 650 MG tablet, Take 650 mg by mouth 4 times daily  chlorhexidine (PERIDEX) 0.12 % solution, Take 15 mLs by mouth 2 times daily  acetaminophen (TYLENOL) 325 MG tablet, Take 650 mg by mouth every 6 hours as needed for Pain  Liraglutide (VICTOZA) 18 MG/3ML SOPN SC injection, 1.8 mg  oxybutynin (DITROPAN) 5 MG tablet,   Baclofen (LIORESAL) 5 MG tablet, Take 1 tablet by mouth 2 times daily  Epoetin Chaitanya-epbx (RETACRIT IJ), Inject 10,000 Units as directed Indications: M W F if levels are correct  insulin detemir (LEVEMIR FLEXTOUCH) 100 UNIT/ML injection pen, Levemir FlexTouch U-100 Insulin 100 unit/mL (3 mL) subcutaneous pen  atorvastatin (LIPITOR) 80 MG tablet, Take 100 mg by mouth daily   enoxaparin (LOVENOX) 40 MG/0.4ML injection, Inject 0.4 mg into the skin 2 times daily  ferrous sulfate (IRON 325) 325 (65 Fe) MG tablet, Take 325 mg by mouth 3 times daily (with meals)  magnesium oxide (MAG-OX) 400 MG tablet, Take 400 mg by mouth daily  senna (SENOKOT) 8.6 MG tablet, Take 1 tablet by mouth nightly  polyethylene glycol (GLYCOLAX) 17 g packet, Take 17 g by mouth daily as needed for Constipation  oxyCODONE (ROXICODONE) 5 MG immediate release tablet, Take 10 mg by mouth every 4 hours as needed for Pain. Multiple Vitamins-Minerals (THERAPEUTIC MULTIVITAMIN-MINERALS) tablet, Take 1 tablet by mouth daily  insulin glargine (LANTUS) 100 UNIT/ML injection vial, Inject 10 Units into the skin daily    Current Medications:     Scheduled Meds:    potassium chloride  40 mEq Oral Once    hydrALAZINE  50 mg Oral 3 times per day    torsemide  20 mg Oral Daily    spironolactone  25 mg Oral Daily    aspirin  81 mg Oral Daily    piperacillin-tazobactam  3,375 mg Intravenous Q8H    insulin glargine  15 Units Subcutaneous Nightly    metoprolol tartrate  50 mg Oral BID    famotidine  20 mg Oral Daily    sodium hypochlorite   Irrigation Daily    allopurinol  100 mg Oral Daily    Baclofen  5 mg Oral BID    magnesium oxide  400 mg Oral Daily    therapeutic multivitamin-minerals  1 tablet Oral Daily    senna  1 tablet Oral Nightly    sodium chloride flush  10 mL Intravenous 2 times per day    heparin (porcine)  5,000 Units Subcutaneous 3 times per day    insulin lispro  0-18 Units Subcutaneous TID WC    insulin lispro  0-9 Units Subcutaneous Nightly    atorvastatin  40 mg Oral Daily     Continuous Infusions:    dextrose       PRN Meds:  melatonin, acetaminophen, polyethylene glycol, sodium chloride flush, potassium chloride **OR** potassium alternative oral replacement **OR** potassium chloride, magnesium sulfate, promethazine **OR** ondansetron, acetaminophen **OR** acetaminophen, glucose, dextrose, glucagon (rDNA), dextrose, magnesium hydroxide    Input/Output:       I/O last 3 completed shifts: In: 560 [P.O.:550; I.V.:10]  Out: 8322 [Urine:3350; Stool:300].       Patient Vitals for the past 96 hrs (Last 3 readings):   Weight   21 0423 (!) 338 lb (153.3 kg)       Vital Signs:   Temperature:  Temp: 98 °F (36.7 °C)  TMax:   Temp (24hrs), Av.2 °F (36.8 °C), Min:98 °F (36.7 °C), Max:98.4 °F (36.9 °C) Respirations:  Resp: 12  Pulse:   Pulse: 90  BP:    BP: (!) 159/112  BP Range: Systolic (37BAE), ELN:507 , Min:128 , XBV:814       Diastolic (77WWG), FNW:35, Min:78, Max:112      Physical Examination:     General:  AAO x 3, speaking in full sentences, no accessory muscle use. HEENT: Atraumatic, normocephalic, no throat congestion, moist mucosa. Eyes:   Pupils equal, round and reactive to light, EOMI. Neck:   Supple  Chest:   Bilateral vesicular breath sounds, no rales or wheezes. Cardiac:  S1 S2 RR, no murmurs, gallops or rubs. Abdomen: Soft, non-tender, no masses or organomegaly, BS audible. :   No suprapubic or flank tenderness. Neuro:  AAO x 3, No FND. SKIN:  No rashes, good skin turgor. Extremities:  +ve 1-2+ edema. Positive anasarca. Labs:       Recent Labs     03/26/21  0613 03/27/21  0419 03/28/21  0543   WBC 8.8 13.2* 12.0*   RBC 4.09* 4.27 4.39   HGB 9.5* 9.9* 10.2*   HCT 33.8* 34.5* 35.5*   MCV 82.6 80.8* 80.9*   MCH 23.2* 23.2* 23.2*   MCHC 28.1* 28.7 28.7   RDW 18.8* 18.9* 19.1*    287 283   MPV 9.0 8.8 8.6      BMP:   Recent Labs     03/26/21  0613 03/27/21  0419 03/28/21  0543    136 137   K 3.2* 3.5* 4.0    101 102   CO2 22 22 23   BUN 75* 74* 72*   CREATININE 3.34* 3.13* 3.24*   GLUCOSE 82 140* 123*   CALCIUM 8.1* 8.2* 8.6      Magnesium:    Recent Labs     03/25/21  1435 03/27/21  0419   MG 1.5* 1.9     MARINE:      Lab Results   Component Value Date    MARINE NEGATIVE 11/30/2019     SPEP:  Lab Results   Component Value Date    PROT 5.3 12/01/2019    PATH ELECTRONICALLY SIGNED.  Chandler Enriquez M.D. 11/30/2019     C3:     Lab Results   Component Value Date    C3 97 11/30/2019     C4:     Lab Results   Component Value Date    C4 12 11/30/2019     Hep BsAg:         Lab Results   Component Value Date    HEPBSAG NONREACTIVE 11/30/2019     Hep C AB:          Lab Results   Component Value Date    HEPCAB NONREACTIVE 11/30/2019       Urinalysis/Chemistries:      Lab Results Component Value Date    NITRU NEGATIVE 03/19/2021    COLORU YELLOW 03/19/2021    PHUR 7.5 03/19/2021    WBCUA TOO NUMEROUS TO COUNT 03/19/2021    RBCUA 5 TO 10 03/19/2021    MUCUS NOT REPORTED 03/19/2021    TRICHOMONAS NOT REPORTED 03/19/2021    YEAST NOT REPORTED 03/19/2021    BACTERIA MODERATE 03/19/2021    SPECGRAV 1.010 03/19/2021    LEUKOCYTESUR LARGE 03/19/2021    UROBILINOGEN Normal 03/19/2021    BILIRUBINUR NEGATIVE 03/19/2021    GLUCOSEU NEGATIVE 03/19/2021    KETUA NEGATIVE 03/19/2021    AMORPHOUS NOT REPORTED 03/19/2021     Urine Sodium:     Lab Results   Component Value Date    DONOVAN 75 03/19/2021      Urine Creatinine:     Lab Results   Component Value Date    LABCREA 26.0 03/21/2021       Radiology:     Reviewed. Assessment:     1. Acute Kidney Injury nonoliguric likely secondary to ischemic ATN from underlying infection. Creatinine still evolving. 3.2 today was 3.1 yesterday  Baseline creatinine seems to be 1.8 to 2 mg/DL. 2.  Diabetic left foot ulcer. 3.  Osteomyelitis left calcaneus. 4.  Diabetic right foot ulcer. 5.  Worsening gluteal and sacral wounds. 6.  Type 2 diabetes with poor control. 7.  Morbid obesity with BMI of 49.99.  8.  Cardiomyopathy with EF of 25% with global hypokinesia. 2D echo done on 3/20/2021.  9.  Chronic indwelling Ambrosio catheter. 10.  Recurrent UTIs. 6.  Spina bifida with history of immobility. 12.  Chronic nonhealing gluteal wounds with history of necrotizing fasciitis status post multiple debridements with diverting colostomy in 2019. 13.  Nonnephrotic proteinuria. Plan:   1. Continue current diuretics. 2.  Monitor strict I's and O's and renal function. 3.  Podiatry discussing further treatment options about patient's left foot ulcer and osteomyelitis, treatments including although up to amputations being addressed. 4.  Antibiotics as per renal dosing per infectious disease. 5.  Labs in a.m.  6.  Will follow.     Nutrition   Please ensure that patient is on a renal diet/TF. Avoid nephrotoxic drugs/contrast exposure. We will continue to follow along with you. Electronically signed by Jayda Bryant CNP, APRN - CNP on 3/28/2021 at 8:50 AM   [facility]     Attending Physician Statement  I have discussed the care of Milagro Creсветлана, including pertinent history and exam findings with the CNP. I have reviewed the key elements of all parts of the encounter with the CNP. I have seen and examined the patient. I agree with the assessment and plan and status of the problem list as documented.        Rajinder Plaza MD  Nephrology Attending Physician  [facility]

## 2021-03-29 ENCOUNTER — ANESTHESIA (OUTPATIENT)
Dept: OPERATING ROOM | Age: 36
DRG: 628 | End: 2021-03-29
Payer: MEDICARE

## 2021-03-29 VITALS — SYSTOLIC BLOOD PRESSURE: 106 MMHG | DIASTOLIC BLOOD PRESSURE: 70 MMHG | OXYGEN SATURATION: 100 %

## 2021-03-29 LAB
ABSOLUTE EOS #: 0.34 K/UL (ref 0–0.44)
ABSOLUTE IMMATURE GRANULOCYTE: 0.07 K/UL (ref 0–0.3)
ABSOLUTE LYMPH #: 1.01 K/UL (ref 1.1–3.7)
ABSOLUTE MONO #: 0.88 K/UL (ref 0.1–1.2)
ANION GAP SERPL CALCULATED.3IONS-SCNC: 13 MMOL/L (ref 9–17)
BASOPHILS # BLD: 0 % (ref 0–2)
BASOPHILS ABSOLUTE: 0.04 K/UL (ref 0–0.2)
BUN BLDV-MCNC: 71 MG/DL (ref 6–20)
BUN/CREAT BLD: ABNORMAL (ref 9–20)
CALCIUM SERPL-MCNC: 8.4 MG/DL (ref 8.6–10.4)
CHLORIDE BLD-SCNC: 103 MMOL/L (ref 98–107)
CO2: 23 MMOL/L (ref 20–31)
CREAT SERPL-MCNC: 3.06 MG/DL (ref 0.7–1.2)
DIFFERENTIAL TYPE: ABNORMAL
EOSINOPHILS RELATIVE PERCENT: 3 % (ref 1–4)
GFR AFRICAN AMERICAN: 28 ML/MIN
GFR NON-AFRICAN AMERICAN: 23 ML/MIN
GFR SERPL CREATININE-BSD FRML MDRD: ABNORMAL ML/MIN/{1.73_M2}
GFR SERPL CREATININE-BSD FRML MDRD: ABNORMAL ML/MIN/{1.73_M2}
GLUCOSE BLD-MCNC: 147 MG/DL (ref 70–99)
GLUCOSE BLD-MCNC: 80 MG/DL (ref 75–110)
GLUCOSE BLD-MCNC: 84 MG/DL (ref 75–110)
HCT VFR BLD CALC: 34.2 % (ref 40.7–50.3)
HEMOGLOBIN: 9.8 G/DL (ref 13–17)
IMMATURE GRANULOCYTES: 1 %
LYMPHOCYTES # BLD: 9 % (ref 24–43)
MCH RBC QN AUTO: 23.2 PG (ref 25.2–33.5)
MCHC RBC AUTO-ENTMCNC: 28.7 G/DL (ref 28.4–34.8)
MCV RBC AUTO: 80.9 FL (ref 82.6–102.9)
MICROBIOLOGY SEND OUT REPORT: NORMAL
MONOCYTES # BLD: 8 % (ref 3–12)
NRBC AUTOMATED: 0 PER 100 WBC
PDW BLD-RTO: 19 % (ref 11.8–14.4)
PLATELET # BLD: 297 K/UL (ref 138–453)
PLATELET ESTIMATE: ABNORMAL
PMV BLD AUTO: 8.9 FL (ref 8.1–13.5)
POTASSIUM SERPL-SCNC: 4.2 MMOL/L (ref 3.7–5.3)
RBC # BLD: 4.23 M/UL (ref 4.21–5.77)
RBC # BLD: ABNORMAL 10*6/UL
SEG NEUTROPHILS: 79 % (ref 36–65)
SEGMENTED NEUTROPHILS ABSOLUTE COUNT: 9.37 K/UL (ref 1.5–8.1)
SODIUM BLD-SCNC: 139 MMOL/L (ref 135–144)
TEST NAME: NORMAL
WBC # BLD: 11.7 K/UL (ref 3.5–11.3)
WBC # BLD: ABNORMAL 10*3/UL

## 2021-03-29 PROCEDURE — 99212 OFFICE O/P EST SF 10 MIN: CPT

## 2021-03-29 PROCEDURE — 6370000000 HC RX 637 (ALT 250 FOR IP): Performed by: SURGERY

## 2021-03-29 PROCEDURE — 6360000002 HC RX W HCPCS: Performed by: PODIATRIST

## 2021-03-29 PROCEDURE — 99232 SBSQ HOSP IP/OBS MODERATE 35: CPT | Performed by: INTERNAL MEDICINE

## 2021-03-29 PROCEDURE — 6370000000 HC RX 637 (ALT 250 FOR IP): Performed by: INTERNAL MEDICINE

## 2021-03-29 PROCEDURE — 6370000000 HC RX 637 (ALT 250 FOR IP): Performed by: CLINICAL NURSE SPECIALIST

## 2021-03-29 PROCEDURE — 3E0V329 INTRODUCTION OF OTHER ANTI-INFECTIVE INTO BONES, PERCUTANEOUS APPROACH: ICD-10-PCS | Performed by: PODIATRIST

## 2021-03-29 PROCEDURE — 6370000000 HC RX 637 (ALT 250 FOR IP): Performed by: NURSE PRACTITIONER

## 2021-03-29 PROCEDURE — 7100000011 HC PHASE II RECOVERY - ADDTL 15 MIN: Performed by: PODIATRIST

## 2021-03-29 PROCEDURE — 86403 PARTICLE AGGLUT ANTBDY SCRN: CPT

## 2021-03-29 PROCEDURE — 0QTM0ZZ RESECTION OF LEFT TARSAL, OPEN APPROACH: ICD-10-PCS | Performed by: PODIATRIST

## 2021-03-29 PROCEDURE — 2500000003 HC RX 250 WO HCPCS: Performed by: NURSE ANESTHETIST, CERTIFIED REGISTERED

## 2021-03-29 PROCEDURE — 6360000002 HC RX W HCPCS: Performed by: INTERNAL MEDICINE

## 2021-03-29 PROCEDURE — 51702 INSERT TEMP BLADDER CATH: CPT

## 2021-03-29 PROCEDURE — 2580000003 HC RX 258: Performed by: NURSE ANESTHETIST, CERTIFIED REGISTERED

## 2021-03-29 PROCEDURE — 80048 BASIC METABOLIC PNL TOTAL CA: CPT

## 2021-03-29 PROCEDURE — 1200000000 HC SEMI PRIVATE

## 2021-03-29 PROCEDURE — 87186 SC STD MICRODIL/AGAR DIL: CPT

## 2021-03-29 PROCEDURE — 6360000002 HC RX W HCPCS: Performed by: STUDENT IN AN ORGANIZED HEALTH CARE EDUCATION/TRAINING PROGRAM

## 2021-03-29 PROCEDURE — 2580000003 HC RX 258: Performed by: STUDENT IN AN ORGANIZED HEALTH CARE EDUCATION/TRAINING PROGRAM

## 2021-03-29 PROCEDURE — 2720000010 HC SURG SUPPLY STERILE: Performed by: PODIATRIST

## 2021-03-29 PROCEDURE — 36415 COLL VENOUS BLD VENIPUNCTURE: CPT

## 2021-03-29 PROCEDURE — 28118 REMOVAL OF HEEL BONE: CPT | Performed by: PODIATRIST

## 2021-03-29 PROCEDURE — 6370000000 HC RX 637 (ALT 250 FOR IP): Performed by: FAMILY MEDICINE

## 2021-03-29 PROCEDURE — 3600000014 HC SURGERY LEVEL 4 ADDTL 15MIN: Performed by: PODIATRIST

## 2021-03-29 PROCEDURE — 6360000002 HC RX W HCPCS

## 2021-03-29 PROCEDURE — 99232 SBSQ HOSP IP/OBS MODERATE 35: CPT | Performed by: FAMILY MEDICINE

## 2021-03-29 PROCEDURE — 6360000002 HC RX W HCPCS: Performed by: NURSE ANESTHETIST, CERTIFIED REGISTERED

## 2021-03-29 PROCEDURE — 7100000010 HC PHASE II RECOVERY - FIRST 15 MIN: Performed by: PODIATRIST

## 2021-03-29 PROCEDURE — 87070 CULTURE OTHR SPECIMN AEROBIC: CPT

## 2021-03-29 PROCEDURE — 6370000000 HC RX 637 (ALT 250 FOR IP): Performed by: STUDENT IN AN ORGANIZED HEALTH CARE EDUCATION/TRAINING PROGRAM

## 2021-03-29 PROCEDURE — 2709999900 HC NON-CHARGEABLE SUPPLY: Performed by: PODIATRIST

## 2021-03-29 PROCEDURE — 3600000004 HC SURGERY LEVEL 4 BASE: Performed by: PODIATRIST

## 2021-03-29 PROCEDURE — 2580000003 HC RX 258: Performed by: INTERNAL MEDICINE

## 2021-03-29 PROCEDURE — 87205 SMEAR GRAM STAIN: CPT

## 2021-03-29 PROCEDURE — 88304 TISSUE EXAM BY PATHOLOGIST: CPT

## 2021-03-29 PROCEDURE — 87077 CULTURE AEROBIC IDENTIFY: CPT

## 2021-03-29 PROCEDURE — 3700000000 HC ANESTHESIA ATTENDED CARE: Performed by: PODIATRIST

## 2021-03-29 PROCEDURE — 3700000001 HC ADD 15 MINUTES (ANESTHESIA): Performed by: PODIATRIST

## 2021-03-29 PROCEDURE — 2580000003 HC RX 258: Performed by: PODIATRIST

## 2021-03-29 PROCEDURE — 87184 SC STD DISK METHOD PER PLATE: CPT

## 2021-03-29 PROCEDURE — 85025 COMPLETE CBC W/AUTO DIFF WBC: CPT

## 2021-03-29 PROCEDURE — 88311 DECALCIFY TISSUE: CPT

## 2021-03-29 PROCEDURE — 82947 ASSAY GLUCOSE BLOOD QUANT: CPT

## 2021-03-29 PROCEDURE — 87075 CULTR BACTERIA EXCEPT BLOOD: CPT

## 2021-03-29 PROCEDURE — 87076 CULTURE ANAEROBE IDENT EACH: CPT

## 2021-03-29 PROCEDURE — C1713 ANCHOR/SCREW BN/BN,TIS/BN: HCPCS | Performed by: PODIATRIST

## 2021-03-29 DEVICE — RESORBABLE MINI BEAD KIT
Type: IMPLANTABLE DEVICE | Site: FOOT | Status: FUNCTIONAL
Brand: OSTEOSET

## 2021-03-29 RX ORDER — MIDAZOLAM HYDROCHLORIDE 1 MG/ML
INJECTION INTRAMUSCULAR; INTRAVENOUS PRN
Status: DISCONTINUED | OUTPATIENT
Start: 2021-03-29 | End: 2021-03-29 | Stop reason: SDUPTHER

## 2021-03-29 RX ORDER — LIDOCAINE HYDROCHLORIDE 10 MG/ML
INJECTION, SOLUTION EPIDURAL; INFILTRATION; INTRACAUDAL; PERINEURAL PRN
Status: DISCONTINUED | OUTPATIENT
Start: 2021-03-29 | End: 2021-03-29 | Stop reason: SDUPTHER

## 2021-03-29 RX ORDER — MIDAZOLAM HYDROCHLORIDE 2 MG/2ML
1 INJECTION, SOLUTION INTRAMUSCULAR; INTRAVENOUS ONCE
Status: COMPLETED | OUTPATIENT
Start: 2021-03-29 | End: 2021-03-29

## 2021-03-29 RX ORDER — KETAMINE HYDROCHLORIDE 10 MG/ML
INJECTION, SOLUTION INTRAMUSCULAR; INTRAVENOUS PRN
Status: DISCONTINUED | OUTPATIENT
Start: 2021-03-29 | End: 2021-03-29 | Stop reason: SDUPTHER

## 2021-03-29 RX ORDER — MAGNESIUM HYDROXIDE 1200 MG/15ML
LIQUID ORAL CONTINUOUS PRN
Status: COMPLETED | OUTPATIENT
Start: 2021-03-29 | End: 2021-03-29

## 2021-03-29 RX ORDER — FENTANYL CITRATE 50 UG/ML
INJECTION, SOLUTION INTRAMUSCULAR; INTRAVENOUS PRN
Status: DISCONTINUED | OUTPATIENT
Start: 2021-03-29 | End: 2021-03-29 | Stop reason: SDUPTHER

## 2021-03-29 RX ORDER — MIDAZOLAM HYDROCHLORIDE 1 MG/ML
INJECTION INTRAMUSCULAR; INTRAVENOUS
Status: COMPLETED
Start: 2021-03-29 | End: 2021-03-29

## 2021-03-29 RX ORDER — FENTANYL CITRATE 50 UG/ML
50 INJECTION, SOLUTION INTRAMUSCULAR; INTRAVENOUS ONCE
Status: DISCONTINUED | OUTPATIENT
Start: 2021-03-29 | End: 2021-03-29 | Stop reason: HOSPADM

## 2021-03-29 RX ORDER — MEPERIDINE HYDROCHLORIDE 50 MG/ML
12.5 INJECTION INTRAMUSCULAR; INTRAVENOUS; SUBCUTANEOUS EVERY 5 MIN PRN
Status: DISCONTINUED | OUTPATIENT
Start: 2021-03-29 | End: 2021-03-29 | Stop reason: HOSPADM

## 2021-03-29 RX ORDER — SODIUM CHLORIDE 9 MG/ML
INJECTION, SOLUTION INTRAVENOUS CONTINUOUS PRN
Status: DISCONTINUED | OUTPATIENT
Start: 2021-03-29 | End: 2021-03-29 | Stop reason: SDUPTHER

## 2021-03-29 RX ORDER — PHENYLEPHRINE HCL IN 0.9% NACL 1 MG/10 ML
SYRINGE (ML) INTRAVENOUS PRN
Status: DISCONTINUED | OUTPATIENT
Start: 2021-03-29 | End: 2021-03-29 | Stop reason: SDUPTHER

## 2021-03-29 RX ORDER — BUPIVACAINE HYDROCHLORIDE 5 MG/ML
40 INJECTION, SOLUTION EPIDURAL; INTRACAUDAL ONCE
Status: DISCONTINUED | OUTPATIENT
Start: 2021-03-29 | End: 2021-03-29 | Stop reason: HOSPADM

## 2021-03-29 RX ORDER — PROPOFOL 10 MG/ML
INJECTION, EMULSION INTRAVENOUS CONTINUOUS PRN
Status: DISCONTINUED | OUTPATIENT
Start: 2021-03-29 | End: 2021-03-29 | Stop reason: SDUPTHER

## 2021-03-29 RX ORDER — VANCOMYCIN HYDROCHLORIDE 1 G/20ML
INJECTION, POWDER, LYOPHILIZED, FOR SOLUTION INTRAVENOUS PRN
Status: DISCONTINUED | OUTPATIENT
Start: 2021-03-29 | End: 2021-03-29 | Stop reason: ALTCHOICE

## 2021-03-29 RX ORDER — FENTANYL CITRATE 50 UG/ML
INJECTION, SOLUTION INTRAMUSCULAR; INTRAVENOUS
Status: COMPLETED
Start: 2021-03-29 | End: 2021-03-29

## 2021-03-29 RX ORDER — TOBRAMYCIN 1.2 G/30ML
INJECTION, POWDER, LYOPHILIZED, FOR SOLUTION INTRAVENOUS PRN
Status: DISCONTINUED | OUTPATIENT
Start: 2021-03-29 | End: 2021-03-29 | Stop reason: ALTCHOICE

## 2021-03-29 RX ORDER — GLYCOPYRROLATE 1 MG/5 ML
SYRINGE (ML) INTRAVENOUS PRN
Status: DISCONTINUED | OUTPATIENT
Start: 2021-03-29 | End: 2021-03-29 | Stop reason: SDUPTHER

## 2021-03-29 RX ORDER — FENTANYL CITRATE 50 UG/ML
25 INJECTION, SOLUTION INTRAMUSCULAR; INTRAVENOUS EVERY 5 MIN PRN
Status: DISCONTINUED | OUTPATIENT
Start: 2021-03-29 | End: 2021-03-29 | Stop reason: HOSPADM

## 2021-03-29 RX ADMIN — HEPARIN SODIUM 5000 UNITS: 5000 INJECTION INTRAVENOUS; SUBCUTANEOUS at 22:33

## 2021-03-29 RX ADMIN — FAMOTIDINE 20 MG: 20 TABLET, FILM COATED ORAL at 09:02

## 2021-03-29 RX ADMIN — MAGNESIUM GLUCONATE 500 MG ORAL TABLET 400 MG: 500 TABLET ORAL at 09:02

## 2021-03-29 RX ADMIN — INSULIN LISPRO 6 UNITS: 100 INJECTION, SOLUTION INTRAVENOUS; SUBCUTANEOUS at 13:25

## 2021-03-29 RX ADMIN — FENTANYL CITRATE 50 MCG: 50 INJECTION, SOLUTION INTRAMUSCULAR; INTRAVENOUS at 16:53

## 2021-03-29 RX ADMIN — TORSEMIDE 20 MG: 20 TABLET ORAL at 09:02

## 2021-03-29 RX ADMIN — Medication 1 TABLET: at 09:02

## 2021-03-29 RX ADMIN — STANDARDIZED SENNA CONCENTRATE 8.6 MG: 8.6 TABLET ORAL at 22:33

## 2021-03-29 RX ADMIN — Medication 100 MCG: at 17:56

## 2021-03-29 RX ADMIN — BACLOFEN 5 MG: 10 TABLET ORAL at 09:03

## 2021-03-29 RX ADMIN — ACETAMINOPHEN 650 MG: 325 TABLET ORAL at 10:28

## 2021-03-29 RX ADMIN — MIDAZOLAM HYDROCHLORIDE 1 MG: 2 INJECTION, SOLUTION INTRAMUSCULAR; INTRAVENOUS at 16:57

## 2021-03-29 RX ADMIN — Medication 0.2 MG: at 17:18

## 2021-03-29 RX ADMIN — BENZONATATE 100 MG: 100 CAPSULE ORAL at 10:46

## 2021-03-29 RX ADMIN — HYDRALAZINE HYDROCHLORIDE 50 MG: 50 TABLET, FILM COATED ORAL at 05:55

## 2021-03-29 RX ADMIN — ASPIRIN 81 MG: 81 TABLET, CHEWABLE ORAL at 09:03

## 2021-03-29 RX ADMIN — PIPERACILLIN AND TAZOBACTAM 3375 MG: 3; .375 INJECTION, POWDER, LYOPHILIZED, FOR SOLUTION INTRAVENOUS at 03:26

## 2021-03-29 RX ADMIN — HYDRALAZINE HYDROCHLORIDE 50 MG: 50 TABLET, FILM COATED ORAL at 15:23

## 2021-03-29 RX ADMIN — MIDAZOLAM HYDROCHLORIDE 1 MG: 1 INJECTION, SOLUTION INTRAMUSCULAR; INTRAVENOUS at 17:10

## 2021-03-29 RX ADMIN — METOPROLOL TARTRATE 50 MG: 25 TABLET ORAL at 09:02

## 2021-03-29 RX ADMIN — Medication 100 MCG: at 18:05

## 2021-03-29 RX ADMIN — DESMOPRESSIN ACETATE 40 MG: 0.2 TABLET ORAL at 09:03

## 2021-03-29 RX ADMIN — LIDOCAINE HYDROCHLORIDE 25 MG: 10 INJECTION, SOLUTION EPIDURAL; INFILTRATION; INTRACAUDAL; PERINEURAL at 17:18

## 2021-03-29 RX ADMIN — DAKIN'S SOLUTION 0.125% (QUARTER STRENGTH): 0.12 SOLUTION at 09:02

## 2021-03-29 RX ADMIN — FENTANYL CITRATE 50 MCG: 50 INJECTION, SOLUTION INTRAMUSCULAR; INTRAVENOUS at 17:24

## 2021-03-29 RX ADMIN — SPIRONOLACTONE 25 MG: 25 TABLET ORAL at 09:02

## 2021-03-29 RX ADMIN — KETAMINE HYDROCHLORIDE 40 MG: 10 INJECTION INTRAMUSCULAR; INTRAVENOUS at 17:19

## 2021-03-29 RX ADMIN — MIDAZOLAM HYDROCHLORIDE 1 MG: 1 INJECTION, SOLUTION INTRAMUSCULAR; INTRAVENOUS at 16:57

## 2021-03-29 RX ADMIN — BENZONATATE 100 MG: 100 CAPSULE ORAL at 15:34

## 2021-03-29 RX ADMIN — SODIUM CHLORIDE, PRESERVATIVE FREE 10 ML: 5 INJECTION INTRAVENOUS at 09:02

## 2021-03-29 RX ADMIN — HEPARIN SODIUM 5000 UNITS: 5000 INJECTION INTRAVENOUS; SUBCUTANEOUS at 05:54

## 2021-03-29 RX ADMIN — KETAMINE HYDROCHLORIDE 50 MG: 10 INJECTION INTRAMUSCULAR; INTRAVENOUS at 17:18

## 2021-03-29 RX ADMIN — PIPERACILLIN AND TAZOBACTAM 3375 MG: 3; .375 INJECTION, POWDER, LYOPHILIZED, FOR SOLUTION INTRAVENOUS at 09:07

## 2021-03-29 RX ADMIN — SODIUM CHLORIDE: 9 INJECTION, SOLUTION INTRAVENOUS at 16:30

## 2021-03-29 RX ADMIN — SODIUM CHLORIDE, PRESERVATIVE FREE 10 ML: 5 INJECTION INTRAVENOUS at 22:33

## 2021-03-29 RX ADMIN — BACLOFEN 5 MG: 10 TABLET ORAL at 22:33

## 2021-03-29 RX ADMIN — PROPOFOL 25 MCG/KG/MIN: 10 INJECTION, EMULSION INTRAVENOUS at 17:21

## 2021-03-29 RX ADMIN — ALLOPURINOL 100 MG: 100 TABLET ORAL at 09:03

## 2021-03-29 ASSESSMENT — PULMONARY FUNCTION TESTS
PIF_VALUE: 1
PIF_VALUE: 0
PIF_VALUE: 1
PIF_VALUE: 0
PIF_VALUE: 1
PIF_VALUE: 0
PIF_VALUE: 1

## 2021-03-29 ASSESSMENT — PAIN - FUNCTIONAL ASSESSMENT: PAIN_FUNCTIONAL_ASSESSMENT: 0-10

## 2021-03-29 ASSESSMENT — PAIN SCALES - GENERAL
PAINLEVEL_OUTOF10: 2
PAINLEVEL_OUTOF10: 0

## 2021-03-29 ASSESSMENT — ENCOUNTER SYMPTOMS
APNEA: 0
ABDOMINAL DISTENTION: 0
EYE ITCHING: 0
COUGH: 0
EYE PAIN: 0
STRIDOR: 0

## 2021-03-29 NOTE — PROGRESS NOTES
[facility]  Office: [address], DO, Igor Anne, DO, [place] Fernández, DO, Ct Monahan, DO, Valeriy Toscano MD, Reji Kay MD, Radha Guzman MD, Deedee Brunner, MD, Raj Weeks MD, Kary Sage MD, Esperanza Flores MD, Dalila Riddle MD, Kris Ballard DO, Erica Mullins MD, Alexandro Valenzuela DO, Lucy Browne MD,  Rolando Palmer DO, Jeremy Lezama MD, Carley Lance MD, Devika Roberts MD, Andrea Lozoya MD, Oumou Hardy, Gilford Kell, CNP, Jeff Zhang, CNP, Ed Gatica, CNS, Stoney Gee, CNP, Nola Phalen, CNP, Eber Torres, CNP, Andi Navarrete, CNP, Shad Christian, CNP, Bridget Simmons PASeverianoC, Neelima Boone, TANESHA, Brigitte Vargas, CNP, Walter Dye, CNP, Mj Sanderson, CNP, Elva Gibbs, CNP, Kenny Park, CNP, Alessia Gee, [address] [place] [address]    Progress Note    3/29/2021    7:51 AM    Name:   Kemi Salinas  MRN:     [id number]     Acct:      [de-identified]   Room:   2002/2002-01  IP Day:  8  Admit Date:  3/19/2021  5:34 PM    PCP:   No primary care provider on file. Code Status:  Full Code    Subjective:     C/C: kyle/foot pain     Interval History Status: not changed.      Pt was seen and examined this morning  No acute events overnight   No copmalints at this time      Brief History:     Arelis Arciniega a 38 y. o. male who has been struggling to heal chronic heel wounds for many years without success.  Typically he is seen by wound care specialists and he cannot recall how they typically treat his heel pressure wounds. Shannan Scanlon was seen by Dr. Napoleon Laurent in late 2019 and early 2020 on a previous hospitalization and at that time no surgical intervention was merited and they were treated conservatively. Has not followed up with Dr. Napoleon Laurent outpatient. He rarely is able to feel any sensation to his feet but pain is noted randomly. [facility] a type 2 diabetic and his last A1C was 6.6 (3/20/21). Patient will be undergoing left partial calcanectomy and antibiotic bead placement with wound VAC on 3/29 with podiatry. Recommend facility placement on d/c for wound care as he will be d/cd with a wound vac       Review of Systems:      12 point ROS performed and negative for anything other than what was stated in subjective       Medications: Allergies: Allergies   Allergen Reactions    Other      Mask adhesive causes hives       Current Meds:   Scheduled Meds:    potassium chloride  40 mEq Oral Once    hydrALAZINE  50 mg Oral 3 times per day    torsemide  20 mg Oral Daily    spironolactone  25 mg Oral Daily    aspirin  81 mg Oral Daily    piperacillin-tazobactam  3,375 mg Intravenous Q8H    insulin glargine  15 Units Subcutaneous Nightly    metoprolol tartrate  50 mg Oral BID    famotidine  20 mg Oral Daily    sodium hypochlorite   Irrigation Daily    allopurinol  100 mg Oral Daily    Baclofen  5 mg Oral BID    magnesium oxide  400 mg Oral Daily    therapeutic multivitamin-minerals  1 tablet Oral Daily    senna  1 tablet Oral Nightly    sodium chloride flush  10 mL Intravenous 2 times per day    heparin (porcine)  5,000 Units Subcutaneous 3 times per day    insulin lispro  0-18 Units Subcutaneous TID WC    insulin lispro  0-9 Units Subcutaneous Nightly    atorvastatin  40 mg Oral Daily     Continuous Infusions:    dextrose       PRN Meds: benzonatate, melatonin, acetaminophen, polyethylene glycol, sodium chloride flush, potassium chloride **OR** potassium alternative oral replacement **OR** potassium chloride, magnesium sulfate, promethazine **OR** ondansetron, acetaminophen **OR** acetaminophen, glucose, dextrose, glucagon (rDNA), dextrose, magnesium hydroxide    Data:     Past Medical History:   has a past medical history of CHF (congestive heart failure) ([facility] [address] Diabetes mellitus ([facility] [address] Hypertension, Septic shock ([facility] [address] and Spina bifida aperta of lumbar spine ([facility] [address]     Social History:   reports that he has never smoked. He has never used smokeless tobacco. He reports that he does not drink alcohol or use drugs. Family History: No family history on file. Vitals:  BP (!) 119/94   Pulse 79   Temp 98.1 °F (36.7 °C) (Oral)   Resp 15   Ht 5' 9\" (1.753 m)   Wt (!) 338 lb (153.3 kg)   SpO2 96%   BMI 49.91 kg/m²   Temp (24hrs), Av.1 °F (36.7 °C), Min:97.5 °F (36.4 °C), Max:98.4 °F (36.9 °C)    Recent Labs     21  0659 21  1148 21  1635 21  1944   POCGLU 116* 203* 188* 185*       I/O (24Hr):     Intake/Output Summary (Last 24 hours) at 3/29/2021 0751  Last data filed at 3/29/2021 0617  Gross per 24 hour   Intake 1040 ml   Output 3850 ml   Net -2810 ml       Labs:  Hematology:  Recent Labs     21  0543 21  0539   WBC 13.2* 12.0* 11.7*   RBC 4.27 4.39 4.23   HGB 9.9* 10.2* 9.8*   HCT 34.5* 35.5* 34.2*   MCV 80.8* 80.9* 80.9*   MCH 23.2* 23.2* 23.2*   MCHC 28.7 28.7 28.7   RDW 18.9* 19.1* 19.0*    283 297   MPV 8.8 8.6 8.9     Chemistry:  Recent Labs     21  04121  0543 21  0539    137 139   K 3.5* 4.0 4.2    102 103   CO2 22 23 23   GLUCOSE 140* 123* 147*   BUN 74* 72* 71*   CREATININE 3.13* 3.24* 3.06*   MG 1.9  --   --    ANIONGAP 13 12 13   LABGLOM 23* 22* 23*   GFRAA 27* 26* 28*   CALCIUM 8.2* 8.6 8.4*     Recent Labs     21  1639 21  1955 21  0659 21  1148 21  1635 21   POCGLU 169* 159* 116* 203* 188* 185*     ABG:  Lab Results   Component Value Date    POCPH 7.381 2019    POCPCO2 35.3 2019    POCPO2 140.3 2019    POCHCO3 20.9 2019    NBEA 4 2019    PBEA NOT REPORTED 2019    CAQ8AXL 22 2019    ZVRW9CIQ 99 2019    FIO2 40.0 2019     Lab Results   Component Value Date/Time    SPECIAL NOT REPORTED 2021 06:56 PM     Lab Results   Component Value Date/Time    CULTURE NO GROWTH 5 DAYS 2021 06:56 PM       Radiology:  Xr Foot Left (min 3 Views)    Result Date: 3/22/2021  Soft tissue ulceration overlying the talus with underlying cortical disruption, concerning for osteomyelitis. Xr Foot Right (min 3 Views)    Result Date: 3/22/2021  No acute bony abnormalities are noted     Xr Chest Portable    Result Date: 3/25/2021  No acute cardiopulmonary disease     Mri Foot Left Wo Contrast    Addendum Date: 3/26/2021    ADDENDUM: There is generalized skin thickening and a 1 cm rind of soft tissue edema along the medial plantar aspect of the foot. Subjacent to this, there is an additional layer of subcutaneous fat. Deep to this, there is complete atrophy/fatty infiltration of the intrinsic musculature of the foot. Result Date: 3/26/2021  Deep soft tissue ulcer overlying the calcaneus. Cortical disruption of the exposed calcaneus with underlying marrow edema, compatible with osteomyelitis. Diffuse skin thickening and subcutaneous soft tissue edema surrounding the calcaneal ulcer as well as within the dorsum of the foot, compatible with myositis and cellulitis. No drainable fluid collection identified to suggest abscess formation. No additional foci of osteomyelitis identified. Mri Ankle Left Wo Contrast    Addendum Date: 3/26/2021    ADDENDUM: There is generalized skin thickening and a 1 cm rind of soft tissue edema along the medial plantar aspect of the foot. Subjacent to this, there is an additional layer of subcutaneous fat. Deep to this, there is complete atrophy/fatty infiltration of the intrinsic musculature of the foot. Result Date: 3/26/2021  Deep soft tissue ulcer overlying the calcaneus. Cortical disruption of the exposed calcaneus with underlying marrow edema, compatible with osteomyelitis. Diffuse skin thickening and subcutaneous soft tissue edema surrounding the calcaneal ulcer as well as within the dorsum of the foot, compatible with myositis and cellulitis.   No drainable fluid collection identified to suggest abscess formation. No additional foci of osteomyelitis identified. Physical Examination:        General appearance:  alert, cooperative and no distress, labile mood  Mental Status:  oriented to person, place and time and normal affect  Lungs:  clear to auscultation bilaterally, normal effort  Heart:  regular rate and rhythm, no murmur  Abdomen: Obese,  nontender, distended, normal bowel sounds, no masses, hepatomegaly, splenomegaly, ostomy present. : Ambrosio present  Extremities: Generalized +2 edema extending into abdomen and left upper extremity,new redness to LLE, RLE with decreased edema.  tenderness in the calves  Skin: Blistering bilateral lower extremities.  Bilateral pedal wounds inaccessible due to dressing    Assessment:        Hospital Problems           Last Modified POA    * (Principal) Osteomyelitis of left foot ([address] 3/24/2021 Yes    DEBBY (acute kidney injury) ([address] 3/24/2021 Yes    Chronic systolic heart failure ([address] 3/19/2021 Yes    Volume overload 3/21/2021 Yes    Hypoglycemia due to insulin 3/21/2021 No    Right arm weakness 3/19/2021 Yes    Essential hypertension 3/19/2021 Yes    DM II (diabetes mellitus, type II), controlled ([address] 3/19/2021 Yes    Diabetic foot ulcer ([address] 3/24/2021 Yes    Diabetic foot infection ([address] 3/24/2021 Yes    Chronic paraplegia ([address] 3/19/2021 Yes    Sacral wound (Chronic) 3/19/2021 Yes    Open wound of left foot 3/19/2021 Yes    Ambrosio catheter in place (Chronic) 3/19/2021 Yes    Colostomy in place [facility]) (Chronic) 3/19/2021 Yes    Overview Signed 3/19/2021  7:56 PM by MIRTA Avalos - CNS     December '19         CKD (chronic kidney disease) stage 4, GFR 15-29 ml/min ([address] 3/23/2021 Yes    Physical debility 3/24/2021 Yes    PAD (peripheral artery disease) ([address] 3/25/2021 Yes          Plan:        1.  DEBBY:    - Renal ultrasound completed 3/20/21 without acute process  - nephrology on board   - On Demadex 20 mg daily and aldactone 25 mg daily  - good urine output  - continue sodium bicarb  - Baseline CRT ~ 1.6-1.9: now 3.2 appears to be a plateau at this point per nephrology's note  - renally dose all meds   - avoid nephrotoxic agents      2. Chronic systolic CHF:   -ECHO completed 3/20 showing EF 25%  -continue BB, demadex  And aldactone  -excellent UOP     3. Protein calorie malnutrition with poor wound healing   4. Left calcaneus osteomyelitis    - Chronic sacral wound and left foot wound present on admission  - Left heel osteomyelitis secondary to uncontrolled diabetes mellitus type 2 with diabetic foot infection and immobility  - Follow-up on foot wound cultures, currently NGTD  - Evaluated by general surgery and podiatry   - Wound care following  - MRI showed OM of left heel   - continue IV zosyn at this time per ID recs   - Patient is tentatively scheduled for OR on 3/29/2021 at 5:00 PM for left partial calcanectomy and antibiotic bead placement with wound VAC under local block, no general anesthesia.    - seen by cardiology and determined to be intermediate to high risk for partial calcanectomy      4. Essential hypertension: Stable  - Continue beta-blocker, increase to 50 mg twice daily  - add hydralazine 25 mg po tid 3/24- increase on 3/25  - v/s per unit protocol      5. Diabetes mellitus type 2 with episodes of hypoglycemia:    - continue lantus at decreased dose of 15 units qhs  - continue accu checks ac/hs with ISS     6. History of spina bifida with adult failure to thrive with chronic paraplegia and chronic urinary retention:   - Chronic debility status noted  - ECF at discharge  - Baclofen  - Chronic Ambrosio catheter: Exchanged on 3/20, polymicrobial UA without systemic symptoms.  Off antibiotics, ID following      7. Morbid obesity: BMI 49.9 on admit  - Weight loss encouraged.   - Dietary supplement twice daily     8. Dyslipidemia: Continue statin     9. Gout: Continue allopurinol     10. Physical debility: Secondary to spina bifida and chronic paraplegia     11. GI/DVT prophylaxis: Pepcid, heparin     12. Dispo: ECF after left foot surgical intervention is completed     Radha Ramirez MD  3/29/2021  7:51 AM

## 2021-03-29 NOTE — PROGRESS NOTES
Physical Therapy    DATE: 3/29/2021    NAME: Kemi Salinas  MRN: [id number]   : 1985      Patient not seen this date for Physical Therapy due to:    Surgery/Procedure: Pt off floor in OR for calcanectomy. Will check back 3/30. Electronically signed by Yudi Case on 3/29/2021 at 4:05 PM   Treatment performed by Student PTA under the supervision of co-signing PTA who agrees with all treatment and documentation.    Bhumi Batista PTA

## 2021-03-29 NOTE — PROGRESS NOTES
Occupational Therapy    Occupational Therapy Not Seen Note    DATE: 3/29/2021  Name: Kyrie Iraheta  : 1985  MRN: [id number]    Patient not available for Occupational Therapy due to:    Surgery/Procedure: Sx on bilat feet this afternoon.     Next Scheduled Treatment: 3/30/21    Electronically signed by ELBA Bashir on 3/29/2021 at 12:15 PM

## 2021-03-29 NOTE — ANESTHESIA POSTPROCEDURE EVALUATION
Department of Anesthesiology  Postprocedure Note    Patient: Kenan Nash  MRN: [id number]  YOB: 1985  Date of evaluation: 3/29/2021  Time:  7:59 PM     Procedure Summary     Date: 03/29/21 Room / Location: [address]    Anesthesia Start: 1708 Anesthesia Stop: 1923    Procedure: LEFT FULL CALCANECTOMY WITH INSERTION OF ANTIBIOTIC BEADS (Left ) Diagnosis: (INFECTION)    Surgeons: Grzegorz Walker DPM Responsible Provider: Brook Bentley MD    Anesthesia Type: regional, MAC ASA Status: 4          Anesthesia Type: regional, MAC    Emir Phase I: Emir Score: 8    Emir Phase II: Emir Score: 7    Last vitals: Reviewed and per EMR flowsheets.        Anesthesia Post Evaluation    Patient location during evaluation: PACU  Patient participation: complete - patient participated  Level of consciousness: awake and alert  Pain score: 3  Airway patency: patent  Nausea & Vomiting: no vomiting and no nausea  Complications: no  Cardiovascular status: hemodynamically stable  Respiratory status: acceptable  Hydration status: stable

## 2021-03-29 NOTE — PROGRESS NOTES
Dr. Lela Daniel at bedside with writer and Ana Maria Menendez. Time out completed, allergies verified. Vital signs and patient monitored. Patient with supplemental oxygen on already. IV versed and fentanyl as ordered. Dr. Lela Daniel unable to complete nerve block because he was unable to visualize nerves due to the amount of edema in the patients leg.

## 2021-03-29 NOTE — PROGRESS NOTES
[facility] Wound Ostomy Continence Nurse  Follow Up      NAME:  Austin Nevarez RECORD NUMBER:  [id number]  AGE: 38 y.o. GENDER: male  : 1985  TODAY'S DATE:  3/29/2021    Subjective:     Reason for WOCN Evaluation and Assessment: \"sacral wound, chronic lt foot wound, DM\"      Scott Root is a 38 y.o. male referred by:   [x] Physician  [] Nursing  [] Other:     Wound Identification:  Wound Type: pressure and non-healing surgical  Contributing Factors: edema, diabetes, chronic pressure, decreased mobility, shear force, obesity and decreased tissue oxygenation  Chronic sacral and heel wounds.     Known from past admissions for necrotizing fasciitis involving buttocks, and scrotum with wide resection and  multiple I&D's with NPWT changes, , Dec. 2019-2020 with Trauma Surgeons. Colostomy creation. History of Spina Bifida. Sacral wound appears clean and granular with serosanguinous drainage. Small right ischial ulceration noted. Abdominal wound (delayed primary closure), with small open area.     Objective:      BP (!) 122/94   Pulse 69   Temp 97.3 °F (36.3 °C) (Oral)   Resp 14   Ht 5' 9\" (1.753 m)   Wt (!) 338 lb (153.3 kg)   SpO2 96%   BMI 49.91 kg/m²   Hawk Risk Score: Hawk Scale Score: 14    LABS    CBC:   Lab Results   Component Value Date    WBC 11.7 2021    RBC 4.23 2021    HGB 9.8 2021     CMP:  Albumin:    Lab Results   Component Value Date    LABALBU 2.6 2021     PT/INR:    Lab Results   Component Value Date    PROTIME 12.0 2021    INR 1.1 2021     HgBA1c:    Lab Results   Component Value Date    LABA1C 6.6 2021     PTT: No components found for: LABPTT      Assessment:       Measurements:       21 1105   Wound 21 Coccyx 2021 incision necrotizing fasciitis   Date First Assessed/Time First Assessed: 21 193   Present on Hospital Admission: Yes  Wound Approximate Age at First Assessment (Weeks): 64 weeks  Primary Wound Type: Pressure Injury  Location: Coccyx  Wound Description (Comments): 12/6/2021 in. .. Wound Etiology Other   Dressing Status New dressing applied   Wound Cleansed Soap and water;Irrigated with saline   Dressing/Treatment Alginate with Ag; Foam   Dressing Change Due 03/31/21   Wound Assessment Pink/red;Granulation tissue; Devitalized tissue   Drainage Amount Moderate   Drainage Description Serosanguinous   Odor None   Liliam-wound Assessment Intact   Wound 03/22/21 Ischium Right   Date First Assessed/Time First Assessed: 03/22/21 0959   Present on Hospital Admission: Yes  Primary Wound Type: Pressure Injury  Location: Ischium  Wound Location Orientation: Right   Wound Etiology Pressure Stage  3   Dressing Status Clean;Dry; Intact   Dressing/Treatment Hydrocolloid   Dressing Change Due 04/01/21         Plan   Plan of Care:   SACRAL/BUTTOCK:  Apply Maxorb alginate to open area and cover with a large Sacral Mepilex. Change every 48 hours and prn if drainage is excessive. RIGHT ISCHIUM:  Hydrocolloid 4x4. Change every 72 hours. ABDOMEN: open to air as is patient's preference  BILATERAL FEET: per podiatrist     Elevate heels off of mattress  Turn every 2 hours     Use Comfort [place] to reposition patient to minimize potential for shear injury.     Routine  Ostomy care. Change pouch 2 times/week. Empty with 1/3-1/2 full     Moisture wicking under pads    Interdry cloths to abdominal skin folds.        Specialty Bed Required : Yes   [x]? Low Air Loss   [x]? Pressure Redistribution  []? Fluid Immersion  [x]? Bariatric  []? Total Pressure Relief  []?  Other:     Electronically signed by Paulina Roa RN, Flaco Mari on 3/29/2021 at 2:05 PM

## 2021-03-29 NOTE — PROGRESS NOTES
(ROXICODONE) 5 MG immediate release tablet, Take 10 mg by mouth every 4 hours as needed for Pain. Multiple Vitamins-Minerals (THERAPEUTIC MULTIVITAMIN-MINERALS) tablet, Take 1 tablet by mouth daily  insulin glargine (LANTUS) 100 UNIT/ML injection vial, Inject 10 Units into the skin daily    Current Medications:     Scheduled Meds:    potassium chloride  40 mEq Oral Once    hydrALAZINE  50 mg Oral 3 times per day    torsemide  20 mg Oral Daily    spironolactone  25 mg Oral Daily    aspirin  81 mg Oral Daily    piperacillin-tazobactam  3,375 mg Intravenous Q8H    insulin glargine  15 Units Subcutaneous Nightly    metoprolol tartrate  50 mg Oral BID    famotidine  20 mg Oral Daily    sodium hypochlorite   Irrigation Daily    allopurinol  100 mg Oral Daily    Baclofen  5 mg Oral BID    magnesium oxide  400 mg Oral Daily    therapeutic multivitamin-minerals  1 tablet Oral Daily    senna  1 tablet Oral Nightly    sodium chloride flush  10 mL Intravenous 2 times per day    heparin (porcine)  5,000 Units Subcutaneous 3 times per day    insulin lispro  0-18 Units Subcutaneous TID WC    insulin lispro  0-9 Units Subcutaneous Nightly    atorvastatin  40 mg Oral Daily     Continuous Infusions:    dextrose       PRN Meds:  benzonatate, melatonin, acetaminophen, polyethylene glycol, sodium chloride flush, potassium chloride **OR** potassium alternative oral replacement **OR** potassium chloride, magnesium sulfate, promethazine **OR** ondansetron, acetaminophen **OR** acetaminophen, glucose, dextrose, glucagon (rDNA), dextrose, magnesium hydroxide    Input/Output:       I/O last 3 completed shifts: In: 9312 [P.O.:1040]  Out: 6834 [Urine:3050; Stool:800].       Patient Vitals for the past 96 hrs (Last 3 readings):   Weight   21 0423 (!) 338 lb (153.3 kg)       Vital Signs:   Temperature:  Temp: 97.3 °F (36.3 °C)  TMax:   Temp (24hrs), Av.1 °F (36.7 °C), Min:97.3 °F (36.3 °C), Max:98.4 °F (36.9 °C) Respirations:  Resp: 14  Pulse:   Pulse: 69  BP:    BP: (!) 122/94  BP Range: Systolic (87IAD), PDC:141 , Min:119 , MNO:987       Diastolic (19DUD), WTS:89, Min:86, Max:103      Physical Examination:     General:  AAO x 3, speaking in full sentences, no accessory muscle use. HEENT: Atraumatic, normocephalic, no throat congestion, moist mucosa. Eyes:   Pupils equal, round and reactive to light, EOMI. Neck:   Supple  Chest:   Bilateral vesicular breath sounds, no rales or wheezes. Cardiac:  S1 S2 RR, no murmurs, gallops or rubs. Abdomen: Soft, non-tender, no masses or organomegaly, BS audible. :   No suprapubic or flank tenderness. Neuro:  AAO x 3, No FND. SKIN:  No rashes, good skin turgor. Extremities:  +ve 1-2+ edema. Positive anasarca. Labs:       Recent Labs     03/27/21 0419 03/28/21  0543 03/29/21  0539   WBC 13.2* 12.0* 11.7*   RBC 4.27 4.39 4.23   HGB 9.9* 10.2* 9.8*   HCT 34.5* 35.5* 34.2*   MCV 80.8* 80.9* 80.9*   MCH 23.2* 23.2* 23.2*   MCHC 28.7 28.7 28.7   RDW 18.9* 19.1* 19.0*    283 297   MPV 8.8 8.6 8.9      BMP:   Recent Labs     03/27/21 0419 03/28/21  0543 03/29/21  0539    137 139   K 3.5* 4.0 4.2    102 103   CO2 22 23 23   BUN 74* 72* 71*   CREATININE 3.13* 3.24* 3.06*   GLUCOSE 140* 123* 147*   CALCIUM 8.2* 8.6 8.4*      Magnesium:    Recent Labs     03/27/21  0419   MG 1.9     MARINE:      Lab Results   Component Value Date    MARINE NEGATIVE 11/30/2019     SPEP:  Lab Results   Component Value Date    PROT 5.3 12/01/2019    PATH ELECTRONICALLY SIGNED.  Lang Rosenthal M.D. 11/30/2019     C3:     Lab Results   Component Value Date    C3 97 11/30/2019     C4:     Lab Results   Component Value Date    C4 12 11/30/2019     Hep BsAg:         Lab Results   Component Value Date    HEPBSAG NONREACTIVE 11/30/2019     Hep C AB:          Lab Results   Component Value Date    HEPCAB NONREACTIVE 11/30/2019       Urinalysis/Chemistries:      Lab Results   Component Value Date NITRU NEGATIVE 03/19/2021    COLORU YELLOW 03/19/2021    PHUR 7.5 03/19/2021    WBCUA TOO NUMEROUS TO COUNT 03/19/2021    RBCUA 5 TO 10 03/19/2021    MUCUS NOT REPORTED 03/19/2021    TRICHOMONAS NOT REPORTED 03/19/2021    YEAST NOT REPORTED 03/19/2021    BACTERIA MODERATE 03/19/2021    SPECGRAV 1.010 03/19/2021    LEUKOCYTESUR LARGE 03/19/2021    UROBILINOGEN Normal 03/19/2021    BILIRUBINUR NEGATIVE 03/19/2021    GLUCOSEU NEGATIVE 03/19/2021    KETUA NEGATIVE 03/19/2021    AMORPHOUS NOT REPORTED 03/19/2021     Urine Sodium:     Lab Results   Component Value Date    DONOVAN 75 03/19/2021      Urine Creatinine:     Lab Results   Component Value Date    LABCREA 26.0 03/21/2021       Radiology:     Reviewed. Assessment:     1. Acute Kidney Injury nonoliguric likely secondary to ischemic ATN from underlying infection. Creatinine now in the 3.13.2 range, possibility of this being new baseline highly likely. 2.  Chronic kidney disease now stage IV with proteinuria fluctuating in the 2-4 g range  2. Diabetic left foot ulcer. 3.  Osteomyelitis left calcaneus. 4.  Diabetic right foot ulcer. 5.  Worsening gluteal and sacral wounds. 6.  Type 2 diabetes with poor control. 7.  Morbid obesity with BMI of 49.99.  8.  Cardiomyopathy with EF of 25% with global hypokinesia. 2D echo done on 3/20/2021.  9.  Chronic indwelling Ambrosio catheter. 10.  Recurrent UTIs. 6.  Spina bifida with history of immobility. 12.  Chronic nonhealing gluteal wounds with history of necrotizing fasciitis status post multiple debridements with diverting colostomy in 2019. 13.  Nonnephrotic proteinuria. Plan:   1. Continue current diuretics. 2.  Monitor strict I's and O's and renal function. 3.  No objection for OR for debridement of left heel wounds today   4. Antibiotics as per renal dosing per infectious disease, agree with choice of antibiotics. 5.   BMP daily  6. Will follow.     Nutrition   Please ensure that patient is on a renal diet/TF. Avoid nephrotoxic drugs/contrast exposure. We will continue to follow along with you.      Electronically signed by Michelle Ventura MD on 3/29/2021 at 12:39 PM   [facility]

## 2021-03-29 NOTE — PROGRESS NOTES
[facility] -   Infectious diseases evaluation  admission date 3/19/2021    reason for consultation:   Heel ulcers w bone exposed    Impression :   Current:  · bilat heel non healing ulcers -  · Left calcaneus osteomyelitis-medullary fibrosis   with reactive bone changes and focal acute and chronic osteomyelitis. · Bone biopsy taken L calca  · Sacral ulcer  · DM2  · Acute on chronic CHF  · DEBBY    Other:  · Morbid obesity  · History of nec fash s/p multiple debridement with diverting colostomy in 2019  Discussion / summary of stay / plan of care   ·   Recommendations     · Keep Zosyn, and adjust to bone culture final results  · Bone cx is neg but path suggesting acute on chronic OM  · Planned for left partial calcanectomy and antibiotic bead placement with wound VAC under local anesthesia 3/29    Infection Control Recommendations   · [place] Precautions  · Contact Isolation       Antimicrobial Stewardship Recommendations   · Simplification of therapy  · Targeted therapy  · IV to oral conversion  · Per Kg dosing  Coordination ofOutpatient Care:   · Estimated Length of IV antimicrobials:  · Patient will need Midline / picc Catheter Insertion:   · Patient will need SNF:  · Patient will need outpatient wound care:     History of Present Illness:   Initial history:  Chad Taylor is a 38y.o.-year-old male bilat heel chronic wounds getting worse and bone exposed on the left - no cellulitis and no fever. WBC normal  MRI ordered    Also has buttock wounds  ID called  DM2     Bone bx left calcan 3/23  Wound cx 3/23                    Interval changes  3/29/2021   Patient Vitals for the past 8 hrs:   BP Temp Temp src Pulse Resp SpO2   03/29/21 0800 124/88 98.2 °F (36.8 °C) Oral      03/29/21 0555 (!) 119/94        03/29/21 0332 (!) 119/94 98.1 °F (36.7 °C) Oral 79 15 96 %     Alert appropriate, no complaints reported. No fever-no chills  Not SOB.  Labs reviewed - leukocytosis improving on abx. He is aware that he is scheduled for surgery today. Hemodynamically stable. Local wound cultures growing mixed growth likely contamination  Vascular - good pulses - no vasc issues  Card deferring cath due to NAHID    Bone biopsy from the calcaneus 3/23 negative -  Pathology medullary fibrosis  with reactive bone changes and focal acute and chronic osteomyelitis. Wound culture from the open left heel is showing for negative rods and Enterococcus bacteroides 3/23    Summary of relevant labs:  Labs:  W 9.8  Micro:   BC  Left calc bone biopsy cx 3/23 pending  Left heel wound cx 3/23 Enterococcus and gram-negative rods  Imaging:      I have personally reviewed the past medical history, past surgical history, medications, social history, and family history, and I haveupdated the database accordingly. Allergies: Other     Review of Systems:     Review of Systems   Constitutional: Positive for activity change. Negative for appetite change, diaphoresis and fever. HENT: Negative for congestion. Eyes: Negative for pain, itching and visual disturbance. Respiratory: Negative for apnea, cough and stridor. Cardiovascular: Negative for chest pain. Gastrointestinal: Negative for abdominal distention. Colostomy   Endocrine: Negative for polyphagia. Genitourinary: Negative for dysuria and frequency. Musculoskeletal: Negative for arthralgias. Skin: Positive for wound. Allergic/Immunologic: Negative for immunocompromised state. Neurological: Negative for dizziness, light-headedness and numbness. Hematological: Negative for adenopathy. Psychiatric/Behavioral: Negative for agitation. Physical Examination :       Physical Exam  Constitutional:       Appearance: Normal appearance. He is obese. He is not ill-appearing or diaphoretic. HENT:      Head: Normocephalic and atraumatic. Nose: Nose normal. No congestion.       Mouth/Throat:      Mouth: Mucous membranes are moist.   Eyes:      Conjunctiva/sclera: Conjunctivae normal.   Neck:      Musculoskeletal: Neck supple. No neck rigidity or muscular tenderness. Cardiovascular:      Rate and Rhythm: Normal rate and regular rhythm. Heart sounds: Normal heart sounds. No murmur. No friction rub. Pulmonary:      Effort: No respiratory distress. Breath sounds: Normal breath sounds. Abdominal:      General: There is no distension. Palpations: Abdomen is soft. Tenderness: There is no abdominal tenderness. Comments: osteomy - mid abd scratch large wounds x 2 - clean   Genitourinary:     Comments: No lema  Musculoskeletal:         General: No swelling, tenderness, deformity or signs of injury. Skin:     General: Skin is dry. Coloration: Skin is not pale. Findings: Lesion present. No erythema. Neurological:      General: No focal deficit present. Mental Status: He is alert and oriented to person, place, and time. Mental status is at baseline. Psychiatric:         Mood and Affect: Mood normal.         Thought Content:  Thought content normal.         Past Medical History:     Past Medical History:   Diagnosis Date    [facility]congestive [facility] failure) [facility]     Diabetes mellitus ([facility]     Hypertension     Septic shock ([address]     Spina bifida aperta of lumbar spine ([facility]        Past Surgical  History:     Past Surgical History:   Procedure Laterality Date    ABDOMEN SURGERY N/A 12/8/2019    DEBRIDEMENT  NECROTIZING FASCIITIS BUTTOCK, WOUND VAC REMOVAL DELAYED PRIMARY CLOSURE OF MIDLINE ABDOMINAL INCISION, OSTOMY BAG CHANGE performed by Merritt Angulo MD at [address] [facility] N/A 12/10/2019    DEBRIDEMENT OF SACRAL WOUND performed by Chelsea Gaffney MD at [address] [facility] N/A 1/5/2020    GLUTEAL WOUND DEBRIDEMENT, WOUND VAC CHANGE performed by Chelsea Gaffney MD at [address] [facility] N/A 1/11/2020    SACRAL DEBRIDEMENT WITH WOUND VAC CHANGE performed by Kristin Rich MD at [facility]. 1/1/2020    IRRIGATION AND DEBRIDEMENT BUTTOCKS, SCROTUM, ABDOMEN, WOUND VAC CHANGE performed by Tonya Patterson MD at [address] [place] [address] N/A 12/3/2019    LAPAROSCOPIC CONVERTED TO OPEN DIVERTING LOOP COLOSTOMY; WOUND VAC APPLICATION performed by Shanika Harper MD at [facility]  01/05/2020    GLUTEAL WOUND DEBRIDEMENT, WOUND VAC CHANGE     DEBRIDEMENT  01/08/2020    WOUND VAC CHANGE SACRAL DECUBITUS, POSSIBLE DEBRIDEMENT    DEBRIDEMENT  01/11/2020     SACRAL DEBRIDEMENT WITH WOUND VAC CHANGE     INCISION AND DRAINAGE Bilateral 11/29/2019    RECTAL PERIRECTAL INCISION AND DRAINAGE, [facility] LOOP COLOSTOMY CREATION [facility] N/A 12/6/2019    DEBRIDEMENT NECROTIZING FASCIAITIS BILAT BUTTOCK performed by Bethany Muñoz MD at [facility] [address] N/A 12/20/2019    DEBRIDEMENT GLUTEAL AND SCROTAL REGIONS performed by Kristin Rich MD at [facility] [address] N/A 12/26/2019    INCISION AND DRAINAGE AND WOUND VAC CHANGE BUTTOCK, PERINEUM performed by Kole Russell DO at [facility] [address] N/A 1/8/2020    WOUND VAC CHANGE SACRAL DECUBITUS, DEBRIDEMENT performed by Bethany Muñoz MD at [facility] N/A 12/10/2019    SCROTAL EXPLORATION WITH SCROTAL DEBRIDEMENT performed by Delaney Castellano MD at [place] [address] 12/23/2019    WOUND [address] performed by Bethany Muñoz MD at [place] [address]       Medications:      potassium chloride  40 mEq Oral Once    hydrALAZINE  50 mg Oral 3 times per day    torsemide  20 mg Oral Daily    spironolactone  25 mg Oral Daily    aspirin  81 mg Oral Daily    piperacillin-tazobactam  3,375 mg Intravenous Q8H    insulin glargine  15 Units Subcutaneous Nightly    metoprolol tartrate  50 mg Oral BID    famotidine  20 mg Oral Daily    sodium hypochlorite   Irrigation Daily    allopurinol  100 mg Oral Daily    Baclofen  5 mg Oral BID    magnesium oxide  400 mg Oral Daily    therapeutic multivitamin-minerals  1 tablet Oral Daily    senna  1 tablet Oral Nightly    sodium chloride flush  10 mL Intravenous 2 times per day    heparin (porcine)  5,000 Units Subcutaneous 3 times per day    insulin lispro  0-18 Units Subcutaneous TID WC    insulin lispro  0-9 Units Subcutaneous Nightly    atorvastatin  40 mg Oral Daily       Social History:     Social History     Socioeconomic History    Marital status: Unknown     Spouse name: Not on file    Number of children: Not on file    Years of education: Not on file    Highest education level: Not on file   Occupational History    Not on file   Social Needs    Financial resource strain: Not on file    Food insecurity     Worry: Not on file     Inability: Not on file    Transportation needs     Medical: Not on file     Non-medical: Not on file   Tobacco Use    Smoking status: Never Smoker    Smokeless tobacco: Never Used   Substance and Sexual Activity    Alcohol use: Never     Frequency: Never    Drug use: Never    Sexual activity: Not on file   Lifestyle    Physical activity     Days per week: Not on file     Minutes per session: Not on file    Stress: Not on file   Relationships    Social connections     Talks on phone: Not on file     Gets together: Not on file     Attends [employer] service: Not on file     Active member of club or organization: Not on file     Attends meetings of clubs or organizations: Not on file     Relationship status: Not on file    Intimate partner violence     Fear of current or ex partner: Not on file     Emotionally abused: Not on file     Physically abused: Not on file     Forced sexual activity: Not on file   Other Topics Concern    Not on file   Social History Narrative    Not on file       Family History:   No family history on file. Medical Decision Making:   I have independently reviewed/ordered the following labs:    CBC with Differential:   Recent Labs     03/28/21  0543 03/29/21  0539   WBC 12.0* 11.7*   HGB 10.2* 9.8*   HCT 35.5* 34.2*    297   LYMPHOPCT 8* 9*   MONOPCT 8 8     BMP:  Recent Labs     03/27/21  0419 03/28/21  0543 03/29/21  0539    137 139   K 3.5* 4.0 4.2    102 103   CO2 22 23 23   BUN 74* 72* 71*   CREATININE 3.13* 3.24* 3.06*   MG 1.9  --   --      Hepatic Function Panel:   No results for input(s): PROT, LABALBU, BILIDIR, IBILI, BILITOT, ALKPHOS, ALT, AST in the last 72 hours. No results for input(s): RPR in the last 72 hours. No results for input(s): HIV in the last 72 hours. No results for input(s): BC in the last 72 hours. Lab Results   Component Value Date    CREATININE 3.06 03/29/2021    GLUCOSE 147 03/29/2021       Detailed results: Thank you for allowing us to participate in the care of this patient. Please call with questions. This note is created with the assistance of a speech recognition program.  While intending to generate adocument that actually reflects the content of the visit, the document can still have some errors including those of syntax and sound a like substitutions which may escape proof reading. It such instances, actual meaningcan be extrapolated by contextual diversion. Jefry Verdugo MD  Office: [phone]  Perfect serve / office [phone]        I have discussed the care of the patient, including pertinent history and exam findings,  with the resident. I have seen and examined the patient and the key elements of all parts of the encounter have been performed by me. I agree with the assessment, plan and orders as documented by the resident.     Diana Luis, Infectious Diseases

## 2021-03-29 NOTE — PLAN OF CARE
Problem: Skin Integrity:  Goal: Will show no infection signs and symptoms  Description: Will show no infection signs and symptoms  Outcome: Ongoing  Goal: Absence of new skin breakdown  Description: Absence of new skin breakdown  Outcome: Ongoing  Goal: Demonstration of wound healing without infection will improve  Description: Demonstration of wound healing without infection will improve  Outcome: Ongoing  Goal: Complications related to intravenous access or infusion will be avoided or minimized  Description: Complications related to intravenous access or infusion will be avoided or minimized  Outcome: Ongoing     Problem: Falls - Risk of:  Goal: Will remain free from falls  Description: Will remain free from falls  Outcome: Ongoing  Goal: Absence of physical injury  Description: Absence of physical injury  Outcome: Ongoing     Problem: Infection:  Goal: Will remain free from infection  Description: Will remain free from infection  Outcome: Ongoing     Problem: Safety:  Goal: Free from accidental physical injury  Description: Free from accidental physical injury  Outcome: Ongoing  Goal: Free from intentional harm  Description: Free from intentional harm  Outcome: Ongoing     Problem: Daily Care:  Goal: Daily care needs are met  Description: Daily care needs are met  Outcome: Ongoing     Problem: Skin Integrity:  Goal: Skin integrity will stabilize  Description: Skin integrity will stabilize  Outcome: Ongoing     Problem: Discharge Planning:  Goal: Patients continuum of care needs are met  Description: Patients continuum of care needs are met  Outcome: Ongoing     Problem: Nutrition  Goal: Optimal nutrition therapy  Description: Nutrition Problem #1: Increased nutrient needs  Intervention: Food and/or Nutrient Delivery: Modify Current Diet, Start Oral Nutrition Supplement  Nutritional Goals: Meet greater than or equal to 75% of estimated nutrient needs for wound healing with PO intake     Outcome: Ongoing Problem: Physical Regulation:  Goal: Will remain free from infection  Description: Will remain free from infection  Outcome: Ongoing  Goal: Diagnostic test results will improve  Description: Diagnostic test results will improve  Outcome: Ongoing  Goal: Ability to maintain vital signs within normal range will improve  Description: Ability to maintain vital signs within normal range will improve  Outcome: Ongoing     Problem: Respiratory:  Goal: Ability to maintain normal respiratory secretions will improve  Description: Ability to maintain normal respiratory secretions will improve  Outcome: Ongoing     Problem: Pain:  Goal: Pain level will decrease  Description: Pain level will decrease  Outcome: Ongoing  Goal: Control of acute pain  Description: Control of acute pain  Outcome: Ongoing  Goal: Control of chronic pain  Description: Control of chronic pain  Outcome: Ongoing     Problem: Discharge Planning:  Goal: Discharged to appropriate level of care  Description: Discharged to appropriate level of care  Outcome: Ongoing

## 2021-03-29 NOTE — BRIEF OP NOTE
BRIEF OP NOTE    PATIENT NAME: Ольга Zhang  YOB: 1985  -  38 y.o. male  MRN: [id number]  DATE: 3/29/2021  BILLING #: [id number]    Surgeon(s):  Gerald Marcus DPM     ASSISTANTS: Roseanne Sieving, DPM Valentina Buerger, PGY2, Melissa Deluna MS4    PRE-OP DIAGNOSIS:   1. S/p bedside bone biopsy (DOS: 3/23/2021)  2. Diabetic foot ulcer down to bone left foot  3. Osteomyelitis Left calcaneus  4. Diabetic foot ulcer down to subcutaneous tissue right foot  5. 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 DEBBY   7. CHF  8. Paraplegia   9. S/p L 5th toe amputation    POST-OP DIAGNOSIS: Same as above. PROCEDURE:   1. Total calcanectomy, left foot  2. Implantation of antibiotic beads, left foot    ANESTHESIA: MAC, 13 cc 0.5% Marcaine plain    HEMOSTASIS: Pneumatic thigh tourniquet @300 mmHg for 88 minutes. ESTIMATED BLOOD LOSS: Less than 10 cc. MATERIALS:   Implant Name Type Inv. Item Serial No.  Lot No. LRB No. Used Action   KIT BNE GRFT SUB 5CC CA SULF FAST CURE RESRB MINI BEAD  KIT BNE GRFT SUB 5CC CA SULF FAST CURE RESRB MINI BEAD  [employer] [id number] Left 1 Implanted   KIT BNE GRFT SUB 5CC CA SULF FAST CURE RESRB MINI BEAD  KIT BNE GRFT SUB 5CC CA SULF FAST CURE RESRB MINI BEAD  [employer] [id number] Left 1 Implanted       INJECTABLES: 10 cc 1% lidocaine plain    SPECIMEN:   ID Type Source Tests Collected by Time Destination   1 : LEFT CALCANEAL SWABS  Swab Foot CULTURE, ANAEROBIC AND AEROBIC Gerald Marcus DPM 3/29/2021 1839    2 : LEFT CALCANEAL  Bone Foot CULTURE, ANAEROBIC AND AEROBIC Gerald Marcus DPM 3/29/2021 1844    A : LEFT CALCANEAL  Bone Foot SURGICAL PATHOLOGY Gerald Marcus DPM 3/29/2021 3525        COMPLICATIONS: None    FINDINGS: Intramedullary abscess of the calcaneus. Please see op note for full detail.     The patient was counseled at length about the risks of thelma Covid-19 during their perioperative period and any recovery window from their procedure. The patient was made aware that thelma Covid-19  may worsen their prognosis for recovering from their procedure  and lend to a higher morbidity and/or mortality risk. All material risks, benefits, and reasonable alternatives including postponing the procedure were discussed. The patient does wish to proceed with the procedure at this time.     Herminia Christie DPM   Podiatric Medicine & Surgery   3/29/2021 at 7:16 PM

## 2021-03-29 NOTE — CARE COORDINATION
Transitional planning-talked with patient-plan is to go to [facility] in [place]. Called [facility] and talked with Nenita-still accepted-needs rapid COVID before going.

## 2021-03-29 NOTE — PROGRESS NOTES
infiltration of the intrinsic musculature of the foot. Final      MRI ANKLE LEFT WO CONTRAST   Final Result   Addendum 1 of 1   ADDENDUM:   There is generalized skin thickening and a 1 cm rind of soft tissue edema   along the medial plantar aspect of the foot. Subjacent to this, there is    an   additional layer of subcutaneous fat. Deep to this, there is complete   atrophy/fatty infiltration of the intrinsic musculature of the foot. Final      VL Lower Extremity Bilateral Venous Duplex   Final Result      XR FOOT LEFT (MIN 3 VIEWS)   Final Result   Soft tissue ulceration overlying the talus with underlying cortical   disruption, concerning for osteomyelitis. XR FOOT RIGHT (MIN 3 VIEWS)   Final Result   No acute bony abnormalities are noted         US RETROPERITONEAL LIMITED   Final Result   Unremarkable right kidney. Left kidney not visualized due to patient body   habitus and bowel gas. Venous Duplex (3/23): Negative for deep or superficial thrombosis. NIVS: 3/23/2021       Right:    TAWVZT abnormal PVRs    TYLER suggests mild vascular insufficiency at rest    Digit waveforms suggests microvascular level of disease        Left:    Mildly abnormal PVRs    TYLER suggests mild vascular insufficiency at rest    Digit waveforms suggests microvascular level of disease     TYLER:Right: 0.78. Left: 0.88. Wound Culture 3/23/2021: GPC, bacteroides    Culture bone, L calc (3/23): Neutrophils. NGTD. Surgical path, bone biopsy L calc (3/23): Acute and chronic osteomyelitis     Assessment   Navdeep Roldan is a 38 y.o. male with   1. S/p bedside bone biopsy (DOS: 3/23/2021)  2. Diabetic foot ulcer down to bone left foot  3. Osteomyelitis Left calcaneus  4. Diabetic foot ulcer down to subcutaneous tissue right foot  5. 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 DEBBY   7. CHF  8. Paraplegia   9.  S/p L 5th toe amputation    Principal Problem:    Osteomyelitis of left foot (HCC)  Active Problems:    DEBBY (acute kidney injury) ([address]    Chronic systolic heart failure (HCC)    Volume overload    Hypoglycemia due to insulin    Right arm weakness    Essential hypertension    DM II (diabetes mellitus, type II), controlled ([address]    Diabetic foot ulcer ([address]    Diabetic foot infection ([address]    Chronic paraplegia ([address]    Sacral wound    Open wound of left foot    Ambrosio catheter in place    Colostomy in place [facility])    CKD (chronic kidney disease) stage 4, GFR 15-29 ml/min ([facility])    Physical debility    PAD (peripheral artery disease) ([facility]  Resolved Problems:    * No resolved hospital problems. *       Plan     · Patient examined and evaluated at bedside. · Treatment options discussed in detail with the patient. · Abx per ID: Zosyn  · Cont to elevate heels off the bed. ? Consult placed for orthotist - b/l Rooke boots to off-load heels  · Patient is tentatively scheduled for OR today at 5:00 PM for left partial calcanectomy and antibiotic bead placement with wound VAC under local block, no general anesthesia. ? Consent in chart  ? Covid Negative   ? NPO 3/29 at 8 a.m.  ? Hold morning AC  · Recommend facility placement upon discharge for wound care, patient will be discharged with a wound VAC. · Non-weight bearing to bilateral lower extremities  · Dressings applied to BLE: Dakins (left) & betadine (right), 4x4s, Kerlix, Abd, light ace. · Will discuss with  Dr. Chasity Boo.        Viki Doyle DPM   Podiatric Medicine & Surgery   3/29/2021 at 4:10 PM

## 2021-03-30 LAB
ABSOLUTE EOS #: 0.42 K/UL (ref 0–0.44)
ABSOLUTE IMMATURE GRANULOCYTE: 0.21 K/UL (ref 0–0.3)
ABSOLUTE LYMPH #: 1.46 K/UL (ref 1.1–3.7)
ABSOLUTE MONO #: 1.88 K/UL (ref 0.1–1.2)
ANION GAP SERPL CALCULATED.3IONS-SCNC: 13 MMOL/L (ref 9–17)
BASOPHILS # BLD: 1 % (ref 0–2)
BASOPHILS ABSOLUTE: 0.21 K/UL (ref 0–0.2)
BUN BLDV-MCNC: 68 MG/DL (ref 6–20)
BUN/CREAT BLD: ABNORMAL (ref 9–20)
CALCIUM SERPL-MCNC: 8.8 MG/DL (ref 8.6–10.4)
CHLORIDE BLD-SCNC: 102 MMOL/L (ref 98–107)
CO2: 23 MMOL/L (ref 20–31)
CREAT SERPL-MCNC: 3.41 MG/DL (ref 0.7–1.2)
DIFFERENTIAL TYPE: ABNORMAL
EOSINOPHILS RELATIVE PERCENT: 2 % (ref 1–4)
GFR AFRICAN AMERICAN: 25 ML/MIN
GFR NON-AFRICAN AMERICAN: 20 ML/MIN
GFR SERPL CREATININE-BSD FRML MDRD: ABNORMAL ML/MIN/{1.73_M2}
GFR SERPL CREATININE-BSD FRML MDRD: ABNORMAL ML/MIN/{1.73_M2}
GLUCOSE BLD-MCNC: 127 MG/DL (ref 75–110)
GLUCOSE BLD-MCNC: 168 MG/DL (ref 75–110)
GLUCOSE BLD-MCNC: 172 MG/DL (ref 75–110)
GLUCOSE BLD-MCNC: 63 MG/DL (ref 70–99)
GLUCOSE BLD-MCNC: 70 MG/DL (ref 75–110)
HCT VFR BLD CALC: 37.8 % (ref 40.7–50.3)
HEMOGLOBIN: 10.4 G/DL (ref 13–17)
IMMATURE GRANULOCYTES: 1 %
LYMPHOCYTES # BLD: 7 % (ref 24–43)
MCH RBC QN AUTO: 23.5 PG (ref 25.2–33.5)
MCHC RBC AUTO-ENTMCNC: 27.5 G/DL (ref 28.4–34.8)
MCV RBC AUTO: 85.5 FL (ref 82.6–102.9)
MONOCYTES # BLD: 9 % (ref 3–12)
MORPHOLOGY: ABNORMAL
NRBC AUTOMATED: 0 PER 100 WBC
PDW BLD-RTO: 19.3 % (ref 11.8–14.4)
PLATELET # BLD: 375 K/UL (ref 138–453)
PLATELET ESTIMATE: ABNORMAL
PMV BLD AUTO: 9 FL (ref 8.1–13.5)
POTASSIUM SERPL-SCNC: 4.6 MMOL/L (ref 3.7–5.3)
RBC # BLD: 4.42 M/UL (ref 4.21–5.77)
RBC # BLD: ABNORMAL 10*6/UL
SEG NEUTROPHILS: 80 % (ref 36–65)
SEGMENTED NEUTROPHILS ABSOLUTE COUNT: 16.72 K/UL (ref 1.5–8.1)
SODIUM BLD-SCNC: 138 MMOL/L (ref 135–144)
WBC # BLD: 20.9 K/UL (ref 3.5–11.3)
WBC # BLD: ABNORMAL 10*3/UL

## 2021-03-30 PROCEDURE — 99232 SBSQ HOSP IP/OBS MODERATE 35: CPT | Performed by: INTERNAL MEDICINE

## 2021-03-30 PROCEDURE — 1200000000 HC SEMI PRIVATE

## 2021-03-30 PROCEDURE — 82947 ASSAY GLUCOSE BLOOD QUANT: CPT

## 2021-03-30 PROCEDURE — 6370000000 HC RX 637 (ALT 250 FOR IP): Performed by: STUDENT IN AN ORGANIZED HEALTH CARE EDUCATION/TRAINING PROGRAM

## 2021-03-30 PROCEDURE — 2580000003 HC RX 258: Performed by: INTERNAL MEDICINE

## 2021-03-30 PROCEDURE — 2580000003 HC RX 258: Performed by: STUDENT IN AN ORGANIZED HEALTH CARE EDUCATION/TRAINING PROGRAM

## 2021-03-30 PROCEDURE — 6360000002 HC RX W HCPCS: Performed by: STUDENT IN AN ORGANIZED HEALTH CARE EDUCATION/TRAINING PROGRAM

## 2021-03-30 PROCEDURE — 36415 COLL VENOUS BLD VENIPUNCTURE: CPT

## 2021-03-30 PROCEDURE — 85025 COMPLETE CBC W/AUTO DIFF WBC: CPT

## 2021-03-30 PROCEDURE — 6370000000 HC RX 637 (ALT 250 FOR IP): Performed by: INTERNAL MEDICINE

## 2021-03-30 PROCEDURE — 97110 THERAPEUTIC EXERCISES: CPT

## 2021-03-30 PROCEDURE — 51702 INSERT TEMP BLADDER CATH: CPT

## 2021-03-30 PROCEDURE — 6370000000 HC RX 637 (ALT 250 FOR IP): Performed by: NURSE PRACTITIONER

## 2021-03-30 PROCEDURE — 97535 SELF CARE MNGMENT TRAINING: CPT

## 2021-03-30 PROCEDURE — 80048 BASIC METABOLIC PNL TOTAL CA: CPT

## 2021-03-30 RX ORDER — GUAIFENESIN 100 MG/5ML
200 SOLUTION ORAL EVERY 4 HOURS PRN
Status: DISCONTINUED | OUTPATIENT
Start: 2021-03-30 | End: 2021-04-02 | Stop reason: HOSPADM

## 2021-03-30 RX ORDER — OXYMETAZOLINE HYDROCHLORIDE 0.05 G/100ML
2 SPRAY NASAL 2 TIMES DAILY
Status: COMPLETED | OUTPATIENT
Start: 2021-03-30 | End: 2021-04-01

## 2021-03-30 RX ORDER — INSULIN GLARGINE 100 [IU]/ML
18 INJECTION, SOLUTION SUBCUTANEOUS NIGHTLY
Status: DISCONTINUED | OUTPATIENT
Start: 2021-03-30 | End: 2021-04-02 | Stop reason: HOSPADM

## 2021-03-30 RX ADMIN — BENZONATATE 100 MG: 100 CAPSULE ORAL at 02:19

## 2021-03-30 RX ADMIN — ACETAMINOPHEN 650 MG: 325 TABLET ORAL at 06:01

## 2021-03-30 RX ADMIN — Medication 2 SPRAY: at 14:45

## 2021-03-30 RX ADMIN — SODIUM CHLORIDE, PRESERVATIVE FREE 10 ML: 5 INJECTION INTRAVENOUS at 08:38

## 2021-03-30 RX ADMIN — GUAIFENESIN 200 MG: 200 SOLUTION ORAL at 22:12

## 2021-03-30 RX ADMIN — INSULIN LISPRO 3 UNITS: 100 INJECTION, SOLUTION INTRAVENOUS; SUBCUTANEOUS at 17:39

## 2021-03-30 RX ADMIN — HEPARIN SODIUM 5000 UNITS: 5000 INJECTION INTRAVENOUS; SUBCUTANEOUS at 21:38

## 2021-03-30 RX ADMIN — HYDRALAZINE HYDROCHLORIDE 50 MG: 50 TABLET, FILM COATED ORAL at 14:50

## 2021-03-30 RX ADMIN — PIPERACILLIN AND TAZOBACTAM 3375 MG: 3; .375 INJECTION, POWDER, LYOPHILIZED, FOR SOLUTION INTRAVENOUS at 10:27

## 2021-03-30 RX ADMIN — PIPERACILLIN AND TAZOBACTAM 3375 MG: 3; .375 INJECTION, POWDER, LYOPHILIZED, FOR SOLUTION INTRAVENOUS at 17:44

## 2021-03-30 RX ADMIN — HEPARIN SODIUM 5000 UNITS: 5000 INJECTION INTRAVENOUS; SUBCUTANEOUS at 14:00

## 2021-03-30 RX ADMIN — HYDRALAZINE HYDROCHLORIDE 50 MG: 50 TABLET, FILM COATED ORAL at 22:14

## 2021-03-30 RX ADMIN — METOPROLOL TARTRATE 50 MG: 25 TABLET ORAL at 22:13

## 2021-03-30 RX ADMIN — BACLOFEN 5 MG: 10 TABLET ORAL at 22:13

## 2021-03-30 RX ADMIN — INSULIN LISPRO 3 UNITS: 100 INJECTION, SOLUTION INTRAVENOUS; SUBCUTANEOUS at 12:00

## 2021-03-30 RX ADMIN — PIPERACILLIN AND TAZOBACTAM 3375 MG: 3; .375 INJECTION, POWDER, LYOPHILIZED, FOR SOLUTION INTRAVENOUS at 02:08

## 2021-03-30 RX ADMIN — DAKIN'S SOLUTION 0.125% (QUARTER STRENGTH): 0.12 SOLUTION at 08:38

## 2021-03-30 RX ADMIN — HYDRALAZINE HYDROCHLORIDE 50 MG: 50 TABLET, FILM COATED ORAL at 05:53

## 2021-03-30 RX ADMIN — STANDARDIZED SENNA CONCENTRATE 8.6 MG: 8.6 TABLET ORAL at 22:14

## 2021-03-30 RX ADMIN — FAMOTIDINE 20 MG: 20 TABLET, FILM COATED ORAL at 08:36

## 2021-03-30 RX ADMIN — Medication 2 SPRAY: at 22:14

## 2021-03-30 RX ADMIN — ASPIRIN 81 MG: 81 TABLET, CHEWABLE ORAL at 08:36

## 2021-03-30 RX ADMIN — BACLOFEN 5 MG: 10 TABLET ORAL at 08:36

## 2021-03-30 RX ADMIN — TORSEMIDE 20 MG: 20 TABLET ORAL at 08:40

## 2021-03-30 RX ADMIN — SODIUM CHLORIDE, PRESERVATIVE FREE 10 ML: 5 INJECTION INTRAVENOUS at 22:12

## 2021-03-30 RX ADMIN — METOPROLOL TARTRATE 50 MG: 25 TABLET ORAL at 08:36

## 2021-03-30 RX ADMIN — Medication 1 TABLET: at 08:35

## 2021-03-30 RX ADMIN — DESMOPRESSIN ACETATE 40 MG: 0.2 TABLET ORAL at 08:36

## 2021-03-30 RX ADMIN — INSULIN GLARGINE 18 UNITS: 100 INJECTION, SOLUTION SUBCUTANEOUS at 21:39

## 2021-03-30 RX ADMIN — SODIUM CHLORIDE: 9 INJECTION, SOLUTION INTRAVENOUS at 17:38

## 2021-03-30 RX ADMIN — METOPROLOL TARTRATE 50 MG: 25 TABLET ORAL at 00:12

## 2021-03-30 RX ADMIN — MAGNESIUM GLUCONATE 500 MG ORAL TABLET 400 MG: 500 TABLET ORAL at 08:36

## 2021-03-30 RX ADMIN — SPIRONOLACTONE 25 MG: 25 TABLET ORAL at 08:36

## 2021-03-30 RX ADMIN — HEPARIN SODIUM 5000 UNITS: 5000 INJECTION INTRAVENOUS; SUBCUTANEOUS at 05:52

## 2021-03-30 RX ADMIN — ALLOPURINOL 100 MG: 100 TABLET ORAL at 08:36

## 2021-03-30 ASSESSMENT — ENCOUNTER SYMPTOMS
ABDOMINAL DISTENTION: 0
EYE PAIN: 0
EYE ITCHING: 0
COUGH: 0
PHOTOPHOBIA: 0
STRIDOR: 0
APNEA: 0

## 2021-03-30 ASSESSMENT — PAIN SCALES - GENERAL: PAINLEVEL_OUTOF10: 4

## 2021-03-30 NOTE — PROGRESS NOTES
MG tablet, Take 1 tablet by mouth nightly  polyethylene glycol (GLYCOLAX) 17 g packet, Take 17 g by mouth daily as needed for Constipation  oxyCODONE (ROXICODONE) 5 MG immediate release tablet, Take 10 mg by mouth every 4 hours as needed for Pain. Multiple Vitamins-Minerals (THERAPEUTIC MULTIVITAMIN-MINERALS) tablet, Take 1 tablet by mouth daily  insulin glargine (LANTUS) 100 UNIT/ML injection vial, Inject 10 Units into the skin daily    Current Medications:     Scheduled Meds:    oxymetazoline  2 spray Each Nostril BID    potassium chloride  40 mEq Oral Once    hydrALAZINE  50 mg Oral 3 times per day    spironolactone  25 mg Oral Daily    aspirin  81 mg Oral Daily    piperacillin-tazobactam  3,375 mg Intravenous Q8H    insulin glargine  15 Units Subcutaneous Nightly    metoprolol tartrate  50 mg Oral BID    famotidine  20 mg Oral Daily    sodium hypochlorite   Irrigation Daily    allopurinol  100 mg Oral Daily    Baclofen  5 mg Oral BID    magnesium oxide  400 mg Oral Daily    therapeutic multivitamin-minerals  1 tablet Oral Daily    senna  1 tablet Oral Nightly    sodium chloride flush  10 mL Intravenous 2 times per day    heparin (porcine)  5,000 Units Subcutaneous 3 times per day    insulin lispro  0-18 Units Subcutaneous TID WC    insulin lispro  0-9 Units Subcutaneous Nightly    atorvastatin  40 mg Oral Daily     Continuous Infusions:    IV infusion builder      dextrose       PRN Meds:  benzonatate, melatonin, acetaminophen, polyethylene glycol, sodium chloride flush, potassium chloride **OR** potassium alternative oral replacement **OR** potassium chloride, magnesium sulfate, promethazine **OR** ondansetron, acetaminophen **OR** acetaminophen, glucose, dextrose, glucagon (rDNA), dextrose, magnesium hydroxide    Input/Output:       I/O last 3 completed shifts: In: 300 [I.V.:300]  Out: 6086 [Urine:2440; Stool:300].       Patient Vitals for the past 96 hrs (Last 3 readings):   Weight 21 0800 300 lb (136.1 kg)   21 0423 (!) 338 lb (153.3 kg)       Vital Signs:   Temperature:  Temp: 97.9 °F (36.6 °C)  TMax:   Temp (24hrs), Av.5 °F (36.4 °C), Min:96.8 °F (36 °C), Max:97.9 °F (36.6 °C)    Respirations:  Resp: 23  Pulse:   Pulse: 96  BP:    BP: (!) 179/96  BP Range: Systolic (08ODW), IU , Min:95 , OEL:096       Diastolic (57NUO), MHE:28, Min:47, Max:132      Physical Examination:     General:  AAO x 3, speaking in full sentences, no accessory muscle use. HEENT: Atraumatic, normocephalic, no throat congestion, moist mucosa. Both external auditory canals examined, no evidence of cerumen impaction, drum area could be seen. Eyes:   Pupils equal, round and reactive to light, EOMI. Neck:   Supple  Chest:   Bilateral vesicular breath sounds, no rales or wheezes. Cardiac:  S1 S2 RR, no murmurs, gallops or rubs. Abdomen: Soft, non-tender, no masses or organomegaly, BS audible. :   No suprapubic or flank tenderness. Neuro:  AAO x 3, No FND. SKIN:  No rashes, good skin turgor. Extremities:  +ve 1-2+ edema. Positive anasarca. Labs:       Recent Labs     21  0543 21  0539 21  0514   WBC 12.0* 11.7* 20.9*   RBC 4.39 4.23 4.42   HGB 10.2* 9.8* 10.4*   HCT 35.5* 34.2* 37.8*   MCV 80.9* 80.9* 85.5   MCH 23.2* 23.2* 23.5*   MCHC 28.7 28.7 27.5*   RDW 19.1* 19.0* 19.3*    297 375   MPV 8.6 8.9 9.0      BMP:   Recent Labs     21  0543 21  0539 21  0514    139 138   K 4.0 4.2 4.6    103 102   CO2 23 23 23   BUN 72* 71* 68*   CREATININE 3.24* 3.06* 3.41*   GLUCOSE 123* 147* 63*   CALCIUM 8.6 8.4* 8.8      Magnesium:    No results for input(s): MG in the last 72 hours. MARINE:      Lab Results   Component Value Date    MARINE NEGATIVE 2019     SPEP:  Lab Results   Component Value Date    PROT 5.3 2019    PATH ELECTRONICALLY SIGNED.  Beny Yang M.D. 2019     C3:     Lab Results   Component Value Date    C3 97 nonhealing gluteal wounds with history of necrotizing fasciitis status post multiple debridements with diverting colostomy in 2019. 13.  Nonnephrotic proteinuria. 14.  Bilateral deafness likely serous otitis media, on antibiotics for foot ulcer which should cover ear infection as well    Plan:   1. Hold Demadex for now  2. Monitor strict I's and O's and renal function. 3.  Gentle hydration normal saline at 50ml/hour to replace circulating volume  4. Antibiotics as per renal dosing per infectious disease, agree with choice of antibiotics. 5.   BMP daily  6. Afrin nasal spray 2 sprays twice a day for 6 doses  7. Follow potassium closely, Aldactone may need to be discontinued if potassium goes up any further    Nutrition   Please ensure that patient is on a renal diet/TF. Avoid nephrotoxic drugs/contrast exposure. We will continue to follow along with you.      Electronically signed by Onel Barber MD on 3/30/2021 at 12:39 PM   [facility]

## 2021-03-30 NOTE — PROGRESS NOTES
[facility]  Office: [address], DO, Jeanette Starr, DO, Alia Vargas, DO, Anjel Monahan, DO, Mariela Barragan MD, Bruce Mike MD, Juan F Knott MD, Renee Hassan MD, Gisella Gao MD, Rayo Duckworth MD, Jann Tamayo MD, Patrick Thomson MD, Aixa Bender, DO, Marisa Matias MD, Mennie Holter, DO, Sukhdeep Harding MD,  Marco A Wright, DO, Cathy Freeman MD, Sayda Rivers MD, Kobe Bradley MD, Jama Hoover MD, Jazmine Harris, Yana Hearn, CNP, Chen East, [facility], Nohelia Vance, CNS, Abilio Mckeon, CNP, Yasmeen Sykes, CNP, Daren Dodge, CNP, Gera Cardozo, CNP, Hilary Bhardwaj, CNP, RONNIE LewisC, Phyllis Potts, [facility], Beulah Adrian, CNP, Isi Patel, CNP, Rick Wolfe, CNP, Annia Luu, CNP, Surinder Lozada, [facility], Tala Ferraro, [address] [place] [address]    Progress Note    3/30/2021    1:06 PM    Name:   Delores Hernandez  MRN:     [id number]     Acct:      [de-identified]   Room:   2002/2002-01  IP Day:  6  Admit Date:  3/19/2021  5:34 PM    PCP:   No primary care provider on file. Code Status:  Full Code    Subjective:     C/C: kyle/foot pain    Interval History Status:    Patient had left foot calcanectomy and placement of antibiotic beads yesterday 3/29/2021, osteomyelitis confirmed in the left calcaneal area, is continued on Zosyn also, ID is following  Creatinine has gone up to 3.41 likely related to loop diuretics and intravascularly depletion from insensible losses  Complained of bilateral blocked ears , Afrin nasal spray has been  Brief History:   Wade Granador a 38 y. o. male who has been struggling to heal chronic heel wounds for many years without success.  Typically he is seen by wound care specialists and he cannot recall how they typically treat his heel pressure wounds. Nia Rudolph was seen by Dr. Linus Moon in late 2019 and early 2020 on a previous hospitalization and at that time no surgical intervention was merited and they were treated conservatively. Has not followed up with Dr. Joaquina Hernandez outpatient. He rarely is able to feel any sensation to his feet but pain is noted randomly. [facility] a type 2 diabetic and his last A1C was 6.6 (3/20/21).    Patient will be undergoing left partial calcanectomy and antibiotic bead placement with wound VAC on 3/29 with podiatry. Recommend facility placement on d/c for wound care as he will be d/cd with a wound vac         Review of Systems:     Constitutional:  negative for chills, fevers, sweats  Respiratory:  negative for cough, dyspnea on exertion, shortness of breath, wheezing  Cardiovascular:  negative for chest pain, chest pressure/discomfort, lower extremity edema, palpitations  Gastrointestinal:  negative for abdominal pain, constipation, diarrhea, nausea, vomiting  Neurological:  negative for dizziness, headache  + Bilaterally blocked ears  Medications: Allergies:     Allergies   Allergen Reactions    Other      Mask adhesive causes hives       Current Meds:   Scheduled Meds:    oxymetazoline  2 spray Each Nostril BID    potassium chloride  40 mEq Oral Once    hydrALAZINE  50 mg Oral 3 times per day    spironolactone  25 mg Oral Daily    aspirin  81 mg Oral Daily    piperacillin-tazobactam  3,375 mg Intravenous Q8H    insulin glargine  15 Units Subcutaneous Nightly    metoprolol tartrate  50 mg Oral BID    famotidine  20 mg Oral Daily    sodium hypochlorite   Irrigation Daily    allopurinol  100 mg Oral Daily    Baclofen  5 mg Oral BID    magnesium oxide  400 mg Oral Daily    therapeutic multivitamin-minerals  1 tablet Oral Daily    senna  1 tablet Oral Nightly    sodium chloride flush  10 mL Intravenous 2 times per day    heparin (porcine)  5,000 Units Subcutaneous 3 times per day    insulin lispro  0-18 Units Subcutaneous TID     insulin lispro  0-9 Units Subcutaneous Nightly    atorvastatin  40 mg Oral Daily     Continuous Infusions:    IV infusion builder      dextrose       PRN Meds: benzonatate, melatonin, polyethylene glycol, sodium chloride flush, potassium chloride **OR** potassium alternative oral replacement **OR** potassium chloride, magnesium sulfate, promethazine **OR** ondansetron, acetaminophen **OR** acetaminophen, glucose, dextrose, glucagon (rDNA), dextrose, magnesium hydroxide    Data:     Past Medical History:   has a past medical history of CHF (congestive heart failure) ([facility] [facility] Hypertension, Septic shock ([facility] and Spina bifida aperta of lumbar spine ([facility] Social History:   reports that he has never smoked. He has never used smokeless tobacco. He reports that he does not drink alcohol or use drugs. Family History: No family history on file. Vitals:  BP (!) 158/90   Pulse 77   Temp 97.3 °F (36.3 °C) (Oral)   Resp 22   Ht 5' 9\" (1.753 m)   Wt 300 lb (136.1 kg)   SpO2 96%   BMI 44.30 kg/m²   Temp (24hrs), Av.5 °F (36.4 °C), Min:96.8 °F (36 °C), Max:97.9 °F (36.6 °C)    Recent Labs     21  1927 213 21  0642 21  1249   POCGLU 80 84 70* 172*       I/O (24Hr):     Intake/Output Summary (Last 24 hours) at 3/30/2021 1306  Last data filed at 3/30/2021 0800  Gross per 24 hour   Intake 540 ml   Output 2890 ml   Net -2350 ml       Labs:  Hematology:  Recent Labs     21  0543 21  0539 21  0514   WBC 12.0* 11.7* 20.9*   RBC 4.39 4.23 4.42   HGB 10.2* 9.8* 10.4*   HCT 35.5* 34.2* 37.8*   MCV 80.9* 80.9* 85.5   MCH 23.2* 23.2* 23.5*   MCHC 28.7 28.7 27.5*   RDW 19.1* 19.0* 19.3*    297 375   MPV 8.6 8.9 9.0     Chemistry:  Recent Labs     21  0543 21  0539 21  0514    139 138   K 4.0 4.2 4.6    103 102   CO2 23 23 23   GLUCOSE 123* 147* 63*   BUN 72* 71* 68*   CREATININE 3.24* 3.06* 3.41*   ANIONGAP 12 13 13   LABGLOM 22* 23* 20*   GFRAA 26* 28* 25*   CALCIUM 8.6 8.4* 8.8     Recent Labs     21 1635 03/28/21  1944 03/29/21  1927 03/29/21 2033 03/30/21  0642 03/30/21  1249   POCGLU 188* 185* 80 84 70* 172*     ABG:  Lab Results   Component Value Date    POCPH 7.381 12/24/2019    POCPCO2 35.3 12/24/2019    POCPO2 140.3 12/24/2019    POCHCO3 20.9 12/24/2019    NBEA 4 12/24/2019    PBEA NOT REPORTED 12/24/2019    RMK6YVH 22 12/24/2019    UOHT1NLE 99 12/24/2019    FIO2 40.0 12/24/2019     Lab Results   Component Value Date/Time    SPECIAL NOT REPORTED 03/29/2021 07:39 PM     Lab Results   Component Value Date/Time    CULTURE PENDING 03/29/2021 07:39 PM       Radiology:  Xr Chest Portable    Result Date: 3/25/2021  No acute cardiopulmonary disease     Mri Foot Left Wo Contrast    Addendum Date: 3/26/2021    ADDENDUM: There is generalized skin thickening and a 1 cm rind of soft tissue edema along the medial plantar aspect of the foot. Subjacent to this, there is an additional layer of subcutaneous fat. Deep to this, there is complete atrophy/fatty infiltration of the intrinsic musculature of the foot. Result Date: 3/26/2021  Deep soft tissue ulcer overlying the calcaneus. Cortical disruption of the exposed calcaneus with underlying marrow edema, compatible with osteomyelitis. Diffuse skin thickening and subcutaneous soft tissue edema surrounding the calcaneal ulcer as well as within the dorsum of the foot, compatible with myositis and cellulitis. No drainable fluid collection identified to suggest abscess formation. No additional foci of osteomyelitis identified. Mri Ankle Left Wo Contrast    Addendum Date: 3/26/2021    ADDENDUM: There is generalized skin thickening and a 1 cm rind of soft tissue edema along the medial plantar aspect of the foot. Subjacent to this, there is an additional layer of subcutaneous fat. Deep to this, there is complete atrophy/fatty infiltration of the intrinsic musculature of the foot. Result Date: 3/26/2021  Deep soft tissue ulcer overlying the calcaneus.   Cortical disruption of the exposed calcaneus with underlying marrow edema, compatible with osteomyelitis. Diffuse skin thickening and subcutaneous soft tissue edema surrounding the calcaneal ulcer as well as within the dorsum of the foot, compatible with myositis and cellulitis. No drainable fluid collection identified to suggest abscess formation. No additional foci of osteomyelitis identified.        Physical Examination:        General appearance:  alert, cooperative and no distress  Mental Status:  oriented to person, place and time and normal affect  Lungs:  clear to auscultation bilaterally, normal effort  Heart:  regular rate and rhythm, no murmur  Abdomen:  soft, nontender, nondistended, normal bowel sounds, no masses, hepatomegaly, splenomegaly  Extremities:  + edema, + dressing and heel supports both feet  Skin:  no other gross lesions, rashes, induration    Assessment:        Hospital Problems           Last Modified POA    * (Principal) Osteomyelitis of left foot ([address] 3/24/2021 Yes    DEBBY (acute kidney injury) ([address] 3/24/2021 Yes    Chronic systolic heart failure ([address] 3/19/2021 Yes    Volume overload 3/21/2021 Yes    Hypoglycemia due to insulin 3/21/2021 No    Right arm weakness 3/19/2021 Yes    Essential hypertension 3/19/2021 Yes    DM II (diabetes mellitus, type II), controlled ([address] 3/19/2021 Yes    Diabetic foot ulcer ([address] 3/24/2021 Yes    Diabetic foot infection ([address] 3/24/2021 Yes    Chronic paraplegia ([address] 3/19/2021 Yes    Sacral wound (Chronic) 3/19/2021 Yes    Open wound of left foot 3/19/2021 Yes    Ambrosio catheter in place (Chronic) 3/19/2021 Yes    Colostomy in place [facility]) (Chronic) 3/19/2021 Yes    Overview Signed 3/19/2021  7:56 PM by MIRTA Rowe - CNS     December '19         CKD (chronic kidney disease) stage 4, GFR 15-29 ml/min ([address] 3/23/2021 Yes    Physical debility 3/24/2021 Yes    PAD (peripheral artery disease) ([address] 3/25/2021 Yes    Acute osteomyelitis of left calcaneus ([address] 3/30/2021 Yes    Pressure injury of left heel, stage 4 ([address] 3/30/2021 Yes    Left foot pain 3/30/2021 Yes          Plan:        1.  DEBBY:    - Renal ultrasound completed 3/20/21 without acute process  - nephrology on board   -Demadex being held today and gentle hydration with normal saline 50 mL/h started to replace circulating volume by nephrology   - Baseline CRT ~ 1.6-1.9: now 3.4 today  - renally dose all meds   - avoid nephrotoxic agents      2. Chronic systolic CHF:   -ECHO completed 3/20 showing EF 25%  -continue BB, Demadex on hold today       3. Protein calorie malnutrition with poor wound healing   4. Left calcaneus osteomyelitis    - had left foot calcanectomy and placement of antibiotic beads yesterday 3/29/2021, osteomyelitis confirmed in the left calcaneal area, is continued on Zosyn also, ID is following  - Chronic sacral wound and left foot wound present on admission     4. Essential hypertension: Stable  - Continue beta-blocker, increase to 50 mg twice daily  - add hydralazine 25 mg po tid 3/24- increase on 3/25  - v/s per unit protocol      5. Diabetes mellitus type 2 with episodes of hypoglycemia:    - continue lantus at decreased dose of 18 units qhs  - continue accu checks ac/hs with ISS     6. History of spina bifida with adult failure to thrive with chronic paraplegia and chronic urinary retention:   - Chronic debility status noted  - ECF at discharge  - Baclofen  - Chronic Ambrosio catheter: Exchanged on 3/20, polymicrobial UA without systemic symptoms.  Off antibiotics, ID following      7. Morbid obesity: BMI 49.9 on admit  - Weight loss encouraged.   - Dietary supplement twice daily     8. Dyslipidemia: Continue statin     9. Gout: Continue allopurinol     10. Physical debility: Secondary to spina bifida and chronic paraplegia     11. GI/DVT prophylaxis: Pepcid, heparin     12. Dispo: ECF after left foot surgical intervention is completed, podiatry wants to take him to the OR again on Thursday

## 2021-03-30 NOTE — PROGRESS NOTES
Physical Therapy  Facility/Department: [facility] 2  Daily Treatment Note  NAME: Theodora Rueda  : 1985  MRN: [id number]    Date of Service: 3/30/2021    Discharge Recommendations:  Patient would benefit from continued therapy after discharge   PT Equipment Recommendations  Other: CTA    Assessment   Assessment: No bed mobility this date as pt was Maxi lifted from chair to bed prior to arrival. AROM BUE and PROM BLE exercises performed. Pt would benefit from continued skilled PT to address deficits. Prognosis: Good  REQUIRES PT FOLLOW UP: Yes  Activity Tolerance  Activity Tolerance: Patient limited by fatigue;Patient limited by endurance     Patient Diagnosis(es): There were no encounter diagnoses. has a past medical history of CHF (congestive heart failure) ([facility] Diabetes mellitus ([facility] Hypertension, Septic shock ([facility] and Spina bifida aperta of lumbar spine ([facility] has a past surgical history that includes incision and drainage (Bilateral, 2019); colostomy (N/A, 12/3/2019); Abdomen surgery (N/A, 2019); incision and drainage (N/A, 2019); Abdomen surgery (N/A, 12/10/2019); pr explore scrotum (N/A, 12/10/2019); incision and drainage (N/A, 2019); Wound Exploration (N/A, 2019); incision and drainage (N/A, 2019); Abscess Drainage (N/A, 2020); debridement (2020); Abdomen surgery (N/A, 2020); debridement (2020); incision and drainage (N/A, 2020); debridement (2020); Abdomen surgery (N/A, 2020); other surgical history (2021); and Foot Amputation (Left, 3/29/2021).     Restrictions  Restrictions/Precautions  Restrictions/Precautions: Weight Bearing  Required Braces or Orthoses?: No  Lower Extremity Weight Bearing Restrictions  Right Lower Extremity Weight Bearing: Non Weight Bearing  Left Lower Extremity Weight Bearing: Non Weight Bearing  Position Activity Restriction  Other position/activity restrictions: up with assist. Hx Spina bifida Time Out 1500         Minutes 12         Timed Code Treatment Minutes: 12 Minutes       Jonelle Osstifin   Treatment performed by Student PTA under the supervision of co-signing PTA who agrees with all treatment and documentation.    Bhumi Batista, PTA

## 2021-03-30 NOTE — PROGRESS NOTES
Weight Bearing: Non Weight Bearing  Left Lower Extremity Weight Bearing: Non Weight Bearing  Position Activity Restriction  Other position/activity restrictions: up with assist. Hx Spina bifida aperta of lumbar spine, buttock wound, O2 NC 2Lm  Subjective   General  Patient assessed for rehabilitation services?: Yes  Family / Caregiver Present: No  Diagnosis: SOB  General Comment  Vital Signs  Patient Currently in Pain: No   Orientation  Orientation  Overall Orientation Status: Within Functional Limits  Objective    ADL  Grooming: Stand by assistance;Setup; Increased time to complete(Oral hygiene while seated in chair)  UE Bathing: Increased time to complete;Stand by assistance;Setup;Verbal cueing(simple ADL tasks (face washing) seated in chair. Pt required v/c to maintain task, easily distracted.)  UE Dressing: Minimal assistance; Increased time to complete;Setup(Gown management snaps & tie around IV)  Additional Comments: Tray table set up for simple ADL acitivities while seated in chair. Pt demo SOB during activities. Balance  Sitting Balance: Maximum assistance(heavy max for trunk support seated EOB x5 minutes, pt utilized bed rails for support. Verbal cues to sit straight with fair return. Pt stated discomfort in abdomen seated.)  Bed mobility  Rolling to Left: Maximum assistance;2 Person assistance(Pt able to utilize bed rails)  Rolling to Right: Maximum assistance;2 Person assistance(Pt able to utilize bed rails)  Supine to Sit: Maximum assistance;2 Person assistance(Heavy posterior lean, relied on writer for max support)  Transfers  Transfer Comments: Pt placed in Maxi move to transfer from supine in bed to chair  Cognition  Overall Cognitive Status: Exceptions  Following Commands:  Follows one step commands with increased time  Attention Span: Attends with cues to redirect  Initiation: Requires cues for some  Sequencing: Requires cues for some  Plan   Plan  Times per week: 3-4x/wk    Goals  Short term goals

## 2021-03-30 NOTE — CARE COORDINATION
Transitional planning. Per report, pt had left total calcanectomy  last pm and more surgery scheduled for Thursday that may include I&D with a wound vac. Updated Jeni Yahir at [facility] in [place] that most likely pt will have wound vac upon D/C and will keep them updated.

## 2021-03-30 NOTE — PROGRESS NOTES
Progress Note  Podiatric Medicine and Surgery     Subjective     CC: Heel wound, b/l    POD#1  Patient seen and examined at bedside. Patient denies pain to the BLE. Heel-offloading boots in place. Tolerating diet well. Afebrile, hypertensive, saturating well on room air. Denies  N,v,f,c,SOB,CP. HPI :  Walter Navas is a 38 y.o. male who has been struggling to heal chronic heel wounds for many years without success. Typically he is seen by wound care specialists and he cannot recall how they typically treat his heel pressure wounds. He was seen by Dr. Nadeem Daniel in late 2019 and early 2020 on a previous hospitalization and at that time no surgical intervention was merited and they were treated conservatively. Has not followed up with Dr. Nadeem Daniel outpatient. He rarely is able to feel any sensation to his feet but pain is noted randomly. He is a type 2 diabetic and his last A1C was 6.6 (3/20/21).      Pt reports gradual decline in strength, chills, discomfort to his buttocks, increase in size of buttock wounds, shortness of breath and worsening edema x 2 mos.  His visiting nurse started Doxycycline 2 days ago for possible wound infection and told him he needed more care than could be provided in the home. Vidhya Sarah lives with his mom who is 79 yr old and has some health issues.        PCP is No primary care provider on file.       Medications:  Scheduled Meds:   potassium chloride  40 mEq Oral Once    hydrALAZINE  50 mg Oral 3 times per day    torsemide  20 mg Oral Daily    spironolactone  25 mg Oral Daily    aspirin  81 mg Oral Daily    piperacillin-tazobactam  3,375 mg Intravenous Q8H    insulin glargine  15 Units Subcutaneous Nightly    metoprolol tartrate  50 mg Oral BID    famotidine  20 mg Oral Daily    sodium hypochlorite   Irrigation Daily    allopurinol  100 mg Oral Daily    Baclofen  5 mg Oral BID    magnesium oxide  400 mg Oral Daily    therapeutic multivitamin-minerals  1 tablet Oral Daily tissue ulceration overlying the talus with underlying cortical   disruption, concerning for osteomyelitis. XR FOOT RIGHT (MIN 3 VIEWS)   Final Result   No acute bony abnormalities are noted         US RETROPERITONEAL LIMITED   Final Result   Unremarkable right kidney. Left kidney not visualized due to patient body   habitus and bowel gas. Venous Duplex (3/23): Negative for deep or superficial thrombosis. NIVS: 3/23/2021       Right:    MEVPIS abnormal PVRs    TYLER suggests mild vascular insufficiency at rest    Digit waveforms suggests microvascular level of disease        Left:    Mildly abnormal PVRs    TYLER suggests mild vascular insufficiency at rest    Digit waveforms suggests microvascular level of disease     TYLER:Right: 0.78. Left: 0.88. Wound Culture 3/23/2021: GPC, bacteroides    Culture bone, L calc (3/23): Neutrophils. NGTD. Surgical path, bone biopsy L calc (3/23): Acute and chronic osteomyelitis     Assessment   Theodora Rueda is a 38 y.o. male with   1. S/p I&D, total calcanectomy, left (3/29)  2. S/p bedside bone biopsy (DOS: 3/23/2021)  3. Diabetic foot ulcer down to bone left foot-resolved  4. Osteomyelitis Left calcaneus-resolved  5. Diabetic foot ulcer down to subcutaneous tissue right foot  6. Type II DM with secondary peripheral neuropathy  7. DEBBY   8. CHF  9. Paraplegia   10.  S/p L 5th toe amputation    Principal Problem:    Osteomyelitis of left foot (HCC)  Active Problems:    DEBBY (acute kidney injury) ([address]    Chronic systolic heart failure (HCC)    Volume overload    Hypoglycemia due to insulin    Right arm weakness    Essential hypertension    DM II (diabetes mellitus, type II), controlled ([address]    Diabetic foot ulcer ([address]    Diabetic foot infection ([address]    Chronic paraplegia ([address]    Sacral wound    Open wound of left foot    Ambrosio catheter in place    Colostomy in place [facility])    CKD (chronic kidney disease) stage 4, GFR 15-29 ml/min ([facility])    Physical debility PAD (peripheral artery disease) (HCC)    Acute osteomyelitis of left calcaneus (HCC)    Pressure injury of left heel, stage 4 (HCC)    Left foot pain  Resolved Problems:    * No resolved hospital problems. *       Plan     · Patient examined and evaluated at bedside. · Treatment options discussed in detail with the patient. · Abx per ID: Zosyn  · Cont to elevate heels off the bed with boots  · Recommend facility placement upon discharge for wound care, patient will be discharged with a wound VAC. · Non-weight bearing to bilateral lower extremities  · Dressings applied to RLE: betadine, 4x4s, Kerlix, Abd, light ace. · Plan is for wound vac placement bedside on 4/1 p.m. · Discussed with  Dr. Sonny Hogan.        Joanne Palmer DPM   Podiatric Medicine & Surgery   3/30/2021 at 8:03 AM

## 2021-03-30 NOTE — PROGRESS NOTES
Comprehensive Nutrition Assessment    Type and Reason for Visit:  Reassess    Nutrition Recommendations/Plan:   -Continue cardiac, 2 gm Na diet w/ 1500 mL FR   -Recommend nepro supplements TID   -Will continue to monitor po intake, weights and wound healing     Nutrition Assessment:  Pt reported that his appetite has been good and has been consuming 100% of his meals. Pt is requesting nutritional supplements on his meal trays to aide in wound healing progression -pt w/ multiple pressure ulcers. Will continue to monitor. Malnutrition Assessment:  Malnutrition Status: At risk for malnutrition (Comment)    Context:  Chronic Illness     Findings of the 6 clinical characteristics of malnutrition:  Energy Intake:  No significant decrease in energy intake  Weight Loss:  Unable to assess(d/t current edema)     Body Fat Loss:  No significant body fat loss     Muscle Mass Loss:  No significant muscle mass loss (r/t upper body)  Fluid Accumulation:  7 - Severe Generalized, Extremities   Strength:  Not Performed    Estimated Daily Nutrient Needs:  Energy (kcal):  1.2-1.3 ~> 1504-5172 kcals/d; Weight Used for Energy Requirements:  Admission     Protein (g):  1.8-2 gm/kg = 120-135 gm/day; Weight Used for Protein Requirements:  Ideal(67 kg for paraplegia)          Nutrition Related Findings:  active bowel sounds; labs/meds reviewed      Wounds:  Pressure Injury, Stage IV, Stage III, Unstageable, Multiple, Surgical Incision, Open Wounds       Current Nutrition Therapies:    DIET CARDIAC; Low Sodium (2 GM);  Daily Fluid Restriction: 1500 ml    Anthropometric Measures:  · Height: 5' 9\" (175.3 cm)  · Current Body Weight: 300 lb (136.1 kg)   · Admission Body Weight: 331 lb (150.1 kg)    · Usual Body Weight: 275 lb (124.7 kg)(per pt)     · Ideal Body Weight: 160 lbs; % Ideal Body Weight 187.5 %   · BMI: 44.3  · Adjusted Body Weight: 356; Paraplegia   · Adjusted BMI: 52.6    · BMI Categories: Obese Class 3 (BMI 40.0 or greater)       Nutrition Diagnosis:   · Increased nutrient needs related to (wound healing) as evidenced by wounds(Need for ONS)      Nutrition Interventions:   Food and/or Nutrient Delivery:  Continue Current Diet, Start Oral Nutrition Supplement  Nutrition Education/Counseling:  Education not indicated   Coordination of Nutrition Care:  Continue to monitor while inpatient    Goals: Achieved   Meet greater than or equal to 75% of estimated nutrient needs for wound healing with PO intake       Nutrition Monitoring and Evaluation:   Food/Nutrient Intake Outcomes:  Food and Nutrient Intake, Supplement Intake  Physical Signs/Symptoms Outcomes:  Biochemical Data, Nutrition Focused Physical Findings, Skin, Weight, GI Status, Fluid Status or Edema     Discharge Planning:     Too soon to determine     Electronically signed by Gama Childers RD, LD on 3/30/21 at 12:24 PM EDT    Contact: [phone]

## 2021-03-30 NOTE — PROGRESS NOTES
[facility] -   Infectious diseases evaluation  admission date 3/19/2021    reason for consultation:   Heel ulcers w bone exposed    Impression :   Current:  · bilat heel non healing ulcers -  · Left calcaneus osteomyelitis-medullary fibrosis   with reactive bone changes and focal acute and chronic osteomyelitis. · Bone biopsy taken L calca 3/23  · Left total calcanectomy 3/29  · Sacral ulcer  · DM2  · Acute on chronic CHF  · DEBBY    Other:  · Morbid obesity  · History of nec fash s/p multiple debridement with diverting colostomy in 2019  Discussion / summary of stay / plan of care   ·   Recommendations     · Keep Zosyn for now  · Follow pathology and micro from surgical specimen  · Bone cx is neg but path suggesting acute on chronic OM  · Will follow    Infection Control Recommendations   · [place] Precautions  · Contact Isolation       Antimicrobial Stewardship Recommendations   · Simplification of therapy  · Targeted therapy  · IV to oral conversion  · Per Kg dosing  Coordination ofOutpatient Care:   · Estimated Length of IV antimicrobials:  · Patient will need Midline / picc Catheter Insertion:   · Patient will need SNF:  · Patient will need outpatient wound care:     History of Present Illness:   Initial history:  Juni Batres is a 38y.o.-year-old male bilat heel chronic wounds getting worse and bone exposed on the left - no cellulitis and no fever. WBC normal  MRI ordered    Also has buttock wounds  ID called  DM2     Bone bx left calcan 3/23  Wound cx 3/23                    Interval changes  3/30/2021   Patient Vitals for the past 8 hrs:   BP Temp Temp src Pulse Resp SpO2 Height Weight   03/30/21 1247 (!) 158/90 97.3 °F (36.3 °C) Oral 77 22 96 %     03/30/21 1222       5' 9\" (1.753 m)    03/30/21 0843 (!) 179/96 97.9 °F (36.6 °C) Oral 96 23      03/30/21 0800        300 lb (136.1 kg)     He had left total calcanectomy yesterday, 3/29. Surgery site dressing is dry and intact. Alert appropriate, no complaints reported. No fever-no chills  Not SOB. Hemodynamically stable. Local wound cultures growing mixed growth likely contamination  Vascular - good pulses - no vasc issues  Card deferring cath due to NAHID    Bone biopsy from the calcaneus 3/23 negative -  Pathology medullary fibrosis  with reactive bone changes and focal acute and chronic osteomyelitis. Wound culture from the open left heel is showing for negative rods and Enterococcus bacteroides 3/23    Summary of relevant labs:  Labs:  W 9.8  Micro:   BC  Left calc bone biopsy cx 3/23 pending  Left heel wound cx 3/23 Enterococcus and gram-negative rods  Imaging:      I have personally reviewed the past medical history, past surgical history, medications, social history, and family history, and I haveupdated the database accordingly. Allergies: Other     Review of Systems:     Review of Systems   Constitutional: Positive for activity change. Negative for appetite change, diaphoresis and fever. HENT: Negative for congestion. Eyes: Negative for photophobia, pain, itching and visual disturbance. Respiratory: Negative for apnea, cough and stridor. Cardiovascular: Negative for chest pain. Gastrointestinal: Negative for abdominal distention. Colostomy   Endocrine: Negative for polyphagia. Genitourinary: Negative for dysuria, frequency and hematuria. Musculoskeletal: Negative for arthralgias. Skin: Positive for wound. Allergic/Immunologic: Negative for immunocompromised state. Neurological: Negative for dizziness, syncope, speech difficulty, light-headedness and numbness. Hematological: Negative for adenopathy. Psychiatric/Behavioral: Negative for agitation. Physical Examination :       Physical Exam  Constitutional:       Appearance: Normal appearance. He is obese. He is not ill-appearing or diaphoretic. HENT:      Head: Normocephalic and atraumatic. Nose: Nose normal. No congestion. Mouth/Throat:      Mouth: Mucous membranes are moist.   Eyes:      Conjunctiva/sclera: Conjunctivae normal.   Neck:      Musculoskeletal: Neck supple. No neck rigidity or muscular tenderness. Cardiovascular:      Rate and Rhythm: Normal rate and regular rhythm. Heart sounds: Normal heart sounds. No murmur. No friction rub. Pulmonary:      Effort: No respiratory distress. Breath sounds: Normal breath sounds. Abdominal:      General: There is no distension. Palpations: Abdomen is soft. Tenderness: There is no abdominal tenderness. Comments: osteomy - mid abd scratch large wounds x 2 - clean   Genitourinary:     Comments: No lema  Musculoskeletal:         General: No swelling, tenderness, deformity or signs of injury. Skin:     General: Skin is dry. Coloration: Skin is not pale. Findings: Lesion present. No erythema. Neurological:      General: No focal deficit present. Mental Status: He is alert and oriented to person, place, and time. Mental status is at baseline. Psychiatric:         Mood and Affect: Mood normal.         Thought Content:  Thought content normal.       Past Medical History:     Past Medical History:   Diagnosis Date    [facility]     Diabetes mellitus ([address]     Hypertension     Septic shock ([address]     Spina bifida aperta of lumbar spine ([facility]        Past Surgical  History:     Past Surgical History:   Procedure Laterality Date    ABDOMEN SURGERY N/A 12/8/2019    DEBRIDEMENT  NECROTIZING FASCIITIS BUTTOCK, WOUND VAC REMOVAL DELAYED PRIMARY CLOSURE OF MIDLINE ABDOMINAL INCISION, OSTOMY BAG CHANGE performed by Rachel Berger MD at [address] [facility] [facility] N/A 12/10/2019    DEBRIDEMENT OF SACRAL WOUND performed by Bobbi Wells MD at [address] [facility] N/A 1/5/2020    GLUTEAL WOUND DEBRIDEMENT, WOUND VAC CHANGE performed by Bobbi Wells MD at [facility]    ABDOMEN SURGERY N/A 1/11/2020    SACRAL DEBRIDEMENT WITH WOUND VAC CHANGE performed by Marcus Bhatia MD at [facility]. 1/1/2020    IRRIGATION AND DEBRIDEMENT BUTTOCKS, SCROTUM, ABDOMEN, WOUND VAC CHANGE performed by Juan Shaikh MD at [facility] N/A 12/3/2019    LAPAROSCOPIC CONVERTED TO OPEN DIVERTING LOOP COLOSTOMY; WOUND VAC APPLICATION performed by Naresh Arteaga MD at [facility]  01/05/2020    GLUTEAL WOUND DEBRIDEMENT, WOUND VAC CHANGE     DEBRIDEMENT  01/08/2020    WOUND VAC CHANGE SACRAL DECUBITUS, POSSIBLE DEBRIDEMENT    DEBRIDEMENT  01/11/2020     SACRAL DEBRIDEMENT WITH WOUND VAC CHANGE     FOOT AMPUTATION Left 3/29/2021    LEFT FULL CALCANECTOMY WITH INSERTION OF ANTIBIOTIC BEADS performed by Rubi Bergman DPM at [facility] [address] Bilateral 11/29/2019    RECTAL PERIRECTAL INCISION AND DRAINAGE, [facility] LOOP COLOSTOMY CREATION performed by Marcus Bhatia MD at [facility] [address] N/A 12/6/2019    DEBRIDEMENT NECROTIZING FASCIAITIS BILAT BUTTOCK [facility] N/A 12/20/2019    DEBRIDEMENT GLUTEAL AND SCROTAL REGIONS performed by Marcus Bhatia MD at [facility] [address] N/A 12/26/2019    INCISION AND DRAINAGE AND WOUND VAC CHANGE BUTTOCK, PERINEUM performed by Max Otto DO at [facility] [address] N/A 1/8/2020    WOUND VAC CHANGE SACRAL DECUBITUS, DEBRIDEMENT performed by Cathy Rodriguez MD at [address] [place] [address]  03/29/2021    LEFT PARTIAL CALCANECTOMY WITH WOUND VAC PLACEMENT    IL EXPLORE SCROTUM N/A 12/10/2019    SCROTAL EXPLORATION WITH SCROTAL DEBRIDEMENT performed by Aron Brewster MD at [facility] N/A 12/23/2019    WOUND [address] performed by Cathy Rodriguez MD at [place] [address]       Medications:      oxymetazoline  2 spray Each Nostril BID    insulin glargine  18 Units Subcutaneous Nightly    potassium chloride  40 mEq Oral Once    hydrALAZINE  50 mg Oral 3 times per day    spironolactone  25 mg Oral Daily    aspirin  81 mg Oral Daily    piperacillin-tazobactam  3,375 mg Intravenous Q8H    metoprolol tartrate  50 mg Oral BID    famotidine  20 mg Oral Daily    sodium hypochlorite   Irrigation Daily    allopurinol  100 mg Oral Daily    Baclofen  5 mg Oral BID    magnesium oxide  400 mg Oral Daily    therapeutic multivitamin-minerals  1 tablet Oral Daily    senna  1 tablet Oral Nightly    sodium chloride flush  10 mL Intravenous 2 times per day    heparin (porcine)  5,000 Units Subcutaneous 3 times per day    insulin lispro  0-18 Units Subcutaneous TID WC    insulin lispro  0-9 Units Subcutaneous Nightly    atorvastatin  40 mg Oral Daily       Social History:     Social History     Socioeconomic History    Marital status: Unknown     Spouse name: Not on file    Number of children: Not on file    Years of education: Not on file    Highest education level: Not on file   Occupational History    Not on file   Social Needs    Financial resource strain: Not on file    Food insecurity     Worry: Not on file     Inability: Not on file    Transportation needs     Medical: Not on file     Non-medical: Not on file   Tobacco Use    Smoking status: Never Smoker    Smokeless tobacco: Never Used   Substance and Sexual Activity    Alcohol use: Never     Frequency: Never    Drug use: Never    Sexual activity: Not on file   Lifestyle    Physical activity     Days per week: Not on file     Minutes per session: Not on file    Stress: Not on file   Relationships    Social connections     Talks on phone: Not on file     Gets together: Not on file     Attends Buddhist service: Not on file     Active member of club or organization: Not on file     Attends meetings of clubs or organizations: Not on file

## 2021-03-30 NOTE — OP NOTE
OP NOTE     PATIENT NAME: Scott Felder  YOB: 1985  -  38 y.o. male  MRN: [id number]  DATE: 3/29/2021  BILLING #: [id number]     Surgeon(s):  Stef Kan DPM      ASSISTANTS: LEONEL Chawla Cha, PGY2, Manjit Tubbs MS4     PRE-OP DIAGNOSIS:   1. S/p bedside bone biopsy (DOS: 3/23/2021)  2. Diabetic foot ulcer down to bone left foot  3. Osteomyelitis Left calcaneus  4. Diabetic foot ulcer down to subcutaneous tissue right foot  5. 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 DEBBY   7. CHF  8. Paraplegia   9. S/p L 5th toe amputation     POST-OP DIAGNOSIS: Same as above.     PROCEDURE:   1. Total calcanectomy, left foot  2. Implantation of antibiotic beads, left foot     ANESTHESIA: MAC, 13 cc 0.5% Marcaine plain     HEMOSTASIS: Pneumatic thigh tourniquet @300 mmHg for 88 minutes.     ESTIMATED BLOOD LOSS: Less than 10 cc.     MATERIALS:   Implant Name Type Inv. Item Serial No.  Lot No. LRB No. Used Action   KIT BNE GRFT SUB 5CC CA SULF FAST CURE RESRB MINI BEAD   KIT BNE GRFT SUB 5CC CA SULF FAST CURE RESRB MINI BEAD   Minded [id number] Left 1 Implanted   KIT BNE GRFT SUB 5CC CA SULF FAST CURE RESRB MINI BEAD   KIT BNE GRFT SUB 5CC CA SULF FAST CURE RESRB MINI BEAD   Minded [id number] Left 1 Implanted         INJECTABLES: 10 cc 1% lidocaine plain     SPECIMEN:   ID Type Source Tests Collected by Time Destination   1 : LEFT CALCANEAL SWABS  Swab Foot CULTURE, ANAEROBIC AND AEROBIC Stef Kan DPM 3/29/2021 1839     2 : LEFT CALCANEAL  Bone Foot CULTURE, ANAEROBIC AND AEROBIC Stef Kan DPM 3/29/2021 1844     A : LEFT CALCANEAL  Bone Foot SURGICAL PATHOLOGY Stef Kan DPM 3/29/2021 9797           COMPLICATIONS: None     FINDINGS: Intramedullary abscess of the calcaneus. Please see op note for full detail.       INDICATION FOR PROCEDURE: Patient is a 49-year-old male with a medical history consisting of congestive heart failure, diabetes mellitus, spina bifida at lumbar region, hypertension, history of septic shock, paraplegic. gerardo was admitted on 03/16/2021 for worsening infection of diabetic wound to the medial left hindfoot. Preoperative MRI of the left foot demonstrated osteomyelitis of the calcaneus. Extensive discussion was had with the patient regarding treatment options which were  limited to below-knee amputation or diabetic limb salvage. The patient and the patient's family adamantly denied below-knee amputation at this time elected to undergo staged surgical treatment for diabetic limb salvage. This treatment plan was also thoroughly discussed with the vascular and infectious disease teams who agreed that this plan was the most viable option for avoiding amputation of the left lower extremity. At this time surgical intervention is indicated to complete stage one of our treatment protocol. Calcanectomy and I&D of the left foot is indicated to allow infectious source control and wound healing. The patient has previously failed multiple conservative therapies. Discussed both surgical and non-surgical treatment options including all the risks and benefits of each. We recommend moving forward surgical intervention of the left foot for limb salvage, infection control, and wound healing. Discussed with the patient that given the severity of the infection, compromised vascular status to the lower extremities,  history of diabetes he is at high risk of more proximal amputation and or limb loss. No guarantees were given or implied. All questions answered to patients appartent satisfaction. Patient verbalizes and demonstrates understanding. The patient provided informed written consent which was signed and placed in the chart.      PROCEDURE IN DETAIL:  Under mild sedation the patient was transferred to the operating room and left on the cart in a supine position, with the modified frog-leg positioning of the left lower extremity. Anesthesia was administered the Anesthesia team.  A well-padded pneumatic tourniquet was applied to the left thigh. After adequate sedation, a local block 13 cc of  0.5% Marcaine plain was given in the left foot. The operative limb was then scrubbed, prepped, and draped in the usual aseptic fashion. A time-out was performed to confirm the correct patient, correct site, and correct procedure, antibiotics, everyone in the room was in agreement. The left lower extremity was exsanguinated with use of Esmarch bandage the tourniquet was inflated to 300 mmHg where it remained for 88 minutes throughout the procedure. Attention was directed to the left posterior heel. There was a large full-thickness ulceration to the plantar aspect of the hindfoot measuring approximately 6 cm x 4 cm x 4 cm. The wound did probe to bone. There was exposed fascia in the central aspect. Base was fibrogranular. There was scant purulent drainage with surrounding erythema noted at this time. A longitudinal incision was made over the distal Achilles insertion and carried to the plantar aspect of the hindfoot through the center of the wound utilizing #15 blade. The incision was deepened to the level of bone utilizing sharp dissection. Care was taken to protect and retract all neurovascular structures. Hemostasis was controlled without the dissection process utilizing Bovie electrocautery. Throughout the dissection process purulent drainage was expressed. Sharp dissection was then carried out through the deep plantar fascia there was noted to be significant involvement of the fascial planes as well as the distal calcaneus. Necrosis extended down to level of bone. No obvious soft tissue abscess was identified. All necrotic and fibrotic tissue was excised. A rongeur was utilized to remove any devitalized necrotic tissue that remains status post incision.  Upon visualization the calcaneus was noted to be darkened in color. Lizzy Jews was used to test bone quality, the calcaneus was noted to be soft with decreased density, consistent with osteomyelitis. At this time the calcaneal tuberosity was freed from all soft tissue attachments both dorsally and plantarly until there was adequate exposure and visualization of the posterior third of the calcaneus to allow for the plantar calcaneal osteotomy. At this time a partial calcanectomy was performed utilizing an oscillating sagittal saw from a distal to proximal and plantar to dorsal fashion effectively transecting the posterior third of the calcaneal body. Care was taken to protect and retract the medial neurovascular bundle during this process. The posterior third of the calcaneus was then excised and passed to the back table to be split and sent for micro and pathology. Upon further inspection there was noted to be purulent drainage from the intramedullary canal of the calcaneus consistent with extensive intramedullary abscess of the calcaneus. Swab cultures were obtained at this time. Given the extensive intramedullary involvement of the infection of the calcaneus and intraoperative decision was made to perform a total calcanectomy. We felt that this was most appropriate for infection source control, limb salvage, and also appropriate for the patient given paraplegic and nonambulatory state. The subtalar joint was identified and the remainder of the calcaneus was completely resected utilizing a combination of osteotomes and rongeur. All purulent drainage was expressed until normal bleeding was noted and remaining tissue appeared viable. At this time the surgical site was flushed with copious amounts of sterile saline utilizing 3 L of pulse lavage. Post irrigation the surgical site was meticulously inspected for any remaining signs of infection and non-viable tissue.  Remaining tissue appeared healthy and viable without signs of necrosis or infection. At this point, any bleeding vessels were then ligated with electrocautery. There was some bleeding, but overall hemostasis was achieved. The surgical site was then coated and packed with [facility] antibiotic beads consisting of a combination of tobramycin and vancomycin. The surgical site was then coapted with use of retention sutures utilizing 2 nylon. The surgical site was left partially open to allow for drainage. Dressings consisting of adaptic, 4 x 4s, Kerlix, ABD,  Ace, and Coban bandage were applied. The patient tolerated the above procedure and anesthesia well without complications. The patient was transported from the operating room to the PACU with vital signs stable and vascular status intact to the left foot. Patient was then transferred back to the floor. The postoperative plan will be to allow for continuous drainage of the infection with daily dressing changes for monitoring of clinical progression. When infection is improved from clinical and laboratory standpoint we will plan for delayed closure of the left foot. The patient was counseled at length about the risks of thelma Covid-19 during their perioperative period and any recovery window from their procedure. The patient was made aware that thelma Covid-19  may worsen their prognosis for recovering from their procedure  and lend to a higher morbidity and/or mortality risk. All material risks, benefits, and reasonable alternatives including postponing the procedure were discussed. The patient does wish to proceed with the procedure at this time.     Rey Portillo DPM   Podiatric Medicine & Surgery   3/30/2021 at 12:37 PM

## 2021-03-31 PROBLEM — E11.628 TYPE 2 DIABETES MELLITUS WITH LEFT DIABETIC FOOT INFECTION (HCC): Status: ACTIVE | Noted: 2019-11-29

## 2021-03-31 PROBLEM — L08.9 TYPE 2 DIABETES MELLITUS WITH LEFT DIABETIC FOOT INFECTION (HCC): Status: ACTIVE | Noted: 2019-11-29

## 2021-03-31 LAB
ABSOLUTE EOS #: 0.16 K/UL (ref 0–0.44)
ABSOLUTE IMMATURE GRANULOCYTE: 0.16 K/UL (ref 0–0.3)
ABSOLUTE LYMPH #: 1.56 K/UL (ref 1.1–3.7)
ABSOLUTE MONO #: 1.72 K/UL (ref 0.1–1.2)
ANION GAP SERPL CALCULATED.3IONS-SCNC: 16 MMOL/L (ref 9–17)
BASOPHILS # BLD: 1 % (ref 0–2)
BASOPHILS ABSOLUTE: 0.16 K/UL (ref 0–0.2)
BUN BLDV-MCNC: 73 MG/DL (ref 6–20)
BUN/CREAT BLD: ABNORMAL (ref 9–20)
CALCIUM SERPL-MCNC: 8.4 MG/DL (ref 8.6–10.4)
CHLORIDE BLD-SCNC: 100 MMOL/L (ref 98–107)
CO2: 19 MMOL/L (ref 20–31)
CREAT SERPL-MCNC: 3.47 MG/DL (ref 0.7–1.2)
DIFFERENTIAL TYPE: ABNORMAL
EOSINOPHILS RELATIVE PERCENT: 1 % (ref 1–4)
GFR AFRICAN AMERICAN: 24 ML/MIN
GFR NON-AFRICAN AMERICAN: 20 ML/MIN
GFR SERPL CREATININE-BSD FRML MDRD: ABNORMAL ML/MIN/{1.73_M2}
GFR SERPL CREATININE-BSD FRML MDRD: ABNORMAL ML/MIN/{1.73_M2}
GLUCOSE BLD-MCNC: 104 MG/DL (ref 70–99)
GLUCOSE BLD-MCNC: 108 MG/DL (ref 75–110)
GLUCOSE BLD-MCNC: 121 MG/DL (ref 75–110)
GLUCOSE BLD-MCNC: 136 MG/DL (ref 75–110)
GLUCOSE BLD-MCNC: 167 MG/DL (ref 75–110)
GLUCOSE BLD-MCNC: 195 MG/DL (ref 75–110)
HCT VFR BLD CALC: 33 % (ref 40.7–50.3)
HEMOGLOBIN: 9.5 G/DL (ref 13–17)
IMMATURE GRANULOCYTES: 1 %
LYMPHOCYTES # BLD: 10 % (ref 24–43)
MCH RBC QN AUTO: 23.6 PG (ref 25.2–33.5)
MCHC RBC AUTO-ENTMCNC: 28.8 G/DL (ref 28.4–34.8)
MCV RBC AUTO: 82.1 FL (ref 82.6–102.9)
MONOCYTES # BLD: 11 % (ref 3–12)
MORPHOLOGY: ABNORMAL
MORPHOLOGY: ABNORMAL
NRBC AUTOMATED: 0 PER 100 WBC
PDW BLD-RTO: 18.9 % (ref 11.8–14.4)
PLATELET # BLD: 401 K/UL (ref 138–453)
PLATELET ESTIMATE: ABNORMAL
PMV BLD AUTO: 9.6 FL (ref 8.1–13.5)
POTASSIUM SERPL-SCNC: 4.5 MMOL/L (ref 3.7–5.3)
RBC # BLD: 4.02 M/UL (ref 4.21–5.77)
RBC # BLD: ABNORMAL 10*6/UL
SEG NEUTROPHILS: 76 % (ref 36–65)
SEGMENTED NEUTROPHILS ABSOLUTE COUNT: 11.84 K/UL (ref 1.5–8.1)
SODIUM BLD-SCNC: 135 MMOL/L (ref 135–144)
SURGICAL PATHOLOGY REPORT: NORMAL
WBC # BLD: 15.6 K/UL (ref 3.5–11.3)
WBC # BLD: ABNORMAL 10*3/UL

## 2021-03-31 PROCEDURE — 97110 THERAPEUTIC EXERCISES: CPT

## 2021-03-31 PROCEDURE — 2580000003 HC RX 258: Performed by: STUDENT IN AN ORGANIZED HEALTH CARE EDUCATION/TRAINING PROGRAM

## 2021-03-31 PROCEDURE — 6370000000 HC RX 637 (ALT 250 FOR IP): Performed by: STUDENT IN AN ORGANIZED HEALTH CARE EDUCATION/TRAINING PROGRAM

## 2021-03-31 PROCEDURE — 6370000000 HC RX 637 (ALT 250 FOR IP): Performed by: NURSE PRACTITIONER

## 2021-03-31 PROCEDURE — 99232 SBSQ HOSP IP/OBS MODERATE 35: CPT | Performed by: INTERNAL MEDICINE

## 2021-03-31 PROCEDURE — 6360000002 HC RX W HCPCS: Performed by: STUDENT IN AN ORGANIZED HEALTH CARE EDUCATION/TRAINING PROGRAM

## 2021-03-31 PROCEDURE — 82947 ASSAY GLUCOSE BLOOD QUANT: CPT

## 2021-03-31 PROCEDURE — 85025 COMPLETE CBC W/AUTO DIFF WBC: CPT

## 2021-03-31 PROCEDURE — 6370000000 HC RX 637 (ALT 250 FOR IP): Performed by: INTERNAL MEDICINE

## 2021-03-31 PROCEDURE — 80048 BASIC METABOLIC PNL TOTAL CA: CPT

## 2021-03-31 PROCEDURE — 1200000000 HC SEMI PRIVATE

## 2021-03-31 RX ORDER — TETRAHYDROZOLINE HCL 0.05 %
1 DROPS OPHTHALMIC (EYE) 3 TIMES DAILY
Status: DISCONTINUED | OUTPATIENT
Start: 2021-03-31 | End: 2021-04-02 | Stop reason: HOSPADM

## 2021-03-31 RX ADMIN — ASPIRIN 81 MG: 81 TABLET, CHEWABLE ORAL at 08:43

## 2021-03-31 RX ADMIN — PIPERACILLIN AND TAZOBACTAM 3375 MG: 3; .375 INJECTION, POWDER, LYOPHILIZED, FOR SOLUTION INTRAVENOUS at 16:43

## 2021-03-31 RX ADMIN — FAMOTIDINE 20 MG: 20 TABLET, FILM COATED ORAL at 08:41

## 2021-03-31 RX ADMIN — TETRAHYDROZOLINE HCL 1 DROP: 0.05 SOLUTION/ DROPS OPHTHALMIC at 14:19

## 2021-03-31 RX ADMIN — SPIRONOLACTONE 25 MG: 25 TABLET ORAL at 08:43

## 2021-03-31 RX ADMIN — GUAIFENESIN 200 MG: 200 SOLUTION ORAL at 02:21

## 2021-03-31 RX ADMIN — BACLOFEN 5 MG: 10 TABLET ORAL at 08:42

## 2021-03-31 RX ADMIN — MAGNESIUM GLUCONATE 500 MG ORAL TABLET 400 MG: 500 TABLET ORAL at 08:41

## 2021-03-31 RX ADMIN — HYDRALAZINE HYDROCHLORIDE 50 MG: 50 TABLET, FILM COATED ORAL at 21:08

## 2021-03-31 RX ADMIN — Medication 2 SPRAY: at 08:44

## 2021-03-31 RX ADMIN — BACLOFEN 5 MG: 10 TABLET ORAL at 21:08

## 2021-03-31 RX ADMIN — ALLOPURINOL 100 MG: 100 TABLET ORAL at 08:42

## 2021-03-31 RX ADMIN — HYDRALAZINE HYDROCHLORIDE 50 MG: 50 TABLET, FILM COATED ORAL at 05:48

## 2021-03-31 RX ADMIN — INSULIN LISPRO 2 UNITS: 100 INJECTION, SOLUTION INTRAVENOUS; SUBCUTANEOUS at 21:14

## 2021-03-31 RX ADMIN — TETRAHYDROZOLINE HCL 1 DROP: 0.05 SOLUTION/ DROPS OPHTHALMIC at 21:08

## 2021-03-31 RX ADMIN — INSULIN GLARGINE 18 UNITS: 100 INJECTION, SOLUTION SUBCUTANEOUS at 21:13

## 2021-03-31 RX ADMIN — ACETAMINOPHEN 650 MG: 325 TABLET ORAL at 01:11

## 2021-03-31 RX ADMIN — Medication 1 TABLET: at 08:42

## 2021-03-31 RX ADMIN — PIPERACILLIN AND TAZOBACTAM 3375 MG: 3; .375 INJECTION, POWDER, LYOPHILIZED, FOR SOLUTION INTRAVENOUS at 01:13

## 2021-03-31 RX ADMIN — DAKIN'S SOLUTION 0.125% (QUARTER STRENGTH): 0.12 SOLUTION at 08:44

## 2021-03-31 RX ADMIN — PIPERACILLIN AND TAZOBACTAM 3375 MG: 3; .375 INJECTION, POWDER, LYOPHILIZED, FOR SOLUTION INTRAVENOUS at 08:42

## 2021-03-31 RX ADMIN — HYDRALAZINE HYDROCHLORIDE 50 MG: 50 TABLET, FILM COATED ORAL at 15:00

## 2021-03-31 RX ADMIN — HEPARIN SODIUM 5000 UNITS: 5000 INJECTION INTRAVENOUS; SUBCUTANEOUS at 21:13

## 2021-03-31 RX ADMIN — Medication 2 SPRAY: at 21:08

## 2021-03-31 RX ADMIN — BENZONATATE 100 MG: 100 CAPSULE ORAL at 01:11

## 2021-03-31 RX ADMIN — METOPROLOL TARTRATE 50 MG: 25 TABLET ORAL at 21:08

## 2021-03-31 RX ADMIN — ACETAMINOPHEN 650 MG: 325 TABLET ORAL at 08:48

## 2021-03-31 RX ADMIN — SODIUM CHLORIDE, PRESERVATIVE FREE 10 ML: 5 INJECTION INTRAVENOUS at 08:45

## 2021-03-31 RX ADMIN — ACETAMINOPHEN 650 MG: 325 TABLET ORAL at 15:41

## 2021-03-31 RX ADMIN — GUAIFENESIN 200 MG: 200 SOLUTION ORAL at 08:41

## 2021-03-31 RX ADMIN — HEPARIN SODIUM 5000 UNITS: 5000 INJECTION INTRAVENOUS; SUBCUTANEOUS at 05:47

## 2021-03-31 RX ADMIN — DESMOPRESSIN ACETATE 40 MG: 0.2 TABLET ORAL at 08:41

## 2021-03-31 ASSESSMENT — PAIN SCALES - GENERAL
PAINLEVEL_OUTOF10: 0
PAINLEVEL_OUTOF10: 0

## 2021-03-31 ASSESSMENT — ENCOUNTER SYMPTOMS
COUGH: 0
EYE REDNESS: 0
STRIDOR: 0
ABDOMINAL DISTENTION: 0
PHOTOPHOBIA: 0
APNEA: 0

## 2021-03-31 NOTE — PROGRESS NOTES
[facility] -   Infectious diseases evaluation  admission date 3/19/2021    reason for consultation:   Heel ulcers w bone exposed    Impression :   Current:  · bilat heel non healing ulcers -  · Left calcaneus osteomyelitis-medullary fibrosis   with reactive bone changes and focal acute and chronic osteomyelitis. · Bone biopsy taken L calca 3/23  · Left total calcanectomy 3/29  · Sacral ulcer  · DM2  · Acute on chronic CHF  · DEBBY    Other:  · Morbid obesity  · History of nec fash s/p multiple debridement with diverting colostomy in 2019  Discussion / summary of stay / plan of care   ·   Recommendations     · Keep Zosyn for now  · Follow pathology and micro from surgical specimen  · Bone cx pend SCN and strep  · Will follow    Infection Control Recommendations   · Parker Precautions  · Contact Isolation       Antimicrobial Stewardship Recommendations   · Simplification of therapy  · Targeted therapy  · IV to oral conversion  · Per Kg dosing  Coordination ofOutpatient Care:   · Estimated Length of IV antimicrobials:  · Patient will need Midline / picc Catheter Insertion:   · Patient will need SNF:  · Patient will need outpatient wound care:     History of Present Illness:   Initial history:  Jaime Viera is a 38y.o.-year-old male bilat heel chronic wounds getting worse and bone exposed on the left - no cellulitis and no fever. WBC normal  MRI ordered    Also has buttock wounds  ID called  DM2     Bone bx left calcan 3/23  Wound cx 3/23                    Interval changes  3/31/2021   Patient Vitals for the past 8 hrs:   BP Temp Temp src Pulse Resp SpO2   03/31/21 1200    86     03/31/21 1150    78     03/31/21 1100 (!) 176/102 97.7 °F (36.5 °C) Oral 79 21 94 %   03/31/21 0812 (!) 152/84 97.6 °F (36.4 °C) Oral 74 12 94 %     Alert oriented x3. Answering questions appropriately. No active complaints reported. Bilateral foot offloading boots in place. Hemodynamically stable. Leukocytosis improving on antibiotics. 3/29 left total calcanectomy with antibiotic bead placement. Local wound cultures growing mixed growth likely contamination  Vascular - good pulses - no vasc issues  Card deferring cath due to NAHID    Bone biopsy from the calcaneus 3/23 negative -  Pathology medullary fibrosis  with reactive bone changes and focal acute and chronic osteomyelitis. Wound culture from the open left heel is showing for negative rods and Enterococcus bacteroides 3/23    Summary of relevant labs:  Labs:  W 9.8  Micro:   BC  Left calc bone biopsy cx 3/23 pending  Left heel wound cx 3/23 Enterococcus and gram-negative rods  Imaging:      I have personally reviewed the past medical history, past surgical history, medications, social history, and family history, and I haveupdated the database accordingly. Allergies: Other     Review of Systems:     Review of Systems   Constitutional: Positive for activity change. Negative for appetite change, diaphoresis and fever. HENT: Negative for congestion. Eyes: Negative for photophobia, redness and visual disturbance. Respiratory: Negative for apnea, cough and stridor. Cardiovascular: Negative for chest pain. Gastrointestinal: Negative for abdominal distention. Colostomy   Endocrine: Negative for polydipsia and polyphagia. Genitourinary: Negative for dysuria, frequency and hematuria. Musculoskeletal: Negative for arthralgias. Skin: Positive for wound. Allergic/Immunologic: Negative for immunocompromised state. Neurological: Negative for dizziness, syncope, speech difficulty, light-headedness and numbness. Hematological: Negative for adenopathy. Psychiatric/Behavioral: Negative for agitation. Physical Examination :       Physical Exam  Constitutional:       Appearance: Normal appearance. He is obese. He is not ill-appearing or diaphoretic. HENT:      Head: Normocephalic and atraumatic. Nose: Nose normal. No congestion. Mouth/Throat:      Mouth: Mucous membranes are moist.   Eyes:      Conjunctiva/sclera: Conjunctivae normal.   Neck:      Musculoskeletal: Neck supple. No neck rigidity or muscular tenderness. Cardiovascular:      Rate and Rhythm: Normal rate and regular rhythm. Heart sounds: Normal heart sounds. No murmur. No friction rub. Pulmonary:      Effort: No respiratory distress. Breath sounds: Normal breath sounds. Abdominal:      General: There is no distension. Palpations: Abdomen is soft. Tenderness: There is no abdominal tenderness. Comments: osteomy - mid abd scratch large wounds x 2 - clean   Genitourinary:     Comments: No lema  Musculoskeletal:         General: No signs of injury. Right lower leg: No edema. Left lower leg: No edema. Skin:     General: Skin is dry. Coloration: Skin is not pale. Findings: Lesion present. No erythema. Neurological:      General: No focal deficit present. Mental Status: He is alert and oriented to person, place, and time. Mental status is at baseline. Psychiatric:         Mood and Affect: Mood normal.         Thought Content:  Thought content normal.       Past Medical History:     Past Medical History:   Diagnosis Date    [facility]     Diabetes mellitus ([facility]     Hypertension     Septic shock ([facility]     Spina bifida aperta of lumbar spine ([facility]        Past Surgical  History:     Past Surgical History:   Procedure Laterality Date    ABDOMEN SURGERY N/A 12/8/2019    DEBRIDEMENT  NECROTIZING FASCIITIS BUTTOCK, WOUND VAC REMOVAL DELAYED PRIMARY CLOSURE OF MIDLINE ABDOMINAL INCISION, OSTOMY BAG CHANGE performed by Kindra Mccloud MD at [address] [facility] N/A 12/10/2019    DEBRIDEMENT OF SACRAL WOUND performed by Roge Malagon MD at [address] [facility] N/A 1/5/2020    GLUTEAL WOUND DEBRIDEMENT, WOUND VAC CHANGE performed by Uziel Bass Verena Estrada MD at [address] 1/11/2020    SACRAL DEBRIDEMENT WITH WOUND VAC CHANGE performed by River Chatman MD at [facility]. 1/1/2020    IRRIGATION AND DEBRIDEMENT BUTTOCKS, SCROTUM, ABDOMEN, WOUND VAC CHANGE performed by Pao Zepeda MD at [address] [facility] N/A 12/3/2019    LAPAROSCOPIC CONVERTED TO OPEN DIVERTING LOOP COLOSTOMY; WOUND VAC APPLICATION performed by Dina Yang MD at [facility] [address]  01/05/2020    GLUTEAL WOUND DEBRIDEMENT, WOUND VAC CHANGE     DEBRIDEMENT  01/08/2020    WOUND VAC CHANGE SACRAL DECUBITUS, POSSIBLE DEBRIDEMENT    DEBRIDEMENT  01/11/2020     SACRAL DEBRIDEMENT WITH WOUND VAC CHANGE     FOOT AMPUTATION Left 3/29/2021    LEFT FULL CALCANECTOMY WITH INSERTION OF ANTIBIOTIC BEADS [facility] Bilateral 11/29/2019    RECTAL PERIRECTAL INCISION AND DRAINAGE, [facility] LOOP COLOSTOMY CREATION performed by River Chatman MD at [facility] [address] N/A 12/6/2019    DEBRIDEMENT NECROTIZING FASCIAITIS BILAT BUTTOCK performed by Tory Brunner MD at [facility] [address] N/A 12/20/2019    DEBRIDEMENT GLUTEAL AND SCROTAL REGIONS performed by River Chatman MD at [facility] [address] N/A 12/26/2019    INCISION AND DRAINAGE AND WOUND VAC CHANGE BUTTOCK, PERINEUM performed by Bhumi Bull DO at [facility] [address] N/A 1/8/2020    WOUND VAC CHANGE SACRAL DECUBITUS, DEBRIDEMENT performed by Tory Brunner MD at [address] [place] [address]  03/29/2021    LEFT PARTIAL CALCANECTOMY WITH WOUND VAC PLACEMENT    [place] EXPLORE SCROTUM N/A 12/10/2019    SCROTAL EXPLORATION WITH SCROTAL DEBRIDEMENT performed by Alka Calvin MD at [address] [facility] N/A 12/23/2019    WOUND DEBRIDEMENT  WOUND VAC CHANGE - [address] performed by Tory Brunner MD at [place] [address] Medications:      tetrahydrozoline  1 drop Both Eyes TID    oxymetazoline  2 spray Each Nostril BID    insulin glargine  18 Units Subcutaneous Nightly    potassium chloride  40 mEq Oral Once    hydrALAZINE  50 mg Oral 3 times per day    spironolactone  25 mg Oral Daily    aspirin  81 mg Oral Daily    piperacillin-tazobactam  3,375 mg Intravenous Q8H    metoprolol tartrate  50 mg Oral BID    famotidine  20 mg Oral Daily    sodium hypochlorite   Irrigation Daily    allopurinol  100 mg Oral Daily    Baclofen  5 mg Oral BID    magnesium oxide  400 mg Oral Daily    therapeutic multivitamin-minerals  1 tablet Oral Daily    senna  1 tablet Oral Nightly    sodium chloride flush  10 mL Intravenous 2 times per day    heparin (porcine)  5,000 Units Subcutaneous 3 times per day    insulin lispro  0-18 Units Subcutaneous TID WC    insulin lispro  0-9 Units Subcutaneous Nightly    atorvastatin  40 mg Oral Daily       Social History:     Social History     Socioeconomic History    Marital status: Unknown     Spouse name: Not on file    Number of children: Not on file    Years of education: Not on file    Highest education level: Not on file   Occupational History    Not on file   Social Needs    Financial resource strain: Not on file    Food insecurity     Worry: Not on file     Inability: Not on file    Transportation needs     Medical: Not on file     Non-medical: Not on file   Tobacco Use    Smoking status: Never Smoker    Smokeless tobacco: Never Used   Substance and Sexual Activity    Alcohol use: Never     Frequency: Never    Drug use: Never    Sexual activity: Not on file   Lifestyle    Physical activity     Days per week: Not on file     Minutes per session: Not on file    Stress: Not on file   Relationships    Social connections     Talks on phone: Not on file     Gets together: Not on file     Attends [employer]ism service: Not on file     Active member of club or organization: Not on file     Attends meetings of clubs or organizations: Not on file     Relationship status: Not on file    Intimate partner violence     Fear of current or ex partner: Not on file     Emotionally abused: Not on file     Physically abused: Not on file     Forced sexual activity: Not on file   Other Topics Concern    Not on file   Social History Narrative    Not on file       Family History:   No family history on file. Medical Decision Making:   I have independently reviewed/ordered the following labs:    CBC with Differential:   Recent Labs     03/30/21  0514 03/31/21  0604   WBC 20.9* 15.6*   HGB 10.4* 9.5*   HCT 37.8* 33.0*    401   LYMPHOPCT 7* 10*   MONOPCT 9 11     BMP:  Recent Labs     03/30/21  0514 03/31/21  0604    135   K 4.6 4.5    100   CO2 23 19*   BUN 68* 73*   CREATININE 3.41* 3.47*     Hepatic Function Panel:   No results for input(s): PROT, LABALBU, BILIDIR, IBILI, BILITOT, ALKPHOS, ALT, AST in the last 72 hours. No results for input(s): RPR in the last 72 hours. No results for input(s): HIV in the last 72 hours. No results for input(s): BC in the last 72 hours. Lab Results   Component Value Date    CREATININE 3.47 03/31/2021    GLUCOSE 104 03/31/2021       Detailed results: Thank you for allowing us to participate in the care of this patient. Please call with questions. This note is created with the assistance of a speech recognition program.  While intending to generate adocument that actually reflects the content of the visit, the document can still have some errors including those of syntax and sound a like substitutions which may escape proof reading. It such instances, actual meaningcan be extrapolated by contextual diversion. Tara Junior MD  Office: [phone]  Perfect serve / office [phone]        I have discussed the care of the patient, including pertinent history and exam findings,  with the resident.  I have seen and examined the patient and the key elements of all parts of the encounter have been performed by me. I agree with the assessment, plan and orders as documented by the resident.     Diana Luis, Infectious Diseases

## 2021-03-31 NOTE — PLAN OF CARE
Problem: Skin Integrity:  Goal: Will show no infection signs and symptoms  Description: Will show no infection signs and symptoms  Outcome: Ongoing  Goal: Absence of new skin breakdown  Description: Absence of new skin breakdown  Outcome: Ongoing  Note: Turn every two hours while in bed, elevate heels while in bed  Goal: Demonstration of wound healing without infection will improve  Description: Demonstration of wound healing without infection will improve  Outcome: Ongoing  Goal: Complications related to intravenous access or infusion will be avoided or minimized  Description: Complications related to intravenous access or infusion will be avoided or minimized  Outcome: Ongoing     Problem: Falls - Risk of:  Goal: Will remain free from falls  Description: Will remain free from falls  Outcome: Ongoing  Note: Pt wearing non skid slippers, call light within reach, bed in lowest position and wheel locked, pt knows to call prior to getting up, immediate area free of cords that may cause a fall hazard.    Goal: Absence of physical injury  Description: Absence of physical injury  Outcome: Ongoing     Problem: Infection:  Goal: Will remain free from infection  Description: Will remain free from infection  Outcome: Ongoing     Problem: Safety:  Goal: Free from accidental physical injury  Description: Free from accidental physical injury  Outcome: Ongoing  Goal: Free from intentional harm  Description: Free from intentional harm  Outcome: Ongoing     Problem: Daily Care:  Goal: Daily care needs are met  Description: Daily care needs are met  Outcome: Ongoing     Problem: Skin Integrity:  Goal: Skin integrity will stabilize  Description: Skin integrity will stabilize  Outcome: Ongoing     Problem: Discharge Planning:  Goal: Patients continuum of care needs are met  Description: Patients continuum of care needs are met  Outcome: Ongoing     Problem: Nutrition  Goal: Optimal nutrition therapy  Description: Nutrition Problem #1: Increased nutrient needs  Intervention: Food and/or Nutrient Delivery: Modify Current Diet, Start Oral Nutrition Supplement  Nutritional Goals: Meet greater than or equal to 75% of estimated nutrient needs for wound healing with PO intake     Outcome: Ongoing     Problem: Nutritional:  Goal: Nutritional status will improve  Description: Nutritional status will improve  Outcome: Ongoing     Problem: Physical Regulation:  Goal: Will remain free from infection  Description: Will remain free from infection  Outcome: Ongoing  Goal: Diagnostic test results will improve  Description: Diagnostic test results will improve  Outcome: Ongoing  Goal: Ability to maintain vital signs within normal range will improve  Description: Ability to maintain vital signs within normal range will improve  Outcome: Ongoing     Problem: Respiratory:  Goal: Ability to maintain normal respiratory secretions will improve  Description: Ability to maintain normal respiratory secretions will improve  Outcome: Ongoing     Problem: Pain:  Goal: Pain level will decrease  Description: Pain level will decrease  Outcome: Ongoing  Goal: Control of acute pain  Description: Control of acute pain  Outcome: Ongoing  Goal: Control of chronic pain  Description: Control of chronic pain  Outcome: Ongoing     Problem: Discharge Planning:  Goal: Discharged to appropriate level of care  Description: Discharged to appropriate level of care  Outcome: Ongoing     Problem: Serum Glucose Level - Abnormal:  Goal: Ability to maintain appropriate glucose levels will improve  Description: Ability to maintain appropriate glucose levels will improve  Outcome: Ongoing     Problem: Sensory Perception - Impaired:  Goal: Ability to maintain a stable neurologic state will improve  Description: Ability to maintain a stable neurologic state will improve  Outcome: Ongoing

## 2021-03-31 NOTE — PROGRESS NOTES
[facility]  Office: [address], DO, Britt Hoyt, DO, Zulema Bass, DO, Mohit Alonso Blood, DO, Mendy Joseph MD, Grady Payton MD, Joann Smith MD, Phong Estevez MD, Andreina Montes MD, Exie Ahumada, MD, Soren Garcia MD, Dimitris Zafar MD, Dillon Pearl, DO, Turner Jenkins MD, Griffin Díaz DO, Larry Gomez MD,  Brianna Pena DO, Facundo Pina MD, Stephenie Maguire MD, Aubrie Welsh MD, Yakelin Orozco MD, Ivanna Yoon, Margarita Asp, CNP, Orestes Jarvis, CNP, Karolina Babb, CNS, Melvin Gomez, CNP, Brandon Marin, CNP, Chanel Scales, CNP, Benton Issa, CNP, Miguel Miguel, CNP, Presley Wooten PA-C, Berlin Marshall, [facility], Dinorah Riley, CNP, Catrachita Lawton, CNP, Farida Najera, CNP, Peterson David, CNP, Adams Hodgson, CNP, Josiane Brooke, [address] [place] [address]    Progress Note    3/31/2021    1:47 PM    Name:   Rahul Betts  MRN:     [id number]     Acct:      [de-identified]   Room:   2002/2002-01  IP Day:  12  Admit Date:  3/19/2021  5:34 PM    PCP:   No primary care provider on file. Code Status:  Full Code    Subjective:     C/C: kyle/foot pain    Interval History Status:    Patient had left foot calcanectomy and placement of antibiotic beads  3/29/2021, osteomyelitis confirmed in the left calcaneal area, is continued on Zosyn also, ID is following  Creatinine has gone up to 3.47 likely related to loop diuretics and intravascularly depletion from insensible losses  Complains of eye discharge and redness   Brief History:   Lane Ortiz a 38 y. o. male who has been struggling to heal chronic heel wounds for many years without success.  Typically he is seen by wound care specialists and he cannot recall how they typically treat his heel pressure wounds. [place] seen by Dr. Consuelo Guerrero in late 2019 and early 2020 on a previous hospitalization and at that time no surgical intervention was merited and they were treated conservatively. Has not followed up with Dr. Sebastien Villanueva outpatient. He rarely is able to feel any sensation to his feet but pain is noted randomly. [facility] a type 2 diabetic and his last A1C was 6.6 (3/20/21).    Patient will be undergoing left partial calcanectomy and antibiotic bead placement with wound VAC on 3/29 with podiatry. Recommend facility placement on d/c for wound care as he will be d/cd with a wound vac         Review of Systems:     Constitutional:  negative for chills, fevers, sweats  Respiratory:  negative for cough, dyspnea on exertion, shortness of breath, wheezing  Cardiovascular:  negative for chest pain, chest pressure/discomfort, lower extremity edema, palpitations  Gastrointestinal:  negative for abdominal pain, constipation, diarrhea, nausea, vomiting  Neurological:  negative for dizziness, headache  + Bilaterally eye discharge  Medications: Allergies:     Allergies   Allergen Reactions    Other      Mask adhesive causes hives       Current Meds:   Scheduled Meds:    tetrahydrozoline  1 drop Both Eyes TID    oxymetazoline  2 spray Each Nostril BID    insulin glargine  18 Units Subcutaneous Nightly    potassium chloride  40 mEq Oral Once    hydrALAZINE  50 mg Oral 3 times per day    spironolactone  25 mg Oral Daily    aspirin  81 mg Oral Daily    piperacillin-tazobactam  3,375 mg Intravenous Q8H    metoprolol tartrate  50 mg Oral BID    famotidine  20 mg Oral Daily    sodium hypochlorite   Irrigation Daily    allopurinol  100 mg Oral Daily    Baclofen  5 mg Oral BID    magnesium oxide  400 mg Oral Daily    therapeutic multivitamin-minerals  1 tablet Oral Daily    senna  1 tablet Oral Nightly    sodium chloride flush  10 mL Intravenous 2 times per day    heparin (porcine)  5,000 Units Subcutaneous 3 times per day    insulin lispro  0-18 Units Subcutaneous TID     insulin lispro  0-9 Units Subcutaneous Nightly    atorvastatin  40 mg Oral Daily     Continuous Infusions:    IV infusion builder 50 mL/hr at 21 1738    dextrose       PRN Meds: guaiFENesin, benzonatate, melatonin, polyethylene glycol, sodium chloride flush, potassium chloride **OR** potassium alternative oral replacement **OR** potassium chloride, magnesium sulfate, promethazine **OR** ondansetron, acetaminophen **OR** acetaminophen, glucose, dextrose, glucagon (rDNA), dextrose, magnesium hydroxide    Data:     Past Medical History:   has a past medical history of CHF (congestive heart failure) ([address] Diabetes mellitus ([address] Hypertension, Septic shock ([address] and Spina bifida aperta of lumbar spine ([facility] Social History:   reports that he has never smoked. He has never used smokeless tobacco. He reports that he does not drink alcohol or use drugs. Family History: No family history on file. Vitals:  BP (!) 176/102   Pulse 86   Temp 97.7 °F (36.5 °C) (Oral)   Resp 21   Ht 5' 9\" (1.753 m)   Wt 300 lb (136.1 kg)   SpO2 94%   BMI 44.30 kg/m²   Temp (24hrs), Av.9 °F (36.6 °C), Min:97.6 °F (36.4 °C), Max:98.1 °F (36.7 °C)    Recent Labs     21  2129 21  0653 21  1209 21  1241   POCGLU 127* 108 121* 136*       I/O (24Hr):     Intake/Output Summary (Last 24 hours) at 3/31/2021 1347  Last data filed at 3/31/2021 1200  Gross per 24 hour   Intake 1345 ml   Output 3150 ml   Net -1805 ml       Labs:  Hematology:  Recent Labs     21  0539 21  0514 21  0604   WBC 11.7* 20.9* 15.6*   RBC 4.23 4.42 4.02*   HGB 9.8* 10.4* 9.5*   HCT 34.2* 37.8* 33.0*   MCV 80.9* 85.5 82.1*   MCH 23.2* 23.5* 23.6*   MCHC 28.7 27.5* 28.8   RDW 19.0* 19.3* 18.9*    375 401   MPV 8.9 9.0 9.6     Chemistry:  Recent Labs     21  0539 21  0514 21  0604    138 135   K 4.2 4.6 4.5    102 100   CO2 23 23 19*   GLUCOSE 147* 63* 104*   BUN 71* 68* 73*   CREATININE 3.06* 3.41* 3.47*   ANIONGAP 13 13 16   LABGLOM 23* 20* 20*   GFRAA 28* 25* 24* CALCIUM 8.4* 8.8 8.4*     Recent Labs     03/30/21  1249 03/30/21  1635 03/30/21  2129 03/31/21  0653 03/31/21  1209 03/31/21  1241   POCGLU 172* 168* 127* 108 121* 136*     ABG:  Lab Results   Component Value Date    POCPH 7.381 12/24/2019    POCPCO2 35.3 12/24/2019    POCPO2 140.3 12/24/2019    POCHCO3 20.9 12/24/2019    NBEA 4 12/24/2019    PBEA NOT REPORTED 12/24/2019    SLI3IBT 22 12/24/2019    UXUW3AYD 99 12/24/2019    FIO2 40.0 12/24/2019     Lab Results   Component Value Date/Time    SPECIAL NOT REPORTED 03/29/2021 07:39 PM     Lab Results   Component Value Date/Time    CULTURE NO GROWTH 21 HOURS 03/29/2021 07:39 PM       Radiology:  Xr Chest Portable    Result Date: 3/25/2021  No acute cardiopulmonary disease     Mri Foot Left Wo Contrast    Addendum Date: 3/26/2021    ADDENDUM: There is generalized skin thickening and a 1 cm rind of soft tissue edema along the medial plantar aspect of the foot. Subjacent to this, there is an additional layer of subcutaneous fat. Deep to this, there is complete atrophy/fatty infiltration of the intrinsic musculature of the foot. Result Date: 3/26/2021  Deep soft tissue ulcer overlying the calcaneus. Cortical disruption of the exposed calcaneus with underlying marrow edema, compatible with osteomyelitis. Diffuse skin thickening and subcutaneous soft tissue edema surrounding the calcaneal ulcer as well as within the dorsum of the foot, compatible with myositis and cellulitis. No drainable fluid collection identified to suggest abscess formation. No additional foci of osteomyelitis identified. Mri Ankle Left Wo Contrast    Addendum Date: 3/26/2021    ADDENDUM: There is generalized skin thickening and a 1 cm rind of soft tissue edema along the medial plantar aspect of the foot. Subjacent to this, there is an additional layer of subcutaneous fat. Deep to this, there is complete atrophy/fatty infiltration of the intrinsic musculature of the foot.      Result Date: 3/26/2021  Deep soft tissue ulcer overlying the calcaneus. Cortical disruption of the exposed calcaneus with underlying marrow edema, compatible with osteomyelitis. Diffuse skin thickening and subcutaneous soft tissue edema surrounding the calcaneal ulcer as well as within the dorsum of the foot, compatible with myositis and cellulitis. No drainable fluid collection identified to suggest abscess formation. No additional foci of osteomyelitis identified.        Physical Examination:        General appearance:  alert, cooperative and no distress, obese  HEENT: + Redness both eyes with discharge  Mental Status:  oriented to person, place and time and normal affect  Lungs:  clear to auscultation bilaterally, normal effort  Heart:  regular rate and rhythm, no murmur  Abdomen:  soft, nontender, nondistended, normal bowel sounds, no masses, hepatomegaly, splenomegaly  Extremities:  + edema, + dressing and heel supports both feet  Skin:  no other gross lesions, rashes, induration    Assessment:        Hospital Problems           Last Modified POA    * (Principal) Osteomyelitis of left foot ([address] 3/24/2021 Yes    DEBBY (acute kidney injury) ([address] 3/24/2021 Yes    Chronic systolic heart failure ([address] 3/19/2021 Yes    Volume overload 3/21/2021 Yes    Hypoglycemia due to insulin 3/21/2021 No    Right arm weakness 3/19/2021 Yes    Essential hypertension 3/19/2021 Yes    DM II (diabetes mellitus, type II), controlled ([address] 3/19/2021 Yes    Diabetic foot ulcer ([address] 3/24/2021 Yes    Diabetic foot infection ([address] 3/24/2021 Yes    Chronic paraplegia ([address] 3/19/2021 Yes    Sacral wound (Chronic) 3/19/2021 Yes    Open wound of left foot 3/19/2021 Yes    Ambrosio catheter in place (Chronic) 3/19/2021 Yes    Colostomy in place [facility]) (Chronic) 3/19/2021 Yes    Overview Signed 3/19/2021  7:56 PM by MIRTA Hicks - EBEN     December '19         CKD (chronic kidney disease) stage 4, GFR 15-29 ml/min ([address] 3/23/2021 Yes    Physical heparin       12. Dispo: ECF after left foot surgical intervention is completed  13. Conjunctivitisclear eyedrops pneumonia  14. Tachycardia podiatry wants to take him to the OR again on Thursday  15.   PT OT when possible  Isidro Fountain MD  3/31/2021  1:47 PM

## 2021-03-31 NOTE — PROGRESS NOTES
Nephrology Progress Note      SUBJECTIVE       Pt was seen and examined. No acute issues overnite. Stable hemodynamics . Patient is status post left-sided calcanectomy, has osteomyelitis left calcaneum. Maintaining decent urine output. Serum creatinine is essentially the same at 3.4 today. He is in negative fluid balance overall. OBJECTIVE      CURRENT TEMPERATURE:  Temp: 97.7 °F (36.5 °C)  MAXIMUM TEMPERATURE OVER 24HRS:  Temp (24hrs), Av.8 °F (36.6 °C), Min:97.3 °F (36.3 °C), Max:98.1 °F (36.7 °C)    CURRENT RESPIRATORY RATE:  Resp: 21  CURRENT PULSE:  Pulse: 86  CURRENT BLOOD PRESSURE:  BP: (!) 176/102  24HR BLOOD PRESSURE RANGE:  Systolic (58KMB), LILY:825 , Min:151 , RCI:673   ; Diastolic (69RQY), BYL:219, Min:78, Max:120    24HR INTAKE/OUTPUT:      Intake/Output Summary (Last 24 hours) at 3/31/2021 1238  Last data filed at 3/31/2021 1200  Gross per 24 hour   Intake 1345 ml   Output 3150 ml   Net -1805 ml     WEIGHT :  Patient Vitals for the past 96 hrs (Last 3 readings):   Weight   21 0800 300 lb (136.1 kg)   21 0423 (!) 338 lb (153.3 kg)     PHYSICAL EXAM      GENERAL APPEARANCE:Awake, alert, in no acute distress  SKIN: warm and dry, no rash or erythema  EYES: conjunctivae normal and sclera anicteric  ENT: no thrush no pharyngeal congestion   NECK:   No JVD. No carotid bruits and no carotid lymphadenopathy . PULMONARY: lungs are diminished on auscultation. No Wheezing, no ronchi . CADRDIOVASCULAR: S1 and S2 normal NO S3 and NO S4 . No rubs , no murmur. ABDOMEN: soft nontender, bowel sounds present, no organomegaly, no ascites.      EXTREMITIES: + edema     CURRENT MEDICATIONS      tetrahydrozoline 0.05 % ophthalmic solution 1 drop, TID  sodium chloride 0.9 % 1,000 mL infusion, Continuous  oxymetazoline (AFRIN) 0.05 % nasal spray 2 spray, BID  insulin glargine (LANTUS) injection vial 18 Units, Nightly  guaiFENesin (ROBITUSSIN) 100 MG/5ML oral solution 200 mg, Q4H PRN  benzonatate (TESSALON) capsule 100 mg, TID PRN  potassium chloride (KLOR-CON M) extended release tablet 40 mEq, Once  hydrALAZINE (APRESOLINE) tablet 50 mg, 3 times per day  spironolactone (ALDACTONE) tablet 25 mg, Daily  aspirin chewable tablet 81 mg, Daily  melatonin tablet 3 mg, Nightly PRN  piperacillin-tazobactam (ZOSYN) 3,375 mg in dextrose 5 % 50 mL IVPB extended infusion (mini-bag), Q8H  metoprolol tartrate (LOPRESSOR) tablet 50 mg, BID  famotidine (PEPCID) tablet 20 mg, Daily  sodium hypochlorite (DAKINS) 0.125 % external solution, Daily  allopurinol (ZYLOPRIM) tablet 100 mg, Daily  baclofen (LIORESAL) tablet 5 mg, BID  magnesium oxide (MAG-OX) tablet 400 mg, Daily  therapeutic multivitamin-minerals 1 tablet, Daily  senna (SENOKOT) tablet 8.6 mg, Nightly  polyethylene glycol (GLYCOLAX) packet 17 g, Daily PRN  sodium chloride flush 0.9 % injection 10 mL, 2 times per day  sodium chloride flush 0.9 % injection 10 mL, PRN  potassium chloride (KLOR-CON M) extended release tablet 40 mEq, PRN    Or  potassium bicarb-citric acid (EFFER-K) effervescent tablet 40 mEq, PRN    Or  potassium chloride 10 mEq/100 mL IVPB (Peripheral Line), PRN  magnesium sulfate 1000 mg in dextrose 5% 100 mL IVPB, PRN  promethazine (PHENERGAN) tablet 12.5 mg, Q6H PRN    Or  ondansetron (ZOFRAN) injection 4 mg, Q6H PRN  acetaminophen (TYLENOL) tablet 650 mg, Q6H PRN    Or  acetaminophen (TYLENOL) suppository 650 mg, Q6H PRN  heparin (porcine) injection 5,000 Units, 3 times per day  insulin lispro (HUMALOG) injection vial 0-18 Units, TID WC  insulin lispro (HUMALOG) injection vial 0-9 Units, Nightly  glucose (GLUTOSE) 40 % oral gel 15 g, PRN  dextrose 50 % IV solution, PRN  glucagon (rDNA) injection 1 mg, PRN  dextrose 5 % solution, PRN  magnesium hydroxide (MILK OF MAGNESIA) 400 MG/5ML suspension 30 mL, Daily PRN  atorvastatin (LIPITOR) tablet 40 mg, Daily          LABS      CBC:   Recent Labs     03/29/21  0539 03/30/21  0514 03/31/21  0604   WBC 11.7* 20.9* 15.6*   RBC 4.23 4.42 4.02*   HGB 9.8* 10.4* 9.5*   HCT 34.2* 37.8* 33.0*   MCV 80.9* 85.5 82.1*   MCH 23.2* 23.5* 23.6*   MCHC 28.7 27.5* 28.8   RDW 19.0* 19.3* 18.9*    375 401   MPV 8.9 9.0 9.6      BMP:   Recent Labs     03/29/21  0539 03/30/21  0514 03/31/21  0604    138 135   K 4.2 4.6 4.5    102 100   CO2 23 23 19*   BUN 71* 68* 73*   CREATININE 3.06* 3.41* 3.47*   GLUCOSE 147* 63* 104*   CALCIUM 8.4* 8.8 8.4*      IRON:    Lab Results   Component Value Date    IRON 49 01/01/2020     IRON SATURATION:    Lab Results   Component Value Date    LABIRON 34 01/01/2020     TIBC:    Lab Results   Component Value Date    TIBC 144 01/01/2020     FERRITIN:    Lab Results   Component Value Date    FERRITIN 505 01/01/2020     MARINE:   Lab Results   Component Value Date    MARINE NEGATIVE 11/30/2019       SPEP:   Lab Results   Component Value Date    PROT 5.3 12/01/2019    PATH ELECTRONICALLY SIGNED.  Dominik Bear M.D. 11/30/2019       HEPBSAG:  Lab Results   Component Value Date    HEPBSAG NONREACTIVE 11/30/2019     HEPCAB:  Lab Results   Component Value Date    HEPCAB NONREACTIVE 11/30/2019     C3:   Lab Results   Component Value Date    C3 97 11/30/2019     C4:   Lab Results   Component Value Date    C4 12 11/30/2019     URINE SODIUM:    Lab Results   Component Value Date    DONOV[address] 75 03/19/2021      URINE CREATININE:    Lab Results   Component Value Date    LABCREA 26.0 03/21/2021     URINE EOSINOPHILS:   Lab Results   Component Value Date    UREO NONE SEEN 03/19/2021     URINALYSIS:  U/A:   Lab Results   Component Value Date    NITRU NEGATIVE 03/19/2021    COLORU YELLOW 03/19/2021    PHUR 7.5 03/19/2021    WBCUA TOO NUMEROUS TO COUNT 03/19/2021    RBCUA 5 TO 10 03/19/2021    MUCUS NOT REPORTED 03/19/2021    TRICHOMONAS NOT REPORTED 03/19/2021    YEAST NOT REPORTED 03/19/2021    BACTERIA MODERATE 03/19/2021    SPECGRAV 1.010 03/19/2021    LEUKOCYTESUR LARGE 03/19/2021    UROBILINOGEN Normal 03/19/2021    BILIRUBINUR NEGATIVE 03/19/2021    GLUCOSEU NEGATIVE 03/19/2021    KETUA NEGATIVE 03/19/2021    AMORPHOUS NOT REPORTED 03/19/2021         ASSESSMENT      1. Acute Kidney Injury nonoliguric likely secondary to ischemic ATN from underlying infection.    Creatinine now in the 3.13.2 range, possibility of this being new baseline highly likely. Creatinine essentially unchanged today at 3.4  2. Chronic kidney disease now stage IV with proteinuria fluctuating in the 2-4 g range  2.  Diabetic left foot ulcer. 3.  Osteomyelitis left calcaneus, status post left calcanectomy. 4.  Diabetic right foot ulcer. 5.  Worsening gluteal and sacral wounds. 6.  Type 2 diabetes with poor control. 7.  Morbid obesity with BMI of 49.99.  8.  Cardiomyopathy with EF of 25% with global hypokinesia.  2D echo done on 3/20/2021. 9.  Chronic suprapubic catheter   10.  Recurrent UTIs. 6.  Spina bifida with history of immobility. 12.  Chronic nonhealing gluteal wounds with history of necrotizing fasciitis status post multiple debridements with diverting colostomy in 2019. 13.  Nonnephrotic proteinuria. PLAN      1. Holding Demadex for now, okay to continue spironolactone at low dose. Potassium appears to be within acceptable range. 2.  We will review labs tomorrow. If creatinine is stable at current values potentially can be discharged to [place]. 3. Follow up labs ordered. 4. Following along       Please do not hesitate to call with questions.     This note is created with the assistance of a speech-recognition program. While intending to generate a document that actually reflects the content of the visit, no guarantees can be provided that every mistake has been identified and corrected by editing    Electronically signed by Tunde Borrero MD on 3/31/2021 at 12:38 PM

## 2021-03-31 NOTE — PROGRESS NOTES
Respiratory eval completed, patient denies any history of COPD/asthma. Patient denies taking any medications for his breathing at home, does not use home CPAP/Oxygen. Currently on 2L nc, breath sounds are clear/diminished. Treatments do not appear to be indicated at this time.

## 2021-03-31 NOTE — PROGRESS NOTES
Physical Therapy  Facility/Department: [facility] CAR 2  Daily Treatment Note  NAME: Isidro Nguyen  : 1985  MRN: [id number]    Date of Service: 3/31/2021    Discharge Recommendations:  Patient would benefit from continued therapy after discharge     PT Equipment Recommendations  Other: CTA    Assessment     Assessment: No bed mobility this date as pt was Maxi lifted from chair to bed prior to arrival. AROM BUE and PROM BLE exercises performed. Pt would benefit from continued skilled PT to address deficits. Prognosis: Good  REQUIRES PT FOLLOW UP: Yes  Activity Tolerance  Activity Tolerance: Patient limited by fatigue;Patient limited by endurance            Patient Diagnosis(es): There were no encounter diagnoses. has a past medical history of CHF (congestive heart failure) ([facility] Diabetes mellitus ([facility] Hypertension, Septic shock ([facility] and Spina bifida aperta of lumbar spine ([facility] has a past surgical history that includes incision and drainage (Bilateral, 2019); colostomy (N/A, 12/3/2019); Abdomen surgery (N/A, 2019); incision and drainage (N/A, 2019); Abdomen surgery (N/A, 12/10/2019); pr explore scrotum (N/A, 12/10/2019); incision and drainage (N/A, 2019); Wound Exploration (N/A, 2019); incision and drainage (N/A, 2019); Abscess Drainage (N/A, 2020); debridement (2020); Abdomen surgery (N/A, 2020); debridement (2020); incision and drainage (N/A, 2020); debridement (2020); Abdomen surgery (N/A, 2020); other surgical history (2021); and Foot Amputation (Left, 3/29/2021).     Restrictions  Restrictions/Precautions  Restrictions/Precautions: Weight Bearing  Required Braces or Orthoses?: No  Lower Extremity Weight Bearing Restrictions  Right Lower Extremity Weight Bearing: Non Weight Bearing  Left Lower Extremity Weight Bearing: Non Weight Bearing  Position Activity Restriction  Other position/activity restrictions: up with assist. Hx Spina bifida aperta of lumbar spine, buttock wound, O2 NC 2Lm  Subjective   Pt in bed upon arrival. Pt only agreeable to do UE ex's. Pt c/o 3/10 Pain. Orientation    Overall Orientation Status: Within Functional Limits     Objective    Ex's  Upper extremity exercises: Bicep curl, shoulder flexion/extension, punches. Reps: 2 x 10 with 2 lbs on a SPC. 2 lbs with Biceps. Mod verbal cues to ex with all available ROM he has. Pt self limits ROM. .       Goals  Short term goals  Time Frame for Short term goals: 14 visits  Short term goal 1: Pt will be Priscila bed mobility  Short term goal 2: Pt will be CGA EOB 2min  Short term goal 3: Pt will be maxA to stand  Short term goal 4: Pt will be safe to attempt pivot transfer    Plan    Plan  Times per week: 5-6x/wk  Current Treatment Recommendations: Balance Training, ROM, Strengthening, Functional Mobility Training, Endurance Training, Transfer Training, Home Exercise Program, Safety Education & Training, Patient/Caregiver Education & Training, Equipment Evaluation, Education, & procurement  Safety Devices  Type of devices: Call light within reach, Nurse notified, Patient at risk for falls, Left in bed, All fall risk precautions in place  Restraints  Initially in place: No     Therapy Time   Individual Concurrent Group Co-treatment   Time In  315         Time Out  335         Minutes  [address]

## 2021-03-31 NOTE — PROGRESS NOTES
Progress Note  Podiatric Medicine and Surgery     Subjective     CC: Heel wound, b/l    POD#2  Patient seen and examined at bedside. Patient denies pain to the BLE. Heel-offloading boots in place. Tolerating diet well. Afebrile, hypertensive, SpO2 94% on 3L nasal cannula. Denies  N,v,f,c,SOB,CP. HPI :  Donte Sandoval is a 38 y.o. male who has been struggling to heal chronic heel wounds for many years without success. Typically he is seen by wound care specialists and he cannot recall how they typically treat his heel pressure wounds. He was seen by Dr. Olga Salinas in late 2019 and early 2020 on a previous hospitalization and at that time no surgical intervention was merited and they were treated conservatively. Has not followed up with Dr. Olga Salinas outpatient. He rarely is able to feel any sensation to his feet but pain is noted randomly. He is a type 2 diabetic and his last A1C was 6.6 (3/20/21).      Pt reports gradual decline in strength, chills, discomfort to his buttocks, increase in size of buttock wounds, shortness of breath and worsening edema x 2 mos.  His visiting nurse started Doxycycline 2 days ago for possible wound infection and told him he needed more care than could be provided in the home. [facility] lives with his mom who is 79 yr old and has some health issues.        PCP is No primary care provider on file.       Medications:  Scheduled Meds:   tetrahydrozoline  1 drop Both Eyes TID    oxymetazoline  2 spray Each Nostril BID    insulin glargine  18 Units Subcutaneous Nightly    potassium chloride  40 mEq Oral Once    hydrALAZINE  50 mg Oral 3 times per day    spironolactone  25 mg Oral Daily    aspirin  81 mg Oral Daily    piperacillin-tazobactam  3,375 mg Intravenous Q8H    metoprolol tartrate  50 mg Oral BID    famotidine  20 mg Oral Daily    sodium hypochlorite   Irrigation Daily    allopurinol  100 mg Oral Daily    Baclofen  5 mg Oral BID    magnesium oxide  400 mg Oral Daily    therapeutic multivitamin-minerals  1 tablet Oral Daily    senna  1 tablet Oral Nightly    sodium chloride flush  10 mL Intravenous 2 times per day    heparin (porcine)  5,000 Units Subcutaneous 3 times per day    insulin lispro  0-18 Units Subcutaneous TID     insulin lispro  0-9 Units Subcutaneous Nightly    atorvastatin  40 mg Oral Daily       Continuous Infusions:   IV infusion builder 50 mL/hr at 21 1738    dextrose         PRN Meds:guaiFENesin, benzonatate, melatonin, polyethylene glycol, sodium chloride flush, potassium chloride **OR** potassium alternative oral replacement **OR** potassium chloride, magnesium sulfate, promethazine **OR** ondansetron, acetaminophen **OR** acetaminophen, glucose, dextrose, glucagon (rDNA), dextrose, magnesium hydroxide    Objective     Vitals:  Patient Vitals for the past 8 hrs:   BP Temp Temp src Pulse Resp SpO2   21 1200    86     21 1150    78     21 1100 (!) 176/102 97.7 °F (36.5 °C) Oral 79 21 94 %   21 0812 (!) 152/84 97.6 °F (36.4 °C) Oral 74 12 94 %     Average, Min, and Max for last 24 hours Vitals:  TEMPERATURE:  Temp  Av.9 °F (36.6 °C)  Min: 97.6 °F (36.4 °C)  Max: 98.1 °F (36.7 °C)    RESPIRATIONS RANGE: Resp  Av.8  Min: 10  Max: 29    PULSE RANGE: Pulse  Av.4  Min: 71  Max: 86    BLOOD PRESSURE RANGE:  Systolic (57CRO), SCZ:105 , Min:151 , ASP:241   ; Diastolic (08HKU), LFR:934, Min:78, Max:120      PULSE OXIMETRY RANGE: SpO2  Av.1 %  Min: 91 %  Max: 99 %    I/O last 3 completed shifts: In: 8548 [P.O.:720;  I.V.:625]  Out: 3150 [Urine:2450; Stool:700]    CBC:  Recent Labs     21  0539 21  0514 21  0604   WBC 11.7* 20.9* 15.6*   HGB 9.8* 10.4* 9.5*   HCT 34.2* 37.8* 33.0*    375 401        BMP:  Recent Labs     21  0539 21  0514 21  0604    138 135   K 4.2 4.6 4.5    102 100   CO2 23 23 19*   BUN 71* 68* 73*   CREATININE 3.06* 3.41* 3.47* thickening and a 1 cm rind of soft tissue edema   along the medial plantar aspect of the foot. Subjacent to this, there is    an   additional layer of subcutaneous fat. Deep to this, there is complete   atrophy/fatty infiltration of the intrinsic musculature of the foot. Final      VL Lower Extremity Bilateral Venous Duplex   Final Result      XR FOOT LEFT (MIN 3 VIEWS)   Final Result   Soft tissue ulceration overlying the talus with underlying cortical   disruption, concerning for osteomyelitis. XR FOOT RIGHT (MIN 3 VIEWS)   Final Result   No acute bony abnormalities are noted         US RETROPERITONEAL LIMITED   Final Result   Unremarkable right kidney. Left kidney not visualized due to patient body   habitus and bowel gas. Venous Duplex (3/23): Negative for deep or superficial thrombosis. NIVS: 3/23/2021       Right:    VFTUMJ abnormal PVRs    TYLER suggests mild vascular insufficiency at rest    Digit waveforms suggests microvascular level of disease        Left:    Mildly abnormal PVRs    TYLER suggests mild vascular insufficiency at rest    Digit waveforms suggests microvascular level of disease     TYLER:Right: 0.78. Left: 0.88. Wound Culture 3/23/2021: GPC, bacteroides, fusobacterium    Culture bone, L calc (3/23): Neutrophils. NGTD. Surgical path, bone biopsy L calc (3/23): Acute and chronic osteomyelitis   Culture bone, left Calc (3/29): GNR, Staphylococcus coag negative, viridans strep. Assessment   Kanika Contreras is a 38 y.o. male with   1. S/p I&D, total calcanectomy, left (3/29)  2. S/p bedside bone biopsy (DOS: 3/23/2021)  3. Diabetic foot ulcer down to bone left foot-resolved  4. Osteomyelitis Left calcaneus-resolved  5. Diabetic foot ulcer down to subcutaneous tissue right foot  6. Type II DM with secondary peripheral neuropathy  7. DEBBY   8. CHF  9. Paraplegia   10.  S/p L 5th toe amputation    Principal Problem:    Osteomyelitis of left foot (HCC)  Active Problems: DEBBY (acute kidney injury) ([facility]    Chronic systolic heart failure (HCC)    Volume overload    Hypoglycemia due to insulin    Right arm weakness    Essential hypertension    DM II (diabetes mellitus, type II), controlled ([address]    Diabetic foot ulcer ([facility]    Diabetic foot infection ([address]    Chronic paraplegia ([facility]    Sacral wound    Open wound of left foot    Ambrosio catheter in place    Colostomy in place [facility])    CKD (chronic kidney disease) stage 4, GFR 15-29 ml/min ([facility])    Physical debility    PAD (peripheral artery disease) ([facility]    Acute osteomyelitis of left calcaneus ([facility])    Pressure injury of left heel, stage 4 ([facility])    Left foot pain  Resolved Problems:    * No resolved hospital problems. *       Plan     · Patient examined and evaluated at bedside. · Treatment options discussed in detail with the patient. · Abx per ID: Zosyn  · Cont to elevate heels off the bed with boots  · Recommend facility placement upon discharge for wound care, patient will be discharged with a wound VAC. · Non-weight bearing to bilateral lower extremities  · Dressings applied to LLE: Adaptic, 4 x 4's, ABD, Kerlix, Ace compression to below-knee. · Dressings applied to RLE: betadine, 4x4s, Kerlix, Abd, ace compression to below-knee. · No plans for return to OR at this time. Plan is for wound vac placement bedside on 4/1 p.m. · Will discuss with with  Dr. Corinna Ryder.        Leon Hutchins DPM   Podiatric Medicine & Surgery   3/31/2021 at 3:51 PM

## 2021-04-01 ENCOUNTER — APPOINTMENT (OUTPATIENT)
Dept: GENERAL RADIOLOGY | Age: 36
DRG: 628 | End: 2021-04-01
Attending: INTERNAL MEDICINE
Payer: MEDICARE

## 2021-04-01 LAB
ABSOLUTE EOS #: 0.4 K/UL (ref 0–0.44)
ABSOLUTE IMMATURE GRANULOCYTE: 0.13 K/UL (ref 0–0.3)
ABSOLUTE LYMPH #: 1.32 K/UL (ref 1.1–3.7)
ABSOLUTE MONO #: 1.32 K/UL (ref 0.1–1.2)
ANION GAP SERPL CALCULATED.3IONS-SCNC: 17 MMOL/L (ref 9–17)
BASOPHILS # BLD: 1 % (ref 0–2)
BASOPHILS ABSOLUTE: 0.13 K/UL (ref 0–0.2)
BUN BLDV-MCNC: 73 MG/DL (ref 6–20)
BUN/CREAT BLD: ABNORMAL (ref 9–20)
C-REACTIVE PROTEIN: 189.6 MG/L (ref 0–5)
CALCIUM SERPL-MCNC: 8.3 MG/DL (ref 8.6–10.4)
CHLORIDE BLD-SCNC: 102 MMOL/L (ref 98–107)
CO2: 18 MMOL/L (ref 20–31)
CREAT SERPL-MCNC: 3.55 MG/DL (ref 0.7–1.2)
DIFFERENTIAL TYPE: ABNORMAL
EOSINOPHILS RELATIVE PERCENT: 3 % (ref 1–4)
GFR AFRICAN AMERICAN: 24 ML/MIN
GFR NON-AFRICAN AMERICAN: 19 ML/MIN
GFR SERPL CREATININE-BSD FRML MDRD: ABNORMAL ML/MIN/{1.73_M2}
GFR SERPL CREATININE-BSD FRML MDRD: ABNORMAL ML/MIN/{1.73_M2}
GLUCOSE BLD-MCNC: 111 MG/DL (ref 75–110)
GLUCOSE BLD-MCNC: 112 MG/DL (ref 70–99)
GLUCOSE BLD-MCNC: 117 MG/DL (ref 75–110)
GLUCOSE BLD-MCNC: 118 MG/DL (ref 75–110)
GLUCOSE BLD-MCNC: 146 MG/DL (ref 75–110)
HCT VFR BLD CALC: 34.8 % (ref 40.7–50.3)
HEMOGLOBIN: 9.7 G/DL (ref 13–17)
IMMATURE GRANULOCYTES: 1 %
LYMPHOCYTES # BLD: 10 % (ref 24–43)
MCH RBC QN AUTO: 22.9 PG (ref 25.2–33.5)
MCHC RBC AUTO-ENTMCNC: 27.9 G/DL (ref 28.4–34.8)
MCV RBC AUTO: 82.3 FL (ref 82.6–102.9)
MONOCYTES # BLD: 10 % (ref 3–12)
MORPHOLOGY: ABNORMAL
NRBC AUTOMATED: 0.2 PER 100 WBC
PDW BLD-RTO: 18.8 % (ref 11.8–14.4)
PLATELET # BLD: 379 K/UL (ref 138–453)
PLATELET ESTIMATE: ABNORMAL
PMV BLD AUTO: 9.1 FL (ref 8.1–13.5)
POTASSIUM SERPL-SCNC: 4.5 MMOL/L (ref 3.7–5.3)
RBC # BLD: 4.23 M/UL (ref 4.21–5.77)
RBC # BLD: ABNORMAL 10*6/UL
SEG NEUTROPHILS: 75 % (ref 36–65)
SEGMENTED NEUTROPHILS ABSOLUTE COUNT: 9.9 K/UL (ref 1.5–8.1)
SODIUM BLD-SCNC: 137 MMOL/L (ref 135–144)
WBC # BLD: 13.2 K/UL (ref 3.5–11.3)
WBC # BLD: ABNORMAL 10*3/UL

## 2021-04-01 PROCEDURE — 73630 X-RAY EXAM OF FOOT: CPT

## 2021-04-01 PROCEDURE — 97110 THERAPEUTIC EXERCISES: CPT

## 2021-04-01 PROCEDURE — 6370000000 HC RX 637 (ALT 250 FOR IP): Performed by: INTERNAL MEDICINE

## 2021-04-01 PROCEDURE — 6360000002 HC RX W HCPCS: Performed by: STUDENT IN AN ORGANIZED HEALTH CARE EDUCATION/TRAINING PROGRAM

## 2021-04-01 PROCEDURE — 6370000000 HC RX 637 (ALT 250 FOR IP): Performed by: STUDENT IN AN ORGANIZED HEALTH CARE EDUCATION/TRAINING PROGRAM

## 2021-04-01 PROCEDURE — 2580000003 HC RX 258: Performed by: STUDENT IN AN ORGANIZED HEALTH CARE EDUCATION/TRAINING PROGRAM

## 2021-04-01 PROCEDURE — 36415 COLL VENOUS BLD VENIPUNCTURE: CPT

## 2021-04-01 PROCEDURE — 99232 SBSQ HOSP IP/OBS MODERATE 35: CPT | Performed by: INTERNAL MEDICINE

## 2021-04-01 PROCEDURE — 1200000000 HC SEMI PRIVATE

## 2021-04-01 PROCEDURE — 51702 INSERT TEMP BLADDER CATH: CPT

## 2021-04-01 PROCEDURE — 82947 ASSAY GLUCOSE BLOOD QUANT: CPT

## 2021-04-01 PROCEDURE — 97530 THERAPEUTIC ACTIVITIES: CPT

## 2021-04-01 PROCEDURE — 86140 C-REACTIVE PROTEIN: CPT

## 2021-04-01 PROCEDURE — 80048 BASIC METABOLIC PNL TOTAL CA: CPT

## 2021-04-01 PROCEDURE — 85025 COMPLETE CBC W/AUTO DIFF WBC: CPT

## 2021-04-01 RX ORDER — DOXYCYCLINE HYCLATE 100 MG
100 TABLET ORAL EVERY 12 HOURS SCHEDULED
Status: DISCONTINUED | OUTPATIENT
Start: 2021-04-01 | End: 2021-04-02

## 2021-04-01 RX ORDER — DOXYCYCLINE HYCLATE 100 MG
100 TABLET ORAL 2 TIMES DAILY
Qty: 60 TABLET | Refills: 0 | DISCHARGE
Start: 2021-04-01 | End: 2021-04-02 | Stop reason: HOSPADM

## 2021-04-01 RX ORDER — AMOXICILLIN 500 MG/1
500 CAPSULE ORAL EVERY 8 HOURS SCHEDULED
Status: DISCONTINUED | OUTPATIENT
Start: 2021-04-01 | End: 2021-04-02

## 2021-04-01 RX ORDER — TORSEMIDE 20 MG/1
10 TABLET ORAL DAILY
Status: DISCONTINUED | OUTPATIENT
Start: 2021-04-02 | End: 2021-04-02 | Stop reason: HOSPADM

## 2021-04-01 RX ORDER — AMOXICILLIN 500 MG/1
500 CAPSULE ORAL 3 TIMES DAILY
Qty: 90 CAPSULE | Refills: 0 | DISCHARGE
Start: 2021-04-01 | End: 2021-04-02 | Stop reason: HOSPADM

## 2021-04-01 RX ADMIN — SODIUM CHLORIDE, PRESERVATIVE FREE 10 ML: 5 INJECTION INTRAVENOUS at 22:04

## 2021-04-01 RX ADMIN — DAKIN'S SOLUTION 0.125% (QUARTER STRENGTH): 0.12 SOLUTION at 07:43

## 2021-04-01 RX ADMIN — PIPERACILLIN AND TAZOBACTAM 3375 MG: 3; .375 INJECTION, POWDER, LYOPHILIZED, FOR SOLUTION INTRAVENOUS at 11:35

## 2021-04-01 RX ADMIN — Medication 1 TABLET: at 07:53

## 2021-04-01 RX ADMIN — HYDRALAZINE HYDROCHLORIDE 50 MG: 50 TABLET, FILM COATED ORAL at 14:50

## 2021-04-01 RX ADMIN — TETRAHYDROZOLINE HCL 1 DROP: 0.05 SOLUTION/ DROPS OPHTHALMIC at 22:01

## 2021-04-01 RX ADMIN — DOXYCYCLINE HYCLATE 100 MG: 100 TABLET, COATED ORAL at 22:01

## 2021-04-01 RX ADMIN — DESMOPRESSIN ACETATE 40 MG: 0.2 TABLET ORAL at 07:53

## 2021-04-01 RX ADMIN — HEPARIN SODIUM 5000 UNITS: 5000 INJECTION INTRAVENOUS; SUBCUTANEOUS at 22:04

## 2021-04-01 RX ADMIN — HYDRALAZINE HYDROCHLORIDE 50 MG: 50 TABLET, FILM COATED ORAL at 06:13

## 2021-04-01 RX ADMIN — INSULIN LISPRO 2 UNITS: 100 INJECTION, SOLUTION INTRAVENOUS; SUBCUTANEOUS at 22:04

## 2021-04-01 RX ADMIN — HYDRALAZINE HYDROCHLORIDE 50 MG: 50 TABLET, FILM COATED ORAL at 22:01

## 2021-04-01 RX ADMIN — METOPROLOL TARTRATE 50 MG: 25 TABLET ORAL at 07:53

## 2021-04-01 RX ADMIN — SPIRONOLACTONE 25 MG: 25 TABLET ORAL at 07:52

## 2021-04-01 RX ADMIN — MAGNESIUM GLUCONATE 500 MG ORAL TABLET 400 MG: 500 TABLET ORAL at 07:53

## 2021-04-01 RX ADMIN — STANDARDIZED SENNA CONCENTRATE 8.6 MG: 8.6 TABLET ORAL at 22:00

## 2021-04-01 RX ADMIN — AMOXICILLIN 500 MG: 500 CAPSULE ORAL at 22:01

## 2021-04-01 RX ADMIN — TETRAHYDROZOLINE HCL 1 DROP: 0.05 SOLUTION/ DROPS OPHTHALMIC at 07:52

## 2021-04-01 RX ADMIN — Medication 2 SPRAY: at 07:52

## 2021-04-01 RX ADMIN — BENZONATATE 100 MG: 100 CAPSULE ORAL at 07:03

## 2021-04-01 RX ADMIN — PIPERACILLIN AND TAZOBACTAM 3375 MG: 3; .375 INJECTION, POWDER, LYOPHILIZED, FOR SOLUTION INTRAVENOUS at 02:02

## 2021-04-01 RX ADMIN — TETRAHYDROZOLINE HCL 1 DROP: 0.05 SOLUTION/ DROPS OPHTHALMIC at 14:50

## 2021-04-01 RX ADMIN — ASPIRIN 81 MG: 81 TABLET, CHEWABLE ORAL at 07:52

## 2021-04-01 RX ADMIN — FAMOTIDINE 20 MG: 20 TABLET, FILM COATED ORAL at 07:52

## 2021-04-01 RX ADMIN — METOPROLOL TARTRATE 50 MG: 25 TABLET ORAL at 22:01

## 2021-04-01 RX ADMIN — HEPARIN SODIUM 5000 UNITS: 5000 INJECTION INTRAVENOUS; SUBCUTANEOUS at 06:12

## 2021-04-01 RX ADMIN — BACLOFEN 5 MG: 10 TABLET ORAL at 07:53

## 2021-04-01 RX ADMIN — Medication 3 MG: at 22:01

## 2021-04-01 RX ADMIN — Medication 2 SPRAY: at 22:01

## 2021-04-01 RX ADMIN — INSULIN GLARGINE 18 UNITS: 100 INJECTION, SOLUTION SUBCUTANEOUS at 22:04

## 2021-04-01 RX ADMIN — ALLOPURINOL 100 MG: 100 TABLET ORAL at 07:53

## 2021-04-01 RX ADMIN — HEPARIN SODIUM 5000 UNITS: 5000 INJECTION INTRAVENOUS; SUBCUTANEOUS at 14:50

## 2021-04-01 RX ADMIN — BACLOFEN 5 MG: 10 TABLET ORAL at 22:01

## 2021-04-01 ASSESSMENT — ENCOUNTER SYMPTOMS
EYE REDNESS: 0
PHOTOPHOBIA: 0
STRIDOR: 0
COUGH: 0
APNEA: 0
ABDOMINAL DISTENTION: 0

## 2021-04-01 ASSESSMENT — PAIN SCALES - GENERAL: PAINLEVEL_OUTOF10: 0

## 2021-04-01 NOTE — PROGRESS NOTES
40 mg Oral Daily     Continuous Infusions:    IV infusion builder 50 mL/hr at 21 1738    dextrose       PRN Meds: guaiFENesin, benzonatate, melatonin, polyethylene glycol, sodium chloride flush, potassium chloride **OR** potassium alternative oral replacement **OR** potassium chloride, magnesium sulfate, promethazine **OR** ondansetron, acetaminophen **OR** acetaminophen, glucose, dextrose, glucagon (rDNA), dextrose, magnesium hydroxide    Data:     Past Medical History:   has a past medical history of CHF (congestive heart failure) ([facility] Hypertension, Septic shock ([facility] and Spina bifida aperta of lumbar spine ([facility] Social History:   reports that he has never smoked. He has never used smokeless tobacco. He reports that he does not drink alcohol or use drugs. Family History: No family history on file. Vitals:  BP (!) 148/96   Pulse (!) 48   Temp 98.2 °F (36.8 °C) (Oral)   Resp 16   Ht 5' 9\" (1.753 m)   Wt 300 lb (136.1 kg)   SpO2 94%   BMI 44.30 kg/m²   Temp (24hrs), Av.4 °F (36.9 °C), Min:98 °F (36.7 °C), Max:99.1 °F (37.3 °C)    Recent Labs     21  1241 21  1632 21  2016 21  1132   POCGLU 136* 167* 195* 111*       I/O (24Hr):     Intake/Output Summary (Last 24 hours) at 2021 1408  Last data filed at 2021 0800  Gross per 24 hour   Intake 2380 ml   Output 3750 ml   Net -1370 ml       Labs:  Hematology:  Recent Labs     21  0514 21  0604 21  0440   WBC 20.9* 15.6* 13.2*   RBC 4.42 4.02* 4.23   HGB 10.4* 9.5* 9.7*   HCT 37.8* 33.0* 34.8*   MCV 85.5 82.1* 82.3*   MCH 23.5* 23.6* 22.9*   MCHC 27.5* 28.8 27.9*   RDW 19.3* 18.9* 18.8*    401 379   MPV 9.0 9.6 9.1   CRP  --   --  189.6*     Chemistry:  Recent Labs     21  0514 21  0604 21  0440    135 137   K 4.6 4.5 4.5    100 102   CO2 23 19* 18*   GLUCOSE 63* 104* 112*   BUN 68* 73* 73*   CREATININE 3.41* 3.47* 3.55*   ANIONGAP 13 16 17   LABGLOM 20* 20* 19*   GFRAA 25* 24* 24*   CALCIUM 8.8 8.4* 8.3*     Recent Labs     03/31/21  0653 03/31/21  1209 03/31/21  1241 03/31/21  1632 03/31/21 2016 04/01/21  1132   POCGLU 108 121* 136* 167* 195* 111*     ABG:  Lab Results   Component Value Date    POCPH 7.381 12/24/2019    POCPCO2 35.3 12/24/2019    POCPO2 140.3 12/24/2019    POCHCO3 20.9 12/24/2019    NBEA 4 12/24/2019    PBEA NOT REPORTED 12/24/2019    MFO5NTP 22 12/24/2019    GTGE5QIG 99 12/24/2019    FIO2 40.0 12/24/2019     Lab Results   Component Value Date/Time    SPECIAL NOT REPORTED 03/29/2021 07:39 PM     Lab Results   Component Value Date/Time    CULTURE NO GROWTH 21 HOURS 03/29/2021 07:39 PM       Radiology:  Xr Chest Portable    Result Date: 3/25/2021  No acute cardiopulmonary disease     Mri Foot Left Wo Contrast    Addendum Date: 3/26/2021    ADDENDUM: There is generalized skin thickening and a 1 cm rind of soft tissue edema along the medial plantar aspect of the foot. Subjacent to this, there is an additional layer of subcutaneous fat. Deep to this, there is complete atrophy/fatty infiltration of the intrinsic musculature of the foot. Result Date: 3/26/2021  Deep soft tissue ulcer overlying the calcaneus. Cortical disruption of the exposed calcaneus with underlying marrow edema, compatible with osteomyelitis. Diffuse skin thickening and subcutaneous soft tissue edema surrounding the calcaneal ulcer as well as within the dorsum of the foot, compatible with myositis and cellulitis. No drainable fluid collection identified to suggest abscess formation. No additional foci of osteomyelitis identified. Mri Ankle Left Wo Contrast    Addendum Date: 3/26/2021    ADDENDUM: There is generalized skin thickening and a 1 cm rind of soft tissue edema along the medial plantar aspect of the foot. Subjacent to this, there is an additional layer of subcutaneous fat.   Deep to this, there is complete atrophy/fatty infiltration of the intrinsic musculature of the foot. Result Date: 3/26/2021  Deep soft tissue ulcer overlying the calcaneus. Cortical disruption of the exposed calcaneus with underlying marrow edema, compatible with osteomyelitis. Diffuse skin thickening and subcutaneous soft tissue edema surrounding the calcaneal ulcer as well as within the dorsum of the foot, compatible with myositis and cellulitis. No drainable fluid collection identified to suggest abscess formation. No additional foci of osteomyelitis identified.        Physical Examination:        General appearance:  alert, cooperative and no distress, obese  HEENT: + Redness both eyes with discharge-little better  Mental Status:  oriented to person, place and time and normal affect  Lungs:  clear to auscultation bilaterally, normal effort  Heart:  regular rate and rhythm, no murmur  Abdomen:  soft, nontender, nondistended, normal bowel sounds, no masses, hepatomegaly, splenomegaly  Extremities:  + edema, + dressing and heel supports both feet  Skin:  no other gross lesions, rashes, induration    Assessment:        Hospital Problems           Last Modified POA    * (Principal) Osteomyelitis of left foot ([address] 3/24/2021 Yes    DEBBY (acute kidney injury) ([address] 3/24/2021 Yes    Chronic systolic heart failure (HCC) 3/19/2021 Yes    Volume overload 3/21/2021 Yes    Hypoglycemia due to insulin 3/21/2021 No    Right arm weakness 3/19/2021 Yes    Essential hypertension 3/19/2021 Yes    Type 2 diabetes mellitus with left diabetic foot infection ([address] 3/31/2021 Yes    Diabetic foot ulcer ([address] 3/24/2021 Yes    Diabetic foot infection ([address] 3/24/2021 Yes    Chronic paraplegia ([address] 3/19/2021 Yes    Sacral wound (Chronic) 3/19/2021 Yes    Open wound of left foot 3/19/2021 Yes    Ambrosio catheter in place (Chronic) 3/19/2021 Yes    Colostomy in place [facility]) (Chronic) 3/19/2021 Yes    Overview Signed 3/19/2021  7:56 PM by MIRTA Hooper - EBEN     December '19         CKD (chronic    10. Physical debility: Secondary to spina bifida and chronic paraplegia     11. GI/DVT prophylaxis: Pepcid, heparin       12. Dispo: ECF after left foot surgical intervention is completed  13. Conjunctivitisclear eyedrops bilaterally  14. Podiatry plans to place wound VAC today morning, no plans for OR now  15.   Likely to go to [facility] tomorrow, will order Covid test for tomorrow    Silas Ovalles MD  4/1/2021  2:08 PM

## 2021-04-01 NOTE — PROGRESS NOTES
Physical Therapy  Facility/Department: [facility] CAR 2  Daily Treatment Note  NAME: Rahul Betts  : 1985  MRN: [id number]    Date of Service: 2021    Discharge Recommendations:  Patient would benefit from continued therapy after discharge   PT Equipment Recommendations  Other: CTA    Assessment   Assessment: Pt was Maxi lifted from bed to chair. AROM BUE exercises performed. Pt would benefit from continued skilled PT to address deficits. Prognosis: Good  REQUIRES PT FOLLOW UP: Yes  Activity Tolerance  Activity Tolerance: Patient limited by fatigue;Patient limited by endurance     Patient Diagnosis(es): There were no encounter diagnoses. has a past medical history of CHF (congestive heart failure) ([facility] Diabetes mellitus ([facility] Hypertension, Septic shock ([facility] and Spina bifida aperta of lumbar spine ([facility] has a past surgical history that includes incision and drainage (Bilateral, 2019); colostomy (N/A, 12/3/2019); Abdomen surgery (N/A, 2019); incision and drainage (N/A, 2019); Abdomen surgery (N/A, 12/10/2019); pr explore scrotum (N/A, 12/10/2019); incision and drainage (N/A, 2019); Wound Exploration (N/A, 2019); incision and drainage (N/A, 2019); Abscess Drainage (N/A, 2020); debridement (2020); Abdomen surgery (N/A, 2020); debridement (2020); incision and drainage (N/A, 2020); debridement (2020); Abdomen surgery (N/A, 2020); other surgical history (2021); and Foot Amputation (Left, 3/29/2021).     Restrictions  Restrictions/Precautions  Restrictions/Precautions: Weight Bearing  Required Braces or Orthoses?: No  Lower Extremity Weight Bearing Restrictions  Right Lower Extremity Weight Bearing: Non Weight Bearing  Left Lower Extremity Weight Bearing: Non Weight Bearing  Position Activity Restriction  Other position/activity restrictions: up with assist. Hx Spina bifida aperta of lumbar spine, buttock wound, O2 NC 2Lm  Subjective General  Response To Previous Treatment: Patient with no complaints from previous session. Family / Caregiver Present: No  Subjective  Subjective: RN and pt agreeable to PT. Pt supine and alert upon arrival.  General Comment  Comments: Pt left seated in recliner with call light in reach, nurse notified. Pain Screening  Patient Currently in Pain: Denies  Vital Signs  Patient Currently in Pain: Denies       Orientation  Orientation  Overall Orientation Status: Within Functional Limits     Objective   Bed mobility  Rolling to Left: Maximum assistance(Pt able to use bed rails)  Rolling to Right: Maximum assistance(Pt able to use bed rails)  Comment: Unable to assess, pt maxi lifted from bed to chair. Transfers  Comment: Unsafe to attempt  Ambulation  Ambulation?: No  Stairs/Curb  Stairs?: No     Balance  Posture: Poor  Sitting - Static: Poor;+    Exercises: Upper extremity exercises with 4# weight on SPC: Bicep curl, shoulder flexion/extension, and punches. Reps: x15  Comments: Verbal cues needed for completing full ROM and continuing exercise.      Goals  Short term goals  Time Frame for Short term goals: 14 visits  Short term goal 1: Pt will be Priscila bed mobility  Short term goal 2: Pt will be CGA EOB 2min  Short term goal 3: Pt will be maxA to stand  Short term goal 4: Pt will be safe to attempt pivot transfer    Plan    Plan  Times per week: 5-6x/wk  Current Treatment Recommendations: Balance Training, ROM, Strengthening, Functional Mobility Training, Endurance Training, Transfer Training, Home Exercise Program, Safety Education & Training, Patient/Caregiver Education & Training, Equipment Evaluation, Education, & procurement  Safety Devices  Type of devices: Call light within reach, Nurse notified, Patient at risk for falls, All fall risk precautions in place, Left in chair  Restraints  Initially in place: No     Therapy Time   Individual Concurrent Group Co-treatment   Time In 1125         Time Out 1150 Minutes 25         Timed Code Treatment Minutes: [address] [facility]   Treatment performed by Student PTA under the supervision of co-signing PTA who agrees with all treatment and documentation.    Naomi Wheeler, CATHRYN

## 2021-04-01 NOTE — PROGRESS NOTES
capsule 100 mg, TID PRN  potassium chloride (KLOR-CON M) extended release tablet 40 mEq, Once  hydrALAZINE (APRESOLINE) tablet 50 mg, 3 times per day  spironolactone (ALDACTONE) tablet 25 mg, Daily  aspirin chewable tablet 81 mg, Daily  melatonin tablet 3 mg, Nightly PRN  piperacillin-tazobactam (ZOSYN) 3,375 mg in dextrose 5 % 50 mL IVPB extended infusion (mini-bag), Q8H  metoprolol tartrate (LOPRESSOR) tablet 50 mg, BID  famotidine (PEPCID) tablet 20 mg, Daily  sodium hypochlorite (DAKINS) 0.125 % external solution, Daily  allopurinol (ZYLOPRIM) tablet 100 mg, Daily  baclofen (LIORESAL) tablet 5 mg, BID  magnesium oxide (MAG-OX) tablet 400 mg, Daily  therapeutic multivitamin-minerals 1 tablet, Daily  senna (SENOKOT) tablet 8.6 mg, Nightly  polyethylene glycol (GLYCOLAX) packet 17 g, Daily PRN  sodium chloride flush 0.9 % injection 10 mL, 2 times per day  sodium chloride flush 0.9 % injection 10 mL, PRN  potassium chloride (KLOR-CON M) extended release tablet 40 mEq, PRN    Or  potassium bicarb-citric acid (EFFER-K) effervescent tablet 40 mEq, PRN    Or  potassium chloride 10 mEq/100 mL IVPB (Peripheral Line), PRN  magnesium sulfate 1000 mg in dextrose 5% 100 mL IVPB, PRN  promethazine (PHENERGAN) tablet 12.5 mg, Q6H PRN    Or  ondansetron (ZOFRAN) injection 4 mg, Q6H PRN  acetaminophen (TYLENOL) tablet 650 mg, Q6H PRN    Or  acetaminophen (TYLENOL) suppository 650 mg, Q6H PRN  heparin (porcine) injection 5,000 Units, 3 times per day  insulin lispro (HUMALOG) injection vial 0-18 Units, TID WC  insulin lispro (HUMALOG) injection vial 0-9 Units, Nightly  glucose (GLUTOSE) 40 % oral gel 15 g, PRN  dextrose 50 % IV solution, PRN  glucagon (rDNA) injection 1 mg, PRN  dextrose 5 % solution, PRN  magnesium hydroxide (MILK OF MAGNESIA) 400 MG/5ML suspension 30 mL, Daily PRN  atorvastatin (LIPITOR) tablet 40 mg, Daily          LABS      CBC:   Recent Labs     03/30/21  0514 03/31/21  0604 04/01/21  0440   WBC 20.9* 15.6* BILIRUBINUR NEGATIVE 03/19/2021    GLUCOSEU NEGATIVE 03/19/2021    KETUA NEGATIVE 03/19/2021    AMORPHOUS NOT REPORTED 03/19/2021         ASSESSMENT      1. Acute Kidney Injury nonoliguric likely secondary to ischemic ATN from underlying infection.    Creatinine now in the 3.13.2 range, possibility of this being new baseline highly likely. Creatinine essentially unchanged today at 3.5  2. Chronic kidney disease now stage IV with proteinuria fluctuating in the 2-4 g range  2.  Diabetic left foot ulcer. 3.  Osteomyelitis left calcaneus, status post left calcanectomy. 4.  Diabetic right foot ulcer. 5.  Worsening gluteal and sacral wounds. 6.  Type 2 diabetes with poor control. 7.  Morbid obesity with BMI of 49.99.  8.  Cardiomyopathy with EF of 25% with global hypokinesia.  2D echo done on 3/20/2021. 9.  Chronic suprapubic catheter   10.  Recurrent UTIs. 6.  Spina bifida with history of immobility. 12.  Chronic nonhealing gluteal wounds with history of necrotizing fasciitis status post multiple debridements with diverting colostomy in 2019. 13.  Nonnephrotic proteinuria. PLAN      1. Will start on demadex from tomorrow. Potassium is now normal .   2. We will review labs tomorrow. If creatinine is stable at current values potentially can be discharged to [facility] from renal standpoint . 3. Follow up labs ordered. 4. Following along       Please do not hesitate to call with questions.     This note is created with the assistance of a speech-recognition program. While intending to generate a document that actually reflects the content of the visit, no guarantees can be provided that every mistake has been identified and corrected by editing    Electronically signed by Milena Bobby MD on 4/1/2021 at 1:04 PM

## 2021-04-01 NOTE — PROGRESS NOTES
[facility] -   Infectious diseases evaluation  admission date 3/19/2021    reason for consultation:   Heel ulcers w bone exposed    Impression :   Current:  · bilat heel non healing ulcers -  · Left calcaneus osteomyelitis-medullary fibrosis   with reactive bone changes and focal acute and chronic osteomyelitis. · Bone biopsy taken L calca 3/23 SCN strep and MDRO acinetobacter  · Left total calcanectomy 3/29  · Sacral ulcer  · DM2  · Acute on chronic CHF  · DEBBY    Other:  · Morbid obesity  · History of nec fash s/p multiple debridement with diverting colostomy in 2019  Discussion / summary of stay / plan of care   ·   Recommendations     · On Zosyn  - switch to doxy and po amoxicillin x 4 weeks  · Treating the infected cubo calcan joint area  · Bone cx pend SCN and strep and acinetobacter R MDRO - asking lab to run sensi for ceftazidime and tetracycline  · DC planning      Infection Control Recommendations   · [place] Precautions  · Contact Isolation       Antimicrobial Stewardship Recommendations   · Simplification of therapy  · Targeted therapy  · IV to oral conversion  · Per Kg dosing  Coordination ofOutpatient Care:   · Estimated Length of IV antimicrobials:  · Patient will need Midline / picc Catheter Insertion:   · Patient will need SNF:  · Patient will need outpatient wound care:     History of Present Illness:   Initial history:  Abdiaziz Sanders is a 38y.o.-year-old male bilat heel chronic wounds getting worse and bone exposed on the left - no cellulitis and no fever. WBC normal  MRI ordered    Also has buttock wounds  ID called  DM2     Bone bx left calcan 3/23  Wound cx 3/23                    Interval changes  4/1/2021   Patient Vitals for the past 8 hrs:   BP Temp Temp src Pulse Resp SpO2   04/01/21 1624 (!) 146/98 98.9 °F (37.2 °C) Oral 77 20 95 %   04/01/21 1200 (!) 148/96 98.2 °F (36.8 °C) Oral (!) 48 16 94 %     Alert oriented x3.   Answering questions appropriately. No active complaints reported. Bilateral foot offloading boots in place. Hemodynamically stable. Leukocytosis improving on antibiotics. 3/29 left total calcanectomy with antibiotic bead placement. Local wound cultures growing mixed growth likely contamination  Vascular - good pulses - no vasc issues  Card deferring cath due to NAHID    Bone biopsy from the calcaneus 3/23 negative -  Pathology medullary fibrosis  with reactive bone changes and focal acute and chronic osteomyelitis. Wound culture from the open left heel is showing for negative rods and Enterococcus bacteroides 3/23    Summary of relevant labs:  Labs:  W 9.8  Micro:   BC pend neg  Left calc bone biopsy cx 3/23 strep SCN, acinetobacter MDRO  ampicillin-sulbactam Resistant  Preliminary     >=32   RESISTANT   ceFAZolin   Preliminary     NOT REPORTED    cefepime Resistant  Preliminary     32   RESISTANT   cefTRIAXone Resistant  Preliminary     >=64   RESISTANT   ciprofloxacin Resistant  Preliminary     >=4   RESISTANT   gentamicin Sensitive  Preliminary     <=1   SUSCEPTIBLE   meropenem  Resistant  Preliminary     NOT REPORTED    tigecycline Sensitive   Preliminary     NOT REPORTED    tobramycin Sensitive  Preliminary     4   SUSCEPTIBLE   trimethoprim-sulfamethoxazole   Preliminary     NOT REPORTED    piperacillin-tazobactam Resistant  Preliminary     >=128   RESISTANT       Left heel wound cx 3/23 Enterococcus and gram-negative rods  Imaging:      I have personally reviewed the past medical history, past surgical history, medications, social history, and family history, and I haveupdated the database accordingly. Allergies: Other     Review of Systems:     Review of Systems   Constitutional: Positive for activity change. Negative for appetite change, diaphoresis and fever. HENT: Negative for congestion. Eyes: Negative for photophobia, redness and visual disturbance.    Respiratory: Negative for apnea, cough Content:  Thought content normal.       Past Medical History:     Past Medical History:   Diagnosis Date    [facility]     Diabetes mellitus ([facility]     Hypertension     Septic shock ([facility]     Spina bifida aperta of lumbar spine ([facility]        Past Surgical  History:     Past Surgical History:   Procedure Laterality Date    ABDOMEN SURGERY N/A 12/8/2019    DEBRIDEMENT  NECROTIZING FASCIITIS BUTTOCK, WOUND VAC REMOVAL DELAYED PRIMARY CLOSURE OF MIDLINE ABDOMINAL INCISION, OSTOMY BAG CHANGE performed by Maryan Plaza MD at [facility] [facility] N/A 12/10/2019    DEBRIDEMENT OF SACRAL WOUND performed by Layla Max MD at [facility],[address] [place] [address] N/A 1/5/2020    GLUTEAL WOUND DEBRIDEMENT, WOUND VAC CHANGE performed by Layla Max MD at [address] [place] [address],[facility] Floor N/A 1/11/2020    SACRAL DEBRIDEMENT WITH WOUND VAC CHANGE performed by Dheeraj Blas MD at [place] [address]. 1/1/2020    IRRIGATION AND DEBRIDEMENT BUTTOCKS, SCROTUM, ABDOMEN, WOUND VAC CHANGE performed by Maryan Plaza MD at [place] [address] 12/3/2019    LAPAROSCOPIC CONVERTED TO OPEN DIVERTING LOOP COLOSTOMY; WOUND VAC APPLICATION performed by Gaby May MD at [place] [address]  01/05/2020    GLUTEAL WOUND DEBRIDEMENT, WOUND VAC CHANGE     DEBRIDEMENT  01/08/2020    WOUND VAC CHANGE SACRAL DECUBITUS, POSSIBLE DEBRIDEMENT    DEBRIDEMENT  01/11/2020     SACRAL DEBRIDEMENT WITH WOUND VAC CHANGE     FOOT AMPUTATION Left 3/29/2021    LEFT FULL CALCANECTOMY WITH INSERTION OF ANTIBIOTIC BEADS performed by Gerald Marcus DPM at [address] [place] [address] 11/29/2019    RECTAL PERIRECTAL INCISION AND DRAINAGE, DIVERING LOOP COLOSTOMY CREATION performed by Dheeraj Blas MD at [address] [place] [address] N/A 12/6/2019    DEBRIDEMENT NECROTIZING FASCIAITIS BILAT BUTTOCK performed by Layla Max MD at [address] [place] [address] INCISION AND DRAINAGE N/A 12/20/2019    DEBRIDEMENT GLUTEAL AND SCROTAL REGIONS performed by Frank Bates MD at [facility] [address] N/A 12/26/2019    INCISION AND DRAINAGE AND WOUND VAC CHANGE BUTTOCK, PERINEUM performed by Saul Flor DO at [facility] [address] N/A 1/8/2020    WOUND VAC CHANGE SACRAL DECUBITUS, DEBRIDEMENT performed by Juliocesar Bustamante MD at [address] [place] [address]  03/29/2021    LEFT PARTIAL CALCANECTOMY WITH WOUND VAC PLACEMENT    VT EXPLORE SCROTUM N/A 12/10/2019    SCROTAL EXPLORATION WITH SCROTAL DEBRIDEMENT performed by Alvaro Jeffery MD at [facility] N/A 12/23/2019    WOUND DEBRIDEMENT  WOUND VAC CHANGE - [address] performed by Juliocesar Bustamante MD at [place] [address]       Medications:      [START ON 4/2/2021] torsemide  10 mg Oral Daily    tetrahydrozoline  1 drop Both Eyes TID    oxymetazoline  2 spray Each Nostril BID    insulin glargine  18 Units Subcutaneous Nightly    potassium chloride  40 mEq Oral Once    hydrALAZINE  50 mg Oral 3 times per day    aspirin  81 mg Oral Daily    piperacillin-tazobactam  3,375 mg Intravenous Q8H    metoprolol tartrate  50 mg Oral BID    famotidine  20 mg Oral Daily    sodium hypochlorite   Irrigation Daily    allopurinol  100 mg Oral Daily    Baclofen  5 mg Oral BID    magnesium oxide  400 mg Oral Daily    therapeutic multivitamin-minerals  1 tablet Oral Daily    senna  1 tablet Oral Nightly    sodium chloride flush  10 mL Intravenous 2 times per day    heparin (porcine)  5,000 Units Subcutaneous 3 times per day    insulin lispro  0-18 Units Subcutaneous TID WC    insulin lispro  0-9 Units Subcutaneous Nightly    atorvastatin  40 mg Oral Daily       Social History:     Social History     Socioeconomic History    Marital status: Unknown     Spouse name: Not on file    Number of children: Not on file    Years of education: Not on file    Highest education level: Not on file   Occupational History    Not on file   Social Needs    Financial resource strain: Not on file    Food insecurity     Worry: Not on file     Inability: Not on file    Transportation needs     Medical: Not on file     Non-medical: Not on file   Tobacco Use    Smoking status: Never Smoker    Smokeless tobacco: Never Used   Substance and Sexual Activity    Alcohol use: Never     Frequency: Never    Drug use: Never    Sexual activity: Not on file   Lifestyle    Physical activity     Days per week: Not on file     Minutes per session: Not on file    Stress: Not on file   Relationships    Social connections     Talks on phone: Not on file     Gets together: Not on file     Attends Islam service: Not on file     Active member of club or organization: Not on file     Attends meetings of clubs or organizations: Not on file     Relationship status: Not on file    Intimate partner violence     Fear of current or ex partner: Not on file     Emotionally abused: Not on file     Physically abused: Not on file     Forced sexual activity: Not on file   Other Topics Concern    Not on file   Social History Narrative    Not on file       Family History:   No family history on file. Medical Decision Making:   I have independently reviewed/ordered the following labs:    CBC with Differential:   Recent Labs     03/31/21  0604 04/01/21  0440   WBC 15.6* 13.2*   HGB 9.5* 9.7*   HCT 33.0* 34.8*    379   LYMPHOPCT 10* 10*   MONOPCT 11 10     BMP:  Recent Labs     03/31/21  0604 04/01/21  0440    137   K 4.5 4.5    102   CO2 19* 18*   BUN 73* 73*   CREATININE 3.47* 3.55*     Hepatic Function Panel:   No results for input(s): PROT, LABALBU, BILIDIR, IBILI, BILITOT, ALKPHOS, ALT, AST in the last 72 hours. No results for input(s): RPR in the last 72 hours. No results for input(s): HIV in the last 72 hours. No results for input(s): BC in the last 72 hours.   Lab Results   Component Value Date    CREATININE 3.55 04/01/2021    GLUCOSE 112 04/01/2021       Detailed results: Thank you for allowing us to participate in the care of this patient. Please call with questions. This note is created with the assistance of a speech recognition program.  While intending to generate adocument that actually reflects the content of the visit, the document can still have some errors including those of syntax and sound a like substitutions which may escape proof reading. It such instances, actual meaningcan be extrapolated by contextual diversion. Mary Ellen Smith MD  Office: [phone]  Perfect serve / office [phone]        I have discussed the care of the patient, including pertinent history and exam findings,  with the resident. I have seen and examined the patient and the key elements of all parts of the encounter have been performed by me. I agree with the assessment, plan and orders as documented by the resident.     Diana Luis, Infectious Diseases

## 2021-04-01 NOTE — PLAN OF CARE
Problem: Skin Integrity:  Goal: Will show no infection signs and symptoms  Description: Will show no infection signs and symptoms  Outcome: Ongoing     Problem: Skin Integrity:  Goal: Complications related to intravenous access or infusion will be avoided or minimized  Description: Complications related to intravenous access or infusion will be avoided or minimized  Outcome: Ongoing     Problem: Falls - Risk of:  Goal: Will remain free from falls  Description: Will remain free from falls  Outcome: Ongoing

## 2021-04-01 NOTE — PROGRESS NOTES
Progress Note  Podiatric Medicine and Surgery     Subjective     CC: Heel wound, b/l    POD#3 s/p L calcanectomy   Patient seen and examined at bedside with Dr. Anna Landaverde. Patient denies pain to the BLE. Heel-offloading boots in place. Tolerating diet well. Afebrile, hypertensive, SpO2 94%  Denies  N,v,f,c,SOB,CP. HPI :  Lary Sandy is a 38 y.o. male who has been struggling to heal chronic heel wounds for many years without success. Typically he is seen by wound care specialists and he cannot recall how they typically treat his heel pressure wounds. He was seen by Dr. Anna Landaverde in late 2019 and early 2020 on a previous hospitalization and at that time no surgical intervention was merited and they were treated conservatively. Has not followed up with Dr. Anna Landaverde outpatient. He rarely is able to feel any sensation to his feet but pain is noted randomly. He is a type 2 diabetic and his last A1C was 6.6 (3/20/21).      Pt reports gradual decline in strength, chills, discomfort to his buttocks, increase in size of buttock wounds, shortness of breath and worsening edema x 2 mos.  His visiting nurse started Doxycycline 2 days ago for possible wound infection and told him he needed more care than could be provided in the home. [facility] lives with his mom who is 79 yr old and has some health issues.        PCP is No primary care provider on file.       Medications:  Scheduled Meds:   tetrahydrozoline  1 drop Both Eyes TID    oxymetazoline  2 spray Each Nostril BID    insulin glargine  18 Units Subcutaneous Nightly    potassium chloride  40 mEq Oral Once    hydrALAZINE  50 mg Oral 3 times per day    spironolactone  25 mg Oral Daily    aspirin  81 mg Oral Daily    piperacillin-tazobactam  3,375 mg Intravenous Q8H    metoprolol tartrate  50 mg Oral BID    famotidine  20 mg Oral Daily    sodium hypochlorite   Irrigation Daily    allopurinol  100 mg Oral Daily    Baclofen  5 mg Oral BID    magnesium oxide  400 mg Oral Daily    therapeutic multivitamin-minerals  1 tablet Oral Daily    senna  1 tablet Oral Nightly    sodium chloride flush  10 mL Intravenous 2 times per day    heparin (porcine)  5,000 Units Subcutaneous 3 times per day    insulin lispro  0-18 Units Subcutaneous TID     insulin lispro  0-9 Units Subcutaneous Nightly    atorvastatin  40 mg Oral Daily       Continuous Infusions:   IV infusion builder 50 mL/hr at 21 1738    dextrose         PRN Meds:guaiFENesin, benzonatate, melatonin, polyethylene glycol, sodium chloride flush, potassium chloride **OR** potassium alternative oral replacement **OR** potassium chloride, magnesium sulfate, promethazine **OR** ondansetron, acetaminophen **OR** acetaminophen, glucose, dextrose, glucagon (rDNA), dextrose, magnesium hydroxide    Objective     Vitals:  Patient Vitals for the past 8 hrs:   BP Pulse Resp SpO2   21 0815  79 16 95 %   21 0612 (!) 177/107        Average, Min, and Max for last 24 hours Vitals:  TEMPERATURE:  Temp  Av.3 °F (36.8 °C)  Min: 97.7 °F (36.5 °C)  Max: 99.1 °F (37.3 °C)    RESPIRATIONS RANGE: Resp  Av  Min: 16  Max: 21    PULSE RANGE: Pulse  Av.7  Min: 76  Max: 92    BLOOD PRESSURE RANGE:  Systolic (09VJH), VWU:868 , Min:143 , AUC:352   ; Diastolic (65NVG), CQN:690, Min:83, Max:111      PULSE OXIMETRY RANGE: SpO2  Av %  Min: 94 %  Max: 99 %    I/O last 3 completed shifts: In: 2380 [P.O.:1200;  I.V.:1180]  Out: 2800 [Urine:2800]    CBC:  Recent Labs     2114 21  0604 21  0440   WBC 20.9* 15.6* 13.2*   HGB 10.4* 9.5* 9.7*   HCT 37.8* 33.0* 34.8*    401 379   CRP  --   --  189.6*        BMP:  Recent Labs     21  0514 21  0604 21  0440    135 137   K 4.6 4.5 4.5    100 102   CO2 23 19* 18*   BUN 68* 73* 73*   CREATININE 3.41* 3.47* 3.55*   GLUCOSE 63* 104* 112*   CALCIUM 8.8 8.4* 8.3*        Coags:  No results for input(s): APTT, PROT, INR in the last 72 hours. Lab Results   Component Value Date    SEDRATE 43 (H) 03/23/2021     Recent Labs     04/01/21  0440   .6*       Lower Extremity Physical Exam:     Vascular, RLE: DP and PT pulses are Non-palpable, DP/PT audible on doppler. CFT <5 seconds to all digits. Hair growth is absent to the level of the digits. pitting edema is present to bilateral lower extremities ankle and foot, improved with compression wraps.      Neuro, RLE: Saph/sural/SP/DP/plantar sensation absent to light touch.     Musculoskeletal, RLE: Muscle strength to all lower extremity muscle groups 0/5. Negative pain on calf squeeze. LE muscle compartments soft and compressible.     Dermatologic:  Surgical site well coapted with sutures removed. Wound measuring approximately 7.0 cm x 1.0 cm x 5.0 cm. Sanguinous drainage expressed with antibiotic beads. No purulence noted. No gross evidence of necrosis or hematoma. Necrotic eschar noted to the right posterior medial heel measuring approximately 1.0 cm x 0.4 cm x 0.2 cm. It does not probe to bone sinus track or undermine. There is no fluctuance crepitus or induration appreciated. Interdigital maceration absent. Clinical Images:  Left        Right              Imaging:   XR CHEST PORTABLE   Final Result   No acute cardiopulmonary disease         VL LOWER EXTREMITY ARTERIAL SEGMENTAL PRESSURES W PPG   Final Result      MRI FOOT LEFT WO CONTRAST   Final Result   Addendum 1 of 1   ADDENDUM:   There is generalized skin thickening and a 1 cm rind of soft tissue edema   along the medial plantar aspect of the foot. Subjacent to this, there is    an   additional layer of subcutaneous fat. Deep to this, there is complete   atrophy/fatty infiltration of the intrinsic musculature of the foot.          Final      MRI ANKLE LEFT WO CONTRAST   Final Result   Addendum 1 of 1   ADDENDUM:   There is generalized skin thickening and a 1 cm rind of soft tissue edema   along the medial plantar aspect of the foot. Subjacent to this, there is    an   additional layer of subcutaneous fat. Deep to this, there is complete   atrophy/fatty infiltration of the intrinsic musculature of the foot. Final      VL Lower Extremity Bilateral Venous Duplex   Final Result      XR FOOT LEFT (MIN 3 VIEWS)   Final Result   Soft tissue ulceration overlying the talus with underlying cortical   disruption, concerning for osteomyelitis. XR FOOT RIGHT (MIN 3 VIEWS)   Final Result   No acute bony abnormalities are noted         US RETROPERITONEAL LIMITED   Final Result   Unremarkable right kidney. Left kidney not visualized due to patient body   habitus and bowel gas. Venous Duplex (3/23): Negative for deep or superficial thrombosis. NIVS: 3/23/2021       Right:    LVWZPJ abnormal PVRs    TYLER suggests mild vascular insufficiency at rest    Digit waveforms suggests microvascular level of disease        Left:    Mildly abnormal PVRs    TYLER suggests mild vascular insufficiency at rest    Digit waveforms suggests microvascular level of disease     TYLER:Right: 0.78. Left: 0.88. Wound Culture 3/23/2021: GPC, bacteroides, fusobacterium    Culture bone, L calc (3/23): Neutrophils. NGTD. Surgical path, bone biopsy L calc (3/23): Acute and chronic osteomyelitis   Culture bone, left Calc (3/29): GNR, Staphylococcus coag negative, viridans strep. Assessment   Theodora Rueda is a 38 y.o. male with   1. S/p I&D, total calcanectomy, left (3/29)  2. S/p bedside bone biopsy (DOS: 3/23/2021)  3. Diabetic foot ulcer down to bone left foot-resolved  4. Osteomyelitis Left calcaneus-resolved  5. Diabetic foot ulcer down to subcutaneous tissue right foot  6. Type II DM with secondary peripheral neuropathy  7. DEBBY   8. CHF  9. Paraplegia   10.  S/p L 5th toe amputation    Principal Problem:    Osteomyelitis of left foot (HCC)  Active Problems:    DEBBY (acute kidney injury) ([facility] [address]    Chronic systolic heart failure (HCC)    Volume overload    Hypoglycemia due to insulin    Right arm weakness    Essential hypertension    Type 2 diabetes mellitus with left diabetic foot infection ([facility]    Diabetic foot ulcer (HCC)    Diabetic foot infection (HCC)    Chronic paraplegia (HCC)    Sacral wound    Open wound of left foot    Ambrosio catheter in place    Colostomy in place [facility])    CKD (chronic kidney disease) stage 4, GFR 15-29 ml/min ([facility])    Physical debility    PAD (peripheral artery disease) (HCC)    Acute osteomyelitis of calcaneum, left (HCC)    Pressure injury of left heel, stage 4 (HCC)    Left foot pain  Resolved Problems:    * No resolved hospital problems. *       Plan     · Patient examined and evaluated at bedside. · Treatment options discussed in detail with the patient. · Abx per ID: Zosyn  · Cont to elevate heels off the bed with boots  · Recommend facility placement upon discharge for wound care, patient will be discharged with a wound VAC. · Wound vac applied to LLE: White foam, black foam, DSD, ace to the knees. VAC settings at 125 mmHg continuous. Next VAC change on 4/3/21. Patient to have VAC changes on a M/W/F schedule afterwards. · Non-weight bearing to bilateral lower extremities  · Dressings applied to bilateral LE: 4 x 4's, ABD, Kerlix, Ace compression to below-knee. · Discussed with  Dr. Gracie Osborn.        Vee Velarde DPM   Podiatric Medicine & Surgery   4/1/2021 at 10:43 AM

## 2021-04-02 VITALS
TEMPERATURE: 98.2 F | HEIGHT: 69 IN | DIASTOLIC BLOOD PRESSURE: 99 MMHG | BODY MASS INDEX: 44.43 KG/M2 | OXYGEN SATURATION: 95 % | HEART RATE: 65 BPM | WEIGHT: 300 LBS | SYSTOLIC BLOOD PRESSURE: 154 MMHG | RESPIRATION RATE: 14 BRPM

## 2021-04-02 LAB
ABSOLUTE EOS #: 0.4 K/UL (ref 0–0.44)
ABSOLUTE IMMATURE GRANULOCYTE: 0.08 K/UL (ref 0–0.3)
ABSOLUTE LYMPH #: 1.17 K/UL (ref 1.1–3.7)
ABSOLUTE MONO #: 1.2 K/UL (ref 0.1–1.2)
ANION GAP SERPL CALCULATED.3IONS-SCNC: 14 MMOL/L (ref 9–17)
BASOPHILS # BLD: 1 % (ref 0–2)
BASOPHILS ABSOLUTE: 0.09 K/UL (ref 0–0.2)
BUN BLDV-MCNC: 69 MG/DL (ref 6–20)
BUN/CREAT BLD: ABNORMAL (ref 9–20)
C-REACTIVE PROTEIN: 162.3 MG/L (ref 0–5)
CALCIUM SERPL-MCNC: 8.3 MG/DL (ref 8.6–10.4)
CHLORIDE BLD-SCNC: 103 MMOL/L (ref 98–107)
CO2: 18 MMOL/L (ref 20–31)
CREAT SERPL-MCNC: 3.43 MG/DL (ref 0.7–1.2)
DIFFERENTIAL TYPE: ABNORMAL
EOSINOPHILS RELATIVE PERCENT: 3 % (ref 1–4)
GFR AFRICAN AMERICAN: 25 ML/MIN
GFR NON-AFRICAN AMERICAN: 20 ML/MIN
GFR SERPL CREATININE-BSD FRML MDRD: ABNORMAL ML/MIN/{1.73_M2}
GFR SERPL CREATININE-BSD FRML MDRD: ABNORMAL ML/MIN/{1.73_M2}
GLUCOSE BLD-MCNC: 83 MG/DL (ref 75–110)
GLUCOSE BLD-MCNC: 91 MG/DL (ref 70–99)
HCT VFR BLD CALC: 34.5 % (ref 40.7–50.3)
HEMOGLOBIN: 10.3 G/DL (ref 13–17)
IMMATURE GRANULOCYTES: 1 %
LYMPHOCYTES # BLD: 9 % (ref 24–43)
MCH RBC QN AUTO: 23.5 PG (ref 25.2–33.5)
MCHC RBC AUTO-ENTMCNC: 29.9 G/DL (ref 28.4–34.8)
MCV RBC AUTO: 78.6 FL (ref 82.6–102.9)
MONOCYTES # BLD: 9 % (ref 3–12)
NRBC AUTOMATED: 0.1 PER 100 WBC
PDW BLD-RTO: 19.1 % (ref 11.8–14.4)
PLATELET # BLD: ABNORMAL K/UL (ref 138–453)
PLATELET ESTIMATE: ABNORMAL
PLATELET, FLUORESCENCE: NORMAL K/UL (ref 138–453)
PLATELET, IMMATURE FRACTION: NORMAL % (ref 1.1–10.3)
PMV BLD AUTO: ABNORMAL FL (ref 8.1–13.5)
POTASSIUM SERPL-SCNC: 4.4 MMOL/L (ref 3.7–5.3)
RBC # BLD: 4.39 M/UL (ref 4.21–5.77)
RBC # BLD: ABNORMAL 10*6/UL
SARS-COV-2, RAPID: NOT DETECTED
SEG NEUTROPHILS: 78 % (ref 36–65)
SEGMENTED NEUTROPHILS ABSOLUTE COUNT: 10.68 K/UL (ref 1.5–8.1)
SODIUM BLD-SCNC: 135 MMOL/L (ref 135–144)
SPECIMEN DESCRIPTION: NORMAL
WBC # BLD: 13.6 K/UL (ref 3.5–11.3)
WBC # BLD: ABNORMAL 10*3/UL

## 2021-04-02 PROCEDURE — 6370000000 HC RX 637 (ALT 250 FOR IP): Performed by: STUDENT IN AN ORGANIZED HEALTH CARE EDUCATION/TRAINING PROGRAM

## 2021-04-02 PROCEDURE — 36569 INSJ PICC 5 YR+ W/O IMAGING: CPT

## 2021-04-02 PROCEDURE — 86140 C-REACTIVE PROTEIN: CPT

## 2021-04-02 PROCEDURE — 99239 HOSP IP/OBS DSCHRG MGMT >30: CPT | Performed by: INTERNAL MEDICINE

## 2021-04-02 PROCEDURE — 85055 RETICULATED PLATELET ASSAY: CPT

## 2021-04-02 PROCEDURE — 82947 ASSAY GLUCOSE BLOOD QUANT: CPT

## 2021-04-02 PROCEDURE — 99222 1ST HOSP IP/OBS MODERATE 55: CPT | Performed by: INTERNAL MEDICINE

## 2021-04-02 PROCEDURE — 6370000000 HC RX 637 (ALT 250 FOR IP): Performed by: INTERNAL MEDICINE

## 2021-04-02 PROCEDURE — 85025 COMPLETE CBC W/AUTO DIFF WBC: CPT

## 2021-04-02 PROCEDURE — 6360000002 HC RX W HCPCS: Performed by: STUDENT IN AN ORGANIZED HEALTH CARE EDUCATION/TRAINING PROGRAM

## 2021-04-02 PROCEDURE — 80048 BASIC METABOLIC PNL TOTAL CA: CPT

## 2021-04-02 PROCEDURE — 6360000002 HC RX W HCPCS: Performed by: INTERNAL MEDICINE

## 2021-04-02 PROCEDURE — 87635 SARS-COV-2 COVID-19 AMP PRB: CPT

## 2021-04-02 PROCEDURE — 94761 N-INVAS EAR/PLS OXIMETRY MLT: CPT

## 2021-04-02 PROCEDURE — 2580000003 HC RX 258: Performed by: INTERNAL MEDICINE

## 2021-04-02 PROCEDURE — 97535 SELF CARE MNGMENT TRAINING: CPT

## 2021-04-02 PROCEDURE — C1751 CATH, INF, PER/CENT/MIDLINE: HCPCS

## 2021-04-02 PROCEDURE — 36415 COLL VENOUS BLD VENIPUNCTURE: CPT

## 2021-04-02 PROCEDURE — 99232 SBSQ HOSP IP/OBS MODERATE 35: CPT | Performed by: INTERNAL MEDICINE

## 2021-04-02 PROCEDURE — 76937 US GUIDE VASCULAR ACCESS: CPT

## 2021-04-02 RX ORDER — HEPARIN SODIUM 5000 [USP'U]/ML
5000 INJECTION, SOLUTION INTRAVENOUS; SUBCUTANEOUS EVERY 8 HOURS SCHEDULED
Qty: 21 ML | Refills: 1 | DISCHARGE
Start: 2021-04-02 | End: 2021-04-09

## 2021-04-02 RX ORDER — METOPROLOL TARTRATE 50 MG/1
50 TABLET, FILM COATED ORAL 2 TIMES DAILY
Qty: 60 TABLET | Refills: 3 | DISCHARGE
Start: 2021-04-02

## 2021-04-02 RX ORDER — MINOCYCLINE HYDROCHLORIDE 100 MG/1
100 TABLET ORAL 2 TIMES DAILY
Qty: 60 TABLET | Refills: 0 | DISCHARGE
Start: 2021-04-02 | End: 2021-05-02

## 2021-04-02 RX ORDER — OXYCODONE HYDROCHLORIDE 5 MG/1
10 TABLET ORAL EVERY 4 HOURS PRN
Qty: 6 TABLET | Refills: 0 | Status: SHIPPED | OUTPATIENT
Start: 2021-04-02 | End: 2021-04-02 | Stop reason: SDUPTHER

## 2021-04-02 RX ORDER — ASPIRIN 81 MG/1
81 TABLET, CHEWABLE ORAL DAILY
Qty: 30 TABLET | Refills: 3 | DISCHARGE
Start: 2021-04-03

## 2021-04-02 RX ORDER — AMPICILLIN AND SULBACTAM 2; 1 G/1; G/1
3 INJECTION, POWDER, FOR SOLUTION INTRAMUSCULAR; INTRAVENOUS EVERY 8 HOURS
Qty: 40 EACH | Refills: 0 | Status: SHIPPED | OUTPATIENT
Start: 2021-04-02 | End: 2021-04-12

## 2021-04-02 RX ORDER — TORSEMIDE 10 MG/1
10 TABLET ORAL DAILY
Qty: 30 TABLET | Refills: 3 | DISCHARGE
Start: 2021-04-03

## 2021-04-02 RX ORDER — FAMOTIDINE 20 MG/1
20 TABLET, FILM COATED ORAL DAILY
Qty: 60 TABLET | Refills: 3 | DISCHARGE
Start: 2021-04-03

## 2021-04-02 RX ORDER — OXYCODONE HYDROCHLORIDE 5 MG/1
10 TABLET ORAL EVERY 4 HOURS PRN
Qty: 6 TABLET | Refills: 0 | Status: SHIPPED | OUTPATIENT
Start: 2021-04-02 | End: 2021-04-06

## 2021-04-02 RX ORDER — MINOCYCLINE HYDROCHLORIDE 50 MG/1
100 CAPSULE ORAL 2 TIMES DAILY
Status: DISCONTINUED | OUTPATIENT
Start: 2021-04-02 | End: 2021-04-02 | Stop reason: HOSPADM

## 2021-04-02 RX ORDER — HYDRALAZINE HYDROCHLORIDE 50 MG/1
50 TABLET, FILM COATED ORAL EVERY 8 HOURS SCHEDULED
Qty: 90 TABLET | Refills: 3 | DISCHARGE
Start: 2021-04-02

## 2021-04-02 RX ORDER — TETRAHYDROZOLINE HCL 0.05 %
1 DROPS OPHTHALMIC (EYE) 3 TIMES DAILY
Qty: 10 ML | Refills: 4 | DISCHARGE
Start: 2021-04-02

## 2021-04-02 RX ORDER — ATORVASTATIN CALCIUM 40 MG/1
40 TABLET, FILM COATED ORAL DAILY
Qty: 30 TABLET | Refills: 3 | DISCHARGE
Start: 2021-04-03

## 2021-04-02 RX ORDER — GENTAMICIN SULFATE 3 MG/ML
1 SOLUTION/ DROPS OPHTHALMIC 4 TIMES DAILY
DISCHARGE
Start: 2021-04-02 | End: 2021-04-12

## 2021-04-02 RX ORDER — AMOXICILLIN AND CLAVULANATE POTASSIUM 875; 125 MG/1; MG/1
1 TABLET, FILM COATED ORAL EVERY 12 HOURS SCHEDULED
Status: DISCONTINUED | OUTPATIENT
Start: 2021-04-02 | End: 2021-04-02

## 2021-04-02 RX ORDER — ALLOPURINOL 100 MG/1
100 TABLET ORAL DAILY
Qty: 30 TABLET | Refills: 3 | DISCHARGE
Start: 2021-04-03

## 2021-04-02 RX ORDER — GENTAMICIN SULFATE 3 MG/ML
1 SOLUTION/ DROPS OPHTHALMIC 4 TIMES DAILY
Status: DISCONTINUED | OUTPATIENT
Start: 2021-04-02 | End: 2021-04-02 | Stop reason: HOSPADM

## 2021-04-02 RX ORDER — INSULIN GLARGINE 100 [IU]/ML
18 INJECTION, SOLUTION SUBCUTANEOUS NIGHTLY
Qty: 1 VIAL | Refills: 3 | DISCHARGE
Start: 2021-04-02

## 2021-04-02 RX ORDER — OXYCODONE HYDROCHLORIDE 5 MG/1
10 TABLET ORAL EVERY 4 HOURS PRN
Qty: 10 TABLET | Refills: 0 | Status: SHIPPED | OUTPATIENT
Start: 2021-04-02 | End: 2021-04-02 | Stop reason: SDUPTHER

## 2021-04-02 RX ADMIN — SODIUM CHLORIDE 3000 MG: 900 INJECTION INTRAVENOUS at 13:05

## 2021-04-02 RX ADMIN — HYDRALAZINE HYDROCHLORIDE 50 MG: 50 TABLET, FILM COATED ORAL at 13:35

## 2021-04-02 RX ADMIN — FAMOTIDINE 20 MG: 20 TABLET, FILM COATED ORAL at 09:02

## 2021-04-02 RX ADMIN — TETRAHYDROZOLINE HCL 1 DROP: 0.05 SOLUTION/ DROPS OPHTHALMIC at 08:58

## 2021-04-02 RX ADMIN — DOXYCYCLINE HYCLATE 100 MG: 100 TABLET, COATED ORAL at 08:58

## 2021-04-02 RX ADMIN — HYDRALAZINE HYDROCHLORIDE 50 MG: 50 TABLET, FILM COATED ORAL at 06:14

## 2021-04-02 RX ADMIN — Medication 1 TABLET: at 08:58

## 2021-04-02 RX ADMIN — METOPROLOL TARTRATE 50 MG: 25 TABLET ORAL at 08:58

## 2021-04-02 RX ADMIN — HEPARIN SODIUM 5000 UNITS: 5000 INJECTION INTRAVENOUS; SUBCUTANEOUS at 06:14

## 2021-04-02 RX ADMIN — MAGNESIUM GLUCONATE 500 MG ORAL TABLET 400 MG: 500 TABLET ORAL at 08:58

## 2021-04-02 RX ADMIN — MINOCYCLINE HYDROCHLORIDE 100 MG: 50 CAPSULE ORAL at 13:35

## 2021-04-02 RX ADMIN — ASPIRIN 81 MG: 81 TABLET, CHEWABLE ORAL at 08:58

## 2021-04-02 RX ADMIN — DESMOPRESSIN ACETATE 40 MG: 0.2 TABLET ORAL at 08:58

## 2021-04-02 RX ADMIN — HEPARIN SODIUM 5000 UNITS: 5000 INJECTION INTRAVENOUS; SUBCUTANEOUS at 13:35

## 2021-04-02 RX ADMIN — BACLOFEN 5 MG: 10 TABLET ORAL at 08:58

## 2021-04-02 RX ADMIN — ALLOPURINOL 100 MG: 100 TABLET ORAL at 08:58

## 2021-04-02 RX ADMIN — AMOXICILLIN 500 MG: 500 CAPSULE ORAL at 06:14

## 2021-04-02 RX ADMIN — TORSEMIDE 10 MG: 20 TABLET ORAL at 08:58

## 2021-04-02 ASSESSMENT — ENCOUNTER SYMPTOMS
APNEA: 0
COUGH: 0
EYE REDNESS: 0
STRIDOR: 0
ABDOMINAL DISTENTION: 0
PHOTOPHOBIA: 0

## 2021-04-02 ASSESSMENT — PAIN SCALES - GENERAL: PAINLEVEL_OUTOF10: 2

## 2021-04-02 NOTE — FLOWSHEET NOTE
Wound vac removed and wet to dry applied to left heel. Both peripheral IV's discontinued and intact. New Midline placed in left upper arm.

## 2021-04-02 NOTE — CARE COORDINATION
Discharge [facility]  Written by: Connor Carvajal RN    Patient Name: Milagro Crew  Attending Provider: No att. providers found  Admit Date: 3/19/2021  5:34 PM  MRN: [id number]  Account: [de-identified]                     : 1985  Discharge Date: 2021      Disposition: [facility] in [place] Hand[place] RN

## 2021-04-02 NOTE — PROGRESS NOTES
[facility]  Office: [address], DO, Calli Padgett, DO, Ethel Long, DO, Cristiana Lee Blood, DO, Cathryn Pagan MD, Paola Bingham MD, Lisset Miranda MD, Yesenia Fuchs MD, Ariella Dinh MD, Caesar Alva MD, Robert Tirnh MD, Pratima White MD, Darshana Acosta DO, Lucy Kimbrough MD, Bear Paez DO, Aamir Pradhan MD,  Veronica Barth, DO, Jolanta Flores MD, Flip Shelton MD, Xin Rene MD, Consuelo Conway MD, Jose Tafoya, Winsome Porter, CNP, Rodolfo Trinh, CNP, Antonio Villanueva, CNS, Dilan Winter, CNP, Melo Caba, CNP, Paul Bahenas, CNP, Tatiana Quintanilla, CNP, Hari Way, CNP, Idania Yepez PA-C, Glenna Alarcon, [facility], Bianca Ruiz, CNP, Lawson Vasquez, CNP, Louisa Velasquez, CNP, Manuel Higgins, CNP, Jes Hoyt, [facility], Kaila Goins, [address] [place] [address]    Progress Note    4/2/2021    11:38 AM    Name:   Dominik Morris  MRN:     [id number]     Acct:      [de-identified]   Room:   2002/2002-01  IP Day:  15  Admit Date:  3/19/2021  5:34 PM    PCP:   No primary care provider on file. Code Status:  Full Code    Subjective:     C/C: kyle/foot pain    Interval History Status:    Patient had left foot calcanectomy and placement of antibiotic beads  3/29/2021, osteomyelitis confirmed in the left calcaneal area, is switched to IV Unasyn and oral minocycline by ID  Wound VAC has been placed by podiatry  Creatinine has started trending down, 3.43 today  Still complains of eye discharge and redness  Brief History:   Christian Huerta a 38 y. o. male who has been struggling to heal chronic heel wounds for many years without success.  Typically he is seen by wound care specialists and he cannot recall how they typically treat his heel pressure wounds.  He was seen by Dr. Rigoberto Warner in late 2019 and early 2020 on a previous hospitalization and at that time no surgical intervention was merited and they were treated atorvastatin  40 mg Oral Daily     Continuous Infusions:    IV infusion builder 50 mL/hr at 21 0640    dextrose       PRN Meds: guaiFENesin, benzonatate, melatonin, polyethylene glycol, sodium chloride flush, potassium chloride **OR** potassium alternative oral replacement **OR** potassium chloride, magnesium sulfate, promethazine **OR** ondansetron, acetaminophen **OR** acetaminophen, glucose, dextrose, glucagon (rDNA), dextrose, magnesium hydroxide    Data:     Past Medical History:   has a past medical history of CHF (congestive heart failure) ([facility] [address] [facility] mellitus [facility] Hypertension, Septic shock ([facility] and Spina bifida aperta of lumbar spine ([facility] Social History:   reports that he has never smoked. He has never used smokeless tobacco. He reports that he does not drink alcohol or use drugs. Family History: No family history on file. Vitals:  BP (!) 148/97   Pulse 83   Temp 98.2 °F (36.8 °C) (Oral)   Resp 19   Ht 5' 9\" (1.753 m)   Wt 300 lb (136.1 kg)   SpO2 98%   BMI 44.30 kg/m²   Temp (24hrs), Av.4 °F (36.9 °C), Min:98.2 °F (36.8 °C), Max:98.9 °F (37.2 °C)    Recent Labs     21  1132 21  1625 21  0721   POCGLU 111* 117* 146* 83       I/O (24Hr):     Intake/Output Summary (Last 24 hours) at 2021 1138  Last data filed at 2021 0640  Gross per 24 hour   Intake 1658 ml   Output 4225 ml   Net -2567 ml       Labs:  Hematology:  Recent Labs     21  0604 21  0440 21  0505   WBC 15.6* 13.2* 13.6*   RBC 4.02* 4.23 4.39   HGB 9.5* 9.7* 10.3*   HCT 33.0* 34.8* 34.5*   MCV 82.1* 82.3* 78.6*   MCH 23.6* 22.9* 23.5*   MCHC 28.8 27.9* 29.9   RDW 18.9* 18.8* 19.1*    379 See Reflexed IPF Result   MPV 9.6 9.1 NOT REPORTED   CRP  --  189.6*  --      Chemistry:  Recent Labs     21  0604 21  0440 21  0505    137 135   K 4.5 4.5 4.4    102 103   CO2 19* 18* 18*   GLUCOSE 104* 112* 91   BUN 73* 73* 69* CREATININE 3.47* 3.55* 3.43*   ANIONGAP 16 17 14   LABGLOM 20* 19* 20*   GFRAA 24* 24* 25*   CALCIUM 8.4* 8.3* 8.3*     Recent Labs     03/31/21  1632 03/31/21 2016 04/01/21  1132 04/01/21  1625 04/01/21 2001 04/02/21  0721   POCGLU 167* 195* 111* 117* 146* 83     ABG:  Lab Results   Component Value Date    POCPH 7.381 12/24/2019    POCPCO2 35.3 12/24/2019    POCPO2 140.3 12/24/2019    POCHCO3 20.9 12/24/2019    NBEA 4 12/24/2019    PBEA NOT REPORTED 12/24/2019    MHD7JAE 22 12/24/2019    KDQQ6BSR 99 12/24/2019    FIO2 40.0 12/24/2019     Lab Results   Component Value Date/Time    SPECIAL NOT REPORTED 03/29/2021 07:39 PM     Lab Results   Component Value Date/Time    CULTURE NORMAL BRONSON 03/29/2021 07:39 PM       Radiology:  Xr Chest Portable    Result Date: 3/25/2021  No acute cardiopulmonary disease     Mri Foot Left Wo Contrast    Addendum Date: 3/26/2021    ADDENDUM: There is generalized skin thickening and a 1 cm rind of soft tissue edema along the medial plantar aspect of the foot. Subjacent to this, there is an additional layer of subcutaneous fat. Deep to this, there is complete atrophy/fatty infiltration of the intrinsic musculature of the foot. Result Date: 3/26/2021  Deep soft tissue ulcer overlying the calcaneus. Cortical disruption of the exposed calcaneus with underlying marrow edema, compatible with osteomyelitis. Diffuse skin thickening and subcutaneous soft tissue edema surrounding the calcaneal ulcer as well as within the dorsum of the foot, compatible with myositis and cellulitis. No drainable fluid collection identified to suggest abscess formation. No additional foci of osteomyelitis identified. Mri Ankle Left Wo Contrast    Addendum Date: 3/26/2021    ADDENDUM: There is generalized skin thickening and a 1 cm rind of soft tissue edema along the medial plantar aspect of the foot. Subjacent to this, there is an additional layer of subcutaneous fat.   Deep to this, there is complete atrophy/fatty infiltration of the intrinsic musculature of the foot. Result Date: 3/26/2021  Deep soft tissue ulcer overlying the calcaneus. Cortical disruption of the exposed calcaneus with underlying marrow edema, compatible with osteomyelitis. Diffuse skin thickening and subcutaneous soft tissue edema surrounding the calcaneal ulcer as well as within the dorsum of the foot, compatible with myositis and cellulitis. No drainable fluid collection identified to suggest abscess formation. No additional foci of osteomyelitis identified.        Physical Examination:        General appearance:  alert, cooperative and no distress, obese  HEENT: + Redness both eyes with discharge-little better  Mental Status:  oriented to person, place and time and normal affect  Lungs:  clear to auscultation bilaterally, normal effort  Heart:  regular rate and rhythm, no murmur  Abdomen:  soft, nontender, nondistended, normal bowel sounds, no masses, hepatomegaly, splenomegaly  Extremities:  + edema, + dressing and heel supports both feet,+ wound VAC left foot  Skin:  no other gross lesions, rashes, induration    Assessment:        Hospital Problems           Last Modified POA    * (Principal) Osteomyelitis of left foot ([address] 3/24/2021 Yes    DEBBY (acute kidney injury) ([address] 3/24/2021 Yes    Chronic systolic heart failure (HCC) 3/19/2021 Yes    Volume overload 3/21/2021 Yes    Hypoglycemia due to insulin 3/21/2021 No    Right arm weakness 3/19/2021 Yes    Essential hypertension 3/19/2021 Yes    Type 2 diabetes mellitus with left diabetic foot infection ([address] 3/31/2021 Yes    Diabetic foot ulcer ([address] 3/24/2021 Yes    Diabetic foot infection ([address] 3/24/2021 Yes    Chronic paraplegia ([address] 3/19/2021 Yes    Sacral wound (Chronic) 3/19/2021 Yes    Open wound of left foot 3/19/2021 Yes    Ambrosio catheter in place (Chronic) 3/19/2021 Yes    Colostomy in place [facility]) (Chronic) 3/19/2021 Yes    Overview Signed 3/19/2021  7:56 PM by Js Iniguez 9. Gout: Continue allopurinol     10. Physical debility: Secondary to spina bifida and chronic paraplegia     11. GI/DVT prophylaxis: Pepcid, heparin       12.  Discharge to [facility]   13. Conjunctivitisclear eyedrops bilaterally, add gentamicin eyedrops  14. Covid test ordered for ECF placement  15.   Follow-up with nephrology/podiatry/ID as outpatient    Emily Painting MD  4/2/2021  11:38 AM

## 2021-04-02 NOTE — PROGRESS NOTES
Occupational Therapy  Facility/Department: [facility] 2  Daily Treatment Note  NAME: Ольга Zhang  : 1985  MRN: [id number]    Date of Service: 2021    Discharge Recommendations:  Patient would benefit from continued therapy after discharge       Assessment   Performance deficits / Impairments: Decreased functional mobility ; Decreased ADL status; Decreased endurance;Decreased balance;Decreased high-level IADLs;Decreased strength  Treatment Diagnosis: DEBBY  Prognosis: Good  Patient Education: Pt educated on hand placement during bed mobility, importance of ADLs, orientation, encouragement for participation-Good return  REQUIRES OT FOLLOW UP: Yes  Activity Tolerance  Activity Tolerance: Patient Tolerated treatment well;Patient limited by fatigue  Safety Devices  Safety Devices in place: Yes  Type of devices: Call light within reach;Nurse notified; Left in bed         Patient Diagnosis(es): There were no encounter diagnoses. has a past medical history of CHF (congestive heart failure) ([facility] [address] [facility] Diabetes mellitus ([facility] [address] [facility] Hypertension, Septic shock ([facility] [address] and Spina bifida aperta of lumbar spine ([facility] [address] has a past surgical history that includes incision and drainage (Bilateral, 2019); colostomy (N/A, 12/3/2019); Abdomen surgery (N/A, 2019); incision and drainage (N/A, 2019); Abdomen surgery (N/A, 12/10/2019); pr explore scrotum (N/A, 12/10/2019); incision and drainage (N/A, 2019); Wound Exploration (N/A, 2019); incision and drainage (N/A, 2019); Abscess Drainage (N/A, 2020); debridement (2020); Abdomen surgery (N/A, 2020); debridement (2020); incision and drainage (N/A, 2020); debridement (2020); Abdomen surgery (N/A, 2020); other surgical history (2021); and Foot Amputation (Left, 3/29/2021).     Restrictions  Restrictions/Precautions  Restrictions/Precautions: Weight Bearing  Required Braces or Orthoses?: No  Lower Extremity Weight Bearing Restrictions  Right Lower Extremity Weight Bearing: Non Weight Bearing  Left Lower Extremity Weight Bearing: Non Weight Bearing  Position Activity Restriction  Other position/activity restrictions: up with assist. Hx Spina bifida aperta of lumbar spine, buttock wound, O2 NC 3 Lm  Subjective   General  Patient assessed for rehabilitation services?: Yes  Family / Caregiver Present: No  Diagnosis: SOB  General Comment  Vital Signs  Patient Currently in Pain: No   Orientation  Orientation  Overall Orientation Status: Within Normal Limits  Objective    ADL  Grooming: Stand by assistance;Setup; Increased time to complete(Oral hygiene and face washing while supine in bed with HOB elevated.)  UE Bathing: Increased time to complete;Stand by assistance;Setup;Verbal cueing(V/c for initiating and sequencing ADL tasks d/t being drowsy and lethargic during tasks)  UE Dressing: Minimal assistance; Increased time to complete;Setup(Gown management snaps and tie around IV)  Toileting: Dependent/Total(brief management and bottom care)  Additional Comments: Tray table set up for simple ADL acitivities while supine in bed with elevated HOB. Pt demo SOB during activities. Bed mobility  Rolling to Left: Maximum assistance;2 Person assistance(Pt able to utilze bed rails for support)  Rolling to Right: Maximum assistance;2 Person assistance  Comments: Bed rolling on multiple occasions for backside management, new bed linens and bed matts with linens d/t being soiled upon arrival.  Cognition  Overall Cognitive Status: Exceptions  Following Commands:  Follows one step commands with increased time  Attention Span: Attends with cues to redirect  Sequencing: Requires cues for some  Plan   Plan  Times per week: 3-4x/wk    Goals  Short term goals  Time Frame for Short term goals: pt will, by discharge  Short term goal 1: complete LB ADLs and toileting tasks with max A, set up and AE, as needed  Short term goal 2: complete UB ADLs with min A, and set up  Short term goal 3: increase activity tolerance to 15+ minutes in order to participate in daily tasks  Short term goal 4: dem mod A during bed mobility in order to increase independence  Short term goal 5: dem ~10 minutes static/dynamic sitting balance on eOB with CGA in order to complete functional tasks  Long term goals  Time Frame for Long term goals : NOTIFY OTR TO UPDATE MOBILITY GOALS AS APPROPRIATE       Therapy Time   Individual Concurrent Group Co-treatment   Time In 3147         Time Out 1130         Minutes 55         Timed Code Treatment Minutes: 55 Minutes     Pt supine in bed upon therapist arrival. Pt agreeable and pleasant for therapy. See above LOF. Pt retired supine in bed at the end of session with call light within reach.     [address] [place] [address],[place] [address] MIKEL/L

## 2021-04-02 NOTE — CARE COORDINATION
Transitional planning-called [facility] and left message to call me back. 1000 call from Anibal Brunner at [address] for patient to come if discharged, needs RAPID COVID    1030 set up transportation with [employer] for 230PM.  [phone] left message with Anibal Brunner at [facility]. Left message with mother Brooke King    1250 faxed AVS and HENS to Wanda    1430 called and talked with mother Anna-she's OK with him going to [facility]. Let Africa Barakat know I talked with his mother and she agreed for him to go.

## 2021-04-02 NOTE — PLAN OF CARE
Problem: Skin Integrity:  Goal: Will show no infection signs and symptoms  Description: Will show no infection signs and symptoms  Outcome: Met This Shift  Goal: Absence of new skin breakdown  Description: Absence of new skin breakdown  Outcome: Met This Shift  Goal: Demonstration of wound healing without infection will improve  Description: Demonstration of wound healing without infection will improve  Outcome: Met This Shift  Goal: Complications related to intravenous access or infusion will be avoided or minimized  Description: Complications related to intravenous access or infusion will be avoided or minimized  Outcome: Met This Shift     Problem: Falls - Risk of:  Goal: Will remain free from falls  Description: Will remain free from falls  Outcome: Met This Shift  Goal: Absence of physical injury  Description: Absence of physical injury  Outcome: Met This Shift     Problem: Infection:  Goal: Will remain free from infection  Description: Will remain free from infection  Outcome: Met This Shift     Problem: Safety:  Goal: Free from accidental physical injury  Description: Free from accidental physical injury  Outcome: Met This Shift  Goal: Free from intentional harm  Description: Free from intentional harm  Outcome: Met This Shift     Problem: Daily Care:  Goal: Daily care needs are met  Description: Daily care needs are met  Outcome: Met This Shift     Problem: Skin Integrity:  Goal: Skin integrity will stabilize  Description: Skin integrity will stabilize  Outcome: Ongoing     Problem: Discharge Planning:  Goal: Patients continuum of care needs are met  Description: Patients continuum of care needs are met  Outcome: Met This Shift     Problem: Nutrition  Goal: Optimal nutrition therapy  Description: Nutrition Problem #1: Increased nutrient needs  Intervention: Food and/or Nutrient Delivery: Modify Current Diet, Start Oral Nutrition Supplement  Nutritional Goals: Meet greater than or equal to 75% of estimated nutrient needs for wound healing with PO intake     Outcome: Met This Shift     Problem: Nutritional:  Goal: Nutritional status will improve  Description: Nutritional status will improve  Outcome: Met This Shift     Problem: Physical Regulation:  Goal: Will remain free from infection  Description: Will remain free from infection  Outcome: Met This Shift  Goal: Diagnostic test results will improve  Description: Diagnostic test results will improve  Outcome: Met This Shift  Goal: Ability to maintain vital signs within normal range will improve  Description: Ability to maintain vital signs within normal range will improve  Outcome: Met This Shift     Problem: Respiratory:  Goal: Ability to maintain normal respiratory secretions will improve  Description: Ability to maintain normal respiratory secretions will improve  Outcome: Met This Shift     Problem: Pain:  Goal: Pain level will decrease  Description: Pain level will decrease  Outcome: Met This Shift  Goal: Control of acute pain  Description: Control of acute pain  Outcome: Met This Shift  Goal: Control of chronic pain  Description: Control of chronic pain  Outcome: Met This Shift     Problem: Discharge Planning:  Goal: Discharged to appropriate level of care  Description: Discharged to appropriate level of care  Outcome: Ongoing     Problem: Serum Glucose Level - Abnormal:  Goal: Ability to maintain appropriate glucose levels will improve  Description: Ability to maintain appropriate glucose levels will improve  Outcome: Met This Shift     Problem: Sensory Perception - Impaired:  Goal: Ability to maintain a stable neurologic state will improve  Description: Ability to maintain a stable neurologic state will improve  Outcome: Met This Shift

## 2021-04-02 NOTE — PROGRESS NOTES
UROBILINOGEN Normal 03/19/2021    BILIRUBINUR NEGATIVE 03/19/2021    GLUCOSEU NEGATIVE 03/19/2021    KETUA NEGATIVE 03/19/2021    AMORPHOUS NOT REPORTED 03/19/2021         ASSESSMENT      1. Acute Kidney Injury nonoliguric likely secondary to ischemic ATN from underlying infection.    Creatinine now in the 3.13.2 range, Creatinine essentially unchanged today at 3.4  2. Chronic kidney disease now stage IV with proteinuria fluctuating in the 2-4 g range  2.  Diabetic left foot ulcer. 3.  Osteomyelitis left calcaneus, status post left calcanectomy. 4.  Diabetic right foot ulcer. 5.  Worsening gluteal and sacral wounds. 6.  Type 2 diabetes with poor control. 7.  Morbid obesity with BMI of 49.99.  8.  Cardiomyopathy with EF of 25% with global hypokinesia.  2D echo done on 3/20/2021. 9.  Chronic suprapubic catheter   10.  Recurrent UTIs. 6.  Spina bifida with history of immobility. 12.  Chronic nonhealing gluteal wounds with history of necrotizing fasciitis status post multiple debridements with diverting colostomy in 2019. 13.  Nonnephrotic proteinuria. PLAN      1.  demadex started   2. Okay to discharge to ECF from renal standpoint. 3. Follow up labs ordered. 4. Following along       Please do not hesitate to call with questions.     This note is created with the assistance of a speech-recognition program. While intending to generate a document that actually reflects the content of the visit, no guarantees can be provided that every mistake has been identified and corrected by editing    Electronically signed by June Patrick MD on 4/2/2021 at 10:23 AM

## 2021-04-02 NOTE — DISCHARGE SUMMARY
[facility]  Office: [phone]  Noni Kawasaki, DO, Raulito Lopez DO, Guy Man DO, Smith Monahan DO, Akira Merida MD, Noah Hubbard MD, Akira Oviedo MD, Sharon Rivas MD, Tami Plata MD, Wesley Laws MD, Nathen Paul MD, Carmen Bazan MD, Denise Licona DO, Heath Marin MD, Suzanne Frias DO, Syed Dominguez MD,  Kj Obregon DO, Catracho Prabhakar MD, Derald Jeans, MD, Ubaldo Otoole MD, Amada Cornell MD, Sheryl Mukherjee [facility], [address], [facility], Bo Condon, CNP, Krzysztof Guillaume, CNS, Valentina Akers, CNP, Kia Bah, CNP, Daniella Marie, CNP, Kalia Herrera, CNP, Deny Tompkins, CNP, Anna Osman PA-C, Hira Pinto, [facility], Yakelin Medina, CNP, Estephania Leung, CNP, Sebastian Chatman, CNP, Antoine Holter, CNP, Mumtaz Marie, CNP, Donovan [facility], [address] [place] [address]    Discharge Summary     Patient ID: Isidro Nguyen  :  1985   MRN: [id number]     ACCOUNT:  [de-identified]   Patient's PCP: No primary care provider on file. Admit Date: 3/19/2021   Discharge Date: 2021     Length of Stay: 14  Code Status:  Full Code  Admitting Physician: No admitting provider for patient encounter.   Discharge Physician: Stephanie Ruiz MD     Active Discharge Diagnoses:     Hospital Problem Lists:  Principal Problem:    Osteomyelitis of left foot [facility])  Active Problems:    DEBBY (acute kidney injury) ([facility]    Chronic systolic heart failure (HCC)    Volume overload    Hypoglycemia due to insulin    Right arm weakness    Essential hypertension    Type 2 diabetes mellitus with left diabetic foot infection ([facility]    Diabetic foot ulcer (HCC)    [facility] foot infection ([facility]    Chronic paraplegia (HCC)    Sacral wound    Open wound of left foot    Ambrosio catheter in place    Colostomy in place [facility])    CKD (chronic kidney disease) stage 4, GFR 15-29 ml/min ([facility])    Physical debility    PAD (peripheral artery disease) ([facility])    Acute osteomyelitis of calcaneum, left (HCC)    Pressure injury of left heel, stage 4 (HCC)    Left foot pain  Resolved Problems:    * No resolved hospital problems. *      Admission Condition:  poor     Discharged Condition: stable    Hospital Stay:   Admitting history:  Guerrero Luna a 38 y. o. male who has been struggling to heal chronic heel wounds for many years without success.  Typically he is seen by wound care specialists and he cannot recall how they typically treat his heel pressure wounds. Vidhya Sarah was seen by Dr. Nadeem Daniel in late 2019 and early 2020 on a previous hospitalization and at that time no surgical intervention was merited and they were treated conservatively. Has not followed up with Dr. Nadeem Daniel outpatient. He rarely is able to feel any sensation to his feet but pain is noted randomly. [facility] a type 2 diabetic and his last A1C was 6.6 (3/20/21).    Patient will be undergoing left partial calcanectomy and antibiotic bead placement with wound VAC on 3/29 with podiatry. Recommend facility placement on d/c for wound care as he will be d/cd with a wound vac    Hospital Course:    Patient had left foot calcanectomy and placement of antibiotic beads  3/29/2021, osteomyelitis confirmed in the left calcaneal area, is switched to IV Unasyn and oral minocycline by ID  Wound VAC has been placed by podiatry  Creatinine has started trending down, 3.43 today  Still complains of eye discharge and redness  Plan:                  1. DEBBY:    - Renal ultrasound completed 3/20/21 without acute process  - nephrology on board   - Baseline CRT ~ 1.6-1.9: now trending down 3.553. 43 today  - renally dose all meds   - avoid nephrotoxic agents  -Nephrology recommended to continue Demadex  on 10 mg daily     2. Chronic systolic CHF:   -ECHO completed 3/20 showing EF 25%  -continue BB, Demadex 10 mg restarted        3. Protein calorie malnutrition with poor wound healing    -Needs dietary supplement    4.  Left calcaneus osteomyelitis    - had left foot calcanectomy and placement of antibiotic beads yesterday 3/29/2021, osteomyelitis confirmed in the left calcaneal area, is continued on IV Unasyn and oral minocycline by ID  -Wound VAC has been placed on left foot by podiatry  - Chronic sacral wound and left foot wound present on admission     4. Essential hypertension: Stable  - Continue beta-blocker,50 mg twice daily  -Continue hydralazine 50 mg po tid         5. Diabetes mellitus type 2 with episodes of hypoglycemia:    - continue lantus  18 units qhs  -Continue insulin sliding scale  - continue accu checks ac/hs with ISS     6. History of spina bifida with adult failure to thrive with chronic paraplegia and chronic urinary retention:   - Chronic debility status noted  - ECF at dischargetoday afternoon  - Baclofen  - Chronic Ambrosio catheter: Exchanged on 3/20, polymicrobial UA without systemic symptoms.  Off antibiotics, ID following      7. Morbid obesity: BMI 49.9 on admit  - Weight loss encouraged. - Dietary supplement twice daily     8. Dyslipidemia: Continue statin     9. Gout: Continue allopurinol     10. Physical debility: Secondary to spina bifida and chronic paraplegia     11. GI/DVT prophylaxis: Pepcid, heparin        12.  Discharge to [facility]   13. Conjunctivitisclear eyedrops bilaterally, add gentamicin eyedrops  14. Covid test ordered for ECF placement  15.   Follow-up with nephrology/podiatry/ID as outpatient      Significant therapeutic interventions: See above notes    Significant Diagnostic Studies:   Labs / Micro:  CBC:   Lab Results   Component Value Date    WBC 13.6 04/02/2021    RBC 4.39 04/02/2021    HGB 10.3 04/02/2021    HCT 34.5 04/02/2021    MCV 78.6 04/02/2021    MCH 23.5 04/02/2021    MCHC 29.9 04/02/2021    RDW 19.1 04/02/2021    PLT See Reflexed IPF Result 04/02/2021     BMP:    Lab Results   Component Value Date    GLUCOSE 91 04/02/2021     04/02/2021    K 4.4 04/02/2021     Specialist Recommendations/Findings:   IP CONSULT TO CASE MANAGEMENT  IP CONSULT TO GENERAL SURGERY  IP CONSULT TO NEPHROLOGY  IP CONSULT TO PODIATRY  INPATIENT CONSULT TO ORTHOTIST/PROSTHETIST  IP CONSULT TO INFECTIOUS DISEASES  IP CONSULT TO CARDIOLOGY  IP CONSULT TO VASCULAR SURGERY  IP CONSULT TO IV TEAM      The patient was seen and examined on day of discharge and this discharge summary is in conjunction with any daily progress note from day of discharge.     Discharge plan:     Disposition: ECF    Physician Follow Up:     [address] [place] [address], [address]  [phone]    In 1 week  For wound re-check    Blane Sharma MD  [address] [place] [address], [address] [place] [address] [phone]    Schedule an appointment as soon as possible for a visit in 3 weeks  infec disease       Requiring Further Evaluation/Follow Up POST HOSPITALIZATION/Incidental Findings: Continue IV antibiotics as recommended by ID    Diet: cardiac diet and diabetic diet    Activity: As tolerated    Instructions to Patient: Wound VAC care, PT OT    Discharge Medications:      Medication List      START taking these medications    allopurinol 100 MG tablet  Commonly known as: ZYLOPRIM  Take 1 tablet by mouth daily  Start taking on: April 3, 2021     ampicillin-sulbactam 3 (2-1) g Solr  Commonly known as: UNASYN 3GM  Infuse 3,000 mg intravenously every 8 hours for 30 doses     aspirin 81 MG chewable tablet  Take 1 tablet by mouth daily  Start taking on: April 3, 2021     famotidine 20 MG tablet  Commonly known as: PEPCID  Take 1 tablet by mouth daily  Start taking on: April 3, 2021     gentamicin 0.3 % ophthalmic solution  Commonly known as: GARAMYCIN  Place 1 drop into both eyes 4 times daily for 10 days     heparin (porcine) 5000 UNIT/ML injection  Inject 1 mL into the skin every 8 hours for 7 days to 6 doses. polyethylene glycol 17 g packet  Commonly known as: GLYCOLAX     RETACRIT IJ     senna 8.6 MG tablet  Commonly known as: SENOKOT     sevelamer 800 MG tablet  Commonly known as: RENVELA     therapeutic multivitamin-minerals tablet  Take 1 tablet by mouth daily        STOP taking these medications    enoxaparin 40 MG/0.4ML injection  Commonly known as: LOVENOX     Levemir FlexTouch 100 UNIT/ML injection pen  Generic drug: insulin detemir     oxybutynin 5 MG tablet  Commonly known as: DITROPAN     sodium bicarbonate 650 MG tablet           Where to Get Your Medications      You can get these medications from any pharmacy    Bring a paper prescription for each of these medications  · ampicillin-sulbactam 3 (2-1) g Solr  · oxyCODONE 5 MG immediate release tablet     Information about where to get these medications is not yet available    Ask your nurse or doctor about these medications  · allopurinol 100 MG tablet  · aspirin 81 MG chewable tablet  · atorvastatin 40 MG tablet  · famotidine 20 MG tablet  · gentamicin 0.3 % ophthalmic solution  · heparin (porcine) 5000 UNIT/ML injection  · hydrALAZINE 50 MG tablet  · insulin glargine 100 UNIT/ML injection vial  · insulin lispro 100 UNIT/ML injection vial  · insulin lispro 100 UNIT/ML injection vial  · metoprolol tartrate 50 MG tablet  · minocycline 100 MG tablet  · tetrahydrozoline 0.05 % ophthalmic solution  · torsemide 10 MG tablet         Discharge Procedure Orders   Basic Metabolic Panel   Standing Status: Future Standing Exp. Date: 04/02/22   Order Comments: Send results to nephrology       Time Spent on discharge is  35 mins in patient examination, evaluation, counseling as well as medication reconciliation, prescriptions for required medications, discharge plan and follow up. Electronically signed by   Rubi Ramesh MD  4/2/2021  4:12 PM      Thank you Dr. Matti Guevara primary care provider on file.  for the opportunity to be involved in this patient's care.

## 2021-04-02 NOTE — PLAN OF CARE
Problem: Skin Integrity:  Goal: Will show no infection signs and symptoms  Description: Will show no infection signs and symptoms  4/2/2021 1530 by Veronica Adorno RN  Outcome: Completed  4/2/2021 0631 by Jesenia Holliday  Outcome: Met This Shift  Goal: Absence of new skin breakdown  Description: Absence of new skin breakdown  4/2/2021 1530 by Veronica Adorno RN  Outcome: Completed  4/2/2021 0631 by Jesenia Holliday  Outcome: Met This Shift  Goal: Demonstration of wound healing without infection will improve  Description: Demonstration of wound healing without infection will improve  4/2/2021 1530 by Veronica Adorno RN  Outcome: Completed  4/2/2021 0631 by Jesenia Holliday  Outcome: Met This Shift  Goal: Complications related to intravenous access or infusion will be avoided or minimized  Description: Complications related to intravenous access or infusion will be avoided or minimized  4/2/2021 1530 by Veronica Adorno RN  Outcome: Completed  4/2/2021 0631 by Jesenia Holliday  Outcome: Met This Shift     Problem: Falls - Risk of:  Goal: Will remain free from falls  Description: Will remain free from falls  4/2/2021 1530 by Veronica Adorno RN  Outcome: Completed  4/2/2021 0631 by Jesenia Holliday  Outcome: Met This Shift  Goal: Absence of physical injury  Description: Absence of physical injury  4/2/2021 1530 by Veronica Adorno RN  Outcome: Completed  4/2/2021 0631 by Jesenia Holliday  Outcome: Met This Shift     Problem: Infection:  Goal: Will remain free from infection  Description: Will remain free from infection  4/2/2021 1530 by Veronica Adorno RN  Outcome: Completed  4/2/2021 0631 by Jesenia Holliday  Outcome: Met This Shift     Problem: Safety:  Goal: Free from accidental physical injury  Description: Free from accidental physical injury  4/2/2021 1530 by Veroinca Adorno RN  Outcome: Completed  4/2/2021 0631 by Jesenia Holliday  Outcome: Met This Shift  Goal: Free from intentional harm  Description: Free from intentional harm  4/2/2021 1530 by Radha Bingham RN  Outcome: Completed  4/2/2021 0631 by Bhumi Sigala  Outcome: Met This Shift     Problem: Daily Care:  Goal: Daily care needs are met  Description: Daily care needs are met  4/2/2021 1530 by Radha Bingham RN  Outcome: Completed  4/2/2021 0631 by Bhumi Sigala  Outcome: Met This Shift     Problem: Skin Integrity:  Goal: Skin integrity will stabilize  Description: Skin integrity will stabilize  4/2/2021 1530 by Radha Bingham RN  Outcome: Completed  4/2/2021 0631 by Bhumi Sigala  Outcome: Ongoing     Problem: Discharge Planning:  Goal: Patients continuum of care needs are met  Description: Patients continuum of care needs are met  4/2/2021 1530 by Radha Bingham RN  Outcome: Completed  4/2/2021 0631 by Bhumi Sigala  Outcome: Met This Shift     Problem: Nutrition  Goal: Optimal nutrition therapy  Description: Nutrition Problem #1: Increased nutrient needs  Intervention: Food and/or Nutrient Delivery: Modify Current Diet, Start Oral Nutrition Supplement  Nutritional Goals: Meet greater than or equal to 75% of estimated nutrient needs for wound healing with PO intake     4/2/2021 1530 by Radha Bingham RN  Outcome: Completed  4/2/2021 0631 by Bhumi Sigala  Outcome: Met This Shift     Problem: Nutritional:  Goal: Nutritional status will improve  Description: Nutritional status will improve  4/2/2021 1530 by Radha Bingham RN  Outcome: Completed  4/2/2021 0631 by Bhumi Sigala  Outcome: Met This Shift     Problem: Physical Regulation:  Goal: Will remain free from infection  Description: Will remain free from infection  4/2/2021 1530 by Radha Bingham RN  Outcome: Completed  4/2/2021 0631 by Bhumi Sigala  Outcome: Met This Shift  Goal: Diagnostic test results will improve  Description: Diagnostic test results will improve  4/2/2021 1530 by Radha Bingham RN  Outcome: Completed  4/2/2021 0631 by Bhumi Sigala  Outcome: Met This Shift  Goal: Ability to maintain vital signs within normal range will improve

## 2021-04-02 NOTE — PROGRESS NOTES
Progress Note  Podiatric Medicine and Surgery     Subjective     CC: Heel wound, b/l    POD#4 s/p L calcanectomy   Patient seen and examined at bedside   Patient denies pain to the BLE. Tolerating wound VAC  Heel-offloading boots in place. Afebrile, hypertensive  Denies  N,v,f,c,SOB,CP. HPI :  Eugenio Aldridge is a 38 y.o. male who has been struggling to heal chronic heel wounds for many years without success. Typically he is seen by wound care specialists and he cannot recall how they typically treat his heel pressure wounds. He was seen by Dr. Trish Valdes in late 2019 and early 2020 on a previous hospitalization and at that time no surgical intervention was merited and they were treated conservatively. Has not followed up with Dr. Trish Valdes outpatient. He rarely is able to feel any sensation to his feet but pain is noted randomly. He is a type 2 diabetic and his last A1C was 6.6 (3/20/21).      Pt reports gradual decline in strength, chills, discomfort to his buttocks, increase in size of buttock wounds, shortness of breath and worsening edema x 2 mos.  His visiting nurse started Doxycycline 2 days ago for possible wound infection and told him he needed more care than could be provided in the home. Ander Fox lives with his mom who is 79 yr old and has some health issues.        PCP is No primary care provider on file.       Medications:  Scheduled Meds:   gentamicin  1 drop Both Eyes 4x Daily    minocycline  100 mg Oral BID    ampicillin-sulbactam  3,000 mg Intravenous Q8H    torsemide  10 mg Oral Daily    tetrahydrozoline  1 drop Both Eyes TID    insulin glargine  18 Units Subcutaneous Nightly    potassium chloride  40 mEq Oral Once    hydrALAZINE  50 mg Oral 3 times per day    aspirin  81 mg Oral Daily    metoprolol tartrate  50 mg Oral BID    famotidine  20 mg Oral Daily    sodium hypochlorite   Irrigation Daily    allopurinol  100 mg Oral Daily    Baclofen  5 mg Oral BID    magnesium oxide  400 mg Oral Daily    therapeutic multivitamin-minerals  1 tablet Oral Daily    senna  1 tablet Oral Nightly    sodium chloride flush  10 mL Intravenous 2 times per day    heparin (porcine)  5,000 Units Subcutaneous 3 times per day    insulin lispro  0-18 Units Subcutaneous TID     insulin lispro  0-9 Units Subcutaneous Nightly    atorvastatin  40 mg Oral Daily       Continuous Infusions:   IV infusion builder 50 mL/hr at 21 0640    dextrose         PRN Meds:guaiFENesin, benzonatate, melatonin, polyethylene glycol, sodium chloride flush, potassium chloride **OR** potassium alternative oral replacement **OR** potassium chloride, magnesium sulfate, promethazine **OR** ondansetron, acetaminophen **OR** acetaminophen, glucose, dextrose, glucagon (rDNA), dextrose, magnesium hydroxide    Objective     Vitals:  Patient Vitals for the past 8 hrs:   BP Temp Temp src Pulse Resp SpO2   21 0738 (!) 148/97 98.2 °F (36.8 °C) Oral 83 19 98 %   21 0400    80       Average, Min, and Max for last 24 hours Vitals:  TEMPERATURE:  Temp  Av.4 °F (36.9 °C)  Min: 98.2 °F (36.8 °C)  Max: 98.9 °F (37.2 °C)    RESPIRATIONS RANGE: Resp  Av.3  Min: 16  Max: 20    PULSE RANGE: Pulse  Av.2  Min: 48  Max: 83    BLOOD PRESSURE RANGE:  Systolic (46HOB), DDD:435 , Min:146 , HLZ:541   ; Diastolic (41TBY), YID:49, Min:96, Max:98      PULSE OXIMETRY RANGE: SpO2  Av.7 %  Min: 94 %  Max: 98 %    I/O last 3 completed shifts:   In: 1186 [P.O.:480; I.V.:1178]  Out: 5675 [DRWQA:4856; Stool:300]    CBC:  Recent Labs     21  0604 21  0440 21  0505   WBC 15.6* 13.2* 13.6*   HGB 9.5* 9.7* 10.3*   HCT 33.0* 34.8* 34.5*    379 See Reflexed IPF Result   CRP  --  189.6*  --         BMP:  Recent Labs     21  0604 21  0440 21  0505    137 135   K 4.5 4.5 4.4    102 103   CO2 19* 18* 18*   BUN 73* 73* 69*   CREATININE 3.47* 3.55* 3.43*   GLUCOSE 104* 112* 91   CALCIUM 8.4* 8.3* 8.3*        Coags:  No results for input(s): APTT, PROT, INR in the last 72 hours. Lab Results   Component Value Date    SEDRATE 43 (H) 03/23/2021     Recent Labs     04/01/21  0440   .6*       Lower Extremity Physical Exam: Dressings left clean, dry, and intact. No strike-thru noted. Minimal serosanguinous drainage in the canister. Below findings from 4/1/21.      Vascular, RLE: DP and PT pulses are Non-palpable, DP/PT audible on doppler. CFT <5 seconds to all digits. Hair growth is absent to the level of the digits. pitting edema is present to bilateral lower extremities ankle and foot, improved with compression wraps.      Neuro, RLE: Saph/sural/SP/DP/plantar sensation absent to light touch.     Musculoskeletal, RLE: Muscle strength to all lower extremity muscle groups 0/5. Negative pain on calf squeeze. LE muscle compartments soft and compressible.     Dermatologic:  Surgical site well coapted with sutures removed. Wound measuring approximately 7.0 cm x 1.0 cm x 5.0 cm. Sanguinous drainage expressed with antibiotic beads. No purulence noted. No gross evidence of necrosis or hematoma. Necrotic eschar noted to the right posterior medial heel measuring approximately 1.0 cm x 0.4 cm x 0.2 cm. It does not probe to bone sinus track or undermine. There is no fluctuance crepitus or induration appreciated. Interdigital maceration absent. Clinical Images:  Left        Right              Imaging:   XR FOOT LEFT (MIN 3 VIEWS)   Final Result   Postoperative changes consistent with left calcanectomy. There appears to be   antibiotic being patent. There are few areas of lucency suggestive some soft   tissue gas noted on the lateral the surgical site. This most likely is   postoperative change clinical correlation is required.          XR CHEST PORTABLE   Final Result   No acute cardiopulmonary disease         VL LOWER EXTREMITY ARTERIAL SEGMENTAL PRESSURES W PPG   Final Result      MRI FOOT LEFT WO CONTRAST   Final Result   Addendum 1 of 1   ADDENDUM:   There is generalized skin thickening and a 1 cm rind of soft tissue edema   along the medial plantar aspect of the foot. Subjacent to this, there is    an   additional layer of subcutaneous fat. Deep to this, there is complete   atrophy/fatty infiltration of the intrinsic musculature of the foot. Final      MRI ANKLE LEFT WO CONTRAST   Final Result   Addendum 1 of 1   ADDENDUM:   There is generalized skin thickening and a 1 cm rind of soft tissue edema   along the medial plantar aspect of the foot. Subjacent to this, there is    an   additional layer of subcutaneous fat. Deep to this, there is complete   atrophy/fatty infiltration of the intrinsic musculature of the foot. Final      VL Lower Extremity Bilateral Venous Duplex   Final Result      XR FOOT LEFT (MIN 3 VIEWS)   Final Result   Soft tissue ulceration overlying the talus with underlying cortical   disruption, concerning for osteomyelitis. XR FOOT RIGHT (MIN 3 VIEWS)   Final Result   No acute bony abnormalities are noted         US RETROPERITONEAL LIMITED   Final Result   Unremarkable right kidney. Left kidney not visualized due to patient body   habitus and bowel gas. Venous Duplex (3/23): Negative for deep or superficial thrombosis. NIVS: 3/23/2021       Right:    LKQSPH abnormal PVRs    TYLER suggests mild vascular insufficiency at rest    Digit waveforms suggests microvascular level of disease        Left:    Mildly abnormal PVRs    TYLER suggests mild vascular insufficiency at rest    Digit waveforms suggests microvascular level of disease     TYLER:Right: 0.78. Left: 0.88. Wound Culture 3/23/2021: GPC, bacteroides, fusobacterium    Culture bone, L calc (3/23): Neutrophils. NGTD. Surgical path, bone biopsy L calc (3/23): Acute and chronic osteomyelitis   Culture bone, left Calc (3/29): GNR, Staphylococcus coag negative, viridans strep.     Assessment Ольга Zhang is a 38 y.o. male with   1. S/p I&D, total calcanectomy, left (3/29)  2. S/p bedside bone biopsy (DOS: 3/23/2021)  3. Diabetic foot ulcer down to bone left foot-resolved  4. Osteomyelitis Left calcaneus-resolved  5. Diabetic foot ulcer down to subcutaneous tissue right foot  6. Type II DM with secondary peripheral neuropathy  7. DEBBY   8. CHF  9. Paraplegia   10. S/p L 5th toe amputation    Principal Problem:    Osteomyelitis of left foot (HCC)  Active Problems:    DEBBY (acute kidney injury) ([facility] [address]    Chronic systolic heart failure (HCC)    Volume overload    Hypoglycemia due to insulin    Right arm weakness    Essential hypertension    Type 2 diabetes mellitus with left diabetic foot infection ([facility] [address]    Diabetic foot ulcer (HCC)    Diabetic foot infection ([facility] [address]    Chronic paraplegia (HCC)    Sacral wound    Open wound of left foot    Ambrosio catheter in place    Colostomy in place [facility])    CKD (chronic kidney disease) stage 4, GFR 15-29 ml/min ([facility])    Physical debility    PAD (peripheral artery disease) ([facility])    Acute osteomyelitis of calcaneum, left (HCC)    Pressure injury of left heel, stage 4 ([facility])    Left foot pain  Resolved Problems:    * No resolved hospital problems. *       Plan     · Patient examined and evaluated at bedside. · Treatment options discussed in detail with the patient. · Discussed case with Dr. Heidi Keller. Patient to be switched to Unasyn and Minocycline. · Cont to elevate heels off the bed with boots  · Recommend facility placement upon discharge for wound care, patient will be discharged with a wound VAC. · Dressings kept intact to bilateral lower extremities. Wound vac settings: White foam, black foam, DSD, ace to the knees. VAC settings at 125 mmHg continuous. Next VAC change on 4/3/21. Patient to have VAC changes on a M/W/F schedule afterwards. · Patient fine for discharge from podiatry standpoint once cleared by other services.   · Non-weight bearing to bilateral lower extremities  · Dressings kept intact to lower extremities. · Will discuss with Dr. Corinna Ryder.        Kirsty Ferguson DPM   Podiatric Medicine & Surgery   4/2/2021 at 11:09 AM

## 2021-04-03 LAB
CULTURE: ABNORMAL
DIRECT EXAM: ABNORMAL
Lab: ABNORMAL
Lab: ABNORMAL
SPECIMEN DESCRIPTION: ABNORMAL
SPECIMEN DESCRIPTION: ABNORMAL

## 2021-04-05 LAB
GLUCOSE BLD-MCNC: 94 MG/DL (ref 75–110)
INTERVENTION: NORMAL

## 2021-05-23 NOTE — PLAN OF CARE
Problem: Pain:  Goal: Pain level will decrease  Description  Pain level will decrease  Outcome: Ongoing  Goal: Control of acute pain  Description  Control of acute pain  Outcome: Ongoing  Goal: Control of chronic pain  Description  Control of chronic pain  Outcome: Ongoing     Problem: Skin Integrity:  Goal: Will show no infection signs and symptoms  Description  Will show no infection signs and symptoms  Outcome: Ongoing  Goal: Absence of new skin breakdown  Description  Absence of new skin breakdown  Outcome: Ongoing     Problem: OXYGENATION/RESPIRATORY FUNCTION  Goal: Patient will maintain patent airway  Outcome: Ongoing  Goal: Patient will achieve/maintain normal respiratory rate/effort  Description  Respiratory rate and effort will be within normal limits for the patient  Outcome: Ongoing     Problem: MECHANICAL VENTILATION  Goal: Patient will maintain patent airway  Outcome: Ongoing  Goal: Oral health is maintained or improved  Outcome: Ongoing  Goal: ET tube will be managed safely  Outcome: Ongoing  Goal: Ability to express needs and understand communication  Outcome: Ongoing  Goal: Mobility/activity is maintained at optimum level for patient  Outcome: Ongoing     Problem: SKIN INTEGRITY  Goal: Skin integrity is maintained or improved  Outcome: Ongoing     Problem: Falls - Risk of:  Goal: Will remain free from falls  Description  Will remain free from falls  Outcome: Ongoing  Goal: Absence of physical injury  Description  Absence of physical injury  Outcome: Ongoing     Problem: Risk for Impaired Skin Integrity  Goal: Tissue integrity - skin and mucous membranes  Description  Structural intactness and normal physiological function of skin and  mucous membranes.   Outcome: Ongoing     Problem: Nutrition  Goal: Optimal nutrition therapy  Description  Nutrition Problem: Inadequate oral intake  Intervention: Food and/or Nutrient Delivery: Start Parenteral Nutrition  Nutritional Goals: Meet % of estimated REVIEW OF SYSTEMS:    CONSTITUTIONAL: No weakness, fevers or chills  EYES/ENT: No visual changes;  No vertigo or throat pain   NECK: No pain or stiffness  BACK: no pain or lesions   RESPIRATORY: No cough, wheezing, hemoptysis; No shortness of breath  CARDIOVASCULAR: No chest pain or palpitations  GASTROINTESTINAL: No abdominal or epigastric pain. No nausea, vomiting, or hematemesis; No diarrhea or constipation. No melena or hematochezia.  GENITOURINARY: No dysuria, frequency or hematuria  NEUROLOGICAL: No numbness or weakness  EXTREMITIES: no varicose veins, no pain in UE and LE  SKIN: No itching, rashes

## 2021-10-07 NOTE — PLAN OF CARE
Problem: Pain:  Goal: Pain level will decrease  Description  Pain level will decrease  Outcome: Ongoing  Goal: Control of acute pain  Description  Control of acute pain  Outcome: Ongoing  Goal: Control of chronic pain  Description  Control of chronic pain  Outcome: Ongoing     Problem: Skin Integrity:  Goal: Will show no infection signs and symptoms  Description  Will show no infection signs and symptoms  Outcome: Ongoing  Goal: Absence of new skin breakdown  Description  Absence of new skin breakdown  Outcome: Ongoing     Problem: OXYGENATION/RESPIRATORY FUNCTION  Goal: Patient will maintain patent airway  Outcome: Ongoing  Goal: Patient will achieve/maintain normal respiratory rate/effort  Description  Respiratory rate and effort will be within normal limits for the patient  Outcome: Ongoing     Problem: SKIN INTEGRITY  Goal: Skin integrity is maintained or improved  Outcome: Ongoing     Problem: Falls - Risk of:  Goal: Will remain free from falls  Description  Will remain free from falls  Outcome: Ongoing  Goal: Absence of physical injury  Description  Absence of physical injury  Outcome: Ongoing     Problem: Risk for Impaired Skin Integrity  Goal: Tissue integrity - skin and mucous membranes  Description  Structural intactness and normal physiological function of skin and  mucous membranes.   Outcome: Ongoing     Problem: Nutrition  Goal: Optimal nutrition therapy  Description  Nutrition Problem: Inadequate oral intake  Intervention: Food and/or Nutrient Delivery: Start Parenteral Nutrition  Nutritional Goals: Meet % of estimated nutrition needs   Outcome: Ongoing     Problem: Confusion - Acute:  Goal: Absence of continued neurological deterioration signs and symptoms  Description  Absence of continued neurological deterioration signs and symptoms  Outcome: Ongoing  Goal: Mental status will be restored to baseline  Description  Mental status will be restored to baseline  Outcome: Ongoing     Problem: Detail Level: Zone Render Risk Assessment In Note?: no Comment: Awaiting BW results obtained by PCP

## 2022-04-11 ENCOUNTER — TELEPHONE (OUTPATIENT)
Dept: VASCULAR SURGERY | Age: 37
End: 2022-04-11

## 2022-04-11 DIAGNOSIS — N18.6 ESRD (END STAGE RENAL DISEASE) (HCC): ICD-10-CM

## 2022-04-11 DIAGNOSIS — Z01.818 PREOP TESTING: Primary | ICD-10-CM

## 2022-04-11 NOTE — TELEPHONE ENCOUNTER
LVM for pt in regards to appointment moving from 4/29 to 5/9. Advised patient on VM to give us a call back if the date does not work for us.

## 2022-04-22 ENCOUNTER — TELEPHONE (OUTPATIENT)
Dept: VASCULAR SURGERY | Age: 37
End: 2022-04-22

## 2022-04-22 NOTE — TELEPHONE ENCOUNTER
Attempted to contact patient and LVM to let him know that his appt needs to be rescheduled due to the Doctor being out in [place] on this day.  LVM for the patient to call back

## 2022-09-29 NOTE — PROGRESS NOTES
Please help her schedule follow-up for next refill. and willing to proceed if needed.   7. BMP evening    Please do not hesitate to call with questions    This note is created with the assistance of a speech-recognition program. While intending to generate a document that actually reflects the content of the visit, no guarantees can be provided that every mistake has been identified and corrected by editing    Henrietta Houser MD, [facility] [place]), [address]   12/1/2019 10:10 AM  [facility]

## 2023-07-10 NOTE — PROGRESS NOTES
This patient is being seen in consultation from Maria R Arellano MD    CHIEF COMPLAINT(S):   Chief Complaint   Patient presents with   • Left Middle Finger - Pain       HISTORY OF PRESENT ILLNESS:  Yaneli Bernal is a 75 year old right handed female presenting to the clinic with a complaint of left 3rd finger pain.  She states that her pain began insidiously about 2 months ago. She noticed a lump over the dorsal aspect of the PIP. She does not believe it has changed in size since she noticed it.  At present, she rates her pain at a 5/10 on the pain scale, describes the pain as aching, and localizes the pain to the dorsal aspect of the left 3rd PIP joint. She denies any triggering or locking, but feels she cannot fully bend her finger because of the lump. She denies any neurological symptoms.  She states that grasping objects and bending her finger will increase her pain.  She is icing as needed, and she is currently using voltaren gel as needed.  She denies any previous injuries to this area.    Accompanied by self  Location: left middle finger PIP joint  Date of Onset: May 2023  Work related: No  Context: see above  Severity:5/10  Timing: intermittent   Quality: aching  Associated signs and symptoms: none  Aggravating factors: movement  Alleviating factors: cold and voltaren gel  Patient occupation/activity level: retired, low activity level    COVID-19 History  Covid Vaccine Status: last dose on 4/9/2022  Any previous instances of testing positive for COVID-19: No  Any previous instances of being presumed positive for COVID-19: Yes - due to exposure  Date(s): 2020  Symptoms experienced: None    REVIEW OF SYSTEMS:  Skin: No problems with hair or nails. No rash. No new skin lesions.  HEENT: Pt denies problems with head, eyes, ears, nose, mouth, throat and neck  Respiratory: SOB: chronic   Cardiovascular: Irregular Heart Beat: atrial fibrillation  Gastrointestinal: Abdominal pain: intermittent  Genitourinary: Pt  Urology Progress Note    Subjective: Sravan Echavarria is a 37 y.o. male. His/Her current Diet is: Dietary Nutrition Supplements: Renal Oral Supplement  Diet NPO, After Midnight Exceptions are: Sips with Meds  DIET RENAL; Low Potassium. The patient is 1 Day Post-Op from Procedure(s):  [address], [address] [facility]  Urology was called for leakage around urethral catheter which is compromising gluteal wound healing. Per nursing has had multiple episodes of decreased urine output followed by saturation of diaper around catheter.      Patient Vitals for the past 24 hrs:   BP Temp Temp src Pulse Resp SpO2 Weight   01/06/20 0625 (!) 162/88 98.2 °F (36.8 °C) Oral 85 16 99 % --   01/06/20 0612 -- -- -- -- -- -- 287 lb (130.2 kg)   01/06/20 0346 (!) 166/101 98.2 °F (36.8 °C) Oral 84 12 100 % --   01/05/20 2012 (!) 150/91 97.3 °F (36.3 °C) Oral 92 21 97 % --   01/05/20 1631 126/78 98.2 °F (36.8 °C) Oral -- -- -- --   01/05/20 1335 -- -- -- -- 11 97 % --   01/05/20 1236 131/72 -- -- 79 11 95 % --   01/05/20 1215 130/69 98.1 °F (36.7 °C) Temporal 82 16 98 % --   01/05/20 1200 (!) 120/59 -- -- 76 15 100 % --   01/05/20 1145 (!) 115/58 -- -- 81 17 100 % --   01/05/20 1142 (!) 115/58 98.2 °F (36.8 °C) Temporal 76 18 100 % --       Intake/Output Summary (Last 24 hours) at 1/6/2020 0751  Last data filed at 1/6/2020 0556  Gross per 24 hour   Intake 2180 ml   Output 1273 ml   Net 907 ml       Recent Labs     01/04/20  0607 01/05/20  0551 01/06/20  0627   WBC 7.9 8.1 9.2   HGB 7.5* 7.7* 7.7*   HCT 25.0* 26.3* 26.2*   MCV 97.3 98.1 96.3    362 377     Recent Labs     01/04/20  0607 01/05/20  0551 01/06/20  0627    137 134*   K 4.1 4.0 4.7    102 96*   CO2 24 22 22   PHOS 4.4  --  5.4*   BUN 48* 45* 49*   CREATININE 1.81* 1.82* 1.90*       No results for input(s): COLORU, PHUR, LABCAST, WBCUA, RBCUA, MUCUS, TRICHOMONAS, YEAST, BACTERIA, CLARITYU, SPECGRAV, LEUKOCYTESUR, UROBILINOGEN, BILIRUBINUR, BLOODU in denies urinary pain, bleeding and voiding problems  Musculoskeletal: left middle finger pain as noted above, right knee pain, low back pain  Neurologic: Pt denies syncope, seizures, paralysis, involuntary movements or gait problems  Psychiatric: Anxiety  Hematologic/Lymphatic/Immunologic: Pt denies hematological, lymphatic and immunological problems  Endocrine: Diabetes    Is the patient on an anti-coagulant: Yes Eliquis Why? Atrial fibrillation  Date of last GFR test: 4/7/2023  Last GFR value: 82      Past Medical History Updated: Yes  PAST MEDICAL HISTORY:  Past Medical History:   Diagnosis Date   • Anemia 4/27/2023        • Anemia of chronic disease    • Atrial fibrillation (CMD)     s/p LYNN/ DCCV on Xarelto (08/27/2017) followed by PVI with cryoballoon ablation (12/08/2017) with recurrence on 11/14/2018   • CAD (coronary artery disease)    • CAD in native artery 3/10/2017   • Chest pain 1/29/2020   • Chronic renal insufficiency 05/24/2023    UGFS of R tribs   • Chronic venous insufficiency 04/26/2023    RFA of R GSV   • Diverticulitis    • DM (diabetes mellitus) (CMD)    • Epigastric pain 4/7/2021    Epigastric discomfort  Did take ompoerzole 40mg daily for months Did not help her symptoms   • Essential hypertension, benign 3/10/2017   • TEVIN (generalized anxiety disorder) 10/20/2022   • GERD (gastroesophageal reflux disease) 1/29/2020   • Hernia of abdominal wall    • HLD (hyperlipidemia)    • HTN (hypertension)    • Long term (current) use of anticoagulants 1/25/2019   • OA (osteoarthritis) of knee     left    • Obstructive sleep apnea (adult) (pediatric) 6/1/2020   • Occult blood positive stool 4/7/2021   • Ocular hypertension, bilateral 11/5/2021   • Other hyperlipidemia    • Pancreatitis    • PCO (posterior capsular opacification), right 11/5/2021   • Primary osteoarthritis of left knee 10/9/2018   • Stage 3b chronic kidney disease (CMD) 1/28/2021   • Tachy-symone syndrome (CMD)    • Type 2 diabetes mellitus  the last 72 hours. Invalid input(s): NITRATE, GLUCOSEUKETONESUAMORPHOUS    Additional Lab/culture results:    Physical Exam:   NAD  Peripheral pulses palpable  Regular rhythm  Respirations nonlabored, symmetric chest rise bilaterally  Soft, ND. Wound vac in place  Lema catheter in place  Perineal wound not visualized  No calf ttp bilaterally  EPC cuffs on and functioning billaterally      Interval Imaging Findings:    Impression:    Patient Active Problem List   Diagnosis    Necrotizing fasciitis ([address]    Essential hypertension    Diabetes ([address]    Diabetic foot ulcer ([address]    Diabetic foot infection ([address]    Iron deficiency anemia    Muscle weakness of right upper extremity    Wrist drop, acquired, right       Plan:   Place catheter in stat lock, traction likely contributing to bladder spasms  Continue oxytrol patch  Would not recommend SP tube, has had multiple abdominal surgeries resulting in high risk of bowel injury during placement.    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, without long-term current use of insulin (CMD)        Past Surgical History Updated: Yes   PAST SURGICAL HISTORY:  Past Surgical History:   Procedure Laterality Date   • Cholecystectomy  05/2016   • Colon surgery     • Gallbladder surgery     • Hernia repair     • Hysterectomy     • Tubal ligation         Past Family History Updated: Yes   FAMILY HISTORY:  Family History   Problem Relation Age of Onset   • Diabetes Mother    • Asthma Mother    • Asthma Father    • Cancer Sister    • Stroke Sister    • Heart Brother         heart attack    • Cancer Brother    • Diabetes Brother        Past Social History Updated: Yes  SOCIAL HISTORY:  Social History     Socioeconomic History   • Marital status: Single     Spouse name: Not on file   • Number of children: Not on file   • Years of education: Not on file   • Highest education level: Not on file   Occupational History   • Not on file   Tobacco Use   • Smoking status: Never   • Smokeless tobacco: Never   Vaping Use   • Vaping Use: never used   Substance and Sexual Activity   • Alcohol use: No   • Drug use: No   • Sexual activity: Never   Other Topics Concern   • Not on file   Social History Narrative   • Not on file     Social Determinants of Health     Financial Resource Strain: Low Risk  (11/21/2022)    Financial Resource Strain    • Social Determinants: Financial Resource Strain: None   Food Insecurity: No Food Insecurity (11/21/2022)    Food Insecurity    • Social Determinants: Food Insecurity: Never   Transportation Needs: No Transportation Needs (12/14/2022)    PRAPARE - Transportation    • Lack of Transportation (Medical): No    • Lack of Transportation (Non-Medical): No   Recent Concern: Transportation Needs - Unmet Transportation Needs (11/21/2022)    PRAPARE - Transportation    • Lack of Transportation (Medical): Yes    • Lack of Transportation (Non-Medical): No   Physical  Activity: Not on file   Stress: Not on file   Social Connections: Socially Integrated (11/21/2022)    Social Connections    • Social Determinants: Social Connections: 5 or more times a week   Intimate Partner Violence: Not on file       MEDICATIONS:  Current Outpatient Medications   Medication Sig Dispense Refill   • metformin (GLUCOPHAGE) 1000 MG tablet TAKE 1 TABLET BY MOUTH IN THE MORNING AND 1 TABLET IN THE EVENING. TAKE WITH MEALS. 180 tablet 0   • rosuvastatin (CRESTOR) 5 MG tablet TAKE 1 TABLET EVERY DAY 90 tablet 0   • amLODIPine (NORVASC) 5 MG tablet TAKE 1 TABLET EVERY DAY 90 tablet 0   • empagliflozin (JARDIANCE) 25 MG tablet Take 1 tablet by mouth daily (before breakfast). 90 tablet 1   • carvedilol (COREG) 12.5 MG tablet TAKE 1 TABLET TWICE DAILY WITH MEALS 180 tablet 0   • isosorbide mononitrate (IMDUR) 30 MG 24 hr tablet TAKE 1 TABLET EVERY DAY 90 tablet 0   • losartan (COZAAR) 100 MG tablet TAKE 1 TABLET EVERY DAY 90 tablet 0   • glipiZIDE (GLUCOTROL) 10 MG tablet TAKE 1 TABLET EVERY DAY BEFORE BREAKFAST 90 tablet 0   • vitamin B-12 (CYANOCOBALAMIN) 50 MCG tablet Take 50 mcg by mouth daily.     • acetaminophen (TYLENOL) 500 MG tablet Take 1,000 mg by mouth every 6 hours as needed for Pain.     • omeprazole (PriLOSEC) 40 MG capsule TAKE 1 CAPSULE BY MOUTH DAILY 90 capsule 1   • albuterol (PROAIR RESPICLICK) 108 (90 Base) MCG/ACT inhaler Inhale 2 puffs into the lungs every 4 hours as needed for Shortness of Breath or Wheezing. 1 each 1   • apixaBAN (Eliquis) 5 MG Tab Take 1 tablet by mouth every 12 hours. 180 tablet 3   • Blood Glucose Monitoring Suppl (Accu-Chek Guide Me) w/Device Kit 1 Device 1 time. Use to test glucose daily. Dx: E11.9 1 kit 1   • blood glucose (Accu-Chek Guide) test strip Test blood sugar 1 times daily. Diagnosis: E11.9. Meter: Accu-Chek Guide Me 100 strip 3   • SOFTCLIX LANCETS Misc Test glucose once daily. Dx E11.9 100 each 1   • Magnesium 500 MG Cap Take 500 mg by mouth 2 times  daily. 180 capsule 1   • albuterol (VENTOLIN) (2.5 MG/3ML) 0.083% nebulizer solution Take 2.5 mg by nebulization every 6 hours as needed for Shortness of Breath.     • Vitamin D, Ergocalciferol, 1.25 mg (50,000 units) capsule Take 1 capsule by mouth 1 day a week. 12 capsule 0   • fluticasone (FLONASE) 50 MCG/ACT nasal spray Spray 2 sprays in each nostril 2 times daily. (Patient taking differently: Spray 2 sprays in each nostril 2 times daily as needed (runny nose). Indications: Stuffy Nose) 16 g 12   • ferrous sulfate 325 (65 FE) MG tablet Take 1 tablet by mouth daily (with breakfast). 30 tablet 11     Current Facility-Administered Medications   Medication   • lidocaine 1 % injection 50 mg   • diazePAM (VALIUM) tablet 5 mg   • fentaNYL (SUBLIMAZE) injection 25 mcg       ALLERGIES:   ALLERGIES:  Patient has no known allergies.    Roomed By: ELENITA Latham     VITAL SIGNS:  Visit Vitals  /66   Pulse 72   Ht 5' (1.524 m)   Wt 85.7 kg (189 lb)   BMI 36.91 kg/m²       EXAM:  This is a 75 year old female, awake, alert, and cooperative. She is well nourished, well developed, and in no apparent distress.    General:  Healthy appearing stated age.   Respirations are regular and unlabored.    Cognitive:  Oriented x 3 demonstrating normal mood and affect.    Lymphatics:  No evidence of adenopathy in affected extremity.    Skin:  Head, neck, and extremity skin is intact without rashes or lesions.    Inspection  Fingernails are normal, pink in color. No dryness noted. Normal temperature bilaterally. No lesions present on examination. No scar present on examination. Small cyst noted over left dorsal 3rd PIP joint    Right Palpation Exam  No tenderness on palpation     Left Palpation Exam  - mild tenderness noted along cyst on dorsum of left 3rd PIP joint    Right Wrist ROM  Wrist motion is normal.     Left Wrist ROM  Wrist motion is normal.     Right MCP ROM normal.    Left MCP ROM normal.    Right PIP ROM  normal.    Left 3rd PIP ROM slightly limited due to pain and cyst.    Right DIP ROM normal.    Left DIP ROM normal.     Right Thumb MCP ROM normal.    Left Thumb MCP ROM normal.    Right Thumb IP ROM normal.    Left Thumb IP ROM normal.    Tests:  Right  Flexor digitorum superficialis test-normal  Flexor digitorum profundus test-normal  Mckinney-Littler test (intrinsic tightness)-normal  Retinacular test-normal  Grind test-normal  Carpal tunnel compression test-negative  Cubital tunnel compression test-negative  Finkelstein's test-negative  Phalen's test-negative  Tinel's test-carpal tunnel-negative  Tinel's test-cubital tunnel-negative    Left  Flexor digitorum superficialis test-negative  Flexor digitorum profundus test-negative  Kimber-Littler test (intrinsic tightness)-negative  Retinacular test-negative  Grind test-negative  Carpal tunnel compression test-negative  Cubital tunnel compression test-negative  Finkelstein's test-negative  Phalen's test-negative  Tinel's test-carpal tunnel-negative  Tinel's test-cubital tunnel-negative    Right Strength Exam  Wrist strength is 5/5.  Index finger strength is 5/5.  Long finger strength is 5/5.  Ring finger strength is 5/5.  Small finger strength is 5/5.  Thumb - APB strength is 5/5.  Thumb - FPL strength is 5/5.  Pinch mechanism is normal.    Right Sensory Exam - negative    Left Strength Exam  Wrist strength is 5/5.  Index finger strength is 5/5.  Long finger strength is 4/5.  Ring finger strength is 5/5.  Small finger strength is 5/5.  Thumb - APB strength is 5/5.  Thumb - FPL strength is 5/5.  Pinch mechanism is normal.    Left Sensory Exam - negative    Right Vascular Exam  Capillary fill is normal.  No edema noted.  Radial artery refill is normal  Ulnar artery refill is normal.    Left Vascular Exam  Capillary fill is normal.  No edema noted.  Radial artery refill is normal       Ulnar artery refill is normal.        IMAGING &TEST:  X-rays of the left middle  finger in 3 views from 7/17/2023 were personally reviewed and interpreted by me.  These show advanced degenerative changes involving the DIP and PIP joints.     ASSESSMENT & PLAN:  Yaneli Bernal is a 75 year old female with left middle finger pain.  She was found to have advanced degenerative changes involving the DIP and PIP joints.  She also has a ganglion cyst on the dorsum of the PIP joint.  She will continue to apply Voltaren gel to the affected area twice a day.  She will ice 3 times a day for 20 minutes at a time.  She received a prescription for hand therapy.    Restrictions: Use as tolerated    The patient will follow up with Maria R Arellano MD or us in 6 week(s)    Pain of left middle finger  (primary encounter diagnosis)  Plan: XR FINGER(S) 2 OR MORE VIEWS LEFT, SERVICE TO         OCCUPATIONAL THERAPY    Ganglion cyst of finger of left hand  Plan: SERVICE TO OCCUPATIONAL THERAPY    Arthritis of finger of left hand  Plan: SERVICE TO OCCUPATIONAL THERAPY    Long term (current) use of anticoagulants    Type 2 diabetes mellitus with both eyes affected by mild nonproliferative retinopathy without macular edema, without long-term current use of insulin (CMD)    Essential hypertension, benign    Other hyperlipidemia      Clinical staff documentation is noted and accepted.  Electronically signed by: Benedicto Tate MD 7/17/2023       This note was shared with Maria R Arellano MD     Note to patient: The 21st Century Cures Act makes medical notes like these available to patients in the interest of transparency. However, be advised this is a medical document. It is intended as peer to peer communication. It is written in medical language and may contain abbreviations or verbiage that are unfamiliar. It may appear blunt or direct. Medical documents are intended to carry relevant information, facts as evident, and the clinical opinion of the practitioner.

## 2023-12-16 ENCOUNTER — LAB REQUISITION (OUTPATIENT)
Dept: LAB | Facility: HOSPITAL | Age: 38
End: 2023-12-16
Payer: MEDICAID

## 2023-12-16 LAB
ANION GAP SERPL CALC-SCNC: 20 MMOL/L (ref 10–20)
BUN SERPL-MCNC: 62 MG/DL (ref 6–23)
CALCIUM SERPL-MCNC: 8.2 MG/DL (ref 8.6–10.3)
CHLORIDE SERPL-SCNC: 100 MMOL/L (ref 98–107)
CO2 SERPL-SCNC: 21 MMOL/L (ref 21–32)
CREAT SERPL-MCNC: 7.76 MG/DL (ref 0.5–1.3)
GFR SERPL CREATININE-BSD FRML MDRD: 8 ML/MIN/1.73M*2
GLUCOSE SERPL-MCNC: 113 MG/DL (ref 74–99)
POTASSIUM SERPL-SCNC: 4.5 MMOL/L (ref 3.5–5.3)
SODIUM SERPL-SCNC: 136 MMOL/L (ref 136–145)

## 2023-12-16 PROCEDURE — 80048 BASIC METABOLIC PNL TOTAL CA: CPT

## 2024-01-04 NOTE — PROGRESS NOTES
She has an appointment with her urologist on 1/10 and this will require a new referral placed in [facility] for Mamadou Jesus MD at [facility]. Please place and send back to [facility]   amount of fluid volume from various infusions including TPN and tube feeds up until Friday (12/27). 7.  S/P diverting colostomy   8.  Anemia requiring blood transfusion prn, stable today s/p one unit yesterday. 9.  Necrotizing fasciitis multiple re-debridements, involving up to the scrotal area, diverting loop colostomy placed, wound VAC placed. Area of wound and skin loss and deep tissue loss significantly high.  General surgery following. Dr. Shandra Santillan (plastic surgery) following from a distance for grafting and possible flap. Plan for sacral would vac change in OR on regular schedule, next planned on Tuesday. Plan:   1. No acute need for dialysis today. Continue to monitor labs and strict I's and O's.  2.  Continue oral Bumex 2 mg daily. 3.  Continue monitor strict I's and O's renal function. 4.  Avoid any nephrotoxic agents. 5.  OR in a.m. for re-debridement. 6.  BMP in a.m.  7.  Will follow. Patient's nurse was updated. Nutrition   Please ensure that patient is on a renal diet/TF. Avoid nephrotoxic drugs/contrast exposure. We will continue to follow along with you. Leonel Luis MD  [facility]     This note is created with the assistance of a speech-recognition program. While intending to generate a document that actually reflects the content of the visit, no guarantees can be provided that every mistake has been identified and corrected by editing.

## 2024-03-29 ENCOUNTER — HOSPITAL ENCOUNTER (OUTPATIENT)
Dept: HOSPITAL 101 - LAB | Age: 39
Discharge: HOME | End: 2024-03-29
Payer: MEDICARE

## 2024-03-29 DIAGNOSIS — R79.89: Primary | ICD-10-CM

## 2024-03-29 LAB
ANION GAP: 12.8
BLOOD UREA NITROGEN: 52 MG/DL (ref 7–18)
CALCIUM: 8.1 MG/DL (ref 8.5–10.1)
CARBON DIOXIDE: 32 MMOL/L (ref 21–32)
CHLORIDE: 100 MMOL/L (ref 98–107)
ESTIMATED GFR (AFRICAN AMERICA: 14 (ref 60–?)
ESTIMATED GFR (NON-AFRICAN AME: 11 (ref 60–?)
GLUCOSE: 87 MG/DL (ref 74–106)
POTASSIUM: 3.8 MMOL/L (ref 3.5–5.1)
SODIUM: 141 MMOL/L (ref 136–145)

## 2024-03-29 PROCEDURE — 80048 BASIC METABOLIC PNL TOTAL CA: CPT

## 2024-03-29 PROCEDURE — 36415 COLL VENOUS BLD VENIPUNCTURE: CPT

## 2024-08-13 ENCOUNTER — HOSPITAL ENCOUNTER
Dept: HOSPITAL 101 - LAB | Age: 39
Discharge: HOME | End: 2024-08-13
Payer: MEDICARE

## 2024-08-13 DIAGNOSIS — E87.5: Primary | ICD-10-CM

## 2024-08-13 LAB
POTASSIUM SERPLBLD-SCNC: 4.6 MMOL/L (ref 3.5–5.1)
POTASSIUM: 4.6 MMOL/L (ref 3.5–5.1)

## 2024-08-13 PROCEDURE — 84132 ASSAY OF SERUM POTASSIUM: CPT

## 2024-08-13 PROCEDURE — 36415 COLL VENOUS BLD VENIPUNCTURE: CPT

## 2024-10-02 NOTE — PROGRESS NOTES
Left DETAILED message for patient to call back clinic, and he is aware of dose changes and 2 week log needed after changes / adjustments made. [Rx sent w/ dose change and staff message entered for log reminder]   Pt is refusing heparin.

## 2025-01-03 ENCOUNTER — HOSPITAL ENCOUNTER (INPATIENT)
Age: 40
LOS: 1 days | End: 2025-01-04
Attending: EMERGENCY MEDICINE | Admitting: INTERNAL MEDICINE
Payer: MEDICARE

## 2025-01-03 ENCOUNTER — APPOINTMENT (OUTPATIENT)
Dept: INTERVENTIONAL RADIOLOGY/VASCULAR | Age: 40
End: 2025-01-03
Payer: MEDICARE

## 2025-01-03 DIAGNOSIS — Z99.2 DIALYSIS PATIENT (HCC): Primary | ICD-10-CM

## 2025-01-03 DIAGNOSIS — T82.9XXA VASCULAR PORT COMPLICATION, INITIAL ENCOUNTER: ICD-10-CM

## 2025-01-03 PROBLEM — E87.5 HYPERKALEMIA: Status: ACTIVE | Noted: 2025-01-03

## 2025-01-03 LAB
ANION GAP SERPL CALCULATED.3IONS-SCNC: 22 MMOL/L (ref 9–16)
BASOPHILS # BLD: 0.08 K/UL (ref 0–0.2)
BASOPHILS NFR BLD: 1 % (ref 0–2)
BUN SERPL-MCNC: 125 MG/DL (ref 6–20)
CALCIUM SERPL-MCNC: 9.4 MG/DL (ref 8.6–10.4)
CHLORIDE SERPL-SCNC: 96 MMOL/L (ref 98–107)
CO2 SERPL-SCNC: 13 MMOL/L (ref 20–31)
CREAT SERPL-MCNC: 12.7 MG/DL (ref 0.7–1.2)
EOSINOPHIL # BLD: 0.38 K/UL (ref 0–0.4)
EOSINOPHILS RELATIVE PERCENT: 5 % (ref 0–4)
ERYTHROCYTE [DISTWIDTH] IN BLOOD BY AUTOMATED COUNT: 16.1 % (ref 11.5–14.9)
GFR, ESTIMATED: 5 ML/MIN/1.73M2
GLUCOSE BLD-MCNC: 157 MG/DL (ref 75–110)
GLUCOSE BLD-MCNC: 97 MG/DL (ref 75–110)
GLUCOSE SERPL-MCNC: 89 MG/DL (ref 74–99)
HCT VFR BLD AUTO: 22.3 % (ref 41–53)
HGB BLD-MCNC: 7.6 G/DL (ref 13.5–17.5)
IRON SATN MFR SERPL: 86 % (ref 20–55)
IRON SERPL-MCNC: 132 UG/DL (ref 61–157)
LYMPHOCYTES NFR BLD: 1.05 K/UL (ref 1–4.8)
LYMPHOCYTES RELATIVE PERCENT: 14 % (ref 24–44)
MCH RBC QN AUTO: 30 PG (ref 26–34)
MCHC RBC AUTO-ENTMCNC: 33.9 G/DL (ref 31–37)
MCV RBC AUTO: 88.6 FL (ref 80–100)
MONOCYTES NFR BLD: 0.53 K/UL (ref 0.1–1.3)
MONOCYTES NFR BLD: 7 % (ref 1–7)
MORPHOLOGY: ABNORMAL
NEUTROPHILS NFR BLD: 73 % (ref 36–66)
NEUTS SEG NFR BLD: 5.46 K/UL (ref 1.3–9.1)
PLATELET # BLD AUTO: 147 K/UL (ref 150–450)
PMV BLD AUTO: 5.7 FL (ref 6–12)
POTASSIUM SERPL-SCNC: 5.9 MMOL/L (ref 3.7–5.3)
RBC # BLD AUTO: 2.52 M/UL (ref 4.5–5.9)
SODIUM SERPL-SCNC: 131 MMOL/L (ref 136–145)
TIBC SERPL-MCNC: 153 UG/DL (ref 250–450)
UNSATURATED IRON BINDING CAPACITY: 21 UG/DL (ref 112–347)
WBC OTHER # BLD: 7.5 K/UL (ref 3.5–11)

## 2025-01-03 PROCEDURE — 36415 COLL VENOUS BLD VENIPUNCTURE: CPT

## 2025-01-03 PROCEDURE — 80048 BASIC METABOLIC PNL TOTAL CA: CPT

## 2025-01-03 PROCEDURE — 0JH63XZ INSERTION OF TUNNELED VASCULAR ACCESS DEVICE INTO CHEST SUBCUTANEOUS TISSUE AND FASCIA, PERCUTANEOUS APPROACH: ICD-10-PCS | Performed by: RADIOLOGY

## 2025-01-03 PROCEDURE — 6360000002 HC RX W HCPCS: Performed by: INTERNAL MEDICINE

## 2025-01-03 PROCEDURE — 2580000003 HC RX 258: Performed by: INTERNAL MEDICINE

## 2025-01-03 PROCEDURE — 99223 1ST HOSP IP/OBS HIGH 75: CPT | Performed by: INTERNAL MEDICINE

## 2025-01-03 PROCEDURE — 6370000000 HC RX 637 (ALT 250 FOR IP): Performed by: EMERGENCY MEDICINE

## 2025-01-03 PROCEDURE — 2580000003 HC RX 258: Performed by: EMERGENCY MEDICINE

## 2025-01-03 PROCEDURE — 2060000000 HC ICU INTERMEDIATE R&B

## 2025-01-03 PROCEDURE — 93005 ELECTROCARDIOGRAM TRACING: CPT | Performed by: EMERGENCY MEDICINE

## 2025-01-03 PROCEDURE — 82947 ASSAY GLUCOSE BLOOD QUANT: CPT

## 2025-01-03 PROCEDURE — 6360000002 HC RX W HCPCS: Performed by: EMERGENCY MEDICINE

## 2025-01-03 PROCEDURE — 5A1D70Z PERFORMANCE OF URINARY FILTRATION, INTERMITTENT, LESS THAN 6 HOURS PER DAY: ICD-10-PCS | Performed by: INTERNAL MEDICINE

## 2025-01-03 PROCEDURE — 2500000003 HC RX 250 WO HCPCS: Performed by: INTERNAL MEDICINE

## 2025-01-03 PROCEDURE — C1769 GUIDE WIRE: HCPCS

## 2025-01-03 PROCEDURE — 83550 IRON BINDING TEST: CPT

## 2025-01-03 PROCEDURE — 85025 COMPLETE CBC W/AUTO DIFF WBC: CPT

## 2025-01-03 PROCEDURE — 36556 INSERT NON-TUNNEL CV CATH: CPT

## 2025-01-03 PROCEDURE — 99291 CRITICAL CARE FIRST HOUR: CPT

## 2025-01-03 PROCEDURE — 36581 REPLACE TUNNELED CV CATH: CPT

## 2025-01-03 PROCEDURE — 6360000002 HC RX W HCPCS: Performed by: RADIOLOGY

## 2025-01-03 PROCEDURE — 83540 ASSAY OF IRON: CPT

## 2025-01-03 PROCEDURE — 02H633Z INSERTION OF INFUSION DEVICE INTO RIGHT ATRIUM, PERCUTANEOUS APPROACH: ICD-10-PCS | Performed by: RADIOLOGY

## 2025-01-03 PROCEDURE — 90935 HEMODIALYSIS ONE EVALUATION: CPT

## 2025-01-03 PROCEDURE — 77001 FLUOROGUIDE FOR VEIN DEVICE: CPT

## 2025-01-03 RX ORDER — VITAMIN B COMPLEX
1 CAPSULE ORAL DAILY
COMMUNITY

## 2025-01-03 RX ORDER — LEVETIRACETAM 500 MG/1
1000 TABLET ORAL DAILY
Status: DISCONTINUED | OUTPATIENT
Start: 2025-01-03 | End: 2025-01-04 | Stop reason: HOSPADM

## 2025-01-03 RX ORDER — FAMOTIDINE 20 MG/1
10 TABLET, FILM COATED ORAL DAILY
Status: DISCONTINUED | OUTPATIENT
Start: 2025-01-03 | End: 2025-01-04 | Stop reason: HOSPADM

## 2025-01-03 RX ORDER — SODIUM CHLORIDE 9 MG/ML
INJECTION, SOLUTION INTRAVENOUS PRN
Status: DISCONTINUED | OUTPATIENT
Start: 2025-01-03 | End: 2025-01-04 | Stop reason: HOSPADM

## 2025-01-03 RX ORDER — OXYCODONE AND ACETAMINOPHEN 5; 325 MG/1; MG/1
1 TABLET ORAL EVERY 6 HOURS PRN
Status: DISCONTINUED | OUTPATIENT
Start: 2025-01-03 | End: 2025-01-04 | Stop reason: HOSPADM

## 2025-01-03 RX ORDER — POLYETHYLENE GLYCOL 3350 17 G/17G
17 POWDER, FOR SOLUTION ORAL DAILY PRN
Status: DISCONTINUED | OUTPATIENT
Start: 2025-01-03 | End: 2025-01-04 | Stop reason: HOSPADM

## 2025-01-03 RX ORDER — INSULIN GLARGINE 100 [IU]/ML
18 INJECTION, SOLUTION SUBCUTANEOUS NIGHTLY
Status: DISCONTINUED | OUTPATIENT
Start: 2025-01-03 | End: 2025-01-04 | Stop reason: HOSPADM

## 2025-01-03 RX ORDER — LEVETIRACETAM 1000 MG/1
1000 TABLET ORAL DAILY
COMMUNITY

## 2025-01-03 RX ORDER — ONDANSETRON 2 MG/ML
4 INJECTION INTRAMUSCULAR; INTRAVENOUS EVERY 6 HOURS PRN
Status: DISCONTINUED | OUTPATIENT
Start: 2025-01-03 | End: 2025-01-04 | Stop reason: HOSPADM

## 2025-01-03 RX ORDER — METOPROLOL TARTRATE 50 MG
50 TABLET ORAL 2 TIMES DAILY
Status: DISCONTINUED | OUTPATIENT
Start: 2025-01-03 | End: 2025-01-04 | Stop reason: HOSPADM

## 2025-01-03 RX ORDER — MUPIROCIN 20 MG/G
OINTMENT TOPICAL
COMMUNITY

## 2025-01-03 RX ORDER — ONDANSETRON 4 MG/1
4 TABLET, ORALLY DISINTEGRATING ORAL EVERY 8 HOURS PRN
Status: DISCONTINUED | OUTPATIENT
Start: 2025-01-03 | End: 2025-01-04 | Stop reason: HOSPADM

## 2025-01-03 RX ORDER — INSULIN LISPRO 100 [IU]/ML
0-8 INJECTION, SOLUTION INTRAVENOUS; SUBCUTANEOUS
Status: DISCONTINUED | OUTPATIENT
Start: 2025-01-03 | End: 2025-01-04 | Stop reason: HOSPADM

## 2025-01-03 RX ORDER — TORSEMIDE 20 MG/1
10 TABLET ORAL DAILY
Status: DISCONTINUED | OUTPATIENT
Start: 2025-01-03 | End: 2025-01-04 | Stop reason: HOSPADM

## 2025-01-03 RX ORDER — ISOSORBIDE DINITRATE 10 MG/1
30 TABLET ORAL DAILY
Status: DISCONTINUED | OUTPATIENT
Start: 2025-01-03 | End: 2025-01-04 | Stop reason: HOSPADM

## 2025-01-03 RX ORDER — FAMOTIDINE 20 MG/1
20 TABLET, FILM COATED ORAL DAILY
Status: DISCONTINUED | OUTPATIENT
Start: 2025-01-03 | End: 2025-01-03

## 2025-01-03 RX ORDER — SEVELAMER CARBONATE 800 MG/1
1600 TABLET, FILM COATED ORAL
Status: DISCONTINUED | OUTPATIENT
Start: 2025-01-03 | End: 2025-01-04 | Stop reason: HOSPADM

## 2025-01-03 RX ORDER — OXYCODONE AND ACETAMINOPHEN 5; 325 MG/1; MG/1
1 TABLET ORAL 2 TIMES DAILY PRN
COMMUNITY

## 2025-01-03 RX ORDER — ACETAMINOPHEN 325 MG/1
650 TABLET ORAL EVERY 6 HOURS PRN
Status: DISCONTINUED | OUTPATIENT
Start: 2025-01-03 | End: 2025-01-04 | Stop reason: HOSPADM

## 2025-01-03 RX ORDER — CARVEDILOL 25 MG/1
25 TABLET ORAL 2 TIMES DAILY WITH MEALS
COMMUNITY

## 2025-01-03 RX ORDER — BACLOFEN 10 MG/1
5 TABLET ORAL 2 TIMES DAILY
Status: DISCONTINUED | OUTPATIENT
Start: 2025-01-03 | End: 2025-01-04 | Stop reason: HOSPADM

## 2025-01-03 RX ORDER — SODIUM BICARBONATE 650 MG/1
650 TABLET ORAL 2 TIMES DAILY
COMMUNITY

## 2025-01-03 RX ORDER — CALCIUM GLUCONATE 20 MG/ML
1000 INJECTION, SOLUTION INTRAVENOUS ONCE
Status: COMPLETED | OUTPATIENT
Start: 2025-01-03 | End: 2025-01-03

## 2025-01-03 RX ORDER — ISOSORBIDE DINITRATE 30 MG/1
30 TABLET ORAL DAILY
COMMUNITY

## 2025-01-03 RX ORDER — AMLODIPINE BESYLATE 5 MG/1
5 TABLET ORAL DAILY
COMMUNITY

## 2025-01-03 RX ORDER — ATORVASTATIN CALCIUM 40 MG/1
40 TABLET, FILM COATED ORAL DAILY
Status: DISCONTINUED | OUTPATIENT
Start: 2025-01-03 | End: 2025-01-04 | Stop reason: HOSPADM

## 2025-01-03 RX ORDER — HYDRALAZINE HYDROCHLORIDE 50 MG/1
50 TABLET, FILM COATED ORAL EVERY 8 HOURS SCHEDULED
Status: DISCONTINUED | OUTPATIENT
Start: 2025-01-03 | End: 2025-01-04 | Stop reason: HOSPADM

## 2025-01-03 RX ORDER — HEPARIN SODIUM 5000 [USP'U]/ML
5000 INJECTION, SOLUTION INTRAVENOUS; SUBCUTANEOUS EVERY 8 HOURS SCHEDULED
Status: DISCONTINUED | OUTPATIENT
Start: 2025-01-03 | End: 2025-01-04 | Stop reason: HOSPADM

## 2025-01-03 RX ORDER — FERROUS SULFATE 325(65) MG
325 TABLET ORAL
Status: DISCONTINUED | OUTPATIENT
Start: 2025-01-03 | End: 2025-01-04 | Stop reason: HOSPADM

## 2025-01-03 RX ORDER — ONDANSETRON 4 MG/1
4 TABLET, FILM COATED ORAL EVERY 8 HOURS PRN
COMMUNITY

## 2025-01-03 RX ORDER — HYDRALAZINE HYDROCHLORIDE 100 MG/1
100 TABLET, FILM COATED ORAL 2 TIMES DAILY
COMMUNITY

## 2025-01-03 RX ORDER — SODIUM CHLORIDE 0.9 % (FLUSH) 0.9 %
5-40 SYRINGE (ML) INJECTION PRN
Status: DISCONTINUED | OUTPATIENT
Start: 2025-01-03 | End: 2025-01-04 | Stop reason: HOSPADM

## 2025-01-03 RX ORDER — HEPARIN SODIUM 1000 [USP'U]/ML
INJECTION, SOLUTION INTRAVENOUS; SUBCUTANEOUS PRN
Status: COMPLETED | OUTPATIENT
Start: 2025-01-03 | End: 2025-01-03

## 2025-01-03 RX ORDER — AZELASTINE 1 MG/ML
2 SPRAY, METERED NASAL DAILY PRN
COMMUNITY

## 2025-01-03 RX ORDER — ACETAMINOPHEN 650 MG/1
650 SUPPOSITORY RECTAL EVERY 6 HOURS PRN
Status: DISCONTINUED | OUTPATIENT
Start: 2025-01-03 | End: 2025-01-04 | Stop reason: HOSPADM

## 2025-01-03 RX ORDER — SODIUM CHLORIDE 0.9 % (FLUSH) 0.9 %
5-40 SYRINGE (ML) INJECTION EVERY 12 HOURS SCHEDULED
Status: DISCONTINUED | OUTPATIENT
Start: 2025-01-03 | End: 2025-01-04 | Stop reason: HOSPADM

## 2025-01-03 RX ORDER — DEXTROSE MONOHYDRATE 100 MG/ML
INJECTION, SOLUTION INTRAVENOUS CONTINUOUS PRN
Status: DISCONTINUED | OUTPATIENT
Start: 2025-01-03 | End: 2025-01-04 | Stop reason: HOSPADM

## 2025-01-03 RX ADMIN — Medication 1.6 ML: at 19:18

## 2025-01-03 RX ADMIN — HEPARIN SODIUM 1600 UNITS: 1000 INJECTION INTRAVENOUS; SUBCUTANEOUS at 15:17

## 2025-01-03 RX ADMIN — DEXTROSE MONOHYDRATE 250 ML: 100 INJECTION, SOLUTION INTRAVENOUS at 14:37

## 2025-01-03 RX ADMIN — CEFAZOLIN 1000 MG: 1 INJECTION, POWDER, FOR SOLUTION INTRAMUSCULAR; INTRAVENOUS at 15:05

## 2025-01-03 RX ADMIN — INSULIN HUMAN 10 UNITS: 100 INJECTION, SOLUTION PARENTERAL at 15:35

## 2025-01-03 RX ADMIN — CALCIUM GLUCONATE 1000 MG: 20 INJECTION, SOLUTION INTRAVENOUS at 14:15

## 2025-01-03 ASSESSMENT — ENCOUNTER SYMPTOMS
DIARRHEA: 0
COUGH: 0
TROUBLE SWALLOWING: 0
VOMITING: 0
ABDOMINAL PAIN: 0
SHORTNESS OF BREATH: 0
NAUSEA: 0
PHOTOPHOBIA: 0
COLOR CHANGE: 0

## 2025-01-03 ASSESSMENT — LIFESTYLE VARIABLES
HOW OFTEN DO YOU HAVE A DRINK CONTAINING ALCOHOL: MONTHLY OR LESS
HOW MANY STANDARD DRINKS CONTAINING ALCOHOL DO YOU HAVE ON A TYPICAL DAY: 1 OR 2

## 2025-01-03 ASSESSMENT — PAIN - FUNCTIONAL ASSESSMENT: PAIN_FUNCTIONAL_ASSESSMENT: NONE - DENIES PAIN

## 2025-01-03 NOTE — PROGRESS NOTES
Writer went to dialysis to evaluate patient. Patient refused assessment. Pt refusing tele monitor. Pt refusing blood sugars. Patient educated on importance on why we need to monitor. Pt still refusing. He states \" as soon as dialysis is done, I'm leaving. This has been such a waste of my time. This place sucks\".

## 2025-01-03 NOTE — H&P
[facility]   IN-PATIENT SERVICE   [facility]    HISTORY AND PHYSICAL EXAMINATION            Date:   1/3/2025  Patient name:  Raúl Dunn  Date of admission:  1/3/2025 12:06 PM  MRN:   [id number]  Account:  [id number]  YOB: 1985  PCP:    No primary care provider on file.  Room:   [address] [place] [address]  Code Status:    Prior    Chief Complaint:     Chief Complaint   Patient presents with    Vascular Access Problem       History Obtained From:     patient    History of Present Illness:     The patient is a 39 y.o.  Non- of /a male who presents with Vascular Access Problem   and he is admitted to the hospital for the management of    The patient is 39-year-old male multiple comorbidities including end-stage renal disease on hemodialysis, cerebral pALPSYof hydrocephalus status post  shunt paraplegia, chronic Ambrosio, chronic heart failure with ejection fraction of 35 to 40%, hypertension type 2 diabetes mellitus presented to the ER with malfunctioning dialysis catheter  Patient reported that his dialysis port is not working, patient had partial session of dialysis on Wednesday  In the ER found to have severely hyperkalemic and uremic, IR was consulted patient had tunneled catheter placed  Seen and examined on the ER, complaining of headache and leg cramps,  No chest pain no shortness of breath      Past Medical History:     Past Medical History:   Diagnosis Date    CHF (congestive heart failure) (HCC)     Diabetes mellitus (HCC)     Hypertension     Septic shock (HCC)     Spina bifida aperta of lumbar spine (HCC)         Past Surgical History:     Past Surgical History:   Procedure Laterality Date    ABCESS DRAINAGE N/A 1/1/2020    IRRIGATION AND DEBRIDEMENT BUTTOCKS, SCROTUM, ABDOMEN, WOUND VAC CHANGE performed by Roland Crawley MD at [facility] OR    ABDOMINAL SURGERY N/A 12/8/2019    DEBRIDEMENT  NECROTIZING FASCIITIS BUTTOCK, WOUND VAC REMOVAL DELAYED PRIMARY CLOSURE OF MIDLINE  6.0 - 12.0 fL    Neutrophils % 73 (H) 36 - 66 %    Lymphocytes % 14 (L) 24 - 44 %    Monocytes % 7 1 - 7 %    Eosinophils % 5 (H) 0 - 4 %    Basophils % 1 0 - 2 %    Neutrophils Absolute 5.46 1.3 - 9.1 k/uL    Lymphocytes Absolute 1.05 1.0 - 4.8 k/uL    Monocytes Absolute 0.53 0.1 - 1.3 k/uL    Eosinophils Absolute 0.38 0.0 - 0.4 k/uL    Basophils Absolute 0.08 0.0 - 0.2 k/uL    Morphology ANISOCYTOSIS PRESENT     Morphology 1+ POLYCHROMASIA     Morphology 1+ ELLIPTOCYTES     Morphology MICROCYTOSIS PRESENT    Basic Metabolic Panel    Collection Time: 01/03/25 12:20 PM   Result Value Ref Range    Sodium 131 (L) 136 - 145 mmol/L    Potassium 5.9 (H) 3.7 - 5.3 mmol/L    Chloride 96 (L) 98 - 107 mmol/L    CO2 13 (L) 20 - 31 mmol/L    Anion Gap 22 (H) 9 - 16 mmol/L    Glucose 89 74 - 99 mg/dL     (HH) 6 - 20 mg/dL    Creatinine 12.7 (HH) 0.7 - 1.2 mg/dL    Est, Glom Filt Rate 5 (L) >60 mL/min/1.73m2    Calcium 9.4 8.6 - 10.4 mg/dL   EKG 12 lead    Collection Time: 01/03/25  2:09 PM   Result Value Ref Range    Ventricular Rate 75 BPM    Atrial Rate 75 BPM    P-R Interval 158 ms    QRS Duration 118 ms    Q-T Interval 418 ms    QTc Calculation (Bazett) 466 ms    P Axis 41 degrees    R Axis 19 degrees    T Axis -27 degrees       Imaging/Diagnostics:        Assessment :      Primary Problem  Hyperkalemia    Active Hospital Problems    Diagnosis Date Noted    Hyperkalemia [E87.5] 01/03/2025       Plan:     Patient status Admit as inpatient in the  Progressive Unit/Step down  Patient is 39-year-old male multiple comorbidities presented with dialysis port malfunction patient is status post tunneled catheter placement by IR   End-stage renal disease on hemodialysis, patient was examined on dialysis  Hyperkalemia, received insulin and calcium, expect potassium to improve after dialysis  Uremia, not encephalopathic  Type 1 diabetes mellitus on Lantus and sliding scale which has been started    Anemia of chronic disease

## 2025-01-03 NOTE — PROGRESS NOTES
HEMODIALYSIS PRE-TREATMENT NOTE    Patient Identifiers prior to treatment: Name, Birthdate and Band    Isolation Required: VRE, MDRO                      Isolation Type: Contact       (please document if patient is being managed as a PUI/COVID-19 patient)        Hepatitis status:                           Date Drawn                             Result  Hepatitis B Surface Antigen 12/13/2024 neg        Hepatitis B Surface Antibody 10/21/24 neg        Hepatitis B Core Antibody            How was Hepatitis Status verified: labs and chart     Was a copy of the labs you documented provided to facility for the patient's chart: yes    Hemodialysis orders verified: yes    Access Within normal limits ( I.e. s/s of infection,...): yes     Pre-Assessment completed: yes By Kimmy Ford    Pre-dialysis report received from: Tammy Gray Rn                      Time: 1400

## 2025-01-03 NOTE — PROGRESS NOTES
Called to ER and spoke with RICO Jones. After reviewing chart patient BS has not been checked. Dextrose was started and no insulin given as per protocol. Asked her to clarify with MD to see ig insulin should still be given and to check BS before sending the patient to HD. RN states \"I will clarify will MD and check BS\"

## 2025-01-03 NOTE — ED NOTES
POCT glucose 157mg/dl.   Dextrose 10% 250ml was stopped when patient went to IR, there is around 125ml left in the bag.   MD was consulted if it is okay to still give the insulin regular and he said it is okay.

## 2025-01-03 NOTE — PROGRESS NOTES
Pharmacy Medication History Note      List of current medications patient is taking is complete.    Source of information: Priya ricci [place] order summary     Changes made to medication list:  Medications removed (include reason, ex. therapy complete or physician discontinued, noncompliance):  Metoprolol - changed to carvedilol    Medications flagged for provider review:  Aspirin - not on facility list   Chlorhexidine 0.12% solution - not on facility list   Epoetin rohan - not on facility list   Humalog - not on facility list   Lantus - not on facility list   Victoza - not on facility list   Magnesium oxide - not on facility list   Multivitamin - not on facility list   Senna - not on facility list   Tetrahydrozoline eye drops - not on facility list   Baclofen - not on facility list   Famotidine - not on facility list   Ferrous sulfate - not on facility list   Torsemide - not on facility list     Medications added/doses adjusted:  Amlodipine 5 mg daily   Azelastine 0.1% nasal spray 2 sprays daily as needed   B complex vitamin daily   Carvedilol 25 mg twice daily   Hydralazine 100 mg twice daily (hold for SBP <100)  Isosorbide dinitrate 30 mg daily   Levetiracetam 1000 mg daily   Mupirocin 2% ointment apply 3 times weekly at bedtime to port site after treatments  Ondansetron 4 mg three times daily as needed   Percocet 5-325 mg take 1 tablet twice daily as needed  Sodium bicarbonate 650 mg twice daily   Lokelma 10 g daily     Other notes (ex. Recent course of antibiotics, Coumadin dosing):  N/A      Current Home Medication List at Time of Admission:  Prior to Admission medications    Medication Sig   amLODIPine (NORVASC) 5 MG tablet Take 1 tablet by mouth daily   azelastine (ASTELIN) 0.1 % nasal spray 2 sprays by Nasal route daily as needed (congestion)   carvedilol (COREG) 25 MG tablet Take 1 tablet by mouth 2 times daily (with meals)   hydrALAZINE (APRESOLINE) 100 MG tablet Take 1 tablet by mouth 2 times daily Hold for  SBP <100   isosorbide dinitrate (ISORDIL) 30 MG tablet Take 1 tablet by mouth daily   levETIRAcetam (KEPPRA) 1000 MG tablet Take 1 tablet by mouth daily   sodium zirconium cyclosilicate (LOKELMA) 10 g PACK oral suspension Take 1 packet by mouth daily   mupirocin (BACTROBAN) 2 % ointment Apply topically three times a week To port in the evening after treatments   ondansetron (ZOFRAN) 4 MG tablet Take 1 tablet by mouth every 8 hours as needed for Nausea or Vomiting   oxyCODONE-acetaminophen (PERCOCET) 5-325 MG per tablet Take 1 tablet by mouth 2 times daily as needed for Pain. Max Daily Amount: 2 tablets   b complex vitamins capsule Take 1 capsule by mouth daily   sodium bicarbonate 650 MG tablet Take 1 tablet by mouth 2 times daily   aspirin 81 MG chewable tablet Take 1 tablet by mouth daily  Patient not taking: Reported on 1/3/2025   insulin glargine (LANTUS) 100 UNIT/ML injection vial Inject 18 Units into the skin nightly  Patient not taking: Reported on 1/3/2025   insulin lispro (HUMALOG) 100 UNIT/ML injection vial Inject 0-18 Units into the skin 3 times daily (with meals)  Patient not taking: Reported on 1/3/2025   insulin lispro (HUMALOG) 100 UNIT/ML injection vial Inject 0-9 Units into the skin nightly  Patient not taking: Reported on 1/3/2025   atorvastatin (LIPITOR) 40 MG tablet Take 1 tablet by mouth daily   torsemide (DEMADEX) 10 MG tablet Take 1 tablet by mouth daily  Patient not taking: Reported on 1/3/2025   allopurinol (ZYLOPRIM) 100 MG tablet Take 1 tablet by mouth daily   tetrahydrozoline 0.05 % ophthalmic solution Place 1 drop into both eyes 3 times daily  Patient not taking: Reported on 1/3/2025   famotidine (PEPCID) 20 MG tablet Take 1 tablet by mouth daily  Patient not taking: Reported on 1/3/2025   sevelamer (RENVELA) 800 MG tablet Take 2 tablets by mouth 3 times daily (with meals)   chlorhexidine (PERIDEX) 0.12 % solution Take 15 mLs by mouth 2 times daily  Patient not taking: Reported on

## 2025-01-03 NOTE — ED TRIAGE NOTES
Mode of arrival (squad #, walk in, police, etc) : Jasen Romero        Chief complaint(s): Dialysis access problem        Arrival Note (brief scenario, treatment PTA, etc).: Patient brought in by EMS from [facility] c/o dialysis access problem. Patient stated that he is getting dialysis M-W-F, last full treatment was Wednesday and is supposed to get one this morning however the dialysis nurse couldn't do it because the lumen on the R side is clogged.        C= \"Have you ever felt that you should Cut down on your drinking?\"  No  A= \"Have people Annoyed you by criticizing your drinking?\"  No  G= \"Have you ever felt bad or Guilty about your drinking?\"  No  E= \"Have you ever had a drink as an Eye-opener first thing in the morning to steady your nerves or to help a hangover?\"  No      Deferred []      Reason for deferring: N/A    *If yes to two or more: probable alcohol abuse.*

## 2025-01-03 NOTE — ED PROVIDER NOTES
EMERGENCY DEPARTMENT ENCOUNTER    Pt Name: Raúl Dunn  MRN: [id number]  Birthdate 1985  Date of evaluation: 1/3/25  CHIEF COMPLAINT       Chief Complaint   Patient presents with    Vascular Access Problem     HISTORY OF PRESENT ILLNESS   39-year old male presenting to the ER complaining of his dialysis port not working.  Patient states that he did receive his last session of dialysis on Wednesday but it was a partial session and did come to the ER in order to have the port replaced.    The history is provided by the patient.   Illness   The current episode started 2 days ago. Pertinent negatives include no fever, no photophobia, no abdominal pain, no diarrhea, no nausea, no vomiting, no congestion, no ear pain, no cough and no rash.           REVIEW OF SYSTEMS     Review of Systems   Constitutional:  Negative for activity change, fatigue and fever.   HENT:  Negative for congestion, ear pain and trouble swallowing.    Eyes:  Negative for photophobia and visual disturbance.   Respiratory:  Negative for cough and shortness of breath.    Cardiovascular:  Negative for chest pain and palpitations.   Gastrointestinal:  Negative for abdominal pain, diarrhea, nausea and vomiting.   Genitourinary:  Negative for dysuria, flank pain and urgency.   Musculoskeletal:  Negative for arthralgias and myalgias.   Skin:  Negative for color change and rash.   Neurological:  Negative for dizziness and facial asymmetry.   Psychiatric/Behavioral:  Negative for agitation and behavioral problems.      PASTMEDICAL HISTORY     Past Medical History:   Diagnosis Date    CHF (congestive heart failure) ([facility])     Diabetes mellitus ([facility])     Hypertension     Septic shock ([facility])     Spina bifida aperta of lumbar spine ([facility])      Past Problem List  Patient Active Problem List   Diagnosis Code    Necrotizing fasciitis M72.6    Essential hypertension I10    Type 2 diabetes mellitus with left diabetic foot infection ([facility]) E11.628, L08.9    Diabetic foot  BEADS performed by Flash Gipson DPM at [facility] OR    INCISION AND DRAINAGE Bilateral 11/29/2019    RECTAL PERIRECTAL INCISION AND DRAINAGE, DIVERING LOOP COLOSTOMY CREATION performed by Delon Bertrand MD at [facility] OR    INCISION AND DRAINAGE N/A 12/6/2019    DEBRIDEMENT NECROTIZING FASCIAITIS BILAT BUTTOCK performed by Gerson Roman MD at [facility] OR    INCISION AND DRAINAGE N/A 12/20/2019    DEBRIDEMENT GLUTEAL AND SCROTAL REGIONS performed by Delon Bertrand MD at [facility] OR    INCISION AND DRAINAGE N/A 12/26/2019    INCISION AND DRAINAGE AND WOUND VAC CHANGE BUTTOCK, PERINEUM performed by Aleksandar Brito DO at [facility] OR    INCISION AND DRAINAGE N/A 1/8/2020    WOUND VAC CHANGE SACRAL DECUBITUS, DEBRIDEMENT performed by Gerson Roman MD at [facility] OR    OTHER SURGICAL HISTORY  03/29/2021    LEFT PARTIAL CALCANECTOMY WITH WOUND VAC PLACEMENT    NC EXPLORE SCROTUM N/A 12/10/2019    SCROTAL EXPLORATION WITH SCROTAL DEBRIDEMENT performed by Elías Washington MD at [facility] OR    WOUND DEBRIDEMENT  01/05/2020    GLUTEAL WOUND DEBRIDEMENT, WOUND VAC CHANGE     WOUND DEBRIDEMENT  01/08/2020    WOUND VAC CHANGE SACRAL DECUBITUS, POSSIBLE DEBRIDEMENT    WOUND DEBRIDEMENT  01/11/2020     SACRAL DEBRIDEMENT WITH WOUND VAC CHANGE     WOUND EXPLORATION N/A 12/23/2019    WOUND DEBRIDEMENT  WOUND VAC CHANGE - PERINIUM performed by Gerson Roman MD at [facility] OR     CURRENT MEDICATIONS       Previous Medications    ACETAMINOPHEN (TYLENOL) 325 MG TABLET    Take 650 mg by mouth every 6 hours as needed for Pain    ALLOPURINOL (ZYLOPRIM) 100 MG TABLET    Take 1 tablet by mouth daily    ASPIRIN 81 MG CHEWABLE TABLET    Take 1 tablet by mouth daily    ATORVASTATIN (LIPITOR) 40 MG TABLET    Take 1 tablet by mouth daily    BACLOFEN (LIORESAL) 5 MG TABLET    Take 1 tablet by mouth 2 times daily    CHLORHEXIDINE (PERIDEX) 0.12 % SOLUTION    Take 15 mLs by mouth 2 times daily    EPOETIN YO-EPBX (RETACRIT IJ)     dextrose bolus 10% 250 mL    glucose chewable tablet 16 g    OR Linked Order Group     dextrose bolus 10% 125 mL     dextrose bolus 10% 250 mL    glucagon injection 1 mg    dextrose 10 % infusion    DISCONTD: ceFAZolin (ANCEF) 1,000 mg in sterile water 10 mL IV syringe     Order Specific Question:   Antimicrobial Indications     Answer:   Surgical Prophylaxis    DISCONTD: ceFAZolin (ANCEF) 1,000 mg in sterile water 10 mL IV syringe     Order Specific Question:   Antimicrobial Indications     Answer:   Surgical Site Infection    ceFAZolin (ANCEF) 1,000 mg in sodium chloride 0.9 % 50 mL IVPB (mini-bag)     Order Specific Question:   Antimicrobial Indications     Answer:   Surgical Site Infection    heparin (porcine) injection     DISCHARGE PRESCRIPTIONS:  New Prescriptions    No medications on file     PHYSICIAN CONSULTS ORDERED THIS ENCOUNTER:  IP CONSULT TO INTERNAL MEDICINE  IP CONSULT TO NEPHROLOGY  FINAL IMPRESSION      1. Dialysis patient (HCC)    2. Vascular port complication, initial encounter          DISPOSITION/PLAN   DISPOSITION Admitted 01/03/2025 02:44:41 PM   DISPOSITION CONDITION Stable           OUTPATIENT FOLLOW UP THE PATIENT:  No follow-up provider specified.    DO Perlita Brand Sami, DO  01/03/25 1542

## 2025-01-03 NOTE — ED NOTES
Report given to RICO Rowland from U.   Report method by phone   The following was reviewed with receiving RN:   Current vital signs:  BP (!) 154/91   Pulse 85   Temp 98 °F (36.7 °C) (Oral)   Resp 19   Ht 1.753 m (5' 9\")   Wt 108.9 kg (240 lb)   SpO2 100%   BMI 35.44 kg/m²                MEWS Score: 0     Any medication or safety alerts were reviewed. Any pending diagnostics and notifications were also reviewed, as well as any safety concerns or issues, abnormal labs, abnormal imaging, and abnormal assessment findings. Questions were answered.

## 2025-01-04 VITALS
SYSTOLIC BLOOD PRESSURE: 156 MMHG | DIASTOLIC BLOOD PRESSURE: 98 MMHG | HEART RATE: 96 BPM | WEIGHT: 240.08 LBS | RESPIRATION RATE: 18 BRPM | OXYGEN SATURATION: 100 % | HEIGHT: 69 IN | BODY MASS INDEX: 35.56 KG/M2 | TEMPERATURE: 97.9 F

## 2025-01-04 LAB
EKG ATRIAL RATE: 75 BPM
EKG P AXIS: 41 DEGREES
EKG P-R INTERVAL: 158 MS
EKG Q-T INTERVAL: 418 MS
EKG QRS DURATION: 118 MS
EKG QTC CALCULATION (BAZETT): 466 MS
EKG R AXIS: 19 DEGREES
EKG T AXIS: -27 DEGREES
EKG VENTRICULAR RATE: 75 BPM
GLUCOSE BLD-MCNC: 89 MG/DL (ref 75–110)

## 2025-01-04 PROCEDURE — 5A12012 PERFORMANCE OF CARDIAC OUTPUT, SINGLE, MANUAL: ICD-10-PCS | Performed by: HOSPITALIST

## 2025-01-04 PROCEDURE — 2500000003 HC RX 250 WO HCPCS: Performed by: EMERGENCY MEDICINE

## 2025-01-04 PROCEDURE — 2500000003 HC RX 250 WO HCPCS

## 2025-01-04 PROCEDURE — 3E033XZ INTRODUCTION OF VASOPRESSOR INTO PERIPHERAL VEIN, PERCUTANEOUS APPROACH: ICD-10-PCS | Performed by: HOSPITALIST

## 2025-01-04 PROCEDURE — 93010 ELECTROCARDIOGRAM REPORT: CPT | Performed by: INTERNAL MEDICINE

## 2025-01-04 PROCEDURE — 6370000000 HC RX 637 (ALT 250 FOR IP): Performed by: INTERNAL MEDICINE

## 2025-01-04 PROCEDURE — 6360000002 HC RX W HCPCS

## 2025-01-04 PROCEDURE — 82947 ASSAY GLUCOSE BLOOD QUANT: CPT

## 2025-01-04 PROCEDURE — 31500 INSERT EMERGENCY AIRWAY: CPT

## 2025-01-04 PROCEDURE — 6370000000 HC RX 637 (ALT 250 FOR IP)

## 2025-01-04 PROCEDURE — 0BH17EZ INSERTION OF ENDOTRACHEAL AIRWAY INTO TRACHEA, VIA NATURAL OR ARTIFICIAL OPENING: ICD-10-PCS | Performed by: HOSPITALIST

## 2025-01-04 RX ORDER — EPINEPHRINE IN SOD CHLOR,ISO 1 MG/10 ML
SYRINGE (ML) INTRAVENOUS
Status: COMPLETED | OUTPATIENT
Start: 2025-01-04 | End: 2025-01-04

## 2025-01-04 RX ORDER — MAGNESIUM SULFATE HEPTAHYDRATE 500 MG/ML
INJECTION, SOLUTION INTRAMUSCULAR; INTRAVENOUS
Status: COMPLETED | OUTPATIENT
Start: 2025-01-04 | End: 2025-01-04

## 2025-01-04 RX ORDER — CALCIUM CHLORIDE 100 MG/ML
INJECTION INTRAVENOUS; INTRAVENTRICULAR
Status: COMPLETED | OUTPATIENT
Start: 2025-01-04 | End: 2025-01-04

## 2025-01-04 RX ORDER — DEXTROSE MONOHYDRATE 25 G/50ML
INJECTION, SOLUTION INTRAVENOUS
Status: COMPLETED | OUTPATIENT
Start: 2025-01-04 | End: 2025-01-04

## 2025-01-04 RX ADMIN — DEXTROSE MONOHYDRATE 25 G: 25 INJECTION, SOLUTION INTRAVENOUS at 04:18

## 2025-01-04 RX ADMIN — SODIUM BICARBONATE 50 MEQ: 84 INJECTION, SOLUTION INTRAVENOUS at 03:55

## 2025-01-04 RX ADMIN — MAGNESIUM SULFATE HEPTAHYDRATE 1000 MG: 500 INJECTION, SOLUTION INTRAMUSCULAR; INTRAVENOUS at 03:56

## 2025-01-04 RX ADMIN — Medication 1 MG: at 04:02

## 2025-01-04 RX ADMIN — CALCIUM CHLORIDE 1000 MG: 100 INJECTION INTRAVENOUS; INTRAVENTRICULAR at 03:58

## 2025-01-04 RX ADMIN — Medication 1 MG: at 04:15

## 2025-01-04 RX ADMIN — Medication 1 MG: at 04:19

## 2025-01-04 RX ADMIN — OXYCODONE HYDROCHLORIDE AND ACETAMINOPHEN 1 TABLET: 5; 325 TABLET ORAL at 00:03

## 2025-01-04 RX ADMIN — Medication 1 MG: at 03:58

## 2025-01-04 RX ADMIN — BACLOFEN 5 MG: 10 TABLET ORAL at 00:03

## 2025-01-04 RX ADMIN — Medication 1 MG: at 04:07

## 2025-01-04 RX ADMIN — Medication 1 MG: at 03:49

## 2025-01-04 RX ADMIN — SODIUM BICARBONATE 50 MEQ: 84 INJECTION, SOLUTION INTRAVENOUS at 04:06

## 2025-01-04 RX ADMIN — Medication 1 MG: at 03:53

## 2025-01-04 RX ADMIN — CALCIUM CHLORIDE 2000 MG: 100 INJECTION INTRAVENOUS; INTRAVENTRICULAR at 04:18

## 2025-01-04 RX ADMIN — Medication 1 MG: at 04:23

## 2025-01-04 RX ADMIN — Medication 1 MG: at 04:11

## 2025-01-04 ASSESSMENT — PAIN DESCRIPTION - DESCRIPTORS: DESCRIPTORS: DISCOMFORT;NAGGING

## 2025-01-04 ASSESSMENT — PAIN SCALES - GENERAL: PAINLEVEL_OUTOF10: 9

## 2025-01-04 ASSESSMENT — PAIN DESCRIPTION - LOCATION: LOCATION: ARM

## 2025-01-04 ASSESSMENT — PAIN DESCRIPTION - ORIENTATION: ORIENTATION: RIGHT;LEFT

## 2025-01-04 NOTE — PROGRESS NOTES
RN at bedside.  Patient is expressing that he wants to be discharged back to [facility] right now.  Patient states that he does not want to \"deal with the admission and I want to be back in my own bed.\"  RN will reach out to the NP.

## 2025-01-04 NOTE — PROGRESS NOTES
Provider notified of patient's wish to be discharged.  Provider informed the patient that he was not medically clear to discharge at this time.  He was admitted with hyperkalemia, and, after dialysis, he refused serum rechecks.  Patient educated about elevated potassium levels increasing his risk of life-ending cardiac incident after he voiced the wish to leave AGAINST MEDICAL ADVICE.  Discussed with bedside nursing.

## 2025-01-04 NOTE — PROGRESS NOTES
[facility]  PROGRESS NOTE    Shift date: 2025  Shift day: Saturday   Shift # 3    Room # 2087/2087-01   Name: Raúl Dunn                Worship: Mandaeism   Place of Jain: N/A    Referral: Code Blue    Admit Date & Time: 1/3/2025 12:06 PM    Assessment:  Raúl Dunn is a 39 y.o. male in the hospital because suffered from Hyperkalemia. Upon entering the room writer observes pt had  with nursing staff present and family was notified of the incident but not the death until they arrived.      Intervention:  Writer introduced self and title as  Naina for spiritual support. Writer offered space for family  to express feelings, needs, and concerns and provided a ministry presence.     Outcome:  Family needed next step instructions and  provided them with the information. Family denied prayer but condolences, comfort and peace was given to them b the  and accepted by the family.    Plan:   Home was selected and notified.      Electronically signed by CLARICE ESPINOZA Chaplain, on 2025 at 6:34 AM.  [facility]  [phone]       25 0611   Encounter Summary   Encounter Overview/Reason Grief, Loss, and Adjustments   Encounter Code  Assessment by  services   Service Provided For Family   Support System Family members   Last Encounter  25   Complexity of Encounter Moderate   Begin Time 0425   End Time  0645   Total Time Calculated 140 min   Grief, Loss, and Adjustments   Type Death   Assessment/Intervention/Outcome   Assessment Calm;Coping   Intervention Active listening;Sustaining Presence/Ministry of presence  (Provided next steps insructions)   Outcome Acceptance;Expressed Gratitude;Grieving   Plan and Referrals   Plan/Referrals Guidance concerning next steps/process to be expected

## 2025-01-04 NOTE — PROGRESS NOTES
Patient did allow RN to empty colostomy, yet refused to have RN to remove linens underneath him.  Patient also refused RN to reposition him.

## 2025-01-04 NOTE — PROGRESS NOTES
HEMODIALYSIS POST TREATMENT NOTE    Treatment time ordered: 3.5h    Actual treatment time: 3h, refused to stay on 3.5 hrs despite education of importance    UltraFiltration Goal: 2-3l  UltraFiltration Removed: 2l      Pre Treatment weight: unable on er cart  Post Treatment weight:     Estimated Dry Weight: pending [facility] records    Access used:     Central Venous Catheter:          Tunneled or Non-tunneled: non           Site: right chest          Access Flow: good 400      Internal Access:       AV Fistula or AV Graft: na         Site: na       Access Flow: na       Sign and symptoms of infection: na       If YES: na    Medications or blood products given: na    Chronic outpatient schedule: [facility]    Chronic outpatient unit: [facility]    Summary of response to treatment: mary well    Explain if orders NOT met, was physician notified:malgorzata      ACES flowsheet faxed to patient unit/ placed in patient chart: yes    Post assessment completed: yes    Report given to: Tammy Gray rn      * Intra-treatment documented Safety Checks include the followin) Access and face visible at all times.     2) All connections and blood lines are secure with no kinks.     3) NVL alarm engaged.     4) Hemosafe device applied (if applicable).     5) No collapse of Arterial or Venous blood chambers.     6) All blood lines / pump segments in the air detectors.

## 2025-01-04 NOTE — ED PROVIDER NOTES
[facility]  Emergency Medicine Note  Called to the floor for  Emergency Intervention     Patient Identification:  Raúl Dunn is a 39 y.o. male.  :  1985  MRN: [id number]     Acct: [id number]   Admit Date:  1/3/2025  Attending Provider: Gisele John MD     Code Status:  Full Code                           Admission Diagnoses:   Hyperkalemia [E87.5]  Dialysis patient ([facility]) [Z99.2]  Vascular port complication, initial encounter [T82.9XXA]    Current Problem List:   Patient Active Problem List   Diagnosis    Necrotizing fasciitis    Essential hypertension    Type 2 diabetes mellitus with left diabetic foot infection (HCC)    Diabetic foot ulcer (HCC)    Diabetic foot infection (HCC)    Iron deficiency anemia    Muscle weakness of right upper extremity    Wrist drop, acquired, right    Chronic paraplegia (HCC)    DEBBY (acute kidney injury) (HCC)    Right arm weakness    Chronic systolic heart failure (HCC)    Sacral wound    Open wound of left foot    Ambrosio catheter in place    Colostomy in place (HCC)    Volume overload    Hypoglycemia due to insulin    CKD (chronic kidney disease) stage 4, GFR 15-29 ml/min (HCC)    Osteomyelitis of left foot    Physical debility    PAD (peripheral artery disease) (HCC)    Acute osteomyelitis of calcaneum, left    Pressure injury of left heel, stage 4 (HCC)    Left foot pain    Hyperkalemia       Reason for Being Called to the Bedside:   The patient is a 39 y.o. male who was in cardiac arrest.    Encounter Course:    When I entered the room, patient was receiving CPR.  Patient is here for vascular access problem.  He received dialysis earlier in the day according to nursing staff.  Patient was refusing any lab work.  He was refusing his vitals being taken earlier in the day.  Patient was refusing telemetry and was saying that he wants to leave AMA.  Last time patient was seen prior to finding him unresponsive was approximately 1.5 hours.  Patient had fixed and

## 2025-01-04 NOTE — PROGRESS NOTES
Patient's mother took patient's belongings when she and patient's brother left.  All lines were removed from patient.

## 2025-01-04 NOTE — PROGRESS NOTES
Patient states that he is unable to find transportation back to his facility and will remain as a patient tonight.  Patient continues to refuse to answer admission questions at this time.

## 2025-01-04 NOTE — FLOWSHEET NOTE
01/03/25 1938   Treatment Team Notification   Reason for Communication Evaluate   Name of Team Member Notified RADHA Fagan   Treatment Team Role Advanced Practice Nurse   Method of Communication Call   Response No new orders   Notification Time 1938     RN notified NP that patient is demanding to be discharged. NP notified that patient states that he came to the hospital to have dialysis catheter exchanged and does not want to remain here. NP states that she will come speak with the patient.

## 2025-01-04 NOTE — CARE COORDINATION
Called Pt emergency contact Anna (Mom) no answer. Voicemail left. Pt brother contact found. Called and spoke with brother. Updated on pt change on condition.

## 2025-01-04 NOTE — PROGRESS NOTES
Patient refused to have RN assess him.  Patient is very talkative and does not appear to be in any distress.  Respirations are even and unlabored.  Patient did refuse to wear telemetry, but did agree to have vitals.done.  Vitals:    01/03/25 1941   BP: (!) 155/88   Pulse: 86   Resp: 19   Temp: 98.1 °F (36.7 °C)   SpO2: 100%     [place] NP did speak to patient and updated RN on that conversation.  Patient is mot medically cleared for discharge at this time.  Patient states that he wants to leave AMA.  RN did inform patient that he needs to set up his own transport from this facility.  Patient is currently texting someone now.

## 2025-01-08 ENCOUNTER — TELEPHONE (OUTPATIENT)
Dept: INTERNAL MEDICINE CLINIC | Age: 40
End: 2025-01-08

## 2025-01-08 NOTE — TELEPHONE ENCOUNTER
Patient passed away at [facility] 1/4/2025 @ 4:25 am.     Will you sign the death certificate?

## 2025-01-10 NOTE — TELEPHONE ENCOUNTER
Death Certificate signed and scanned into chart.  Home called for . Per [place] at  home they would [facility] [email or web address]

## 2025-01-10 NOTE — DISCHARGE SUMMARY
IN-PATIENT SERVICE   [facility] Internal Medicine    Discharge Summary     Patient ID: Raúl Dunn  :  1985   MRN: [id number]     ACCOUNT:  [id number]   Patient's PCP: No primary care provider on file.  Admit Date: 1/3/2025   Discharge Date: 1/10/2025    Length of Stay: 1  Code Status:  Prior  Admitting Physician: Gisele John MD  Discharge Physician: Gisele John MD     Active Discharge Diagnoses:     Primary Problem  Hyperkalemia      Hospital Problems  Active Hospital Problems    Diagnosis Date Noted    Hyperkalemia [E87.5] 2025       Admission Condition:  fair     Discharged Condition: fair    Hospital Stay:     Hospital Course:  Raúl Dunn is a 39 y.o. 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, chronic Ambrosio, chronic heart failure with ejection fraction of 35 to 40%, hypertension type 2 diabetes mellitus presented to the ER with malfunctioning dialysis catheter  Patient reported that his dialysis port is not working, patient had partial session of dialysis on Wednesday  In the ER found to have severely hyperkalemic and uremic, IR was consulted patient had tunneled catheter placed  Seen and examined on the ER, complaining of headache and leg cramps,''    Patient had hyperkalemia with potassium of 6.1, catheter was replaced, started on dialysis patient did not complete the dialysis session  Wanted to leave AMA, refused care, refused blood  draws and vital  Patient coded on  and passed away on  at 4:35 AM    Significant therapeutic interventions:     Significant Diagnostic Studies:   Labs / Micro:        Electronically signed by   Gisele John MD  1/10/2025  2:44 PM      Thank you DrFrank No primary care provider on file. for the opportunity to be  involved in this patient's care.

## 2025-01-10 NOTE — TELEPHONE ENCOUNTER
Tammy called inquiring if the physician will sign the death certificate. Informed Tammy I left a message with Devon yesterday.    Informed her that the physician will not be in until the 17 th.    Tammy stated she will send it over.   Verbalized Understanding/Simple: Patient demonstrates quick and easy understanding

## (undated) DEVICE — GOWN,AURORA,NONREINFORCED,LARGE: Brand: MEDLINE

## (undated) DEVICE — SOLUTION PREP POVIDONE IOD FOR SKIN MUCOUS MEM PRIOR TO

## (undated) DEVICE — ADAPTER COLOPLAST

## (undated) DEVICE — MITT PREP W575XL775IN POVIDONE IOD HAIR REMV

## (undated) DEVICE — CONNECTOR DSG Y FOR 1 VAC THER UNIT TRAC

## (undated) DEVICE — SUTURE PERMAHAND SZ 3-0 L18IN NONABSORBABLE BLK L26MM SH C013D

## (undated) DEVICE — PENROSE DRAIN 18 X .5" SILICONE: Brand: MEDLINE

## (undated) DEVICE — GLOVE ORANGE PI 7   MSG9070

## (undated) DEVICE — INTENDED FOR TISSUE SEPARATION, AND OTHER PROCEDURES THAT REQUIRE A SHARP SURGICAL BLADE TO PUNCTURE OR CUT.: Brand: BARD-PARKER ® CARBON RIB-BACK BLADES

## (undated) DEVICE — SUTURE VCRL SZ 2-0 L27IN ABSRB UD L26MM SH 1/2 CIR J417H

## (undated) DEVICE — SEALER ENDOSCP NANO COAT OPN DIV CRV L JAW LIGASURE IMPACT

## (undated) DEVICE — CANISTER NEG PRSS 1000ML W/ GEL INFOVAC

## (undated) DEVICE — SVMMC POD PK

## (undated) DEVICE — ADHESIVE SKIN CLSR 0.7ML TOP DERMBND ADV

## (undated) DEVICE — SUTURE PROL SZ 0 L30IN NONABSORBABLE BLU L26MM CT-2 1/2 CIR 8412H

## (undated) DEVICE — SET IRRIG TB DISP FOR BONESCALPEL

## (undated) DEVICE — DRAPE THYROID

## (undated) DEVICE — DRAPE IRRIG FLD WRM W44XL66IN W/ AORN STD PRTBL INTRATEMP

## (undated) DEVICE — PAD,ABDOMINAL,5"X9",ST,LF,25/BX: Brand: MEDLINE INDUSTRIES, INC.

## (undated) DEVICE — RELOAD STPL L75MM OPN H3.8MM CLS 1.5MM WIRE DIA0.2MM REG

## (undated) DEVICE — CONTAINER,SPECIMEN,4OZ,OR STRL: Brand: MEDLINE

## (undated) DEVICE — DRESSING PETRO W3XL8IN OIL EMUL N ADH GZ KNIT IMPREG CELOS

## (undated) DEVICE — DRESSING NEG PRSS L W15XH3.3XL26CM BLK POLYUR DRP PD

## (undated) DEVICE — SOLUTION SCRB 4OZ 4% CHG H2O AIDED FOR PREOPERATIVE SKIN

## (undated) DEVICE — GLOVE ORANGE PI 8   MSG9080

## (undated) DEVICE — DRAPE,REIN 53X77,STERILE: Brand: MEDLINE

## (undated) DEVICE — V.A.C. VERAFLO CLEANSE CHOICE™ DRESSING LARGE: Brand: V.A.C. VERAFLO CLEANSE CHOICE™

## (undated) DEVICE — ELECTRODE PT RET AD L9FT HI MOIST COND ADH HYDRGEL CORDED

## (undated) DEVICE — SUTURE MCRYL SZ 4-0 L18IN ABSRB UD L16MM PC-3 3/8 CIR PRIM Y845G

## (undated) DEVICE — YANKAUER,FLEXIBLE HANDLE,REGLR CAPACITY: Brand: MEDLINE INDUSTRIES, INC.

## (undated) DEVICE — COLOPLAST  CLAMP . FOR DRAINABLE OSTOMY BAGS.: Brand: COLOPLAST

## (undated) DEVICE — DISSECTOR LAP DIA5MM BLNT TIP ENDOPATH

## (undated) DEVICE — GAUZE,PACKING STRIP,PLAIN,1/2"X5YD,STRL: Brand: CURAD

## (undated) DEVICE — PACK PROCEDURE SURG GEN MIN SVMMC

## (undated) DEVICE — DISPOSABLE LAPAROSCOPIC CORDS, 1 PER POUCH: Brand: A&E MEDICAL / DISPOSABLE LAPAROSCOPIC CORDS

## (undated) DEVICE — TUBING, SUCTION, 9/32" X 20', STRAIGHT: Brand: MEDLINE INDUSTRIES, INC.

## (undated) DEVICE — Device

## (undated) DEVICE — DRESSING WND VAC MED GRANUFOAM SENSATRAC

## (undated) DEVICE — SUTURE ABSORBABLE BRAIDED 3-0 12X18 IN COAT UD VICRYL + VCP110G

## (undated) DEVICE — 3M™ STERI-STRIP™ COMPOUND BENZOIN TINCTURE 40 BAGS/CARTON 4 CARTONS/CASE C1544: Brand: 3M™ STERI-STRIP™

## (undated) DEVICE — DRAPE NEG PRSS W30.5XL26CM POLYUR ADH VAC

## (undated) DEVICE — SUTURE COAT VCRL SZ 0 L18IN ABSRB UD W/O NDL POLYGLACTIN J112T

## (undated) DEVICE — GLOVE SURG SZ 65 THK91MIL LTX FREE SYN POLYISOPRENE

## (undated) DEVICE — SVMMC HD AND NK PK

## (undated) DEVICE — SUTURE PROL SZ 0 L30IN NONABSORBABLE BLU L36MM CT-1 1/2 CIR 8424H

## (undated) DEVICE — STERILE LATEX POWDER-FREE SURGICAL GLOVESWITH NITRILE COATING: Brand: PROTEXIS

## (undated) DEVICE — ELECTROSURGICAL PENCIL BUTTON SWITCH E-Z CLEAN COATED BLADE ELECTRODE 10 FT (3 M) CORD HOLSTER: Brand: MEGADYNE

## (undated) DEVICE — GLOVE ORANGE PI 7 1/2   MSG9075

## (undated) DEVICE — STERILE POLYISOPRENE POWDER-FREE SURGICAL GLOVES: Brand: PROTEXIS

## (undated) DEVICE — SUTURE VIC + BR UD PS2 4-0 18IN VCP496G

## (undated) DEVICE — SUTURE MCRYL + SZ 5 0 L18IN ABSRB UD PC 3 L16MM 3 8 CIR MCP844G

## (undated) DEVICE — CHLORAPREP 26ML ORANGE

## (undated) DEVICE — STRAP RESTRAIN W3.5XL19IN TECLIN STRRP POS LEG DURING LITH

## (undated) DEVICE — UNDERPANTS MAT L/XL KNIT SEAMLESS CLR CODE WAISTBAND

## (undated) DEVICE — GOWN,SIRUS,NONRNF,SETINSLV,XL,20/CS: Brand: MEDLINE

## (undated) DEVICE — STERILE POLYISOPRENE POWDER-FREE SURGICAL GLOVES WITH EMOLLIENT COATING: Brand: PROTEXIS

## (undated) DEVICE — Z DISCONTINUED BY MEDLINE USE 2711682 TRAY SKIN PREP DRY W/ PREM GLV

## (undated) DEVICE — KIT NEG PRSS M DSG FOAM VAC VERAFLO

## (undated) DEVICE — STAPLER INT L34CM 60MM LNG ENDOSCP ARTC PWR + ECHELON FLX

## (undated) DEVICE — GAUZE,PACKING STRIP,IODOFORM,1"X5YD,STRL: Brand: CURAD

## (undated) DEVICE — KIT PEG 20FR STD PUL EN ACCS DEV ENDOVIVE

## (undated) DEVICE — SHEET, ORTHO, SPLIT, STERILE: Brand: MEDLINE

## (undated) DEVICE — DRAPE,LAP,CHOLE,W/TROUGHS,STERILE: Brand: MEDLINE

## (undated) DEVICE — DRESSING NEG PRESSURE WND VAC

## (undated) DEVICE — DRAPE,U/ SHT,SPLIT,PLAS,STERIL: Brand: MEDLINE

## (undated) DEVICE — PROTECTOR ULN NRV PUR FOAM HK LOOP STRP ANATOMICALLY

## (undated) DEVICE — MARKER,SKIN,WI/RULER AND LABELS: Brand: MEDLINE

## (undated) DEVICE — Device: Brand: SENSURA MIO

## (undated) DEVICE — SUTURE VCRL SZ 3-0 L18IN ABSRB UD L26MM SH 1/2 CIR J864D

## (undated) DEVICE — STRAP,CATHETER,ELASTIC,HOOK&LOOP: Brand: MEDLINE

## (undated) DEVICE — SUTURE PROL SZ 0 L30IN NONABSORBABLE BLU MO-6 L26MM 1/2 CIR 8418H

## (undated) DEVICE — GARMENT,MEDLINE,DVT,INT,CALF,MED, GEN2: Brand: MEDLINE

## (undated) DEVICE — BANDAGE,GAUZE,BULKEE II,4.5"X4.1YD,STRL: Brand: MEDLINE

## (undated) DEVICE — RELOAD STPL L60MM H1-2.6MM MESENTERY THN TISS WHT 6 ROW

## (undated) DEVICE — GAUZE,SPONGE,FLUFF,6"X6.75",STRL,5/TRAY: Brand: MEDLINE

## (undated) DEVICE — GLOVE SURG SZ 6 THK91MIL LTX FREE SYN POLYISOPRENE ANTI

## (undated) DEVICE — BANDAGE COBAN 4 IN COMPR W4INXL5YD FOAM COHESIVE QUIK STK SELF ADH SFT

## (undated) DEVICE — RESERVOIR,SUCTION,100CC,SILICONE: Brand: MEDLINE

## (undated) DEVICE — STAPLER INT L75MM CUT LN L73MM STPL LN L77MM BLU B FRM 8

## (undated) DEVICE — SOLUTION SCRB 4OZ 10% POVIDONE IOD ANTIMIC BTL

## (undated) DEVICE — TUBE IRRIG HNDPC HI FLO TP INTRPULS W/SUCTION TUBE

## (undated) DEVICE — TROCAR: Brand: KII SHIELDED BLADED ACCESS SYSTEM

## (undated) DEVICE — ZIMMER® STERILE DISPOSABLE TOURNIQUET CUFF WITH PROTECTIVE SLEEVE AND PLC, DUAL PORT, SINGLE BLADDER, 34 IN. (86 CM)

## (undated) DEVICE — CONNECTOR,TUBING,5-IN-1,NON-STERILE: Brand: MEDLINE INDUSTRIES, INC.

## (undated) DEVICE — INSUFFLATION TUBING SET WITH FILTER, FUNNEL CONNECTOR AND LUER LOCK: Brand: JOSNOE MEDICAL INC

## (undated) DEVICE — SYRINGE IRRIG 60ML SFT PLIABLE BLB EZ TO GRP 1 HND USE W/

## (undated) DEVICE — TROCARS: Brand: KII® BALLOON BLUNT TIP SYSTEM

## (undated) DEVICE — AGENT HEMSTAT W2XL4IN OXIDIZED REGENERATED CELOS ABSRB SFT

## (undated) DEVICE — AGENT HEMSTAT W6XL9IN OXIDIZED REGENERATED CELOS ABSRB FOR

## (undated) DEVICE — CANISTER NEG PRSS 500ML WND THER W/ TBNG NO PRSS RANG W/

## (undated) DEVICE — CYSTO/BLADDER IRRIGATION SET, REGULATING CLAMP

## (undated) DEVICE — COVER,MAYO STAND,STERILE: Brand: MEDLINE

## (undated) DEVICE — DRAIN WND SIL FLAT RADIOPAQUE 10MM FULL FLUTED

## (undated) DEVICE — GOWN,AURORA,NONRNF,XL,30/CS: Brand: MEDLINE

## (undated) DEVICE — PROBE US DEB DISP SONICVAC

## (undated) DEVICE — STRIP WND PK W0.25INXL5YD IODO GZ TIGHTLY WVN CURAD

## (undated) DEVICE — SUTURE PDS II SZ 1 L36IN ABSRB VLT L36MM CT-1 1/2 CIR Z347H

## (undated) DEVICE — BLADE CLIPPER GEN PURP NS

## (undated) DEVICE — GAUZE,PACKING STRIP,IODOFORM,2"X5YD,STRL: Brand: CURAD

## (undated) DEVICE — SUTURE VCRL SZ 3-0 L27IN ABSRB UD L26MM SH 1/2 CIR J416H

## (undated) DEVICE — NEEDLE HYPO 25GA L1.5IN BLU POLYPR HUB S STL REG BVL STR

## (undated) DEVICE — SYRINGE MED 10ML LUERLOCK TIP W/O SFTY DISP

## (undated) DEVICE — YANKAUER,POOLE TIP,STERILE,50/CS: Brand: MEDLINE

## (undated) DEVICE — GAUZE,PACKING STRIP,PLAIN,1"X5YD,STRL,LF: Brand: CURAD

## (undated) DEVICE — GLOVE EXAM M L95IN FNGR THK35MIL PALM THK24MIL OFF WHT

## (undated) DEVICE — C-ARM: Brand: UNBRANDED

## (undated) DEVICE — HANDPIECE SET WITH COAXIAL HIGH FLOW TIP AND SUCTION TUBE: Brand: INTERPULSE

## (undated) DEVICE — SUTURE VCRL SZ 0 L27IN ABSRB UD L26MM CT-2 1/2 CIR J270H

## (undated) DEVICE — SPONGE LAP W18XL18IN WHT COT 4 PLY FLD STRUNG RADPQ DISP ST

## (undated) DEVICE — NEEDLE FLTR 18GA L1.5IN MEM THK5UM BLNT DISP

## (undated) DEVICE — SUTURE PROL SZ 2-0 L30IN NONABSORBABLE BLU L26MM SH 1/2 CIR 8833H

## (undated) DEVICE — BANDAGE,ELASTIC,ESMARK,STERILE,4"X9',LF: Brand: MEDLINE

## (undated) DEVICE — CASSETTE THER 38 MM W/ INSTILLATION TBNG VAC VERALINK

## (undated) DEVICE — MICRO DUAL CUT (9.0 X 0.38 X 25.0MM)

## (undated) DEVICE — TOWEL,OR,DSP,ST,NATURAL,DLX,4/PK,20PK/CS: Brand: MEDLINE

## (undated) DEVICE — LEGGINGS, PAIR, 31X48, STERILE: Brand: MEDLINE

## (undated) DEVICE — SUTURE VCRL + SZ 2-0 L27IN ABSRB CLR CT-1 1/2 CIR TAPERCUT VCP259H

## (undated) DEVICE — SUTURE PROL SZ 2-0 L30IN NONABSORBABLE BLU L26MM CT-2 1/2 8411H

## (undated) DEVICE — GLOVE ORTHO 8   MSG9480

## (undated) DEVICE — SOLUTION ANTIFOG VIS SYS CLEARIFY LAPSCP

## (undated) DEVICE — BRAVA STRIP PASTE. OSTOMY CARE.: Brand: BRAVA

## (undated) DEVICE — DRESSING ALG W8XL20IN THN ABSRB SELF ADH BORD MEPILEX

## (undated) DEVICE — BURN DRESSING BUTTOCKS28 PLY: Brand: DEROYAL

## (undated) DEVICE — PACK SURG ABD SVMMC

## (undated) DEVICE — DRAPE,EENT,SPLIT,STERILE: Brand: MEDLINE

## (undated) DEVICE — SUTURE ETHLN SZ 3-0 L18IN NONABSORBABLE BLK L24MM PS-1 3/8 1663G

## (undated) DEVICE — DRESSING WND VAC XLG GRANUFOAM SENSATRAC

## (undated) DEVICE — Z INACTIVE USE 2527070 DRAPE SURG W40XL44IN UNDERBUTTOCK SMS POLYPR W/ PCH BK DISP

## (undated) DEVICE — RASP SURG L W0.27XL0.55IN TEAR CRSS CUT MIC RECIP SAW

## (undated) DEVICE — AGENT HEMSTAT W4XL8IN OXIDIZED REGENERATED CELOS ABSRB

## (undated) DEVICE — COVER LT HNDL BLU PLAS

## (undated) DEVICE — KIT VAC SENSA TRAC PAD DRP

## (undated) DEVICE — CATHETER,URETHRAL,REDRUBBER,STRL,16FR: Brand: MEDLINE

## (undated) DEVICE — WOUND RETRACTOR AND PROTECTOR: Brand: ALEXIS O WOUND PROTECTOR-RETRACTOR

## (undated) DEVICE — SHEET, T, LAPAROTOMY, STERILE: Brand: MEDLINE

## (undated) DEVICE — SUTURE SZ 0 27IN 5/8 CIR UR-6  TAPER PT VIOLET ABSRB VICRYL J603H